# Patient Record
Sex: MALE | Race: ASIAN | URBAN - METROPOLITAN AREA
[De-identification: names, ages, dates, MRNs, and addresses within clinical notes are randomized per-mention and may not be internally consistent; named-entity substitution may affect disease eponyms.]

---

## 2017-02-25 ASSESSMENT — MIFFLIN-ST. JEOR
SCORE: 1419.88
SCORE: 1419.88

## 2017-02-26 ENCOUNTER — SURGERY - HEALTHEAST (OUTPATIENT)
Dept: GASTROENTEROLOGY | Facility: HOSPITAL | Age: 42
End: 2017-02-26

## 2017-02-27 ENCOUNTER — SURGERY - HEALTHEAST (OUTPATIENT)
Dept: GASTROENTEROLOGY | Facility: HOSPITAL | Age: 42
End: 2017-02-27

## 2017-02-27 ASSESSMENT — MIFFLIN-ST. JEOR
SCORE: 1572.09
SCORE: 1572.09

## 2017-07-26 ENCOUNTER — RECORDS - HEALTHEAST (OUTPATIENT)
Dept: ADMINISTRATIVE | Facility: OTHER | Age: 42
End: 2017-07-26

## 2017-08-04 ENCOUNTER — HOSPITAL ENCOUNTER (OUTPATIENT)
Dept: MRI IMAGING | Facility: HOSPITAL | Age: 42
Discharge: HOME | End: 2017-08-04
Attending: PHYSICIAN ASSISTANT

## 2017-08-04 DIAGNOSIS — R79.89 OTHER ABNORMAL BLOOD CHEMISTRY: ICD-10-CM

## 2018-01-01 ENCOUNTER — ANESTHESIA EVENT (OUTPATIENT)
Dept: SURGERY | Facility: CLINIC | Age: 43
End: 2018-01-01

## 2018-01-01 ENCOUNTER — HOME CARE/HOSPICE - HEALTHEAST (OUTPATIENT)
Dept: HOME HEALTH SERVICES | Facility: HOME HEALTH | Age: 43
End: 2018-01-01

## 2018-01-01 ENCOUNTER — APPOINTMENT (OUTPATIENT)
Dept: CT IMAGING | Facility: CLINIC | Age: 43
DRG: 813 | End: 2018-01-01
Attending: INTERNAL MEDICINE
Payer: COMMERCIAL

## 2018-01-01 ENCOUNTER — APPOINTMENT (OUTPATIENT)
Dept: GENERAL RADIOLOGY | Facility: CLINIC | Age: 43
DRG: 813 | End: 2018-01-01
Attending: STUDENT IN AN ORGANIZED HEALTH CARE EDUCATION/TRAINING PROGRAM
Payer: COMMERCIAL

## 2018-01-01 ENCOUNTER — RESULTS ONLY (OUTPATIENT)
Dept: OTHER | Facility: CLINIC | Age: 43
End: 2018-01-01

## 2018-01-01 ENCOUNTER — OFFICE VISIT (OUTPATIENT)
Dept: INTERPRETER SERVICES | Facility: CLINIC | Age: 43
End: 2018-01-01
Payer: COMMERCIAL

## 2018-01-01 ENCOUNTER — APPOINTMENT (OUTPATIENT)
Dept: OCCUPATIONAL THERAPY | Facility: CLINIC | Age: 43
DRG: 005 | End: 2018-01-01
Attending: TRANSPLANT SURGERY
Payer: COMMERCIAL

## 2018-01-01 ENCOUNTER — OFFICE VISIT (OUTPATIENT)
Dept: INFUSION THERAPY | Facility: CLINIC | Age: 43
End: 2018-01-01
Attending: INTERNAL MEDICINE
Payer: COMMERCIAL

## 2018-01-01 ENCOUNTER — INTERPRETER (OUTPATIENT)
Dept: INTERPRETER SERVICES | Facility: CLINIC | Age: 43
End: 2018-01-01
Payer: COMMERCIAL

## 2018-01-01 ENCOUNTER — APPOINTMENT (OUTPATIENT)
Dept: PHYSICAL THERAPY | Facility: CLINIC | Age: 43
DRG: 005 | End: 2018-01-01
Attending: TRANSPLANT SURGERY
Payer: COMMERCIAL

## 2018-01-01 ENCOUNTER — ANESTHESIA (OUTPATIENT)
Dept: SURGERY | Facility: CLINIC | Age: 43
End: 2018-01-01

## 2018-01-01 ENCOUNTER — OFFICE VISIT (OUTPATIENT)
Dept: INFUSION THERAPY | Facility: CLINIC | Age: 43
End: 2018-01-01
Payer: COMMERCIAL

## 2018-01-01 ENCOUNTER — APPOINTMENT (OUTPATIENT)
Dept: CARDIOLOGY | Facility: CLINIC | Age: 43
DRG: 005 | End: 2018-01-01
Attending: NURSE PRACTITIONER
Payer: COMMERCIAL

## 2018-01-01 ENCOUNTER — APPOINTMENT (OUTPATIENT)
Dept: GENERAL RADIOLOGY | Facility: CLINIC | Age: 43
DRG: 005 | End: 2018-01-01
Attending: TRANSPLANT SURGERY
Payer: COMMERCIAL

## 2018-01-01 ENCOUNTER — APPOINTMENT (OUTPATIENT)
Dept: GENERAL RADIOLOGY | Facility: CLINIC | Age: 43
DRG: 871 | End: 2018-01-01
Attending: INTERNAL MEDICINE
Payer: COMMERCIAL

## 2018-01-01 ENCOUNTER — OFFICE VISIT (OUTPATIENT)
Dept: INTERPRETER SERVICES | Facility: CLINIC | Age: 43
End: 2018-01-01

## 2018-01-01 ENCOUNTER — APPOINTMENT (OUTPATIENT)
Dept: ULTRASOUND IMAGING | Facility: CLINIC | Age: 43
DRG: 005 | End: 2018-01-01
Attending: SURGERY
Payer: COMMERCIAL

## 2018-01-01 ENCOUNTER — TRANSFERRED RECORDS (OUTPATIENT)
Dept: HEALTH INFORMATION MANAGEMENT | Facility: CLINIC | Age: 43
End: 2018-01-01

## 2018-01-01 ENCOUNTER — APPOINTMENT (OUTPATIENT)
Dept: SPEECH THERAPY | Facility: CLINIC | Age: 43
DRG: 871 | End: 2018-01-01
Attending: INTERNAL MEDICINE
Payer: COMMERCIAL

## 2018-01-01 ENCOUNTER — APPOINTMENT (OUTPATIENT)
Dept: ULTRASOUND IMAGING | Facility: CLINIC | Age: 43
DRG: 813 | End: 2018-01-01
Attending: TRANSPLANT SURGERY
Payer: COMMERCIAL

## 2018-01-01 ENCOUNTER — APPOINTMENT (OUTPATIENT)
Dept: GENERAL RADIOLOGY | Facility: CLINIC | Age: 43
DRG: 005 | End: 2018-01-01
Attending: PHYSICIAN ASSISTANT
Payer: COMMERCIAL

## 2018-01-01 ENCOUNTER — APPOINTMENT (OUTPATIENT)
Dept: CARDIOLOGY | Facility: CLINIC | Age: 43
DRG: 005 | End: 2018-01-01
Attending: TRANSPLANT SURGERY
Payer: COMMERCIAL

## 2018-01-01 ENCOUNTER — RECORDS - HEALTHEAST (OUTPATIENT)
Dept: ADMINISTRATIVE | Facility: OTHER | Age: 43
End: 2018-01-01

## 2018-01-01 ENCOUNTER — TELEPHONE (OUTPATIENT)
Dept: TRANSPLANT | Facility: CLINIC | Age: 43
End: 2018-01-01

## 2018-01-01 ENCOUNTER — COMMUNICATION - HEALTHEAST (OUTPATIENT)
Dept: SCHEDULING | Facility: CLINIC | Age: 43
End: 2018-01-01

## 2018-01-01 ENCOUNTER — APPOINTMENT (OUTPATIENT)
Dept: PHYSICAL THERAPY | Facility: CLINIC | Age: 43
DRG: 813 | End: 2018-01-01
Attending: INTERNAL MEDICINE
Payer: COMMERCIAL

## 2018-01-01 ENCOUNTER — ANESTHESIA - HEALTHEAST (OUTPATIENT)
Dept: SURGERY | Facility: HOSPITAL | Age: 43
End: 2018-01-01

## 2018-01-01 ENCOUNTER — HOSPITAL ENCOUNTER (INPATIENT)
Facility: CLINIC | Age: 43
Setting detail: SURGERY ADMIT
DRG: 813 | End: 2018-01-01
Attending: TRANSPLANT SURGERY | Admitting: TRANSPLANT SURGERY
Payer: COMMERCIAL

## 2018-01-01 ENCOUNTER — APPOINTMENT (OUTPATIENT)
Dept: GENERAL RADIOLOGY | Facility: CLINIC | Age: 43
DRG: 005 | End: 2018-01-01
Attending: NURSE PRACTITIONER
Payer: COMMERCIAL

## 2018-01-01 ENCOUNTER — SURGERY - HEALTHEAST (OUTPATIENT)
Dept: SURGERY | Facility: HOSPITAL | Age: 43
End: 2018-01-01

## 2018-01-01 ENCOUNTER — APPOINTMENT (OUTPATIENT)
Dept: CT IMAGING | Facility: CLINIC | Age: 43
DRG: 005 | End: 2018-01-01
Attending: TRANSPLANT SURGERY
Payer: COMMERCIAL

## 2018-01-01 ENCOUNTER — HOSPITAL ENCOUNTER (INPATIENT)
Facility: CLINIC | Age: 43
LOS: 49 days | DRG: 005 | End: 2018-09-08
Attending: TRANSPLANT SURGERY | Admitting: TRANSPLANT SURGERY
Payer: COMMERCIAL

## 2018-01-01 ENCOUNTER — APPOINTMENT (OUTPATIENT)
Dept: OCCUPATIONAL THERAPY | Facility: CLINIC | Age: 43
DRG: 871 | End: 2018-01-01
Attending: INTERNAL MEDICINE
Payer: COMMERCIAL

## 2018-01-01 ENCOUNTER — APPOINTMENT (OUTPATIENT)
Dept: ULTRASOUND IMAGING | Facility: CLINIC | Age: 43
DRG: 005 | End: 2018-01-01
Attending: STUDENT IN AN ORGANIZED HEALTH CARE EDUCATION/TRAINING PROGRAM
Payer: COMMERCIAL

## 2018-01-01 ENCOUNTER — RADIANT APPOINTMENT (OUTPATIENT)
Dept: ULTRASOUND IMAGING | Facility: CLINIC | Age: 43
End: 2018-01-01
Payer: COMMERCIAL

## 2018-01-01 ENCOUNTER — ORGAN (OUTPATIENT)
Dept: TRANSPLANT | Facility: CLINIC | Age: 43
End: 2018-01-01

## 2018-01-01 ENCOUNTER — DOCUMENTATION ONLY (OUTPATIENT)
Dept: TRANSPLANT | Facility: CLINIC | Age: 43
End: 2018-01-01

## 2018-01-01 ENCOUNTER — ANESTHESIA (OUTPATIENT)
Dept: INTENSIVE CARE | Facility: CLINIC | Age: 43
DRG: 871 | End: 2018-01-01
Payer: COMMERCIAL

## 2018-01-01 ENCOUNTER — APPOINTMENT (OUTPATIENT)
Dept: GENERAL RADIOLOGY | Facility: CLINIC | Age: 43
DRG: 005 | End: 2018-01-01
Attending: STUDENT IN AN ORGANIZED HEALTH CARE EDUCATION/TRAINING PROGRAM
Payer: COMMERCIAL

## 2018-01-01 ENCOUNTER — RECORDS - HEALTHEAST (OUTPATIENT)
Dept: LAB | Facility: HOSPITAL | Age: 43
End: 2018-01-01

## 2018-01-01 ENCOUNTER — APPOINTMENT (OUTPATIENT)
Dept: PHYSICAL THERAPY | Facility: CLINIC | Age: 43
DRG: 871 | End: 2018-01-01
Attending: INTERNAL MEDICINE
Payer: COMMERCIAL

## 2018-01-01 ENCOUNTER — ANESTHESIA - HEALTHEAST (OUTPATIENT)
Dept: INTENSIVE CARE | Facility: HOSPITAL | Age: 43
End: 2018-01-01

## 2018-01-01 ENCOUNTER — APPOINTMENT (OUTPATIENT)
Dept: ULTRASOUND IMAGING | Facility: CLINIC | Age: 43
DRG: 005 | End: 2018-01-01
Attending: NURSE PRACTITIONER
Payer: COMMERCIAL

## 2018-01-01 ENCOUNTER — RADIANT APPOINTMENT (OUTPATIENT)
Dept: ULTRASOUND IMAGING | Facility: CLINIC | Age: 43
End: 2018-01-01
Attending: INTERNAL MEDICINE
Payer: COMMERCIAL

## 2018-01-01 ENCOUNTER — APPOINTMENT (OUTPATIENT)
Dept: ULTRASOUND IMAGING | Facility: CLINIC | Age: 43
DRG: 005 | End: 2018-01-01
Attending: TRANSPLANT SURGERY
Payer: COMMERCIAL

## 2018-01-01 ENCOUNTER — APPOINTMENT (OUTPATIENT)
Dept: PHYSICAL THERAPY | Facility: CLINIC | Age: 43
DRG: 813 | End: 2018-01-01
Attending: TRANSPLANT SURGERY
Payer: COMMERCIAL

## 2018-01-01 ENCOUNTER — ANESTHESIA EVENT (OUTPATIENT)
Dept: INTENSIVE CARE | Facility: CLINIC | Age: 43
DRG: 871 | End: 2018-01-01
Payer: COMMERCIAL

## 2018-01-01 ENCOUNTER — ANESTHESIA EVENT (OUTPATIENT)
Dept: SURGERY | Facility: CLINIC | Age: 43
DRG: 871 | End: 2018-01-01
Payer: COMMERCIAL

## 2018-01-01 ENCOUNTER — CARE COORDINATION (OUTPATIENT)
Dept: CARE COORDINATION | Facility: CLINIC | Age: 43
End: 2018-01-01

## 2018-01-01 ENCOUNTER — HOSPITAL ENCOUNTER (INPATIENT)
Facility: CLINIC | Age: 43
LOS: 12 days | Discharge: HOME HEALTH | DRG: 871 | End: 2018-06-28
Attending: INTERNAL MEDICINE | Admitting: SURGERY
Payer: COMMERCIAL

## 2018-01-01 ENCOUNTER — RESULTS ONLY (OUTPATIENT)
Dept: GASTROENTEROLOGY | Facility: CLINIC | Age: 43
End: 2018-01-01

## 2018-01-01 ENCOUNTER — APPOINTMENT (OUTPATIENT)
Dept: CARDIOLOGY | Facility: CLINIC | Age: 43
DRG: 005 | End: 2018-01-01
Attending: PHYSICIAN ASSISTANT
Payer: COMMERCIAL

## 2018-01-01 ENCOUNTER — ANESTHESIA (OUTPATIENT)
Dept: SURGERY | Facility: CLINIC | Age: 43
DRG: 871 | End: 2018-01-01
Payer: COMMERCIAL

## 2018-01-01 ENCOUNTER — APPOINTMENT (OUTPATIENT)
Dept: SPEECH THERAPY | Facility: CLINIC | Age: 43
DRG: 005 | End: 2018-01-01
Attending: TRANSPLANT SURGERY
Payer: COMMERCIAL

## 2018-01-01 ENCOUNTER — RECORDS - HEALTHEAST (OUTPATIENT)
Dept: INTENSIVE CARE | Facility: HOSPITAL | Age: 43
End: 2018-01-01

## 2018-01-01 ENCOUNTER — HOSPITAL ENCOUNTER (INPATIENT)
Facility: CLINIC | Age: 43
Setting detail: SURGERY ADMIT
End: 2018-01-01
Attending: TRANSPLANT SURGERY | Admitting: TRANSPLANT SURGERY
Payer: COMMERCIAL

## 2018-01-01 ENCOUNTER — ANESTHESIA (OUTPATIENT)
Dept: INTENSIVE CARE | Facility: CLINIC | Age: 43
End: 2018-01-01

## 2018-01-01 ENCOUNTER — APPOINTMENT (OUTPATIENT)
Dept: CT IMAGING | Facility: CLINIC | Age: 43
DRG: 005 | End: 2018-01-01
Attending: PHYSICIAN ASSISTANT
Payer: COMMERCIAL

## 2018-01-01 ENCOUNTER — ANESTHESIA EVENT (OUTPATIENT)
Dept: INTENSIVE CARE | Facility: CLINIC | Age: 43
End: 2018-01-01

## 2018-01-01 ENCOUNTER — HOSPITAL ENCOUNTER (INPATIENT)
Facility: CLINIC | Age: 43
LOS: 4 days | Discharge: HOME | DRG: 813 | End: 2018-07-17
Attending: TRANSPLANT SURGERY | Admitting: PEDIATRICS
Payer: COMMERCIAL

## 2018-01-01 ENCOUNTER — APPOINTMENT (OUTPATIENT)
Dept: CARDIOLOGY | Facility: CLINIC | Age: 43
DRG: 871 | End: 2018-01-01
Attending: INTERNAL MEDICINE
Payer: COMMERCIAL

## 2018-01-01 ENCOUNTER — ANESTHESIA (OUTPATIENT)
Dept: SURGERY | Facility: CLINIC | Age: 43
DRG: 005 | End: 2018-01-01
Payer: COMMERCIAL

## 2018-01-01 ENCOUNTER — COMMITTEE REVIEW (OUTPATIENT)
Dept: TRANSPLANT | Facility: CLINIC | Age: 43
End: 2018-01-01

## 2018-01-01 ENCOUNTER — APPOINTMENT (OUTPATIENT)
Dept: GENERAL RADIOLOGY | Facility: CLINIC | Age: 43
DRG: 871 | End: 2018-01-01
Attending: STUDENT IN AN ORGANIZED HEALTH CARE EDUCATION/TRAINING PROGRAM
Payer: COMMERCIAL

## 2018-01-01 ENCOUNTER — APPOINTMENT (OUTPATIENT)
Dept: CT IMAGING | Facility: CLINIC | Age: 43
DRG: 005 | End: 2018-01-01
Attending: NURSE PRACTITIONER
Payer: COMMERCIAL

## 2018-01-01 ENCOUNTER — HOSPITAL ENCOUNTER (OUTPATIENT)
Dept: MRI IMAGING | Facility: HOSPITAL | Age: 43
Discharge: HOME | End: 2018-04-25

## 2018-01-01 ENCOUNTER — SURGERY - HEALTHEAST (OUTPATIENT)
Dept: GASTROENTEROLOGY | Facility: HOSPITAL | Age: 43
End: 2018-01-01

## 2018-01-01 ENCOUNTER — ANESTHESIA EVENT (OUTPATIENT)
Dept: SURGERY | Facility: CLINIC | Age: 43
DRG: 005 | End: 2018-01-01
Payer: COMMERCIAL

## 2018-01-01 ENCOUNTER — APPOINTMENT (OUTPATIENT)
Dept: ULTRASOUND IMAGING | Facility: CLINIC | Age: 43
DRG: 871 | End: 2018-01-01
Attending: INTERNAL MEDICINE
Payer: COMMERCIAL

## 2018-01-01 ENCOUNTER — HOSPITAL ENCOUNTER (OUTPATIENT)
Dept: ULTRASOUND IMAGING | Facility: HOSPITAL | Age: 43
Discharge: HOME | End: 2018-03-22
Attending: INTERPRETER

## 2018-01-01 VITALS
HEIGHT: 66 IN | TEMPERATURE: 98.5 F | DIASTOLIC BLOOD PRESSURE: 73 MMHG | RESPIRATION RATE: 16 BRPM | WEIGHT: 156.75 LBS | SYSTOLIC BLOOD PRESSURE: 111 MMHG | HEART RATE: 78 BPM | BODY MASS INDEX: 25.19 KG/M2 | OXYGEN SATURATION: 100 %

## 2018-01-01 VITALS
SYSTOLIC BLOOD PRESSURE: 112 MMHG | HEART RATE: 75 BPM | BODY MASS INDEX: 21 KG/M2 | DIASTOLIC BLOOD PRESSURE: 66 MMHG | TEMPERATURE: 99 F | WEIGHT: 130.1 LBS | OXYGEN SATURATION: 98 %

## 2018-01-01 VITALS
TEMPERATURE: 98.4 F | HEART RATE: 80 BPM | WEIGHT: 134.4 LBS | DIASTOLIC BLOOD PRESSURE: 60 MMHG | BODY MASS INDEX: 21.69 KG/M2 | OXYGEN SATURATION: 100 % | SYSTOLIC BLOOD PRESSURE: 115 MMHG

## 2018-01-01 VITALS
DIASTOLIC BLOOD PRESSURE: 73 MMHG | SYSTOLIC BLOOD PRESSURE: 107 MMHG | HEART RATE: 59 BPM | RESPIRATION RATE: 18 BRPM | WEIGHT: 141.1 LBS | TEMPERATURE: 97.5 F | OXYGEN SATURATION: 100 % | BODY MASS INDEX: 22.77 KG/M2

## 2018-01-01 VITALS
OXYGEN SATURATION: 100 % | HEART RATE: 81 BPM | DIASTOLIC BLOOD PRESSURE: 66 MMHG | BODY MASS INDEX: 21.69 KG/M2 | SYSTOLIC BLOOD PRESSURE: 113 MMHG | RESPIRATION RATE: 16 BRPM | WEIGHT: 135 LBS | HEIGHT: 66 IN | TEMPERATURE: 98.2 F

## 2018-01-01 VITALS
OXYGEN SATURATION: 96 % | RESPIRATION RATE: 19 BRPM | BODY MASS INDEX: 24.94 KG/M2 | SYSTOLIC BLOOD PRESSURE: 154 MMHG | WEIGHT: 154.54 LBS | HEART RATE: 131 BPM | DIASTOLIC BLOOD PRESSURE: 138 MMHG | TEMPERATURE: 100 F

## 2018-01-01 DIAGNOSIS — E83.39 HYPOPHOSPHATASIA: ICD-10-CM

## 2018-01-01 DIAGNOSIS — K70.31 ALCOHOLIC CIRRHOSIS OF LIVER WITH ASCITES (H): ICD-10-CM

## 2018-01-01 DIAGNOSIS — N39.0 ENTEROCOCCUS UTI: ICD-10-CM

## 2018-01-01 DIAGNOSIS — B18.1 CHRONIC HEPATITIS B (H): ICD-10-CM

## 2018-01-01 DIAGNOSIS — B18.1 CHRONIC HEPATITIS B WITH CIRRHOSIS (H): ICD-10-CM

## 2018-01-01 DIAGNOSIS — E87.6 HYPOKALEMIA: ICD-10-CM

## 2018-01-01 DIAGNOSIS — Z78.9 ON ENTERAL NUTRITION: ICD-10-CM

## 2018-01-01 DIAGNOSIS — B18.1 HEPATITIS B, CHRONIC (H): ICD-10-CM

## 2018-01-01 DIAGNOSIS — D84.9 IMMUNOSUPPRESSION (H): ICD-10-CM

## 2018-01-01 DIAGNOSIS — Z94.4 LIVER TRANSPLANTED (H): Primary | ICD-10-CM

## 2018-01-01 DIAGNOSIS — K70.31 ALCOHOLIC CIRRHOSIS OF LIVER WITH ASCITES (H): Primary | ICD-10-CM

## 2018-01-01 DIAGNOSIS — B18.1 CHRONIC HEPATITIS B WITH CIRRHOSIS (H): Primary | ICD-10-CM

## 2018-01-01 DIAGNOSIS — B18.1 CHRONIC VIRAL HEPATITIS B WITHOUT DELTA AGENT AND WITHOUT COMA (H): ICD-10-CM

## 2018-01-01 DIAGNOSIS — K74.60 CHRONIC HEPATITIS B WITH CIRRHOSIS (H): ICD-10-CM

## 2018-01-01 DIAGNOSIS — B37.2 CANDIDIASIS OF SKIN: ICD-10-CM

## 2018-01-01 DIAGNOSIS — G93.40 ENCEPHALOPATHY: Primary | ICD-10-CM

## 2018-01-01 DIAGNOSIS — I86.4 BLEEDING GASTRIC VARICES: ICD-10-CM

## 2018-01-01 DIAGNOSIS — E11.9 TYPE 2 DIABETES MELLITUS WITHOUT COMPLICATION, WITHOUT LONG-TERM CURRENT USE OF INSULIN (H): ICD-10-CM

## 2018-01-01 DIAGNOSIS — R18.8 CIRRHOSIS OF LIVER WITH ASCITES, UNSPECIFIED HEPATIC CIRRHOSIS TYPE (H): ICD-10-CM

## 2018-01-01 DIAGNOSIS — K65.2 SBP (SPONTANEOUS BACTERIAL PERITONITIS) (H): ICD-10-CM

## 2018-01-01 DIAGNOSIS — K74.60 CIRRHOSIS (H): Primary | ICD-10-CM

## 2018-01-01 DIAGNOSIS — D69.6 THROMBOCYTOPENIA (H): Primary | ICD-10-CM

## 2018-01-01 DIAGNOSIS — K74.60 CHRONIC HEPATITIS B WITH CIRRHOSIS (H): Primary | ICD-10-CM

## 2018-01-01 DIAGNOSIS — K74.60 CIRRHOSIS (H): ICD-10-CM

## 2018-01-01 DIAGNOSIS — K76.82 HEPATIC ENCEPHALOPATHY (H): ICD-10-CM

## 2018-01-01 DIAGNOSIS — K74.60 CIRRHOSIS OF LIVER WITH ASCITES, UNSPECIFIED HEPATIC CIRRHOSIS TYPE (H): ICD-10-CM

## 2018-01-01 DIAGNOSIS — B95.2 ENTEROCOCCUS UTI: ICD-10-CM

## 2018-01-01 LAB
A* LOCUS NMDP: NORMAL
A* LOCUS: NORMAL
A* NMDP: NORMAL
A2: 1231
ABO + RH BLD: NORMAL
ABTEST METHOD: NORMAL
ACANTHOCYTES BLD QL SMEAR: SLIGHT
AFP L3 MFR SERPL: 14.3 % (ref 0–9.9)
AFP SERPL-MCNC: 4 NG/ML (ref 0–15)
AFP SERPL-MCNC: 4.1 UG/L (ref 0–8)
AFP SERPL-MCNC: NORMAL UG/L (ref 0–8)
ALBUMIN FLD-MCNC: 0.1 G/DL
ALBUMIN FLD-MCNC: 0.9 G/DL
ALBUMIN FLD-MCNC: 1 G/DL
ALBUMIN FLD-MCNC: 1 G/DL
ALBUMIN SERPL-MCNC: 1 G/DL (ref 3.4–5)
ALBUMIN SERPL-MCNC: 1.1 G/DL (ref 3.4–5)
ALBUMIN SERPL-MCNC: 1.3 G/DL (ref 3.4–5)
ALBUMIN SERPL-MCNC: 1.4 G/DL (ref 3.4–5)
ALBUMIN SERPL-MCNC: 1.5 G/DL (ref 3.4–5)
ALBUMIN SERPL-MCNC: 1.6 G/DL (ref 3.4–5)
ALBUMIN SERPL-MCNC: 1.7 G/DL (ref 3.4–5)
ALBUMIN SERPL-MCNC: 1.8 G/DL (ref 3.4–5)
ALBUMIN SERPL-MCNC: 1.9 G/DL (ref 3.4–5)
ALBUMIN SERPL-MCNC: 2 G/DL (ref 3.4–5)
ALBUMIN SERPL-MCNC: 2.1 G/DL (ref 3.4–5)
ALBUMIN SERPL-MCNC: 2.2 G/DL (ref 3.4–5)
ALBUMIN SERPL-MCNC: 2.3 G/DL (ref 3.4–5)
ALBUMIN SERPL-MCNC: 2.4 G/DL (ref 3.4–5)
ALBUMIN SERPL-MCNC: 2.5 G/DL (ref 3.4–5)
ALBUMIN SERPL-MCNC: 2.6 G/DL (ref 3.4–5)
ALBUMIN SERPL-MCNC: 2.7 G/DL (ref 3.4–5)
ALBUMIN SERPL-MCNC: 2.8 G/DL (ref 3.4–5)
ALBUMIN SERPL-MCNC: 2.9 G/DL (ref 3.4–5)
ALBUMIN SERPL-MCNC: 2.9 G/DL (ref 3.4–5)
ALBUMIN SERPL-MCNC: 3 G/DL (ref 3.4–5)
ALBUMIN SERPL-MCNC: 3.1 G/DL (ref 3.5–5)
ALBUMIN SERPL-MCNC: 3.2 G/DL (ref 3.4–5)
ALBUMIN SERPL-MCNC: 3.2 G/DL (ref 3.4–5)
ALBUMIN SERPL-MCNC: 3.4 G/DL (ref 3.4–5)
ALBUMIN SERPL-MCNC: 3.5 G/DL (ref 3.4–5)
ALBUMIN SERPL-MCNC: 3.6 G/DL (ref 3.4–5)
ALBUMIN SERPL-MCNC: 3.7 G/DL (ref 3.4–5)
ALBUMIN SERPL-MCNC: 3.8 G/DL (ref 3.4–5)
ALBUMIN SERPL-MCNC: 3.8 G/DL (ref 3.4–5)
ALBUMIN SERPL-MCNC: 4 G/DL (ref 3.4–5)
ALBUMIN UR-MCNC: 10 MG/DL
ALBUMIN UR-MCNC: 30 MG/DL
ALBUMIN UR-MCNC: 30 MG/DL
ALBUMIN UR-MCNC: NEGATIVE MG/DL
ALP SERPL-CCNC: 100 U/L (ref 40–150)
ALP SERPL-CCNC: 103 U/L (ref 40–150)
ALP SERPL-CCNC: 107 U/L (ref 40–150)
ALP SERPL-CCNC: 111 U/L (ref 40–150)
ALP SERPL-CCNC: 127 U/L (ref 40–150)
ALP SERPL-CCNC: 138 U/L (ref 40–150)
ALP SERPL-CCNC: 142 U/L (ref 40–150)
ALP SERPL-CCNC: 144 U/L (ref 40–150)
ALP SERPL-CCNC: 148 U/L (ref 40–150)
ALP SERPL-CCNC: 148 U/L (ref 40–150)
ALP SERPL-CCNC: 151 U/L (ref 40–150)
ALP SERPL-CCNC: 153 U/L (ref 40–150)
ALP SERPL-CCNC: 154 U/L (ref 40–150)
ALP SERPL-CCNC: 157 U/L (ref 40–150)
ALP SERPL-CCNC: 157 U/L (ref 40–150)
ALP SERPL-CCNC: 158 U/L (ref 40–150)
ALP SERPL-CCNC: 160 U/L (ref 40–150)
ALP SERPL-CCNC: 162 U/L (ref 40–150)
ALP SERPL-CCNC: 164 U/L (ref 40–150)
ALP SERPL-CCNC: 170 U/L (ref 40–150)
ALP SERPL-CCNC: 178 U/L (ref 40–150)
ALP SERPL-CCNC: 179 U/L (ref 40–150)
ALP SERPL-CCNC: 190 U/L (ref 40–150)
ALP SERPL-CCNC: 192 U/L (ref 40–150)
ALP SERPL-CCNC: 193 U/L (ref 40–150)
ALP SERPL-CCNC: 193 U/L (ref 40–150)
ALP SERPL-CCNC: 194 U/L (ref 40–150)
ALP SERPL-CCNC: 196 U/L (ref 40–150)
ALP SERPL-CCNC: 199 U/L (ref 40–150)
ALP SERPL-CCNC: 202 U/L (ref 40–150)
ALP SERPL-CCNC: 204 U/L (ref 40–150)
ALP SERPL-CCNC: 206 U/L (ref 40–150)
ALP SERPL-CCNC: 206 U/L (ref 40–150)
ALP SERPL-CCNC: 208 U/L (ref 40–150)
ALP SERPL-CCNC: 211 U/L (ref 40–150)
ALP SERPL-CCNC: 218 U/L (ref 40–150)
ALP SERPL-CCNC: 218 U/L (ref 40–150)
ALP SERPL-CCNC: 223 U/L (ref 40–150)
ALP SERPL-CCNC: 224 U/L (ref 40–150)
ALP SERPL-CCNC: 224 U/L (ref 40–150)
ALP SERPL-CCNC: 227 U/L (ref 40–150)
ALP SERPL-CCNC: 227 U/L (ref 40–150)
ALP SERPL-CCNC: 228 U/L (ref 40–150)
ALP SERPL-CCNC: 233 U/L (ref 40–150)
ALP SERPL-CCNC: 235 U/L (ref 40–150)
ALP SERPL-CCNC: 239 U/L (ref 40–150)
ALP SERPL-CCNC: 239 U/L (ref 40–150)
ALP SERPL-CCNC: 240 U/L (ref 40–150)
ALP SERPL-CCNC: 242 U/L (ref 40–150)
ALP SERPL-CCNC: 244 U/L (ref 40–150)
ALP SERPL-CCNC: 245 U/L (ref 40–150)
ALP SERPL-CCNC: 249 U/L (ref 40–150)
ALP SERPL-CCNC: 250 U/L (ref 40–150)
ALP SERPL-CCNC: 251 U/L (ref 40–150)
ALP SERPL-CCNC: 252 U/L (ref 40–150)
ALP SERPL-CCNC: 253 U/L (ref 40–150)
ALP SERPL-CCNC: 254 U/L (ref 40–150)
ALP SERPL-CCNC: 254 U/L (ref 40–150)
ALP SERPL-CCNC: 257 U/L (ref 40–150)
ALP SERPL-CCNC: 258 U/L (ref 40–150)
ALP SERPL-CCNC: 263 U/L (ref 40–150)
ALP SERPL-CCNC: 266 U/L (ref 40–150)
ALP SERPL-CCNC: 266 U/L (ref 40–150)
ALP SERPL-CCNC: 269 U/L (ref 40–150)
ALP SERPL-CCNC: 269 U/L (ref 40–150)
ALP SERPL-CCNC: 270 U/L (ref 40–150)
ALP SERPL-CCNC: 270 U/L (ref 40–150)
ALP SERPL-CCNC: 272 U/L (ref 40–150)
ALP SERPL-CCNC: 272 U/L (ref 40–150)
ALP SERPL-CCNC: 273 U/L (ref 40–150)
ALP SERPL-CCNC: 274 U/L (ref 40–150)
ALP SERPL-CCNC: 276 U/L (ref 40–150)
ALP SERPL-CCNC: 276 U/L (ref 40–150)
ALP SERPL-CCNC: 277 U/L (ref 40–150)
ALP SERPL-CCNC: 277 U/L (ref 40–150)
ALP SERPL-CCNC: 279 U/L (ref 40–150)
ALP SERPL-CCNC: 280 U/L (ref 40–150)
ALP SERPL-CCNC: 282 U/L (ref 40–150)
ALP SERPL-CCNC: 282 U/L (ref 40–150)
ALP SERPL-CCNC: 283 U/L (ref 40–150)
ALP SERPL-CCNC: 289 U/L (ref 40–150)
ALP SERPL-CCNC: 290 U/L (ref 40–150)
ALP SERPL-CCNC: 291 U/L (ref 40–150)
ALP SERPL-CCNC: 292 U/L (ref 40–150)
ALP SERPL-CCNC: 296 U/L (ref 40–150)
ALP SERPL-CCNC: 296 U/L (ref 40–150)
ALP SERPL-CCNC: 298 U/L (ref 40–150)
ALP SERPL-CCNC: 307 U/L (ref 40–150)
ALP SERPL-CCNC: 412 U/L (ref 40–150)
ALP SERPL-CCNC: 43 U/L (ref 40–150)
ALP SERPL-CCNC: 518 U/L (ref 40–150)
ALP SERPL-CCNC: 552 U/L (ref 40–150)
ALP SERPL-CCNC: 56 U/L (ref 40–150)
ALP SERPL-CCNC: 599 U/L (ref 40–150)
ALP SERPL-CCNC: 60 U/L (ref 40–150)
ALP SERPL-CCNC: 61 U/L (ref 40–150)
ALP SERPL-CCNC: 620 U/L (ref 40–150)
ALP SERPL-CCNC: 63 U/L (ref 40–150)
ALP SERPL-CCNC: 64 U/L (ref 40–150)
ALP SERPL-CCNC: 664 U/L (ref 40–150)
ALP SERPL-CCNC: 664 U/L (ref 40–150)
ALP SERPL-CCNC: 69 U/L (ref 40–150)
ALP SERPL-CCNC: 704 U/L (ref 40–150)
ALP SERPL-CCNC: 737 U/L (ref 40–150)
ALP SERPL-CCNC: 75 U/L (ref 40–150)
ALP SERPL-CCNC: 76 U/L (ref 40–150)
ALP SERPL-CCNC: 77 U/L (ref 40–150)
ALP SERPL-CCNC: 78 U/L (ref 40–150)
ALP SERPL-CCNC: 78 U/L (ref 40–150)
ALP SERPL-CCNC: 79 U/L (ref 40–150)
ALP SERPL-CCNC: 79 U/L (ref 40–150)
ALP SERPL-CCNC: 80 U/L (ref 40–150)
ALP SERPL-CCNC: 82 U/L (ref 40–150)
ALP SERPL-CCNC: 83 U/L (ref 40–150)
ALP SERPL-CCNC: 83 U/L (ref 40–150)
ALP SERPL-CCNC: 84 U/L (ref 40–150)
ALP SERPL-CCNC: 86 U/L (ref 40–150)
ALP SERPL-CCNC: 86 U/L (ref 40–150)
ALP SERPL-CCNC: 88 U/L (ref 40–150)
ALP SERPL-CCNC: 88 U/L (ref 40–150)
ALP SERPL-CCNC: 90 U/L (ref 40–150)
ALP SERPL-CCNC: 90 U/L (ref 40–150)
ALP SERPL-CCNC: 92 U/L (ref 40–150)
ALP SERPL-CCNC: 94 U/L (ref 40–150)
ALP SERPL-CCNC: 96 U/L (ref 40–150)
ALP SERPL-CCNC: 97 U/L (ref 40–150)
ALT SERPL W P-5'-P-CCNC: 101 U/L (ref 0–70)
ALT SERPL W P-5'-P-CCNC: 106 U/L (ref 0–70)
ALT SERPL W P-5'-P-CCNC: 1080 U/L (ref 0–70)
ALT SERPL W P-5'-P-CCNC: 111 U/L (ref 0–70)
ALT SERPL W P-5'-P-CCNC: 112 U/L (ref 0–70)
ALT SERPL W P-5'-P-CCNC: 1183 U/L (ref 0–70)
ALT SERPL W P-5'-P-CCNC: 12 U/L (ref 0–70)
ALT SERPL W P-5'-P-CCNC: 122 U/L (ref 0–70)
ALT SERPL W P-5'-P-CCNC: 122 U/L (ref 0–70)
ALT SERPL W P-5'-P-CCNC: 123 U/L (ref 0–70)
ALT SERPL W P-5'-P-CCNC: 1239 U/L (ref 0–70)
ALT SERPL W P-5'-P-CCNC: 124 U/L (ref 0–70)
ALT SERPL W P-5'-P-CCNC: 125 U/L (ref 0–70)
ALT SERPL W P-5'-P-CCNC: 125 U/L (ref 0–70)
ALT SERPL W P-5'-P-CCNC: 13 U/L (ref 0–70)
ALT SERPL W P-5'-P-CCNC: 130 U/L (ref 0–70)
ALT SERPL W P-5'-P-CCNC: 132 U/L (ref 0–70)
ALT SERPL W P-5'-P-CCNC: 137 U/L (ref 0–70)
ALT SERPL W P-5'-P-CCNC: 1372 U/L (ref 0–70)
ALT SERPL W P-5'-P-CCNC: 138 U/L (ref 0–70)
ALT SERPL W P-5'-P-CCNC: 14 U/L (ref 0–70)
ALT SERPL W P-5'-P-CCNC: 142 U/L (ref 0–70)
ALT SERPL W P-5'-P-CCNC: 15 U/L (ref 0–70)
ALT SERPL W P-5'-P-CCNC: 154 U/L (ref 0–70)
ALT SERPL W P-5'-P-CCNC: 1594 U/L (ref 0–70)
ALT SERPL W P-5'-P-CCNC: 16 U/L (ref 0–70)
ALT SERPL W P-5'-P-CCNC: 162 U/L (ref 0–70)
ALT SERPL W P-5'-P-CCNC: 166 U/L (ref 0–70)
ALT SERPL W P-5'-P-CCNC: 17 U/L (ref 0–70)
ALT SERPL W P-5'-P-CCNC: 1730 U/L (ref 0–70)
ALT SERPL W P-5'-P-CCNC: 1756 U/L (ref 0–70)
ALT SERPL W P-5'-P-CCNC: 177 U/L (ref 0–70)
ALT SERPL W P-5'-P-CCNC: 18 U/L (ref 0–70)
ALT SERPL W P-5'-P-CCNC: 18 U/L (ref 0–70)
ALT SERPL W P-5'-P-CCNC: 1846 U/L (ref 0–70)
ALT SERPL W P-5'-P-CCNC: 1890 U/L (ref 0–70)
ALT SERPL W P-5'-P-CCNC: 19 U/L (ref 0–70)
ALT SERPL W P-5'-P-CCNC: 192 U/L (ref 0–70)
ALT SERPL W P-5'-P-CCNC: 20 U/L (ref 0–70)
ALT SERPL W P-5'-P-CCNC: 21 U/L (ref 0–70)
ALT SERPL W P-5'-P-CCNC: 2100 U/L (ref 0–70)
ALT SERPL W P-5'-P-CCNC: 2195 U/L (ref 0–70)
ALT SERPL W P-5'-P-CCNC: 22 U/L (ref 0–70)
ALT SERPL W P-5'-P-CCNC: 23 U/L (ref 0–70)
ALT SERPL W P-5'-P-CCNC: 24 U/L (ref 0–70)
ALT SERPL W P-5'-P-CCNC: 248 U/L (ref 0–70)
ALT SERPL W P-5'-P-CCNC: 25 U/L (ref 0–70)
ALT SERPL W P-5'-P-CCNC: 25 U/L (ref 0–70)
ALT SERPL W P-5'-P-CCNC: 26 U/L (ref 0–70)
ALT SERPL W P-5'-P-CCNC: 267 U/L (ref 0–70)
ALT SERPL W P-5'-P-CCNC: 27 U/L (ref 0–70)
ALT SERPL W P-5'-P-CCNC: 28 U/L (ref 0–70)
ALT SERPL W P-5'-P-CCNC: 28 U/L (ref 0–70)
ALT SERPL W P-5'-P-CCNC: 30 U/L (ref 0–70)
ALT SERPL W P-5'-P-CCNC: 302 U/L (ref 0–70)
ALT SERPL W P-5'-P-CCNC: 31 U/L (ref 0–70)
ALT SERPL W P-5'-P-CCNC: 31 U/L (ref 0–70)
ALT SERPL W P-5'-P-CCNC: 32 U/L (ref 0–70)
ALT SERPL W P-5'-P-CCNC: 32 U/L (ref 0–70)
ALT SERPL W P-5'-P-CCNC: 320 U/L (ref 0–70)
ALT SERPL W P-5'-P-CCNC: 326 U/L (ref 0–70)
ALT SERPL W P-5'-P-CCNC: 33 U/L (ref 0–70)
ALT SERPL W P-5'-P-CCNC: 332 U/L (ref 0–70)
ALT SERPL W P-5'-P-CCNC: 336 U/L (ref 0–70)
ALT SERPL W P-5'-P-CCNC: 338 U/L (ref 0–70)
ALT SERPL W P-5'-P-CCNC: 34 U/L (ref 0–70)
ALT SERPL W P-5'-P-CCNC: 36 U/L (ref 0–70)
ALT SERPL W P-5'-P-CCNC: 386 U/L (ref 0–70)
ALT SERPL W P-5'-P-CCNC: 40 U/L (ref 0–70)
ALT SERPL W P-5'-P-CCNC: 422 U/L (ref 0–70)
ALT SERPL W P-5'-P-CCNC: 438 U/L (ref 0–70)
ALT SERPL W P-5'-P-CCNC: 488 U/L (ref 0–70)
ALT SERPL W P-5'-P-CCNC: 49 U/L (ref 0–70)
ALT SERPL W P-5'-P-CCNC: 51 U/L (ref 0–70)
ALT SERPL W P-5'-P-CCNC: 526 U/L (ref 0–70)
ALT SERPL W P-5'-P-CCNC: 527 U/L (ref 0–70)
ALT SERPL W P-5'-P-CCNC: 53 U/L (ref 0–70)
ALT SERPL W P-5'-P-CCNC: 55 U/L (ref 0–70)
ALT SERPL W P-5'-P-CCNC: 57 U/L (ref 0–70)
ALT SERPL W P-5'-P-CCNC: 57 U/L (ref 0–70)
ALT SERPL W P-5'-P-CCNC: 59 U/L (ref 0–70)
ALT SERPL W P-5'-P-CCNC: 602 U/L (ref 0–70)
ALT SERPL W P-5'-P-CCNC: 613 U/L (ref 0–70)
ALT SERPL W P-5'-P-CCNC: 648 U/L (ref 0–70)
ALT SERPL W P-5'-P-CCNC: 69 U/L (ref 0–70)
ALT SERPL W P-5'-P-CCNC: 699 U/L (ref 0–70)
ALT SERPL W P-5'-P-CCNC: 75 U/L (ref 0–70)
ALT SERPL W P-5'-P-CCNC: 756 U/L (ref 0–70)
ALT SERPL W P-5'-P-CCNC: 78 U/L (ref 0–70)
ALT SERPL W P-5'-P-CCNC: 78 U/L (ref 0–70)
ALT SERPL W P-5'-P-CCNC: 79 U/L (ref 0–70)
ALT SERPL W P-5'-P-CCNC: 832 U/L (ref 0–70)
ALT SERPL W P-5'-P-CCNC: 87 U/L (ref 0–70)
ALT SERPL W P-5'-P-CCNC: 915 U/L (ref 0–70)
ALT SERPL W P-5'-P-CCNC: 92 U/L (ref 0–70)
ALT SERPL W P-5'-P-CCNC: 93 U/L (ref 0–70)
ALT SERPL W P-5'-P-CCNC: 94 U/L (ref 0–70)
ALT SERPL W P-5'-P-CCNC: 95 U/L (ref 0–70)
ALT SERPL W P-5'-P-CCNC: 99 U/L (ref 0–70)
ALT SERPL W P-5'-P-CCNC: 99 U/L (ref 0–70)
AMIKACIN SERPL-MCNC: 12 MG/L
AMIKACIN SERPL-MCNC: 13 MG/L
AMIKACIN SERPL-MCNC: 13 MG/L
AMIKACIN SERPL-MCNC: 21 MG/L
AMIKACIN SERPL-MCNC: 4 MG/L
AMIKACIN SERPL-MCNC: 6 MG/L
AMIKACIN SERPL-MCNC: 7 MG/L
AMIKACIN SERPL-MCNC: 7 MG/L
AMMONIA PLAS-SCNC: 268 UMOL/L (ref 10–50)
AMMONIA PLAS-SCNC: 51 UMOL/L (ref 10–50)
AMYLASE SERPL-CCNC: 35 U/L (ref 30–110)
AMYLASE SERPL-CCNC: 42 U/L (ref 30–110)
AMYLASE SERPL-CCNC: 60 U/L (ref 30–110)
ANGLE RATE OF CLOT GROWTH: 44.3 DEG (ref 59–74)
ANGLE RATE OF CLOT GROWTH: 51.3 DEG (ref 59–74)
ANGLE RATE OF CLOT GROWTH: 51.6 DEG (ref 59–74)
ANGLE RATE OF CLOT GROWTH: 64 DEG (ref 59–74)
ANGLE RATE OF CLOT GROWTH: 65.3 DEG (ref 59–74)
ANGLE RATE OF CLOT GROWTH: 67.6 DEG (ref 59–74)
ANGLE RATE OF CLOT GROWTH: 70.4 DEG (ref 59–74)
ANGLE RATE OF CLOT GROWTH: NORMAL DEG (ref 59–74)
ANGLE RATE OF CLOT STRENGTH: 33.5 DEGREES (ref 53–72)
ANGLE RATE OF CLOT STRENGTH: 55.8 DEGREES (ref 53–72)
ANGLE RATE OF CLOT STRENGTH: 56.3 DEGREES (ref 53–72)
ANGLE RATE OF CLOT STRENGTH: 56.8 DEGREES (ref 53–72)
ANGLE RATE OF CLOT STRENGTH: 60 DEGREES (ref 53–72)
ANGLE RATE OF CLOT STRENGTH: 69.6 DEGREES (ref 53–72)
ANGLE RATE OF CLOT STRENGTH: NORMAL DEGREES (ref 53–72)
ANION GAP SERPL CALCULATED.3IONS-SCNC: 10 MMOL/L (ref 3–14)
ANION GAP SERPL CALCULATED.3IONS-SCNC: 11 MMOL/L (ref 3–14)
ANION GAP SERPL CALCULATED.3IONS-SCNC: 12 MMOL/L (ref 3–14)
ANION GAP SERPL CALCULATED.3IONS-SCNC: 13 MMOL/L (ref 3–14)
ANION GAP SERPL CALCULATED.3IONS-SCNC: 14 MMOL/L (ref 3–14)
ANION GAP SERPL CALCULATED.3IONS-SCNC: 15 MMOL/L (ref 3–14)
ANION GAP SERPL CALCULATED.3IONS-SCNC: 16 MMOL/L (ref 3–14)
ANION GAP SERPL CALCULATED.3IONS-SCNC: 17 MMOL/L (ref 3–14)
ANION GAP SERPL CALCULATED.3IONS-SCNC: 18 MMOL/L (ref 3–14)
ANION GAP SERPL CALCULATED.3IONS-SCNC: 20 MMOL/L (ref 3–14)
ANION GAP SERPL CALCULATED.3IONS-SCNC: 21 MMOL/L (ref 3–14)
ANION GAP SERPL CALCULATED.3IONS-SCNC: 23 MMOL/L (ref 3–14)
ANION GAP SERPL CALCULATED.3IONS-SCNC: 4 MMOL/L (ref 3–14)
ANION GAP SERPL CALCULATED.3IONS-SCNC: 6 MMOL/L (ref 3–14)
ANION GAP SERPL CALCULATED.3IONS-SCNC: 7 MMOL/L (ref 3–14)
ANION GAP SERPL CALCULATED.3IONS-SCNC: 8 MMOL/L (ref 3–14)
ANION GAP SERPL CALCULATED.3IONS-SCNC: 9 MMOL/L (ref 3–14)
ANISOCYTOSIS BLD QL SMEAR: ABNORMAL
ANISOCYTOSIS BLD QL SMEAR: SLIGHT
APPEARANCE FLD: CLEAR
APPEARANCE FLD: NORMAL
APPEARANCE UR: ABNORMAL
APPEARANCE UR: CLEAR
APTT PPP: 179 SEC (ref 22–37)
APTT PPP: 34 SEC (ref 22–37)
APTT PPP: 37 SEC (ref 22–37)
APTT PPP: 38 SEC (ref 22–37)
APTT PPP: 39 SEC (ref 22–37)
APTT PPP: 40 SEC (ref 22–37)
APTT PPP: 41 SEC (ref 22–37)
APTT PPP: 42 SEC (ref 22–37)
APTT PPP: 42 SEC (ref 22–37)
APTT PPP: 46 SEC (ref 22–37)
APTT PPP: 47 SEC (ref 22–37)
APTT PPP: 48 SEC (ref 22–37)
APTT PPP: 49 SEC (ref 22–37)
APTT PPP: 51 SEC (ref 22–37)
APTT PPP: 51 SEC (ref 22–37)
APTT PPP: 53 SEC (ref 22–37)
APTT PPP: 53 SEC (ref 22–37)
APTT PPP: 55 SEC (ref 22–37)
APTT PPP: 56 SEC (ref 22–37)
APTT PPP: 66 SEC (ref 22–37)
APTT PPP: 74 SEC (ref 22–37)
APTT PPP: 85 SEC (ref 22–37)
APTT PPP: 95 SEC (ref 22–37)
APTT PPP: >240 SEC (ref 22–37)
AST SERPL W P-5'-P-CCNC: 100 U/L (ref 0–45)
AST SERPL W P-5'-P-CCNC: 100 U/L (ref 0–45)
AST SERPL W P-5'-P-CCNC: 102 U/L (ref 0–45)
AST SERPL W P-5'-P-CCNC: 102 U/L (ref 0–45)
AST SERPL W P-5'-P-CCNC: 105 U/L (ref 0–45)
AST SERPL W P-5'-P-CCNC: 108 U/L (ref 0–45)
AST SERPL W P-5'-P-CCNC: 111 U/L (ref 0–45)
AST SERPL W P-5'-P-CCNC: 1176 U/L (ref 0–45)
AST SERPL W P-5'-P-CCNC: 118 U/L (ref 0–45)
AST SERPL W P-5'-P-CCNC: 118 U/L (ref 0–45)
AST SERPL W P-5'-P-CCNC: 124 U/L (ref 0–45)
AST SERPL W P-5'-P-CCNC: 1297 U/L (ref 0–45)
AST SERPL W P-5'-P-CCNC: 13 U/L (ref 0–45)
AST SERPL W P-5'-P-CCNC: 1328 U/L (ref 0–45)
AST SERPL W P-5'-P-CCNC: 133 U/L (ref 0–45)
AST SERPL W P-5'-P-CCNC: 142 U/L (ref 0–45)
AST SERPL W P-5'-P-CCNC: 144 U/L (ref 0–45)
AST SERPL W P-5'-P-CCNC: 144 U/L (ref 0–45)
AST SERPL W P-5'-P-CCNC: 152 U/L (ref 0–45)
AST SERPL W P-5'-P-CCNC: 16 U/L (ref 0–45)
AST SERPL W P-5'-P-CCNC: 160 U/L (ref 0–45)
AST SERPL W P-5'-P-CCNC: 1608 U/L (ref 0–45)
AST SERPL W P-5'-P-CCNC: 169 U/L (ref 0–45)
AST SERPL W P-5'-P-CCNC: 17 U/L (ref 0–45)
AST SERPL W P-5'-P-CCNC: 18 U/L (ref 0–45)
AST SERPL W P-5'-P-CCNC: 18 U/L (ref 0–45)
AST SERPL W P-5'-P-CCNC: 19 U/L (ref 0–45)
AST SERPL W P-5'-P-CCNC: 19 U/L (ref 0–45)
AST SERPL W P-5'-P-CCNC: 190 U/L (ref 0–45)
AST SERPL W P-5'-P-CCNC: 193 U/L (ref 0–45)
AST SERPL W P-5'-P-CCNC: 20 U/L (ref 0–45)
AST SERPL W P-5'-P-CCNC: 20 U/L (ref 0–45)
AST SERPL W P-5'-P-CCNC: 21 U/L (ref 0–45)
AST SERPL W P-5'-P-CCNC: 2168 U/L (ref 0–45)
AST SERPL W P-5'-P-CCNC: 22 U/L (ref 0–45)
AST SERPL W P-5'-P-CCNC: 22 U/L (ref 0–45)
AST SERPL W P-5'-P-CCNC: 221 U/L (ref 0–45)
AST SERPL W P-5'-P-CCNC: 226 U/L (ref 0–45)
AST SERPL W P-5'-P-CCNC: 23 U/L (ref 0–45)
AST SERPL W P-5'-P-CCNC: 24 U/L (ref 0–45)
AST SERPL W P-5'-P-CCNC: 242 U/L (ref 0–45)
AST SERPL W P-5'-P-CCNC: 247 U/L (ref 0–45)
AST SERPL W P-5'-P-CCNC: 249 U/L (ref 0–45)
AST SERPL W P-5'-P-CCNC: 25 U/L (ref 0–45)
AST SERPL W P-5'-P-CCNC: 25 U/L (ref 0–45)
AST SERPL W P-5'-P-CCNC: 26 U/L (ref 0–45)
AST SERPL W P-5'-P-CCNC: 28 U/L (ref 0–45)
AST SERPL W P-5'-P-CCNC: 287 U/L (ref 0–45)
AST SERPL W P-5'-P-CCNC: 287 U/L (ref 0–45)
AST SERPL W P-5'-P-CCNC: 289 U/L (ref 0–45)
AST SERPL W P-5'-P-CCNC: 30 U/L (ref 0–45)
AST SERPL W P-5'-P-CCNC: 301 U/L (ref 0–45)
AST SERPL W P-5'-P-CCNC: 305 U/L (ref 0–45)
AST SERPL W P-5'-P-CCNC: 3095 U/L (ref 0–45)
AST SERPL W P-5'-P-CCNC: 319 U/L (ref 0–45)
AST SERPL W P-5'-P-CCNC: 322 U/L (ref 0–45)
AST SERPL W P-5'-P-CCNC: 33 U/L (ref 0–45)
AST SERPL W P-5'-P-CCNC: 341 U/L (ref 0–45)
AST SERPL W P-5'-P-CCNC: 35 U/L (ref 0–45)
AST SERPL W P-5'-P-CCNC: 36 U/L (ref 0–45)
AST SERPL W P-5'-P-CCNC: 368 U/L (ref 0–45)
AST SERPL W P-5'-P-CCNC: 37 U/L (ref 0–45)
AST SERPL W P-5'-P-CCNC: 3763 U/L (ref 0–45)
AST SERPL W P-5'-P-CCNC: 38 U/L (ref 0–45)
AST SERPL W P-5'-P-CCNC: 38 U/L (ref 0–45)
AST SERPL W P-5'-P-CCNC: 386 U/L (ref 0–45)
AST SERPL W P-5'-P-CCNC: 41 U/L (ref 0–45)
AST SERPL W P-5'-P-CCNC: 41 U/L (ref 0–45)
AST SERPL W P-5'-P-CCNC: 418 U/L (ref 0–45)
AST SERPL W P-5'-P-CCNC: 44 U/L (ref 0–45)
AST SERPL W P-5'-P-CCNC: 44 U/L (ref 0–45)
AST SERPL W P-5'-P-CCNC: 47 U/L (ref 0–45)
AST SERPL W P-5'-P-CCNC: 48 U/L (ref 0–45)
AST SERPL W P-5'-P-CCNC: 49 U/L (ref 0–45)
AST SERPL W P-5'-P-CCNC: 49 U/L (ref 0–45)
AST SERPL W P-5'-P-CCNC: 4916 U/L (ref 0–45)
AST SERPL W P-5'-P-CCNC: 4995 U/L (ref 0–45)
AST SERPL W P-5'-P-CCNC: 50 U/L (ref 0–45)
AST SERPL W P-5'-P-CCNC: 504 U/L (ref 0–45)
AST SERPL W P-5'-P-CCNC: 51 U/L (ref 0–45)
AST SERPL W P-5'-P-CCNC: 5308 U/L (ref 0–45)
AST SERPL W P-5'-P-CCNC: 538 U/L (ref 0–45)
AST SERPL W P-5'-P-CCNC: 54 U/L (ref 0–45)
AST SERPL W P-5'-P-CCNC: 54 U/L (ref 0–45)
AST SERPL W P-5'-P-CCNC: 55 U/L (ref 0–45)
AST SERPL W P-5'-P-CCNC: 55 U/L (ref 0–45)
AST SERPL W P-5'-P-CCNC: 56 U/L (ref 0–45)
AST SERPL W P-5'-P-CCNC: 56 U/L (ref 0–45)
AST SERPL W P-5'-P-CCNC: 57 U/L (ref 0–45)
AST SERPL W P-5'-P-CCNC: 59 U/L (ref 0–45)
AST SERPL W P-5'-P-CCNC: 59 U/L (ref 0–45)
AST SERPL W P-5'-P-CCNC: 593 U/L (ref 0–45)
AST SERPL W P-5'-P-CCNC: 60 U/L (ref 0–45)
AST SERPL W P-5'-P-CCNC: 60 U/L (ref 0–45)
AST SERPL W P-5'-P-CCNC: 61 U/L (ref 0–45)
AST SERPL W P-5'-P-CCNC: 61 U/L (ref 0–45)
AST SERPL W P-5'-P-CCNC: 6162 U/L (ref 0–45)
AST SERPL W P-5'-P-CCNC: 62 U/L (ref 0–45)
AST SERPL W P-5'-P-CCNC: 628 U/L (ref 0–45)
AST SERPL W P-5'-P-CCNC: 63 U/L (ref 0–45)
AST SERPL W P-5'-P-CCNC: 63 U/L (ref 0–45)
AST SERPL W P-5'-P-CCNC: 64 U/L (ref 0–45)
AST SERPL W P-5'-P-CCNC: 671 U/L (ref 0–45)
AST SERPL W P-5'-P-CCNC: 68 U/L (ref 0–45)
AST SERPL W P-5'-P-CCNC: 69 U/L (ref 0–45)
AST SERPL W P-5'-P-CCNC: 691 U/L (ref 0–45)
AST SERPL W P-5'-P-CCNC: 71 U/L (ref 0–45)
AST SERPL W P-5'-P-CCNC: 719 U/L (ref 0–45)
AST SERPL W P-5'-P-CCNC: 74 U/L (ref 0–45)
AST SERPL W P-5'-P-CCNC: 76 U/L (ref 0–45)
AST SERPL W P-5'-P-CCNC: 81 U/L (ref 0–45)
AST SERPL W P-5'-P-CCNC: 81 U/L (ref 0–45)
AST SERPL W P-5'-P-CCNC: 83 U/L (ref 0–45)
AST SERPL W P-5'-P-CCNC: 86 U/L (ref 0–45)
AST SERPL W P-5'-P-CCNC: 86 U/L (ref 0–45)
AST SERPL W P-5'-P-CCNC: 864 U/L (ref 0–45)
AST SERPL W P-5'-P-CCNC: 88 U/L (ref 0–45)
AST SERPL W P-5'-P-CCNC: 889 U/L (ref 0–45)
AST SERPL W P-5'-P-CCNC: 95 U/L (ref 0–45)
AST SERPL W P-5'-P-CCNC: 96 U/L (ref 0–45)
AST SERPL W P-5'-P-CCNC: 97 U/L (ref 0–45)
AST SERPL W P-5'-P-CCNC: 98 U/L (ref 0–45)
AT III ACT/NOR PPP CHRO: 21 % (ref 85–135)
B* LOCUS NMDP: NORMAL
B* LOCUS: NORMAL
B* NMDP: NORMAL
B*: NORMAL
BACTERIA #/AREA URNS HPF: ABNORMAL /HPF
BACTERIA SPEC CULT: ABNORMAL
BACTERIA SPEC CULT: NO GROWTH
BACTERIA SPEC CULT: NORMAL
BASE DEFICIT BLDA-SCNC: 0.2 MMOL/L
BASE DEFICIT BLDA-SCNC: 0.3 MMOL/L
BASE DEFICIT BLDA-SCNC: 1.7 MMOL/L
BASE DEFICIT BLDA-SCNC: 10.5 MMOL/L
BASE DEFICIT BLDA-SCNC: 10.7 MMOL/L
BASE DEFICIT BLDA-SCNC: 12.8 MMOL/L
BASE DEFICIT BLDA-SCNC: 13.4 MMOL/L
BASE DEFICIT BLDA-SCNC: 13.6 MMOL/L
BASE DEFICIT BLDA-SCNC: 2.1 MMOL/L
BASE DEFICIT BLDA-SCNC: 2.7 MMOL/L
BASE DEFICIT BLDA-SCNC: 3 MMOL/L
BASE DEFICIT BLDA-SCNC: 3.1 MMOL/L
BASE DEFICIT BLDA-SCNC: 4.2 MMOL/L
BASE DEFICIT BLDA-SCNC: 4.8 MMOL/L
BASE DEFICIT BLDA-SCNC: 5.1 MMOL/L
BASE DEFICIT BLDA-SCNC: 5.2 MMOL/L
BASE DEFICIT BLDA-SCNC: 5.4 MMOL/L
BASE DEFICIT BLDA-SCNC: 6.9 MMOL/L
BASE DEFICIT BLDA-SCNC: 7.6 MMOL/L
BASE DEFICIT BLDA-SCNC: 7.7 MMOL/L
BASE DEFICIT BLDA-SCNC: 7.9 MMOL/L
BASE DEFICIT BLDA-SCNC: 8.3 MMOL/L
BASE DEFICIT BLDA-SCNC: 8.7 MMOL/L
BASE DEFICIT BLDA-SCNC: 8.9 MMOL/L
BASE DEFICIT BLDA-SCNC: 9.3 MMOL/L
BASE DEFICIT BLDA-SCNC: 9.3 MMOL/L
BASE DEFICIT BLDA-SCNC: 9.7 MMOL/L
BASE DEFICIT BLDV-SCNC: 0.4 MMOL/L
BASE DEFICIT BLDV-SCNC: 1.6 MMOL/L
BASE DEFICIT BLDV-SCNC: 2.8 MMOL/L
BASE DEFICIT BLDV-SCNC: 3.1 MMOL/L
BASE DEFICIT BLDV-SCNC: 4 MMOL/L
BASE DEFICIT BLDV-SCNC: 4.8 MMOL/L
BASE DEFICIT BLDV-SCNC: 6.6 MMOL/L
BASE DEFICIT BLDV-SCNC: 7.5 MMOL/L
BASE DEFICIT BLDV-SCNC: 9.9 MMOL/L
BASE EXCESS BLDA CALC-SCNC: 0 MMOL/L
BASE EXCESS BLDA CALC-SCNC: 0.5 MMOL/L
BASE EXCESS BLDA CALC-SCNC: 0.7 MMOL/L
BASE EXCESS BLDA CALC-SCNC: 0.7 MMOL/L
BASE EXCESS BLDA CALC-SCNC: 0.9 MMOL/L
BASE EXCESS BLDA CALC-SCNC: 2.2 MMOL/L
BASO STIPL BLD QL SMEAR: PRESENT
BASOPHILS # BLD AUTO: 0 10E9/L (ref 0–0.2)
BASOPHILS # BLD AUTO: 0.1 10E9/L (ref 0–0.2)
BASOPHILS # BLD AUTO: 0.2 10E9/L (ref 0–0.2)
BASOPHILS NFR BLD AUTO: 0 %
BASOPHILS NFR BLD AUTO: 0.1 %
BASOPHILS NFR BLD AUTO: 0.2 %
BASOPHILS NFR BLD AUTO: 0.4 %
BASOPHILS NFR BLD AUTO: 0.5 %
BASOPHILS NFR BLD AUTO: 0.8 %
BASOPHILS NFR BLD AUTO: 0.9 %
BILIRUB DIRECT SERPL-MCNC: 1 MG/DL (ref 0–0.2)
BILIRUB DIRECT SERPL-MCNC: 1.1 MG/DL (ref 0–0.2)
BILIRUB DIRECT SERPL-MCNC: 1.3 MG/DL (ref 0–0.2)
BILIRUB DIRECT SERPL-MCNC: 1.4 MG/DL (ref 0–0.2)
BILIRUB DIRECT SERPL-MCNC: 1.6 MG/DL (ref 0–0.2)
BILIRUB DIRECT SERPL-MCNC: 1.7 MG/DL (ref 0–0.2)
BILIRUB DIRECT SERPL-MCNC: 1.8 MG/DL (ref 0–0.2)
BILIRUB DIRECT SERPL-MCNC: 1.9 MG/DL (ref 0–0.2)
BILIRUB DIRECT SERPL-MCNC: 2 MG/DL (ref 0–0.2)
BILIRUB DIRECT SERPL-MCNC: 2 MG/DL (ref 0–0.2)
BILIRUB DIRECT SERPL-MCNC: 2.2 MG/DL (ref 0–0.2)
BILIRUB DIRECT SERPL-MCNC: 2.5 MG/DL (ref 0–0.2)
BILIRUB DIRECT SERPL-MCNC: 2.6 MG/DL (ref 0–0.2)
BILIRUB DIRECT SERPL-MCNC: 2.8 MG/DL (ref 0–0.2)
BILIRUB DIRECT SERPL-MCNC: 2.9 MG/DL (ref 0–0.2)
BILIRUB DIRECT SERPL-MCNC: 2.9 MG/DL (ref 0–0.2)
BILIRUB DIRECT SERPL-MCNC: 28.1 MG/DL (ref 0–0.2)
BILIRUB DIRECT SERPL-MCNC: 3.1 MG/DL (ref 0–0.2)
BILIRUB DIRECT SERPL-MCNC: 3.3 MG/DL (ref 0–0.2)
BILIRUB DIRECT SERPL-MCNC: 3.5 MG/DL (ref 0–0.2)
BILIRUB DIRECT SERPL-MCNC: 3.5 MG/DL (ref 0–0.2)
BILIRUB DIRECT SERPL-MCNC: 3.6 MG/DL (ref 0–0.2)
BILIRUB DIRECT SERPL-MCNC: 3.9 MG/DL (ref 0–0.2)
BILIRUB DIRECT SERPL-MCNC: 33.6 MG/DL (ref 0–0.2)
BILIRUB DIRECT SERPL-MCNC: 4 MG/DL (ref 0–0.2)
BILIRUB DIRECT SERPL-MCNC: 4.2 MG/DL (ref 0–0.2)
BILIRUB DIRECT SERPL-MCNC: 4.3 MG/DL (ref 0–0.2)
BILIRUB DIRECT SERPL-MCNC: 4.8 MG/DL (ref 0–0.2)
BILIRUB DIRECT SERPL-MCNC: 5.4 MG/DL (ref 0–0.2)
BILIRUB DIRECT SERPL-MCNC: 5.5 MG/DL (ref 0–0.2)
BILIRUB DIRECT SERPL-MCNC: 5.5 MG/DL (ref 0–0.2)
BILIRUB DIRECT SERPL-MCNC: 6.2 MG/DL (ref 0–0.2)
BILIRUB DIRECT SERPL-MCNC: 6.2 MG/DL (ref 0–0.2)
BILIRUB DIRECT SERPL-MCNC: 6.3 MG/DL (ref 0–0.2)
BILIRUB DIRECT SERPL-MCNC: 6.5 MG/DL (ref 0–0.2)
BILIRUB DIRECT SERPL-MCNC: 7 MG/DL (ref 0–0.2)
BILIRUB SERPL-MCNC: 1.1 MG/DL (ref 0.2–1.3)
BILIRUB SERPL-MCNC: 1.1 MG/DL (ref 0.2–1.3)
BILIRUB SERPL-MCNC: 1.2 MG/DL (ref 0.2–1.3)
BILIRUB SERPL-MCNC: 1.2 MG/DL (ref 0.2–1.3)
BILIRUB SERPL-MCNC: 1.4 MG/DL (ref 0.2–1.3)
BILIRUB SERPL-MCNC: 1.5 MG/DL (ref 0.2–1.3)
BILIRUB SERPL-MCNC: 1.6 MG/DL (ref 0.2–1.3)
BILIRUB SERPL-MCNC: 1.7 MG/DL (ref 0.2–1.3)
BILIRUB SERPL-MCNC: 1.8 MG/DL (ref 0.2–1.3)
BILIRUB SERPL-MCNC: 1.9 MG/DL (ref 0.2–1.3)
BILIRUB SERPL-MCNC: 1.9 MG/DL (ref 0.2–1.3)
BILIRUB SERPL-MCNC: 10.3 MG/DL (ref 0.2–1.3)
BILIRUB SERPL-MCNC: 19.7 MG/DL (ref 0.2–1.3)
BILIRUB SERPL-MCNC: 2 MG/DL (ref 0.2–1.3)
BILIRUB SERPL-MCNC: 2 MG/DL (ref 0.2–1.3)
BILIRUB SERPL-MCNC: 2.1 MG/DL (ref 0.2–1.3)
BILIRUB SERPL-MCNC: 2.2 MG/DL (ref 0.2–1.3)
BILIRUB SERPL-MCNC: 2.3 MG/DL (ref 0.2–1.3)
BILIRUB SERPL-MCNC: 2.4 MG/DL (ref 0.2–1.3)
BILIRUB SERPL-MCNC: 2.4 MG/DL (ref 0.2–1.3)
BILIRUB SERPL-MCNC: 2.5 MG/DL (ref 0.2–1.3)
BILIRUB SERPL-MCNC: 2.6 MG/DL (ref 0.2–1.3)
BILIRUB SERPL-MCNC: 2.7 MG/DL (ref 0.2–1.3)
BILIRUB SERPL-MCNC: 2.8 MG/DL (ref 0.2–1.3)
BILIRUB SERPL-MCNC: 2.9 MG/DL (ref 0.2–1.3)
BILIRUB SERPL-MCNC: 22.6 MG/DL (ref 0.2–1.3)
BILIRUB SERPL-MCNC: 23.4 MG/DL (ref 0.2–1.3)
BILIRUB SERPL-MCNC: 23.4 MG/DL (ref 0.2–1.3)
BILIRUB SERPL-MCNC: 23.5 MG/DL (ref 0.2–1.3)
BILIRUB SERPL-MCNC: 23.7 MG/DL (ref 0.2–1.3)
BILIRUB SERPL-MCNC: 24.5 MG/DL (ref 0.2–1.3)
BILIRUB SERPL-MCNC: 26.1 MG/DL (ref 0.2–1.3)
BILIRUB SERPL-MCNC: 29.4 MG/DL (ref 0.2–1.3)
BILIRUB SERPL-MCNC: 3 MG/DL (ref 0.2–1.3)
BILIRUB SERPL-MCNC: 3.1 MG/DL (ref 0.2–1.3)
BILIRUB SERPL-MCNC: 3.1 MG/DL (ref 0.2–1.3)
BILIRUB SERPL-MCNC: 3.2 MG/DL (ref 0.2–1.3)
BILIRUB SERPL-MCNC: 3.3 MG/DL (ref 0.2–1.3)
BILIRUB SERPL-MCNC: 3.4 MG/DL (ref 0.2–1.3)
BILIRUB SERPL-MCNC: 3.5 MG/DL (ref 0.2–1.3)
BILIRUB SERPL-MCNC: 3.5 MG/DL (ref 0.2–1.3)
BILIRUB SERPL-MCNC: 3.6 MG/DL (ref 0.2–1.3)
BILIRUB SERPL-MCNC: 3.7 MG/DL (ref 0.2–1.3)
BILIRUB SERPL-MCNC: 3.8 MG/DL (ref 0.2–1.3)
BILIRUB SERPL-MCNC: 3.9 MG/DL (ref 0.2–1.3)
BILIRUB SERPL-MCNC: 3.9 MG/DL (ref 0.2–1.3)
BILIRUB SERPL-MCNC: 31 MG/DL (ref 0.2–1.3)
BILIRUB SERPL-MCNC: 31.6 MG/DL (ref 0.2–1.3)
BILIRUB SERPL-MCNC: 34.4 MG/DL (ref 0.2–1.3)
BILIRUB SERPL-MCNC: 35.7 MG/DL (ref 0.2–1.3)
BILIRUB SERPL-MCNC: 36.3 MG/DL (ref 0.2–1.3)
BILIRUB SERPL-MCNC: 37 MG/DL (ref 0.2–1.3)
BILIRUB SERPL-MCNC: 37 MG/DL (ref 0.2–1.3)
BILIRUB SERPL-MCNC: 37.4 MG/DL (ref 0.2–1.3)
BILIRUB SERPL-MCNC: 39.1 MG/DL (ref 0.2–1.3)
BILIRUB SERPL-MCNC: 4.1 MG/DL (ref 0.2–1.3)
BILIRUB SERPL-MCNC: 4.4 MG/DL (ref 0.2–1.3)
BILIRUB SERPL-MCNC: 4.5 MG/DL (ref 0.2–1.3)
BILIRUB SERPL-MCNC: 4.6 MG/DL (ref 0.2–1.3)
BILIRUB SERPL-MCNC: 4.6 MG/DL (ref 0.2–1.3)
BILIRUB SERPL-MCNC: 4.7 MG/DL (ref 0.2–1.3)
BILIRUB SERPL-MCNC: 4.8 MG/DL (ref 0.2–1.3)
BILIRUB SERPL-MCNC: 4.9 MG/DL (ref 0.2–1.3)
BILIRUB SERPL-MCNC: 4.9 MG/DL (ref 0.2–1.3)
BILIRUB SERPL-MCNC: 40.4 MG/DL (ref 0.2–1.3)
BILIRUB SERPL-MCNC: 40.6 MG/DL (ref 0.2–1.3)
BILIRUB SERPL-MCNC: 43.4 MG/DL (ref 0.2–1.3)
BILIRUB SERPL-MCNC: 47.5 MG/DL (ref 0.2–1.3)
BILIRUB SERPL-MCNC: 5 MG/DL (ref 0.2–1.3)
BILIRUB SERPL-MCNC: 5.1 MG/DL (ref 0.2–1.3)
BILIRUB SERPL-MCNC: 5.1 MG/DL (ref 0.2–1.3)
BILIRUB SERPL-MCNC: 5.2 MG/DL (ref 0.2–1.3)
BILIRUB SERPL-MCNC: 5.3 MG/DL (ref 0.2–1.3)
BILIRUB SERPL-MCNC: 5.3 MG/DL (ref 0.2–1.3)
BILIRUB SERPL-MCNC: 5.4 MG/DL (ref 0.2–1.3)
BILIRUB SERPL-MCNC: 5.5 MG/DL (ref 0.2–1.3)
BILIRUB SERPL-MCNC: 5.5 MG/DL (ref 0.2–1.3)
BILIRUB SERPL-MCNC: 5.6 MG/DL (ref 0.2–1.3)
BILIRUB SERPL-MCNC: 5.8 MG/DL (ref 0.2–1.3)
BILIRUB SERPL-MCNC: 57.6 MG/DL (ref 0–1)
BILIRUB SERPL-MCNC: 6.5 MG/DL (ref 0.2–1.3)
BILIRUB SERPL-MCNC: 6.6 MG/DL (ref 0.2–1.3)
BILIRUB SERPL-MCNC: 6.8 MG/DL (ref 0.2–1.3)
BILIRUB SERPL-MCNC: 7.5 MG/DL (ref 0.2–1.3)
BILIRUB SERPL-MCNC: 7.6 MG/DL (ref 0.2–1.3)
BILIRUB SERPL-MCNC: 7.7 MG/DL (ref 0.2–1.3)
BILIRUB SERPL-MCNC: 7.7 MG/DL (ref 0.2–1.3)
BILIRUB SERPL-MCNC: 7.8 MG/DL (ref 0.2–1.3)
BILIRUB SERPL-MCNC: 7.9 MG/DL (ref 0.2–1.3)
BILIRUB SERPL-MCNC: 8 MG/DL (ref 0.2–1.3)
BILIRUB UR QL STRIP: ABNORMAL
BILIRUB UR QL STRIP: NEGATIVE
BLD GP AB SCN SERPL QL: NORMAL
BLD PROD TYP BPU: NORMAL
BLD UNIT ID BPU: 0
BLOOD BANK CMNT PATIENT-IMP: NORMAL
BLOOD PRODUCT CODE: NORMAL
BPU ID: NORMAL
BUN SERPL-MCNC: 100 MG/DL (ref 7–30)
BUN SERPL-MCNC: 108 MG/DL (ref 7–30)
BUN SERPL-MCNC: 108 MG/DL (ref 7–30)
BUN SERPL-MCNC: 116 MG/DL (ref 7–30)
BUN SERPL-MCNC: 118 MG/DL (ref 7–30)
BUN SERPL-MCNC: 120 MG/DL (ref 7–30)
BUN SERPL-MCNC: 125 MG/DL (ref 7–30)
BUN SERPL-MCNC: 127 MG/DL (ref 7–30)
BUN SERPL-MCNC: 127 MG/DL (ref 7–30)
BUN SERPL-MCNC: 131 MG/DL (ref 7–30)
BUN SERPL-MCNC: 134 MG/DL (ref 7–30)
BUN SERPL-MCNC: 139 MG/DL (ref 7–30)
BUN SERPL-MCNC: 147 MG/DL (ref 7–30)
BUN SERPL-MCNC: 16 MG/DL (ref 7–30)
BUN SERPL-MCNC: 18 MG/DL (ref 7–30)
BUN SERPL-MCNC: 18 MG/DL (ref 7–30)
BUN SERPL-MCNC: 19 MG/DL (ref 7–30)
BUN SERPL-MCNC: 20 MG/DL (ref 7–30)
BUN SERPL-MCNC: 20 MG/DL (ref 7–30)
BUN SERPL-MCNC: 21 MG/DL (ref 7–30)
BUN SERPL-MCNC: 22 MG/DL (ref 7–30)
BUN SERPL-MCNC: 23 MG/DL (ref 7–30)
BUN SERPL-MCNC: 24 MG/DL (ref 7–30)
BUN SERPL-MCNC: 24 MG/DL (ref 7–30)
BUN SERPL-MCNC: 25 MG/DL (ref 7–30)
BUN SERPL-MCNC: 26 MG/DL (ref 7–30)
BUN SERPL-MCNC: 27 MG/DL (ref 7–30)
BUN SERPL-MCNC: 28 MG/DL (ref 7–30)
BUN SERPL-MCNC: 28 MG/DL (ref 7–30)
BUN SERPL-MCNC: 29 MG/DL (ref 7–30)
BUN SERPL-MCNC: 29 MG/DL (ref 7–30)
BUN SERPL-MCNC: 30 MG/DL (ref 7–30)
BUN SERPL-MCNC: 31 MG/DL (ref 7–30)
BUN SERPL-MCNC: 32 MG/DL (ref 7–30)
BUN SERPL-MCNC: 33 MG/DL (ref 7–30)
BUN SERPL-MCNC: 33 MG/DL (ref 7–30)
BUN SERPL-MCNC: 34 MG/DL (ref 7–30)
BUN SERPL-MCNC: 34 MG/DL (ref 7–30)
BUN SERPL-MCNC: 35 MG/DL (ref 7–30)
BUN SERPL-MCNC: 35 MG/DL (ref 7–30)
BUN SERPL-MCNC: 37 MG/DL (ref 7–30)
BUN SERPL-MCNC: 38 MG/DL (ref 7–30)
BUN SERPL-MCNC: 39 MG/DL (ref 7–30)
BUN SERPL-MCNC: 40 MG/DL (ref 7–30)
BUN SERPL-MCNC: 41 MG/DL (ref 7–30)
BUN SERPL-MCNC: 42 MG/DL (ref 7–30)
BUN SERPL-MCNC: 43 MG/DL (ref 7–30)
BUN SERPL-MCNC: 43 MG/DL (ref 7–30)
BUN SERPL-MCNC: 44 MG/DL (ref 7–30)
BUN SERPL-MCNC: 44 MG/DL (ref 7–30)
BUN SERPL-MCNC: 46 MG/DL (ref 7–30)
BUN SERPL-MCNC: 46 MG/DL (ref 7–30)
BUN SERPL-MCNC: 48 MG/DL (ref 7–30)
BUN SERPL-MCNC: 50 MG/DL (ref 7–30)
BUN SERPL-MCNC: 51 MG/DL (ref 7–30)
BUN SERPL-MCNC: 52 MG/DL (ref 7–30)
BUN SERPL-MCNC: 54 MG/DL (ref 7–30)
BUN SERPL-MCNC: 55 MG/DL (ref 7–30)
BUN SERPL-MCNC: 55 MG/DL (ref 7–30)
BUN SERPL-MCNC: 56 MG/DL (ref 7–30)
BUN SERPL-MCNC: 57 MG/DL (ref 7–30)
BUN SERPL-MCNC: 58 MG/DL (ref 7–30)
BUN SERPL-MCNC: 66 MG/DL (ref 7–30)
BUN SERPL-MCNC: 66 MG/DL (ref 7–30)
BUN SERPL-MCNC: 68 MG/DL (ref 7–30)
BUN SERPL-MCNC: 74 MG/DL (ref 7–30)
BUN SERPL-MCNC: 74 MG/DL (ref 7–30)
BUN SERPL-MCNC: 77 MG/DL (ref 7–30)
BUN SERPL-MCNC: 79 MG/DL (ref 7–30)
BUN SERPL-MCNC: 79 MG/DL (ref 7–30)
BUN SERPL-MCNC: 80 MG/DL (ref 7–30)
BUN SERPL-MCNC: 80 MG/DL (ref 7–30)
BUN SERPL-MCNC: 82 MG/DL (ref 7–30)
BUN SERPL-MCNC: 84 MG/DL (ref 7–30)
BUN SERPL-MCNC: 87 MG/DL (ref 7–30)
BUN SERPL-MCNC: 88 MG/DL (ref 7–30)
BUN SERPL-MCNC: 93 MG/DL (ref 7–30)
BUN SERPL-MCNC: 94 MG/DL (ref 7–30)
BURR CELLS BLD QL SMEAR: ABNORMAL
BURR CELLS BLD QL SMEAR: SLIGHT
BW-1: NORMAL
C* LOCUS NMDP: NORMAL
C* LOCUS: NORMAL
C* NMDP: NORMAL
C*: NORMAL
CA-I BLD-MCNC: 3.2 MG/DL (ref 4.4–5.2)
CA-I BLD-MCNC: 3.5 MG/DL (ref 4.4–5.2)
CA-I BLD-MCNC: 3.5 MG/DL (ref 4.4–5.2)
CA-I BLD-MCNC: 3.7 MG/DL (ref 4.4–5.2)
CA-I BLD-MCNC: 3.7 MG/DL (ref 4.4–5.2)
CA-I BLD-MCNC: 3.9 MG/DL (ref 4.4–5.2)
CA-I BLD-MCNC: 4.1 MG/DL (ref 4.4–5.2)
CA-I BLD-MCNC: 4.2 MG/DL (ref 4.4–5.2)
CA-I BLD-MCNC: 4.2 MG/DL (ref 4.4–5.2)
CA-I BLD-MCNC: 4.3 MG/DL (ref 4.4–5.2)
CA-I BLD-MCNC: 4.4 MG/DL (ref 4.4–5.2)
CA-I BLD-MCNC: 4.5 MG/DL (ref 4.4–5.2)
CA-I BLD-MCNC: 4.6 MG/DL (ref 4.4–5.2)
CA-I BLD-MCNC: 4.7 MG/DL (ref 4.4–5.2)
CA-I BLD-MCNC: 4.8 MG/DL (ref 4.4–5.2)
CA-I BLD-MCNC: 4.9 MG/DL (ref 4.4–5.2)
CA-I BLD-MCNC: 5 MG/DL (ref 4.4–5.2)
CA-I BLD-MCNC: 5.4 MG/DL (ref 4.4–5.2)
CA-I BLD-MCNC: 5.7 MG/DL (ref 4.4–5.2)
CA-I BLD-MCNC: 5.7 MG/DL (ref 4.4–5.2)
CA-I BLD-MCNC: 6 MG/DL (ref 4.4–5.2)
CA-I SERPL ISE-MCNC: 3.8 MG/DL (ref 4.4–5.2)
CA-I SERPL ISE-MCNC: 3.9 MG/DL (ref 4.4–5.2)
CA-I SERPL ISE-MCNC: 4 MG/DL (ref 4.4–5.2)
CA-I SERPL ISE-MCNC: 4.1 MG/DL (ref 4.4–5.2)
CA-I SERPL ISE-MCNC: 4.2 MG/DL (ref 4.4–5.2)
CA-I SERPL ISE-MCNC: 4.3 MG/DL (ref 4.4–5.2)
CA-I SERPL ISE-MCNC: 4.4 MG/DL (ref 4.4–5.2)
CA-I SERPL ISE-MCNC: 4.6 MG/DL (ref 4.4–5.2)
CA-I SERPL ISE-MCNC: 4.8 MG/DL (ref 4.4–5.2)
CA-I SERPL ISE-MCNC: 4.9 MG/DL (ref 4.4–5.2)
CA-I SERPL ISE-MCNC: 5 MG/DL (ref 4.4–5.2)
CALCIUM SERPL-MCNC: 6 MG/DL (ref 8.5–10.1)
CALCIUM SERPL-MCNC: 6.1 MG/DL (ref 8.5–10.1)
CALCIUM SERPL-MCNC: 6.1 MG/DL (ref 8.5–10.1)
CALCIUM SERPL-MCNC: 6.2 MG/DL (ref 8.5–10.1)
CALCIUM SERPL-MCNC: 6.3 MG/DL (ref 8.5–10.1)
CALCIUM SERPL-MCNC: 6.4 MG/DL (ref 8.5–10.1)
CALCIUM SERPL-MCNC: 6.4 MG/DL (ref 8.5–10.1)
CALCIUM SERPL-MCNC: 6.6 MG/DL (ref 8.5–10.1)
CALCIUM SERPL-MCNC: 6.7 MG/DL (ref 8.5–10.1)
CALCIUM SERPL-MCNC: 6.8 MG/DL (ref 8.5–10.1)
CALCIUM SERPL-MCNC: 6.9 MG/DL (ref 8.5–10.1)
CALCIUM SERPL-MCNC: 7 MG/DL (ref 8.5–10.1)
CALCIUM SERPL-MCNC: 7.1 MG/DL (ref 8.5–10.1)
CALCIUM SERPL-MCNC: 7.2 MG/DL (ref 8.5–10.1)
CALCIUM SERPL-MCNC: 7.3 MG/DL (ref 8.5–10.1)
CALCIUM SERPL-MCNC: 7.4 MG/DL (ref 8.5–10.1)
CALCIUM SERPL-MCNC: 7.5 MG/DL (ref 8.5–10.1)
CALCIUM SERPL-MCNC: 7.6 MG/DL (ref 8.5–10.1)
CALCIUM SERPL-MCNC: 7.7 MG/DL (ref 8.5–10.1)
CALCIUM SERPL-MCNC: 7.8 MG/DL (ref 8.5–10.1)
CALCIUM SERPL-MCNC: 7.9 MG/DL (ref 8.5–10.1)
CALCIUM SERPL-MCNC: 8 MG/DL (ref 8.5–10.1)
CALCIUM SERPL-MCNC: 8.1 MG/DL (ref 8.5–10.1)
CALCIUM SERPL-MCNC: 8.2 MG/DL (ref 8.5–10.1)
CALCIUM SERPL-MCNC: 8.3 MG/DL (ref 8.5–10.1)
CALCIUM SERPL-MCNC: 8.4 MG/DL (ref 8.5–10.1)
CALCIUM SERPL-MCNC: 8.5 MG/DL (ref 8.5–10.1)
CALCIUM SERPL-MCNC: 8.6 MG/DL (ref 8.5–10.1)
CALCIUM SERPL-MCNC: 8.7 MG/DL (ref 8.5–10.1)
CALCIUM SERPL-MCNC: 8.8 MG/DL (ref 8.5–10.1)
CALCIUM SERPL-MCNC: 8.9 MG/DL (ref 8.5–10.1)
CALCIUM SERPL-MCNC: 8.9 MG/DL (ref 8.5–10.1)
CALCIUM SERPL-MCNC: 9 MG/DL (ref 8.5–10.1)
CALCIUM SERPL-MCNC: 9 MG/DL (ref 8.5–10.1)
CALCIUM SERPL-MCNC: 9.1 MG/DL (ref 8.5–10.1)
CALCIUM SERPL-MCNC: 9.2 MG/DL (ref 8.5–10.1)
CALCIUM SERPL-MCNC: 9.4 MG/DL (ref 8.5–10.1)
CALCIUM SERPL-MCNC: 9.5 MG/DL (ref 8.5–10.1)
CALCIUM SERPL-MCNC: 9.6 MG/DL (ref 8.5–10.1)
CALCIUM SERPL-MCNC: 9.6 MG/DL (ref 8.5–10.1)
CALCIUM SERPL-MCNC: 9.8 MG/DL (ref 8.5–10.1)
CHLORIDE SERPL-SCNC: 100 MMOL/L (ref 94–109)
CHLORIDE SERPL-SCNC: 101 MMOL/L (ref 94–109)
CHLORIDE SERPL-SCNC: 102 MMOL/L (ref 94–109)
CHLORIDE SERPL-SCNC: 103 MMOL/L (ref 94–109)
CHLORIDE SERPL-SCNC: 104 MMOL/L (ref 94–109)
CHLORIDE SERPL-SCNC: 105 MMOL/L (ref 94–109)
CHLORIDE SERPL-SCNC: 106 MMOL/L (ref 94–109)
CHLORIDE SERPL-SCNC: 107 MMOL/L (ref 94–109)
CHLORIDE SERPL-SCNC: 108 MMOL/L (ref 94–109)
CHLORIDE SERPL-SCNC: 109 MMOL/L (ref 94–109)
CHLORIDE SERPL-SCNC: 110 MMOL/L (ref 94–109)
CHLORIDE SERPL-SCNC: 111 MMOL/L (ref 94–109)
CHLORIDE SERPL-SCNC: 112 MMOL/L (ref 94–109)
CHLORIDE SERPL-SCNC: 113 MMOL/L (ref 94–109)
CHLORIDE SERPL-SCNC: 114 MMOL/L (ref 94–109)
CHLORIDE SERPL-SCNC: 115 MMOL/L (ref 94–109)
CHLORIDE SERPL-SCNC: 116 MMOL/L (ref 94–109)
CHLORIDE SERPL-SCNC: 116 MMOL/L (ref 94–109)
CHLORIDE SERPL-SCNC: 117 MMOL/L (ref 94–109)
CHLORIDE SERPL-SCNC: 117 MMOL/L (ref 94–109)
CHLORIDE SERPL-SCNC: 118 MMOL/L (ref 94–109)
CHLORIDE SERPL-SCNC: 84 MMOL/L (ref 94–109)
CHLORIDE SERPL-SCNC: 85 MMOL/L (ref 94–109)
CHLORIDE SERPL-SCNC: 85 MMOL/L (ref 94–109)
CHLORIDE SERPL-SCNC: 90 MMOL/L (ref 94–109)
CHLORIDE SERPL-SCNC: 90 MMOL/L (ref 94–109)
CHLORIDE SERPL-SCNC: 93 MMOL/L (ref 94–109)
CHLORIDE SERPL-SCNC: 93 MMOL/L (ref 94–109)
CHLORIDE SERPL-SCNC: 94 MMOL/L (ref 94–109)
CHLORIDE SERPL-SCNC: 95 MMOL/L (ref 94–109)
CHLORIDE SERPL-SCNC: 96 MMOL/L (ref 94–109)
CHLORIDE SERPL-SCNC: 96 MMOL/L (ref 94–109)
CHLORIDE SERPL-SCNC: 97 MMOL/L (ref 94–109)
CHLORIDE SERPL-SCNC: 98 MMOL/L (ref 94–109)
CHLORIDE SERPL-SCNC: 99 MMOL/L (ref 94–109)
CHLORIDE UR-SCNC: 29 MMOL/L
CHOLEST SERPL-MCNC: ABNORMAL MG/DL
CI HYPERCOAGULATION INDEX: 0.1 RATIO (ref 0–3)
CI HYPERCOAGULATION INDEX: 0.5 RATIO (ref 0–3)
CI HYPERCOAGULATION INDEX: 1.8 RATIO (ref 0–3)
CI HYPERCOAGULATION INDEX: NORMAL RATIO (ref 0–3)
CI HYPERCOAGULATION INDEX: NORMAL RATIO (ref 0–3)
CI HYPOCOAGULATION INDEX: 0.6 RATIO (ref 0–3)
CI HYPOCOAGULATION INDEX: 1.3 RATIO (ref 0–3)
CI HYPOCOAGULATION INDEX: 2.9 RATIO (ref 0–3)
CI HYPOCOAGULATION INDEX: 3.1 RATIO (ref 0–3)
CI HYPOCOAGULATION INDEX: 3.8 RATIO (ref 0–3)
CI HYPOCOAGULATION INDEX: 4.3 RATIO (ref 0–3)
CI HYPOCOAGULATION INDEX: 4.4 RATIO (ref 0–3)
CI HYPOCOAGULATION INDEX: 5 RATIO (ref 0–3)
CI HYPOCOAGULATION INDEX: 5.1 RATIO (ref 0–3)
CI HYPOCOAGULATION INDEX: 8.8 RATIO (ref 0–3)
CI HYPOCOAGULATION INDEX: NORMAL RATIO (ref 0–3)
CI HYPOCOAGULATION INDEX: NORMAL RATIO (ref 0–3)
CK SERPL-CCNC: 26 U/L (ref 30–300)
CK SERPL-CCNC: 28 U/L (ref 30–300)
CK SERPL-CCNC: 32 U/L (ref 30–300)
CK SERPL-CCNC: 33 U/L (ref 30–300)
CK SERPL-CCNC: 34 U/L (ref 30–300)
CK SERPL-CCNC: 38 U/L (ref 30–300)
CK SERPL-CCNC: 40 U/L (ref 30–300)
CK SERPL-CCNC: 41 U/L (ref 30–300)
CK SERPL-CCNC: 44 U/L (ref 30–300)
CK SERPL-CCNC: 53 U/L (ref 30–300)
CK SERPL-CCNC: 58 U/L (ref 30–300)
CK SERPL-CCNC: 70 U/L (ref 30–300)
CLOT LYSIS 30M P MA LENFR BLD TEG: 0 % (ref 0–8)
CLOT LYSIS 30M P MA LENFR BLD TEG: 45 % (ref 0–8)
CLOT LYSIS 30M P MA LENFR BLD TEG: 6 % (ref 0–8)
CLOT LYSIS 30M P MA LENFR BLD TEG: 9 % (ref 0–8)
CLOT LYSIS 30M P MA LENFR BLD TEG: NORMAL % (ref 0–8)
CLOT STRENGTH BLD TEG: 11.6 KD/SC (ref 5.3–13.2)
CLOT STRENGTH BLD TEG: 3.3 KD/SC (ref 5.3–13.2)
CLOT STRENGTH BLD TEG: 3.4 KD/SC (ref 5.3–13.2)
CLOT STRENGTH BLD TEG: 4.8 KD/SC (ref 5.3–13.2)
CLOT STRENGTH BLD TEG: 4.9 KD/SC (ref 5.3–13.2)
CLOT STRENGTH BLD TEG: 7.4 KD/SC (ref 5.3–13.2)
CLOT STRENGTH BLD TEG: 9 KD/SC (ref 5.3–13.2)
CLOT STRENGTH BLD TEG: NORMAL KD/SC (ref 5.3–13.2)
CMV IGG SERPL QL IA: >8 AI (ref 0–0.8)
CMV IGM SERPL QL IA: <0.2 AI (ref 0–0.8)
CMV IGM SERPL QL IA: <0.2 AI (ref 0–0.8)
CO2 SERPL-SCNC: 10 MMOL/L (ref 20–32)
CO2 SERPL-SCNC: 11 MMOL/L (ref 20–32)
CO2 SERPL-SCNC: 12 MMOL/L (ref 20–32)
CO2 SERPL-SCNC: 13 MMOL/L (ref 20–32)
CO2 SERPL-SCNC: 14 MMOL/L (ref 20–32)
CO2 SERPL-SCNC: 15 MMOL/L (ref 20–32)
CO2 SERPL-SCNC: 15 MMOL/L (ref 20–32)
CO2 SERPL-SCNC: 16 MMOL/L (ref 20–32)
CO2 SERPL-SCNC: 17 MMOL/L (ref 20–32)
CO2 SERPL-SCNC: 18 MMOL/L (ref 20–32)
CO2 SERPL-SCNC: 19 MMOL/L (ref 20–32)
CO2 SERPL-SCNC: 20 MMOL/L (ref 20–32)
CO2 SERPL-SCNC: 21 MMOL/L (ref 20–32)
CO2 SERPL-SCNC: 22 MMOL/L (ref 20–32)
CO2 SERPL-SCNC: 23 MMOL/L (ref 20–32)
CO2 SERPL-SCNC: 24 MMOL/L (ref 20–32)
CO2 SERPL-SCNC: 25 MMOL/L (ref 20–32)
CO2 SERPL-SCNC: 26 MMOL/L (ref 20–32)
CO2 SERPL-SCNC: 28 MMOL/L (ref 20–32)
COLOR FLD: NORMAL
COLOR FLD: NORMAL
COLOR FLD: YELLOW
COLOR UR AUTO: ABNORMAL
COLOR UR AUTO: ABNORMAL
COLOR UR AUTO: YELLOW
COLOR UR AUTO: YELLOW
COPATH REPORT: NORMAL
CREAT SERPL-MCNC: 0.58 MG/DL (ref 0.66–1.25)
CREAT SERPL-MCNC: 0.6 MG/DL (ref 0.66–1.25)
CREAT SERPL-MCNC: 0.65 MG/DL (ref 0.66–1.25)
CREAT SERPL-MCNC: 0.66 MG/DL (ref 0.66–1.25)
CREAT SERPL-MCNC: 0.71 MG/DL (ref 0.66–1.25)
CREAT SERPL-MCNC: 0.72 MG/DL (ref 0.66–1.25)
CREAT SERPL-MCNC: 0.72 MG/DL (ref 0.66–1.25)
CREAT SERPL-MCNC: 0.73 MG/DL (ref 0.66–1.25)
CREAT SERPL-MCNC: 0.74 MG/DL (ref 0.66–1.25)
CREAT SERPL-MCNC: 0.75 MG/DL (ref 0.66–1.25)
CREAT SERPL-MCNC: 0.76 MG/DL (ref 0.66–1.25)
CREAT SERPL-MCNC: 0.77 MG/DL (ref 0.66–1.25)
CREAT SERPL-MCNC: 0.78 MG/DL (ref 0.66–1.25)
CREAT SERPL-MCNC: 0.8 MG/DL (ref 0.66–1.25)
CREAT SERPL-MCNC: 0.8 MG/DL (ref 0.66–1.25)
CREAT SERPL-MCNC: 0.81 MG/DL (ref 0.66–1.25)
CREAT SERPL-MCNC: 0.83 MG/DL (ref 0.66–1.25)
CREAT SERPL-MCNC: 0.83 MG/DL (ref 0.66–1.25)
CREAT SERPL-MCNC: 0.84 MG/DL (ref 0.66–1.25)
CREAT SERPL-MCNC: 0.85 MG/DL (ref 0.66–1.25)
CREAT SERPL-MCNC: 0.85 MG/DL (ref 0.66–1.25)
CREAT SERPL-MCNC: 0.86 MG/DL (ref 0.66–1.25)
CREAT SERPL-MCNC: 0.87 MG/DL (ref 0.66–1.25)
CREAT SERPL-MCNC: 0.87 MG/DL (ref 0.66–1.25)
CREAT SERPL-MCNC: 0.88 MG/DL (ref 0.66–1.25)
CREAT SERPL-MCNC: 0.89 MG/DL (ref 0.66–1.25)
CREAT SERPL-MCNC: 0.9 MG/DL (ref 0.66–1.25)
CREAT SERPL-MCNC: 0.91 MG/DL (ref 0.66–1.25)
CREAT SERPL-MCNC: 0.93 MG/DL (ref 0.66–1.25)
CREAT SERPL-MCNC: 0.95 MG/DL (ref 0.66–1.25)
CREAT SERPL-MCNC: 0.96 MG/DL (ref 0.66–1.25)
CREAT SERPL-MCNC: 0.97 MG/DL (ref 0.66–1.25)
CREAT SERPL-MCNC: 0.98 MG/DL (ref 0.66–1.25)
CREAT SERPL-MCNC: 0.99 MG/DL (ref 0.66–1.25)
CREAT SERPL-MCNC: 1.01 MG/DL (ref 0.66–1.25)
CREAT SERPL-MCNC: 1.02 MG/DL (ref 0.66–1.25)
CREAT SERPL-MCNC: 1.03 MG/DL (ref 0.66–1.25)
CREAT SERPL-MCNC: 1.03 MG/DL (ref 0.66–1.25)
CREAT SERPL-MCNC: 1.04 MG/DL (ref 0.66–1.25)
CREAT SERPL-MCNC: 1.05 MG/DL (ref 0.66–1.25)
CREAT SERPL-MCNC: 1.06 MG/DL (ref 0.66–1.25)
CREAT SERPL-MCNC: 1.1 MG/DL (ref 0.66–1.25)
CREAT SERPL-MCNC: 1.11 MG/DL (ref 0.66–1.25)
CREAT SERPL-MCNC: 1.13 MG/DL (ref 0.66–1.25)
CREAT SERPL-MCNC: 1.16 MG/DL (ref 0.66–1.25)
CREAT SERPL-MCNC: 1.16 MG/DL (ref 0.66–1.25)
CREAT SERPL-MCNC: 1.17 MG/DL (ref 0.66–1.25)
CREAT SERPL-MCNC: 1.18 MG/DL (ref 0.66–1.25)
CREAT SERPL-MCNC: 1.18 MG/DL (ref 0.66–1.25)
CREAT SERPL-MCNC: 1.19 MG/DL (ref 0.66–1.25)
CREAT SERPL-MCNC: 1.2 MG/DL (ref 0.66–1.25)
CREAT SERPL-MCNC: 1.23 MG/DL (ref 0.66–1.25)
CREAT SERPL-MCNC: 1.26 MG/DL (ref 0.66–1.25)
CREAT SERPL-MCNC: 1.26 MG/DL (ref 0.66–1.25)
CREAT SERPL-MCNC: 1.28 MG/DL (ref 0.66–1.25)
CREAT SERPL-MCNC: 1.29 MG/DL (ref 0.66–1.25)
CREAT SERPL-MCNC: 1.3 MG/DL (ref 0.66–1.25)
CREAT SERPL-MCNC: 1.31 MG/DL (ref 0.66–1.25)
CREAT SERPL-MCNC: 1.32 MG/DL (ref 0.66–1.25)
CREAT SERPL-MCNC: 1.33 MG/DL (ref 0.66–1.25)
CREAT SERPL-MCNC: 1.33 MG/DL (ref 0.66–1.25)
CREAT SERPL-MCNC: 1.37 MG/DL (ref 0.66–1.25)
CREAT SERPL-MCNC: 1.39 MG/DL (ref 0.66–1.25)
CREAT SERPL-MCNC: 1.41 MG/DL (ref 0.7–1.3)
CREAT SERPL-MCNC: 1.43 MG/DL (ref 0.66–1.25)
CREAT SERPL-MCNC: 1.46 MG/DL (ref 0.66–1.25)
CREAT SERPL-MCNC: 1.46 MG/DL (ref 0.66–1.25)
CREAT SERPL-MCNC: 1.49 MG/DL (ref 0.66–1.25)
CREAT SERPL-MCNC: 1.53 MG/DL (ref 0.66–1.25)
CREAT SERPL-MCNC: 1.53 MG/DL (ref 0.66–1.25)
CREAT SERPL-MCNC: 1.58 MG/DL (ref 0.66–1.25)
CREAT SERPL-MCNC: 1.58 MG/DL (ref 0.66–1.25)
CREAT SERPL-MCNC: 1.59 MG/DL (ref 0.66–1.25)
CREAT SERPL-MCNC: 1.59 MG/DL (ref 0.66–1.25)
CREAT SERPL-MCNC: 1.6 MG/DL (ref 0.66–1.25)
CREAT SERPL-MCNC: 1.63 MG/DL (ref 0.66–1.25)
CREAT SERPL-MCNC: 1.63 MG/DL (ref 0.66–1.25)
CREAT SERPL-MCNC: 1.64 MG/DL (ref 0.66–1.25)
CREAT SERPL-MCNC: 1.65 MG/DL (ref 0.66–1.25)
CREAT SERPL-MCNC: 1.67 MG/DL (ref 0.66–1.25)
CREAT SERPL-MCNC: 1.68 MG/DL (ref 0.66–1.25)
CREAT SERPL-MCNC: 1.7 MG/DL (ref 0.66–1.25)
CREAT SERPL-MCNC: 1.71 MG/DL (ref 0.66–1.25)
CREAT SERPL-MCNC: 1.71 MG/DL (ref 0.66–1.25)
CREAT SERPL-MCNC: 1.72 MG/DL (ref 0.66–1.25)
CREAT SERPL-MCNC: 1.78 MG/DL (ref 0.66–1.25)
CREAT SERPL-MCNC: 1.79 MG/DL (ref 0.66–1.25)
CREAT SERPL-MCNC: 1.84 MG/DL (ref 0.66–1.25)
CREAT SERPL-MCNC: 1.87 MG/DL (ref 0.66–1.25)
CREAT SERPL-MCNC: 1.87 MG/DL (ref 0.66–1.25)
CREAT SERPL-MCNC: 1.89 MG/DL (ref 0.66–1.25)
CREAT SERPL-MCNC: 1.91 MG/DL (ref 0.66–1.25)
CREAT SERPL-MCNC: 1.93 MG/DL (ref 0.66–1.25)
CREAT SERPL-MCNC: 1.94 MG/DL (ref 0.66–1.25)
CREAT SERPL-MCNC: 1.94 MG/DL (ref 0.66–1.25)
CREAT SERPL-MCNC: 1.96 MG/DL (ref 0.66–1.25)
CREAT SERPL-MCNC: 1.99 MG/DL (ref 0.66–1.25)
CREAT SERPL-MCNC: 2 MG/DL (ref 0.66–1.25)
CREAT SERPL-MCNC: 2.01 MG/DL (ref 0.66–1.25)
CREAT SERPL-MCNC: 2.08 MG/DL (ref 0.66–1.25)
CREAT SERPL-MCNC: 2.12 MG/DL (ref 0.66–1.25)
CREAT SERPL-MCNC: 2.15 MG/DL (ref 0.66–1.25)
CREAT SERPL-MCNC: 2.19 MG/DL (ref 0.66–1.25)
CREAT SERPL-MCNC: 2.28 MG/DL (ref 0.66–1.25)
CREAT SERPL-MCNC: 2.37 MG/DL (ref 0.66–1.25)
CREAT SERPL-MCNC: 2.38 MG/DL (ref 0.66–1.25)
CREAT SERPL-MCNC: 2.4 MG/DL (ref 0.66–1.25)
CREAT SERPL-MCNC: 2.41 MG/DL (ref 0.66–1.25)
CREAT SERPL-MCNC: 2.47 MG/DL (ref 0.66–1.25)
CREAT SERPL-MCNC: 2.49 MG/DL (ref 0.66–1.25)
CREAT SERPL-MCNC: 2.49 MG/DL (ref 0.66–1.25)
CREAT SERPL-MCNC: 2.58 MG/DL (ref 0.66–1.25)
CREAT SERPL-MCNC: 2.69 MG/DL (ref 0.66–1.25)
CREAT SERPL-MCNC: 2.71 MG/DL (ref 0.66–1.25)
CREAT SERPL-MCNC: 2.8 MG/DL (ref 0.66–1.25)
CREAT SERPL-MCNC: 2.88 MG/DL (ref 0.66–1.25)
CREAT SERPL-MCNC: 2.89 MG/DL (ref 0.66–1.25)
CREAT SERPL-MCNC: 2.93 MG/DL (ref 0.66–1.25)
CREAT SERPL-MCNC: 3 MG/DL (ref 0.66–1.25)
CREAT SERPL-MCNC: 3.08 MG/DL (ref 0.66–1.25)
CREAT SERPL-MCNC: 3.15 MG/DL (ref 0.66–1.25)
CREAT SERPL-MCNC: 3.17 MG/DL (ref 0.66–1.25)
CREAT SERPL-MCNC: 3.23 MG/DL (ref 0.66–1.25)
CREAT SERPL-MCNC: 3.26 MG/DL (ref 0.66–1.25)
CREAT SERPL-MCNC: 3.34 MG/DL (ref 0.66–1.25)
CREAT SERPL-MCNC: 3.4 MG/DL (ref 0.66–1.25)
CREAT SERPL-MCNC: 3.59 MG/DL (ref 0.66–1.25)
CREAT SERPL-MCNC: 3.67 MG/DL (ref 0.66–1.25)
CREAT SERPL-MCNC: 3.69 MG/DL (ref 0.66–1.25)
CREAT UR-MCNC: 105 MG/DL
CREAT UR-MCNC: 96 MG/DL
CROSSMATCH RESULT: NORMAL
CROSSMATCH RESULT: NORMAL
DACRYOCYTES BLD QL SMEAR: SLIGHT
DACRYOCYTES BLD QL SMEAR: SLIGHT
DIFFERENTIAL METHOD BLD: ABNORMAL
DONOR IDENTIFICATION: NORMAL
DPA1* LOCUS NMDP: NORMAL
DPA1* NMDP: NORMAL
DPA1*: NORMAL
DPA1*LOCUS: NORMAL
DPB1* LOCUS NMDP: NORMAL
DPB1* NMDP: NORMAL
DPB1*: NORMAL
DPB1*LOCUS: NORMAL
DQA1*: NORMAL
DQA1*LOCUS NMDP: NORMAL
DQA1*LOCUS: NORMAL
DQB1* LOCUS NMDP: NORMAL
DQB1* LOCUS: NORMAL
DQB1* NMDP: NORMAL
DQB1*: NORMAL
DRB1* LOCUS NMDP: NORMAL
DRB1* LOCUS: NORMAL
DRB1* NMDP: NORMAL
DRB1*: NORMAL
DRB3* LOCUS: NORMAL
DRB5* LOCUS: NORMAL
DRB5* NMDP: NORMAL
DRSSO TEST METHOD: NORMAL
DSA COMMENTS: NORMAL
DSA PRESENT: YES
DSA TEST METHOD: NORMAL
EBV VCA IGG SER QL IA: 7.6 AI (ref 0–0.8)
EBV VCA IGG SER QL IA: 7.6 AI (ref 0–0.8)
EBV VCA IGG SER QL IA: >8 AI (ref 0–0.8)
EBV VCA IGM SER QL IA: <0.2 AI (ref 0–0.8)
EBV VCA IGM SER QL IA: <0.2 AI (ref 0–0.8)
EOSINOPHIL # BLD AUTO: 0 10E9/L (ref 0–0.7)
EOSINOPHIL # BLD AUTO: 0.1 10E9/L (ref 0–0.7)
EOSINOPHIL # BLD AUTO: 0.2 10E9/L (ref 0–0.7)
EOSINOPHIL # BLD AUTO: 0.3 10E9/L (ref 0–0.7)
EOSINOPHIL # BLD AUTO: 0.7 10E9/L (ref 0–0.7)
EOSINOPHIL NFR BLD AUTO: 0 %
EOSINOPHIL NFR BLD AUTO: 0.1 %
EOSINOPHIL NFR BLD AUTO: 0.2 %
EOSINOPHIL NFR BLD AUTO: 0.3 %
EOSINOPHIL NFR BLD AUTO: 0.3 %
EOSINOPHIL NFR BLD AUTO: 0.5 %
EOSINOPHIL NFR BLD AUTO: 0.6 %
EOSINOPHIL NFR BLD AUTO: 0.8 %
EOSINOPHIL NFR BLD AUTO: 0.9 %
EOSINOPHIL NFR BLD AUTO: 1 %
EOSINOPHIL NFR BLD AUTO: 1.1 %
EOSINOPHIL NFR BLD AUTO: 1.2 %
EOSINOPHIL NFR BLD AUTO: 1.3 %
EOSINOPHIL NFR BLD AUTO: 1.3 %
EOSINOPHIL NFR BLD AUTO: 1.4 %
EOSINOPHIL NFR BLD AUTO: 1.4 %
EOSINOPHIL NFR BLD AUTO: 1.5 %
EOSINOPHIL NFR BLD AUTO: 1.6 %
EOSINOPHIL NFR BLD AUTO: 1.6 %
EOSINOPHIL NFR BLD AUTO: 1.7 %
EOSINOPHIL NFR BLD AUTO: 2.1 %
EOSINOPHIL NFR BLD AUTO: 2.6 %
EOSINOPHIL NFR BLD AUTO: 2.9 %
EOSINOPHIL NFR BLD AUTO: 2.9 %
EOSINOPHIL NFR BLD AUTO: 3.3 %
ERYTHROCYTE [DISTWIDTH] IN BLOOD BY AUTOMATED COUNT: 14.7 % (ref 10–15)
ERYTHROCYTE [DISTWIDTH] IN BLOOD BY AUTOMATED COUNT: 14.9 % (ref 10–15)
ERYTHROCYTE [DISTWIDTH] IN BLOOD BY AUTOMATED COUNT: 15.4 % (ref 10–15)
ERYTHROCYTE [DISTWIDTH] IN BLOOD BY AUTOMATED COUNT: 15.5 % (ref 10–15)
ERYTHROCYTE [DISTWIDTH] IN BLOOD BY AUTOMATED COUNT: 15.6 % (ref 10–15)
ERYTHROCYTE [DISTWIDTH] IN BLOOD BY AUTOMATED COUNT: 15.7 % (ref 10–15)
ERYTHROCYTE [DISTWIDTH] IN BLOOD BY AUTOMATED COUNT: 16 % (ref 10–15)
ERYTHROCYTE [DISTWIDTH] IN BLOOD BY AUTOMATED COUNT: 16.1 % (ref 10–15)
ERYTHROCYTE [DISTWIDTH] IN BLOOD BY AUTOMATED COUNT: 16.2 % (ref 10–15)
ERYTHROCYTE [DISTWIDTH] IN BLOOD BY AUTOMATED COUNT: 16.2 % (ref 10–15)
ERYTHROCYTE [DISTWIDTH] IN BLOOD BY AUTOMATED COUNT: 16.3 % (ref 10–15)
ERYTHROCYTE [DISTWIDTH] IN BLOOD BY AUTOMATED COUNT: 16.4 % (ref 10–15)
ERYTHROCYTE [DISTWIDTH] IN BLOOD BY AUTOMATED COUNT: 16.5 % (ref 10–15)
ERYTHROCYTE [DISTWIDTH] IN BLOOD BY AUTOMATED COUNT: 16.6 % (ref 10–15)
ERYTHROCYTE [DISTWIDTH] IN BLOOD BY AUTOMATED COUNT: 16.7 % (ref 10–15)
ERYTHROCYTE [DISTWIDTH] IN BLOOD BY AUTOMATED COUNT: 16.8 % (ref 10–15)
ERYTHROCYTE [DISTWIDTH] IN BLOOD BY AUTOMATED COUNT: 16.9 % (ref 10–15)
ERYTHROCYTE [DISTWIDTH] IN BLOOD BY AUTOMATED COUNT: 17 % (ref 10–15)
ERYTHROCYTE [DISTWIDTH] IN BLOOD BY AUTOMATED COUNT: 17.1 % (ref 10–15)
ERYTHROCYTE [DISTWIDTH] IN BLOOD BY AUTOMATED COUNT: 17.2 % (ref 10–15)
ERYTHROCYTE [DISTWIDTH] IN BLOOD BY AUTOMATED COUNT: 17.3 % (ref 10–15)
ERYTHROCYTE [DISTWIDTH] IN BLOOD BY AUTOMATED COUNT: 17.4 % (ref 10–15)
ERYTHROCYTE [DISTWIDTH] IN BLOOD BY AUTOMATED COUNT: 17.5 % (ref 10–15)
ERYTHROCYTE [DISTWIDTH] IN BLOOD BY AUTOMATED COUNT: 17.6 % (ref 10–15)
ERYTHROCYTE [DISTWIDTH] IN BLOOD BY AUTOMATED COUNT: 17.7 % (ref 10–15)
ERYTHROCYTE [DISTWIDTH] IN BLOOD BY AUTOMATED COUNT: 17.7 % (ref 10–15)
ERYTHROCYTE [DISTWIDTH] IN BLOOD BY AUTOMATED COUNT: 17.8 % (ref 10–15)
ERYTHROCYTE [DISTWIDTH] IN BLOOD BY AUTOMATED COUNT: 17.8 % (ref 10–15)
ERYTHROCYTE [DISTWIDTH] IN BLOOD BY AUTOMATED COUNT: 18 % (ref 10–15)
ERYTHROCYTE [DISTWIDTH] IN BLOOD BY AUTOMATED COUNT: 18 % (ref 10–15)
ERYTHROCYTE [DISTWIDTH] IN BLOOD BY AUTOMATED COUNT: 18.1 % (ref 10–15)
ERYTHROCYTE [DISTWIDTH] IN BLOOD BY AUTOMATED COUNT: 18.2 % (ref 10–15)
ERYTHROCYTE [DISTWIDTH] IN BLOOD BY AUTOMATED COUNT: 18.4 % (ref 10–15)
ERYTHROCYTE [DISTWIDTH] IN BLOOD BY AUTOMATED COUNT: 18.6 % (ref 10–15)
ERYTHROCYTE [DISTWIDTH] IN BLOOD BY AUTOMATED COUNT: 18.7 % (ref 10–15)
ERYTHROCYTE [DISTWIDTH] IN BLOOD BY AUTOMATED COUNT: 18.8 % (ref 10–15)
ERYTHROCYTE [DISTWIDTH] IN BLOOD BY AUTOMATED COUNT: 18.8 % (ref 10–15)
ERYTHROCYTE [DISTWIDTH] IN BLOOD BY AUTOMATED COUNT: 18.9 % (ref 10–15)
ERYTHROCYTE [DISTWIDTH] IN BLOOD BY AUTOMATED COUNT: 19 % (ref 10–15)
ERYTHROCYTE [DISTWIDTH] IN BLOOD BY AUTOMATED COUNT: 19.1 % (ref 10–15)
ERYTHROCYTE [DISTWIDTH] IN BLOOD BY AUTOMATED COUNT: 19.2 % (ref 10–15)
ERYTHROCYTE [DISTWIDTH] IN BLOOD BY AUTOMATED COUNT: 19.2 % (ref 10–15)
ERYTHROCYTE [DISTWIDTH] IN BLOOD BY AUTOMATED COUNT: 19.3 % (ref 10–15)
ERYTHROCYTE [DISTWIDTH] IN BLOOD BY AUTOMATED COUNT: 19.4 % (ref 10–15)
ERYTHROCYTE [DISTWIDTH] IN BLOOD BY AUTOMATED COUNT: 19.4 % (ref 10–15)
ERYTHROCYTE [DISTWIDTH] IN BLOOD BY AUTOMATED COUNT: 19.5 % (ref 10–15)
ERYTHROCYTE [DISTWIDTH] IN BLOOD BY AUTOMATED COUNT: 19.5 % (ref 10–15)
ERYTHROCYTE [DISTWIDTH] IN BLOOD BY AUTOMATED COUNT: 19.6 % (ref 10–15)
ERYTHROCYTE [DISTWIDTH] IN BLOOD BY AUTOMATED COUNT: 19.8 % (ref 10–15)
ERYTHROCYTE [DISTWIDTH] IN BLOOD BY AUTOMATED COUNT: 19.8 % (ref 10–15)
ERYTHROCYTE [DISTWIDTH] IN BLOOD BY AUTOMATED COUNT: 19.9 % (ref 10–15)
ERYTHROCYTE [DISTWIDTH] IN BLOOD BY AUTOMATED COUNT: 20 % (ref 10–15)
ERYTHROCYTE [DISTWIDTH] IN BLOOD BY AUTOMATED COUNT: 20.1 % (ref 10–15)
ERYTHROCYTE [DISTWIDTH] IN BLOOD BY AUTOMATED COUNT: 20.2 % (ref 10–15)
ERYTHROCYTE [DISTWIDTH] IN BLOOD BY AUTOMATED COUNT: 20.2 % (ref 10–15)
ERYTHROCYTE [DISTWIDTH] IN BLOOD BY AUTOMATED COUNT: 20.3 % (ref 10–15)
ERYTHROCYTE [DISTWIDTH] IN BLOOD BY AUTOMATED COUNT: 20.4 % (ref 10–15)
ERYTHROCYTE [DISTWIDTH] IN BLOOD BY AUTOMATED COUNT: 20.5 % (ref 10–15)
ERYTHROCYTE [DISTWIDTH] IN BLOOD BY AUTOMATED COUNT: 20.6 % (ref 10–15)
ERYTHROCYTE [DISTWIDTH] IN BLOOD BY AUTOMATED COUNT: 20.7 % (ref 10–15)
ERYTHROCYTE [DISTWIDTH] IN BLOOD BY AUTOMATED COUNT: 20.8 % (ref 10–15)
ERYTHROCYTE [DISTWIDTH] IN BLOOD BY AUTOMATED COUNT: 20.8 % (ref 10–15)
ERYTHROCYTE [DISTWIDTH] IN BLOOD BY AUTOMATED COUNT: 20.9 % (ref 10–15)
ERYTHROCYTE [DISTWIDTH] IN BLOOD BY AUTOMATED COUNT: 20.9 % (ref 10–15)
ERYTHROCYTE [DISTWIDTH] IN BLOOD BY AUTOMATED COUNT: 21.3 % (ref 10–15)
ERYTHROCYTE [DISTWIDTH] IN BLOOD BY AUTOMATED COUNT: 21.6 % (ref 10–15)
ERYTHROCYTE [DISTWIDTH] IN BLOOD BY AUTOMATED COUNT: 22 % (ref 10–15)
ERYTHROCYTE [DISTWIDTH] IN BLOOD BY AUTOMATED COUNT: 22.8 % (ref 10–15)
ERYTHROCYTE [DISTWIDTH] IN BLOOD BY AUTOMATED COUNT: 22.9 % (ref 10–15)
ERYTHROCYTE [DISTWIDTH] IN BLOOD BY AUTOMATED COUNT: 23.2 % (ref 10–15)
ERYTHROCYTE [DISTWIDTH] IN BLOOD BY AUTOMATED COUNT: NORMAL % (ref 10–15)
FERRITIN SERPL-MCNC: NORMAL NG/ML (ref 26–388)
FIBRINOGEN PPP-MCNC: 100 MG/DL (ref 200–420)
FIBRINOGEN PPP-MCNC: 101 MG/DL (ref 200–420)
FIBRINOGEN PPP-MCNC: 111 MG/DL (ref 200–420)
FIBRINOGEN PPP-MCNC: 128 MG/DL (ref 200–420)
FIBRINOGEN PPP-MCNC: 134 MG/DL (ref 200–420)
FIBRINOGEN PPP-MCNC: 141 MG/DL (ref 200–420)
FIBRINOGEN PPP-MCNC: 145 MG/DL (ref 200–420)
FIBRINOGEN PPP-MCNC: 146 MG/DL (ref 200–420)
FIBRINOGEN PPP-MCNC: 146 MG/DL (ref 200–420)
FIBRINOGEN PPP-MCNC: 152 MG/DL (ref 200–420)
FIBRINOGEN PPP-MCNC: 171 MG/DL (ref 200–420)
FIBRINOGEN PPP-MCNC: 188 MG/DL (ref 200–420)
FIBRINOGEN PPP-MCNC: 194 MG/DL (ref 200–420)
FIBRINOGEN PPP-MCNC: 197 MG/DL (ref 200–420)
FIBRINOGEN PPP-MCNC: 199 MG/DL (ref 200–420)
FIBRINOGEN PPP-MCNC: 209 MG/DL (ref 200–420)
FIBRINOGEN PPP-MCNC: 210 MG/DL (ref 200–420)
FIBRINOGEN PPP-MCNC: 211 MG/DL (ref 200–420)
FIBRINOGEN PPP-MCNC: 212 MG/DL (ref 200–420)
FIBRINOGEN PPP-MCNC: 214 MG/DL (ref 200–420)
FIBRINOGEN PPP-MCNC: 215 MG/DL (ref 200–420)
FIBRINOGEN PPP-MCNC: 216 MG/DL (ref 200–420)
FIBRINOGEN PPP-MCNC: 218 MG/DL (ref 200–420)
FIBRINOGEN PPP-MCNC: 220 MG/DL (ref 200–420)
FIBRINOGEN PPP-MCNC: 220 MG/DL (ref 200–420)
FIBRINOGEN PPP-MCNC: 226 MG/DL (ref 200–420)
FIBRINOGEN PPP-MCNC: 227 MG/DL (ref 200–420)
FIBRINOGEN PPP-MCNC: 228 MG/DL (ref 200–420)
FIBRINOGEN PPP-MCNC: 231 MG/DL (ref 200–420)
FIBRINOGEN PPP-MCNC: 231 MG/DL (ref 200–420)
FIBRINOGEN PPP-MCNC: 234 MG/DL (ref 200–420)
FIBRINOGEN PPP-MCNC: 238 MG/DL (ref 200–420)
FIBRINOGEN PPP-MCNC: 239 MG/DL (ref 200–420)
FIBRINOGEN PPP-MCNC: 247 MG/DL (ref 200–420)
FIBRINOGEN PPP-MCNC: 256 MG/DL (ref 200–420)
FIBRINOGEN PPP-MCNC: 261 MG/DL (ref 200–420)
FIBRINOGEN PPP-MCNC: 267 MG/DL (ref 200–420)
FIBRINOGEN PPP-MCNC: 271 MG/DL (ref 200–420)
FIBRINOGEN PPP-MCNC: 283 MG/DL (ref 200–420)
FIBRINOGEN PPP-MCNC: 317 MG/DL (ref 200–420)
FIBRINOGEN PPP-MCNC: 334 MG/DL (ref 200–420)
FIBRINOGEN PPP-MCNC: 336 MG/DL (ref 200–420)
FIBRINOGEN PPP-MCNC: 341 MG/DL (ref 200–420)
FIBRINOGEN PPP-MCNC: 365 MG/DL (ref 200–420)
FIBRINOGEN PPP-MCNC: 382 MG/DL (ref 200–420)
FIBRINOGEN PPP-MCNC: 391 MG/DL (ref 200–420)
FIBRINOGEN PPP-MCNC: 410 MG/DL (ref 200–420)
FIBRINOGEN PPP-MCNC: 91 MG/DL (ref 200–420)
FOLATE SERPL-MCNC: 10.5 NG/ML
FRACT EXCRET NA UR+SERPL-RTO: 0.4 %
FUNGUS SPEC CULT: NORMAL
G ACTUAL CLOT STRENGTH: 2.2 KD/SC (ref 4.5–11)
G ACTUAL CLOT STRENGTH: 3.8 KD/SC (ref 4.5–11)
G ACTUAL CLOT STRENGTH: 4 KD/SC (ref 4.5–11)
G ACTUAL CLOT STRENGTH: 4.2 KD/SC (ref 4.5–11)
G ACTUAL CLOT STRENGTH: 4.7 KD/SC (ref 4.5–11)
G ACTUAL CLOT STRENGTH: 7.8 KD/SC (ref 4.5–11)
G ACTUAL CLOT STRENGTH: NORMAL KD/SC (ref 4.5–11)
GFR SERPL CREATININE-BSD FRML MDRD: 18 ML/MIN/1.7M2
GFR SERPL CREATININE-BSD FRML MDRD: 20 ML/MIN/1.7M2
GFR SERPL CREATININE-BSD FRML MDRD: 20 ML/MIN/1.7M2
GFR SERPL CREATININE-BSD FRML MDRD: 21 ML/MIN/1.7M2
GFR SERPL CREATININE-BSD FRML MDRD: 22 ML/MIN/1.7M2
GFR SERPL CREATININE-BSD FRML MDRD: 22 ML/MIN/1.7M2
GFR SERPL CREATININE-BSD FRML MDRD: 23 ML/MIN/1.7M2
GFR SERPL CREATININE-BSD FRML MDRD: 23 ML/MIN/1.7M2
GFR SERPL CREATININE-BSD FRML MDRD: 24 ML/MIN/1.7M2
GFR SERPL CREATININE-BSD FRML MDRD: 24 ML/MIN/1.7M2
GFR SERPL CREATININE-BSD FRML MDRD: 25 ML/MIN/1.7M2
GFR SERPL CREATININE-BSD FRML MDRD: 26 ML/MIN/1.7M2
GFR SERPL CREATININE-BSD FRML MDRD: 26 ML/MIN/1.7M2
GFR SERPL CREATININE-BSD FRML MDRD: 27 ML/MIN/1.7M2
GFR SERPL CREATININE-BSD FRML MDRD: 28 ML/MIN/1.7M2
GFR SERPL CREATININE-BSD FRML MDRD: 29 ML/MIN/1.7M2
GFR SERPL CREATININE-BSD FRML MDRD: 29 ML/MIN/1.7M2
GFR SERPL CREATININE-BSD FRML MDRD: 30 ML/MIN/1.7M2
GFR SERPL CREATININE-BSD FRML MDRD: 30 ML/MIN/1.7M2
GFR SERPL CREATININE-BSD FRML MDRD: 31 ML/MIN/1.7M2
GFR SERPL CREATININE-BSD FRML MDRD: 33 ML/MIN/1.7M2
GFR SERPL CREATININE-BSD FRML MDRD: 33 ML/MIN/1.7M2
GFR SERPL CREATININE-BSD FRML MDRD: 34 ML/MIN/1.7M2
GFR SERPL CREATININE-BSD FRML MDRD: 35 ML/MIN/1.7M2
GFR SERPL CREATININE-BSD FRML MDRD: 36 ML/MIN/1.7M2
GFR SERPL CREATININE-BSD FRML MDRD: 37 ML/MIN/1.7M2
GFR SERPL CREATININE-BSD FRML MDRD: 37 ML/MIN/1.7M2
GFR SERPL CREATININE-BSD FRML MDRD: 38 ML/MIN/1.7M2
GFR SERPL CREATININE-BSD FRML MDRD: 39 ML/MIN/1.7M2
GFR SERPL CREATININE-BSD FRML MDRD: 40 ML/MIN/1.7M2
GFR SERPL CREATININE-BSD FRML MDRD: 41 ML/MIN/1.7M2
GFR SERPL CREATININE-BSD FRML MDRD: 42 ML/MIN/1.7M2
GFR SERPL CREATININE-BSD FRML MDRD: 43 ML/MIN/1.7M2
GFR SERPL CREATININE-BSD FRML MDRD: 44 ML/MIN/1.7M2
GFR SERPL CREATININE-BSD FRML MDRD: 44 ML/MIN/1.7M2
GFR SERPL CREATININE-BSD FRML MDRD: 45 ML/MIN/1.7M2
GFR SERPL CREATININE-BSD FRML MDRD: 45 ML/MIN/1.7M2
GFR SERPL CREATININE-BSD FRML MDRD: 46 ML/MIN/1.7M2
GFR SERPL CREATININE-BSD FRML MDRD: 47 ML/MIN/1.7M2
GFR SERPL CREATININE-BSD FRML MDRD: 48 ML/MIN/1.7M2
GFR SERPL CREATININE-BSD FRML MDRD: 48 ML/MIN/1.7M2
GFR SERPL CREATININE-BSD FRML MDRD: 49 ML/MIN/1.7M2
GFR SERPL CREATININE-BSD FRML MDRD: 49 ML/MIN/1.7M2
GFR SERPL CREATININE-BSD FRML MDRD: 51 ML/MIN/1.7M2
GFR SERPL CREATININE-BSD FRML MDRD: 52 ML/MIN/1.7M2
GFR SERPL CREATININE-BSD FRML MDRD: 52 ML/MIN/1.7M2
GFR SERPL CREATININE-BSD FRML MDRD: 53 ML/MIN/1.7M2
GFR SERPL CREATININE-BSD FRML MDRD: 54 ML/MIN/1.73M2
GFR SERPL CREATININE-BSD FRML MDRD: 55 ML/MIN/1.7M2
GFR SERPL CREATININE-BSD FRML MDRD: 56 ML/MIN/1.7M2
GFR SERPL CREATININE-BSD FRML MDRD: 58 ML/MIN/1.7M2
GFR SERPL CREATININE-BSD FRML MDRD: 58 ML/MIN/1.7M2
GFR SERPL CREATININE-BSD FRML MDRD: 59 ML/MIN/1.7M2
GFR SERPL CREATININE-BSD FRML MDRD: 59 ML/MIN/1.7M2
GFR SERPL CREATININE-BSD FRML MDRD: 60 ML/MIN/1.7M2
GFR SERPL CREATININE-BSD FRML MDRD: 61 ML/MIN/1.7M2
GFR SERPL CREATININE-BSD FRML MDRD: 62 ML/MIN/1.7M2
GFR SERPL CREATININE-BSD FRML MDRD: 62 ML/MIN/1.7M2
GFR SERPL CREATININE-BSD FRML MDRD: 64 ML/MIN/1.7M2
GFR SERPL CREATININE-BSD FRML MDRD: 65 ML/MIN/1.7M2
GFR SERPL CREATININE-BSD FRML MDRD: 66 ML/MIN/1.7M2
GFR SERPL CREATININE-BSD FRML MDRD: 67 ML/MIN/1.7M2
GFR SERPL CREATININE-BSD FRML MDRD: 68 ML/MIN/1.7M2
GFR SERPL CREATININE-BSD FRML MDRD: 68 ML/MIN/1.7M2
GFR SERPL CREATININE-BSD FRML MDRD: 70 ML/MIN/1.7M2
GFR SERPL CREATININE-BSD FRML MDRD: 72 ML/MIN/1.7M2
GFR SERPL CREATININE-BSD FRML MDRD: 72 ML/MIN/1.7M2
GFR SERPL CREATININE-BSD FRML MDRD: 76 ML/MIN/1.7M2
GFR SERPL CREATININE-BSD FRML MDRD: 77 ML/MIN/1.7M2
GFR SERPL CREATININE-BSD FRML MDRD: 78 ML/MIN/1.7M2
GFR SERPL CREATININE-BSD FRML MDRD: 78 ML/MIN/1.7M2
GFR SERPL CREATININE-BSD FRML MDRD: 79 ML/MIN/1.7M2
GFR SERPL CREATININE-BSD FRML MDRD: 80 ML/MIN/1.7M2
GFR SERPL CREATININE-BSD FRML MDRD: 82 ML/MIN/1.7M2
GFR SERPL CREATININE-BSD FRML MDRD: 83 ML/MIN/1.7M2
GFR SERPL CREATININE-BSD FRML MDRD: 84 ML/MIN/1.7M2
GFR SERPL CREATININE-BSD FRML MDRD: 84 ML/MIN/1.7M2
GFR SERPL CREATININE-BSD FRML MDRD: 85 ML/MIN/1.7M2
GFR SERPL CREATININE-BSD FRML MDRD: 86 ML/MIN/1.7M2
GFR SERPL CREATININE-BSD FRML MDRD: 88 ML/MIN/1.7M2
GFR SERPL CREATININE-BSD FRML MDRD: 90 ML/MIN/1.7M2
GFR SERPL CREATININE-BSD FRML MDRD: >90 ML/MIN/1.7M2
GLUCOSE BLD-MCNC: 129 MG/DL (ref 70–99)
GLUCOSE BLD-MCNC: 135 MG/DL (ref 70–99)
GLUCOSE BLD-MCNC: 136 MG/DL (ref 70–99)
GLUCOSE BLD-MCNC: 142 MG/DL (ref 70–99)
GLUCOSE BLD-MCNC: 143 MG/DL (ref 70–99)
GLUCOSE BLD-MCNC: 151 MG/DL (ref 70–99)
GLUCOSE BLD-MCNC: 157 MG/DL (ref 70–99)
GLUCOSE BLD-MCNC: 159 MG/DL (ref 70–99)
GLUCOSE BLD-MCNC: 174 MG/DL (ref 70–99)
GLUCOSE BLD-MCNC: 232 MG/DL (ref 70–99)
GLUCOSE BLD-MCNC: 233 MG/DL (ref 70–99)
GLUCOSE BLD-MCNC: 241 MG/DL (ref 70–99)
GLUCOSE BLD-MCNC: 96 MG/DL (ref 70–99)
GLUCOSE BLDC GLUCOMTR-MCNC: 100 MG/DL (ref 70–99)
GLUCOSE BLDC GLUCOMTR-MCNC: 101 MG/DL (ref 70–99)
GLUCOSE BLDC GLUCOMTR-MCNC: 102 MG/DL (ref 70–99)
GLUCOSE BLDC GLUCOMTR-MCNC: 103 MG/DL (ref 70–99)
GLUCOSE BLDC GLUCOMTR-MCNC: 104 MG/DL (ref 70–99)
GLUCOSE BLDC GLUCOMTR-MCNC: 105 MG/DL (ref 70–99)
GLUCOSE BLDC GLUCOMTR-MCNC: 106 MG/DL (ref 70–99)
GLUCOSE BLDC GLUCOMTR-MCNC: 107 MG/DL (ref 70–99)
GLUCOSE BLDC GLUCOMTR-MCNC: 108 MG/DL (ref 70–99)
GLUCOSE BLDC GLUCOMTR-MCNC: 109 MG/DL (ref 70–99)
GLUCOSE BLDC GLUCOMTR-MCNC: 109 MG/DL (ref 70–99)
GLUCOSE BLDC GLUCOMTR-MCNC: 110 MG/DL (ref 70–99)
GLUCOSE BLDC GLUCOMTR-MCNC: 111 MG/DL (ref 70–99)
GLUCOSE BLDC GLUCOMTR-MCNC: 112 MG/DL (ref 70–99)
GLUCOSE BLDC GLUCOMTR-MCNC: 113 MG/DL (ref 70–99)
GLUCOSE BLDC GLUCOMTR-MCNC: 114 MG/DL (ref 70–99)
GLUCOSE BLDC GLUCOMTR-MCNC: 115 MG/DL (ref 70–99)
GLUCOSE BLDC GLUCOMTR-MCNC: 116 MG/DL (ref 70–99)
GLUCOSE BLDC GLUCOMTR-MCNC: 117 MG/DL (ref 70–99)
GLUCOSE BLDC GLUCOMTR-MCNC: 118 MG/DL (ref 70–99)
GLUCOSE BLDC GLUCOMTR-MCNC: 119 MG/DL (ref 70–99)
GLUCOSE BLDC GLUCOMTR-MCNC: 120 MG/DL (ref 70–99)
GLUCOSE BLDC GLUCOMTR-MCNC: 121 MG/DL (ref 70–99)
GLUCOSE BLDC GLUCOMTR-MCNC: 122 MG/DL (ref 70–99)
GLUCOSE BLDC GLUCOMTR-MCNC: 123 MG/DL (ref 70–99)
GLUCOSE BLDC GLUCOMTR-MCNC: 124 MG/DL (ref 70–99)
GLUCOSE BLDC GLUCOMTR-MCNC: 125 MG/DL (ref 70–99)
GLUCOSE BLDC GLUCOMTR-MCNC: 126 MG/DL (ref 70–99)
GLUCOSE BLDC GLUCOMTR-MCNC: 127 MG/DL (ref 70–99)
GLUCOSE BLDC GLUCOMTR-MCNC: 128 MG/DL (ref 70–99)
GLUCOSE BLDC GLUCOMTR-MCNC: 129 MG/DL (ref 70–99)
GLUCOSE BLDC GLUCOMTR-MCNC: 130 MG/DL (ref 70–99)
GLUCOSE BLDC GLUCOMTR-MCNC: 131 MG/DL (ref 70–99)
GLUCOSE BLDC GLUCOMTR-MCNC: 132 MG/DL (ref 70–99)
GLUCOSE BLDC GLUCOMTR-MCNC: 133 MG/DL (ref 70–99)
GLUCOSE BLDC GLUCOMTR-MCNC: 134 MG/DL (ref 70–99)
GLUCOSE BLDC GLUCOMTR-MCNC: 135 MG/DL (ref 70–99)
GLUCOSE BLDC GLUCOMTR-MCNC: 136 MG/DL (ref 70–99)
GLUCOSE BLDC GLUCOMTR-MCNC: 137 MG/DL (ref 70–99)
GLUCOSE BLDC GLUCOMTR-MCNC: 138 MG/DL (ref 70–99)
GLUCOSE BLDC GLUCOMTR-MCNC: 139 MG/DL (ref 70–99)
GLUCOSE BLDC GLUCOMTR-MCNC: 140 MG/DL (ref 70–99)
GLUCOSE BLDC GLUCOMTR-MCNC: 141 MG/DL (ref 70–99)
GLUCOSE BLDC GLUCOMTR-MCNC: 142 MG/DL (ref 70–99)
GLUCOSE BLDC GLUCOMTR-MCNC: 143 MG/DL (ref 70–99)
GLUCOSE BLDC GLUCOMTR-MCNC: 144 MG/DL (ref 70–99)
GLUCOSE BLDC GLUCOMTR-MCNC: 144 MG/DL (ref 70–99)
GLUCOSE BLDC GLUCOMTR-MCNC: 145 MG/DL (ref 70–99)
GLUCOSE BLDC GLUCOMTR-MCNC: 146 MG/DL (ref 70–99)
GLUCOSE BLDC GLUCOMTR-MCNC: 147 MG/DL (ref 70–99)
GLUCOSE BLDC GLUCOMTR-MCNC: 148 MG/DL (ref 70–99)
GLUCOSE BLDC GLUCOMTR-MCNC: 149 MG/DL (ref 70–99)
GLUCOSE BLDC GLUCOMTR-MCNC: 150 MG/DL (ref 70–99)
GLUCOSE BLDC GLUCOMTR-MCNC: 151 MG/DL (ref 70–99)
GLUCOSE BLDC GLUCOMTR-MCNC: 152 MG/DL (ref 70–99)
GLUCOSE BLDC GLUCOMTR-MCNC: 153 MG/DL (ref 70–99)
GLUCOSE BLDC GLUCOMTR-MCNC: 154 MG/DL (ref 70–99)
GLUCOSE BLDC GLUCOMTR-MCNC: 155 MG/DL (ref 70–99)
GLUCOSE BLDC GLUCOMTR-MCNC: 156 MG/DL (ref 70–99)
GLUCOSE BLDC GLUCOMTR-MCNC: 157 MG/DL (ref 70–99)
GLUCOSE BLDC GLUCOMTR-MCNC: 158 MG/DL (ref 70–99)
GLUCOSE BLDC GLUCOMTR-MCNC: 159 MG/DL (ref 70–99)
GLUCOSE BLDC GLUCOMTR-MCNC: 160 MG/DL (ref 70–99)
GLUCOSE BLDC GLUCOMTR-MCNC: 161 MG/DL (ref 70–99)
GLUCOSE BLDC GLUCOMTR-MCNC: 162 MG/DL (ref 70–99)
GLUCOSE BLDC GLUCOMTR-MCNC: 163 MG/DL (ref 70–99)
GLUCOSE BLDC GLUCOMTR-MCNC: 164 MG/DL (ref 70–99)
GLUCOSE BLDC GLUCOMTR-MCNC: 164 MG/DL (ref 70–99)
GLUCOSE BLDC GLUCOMTR-MCNC: 165 MG/DL (ref 70–99)
GLUCOSE BLDC GLUCOMTR-MCNC: 166 MG/DL (ref 70–99)
GLUCOSE BLDC GLUCOMTR-MCNC: 167 MG/DL (ref 70–99)
GLUCOSE BLDC GLUCOMTR-MCNC: 167 MG/DL (ref 70–99)
GLUCOSE BLDC GLUCOMTR-MCNC: 168 MG/DL (ref 70–99)
GLUCOSE BLDC GLUCOMTR-MCNC: 168 MG/DL (ref 70–99)
GLUCOSE BLDC GLUCOMTR-MCNC: 169 MG/DL (ref 70–99)
GLUCOSE BLDC GLUCOMTR-MCNC: 171 MG/DL (ref 70–99)
GLUCOSE BLDC GLUCOMTR-MCNC: 173 MG/DL (ref 70–99)
GLUCOSE BLDC GLUCOMTR-MCNC: 174 MG/DL (ref 70–99)
GLUCOSE BLDC GLUCOMTR-MCNC: 175 MG/DL (ref 70–99)
GLUCOSE BLDC GLUCOMTR-MCNC: 176 MG/DL (ref 70–99)
GLUCOSE BLDC GLUCOMTR-MCNC: 177 MG/DL (ref 70–99)
GLUCOSE BLDC GLUCOMTR-MCNC: 177 MG/DL (ref 70–99)
GLUCOSE BLDC GLUCOMTR-MCNC: 180 MG/DL (ref 70–99)
GLUCOSE BLDC GLUCOMTR-MCNC: 180 MG/DL (ref 70–99)
GLUCOSE BLDC GLUCOMTR-MCNC: 181 MG/DL (ref 70–99)
GLUCOSE BLDC GLUCOMTR-MCNC: 182 MG/DL (ref 70–99)
GLUCOSE BLDC GLUCOMTR-MCNC: 184 MG/DL (ref 70–99)
GLUCOSE BLDC GLUCOMTR-MCNC: 186 MG/DL (ref 70–99)
GLUCOSE BLDC GLUCOMTR-MCNC: 187 MG/DL (ref 70–99)
GLUCOSE BLDC GLUCOMTR-MCNC: 187 MG/DL (ref 70–99)
GLUCOSE BLDC GLUCOMTR-MCNC: 188 MG/DL (ref 70–99)
GLUCOSE BLDC GLUCOMTR-MCNC: 189 MG/DL (ref 70–99)
GLUCOSE BLDC GLUCOMTR-MCNC: 190 MG/DL (ref 70–99)
GLUCOSE BLDC GLUCOMTR-MCNC: 191 MG/DL (ref 70–99)
GLUCOSE BLDC GLUCOMTR-MCNC: 194 MG/DL (ref 70–99)
GLUCOSE BLDC GLUCOMTR-MCNC: 194 MG/DL (ref 70–99)
GLUCOSE BLDC GLUCOMTR-MCNC: 196 MG/DL (ref 70–99)
GLUCOSE BLDC GLUCOMTR-MCNC: 196 MG/DL (ref 70–99)
GLUCOSE BLDC GLUCOMTR-MCNC: 199 MG/DL (ref 70–99)
GLUCOSE BLDC GLUCOMTR-MCNC: 200 MG/DL (ref 70–99)
GLUCOSE BLDC GLUCOMTR-MCNC: 203 MG/DL (ref 70–99)
GLUCOSE BLDC GLUCOMTR-MCNC: 206 MG/DL (ref 70–99)
GLUCOSE BLDC GLUCOMTR-MCNC: 207 MG/DL (ref 70–99)
GLUCOSE BLDC GLUCOMTR-MCNC: 207 MG/DL (ref 70–99)
GLUCOSE BLDC GLUCOMTR-MCNC: 208 MG/DL (ref 70–99)
GLUCOSE BLDC GLUCOMTR-MCNC: 208 MG/DL (ref 70–99)
GLUCOSE BLDC GLUCOMTR-MCNC: 211 MG/DL (ref 70–99)
GLUCOSE BLDC GLUCOMTR-MCNC: 213 MG/DL (ref 70–99)
GLUCOSE BLDC GLUCOMTR-MCNC: 214 MG/DL (ref 70–99)
GLUCOSE BLDC GLUCOMTR-MCNC: 214 MG/DL (ref 70–99)
GLUCOSE BLDC GLUCOMTR-MCNC: 217 MG/DL (ref 70–99)
GLUCOSE BLDC GLUCOMTR-MCNC: 219 MG/DL (ref 70–99)
GLUCOSE BLDC GLUCOMTR-MCNC: 220 MG/DL (ref 70–99)
GLUCOSE BLDC GLUCOMTR-MCNC: 225 MG/DL (ref 70–99)
GLUCOSE BLDC GLUCOMTR-MCNC: 230 MG/DL (ref 70–99)
GLUCOSE BLDC GLUCOMTR-MCNC: 238 MG/DL (ref 70–99)
GLUCOSE BLDC GLUCOMTR-MCNC: 248 MG/DL (ref 70–99)
GLUCOSE BLDC GLUCOMTR-MCNC: 260 MG/DL (ref 70–99)
GLUCOSE BLDC GLUCOMTR-MCNC: 272 MG/DL (ref 70–99)
GLUCOSE BLDC GLUCOMTR-MCNC: 275 MG/DL (ref 70–99)
GLUCOSE BLDC GLUCOMTR-MCNC: 288 MG/DL (ref 70–99)
GLUCOSE BLDC GLUCOMTR-MCNC: 295 MG/DL (ref 70–99)
GLUCOSE BLDC GLUCOMTR-MCNC: 306 MG/DL (ref 70–99)
GLUCOSE BLDC GLUCOMTR-MCNC: 397 MG/DL (ref 70–99)
GLUCOSE BLDC GLUCOMTR-MCNC: 457 MG/DL (ref 70–99)
GLUCOSE BLDC GLUCOMTR-MCNC: 65 MG/DL (ref 70–99)
GLUCOSE BLDC GLUCOMTR-MCNC: 79 MG/DL (ref 70–99)
GLUCOSE BLDC GLUCOMTR-MCNC: 79 MG/DL (ref 70–99)
GLUCOSE BLDC GLUCOMTR-MCNC: 83 MG/DL (ref 70–99)
GLUCOSE BLDC GLUCOMTR-MCNC: 85 MG/DL (ref 70–99)
GLUCOSE BLDC GLUCOMTR-MCNC: 85 MG/DL (ref 70–99)
GLUCOSE BLDC GLUCOMTR-MCNC: 87 MG/DL (ref 70–99)
GLUCOSE BLDC GLUCOMTR-MCNC: 88 MG/DL (ref 70–99)
GLUCOSE BLDC GLUCOMTR-MCNC: 89 MG/DL (ref 70–99)
GLUCOSE BLDC GLUCOMTR-MCNC: 91 MG/DL (ref 70–99)
GLUCOSE BLDC GLUCOMTR-MCNC: 93 MG/DL (ref 70–99)
GLUCOSE BLDC GLUCOMTR-MCNC: 93 MG/DL (ref 70–99)
GLUCOSE BLDC GLUCOMTR-MCNC: 94 MG/DL (ref 70–99)
GLUCOSE BLDC GLUCOMTR-MCNC: 94 MG/DL (ref 70–99)
GLUCOSE BLDC GLUCOMTR-MCNC: 95 MG/DL (ref 70–99)
GLUCOSE BLDC GLUCOMTR-MCNC: 97 MG/DL (ref 70–99)
GLUCOSE BLDC GLUCOMTR-MCNC: 98 MG/DL (ref 70–99)
GLUCOSE BLDC GLUCOMTR-MCNC: 99 MG/DL (ref 70–99)
GLUCOSE SERPL-MCNC: 100 MG/DL (ref 70–99)
GLUCOSE SERPL-MCNC: 101 MG/DL (ref 70–99)
GLUCOSE SERPL-MCNC: 103 MG/DL (ref 70–99)
GLUCOSE SERPL-MCNC: 103 MG/DL (ref 70–99)
GLUCOSE SERPL-MCNC: 104 MG/DL (ref 70–99)
GLUCOSE SERPL-MCNC: 104 MG/DL (ref 70–99)
GLUCOSE SERPL-MCNC: 105 MG/DL (ref 70–99)
GLUCOSE SERPL-MCNC: 106 MG/DL (ref 70–99)
GLUCOSE SERPL-MCNC: 107 MG/DL (ref 70–99)
GLUCOSE SERPL-MCNC: 107 MG/DL (ref 70–99)
GLUCOSE SERPL-MCNC: 109 MG/DL (ref 70–99)
GLUCOSE SERPL-MCNC: 110 MG/DL (ref 70–99)
GLUCOSE SERPL-MCNC: 110 MG/DL (ref 70–99)
GLUCOSE SERPL-MCNC: 112 MG/DL (ref 70–99)
GLUCOSE SERPL-MCNC: 113 MG/DL (ref 70–99)
GLUCOSE SERPL-MCNC: 114 MG/DL (ref 70–99)
GLUCOSE SERPL-MCNC: 115 MG/DL (ref 70–99)
GLUCOSE SERPL-MCNC: 115 MG/DL (ref 70–99)
GLUCOSE SERPL-MCNC: 116 MG/DL (ref 70–99)
GLUCOSE SERPL-MCNC: 116 MG/DL (ref 70–99)
GLUCOSE SERPL-MCNC: 117 MG/DL (ref 70–99)
GLUCOSE SERPL-MCNC: 119 MG/DL (ref 70–99)
GLUCOSE SERPL-MCNC: 119 MG/DL (ref 70–99)
GLUCOSE SERPL-MCNC: 122 MG/DL (ref 70–99)
GLUCOSE SERPL-MCNC: 122 MG/DL (ref 70–99)
GLUCOSE SERPL-MCNC: 123 MG/DL (ref 70–99)
GLUCOSE SERPL-MCNC: 124 MG/DL (ref 70–99)
GLUCOSE SERPL-MCNC: 125 MG/DL (ref 70–99)
GLUCOSE SERPL-MCNC: 126 MG/DL (ref 70–99)
GLUCOSE SERPL-MCNC: 128 MG/DL (ref 70–99)
GLUCOSE SERPL-MCNC: 129 MG/DL (ref 70–99)
GLUCOSE SERPL-MCNC: 129 MG/DL (ref 70–99)
GLUCOSE SERPL-MCNC: 130 MG/DL (ref 70–99)
GLUCOSE SERPL-MCNC: 131 MG/DL (ref 70–99)
GLUCOSE SERPL-MCNC: 132 MG/DL (ref 70–99)
GLUCOSE SERPL-MCNC: 133 MG/DL (ref 70–99)
GLUCOSE SERPL-MCNC: 134 MG/DL (ref 70–99)
GLUCOSE SERPL-MCNC: 134 MG/DL (ref 70–99)
GLUCOSE SERPL-MCNC: 135 MG/DL (ref 70–99)
GLUCOSE SERPL-MCNC: 136 MG/DL (ref 70–99)
GLUCOSE SERPL-MCNC: 137 MG/DL (ref 70–99)
GLUCOSE SERPL-MCNC: 137 MG/DL (ref 70–99)
GLUCOSE SERPL-MCNC: 139 MG/DL (ref 70–99)
GLUCOSE SERPL-MCNC: 140 MG/DL (ref 70–99)
GLUCOSE SERPL-MCNC: 141 MG/DL (ref 70–99)
GLUCOSE SERPL-MCNC: 141 MG/DL (ref 70–99)
GLUCOSE SERPL-MCNC: 142 MG/DL (ref 70–99)
GLUCOSE SERPL-MCNC: 143 MG/DL (ref 70–99)
GLUCOSE SERPL-MCNC: 144 MG/DL (ref 70–99)
GLUCOSE SERPL-MCNC: 145 MG/DL (ref 70–99)
GLUCOSE SERPL-MCNC: 147 MG/DL (ref 70–99)
GLUCOSE SERPL-MCNC: 148 MG/DL (ref 70–99)
GLUCOSE SERPL-MCNC: 148 MG/DL (ref 70–99)
GLUCOSE SERPL-MCNC: 150 MG/DL (ref 70–99)
GLUCOSE SERPL-MCNC: 151 MG/DL (ref 70–99)
GLUCOSE SERPL-MCNC: 152 MG/DL (ref 70–99)
GLUCOSE SERPL-MCNC: 153 MG/DL (ref 70–99)
GLUCOSE SERPL-MCNC: 153 MG/DL (ref 70–99)
GLUCOSE SERPL-MCNC: 154 MG/DL (ref 70–99)
GLUCOSE SERPL-MCNC: 154 MG/DL (ref 70–99)
GLUCOSE SERPL-MCNC: 156 MG/DL (ref 70–99)
GLUCOSE SERPL-MCNC: 157 MG/DL (ref 70–99)
GLUCOSE SERPL-MCNC: 158 MG/DL (ref 70–99)
GLUCOSE SERPL-MCNC: 158 MG/DL (ref 70–99)
GLUCOSE SERPL-MCNC: 159 MG/DL (ref 70–99)
GLUCOSE SERPL-MCNC: 160 MG/DL (ref 70–99)
GLUCOSE SERPL-MCNC: 160 MG/DL (ref 70–99)
GLUCOSE SERPL-MCNC: 161 MG/DL (ref 70–99)
GLUCOSE SERPL-MCNC: 169 MG/DL (ref 70–99)
GLUCOSE SERPL-MCNC: 170 MG/DL (ref 70–99)
GLUCOSE SERPL-MCNC: 172 MG/DL (ref 70–99)
GLUCOSE SERPL-MCNC: 174 MG/DL (ref 70–99)
GLUCOSE SERPL-MCNC: 176 MG/DL (ref 70–99)
GLUCOSE SERPL-MCNC: 177 MG/DL (ref 70–99)
GLUCOSE SERPL-MCNC: 178 MG/DL (ref 70–99)
GLUCOSE SERPL-MCNC: 182 MG/DL (ref 70–99)
GLUCOSE SERPL-MCNC: 184 MG/DL (ref 70–99)
GLUCOSE SERPL-MCNC: 185 MG/DL (ref 70–99)
GLUCOSE SERPL-MCNC: 186 MG/DL (ref 70–99)
GLUCOSE SERPL-MCNC: 192 MG/DL (ref 70–99)
GLUCOSE SERPL-MCNC: 192 MG/DL (ref 70–99)
GLUCOSE SERPL-MCNC: 194 MG/DL (ref 70–99)
GLUCOSE SERPL-MCNC: 195 MG/DL (ref 70–99)
GLUCOSE SERPL-MCNC: 197 MG/DL (ref 70–99)
GLUCOSE SERPL-MCNC: 198 MG/DL (ref 70–99)
GLUCOSE SERPL-MCNC: 210 MG/DL (ref 70–99)
GLUCOSE SERPL-MCNC: 220 MG/DL (ref 70–99)
GLUCOSE SERPL-MCNC: 223 MG/DL (ref 70–99)
GLUCOSE SERPL-MCNC: 227 MG/DL (ref 70–99)
GLUCOSE SERPL-MCNC: 234 MG/DL (ref 70–99)
GLUCOSE SERPL-MCNC: 264 MG/DL (ref 70–99)
GLUCOSE SERPL-MCNC: 264 MG/DL (ref 70–99)
GLUCOSE SERPL-MCNC: 273 MG/DL (ref 70–99)
GLUCOSE SERPL-MCNC: 282 MG/DL (ref 70–99)
GLUCOSE SERPL-MCNC: 284 MG/DL (ref 70–99)
GLUCOSE SERPL-MCNC: 311 MG/DL (ref 70–99)
GLUCOSE SERPL-MCNC: 74 MG/DL (ref 70–99)
GLUCOSE SERPL-MCNC: 85 MG/DL (ref 70–99)
GLUCOSE SERPL-MCNC: 92 MG/DL (ref 70–99)
GLUCOSE SERPL-MCNC: 93 MG/DL (ref 70–99)
GLUCOSE SERPL-MCNC: 97 MG/DL (ref 70–99)
GLUCOSE SERPL-MCNC: 98 MG/DL (ref 70–99)
GLUCOSE SERPL-MCNC: 99 MG/DL (ref 70–99)
GLUCOSE SERPL-MCNC: 99 MG/DL (ref 70–99)
GLUCOSE UR STRIP-MCNC: NEGATIVE MG/DL
GRAM STN SPEC: ABNORMAL
GRAM STN SPEC: NORMAL
GRAN CASTS #/AREA URNS LPF: 1 /LPF
HAPTOGLOB SERPL-MCNC: 13 MG/DL (ref 15–200)
HAPTOGLOB SERPL-MCNC: <6 MG/DL (ref 15–200)
HAV IGG SER QL IA: REACTIVE
HAV IGM SERPL QL IA: NONREACTIVE
HBA1C MFR BLD: 5.4 % (ref 0–5.6)
HBV CORE AB SERPL QL IA: REACTIVE
HBV CORE IGM SERPL QL IA: NONREACTIVE
HBV CORE IGM SERPL QL IA: NONREACTIVE
HBV DNA SERPL NAA+PROBE-ACNC: NORMAL [IU]/ML
HBV DNA SERPL NAA+PROBE-ACNC: NORMAL [IU]/ML
HBV DNA SERPL NAA+PROBE-LOG IU: NORMAL {LOG_IU}/ML
HBV DNA SERPL NAA+PROBE-LOG IU: NORMAL {LOG_IU}/ML
HBV SURFACE AB SERPL IA-ACNC: 0 M[IU]/ML
HBV SURFACE AB SERPL IA-ACNC: 551.06 M[IU]/ML
HBV SURFACE AG SERPL QL IA: NONREACTIVE
HBV SURFACE AG SERPL QL IA: REACTIVE
HCO3 BLD-SCNC: 11 MMOL/L (ref 21–28)
HCO3 BLD-SCNC: 13 MMOL/L (ref 21–28)
HCO3 BLD-SCNC: 13 MMOL/L (ref 21–28)
HCO3 BLD-SCNC: 15 MMOL/L (ref 21–28)
HCO3 BLD-SCNC: 15 MMOL/L (ref 21–28)
HCO3 BLD-SCNC: 16 MMOL/L (ref 21–28)
HCO3 BLD-SCNC: 17 MMOL/L (ref 21–28)
HCO3 BLD-SCNC: 18 MMOL/L (ref 21–28)
HCO3 BLD-SCNC: 19 MMOL/L (ref 21–28)
HCO3 BLD-SCNC: 20 MMOL/L (ref 21–28)
HCO3 BLD-SCNC: 21 MMOL/L (ref 21–28)
HCO3 BLD-SCNC: 22 MMOL/L (ref 21–28)
HCO3 BLD-SCNC: 23 MMOL/L (ref 21–28)
HCO3 BLD-SCNC: 24 MMOL/L (ref 21–28)
HCO3 BLD-SCNC: 25 MMOL/L (ref 21–28)
HCO3 BLD-SCNC: 27 MMOL/L (ref 21–28)
HCO3 BLDV-SCNC: 14 MMOL/L (ref 21–28)
HCO3 BLDV-SCNC: 17 MMOL/L (ref 21–28)
HCO3 BLDV-SCNC: 17 MMOL/L (ref 21–28)
HCO3 BLDV-SCNC: 18 MMOL/L (ref 21–28)
HCO3 BLDV-SCNC: 20 MMOL/L (ref 21–28)
HCO3 BLDV-SCNC: 21 MMOL/L (ref 21–28)
HCO3 BLDV-SCNC: 21 MMOL/L (ref 21–28)
HCO3 BLDV-SCNC: 24 MMOL/L (ref 21–28)
HCO3 BLDV-SCNC: 24 MMOL/L (ref 21–28)
HCT VFR BLD AUTO: 14.3 % (ref 40–53)
HCT VFR BLD AUTO: 14.5 % (ref 40–53)
HCT VFR BLD AUTO: 14.6 % (ref 40–53)
HCT VFR BLD AUTO: 14.7 % (ref 40–53)
HCT VFR BLD AUTO: 14.8 % (ref 40–53)
HCT VFR BLD AUTO: 15.8 % (ref 40–53)
HCT VFR BLD AUTO: 18.5 % (ref 40–53)
HCT VFR BLD AUTO: 18.9 % (ref 40–53)
HCT VFR BLD AUTO: 19.4 % (ref 40–53)
HCT VFR BLD AUTO: 19.5 % (ref 40–53)
HCT VFR BLD AUTO: 19.7 % (ref 40–53)
HCT VFR BLD AUTO: 19.8 % (ref 40–53)
HCT VFR BLD AUTO: 19.8 % (ref 40–53)
HCT VFR BLD AUTO: 19.9 % (ref 40–53)
HCT VFR BLD AUTO: 19.9 % (ref 40–53)
HCT VFR BLD AUTO: 20 % (ref 40–53)
HCT VFR BLD AUTO: 20.1 % (ref 40–53)
HCT VFR BLD AUTO: 20.3 % (ref 40–53)
HCT VFR BLD AUTO: 20.4 % (ref 40–53)
HCT VFR BLD AUTO: 20.5 % (ref 40–53)
HCT VFR BLD AUTO: 20.6 % (ref 40–53)
HCT VFR BLD AUTO: 20.8 % (ref 40–53)
HCT VFR BLD AUTO: 20.9 % (ref 40–53)
HCT VFR BLD AUTO: 21 % (ref 40–53)
HCT VFR BLD AUTO: 21.1 % (ref 40–53)
HCT VFR BLD AUTO: 21.2 % (ref 40–53)
HCT VFR BLD AUTO: 21.3 % (ref 40–53)
HCT VFR BLD AUTO: 21.3 % (ref 40–53)
HCT VFR BLD AUTO: 21.4 % (ref 40–53)
HCT VFR BLD AUTO: 21.5 % (ref 40–53)
HCT VFR BLD AUTO: 21.5 % (ref 40–53)
HCT VFR BLD AUTO: 21.6 % (ref 40–53)
HCT VFR BLD AUTO: 21.6 % (ref 40–53)
HCT VFR BLD AUTO: 21.7 % (ref 40–53)
HCT VFR BLD AUTO: 21.8 % (ref 40–53)
HCT VFR BLD AUTO: 21.9 % (ref 40–53)
HCT VFR BLD AUTO: 22 % (ref 40–53)
HCT VFR BLD AUTO: 22.1 % (ref 40–53)
HCT VFR BLD AUTO: 22.2 % (ref 40–53)
HCT VFR BLD AUTO: 22.2 % (ref 40–53)
HCT VFR BLD AUTO: 22.3 % (ref 40–53)
HCT VFR BLD AUTO: 22.4 % (ref 40–53)
HCT VFR BLD AUTO: 22.5 % (ref 40–53)
HCT VFR BLD AUTO: 22.5 % (ref 40–53)
HCT VFR BLD AUTO: 22.6 % (ref 40–53)
HCT VFR BLD AUTO: 22.7 % (ref 40–53)
HCT VFR BLD AUTO: 22.8 % (ref 40–53)
HCT VFR BLD AUTO: 22.8 % (ref 40–53)
HCT VFR BLD AUTO: 22.9 % (ref 40–53)
HCT VFR BLD AUTO: 23 % (ref 40–53)
HCT VFR BLD AUTO: 23.1 % (ref 40–53)
HCT VFR BLD AUTO: 23.1 % (ref 40–53)
HCT VFR BLD AUTO: 23.2 % (ref 40–53)
HCT VFR BLD AUTO: 23.2 % (ref 40–53)
HCT VFR BLD AUTO: 23.3 % (ref 40–53)
HCT VFR BLD AUTO: 23.4 % (ref 40–53)
HCT VFR BLD AUTO: 23.5 % (ref 40–53)
HCT VFR BLD AUTO: 23.5 % (ref 40–53)
HCT VFR BLD AUTO: 23.6 % (ref 40–53)
HCT VFR BLD AUTO: 23.7 % (ref 40–53)
HCT VFR BLD AUTO: 23.7 % (ref 40–53)
HCT VFR BLD AUTO: 23.9 % (ref 40–53)
HCT VFR BLD AUTO: 24 % (ref 40–53)
HCT VFR BLD AUTO: 24 % (ref 40–53)
HCT VFR BLD AUTO: 24.1 % (ref 40–53)
HCT VFR BLD AUTO: 24.2 % (ref 40–53)
HCT VFR BLD AUTO: 24.2 % (ref 40–53)
HCT VFR BLD AUTO: 24.3 % (ref 40–53)
HCT VFR BLD AUTO: 24.3 % (ref 40–53)
HCT VFR BLD AUTO: 24.4 % (ref 40–53)
HCT VFR BLD AUTO: 24.4 % (ref 40–53)
HCT VFR BLD AUTO: 24.7 % (ref 40–53)
HCT VFR BLD AUTO: 24.8 % (ref 40–53)
HCT VFR BLD AUTO: 24.9 % (ref 40–53)
HCT VFR BLD AUTO: 25.1 % (ref 40–53)
HCT VFR BLD AUTO: 25.2 % (ref 40–53)
HCT VFR BLD AUTO: 25.3 % (ref 40–53)
HCT VFR BLD AUTO: 25.4 % (ref 40–53)
HCT VFR BLD AUTO: 25.5 % (ref 40–53)
HCT VFR BLD AUTO: 25.6 % (ref 40–53)
HCT VFR BLD AUTO: 25.6 % (ref 40–53)
HCT VFR BLD AUTO: 25.7 % (ref 40–53)
HCT VFR BLD AUTO: 25.7 % (ref 40–53)
HCT VFR BLD AUTO: 25.8 % (ref 40–53)
HCT VFR BLD AUTO: 25.9 % (ref 40–53)
HCT VFR BLD AUTO: 26 % (ref 40–53)
HCT VFR BLD AUTO: 26.1 % (ref 40–53)
HCT VFR BLD AUTO: 26.2 % (ref 40–53)
HCT VFR BLD AUTO: 26.2 % (ref 40–53)
HCT VFR BLD AUTO: 26.4 % (ref 40–53)
HCT VFR BLD AUTO: 26.4 % (ref 40–53)
HCT VFR BLD AUTO: 26.9 % (ref 40–53)
HCT VFR BLD AUTO: 27 % (ref 40–53)
HCT VFR BLD AUTO: 27 % (ref 40–53)
HCT VFR BLD AUTO: 27.1 % (ref 40–53)
HCT VFR BLD AUTO: 27.3 % (ref 40–53)
HCT VFR BLD AUTO: 27.3 % (ref 40–53)
HCT VFR BLD AUTO: 27.4 % (ref 40–53)
HCT VFR BLD AUTO: 27.4 % (ref 40–53)
HCT VFR BLD AUTO: 27.7 % (ref 40–53)
HCT VFR BLD AUTO: 27.7 % (ref 40–53)
HCT VFR BLD AUTO: 27.8 % (ref 40–53)
HCT VFR BLD AUTO: 27.8 % (ref 40–53)
HCT VFR BLD AUTO: 27.9 % (ref 40–53)
HCT VFR BLD AUTO: 28 % (ref 40–53)
HCT VFR BLD AUTO: 28.1 % (ref 40–53)
HCT VFR BLD AUTO: 28.1 % (ref 40–53)
HCT VFR BLD AUTO: 28.3 % (ref 40–53)
HCT VFR BLD AUTO: 28.6 % (ref 40–53)
HCT VFR BLD AUTO: 28.7 % (ref 40–53)
HCT VFR BLD AUTO: 28.8 % (ref 40–53)
HCT VFR BLD AUTO: 28.9 % (ref 40–53)
HCT VFR BLD AUTO: 28.9 % (ref 40–53)
HCT VFR BLD AUTO: 29.5 % (ref 40–53)
HCT VFR BLD AUTO: 29.5 % (ref 40–53)
HCT VFR BLD AUTO: 29.6 % (ref 40–53)
HCT VFR BLD AUTO: 29.7 % (ref 40–53)
HCT VFR BLD AUTO: 29.8 % (ref 40–53)
HCT VFR BLD AUTO: 30 % (ref 40–53)
HCT VFR BLD AUTO: 30 % (ref 40–53)
HCT VFR BLD AUTO: 30.1 % (ref 40–53)
HCT VFR BLD AUTO: 30.2 % (ref 40–53)
HCT VFR BLD AUTO: 30.2 % (ref 40–53)
HCT VFR BLD AUTO: 30.7 % (ref 40–53)
HCT VFR BLD AUTO: 30.9 % (ref 40–53)
HCT VFR BLD AUTO: 31.3 % (ref 40–53)
HCT VFR BLD AUTO: 31.6 % (ref 40–53)
HCT VFR BLD AUTO: 31.8 % (ref 40–53)
HCT VFR BLD AUTO: 32.1 % (ref 40–53)
HCT VFR BLD AUTO: 32.6 % (ref 40–53)
HCT VFR BLD AUTO: 35.2 % (ref 40–53)
HCT VFR BLD AUTO: 36.6 % (ref 40–53)
HCT VFR BLD AUTO: 37.6 % (ref 40–53)
HCT VFR BLD AUTO: 40.4 % (ref 40–53)
HCT VFR BLD AUTO: 40.8 % (ref 40–53)
HCT VFR BLD AUTO: 41 % (ref 40–53)
HCT VFR BLD AUTO: 41.4 % (ref 40–53)
HCT VFR BLD AUTO: 41.5 % (ref 40–53)
HCT VFR BLD AUTO: 42.6 % (ref 40–53)
HCT VFR BLD AUTO: 42.6 % (ref 40–53)
HCT VFR BLD AUTO: 43.1 % (ref 40–53)
HCT VFR BLD AUTO: 43.4 % (ref 40–53)
HCT VFR BLD AUTO: 43.6 % (ref 40–53)
HCT VFR BLD AUTO: 44.2 % (ref 40–53)
HCT VFR BLD AUTO: 44.8 % (ref 40–53)
HCT VFR BLD AUTO: 47.5 % (ref 40–53)
HCT VFR BLD AUTO: NORMAL % (ref 40–53)
HCV AB SERPL QL IA: NONREACTIVE
HDLC SERPL-MCNC: 7 MG/DL
HGB BLD-MCNC: 10 G/DL (ref 13.3–17.7)
HGB BLD-MCNC: 10.1 G/DL (ref 13.3–17.7)
HGB BLD-MCNC: 10.2 G/DL (ref 13.3–17.7)
HGB BLD-MCNC: 10.3 G/DL (ref 13.3–17.7)
HGB BLD-MCNC: 10.4 G/DL (ref 13.3–17.7)
HGB BLD-MCNC: 10.6 G/DL (ref 13.3–17.7)
HGB BLD-MCNC: 10.7 G/DL (ref 13.3–17.7)
HGB BLD-MCNC: 10.7 G/DL (ref 13.3–17.7)
HGB BLD-MCNC: 10.9 G/DL (ref 13.3–17.7)
HGB BLD-MCNC: 11 G/DL (ref 13.3–17.7)
HGB BLD-MCNC: 11.1 G/DL (ref 13.3–17.7)
HGB BLD-MCNC: 11.6 G/DL (ref 13.3–17.7)
HGB BLD-MCNC: 12.3 G/DL (ref 13.3–17.7)
HGB BLD-MCNC: 12.9 G/DL (ref 13.3–17.7)
HGB BLD-MCNC: 14.3 G/DL (ref 13.3–17.7)
HGB BLD-MCNC: 14.3 G/DL (ref 13.3–17.7)
HGB BLD-MCNC: 14.4 G/DL (ref 13.3–17.7)
HGB BLD-MCNC: 14.5 G/DL (ref 13.3–17.7)
HGB BLD-MCNC: 14.7 G/DL (ref 13.3–17.7)
HGB BLD-MCNC: 14.8 G/DL (ref 13.3–17.7)
HGB BLD-MCNC: 14.9 G/DL (ref 13.3–17.7)
HGB BLD-MCNC: 15 G/DL (ref 13.3–17.7)
HGB BLD-MCNC: 15.2 G/DL (ref 13.3–17.7)
HGB BLD-MCNC: 15.3 G/DL (ref 13.3–17.7)
HGB BLD-MCNC: 15.5 G/DL (ref 13.3–17.7)
HGB BLD-MCNC: 15.7 G/DL (ref 13.3–17.7)
HGB BLD-MCNC: 15.9 G/DL (ref 13.3–17.7)
HGB BLD-MCNC: 16.9 G/DL (ref 13.3–17.7)
HGB BLD-MCNC: 4.6 G/DL (ref 13.3–17.7)
HGB BLD-MCNC: 4.7 G/DL (ref 13.3–17.7)
HGB BLD-MCNC: 4.9 G/DL (ref 13.3–17.7)
HGB BLD-MCNC: 5 G/DL (ref 13.3–17.7)
HGB BLD-MCNC: 5 G/DL (ref 13.3–17.7)
HGB BLD-MCNC: 5.3 G/DL (ref 13.3–17.7)
HGB BLD-MCNC: 5.8 G/DL (ref 13.3–17.7)
HGB BLD-MCNC: 6.4 G/DL (ref 13.3–17.7)
HGB BLD-MCNC: 6.5 G/DL (ref 13.3–17.7)
HGB BLD-MCNC: 6.6 G/DL (ref 13.3–17.7)
HGB BLD-MCNC: 6.7 G/DL (ref 13.3–17.7)
HGB BLD-MCNC: 6.7 G/DL (ref 13.3–17.7)
HGB BLD-MCNC: 6.8 G/DL (ref 13.3–17.7)
HGB BLD-MCNC: 6.9 G/DL (ref 13.3–17.7)
HGB BLD-MCNC: 6.9 G/DL (ref 13.3–17.7)
HGB BLD-MCNC: 7 G/DL (ref 13.3–17.7)
HGB BLD-MCNC: 7.1 G/DL (ref 13.3–17.7)
HGB BLD-MCNC: 7.2 G/DL (ref 13.3–17.7)
HGB BLD-MCNC: 7.3 G/DL (ref 13.3–17.7)
HGB BLD-MCNC: 7.4 G/DL (ref 13.3–17.7)
HGB BLD-MCNC: 7.5 G/DL (ref 13.3–17.7)
HGB BLD-MCNC: 7.6 G/DL (ref 13.3–17.7)
HGB BLD-MCNC: 7.7 G/DL (ref 13.3–17.7)
HGB BLD-MCNC: 7.8 G/DL (ref 13.3–17.7)
HGB BLD-MCNC: 7.9 G/DL (ref 13.3–17.7)
HGB BLD-MCNC: 8 G/DL (ref 13.3–17.7)
HGB BLD-MCNC: 8.1 G/DL (ref 13.3–17.7)
HGB BLD-MCNC: 8.2 G/DL (ref 13.3–17.7)
HGB BLD-MCNC: 8.3 G/DL (ref 13.3–17.7)
HGB BLD-MCNC: 8.4 G/DL (ref 13.3–17.7)
HGB BLD-MCNC: 8.5 G/DL (ref 13.3–17.7)
HGB BLD-MCNC: 8.6 G/DL (ref 13.3–17.7)
HGB BLD-MCNC: 8.7 G/DL (ref 13.3–17.7)
HGB BLD-MCNC: 8.8 G/DL (ref 13.3–17.7)
HGB BLD-MCNC: 8.9 G/DL (ref 13.3–17.7)
HGB BLD-MCNC: 9 G/DL (ref 13.3–17.7)
HGB BLD-MCNC: 9.1 G/DL (ref 13.3–17.7)
HGB BLD-MCNC: 9.1 G/DL (ref 13.3–17.7)
HGB BLD-MCNC: 9.2 G/DL (ref 13.3–17.7)
HGB BLD-MCNC: 9.3 G/DL (ref 13.3–17.7)
HGB BLD-MCNC: 9.5 G/DL (ref 13.3–17.7)
HGB BLD-MCNC: 9.6 G/DL (ref 13.3–17.7)
HGB BLD-MCNC: 9.7 G/DL (ref 13.3–17.7)
HGB BLD-MCNC: 9.8 G/DL (ref 13.3–17.7)
HGB BLD-MCNC: 9.9 G/DL (ref 13.3–17.7)
HGB BLD-MCNC: NORMAL G/DL (ref 13.3–17.7)
HGB UR QL STRIP: ABNORMAL
HGB UR QL STRIP: NEGATIVE
HIV 1+2 AB+HIV1 P24 AG SERPL QL IA: NONREACTIVE
HLA FINAL CROSSMATCH RECIPIENT: NORMAL
HLA FINAL CROSSMATCH RECIPIENT: NORMAL
HLA TYPING COMPLETE SOT RECIPIENT: NORMAL
IMM GRANULOCYTES # BLD: 0 10E9/L (ref 0–0.4)
IMM GRANULOCYTES # BLD: 0.1 10E9/L (ref 0–0.4)
IMM GRANULOCYTES # BLD: 0.2 10E9/L (ref 0–0.4)
IMM GRANULOCYTES # BLD: 0.3 10E9/L (ref 0–0.4)
IMM GRANULOCYTES # BLD: 0.5 10E9/L (ref 0–0.4)
IMM GRANULOCYTES # BLD: 0.5 10E9/L (ref 0–0.4)
IMM GRANULOCYTES # BLD: 0.6 10E9/L (ref 0–0.4)
IMM GRANULOCYTES # BLD: 0.9 10E9/L (ref 0–0.4)
IMM GRANULOCYTES # BLD: 1.4 10E9/L (ref 0–0.4)
IMM GRANULOCYTES NFR BLD: 0 %
IMM GRANULOCYTES NFR BLD: 0.2 %
IMM GRANULOCYTES NFR BLD: 0.3 %
IMM GRANULOCYTES NFR BLD: 0.4 %
IMM GRANULOCYTES NFR BLD: 0.5 %
IMM GRANULOCYTES NFR BLD: 0.6 %
IMM GRANULOCYTES NFR BLD: 0.7 %
IMM GRANULOCYTES NFR BLD: 0.7 %
IMM GRANULOCYTES NFR BLD: 0.8 %
IMM GRANULOCYTES NFR BLD: 0.9 %
IMM GRANULOCYTES NFR BLD: 1 %
IMM GRANULOCYTES NFR BLD: 1.1 %
IMM GRANULOCYTES NFR BLD: 1.1 %
IMM GRANULOCYTES NFR BLD: 1.2 %
IMM GRANULOCYTES NFR BLD: 1.3 %
IMM GRANULOCYTES NFR BLD: 1.3 %
IMM GRANULOCYTES NFR BLD: 1.5 %
IMM GRANULOCYTES NFR BLD: 1.6 %
IMM GRANULOCYTES NFR BLD: 1.8 %
IMM GRANULOCYTES NFR BLD: 1.9 %
IMM GRANULOCYTES NFR BLD: 2 %
IMM GRANULOCYTES NFR BLD: 2.3 %
IMM GRANULOCYTES NFR BLD: 2.4 %
IMM GRANULOCYTES NFR BLD: 2.4 %
IMM GRANULOCYTES NFR BLD: 2.9 %
IMM GRANULOCYTES NFR BLD: 3.2 %
IMM GRANULOCYTES NFR BLD: 4.5 %
INR PPP: 1.24 (ref 0.86–1.14)
INR PPP: 1.27 (ref 0.86–1.14)
INR PPP: 1.28 (ref 0.86–1.14)
INR PPP: 1.3 (ref 0.86–1.14)
INR PPP: 1.35 (ref 0.86–1.14)
INR PPP: 1.36 (ref 0.86–1.14)
INR PPP: 1.39 (ref 0.86–1.14)
INR PPP: 1.41 (ref 0.86–1.14)
INR PPP: 1.43 (ref 0.86–1.14)
INR PPP: 1.45 (ref 0.86–1.14)
INR PPP: 1.47 (ref 0.86–1.14)
INR PPP: 1.47 (ref 0.86–1.14)
INR PPP: 1.48 (ref 0.86–1.14)
INR PPP: 1.49 (ref 0.86–1.14)
INR PPP: 1.5 (ref 0.86–1.14)
INR PPP: 1.5 (ref 0.86–1.14)
INR PPP: 1.51 (ref 0.86–1.14)
INR PPP: 1.52 (ref 0.86–1.14)
INR PPP: 1.53 (ref 0.86–1.14)
INR PPP: 1.57 (ref 0.86–1.14)
INR PPP: 1.58 (ref 0.86–1.14)
INR PPP: 1.6 (ref 0.86–1.14)
INR PPP: 1.63 (ref 0.86–1.14)
INR PPP: 1.64 (ref 0.86–1.14)
INR PPP: 1.65 (ref 0.86–1.14)
INR PPP: 1.68 (ref 0.86–1.14)
INR PPP: 1.69 (ref 0.86–1.14)
INR PPP: 1.7 (ref 0.86–1.14)
INR PPP: 1.71 (ref 0.9–1.1)
INR PPP: 1.74 (ref 0.86–1.14)
INR PPP: 1.75 (ref 0.86–1.14)
INR PPP: 1.78 (ref 0.86–1.14)
INR PPP: 1.78 (ref 0.86–1.14)
INR PPP: 1.79 (ref 0.86–1.14)
INR PPP: 1.79 (ref 0.86–1.14)
INR PPP: 1.81 (ref 0.86–1.14)
INR PPP: 1.83 (ref 0.86–1.14)
INR PPP: 1.84 (ref 0.86–1.14)
INR PPP: 1.87 (ref 0.86–1.14)
INR PPP: 1.87 (ref 0.86–1.14)
INR PPP: 1.88 (ref 0.86–1.14)
INR PPP: 1.91 (ref 0.86–1.14)
INR PPP: 1.93 (ref 0.86–1.14)
INR PPP: 1.96 (ref 0.86–1.14)
INR PPP: 1.98 (ref 0.86–1.14)
INR PPP: 1.99 (ref 0.86–1.14)
INR PPP: 2 (ref 0.86–1.14)
INR PPP: 2.02 (ref 0.86–1.14)
INR PPP: 2.05 (ref 0.86–1.14)
INR PPP: 2.06 (ref 0.86–1.14)
INR PPP: 2.09 (ref 0.86–1.14)
INR PPP: 2.12 (ref 0.86–1.14)
INR PPP: 2.15 (ref 0.86–1.14)
INR PPP: 2.18 (ref 0.86–1.14)
INR PPP: 2.18 (ref 0.86–1.14)
INR PPP: 2.2 (ref 0.86–1.14)
INR PPP: 2.2 (ref 0.86–1.14)
INR PPP: 2.23 (ref 0.86–1.14)
INR PPP: 2.23 (ref 0.86–1.14)
INR PPP: 2.25 (ref 0.86–1.14)
INR PPP: 2.26 (ref 0.86–1.14)
INR PPP: 2.32 (ref 0.86–1.14)
INR PPP: 2.33 (ref 0.86–1.14)
INR PPP: 2.34 (ref 0.86–1.14)
INR PPP: 2.35 (ref 0.86–1.14)
INR PPP: 2.36 (ref 0.86–1.14)
INR PPP: 2.39 (ref 0.86–1.14)
INR PPP: 2.43 (ref 0.86–1.14)
INR PPP: 2.44 (ref 0.86–1.14)
INR PPP: 2.46 (ref 0.86–1.14)
INR PPP: 2.48 (ref 0.86–1.14)
INR PPP: 2.54 (ref 0.86–1.14)
INR PPP: 2.57 (ref 0.86–1.14)
INR PPP: 2.6 (ref 0.86–1.14)
INR PPP: 2.62 (ref 0.86–1.14)
INR PPP: 2.64 (ref 0.86–1.14)
INR PPP: 2.65 (ref 0.86–1.14)
INR PPP: 2.65 (ref 0.86–1.14)
INR PPP: 2.75 (ref 0.86–1.14)
INR PPP: 2.78 (ref 0.86–1.14)
INR PPP: 2.81 (ref 0.86–1.14)
INR PPP: 3.5 (ref 0.86–1.14)
INR PPP: 3.52 (ref 0.86–1.14)
INR PPP: 3.81 (ref 0.86–1.14)
INR PPP: 3.82 (ref 0.86–1.14)
INTERPRETATION ECG - MUSE: NORMAL
IRON SATN MFR SERPL: 109 % (ref 15–46)
IRON SATN MFR SERPL: 83 % (ref 15–46)
IRON SERPL-MCNC: 66 UG/DL (ref 35–180)
IRON SERPL-MCNC: 97 UG/DL (ref 35–180)
K TIME TO SPEC CLOT STRENGTH: 1.4 MIN (ref 1–3)
K TIME TO SPEC CLOT STRENGTH: 1.5 MINUTE (ref 1–3)
K TIME TO SPEC CLOT STRENGTH: 1.9 MIN (ref 1–3)
K TIME TO SPEC CLOT STRENGTH: 1.9 MIN (ref 1–3)
K TIME TO SPEC CLOT STRENGTH: 2 MIN (ref 1–3)
K TIME TO SPEC CLOT STRENGTH: 2.7 MINUTE (ref 1–3)
K TIME TO SPEC CLOT STRENGTH: 2.8 MINUTE (ref 1–3)
K TIME TO SPEC CLOT STRENGTH: 2.8 MINUTE (ref 1–3)
K TIME TO SPEC CLOT STRENGTH: 2.9 MINUTE (ref 1–3)
K TIME TO SPEC CLOT STRENGTH: 3.2 MIN (ref 1–3)
K TIME TO SPEC CLOT STRENGTH: 4.2 MIN (ref 1–3)
K TIME TO SPEC CLOT STRENGTH: 4.8 MIN (ref 1–3)
K TIME TO SPEC CLOT STRENGTH: 8.5 MINUTE (ref 1–3)
K TIME TO SPEC CLOT STRENGTH: NORMAL MIN (ref 1–3)
K TIME TO SPEC CLOT STRENGTH: NORMAL MINUTE (ref 1–3)
KETONES UR STRIP-MCNC: NEGATIVE MG/DL
LAB SCANNED RESULT: ABNORMAL
LACTATE BLD-SCNC: 0.8 MMOL/L (ref 0.7–2)
LACTATE BLD-SCNC: 0.9 MMOL/L (ref 0.7–2)
LACTATE BLD-SCNC: 1 MMOL/L (ref 0.7–2)
LACTATE BLD-SCNC: 1.1 MMOL/L (ref 0.7–2)
LACTATE BLD-SCNC: 1.2 MMOL/L (ref 0.7–2)
LACTATE BLD-SCNC: 1.3 MMOL/L (ref 0.7–2)
LACTATE BLD-SCNC: 1.4 MMOL/L (ref 0.7–2)
LACTATE BLD-SCNC: 1.5 MMOL/L (ref 0.7–2)
LACTATE BLD-SCNC: 1.6 MMOL/L (ref 0.7–2)
LACTATE BLD-SCNC: 1.7 MMOL/L (ref 0.7–2)
LACTATE BLD-SCNC: 1.8 MMOL/L (ref 0.7–2)
LACTATE BLD-SCNC: 1.9 MMOL/L (ref 0.7–2)
LACTATE BLD-SCNC: 10.1 MMOL/L (ref 0.7–2)
LACTATE BLD-SCNC: 2 MMOL/L (ref 0.7–2)
LACTATE BLD-SCNC: 2.1 MMOL/L (ref 0.7–2)
LACTATE BLD-SCNC: 2.2 MMOL/L (ref 0.7–2)
LACTATE BLD-SCNC: 2.3 MMOL/L (ref 0.7–2)
LACTATE BLD-SCNC: 2.4 MMOL/L (ref 0.7–2)
LACTATE BLD-SCNC: 2.5 MMOL/L (ref 0.7–2)
LACTATE BLD-SCNC: 2.7 MMOL/L (ref 0.7–2)
LACTATE BLD-SCNC: 2.8 MMOL/L (ref 0.7–2)
LACTATE BLD-SCNC: 2.9 MMOL/L (ref 0.7–2)
LACTATE BLD-SCNC: 3 MMOL/L (ref 0.7–2)
LACTATE BLD-SCNC: 3.2 MMOL/L (ref 0.4–1.9)
LACTATE BLD-SCNC: 3.2 MMOL/L (ref 0.7–2)
LACTATE BLD-SCNC: 3.2 MMOL/L (ref 0.7–2)
LACTATE BLD-SCNC: 3.3 MMOL/L (ref 0.7–2)
LACTATE BLD-SCNC: 3.4 MMOL/L (ref 0.7–2)
LACTATE BLD-SCNC: 3.7 MMOL/L (ref 0.7–2)
LACTATE BLD-SCNC: 3.8 MMOL/L (ref 0.7–2)
LACTATE BLD-SCNC: 3.9 MMOL/L (ref 0.7–2)
LACTATE BLD-SCNC: 3.9 MMOL/L (ref 0.7–2)
LACTATE BLD-SCNC: 4.3 MMOL/L (ref 0.7–2)
LACTATE BLD-SCNC: 5.1 MMOL/L (ref 0.7–2)
LACTATE BLD-SCNC: 5.1 MMOL/L (ref 0.7–2)
LACTATE BLD-SCNC: 5.2 MMOL/L (ref 0.7–2)
LACTATE BLD-SCNC: 5.6 MMOL/L (ref 0.7–2)
LACTATE BLD-SCNC: 5.7 MMOL/L (ref 0.7–2)
LACTATE BLD-SCNC: 5.7 MMOL/L (ref 0.7–2)
LACTATE BLD-SCNC: 5.8 MMOL/L (ref 0.7–2)
LACTATE BLD-SCNC: 5.9 MMOL/L (ref 0.7–2)
LACTATE BLD-SCNC: 8.7 MMOL/L (ref 0.7–2)
LACTATE BLD-SCNC: 8.7 MMOL/L (ref 0.7–2)
LACTATE BLD-SCNC: 8.9 MMOL/L (ref 0.7–2)
LACTATE BLD-SCNC: 9.4 MMOL/L (ref 0.7–2)
LACTATE SERPL-SCNC: 1 MMOL/L (ref 0.4–2)
LACTATE SERPL-SCNC: 5.7 MMOL/L (ref 0.4–2)
LDH FLD L TO P-CCNC: 54 U/L
LDH FLD L TO P-CCNC: 62 U/L
LDH SERPL L TO P-CCNC: 177 U/L (ref 85–227)
LDH SERPL L TO P-CCNC: 259 U/L (ref 85–227)
LDLC SERPL CALC-MCNC: ABNORMAL MG/DL (ref 0–130)
LEUKOCYTE ESTERASE UR QL STRIP: ABNORMAL
LEUKOCYTE ESTERASE UR QL STRIP: NEGATIVE
LIPASE SERPL-CCNC: 260 U/L (ref 73–393)
LIPASE SERPL-CCNC: 75 U/L (ref 73–393)
LMWH PPP CHRO-ACNC: 0.12 IU/ML
LMWH PPP CHRO-ACNC: 0.14 IU/ML
LMWH PPP CHRO-ACNC: 0.17 IU/ML
LMWH PPP CHRO-ACNC: 0.21 IU/ML
LMWH PPP CHRO-ACNC: 0.23 IU/ML
LMWH PPP CHRO-ACNC: 0.26 IU/ML
LMWH PPP CHRO-ACNC: 0.27 IU/ML
LMWH PPP CHRO-ACNC: 0.28 IU/ML
LMWH PPP CHRO-ACNC: 0.28 IU/ML
LMWH PPP CHRO-ACNC: 0.29 IU/ML
LMWH PPP CHRO-ACNC: 0.3 IU/ML
LMWH PPP CHRO-ACNC: 0.31 IU/ML
LMWH PPP CHRO-ACNC: 0.33 IU/ML
LMWH PPP CHRO-ACNC: 0.33 IU/ML
LMWH PPP CHRO-ACNC: 0.4 IU/ML
LMWH PPP CHRO-ACNC: 0.45 IU/ML
LMWH PPP CHRO-ACNC: 0.48 IU/ML
LMWH PPP CHRO-ACNC: 0.55 IU/ML
LMWH PPP CHRO-ACNC: 0.58 IU/ML
LMWH PPP CHRO-ACNC: <0.1 IU/ML
LY30 LYSIS AT 30 MINUTES: 0 % (ref 0–8)
LY30 LYSIS AT 30 MINUTES: 0.9 % (ref 0–8)
LY30 LYSIS AT 30 MINUTES: NORMAL % (ref 0–8)
LY60 LYSIS AT 60 MINUTES: 0 % (ref 0–15)
LY60 LYSIS AT 60 MINUTES: 0.2 % (ref 0–15)
LY60 LYSIS AT 60 MINUTES: 0.6 % (ref 0–15)
LY60 LYSIS AT 60 MINUTES: 0.7 % (ref 0–15)
LY60 LYSIS AT 60 MINUTES: 1.3 % (ref 0–15)
LY60 LYSIS AT 60 MINUTES: 17.5 % (ref 0–15)
LY60 LYSIS AT 60 MINUTES: 34.6 % (ref 0–15)
LY60 LYSIS AT 60 MINUTES: 6.1 % (ref 0–15)
LY60 LYSIS AT 60 MINUTES: 73.4 % (ref 0–15)
LY60 LYSIS AT 60 MINUTES: NORMAL % (ref 0–15)
LY60 LYSIS AT 60 MINUTES: NORMAL % (ref 0–15)
LYMPHOCYTES # BLD AUTO: 0 10E9/L (ref 0.8–5.3)
LYMPHOCYTES # BLD AUTO: 0.1 10E9/L (ref 0.8–5.3)
LYMPHOCYTES # BLD AUTO: 0.2 10E9/L (ref 0.8–5.3)
LYMPHOCYTES # BLD AUTO: 0.3 10E9/L (ref 0.8–5.3)
LYMPHOCYTES # BLD AUTO: 0.4 10E9/L (ref 0.8–5.3)
LYMPHOCYTES # BLD AUTO: 0.5 10E9/L (ref 0.8–5.3)
LYMPHOCYTES # BLD AUTO: 0.6 10E9/L (ref 0.8–5.3)
LYMPHOCYTES # BLD AUTO: 0.7 10E9/L (ref 0.8–5.3)
LYMPHOCYTES # BLD AUTO: 0.8 10E9/L (ref 0.8–5.3)
LYMPHOCYTES # BLD AUTO: 1 10E9/L (ref 0.8–5.3)
LYMPHOCYTES # BLD AUTO: 1.1 10E9/L (ref 0.8–5.3)
LYMPHOCYTES # BLD AUTO: 1.2 10E9/L (ref 0.8–5.3)
LYMPHOCYTES NFR BLD AUTO: 0 %
LYMPHOCYTES NFR BLD AUTO: 0.9 %
LYMPHOCYTES NFR BLD AUTO: 1.1 %
LYMPHOCYTES NFR BLD AUTO: 1.4 %
LYMPHOCYTES NFR BLD AUTO: 1.5 %
LYMPHOCYTES NFR BLD AUTO: 1.7 %
LYMPHOCYTES NFR BLD AUTO: 1.7 %
LYMPHOCYTES NFR BLD AUTO: 1.8 %
LYMPHOCYTES NFR BLD AUTO: 11.3 %
LYMPHOCYTES NFR BLD AUTO: 11.6 %
LYMPHOCYTES NFR BLD AUTO: 12.5 %
LYMPHOCYTES NFR BLD AUTO: 15 %
LYMPHOCYTES NFR BLD AUTO: 15.3 %
LYMPHOCYTES NFR BLD AUTO: 16.2 %
LYMPHOCYTES NFR BLD AUTO: 16.3 %
LYMPHOCYTES NFR BLD AUTO: 17.4 %
LYMPHOCYTES NFR BLD AUTO: 17.6 %
LYMPHOCYTES NFR BLD AUTO: 2 %
LYMPHOCYTES NFR BLD AUTO: 2.1 %
LYMPHOCYTES NFR BLD AUTO: 2.3 %
LYMPHOCYTES NFR BLD AUTO: 2.5 %
LYMPHOCYTES NFR BLD AUTO: 2.6 %
LYMPHOCYTES NFR BLD AUTO: 2.7 %
LYMPHOCYTES NFR BLD AUTO: 2.8 %
LYMPHOCYTES NFR BLD AUTO: 2.9 %
LYMPHOCYTES NFR BLD AUTO: 3 %
LYMPHOCYTES NFR BLD AUTO: 3.2 %
LYMPHOCYTES NFR BLD AUTO: 3.2 %
LYMPHOCYTES NFR BLD AUTO: 3.3 %
LYMPHOCYTES NFR BLD AUTO: 3.4 %
LYMPHOCYTES NFR BLD AUTO: 3.5 %
LYMPHOCYTES NFR BLD AUTO: 3.5 %
LYMPHOCYTES NFR BLD AUTO: 3.6 %
LYMPHOCYTES NFR BLD AUTO: 3.7 %
LYMPHOCYTES NFR BLD AUTO: 3.9 %
LYMPHOCYTES NFR BLD AUTO: 37 %
LYMPHOCYTES NFR BLD AUTO: 4 %
LYMPHOCYTES NFR BLD AUTO: 4.2 %
LYMPHOCYTES NFR BLD AUTO: 4.5 %
LYMPHOCYTES NFR BLD AUTO: 4.6 %
LYMPHOCYTES NFR BLD AUTO: 4.6 %
LYMPHOCYTES NFR BLD AUTO: 4.8 %
LYMPHOCYTES NFR BLD AUTO: 4.8 %
LYMPHOCYTES NFR BLD AUTO: 5 %
LYMPHOCYTES NFR BLD AUTO: 5.1 %
LYMPHOCYTES NFR BLD AUTO: 5.2 %
LYMPHOCYTES NFR BLD AUTO: 5.5 %
LYMPHOCYTES NFR BLD AUTO: 5.7 %
LYMPHOCYTES NFR BLD AUTO: 5.8 %
LYMPHOCYTES NFR BLD AUTO: 5.8 %
LYMPHOCYTES NFR BLD AUTO: 6.1 %
LYMPHOCYTES NFR BLD AUTO: 6.2 %
LYMPHOCYTES NFR BLD AUTO: 6.2 %
LYMPHOCYTES NFR BLD AUTO: 6.3 %
LYMPHOCYTES NFR BLD AUTO: 6.6 %
LYMPHOCYTES NFR BLD AUTO: 6.7 %
LYMPHOCYTES NFR BLD AUTO: 6.8 %
LYMPHOCYTES NFR BLD AUTO: 6.8 %
LYMPHOCYTES NFR BLD AUTO: 6.9 %
LYMPHOCYTES NFR BLD AUTO: 6.9 %
LYMPHOCYTES NFR BLD AUTO: 7.1 %
LYMPHOCYTES NFR BLD AUTO: 7.4 %
LYMPHOCYTES NFR BLD AUTO: 7.5 %
LYMPHOCYTES NFR BLD AUTO: 7.5 %
LYMPHOCYTES NFR BLD AUTO: 7.8 %
LYMPHOCYTES NFR BLD AUTO: 8 %
LYMPHOCYTES NFR BLD AUTO: 8.1 %
LYMPHOCYTES NFR BLD AUTO: 8.1 %
LYMPHOCYTES NFR BLD AUTO: 8.3 %
LYMPHOCYTES NFR BLD AUTO: 8.8 %
LYMPHOCYTES NFR BLD AUTO: 8.9 %
LYMPHOCYTES NFR BLD AUTO: 9 %
LYMPHOCYTES NFR BLD AUTO: 9.5 %
LYMPHOCYTES NFR BLD AUTO: 9.6 %
LYMPHOCYTES NFR FLD MANUAL: 11 %
LYMPHOCYTES NFR FLD MANUAL: 16 %
LYMPHOCYTES NFR FLD MANUAL: 19 %
LYMPHOCYTES NFR FLD MANUAL: 19 %
LYMPHOCYTES NFR FLD MANUAL: 24 %
LYMPHOCYTES NFR FLD MANUAL: 36 %
LYMPHOCYTES NFR FLD MANUAL: 5 %
Lab: ABNORMAL
Lab: NORMAL
M TB TUBERC IFN-G BLD QL: NEGATIVE
M TB TUBERC IFN-G/MITOGEN IGNF BLD: 0.01 IU/ML
MA MAXIMUM CLOT STRENGTH: 30.4 MM (ref 50–70)
MA MAXIMUM CLOT STRENGTH: 39.4 MM (ref 55–74)
MA MAXIMUM CLOT STRENGTH: 40.6 MM (ref 55–74)
MA MAXIMUM CLOT STRENGTH: 43.3 MM (ref 50–70)
MA MAXIMUM CLOT STRENGTH: 44.3 MM (ref 50–70)
MA MAXIMUM CLOT STRENGTH: 45.5 MM (ref 50–70)
MA MAXIMUM CLOT STRENGTH: 48.5 MM (ref 50–70)
MA MAXIMUM CLOT STRENGTH: 49 MM (ref 55–74)
MA MAXIMUM CLOT STRENGTH: 49.6 MM (ref 55–74)
MA MAXIMUM CLOT STRENGTH: 59.7 MM (ref 55–74)
MA MAXIMUM CLOT STRENGTH: 60.9 MM (ref 50–70)
MA MAXIMUM CLOT STRENGTH: 64.2 MM (ref 55–74)
MA MAXIMUM CLOT STRENGTH: 69.9 MM (ref 55–74)
MA MAXIMUM CLOT STRENGTH: NORMAL MM (ref 50–70)
MA MAXIMUM CLOT STRENGTH: NORMAL MM (ref 55–74)
MACROCYTES BLD QL SMEAR: PRESENT
MAGNESIUM SERPL-MCNC: 1.2 MG/DL (ref 1.6–2.3)
MAGNESIUM SERPL-MCNC: 1.4 MG/DL (ref 1.6–2.3)
MAGNESIUM SERPL-MCNC: 1.4 MG/DL (ref 1.6–2.3)
MAGNESIUM SERPL-MCNC: 1.5 MG/DL (ref 1.6–2.3)
MAGNESIUM SERPL-MCNC: 1.5 MG/DL (ref 1.6–2.3)
MAGNESIUM SERPL-MCNC: 1.6 MG/DL (ref 1.6–2.3)
MAGNESIUM SERPL-MCNC: 1.7 MG/DL (ref 1.6–2.3)
MAGNESIUM SERPL-MCNC: 1.8 MG/DL (ref 1.6–2.3)
MAGNESIUM SERPL-MCNC: 1.9 MG/DL (ref 1.6–2.3)
MAGNESIUM SERPL-MCNC: 2 MG/DL (ref 1.6–2.3)
MAGNESIUM SERPL-MCNC: 2.1 MG/DL (ref 1.6–2.3)
MAGNESIUM SERPL-MCNC: 2.2 MG/DL (ref 1.6–2.3)
MAGNESIUM SERPL-MCNC: 2.3 MG/DL (ref 1.6–2.3)
MAGNESIUM SERPL-MCNC: 2.4 MG/DL (ref 1.6–2.3)
MAGNESIUM SERPL-MCNC: 2.5 MG/DL (ref 1.6–2.3)
MAGNESIUM SERPL-MCNC: 2.6 MG/DL (ref 1.6–2.3)
MAGNESIUM SERPL-MCNC: 2.8 MG/DL (ref 1.6–2.3)
MCH RBC QN AUTO: 28.4 PG (ref 26.5–33)
MCH RBC QN AUTO: 28.5 PG (ref 26.5–33)
MCH RBC QN AUTO: 28.5 PG (ref 26.5–33)
MCH RBC QN AUTO: 28.6 PG (ref 26.5–33)
MCH RBC QN AUTO: 28.6 PG (ref 26.5–33)
MCH RBC QN AUTO: 28.7 PG (ref 26.5–33)
MCH RBC QN AUTO: 28.8 PG (ref 26.5–33)
MCH RBC QN AUTO: 28.9 PG (ref 26.5–33)
MCH RBC QN AUTO: 29 PG (ref 26.5–33)
MCH RBC QN AUTO: 29.1 PG (ref 26.5–33)
MCH RBC QN AUTO: 29.2 PG (ref 26.5–33)
MCH RBC QN AUTO: 29.3 PG (ref 26.5–33)
MCH RBC QN AUTO: 29.4 PG (ref 26.5–33)
MCH RBC QN AUTO: 29.5 PG (ref 26.5–33)
MCH RBC QN AUTO: 29.6 PG (ref 26.5–33)
MCH RBC QN AUTO: 29.7 PG (ref 26.5–33)
MCH RBC QN AUTO: 29.8 PG (ref 26.5–33)
MCH RBC QN AUTO: 29.9 PG (ref 26.5–33)
MCH RBC QN AUTO: 30 PG (ref 26.5–33)
MCH RBC QN AUTO: 30.1 PG (ref 26.5–33)
MCH RBC QN AUTO: 30.2 PG (ref 26.5–33)
MCH RBC QN AUTO: 30.3 PG (ref 26.5–33)
MCH RBC QN AUTO: 30.4 PG (ref 26.5–33)
MCH RBC QN AUTO: 30.5 PG (ref 26.5–33)
MCH RBC QN AUTO: 30.6 PG (ref 26.5–33)
MCH RBC QN AUTO: 30.7 PG (ref 26.5–33)
MCH RBC QN AUTO: 30.8 PG (ref 26.5–33)
MCH RBC QN AUTO: 30.9 PG (ref 26.5–33)
MCH RBC QN AUTO: 31 PG (ref 26.5–33)
MCH RBC QN AUTO: 31.1 PG (ref 26.5–33)
MCH RBC QN AUTO: 31.2 PG (ref 26.5–33)
MCH RBC QN AUTO: 31.2 PG (ref 26.5–33)
MCH RBC QN AUTO: 31.3 PG (ref 26.5–33)
MCH RBC QN AUTO: NORMAL PG (ref 26.5–33)
MCHC RBC AUTO-ENTMCNC: 32 G/DL (ref 31.5–36.5)
MCHC RBC AUTO-ENTMCNC: 32 G/DL (ref 31.5–36.5)
MCHC RBC AUTO-ENTMCNC: 32.1 G/DL (ref 31.5–36.5)
MCHC RBC AUTO-ENTMCNC: 32.2 G/DL (ref 31.5–36.5)
MCHC RBC AUTO-ENTMCNC: 32.6 G/DL (ref 31.5–36.5)
MCHC RBC AUTO-ENTMCNC: 32.8 G/DL (ref 31.5–36.5)
MCHC RBC AUTO-ENTMCNC: 32.9 G/DL (ref 31.5–36.5)
MCHC RBC AUTO-ENTMCNC: 33 G/DL (ref 31.5–36.5)
MCHC RBC AUTO-ENTMCNC: 33.1 G/DL (ref 31.5–36.5)
MCHC RBC AUTO-ENTMCNC: 33.2 G/DL (ref 31.5–36.5)
MCHC RBC AUTO-ENTMCNC: 33.3 G/DL (ref 31.5–36.5)
MCHC RBC AUTO-ENTMCNC: 33.4 G/DL (ref 31.5–36.5)
MCHC RBC AUTO-ENTMCNC: 33.5 G/DL (ref 31.5–36.5)
MCHC RBC AUTO-ENTMCNC: 33.6 G/DL (ref 31.5–36.5)
MCHC RBC AUTO-ENTMCNC: 33.7 G/DL (ref 31.5–36.5)
MCHC RBC AUTO-ENTMCNC: 33.8 G/DL (ref 31.5–36.5)
MCHC RBC AUTO-ENTMCNC: 33.9 G/DL (ref 31.5–36.5)
MCHC RBC AUTO-ENTMCNC: 34 G/DL (ref 31.5–36.5)
MCHC RBC AUTO-ENTMCNC: 34.1 G/DL (ref 31.5–36.5)
MCHC RBC AUTO-ENTMCNC: 34.2 G/DL (ref 31.5–36.5)
MCHC RBC AUTO-ENTMCNC: 34.3 G/DL (ref 31.5–36.5)
MCHC RBC AUTO-ENTMCNC: 34.4 G/DL (ref 31.5–36.5)
MCHC RBC AUTO-ENTMCNC: 34.5 G/DL (ref 31.5–36.5)
MCHC RBC AUTO-ENTMCNC: 34.6 G/DL (ref 31.5–36.5)
MCHC RBC AUTO-ENTMCNC: 34.7 G/DL (ref 31.5–36.5)
MCHC RBC AUTO-ENTMCNC: 34.8 G/DL (ref 31.5–36.5)
MCHC RBC AUTO-ENTMCNC: 34.9 G/DL (ref 31.5–36.5)
MCHC RBC AUTO-ENTMCNC: 35 G/DL (ref 31.5–36.5)
MCHC RBC AUTO-ENTMCNC: 35.1 G/DL (ref 31.5–36.5)
MCHC RBC AUTO-ENTMCNC: 35.2 G/DL (ref 31.5–36.5)
MCHC RBC AUTO-ENTMCNC: 35.3 G/DL (ref 31.5–36.5)
MCHC RBC AUTO-ENTMCNC: 35.4 G/DL (ref 31.5–36.5)
MCHC RBC AUTO-ENTMCNC: 35.5 G/DL (ref 31.5–36.5)
MCHC RBC AUTO-ENTMCNC: 35.5 G/DL (ref 31.5–36.5)
MCHC RBC AUTO-ENTMCNC: 35.6 G/DL (ref 31.5–36.5)
MCHC RBC AUTO-ENTMCNC: 35.7 G/DL (ref 31.5–36.5)
MCHC RBC AUTO-ENTMCNC: 35.8 G/DL (ref 31.5–36.5)
MCHC RBC AUTO-ENTMCNC: 35.9 G/DL (ref 31.5–36.5)
MCHC RBC AUTO-ENTMCNC: 35.9 G/DL (ref 31.5–36.5)
MCHC RBC AUTO-ENTMCNC: 36.2 G/DL (ref 31.5–36.5)
MCHC RBC AUTO-ENTMCNC: 36.2 G/DL (ref 31.5–36.5)
MCHC RBC AUTO-ENTMCNC: 36.3 G/DL (ref 31.5–36.5)
MCHC RBC AUTO-ENTMCNC: 36.4 G/DL (ref 31.5–36.5)
MCHC RBC AUTO-ENTMCNC: 36.5 G/DL (ref 31.5–36.5)
MCHC RBC AUTO-ENTMCNC: 36.7 G/DL (ref 31.5–36.5)
MCHC RBC AUTO-ENTMCNC: 37 G/DL (ref 31.5–36.5)
MCHC RBC AUTO-ENTMCNC: 37.2 G/DL (ref 31.5–36.5)
MCHC RBC AUTO-ENTMCNC: 37.3 G/DL (ref 31.5–36.5)
MCHC RBC AUTO-ENTMCNC: NORMAL G/DL (ref 31.5–36.5)
MCV RBC AUTO: 81 FL (ref 78–100)
MCV RBC AUTO: 82 FL (ref 78–100)
MCV RBC AUTO: 83 FL (ref 78–100)
MCV RBC AUTO: 84 FL (ref 78–100)
MCV RBC AUTO: 85 FL (ref 78–100)
MCV RBC AUTO: 86 FL (ref 78–100)
MCV RBC AUTO: 87 FL (ref 78–100)
MCV RBC AUTO: 88 FL (ref 78–100)
MCV RBC AUTO: 89 FL (ref 78–100)
MCV RBC AUTO: 90 FL (ref 78–100)
MCV RBC AUTO: 91 FL (ref 78–100)
MCV RBC AUTO: 92 FL (ref 78–100)
MCV RBC AUTO: 92 FL (ref 78–100)
MCV RBC AUTO: NORMAL FL (ref 78–100)
METAMYELOCYTES # BLD: 0 10E9/L
METAMYELOCYTES # BLD: 0.1 10E9/L
METAMYELOCYTES # BLD: 0.1 10E9/L
METAMYELOCYTES # BLD: 0.2 10E9/L
METAMYELOCYTES # BLD: 0.3 10E9/L
METAMYELOCYTES # BLD: 0.4 10E9/L
METAMYELOCYTES # BLD: 0.5 10E9/L
METAMYELOCYTES # BLD: 0.6 10E9/L
METAMYELOCYTES # BLD: 0.7 10E9/L
METAMYELOCYTES # BLD: 0.8 10E9/L
METAMYELOCYTES # BLD: 0.8 10E9/L
METAMYELOCYTES # BLD: 1.8 10E9/L
METAMYELOCYTES NFR BLD MANUAL: 0.9 %
METAMYELOCYTES NFR BLD MANUAL: 1.7 %
METAMYELOCYTES NFR BLD MANUAL: 1.8 %
METAMYELOCYTES NFR BLD MANUAL: 1.8 %
METAMYELOCYTES NFR BLD MANUAL: 11.6 %
METAMYELOCYTES NFR BLD MANUAL: 2 %
METAMYELOCYTES NFR BLD MANUAL: 2.6 %
METAMYELOCYTES NFR BLD MANUAL: 3.6 %
METAMYELOCYTES NFR BLD MANUAL: 3.8 %
METAMYELOCYTES NFR BLD MANUAL: 4.3 %
MICROCYTES BLD QL SMEAR: PRESENT
MISCELLANEOUS TEST: NORMAL
MIXED CELL CASTS #/AREA URNS LPF: 4 /LPF
MONOCYTES # BLD AUTO: 0 10E9/L (ref 0–1.3)
MONOCYTES # BLD AUTO: 0.1 10E9/L (ref 0–1.3)
MONOCYTES # BLD AUTO: 0.2 10E9/L (ref 0–1.3)
MONOCYTES # BLD AUTO: 0.3 10E9/L (ref 0–1.3)
MONOCYTES # BLD AUTO: 0.4 10E9/L (ref 0–1.3)
MONOCYTES # BLD AUTO: 0.5 10E9/L (ref 0–1.3)
MONOCYTES # BLD AUTO: 0.6 10E9/L (ref 0–1.3)
MONOCYTES # BLD AUTO: 0.7 10E9/L (ref 0–1.3)
MONOCYTES # BLD AUTO: 0.8 10E9/L (ref 0–1.3)
MONOCYTES # BLD AUTO: 0.8 10E9/L (ref 0–1.3)
MONOCYTES # BLD AUTO: 0.9 10E9/L (ref 0–1.3)
MONOCYTES # BLD AUTO: 1 10E9/L (ref 0–1.3)
MONOCYTES # BLD AUTO: 1.1 10E9/L (ref 0–1.3)
MONOCYTES # BLD AUTO: 1.2 10E9/L (ref 0–1.3)
MONOCYTES # BLD AUTO: 1.2 10E9/L (ref 0–1.3)
MONOCYTES # BLD AUTO: 1.3 10E9/L (ref 0–1.3)
MONOCYTES # BLD AUTO: 1.3 10E9/L (ref 0–1.3)
MONOCYTES # BLD AUTO: 1.4 10E9/L (ref 0–1.3)
MONOCYTES # BLD AUTO: 1.5 10E9/L (ref 0–1.3)
MONOCYTES # BLD AUTO: 1.7 10E9/L (ref 0–1.3)
MONOCYTES # BLD AUTO: 1.9 10E9/L (ref 0–1.3)
MONOCYTES # BLD AUTO: 2 10E9/L (ref 0–1.3)
MONOCYTES # BLD AUTO: 2.1 10E9/L (ref 0–1.3)
MONOCYTES # BLD AUTO: 3.1 10E9/L (ref 0–1.3)
MONOCYTES # BLD AUTO: 7.9 10E9/L (ref 0–1.3)
MONOCYTES NFR BLD AUTO: 0 %
MONOCYTES NFR BLD AUTO: 0.9 %
MONOCYTES NFR BLD AUTO: 1 %
MONOCYTES NFR BLD AUTO: 1 %
MONOCYTES NFR BLD AUTO: 1.1 %
MONOCYTES NFR BLD AUTO: 1.2 %
MONOCYTES NFR BLD AUTO: 1.2 %
MONOCYTES NFR BLD AUTO: 1.3 %
MONOCYTES NFR BLD AUTO: 1.3 %
MONOCYTES NFR BLD AUTO: 1.4 %
MONOCYTES NFR BLD AUTO: 1.5 %
MONOCYTES NFR BLD AUTO: 1.6 %
MONOCYTES NFR BLD AUTO: 1.7 %
MONOCYTES NFR BLD AUTO: 1.7 %
MONOCYTES NFR BLD AUTO: 1.8 %
MONOCYTES NFR BLD AUTO: 10.5 %
MONOCYTES NFR BLD AUTO: 11.4 %
MONOCYTES NFR BLD AUTO: 11.5 %
MONOCYTES NFR BLD AUTO: 12.2 %
MONOCYTES NFR BLD AUTO: 12.3 %
MONOCYTES NFR BLD AUTO: 12.5 %
MONOCYTES NFR BLD AUTO: 12.5 %
MONOCYTES NFR BLD AUTO: 12.7 %
MONOCYTES NFR BLD AUTO: 12.9 %
MONOCYTES NFR BLD AUTO: 13.4 %
MONOCYTES NFR BLD AUTO: 13.8 %
MONOCYTES NFR BLD AUTO: 13.9 %
MONOCYTES NFR BLD AUTO: 14.1 %
MONOCYTES NFR BLD AUTO: 14.4 %
MONOCYTES NFR BLD AUTO: 15.5 %
MONOCYTES NFR BLD AUTO: 16 %
MONOCYTES NFR BLD AUTO: 16.9 %
MONOCYTES NFR BLD AUTO: 18.2 %
MONOCYTES NFR BLD AUTO: 18.8 %
MONOCYTES NFR BLD AUTO: 19.6 %
MONOCYTES NFR BLD AUTO: 2 %
MONOCYTES NFR BLD AUTO: 2 %
MONOCYTES NFR BLD AUTO: 2.1 %
MONOCYTES NFR BLD AUTO: 2.1 %
MONOCYTES NFR BLD AUTO: 2.5 %
MONOCYTES NFR BLD AUTO: 2.6 %
MONOCYTES NFR BLD AUTO: 3 %
MONOCYTES NFR BLD AUTO: 3 %
MONOCYTES NFR BLD AUTO: 3.1 %
MONOCYTES NFR BLD AUTO: 3.2 %
MONOCYTES NFR BLD AUTO: 3.3 %
MONOCYTES NFR BLD AUTO: 3.4 %
MONOCYTES NFR BLD AUTO: 3.4 %
MONOCYTES NFR BLD AUTO: 3.5 %
MONOCYTES NFR BLD AUTO: 3.7 %
MONOCYTES NFR BLD AUTO: 3.8 %
MONOCYTES NFR BLD AUTO: 4 %
MONOCYTES NFR BLD AUTO: 4 %
MONOCYTES NFR BLD AUTO: 4.2 %
MONOCYTES NFR BLD AUTO: 4.2 %
MONOCYTES NFR BLD AUTO: 4.3 %
MONOCYTES NFR BLD AUTO: 4.4 %
MONOCYTES NFR BLD AUTO: 4.4 %
MONOCYTES NFR BLD AUTO: 4.5 %
MONOCYTES NFR BLD AUTO: 4.6 %
MONOCYTES NFR BLD AUTO: 5 %
MONOCYTES NFR BLD AUTO: 5.2 %
MONOCYTES NFR BLD AUTO: 5.3 %
MONOCYTES NFR BLD AUTO: 5.4 %
MONOCYTES NFR BLD AUTO: 5.7 %
MONOCYTES NFR BLD AUTO: 5.9 %
MONOCYTES NFR BLD AUTO: 6 %
MONOCYTES NFR BLD AUTO: 6.1 %
MONOCYTES NFR BLD AUTO: 6.2 %
MONOCYTES NFR BLD AUTO: 6.3 %
MONOCYTES NFR BLD AUTO: 6.6 %
MONOCYTES NFR BLD AUTO: 6.7 %
MONOCYTES NFR BLD AUTO: 6.9 %
MONOCYTES NFR BLD AUTO: 7.1 %
MONOCYTES NFR BLD AUTO: 7.8 %
MONOCYTES NFR BLD AUTO: 8 %
MONOCYTES NFR BLD AUTO: 8.7 %
MONOCYTES NFR BLD AUTO: 9.6 %
MONOCYTES NFR BLD AUTO: 9.6 %
MONOCYTES NFR BLD AUTO: 9.7 %
MONOCYTES NFR BLD AUTO: 9.8 %
MONOCYTES NFR BLD AUTO: 9.8 %
MONOS+MACROS NFR FLD MANUAL: 78 %
MRSA DNA SPEC QL NAA+PROBE: NEGATIVE
MRSA DNA SPEC QL NAA+PROBE: NEGATIVE
MUCOUS THREADS #/AREA URNS LPF: PRESENT /LPF
MYELOCYTES # BLD: 0 10E9/L
MYELOCYTES # BLD: 0 10E9/L
MYELOCYTES # BLD: 0.1 10E9/L
MYELOCYTES # BLD: 0.1 10E9/L
MYELOCYTES # BLD: 0.2 10E9/L
MYELOCYTES # BLD: 0.2 10E9/L
MYELOCYTES # BLD: 0.3 10E9/L
MYELOCYTES # BLD: 0.4 10E9/L
MYELOCYTES NFR BLD MANUAL: 0.9 %
MYELOCYTES NFR BLD MANUAL: 1 %
MYELOCYTES NFR BLD MANUAL: 1.8 %
MYELOCYTES NFR BLD MANUAL: 1.8 %
MYELOCYTES NFR BLD MANUAL: 2.7 %
MYELOCYTES NFR BLD MANUAL: 4.8 %
NEUTROPHILS # BLD AUTO: 1.2 10E9/L (ref 1.6–8.3)
NEUTROPHILS # BLD AUTO: 1.3 10E9/L (ref 1.6–8.3)
NEUTROPHILS # BLD AUTO: 1.3 10E9/L (ref 1.6–8.3)
NEUTROPHILS # BLD AUTO: 1.4 10E9/L (ref 1.6–8.3)
NEUTROPHILS # BLD AUTO: 1.6 10E9/L (ref 1.6–8.3)
NEUTROPHILS # BLD AUTO: 1.7 10E9/L (ref 1.6–8.3)
NEUTROPHILS # BLD AUTO: 1.9 10E9/L (ref 1.6–8.3)
NEUTROPHILS # BLD AUTO: 10.3 10E9/L (ref 1.6–8.3)
NEUTROPHILS # BLD AUTO: 11.1 10E9/L (ref 1.6–8.3)
NEUTROPHILS # BLD AUTO: 11.3 10E9/L (ref 1.6–8.3)
NEUTROPHILS # BLD AUTO: 11.4 10E9/L (ref 1.6–8.3)
NEUTROPHILS # BLD AUTO: 11.5 10E9/L (ref 1.6–8.3)
NEUTROPHILS # BLD AUTO: 11.7 10E9/L (ref 1.6–8.3)
NEUTROPHILS # BLD AUTO: 13.3 10E9/L (ref 1.6–8.3)
NEUTROPHILS # BLD AUTO: 13.6 10E9/L (ref 1.6–8.3)
NEUTROPHILS # BLD AUTO: 13.9 10E9/L (ref 1.6–8.3)
NEUTROPHILS # BLD AUTO: 14.7 10E9/L (ref 1.6–8.3)
NEUTROPHILS # BLD AUTO: 15.2 10E9/L (ref 1.6–8.3)
NEUTROPHILS # BLD AUTO: 15.5 10E9/L (ref 1.6–8.3)
NEUTROPHILS # BLD AUTO: 15.5 10E9/L (ref 1.6–8.3)
NEUTROPHILS # BLD AUTO: 15.6 10E9/L (ref 1.6–8.3)
NEUTROPHILS # BLD AUTO: 15.7 10E9/L (ref 1.6–8.3)
NEUTROPHILS # BLD AUTO: 16.1 10E9/L (ref 1.6–8.3)
NEUTROPHILS # BLD AUTO: 16.2 10E9/L (ref 1.6–8.3)
NEUTROPHILS # BLD AUTO: 16.5 10E9/L (ref 1.6–8.3)
NEUTROPHILS # BLD AUTO: 16.6 10E9/L (ref 1.6–8.3)
NEUTROPHILS # BLD AUTO: 16.9 10E9/L (ref 1.6–8.3)
NEUTROPHILS # BLD AUTO: 16.9 10E9/L (ref 1.6–8.3)
NEUTROPHILS # BLD AUTO: 17.2 10E9/L (ref 1.6–8.3)
NEUTROPHILS # BLD AUTO: 18.5 10E9/L (ref 1.6–8.3)
NEUTROPHILS # BLD AUTO: 18.5 10E9/L (ref 1.6–8.3)
NEUTROPHILS # BLD AUTO: 18.8 10E9/L (ref 1.6–8.3)
NEUTROPHILS # BLD AUTO: 18.9 10E9/L (ref 1.6–8.3)
NEUTROPHILS # BLD AUTO: 19.1 10E9/L (ref 1.6–8.3)
NEUTROPHILS # BLD AUTO: 19.9 10E9/L (ref 1.6–8.3)
NEUTROPHILS # BLD AUTO: 2.8 10E9/L (ref 1.6–8.3)
NEUTROPHILS # BLD AUTO: 2.8 10E9/L (ref 1.6–8.3)
NEUTROPHILS # BLD AUTO: 20.7 10E9/L (ref 1.6–8.3)
NEUTROPHILS # BLD AUTO: 21.3 10E9/L (ref 1.6–8.3)
NEUTROPHILS # BLD AUTO: 21.4 10E9/L (ref 1.6–8.3)
NEUTROPHILS # BLD AUTO: 22.1 10E9/L (ref 1.6–8.3)
NEUTROPHILS # BLD AUTO: 22.7 10E9/L (ref 1.6–8.3)
NEUTROPHILS # BLD AUTO: 23.8 10E9/L (ref 1.6–8.3)
NEUTROPHILS # BLD AUTO: 24.4 10E9/L (ref 1.6–8.3)
NEUTROPHILS # BLD AUTO: 24.7 10E9/L (ref 1.6–8.3)
NEUTROPHILS # BLD AUTO: 24.9 10E9/L (ref 1.6–8.3)
NEUTROPHILS # BLD AUTO: 25.5 10E9/L (ref 1.6–8.3)
NEUTROPHILS # BLD AUTO: 25.6 10E9/L (ref 1.6–8.3)
NEUTROPHILS # BLD AUTO: 26.5 10E9/L (ref 1.6–8.3)
NEUTROPHILS # BLD AUTO: 26.8 10E9/L (ref 1.6–8.3)
NEUTROPHILS # BLD AUTO: 27.3 10E9/L (ref 1.6–8.3)
NEUTROPHILS # BLD AUTO: 27.7 10E9/L (ref 1.6–8.3)
NEUTROPHILS # BLD AUTO: 28.5 10E9/L (ref 1.6–8.3)
NEUTROPHILS # BLD AUTO: 29 10E9/L (ref 1.6–8.3)
NEUTROPHILS # BLD AUTO: 3 10E9/L (ref 1.6–8.3)
NEUTROPHILS # BLD AUTO: 3 10E9/L (ref 1.6–8.3)
NEUTROPHILS # BLD AUTO: 3.1 10E9/L (ref 1.6–8.3)
NEUTROPHILS # BLD AUTO: 3.1 10E9/L (ref 1.6–8.3)
NEUTROPHILS # BLD AUTO: 3.2 10E9/L (ref 1.6–8.3)
NEUTROPHILS # BLD AUTO: 3.5 10E9/L (ref 1.6–8.3)
NEUTROPHILS # BLD AUTO: 3.8 10E9/L (ref 1.6–8.3)
NEUTROPHILS # BLD AUTO: 3.8 10E9/L (ref 1.6–8.3)
NEUTROPHILS # BLD AUTO: 30.3 10E9/L (ref 1.6–8.3)
NEUTROPHILS # BLD AUTO: 30.4 10E9/L (ref 1.6–8.3)
NEUTROPHILS # BLD AUTO: 31.7 10E9/L (ref 1.6–8.3)
NEUTROPHILS # BLD AUTO: 32.6 10E9/L (ref 1.6–8.3)
NEUTROPHILS # BLD AUTO: 32.9 10E9/L (ref 1.6–8.3)
NEUTROPHILS # BLD AUTO: 34.7 10E9/L (ref 1.6–8.3)
NEUTROPHILS # BLD AUTO: 37.7 10E9/L (ref 1.6–8.3)
NEUTROPHILS # BLD AUTO: 4 10E9/L (ref 1.6–8.3)
NEUTROPHILS # BLD AUTO: 4.2 10E9/L (ref 1.6–8.3)
NEUTROPHILS # BLD AUTO: 4.2 10E9/L (ref 1.6–8.3)
NEUTROPHILS # BLD AUTO: 4.4 10E9/L (ref 1.6–8.3)
NEUTROPHILS # BLD AUTO: 4.4 10E9/L (ref 1.6–8.3)
NEUTROPHILS # BLD AUTO: 4.5 10E9/L (ref 1.6–8.3)
NEUTROPHILS # BLD AUTO: 4.5 10E9/L (ref 1.6–8.3)
NEUTROPHILS # BLD AUTO: 4.6 10E9/L (ref 1.6–8.3)
NEUTROPHILS # BLD AUTO: 4.6 10E9/L (ref 1.6–8.3)
NEUTROPHILS # BLD AUTO: 4.7 10E9/L (ref 1.6–8.3)
NEUTROPHILS # BLD AUTO: 4.8 10E9/L (ref 1.6–8.3)
NEUTROPHILS # BLD AUTO: 4.9 10E9/L (ref 1.6–8.3)
NEUTROPHILS # BLD AUTO: 41.9 10E9/L (ref 1.6–8.3)
NEUTROPHILS # BLD AUTO: 5.1 10E9/L (ref 1.6–8.3)
NEUTROPHILS # BLD AUTO: 5.2 10E9/L (ref 1.6–8.3)
NEUTROPHILS # BLD AUTO: 5.2 10E9/L (ref 1.6–8.3)
NEUTROPHILS # BLD AUTO: 5.4 10E9/L (ref 1.6–8.3)
NEUTROPHILS # BLD AUTO: 5.6 10E9/L (ref 1.6–8.3)
NEUTROPHILS # BLD AUTO: 53.5 10E9/L (ref 1.6–8.3)
NEUTROPHILS # BLD AUTO: 55.4 10E9/L (ref 1.6–8.3)
NEUTROPHILS # BLD AUTO: 6.1 10E9/L (ref 1.6–8.3)
NEUTROPHILS # BLD AUTO: 6.3 10E9/L (ref 1.6–8.3)
NEUTROPHILS # BLD AUTO: 6.8 10E9/L (ref 1.6–8.3)
NEUTROPHILS # BLD AUTO: 7 10E9/L (ref 1.6–8.3)
NEUTROPHILS # BLD AUTO: 7.1 10E9/L (ref 1.6–8.3)
NEUTROPHILS # BLD AUTO: 7.3 10E9/L (ref 1.6–8.3)
NEUTROPHILS # BLD AUTO: 7.5 10E9/L (ref 1.6–8.3)
NEUTROPHILS # BLD AUTO: 7.5 10E9/L (ref 1.6–8.3)
NEUTROPHILS # BLD AUTO: 7.8 10E9/L (ref 1.6–8.3)
NEUTROPHILS # BLD AUTO: 8 10E9/L (ref 1.6–8.3)
NEUTROPHILS # BLD AUTO: 8.1 10E9/L (ref 1.6–8.3)
NEUTROPHILS # BLD AUTO: 8.3 10E9/L (ref 1.6–8.3)
NEUTROPHILS # BLD AUTO: 8.3 10E9/L (ref 1.6–8.3)
NEUTROPHILS # BLD AUTO: 8.5 10E9/L (ref 1.6–8.3)
NEUTROPHILS # BLD AUTO: 8.9 10E9/L (ref 1.6–8.3)
NEUTROPHILS # BLD AUTO: 8.9 10E9/L (ref 1.6–8.3)
NEUTROPHILS # BLD AUTO: 9.4 10E9/L (ref 1.6–8.3)
NEUTROPHILS NFR BLD AUTO: 100 %
NEUTROPHILS NFR BLD AUTO: 54 %
NEUTROPHILS NFR BLD AUTO: 59.1 %
NEUTROPHILS NFR BLD AUTO: 63.4 %
NEUTROPHILS NFR BLD AUTO: 69.7 %
NEUTROPHILS NFR BLD AUTO: 72.3 %
NEUTROPHILS NFR BLD AUTO: 73 %
NEUTROPHILS NFR BLD AUTO: 73.1 %
NEUTROPHILS NFR BLD AUTO: 73.2 %
NEUTROPHILS NFR BLD AUTO: 73.2 %
NEUTROPHILS NFR BLD AUTO: 73.4 %
NEUTROPHILS NFR BLD AUTO: 73.6 %
NEUTROPHILS NFR BLD AUTO: 73.7 %
NEUTROPHILS NFR BLD AUTO: 75.1 %
NEUTROPHILS NFR BLD AUTO: 75.7 %
NEUTROPHILS NFR BLD AUTO: 76.8 %
NEUTROPHILS NFR BLD AUTO: 77.3 %
NEUTROPHILS NFR BLD AUTO: 77.7 %
NEUTROPHILS NFR BLD AUTO: 77.9 %
NEUTROPHILS NFR BLD AUTO: 79.9 %
NEUTROPHILS NFR BLD AUTO: 80 %
NEUTROPHILS NFR BLD AUTO: 80.4 %
NEUTROPHILS NFR BLD AUTO: 81.1 %
NEUTROPHILS NFR BLD AUTO: 81.1 %
NEUTROPHILS NFR BLD AUTO: 81.2 %
NEUTROPHILS NFR BLD AUTO: 81.3 %
NEUTROPHILS NFR BLD AUTO: 82.1 %
NEUTROPHILS NFR BLD AUTO: 82.6 %
NEUTROPHILS NFR BLD AUTO: 82.7 %
NEUTROPHILS NFR BLD AUTO: 82.7 %
NEUTROPHILS NFR BLD AUTO: 83.3 %
NEUTROPHILS NFR BLD AUTO: 83.5 %
NEUTROPHILS NFR BLD AUTO: 84.5 %
NEUTROPHILS NFR BLD AUTO: 85.3 %
NEUTROPHILS NFR BLD AUTO: 85.9 %
NEUTROPHILS NFR BLD AUTO: 86 %
NEUTROPHILS NFR BLD AUTO: 86.3 %
NEUTROPHILS NFR BLD AUTO: 86.4 %
NEUTROPHILS NFR BLD AUTO: 86.9 %
NEUTROPHILS NFR BLD AUTO: 87.3 %
NEUTROPHILS NFR BLD AUTO: 87.4 %
NEUTROPHILS NFR BLD AUTO: 87.7 %
NEUTROPHILS NFR BLD AUTO: 88 %
NEUTROPHILS NFR BLD AUTO: 88.1 %
NEUTROPHILS NFR BLD AUTO: 88.3 %
NEUTROPHILS NFR BLD AUTO: 88.8 %
NEUTROPHILS NFR BLD AUTO: 89 %
NEUTROPHILS NFR BLD AUTO: 89 %
NEUTROPHILS NFR BLD AUTO: 89.2 %
NEUTROPHILS NFR BLD AUTO: 89.7 %
NEUTROPHILS NFR BLD AUTO: 89.7 %
NEUTROPHILS NFR BLD AUTO: 90.2 %
NEUTROPHILS NFR BLD AUTO: 90.2 %
NEUTROPHILS NFR BLD AUTO: 90.4 %
NEUTROPHILS NFR BLD AUTO: 90.6 %
NEUTROPHILS NFR BLD AUTO: 91 %
NEUTROPHILS NFR BLD AUTO: 91 %
NEUTROPHILS NFR BLD AUTO: 91.2 %
NEUTROPHILS NFR BLD AUTO: 91.2 %
NEUTROPHILS NFR BLD AUTO: 91.3 %
NEUTROPHILS NFR BLD AUTO: 91.4 %
NEUTROPHILS NFR BLD AUTO: 91.7 %
NEUTROPHILS NFR BLD AUTO: 92 %
NEUTROPHILS NFR BLD AUTO: 92 %
NEUTROPHILS NFR BLD AUTO: 92.1 %
NEUTROPHILS NFR BLD AUTO: 92.2 %
NEUTROPHILS NFR BLD AUTO: 92.3 %
NEUTROPHILS NFR BLD AUTO: 92.6 %
NEUTROPHILS NFR BLD AUTO: 92.8 %
NEUTROPHILS NFR BLD AUTO: 92.8 %
NEUTROPHILS NFR BLD AUTO: 92.9 %
NEUTROPHILS NFR BLD AUTO: 93.1 %
NEUTROPHILS NFR BLD AUTO: 93.2 %
NEUTROPHILS NFR BLD AUTO: 93.5 %
NEUTROPHILS NFR BLD AUTO: 93.6 %
NEUTROPHILS NFR BLD AUTO: 93.8 %
NEUTROPHILS NFR BLD AUTO: 93.8 %
NEUTROPHILS NFR BLD AUTO: 93.9 %
NEUTROPHILS NFR BLD AUTO: 94.1 %
NEUTROPHILS NFR BLD AUTO: 94.2 %
NEUTROPHILS NFR BLD AUTO: 94.5 %
NEUTROPHILS NFR BLD AUTO: 94.5 %
NEUTROPHILS NFR BLD AUTO: 94.6 %
NEUTROPHILS NFR BLD AUTO: 94.7 %
NEUTROPHILS NFR BLD AUTO: 94.7 %
NEUTROPHILS NFR BLD AUTO: 94.8 %
NEUTROPHILS NFR BLD AUTO: 94.8 %
NEUTROPHILS NFR BLD AUTO: 94.9 %
NEUTROPHILS NFR BLD AUTO: 95 %
NEUTROPHILS NFR BLD AUTO: 95.1 %
NEUTROPHILS NFR BLD AUTO: 95.2 %
NEUTROPHILS NFR BLD AUTO: 95.5 %
NEUTROPHILS NFR BLD AUTO: 95.6 %
NEUTROPHILS NFR BLD AUTO: 95.7 %
NEUTROPHILS NFR BLD AUTO: 96.4 %
NEUTROPHILS NFR BLD AUTO: 96.5 %
NEUTROPHILS NFR BLD AUTO: 97 %
NEUTROPHILS NFR BLD AUTO: 97.3 %
NEUTROPHILS NFR BLD AUTO: 99.1 %
NEUTS BAND NFR FLD MANUAL: 2 %
NEUTS BAND NFR FLD MANUAL: 28 %
NEUTS BAND NFR FLD MANUAL: 3 %
NEUTS BAND NFR FLD MANUAL: 36 %
NEUTS BAND NFR FLD MANUAL: 6 %
NEUTS BAND NFR FLD MANUAL: 60 %
NEUTS BAND NFR FLD MANUAL: 68 %
NITRATE UR QL: NEGATIVE
NONHDLC SERPL-MCNC: ABNORMAL MG/DL
NRBC # BLD AUTO: 0 10*3/UL
NRBC # BLD AUTO: 0.1 10*3/UL
NRBC # BLD AUTO: 0.2 10*3/UL
NRBC # BLD AUTO: 0.3 10*3/UL
NRBC # BLD AUTO: 0.4 10*3/UL
NRBC # BLD AUTO: 0.5 10*3/UL
NRBC # BLD AUTO: 0.5 10*3/UL
NRBC # BLD AUTO: 0.7 10*3/UL
NRBC # BLD AUTO: 0.8 10*3/UL
NRBC # BLD AUTO: 0.8 10*3/UL
NRBC # BLD AUTO: 1.1 10*3/UL
NRBC # BLD AUTO: 1.2 10*3/UL
NRBC # BLD AUTO: 1.3 10*3/UL
NRBC # BLD AUTO: 1.8 10*3/UL
NRBC # BLD AUTO: 4.7 10*3/UL
NRBC BLD AUTO-RTO: 0 /100
NRBC BLD AUTO-RTO: 1 /100
NRBC BLD AUTO-RTO: 10 /100
NRBC BLD AUTO-RTO: 11 /100
NRBC BLD AUTO-RTO: 13 /100
NRBC BLD AUTO-RTO: 13 /100
NRBC BLD AUTO-RTO: 2 /100
NRBC BLD AUTO-RTO: 23 /100
NRBC BLD AUTO-RTO: 24 /100
NRBC BLD AUTO-RTO: 3 /100
NRBC BLD AUTO-RTO: 4 /100
NRBC BLD AUTO-RTO: 5 /100
NRBC BLD AUTO-RTO: 6 /100
NRBC BLD AUTO-RTO: 6 /100
NRBC BLD AUTO-RTO: 7 /100
NRBC BLD AUTO-RTO: 8 /100
NRBC BLD AUTO-RTO: 9 /100
NRBC BLD AUTO-RTO: 9 /100
NUM BPU REQUESTED: 0
NUM BPU REQUESTED: 1
NUM BPU REQUESTED: 10
NUM BPU REQUESTED: 16
NUM BPU REQUESTED: 18
NUM BPU REQUESTED: 2
NUM BPU REQUESTED: 20
NUM BPU REQUESTED: 20
NUM BPU REQUESTED: 25
NUM BPU REQUESTED: 4
NUM BPU REQUESTED: 5
NUM BPU REQUESTED: 7
NUM BPU REQUESTED: 8
NUM BPU REQUESTED: 8
O2/TOTAL GAS SETTING VFR VENT: 100 %
O2/TOTAL GAS SETTING VFR VENT: 21 %
O2/TOTAL GAS SETTING VFR VENT: 30 %
O2/TOTAL GAS SETTING VFR VENT: 34 %
O2/TOTAL GAS SETTING VFR VENT: 38 %
O2/TOTAL GAS SETTING VFR VENT: 39 %
O2/TOTAL GAS SETTING VFR VENT: 40 %
O2/TOTAL GAS SETTING VFR VENT: 43 %
O2/TOTAL GAS SETTING VFR VENT: 45 %
O2/TOTAL GAS SETTING VFR VENT: 48 %
O2/TOTAL GAS SETTING VFR VENT: 50 %
O2/TOTAL GAS SETTING VFR VENT: 60 %
O2/TOTAL GAS SETTING VFR VENT: 70 %
O2/TOTAL GAS SETTING VFR VENT: 70 %
O2/TOTAL GAS SETTING VFR VENT: ABNORMAL %
ORGAN: NORMAL
OSMOLALITY SERPL: 273 MMOL/KG (ref 275–295)
OSMOLALITY SERPL: 285 MMOL/KG (ref 275–295)
OSMOLALITY UR: 235 MMOL/KG (ref 100–1200)
OSMOLALITY UR: 334 MMOL/KG (ref 100–1200)
OSMOLALITY UR: 397 MMOL/KG (ref 100–1200)
OTHER CELLS FLD MANUAL: 24 %
OTHER CELLS FLD MANUAL: 27 %
OTHER CELLS FLD MANUAL: 45 %
OTHER CELLS FLD MANUAL: 61 %
OTHER CELLS FLD MANUAL: 62 %
OTHER CELLS FLD MANUAL: 70 %
OVALOCYTES BLD QL SMEAR: SLIGHT
OXYHGB MFR BLD: 94 % (ref 92–100)
OXYHGB MFR BLD: 96 % (ref 92–100)
OXYHGB MFR BLD: 96 % (ref 92–100)
OXYHGB MFR BLDV: 63 %
OXYHGB MFR BLDV: 70 %
OXYHGB MFR BLDV: 79 %
OXYHGB MFR BLDV: 82 %
OXYHGB MFR BLDV: 84 %
PCO2 BLD: 21 MM HG (ref 35–45)
PCO2 BLD: 27 MM HG (ref 35–45)
PCO2 BLD: 29 MM HG (ref 35–45)
PCO2 BLD: 29 MM HG (ref 35–45)
PCO2 BLD: 30 MM HG (ref 35–45)
PCO2 BLD: 30 MM HG (ref 35–45)
PCO2 BLD: 31 MM HG (ref 35–45)
PCO2 BLD: 32 MM HG (ref 35–45)
PCO2 BLD: 33 MM HG (ref 35–45)
PCO2 BLD: 35 MM HG (ref 35–45)
PCO2 BLD: 35 MM HG (ref 35–45)
PCO2 BLD: 36 MM HG (ref 35–45)
PCO2 BLD: 36 MM HG (ref 35–45)
PCO2 BLD: 37 MM HG (ref 35–45)
PCO2 BLD: 38 MM HG (ref 35–45)
PCO2 BLD: 39 MM HG (ref 35–45)
PCO2 BLD: 40 MM HG (ref 35–45)
PCO2 BLD: 40 MM HG (ref 35–45)
PCO2 BLD: 41 MM HG (ref 35–45)
PCO2 BLD: 44 MM HG (ref 35–45)
PCO2 BLD: 46 MM HG (ref 35–45)
PCO2 BLDV: 23 MM HG (ref 40–50)
PCO2 BLDV: 27 MM HG (ref 40–50)
PCO2 BLDV: 28 MM HG (ref 40–50)
PCO2 BLDV: 29 MM HG (ref 40–50)
PCO2 BLDV: 33 MM HG (ref 40–50)
PCO2 BLDV: 33 MM HG (ref 40–50)
PCO2 BLDV: 36 MM HG (ref 40–50)
PCO2 BLDV: 39 MM HG (ref 40–50)
PCO2 BLDV: 41 MM HG (ref 40–50)
PF4 HEPARIN CMPLX AB SER QL: NEGATIVE
PF4 HEPARIN CMPLX AB SER QL: NEGATIVE
PH BLD: 7.22 PH (ref 7.35–7.45)
PH BLD: 7.23 PH (ref 7.35–7.45)
PH BLD: 7.29 PH (ref 7.35–7.45)
PH BLD: 7.3 PH (ref 7.35–7.45)
PH BLD: 7.3 PH (ref 7.35–7.45)
PH BLD: 7.31 PH (ref 7.35–7.45)
PH BLD: 7.31 PH (ref 7.35–7.45)
PH BLD: 7.32 PH (ref 7.35–7.45)
PH BLD: 7.33 PH (ref 7.35–7.45)
PH BLD: 7.34 PH (ref 7.35–7.45)
PH BLD: 7.34 PH (ref 7.35–7.45)
PH BLD: 7.35 PH (ref 7.35–7.45)
PH BLD: 7.36 PH (ref 7.35–7.45)
PH BLD: 7.36 PH (ref 7.35–7.45)
PH BLD: 7.37 PH (ref 7.35–7.45)
PH BLD: 7.38 PH (ref 7.35–7.45)
PH BLD: 7.38 PH (ref 7.35–7.45)
PH BLD: 7.39 PH (ref 7.35–7.45)
PH BLD: 7.39 PH (ref 7.35–7.45)
PH BLD: 7.4 PH (ref 7.35–7.45)
PH BLD: 7.41 PH (ref 7.35–7.45)
PH BLD: 7.42 PH (ref 7.35–7.45)
PH BLD: 7.43 PH (ref 7.35–7.45)
PH BLD: 7.43 PH (ref 7.35–7.45)
PH BLD: 7.44 PH (ref 7.35–7.45)
PH BLD: 7.45 PH (ref 7.35–7.45)
PH BLD: 7.45 PH (ref 7.35–7.45)
PH BLD: 7.46 PH (ref 7.35–7.45)
PH BLDV: 7.37 PH (ref 7.32–7.43)
PH BLDV: 7.37 PH (ref 7.32–7.43)
PH BLDV: 7.38 PH (ref 7.32–7.43)
PH BLDV: 7.38 PH (ref 7.32–7.43)
PH BLDV: 7.39 PH (ref 7.32–7.43)
PH BLDV: 7.4 PH (ref 7.32–7.43)
PH BLDV: 7.41 PH (ref 7.32–7.43)
PH BLDV: 7.42 PH (ref 7.32–7.43)
PH BLDV: 7.43 PH (ref 7.32–7.43)
PH UR STRIP: 5 PH (ref 5–7)
PH UR STRIP: 5.5 PH (ref 5–7)
PH UR STRIP: 6 PH (ref 5–7)
PH UR STRIP: 7.5 PH (ref 5–7)
PHOSPHATE SERPL-MCNC: 1.3 MG/DL (ref 2.5–4.5)
PHOSPHATE SERPL-MCNC: 1.3 MG/DL (ref 2.5–4.5)
PHOSPHATE SERPL-MCNC: 1.4 MG/DL (ref 2.5–4.5)
PHOSPHATE SERPL-MCNC: 1.5 MG/DL (ref 2.5–4.5)
PHOSPHATE SERPL-MCNC: 1.6 MG/DL (ref 2.5–4.5)
PHOSPHATE SERPL-MCNC: 1.7 MG/DL (ref 2.5–4.5)
PHOSPHATE SERPL-MCNC: 1.8 MG/DL (ref 2.5–4.5)
PHOSPHATE SERPL-MCNC: 1.9 MG/DL (ref 2.5–4.5)
PHOSPHATE SERPL-MCNC: 1.9 MG/DL (ref 2.5–4.5)
PHOSPHATE SERPL-MCNC: 2 MG/DL (ref 2.5–4.5)
PHOSPHATE SERPL-MCNC: 2.2 MG/DL (ref 2.5–4.5)
PHOSPHATE SERPL-MCNC: 2.3 MG/DL (ref 2.5–4.5)
PHOSPHATE SERPL-MCNC: 2.4 MG/DL (ref 2.5–4.5)
PHOSPHATE SERPL-MCNC: 2.5 MG/DL (ref 2.5–4.5)
PHOSPHATE SERPL-MCNC: 2.6 MG/DL (ref 2.5–4.5)
PHOSPHATE SERPL-MCNC: 2.8 MG/DL (ref 2.5–4.5)
PHOSPHATE SERPL-MCNC: 2.9 MG/DL (ref 2.5–4.5)
PHOSPHATE SERPL-MCNC: 3 MG/DL (ref 2.5–4.5)
PHOSPHATE SERPL-MCNC: 3.1 MG/DL (ref 2.5–4.5)
PHOSPHATE SERPL-MCNC: 3.2 MG/DL (ref 2.5–4.5)
PHOSPHATE SERPL-MCNC: 3.3 MG/DL (ref 2.5–4.5)
PHOSPHATE SERPL-MCNC: 3.3 MG/DL (ref 2.5–4.5)
PHOSPHATE SERPL-MCNC: 3.4 MG/DL (ref 2.5–4.5)
PHOSPHATE SERPL-MCNC: 3.5 MG/DL (ref 2.5–4.5)
PHOSPHATE SERPL-MCNC: 3.6 MG/DL (ref 2.5–4.5)
PHOSPHATE SERPL-MCNC: 3.7 MG/DL (ref 2.5–4.5)
PHOSPHATE SERPL-MCNC: 3.7 MG/DL (ref 2.5–4.5)
PHOSPHATE SERPL-MCNC: 3.8 MG/DL (ref 2.5–4.5)
PHOSPHATE SERPL-MCNC: 3.9 MG/DL (ref 2.5–4.5)
PHOSPHATE SERPL-MCNC: 4 MG/DL (ref 2.5–4.5)
PHOSPHATE SERPL-MCNC: 4.1 MG/DL (ref 2.5–4.5)
PHOSPHATE SERPL-MCNC: 4.2 MG/DL (ref 2.5–4.5)
PHOSPHATE SERPL-MCNC: 4.3 MG/DL (ref 2.5–4.5)
PHOSPHATE SERPL-MCNC: 4.4 MG/DL (ref 2.5–4.5)
PHOSPHATE SERPL-MCNC: 4.4 MG/DL (ref 2.5–4.5)
PHOSPHATE SERPL-MCNC: 4.5 MG/DL (ref 2.5–4.5)
PHOSPHATE SERPL-MCNC: 4.6 MG/DL (ref 2.5–4.5)
PHOSPHATE SERPL-MCNC: 4.7 MG/DL (ref 2.5–4.5)
PHOSPHATE SERPL-MCNC: 4.8 MG/DL (ref 2.5–4.5)
PHOSPHATE SERPL-MCNC: 5 MG/DL (ref 2.5–4.5)
PHOSPHATE SERPL-MCNC: 5.1 MG/DL (ref 2.5–4.5)
PHOSPHATE SERPL-MCNC: 5.1 MG/DL (ref 2.5–4.5)
PHOSPHATE SERPL-MCNC: 5.4 MG/DL (ref 2.5–4.5)
PHOSPHATE SERPL-MCNC: 5.5 MG/DL (ref 2.5–4.5)
PHOSPHATE SERPL-MCNC: 5.6 MG/DL (ref 2.5–4.5)
PHOSPHATE SERPL-MCNC: 5.8 MG/DL (ref 2.5–4.5)
PHOSPHATE SERPL-MCNC: 5.8 MG/DL (ref 2.5–4.5)
PHOSPHATE SERPL-MCNC: 5.9 MG/DL (ref 2.5–4.5)
PHOSPHATE SERPL-MCNC: 6 MG/DL (ref 2.5–4.5)
PHOSPHATE SERPL-MCNC: 6.5 MG/DL (ref 2.5–4.5)
PHOSPHATE SERPL-MCNC: 6.5 MG/DL (ref 2.5–4.5)
PLATELET # BLD AUTO: 101 10E9/L (ref 150–450)
PLATELET # BLD AUTO: 101 10E9/L (ref 150–450)
PLATELET # BLD AUTO: 105 10E9/L (ref 150–450)
PLATELET # BLD AUTO: 111 10E9/L (ref 150–450)
PLATELET # BLD AUTO: 111 10E9/L (ref 150–450)
PLATELET # BLD AUTO: 116 10E9/L (ref 150–450)
PLATELET # BLD AUTO: 122 10E9/L (ref 150–450)
PLATELET # BLD AUTO: 139 10E9/L (ref 150–450)
PLATELET # BLD AUTO: 142 10E9/L (ref 150–450)
PLATELET # BLD AUTO: 154 10E9/L (ref 150–450)
PLATELET # BLD AUTO: 17 10E9/L (ref 150–450)
PLATELET # BLD AUTO: 172 10E9/L (ref 150–450)
PLATELET # BLD AUTO: 176 10E9/L (ref 150–450)
PLATELET # BLD AUTO: 19 10E9/L (ref 150–450)
PLATELET # BLD AUTO: 19 10E9/L (ref 150–450)
PLATELET # BLD AUTO: 199 10E9/L (ref 150–450)
PLATELET # BLD AUTO: 23 10E9/L (ref 150–450)
PLATELET # BLD AUTO: 23 10E9/L (ref 150–450)
PLATELET # BLD AUTO: 24 10E9/L (ref 150–450)
PLATELET # BLD AUTO: 24 10E9/L (ref 150–450)
PLATELET # BLD AUTO: 246 10E9/L (ref 150–450)
PLATELET # BLD AUTO: 25 10E9/L (ref 150–450)
PLATELET # BLD AUTO: 25 10E9/L (ref 150–450)
PLATELET # BLD AUTO: 251 10E9/L (ref 150–450)
PLATELET # BLD AUTO: 26 10E9/L (ref 150–450)
PLATELET # BLD AUTO: 27 10E9/L (ref 150–450)
PLATELET # BLD AUTO: 27 10E9/L (ref 150–450)
PLATELET # BLD AUTO: 28 10E9/L (ref 150–450)
PLATELET # BLD AUTO: 28 10E9/L (ref 150–450)
PLATELET # BLD AUTO: 29 10E9/L (ref 150–450)
PLATELET # BLD AUTO: 31 10E9/L (ref 150–450)
PLATELET # BLD AUTO: 32 10E9/L (ref 150–450)
PLATELET # BLD AUTO: 33 10E9/L (ref 150–450)
PLATELET # BLD AUTO: 34 10E9/L (ref 150–450)
PLATELET # BLD AUTO: 35 10E9/L (ref 150–450)
PLATELET # BLD AUTO: 36 10E9/L (ref 150–450)
PLATELET # BLD AUTO: 37 10E9/L (ref 150–450)
PLATELET # BLD AUTO: 38 10E9/L (ref 150–450)
PLATELET # BLD AUTO: 39 10E9/L (ref 150–450)
PLATELET # BLD AUTO: 40 10E9/L (ref 150–450)
PLATELET # BLD AUTO: 41 10E9/L (ref 150–450)
PLATELET # BLD AUTO: 42 10E9/L (ref 150–450)
PLATELET # BLD AUTO: 43 10E9/L (ref 150–450)
PLATELET # BLD AUTO: 44 10E9/L (ref 150–450)
PLATELET # BLD AUTO: 45 10E9/L (ref 150–450)
PLATELET # BLD AUTO: 45 10E9/L (ref 150–450)
PLATELET # BLD AUTO: 46 10E9/L (ref 150–450)
PLATELET # BLD AUTO: 46 10E9/L (ref 150–450)
PLATELET # BLD AUTO: 47 10E9/L (ref 150–450)
PLATELET # BLD AUTO: 48 10E9/L (ref 150–450)
PLATELET # BLD AUTO: 49 10E9/L (ref 150–450)
PLATELET # BLD AUTO: 50 10E9/L (ref 150–450)
PLATELET # BLD AUTO: 51 10E9/L (ref 150–450)
PLATELET # BLD AUTO: 52 10E9/L (ref 150–450)
PLATELET # BLD AUTO: 53 10E9/L (ref 150–450)
PLATELET # BLD AUTO: 53 10E9/L (ref 150–450)
PLATELET # BLD AUTO: 54 10E9/L (ref 150–450)
PLATELET # BLD AUTO: 55 10E9/L (ref 150–450)
PLATELET # BLD AUTO: 56 10E9/L (ref 150–450)
PLATELET # BLD AUTO: 57 10E9/L (ref 150–450)
PLATELET # BLD AUTO: 58 10E9/L (ref 150–450)
PLATELET # BLD AUTO: 58 10E9/L (ref 150–450)
PLATELET # BLD AUTO: 59 10E9/L (ref 150–450)
PLATELET # BLD AUTO: 6 10E9/L (ref 150–450)
PLATELET # BLD AUTO: 61 10E9/L (ref 150–450)
PLATELET # BLD AUTO: 62 10E9/L (ref 150–450)
PLATELET # BLD AUTO: 63 10E9/L (ref 150–450)
PLATELET # BLD AUTO: 64 10E9/L (ref 150–450)
PLATELET # BLD AUTO: 64 10E9/L (ref 150–450)
PLATELET # BLD AUTO: 65 10E9/L (ref 150–450)
PLATELET # BLD AUTO: 65 10E9/L (ref 150–450)
PLATELET # BLD AUTO: 66 10E9/L (ref 150–450)
PLATELET # BLD AUTO: 67 10E9/L (ref 150–450)
PLATELET # BLD AUTO: 68 10E9/L (ref 150–450)
PLATELET # BLD AUTO: 71 10E9/L (ref 150–450)
PLATELET # BLD AUTO: 72 10E9/L (ref 150–450)
PLATELET # BLD AUTO: 73 10E9/L (ref 150–450)
PLATELET # BLD AUTO: 75 10E9/L (ref 150–450)
PLATELET # BLD AUTO: 76 10E9/L (ref 150–450)
PLATELET # BLD AUTO: 79 10E9/L (ref 150–450)
PLATELET # BLD AUTO: 81 10E9/L (ref 150–450)
PLATELET # BLD AUTO: 81 10E9/L (ref 150–450)
PLATELET # BLD AUTO: 82 10E9/L (ref 150–450)
PLATELET # BLD AUTO: 82 10E9/L (ref 150–450)
PLATELET # BLD AUTO: 83 10E9/L (ref 150–450)
PLATELET # BLD AUTO: 85 10E9/L (ref 150–450)
PLATELET # BLD AUTO: 86 10E9/L (ref 150–450)
PLATELET # BLD AUTO: 86 10E9/L (ref 150–450)
PLATELET # BLD AUTO: 88 10E9/L (ref 150–450)
PLATELET # BLD AUTO: 89 10E9/L (ref 150–450)
PLATELET # BLD AUTO: 92 10E9/L (ref 150–450)
PLATELET # BLD AUTO: 94 10E9/L (ref 150–450)
PLATELET # BLD AUTO: 94 10E9/L (ref 150–450)
PLATELET # BLD AUTO: NORMAL 10E9/L (ref 150–450)
PLATELET # BLD EST: ABNORMAL 10*3/UL
PO2 BLD: 100 MM HG (ref 80–105)
PO2 BLD: 100 MM HG (ref 80–105)
PO2 BLD: 103 MM HG (ref 80–105)
PO2 BLD: 106 MM HG (ref 80–105)
PO2 BLD: 112 MM HG (ref 80–105)
PO2 BLD: 116 MM HG (ref 80–105)
PO2 BLD: 126 MM HG (ref 80–105)
PO2 BLD: 127 MM HG (ref 80–105)
PO2 BLD: 128 MM HG (ref 80–105)
PO2 BLD: 132 MM HG (ref 80–105)
PO2 BLD: 142 MM HG (ref 80–105)
PO2 BLD: 143 MM HG (ref 80–105)
PO2 BLD: 144 MM HG (ref 80–105)
PO2 BLD: 163 MM HG (ref 80–105)
PO2 BLD: 164 MM HG (ref 80–105)
PO2 BLD: 172 MM HG (ref 80–105)
PO2 BLD: 177 MM HG (ref 80–105)
PO2 BLD: 178 MM HG (ref 80–105)
PO2 BLD: 178 MM HG (ref 80–105)
PO2 BLD: 180 MM HG (ref 80–105)
PO2 BLD: 180 MM HG (ref 80–105)
PO2 BLD: 183 MM HG (ref 80–105)
PO2 BLD: 186 MM HG (ref 80–105)
PO2 BLD: 203 MM HG (ref 80–105)
PO2 BLD: 205 MM HG (ref 80–105)
PO2 BLD: 210 MM HG (ref 80–105)
PO2 BLD: 331 MM HG (ref 80–105)
PO2 BLD: 56 MM HG (ref 80–105)
PO2 BLD: 72 MM HG (ref 80–105)
PO2 BLD: 73 MM HG (ref 80–105)
PO2 BLD: 85 MM HG (ref 80–105)
PO2 BLDV: 28 MM HG (ref 25–47)
PO2 BLDV: 38 MM HG (ref 25–47)
PO2 BLDV: 39 MM HG (ref 25–47)
PO2 BLDV: 40 MM HG (ref 25–47)
PO2 BLDV: 46 MM HG (ref 25–47)
PO2 BLDV: 47 MM HG (ref 25–47)
PO2 BLDV: 47 MM HG (ref 25–47)
PO2 BLDV: 50 MM HG (ref 25–47)
PO2 BLDV: 95 MM HG (ref 25–47)
POIKILOCYTOSIS BLD QL SMEAR: ABNORMAL
POIKILOCYTOSIS BLD QL SMEAR: SLIGHT
POLYCHROMASIA BLD QL SMEAR: SLIGHT
POTASSIUM BLD-SCNC: 2.7 MMOL/L (ref 3.4–5.3)
POTASSIUM BLD-SCNC: 2.9 MMOL/L (ref 3.4–5.3)
POTASSIUM BLD-SCNC: 3 MMOL/L (ref 3.4–5.3)
POTASSIUM BLD-SCNC: 3.1 MMOL/L (ref 3.4–5.3)
POTASSIUM BLD-SCNC: 3.4 MMOL/L (ref 3.4–5.3)
POTASSIUM BLD-SCNC: 3.6 MMOL/L (ref 3.4–5.3)
POTASSIUM BLD-SCNC: 3.7 MMOL/L (ref 3.4–5.3)
POTASSIUM BLD-SCNC: 3.8 MMOL/L (ref 3.4–5.3)
POTASSIUM BLD-SCNC: 3.8 MMOL/L (ref 3.4–5.3)
POTASSIUM BLD-SCNC: 4.1 MMOL/L (ref 3.4–5.3)
POTASSIUM BLD-SCNC: 4.3 MMOL/L (ref 3.4–5.3)
POTASSIUM BLD-SCNC: 4.6 MMOL/L (ref 3.4–5.3)
POTASSIUM BLD-SCNC: 6.6 MMOL/L (ref 3.4–5.3)
POTASSIUM SERPL-SCNC: 2.5 MMOL/L (ref 3.4–5.3)
POTASSIUM SERPL-SCNC: 2.6 MMOL/L (ref 3.4–5.3)
POTASSIUM SERPL-SCNC: 2.7 MMOL/L (ref 3.4–5.3)
POTASSIUM SERPL-SCNC: 2.8 MMOL/L (ref 3.4–5.3)
POTASSIUM SERPL-SCNC: 2.9 MMOL/L (ref 3.4–5.3)
POTASSIUM SERPL-SCNC: 3 MMOL/L (ref 3.4–5.3)
POTASSIUM SERPL-SCNC: 3.1 MMOL/L (ref 3.4–5.3)
POTASSIUM SERPL-SCNC: 3.1 MMOL/L (ref 3.4–5.3)
POTASSIUM SERPL-SCNC: 3.2 MMOL/L (ref 3.4–5.3)
POTASSIUM SERPL-SCNC: 3.3 MMOL/L (ref 3.4–5.3)
POTASSIUM SERPL-SCNC: 3.4 MMOL/L (ref 3.4–5.3)
POTASSIUM SERPL-SCNC: 3.5 MMOL/L (ref 3.4–5.3)
POTASSIUM SERPL-SCNC: 3.6 MMOL/L (ref 3.4–5.3)
POTASSIUM SERPL-SCNC: 3.7 MMOL/L (ref 3.4–5.3)
POTASSIUM SERPL-SCNC: 3.8 MMOL/L (ref 3.4–5.3)
POTASSIUM SERPL-SCNC: 3.9 MMOL/L (ref 3.4–5.3)
POTASSIUM SERPL-SCNC: 4 MMOL/L (ref 3.4–5.3)
POTASSIUM SERPL-SCNC: 4.1 MMOL/L (ref 3.4–5.3)
POTASSIUM SERPL-SCNC: 4.2 MMOL/L (ref 3.4–5.3)
POTASSIUM SERPL-SCNC: 4.3 MMOL/L (ref 3.4–5.3)
POTASSIUM SERPL-SCNC: 4.4 MMOL/L (ref 3.4–5.3)
POTASSIUM SERPL-SCNC: 4.4 MMOL/L (ref 3.4–5.3)
POTASSIUM SERPL-SCNC: 4.5 MMOL/L (ref 3.4–5.3)
POTASSIUM SERPL-SCNC: 4.5 MMOL/L (ref 3.4–5.3)
POTASSIUM SERPL-SCNC: 4.6 MMOL/L (ref 3.4–5.3)
POTASSIUM SERPL-SCNC: 4.9 MMOL/L (ref 3.4–5.3)
POTASSIUM SERPL-SCNC: 5 MMOL/L (ref 3.4–5.3)
POTASSIUM SERPL-SCNC: 5 MMOL/L (ref 3.4–5.3)
POTASSIUM SERPL-SCNC: 5.1 MMOL/L (ref 3.4–5.3)
POTASSIUM SERPL-SCNC: 5.2 MMOL/L (ref 3.4–5.3)
POTASSIUM SERPL-SCNC: 5.3 MMOL/L (ref 3.4–5.3)
POTASSIUM SERPL-SCNC: 5.3 MMOL/L (ref 3.4–5.3)
POTASSIUM SERPL-SCNC: 5.4 MMOL/L (ref 3.4–5.3)
POTASSIUM SERPL-SCNC: 5.6 MMOL/L (ref 3.4–5.3)
POTASSIUM SERPL-SCNC: 6.1 MMOL/L (ref 3.4–5.3)
POTASSIUM SERPL-SCNC: 6.5 MMOL/L (ref 3.4–5.3)
POTASSIUM SERPL-SCNC: 6.7 MMOL/L (ref 3.4–5.3)
PRA SINGLE ANTIGEN IGG ANTIBODY: NORMAL
PREALB SERPL IA-MCNC: 4 MG/DL (ref 15–45)
PREALB SERPL IA-MCNC: 6 MG/DL (ref 15–45)
PREALB SERPL IA-MCNC: 9 MG/DL (ref 15–45)
PROCALCITONIN SERPL-MCNC: 0.89 NG/ML
PROMYELOCYTES # BLD MANUAL: 0.1 10E9/L
PROMYELOCYTES # BLD MANUAL: 0.2 10E9/L
PROMYELOCYTES # BLD MANUAL: 0.2 10E9/L
PROMYELOCYTES # BLD MANUAL: 0.4 10E9/L
PROMYELOCYTES NFR BLD MANUAL: 0.9 %
PROMYELOCYTES NFR BLD MANUAL: 1.7 %
PROT C ACT/NOR PPP CHRO: 28 % (ref 70–170)
PROT FLD-MCNC: 0.6 G/DL
PROT FLD-MCNC: 1.6 G/DL
PROT S FREE AG ACT/NOR PPP IA: 27 % (ref 70–148)
PROT SERPL-MCNC: 2.3 G/DL (ref 6.8–8.8)
PROT SERPL-MCNC: 2.9 G/DL (ref 6.8–8.8)
PROT SERPL-MCNC: 3 G/DL (ref 6.8–8.8)
PROT SERPL-MCNC: 3 G/DL (ref 6.8–8.8)
PROT SERPL-MCNC: 3.1 G/DL (ref 6.8–8.8)
PROT SERPL-MCNC: 3.2 G/DL (ref 6.8–8.8)
PROT SERPL-MCNC: 3.3 G/DL (ref 6.8–8.8)
PROT SERPL-MCNC: 3.3 G/DL (ref 6.8–8.8)
PROT SERPL-MCNC: 3.4 G/DL (ref 6.8–8.8)
PROT SERPL-MCNC: 3.5 G/DL (ref 6.8–8.8)
PROT SERPL-MCNC: 3.6 G/DL (ref 6.8–8.8)
PROT SERPL-MCNC: 3.7 G/DL (ref 6.8–8.8)
PROT SERPL-MCNC: 3.8 G/DL (ref 6.8–8.8)
PROT SERPL-MCNC: 3.9 G/DL (ref 6.8–8.8)
PROT SERPL-MCNC: 4 G/DL (ref 6.8–8.8)
PROT SERPL-MCNC: 4.1 G/DL (ref 6.8–8.8)
PROT SERPL-MCNC: 4.2 G/DL (ref 6.8–8.8)
PROT SERPL-MCNC: 4.3 G/DL (ref 6.8–8.8)
PROT SERPL-MCNC: 4.4 G/DL (ref 6.8–8.8)
PROT SERPL-MCNC: 4.5 G/DL (ref 6.8–8.8)
PROT SERPL-MCNC: 4.6 G/DL (ref 6.8–8.8)
PROT SERPL-MCNC: 4.7 G/DL (ref 6.8–8.8)
PROT SERPL-MCNC: 4.7 G/DL (ref 6.8–8.8)
PROT SERPL-MCNC: 4.8 G/DL (ref 6.8–8.8)
PROT SERPL-MCNC: 4.8 G/DL (ref 6.8–8.8)
PROT SERPL-MCNC: 4.9 G/DL (ref 6.8–8.8)
PROT SERPL-MCNC: 5 G/DL (ref 6.8–8.8)
PROT SERPL-MCNC: 5 G/DL (ref 6.8–8.8)
PROT SERPL-MCNC: 5.1 G/DL (ref 6.8–8.8)
PROT SERPL-MCNC: 5.2 G/DL (ref 6.8–8.8)
PROT SERPL-MCNC: 5.2 G/DL (ref 6.8–8.8)
PROT SERPL-MCNC: 5.3 G/DL (ref 6.8–8.8)
PROT SERPL-MCNC: 5.4 G/DL (ref 6.8–8.8)
PROT SERPL-MCNC: 5.5 G/DL (ref 6.8–8.8)
PROT SERPL-MCNC: 5.5 G/DL (ref 6.8–8.8)
PROT SERPL-MCNC: 5.6 G/DL (ref 6.8–8.8)
PROT SERPL-MCNC: 5.7 G/DL (ref 6.8–8.8)
PROT SERPL-MCNC: 5.7 G/DL (ref 6.8–8.8)
PROT SERPL-MCNC: 5.8 G/DL (ref 6.8–8.8)
PROT SERPL-MCNC: 5.9 G/DL (ref 6.8–8.8)
PROT SERPL-MCNC: 5.9 G/DL (ref 6.8–8.8)
PROT SERPL-MCNC: 6 G/DL (ref 6.8–8.8)
PROT SERPL-MCNC: 6 G/DL (ref 6.8–8.8)
PROT SERPL-MCNC: 6.1 G/DL (ref 6.8–8.8)
PROT SERPL-MCNC: 6.4 G/DL (ref 6.8–8.8)
PROT SERPL-MCNC: 6.5 G/DL (ref 6.8–8.8)
PROT SERPL-MCNC: 6.6 G/DL (ref 6.8–8.8)
PROT SERPL-MCNC: 6.8 G/DL (ref 6.8–8.8)
PROT UR-MCNC: 0.24 G/L
PROT/CREAT 24H UR: 0.75 G/G CR (ref 0–0.2)
PSA SERPL-ACNC: 0.15 UG/L (ref 0–4)
R TIME UNTIL CLOT FORMS: 3.2 MIN (ref 4–9)
R TIME UNTIL CLOT FORMS: 3.9 MIN (ref 4–9)
R TIME UNTIL CLOT FORMS: 5.8 MIN (ref 4–9)
R TIME UNTIL CLOT FORMS: 6.2 MINUTE (ref 5–10)
R TIME UNTIL CLOT FORMS: 6.6 MINUTE (ref 5–10)
R TIME UNTIL CLOT FORMS: 6.8 MIN (ref 4–9)
R TIME UNTIL CLOT FORMS: 6.8 MINUTE (ref 5–10)
R TIME UNTIL CLOT FORMS: 6.9 MINUTE (ref 5–10)
R TIME UNTIL CLOT FORMS: 7.5 MIN (ref 4–9)
R TIME UNTIL CLOT FORMS: 7.7 MINUTE (ref 5–10)
R TIME UNTIL CLOT FORMS: 8.1 MINUTE (ref 5–10)
R TIME UNTIL CLOT FORMS: 8.2 MIN (ref 4–9)
R TIME UNTIL CLOT FORMS: 8.3 MIN (ref 4–9)
R TIME UNTIL CLOT FORMS: NORMAL MIN (ref 4–9)
R TIME UNTIL CLOT FORMS: NORMAL MINUTE (ref 5–10)
RADIOLOGIST FLAGS: ABNORMAL
RBC # BLD AUTO: 1.59 10E12/L (ref 4.4–5.9)
RBC # BLD AUTO: 1.64 10E12/L (ref 4.4–5.9)
RBC # BLD AUTO: 1.65 10E12/L (ref 4.4–5.9)
RBC # BLD AUTO: 1.66 10E12/L (ref 4.4–5.9)
RBC # BLD AUTO: 1.69 10E12/L (ref 4.4–5.9)
RBC # BLD AUTO: 1.79 10E12/L (ref 4.4–5.9)
RBC # BLD AUTO: 2.14 10E12/L (ref 4.4–5.9)
RBC # BLD AUTO: 2.15 10E12/L (ref 4.4–5.9)
RBC # BLD AUTO: 2.2 10E12/L (ref 4.4–5.9)
RBC # BLD AUTO: 2.2 10E12/L (ref 4.4–5.9)
RBC # BLD AUTO: 2.22 10E12/L (ref 4.4–5.9)
RBC # BLD AUTO: 2.23 10E12/L (ref 4.4–5.9)
RBC # BLD AUTO: 2.29 10E12/L (ref 4.4–5.9)
RBC # BLD AUTO: 2.3 10E12/L (ref 4.4–5.9)
RBC # BLD AUTO: 2.32 10E12/L (ref 4.4–5.9)
RBC # BLD AUTO: 2.33 10E12/L (ref 4.4–5.9)
RBC # BLD AUTO: 2.34 10E12/L (ref 4.4–5.9)
RBC # BLD AUTO: 2.35 10E12/L (ref 4.4–5.9)
RBC # BLD AUTO: 2.37 10E12/L (ref 4.4–5.9)
RBC # BLD AUTO: 2.38 10E12/L (ref 4.4–5.9)
RBC # BLD AUTO: 2.39 10E12/L (ref 4.4–5.9)
RBC # BLD AUTO: 2.41 10E12/L (ref 4.4–5.9)
RBC # BLD AUTO: 2.41 10E12/L (ref 4.4–5.9)
RBC # BLD AUTO: 2.43 10E12/L (ref 4.4–5.9)
RBC # BLD AUTO: 2.45 10E12/L (ref 4.4–5.9)
RBC # BLD AUTO: 2.47 10E12/L (ref 4.4–5.9)
RBC # BLD AUTO: 2.48 10E12/L (ref 4.4–5.9)
RBC # BLD AUTO: 2.49 10E12/L (ref 4.4–5.9)
RBC # BLD AUTO: 2.5 10E12/L (ref 4.4–5.9)
RBC # BLD AUTO: 2.5 10E12/L (ref 4.4–5.9)
RBC # BLD AUTO: 2.52 10E12/L (ref 4.4–5.9)
RBC # BLD AUTO: 2.53 10E12/L (ref 4.4–5.9)
RBC # BLD AUTO: 2.54 10E12/L (ref 4.4–5.9)
RBC # BLD AUTO: 2.54 10E12/L (ref 4.4–5.9)
RBC # BLD AUTO: 2.55 10E12/L (ref 4.4–5.9)
RBC # BLD AUTO: 2.56 10E12/L (ref 4.4–5.9)
RBC # BLD AUTO: 2.56 10E12/L (ref 4.4–5.9)
RBC # BLD AUTO: 2.57 10E12/L (ref 4.4–5.9)
RBC # BLD AUTO: 2.57 10E12/L (ref 4.4–5.9)
RBC # BLD AUTO: 2.58 10E12/L (ref 4.4–5.9)
RBC # BLD AUTO: 2.59 10E12/L (ref 4.4–5.9)
RBC # BLD AUTO: 2.6 10E12/L (ref 4.4–5.9)
RBC # BLD AUTO: 2.61 10E12/L (ref 4.4–5.9)
RBC # BLD AUTO: 2.61 10E12/L (ref 4.4–5.9)
RBC # BLD AUTO: 2.62 10E12/L (ref 4.4–5.9)
RBC # BLD AUTO: 2.63 10E12/L (ref 4.4–5.9)
RBC # BLD AUTO: 2.63 10E12/L (ref 4.4–5.9)
RBC # BLD AUTO: 2.65 10E12/L (ref 4.4–5.9)
RBC # BLD AUTO: 2.66 10E12/L (ref 4.4–5.9)
RBC # BLD AUTO: 2.67 10E12/L (ref 4.4–5.9)
RBC # BLD AUTO: 2.67 10E12/L (ref 4.4–5.9)
RBC # BLD AUTO: 2.68 10E12/L (ref 4.4–5.9)
RBC # BLD AUTO: 2.7 10E12/L (ref 4.4–5.9)
RBC # BLD AUTO: 2.71 10E12/L (ref 4.4–5.9)
RBC # BLD AUTO: 2.72 10E12/L (ref 4.4–5.9)
RBC # BLD AUTO: 2.73 10E12/L (ref 4.4–5.9)
RBC # BLD AUTO: 2.73 10E12/L (ref 4.4–5.9)
RBC # BLD AUTO: 2.74 10E12/L (ref 4.4–5.9)
RBC # BLD AUTO: 2.74 10E12/L (ref 4.4–5.9)
RBC # BLD AUTO: 2.75 10E12/L (ref 4.4–5.9)
RBC # BLD AUTO: 2.75 10E12/L (ref 4.4–5.9)
RBC # BLD AUTO: 2.76 10E12/L (ref 4.4–5.9)
RBC # BLD AUTO: 2.76 10E12/L (ref 4.4–5.9)
RBC # BLD AUTO: 2.77 10E12/L (ref 4.4–5.9)
RBC # BLD AUTO: 2.77 10E12/L (ref 4.4–5.9)
RBC # BLD AUTO: 2.79 10E12/L (ref 4.4–5.9)
RBC # BLD AUTO: 2.79 10E12/L (ref 4.4–5.9)
RBC # BLD AUTO: 2.8 10E12/L (ref 4.4–5.9)
RBC # BLD AUTO: 2.81 10E12/L (ref 4.4–5.9)
RBC # BLD AUTO: 2.82 10E12/L (ref 4.4–5.9)
RBC # BLD AUTO: 2.84 10E12/L (ref 4.4–5.9)
RBC # BLD AUTO: 2.85 10E12/L (ref 4.4–5.9)
RBC # BLD AUTO: 2.85 10E12/L (ref 4.4–5.9)
RBC # BLD AUTO: 2.87 10E12/L (ref 4.4–5.9)
RBC # BLD AUTO: 2.88 10E12/L (ref 4.4–5.9)
RBC # BLD AUTO: 2.89 10E12/L (ref 4.4–5.9)
RBC # BLD AUTO: 2.91 10E12/L (ref 4.4–5.9)
RBC # BLD AUTO: 2.91 10E12/L (ref 4.4–5.9)
RBC # BLD AUTO: 2.92 10E12/L (ref 4.4–5.9)
RBC # BLD AUTO: 2.92 10E12/L (ref 4.4–5.9)
RBC # BLD AUTO: 2.93 10E12/L (ref 4.4–5.9)
RBC # BLD AUTO: 2.94 10E12/L (ref 4.4–5.9)
RBC # BLD AUTO: 2.95 10E12/L (ref 4.4–5.9)
RBC # BLD AUTO: 2.95 10E12/L (ref 4.4–5.9)
RBC # BLD AUTO: 2.96 10E12/L (ref 4.4–5.9)
RBC # BLD AUTO: 2.97 10E12/L (ref 4.4–5.9)
RBC # BLD AUTO: 2.99 10E12/L (ref 4.4–5.9)
RBC # BLD AUTO: 3.01 10E12/L (ref 4.4–5.9)
RBC # BLD AUTO: 3.02 10E12/L (ref 4.4–5.9)
RBC # BLD AUTO: 3.04 10E12/L (ref 4.4–5.9)
RBC # BLD AUTO: 3.06 10E12/L (ref 4.4–5.9)
RBC # BLD AUTO: 3.08 10E12/L (ref 4.4–5.9)
RBC # BLD AUTO: 3.08 10E12/L (ref 4.4–5.9)
RBC # BLD AUTO: 3.09 10E12/L (ref 4.4–5.9)
RBC # BLD AUTO: 3.09 10E12/L (ref 4.4–5.9)
RBC # BLD AUTO: 3.1 10E12/L (ref 4.4–5.9)
RBC # BLD AUTO: 3.1 10E12/L (ref 4.4–5.9)
RBC # BLD AUTO: 3.14 10E12/L (ref 4.4–5.9)
RBC # BLD AUTO: 3.15 10E12/L (ref 4.4–5.9)
RBC # BLD AUTO: 3.17 10E12/L (ref 4.4–5.9)
RBC # BLD AUTO: 3.19 10E12/L (ref 4.4–5.9)
RBC # BLD AUTO: 3.19 10E12/L (ref 4.4–5.9)
RBC # BLD AUTO: 3.2 10E12/L (ref 4.4–5.9)
RBC # BLD AUTO: 3.21 10E12/L (ref 4.4–5.9)
RBC # BLD AUTO: 3.25 10E12/L (ref 4.4–5.9)
RBC # BLD AUTO: 3.26 10E12/L (ref 4.4–5.9)
RBC # BLD AUTO: 3.3 10E12/L (ref 4.4–5.9)
RBC # BLD AUTO: 3.31 10E12/L (ref 4.4–5.9)
RBC # BLD AUTO: 3.31 10E12/L (ref 4.4–5.9)
RBC # BLD AUTO: 3.35 10E12/L (ref 4.4–5.9)
RBC # BLD AUTO: 3.36 10E12/L (ref 4.4–5.9)
RBC # BLD AUTO: 3.37 10E12/L (ref 4.4–5.9)
RBC # BLD AUTO: 3.38 10E12/L (ref 4.4–5.9)
RBC # BLD AUTO: 3.4 10E12/L (ref 4.4–5.9)
RBC # BLD AUTO: 3.41 10E12/L (ref 4.4–5.9)
RBC # BLD AUTO: 3.42 10E12/L (ref 4.4–5.9)
RBC # BLD AUTO: 3.42 10E12/L (ref 4.4–5.9)
RBC # BLD AUTO: 3.43 10E12/L (ref 4.4–5.9)
RBC # BLD AUTO: 3.45 10E12/L (ref 4.4–5.9)
RBC # BLD AUTO: 3.47 10E12/L (ref 4.4–5.9)
RBC # BLD AUTO: 3.49 10E12/L (ref 4.4–5.9)
RBC # BLD AUTO: 3.51 10E12/L (ref 4.4–5.9)
RBC # BLD AUTO: 3.51 10E12/L (ref 4.4–5.9)
RBC # BLD AUTO: 3.52 10E12/L (ref 4.4–5.9)
RBC # BLD AUTO: 3.52 10E12/L (ref 4.4–5.9)
RBC # BLD AUTO: 3.59 10E12/L (ref 4.4–5.9)
RBC # BLD AUTO: 3.62 10E12/L (ref 4.4–5.9)
RBC # BLD AUTO: 3.66 10E12/L (ref 4.4–5.9)
RBC # BLD AUTO: 3.67 10E12/L (ref 4.4–5.9)
RBC # BLD AUTO: 3.71 10E12/L (ref 4.4–5.9)
RBC # BLD AUTO: 3.94 10E12/L (ref 4.4–5.9)
RBC # BLD AUTO: 4.11 10E12/L (ref 4.4–5.9)
RBC # BLD AUTO: 4.33 10E12/L (ref 4.4–5.9)
RBC # BLD AUTO: 4.72 10E12/L (ref 4.4–5.9)
RBC # BLD AUTO: 4.77 10E12/L (ref 4.4–5.9)
RBC # BLD AUTO: 4.79 10E12/L (ref 4.4–5.9)
RBC # BLD AUTO: 4.83 10E12/L (ref 4.4–5.9)
RBC # BLD AUTO: 4.87 10E12/L (ref 4.4–5.9)
RBC # BLD AUTO: 4.87 10E12/L (ref 4.4–5.9)
RBC # BLD AUTO: 4.97 10E12/L (ref 4.4–5.9)
RBC # BLD AUTO: 5.04 10E12/L (ref 4.4–5.9)
RBC # BLD AUTO: 5.15 10E12/L (ref 4.4–5.9)
RBC # BLD AUTO: 5.16 10E12/L (ref 4.4–5.9)
RBC # BLD AUTO: 5.47 10E12/L (ref 4.4–5.9)
RBC # BLD AUTO: NORMAL 10E12/L (ref 4.4–5.9)
RBC #/AREA URNS AUTO: 1 /HPF (ref 0–2)
RBC #/AREA URNS AUTO: 16 /HPF (ref 0–2)
RBC #/AREA URNS AUTO: 5 /HPF (ref 0–2)
RBC #/AREA URNS AUTO: <1 /HPF (ref 0–2)
RBC AGGLUT BLD QL: PRESENT
RBC INCLUSIONS BLD: SLIGHT
RBC MORPH BLD: NORMAL
RENAL EPI CELLS #/AREA URNS HPF: 2 /HPF
RESULT: NORMAL
RETICS # AUTO: 46 10E9/L (ref 25–95)
RETICS # AUTO: 49.1 10E9/L (ref 25–95)
RETICS # AUTO: 96.5 10E9/L (ref 25–95)
RETICS/RBC NFR AUTO: 1.5 % (ref 0.5–2)
RETICS/RBC NFR AUTO: 1.6 % (ref 0.5–2)
RETICS/RBC NFR AUTO: 3.8 % (ref 0.5–2)
SA1 CELL: NORMAL
SA1 COMMENTS: NORMAL
SA1 HI RISK ABY: NORMAL
SA1 MOD RISK ABY: NORMAL
SA1 TEST METHOD: NORMAL
SA2 CELL: NORMAL
SA2 COMMENTS: NORMAL
SA2 HI RISK ABY UA: NORMAL
SA2 MOD RISK ABY: NORMAL
SA2 TEST METHOD: NORMAL
SEND OUTS MISC TEST CODE: NORMAL
SEND OUTS MISC TEST SPECIMEN: NORMAL
SODIUM BLD-SCNC: 112 MMOL/L (ref 133–144)
SODIUM BLD-SCNC: 132 MMOL/L (ref 133–144)
SODIUM BLD-SCNC: 133 MMOL/L (ref 133–144)
SODIUM BLD-SCNC: 134 MMOL/L (ref 133–144)
SODIUM BLD-SCNC: 134 MMOL/L (ref 133–144)
SODIUM BLD-SCNC: 135 MMOL/L (ref 133–144)
SODIUM BLD-SCNC: 137 MMOL/L (ref 133–144)
SODIUM BLD-SCNC: 138 MMOL/L (ref 133–144)
SODIUM SERPL-SCNC: 119 MMOL/L (ref 133–144)
SODIUM SERPL-SCNC: 121 MMOL/L (ref 133–144)
SODIUM SERPL-SCNC: 123 MMOL/L (ref 133–144)
SODIUM SERPL-SCNC: 125 MMOL/L (ref 133–144)
SODIUM SERPL-SCNC: 126 MMOL/L (ref 133–144)
SODIUM SERPL-SCNC: 127 MMOL/L (ref 133–144)
SODIUM SERPL-SCNC: 128 MMOL/L (ref 133–144)
SODIUM SERPL-SCNC: 129 MMOL/L (ref 133–144)
SODIUM SERPL-SCNC: 130 MMOL/L (ref 133–144)
SODIUM SERPL-SCNC: 131 MMOL/L (ref 133–144)
SODIUM SERPL-SCNC: 132 MMOL/L (ref 133–144)
SODIUM SERPL-SCNC: 133 MMOL/L (ref 133–144)
SODIUM SERPL-SCNC: 133 MMOL/L (ref 136–145)
SODIUM SERPL-SCNC: 134 MMOL/L (ref 133–144)
SODIUM SERPL-SCNC: 135 MMOL/L (ref 133–144)
SODIUM SERPL-SCNC: 136 MMOL/L (ref 133–144)
SODIUM SERPL-SCNC: 137 MMOL/L (ref 133–144)
SODIUM SERPL-SCNC: 138 MMOL/L (ref 133–144)
SODIUM SERPL-SCNC: 139 MMOL/L (ref 133–144)
SODIUM SERPL-SCNC: 140 MMOL/L (ref 133–144)
SODIUM SERPL-SCNC: 141 MMOL/L (ref 133–144)
SODIUM SERPL-SCNC: 142 MMOL/L (ref 133–144)
SODIUM SERPL-SCNC: 143 MMOL/L (ref 133–144)
SODIUM SERPL-SCNC: 144 MMOL/L (ref 133–144)
SODIUM SERPL-SCNC: 145 MMOL/L (ref 133–144)
SODIUM SERPL-SCNC: 145 MMOL/L (ref 133–144)
SODIUM SERPL-SCNC: 146 MMOL/L (ref 133–144)
SODIUM SERPL-SCNC: 147 MMOL/L (ref 133–144)
SODIUM SERPL-SCNC: 147 MMOL/L (ref 133–144)
SODIUM SERPL-SCNC: 148 MMOL/L (ref 133–144)
SODIUM SERPL-SCNC: 149 MMOL/L (ref 133–144)
SODIUM SERPL-SCNC: 150 MMOL/L (ref 133–144)
SODIUM UR-SCNC: 127 MMOL/L
SODIUM UR-SCNC: 13 MMOL/L
SODIUM UR-SCNC: 29 MMOL/L
SODIUM UR-SCNC: 36 MMOL/L
SODIUM UR-SCNC: 63 MMOL/L
SOURCE: ABNORMAL
SP GR UR STRIP: 1.01 (ref 1–1.03)
SPECIMEN EXP DATE BLD: NORMAL
SPECIMEN SOURCE FLD: NORMAL
SPECIMEN SOURCE: ABNORMAL
SPECIMEN SOURCE: NORMAL
SQUAMOUS #/AREA URNS AUTO: 2 /HPF (ref 0–1)
SQUAMOUS #/AREA URNS AUTO: <1 /HPF (ref 0–1)
T PALLIDUM AB SER QL: NONREACTIVE
T4 FREE SERPL DIALY-MCNC: 2 NG/DL (ref 1.1–2.4)
T4 FREE SERPL-MCNC: NORMAL NG/DL (ref 0.76–1.46)
TACROLIMUS BLD-MCNC: 10.2 UG/L (ref 5–15)
TACROLIMUS BLD-MCNC: 10.2 UG/L (ref 5–15)
TACROLIMUS BLD-MCNC: 10.3 UG/L (ref 5–15)
TACROLIMUS BLD-MCNC: 10.8 UG/L (ref 5–15)
TACROLIMUS BLD-MCNC: 11.3 UG/L (ref 5–15)
TACROLIMUS BLD-MCNC: 11.5 UG/L (ref 5–15)
TACROLIMUS BLD-MCNC: 11.6 UG/L (ref 5–15)
TACROLIMUS BLD-MCNC: 11.9 UG/L (ref 5–15)
TACROLIMUS BLD-MCNC: 11.9 UG/L (ref 5–15)
TACROLIMUS BLD-MCNC: 12.1 UG/L (ref 5–15)
TACROLIMUS BLD-MCNC: 17.3 UG/L (ref 5–15)
TACROLIMUS BLD-MCNC: 3.2 UG/L (ref 5–15)
TACROLIMUS BLD-MCNC: 4 UG/L (ref 5–15)
TACROLIMUS BLD-MCNC: 4 UG/L (ref 5–15)
TACROLIMUS BLD-MCNC: 4.3 UG/L (ref 5–15)
TACROLIMUS BLD-MCNC: 4.6 UG/L (ref 5–15)
TACROLIMUS BLD-MCNC: 4.9 UG/L (ref 5–15)
TACROLIMUS BLD-MCNC: 5 UG/L (ref 5–15)
TACROLIMUS BLD-MCNC: 5.1 UG/L (ref 5–15)
TACROLIMUS BLD-MCNC: 5.6 UG/L (ref 5–15)
TACROLIMUS BLD-MCNC: 5.7 UG/L (ref 5–15)
TACROLIMUS BLD-MCNC: 5.8 UG/L (ref 5–15)
TACROLIMUS BLD-MCNC: 5.9 UG/L (ref 5–15)
TACROLIMUS BLD-MCNC: 5.9 UG/L (ref 5–15)
TACROLIMUS BLD-MCNC: 6 UG/L (ref 5–15)
TACROLIMUS BLD-MCNC: 6 UG/L (ref 5–15)
TACROLIMUS BLD-MCNC: 6.1 UG/L (ref 5–15)
TACROLIMUS BLD-MCNC: 6.2 UG/L (ref 5–15)
TACROLIMUS BLD-MCNC: 6.8 UG/L (ref 5–15)
TACROLIMUS BLD-MCNC: 6.9 UG/L (ref 5–15)
TACROLIMUS BLD-MCNC: 7.1 UG/L (ref 5–15)
TACROLIMUS BLD-MCNC: 7.4 UG/L (ref 5–15)
TACROLIMUS BLD-MCNC: 7.5 UG/L (ref 5–15)
TACROLIMUS BLD-MCNC: 7.6 UG/L (ref 5–15)
TACROLIMUS BLD-MCNC: 7.8 UG/L (ref 5–15)
TACROLIMUS BLD-MCNC: 7.8 UG/L (ref 5–15)
TACROLIMUS BLD-MCNC: 8.1 UG/L (ref 5–15)
TACROLIMUS BLD-MCNC: 8.5 UG/L (ref 5–15)
TACROLIMUS BLD-MCNC: 8.6 UG/L (ref 5–15)
TACROLIMUS BLD-MCNC: 8.7 UG/L (ref 5–15)
TACROLIMUS BLD-MCNC: 9 UG/L (ref 5–15)
TACROLIMUS BLD-MCNC: 9.6 UG/L (ref 5–15)
TACROLIMUS BLD-MCNC: <3 UG/L (ref 5–15)
TACROLIMUS BLD-MCNC: <3 UG/L (ref 5–15)
TACROLIMUS BLD-MCNC: NORMAL UG/L (ref 5–15)
TEST NAME: NORMAL
TIBC SERPL-MCNC: 117 UG/DL (ref 240–430)
TIBC SERPL-MCNC: 61 UG/DL (ref 240–430)
TME LAST DOSE: ABNORMAL H
TME LAST DOSE: NORMAL H
TRANS CELLS #/AREA URNS HPF: <1 /HPF (ref 0–1)
TRANS CELLS #/AREA URNS HPF: <1 /HPF (ref 0–1)
TRANSFUSION STATUS PATIENT QL: NORMAL
TRIGL SERPL-MCNC: 173 MG/DL
TRIGL SERPL-MCNC: 351 MG/DL
TRIGL SERPL-MCNC: 40 MG/DL
TRIGL SERPL-MCNC: ABNORMAL MG/DL
TSH SERPL DL<=0.005 MIU/L-ACNC: 0.77 MU/L (ref 0.4–4)
TSH SERPL DL<=0.005 MIU/L-ACNC: 0.77 MU/L (ref 0.4–4)
UNOS CPRA: 0
UPPER EUS: NORMAL
UPPER GI ENDOSCOPY: NORMAL
UROBILINOGEN UR STRIP-MCNC: NORMAL MG/DL (ref 0–2)
UUN UR-MCNC: 505 MG/DL
UUN/CREAT 24H UR: 5 G/G CR
VANCOMYCIN SERPL-MCNC: 16.8 MG/L
VIT B12 SERPL-MCNC: NORMAL PG/ML (ref 193–986)
WBC # BLD AUTO: 1.7 10E9/L (ref 4–11)
WBC # BLD AUTO: 1.8 10E9/L (ref 4–11)
WBC # BLD AUTO: 1.9 10E9/L (ref 4–11)
WBC # BLD AUTO: 10 10E9/L (ref 4–11)
WBC # BLD AUTO: 10.1 10E9/L (ref 4–11)
WBC # BLD AUTO: 10.2 10E9/L (ref 4–11)
WBC # BLD AUTO: 10.3 10E9/L (ref 4–11)
WBC # BLD AUTO: 10.3 10E9/L (ref 4–11)
WBC # BLD AUTO: 10.5 10E9/L (ref 4–11)
WBC # BLD AUTO: 10.6 10E9/L (ref 4–11)
WBC # BLD AUTO: 10.8 10E9/L (ref 4–11)
WBC # BLD AUTO: 11.2 10E9/L (ref 4–11)
WBC # BLD AUTO: 11.2 10E9/L (ref 4–11)
WBC # BLD AUTO: 11.4 10E9/L (ref 4–11)
WBC # BLD AUTO: 11.6 10E9/L (ref 4–11)
WBC # BLD AUTO: 12.1 10E9/L (ref 4–11)
WBC # BLD AUTO: 12.1 10E9/L (ref 4–11)
WBC # BLD AUTO: 12.2 10E9/L (ref 4–11)
WBC # BLD AUTO: 12.2 10E9/L (ref 4–11)
WBC # BLD AUTO: 12.4 10E9/L (ref 4–11)
WBC # BLD AUTO: 12.9 10E9/L (ref 4–11)
WBC # BLD AUTO: 13.4 10E9/L (ref 4–11)
WBC # BLD AUTO: 13.6 10E9/L (ref 4–11)
WBC # BLD AUTO: 14.1 10E9/L (ref 4–11)
WBC # BLD AUTO: 14.8 10E9/L (ref 4–11)
WBC # BLD AUTO: 14.8 10E9/L (ref 4–11)
WBC # BLD AUTO: 14.9 10E9/L (ref 4–11)
WBC # BLD AUTO: 15.5 10E9/L (ref 4–11)
WBC # BLD AUTO: 15.7 10E9/L (ref 4–11)
WBC # BLD AUTO: 15.9 10E9/L (ref 4–11)
WBC # BLD AUTO: 15.9 10E9/L (ref 4–11)
WBC # BLD AUTO: 16.4 10E9/L (ref 4–11)
WBC # BLD AUTO: 16.6 10E9/L (ref 4–11)
WBC # BLD AUTO: 16.6 10E9/L (ref 4–11)
WBC # BLD AUTO: 17.1 10E9/L (ref 4–11)
WBC # BLD AUTO: 17.1 10E9/L (ref 4–11)
WBC # BLD AUTO: 17.3 10E9/L (ref 4–11)
WBC # BLD AUTO: 17.3 10E9/L (ref 4–11)
WBC # BLD AUTO: 17.7 10E9/L (ref 4–11)
WBC # BLD AUTO: 17.8 10E9/L (ref 4–11)
WBC # BLD AUTO: 17.8 10E9/L (ref 4–11)
WBC # BLD AUTO: 18.5 10E9/L (ref 4–11)
WBC # BLD AUTO: 19.1 10E9/L (ref 4–11)
WBC # BLD AUTO: 19.7 10E9/L (ref 4–11)
WBC # BLD AUTO: 19.9 10E9/L (ref 4–11)
WBC # BLD AUTO: 2 10E9/L (ref 4–11)
WBC # BLD AUTO: 2 10E9/L (ref 4–11)
WBC # BLD AUTO: 2.1 10E9/L (ref 4–11)
WBC # BLD AUTO: 2.2 10E9/L (ref 4–11)
WBC # BLD AUTO: 2.4 10E9/L (ref 4–11)
WBC # BLD AUTO: 2.4 10E9/L (ref 4–11)
WBC # BLD AUTO: 2.6 10E9/L (ref 4–11)
WBC # BLD AUTO: 2.9 10E9/L (ref 4–11)
WBC # BLD AUTO: 20 10E9/L (ref 4–11)
WBC # BLD AUTO: 20.1 10E9/L (ref 4–11)
WBC # BLD AUTO: 20.1 10E9/L (ref 4–11)
WBC # BLD AUTO: 20.2 10E9/L (ref 4–11)
WBC # BLD AUTO: 20.9 10E9/L (ref 4–11)
WBC # BLD AUTO: 20.9 10E9/L (ref 4–11)
WBC # BLD AUTO: 21.6 10E9/L (ref 4–11)
WBC # BLD AUTO: 22.5 10E9/L (ref 4–11)
WBC # BLD AUTO: 22.8 10E9/L (ref 4–11)
WBC # BLD AUTO: 23.2 10E9/L (ref 4–11)
WBC # BLD AUTO: 23.4 10E9/L (ref 4–11)
WBC # BLD AUTO: 23.8 10E9/L (ref 4–11)
WBC # BLD AUTO: 24 10E9/L (ref 4–11)
WBC # BLD AUTO: 25 10E9/L (ref 4–11)
WBC # BLD AUTO: 25.5 10E9/L (ref 4–11)
WBC # BLD AUTO: 25.7 10E9/L (ref 4–11)
WBC # BLD AUTO: 26.1 10E9/L (ref 4–11)
WBC # BLD AUTO: 27.1 10E9/L (ref 4–11)
WBC # BLD AUTO: 27.3 10E9/L (ref 4–11)
WBC # BLD AUTO: 28 10E9/L (ref 4–11)
WBC # BLD AUTO: 28.1 10E9/L (ref 4–11)
WBC # BLD AUTO: 28.6 10E9/L (ref 4–11)
WBC # BLD AUTO: 28.9 10E9/L (ref 4–11)
WBC # BLD AUTO: 29 10E9/L (ref 4–11)
WBC # BLD AUTO: 29.2 10E9/L (ref 4–11)
WBC # BLD AUTO: 29.3 10E9/L (ref 4–11)
WBC # BLD AUTO: 29.8 10E9/L (ref 4–11)
WBC # BLD AUTO: 3 10E9/L (ref 4–11)
WBC # BLD AUTO: 3.2 10E9/L (ref 4–11)
WBC # BLD AUTO: 3.2 10E9/L (ref 4–11)
WBC # BLD AUTO: 3.4 10E9/L (ref 4–11)
WBC # BLD AUTO: 3.4 10E9/L (ref 4–11)
WBC # BLD AUTO: 3.5 10E9/L (ref 4–11)
WBC # BLD AUTO: 3.5 10E9/L (ref 4–11)
WBC # BLD AUTO: 3.6 10E9/L (ref 4–11)
WBC # BLD AUTO: 3.7 10E9/L (ref 4–11)
WBC # BLD AUTO: 3.8 10E9/L (ref 4–11)
WBC # BLD AUTO: 3.9 10E9/L (ref 4–11)
WBC # BLD AUTO: 30 10E9/L (ref 4–11)
WBC # BLD AUTO: 30.5 10E9/L (ref 4–11)
WBC # BLD AUTO: 30.7 10E9/L (ref 4–11)
WBC # BLD AUTO: 31.4 10E9/L (ref 4–11)
WBC # BLD AUTO: 31.7 10E9/L (ref 4–11)
WBC # BLD AUTO: 31.8 10E9/L (ref 4–11)
WBC # BLD AUTO: 32.2 10E9/L (ref 4–11)
WBC # BLD AUTO: 32.9 10E9/L (ref 4–11)
WBC # BLD AUTO: 33.2 10E9/L (ref 4–11)
WBC # BLD AUTO: 34.5 10E9/L (ref 4–11)
WBC # BLD AUTO: 35.6 10E9/L (ref 4–11)
WBC # BLD AUTO: 36.8 10E9/L (ref 4–11)
WBC # BLD AUTO: 37.3 10E9/L (ref 4–11)
WBC # BLD AUTO: 37.5 10E9/L (ref 4–11)
WBC # BLD AUTO: 4 10E9/L (ref 4–11)
WBC # BLD AUTO: 4.1 10E9/L (ref 4–11)
WBC # BLD AUTO: 4.1 10E9/L (ref 4–11)
WBC # BLD AUTO: 4.2 10E9/L (ref 4–11)
WBC # BLD AUTO: 4.2 10E9/L (ref 4–11)
WBC # BLD AUTO: 4.3 10E9/L (ref 4–11)
WBC # BLD AUTO: 4.4 10E9/L (ref 4–11)
WBC # BLD AUTO: 4.6 10E9/L (ref 4–11)
WBC # BLD AUTO: 4.7 10E9/L (ref 4–11)
WBC # BLD AUTO: 4.8 10E9/L (ref 4–11)
WBC # BLD AUTO: 4.8 10E9/L (ref 4–11)
WBC # BLD AUTO: 4.9 10E9/L (ref 4–11)
WBC # BLD AUTO: 41.3 10E9/L (ref 4–11)
WBC # BLD AUTO: 44.2 10E9/L (ref 4–11)
WBC # BLD AUTO: 5 10E9/L (ref 4–11)
WBC # BLD AUTO: 5.1 10E9/L (ref 4–11)
WBC # BLD AUTO: 5.2 10E9/L (ref 4–11)
WBC # BLD AUTO: 5.2 10E9/L (ref 4–11)
WBC # BLD AUTO: 5.3 10E9/L (ref 4–11)
WBC # BLD AUTO: 5.4 10E9/L (ref 4–11)
WBC # BLD AUTO: 5.6 10E9/L (ref 4–11)
WBC # BLD AUTO: 5.7 10E9/L (ref 4–11)
WBC # BLD AUTO: 5.8 10E9/L (ref 4–11)
WBC # BLD AUTO: 5.9 10E9/L (ref 4–11)
WBC # BLD AUTO: 56 10E9/L (ref 4–11)
WBC # BLD AUTO: 6.1 10E9/L (ref 4–11)
WBC # BLD AUTO: 6.1 10E9/L (ref 4–11)
WBC # BLD AUTO: 6.6 10E9/L (ref 4–11)
WBC # BLD AUTO: 6.8 10E9/L (ref 4–11)
WBC # BLD AUTO: 6.9 10E9/L (ref 4–11)
WBC # BLD AUTO: 6.9 10E9/L (ref 4–11)
WBC # BLD AUTO: 64.5 10E9/L (ref 4–11)
WBC # BLD AUTO: 7 10E9/L (ref 4–11)
WBC # BLD AUTO: 7.1 10E9/L (ref 4–11)
WBC # BLD AUTO: 7.2 10E9/L (ref 4–11)
WBC # BLD AUTO: 7.5 10E9/L (ref 4–11)
WBC # BLD AUTO: 7.5 10E9/L (ref 4–11)
WBC # BLD AUTO: 7.6 10E9/L (ref 4–11)
WBC # BLD AUTO: 7.8 10E9/L (ref 4–11)
WBC # BLD AUTO: 7.9 10E9/L (ref 4–11)
WBC # BLD AUTO: 8 10E9/L (ref 4–11)
WBC # BLD AUTO: 8.4 10E9/L (ref 4–11)
WBC # BLD AUTO: 8.5 10E9/L (ref 4–11)
WBC # BLD AUTO: 8.6 10E9/L (ref 4–11)
WBC # BLD AUTO: 8.6 10E9/L (ref 4–11)
WBC # BLD AUTO: 8.8 10E9/L (ref 4–11)
WBC # BLD AUTO: 8.9 10E9/L (ref 4–11)
WBC # BLD AUTO: 9.1 10E9/L (ref 4–11)
WBC # BLD AUTO: 9.2 10E9/L (ref 4–11)
WBC # BLD AUTO: 9.2 10E9/L (ref 4–11)
WBC # BLD AUTO: 9.3 10E9/L (ref 4–11)
WBC # BLD AUTO: 9.3 10E9/L (ref 4–11)
WBC # BLD AUTO: 9.6 10E9/L (ref 4–11)
WBC # BLD AUTO: 9.6 10E9/L (ref 4–11)
WBC # BLD AUTO: 9.8 10E9/L (ref 4–11)
WBC # BLD AUTO: 9.9 10E9/L (ref 4–11)
WBC # BLD AUTO: NORMAL 10E9/L (ref 4–11)
WBC # FLD AUTO: 127 /UL
WBC # FLD AUTO: 234 /UL
WBC # FLD AUTO: 235 /UL
WBC # FLD AUTO: 245 /UL
WBC # FLD AUTO: 306 /UL
WBC # FLD AUTO: 373 /UL
WBC # FLD AUTO: 738 /UL
WBC #/AREA URNS AUTO: 2 /HPF (ref 0–5)
WBC #/AREA URNS AUTO: 3 /HPF (ref 0–5)
WBC #/AREA URNS AUTO: 66 /HPF (ref 0–5)
WBC #/AREA URNS AUTO: ABNORMAL /HPF (ref 0–5)
WBC CASTS #/AREA URNS LPF: 2 /LPF
WBC CLUMPS #/AREA URNS HPF: PRESENT /HPF

## 2018-01-01 PROCEDURE — 40000344 ZZHCL STATISTIC THAWING COMPONENT: Performed by: TRANSPLANT SURGERY

## 2018-01-01 PROCEDURE — 27210429 ZZH NUTRITION PRODUCT INTERMEDIATE LITER

## 2018-01-01 PROCEDURE — 25000128 H RX IP 250 OP 636: Performed by: STUDENT IN AN ORGANIZED HEALTH CARE EDUCATION/TRAINING PROGRAM

## 2018-01-01 PROCEDURE — 25000566 ZZH SEVOFLURANE, EA 15 MIN: Performed by: TRANSPLANT SURGERY

## 2018-01-01 PROCEDURE — P9016 RBC LEUKOCYTES REDUCED: HCPCS | Performed by: STUDENT IN AN ORGANIZED HEALTH CARE EDUCATION/TRAINING PROGRAM

## 2018-01-01 PROCEDURE — 82330 ASSAY OF CALCIUM: CPT | Performed by: INTERNAL MEDICINE

## 2018-01-01 PROCEDURE — 84100 ASSAY OF PHOSPHORUS: CPT | Performed by: NURSE PRACTITIONER

## 2018-01-01 PROCEDURE — 27210190 US PARACENTESIS

## 2018-01-01 PROCEDURE — 25000128 H RX IP 250 OP 636: Performed by: INTERNAL MEDICINE

## 2018-01-01 PROCEDURE — 36000059 ZZH SURGERY LEVEL 3 EA 15 ADDTL MIN UMMC: Performed by: TRANSPLANT SURGERY

## 2018-01-01 PROCEDURE — 83735 ASSAY OF MAGNESIUM: CPT | Performed by: INTERNAL MEDICINE

## 2018-01-01 PROCEDURE — P9016 RBC LEUKOCYTES REDUCED: HCPCS | Performed by: INTERNAL MEDICINE

## 2018-01-01 PROCEDURE — 80048 BASIC METABOLIC PNL TOTAL CA: CPT | Performed by: NURSE PRACTITIONER

## 2018-01-01 PROCEDURE — 80197 ASSAY OF TACROLIMUS: CPT | Performed by: TRANSPLANT SURGERY

## 2018-01-01 PROCEDURE — 84100 ASSAY OF PHOSPHORUS: CPT | Performed by: PHYSICIAN ASSISTANT

## 2018-01-01 PROCEDURE — 84295 ASSAY OF SERUM SODIUM: CPT | Performed by: INTERNAL MEDICINE

## 2018-01-01 PROCEDURE — 40000133 ZZH STATISTIC OT WARD VISIT

## 2018-01-01 PROCEDURE — 25000132 ZZH RX MED GY IP 250 OP 250 PS 637: Performed by: TRANSPLANT SURGERY

## 2018-01-01 PROCEDURE — 80197 ASSAY OF TACROLIMUS: CPT | Performed by: STUDENT IN AN ORGANIZED HEALTH CARE EDUCATION/TRAINING PROGRAM

## 2018-01-01 PROCEDURE — 25000125 ZZHC RX 250: Performed by: INTERNAL MEDICINE

## 2018-01-01 PROCEDURE — 83605 ASSAY OF LACTIC ACID: CPT | Performed by: STUDENT IN AN ORGANIZED HEALTH CARE EDUCATION/TRAINING PROGRAM

## 2018-01-01 PROCEDURE — P9059 PLASMA, FRZ BETWEEN 8-24HOUR: HCPCS | Performed by: NURSE PRACTITIONER

## 2018-01-01 PROCEDURE — 25000128 H RX IP 250 OP 636: Performed by: PHYSICIAN ASSISTANT

## 2018-01-01 PROCEDURE — 86901 BLOOD TYPING SEROLOGIC RH(D): CPT | Performed by: STUDENT IN AN ORGANIZED HEALTH CARE EDUCATION/TRAINING PROGRAM

## 2018-01-01 PROCEDURE — 25000125 ZZHC RX 250: Performed by: NURSE PRACTITIONER

## 2018-01-01 PROCEDURE — 89051 BODY FLUID CELL COUNT: CPT | Performed by: STUDENT IN AN ORGANIZED HEALTH CARE EDUCATION/TRAINING PROGRAM

## 2018-01-01 PROCEDURE — 94003 VENT MGMT INPAT SUBQ DAY: CPT

## 2018-01-01 PROCEDURE — 85520 HEPARIN ASSAY: CPT | Performed by: INTERNAL MEDICINE

## 2018-01-01 PROCEDURE — 40000014 ZZH STATISTIC ARTERIAL MONITORING DAILY

## 2018-01-01 PROCEDURE — 25000128 H RX IP 250 OP 636: Performed by: NURSE PRACTITIONER

## 2018-01-01 PROCEDURE — 99233 SBSQ HOSP IP/OBS HIGH 50: CPT | Mod: GC | Performed by: HOSPITALIST

## 2018-01-01 PROCEDURE — 83615 LACTATE (LD) (LDH) ENZYME: CPT | Performed by: STUDENT IN AN ORGANIZED HEALTH CARE EDUCATION/TRAINING PROGRAM

## 2018-01-01 PROCEDURE — G0463 HOSPITAL OUTPT CLINIC VISIT: HCPCS

## 2018-01-01 PROCEDURE — 85027 COMPLETE CBC AUTOMATED: CPT | Performed by: PHYSICIAN ASSISTANT

## 2018-01-01 PROCEDURE — 27210794 ZZH OR GENERAL SUPPLY STERILE: Performed by: TRANSPLANT SURGERY

## 2018-01-01 PROCEDURE — 25000125 ZZHC RX 250: Performed by: STUDENT IN AN ORGANIZED HEALTH CARE EDUCATION/TRAINING PROGRAM

## 2018-01-01 PROCEDURE — 80076 HEPATIC FUNCTION PANEL: CPT | Performed by: NURSE PRACTITIONER

## 2018-01-01 PROCEDURE — 20000004 ZZH R&B ICU UMMC

## 2018-01-01 PROCEDURE — 25000128 H RX IP 250 OP 636: Performed by: SURGERY

## 2018-01-01 PROCEDURE — 80150 ASSAY OF AMIKACIN: CPT | Performed by: INTERNAL MEDICINE

## 2018-01-01 PROCEDURE — 85027 COMPLETE CBC AUTOMATED: CPT | Performed by: STUDENT IN AN ORGANIZED HEALTH CARE EDUCATION/TRAINING PROGRAM

## 2018-01-01 PROCEDURE — 84100 ASSAY OF PHOSPHORUS: CPT | Performed by: INTERNAL MEDICINE

## 2018-01-01 PROCEDURE — 80197 ASSAY OF TACROLIMUS: CPT | Performed by: INTERNAL MEDICINE

## 2018-01-01 PROCEDURE — 00000146 ZZHCL STATISTIC GLUCOSE BY METER IP

## 2018-01-01 PROCEDURE — 87070 CULTURE OTHR SPECIMN AEROBIC: CPT | Performed by: TRANSPLANT SURGERY

## 2018-01-01 PROCEDURE — 87040 BLOOD CULTURE FOR BACTERIA: CPT | Performed by: TRANSPLANT SURGERY

## 2018-01-01 PROCEDURE — P9041 ALBUMIN (HUMAN),5%, 50ML: HCPCS | Performed by: STUDENT IN AN ORGANIZED HEALTH CARE EDUCATION/TRAINING PROGRAM

## 2018-01-01 PROCEDURE — 86850 RBC ANTIBODY SCREEN: CPT | Performed by: STUDENT IN AN ORGANIZED HEALTH CARE EDUCATION/TRAINING PROGRAM

## 2018-01-01 PROCEDURE — 25000131 ZZH RX MED GY IP 250 OP 636 PS 637: Performed by: STUDENT IN AN ORGANIZED HEALTH CARE EDUCATION/TRAINING PROGRAM

## 2018-01-01 PROCEDURE — 25000132 ZZH RX MED GY IP 250 OP 250 PS 637: Performed by: PHYSICIAN ASSISTANT

## 2018-01-01 PROCEDURE — 93010 ELECTROCARDIOGRAM REPORT: CPT | Performed by: INTERNAL MEDICINE

## 2018-01-01 PROCEDURE — 86645 CMV ANTIBODY IGM: CPT | Performed by: NURSE PRACTITIONER

## 2018-01-01 PROCEDURE — 87186 SC STD MICRODIL/AGAR DIL: CPT | Performed by: PHYSICIAN ASSISTANT

## 2018-01-01 PROCEDURE — 40000344 ZZHCL STATISTIC THAWING COMPONENT: Performed by: NURSE PRACTITIONER

## 2018-01-01 PROCEDURE — 40000281 ZZH STATISTIC TRANSPORT TIME EA 15 MIN

## 2018-01-01 PROCEDURE — 86900 BLOOD TYPING SEROLOGIC ABO: CPT | Performed by: TRANSPLANT SURGERY

## 2018-01-01 PROCEDURE — 87077 CULTURE AEROBIC IDENTIFY: CPT | Performed by: PHYSICIAN ASSISTANT

## 2018-01-01 PROCEDURE — 82550 ASSAY OF CK (CPK): CPT | Performed by: INTERNAL MEDICINE

## 2018-01-01 PROCEDURE — 80053 COMPREHEN METABOLIC PANEL: CPT | Performed by: INTERNAL MEDICINE

## 2018-01-01 PROCEDURE — 92610 EVALUATE SWALLOWING FUNCTION: CPT | Mod: GN

## 2018-01-01 PROCEDURE — 97116 GAIT TRAINING THERAPY: CPT | Mod: GP

## 2018-01-01 PROCEDURE — 25000131 ZZH RX MED GY IP 250 OP 636 PS 637: Performed by: PHYSICIAN ASSISTANT

## 2018-01-01 PROCEDURE — 25000132 ZZH RX MED GY IP 250 OP 250 PS 637

## 2018-01-01 PROCEDURE — 86923 COMPATIBILITY TEST ELECTRIC: CPT | Performed by: TRANSPLANT SURGERY

## 2018-01-01 PROCEDURE — 97110 THERAPEUTIC EXERCISES: CPT | Mod: GP | Performed by: PHYSICAL THERAPIST

## 2018-01-01 PROCEDURE — 36415 COLL VENOUS BLD VENIPUNCTURE: CPT | Performed by: STUDENT IN AN ORGANIZED HEALTH CARE EDUCATION/TRAINING PROGRAM

## 2018-01-01 PROCEDURE — 87205 SMEAR GRAM STAIN: CPT | Performed by: STUDENT IN AN ORGANIZED HEALTH CARE EDUCATION/TRAINING PROGRAM

## 2018-01-01 PROCEDURE — 82330 ASSAY OF CALCIUM: CPT | Performed by: TRANSPLANT SURGERY

## 2018-01-01 PROCEDURE — 97530 THERAPEUTIC ACTIVITIES: CPT | Mod: GP

## 2018-01-01 PROCEDURE — 84134 ASSAY OF PREALBUMIN: CPT | Performed by: SURGERY

## 2018-01-01 PROCEDURE — P9037 PLATE PHERES LEUKOREDU IRRAD: HCPCS | Performed by: STUDENT IN AN ORGANIZED HEALTH CARE EDUCATION/TRAINING PROGRAM

## 2018-01-01 PROCEDURE — 97110 THERAPEUTIC EXERCISES: CPT | Mod: GP

## 2018-01-01 PROCEDURE — 85520 HEPARIN ASSAY: CPT | Performed by: SURGERY

## 2018-01-01 PROCEDURE — P9047 ALBUMIN (HUMAN), 25%, 50ML: HCPCS | Performed by: INTERNAL MEDICINE

## 2018-01-01 PROCEDURE — 80048 BASIC METABOLIC PNL TOTAL CA: CPT | Performed by: PHYSICIAN ASSISTANT

## 2018-01-01 PROCEDURE — 85025 COMPLETE CBC W/AUTO DIFF WBC: CPT | Performed by: SURGERY

## 2018-01-01 PROCEDURE — 80053 COMPREHEN METABOLIC PANEL: CPT | Performed by: SURGERY

## 2018-01-01 PROCEDURE — 27210995 ZZH RX 272: Performed by: SURGERY

## 2018-01-01 PROCEDURE — 80048 BASIC METABOLIC PNL TOTAL CA: CPT | Performed by: STUDENT IN AN ORGANIZED HEALTH CARE EDUCATION/TRAINING PROGRAM

## 2018-01-01 PROCEDURE — 87641 MR-STAPH DNA AMP PROBE: CPT | Performed by: STUDENT IN AN ORGANIZED HEALTH CARE EDUCATION/TRAINING PROGRAM

## 2018-01-01 PROCEDURE — 36415 COLL VENOUS BLD VENIPUNCTURE: CPT | Performed by: TRANSPLANT SURGERY

## 2018-01-01 PROCEDURE — 86901 BLOOD TYPING SEROLOGIC RH(D): CPT | Performed by: TRANSPLANT SURGERY

## 2018-01-01 PROCEDURE — 25000128 H RX IP 250 OP 636: Performed by: TRANSPLANT SURGERY

## 2018-01-01 PROCEDURE — 25000132 ZZH RX MED GY IP 250 OP 250 PS 637: Performed by: INTERNAL MEDICINE

## 2018-01-01 PROCEDURE — P9012 CRYOPRECIPITATE EACH UNIT: HCPCS | Performed by: STUDENT IN AN ORGANIZED HEALTH CARE EDUCATION/TRAINING PROGRAM

## 2018-01-01 PROCEDURE — 83605 ASSAY OF LACTIC ACID: CPT | Performed by: NURSE PRACTITIONER

## 2018-01-01 PROCEDURE — 85384 FIBRINOGEN ACTIVITY: CPT | Performed by: TRANSPLANT SURGERY

## 2018-01-01 PROCEDURE — 27210995 ZZH RX 272: Performed by: TRANSPLANT SURGERY

## 2018-01-01 PROCEDURE — 93970 EXTREMITY STUDY: CPT

## 2018-01-01 PROCEDURE — 85025 COMPLETE CBC W/AUTO DIFF WBC: CPT | Performed by: INTERNAL MEDICINE

## 2018-01-01 PROCEDURE — 81376 HLA II TYPING 1 LOCUS LR: CPT | Performed by: INTERNAL MEDICINE

## 2018-01-01 PROCEDURE — 40000275 ZZH STATISTIC RCP TIME EA 10 MIN

## 2018-01-01 PROCEDURE — 85520 HEPARIN ASSAY: CPT | Performed by: TRANSPLANT SURGERY

## 2018-01-01 PROCEDURE — 87086 URINE CULTURE/COLONY COUNT: CPT | Performed by: PHYSICIAN ASSISTANT

## 2018-01-01 PROCEDURE — 84132 ASSAY OF SERUM POTASSIUM: CPT | Performed by: TRANSPLANT SURGERY

## 2018-01-01 PROCEDURE — T1013 SIGN LANG/ORAL INTERPRETER: HCPCS | Mod: U3

## 2018-01-01 PROCEDURE — 40000141 ZZH STATISTIC PERIPHERAL IV START W/O US GUIDANCE

## 2018-01-01 PROCEDURE — 25000125 ZZHC RX 250: Performed by: CLINICAL NURSE SPECIALIST

## 2018-01-01 PROCEDURE — 99291 CRITICAL CARE FIRST HOUR: CPT | Mod: GC | Performed by: SURGERY

## 2018-01-01 PROCEDURE — 89051 BODY FLUID CELL COUNT: CPT | Performed by: INTERNAL MEDICINE

## 2018-01-01 PROCEDURE — 86923 COMPATIBILITY TEST ELECTRIC: CPT | Performed by: STUDENT IN AN ORGANIZED HEALTH CARE EDUCATION/TRAINING PROGRAM

## 2018-01-01 PROCEDURE — 87040 BLOOD CULTURE FOR BACTERIA: CPT | Performed by: PHYSICIAN ASSISTANT

## 2018-01-01 PROCEDURE — 82947 ASSAY GLUCOSE BLOOD QUANT: CPT | Performed by: TRANSPLANT SURGERY

## 2018-01-01 PROCEDURE — 84132 ASSAY OF SERUM POTASSIUM: CPT | Performed by: PHYSICIAN ASSISTANT

## 2018-01-01 PROCEDURE — 85306 CLOT INHIBIT PROT S FREE: CPT | Performed by: SURGERY

## 2018-01-01 PROCEDURE — 40000986 XR CHEST PORT 1 VW

## 2018-01-01 PROCEDURE — 99233 SBSQ HOSP IP/OBS HIGH 50: CPT | Performed by: PHYSICIAN ASSISTANT

## 2018-01-01 PROCEDURE — 85610 PROTHROMBIN TIME: CPT | Performed by: TRANSPLANT SURGERY

## 2018-01-01 PROCEDURE — 40000978 ZZH STATISTIC COMPARTMENT STUDY

## 2018-01-01 PROCEDURE — 25000125 ZZHC RX 250: Mod: ZF | Performed by: STUDENT IN AN ORGANIZED HEALTH CARE EDUCATION/TRAINING PROGRAM

## 2018-01-01 PROCEDURE — 85610 PROTHROMBIN TIME: CPT | Performed by: SURGERY

## 2018-01-01 PROCEDURE — 84295 ASSAY OF SERUM SODIUM: CPT | Performed by: SURGERY

## 2018-01-01 PROCEDURE — 87186 SC STD MICRODIL/AGAR DIL: CPT | Performed by: STUDENT IN AN ORGANIZED HEALTH CARE EDUCATION/TRAINING PROGRAM

## 2018-01-01 PROCEDURE — 97110 THERAPEUTIC EXERCISES: CPT | Mod: GP | Performed by: REHABILITATION PRACTITIONER

## 2018-01-01 PROCEDURE — 94681 O2 UPTK CO2 OUTP % O2 XTRC: CPT

## 2018-01-01 PROCEDURE — E2402 NEG PRESS WOUND THERAPY PUMP: HCPCS

## 2018-01-01 PROCEDURE — 87075 CULTR BACTERIA EXCEPT BLOOD: CPT | Performed by: TRANSPLANT SURGERY

## 2018-01-01 PROCEDURE — 85025 COMPLETE CBC W/AUTO DIFF WBC: CPT | Performed by: TRANSPLANT SURGERY

## 2018-01-01 PROCEDURE — P9047 ALBUMIN (HUMAN), 25%, 50ML: HCPCS | Performed by: STUDENT IN AN ORGANIZED HEALTH CARE EDUCATION/TRAINING PROGRAM

## 2018-01-01 PROCEDURE — 86850 RBC ANTIBODY SCREEN: CPT | Performed by: TRANSPLANT SURGERY

## 2018-01-01 PROCEDURE — P9047 ALBUMIN (HUMAN), 25%, 50ML: HCPCS | Performed by: NURSE PRACTITIONER

## 2018-01-01 PROCEDURE — 84100 ASSAY OF PHOSPHORUS: CPT | Performed by: SURGERY

## 2018-01-01 PROCEDURE — 85027 COMPLETE CBC AUTOMATED: CPT | Performed by: SURGERY

## 2018-01-01 PROCEDURE — P9059 PLASMA, FRZ BETWEEN 8-24HOUR: HCPCS | Performed by: STUDENT IN AN ORGANIZED HEALTH CARE EDUCATION/TRAINING PROGRAM

## 2018-01-01 PROCEDURE — 25000125 ZZHC RX 250: Performed by: TRANSPLANT SURGERY

## 2018-01-01 PROCEDURE — 87106 FUNGI IDENTIFICATION YEAST: CPT | Performed by: TRANSPLANT SURGERY

## 2018-01-01 PROCEDURE — 40000193 ZZH STATISTIC PT WARD VISIT

## 2018-01-01 PROCEDURE — 94002 VENT MGMT INPAT INIT DAY: CPT

## 2018-01-01 PROCEDURE — 25000128 H RX IP 250 OP 636

## 2018-01-01 PROCEDURE — 76700 US EXAM ABDOM COMPLETE: CPT | Mod: XS

## 2018-01-01 PROCEDURE — 85730 THROMBOPLASTIN TIME PARTIAL: CPT | Performed by: TRANSPLANT SURGERY

## 2018-01-01 PROCEDURE — P9012 CRYOPRECIPITATE EACH UNIT: HCPCS | Performed by: TRANSPLANT SURGERY

## 2018-01-01 PROCEDURE — 80053 COMPREHEN METABOLIC PANEL: CPT | Performed by: STUDENT IN AN ORGANIZED HEALTH CARE EDUCATION/TRAINING PROGRAM

## 2018-01-01 PROCEDURE — 85520 HEPARIN ASSAY: CPT | Performed by: PHYSICIAN ASSISTANT

## 2018-01-01 PROCEDURE — 82550 ASSAY OF CK (CPK): CPT | Performed by: TRANSPLANT SURGERY

## 2018-01-01 PROCEDURE — 93325 DOPPLER ECHO COLOR FLOW MAPG: CPT | Mod: 26 | Performed by: INTERNAL MEDICINE

## 2018-01-01 PROCEDURE — 80048 BASIC METABOLIC PNL TOTAL CA: CPT | Performed by: SURGERY

## 2018-01-01 PROCEDURE — 85610 PROTHROMBIN TIME: CPT | Performed by: PHYSICIAN ASSISTANT

## 2018-01-01 PROCEDURE — 71045 X-RAY EXAM CHEST 1 VIEW: CPT

## 2018-01-01 PROCEDURE — 36592 COLLECT BLOOD FROM PICC: CPT | Performed by: PHYSICIAN ASSISTANT

## 2018-01-01 PROCEDURE — 84132 ASSAY OF SERUM POTASSIUM: CPT | Performed by: ANESTHESIOLOGY

## 2018-01-01 PROCEDURE — 40000193 ZZH STATISTIC PT WARD VISIT: Performed by: PHYSICAL THERAPIST

## 2018-01-01 PROCEDURE — 85610 PROTHROMBIN TIME: CPT | Performed by: INTERNAL MEDICINE

## 2018-01-01 PROCEDURE — 87102 FUNGUS ISOLATION CULTURE: CPT | Performed by: TRANSPLANT SURGERY

## 2018-01-01 PROCEDURE — 82140 ASSAY OF AMMONIA: CPT | Performed by: STUDENT IN AN ORGANIZED HEALTH CARE EDUCATION/TRAINING PROGRAM

## 2018-01-01 PROCEDURE — 85384 FIBRINOGEN ACTIVITY: CPT | Performed by: STUDENT IN AN ORGANIZED HEALTH CARE EDUCATION/TRAINING PROGRAM

## 2018-01-01 PROCEDURE — 80076 HEPATIC FUNCTION PANEL: CPT | Performed by: INTERNAL MEDICINE

## 2018-01-01 PROCEDURE — 25000125 ZZHC RX 250: Performed by: NURSE ANESTHETIST, CERTIFIED REGISTERED

## 2018-01-01 PROCEDURE — 83735 ASSAY OF MAGNESIUM: CPT | Performed by: PHYSICIAN ASSISTANT

## 2018-01-01 PROCEDURE — 85025 COMPLETE CBC W/AUTO DIFF WBC: CPT | Performed by: NURSE PRACTITIONER

## 2018-01-01 PROCEDURE — 37000008 ZZH ANESTHESIA TECHNICAL FEE, 1ST 30 MIN: Performed by: TRANSPLANT SURGERY

## 2018-01-01 PROCEDURE — 82803 BLOOD GASES ANY COMBINATION: CPT | Performed by: STUDENT IN AN ORGANIZED HEALTH CARE EDUCATION/TRAINING PROGRAM

## 2018-01-01 PROCEDURE — 25000128 H RX IP 250 OP 636: Performed by: NURSE ANESTHETIST, CERTIFIED REGISTERED

## 2018-01-01 PROCEDURE — 36600 WITHDRAWAL OF ARTERIAL BLOOD: CPT

## 2018-01-01 PROCEDURE — 80076 HEPATIC FUNCTION PANEL: CPT | Performed by: PHYSICIAN ASSISTANT

## 2018-01-01 PROCEDURE — 12000025 ZZH R&B TRANSPLANT INTERMEDIATE

## 2018-01-01 PROCEDURE — 82330 ASSAY OF CALCIUM: CPT | Performed by: NURSE PRACTITIONER

## 2018-01-01 PROCEDURE — 83735 ASSAY OF MAGNESIUM: CPT | Performed by: TRANSPLANT SURGERY

## 2018-01-01 PROCEDURE — 87077 CULTURE AEROBIC IDENTIFY: CPT | Performed by: NURSE PRACTITIONER

## 2018-01-01 PROCEDURE — 99291 CRITICAL CARE FIRST HOUR: CPT | Mod: GC | Performed by: INTERNAL MEDICINE

## 2018-01-01 PROCEDURE — 74018 RADEX ABDOMEN 1 VIEW: CPT

## 2018-01-01 PROCEDURE — 25000131 ZZH RX MED GY IP 250 OP 636 PS 637: Performed by: NURSE PRACTITIONER

## 2018-01-01 PROCEDURE — 36415 COLL VENOUS BLD VENIPUNCTURE: CPT | Performed by: NURSE PRACTITIONER

## 2018-01-01 PROCEDURE — 87077 CULTURE AEROBIC IDENTIFY: CPT | Performed by: STUDENT IN AN ORGANIZED HEALTH CARE EDUCATION/TRAINING PROGRAM

## 2018-01-01 PROCEDURE — 25000132 ZZH RX MED GY IP 250 OP 250 PS 637: Performed by: SURGERY

## 2018-01-01 PROCEDURE — 82565 ASSAY OF CREATININE: CPT | Performed by: INTERNAL MEDICINE

## 2018-01-01 PROCEDURE — 99233 SBSQ HOSP IP/OBS HIGH 50: CPT | Mod: GC | Performed by: INTERNAL MEDICINE

## 2018-01-01 PROCEDURE — 3E1M38Z IRRIGATION OF PERITONEAL CAVITY USING IRRIGATING SUBSTANCE, PERCUTANEOUS APPROACH: ICD-10-PCS | Performed by: SURGERY

## 2018-01-01 PROCEDURE — 82607 VITAMIN B-12: CPT | Performed by: STUDENT IN AN ORGANIZED HEALTH CARE EDUCATION/TRAINING PROGRAM

## 2018-01-01 PROCEDURE — P9041 ALBUMIN (HUMAN),5%, 50ML: HCPCS | Performed by: PHYSICIAN ASSISTANT

## 2018-01-01 PROCEDURE — 40000556 ZZH STATISTIC PERIPHERAL IV START W US GUIDANCE

## 2018-01-01 PROCEDURE — 83010 ASSAY OF HAPTOGLOBIN QUANT: CPT | Performed by: STUDENT IN AN ORGANIZED HEALTH CARE EDUCATION/TRAINING PROGRAM

## 2018-01-01 PROCEDURE — 90947 DIALYSIS REPEATED EVAL: CPT

## 2018-01-01 PROCEDURE — 83735 ASSAY OF MAGNESIUM: CPT | Performed by: STUDENT IN AN ORGANIZED HEALTH CARE EDUCATION/TRAINING PROGRAM

## 2018-01-01 PROCEDURE — 82248 BILIRUBIN DIRECT: CPT | Performed by: INTERNAL MEDICINE

## 2018-01-01 PROCEDURE — 87077 CULTURE AEROBIC IDENTIFY: CPT | Performed by: TRANSPLANT SURGERY

## 2018-01-01 PROCEDURE — 86900 BLOOD TYPING SEROLOGIC ABO: CPT | Performed by: STUDENT IN AN ORGANIZED HEALTH CARE EDUCATION/TRAINING PROGRAM

## 2018-01-01 PROCEDURE — P9037 PLATE PHERES LEUKOREDU IRRAD: HCPCS | Performed by: SURGERY

## 2018-01-01 PROCEDURE — G0463 HOSPITAL OUTPT CLINIC VISIT: HCPCS | Mod: 25

## 2018-01-01 PROCEDURE — 37000009 ZZH ANESTHESIA TECHNICAL FEE, EACH ADDTL 15 MIN: Performed by: TRANSPLANT SURGERY

## 2018-01-01 PROCEDURE — 87800 DETECT AGNT MULT DNA DIREC: CPT | Performed by: PHYSICIAN ASSISTANT

## 2018-01-01 PROCEDURE — 93321 DOPPLER ECHO F-UP/LMTD STD: CPT | Mod: 26 | Performed by: INTERNAL MEDICINE

## 2018-01-01 PROCEDURE — 99291 CRITICAL CARE FIRST HOUR: CPT | Performed by: PHYSICIAN ASSISTANT

## 2018-01-01 PROCEDURE — 82803 BLOOD GASES ANY COMBINATION: CPT | Performed by: ANESTHESIOLOGY

## 2018-01-01 PROCEDURE — 84157 ASSAY OF PROTEIN OTHER: CPT | Performed by: STUDENT IN AN ORGANIZED HEALTH CARE EDUCATION/TRAINING PROGRAM

## 2018-01-01 PROCEDURE — 93312 ECHO TRANSESOPHAGEAL: CPT | Mod: 26 | Performed by: INTERNAL MEDICINE

## 2018-01-01 PROCEDURE — 99233 SBSQ HOSP IP/OBS HIGH 50: CPT | Mod: GC | Performed by: PEDIATRICS

## 2018-01-01 PROCEDURE — 85025 COMPLETE CBC W/AUTO DIFF WBC: CPT | Performed by: STUDENT IN AN ORGANIZED HEALTH CARE EDUCATION/TRAINING PROGRAM

## 2018-01-01 PROCEDURE — 25000132 ZZH RX MED GY IP 250 OP 250 PS 637: Performed by: STUDENT IN AN ORGANIZED HEALTH CARE EDUCATION/TRAINING PROGRAM

## 2018-01-01 PROCEDURE — 40000344 ZZHCL STATISTIC THAWING COMPONENT: Performed by: PHYSICIAN ASSISTANT

## 2018-01-01 PROCEDURE — P9041 ALBUMIN (HUMAN),5%, 50ML: HCPCS

## 2018-01-01 PROCEDURE — 25800025 ZZH RX 258: Performed by: STUDENT IN AN ORGANIZED HEALTH CARE EDUCATION/TRAINING PROGRAM

## 2018-01-01 PROCEDURE — 25000132 ZZH RX MED GY IP 250 OP 250 PS 637: Performed by: NURSE PRACTITIONER

## 2018-01-01 PROCEDURE — 25000128 H RX IP 250 OP 636: Mod: ZF | Performed by: STUDENT IN AN ORGANIZED HEALTH CARE EDUCATION/TRAINING PROGRAM

## 2018-01-01 PROCEDURE — 80053 COMPREHEN METABOLIC PANEL: CPT | Performed by: TRANSPLANT SURGERY

## 2018-01-01 PROCEDURE — 80197 ASSAY OF TACROLIMUS: CPT | Performed by: PHYSICIAN ASSISTANT

## 2018-01-01 PROCEDURE — 93005 ELECTROCARDIOGRAM TRACING: CPT

## 2018-01-01 PROCEDURE — 84300 ASSAY OF URINE SODIUM: CPT | Performed by: STUDENT IN AN ORGANIZED HEALTH CARE EDUCATION/TRAINING PROGRAM

## 2018-01-01 PROCEDURE — 85027 COMPLETE CBC AUTOMATED: CPT | Performed by: NURSE PRACTITIONER

## 2018-01-01 PROCEDURE — 87040 BLOOD CULTURE FOR BACTERIA: CPT | Performed by: INTERNAL MEDICINE

## 2018-01-01 PROCEDURE — 85384 FIBRINOGEN ACTIVITY: CPT | Performed by: INTERNAL MEDICINE

## 2018-01-01 PROCEDURE — 40000196 ZZH STATISTIC RAPCV CVP MONITORING

## 2018-01-01 PROCEDURE — 85520 HEPARIN ASSAY: CPT | Performed by: NURSE PRACTITIONER

## 2018-01-01 PROCEDURE — 12000008 ZZH R&B INTERMEDIATE UMMC

## 2018-01-01 PROCEDURE — 36000070 ZZH SURGERY LEVEL 5 EA 15 ADDTL MIN - UMMC: Performed by: TRANSPLANT SURGERY

## 2018-01-01 PROCEDURE — 25000125 ZZHC RX 250: Performed by: PHYSICIAN ASSISTANT

## 2018-01-01 PROCEDURE — 84100 ASSAY OF PHOSPHORUS: CPT | Performed by: STUDENT IN AN ORGANIZED HEALTH CARE EDUCATION/TRAINING PROGRAM

## 2018-01-01 PROCEDURE — 0DB80ZZ EXCISION OF SMALL INTESTINE, OPEN APPROACH: ICD-10-PCS | Performed by: TRANSPLANT SURGERY

## 2018-01-01 PROCEDURE — 85396 CLOTTING ASSAY WHOLE BLOOD: CPT | Performed by: TRANSPLANT SURGERY

## 2018-01-01 PROCEDURE — 85027 COMPLETE CBC AUTOMATED: CPT | Performed by: TRANSPLANT SURGERY

## 2018-01-01 PROCEDURE — 97606 NEG PRS WND THER DME>50 SQCM: CPT

## 2018-01-01 PROCEDURE — 86665 EPSTEIN-BARR CAPSID VCA: CPT | Performed by: STUDENT IN AN ORGANIZED HEALTH CARE EDUCATION/TRAINING PROGRAM

## 2018-01-01 PROCEDURE — 97530 THERAPEUTIC ACTIVITIES: CPT | Mod: GO | Performed by: OCCUPATIONAL THERAPIST

## 2018-01-01 PROCEDURE — 85610 PROTHROMBIN TIME: CPT | Performed by: STUDENT IN AN ORGANIZED HEALTH CARE EDUCATION/TRAINING PROGRAM

## 2018-01-01 PROCEDURE — C9113 INJ PANTOPRAZOLE SODIUM, VIA: HCPCS | Performed by: NURSE PRACTITIONER

## 2018-01-01 PROCEDURE — 93975 VASCULAR STUDY: CPT | Mod: TC

## 2018-01-01 PROCEDURE — 99232 SBSQ HOSP IP/OBS MODERATE 35: CPT | Mod: GC | Performed by: SURGERY

## 2018-01-01 PROCEDURE — 97116 GAIT TRAINING THERAPY: CPT | Mod: GP | Performed by: REHABILITATION PRACTITIONER

## 2018-01-01 PROCEDURE — 85045 AUTOMATED RETICULOCYTE COUNT: CPT | Performed by: STUDENT IN AN ORGANIZED HEALTH CARE EDUCATION/TRAINING PROGRAM

## 2018-01-01 PROCEDURE — 86780 TREPONEMA PALLIDUM: CPT | Performed by: STUDENT IN AN ORGANIZED HEALTH CARE EDUCATION/TRAINING PROGRAM

## 2018-01-01 PROCEDURE — 99233 SBSQ HOSP IP/OBS HIGH 50: CPT | Mod: 25 | Performed by: HOSPITALIST

## 2018-01-01 PROCEDURE — 12000001 ZZH R&B MED SURG/OB UMMC

## 2018-01-01 PROCEDURE — 85730 THROMBOPLASTIN TIME PARTIAL: CPT | Performed by: STUDENT IN AN ORGANIZED HEALTH CARE EDUCATION/TRAINING PROGRAM

## 2018-01-01 PROCEDURE — 86850 RBC ANTIBODY SCREEN: CPT | Performed by: NURSE PRACTITIONER

## 2018-01-01 PROCEDURE — 36415 COLL VENOUS BLD VENIPUNCTURE: CPT | Performed by: INTERNAL MEDICINE

## 2018-01-01 PROCEDURE — 25000565 ZZH ISOFLURANE, EA 15 MIN: Performed by: TRANSPLANT SURGERY

## 2018-01-01 PROCEDURE — 84132 ASSAY OF SERUM POTASSIUM: CPT | Performed by: STUDENT IN AN ORGANIZED HEALTH CARE EDUCATION/TRAINING PROGRAM

## 2018-01-01 PROCEDURE — 92610 EVALUATE SWALLOWING FUNCTION: CPT | Mod: GN | Performed by: SPEECH-LANGUAGE PATHOLOGIST

## 2018-01-01 PROCEDURE — C9364 PORCINE IMPLANT, PERMACOL: HCPCS | Performed by: TRANSPLANT SURGERY

## 2018-01-01 PROCEDURE — 40000671 ZZH STATISTIC ANESTHESIA CASE

## 2018-01-01 PROCEDURE — P9037 PLATE PHERES LEUKOREDU IRRAD: HCPCS | Performed by: PHYSICIAN ASSISTANT

## 2018-01-01 PROCEDURE — P9059 PLASMA, FRZ BETWEEN 8-24HOUR: HCPCS | Performed by: SURGERY

## 2018-01-01 PROCEDURE — 85018 HEMOGLOBIN: CPT | Performed by: STUDENT IN AN ORGANIZED HEALTH CARE EDUCATION/TRAINING PROGRAM

## 2018-01-01 PROCEDURE — 83605 ASSAY OF LACTIC ACID: CPT | Performed by: TRANSPLANT SURGERY

## 2018-01-01 PROCEDURE — 5A2204Z RESTORATION OF CARDIAC RHYTHM, SINGLE: ICD-10-PCS | Performed by: TRANSPLANT SURGERY

## 2018-01-01 PROCEDURE — 83930 ASSAY OF BLOOD OSMOLALITY: CPT | Performed by: STUDENT IN AN ORGANIZED HEALTH CARE EDUCATION/TRAINING PROGRAM

## 2018-01-01 PROCEDURE — 83605 ASSAY OF LACTIC ACID: CPT | Performed by: INTERNAL MEDICINE

## 2018-01-01 PROCEDURE — P9016 RBC LEUKOCYTES REDUCED: HCPCS | Performed by: TRANSPLANT SURGERY

## 2018-01-01 PROCEDURE — 82247 BILIRUBIN TOTAL: CPT | Performed by: INTERNAL MEDICINE

## 2018-01-01 PROCEDURE — 0FY00Z0 TRANSPLANTATION OF LIVER, ALLOGENEIC, OPEN APPROACH: ICD-10-PCS | Performed by: TRANSPLANT SURGERY

## 2018-01-01 PROCEDURE — P9059 PLASMA, FRZ BETWEEN 8-24HOUR: HCPCS | Performed by: ANESTHESIOLOGY

## 2018-01-01 PROCEDURE — 27210136 ZZH KIT CATH ARTERIAL EXT SUPPLY

## 2018-01-01 PROCEDURE — 27211315 ZZ H KIT, BLADDER PRESSURE

## 2018-01-01 PROCEDURE — 83605 ASSAY OF LACTIC ACID: CPT | Performed by: ANESTHESIOLOGY

## 2018-01-01 PROCEDURE — 80048 BASIC METABOLIC PNL TOTAL CA: CPT | Performed by: INTERNAL MEDICINE

## 2018-01-01 PROCEDURE — 85730 THROMBOPLASTIN TIME PARTIAL: CPT | Performed by: INTERNAL MEDICINE

## 2018-01-01 PROCEDURE — 87186 SC STD MICRODIL/AGAR DIL: CPT | Performed by: TRANSPLANT SURGERY

## 2018-01-01 PROCEDURE — 87181 SC STD AGAR DILUTION PER AGT: CPT | Performed by: PHYSICIAN ASSISTANT

## 2018-01-01 PROCEDURE — 88304 TISSUE EXAM BY PATHOLOGIST: CPT | Performed by: TRANSPLANT SURGERY

## 2018-01-01 PROCEDURE — 97535 SELF CARE MNGMENT TRAINING: CPT | Mod: GO | Performed by: OCCUPATIONAL THERAPIST

## 2018-01-01 PROCEDURE — 40000133 ZZH STATISTIC OT WARD VISIT: Performed by: OCCUPATIONAL THERAPIST

## 2018-01-01 PROCEDURE — 83540 ASSAY OF IRON: CPT | Performed by: STUDENT IN AN ORGANIZED HEALTH CARE EDUCATION/TRAINING PROGRAM

## 2018-01-01 PROCEDURE — 86923 COMPATIBILITY TEST ELECTRIC: CPT | Performed by: SURGERY

## 2018-01-01 PROCEDURE — 86923 COMPATIBILITY TEST ELECTRIC: CPT | Performed by: INTERNAL MEDICINE

## 2018-01-01 PROCEDURE — 83605 ASSAY OF LACTIC ACID: CPT | Performed by: SURGERY

## 2018-01-01 PROCEDURE — 97161 PT EVAL LOW COMPLEX 20 MIN: CPT | Mod: GP

## 2018-01-01 PROCEDURE — 86901 BLOOD TYPING SEROLOGIC RH(D): CPT | Performed by: SURGERY

## 2018-01-01 PROCEDURE — 82248 BILIRUBIN DIRECT: CPT | Performed by: TRANSPLANT SURGERY

## 2018-01-01 PROCEDURE — 82610 CYSTATIN C: CPT | Performed by: PHYSICIAN ASSISTANT

## 2018-01-01 PROCEDURE — 86900 BLOOD TYPING SEROLOGIC ABO: CPT | Performed by: INTERNAL MEDICINE

## 2018-01-01 PROCEDURE — P9047 ALBUMIN (HUMAN), 25%, 50ML: HCPCS | Performed by: PHYSICIAN ASSISTANT

## 2018-01-01 PROCEDURE — 88313 SPECIAL STAINS GROUP 2: CPT | Performed by: TRANSPLANT SURGERY

## 2018-01-01 PROCEDURE — 97530 THERAPEUTIC ACTIVITIES: CPT | Mod: GO

## 2018-01-01 PROCEDURE — 49083 ABD PARACENTESIS W/IMAGING: CPT | Performed by: PHYSICIAN ASSISTANT

## 2018-01-01 PROCEDURE — 80061 LIPID PANEL: CPT | Performed by: STUDENT IN AN ORGANIZED HEALTH CARE EDUCATION/TRAINING PROGRAM

## 2018-01-01 PROCEDURE — 86832 HLA CLASS I HIGH DEFIN QUAL: CPT | Performed by: INTERNAL MEDICINE

## 2018-01-01 PROCEDURE — 86901 BLOOD TYPING SEROLOGIC RH(D): CPT | Performed by: INTERNAL MEDICINE

## 2018-01-01 PROCEDURE — 84100 ASSAY OF PHOSPHORUS: CPT | Performed by: TRANSPLANT SURGERY

## 2018-01-01 PROCEDURE — 93016 CV STRESS TEST SUPVJ ONLY: CPT | Performed by: INTERNAL MEDICINE

## 2018-01-01 PROCEDURE — 87040 BLOOD CULTURE FOR BACTERIA: CPT | Performed by: STUDENT IN AN ORGANIZED HEALTH CARE EDUCATION/TRAINING PROGRAM

## 2018-01-01 PROCEDURE — 36569 INSJ PICC 5 YR+ W/O IMAGING: CPT

## 2018-01-01 PROCEDURE — 82803 BLOOD GASES ANY COMBINATION: CPT | Performed by: PHYSICIAN ASSISTANT

## 2018-01-01 PROCEDURE — 85384 FIBRINOGEN ACTIVITY: CPT | Performed by: SURGERY

## 2018-01-01 PROCEDURE — 83735 ASSAY OF MAGNESIUM: CPT | Performed by: NURSE PRACTITIONER

## 2018-01-01 PROCEDURE — 0W9G3ZZ DRAINAGE OF PERITONEAL CAVITY, PERCUTANEOUS APPROACH: ICD-10-PCS | Performed by: SURGERY

## 2018-01-01 PROCEDURE — 84132 ASSAY OF SERUM POTASSIUM: CPT | Performed by: INTERNAL MEDICINE

## 2018-01-01 PROCEDURE — 27210995 ZZH RX 272

## 2018-01-01 PROCEDURE — 85027 COMPLETE CBC AUTOMATED: CPT | Performed by: PEDIATRICS

## 2018-01-01 PROCEDURE — 87205 SMEAR GRAM STAIN: CPT | Performed by: TRANSPLANT SURGERY

## 2018-01-01 PROCEDURE — 86708 HEPATITIS A ANTIBODY: CPT | Performed by: STUDENT IN AN ORGANIZED HEALTH CARE EDUCATION/TRAINING PROGRAM

## 2018-01-01 PROCEDURE — 87186 SC STD MICRODIL/AGAR DIL: CPT | Performed by: INTERNAL MEDICINE

## 2018-01-01 PROCEDURE — 25000131 ZZH RX MED GY IP 250 OP 636 PS 637: Performed by: TRANSPLANT SURGERY

## 2018-01-01 PROCEDURE — 25000125 ZZHC RX 250: Performed by: ANESTHESIOLOGY

## 2018-01-01 PROCEDURE — 87070 CULTURE OTHR SPECIMN AEROBIC: CPT | Performed by: STUDENT IN AN ORGANIZED HEALTH CARE EDUCATION/TRAINING PROGRAM

## 2018-01-01 PROCEDURE — P9041 ALBUMIN (HUMAN),5%, 50ML: HCPCS | Performed by: NURSE ANESTHETIST, CERTIFIED REGISTERED

## 2018-01-01 PROCEDURE — 85018 HEMOGLOBIN: CPT | Performed by: NURSE PRACTITIONER

## 2018-01-01 PROCEDURE — 25000131 ZZH RX MED GY IP 250 OP 636 PS 637: Performed by: INTERNAL MEDICINE

## 2018-01-01 PROCEDURE — 82803 BLOOD GASES ANY COMBINATION: CPT | Performed by: SURGERY

## 2018-01-01 PROCEDURE — 80076 HEPATIC FUNCTION PANEL: CPT | Performed by: STUDENT IN AN ORGANIZED HEALTH CARE EDUCATION/TRAINING PROGRAM

## 2018-01-01 PROCEDURE — 82550 ASSAY OF CK (CPK): CPT | Performed by: PHYSICIAN ASSISTANT

## 2018-01-01 PROCEDURE — 87070 CULTURE OTHR SPECIMN AEROBIC: CPT | Performed by: SURGERY

## 2018-01-01 PROCEDURE — 36000057 ZZH SURGERY LEVEL 3 1ST 30 MIN - UMMC: Performed by: SURGERY

## 2018-01-01 PROCEDURE — 83605 ASSAY OF LACTIC ACID: CPT | Performed by: PHYSICIAN ASSISTANT

## 2018-01-01 PROCEDURE — 36000053 ZZH SURGERY LEVEL 2 EA 15 ADDTL MIN - UMMC: Performed by: INTERNAL MEDICINE

## 2018-01-01 PROCEDURE — 82947 ASSAY GLUCOSE BLOOD QUANT: CPT | Performed by: ANESTHESIOLOGY

## 2018-01-01 PROCEDURE — 85384 FIBRINOGEN ACTIVITY: CPT | Performed by: PHYSICIAN ASSISTANT

## 2018-01-01 PROCEDURE — P9037 PLATE PHERES LEUKOREDU IRRAD: HCPCS | Performed by: ANESTHESIOLOGY

## 2018-01-01 PROCEDURE — 99291 CRITICAL CARE FIRST HOUR: CPT | Performed by: NURSE PRACTITIONER

## 2018-01-01 PROCEDURE — 97162 PT EVAL MOD COMPLEX 30 MIN: CPT | Mod: GP

## 2018-01-01 PROCEDURE — 2W13X6Z COMPRESSION OF ABDOMINAL WALL USING PRESSURE DRESSING: ICD-10-PCS | Performed by: TRANSPLANT SURGERY

## 2018-01-01 PROCEDURE — 40000344 ZZHCL STATISTIC THAWING COMPONENT: Performed by: SURGERY

## 2018-01-01 PROCEDURE — 25000128 H RX IP 250 OP 636: Performed by: ANESTHESIOLOGY

## 2018-01-01 PROCEDURE — 74176 CT ABD & PELVIS W/O CONTRAST: CPT

## 2018-01-01 PROCEDURE — 36000068 ZZH SURGERY LEVEL 5 1ST 30 MIN - UMMC: Performed by: TRANSPLANT SURGERY

## 2018-01-01 PROCEDURE — 80076 HEPATIC FUNCTION PANEL: CPT | Performed by: TRANSPLANT SURGERY

## 2018-01-01 PROCEDURE — 97535 SELF CARE MNGMENT TRAINING: CPT | Mod: GO

## 2018-01-01 PROCEDURE — 25000125 ZZHC RX 250: Performed by: SURGERY

## 2018-01-01 PROCEDURE — 87077 CULTURE AEROBIC IDENTIFY: CPT | Performed by: INTERNAL MEDICINE

## 2018-01-01 PROCEDURE — 87181 SC STD AGAR DILUTION PER AGT: CPT | Performed by: STUDENT IN AN ORGANIZED HEALTH CARE EDUCATION/TRAINING PROGRAM

## 2018-01-01 PROCEDURE — 82330 ASSAY OF CALCIUM: CPT | Performed by: SURGERY

## 2018-01-01 PROCEDURE — P9059 PLASMA, FRZ BETWEEN 8-24HOUR: HCPCS | Performed by: TRANSPLANT SURGERY

## 2018-01-01 PROCEDURE — P9041 ALBUMIN (HUMAN),5%, 50ML: HCPCS | Performed by: TRANSPLANT SURGERY

## 2018-01-01 PROCEDURE — 40000986 XR ABDOMEN PORT 1 VW

## 2018-01-01 PROCEDURE — 82330 ASSAY OF CALCIUM: CPT | Performed by: ANESTHESIOLOGY

## 2018-01-01 PROCEDURE — 27210437 ZZH NUTRITION PRODUCT SEMIELEM INTERMED LITER

## 2018-01-01 PROCEDURE — 82570 ASSAY OF URINE CREATININE: CPT | Performed by: STUDENT IN AN ORGANIZED HEALTH CARE EDUCATION/TRAINING PROGRAM

## 2018-01-01 PROCEDURE — 25000565 ZZH ISOFLURANE, EA 15 MIN: Performed by: INTERNAL MEDICINE

## 2018-01-01 PROCEDURE — 27210995 ZZH RX 272: Performed by: INTERNAL MEDICINE

## 2018-01-01 PROCEDURE — P9047 ALBUMIN (HUMAN), 25%, 50ML: HCPCS | Mod: ZF | Performed by: STUDENT IN AN ORGANIZED HEALTH CARE EDUCATION/TRAINING PROGRAM

## 2018-01-01 PROCEDURE — 97110 THERAPEUTIC EXERCISES: CPT | Mod: GO

## 2018-01-01 PROCEDURE — P9016 RBC LEUKOCYTES REDUCED: HCPCS | Performed by: NURSE PRACTITIONER

## 2018-01-01 PROCEDURE — 87040 BLOOD CULTURE FOR BACTERIA: CPT | Performed by: NURSE PRACTITIONER

## 2018-01-01 PROCEDURE — 82330 ASSAY OF CALCIUM: CPT | Performed by: STUDENT IN AN ORGANIZED HEALTH CARE EDUCATION/TRAINING PROGRAM

## 2018-01-01 PROCEDURE — 93018 CV STRESS TEST I&R ONLY: CPT | Performed by: INTERNAL MEDICINE

## 2018-01-01 PROCEDURE — P9059 PLASMA, FRZ BETWEEN 8-24HOUR: HCPCS | Performed by: PHYSICIAN ASSISTANT

## 2018-01-01 PROCEDURE — 36415 COLL VENOUS BLD VENIPUNCTURE: CPT | Performed by: PHYSICIAN ASSISTANT

## 2018-01-01 PROCEDURE — 0DBA0ZZ EXCISION OF JEJUNUM, OPEN APPROACH: ICD-10-PCS | Performed by: TRANSPLANT SURGERY

## 2018-01-01 PROCEDURE — 82803 BLOOD GASES ANY COMBINATION: CPT | Performed by: TRANSPLANT SURGERY

## 2018-01-01 PROCEDURE — 83935 ASSAY OF URINE OSMOLALITY: CPT | Performed by: STUDENT IN AN ORGANIZED HEALTH CARE EDUCATION/TRAINING PROGRAM

## 2018-01-01 PROCEDURE — 49083 ABD PARACENTESIS W/IMAGING: CPT

## 2018-01-01 PROCEDURE — 84295 ASSAY OF SERUM SODIUM: CPT | Performed by: TRANSPLANT SURGERY

## 2018-01-01 PROCEDURE — 87181 SC STD AGAR DILUTION PER AGT: CPT | Performed by: TRANSPLANT SURGERY

## 2018-01-01 PROCEDURE — 85396 CLOTTING ASSAY WHOLE BLOOD: CPT | Performed by: STUDENT IN AN ORGANIZED HEALTH CARE EDUCATION/TRAINING PROGRAM

## 2018-01-01 PROCEDURE — 93320 DOPPLER ECHO COMPLETE: CPT | Mod: 26 | Performed by: INTERNAL MEDICINE

## 2018-01-01 PROCEDURE — 86850 RBC ANTIBODY SCREEN: CPT | Performed by: INTERNAL MEDICINE

## 2018-01-01 PROCEDURE — 85025 COMPLETE CBC W/AUTO DIFF WBC: CPT | Performed by: PHYSICIAN ASSISTANT

## 2018-01-01 PROCEDURE — 85027 COMPLETE CBC AUTOMATED: CPT | Performed by: INTERNAL MEDICINE

## 2018-01-01 PROCEDURE — 88112 CYTOPATH CELL ENHANCE TECH: CPT | Performed by: STUDENT IN AN ORGANIZED HEALTH CARE EDUCATION/TRAINING PROGRAM

## 2018-01-01 PROCEDURE — 80053 COMPREHEN METABOLIC PANEL: CPT | Performed by: NURSE PRACTITIONER

## 2018-01-01 PROCEDURE — 87800 DETECT AGNT MULT DNA DIREC: CPT | Performed by: STUDENT IN AN ORGANIZED HEALTH CARE EDUCATION/TRAINING PROGRAM

## 2018-01-01 PROCEDURE — 85730 THROMBOPLASTIN TIME PARTIAL: CPT | Performed by: NURSE PRACTITIONER

## 2018-01-01 PROCEDURE — 36556 INSERT NON-TUNNEL CV CATH: CPT | Performed by: NURSE PRACTITIONER

## 2018-01-01 PROCEDURE — 86850 RBC ANTIBODY SCREEN: CPT | Performed by: SURGERY

## 2018-01-01 PROCEDURE — 84157 ASSAY OF PROTEIN OTHER: CPT | Performed by: INTERNAL MEDICINE

## 2018-01-01 PROCEDURE — 87070 CULTURE OTHR SPECIMN AEROBIC: CPT | Performed by: INTERNAL MEDICINE

## 2018-01-01 PROCEDURE — 36592 COLLECT BLOOD FROM PICC: CPT | Performed by: TRANSPLANT SURGERY

## 2018-01-01 PROCEDURE — 71046 X-RAY EXAM CHEST 2 VIEWS: CPT

## 2018-01-01 PROCEDURE — 97110 THERAPEUTIC EXERCISES: CPT | Mod: GO | Performed by: OCCUPATIONAL THERAPIST

## 2018-01-01 PROCEDURE — 97164 PT RE-EVAL EST PLAN CARE: CPT | Mod: GP

## 2018-01-01 PROCEDURE — 37000009 ZZH ANESTHESIA TECHNICAL FEE, EACH ADDTL 15 MIN: Performed by: SURGERY

## 2018-01-01 PROCEDURE — 74174 CTA ABD&PLVS W/CONTRAST: CPT

## 2018-01-01 PROCEDURE — 36000059 ZZH SURGERY LEVEL 3 EA 15 ADDTL MIN UMMC: Performed by: SURGERY

## 2018-01-01 PROCEDURE — 40000344 ZZHCL STATISTIC THAWING COMPONENT: Performed by: STUDENT IN AN ORGANIZED HEALTH CARE EDUCATION/TRAINING PROGRAM

## 2018-01-01 PROCEDURE — 97530 THERAPEUTIC ACTIVITIES: CPT | Mod: GP | Performed by: PHYSICAL THERAPIST

## 2018-01-01 PROCEDURE — 93325 DOPPLER ECHO COLOR FLOW MAPG: CPT

## 2018-01-01 PROCEDURE — 85018 HEMOGLOBIN: CPT | Performed by: INTERNAL MEDICINE

## 2018-01-01 PROCEDURE — P9016 RBC LEUKOCYTES REDUCED: HCPCS | Performed by: SURGERY

## 2018-01-01 PROCEDURE — 85610 PROTHROMBIN TIME: CPT | Performed by: NURSE PRACTITIONER

## 2018-01-01 PROCEDURE — 25800025 ZZH RX 258: Performed by: INTERNAL MEDICINE

## 2018-01-01 PROCEDURE — 25000128 H RX IP 250 OP 636: Performed by: HOSPITALIST

## 2018-01-01 PROCEDURE — 93306 TTE W/DOPPLER COMPLETE: CPT | Mod: 26 | Performed by: INTERNAL MEDICINE

## 2018-01-01 PROCEDURE — 74150 CT ABDOMEN W/O CONTRAST: CPT

## 2018-01-01 PROCEDURE — 40000048 ZZH STATISTIC DAILY SWAN MONITORING

## 2018-01-01 PROCEDURE — 0FB00ZX EXCISION OF LIVER, OPEN APPROACH, DIAGNOSTIC: ICD-10-PCS | Performed by: TRANSPLANT SURGERY

## 2018-01-01 PROCEDURE — 40000193 ZZH STATISTIC PT WARD VISIT: Performed by: REHABILITATION PRACTITIONER

## 2018-01-01 PROCEDURE — 86480 TB TEST CELL IMMUN MEASURE: CPT | Performed by: STUDENT IN AN ORGANIZED HEALTH CARE EDUCATION/TRAINING PROGRAM

## 2018-01-01 PROCEDURE — P9037 PLATE PHERES LEUKOREDU IRRAD: HCPCS | Performed by: NURSE PRACTITIONER

## 2018-01-01 PROCEDURE — 87176 TISSUE HOMOGENIZATION CULTR: CPT | Performed by: TRANSPLANT SURGERY

## 2018-01-01 PROCEDURE — 86704 HEP B CORE ANTIBODY TOTAL: CPT | Performed by: NURSE PRACTITIONER

## 2018-01-01 PROCEDURE — P9041 ALBUMIN (HUMAN),5%, 50ML: HCPCS | Performed by: NURSE PRACTITIONER

## 2018-01-01 PROCEDURE — 0W9G3ZZ DRAINAGE OF PERITONEAL CAVITY, PERCUTANEOUS APPROACH: ICD-10-PCS | Performed by: PEDIATRICS

## 2018-01-01 PROCEDURE — 84439 ASSAY OF FREE THYROXINE: CPT | Performed by: STUDENT IN AN ORGANIZED HEALTH CARE EDUCATION/TRAINING PROGRAM

## 2018-01-01 PROCEDURE — 86644 CMV ANTIBODY: CPT | Performed by: STUDENT IN AN ORGANIZED HEALTH CARE EDUCATION/TRAINING PROGRAM

## 2018-01-01 PROCEDURE — 86803 HEPATITIS C AB TEST: CPT | Performed by: NURSE PRACTITIONER

## 2018-01-01 PROCEDURE — 87077 CULTURE AEROBIC IDENTIFY: CPT | Performed by: SURGERY

## 2018-01-01 PROCEDURE — G0103 PSA SCREENING: HCPCS | Performed by: INTERNAL MEDICINE

## 2018-01-01 PROCEDURE — 84132 ASSAY OF SERUM POTASSIUM: CPT | Performed by: SURGERY

## 2018-01-01 PROCEDURE — 84443 ASSAY THYROID STIM HORMONE: CPT | Performed by: STUDENT IN AN ORGANIZED HEALTH CARE EDUCATION/TRAINING PROGRAM

## 2018-01-01 PROCEDURE — 25800025 ZZH RX 258: Performed by: NURSE PRACTITIONER

## 2018-01-01 PROCEDURE — 76700 US EXAM ABDOM COMPLETE: CPT

## 2018-01-01 PROCEDURE — 87640 STAPH A DNA AMP PROBE: CPT | Performed by: STUDENT IN AN ORGANIZED HEALTH CARE EDUCATION/TRAINING PROGRAM

## 2018-01-01 PROCEDURE — 87341 HEP B SURFACE AG NEUTRLZJ IA: CPT | Performed by: STUDENT IN AN ORGANIZED HEALTH CARE EDUCATION/TRAINING PROGRAM

## 2018-01-01 PROCEDURE — 87389 HIV-1 AG W/HIV-1&-2 AB AG IA: CPT | Performed by: NURSE PRACTITIONER

## 2018-01-01 PROCEDURE — 87340 HEPATITIS B SURFACE AG IA: CPT | Performed by: STUDENT IN AN ORGANIZED HEALTH CARE EDUCATION/TRAINING PROGRAM

## 2018-01-01 PROCEDURE — 27210197 ZZH KIT POWER PICC TRIPLE LUMEN

## 2018-01-01 PROCEDURE — 86665 EPSTEIN-BARR CAPSID VCA: CPT | Performed by: NURSE PRACTITIONER

## 2018-01-01 PROCEDURE — 93308 TTE F-UP OR LMTD: CPT | Mod: 26 | Performed by: INTERNAL MEDICINE

## 2018-01-01 PROCEDURE — 3E1M38Z IRRIGATION OF PERITONEAL CAVITY USING IRRIGATING SUBSTANCE, PERCUTANEOUS APPROACH: ICD-10-PCS | Performed by: TRANSPLANT SURGERY

## 2018-01-01 PROCEDURE — 82042 OTHER SOURCE ALBUMIN QUAN EA: CPT | Performed by: INTERNAL MEDICINE

## 2018-01-01 PROCEDURE — 82805 BLOOD GASES W/O2 SATURATION: CPT | Performed by: STUDENT IN AN ORGANIZED HEALTH CARE EDUCATION/TRAINING PROGRAM

## 2018-01-01 PROCEDURE — 85303 CLOT INHIBIT PROT C ACTIVITY: CPT | Performed by: SURGERY

## 2018-01-01 PROCEDURE — 86704 HEP B CORE ANTIBODY TOTAL: CPT | Performed by: STUDENT IN AN ORGANIZED HEALTH CARE EDUCATION/TRAINING PROGRAM

## 2018-01-01 PROCEDURE — 84132 ASSAY OF SERUM POTASSIUM: CPT | Performed by: NURSE PRACTITIONER

## 2018-01-01 PROCEDURE — 3E033XZ INTRODUCTION OF VASOPRESSOR INTO PERIPHERAL VEIN, PERCUTANEOUS APPROACH: ICD-10-PCS | Performed by: SURGERY

## 2018-01-01 PROCEDURE — 92526 ORAL FUNCTION THERAPY: CPT | Mod: GN | Performed by: SPEECH-LANGUAGE PATHOLOGIST

## 2018-01-01 PROCEDURE — 87086 URINE CULTURE/COLONY COUNT: CPT | Performed by: INTERNAL MEDICINE

## 2018-01-01 PROCEDURE — 27210794 ZZH OR GENERAL SUPPLY STERILE: Performed by: INTERNAL MEDICINE

## 2018-01-01 PROCEDURE — 87799 DETECT AGENT NOS DNA QUANT: CPT | Performed by: NURSE PRACTITIONER

## 2018-01-01 PROCEDURE — 82042 OTHER SOURCE ALBUMIN QUAN EA: CPT | Performed by: STUDENT IN AN ORGANIZED HEALTH CARE EDUCATION/TRAINING PROGRAM

## 2018-01-01 PROCEDURE — 82140 ASSAY OF AMMONIA: CPT | Performed by: INTERNAL MEDICINE

## 2018-01-01 PROCEDURE — 87106 FUNGI IDENTIFICATION YEAST: CPT | Performed by: STUDENT IN AN ORGANIZED HEALTH CARE EDUCATION/TRAINING PROGRAM

## 2018-01-01 PROCEDURE — 12000026 ZZH R&B TRANSPLANT

## 2018-01-01 PROCEDURE — C9399 UNCLASSIFIED DRUGS OR BIOLOG: HCPCS | Performed by: NURSE ANESTHETIST, CERTIFIED REGISTERED

## 2018-01-01 PROCEDURE — 85049 AUTOMATED PLATELET COUNT: CPT | Performed by: STUDENT IN AN ORGANIZED HEALTH CARE EDUCATION/TRAINING PROGRAM

## 2018-01-01 PROCEDURE — 85300 ANTITHROMBIN III ACTIVITY: CPT | Performed by: SURGERY

## 2018-01-01 PROCEDURE — 49083 ABD PARACENTESIS W/IMAGING: CPT | Mod: GC | Performed by: INTERNAL MEDICINE

## 2018-01-01 PROCEDURE — 83935 ASSAY OF URINE OSMOLALITY: CPT | Performed by: PHYSICIAN ASSISTANT

## 2018-01-01 PROCEDURE — 87340 HEPATITIS B SURFACE AG IA: CPT | Performed by: INTERNAL MEDICINE

## 2018-01-01 PROCEDURE — 84450 TRANSFERASE (AST) (SGOT): CPT | Performed by: TRANSPLANT SURGERY

## 2018-01-01 PROCEDURE — 82040 ASSAY OF SERUM ALBUMIN: CPT | Performed by: INTERNAL MEDICINE

## 2018-01-01 PROCEDURE — 87340 HEPATITIS B SURFACE AG IA: CPT | Performed by: TRANSPLANT SURGERY

## 2018-01-01 PROCEDURE — 84540 ASSAY OF URINE/UREA-N: CPT | Performed by: INTERNAL MEDICINE

## 2018-01-01 PROCEDURE — 85384 FIBRINOGEN ACTIVITY: CPT | Performed by: ANESTHESIOLOGY

## 2018-01-01 PROCEDURE — 83735 ASSAY OF MAGNESIUM: CPT | Performed by: HOSPITALIST

## 2018-01-01 PROCEDURE — 84295 ASSAY OF SERUM SODIUM: CPT | Performed by: ANESTHESIOLOGY

## 2018-01-01 PROCEDURE — 85049 AUTOMATED PLATELET COUNT: CPT | Performed by: INTERNAL MEDICINE

## 2018-01-01 PROCEDURE — 12000024 ZZH R&B TRANSPLANT CRITICAL

## 2018-01-01 PROCEDURE — 82803 BLOOD GASES ANY COMBINATION: CPT | Performed by: INTERNAL MEDICINE

## 2018-01-01 PROCEDURE — 87176 TISSUE HOMOGENIZATION CULTR: CPT | Performed by: SURGERY

## 2018-01-01 PROCEDURE — 40000225 ZZH STATISTIC SLP WARD VISIT: Performed by: SPEECH-LANGUAGE PATHOLOGIST

## 2018-01-01 PROCEDURE — 36000057 ZZH SURGERY LEVEL 3 1ST 30 MIN - UMMC: Performed by: TRANSPLANT SURGERY

## 2018-01-01 PROCEDURE — T1013 SIGN LANG/ORAL INTERPRETER: HCPCS | Mod: U3,ZF

## 2018-01-01 PROCEDURE — 82805 BLOOD GASES W/O2 SATURATION: CPT | Performed by: INTERNAL MEDICINE

## 2018-01-01 PROCEDURE — 5A1D90Z PERFORMANCE OF URINARY FILTRATION, CONTINUOUS, GREATER THAN 18 HOURS PER DAY: ICD-10-PCS | Performed by: INTERNAL MEDICINE

## 2018-01-01 PROCEDURE — 40000809 ZZH STATISTIC NO DOCUMENTATION TO SUPPORT CHARGE

## 2018-01-01 PROCEDURE — 5A1945Z RESPIRATORY VENTILATION, 24-96 CONSECUTIVE HOURS: ICD-10-PCS | Performed by: INTERNAL MEDICINE

## 2018-01-01 PROCEDURE — 76705 ECHO EXAM OF ABDOMEN: CPT

## 2018-01-01 PROCEDURE — 85049 AUTOMATED PLATELET COUNT: CPT | Performed by: TRANSPLANT SURGERY

## 2018-01-01 PROCEDURE — 84134 ASSAY OF PREALBUMIN: CPT | Performed by: PHYSICIAN ASSISTANT

## 2018-01-01 PROCEDURE — 43255 EGD CONTROL BLEEDING ANY: CPT | Performed by: INTERNAL MEDICINE

## 2018-01-01 PROCEDURE — 86900 BLOOD TYPING SEROLOGIC ABO: CPT | Performed by: SURGERY

## 2018-01-01 PROCEDURE — 93350 STRESS TTE ONLY: CPT | Mod: 26 | Performed by: INTERNAL MEDICINE

## 2018-01-01 PROCEDURE — 27210995 ZZH RX 272: Performed by: STUDENT IN AN ORGANIZED HEALTH CARE EDUCATION/TRAINING PROGRAM

## 2018-01-01 PROCEDURE — 86803 HEPATITIS C AB TEST: CPT | Performed by: STUDENT IN AN ORGANIZED HEALTH CARE EDUCATION/TRAINING PROGRAM

## 2018-01-01 PROCEDURE — 99291 CRITICAL CARE FIRST HOUR: CPT | Mod: 25 | Performed by: SURGERY

## 2018-01-01 PROCEDURE — 40000008 ZZH STATISTIC AIRWAY CARE

## 2018-01-01 PROCEDURE — 83036 HEMOGLOBIN GLYCOSYLATED A1C: CPT | Performed by: STUDENT IN AN ORGANIZED HEALTH CARE EDUCATION/TRAINING PROGRAM

## 2018-01-01 PROCEDURE — 84300 ASSAY OF URINE SODIUM: CPT | Performed by: INTERNAL MEDICINE

## 2018-01-01 PROCEDURE — 99292 CRITICAL CARE ADDL 30 MIN: CPT | Mod: GC | Performed by: INTERNAL MEDICINE

## 2018-01-01 PROCEDURE — 00000155 ZZHCL STATISTIC H-CELL BLOCK W/STAIN: Performed by: STUDENT IN AN ORGANIZED HEALTH CARE EDUCATION/TRAINING PROGRAM

## 2018-01-01 PROCEDURE — 27210432 ZZH NUTRITION PRODUCT RENAL BASIC LITER

## 2018-01-01 PROCEDURE — 82150 ASSAY OF AMYLASE: CPT | Performed by: STUDENT IN AN ORGANIZED HEALTH CARE EDUCATION/TRAINING PROGRAM

## 2018-01-01 PROCEDURE — 40000264 ECHO STRESS DOBUTAMINE WITH OPTISON

## 2018-01-01 PROCEDURE — 93306 TTE W/DOPPLER COMPLETE: CPT

## 2018-01-01 PROCEDURE — 40000344 ZZHCL STATISTIC THAWING COMPONENT: Performed by: INTERNAL MEDICINE

## 2018-01-01 PROCEDURE — 36000051 ZZH SURGERY LEVEL 2 1ST 30 MIN - UMMC: Performed by: INTERNAL MEDICINE

## 2018-01-01 PROCEDURE — 84134 ASSAY OF PREALBUMIN: CPT | Performed by: INTERNAL MEDICINE

## 2018-01-01 PROCEDURE — 97168 OT RE-EVAL EST PLAN CARE: CPT | Mod: GO | Performed by: OCCUPATIONAL THERAPIST

## 2018-01-01 PROCEDURE — 25000128 H RX IP 250 OP 636: Performed by: PEDIATRICS

## 2018-01-01 PROCEDURE — 83930 ASSAY OF BLOOD OSMOLALITY: CPT | Performed by: SURGERY

## 2018-01-01 PROCEDURE — 25000125 ZZHC RX 250

## 2018-01-01 PROCEDURE — 25800025 ZZH RX 258: Performed by: TRANSPLANT SURGERY

## 2018-01-01 PROCEDURE — 37000009 ZZH ANESTHESIA TECHNICAL FEE, EACH ADDTL 15 MIN: Performed by: INTERNAL MEDICINE

## 2018-01-01 PROCEDURE — 84460 ALANINE AMINO (ALT) (SGPT): CPT | Performed by: TRANSPLANT SURGERY

## 2018-01-01 PROCEDURE — 81001 URINALYSIS AUTO W/SCOPE: CPT | Performed by: PHYSICIAN ASSISTANT

## 2018-01-01 PROCEDURE — 80048 BASIC METABOLIC PNL TOTAL CA: CPT | Performed by: TRANSPLANT SURGERY

## 2018-01-01 PROCEDURE — 82805 BLOOD GASES W/O2 SATURATION: CPT | Performed by: TRANSPLANT SURGERY

## 2018-01-01 PROCEDURE — P9012 CRYOPRECIPITATE EACH UNIT: HCPCS | Performed by: INTERNAL MEDICINE

## 2018-01-01 PROCEDURE — 81001 URINALYSIS AUTO W/SCOPE: CPT | Performed by: INTERNAL MEDICINE

## 2018-01-01 PROCEDURE — 44500 INTRO GASTROINTESTINAL TUBE: CPT | Performed by: DIETITIAN, REGISTERED

## 2018-01-01 PROCEDURE — 86901 BLOOD TYPING SEROLOGIC RH(D): CPT | Performed by: NURSE PRACTITIONER

## 2018-01-01 PROCEDURE — 0D1A0ZB BYPASS JEJUNUM TO ILEUM, OPEN APPROACH: ICD-10-PCS | Performed by: TRANSPLANT SURGERY

## 2018-01-01 PROCEDURE — 36556 INSERT NON-TUNNEL CV CATH: CPT | Mod: GC | Performed by: SURGERY

## 2018-01-01 PROCEDURE — 83735 ASSAY OF MAGNESIUM: CPT | Performed by: SURGERY

## 2018-01-01 PROCEDURE — 87186 SC STD MICRODIL/AGAR DIL: CPT | Performed by: SURGERY

## 2018-01-01 PROCEDURE — 93321 DOPPLER ECHO F-UP/LMTD STD: CPT

## 2018-01-01 PROCEDURE — 86706 HEP B SURFACE ANTIBODY: CPT | Performed by: STUDENT IN AN ORGANIZED HEALTH CARE EDUCATION/TRAINING PROGRAM

## 2018-01-01 PROCEDURE — 81001 URINALYSIS AUTO W/SCOPE: CPT | Performed by: STUDENT IN AN ORGANIZED HEALTH CARE EDUCATION/TRAINING PROGRAM

## 2018-01-01 PROCEDURE — 82248 BILIRUBIN DIRECT: CPT | Performed by: SURGERY

## 2018-01-01 PROCEDURE — 84156 ASSAY OF PROTEIN URINE: CPT | Performed by: STUDENT IN AN ORGANIZED HEALTH CARE EDUCATION/TRAINING PROGRAM

## 2018-01-01 PROCEDURE — 40000225 ZZH STATISTIC SLP WARD VISIT

## 2018-01-01 PROCEDURE — 85384 FIBRINOGEN ACTIVITY: CPT | Performed by: NURSE PRACTITIONER

## 2018-01-01 PROCEDURE — 0W9G3ZZ DRAINAGE OF PERITONEAL CAVITY, PERCUTANEOUS APPROACH: ICD-10-PCS | Performed by: INTERNAL MEDICINE

## 2018-01-01 PROCEDURE — 25000125 ZZHC RX 250: Performed by: PEDIATRICS

## 2018-01-01 PROCEDURE — 80202 ASSAY OF VANCOMYCIN: CPT | Performed by: INTERNAL MEDICINE

## 2018-01-01 PROCEDURE — 27211024 ZZHC OR SUPPLY OTHER OPNP: Performed by: INTERNAL MEDICINE

## 2018-01-01 PROCEDURE — 82105 ALPHA-FETOPROTEIN SERUM: CPT | Performed by: INTERNAL MEDICINE

## 2018-01-01 PROCEDURE — 84478 ASSAY OF TRIGLYCERIDES: CPT | Performed by: NURSE PRACTITIONER

## 2018-01-01 PROCEDURE — 82728 ASSAY OF FERRITIN: CPT | Performed by: STUDENT IN AN ORGANIZED HEALTH CARE EDUCATION/TRAINING PROGRAM

## 2018-01-01 PROCEDURE — 40000344 ZZHCL STATISTIC THAWING COMPONENT: Performed by: ANESTHESIOLOGY

## 2018-01-01 PROCEDURE — 86022 PLATELET ANTIBODIES: CPT | Performed by: STUDENT IN AN ORGANIZED HEALTH CARE EDUCATION/TRAINING PROGRAM

## 2018-01-01 PROCEDURE — 88309 TISSUE EXAM BY PATHOLOGIST: CPT | Performed by: TRANSPLANT SURGERY

## 2018-01-01 PROCEDURE — 5A1945Z RESPIRATORY VENTILATION, 24-96 CONSECUTIVE HOURS: ICD-10-PCS | Performed by: SURGERY

## 2018-01-01 PROCEDURE — 87517 HEPATITIS B DNA QUANT: CPT | Performed by: INTERNAL MEDICINE

## 2018-01-01 PROCEDURE — 92526 ORAL FUNCTION THERAPY: CPT | Mod: GN

## 2018-01-01 PROCEDURE — 84999 UNLISTED CHEMISTRY PROCEDURE: CPT | Performed by: NURSE PRACTITIONER

## 2018-01-01 PROCEDURE — 27210794 ZZH OR GENERAL SUPPLY STERILE: Performed by: SURGERY

## 2018-01-01 PROCEDURE — 87081 CULTURE SCREEN ONLY: CPT | Performed by: INTERNAL MEDICINE

## 2018-01-01 PROCEDURE — 37000008 ZZH ANESTHESIA TECHNICAL FEE, 1ST 30 MIN: Performed by: SURGERY

## 2018-01-01 PROCEDURE — 40000611 ZZHCL STATISTIC MORPHOLOGY W/INTERP HEMEPATH TC 85060: Performed by: STUDENT IN AN ORGANIZED HEALTH CARE EDUCATION/TRAINING PROGRAM

## 2018-01-01 PROCEDURE — 88305 TISSUE EXAM BY PATHOLOGIST: CPT | Performed by: TRANSPLANT SURGERY

## 2018-01-01 PROCEDURE — 99291 CRITICAL CARE FIRST HOUR: CPT | Mod: GC | Performed by: ANESTHESIOLOGY

## 2018-01-01 PROCEDURE — 85396 CLOTTING ASSAY WHOLE BLOOD: CPT | Performed by: ANESTHESIOLOGY

## 2018-01-01 PROCEDURE — 87640 STAPH A DNA AMP PROBE: CPT | Performed by: INTERNAL MEDICINE

## 2018-01-01 PROCEDURE — 88305 TISSUE EXAM BY PATHOLOGIST: CPT | Performed by: INTERNAL MEDICINE

## 2018-01-01 PROCEDURE — 97165 OT EVAL LOW COMPLEX 30 MIN: CPT | Mod: GO

## 2018-01-01 PROCEDURE — 0W3P8ZZ CONTROL BLEEDING IN GASTROINTESTINAL TRACT, VIA NATURAL OR ARTIFICIAL OPENING ENDOSCOPIC: ICD-10-PCS | Performed by: INTERNAL MEDICINE

## 2018-01-01 PROCEDURE — 84478 ASSAY OF TRIGLYCERIDES: CPT | Performed by: SURGERY

## 2018-01-01 PROCEDURE — 87389 HIV-1 AG W/HIV-1&-2 AB AG IA: CPT | Performed by: STUDENT IN AN ORGANIZED HEALTH CARE EDUCATION/TRAINING PROGRAM

## 2018-01-01 PROCEDURE — 86705 HEP B CORE ANTIBODY IGM: CPT | Performed by: NURSE PRACTITIONER

## 2018-01-01 PROCEDURE — 83735 ASSAY OF MAGNESIUM: CPT | Performed by: PEDIATRICS

## 2018-01-01 PROCEDURE — 37000008 ZZH ANESTHESIA TECHNICAL FEE, 1ST 30 MIN: Performed by: INTERNAL MEDICINE

## 2018-01-01 PROCEDURE — 36415 COLL VENOUS BLD VENIPUNCTURE: CPT | Performed by: SURGERY

## 2018-01-01 PROCEDURE — 86833 HLA CLASS II HIGH DEFIN QUAL: CPT | Performed by: INTERNAL MEDICINE

## 2018-01-01 PROCEDURE — 86709 HEPATITIS A IGM ANTIBODY: CPT | Performed by: STUDENT IN AN ORGANIZED HEALTH CARE EDUCATION/TRAINING PROGRAM

## 2018-01-01 PROCEDURE — 0WQF0ZZ REPAIR ABDOMINAL WALL, OPEN APPROACH: ICD-10-PCS | Performed by: TRANSPLANT SURGERY

## 2018-01-01 PROCEDURE — 85004 AUTOMATED DIFF WBC COUNT: CPT | Performed by: STUDENT IN AN ORGANIZED HEALTH CARE EDUCATION/TRAINING PROGRAM

## 2018-01-01 PROCEDURE — 83550 IRON BINDING TEST: CPT | Performed by: STUDENT IN AN ORGANIZED HEALTH CARE EDUCATION/TRAINING PROGRAM

## 2018-01-01 PROCEDURE — 84100 ASSAY OF PHOSPHORUS: CPT | Performed by: HOSPITALIST

## 2018-01-01 PROCEDURE — P9059 PLASMA, FRZ BETWEEN 8-24HOUR: HCPCS | Performed by: INTERNAL MEDICINE

## 2018-01-01 PROCEDURE — 86900 BLOOD TYPING SEROLOGIC ABO: CPT | Performed by: NURSE PRACTITIONER

## 2018-01-01 PROCEDURE — 88305 TISSUE EXAM BY PATHOLOGIST: CPT | Performed by: STUDENT IN AN ORGANIZED HEALTH CARE EDUCATION/TRAINING PROGRAM

## 2018-01-01 PROCEDURE — 87088 URINE BACTERIA CULTURE: CPT | Performed by: INTERNAL MEDICINE

## 2018-01-01 PROCEDURE — 99223 1ST HOSP IP/OBS HIGH 75: CPT | Mod: AI | Performed by: PEDIATRICS

## 2018-01-01 PROCEDURE — 74177 CT ABD & PELVIS W/CONTRAST: CPT

## 2018-01-01 PROCEDURE — 87186 SC STD MICRODIL/AGAR DIL: CPT | Performed by: NURSE PRACTITIONER

## 2018-01-01 PROCEDURE — 40000277 XR SURGERY CARM FLUORO LESS THAN 5 MIN W STILLS: Mod: TC

## 2018-01-01 PROCEDURE — 40000065 ZZH STATISTIC EKG NON-CHARGEABLE

## 2018-01-01 PROCEDURE — 80150 ASSAY OF AMIKACIN: CPT | Performed by: SURGERY

## 2018-01-01 PROCEDURE — 86706 HEP B SURFACE ANTIBODY: CPT | Performed by: NURSE PRACTITIONER

## 2018-01-01 PROCEDURE — 00000155 ZZHCL STATISTIC H-CELL BLOCK W/STAIN: Performed by: INTERNAL MEDICINE

## 2018-01-01 PROCEDURE — 99291 CRITICAL CARE FIRST HOUR: CPT | Mod: 25 | Performed by: NURSE PRACTITIONER

## 2018-01-01 PROCEDURE — 81200005 ZZH ACQUISITION LIVER CADAVER DONOR

## 2018-01-01 PROCEDURE — 80150 ASSAY OF AMIKACIN: CPT | Performed by: TRANSPLANT SURGERY

## 2018-01-01 PROCEDURE — 81370 HLA I & II TYPING LR: CPT | Performed by: INTERNAL MEDICINE

## 2018-01-01 PROCEDURE — 87181 SC STD AGAR DILUTION PER AGT: CPT | Performed by: NURSE PRACTITIONER

## 2018-01-01 PROCEDURE — P9073 PLATELETS PHERESIS PATH REDU: HCPCS | Performed by: ANESTHESIOLOGY

## 2018-01-01 PROCEDURE — 86022 PLATELET ANTIBODIES: CPT | Performed by: INTERNAL MEDICINE

## 2018-01-01 PROCEDURE — 82107 ALPHA-FETOPROTEIN L3: CPT | Performed by: STUDENT IN AN ORGANIZED HEALTH CARE EDUCATION/TRAINING PROGRAM

## 2018-01-01 PROCEDURE — 84134 ASSAY OF PREALBUMIN: CPT | Performed by: TRANSPLANT SURGERY

## 2018-01-01 PROCEDURE — 82436 ASSAY OF URINE CHLORIDE: CPT | Performed by: INTERNAL MEDICINE

## 2018-01-01 PROCEDURE — 82248 BILIRUBIN DIRECT: CPT | Performed by: STUDENT IN AN ORGANIZED HEALTH CARE EDUCATION/TRAINING PROGRAM

## 2018-01-01 PROCEDURE — C1769 GUIDE WIRE: HCPCS

## 2018-01-01 PROCEDURE — 97530 THERAPEUTIC ACTIVITIES: CPT | Mod: GP | Performed by: REHABILITATION PRACTITIONER

## 2018-01-01 PROCEDURE — 74018 RADEX ABDOMEN 1 VIEW: CPT | Mod: 77

## 2018-01-01 PROCEDURE — 84478 ASSAY OF TRIGLYCERIDES: CPT | Performed by: INTERNAL MEDICINE

## 2018-01-01 PROCEDURE — 87205 SMEAR GRAM STAIN: CPT | Performed by: INTERNAL MEDICINE

## 2018-01-01 PROCEDURE — 84295 ASSAY OF SERUM SODIUM: CPT | Performed by: STUDENT IN AN ORGANIZED HEALTH CARE EDUCATION/TRAINING PROGRAM

## 2018-01-01 PROCEDURE — 85396 CLOTTING ASSAY WHOLE BLOOD: CPT | Performed by: NURSE ANESTHETIST, CERTIFIED REGISTERED

## 2018-01-01 PROCEDURE — 82746 ASSAY OF FOLIC ACID SERUM: CPT | Performed by: STUDENT IN AN ORGANIZED HEALTH CARE EDUCATION/TRAINING PROGRAM

## 2018-01-01 PROCEDURE — 84300 ASSAY OF URINE SODIUM: CPT | Performed by: PHYSICIAN ASSISTANT

## 2018-01-01 PROCEDURE — 87075 CULTR BACTERIA EXCEPT BLOOD: CPT | Performed by: SURGERY

## 2018-01-01 PROCEDURE — 82550 ASSAY OF CK (CPK): CPT | Performed by: NURSE PRACTITIONER

## 2018-01-01 PROCEDURE — 86923 COMPATIBILITY TEST ELECTRIC: CPT | Performed by: NURSE PRACTITIONER

## 2018-01-01 PROCEDURE — 40000985 XR ABDOMEN PORT 1 VW

## 2018-01-01 PROCEDURE — 86705 HEP B CORE ANTIBODY IGM: CPT | Performed by: STUDENT IN AN ORGANIZED HEALTH CARE EDUCATION/TRAINING PROGRAM

## 2018-01-01 PROCEDURE — 76376 3D RENDER W/INTRP POSTPROCES: CPT | Mod: 26 | Performed by: INTERNAL MEDICINE

## 2018-01-01 PROCEDURE — 85396 CLOTTING ASSAY WHOLE BLOOD: CPT | Performed by: NURSE PRACTITIONER

## 2018-01-01 PROCEDURE — 0DNU0ZZ RELEASE OMENTUM, OPEN APPROACH: ICD-10-PCS | Performed by: TRANSPLANT SURGERY

## 2018-01-01 PROCEDURE — 82150 ASSAY OF AMYLASE: CPT | Performed by: NURSE PRACTITIONER

## 2018-01-01 PROCEDURE — P9037 PLATE PHERES LEUKOREDU IRRAD: HCPCS | Performed by: TRANSPLANT SURGERY

## 2018-01-01 PROCEDURE — 88307 TISSUE EXAM BY PATHOLOGIST: CPT | Performed by: TRANSPLANT SURGERY

## 2018-01-01 PROCEDURE — 85730 THROMBOPLASTIN TIME PARTIAL: CPT | Performed by: SURGERY

## 2018-01-01 PROCEDURE — 83690 ASSAY OF LIPASE: CPT | Performed by: STUDENT IN AN ORGANIZED HEALTH CARE EDUCATION/TRAINING PROGRAM

## 2018-01-01 PROCEDURE — 99291 CRITICAL CARE FIRST HOUR: CPT | Mod: 25 | Performed by: INTERNAL MEDICINE

## 2018-01-01 PROCEDURE — 3E043XZ INTRODUCTION OF VASOPRESSOR INTO CENTRAL VEIN, PERCUTANEOUS APPROACH: ICD-10-PCS | Performed by: SURGERY

## 2018-01-01 PROCEDURE — 87641 MR-STAPH DNA AMP PROBE: CPT | Performed by: INTERNAL MEDICINE

## 2018-01-01 PROCEDURE — 36415 COLL VENOUS BLD VENIPUNCTURE: CPT | Performed by: HOSPITALIST

## 2018-01-01 PROCEDURE — P9073 PLATELETS PHERESIS PATH REDU: HCPCS | Performed by: STUDENT IN AN ORGANIZED HEALTH CARE EDUCATION/TRAINING PROGRAM

## 2018-01-01 PROCEDURE — 49082 ABD PARACENTESIS: CPT | Mod: GC | Performed by: INTERNAL MEDICINE

## 2018-01-01 PROCEDURE — 40000802 ZZH SITE CHECK

## 2018-01-01 PROCEDURE — 36415 COLL VENOUS BLD VENIPUNCTURE: CPT | Performed by: ANESTHESIOLOGY

## 2018-01-01 PROCEDURE — 49082 ABD PARACENTESIS: CPT | Mod: GC | Performed by: PEDIATRICS

## 2018-01-01 PROCEDURE — 84145 PROCALCITONIN (PCT): CPT | Performed by: STUDENT IN AN ORGANIZED HEALTH CARE EDUCATION/TRAINING PROGRAM

## 2018-01-01 PROCEDURE — 86644 CMV ANTIBODY: CPT | Performed by: NURSE PRACTITIONER

## 2018-01-01 PROCEDURE — 36415 COLL VENOUS BLD VENIPUNCTURE: CPT | Performed by: PEDIATRICS

## 2018-01-01 PROCEDURE — 20950 MNTR INTRSTITIAL FLUID PRESS: CPT

## 2018-01-01 PROCEDURE — 82962 GLUCOSE BLOOD TEST: CPT

## 2018-01-01 PROCEDURE — 85018 HEMOGLOBIN: CPT | Performed by: TRANSPLANT SURGERY

## 2018-01-01 PROCEDURE — 87181 SC STD AGAR DILUTION PER AGT: CPT | Performed by: INTERNAL MEDICINE

## 2018-01-01 PROCEDURE — 83690 ASSAY OF LIPASE: CPT | Performed by: INTERNAL MEDICINE

## 2018-01-01 PROCEDURE — 88112 CYTOPATH CELL ENHANCE TECH: CPT | Performed by: INTERNAL MEDICINE

## 2018-01-01 PROCEDURE — 93970 EXTREMITY STUDY: CPT | Mod: XS

## 2018-01-01 PROCEDURE — P9047 ALBUMIN (HUMAN), 25%, 50ML: HCPCS | Performed by: PEDIATRICS

## 2018-01-01 PROCEDURE — 00000102 ZZHCL STATISTIC CYTO WRIGHT STAIN TC: Performed by: STUDENT IN AN ORGANIZED HEALTH CARE EDUCATION/TRAINING PROGRAM

## 2018-01-01 PROCEDURE — 0DQB0ZZ REPAIR ILEUM, OPEN APPROACH: ICD-10-PCS | Performed by: TRANSPLANT SURGERY

## 2018-01-01 PROCEDURE — 99239 HOSP IP/OBS DSCHRG MGMT >30: CPT | Mod: GC | Performed by: HOSPITALIST

## 2018-01-01 PROCEDURE — 87517 HEPATITIS B DNA QUANT: CPT | Performed by: STUDENT IN AN ORGANIZED HEALTH CARE EDUCATION/TRAINING PROGRAM

## 2018-01-01 PROCEDURE — 99233 SBSQ HOSP IP/OBS HIGH 50: CPT | Performed by: PEDIATRICS

## 2018-01-01 PROCEDURE — 00000102 ZZHCL STATISTIC CYTO WRIGHT STAIN TC: Performed by: INTERNAL MEDICINE

## 2018-01-01 PROCEDURE — 88342 IMHCHEM/IMCYTCHM 1ST ANTB: CPT | Performed by: TRANSPLANT SURGERY

## 2018-01-01 PROCEDURE — 25500064 ZZH RX 255 OP 636: Performed by: INTERNAL MEDICINE

## 2018-01-01 PROCEDURE — 86645 CMV ANTIBODY IGM: CPT | Performed by: STUDENT IN AN ORGANIZED HEALTH CARE EDUCATION/TRAINING PROGRAM

## 2018-01-01 PROCEDURE — 87102 FUNGUS ISOLATION CULTURE: CPT | Performed by: SURGERY

## 2018-01-01 DEVICE — GRAFT STRATTICE 16X20CM FIRM 1620002 CHG PER SQ CM=320 UNITS: Type: IMPLANTABLE DEVICE | Status: FUNCTIONAL

## 2018-01-01 DEVICE — IMPLANTABLE DEVICE: Type: IMPLANTABLE DEVICE | Site: ABDOMEN | Status: FUNCTIONAL

## 2018-01-01 RX ORDER — POTASSIUM CHLORIDE 1.5 G/1.58G
40 POWDER, FOR SOLUTION ORAL ONCE
Status: COMPLETED | OUTPATIENT
Start: 2018-01-01 | End: 2018-01-01

## 2018-01-01 RX ORDER — SODIUM CHLORIDE 9 MG/ML
INJECTION, SOLUTION INTRAVENOUS
Status: DISCONTINUED
Start: 2018-01-01 | End: 2018-01-01 | Stop reason: HOSPADM

## 2018-01-01 RX ORDER — SODIUM CHLORIDE 9 MG/ML
INJECTION, SOLUTION INTRAVENOUS CONTINUOUS PRN
Status: DISCONTINUED | OUTPATIENT
Start: 2018-01-01 | End: 2018-01-01

## 2018-01-01 RX ORDER — FENTANYL CITRATE 50 UG/ML
25-50 INJECTION, SOLUTION INTRAMUSCULAR; INTRAVENOUS
Status: DISCONTINUED | OUTPATIENT
Start: 2018-01-01 | End: 2018-01-01

## 2018-01-01 RX ORDER — POTASSIUM CHLORIDE 1.5 G/1.58G
20-40 POWDER, FOR SOLUTION ORAL
Status: DISCONTINUED | OUTPATIENT
Start: 2018-01-01 | End: 2018-01-01

## 2018-01-01 RX ORDER — FUROSEMIDE 10 MG/ML
40 INJECTION INTRAMUSCULAR; INTRAVENOUS ONCE
Status: DISCONTINUED | OUTPATIENT
Start: 2018-01-01 | End: 2018-01-01

## 2018-01-01 RX ORDER — POTASSIUM CHLORIDE 14.9 MG/ML
20 INJECTION INTRAVENOUS EVERY 6 HOURS PRN
Status: DISCONTINUED | OUTPATIENT
Start: 2018-01-01 | End: 2018-01-01

## 2018-01-01 RX ORDER — LIDOCAINE 40 MG/G
CREAM TOPICAL
Status: DISCONTINUED | OUTPATIENT
Start: 2018-01-01 | End: 2018-01-01

## 2018-01-01 RX ORDER — POTASSIUM CHLORIDE 750 MG/1
20-40 TABLET, EXTENDED RELEASE ORAL
Status: DISCONTINUED | OUTPATIENT
Start: 2018-01-01 | End: 2018-01-01 | Stop reason: HOSPADM

## 2018-01-01 RX ORDER — PROCHLORPERAZINE 25 MG
25 SUPPOSITORY, RECTAL RECTAL EVERY 12 HOURS PRN
Status: DISCONTINUED | OUTPATIENT
Start: 2018-01-01 | End: 2018-01-01 | Stop reason: HOSPADM

## 2018-01-01 RX ORDER — PIPERACILLIN SODIUM, TAZOBACTAM SODIUM 4; .5 G/20ML; G/20ML
4.5 INJECTION, POWDER, LYOPHILIZED, FOR SOLUTION INTRAVENOUS EVERY 6 HOURS
Status: DISCONTINUED | OUTPATIENT
Start: 2018-01-01 | End: 2018-01-01

## 2018-01-01 RX ORDER — POTASSIUM CL/LIDO/0.9 % NACL 10MEQ/0.1L
10 INTRAVENOUS SOLUTION, PIGGYBACK (ML) INTRAVENOUS
Status: DISCONTINUED | OUTPATIENT
Start: 2018-01-01 | End: 2018-01-01

## 2018-01-01 RX ORDER — SODIUM CHLORIDE 450 MG/100ML
INJECTION, SOLUTION INTRAVENOUS
Status: DISCONTINUED
Start: 2018-01-01 | End: 2018-01-01 | Stop reason: HOSPADM

## 2018-01-01 RX ORDER — POTASSIUM CHLORIDE 7.45 MG/ML
10 INJECTION INTRAVENOUS ONCE
Status: DISCONTINUED | OUTPATIENT
Start: 2018-01-01 | End: 2018-01-01

## 2018-01-01 RX ORDER — ALBUMIN (HUMAN) 12.5 G/50ML
12.5 SOLUTION INTRAVENOUS 4 TIMES DAILY PRN
Status: DISCONTINUED | OUTPATIENT
Start: 2018-01-01 | End: 2018-01-01 | Stop reason: HOSPADM

## 2018-01-01 RX ORDER — HEPARIN SODIUM 10000 [USP'U]/100ML
400 INJECTION, SOLUTION INTRAVENOUS
Status: DISCONTINUED | OUTPATIENT
Start: 2018-01-01 | End: 2018-01-01

## 2018-01-01 RX ORDER — TENOFOVIR DISOPROXIL FUMARATE 300 MG/1
300 TABLET, FILM COATED ORAL
Status: DISCONTINUED | OUTPATIENT
Start: 2018-01-01 | End: 2018-01-01

## 2018-01-01 RX ORDER — ALBUMIN, HUMAN INJ 5% 5 %
SOLUTION INTRAVENOUS
Status: COMPLETED
Start: 2018-01-01 | End: 2018-01-01

## 2018-01-01 RX ORDER — SODIUM CHLORIDE 9 MG/ML
INJECTION, SOLUTION INTRAVENOUS
Status: COMPLETED
Start: 2018-01-01 | End: 2018-01-01

## 2018-01-01 RX ORDER — PROPOFOL 10 MG/ML
INJECTION, EMULSION INTRAVENOUS
Status: COMPLETED
Start: 2018-01-01 | End: 2018-01-01

## 2018-01-01 RX ORDER — ALBUMIN (HUMAN) 12.5 G/50ML
12.5 SOLUTION INTRAVENOUS EVERY 6 HOURS PRN
Status: DISCONTINUED | OUTPATIENT
Start: 2018-01-01 | End: 2018-01-01

## 2018-01-01 RX ORDER — ALBUMIN, HUMAN INJ 5% 5 %
12.5 SOLUTION INTRAVENOUS EVERY 12 HOURS PRN
Status: DISCONTINUED | OUTPATIENT
Start: 2018-01-01 | End: 2018-01-01

## 2018-01-01 RX ORDER — ALBUMIN, HUMAN INJ 5% 5 %
12.5 SOLUTION INTRAVENOUS ONCE
Status: COMPLETED | OUTPATIENT
Start: 2018-01-01 | End: 2018-01-01

## 2018-01-01 RX ORDER — HEPARIN SODIUM 10000 [USP'U]/100ML
0-3500 INJECTION, SOLUTION INTRAVENOUS CONTINUOUS
Status: DISCONTINUED | OUTPATIENT
Start: 2018-01-01 | End: 2018-01-01

## 2018-01-01 RX ORDER — ALBUMIN (HUMAN) 12.5 G/50ML
25 SOLUTION INTRAVENOUS EVERY 8 HOURS
Status: COMPLETED | OUTPATIENT
Start: 2018-01-01 | End: 2018-01-01

## 2018-01-01 RX ORDER — MAGNESIUM HYDROXIDE 1200 MG/15ML
LIQUID ORAL PRN
Status: DISCONTINUED | OUTPATIENT
Start: 2018-01-01 | End: 2018-01-01 | Stop reason: HOSPADM

## 2018-01-01 RX ORDER — CEFTRIAXONE 1 G/1
1 INJECTION, POWDER, FOR SOLUTION INTRAMUSCULAR; INTRAVENOUS EVERY 24 HOURS
Status: DISCONTINUED | OUTPATIENT
Start: 2018-01-01 | End: 2018-01-01

## 2018-01-01 RX ORDER — PROPOFOL 10 MG/ML
5-75 INJECTION, EMULSION INTRAVENOUS CONTINUOUS
Status: DISCONTINUED | OUTPATIENT
Start: 2018-01-01 | End: 2018-01-01

## 2018-01-01 RX ORDER — SULFAMETHOXAZOLE AND TRIMETHOPRIM 200; 40 MG/5ML; MG/5ML
10 SUSPENSION ORAL
Status: DISCONTINUED | OUTPATIENT
Start: 2018-01-01 | End: 2018-01-01

## 2018-01-01 RX ORDER — OXYCODONE HYDROCHLORIDE 5 MG/1
5 TABLET ORAL EVERY 4 HOURS
Status: DISCONTINUED | OUTPATIENT
Start: 2018-01-01 | End: 2018-01-01

## 2018-01-01 RX ORDER — LACTULOSE 10 G/15ML
20 SOLUTION ORAL
Status: DISCONTINUED | OUTPATIENT
Start: 2018-01-01 | End: 2018-01-01

## 2018-01-01 RX ORDER — MORPHINE SULFATE 2 MG/ML
2-4 INJECTION, SOLUTION INTRAMUSCULAR; INTRAVENOUS EVERY 10 MIN PRN
Status: DISCONTINUED | OUTPATIENT
Start: 2018-01-01 | End: 2018-01-01 | Stop reason: HOSPADM

## 2018-01-01 RX ORDER — POTASSIUM CHLORIDE 1.5 G/1.58G
20-40 POWDER, FOR SOLUTION ORAL
Status: DISCONTINUED | OUTPATIENT
Start: 2018-01-01 | End: 2018-01-01 | Stop reason: HOSPADM

## 2018-01-01 RX ORDER — POTASSIUM CL/LIDO/0.9 % NACL 10MEQ/0.1L
10 INTRAVENOUS SOLUTION, PIGGYBACK (ML) INTRAVENOUS
Status: DISCONTINUED | OUTPATIENT
Start: 2018-01-01 | End: 2018-01-01 | Stop reason: HOSPADM

## 2018-01-01 RX ORDER — PIPERACILLIN SODIUM, TAZOBACTAM SODIUM 3; .375 G/15ML; G/15ML
3.38 INJECTION, POWDER, LYOPHILIZED, FOR SOLUTION INTRAVENOUS ONCE
Status: DISCONTINUED | OUTPATIENT
Start: 2018-01-01 | End: 2018-01-01

## 2018-01-01 RX ORDER — MIDAZOLAM (PF) 1 MG/ML IN 0.9 % SODIUM CHLORIDE INTRAVENOUS SOLUTION
1-8 CONTINUOUS
Status: DISCONTINUED | OUTPATIENT
Start: 2018-01-01 | End: 2018-01-01

## 2018-01-01 RX ORDER — ATROPINE SULFATE 0.1 MG/ML
INJECTION INTRAVENOUS
Status: DISCONTINUED
Start: 2018-01-01 | End: 2018-01-01 | Stop reason: HOSPADM

## 2018-01-01 RX ORDER — LACTULOSE 10 G/15ML
100 SOLUTION ORAL
Status: DISCONTINUED | OUTPATIENT
Start: 2018-01-01 | End: 2018-01-01

## 2018-01-01 RX ORDER — SODIUM CHLORIDE, SODIUM LACTATE, POTASSIUM CHLORIDE, CALCIUM CHLORIDE 600; 310; 30; 20 MG/100ML; MG/100ML; MG/100ML; MG/100ML
INJECTION, SOLUTION INTRAVENOUS CONTINUOUS PRN
Status: DISCONTINUED | OUTPATIENT
Start: 2018-01-01 | End: 2018-01-01

## 2018-01-01 RX ORDER — ACETAMINOPHEN 325 MG/1
325 TABLET ORAL EVERY 4 HOURS PRN
Status: DISCONTINUED | OUTPATIENT
Start: 2018-01-01 | End: 2018-01-01 | Stop reason: HOSPADM

## 2018-01-01 RX ORDER — IRRIGATION SET
800 IRRIGATION SET MISCELLANEOUS EVERY 24 HOURS
Status: COMPLETED | OUTPATIENT
Start: 2018-01-01 | End: 2018-01-01

## 2018-01-01 RX ORDER — POTASSIUM CHLORIDE 1.5 G/1.58G
20 POWDER, FOR SOLUTION ORAL 2 TIMES DAILY
Status: COMPLETED | OUTPATIENT
Start: 2018-01-01 | End: 2018-01-01

## 2018-01-01 RX ORDER — LIDOCAINE 50 MG/G
OINTMENT TOPICAL EVERY 4 HOURS PRN
Qty: 240 G | Refills: 0 | Status: SHIPPED | OUTPATIENT
Start: 2018-01-01

## 2018-01-01 RX ORDER — POTASSIUM CHLORIDE 7.45 MG/ML
10 INJECTION INTRAVENOUS
Status: DISCONTINUED | OUTPATIENT
Start: 2018-01-01 | End: 2018-01-01

## 2018-01-01 RX ORDER — PROPOFOL 10 MG/ML
INJECTION, EMULSION INTRAVENOUS CONTINUOUS PRN
Status: DISCONTINUED | OUTPATIENT
Start: 2018-01-01 | End: 2018-01-01

## 2018-01-01 RX ORDER — SODIUM CHLORIDE 450 MG/100ML
INJECTION, SOLUTION INTRAVENOUS CONTINUOUS
Status: DISCONTINUED | OUTPATIENT
Start: 2018-01-01 | End: 2018-01-01

## 2018-01-01 RX ORDER — ALBUMIN (HUMAN) 12.5 G/50ML
25 SOLUTION INTRAVENOUS EVERY 6 HOURS PRN
Status: DISCONTINUED | OUTPATIENT
Start: 2018-01-01 | End: 2018-01-01

## 2018-01-01 RX ORDER — POTASSIUM CHLORIDE 29.8 MG/ML
20 INJECTION INTRAVENOUS EVERY 6 HOURS PRN
Status: DISCONTINUED | OUTPATIENT
Start: 2018-01-01 | End: 2018-01-01

## 2018-01-01 RX ORDER — POTASSIUM CHLORIDE 7.45 MG/ML
10 INJECTION INTRAVENOUS
Status: DISCONTINUED | OUTPATIENT
Start: 2018-01-01 | End: 2018-01-01 | Stop reason: HOSPADM

## 2018-01-01 RX ORDER — OXYCODONE HCL 5 MG/5 ML
5-10 SOLUTION, ORAL ORAL
Status: DISCONTINUED | OUTPATIENT
Start: 2018-01-01 | End: 2018-01-01

## 2018-01-01 RX ORDER — FUROSEMIDE 10 MG/ML
20 INJECTION INTRAMUSCULAR; INTRAVENOUS ONCE
Status: DISCONTINUED | OUTPATIENT
Start: 2018-01-01 | End: 2018-01-01

## 2018-01-01 RX ORDER — LEVOFLOXACIN 250 MG/1
250 TABLET, FILM COATED ORAL EVERY 24 HOURS
Status: DISCONTINUED | OUTPATIENT
Start: 2018-01-01 | End: 2018-01-01

## 2018-01-01 RX ORDER — LACTULOSE 10 G/15ML
20 SOLUTION ORAL
Status: ON HOLD | COMMUNITY
Start: 2018-01-01 | End: 2018-01-01

## 2018-01-01 RX ORDER — VALGANCICLOVIR HYDROCHLORIDE 50 MG/ML
450 POWDER, FOR SOLUTION ORAL
Status: DISCONTINUED | OUTPATIENT
Start: 2018-01-01 | End: 2018-01-01

## 2018-01-01 RX ORDER — BUMETANIDE 0.25 MG/ML
2 INJECTION INTRAMUSCULAR; INTRAVENOUS EVERY 8 HOURS
Status: DISCONTINUED | OUTPATIENT
Start: 2018-01-01 | End: 2018-01-01

## 2018-01-01 RX ORDER — MAGNESIUM SULFATE HEPTAHYDRATE 40 MG/ML
4 INJECTION, SOLUTION INTRAVENOUS EVERY 4 HOURS PRN
Status: DISCONTINUED | OUTPATIENT
Start: 2018-01-01 | End: 2018-01-01

## 2018-01-01 RX ORDER — HEPARIN SODIUM 10000 [USP'U]/100ML
0-3500 INJECTION, SOLUTION INTRAVENOUS CONTINUOUS
Status: DISPENSED | OUTPATIENT
Start: 2018-01-01 | End: 2018-01-01

## 2018-01-01 RX ORDER — SODIUM CHLORIDE, SODIUM LACTATE, POTASSIUM CHLORIDE, CALCIUM CHLORIDE 600; 310; 30; 20 MG/100ML; MG/100ML; MG/100ML; MG/100ML
INJECTION, SOLUTION INTRAVENOUS
Status: DISCONTINUED
Start: 2018-01-01 | End: 2018-01-01 | Stop reason: HOSPADM

## 2018-01-01 RX ORDER — FENTANYL CITRATE 50 UG/ML
INJECTION, SOLUTION INTRAMUSCULAR; INTRAVENOUS PRN
Status: DISCONTINUED | OUTPATIENT
Start: 2018-01-01 | End: 2018-01-01

## 2018-01-01 RX ORDER — PANTOPRAZOLE SODIUM 40 MG/1
40 TABLET, DELAYED RELEASE ORAL
Status: DISCONTINUED | OUTPATIENT
Start: 2018-01-01 | End: 2018-01-01

## 2018-01-01 RX ORDER — DEXTROSE MONOHYDRATE 50 MG/ML
INJECTION, SOLUTION INTRAVENOUS CONTINUOUS
Status: DISCONTINUED | OUTPATIENT
Start: 2018-01-01 | End: 2018-01-01

## 2018-01-01 RX ORDER — HEPARIN SODIUM 1000 [USP'U]/ML
INJECTION, SOLUTION INTRAVENOUS; SUBCUTANEOUS PRN
Status: DISCONTINUED | OUTPATIENT
Start: 2018-01-01 | End: 2018-01-01

## 2018-01-01 RX ORDER — DEXTROSE MONOHYDRATE 25 G/50ML
25-50 INJECTION, SOLUTION INTRAVENOUS
Status: DISCONTINUED | OUTPATIENT
Start: 2018-01-01 | End: 2018-01-01 | Stop reason: HOSPADM

## 2018-01-01 RX ORDER — POTASSIUM CHLORIDE 750 MG/1
20-40 TABLET, EXTENDED RELEASE ORAL
Status: DISCONTINUED | OUTPATIENT
Start: 2018-01-01 | End: 2018-01-01

## 2018-01-01 RX ORDER — LIDOCAINE 50 MG/G
OINTMENT TOPICAL EVERY 4 HOURS PRN
Status: DISCONTINUED | OUTPATIENT
Start: 2018-01-01 | End: 2018-01-01 | Stop reason: HOSPADM

## 2018-01-01 RX ORDER — FENTANYL CITRATE 50 UG/ML
INJECTION, SOLUTION INTRAMUSCULAR; INTRAVENOUS
Status: COMPLETED
Start: 2018-01-01 | End: 2018-01-01

## 2018-01-01 RX ORDER — METHYLPREDNISOLONE SODIUM SUCCINATE 125 MG/2ML
50 INJECTION, POWDER, LYOPHILIZED, FOR SOLUTION INTRAMUSCULAR; INTRAVENOUS ONCE
Status: COMPLETED | OUTPATIENT
Start: 2018-01-01 | End: 2018-01-01

## 2018-01-01 RX ORDER — LACTULOSE 10 G/15ML
20 SOLUTION ORAL 3 TIMES DAILY
Status: DISCONTINUED | OUTPATIENT
Start: 2018-01-01 | End: 2018-01-01 | Stop reason: HOSPADM

## 2018-01-01 RX ORDER — MIDODRINE HYDROCHLORIDE 5 MG/1
10 TABLET ORAL
Status: DISCONTINUED | OUTPATIENT
Start: 2018-01-01 | End: 2018-01-01 | Stop reason: HOSPADM

## 2018-01-01 RX ORDER — NALOXONE HYDROCHLORIDE 0.4 MG/ML
.1-.4 INJECTION, SOLUTION INTRAMUSCULAR; INTRAVENOUS; SUBCUTANEOUS
Status: DISCONTINUED | OUTPATIENT
Start: 2018-01-01 | End: 2018-01-01 | Stop reason: HOSPADM

## 2018-01-01 RX ORDER — POTASSIUM CHLORIDE 20MEQ/15ML
80 LIQUID (ML) ORAL ONCE
Status: COMPLETED | OUTPATIENT
Start: 2018-01-01 | End: 2018-01-01

## 2018-01-01 RX ORDER — MYCOPHENOLATE MOFETIL 200 MG/ML
250 POWDER, FOR SUSPENSION ORAL
Status: DISCONTINUED | OUTPATIENT
Start: 2018-01-01 | End: 2018-01-01

## 2018-01-01 RX ORDER — AMOXICILLIN 250 MG
1 CAPSULE ORAL 2 TIMES DAILY
Status: DISCONTINUED | OUTPATIENT
Start: 2018-01-01 | End: 2018-01-01

## 2018-01-01 RX ORDER — LINEZOLID 2 MG/ML
INJECTION, SOLUTION INTRAVENOUS PRN
Status: DISCONTINUED | OUTPATIENT
Start: 2018-01-01 | End: 2018-01-01

## 2018-01-01 RX ORDER — PANTOPRAZOLE SODIUM 40 MG/1
40 TABLET, DELAYED RELEASE ORAL
Qty: 180 TABLET | Refills: 0 | Status: SHIPPED | OUTPATIENT
Start: 2018-01-01

## 2018-01-01 RX ORDER — ALBUMIN, HUMAN INJ 5% 5 %
25 SOLUTION INTRAVENOUS ONCE
Status: COMPLETED | OUTPATIENT
Start: 2018-01-01 | End: 2018-01-01

## 2018-01-01 RX ORDER — TACROLIMUS 1 MG/1
2 CAPSULE ORAL
Status: DISCONTINUED | OUTPATIENT
Start: 2018-01-01 | End: 2018-01-01

## 2018-01-01 RX ORDER — LINEZOLID 2 MG/ML
600 INJECTION, SOLUTION INTRAVENOUS EVERY 12 HOURS
Status: DISCONTINUED | OUTPATIENT
Start: 2018-01-01 | End: 2018-01-01

## 2018-01-01 RX ORDER — POTASSIUM CHLORIDE 7.45 MG/ML
10 INJECTION INTRAVENOUS
Status: COMPLETED | OUTPATIENT
Start: 2018-01-01 | End: 2018-01-01

## 2018-01-01 RX ORDER — TENOFOVIR DISOPROXIL FUMARATE 300 MG/1
300 TABLET, FILM COATED ORAL
Status: DISCONTINUED | OUTPATIENT
Start: 2018-01-01 | End: 2018-01-01 | Stop reason: HOSPADM

## 2018-01-01 RX ORDER — OCTREOTIDE ACETATE 100 UG/ML
100 INJECTION, SOLUTION INTRAVENOUS; SUBCUTANEOUS 3 TIMES DAILY
Status: DISCONTINUED | OUTPATIENT
Start: 2018-01-01 | End: 2018-01-01

## 2018-01-01 RX ORDER — PIPERACILLIN SODIUM, TAZOBACTAM SODIUM 3; .375 G/15ML; G/15ML
INJECTION, POWDER, LYOPHILIZED, FOR SOLUTION INTRAVENOUS PRN
Status: DISCONTINUED | OUTPATIENT
Start: 2018-01-01 | End: 2018-01-01

## 2018-01-01 RX ORDER — NICOTINE POLACRILEX 4 MG
15-30 LOZENGE BUCCAL
Status: DISCONTINUED | OUTPATIENT
Start: 2018-01-01 | End: 2018-01-01 | Stop reason: HOSPADM

## 2018-01-01 RX ORDER — BISACODYL 10 MG
10 SUPPOSITORY, RECTAL RECTAL ONCE
Status: COMPLETED | OUTPATIENT
Start: 2018-01-01 | End: 2018-01-01

## 2018-01-01 RX ORDER — FUROSEMIDE 40 MG
20 TABLET ORAL
Status: ON HOLD | COMMUNITY
Start: 2018-01-01 | End: 2018-01-01

## 2018-01-01 RX ORDER — SODIUM CHLORIDE, SODIUM LACTATE, POTASSIUM CHLORIDE, CALCIUM CHLORIDE 600; 310; 30; 20 MG/100ML; MG/100ML; MG/100ML; MG/100ML
INJECTION, SOLUTION INTRAVENOUS
Status: COMPLETED
Start: 2018-01-01 | End: 2018-01-01

## 2018-01-01 RX ORDER — HYDROMORPHONE HYDROCHLORIDE 1 MG/ML
.3-.5 INJECTION, SOLUTION INTRAMUSCULAR; INTRAVENOUS; SUBCUTANEOUS
Status: DISCONTINUED | OUTPATIENT
Start: 2018-01-01 | End: 2018-01-01

## 2018-01-01 RX ORDER — SODIUM CHLORIDE 9 MG/ML
INJECTION, SOLUTION INTRAVENOUS
Status: DISPENSED
Start: 2018-01-01 | End: 2018-01-01

## 2018-01-01 RX ORDER — LIDOCAINE 40 MG/G
CREAM TOPICAL
Status: DISCONTINUED | OUTPATIENT
Start: 2018-01-01 | End: 2018-01-01 | Stop reason: HOSPADM

## 2018-01-01 RX ORDER — METOPROLOL TARTRATE 1 MG/ML
5-15 INJECTION, SOLUTION INTRAVENOUS ONCE
Status: COMPLETED | OUTPATIENT
Start: 2018-01-01 | End: 2018-01-01

## 2018-01-01 RX ORDER — NALOXONE HYDROCHLORIDE 0.4 MG/ML
.1-.4 INJECTION, SOLUTION INTRAMUSCULAR; INTRAVENOUS; SUBCUTANEOUS
Status: CANCELLED | OUTPATIENT
Start: 2018-01-01 | End: 2018-01-01

## 2018-01-01 RX ORDER — ONDANSETRON 4 MG/1
4 TABLET, ORALLY DISINTEGRATING ORAL EVERY 6 HOURS PRN
Status: DISCONTINUED | OUTPATIENT
Start: 2018-01-01 | End: 2018-01-01

## 2018-01-01 RX ORDER — OXYCODONE HCL 5 MG/5 ML
5 SOLUTION, ORAL ORAL EVERY 4 HOURS PRN
Status: DISPENSED | OUTPATIENT
Start: 2018-01-01 | End: 2018-01-01

## 2018-01-01 RX ORDER — ALBUMIN (HUMAN) 12.5 G/50ML
12.5 SOLUTION INTRAVENOUS 4 TIMES DAILY PRN
Status: CANCELLED
Start: 2018-01-01

## 2018-01-01 RX ORDER — MIDODRINE HYDROCHLORIDE 5 MG/1
10 TABLET ORAL EVERY 8 HOURS
Status: DISCONTINUED | OUTPATIENT
Start: 2018-01-01 | End: 2018-01-01

## 2018-01-01 RX ORDER — ALBUMIN (HUMAN) 12.5 G/50ML
25 SOLUTION INTRAVENOUS ONCE
Status: COMPLETED | OUTPATIENT
Start: 2018-01-01 | End: 2018-01-01

## 2018-01-01 RX ORDER — FUROSEMIDE 10 MG/ML
20 INJECTION INTRAMUSCULAR; INTRAVENOUS ONCE
Status: COMPLETED | OUTPATIENT
Start: 2018-01-01 | End: 2018-01-01

## 2018-01-01 RX ORDER — MIDODRINE HYDROCHLORIDE 5 MG/1
15 TABLET ORAL
Status: DISCONTINUED | OUTPATIENT
Start: 2018-01-01 | End: 2018-01-01

## 2018-01-01 RX ORDER — ALBUMIN (HUMAN) 12.5 G/50ML
12.5 SOLUTION INTRAVENOUS ONCE
Status: COMPLETED | OUTPATIENT
Start: 2018-01-01 | End: 2018-01-01

## 2018-01-01 RX ORDER — TENOFOVIR DISOPROXIL FUMARATE 300 MG/1
300 TABLET, FILM COATED ORAL DAILY
Status: DISCONTINUED | OUTPATIENT
Start: 2018-01-01 | End: 2018-01-01 | Stop reason: HOSPADM

## 2018-01-01 RX ORDER — MEROPENEM 1 G/1
1000 INJECTION, POWDER, FOR SOLUTION INTRAVENOUS EVERY 8 HOURS
Status: DISCONTINUED | OUTPATIENT
Start: 2018-01-01 | End: 2018-01-01

## 2018-01-01 RX ORDER — OXYCODONE HCL 5 MG/5 ML
10 SOLUTION, ORAL ORAL EVERY 6 HOURS
Status: DISCONTINUED | OUTPATIENT
Start: 2018-01-01 | End: 2018-01-01

## 2018-01-01 RX ORDER — AMOXICILLIN 500 MG/1
500 CAPSULE ORAL EVERY 12 HOURS
Qty: 10 CAPSULE | Refills: 0 | Status: SHIPPED | OUTPATIENT
Start: 2018-01-01 | End: 2018-01-01

## 2018-01-01 RX ORDER — VALGANCICLOVIR 450 MG/1
450 TABLET, FILM COATED ORAL EVERY OTHER DAY
Status: DISCONTINUED | OUTPATIENT
Start: 2018-01-01 | End: 2018-01-01

## 2018-01-01 RX ORDER — PROPOFOL 10 MG/ML
INJECTION, EMULSION INTRAVENOUS PRN
Status: DISCONTINUED | OUTPATIENT
Start: 2018-01-01 | End: 2018-01-01

## 2018-01-01 RX ORDER — HEPARIN SODIUM 10000 [USP'U]/100ML
500 INJECTION, SOLUTION INTRAVENOUS CONTINUOUS
Status: DISCONTINUED | OUTPATIENT
Start: 2018-01-01 | End: 2018-01-01

## 2018-01-01 RX ORDER — MAGNESIUM HYDROXIDE 1200 MG/15ML
LIQUID ORAL
Status: DISCONTINUED
Start: 2018-01-01 | End: 2018-01-01 | Stop reason: HOSPADM

## 2018-01-01 RX ORDER — TENOFOVIR DISOPROXIL FUMARATE 300 MG/1
300 TABLET, FILM COATED ORAL EVERY OTHER DAY
Status: DISCONTINUED | OUTPATIENT
Start: 2018-01-01 | End: 2018-01-01

## 2018-01-01 RX ORDER — VALGANCICLOVIR HYDROCHLORIDE 50 MG/ML
450 POWDER, FOR SOLUTION ORAL EVERY OTHER DAY
Status: DISCONTINUED | OUTPATIENT
Start: 2018-01-01 | End: 2018-01-01

## 2018-01-01 RX ORDER — ALBUMIN, HUMAN INJ 5% 5 %
SOLUTION INTRAVENOUS
Status: DISCONTINUED
Start: 2018-01-01 | End: 2018-01-01 | Stop reason: HOSPADM

## 2018-01-01 RX ORDER — LIDOCAINE HYDROCHLORIDE 10 MG/ML
10 INJECTION, SOLUTION EPIDURAL; INFILTRATION; INTRACAUDAL; PERINEURAL ONCE
Status: DISCONTINUED | OUTPATIENT
Start: 2018-01-01 | End: 2018-01-01

## 2018-01-01 RX ORDER — DOPAMINE HYDROCHLORIDE 160 MG/100ML
2-20 INJECTION, SOLUTION INTRAVENOUS CONTINUOUS
Status: DISCONTINUED | OUTPATIENT
Start: 2018-01-01 | End: 2018-01-01

## 2018-01-01 RX ORDER — MAGNESIUM SULFATE HEPTAHYDRATE 40 MG/ML
4 INJECTION, SOLUTION INTRAVENOUS EVERY 4 HOURS PRN
Status: DISCONTINUED | OUTPATIENT
Start: 2018-01-01 | End: 2018-01-01 | Stop reason: HOSPADM

## 2018-01-01 RX ORDER — ONDANSETRON 2 MG/ML
4 INJECTION INTRAMUSCULAR; INTRAVENOUS EVERY 30 MIN PRN
Status: COMPLETED | OUTPATIENT
Start: 2018-01-01 | End: 2018-01-01

## 2018-01-01 RX ORDER — VASOPRESSIN 20 U/ML
INJECTION PARENTERAL PRN
Status: DISCONTINUED | OUTPATIENT
Start: 2018-01-01 | End: 2018-01-01

## 2018-01-01 RX ORDER — ONDANSETRON 2 MG/ML
4 INJECTION INTRAMUSCULAR; INTRAVENOUS EVERY 30 MIN PRN
Status: CANCELLED | OUTPATIENT
Start: 2018-01-01

## 2018-01-01 RX ORDER — SODIUM CHLORIDE, SODIUM GLUCONATE, SODIUM ACETATE, POTASSIUM CHLORIDE AND MAGNESIUM CHLORIDE 526; 502; 368; 37; 30 MG/100ML; MG/100ML; MG/100ML; MG/100ML; MG/100ML
INJECTION, SOLUTION INTRAVENOUS CONTINUOUS PRN
Status: DISCONTINUED | OUTPATIENT
Start: 2018-01-01 | End: 2018-01-01

## 2018-01-01 RX ORDER — LIDOCAINE HYDROCHLORIDE 10 MG/ML
10 INJECTION, SOLUTION EPIDURAL; INFILTRATION; INTRACAUDAL; PERINEURAL ONCE
Status: COMPLETED | OUTPATIENT
Start: 2018-01-01 | End: 2018-01-01

## 2018-01-01 RX ORDER — FERROUS SULFATE 325(65) MG
325 TABLET ORAL
COMMUNITY
Start: 2018-01-01

## 2018-01-01 RX ORDER — ETOMIDATE 2 MG/ML
INJECTION INTRAVENOUS PRN
Status: DISCONTINUED | OUTPATIENT
Start: 2018-01-01 | End: 2018-01-01

## 2018-01-01 RX ORDER — PROPOFOL 10 MG/ML
5-75 INJECTION, EMULSION INTRAVENOUS CONTINUOUS
Status: DISCONTINUED | OUTPATIENT
Start: 2018-01-01 | End: 2018-01-01 | Stop reason: HOSPADM

## 2018-01-01 RX ORDER — SODIUM CHLORIDE, SODIUM LACTATE, POTASSIUM CHLORIDE, CALCIUM CHLORIDE 600; 310; 30; 20 MG/100ML; MG/100ML; MG/100ML; MG/100ML
INJECTION, SOLUTION INTRAVENOUS
Status: DISPENSED
Start: 2018-01-01 | End: 2018-01-01

## 2018-01-01 RX ORDER — PROPOFOL 10 MG/ML
INJECTION, EMULSION INTRAVENOUS
Status: DISPENSED
Start: 2018-01-01 | End: 2018-01-01

## 2018-01-01 RX ORDER — AMPICILLIN 2 G/1
2 INJECTION, POWDER, FOR SOLUTION INTRAVENOUS EVERY 8 HOURS
Status: DISCONTINUED | OUTPATIENT
Start: 2018-01-01 | End: 2018-01-01

## 2018-01-01 RX ORDER — CEFTRIAXONE 2 G/1
2 INJECTION, POWDER, FOR SOLUTION INTRAMUSCULAR; INTRAVENOUS EVERY 24 HOURS
Status: DISCONTINUED | OUTPATIENT
Start: 2018-01-01 | End: 2018-01-01

## 2018-01-01 RX ORDER — PROCHLORPERAZINE MALEATE 5 MG
10 TABLET ORAL EVERY 6 HOURS PRN
Status: DISCONTINUED | OUTPATIENT
Start: 2018-01-01 | End: 2018-01-01 | Stop reason: HOSPADM

## 2018-01-01 RX ORDER — ALBUMIN, HUMAN INJ 5% 5 %
SOLUTION INTRAVENOUS CONTINUOUS PRN
Status: DISCONTINUED | OUTPATIENT
Start: 2018-01-01 | End: 2018-01-01

## 2018-01-01 RX ORDER — HEPARIN SODIUM,PORCINE 10 UNIT/ML
5-10 VIAL (ML) INTRAVENOUS EVERY 24 HOURS
Status: DISCONTINUED | OUTPATIENT
Start: 2018-01-01 | End: 2018-01-01 | Stop reason: HOSPADM

## 2018-01-01 RX ORDER — DEXTROSE MONOHYDRATE 25 G/50ML
25-50 INJECTION, SOLUTION INTRAVENOUS
Status: DISCONTINUED | OUTPATIENT
Start: 2018-01-01 | End: 2018-01-01

## 2018-01-01 RX ORDER — ONDANSETRON 2 MG/ML
INJECTION INTRAMUSCULAR; INTRAVENOUS PRN
Status: DISCONTINUED | OUTPATIENT
Start: 2018-01-01 | End: 2018-01-01

## 2018-01-01 RX ORDER — GUAR GUM
1 PACKET (EA) ORAL 3 TIMES DAILY
Status: DISCONTINUED | OUTPATIENT
Start: 2018-01-01 | End: 2018-01-01

## 2018-01-01 RX ORDER — HEPARIN SODIUM,PORCINE 10 UNIT/ML
5-10 VIAL (ML) INTRAVENOUS
Status: DISCONTINUED | OUTPATIENT
Start: 2018-01-01 | End: 2018-01-01 | Stop reason: HOSPADM

## 2018-01-01 RX ORDER — ONDANSETRON 2 MG/ML
4 INJECTION INTRAMUSCULAR; INTRAVENOUS EVERY 6 HOURS PRN
Status: DISCONTINUED | OUTPATIENT
Start: 2018-01-01 | End: 2018-01-01 | Stop reason: HOSPADM

## 2018-01-01 RX ORDER — LOPERAMIDE HYDROCHLORIDE 1 MG/5ML
2 SOLUTION ORAL 4 TIMES DAILY
Status: DISCONTINUED | OUTPATIENT
Start: 2018-01-01 | End: 2018-01-01

## 2018-01-01 RX ORDER — ONDANSETRON 4 MG/1
4 TABLET, ORALLY DISINTEGRATING ORAL EVERY 30 MIN PRN
Status: COMPLETED | OUTPATIENT
Start: 2018-01-01 | End: 2018-01-01

## 2018-01-01 RX ORDER — CALCIUM CHLORIDE 100 MG/ML
INJECTION INTRAVENOUS; INTRAVENTRICULAR PRN
Status: DISCONTINUED | OUTPATIENT
Start: 2018-01-01 | End: 2018-01-01

## 2018-01-01 RX ORDER — SODIUM CHLORIDE, SODIUM LACTATE, POTASSIUM CHLORIDE, CALCIUM CHLORIDE 600; 310; 30; 20 MG/100ML; MG/100ML; MG/100ML; MG/100ML
INJECTION, SOLUTION INTRAVENOUS CONTINUOUS
Status: DISCONTINUED | OUTPATIENT
Start: 2018-01-01 | End: 2018-01-01

## 2018-01-01 RX ORDER — AMOXICILLIN 250 MG
2 CAPSULE ORAL 2 TIMES DAILY
Status: DISCONTINUED | OUTPATIENT
Start: 2018-01-01 | End: 2018-01-01

## 2018-01-01 RX ORDER — POTASSIUM CHLORIDE 29.8 MG/ML
20 INJECTION INTRAVENOUS
Status: DISCONTINUED | OUTPATIENT
Start: 2018-01-01 | End: 2018-01-01

## 2018-01-01 RX ORDER — NICOTINE POLACRILEX 4 MG
15-30 LOZENGE BUCCAL
Status: DISCONTINUED | OUTPATIENT
Start: 2018-01-01 | End: 2018-01-01

## 2018-01-01 RX ORDER — FENTANYL CITRATE 50 UG/ML
25-50 INJECTION, SOLUTION INTRAMUSCULAR; INTRAVENOUS
Status: CANCELLED | OUTPATIENT
Start: 2018-01-01

## 2018-01-01 RX ORDER — HEPARIN SODIUM 10000 [USP'U]/100ML
0-3500 INJECTION, SOLUTION INTRAVENOUS
Status: DISCONTINUED | OUTPATIENT
Start: 2018-01-01 | End: 2018-01-01

## 2018-01-01 RX ORDER — MEROPENEM 500 MG/1
500 INJECTION, POWDER, FOR SOLUTION INTRAVENOUS EVERY 6 HOURS
Status: DISCONTINUED | OUTPATIENT
Start: 2018-01-01 | End: 2018-01-01

## 2018-01-01 RX ORDER — MIDODRINE HYDROCHLORIDE 5 MG/1
10 TABLET ORAL ONCE
Status: COMPLETED | OUTPATIENT
Start: 2018-01-01 | End: 2018-01-01

## 2018-01-01 RX ORDER — BISACODYL 10 MG
10 SUPPOSITORY, RECTAL RECTAL DAILY PRN
Status: DISCONTINUED | OUTPATIENT
Start: 2018-01-01 | End: 2018-01-01

## 2018-01-01 RX ORDER — OXYCODONE HCL 5 MG/5 ML
5-10 SOLUTION, ORAL ORAL EVERY 4 HOURS PRN
Status: DISCONTINUED | OUTPATIENT
Start: 2018-01-01 | End: 2018-01-01

## 2018-01-01 RX ORDER — POTASSIUM CHLORIDE 29.8 MG/ML
20 INJECTION INTRAVENOUS
Status: DISCONTINUED | OUTPATIENT
Start: 2018-01-01 | End: 2018-01-01 | Stop reason: HOSPADM

## 2018-01-01 RX ORDER — TENOFOVIR DISOPROXIL FUMARATE 300 MG/1
300 TABLET, FILM COATED ORAL DAILY
Status: DISCONTINUED | OUTPATIENT
Start: 2018-01-01 | End: 2018-01-01

## 2018-01-01 RX ORDER — PANTOPRAZOLE SODIUM 40 MG/1
40 TABLET, DELAYED RELEASE ORAL
Status: DISCONTINUED | OUTPATIENT
Start: 2018-01-01 | End: 2018-01-01 | Stop reason: HOSPADM

## 2018-01-01 RX ORDER — FENTANYL CITRATE 50 UG/ML
INJECTION, SOLUTION INTRAMUSCULAR; INTRAVENOUS
Status: DISPENSED
Start: 2018-01-01 | End: 2018-01-01

## 2018-01-01 RX ORDER — LACTULOSE 10 G/15ML
200 SOLUTION ORAL ONCE
Status: COMPLETED | OUTPATIENT
Start: 2018-01-01 | End: 2018-01-01

## 2018-01-01 RX ORDER — CALCIUM CHLORIDE 100 MG/ML
INJECTION INTRAVENOUS; INTRAVENTRICULAR
Status: DISCONTINUED
Start: 2018-01-01 | End: 2018-01-01 | Stop reason: HOSPADM

## 2018-01-01 RX ORDER — SPIRONOLACTONE 25 MG/1
75 TABLET ORAL
Status: ON HOLD | COMMUNITY
Start: 2018-01-01 | End: 2018-01-01

## 2018-01-01 RX ORDER — SODIUM BICARBONATE 325 MG/1
650 TABLET ORAL 2 TIMES DAILY
Status: DISCONTINUED | OUTPATIENT
Start: 2018-01-01 | End: 2018-01-01

## 2018-01-01 RX ORDER — SULFAMETHOXAZOLE AND TRIMETHOPRIM 200; 40 MG/5ML; MG/5ML
10 SUSPENSION ORAL DAILY
Status: DISCONTINUED | OUTPATIENT
Start: 2018-01-01 | End: 2018-01-01

## 2018-01-01 RX ORDER — VALGANCICLOVIR 450 MG/1
450 TABLET, FILM COATED ORAL
Status: DISCONTINUED | OUTPATIENT
Start: 2018-01-01 | End: 2018-01-01

## 2018-01-01 RX ORDER — NICOTINE POLACRILEX 4 MG
15-30 LOZENGE BUCCAL
Status: CANCELLED | OUTPATIENT
Start: 2018-01-01

## 2018-01-01 RX ORDER — SIMETHICONE
LIQUID (ML) MISCELLANEOUS PRN
Status: DISCONTINUED | OUTPATIENT
Start: 2018-01-01 | End: 2018-01-01

## 2018-01-01 RX ORDER — SPIRONOLACTONE 50 MG/1
100 TABLET, FILM COATED ORAL DAILY
Status: DISCONTINUED | OUTPATIENT
Start: 2018-01-01 | End: 2018-01-01

## 2018-01-01 RX ORDER — DEXTROSE MONOHYDRATE 25 G/50ML
25-50 INJECTION, SOLUTION INTRAVENOUS
Status: CANCELLED | OUTPATIENT
Start: 2018-01-01

## 2018-01-01 RX ORDER — FLUCONAZOLE 2 MG/ML
400 INJECTION, SOLUTION INTRAVENOUS EVERY 24 HOURS
Status: DISCONTINUED | OUTPATIENT
Start: 2018-01-01 | End: 2018-01-01

## 2018-01-01 RX ORDER — PIPERACILLIN SODIUM, TAZOBACTAM SODIUM 2; .25 G/10ML; G/10ML
2.25 INJECTION, POWDER, LYOPHILIZED, FOR SOLUTION INTRAVENOUS
Status: DISCONTINUED | OUTPATIENT
Start: 2018-01-01 | End: 2018-01-01 | Stop reason: HOSPADM

## 2018-01-01 RX ORDER — FENTANYL CITRATE 50 UG/ML
INJECTION, SOLUTION INTRAMUSCULAR; INTRAVENOUS
Status: DISCONTINUED
Start: 2018-01-01 | End: 2018-01-01 | Stop reason: HOSPADM

## 2018-01-01 RX ORDER — ACETAMINOPHEN 325 MG/1
650 TABLET ORAL ONCE
Status: COMPLETED | OUTPATIENT
Start: 2018-01-01 | End: 2018-01-01

## 2018-01-01 RX ORDER — DIPHENOXYLATE HCL/ATROPINE 2.5-.025/5
2.5 LIQUID (ML) ORAL 4 TIMES DAILY PRN
Status: DISCONTINUED | OUTPATIENT
Start: 2018-01-01 | End: 2018-01-01

## 2018-01-01 RX ORDER — POTASSIUM CHLORIDE 29.8 MG/ML
20 INJECTION INTRAVENOUS ONCE
Status: COMPLETED | OUTPATIENT
Start: 2018-01-01 | End: 2018-01-01

## 2018-01-01 RX ORDER — HEPARIN SODIUM 10000 [USP'U]/100ML
500 INJECTION, SOLUTION INTRAVENOUS CONTINUOUS
Status: DISPENSED | OUTPATIENT
Start: 2018-01-01 | End: 2018-01-01

## 2018-01-01 RX ORDER — ALBUMIN (HUMAN) 12.5 G/50ML
25 SOLUTION INTRAVENOUS EVERY 6 HOURS PRN
Status: COMPLETED | OUTPATIENT
Start: 2018-01-01 | End: 2018-01-01

## 2018-01-01 RX ORDER — HYDROMORPHONE HYDROCHLORIDE 1 MG/ML
.3-.5 INJECTION, SOLUTION INTRAMUSCULAR; INTRAVENOUS; SUBCUTANEOUS
Status: DISCONTINUED | OUTPATIENT
Start: 2018-01-01 | End: 2018-01-01 | Stop reason: HOSPADM

## 2018-01-01 RX ORDER — NALOXONE HYDROCHLORIDE 0.4 MG/ML
.1-.4 INJECTION, SOLUTION INTRAMUSCULAR; INTRAVENOUS; SUBCUTANEOUS
Status: ACTIVE | OUTPATIENT
Start: 2018-01-01 | End: 2018-01-01

## 2018-01-01 RX ORDER — CIPROFLOXACIN 500 MG/1
500 TABLET, FILM COATED ORAL
Status: DISCONTINUED | OUTPATIENT
Start: 2018-01-01 | End: 2018-01-01 | Stop reason: HOSPADM

## 2018-01-01 RX ORDER — FLUMAZENIL 0.1 MG/ML
0.2 INJECTION, SOLUTION INTRAVENOUS
Status: ACTIVE | OUTPATIENT
Start: 2018-01-01 | End: 2018-01-01

## 2018-01-01 RX ORDER — PIPERACILLIN SODIUM, TAZOBACTAM SODIUM 3; .375 G/15ML; G/15ML
3.38 INJECTION, POWDER, LYOPHILIZED, FOR SOLUTION INTRAVENOUS ONCE
Status: DISCONTINUED | OUTPATIENT
Start: 2018-01-01 | End: 2018-01-01 | Stop reason: HOSPADM

## 2018-01-01 RX ORDER — METOCLOPRAMIDE HYDROCHLORIDE 5 MG/ML
10 INJECTION INTRAMUSCULAR; INTRAVENOUS ONCE
Status: COMPLETED | OUTPATIENT
Start: 2018-01-01 | End: 2018-01-01

## 2018-01-01 RX ORDER — NITROGLYCERIN 10 MG/100ML
INJECTION INTRAVENOUS PRN
Status: DISCONTINUED | OUTPATIENT
Start: 2018-01-01 | End: 2018-01-01

## 2018-01-01 RX ORDER — HYDROMORPHONE HCL/0.9% NACL/PF 0.2MG/0.2
.2-1 SYRINGE (ML) INTRAVENOUS
Status: ACTIVE | OUTPATIENT
Start: 2018-01-01 | End: 2018-01-01

## 2018-01-01 RX ORDER — TENOFOVIR DISOPROXIL FUMARATE 300 MG/1
300 TABLET, FILM COATED ORAL
COMMUNITY
Start: 2017-06-26

## 2018-01-01 RX ORDER — DEXTROSE 20 G/100ML
INJECTION, SOLUTION INTRAVENOUS CONTINUOUS
Status: DISCONTINUED | OUTPATIENT
Start: 2018-01-01 | End: 2018-01-01 | Stop reason: HOSPADM

## 2018-01-01 RX ORDER — AMOXICILLIN 250 MG
2 CAPSULE ORAL 2 TIMES DAILY PRN
Status: DISCONTINUED | OUTPATIENT
Start: 2018-01-01 | End: 2018-01-01

## 2018-01-01 RX ORDER — POTASSIUM CHLORIDE 29.8 MG/ML
20 INJECTION INTRAVENOUS
Status: DISCONTINUED | OUTPATIENT
Start: 2018-01-01 | End: 2018-01-01 | Stop reason: RX

## 2018-01-01 RX ORDER — LACTULOSE 10 G/15ML
20 SOLUTION ORAL 3 TIMES DAILY
Qty: 1892 ML | Refills: 2 | Status: SHIPPED | OUTPATIENT
Start: 2018-01-01

## 2018-01-01 RX ORDER — ALBUMIN (HUMAN) 12.5 G/50ML
50 SOLUTION INTRAVENOUS ONCE
Status: COMPLETED | OUTPATIENT
Start: 2018-01-01 | End: 2018-01-01

## 2018-01-01 RX ORDER — MEROPENEM 500 MG/1
500 INJECTION, POWDER, FOR SOLUTION INTRAVENOUS EVERY 24 HOURS
Status: DISCONTINUED | OUTPATIENT
Start: 2018-01-01 | End: 2018-01-01

## 2018-01-01 RX ORDER — ALBUMIN, HUMAN INJ 5% 5 %
25 SOLUTION INTRAVENOUS ONCE
Status: DISCONTINUED | OUTPATIENT
Start: 2018-01-01 | End: 2018-01-01

## 2018-01-01 RX ORDER — PEPPERMINT OIL
OIL (ML) MISCELLANEOUS CONTINUOUS PRN
Status: DISCONTINUED | OUTPATIENT
Start: 2018-01-01 | End: 2018-01-01 | Stop reason: HOSPADM

## 2018-01-01 RX ORDER — POTASSIUM CL/LIDO/0.9 % NACL 10MEQ/0.1L
10 INTRAVENOUS SOLUTION, PIGGYBACK (ML) INTRAVENOUS ONCE
Status: DISCONTINUED | OUTPATIENT
Start: 2018-01-01 | End: 2018-01-01 | Stop reason: RX

## 2018-01-01 RX ORDER — FAMOTIDINE 40 MG/5ML
20 POWDER, FOR SUSPENSION ORAL DAILY
Status: DISCONTINUED | OUTPATIENT
Start: 2018-01-01 | End: 2018-01-01

## 2018-01-01 RX ORDER — HEPARIN SODIUM 10000 [USP'U]/100ML
400 INJECTION, SOLUTION INTRAVENOUS CONTINUOUS
Status: DISCONTINUED | OUTPATIENT
Start: 2018-01-01 | End: 2018-01-01

## 2018-01-01 RX ORDER — POTASSIUM CL/LIDO/0.9 % NACL 10MEQ/0.1L
10 INTRAVENOUS SOLUTION, PIGGYBACK (ML) INTRAVENOUS
Status: DISCONTINUED | OUTPATIENT
Start: 2018-01-01 | End: 2018-01-01 | Stop reason: ALTCHOICE

## 2018-01-01 RX ORDER — VALGANCICLOVIR 450 MG/1
450 TABLET, FILM COATED ORAL DAILY
Status: DISCONTINUED | OUTPATIENT
Start: 2018-01-01 | End: 2018-01-01

## 2018-01-01 RX ORDER — POTASSIUM CHLORIDE 20MEQ/15ML
20 LIQUID (ML) ORAL ONCE
Status: COMPLETED | OUTPATIENT
Start: 2018-01-01 | End: 2018-01-01

## 2018-01-01 RX ORDER — FENTANYL CITRATE 50 UG/ML
50-100 INJECTION, SOLUTION INTRAMUSCULAR; INTRAVENOUS
Status: DISCONTINUED | OUTPATIENT
Start: 2018-01-01 | End: 2018-01-01

## 2018-01-01 RX ORDER — PREDNISONE 10 MG/1
10 TABLET ORAL ONCE
Status: COMPLETED | OUTPATIENT
Start: 2018-01-01 | End: 2018-01-01

## 2018-01-01 RX ORDER — POTASSIUM CHLORIDE 1500 MG/1
20 TABLET, EXTENDED RELEASE ORAL DAILY
Qty: 90 TABLET | Refills: 1 | Status: SHIPPED | OUTPATIENT
Start: 2018-01-01

## 2018-01-01 RX ORDER — NALOXONE HYDROCHLORIDE 0.4 MG/ML
.1-.4 INJECTION, SOLUTION INTRAMUSCULAR; INTRAVENOUS; SUBCUTANEOUS
Status: DISCONTINUED | OUTPATIENT
Start: 2018-01-01 | End: 2018-01-01

## 2018-01-01 RX ORDER — AMOXICILLIN 250 MG
1 CAPSULE ORAL 2 TIMES DAILY PRN
Status: DISCONTINUED | OUTPATIENT
Start: 2018-01-01 | End: 2018-01-01

## 2018-01-01 RX ORDER — FENTANYL CITRATE 50 UG/ML
100 INJECTION, SOLUTION INTRAMUSCULAR; INTRAVENOUS EVERY 30 MIN PRN
Status: DISCONTINUED | OUTPATIENT
Start: 2018-01-01 | End: 2018-01-01 | Stop reason: HOSPADM

## 2018-01-01 RX ORDER — ALBUMIN (HUMAN) 12.5 G/50ML
25 SOLUTION INTRAVENOUS EVERY 8 HOURS
Status: DISPENSED | OUTPATIENT
Start: 2018-01-01 | End: 2018-01-01

## 2018-01-01 RX ORDER — ALBUMIN (HUMAN) 12.5 G/50ML
100 SOLUTION INTRAVENOUS DAILY
Status: COMPLETED | OUTPATIENT
Start: 2018-01-01 | End: 2018-01-01

## 2018-01-01 RX ORDER — POTASSIUM CHLORIDE 750 MG/1
20-40 TABLET, EXTENDED RELEASE ORAL
Status: DISCONTINUED | OUTPATIENT
Start: 2018-01-01 | End: 2018-01-01 | Stop reason: ALTCHOICE

## 2018-01-01 RX ORDER — MAGNESIUM CARB/ALUMINUM HYDROX 105-160MG
30 TABLET,CHEWABLE ORAL ONCE
Status: COMPLETED | OUTPATIENT
Start: 2018-01-01 | End: 2018-01-01

## 2018-01-01 RX ORDER — CALCIUM CHLORIDE 100 MG/ML
1 INJECTION INTRAVENOUS; INTRAVENTRICULAR ONCE
Status: DISCONTINUED | OUTPATIENT
Start: 2018-01-01 | End: 2018-01-01

## 2018-01-01 RX ORDER — HEPARIN SODIUM,PORCINE 10 UNIT/ML
5-10 VIAL (ML) INTRAVENOUS
Status: DISCONTINUED | OUTPATIENT
Start: 2018-01-01 | End: 2018-01-01

## 2018-01-01 RX ORDER — ALBUMIN (HUMAN) 12.5 G/50ML
75 SOLUTION INTRAVENOUS DAILY
Status: DISCONTINUED | OUTPATIENT
Start: 2018-01-01 | End: 2018-01-01

## 2018-01-01 RX ORDER — ACETAMINOPHEN 500 MG
500 TABLET ORAL EVERY 6 HOURS PRN
Qty: 120 TABLET | Refills: 0 | Status: SHIPPED | OUTPATIENT
Start: 2018-01-01

## 2018-01-01 RX ORDER — MIDODRINE HYDROCHLORIDE 5 MG/1
5 TABLET ORAL ONCE
Status: COMPLETED | OUTPATIENT
Start: 2018-01-01 | End: 2018-01-01

## 2018-01-01 RX ORDER — POTASSIUM CHLORIDE 29.8 MG/ML
20 INJECTION INTRAVENOUS
Status: DISCONTINUED | OUTPATIENT
Start: 2018-01-01 | End: 2018-01-01 | Stop reason: ALTCHOICE

## 2018-01-01 RX ORDER — ONDANSETRON 2 MG/ML
4 INJECTION INTRAMUSCULAR; INTRAVENOUS
Status: COMPLETED | OUTPATIENT
Start: 2018-01-01 | End: 2018-01-01

## 2018-01-01 RX ORDER — DEXTROSE MONOHYDRATE 25 G/50ML
50 INJECTION, SOLUTION INTRAVENOUS ONCE
Status: DISCONTINUED | OUTPATIENT
Start: 2018-01-01 | End: 2018-01-01 | Stop reason: CLARIF

## 2018-01-01 RX ORDER — ATROPINE SULFATE 0.4 MG/ML
2 INJECTION, SOLUTION ENDOTRACHEAL; INTRAMEDULLARY; INTRAMUSCULAR; INTRAVENOUS; SUBCUTANEOUS ONCE
Status: DISCONTINUED | OUTPATIENT
Start: 2018-01-01 | End: 2018-01-01

## 2018-01-01 RX ORDER — SIMETHICONE 80 MG
80 TABLET,CHEWABLE ORAL DAILY PRN
Status: DISCONTINUED | OUTPATIENT
Start: 2018-01-01 | End: 2018-01-01 | Stop reason: HOSPADM

## 2018-01-01 RX ORDER — POTASSIUM CHLORIDE 1.5 G/1.58G
20-40 POWDER, FOR SOLUTION ORAL
Status: DISCONTINUED | OUTPATIENT
Start: 2018-01-01 | End: 2018-01-01 | Stop reason: ALTCHOICE

## 2018-01-01 RX ORDER — CIPROFLOXACIN 500 MG/1
500 TABLET, FILM COATED ORAL EVERY 24 HOURS
Qty: 30 TABLET | Refills: 0 | Status: SHIPPED | OUTPATIENT
Start: 2018-01-01

## 2018-01-01 RX ORDER — IOPAMIDOL 755 MG/ML
93 INJECTION, SOLUTION INTRAVASCULAR ONCE
Status: COMPLETED | OUTPATIENT
Start: 2018-01-01 | End: 2018-01-01

## 2018-01-01 RX ORDER — ALBUMIN (HUMAN) 12.5 G/50ML
100 SOLUTION INTRAVENOUS ONCE
Status: COMPLETED | OUTPATIENT
Start: 2018-01-01 | End: 2018-01-01

## 2018-01-01 RX ORDER — ONDANSETRON 4 MG/1
4 TABLET, ORALLY DISINTEGRATING ORAL EVERY 30 MIN PRN
Status: CANCELLED | OUTPATIENT
Start: 2018-01-01

## 2018-01-01 RX ORDER — ALBUMIN (HUMAN) 12.5 G/50ML
75 SOLUTION INTRAVENOUS DAILY
Status: COMPLETED | OUTPATIENT
Start: 2018-01-01 | End: 2018-01-01

## 2018-01-01 RX ORDER — OXYCODONE HCL 5 MG/5 ML
5-15 SOLUTION, ORAL ORAL
Status: DISCONTINUED | OUTPATIENT
Start: 2018-01-01 | End: 2018-01-01

## 2018-01-01 RX ORDER — POTASSIUM CHLORIDE 7.45 MG/ML
10 INJECTION INTRAVENOUS ONCE
Status: COMPLETED | OUTPATIENT
Start: 2018-01-01 | End: 2018-01-01

## 2018-01-01 RX ORDER — POTASSIUM CHLORIDE 29.8 MG/ML
20 INJECTION INTRAVENOUS ONCE
Status: DISCONTINUED | OUTPATIENT
Start: 2018-01-01 | End: 2018-01-01

## 2018-01-01 RX ORDER — HEPARIN SODIUM 10000 [USP'U]/100ML
400 INJECTION, SOLUTION INTRAVENOUS CONTINUOUS
Status: DISCONTINUED | OUTPATIENT
Start: 2018-01-01 | End: 2018-01-01 | Stop reason: HOSPADM

## 2018-01-01 RX ORDER — SPIRONOLACTONE 25 MG/1
50 TABLET ORAL DAILY
Qty: 180 TABLET | Refills: 0 | Status: SHIPPED | OUTPATIENT
Start: 2018-01-01 | End: 2018-01-01

## 2018-01-01 RX ORDER — IOPAMIDOL 755 MG/ML
82 INJECTION, SOLUTION INTRAVASCULAR ONCE
Status: COMPLETED | OUTPATIENT
Start: 2018-01-01 | End: 2018-01-01

## 2018-01-01 RX ORDER — HEPARIN SODIUM,PORCINE 10 UNIT/ML
5-10 VIAL (ML) INTRAVENOUS EVERY 24 HOURS
Status: DISCONTINUED | OUTPATIENT
Start: 2018-01-01 | End: 2018-01-01

## 2018-01-01 RX ORDER — NYSTATIN 100000/ML
500000 SUSPENSION, ORAL (FINAL DOSE FORM) ORAL 4 TIMES DAILY
Status: DISCONTINUED | OUTPATIENT
Start: 2018-01-01 | End: 2018-01-01 | Stop reason: HOSPADM

## 2018-01-01 RX ORDER — MEROPENEM 500 MG/1
500 INJECTION, POWDER, FOR SOLUTION INTRAVENOUS EVERY 12 HOURS
Status: DISCONTINUED | OUTPATIENT
Start: 2018-01-01 | End: 2018-01-01

## 2018-01-01 RX ORDER — ALBUMIN (HUMAN) 12.5 G/50ML
100 SOLUTION INTRAVENOUS DAILY
Status: DISCONTINUED | OUTPATIENT
Start: 2018-01-01 | End: 2018-01-01

## 2018-01-01 RX ORDER — HEPARIN SODIUM 10000 [USP'U]/100ML
400 INJECTION, SOLUTION INTRAVENOUS CONTINUOUS
Status: DISPENSED | OUTPATIENT
Start: 2018-01-01 | End: 2018-01-01

## 2018-01-01 RX ORDER — OXYCODONE HYDROCHLORIDE 5 MG/1
5-10 TABLET ORAL EVERY 4 HOURS PRN
Status: DISCONTINUED | OUTPATIENT
Start: 2018-01-01 | End: 2018-01-01

## 2018-01-01 RX ORDER — BUMETANIDE 1 MG/1
1 TABLET ORAL 3 TIMES DAILY
Status: DISCONTINUED | OUTPATIENT
Start: 2018-01-01 | End: 2018-01-01

## 2018-01-01 RX ORDER — PROPOFOL 10 MG/ML
100 INJECTION, EMULSION INTRAVENOUS CONTINUOUS
Status: DISCONTINUED | OUTPATIENT
Start: 2018-01-01 | End: 2018-01-01

## 2018-01-01 RX ORDER — ALBUMIN (HUMAN) 12.5 G/50ML
12.5 SOLUTION INTRAVENOUS EVERY 8 HOURS
Status: DISCONTINUED | OUTPATIENT
Start: 2018-01-01 | End: 2018-01-01

## 2018-01-01 RX ORDER — MIDODRINE HYDROCHLORIDE 10 MG/1
10 TABLET ORAL
Qty: 30 TABLET | Refills: 3 | Status: SHIPPED | OUTPATIENT
Start: 2018-01-01

## 2018-01-01 RX ORDER — ALBUMIN, HUMAN INJ 5% 5 %
SOLUTION INTRAVENOUS
Status: DISPENSED
Start: 2018-01-01 | End: 2018-01-01

## 2018-01-01 RX ORDER — FLUCONAZOLE 2 MG/ML
200 INJECTION, SOLUTION INTRAVENOUS EVERY 24 HOURS
Status: DISCONTINUED | OUTPATIENT
Start: 2018-01-01 | End: 2018-01-01 | Stop reason: HOSPADM

## 2018-01-01 RX ORDER — BUMETANIDE 1 MG/1
1 TABLET ORAL DAILY
Status: DISCONTINUED | OUTPATIENT
Start: 2018-01-01 | End: 2018-01-01

## 2018-01-01 RX ORDER — DOPAMINE HYDROCHLORIDE 160 MG/100ML
INJECTION, SOLUTION INTRAVENOUS
Status: DISCONTINUED
Start: 2018-01-01 | End: 2018-01-01 | Stop reason: HOSPADM

## 2018-01-01 RX ORDER — FUROSEMIDE 40 MG
40 TABLET ORAL DAILY
Status: DISCONTINUED | OUTPATIENT
Start: 2018-01-01 | End: 2018-01-01

## 2018-01-01 RX ORDER — NADOLOL 20 MG/1
20 TABLET ORAL
Status: ON HOLD | COMMUNITY
Start: 2017-02-03 | End: 2018-01-01

## 2018-01-01 RX ORDER — POTASSIUM CHLORIDE 7.45 MG/ML
10 INJECTION INTRAVENOUS
Status: DISCONTINUED | OUTPATIENT
Start: 2018-01-01 | End: 2018-01-01 | Stop reason: ALTCHOICE

## 2018-01-01 RX ORDER — PIPERACILLIN SODIUM, TAZOBACTAM SODIUM 2; .25 G/10ML; G/10ML
2.25 INJECTION, POWDER, LYOPHILIZED, FOR SOLUTION INTRAVENOUS
Status: DISCONTINUED | OUTPATIENT
Start: 2018-01-01 | End: 2018-01-01

## 2018-01-01 RX ORDER — LIDOCAINE HYDROCHLORIDE 10 MG/ML
10 INJECTION, SOLUTION EPIDURAL; INFILTRATION; INTRACAUDAL; PERINEURAL ONCE
Status: DISCONTINUED | OUTPATIENT
Start: 2018-01-01 | End: 2018-01-01 | Stop reason: HOSPADM

## 2018-01-01 RX ORDER — OXYCODONE HCL 5 MG/5 ML
10-15 SOLUTION, ORAL ORAL
Status: DISCONTINUED | OUTPATIENT
Start: 2018-01-01 | End: 2018-01-01

## 2018-01-01 RX ORDER — PIPERACILLIN SODIUM, TAZOBACTAM SODIUM 3; .375 G/15ML; G/15ML
3.38 INJECTION, POWDER, LYOPHILIZED, FOR SOLUTION INTRAVENOUS EVERY 6 HOURS
Status: COMPLETED | OUTPATIENT
Start: 2018-01-01 | End: 2018-01-01

## 2018-01-01 RX ORDER — GUAR GUM
2 PACKET (EA) ORAL 3 TIMES DAILY
Status: DISCONTINUED | OUTPATIENT
Start: 2018-01-01 | End: 2018-01-01

## 2018-01-01 RX ORDER — SIMETHICONE 80 MG
80 TABLET,CHEWABLE ORAL
COMMUNITY
Start: 2018-01-01

## 2018-01-01 RX ORDER — DEXMEDETOMIDINE HYDROCHLORIDE 4 UG/ML
0.2-0.7 INJECTION, SOLUTION INTRAVENOUS CONTINUOUS
Status: DISCONTINUED | OUTPATIENT
Start: 2018-01-01 | End: 2018-01-01

## 2018-01-01 RX ORDER — ACETAMINOPHEN 500 MG
500 TABLET ORAL EVERY 6 HOURS PRN
Status: DISCONTINUED | OUTPATIENT
Start: 2018-01-01 | End: 2018-01-01 | Stop reason: HOSPADM

## 2018-01-01 RX ORDER — MAGNESIUM SULFATE HEPTAHYDRATE 40 MG/ML
2 INJECTION, SOLUTION INTRAVENOUS DAILY PRN
Status: DISCONTINUED | OUTPATIENT
Start: 2018-01-01 | End: 2018-01-01

## 2018-01-01 RX ORDER — TACROLIMUS 1 MG/1
3 CAPSULE ORAL
Status: DISCONTINUED | OUTPATIENT
Start: 2018-01-01 | End: 2018-01-01

## 2018-01-01 RX ORDER — PROPOFOL 10 MG/ML
10-20 INJECTION, EMULSION INTRAVENOUS EVERY 30 MIN PRN
Status: DISCONTINUED | OUTPATIENT
Start: 2018-01-01 | End: 2018-01-01

## 2018-01-01 RX ORDER — ALBUMIN (HUMAN) 12.5 G/50ML
75 SOLUTION INTRAVENOUS ONCE
Status: COMPLETED | OUTPATIENT
Start: 2018-01-01 | End: 2018-01-01

## 2018-01-01 RX ORDER — MYCOPHENOLATE MOFETIL 200 MG/ML
1000 POWDER, FOR SUSPENSION ORAL
Status: DISCONTINUED | OUTPATIENT
Start: 2018-01-01 | End: 2018-01-01

## 2018-01-01 RX ORDER — CALCIUM GLUCONATE 94 MG/ML
1 INJECTION, SOLUTION INTRAVENOUS ONCE
Status: DISCONTINUED | OUTPATIENT
Start: 2018-01-01 | End: 2018-01-01

## 2018-01-01 RX ORDER — CIPROFLOXACIN 500 MG/1
500 TABLET, FILM COATED ORAL EVERY 24 HOURS
Status: DISCONTINUED | OUTPATIENT
Start: 2018-01-01 | End: 2018-01-01

## 2018-01-01 RX ORDER — LACTULOSE 10 G/15ML
20 SOLUTION ORAL 3 TIMES DAILY
Status: DISCONTINUED | OUTPATIENT
Start: 2018-01-01 | End: 2018-01-01

## 2018-01-01 RX ORDER — ERTAPENEM 1 G/1
1 INJECTION, POWDER, LYOPHILIZED, FOR SOLUTION INTRAMUSCULAR; INTRAVENOUS EVERY 24 HOURS
Status: DISCONTINUED | OUTPATIENT
Start: 2018-01-01 | End: 2018-01-01

## 2018-01-01 RX ORDER — MAGNESIUM SULFATE HEPTAHYDRATE 40 MG/ML
4 INJECTION, SOLUTION INTRAVENOUS ONCE
Status: COMPLETED | OUTPATIENT
Start: 2018-01-01 | End: 2018-01-01

## 2018-01-01 RX ORDER — POTASSIUM CHLORIDE 29.8 MG/ML
20 INJECTION INTRAVENOUS EVERY 6 HOURS PRN
Status: DISCONTINUED | OUTPATIENT
Start: 2018-01-01 | End: 2018-01-01 | Stop reason: CLARIF

## 2018-01-01 RX ORDER — ONDANSETRON 2 MG/ML
4 INJECTION INTRAMUSCULAR; INTRAVENOUS EVERY 6 HOURS PRN
Status: DISCONTINUED | OUTPATIENT
Start: 2018-01-01 | End: 2018-01-01

## 2018-01-01 RX ORDER — MYCOPHENOLATE MOFETIL 250 MG/1
1000 CAPSULE ORAL
Status: DISCONTINUED | OUTPATIENT
Start: 2018-01-01 | End: 2018-01-01

## 2018-01-01 RX ORDER — TENOFOVIR DISOPROXIL FUMARATE 300 MG/1
300 TABLET, FILM COATED ORAL WEEKLY
Status: DISCONTINUED | OUTPATIENT
Start: 2018-01-01 | End: 2018-01-01

## 2018-01-01 RX ORDER — INSULIN GLARGINE 100 [IU]/ML
10 INJECTION, SOLUTION SUBCUTANEOUS 2 TIMES DAILY
Status: ON HOLD | COMMUNITY
End: 2018-01-01

## 2018-01-01 RX ORDER — MAGNESIUM SULFATE HEPTAHYDRATE 40 MG/ML
2 INJECTION, SOLUTION INTRAVENOUS EVERY 6 HOURS PRN
Status: DISCONTINUED | OUTPATIENT
Start: 2018-01-01 | End: 2018-01-01

## 2018-01-01 RX ORDER — OXYCODONE HYDROCHLORIDE 5 MG/1
5 TABLET ORAL EVERY 4 HOURS PRN
Status: DISCONTINUED | OUTPATIENT
Start: 2018-01-01 | End: 2018-01-01 | Stop reason: HOSPADM

## 2018-01-01 RX ORDER — SODIUM CHLORIDE, SODIUM LACTATE, POTASSIUM CHLORIDE, CALCIUM CHLORIDE 600; 310; 30; 20 MG/100ML; MG/100ML; MG/100ML; MG/100ML
INJECTION, SOLUTION INTRAVENOUS CONTINUOUS
Status: CANCELLED | OUTPATIENT
Start: 2018-01-01

## 2018-01-01 RX ORDER — METHYLPREDNISOLONE SODIUM SUCCINATE 125 MG/2ML
100 INJECTION, POWDER, LYOPHILIZED, FOR SOLUTION INTRAMUSCULAR; INTRAVENOUS ONCE
Status: COMPLETED | OUTPATIENT
Start: 2018-01-01 | End: 2018-01-01

## 2018-01-01 RX ORDER — FERROUS SULFATE 325(65) MG
325 TABLET ORAL DAILY
Status: DISCONTINUED | OUTPATIENT
Start: 2018-01-01 | End: 2018-01-01 | Stop reason: HOSPADM

## 2018-01-01 RX ORDER — LIDOCAINE HYDROCHLORIDE 20 MG/ML
INJECTION, SOLUTION INFILTRATION; PERINEURAL PRN
Status: DISCONTINUED | OUTPATIENT
Start: 2018-01-01 | End: 2018-01-01

## 2018-01-01 RX ORDER — DOBUTAMINE HYDROCHLORIDE 200 MG/100ML
5-50 INJECTION INTRAVENOUS CONTINUOUS
Status: DISCONTINUED | OUTPATIENT
Start: 2018-01-01 | End: 2018-01-01

## 2018-01-01 RX ORDER — MAGNESIUM HYDROXIDE 1200 MG/15ML
LIQUID ORAL
Status: COMPLETED
Start: 2018-01-01 | End: 2018-01-01

## 2018-01-01 RX ORDER — VALGANCICLOVIR HYDROCHLORIDE 50 MG/ML
450 POWDER, FOR SOLUTION ORAL DAILY
Status: DISCONTINUED | OUTPATIENT
Start: 2018-01-01 | End: 2018-01-01

## 2018-01-01 RX ORDER — BUMETANIDE 0.25 MG/ML
2 INJECTION INTRAMUSCULAR; INTRAVENOUS ONCE
Status: COMPLETED | OUTPATIENT
Start: 2018-01-01 | End: 2018-01-01

## 2018-01-01 RX ORDER — HEPARIN SODIUM,PORCINE 10 UNIT/ML
2-5 VIAL (ML) INTRAVENOUS
Status: DISCONTINUED | OUTPATIENT
Start: 2018-01-01 | End: 2018-01-01

## 2018-01-01 RX ORDER — ASPIRIN 81 MG/1
81 TABLET, CHEWABLE ORAL DAILY
Status: DISCONTINUED | OUTPATIENT
Start: 2018-01-01 | End: 2018-01-01

## 2018-01-01 RX ORDER — SULFAMETHOXAZOLE AND TRIMETHOPRIM 400; 80 MG/1; MG/1
1 TABLET ORAL DAILY
Status: DISCONTINUED | OUTPATIENT
Start: 2018-01-01 | End: 2018-01-01

## 2018-01-01 RX ORDER — POTASSIUM CHLORIDE 1.5 G/1.58G
20 POWDER, FOR SOLUTION ORAL ONCE
Status: COMPLETED | OUTPATIENT
Start: 2018-01-01 | End: 2018-01-01

## 2018-01-01 RX ORDER — MEROPENEM 1 G/1
1 INJECTION, POWDER, FOR SOLUTION INTRAVENOUS EVERY 8 HOURS
Status: DISCONTINUED | OUTPATIENT
Start: 2018-01-01 | End: 2018-01-01

## 2018-01-01 RX ORDER — HEPARIN SODIUM,PORCINE 10 UNIT/ML
2-5 VIAL (ML) INTRAVENOUS
Status: DISCONTINUED | OUTPATIENT
Start: 2018-01-01 | End: 2018-01-01 | Stop reason: HOSPADM

## 2018-01-01 RX ADMIN — MIDODRINE HYDROCHLORIDE 10 MG: 5 TABLET ORAL at 03:36

## 2018-01-01 RX ADMIN — FLUCONAZOLE 200 MG: 2 INJECTION, SOLUTION INTRAVENOUS at 07:46

## 2018-01-01 RX ADMIN — RIFAXIMIN 550 MG: 550 TABLET ORAL at 07:57

## 2018-01-01 RX ADMIN — HUMAN INSULIN 4 UNITS/HR: 100 INJECTION, SOLUTION SUBCUTANEOUS at 19:23

## 2018-01-01 RX ADMIN — CALCIUM GLUCONATE: 98 INJECTION, SOLUTION INTRAVENOUS at 20:57

## 2018-01-01 RX ADMIN — CALCIUM CHLORIDE 1 G: 100 INJECTION, SOLUTION INTRAVENOUS at 09:46

## 2018-01-01 RX ADMIN — SENNOSIDES AND DOCUSATE SODIUM 1 TABLET: 8.6; 5 TABLET ORAL at 19:51

## 2018-01-01 RX ADMIN — Medication 12.5 ML/KG/HR: at 00:50

## 2018-01-01 RX ADMIN — OCTREOTIDE ACETATE 100 MCG: 100 INJECTION, SOLUTION INTRAVENOUS; SUBCUTANEOUS at 08:07

## 2018-01-01 RX ADMIN — ACETAMINOPHEN 500 MG: 500 TABLET, FILM COATED ORAL at 16:10

## 2018-01-01 RX ADMIN — DAPTOMYCIN 800 MG: 500 INJECTION, POWDER, LYOPHILIZED, FOR SOLUTION INTRAVENOUS at 14:31

## 2018-01-01 RX ADMIN — PANTOPRAZOLE SODIUM 40 MG: 40 TABLET, DELAYED RELEASE ORAL at 08:19

## 2018-01-01 RX ADMIN — MICAFUNGIN SODIUM 100 MG: 10 INJECTION, POWDER, LYOPHILIZED, FOR SOLUTION INTRAVENOUS at 22:08

## 2018-01-01 RX ADMIN — NOREPINEPHRINE BITARTRATE 0.51 MCG/KG/MIN: 1 INJECTION INTRAVENOUS at 10:19

## 2018-01-01 RX ADMIN — MAGNESIUM SULFATE HEPTAHYDRATE: 500 INJECTION, SOLUTION INTRAMUSCULAR; INTRAVENOUS at 19:56

## 2018-01-01 RX ADMIN — LACTULOSE 20 G: 10 SOLUTION ORAL; RECTAL at 21:00

## 2018-01-01 RX ADMIN — ALBUMIN HUMAN 12.5 G: 0.25 SOLUTION INTRAVENOUS at 15:03

## 2018-01-01 RX ADMIN — ALBUMIN HUMAN 12.5 G: 0.05 INJECTION, SOLUTION INTRAVENOUS at 17:56

## 2018-01-01 RX ADMIN — NOREPINEPHRINE BITARTRATE 0.02 MCG/KG/MIN: 1 INJECTION INTRAVENOUS at 19:27

## 2018-01-01 RX ADMIN — LACTULOSE 20 G: 10 SOLUTION ORAL; RECTAL at 21:30

## 2018-01-01 RX ADMIN — Medication 12.5 ML/KG/HR: at 14:37

## 2018-01-01 RX ADMIN — PHENYLEPHRINE HYDROCHLORIDE 100 MCG: 10 INJECTION, SOLUTION INTRAMUSCULAR; INTRAVENOUS; SUBCUTANEOUS at 15:21

## 2018-01-01 RX ADMIN — VASOPRESSIN 1 UNITS: 20 INJECTION INTRAVENOUS at 09:36

## 2018-01-01 RX ADMIN — METRONIDAZOLE 500 MG: 500 INJECTION, SOLUTION INTRAVENOUS at 20:11

## 2018-01-01 RX ADMIN — Medication: at 10:38

## 2018-01-01 RX ADMIN — POTASSIUM CHLORIDE 40 MEQ: 750 TABLET, EXTENDED RELEASE ORAL at 04:48

## 2018-01-01 RX ADMIN — HYDROCORTISONE SODIUM SUCCINATE 50 MG: 100 INJECTION, POWDER, FOR SOLUTION INTRAMUSCULAR; INTRAVENOUS at 12:02

## 2018-01-01 RX ADMIN — OXYCODONE HYDROCHLORIDE 5 MG: 5 TABLET ORAL at 07:28

## 2018-01-01 RX ADMIN — SODIUM CHLORIDE 2000 ML: 9 INJECTION, SOLUTION INTRAVENOUS at 22:00

## 2018-01-01 RX ADMIN — PHENYLEPHRINE HYDROCHLORIDE 100 MCG: 10 INJECTION, SOLUTION INTRAMUSCULAR; INTRAVENOUS; SUBCUTANEOUS at 08:43

## 2018-01-01 RX ADMIN — MAGNESIUM SULFATE HEPTAHYDRATE: 500 INJECTION, SOLUTION INTRAMUSCULAR; INTRAVENOUS at 21:36

## 2018-01-01 RX ADMIN — POTASSIUM CHLORIDE 20 MEQ: 750 TABLET, EXTENDED RELEASE ORAL at 09:04

## 2018-01-01 RX ADMIN — FLUCONAZOLE 200 MG: 2 INJECTION, SOLUTION INTRAVENOUS at 07:55

## 2018-01-01 RX ADMIN — VASOPRESSIN 1 UNITS: 20 INJECTION INTRAVENOUS at 11:50

## 2018-01-01 RX ADMIN — PROPOFOL 130 MG: 10 INJECTION, EMULSION INTRAVENOUS at 06:26

## 2018-01-01 RX ADMIN — PANTOPRAZOLE SODIUM 40 MG: 40 TABLET, DELAYED RELEASE ORAL at 08:57

## 2018-01-01 RX ADMIN — METRONIDAZOLE 500 MG: 500 INJECTION, SOLUTION INTRAVENOUS at 13:14

## 2018-01-01 RX ADMIN — PANTOPRAZOLE SODIUM 40 MG: 40 TABLET, DELAYED RELEASE ORAL at 08:17

## 2018-01-01 RX ADMIN — OXYCODONE HYDROCHLORIDE 5 MG: 5 TABLET ORAL at 13:32

## 2018-01-01 RX ADMIN — CALCIUM CHLORIDE 2 G: 100 INJECTION INTRAVENOUS; INTRAVENTRICULAR at 01:35

## 2018-01-01 RX ADMIN — HUMAN INSULIN 4 UNITS/HR: 100 INJECTION, SOLUTION SUBCUTANEOUS at 03:58

## 2018-01-01 RX ADMIN — POTASSIUM CHLORIDE 20 MEQ: 1.5 POWDER, FOR SOLUTION ORAL at 05:34

## 2018-01-01 RX ADMIN — MEROPENEM 1 G: 1 INJECTION, POWDER, FOR SOLUTION INTRAVENOUS at 12:56

## 2018-01-01 RX ADMIN — MYCOPHENOLATE MOFETIL 1000 MG: 200 POWDER, FOR SUSPENSION ORAL at 08:26

## 2018-01-01 RX ADMIN — FLUCONAZOLE 200 MG: 2 INJECTION, SOLUTION INTRAVENOUS at 09:08

## 2018-01-01 RX ADMIN — NOREPINEPHRINE BITARTRATE 0.34 MCG/KG/MIN: 1 INJECTION INTRAVENOUS at 02:49

## 2018-01-01 RX ADMIN — ALBUMIN HUMAN 12.5 G: 0.05 INJECTION, SOLUTION INTRAVENOUS at 11:54

## 2018-01-01 RX ADMIN — HEPARIN SODIUM 400 UNITS/HR: 10000 INJECTION, SOLUTION INTRAVENOUS at 12:09

## 2018-01-01 RX ADMIN — Medication 17 ML/KG/HR: at 05:44

## 2018-01-01 RX ADMIN — Medication 0.5 MG: at 19:52

## 2018-01-01 RX ADMIN — CEFTAROLINE FOSAMIL 300 MG: 600 POWDER, FOR SOLUTION INTRAVENOUS at 13:53

## 2018-01-01 RX ADMIN — Medication 12.5 ML/KG/HR: at 18:18

## 2018-01-01 RX ADMIN — DAPTOMYCIN 800 MG: 500 INJECTION, POWDER, LYOPHILIZED, FOR SOLUTION INTRAVENOUS at 14:06

## 2018-01-01 RX ADMIN — FAMOTIDINE 20 MG: 20 INJECTION, SOLUTION INTRAVENOUS at 08:07

## 2018-01-01 RX ADMIN — CEFTRIAXONE SODIUM 2 G: 2 INJECTION, POWDER, FOR SOLUTION INTRAMUSCULAR; INTRAVENOUS at 10:10

## 2018-01-01 RX ADMIN — RIFAXIMIN 550 MG: 550 TABLET ORAL at 19:41

## 2018-01-01 RX ADMIN — LACTULOSE 20 G: 20 POWDER, FOR SOLUTION ORAL at 08:01

## 2018-01-01 RX ADMIN — PHENYLEPHRINE HYDROCHLORIDE 200 MCG: 10 INJECTION, SOLUTION INTRAMUSCULAR; INTRAVENOUS; SUBCUTANEOUS at 08:58

## 2018-01-01 RX ADMIN — Medication 10 MEQ: at 06:04

## 2018-01-01 RX ADMIN — PANTOPRAZOLE SODIUM 40 MG: 40 TABLET, DELAYED RELEASE ORAL at 09:18

## 2018-01-01 RX ADMIN — CLOTRIMAZOLE 1 TROCHE: 10 LOZENGE ORAL at 20:05

## 2018-01-01 RX ADMIN — Medication 12.5 ML/KG/HR: at 22:10

## 2018-01-01 RX ADMIN — SODIUM CHLORIDE: 9 INJECTION, SOLUTION INTRAVENOUS at 12:24

## 2018-01-01 RX ADMIN — Medication 17 ML/KG/HR: at 04:16

## 2018-01-01 RX ADMIN — Medication 12.5 ML/KG/HR: at 05:03

## 2018-01-01 RX ADMIN — ALBUMIN HUMAN: 0.05 INJECTION, SOLUTION INTRAVENOUS at 08:21

## 2018-01-01 RX ADMIN — PANTOPRAZOLE SODIUM 40 MG: 40 INJECTION, POWDER, FOR SOLUTION INTRAVENOUS at 07:27

## 2018-01-01 RX ADMIN — Medication 0.5 MG: at 03:10

## 2018-01-01 RX ADMIN — MAGNESIUM SULFATE HEPTAHYDRATE: 500 INJECTION, SOLUTION INTRAMUSCULAR; INTRAVENOUS at 19:45

## 2018-01-01 RX ADMIN — HYDROCORTISONE SODIUM SUCCINATE 100 MG: 100 INJECTION, POWDER, FOR SOLUTION INTRAMUSCULAR; INTRAVENOUS at 11:18

## 2018-01-01 RX ADMIN — PANTOPRAZOLE SODIUM 40 MG: 40 INJECTION, POWDER, FOR SOLUTION INTRAVENOUS at 07:30

## 2018-01-01 RX ADMIN — CALCIUM CHLORIDE 1 G: 100 INJECTION, SOLUTION INTRAVENOUS at 23:54

## 2018-01-01 RX ADMIN — HYDROCORTISONE SODIUM SUCCINATE 100 MG: 100 INJECTION, POWDER, FOR SOLUTION INTRAMUSCULAR; INTRAVENOUS at 12:32

## 2018-01-01 RX ADMIN — Medication 1.25 MG: at 14:41

## 2018-01-01 RX ADMIN — LACTULOSE 20 G: 10 SOLUTION ORAL; RECTAL at 08:43

## 2018-01-01 RX ADMIN — HUMAN INSULIN 6 UNITS/HR: 100 INJECTION, SOLUTION SUBCUTANEOUS at 09:21

## 2018-01-01 RX ADMIN — SODIUM CHLORIDE 50 MG: 9 INJECTION, SOLUTION INTRAVENOUS at 21:55

## 2018-01-01 RX ADMIN — PROPOFOL 20 MCG/KG/MIN: 10 INJECTION, EMULSION INTRAVENOUS at 14:28

## 2018-01-01 RX ADMIN — FENTANYL CITRATE 100 MCG: 50 INJECTION INTRAMUSCULAR; INTRAVENOUS at 13:10

## 2018-01-01 RX ADMIN — Medication 10 MEQ: at 07:49

## 2018-01-01 RX ADMIN — CEFTRIAXONE SODIUM 2 G: 2 INJECTION, POWDER, FOR SOLUTION INTRAMUSCULAR; INTRAVENOUS at 11:31

## 2018-01-01 RX ADMIN — NYSTATIN 500000 UNITS: 100000 SUSPENSION ORAL at 21:18

## 2018-01-01 RX ADMIN — SMOFLIPID 250 ML: 6; 6; 5; 3 INJECTION, EMULSION INTRAVENOUS at 20:15

## 2018-01-01 RX ADMIN — OXYCODONE HYDROCHLORIDE 5 MG: 5 SOLUTION ORAL at 08:40

## 2018-01-01 RX ADMIN — PANTOPRAZOLE SODIUM 40 MG: 40 TABLET, DELAYED RELEASE ORAL at 07:57

## 2018-01-01 RX ADMIN — Medication 12.5 ML/KG/HR: at 17:48

## 2018-01-01 RX ADMIN — POTASSIUM CHLORIDE 20 MEQ: 400 INJECTION, SOLUTION INTRAVENOUS at 21:30

## 2018-01-01 RX ADMIN — ASCORBIC ACID, VITAMIN A PALMITATE, CHOLECALCIFEROL, THIAMINE HYDROCHLORIDE, RIBOFLAVIN-5 PHOSPHATE SODIUM, PYRIDOXINE HYDROCHLORIDE, NIACINAMIDE, DEXPANTHENOL, ALPHA-TOCOPHEROL ACETATE, VITAMIN K1, FOLIC ACID, BIOTIN, CYANOCOBALAMIN: 200; 3300; 200; 6; 3.6; 6; 40; 15; 10; 150; 600; 60; 5 INJECTION, SOLUTION INTRAVENOUS at 19:43

## 2018-01-01 RX ADMIN — HYDROCORTISONE SODIUM SUCCINATE 50 MG: 100 INJECTION, POWDER, FOR SOLUTION INTRAMUSCULAR; INTRAVENOUS at 01:56

## 2018-01-01 RX ADMIN — LACTULOSE 20 G: 20 POWDER, FOR SOLUTION ORAL at 13:23

## 2018-01-01 RX ADMIN — Medication 1.5 MG: at 07:43

## 2018-01-01 RX ADMIN — PHENYLEPHRINE HYDROCHLORIDE 4.3 MCG/KG/MIN: 10 INJECTION, SOLUTION INTRAMUSCULAR; INTRAVENOUS; SUBCUTANEOUS at 07:25

## 2018-01-01 RX ADMIN — Medication 12.5 ML/KG/HR: at 16:19

## 2018-01-01 RX ADMIN — CALCIUM GLUCONATE: 98 INJECTION, SOLUTION INTRAVENOUS at 19:37

## 2018-01-01 RX ADMIN — MEROPENEM 500 MG: 500 INJECTION, POWDER, FOR SOLUTION INTRAVENOUS at 13:08

## 2018-01-01 RX ADMIN — FENTANYL CITRATE 50 MCG/HR: 50 INJECTION, SOLUTION INTRAMUSCULAR; INTRAVENOUS at 03:55

## 2018-01-01 RX ADMIN — HYDROCORTISONE SODIUM SUCCINATE 50 MG: 100 INJECTION, POWDER, FOR SOLUTION INTRAMUSCULAR; INTRAVENOUS at 02:09

## 2018-01-01 RX ADMIN — RIFAXIMIN 550 MG: 550 TABLET ORAL at 20:55

## 2018-01-01 RX ADMIN — SODIUM CHLORIDE 250 ML: 9 INJECTION, SOLUTION INTRAVENOUS at 09:17

## 2018-01-01 RX ADMIN — PIPERACILLIN SODIUM,TAZOBACTAM SODIUM 4.5 G: 4; .5 INJECTION, POWDER, FOR SOLUTION INTRAVENOUS at 07:53

## 2018-01-01 RX ADMIN — CEFTAROLINE FOSAMIL 300 MG: 600 POWDER, FOR SOLUTION INTRAVENOUS at 18:47

## 2018-01-01 RX ADMIN — MIDODRINE HYDROCHLORIDE 10 MG: 5 TABLET ORAL at 11:30

## 2018-01-01 RX ADMIN — INSULIN ASPART 7 UNITS: 100 INJECTION, SOLUTION INTRAVENOUS; SUBCUTANEOUS at 08:31

## 2018-01-01 RX ADMIN — FENTANYL CITRATE 50 MCG: 50 INJECTION, SOLUTION INTRAMUSCULAR; INTRAVENOUS at 01:51

## 2018-01-01 RX ADMIN — Medication 12.5 ML/KG/HR: at 01:58

## 2018-01-01 RX ADMIN — PANTOPRAZOLE SODIUM 40 MG: 40 TABLET, DELAYED RELEASE ORAL at 15:42

## 2018-01-01 RX ADMIN — SODIUM BICARBONATE 650 MG: 650 TABLET ORAL at 08:03

## 2018-01-01 RX ADMIN — PANTOPRAZOLE SODIUM 40 MG: 40 TABLET, DELAYED RELEASE ORAL at 08:25

## 2018-01-01 RX ADMIN — HEPARIN SODIUM 500 UNITS/HR: 10000 INJECTION, SOLUTION INTRAVENOUS at 14:16

## 2018-01-01 RX ADMIN — PHENYLEPHRINE HYDROCHLORIDE 300 MCG: 10 INJECTION, SOLUTION INTRAMUSCULAR; INTRAVENOUS; SUBCUTANEOUS at 09:10

## 2018-01-01 RX ADMIN — Medication 12.5 ML/KG/HR: at 13:14

## 2018-01-01 RX ADMIN — PIPERACILLIN SODIUM AND TAZOBACTAM SODIUM 3.38 G: 3; .375 INJECTION, POWDER, LYOPHILIZED, FOR SOLUTION INTRAVENOUS at 17:22

## 2018-01-01 RX ADMIN — PROPOFOL 50 MCG/KG/MIN: 10 INJECTION, EMULSION INTRAVENOUS at 14:49

## 2018-01-01 RX ADMIN — ALBUMIN HUMAN 12.5 G: 50 SOLUTION INTRAVENOUS at 23:09

## 2018-01-01 RX ADMIN — HYDROMORPHONE HYDROCHLORIDE 0.5 MG: 1 INJECTION, SOLUTION INTRAMUSCULAR; INTRAVENOUS; SUBCUTANEOUS at 10:42

## 2018-01-01 RX ADMIN — HEPATITIS B IMMUNE GLOBULIN (HUMAN) 10000 UNITS: 312 INJECTION, SOLUTION INTRAMUSCULAR; INTRAVENOUS at 15:58

## 2018-01-01 RX ADMIN — Medication 17 ML/KG/HR: at 13:41

## 2018-01-01 RX ADMIN — Medication 81 MG: at 07:41

## 2018-01-01 RX ADMIN — OXYCODONE HYDROCHLORIDE 10 MG: 5 SOLUTION ORAL at 08:15

## 2018-01-01 RX ADMIN — HYDROCORTISONE SODIUM SUCCINATE 100 MG: 100 INJECTION, POWDER, FOR SOLUTION INTRAMUSCULAR; INTRAVENOUS at 09:48

## 2018-01-01 RX ADMIN — Medication 0.3 MG: at 23:52

## 2018-01-01 RX ADMIN — DEXTROSE MONOHYDRATE: 100 INJECTION, SOLUTION INTRAVENOUS at 04:17

## 2018-01-01 RX ADMIN — Medication 1 MG: at 18:41

## 2018-01-01 RX ADMIN — VALGANCICLOVIR HYDROCHLORIDE 450 MG: 50 POWDER, FOR SOLUTION ORAL at 07:07

## 2018-01-01 RX ADMIN — METRONIDAZOLE 500 MG: 500 INJECTION, SOLUTION INTRAVENOUS at 20:00

## 2018-01-01 RX ADMIN — CEFTRIAXONE SODIUM 2 G: 2 INJECTION, POWDER, FOR SOLUTION INTRAMUSCULAR; INTRAVENOUS at 11:04

## 2018-01-01 RX ADMIN — VALGANCICLOVIR HYDROCHLORIDE 450 MG: 50 POWDER, FOR SOLUTION ORAL at 09:26

## 2018-01-01 RX ADMIN — RIFAXIMIN 550 MG: 550 TABLET ORAL at 08:43

## 2018-01-01 RX ADMIN — MAGNESIUM SULFATE HEPTAHYDRATE 2 G: 40 INJECTION, SOLUTION INTRAVENOUS at 05:50

## 2018-01-01 RX ADMIN — Medication 12.5 ML/KG/HR: at 16:38

## 2018-01-01 RX ADMIN — ALBUMIN HUMAN 12.5 G: 0.25 SOLUTION INTRAVENOUS at 13:15

## 2018-01-01 RX ADMIN — MAGNESIUM SULFATE HEPTAHYDRATE: 500 INJECTION, SOLUTION INTRAMUSCULAR; INTRAVENOUS at 19:39

## 2018-01-01 RX ADMIN — LINEZOLID 600 MG: 600 INJECTION, SOLUTION INTRAVENOUS at 20:00

## 2018-01-01 RX ADMIN — ACETAMINOPHEN 500 MG: 500 TABLET, FILM COATED ORAL at 20:41

## 2018-01-01 RX ADMIN — POTASSIUM CHLORIDE 20 MEQ: 400 INJECTION, SOLUTION INTRAVENOUS at 14:58

## 2018-01-01 RX ADMIN — BUMETANIDE 1 MG/HR: 0.25 INJECTION INTRAMUSCULAR; INTRAVENOUS at 18:56

## 2018-01-01 RX ADMIN — SODIUM BICARBONATE 50 MEQ: 84 INJECTION INTRAVENOUS at 10:30

## 2018-01-01 RX ADMIN — ALBUMIN HUMAN 12.5 G: 0.25 SOLUTION INTRAVENOUS at 12:58

## 2018-01-01 RX ADMIN — Medication 0.5 MG: at 04:34

## 2018-01-01 RX ADMIN — PIPERACILLIN SODIUM AND TAZOBACTAM SODIUM 3.38 G: 3; .375 INJECTION, POWDER, LYOPHILIZED, FOR SOLUTION INTRAVENOUS at 18:25

## 2018-01-01 RX ADMIN — METRONIDAZOLE 500 MG: 500 INJECTION, SOLUTION INTRAVENOUS at 15:26

## 2018-01-01 RX ADMIN — FENTANYL CITRATE 50 MCG: 50 INJECTION, SOLUTION INTRAMUSCULAR; INTRAVENOUS at 17:09

## 2018-01-01 RX ADMIN — HYDROMORPHONE HYDROCHLORIDE 0.5 MG: 1 INJECTION, SOLUTION INTRAMUSCULAR; INTRAVENOUS; SUBCUTANEOUS at 19:34

## 2018-01-01 RX ADMIN — Medication 17.5 ML/KG/HR: at 22:43

## 2018-01-01 RX ADMIN — HYDROMORPHONE HYDROCHLORIDE 0.3 MG: 1 INJECTION, SOLUTION INTRAMUSCULAR; INTRAVENOUS; SUBCUTANEOUS at 12:39

## 2018-01-01 RX ADMIN — Medication 0.5 MG: at 22:25

## 2018-01-01 RX ADMIN — INSULIN ASPART 2 UNITS: 100 INJECTION, SOLUTION INTRAVENOUS; SUBCUTANEOUS at 12:46

## 2018-01-01 RX ADMIN — SODIUM CHLORIDE 50 MG: 9 INJECTION, SOLUTION INTRAVENOUS at 16:59

## 2018-01-01 RX ADMIN — Medication 12.5 ML/KG/HR: at 04:49

## 2018-01-01 RX ADMIN — DEXMEDETOMIDINE HYDROCHLORIDE 0.3 MCG/KG/HR: 4 INJECTION, SOLUTION INTRAVENOUS at 09:59

## 2018-01-01 RX ADMIN — HUMAN INSULIN 3 UNITS/HR: 100 INJECTION, SOLUTION SUBCUTANEOUS at 08:17

## 2018-01-01 RX ADMIN — CALCIUM CHLORIDE 2 G: 100 INJECTION, SOLUTION INTRAVENOUS at 13:07

## 2018-01-01 RX ADMIN — CEFTAROLINE FOSAMIL 300 MG: 600 POWDER, FOR SOLUTION INTRAVENOUS at 14:46

## 2018-01-01 RX ADMIN — MEROPENEM 1 G: 1 INJECTION, POWDER, FOR SOLUTION INTRAVENOUS at 04:28

## 2018-01-01 RX ADMIN — PANTOPRAZOLE SODIUM 40 MG: 40 INJECTION, POWDER, FOR SOLUTION INTRAVENOUS at 20:06

## 2018-01-01 RX ADMIN — TENOFOVIR DISOPROXIL FUMARATE 300 MG: 300 TABLET, COATED ORAL at 20:11

## 2018-01-01 RX ADMIN — Medication 50 MCG/HR: at 14:22

## 2018-01-01 RX ADMIN — HYDROCORTISONE SODIUM SUCCINATE 50 MG: 100 INJECTION, POWDER, FOR SOLUTION INTRAMUSCULAR; INTRAVENOUS at 02:20

## 2018-01-01 RX ADMIN — ROCURONIUM BROMIDE 50 MG: 10 INJECTION INTRAVENOUS at 17:55

## 2018-01-01 RX ADMIN — LACTULOSE 20 G: 10 SOLUTION ORAL; RECTAL at 20:09

## 2018-01-01 RX ADMIN — LACTULOSE 200 G: 10 SOLUTION ORAL; RECTAL at 23:06

## 2018-01-01 RX ADMIN — PANTOPRAZOLE SODIUM 40 MG: 40 INJECTION, POWDER, FOR SOLUTION INTRAVENOUS at 08:40

## 2018-01-01 RX ADMIN — NYSTATIN 500000 UNITS: 100000 SUSPENSION ORAL at 09:08

## 2018-01-01 RX ADMIN — HYDROMORPHONE HYDROCHLORIDE 0.5 MG: 1 INJECTION, SOLUTION INTRAMUSCULAR; INTRAVENOUS; SUBCUTANEOUS at 11:53

## 2018-01-01 RX ADMIN — SULFAMETHOXAZOLE AND TRIMETHOPRIM 80 MG: 200; 40 SUSPENSION ORAL at 09:19

## 2018-01-01 RX ADMIN — MEROPENEM 1000 MG: 1 INJECTION, POWDER, FOR SOLUTION INTRAVENOUS at 13:47

## 2018-01-01 RX ADMIN — MICAFUNGIN SODIUM 100 MG: 10 INJECTION, POWDER, LYOPHILIZED, FOR SOLUTION INTRAVENOUS at 21:55

## 2018-01-01 RX ADMIN — LACTULOSE 20 G: 10 SOLUTION ORAL; RECTAL at 13:32

## 2018-01-01 RX ADMIN — Medication 1.25 MG: at 20:12

## 2018-01-01 RX ADMIN — TENOFOVIR DISOPROXIL FUMARATE 300 MG: 300 TABLET, COATED ORAL at 15:41

## 2018-01-01 RX ADMIN — Medication 1 PACKET: at 11:58

## 2018-01-01 RX ADMIN — HEPARIN SODIUM 1400 UNITS/HR: 10000 INJECTION, SOLUTION INTRAVENOUS at 18:18

## 2018-01-01 RX ADMIN — CALCIUM GLUCONATE: 98 INJECTION, SOLUTION INTRAVENOUS at 19:50

## 2018-01-01 RX ADMIN — Medication 100 MCG/HR: at 00:41

## 2018-01-01 RX ADMIN — ONDANSETRON 4 MG: 2 INJECTION INTRAMUSCULAR; INTRAVENOUS at 15:18

## 2018-01-01 RX ADMIN — HUMAN INSULIN 1 UNITS/HR: 100 INJECTION, SOLUTION SUBCUTANEOUS at 00:12

## 2018-01-01 RX ADMIN — PHENYLEPHRINE HYDROCHLORIDE 300 MCG: 10 INJECTION, SOLUTION INTRAMUSCULAR; INTRAVENOUS; SUBCUTANEOUS at 09:36

## 2018-01-01 RX ADMIN — Medication 1 MG: at 11:12

## 2018-01-01 RX ADMIN — FENTANYL CITRATE 50 MCG: 50 INJECTION, SOLUTION INTRAMUSCULAR; INTRAVENOUS at 16:51

## 2018-01-01 RX ADMIN — PANTOPRAZOLE SODIUM 40 MG: 40 TABLET, DELAYED RELEASE ORAL at 07:32

## 2018-01-01 RX ADMIN — Medication 17 ML/KG/HR: at 18:13

## 2018-01-01 RX ADMIN — ALBUMIN HUMAN 25 G: 0.25 SOLUTION INTRAVENOUS at 11:00

## 2018-01-01 RX ADMIN — Medication 81 MG: at 08:05

## 2018-01-01 RX ADMIN — PROPOFOL 15 MCG/KG/MIN: 10 INJECTION, EMULSION INTRAVENOUS at 21:32

## 2018-01-01 RX ADMIN — MAGNESIUM SULFATE HEPTAHYDRATE: 500 INJECTION, SOLUTION INTRAMUSCULAR; INTRAVENOUS at 20:01

## 2018-01-01 RX ADMIN — CEFTAROLINE FOSAMIL 400 MG: 600 POWDER, FOR SOLUTION INTRAVENOUS at 19:58

## 2018-01-01 RX ADMIN — Medication 81 MG: at 07:55

## 2018-01-01 RX ADMIN — FENTANYL CITRATE 100 MCG: 50 INJECTION, SOLUTION INTRAMUSCULAR; INTRAVENOUS at 07:57

## 2018-01-01 RX ADMIN — CLOTRIMAZOLE 1 TROCHE: 10 LOZENGE ORAL at 07:56

## 2018-01-01 RX ADMIN — DEXTROSE MONOHYDRATE: 50 INJECTION, SOLUTION INTRAVENOUS at 09:45

## 2018-01-01 RX ADMIN — PANTOPRAZOLE SODIUM 40 MG: 40 TABLET, DELAYED RELEASE ORAL at 07:26

## 2018-01-01 RX ADMIN — ALBUMIN HUMAN 25 G: 50 SOLUTION INTRAVENOUS at 05:04

## 2018-01-01 RX ADMIN — HUMAN INSULIN 6 UNITS/HR: 100 INJECTION, SOLUTION SUBCUTANEOUS at 13:04

## 2018-01-01 RX ADMIN — Medication 17.5 ML/KG/HR: at 23:41

## 2018-01-01 RX ADMIN — POTASSIUM CHLORIDE 20 MEQ: 20 SOLUTION ORAL at 09:41

## 2018-01-01 RX ADMIN — ALBUMIN HUMAN 12.5 G: 50 SOLUTION INTRAVENOUS at 06:47

## 2018-01-01 RX ADMIN — Medication 12.5 ML/KG/HR: at 03:52

## 2018-01-01 RX ADMIN — SODIUM CHLORIDE 8 MG/HR: 9 INJECTION, SOLUTION INTRAVENOUS at 07:53

## 2018-01-01 RX ADMIN — SODIUM CHLORIDE 50 MG: 9 INJECTION, SOLUTION INTRAVENOUS at 05:14

## 2018-01-01 RX ADMIN — PANTOPRAZOLE SODIUM 40 MG: 40 TABLET, DELAYED RELEASE ORAL at 16:09

## 2018-01-01 RX ADMIN — MIDODRINE HYDROCHLORIDE 10 MG: 5 TABLET ORAL at 11:50

## 2018-01-01 RX ADMIN — HUMAN INSULIN 7 UNITS/HR: 100 INJECTION, SOLUTION SUBCUTANEOUS at 11:20

## 2018-01-01 RX ADMIN — Medication 12.5 ML/KG/HR: at 14:45

## 2018-01-01 RX ADMIN — PHENYLEPHRINE HYDROCHLORIDE 100 MCG: 10 INJECTION, SOLUTION INTRAMUSCULAR; INTRAVENOUS; SUBCUTANEOUS at 14:34

## 2018-01-01 RX ADMIN — Medication 17 ML/KG/HR: at 19:34

## 2018-01-01 RX ADMIN — SODIUM CHLORIDE 50 MG: 9 INJECTION, SOLUTION INTRAVENOUS at 16:47

## 2018-01-01 RX ADMIN — MYCOPHENOLATE MOFETIL 1000 MG: 200 POWDER, FOR SUSPENSION ORAL at 08:19

## 2018-01-01 RX ADMIN — CEFTAROLINE FOSAMIL 400 MG: 600 POWDER, FOR SOLUTION INTRAVENOUS at 07:32

## 2018-01-01 RX ADMIN — HYDROMORPHONE HYDROCHLORIDE 0.5 MG: 1 INJECTION, SOLUTION INTRAMUSCULAR; INTRAVENOUS; SUBCUTANEOUS at 21:52

## 2018-01-01 RX ADMIN — WATER 500 ML: 100 IRRIGANT IRRIGATION at 16:30

## 2018-01-01 RX ADMIN — OXYCODONE HYDROCHLORIDE 10 MG: 5 SOLUTION ORAL at 12:09

## 2018-01-01 RX ADMIN — PHENYLEPHRINE HYDROCHLORIDE 200 MCG: 10 INJECTION, SOLUTION INTRAMUSCULAR; INTRAVENOUS; SUBCUTANEOUS at 09:46

## 2018-01-01 RX ADMIN — ONDANSETRON HYDROCHLORIDE 4 MG: 2 INJECTION, SOLUTION INTRAMUSCULAR; INTRAVENOUS at 00:35

## 2018-01-01 RX ADMIN — VASOPRESSIN 2 UNITS: 20 INJECTION INTRAVENOUS at 09:31

## 2018-01-01 RX ADMIN — ROCURONIUM BROMIDE 20 MG: 10 INJECTION INTRAVENOUS at 11:44

## 2018-01-01 RX ADMIN — VALGANCICLOVIR HYDROCHLORIDE 450 MG: 50 POWDER, FOR SOLUTION ORAL at 07:26

## 2018-01-01 RX ADMIN — PREDNISONE 25 MG: 5 TABLET ORAL at 11:08

## 2018-01-01 RX ADMIN — HUMAN INSULIN 4 UNITS/HR: 100 INJECTION, SOLUTION SUBCUTANEOUS at 18:01

## 2018-01-01 RX ADMIN — SODIUM CHLORIDE, POTASSIUM CHLORIDE, SODIUM LACTATE AND CALCIUM CHLORIDE: 600; 310; 30; 20 INJECTION, SOLUTION INTRAVENOUS at 13:40

## 2018-01-01 RX ADMIN — MEROPENEM 1000 MG: 1 INJECTION, POWDER, FOR SOLUTION INTRAVENOUS at 14:42

## 2018-01-01 RX ADMIN — DEXTROSE MONOHYDRATE: 50 INJECTION, SOLUTION INTRAVENOUS at 21:59

## 2018-01-01 RX ADMIN — NITROGLYCERIN 50 MCG: 10 INJECTION INTRAVENOUS at 12:15

## 2018-01-01 RX ADMIN — PIPERACILLIN SODIUM,TAZOBACTAM SODIUM 4.5 G: 4; .5 INJECTION, POWDER, FOR SOLUTION INTRAVENOUS at 02:16

## 2018-01-01 RX ADMIN — LACTULOSE 20 G: 20 POWDER, FOR SOLUTION ORAL at 08:44

## 2018-01-01 RX ADMIN — PANTOPRAZOLE SODIUM 40 MG: 40 INJECTION, POWDER, FOR SOLUTION INTRAVENOUS at 07:43

## 2018-01-01 RX ADMIN — PANTOPRAZOLE SODIUM 40 MG: 40 INJECTION, POWDER, FOR SOLUTION INTRAVENOUS at 19:35

## 2018-01-01 RX ADMIN — Medication 17 ML/KG/HR: at 23:54

## 2018-01-01 RX ADMIN — SODIUM CHLORIDE 500 ML: 9 INJECTION, SOLUTION INTRAVENOUS at 19:34

## 2018-01-01 RX ADMIN — Medication 1.25 MG: at 21:27

## 2018-01-01 RX ADMIN — METRONIDAZOLE 500 MG: 500 INJECTION, SOLUTION INTRAVENOUS at 02:42

## 2018-01-01 RX ADMIN — CLOTRIMAZOLE 1 TROCHE: 10 LOZENGE ORAL at 11:13

## 2018-01-01 RX ADMIN — HYDROCORTISONE SODIUM SUCCINATE 100 MG: 100 INJECTION, POWDER, FOR SOLUTION INTRAMUSCULAR; INTRAVENOUS at 01:52

## 2018-01-01 RX ADMIN — OXYCODONE HYDROCHLORIDE 10 MG: 5 SOLUTION ORAL at 22:17

## 2018-01-01 RX ADMIN — SODIUM CHLORIDE 50 MG: 9 INJECTION, SOLUTION INTRAVENOUS at 09:21

## 2018-01-01 RX ADMIN — Medication 12.5 ML/KG/HR: at 23:58

## 2018-01-01 RX ADMIN — ONDANSETRON 4 MG: 2 INJECTION INTRAMUSCULAR; INTRAVENOUS at 16:08

## 2018-01-01 RX ADMIN — LACTULOSE 20 G: 20 POWDER, FOR SOLUTION ORAL at 15:03

## 2018-01-01 RX ADMIN — PHENYLEPHRINE HYDROCHLORIDE 200 MCG: 10 INJECTION, SOLUTION INTRAMUSCULAR; INTRAVENOUS; SUBCUTANEOUS at 09:47

## 2018-01-01 RX ADMIN — Medication 0.3 MG: at 12:53

## 2018-01-01 RX ADMIN — RIFAXIMIN 550 MG: 550 TABLET ORAL at 21:30

## 2018-01-01 RX ADMIN — DAPTOMYCIN 800 MG: 500 INJECTION, POWDER, LYOPHILIZED, FOR SOLUTION INTRAVENOUS at 13:59

## 2018-01-01 RX ADMIN — METRONIDAZOLE 500 MG: 500 INJECTION, SOLUTION INTRAVENOUS at 07:51

## 2018-01-01 RX ADMIN — LEVOFLOXACIN 250 MG: 250 TABLET, FILM COATED ORAL at 18:36

## 2018-01-01 RX ADMIN — PANTOPRAZOLE SODIUM 40 MG: 40 TABLET, DELAYED RELEASE ORAL at 07:38

## 2018-01-01 RX ADMIN — HYDROCORTISONE SODIUM SUCCINATE 100 MG: 100 INJECTION, POWDER, FOR SOLUTION INTRAMUSCULAR; INTRAVENOUS at 03:45

## 2018-01-01 RX ADMIN — LACTULOSE 20 G: 20 POWDER, FOR SOLUTION ORAL at 19:41

## 2018-01-01 RX ADMIN — PANTOPRAZOLE SODIUM 40 MG: 40 INJECTION, POWDER, FOR SOLUTION INTRAVENOUS at 19:40

## 2018-01-01 RX ADMIN — TRANEXAMIC ACID 1 G: 100 INJECTION, SOLUTION INTRAVENOUS at 05:52

## 2018-01-01 RX ADMIN — Medication: at 11:31

## 2018-01-01 RX ADMIN — OXYCODONE HYDROCHLORIDE 10 MG: 5 SOLUTION ORAL at 20:05

## 2018-01-01 RX ADMIN — PHENYLEPHRINE HYDROCHLORIDE 2 MCG/KG/MIN: 10 INJECTION, SOLUTION INTRAMUSCULAR; INTRAVENOUS; SUBCUTANEOUS at 03:12

## 2018-01-01 RX ADMIN — PIPERACILLIN SODIUM AND TAZOBACTAM SODIUM 3.38 G: 3; .375 INJECTION, POWDER, LYOPHILIZED, FOR SOLUTION INTRAVENOUS at 11:56

## 2018-01-01 RX ADMIN — PANTOPRAZOLE SODIUM 40 MG: 40 TABLET, DELAYED RELEASE ORAL at 09:25

## 2018-01-01 RX ADMIN — OXYCODONE HYDROCHLORIDE 5 MG: 5 SOLUTION ORAL at 05:11

## 2018-01-01 RX ADMIN — OCTREOTIDE ACETATE 50 MCG/HR: 200 INJECTION, SOLUTION INTRAVENOUS; SUBCUTANEOUS at 00:08

## 2018-01-01 RX ADMIN — Medication 17 ML/KG/HR: at 21:42

## 2018-01-01 RX ADMIN — MIDODRINE HYDROCHLORIDE 10 MG: 5 TABLET ORAL at 19:44

## 2018-01-01 RX ADMIN — WATER 500 ML: 100 IRRIGANT IRRIGATION at 02:11

## 2018-01-01 RX ADMIN — Medication 81 MG: at 08:30

## 2018-01-01 RX ADMIN — HYDROCORTISONE SODIUM SUCCINATE 25 MG: 100 INJECTION, POWDER, FOR SOLUTION INTRAMUSCULAR; INTRAVENOUS at 20:01

## 2018-01-01 RX ADMIN — DOCUSATE SODIUM 286 ML: 50 LIQUID ORAL at 10:43

## 2018-01-01 RX ADMIN — ERTAPENEM SODIUM 500 MG: 1 INJECTION, POWDER, LYOPHILIZED, FOR SOLUTION INTRAMUSCULAR; INTRAVENOUS at 17:25

## 2018-01-01 RX ADMIN — OXYCODONE HYDROCHLORIDE 5 MG: 5 SOLUTION ORAL at 08:07

## 2018-01-01 RX ADMIN — POTASSIUM CHLORIDE 40 MEQ: 1.5 POWDER, FOR SOLUTION ORAL at 15:53

## 2018-01-01 RX ADMIN — Medication 0.75 MG: at 07:48

## 2018-01-01 RX ADMIN — HEPARIN SODIUM 400 UNITS/HR: 10000 INJECTION, SOLUTION INTRAVENOUS at 17:36

## 2018-01-01 RX ADMIN — POTASSIUM PHOSPHATE, MONOBASIC AND POTASSIUM PHOSPHATE, DIBASIC 15 MMOL: 224; 236 INJECTION, SOLUTION INTRAVENOUS at 11:49

## 2018-01-01 RX ADMIN — PIPERACILLIN SODIUM,TAZOBACTAM SODIUM 4.5 G: 4; .5 INJECTION, POWDER, FOR SOLUTION INTRAVENOUS at 01:56

## 2018-01-01 RX ADMIN — OXYCODONE HYDROCHLORIDE 5 MG: 5 SOLUTION ORAL at 13:16

## 2018-01-01 RX ADMIN — HUMAN INSULIN 4 UNITS/HR: 100 INJECTION, SOLUTION SUBCUTANEOUS at 05:05

## 2018-01-01 RX ADMIN — Medication 12.5 ML/KG/HR: at 07:05

## 2018-01-01 RX ADMIN — ALBUMIN HUMAN: 0.05 INJECTION, SOLUTION INTRAVENOUS at 12:02

## 2018-01-01 RX ADMIN — SODIUM CHLORIDE: 9 INJECTION, SOLUTION INTRAVENOUS at 16:14

## 2018-01-01 RX ADMIN — SODIUM CHLORIDE 50 MG: 9 INJECTION, SOLUTION INTRAVENOUS at 20:21

## 2018-01-01 RX ADMIN — SODIUM CHLORIDE, SODIUM GLUCONATE, SODIUM ACETATE, POTASSIUM CHLORIDE AND MAGNESIUM CHLORIDE: 526; 502; 368; 37; 30 INJECTION, SOLUTION INTRAVENOUS at 09:30

## 2018-01-01 RX ADMIN — FENTANYL CITRATE 50 MCG: 50 INJECTION, SOLUTION INTRAMUSCULAR; INTRAVENOUS at 13:49

## 2018-01-01 RX ADMIN — PANTOPRAZOLE SODIUM 40 MG: 40 TABLET, DELAYED RELEASE ORAL at 08:01

## 2018-01-01 RX ADMIN — ACETAMINOPHEN 325 MG: 325 TABLET, FILM COATED ORAL at 12:53

## 2018-01-01 RX ADMIN — HUMAN INSULIN 8 UNITS/HR: 100 INJECTION, SOLUTION SUBCUTANEOUS at 04:30

## 2018-01-01 RX ADMIN — I.V. FAT EMULSION 250 ML: 20 EMULSION INTRAVENOUS at 20:01

## 2018-01-01 RX ADMIN — Medication: at 17:02

## 2018-01-01 RX ADMIN — BUMETANIDE 1 MG: 1 TABLET ORAL at 13:38

## 2018-01-01 RX ADMIN — SODIUM CHLORIDE 100 MG: 9 INJECTION, SOLUTION INTRAVENOUS at 17:38

## 2018-01-01 RX ADMIN — MULTIVITAMIN 15 ML: LIQUID ORAL at 09:21

## 2018-01-01 RX ADMIN — POTASSIUM CHLORIDE 40 MEQ: 1.5 POWDER, FOR SOLUTION ORAL at 06:42

## 2018-01-01 RX ADMIN — Medication 17.5 ML/KG/HR: at 18:00

## 2018-01-01 RX ADMIN — HYDROMORPHONE HYDROCHLORIDE 0.2 MG: 1 INJECTION, SOLUTION INTRAMUSCULAR; INTRAVENOUS; SUBCUTANEOUS at 12:53

## 2018-01-01 RX ADMIN — MIDAZOLAM 1 MG: 1 INJECTION INTRAMUSCULAR; INTRAVENOUS at 18:45

## 2018-01-01 RX ADMIN — CEFTAROLINE FOSAMIL 400 MG: 600 POWDER, FOR SOLUTION INTRAVENOUS at 19:45

## 2018-01-01 RX ADMIN — CEFTAROLINE FOSAMIL 300 MG: 600 POWDER, FOR SOLUTION INTRAVENOUS at 01:40

## 2018-01-01 RX ADMIN — OXYCODONE HYDROCHLORIDE 10 MG: 5 SOLUTION ORAL at 23:10

## 2018-01-01 RX ADMIN — Medication 17 ML/KG/HR: at 09:03

## 2018-01-01 RX ADMIN — NYSTATIN 500000 UNITS: 100000 SUSPENSION ORAL at 19:34

## 2018-01-01 RX ADMIN — SULFAMETHOXAZOLE AND TRIMETHOPRIM 80 MG: 200; 40 SUSPENSION ORAL at 07:40

## 2018-01-01 RX ADMIN — POTASSIUM CHLORIDE 20 MEQ: 1.5 POWDER, FOR SOLUTION ORAL at 08:08

## 2018-01-01 RX ADMIN — CISATRACURIUM BESYLATE 6 MG: 2 INJECTION INTRAVENOUS at 13:36

## 2018-01-01 RX ADMIN — POTASSIUM CHLORIDE 20 MEQ: 29.8 INJECTION, SOLUTION INTRAVENOUS at 06:10

## 2018-01-01 RX ADMIN — HUMAN INSULIN 7 UNITS/HR: 100 INJECTION, SOLUTION SUBCUTANEOUS at 07:50

## 2018-01-01 RX ADMIN — ASPIRIN 81 MG CHEWABLE TABLET 81 MG: 81 TABLET CHEWABLE at 11:08

## 2018-01-01 RX ADMIN — HUMAN INSULIN 5 UNITS/HR: 100 INJECTION, SOLUTION SUBCUTANEOUS at 17:19

## 2018-01-01 RX ADMIN — HUMAN INSULIN 4 UNITS/HR: 100 INJECTION, SOLUTION SUBCUTANEOUS at 21:35

## 2018-01-01 RX ADMIN — MAGNESIUM SULFATE HEPTAHYDRATE 2 G: 40 INJECTION, SOLUTION INTRAVENOUS at 08:39

## 2018-01-01 RX ADMIN — Medication 12.5 ML/KG/HR: at 20:31

## 2018-01-01 RX ADMIN — MULTIVITAMIN 15 ML: LIQUID ORAL at 18:41

## 2018-01-01 RX ADMIN — DEXTROSE MONOHYDRATE: 50 INJECTION, SOLUTION INTRAVENOUS at 10:10

## 2018-01-01 RX ADMIN — HEPARIN SODIUM 1400 UNITS/HR: 10000 INJECTION, SOLUTION INTRAVENOUS at 01:31

## 2018-01-01 RX ADMIN — METRONIDAZOLE 500 MG: 500 INJECTION, SOLUTION INTRAVENOUS at 08:38

## 2018-01-01 RX ADMIN — TENOFOVIR DISOPROXIL FUMARATE 300 MG: 300 TABLET, COATED ORAL at 08:09

## 2018-01-01 RX ADMIN — CALCIUM CHLORIDE 1 G: 100 INJECTION, SOLUTION INTRAVENOUS at 00:01

## 2018-01-01 RX ADMIN — MAGNESIUM SULFATE HEPTAHYDRATE: 500 INJECTION, SOLUTION INTRAMUSCULAR; INTRAVENOUS at 19:51

## 2018-01-01 RX ADMIN — HEPARIN SODIUM 1100 UNITS/HR: 10000 INJECTION, SOLUTION INTRAVENOUS at 17:08

## 2018-01-01 RX ADMIN — PHYTONADIONE 10 MG: 10 INJECTION, EMULSION INTRAMUSCULAR; INTRAVENOUS; SUBCUTANEOUS at 10:49

## 2018-01-01 RX ADMIN — PIPERACILLIN SODIUM,TAZOBACTAM SODIUM 4.5 G: 4; .5 INJECTION, POWDER, FOR SOLUTION INTRAVENOUS at 07:55

## 2018-01-01 RX ADMIN — CALCIUM CHLORIDE 1 G: 100 INJECTION, SOLUTION INTRAVENOUS at 14:55

## 2018-01-01 RX ADMIN — SODIUM CHLORIDE 50 MG: 9 INJECTION, SOLUTION INTRAVENOUS at 16:30

## 2018-01-01 RX ADMIN — FENTANYL CITRATE 50 MCG: 50 INJECTION, SOLUTION INTRAMUSCULAR; INTRAVENOUS at 16:58

## 2018-01-01 RX ADMIN — OXYCODONE HYDROCHLORIDE 5 MG: 5 SOLUTION ORAL at 20:34

## 2018-01-01 RX ADMIN — VALGANCICLOVIR HYDROCHLORIDE 450 MG: 50 POWDER, FOR SOLUTION ORAL at 08:43

## 2018-01-01 RX ADMIN — LOPERAMIDE HYDROCHLORIDE 2 MG: 1 SOLUTION ORAL at 16:44

## 2018-01-01 RX ADMIN — PHENYLEPHRINE HYDROCHLORIDE 100 MCG: 10 INJECTION, SOLUTION INTRAMUSCULAR; INTRAVENOUS; SUBCUTANEOUS at 10:49

## 2018-01-01 RX ADMIN — RIFAXIMIN 550 MG: 550 TABLET ORAL at 08:01

## 2018-01-01 RX ADMIN — SODIUM CHLORIDE: 4.5 INJECTION, SOLUTION INTRAVENOUS at 17:12

## 2018-01-01 RX ADMIN — OXYCODONE HYDROCHLORIDE 10 MG: 5 SOLUTION ORAL at 12:04

## 2018-01-01 RX ADMIN — Medication 17 ML/KG/HR: at 04:37

## 2018-01-01 RX ADMIN — Medication 50 MCG/HR: at 16:26

## 2018-01-01 RX ADMIN — VASOPRESSIN 1 UNITS: 20 INJECTION, SOLUTION INTRAMUSCULAR; SUBCUTANEOUS at 09:26

## 2018-01-01 RX ADMIN — METRONIDAZOLE 500 MG: 500 INJECTION, SOLUTION INTRAVENOUS at 08:10

## 2018-01-01 RX ADMIN — Medication: at 22:19

## 2018-01-01 RX ADMIN — DESMOPRESSIN ACETATE 18.2 MCG: 4 SOLUTION INTRAVENOUS at 12:19

## 2018-01-01 RX ADMIN — HUMAN INSULIN 9 UNITS/HR: 100 INJECTION, SOLUTION SUBCUTANEOUS at 22:46

## 2018-01-01 RX ADMIN — SODIUM CHLORIDE, POTASSIUM CHLORIDE, SODIUM LACTATE AND CALCIUM CHLORIDE: 600; 310; 30; 20 INJECTION, SOLUTION INTRAVENOUS at 07:30

## 2018-01-01 RX ADMIN — FENTANYL CITRATE 250 MCG: 50 INJECTION, SOLUTION INTRAMUSCULAR; INTRAVENOUS at 12:16

## 2018-01-01 RX ADMIN — MULTIVITAMIN 15 ML: LIQUID ORAL at 08:18

## 2018-01-01 RX ADMIN — MAGNESIUM SULFATE HEPTAHYDRATE 45 ML/HR: 500 INJECTION, SOLUTION INTRAMUSCULAR; INTRAVENOUS at 13:20

## 2018-01-01 RX ADMIN — HUMAN ALBUMIN MICROSPHERES AND PERFLUTREN 9 ML: 10; .22 INJECTION, SOLUTION INTRAVENOUS at 10:57

## 2018-01-01 RX ADMIN — MICAFUNGIN SODIUM 100 MG: 10 INJECTION, POWDER, LYOPHILIZED, FOR SOLUTION INTRAVENOUS at 20:13

## 2018-01-01 RX ADMIN — HYDROCORTISONE SODIUM SUCCINATE 100 MG: 100 INJECTION, POWDER, FOR SOLUTION INTRAMUSCULAR; INTRAVENOUS at 15:00

## 2018-01-01 RX ADMIN — SODIUM CHLORIDE 50 MG: 9 INJECTION, SOLUTION INTRAVENOUS at 09:30

## 2018-01-01 RX ADMIN — Medication 12.5 ML/KG/HR: at 18:26

## 2018-01-01 RX ADMIN — POTASSIUM PHOSPHATE, MONOBASIC AND POTASSIUM PHOSPHATE, DIBASIC 20 MMOL: 224; 236 INJECTION, SOLUTION INTRAVENOUS at 05:54

## 2018-01-01 RX ADMIN — OCTREOTIDE ACETATE 100 MCG: 100 INJECTION, SOLUTION INTRAVENOUS; SUBCUTANEOUS at 04:05

## 2018-01-01 RX ADMIN — ROCURONIUM BROMIDE 80 MG: 10 INJECTION INTRAVENOUS at 10:07

## 2018-01-01 RX ADMIN — POTASSIUM CHLORIDE 20 MEQ: 400 INJECTION, SOLUTION INTRAVENOUS at 06:14

## 2018-01-01 RX ADMIN — MIDODRINE HYDROCHLORIDE 10 MG: 5 TABLET ORAL at 12:56

## 2018-01-01 RX ADMIN — ETOMIDATE 6 MG: 2 INJECTION INTRAVENOUS at 17:23

## 2018-01-01 RX ADMIN — PHENYLEPHRINE HYDROCHLORIDE 100 MCG: 10 INJECTION, SOLUTION INTRAMUSCULAR; INTRAVENOUS; SUBCUTANEOUS at 17:36

## 2018-01-01 RX ADMIN — MIDODRINE HYDROCHLORIDE 10 MG: 5 TABLET ORAL at 19:55

## 2018-01-01 RX ADMIN — Medication 1 MG: at 08:26

## 2018-01-01 RX ADMIN — PANTOPRAZOLE SODIUM 40 MG: 40 INJECTION, POWDER, FOR SOLUTION INTRAVENOUS at 07:31

## 2018-01-01 RX ADMIN — Medication 17 ML/KG/HR: at 14:34

## 2018-01-01 RX ADMIN — HUMAN INSULIN 10 UNITS/HR: 100 INJECTION, SOLUTION SUBCUTANEOUS at 05:44

## 2018-01-01 RX ADMIN — LIDOCAINE HYDROCHLORIDE 10 ML: 20 JELLY TOPICAL at 14:12

## 2018-01-01 RX ADMIN — HUMAN INSULIN 1 UNITS/HR: 100 INJECTION, SOLUTION SUBCUTANEOUS at 14:07

## 2018-01-01 RX ADMIN — Medication 12.5 ML/KG/HR: at 05:10

## 2018-01-01 RX ADMIN — Medication 12.5 ML/KG/HR: at 07:49

## 2018-01-01 RX ADMIN — CALCIUM CHLORIDE 1 G: 100 INJECTION, SOLUTION INTRAVENOUS at 05:22

## 2018-01-01 RX ADMIN — VALGANCICLOVIR HYDROCHLORIDE 450 MG: 50 POWDER, FOR SOLUTION ORAL at 08:30

## 2018-01-01 RX ADMIN — MIDAZOLAM 2 MG: 1 INJECTION INTRAMUSCULAR; INTRAVENOUS at 11:53

## 2018-01-01 RX ADMIN — POTASSIUM CHLORIDE 20 MEQ: 1.5 POWDER, FOR SOLUTION ORAL at 21:42

## 2018-01-01 RX ADMIN — LIDOCAINE HYDROCHLORIDE 5 ML: 10 INJECTION, SOLUTION EPIDURAL; INFILTRATION; INTRACAUDAL; PERINEURAL at 09:30

## 2018-01-01 RX ADMIN — Medication 1 MG: at 17:53

## 2018-01-01 RX ADMIN — HYDROCORTISONE SODIUM SUCCINATE 100 MG: 100 INJECTION, POWDER, FOR SOLUTION INTRAMUSCULAR; INTRAVENOUS at 02:23

## 2018-01-01 RX ADMIN — NOREPINEPHRINE BITARTRATE 3.2 MCG: 1 INJECTION INTRAVENOUS at 12:51

## 2018-01-01 RX ADMIN — SODIUM CHLORIDE, POTASSIUM CHLORIDE, SODIUM LACTATE AND CALCIUM CHLORIDE: 600; 310; 30; 20 INJECTION, SOLUTION INTRAVENOUS at 21:43

## 2018-01-01 RX ADMIN — POTASSIUM CHLORIDE 20 MEQ: 1.5 POWDER, FOR SOLUTION ORAL at 20:05

## 2018-01-01 RX ADMIN — MICONAZOLE NITRATE: 2 POWDER TOPICAL at 19:41

## 2018-01-01 RX ADMIN — Medication 12.5 ML/KG/HR: at 00:29

## 2018-01-01 RX ADMIN — DEXTROSE AND SODIUM CHLORIDE: 5; 450 INJECTION, SOLUTION INTRAVENOUS at 05:55

## 2018-01-01 RX ADMIN — OXYCODONE HYDROCHLORIDE 10 MG: 5 SOLUTION ORAL at 14:24

## 2018-01-01 RX ADMIN — POTASSIUM CHLORIDE 20 MEQ: 29.8 INJECTION, SOLUTION INTRAVENOUS at 04:44

## 2018-01-01 RX ADMIN — FENTANYL CITRATE 25 MCG/HR: 50 INJECTION, SOLUTION INTRAMUSCULAR; INTRAVENOUS at 15:49

## 2018-01-01 RX ADMIN — HUMAN INSULIN 6 UNITS/HR: 100 INJECTION, SOLUTION SUBCUTANEOUS at 06:18

## 2018-01-01 RX ADMIN — SPIRONOLACTONE 100 MG: 25 TABLET ORAL at 14:38

## 2018-01-01 RX ADMIN — SODIUM CHLORIDE: 4.5 INJECTION, SOLUTION INTRAVENOUS at 06:45

## 2018-01-01 RX ADMIN — FENTANYL CITRATE 100 MCG: 50 INJECTION INTRAMUSCULAR; INTRAVENOUS at 10:52

## 2018-01-01 RX ADMIN — POTASSIUM CHLORIDE 20 MEQ: 1.5 POWDER, FOR SOLUTION ORAL at 08:45

## 2018-01-01 RX ADMIN — MIDODRINE HYDROCHLORIDE 10 MG: 5 TABLET ORAL at 11:44

## 2018-01-01 RX ADMIN — DAPTOMYCIN 600 MG: 500 INJECTION, POWDER, LYOPHILIZED, FOR SOLUTION INTRAVENOUS at 11:28

## 2018-01-01 RX ADMIN — NYSTATIN 500000 UNITS: 100000 SUSPENSION ORAL at 07:46

## 2018-01-01 RX ADMIN — NYSTATIN 500000 UNITS: 100000 SUSPENSION ORAL at 12:33

## 2018-01-01 RX ADMIN — CEFTRIAXONE SODIUM 2 G: 2 INJECTION, POWDER, FOR SOLUTION INTRAMUSCULAR; INTRAVENOUS at 10:42

## 2018-01-01 RX ADMIN — Medication 17 ML/KG/HR: at 02:12

## 2018-01-01 RX ADMIN — Medication 0.75 MG: at 18:24

## 2018-01-01 RX ADMIN — HUMAN INSULIN 6 UNITS/HR: 100 INJECTION, SOLUTION SUBCUTANEOUS at 12:19

## 2018-01-01 RX ADMIN — OXYCODONE HYDROCHLORIDE 5 MG: 5 SOLUTION ORAL at 14:23

## 2018-01-01 RX ADMIN — MYCOPHENOLATE MOFETIL 1000 MG: 200 POWDER, FOR SUSPENSION ORAL at 07:57

## 2018-01-01 RX ADMIN — HUMAN INSULIN 1 UNITS/HR: 100 INJECTION, SOLUTION SUBCUTANEOUS at 20:23

## 2018-01-01 RX ADMIN — HUMAN INSULIN 7 UNITS/HR: 100 INJECTION, SOLUTION SUBCUTANEOUS at 23:10

## 2018-01-01 RX ADMIN — MEROPENEM 500 MG: 500 INJECTION, POWDER, FOR SOLUTION INTRAVENOUS at 21:25

## 2018-01-01 RX ADMIN — ROCURONIUM BROMIDE 50 MG: 10 INJECTION INTRAVENOUS at 12:36

## 2018-01-01 RX ADMIN — INSULIN HUMAN 10 UNITS: 100 INJECTION, SUSPENSION SUBCUTANEOUS at 16:04

## 2018-01-01 RX ADMIN — IOPAMIDOL 93 ML: 755 INJECTION, SOLUTION INTRAVENOUS at 10:41

## 2018-01-01 RX ADMIN — Medication 81 MG: at 07:57

## 2018-01-01 RX ADMIN — Medication 12.5 ML/KG/HR: at 19:10

## 2018-01-01 RX ADMIN — Medication 12.5 ML/KG/HR: at 09:45

## 2018-01-01 RX ADMIN — NOREPINEPHRINE BITARTRATE 0.4 MCG/KG/MIN: 1 INJECTION INTRAVENOUS at 23:24

## 2018-01-01 RX ADMIN — LACTULOSE 20 G: 20 SOLUTION ORAL at 16:46

## 2018-01-01 RX ADMIN — PHENYLEPHRINE HYDROCHLORIDE 200 MCG: 10 INJECTION, SOLUTION INTRAMUSCULAR; INTRAVENOUS; SUBCUTANEOUS at 08:00

## 2018-01-01 RX ADMIN — ACETAMINOPHEN 500 MG: 500 TABLET, FILM COATED ORAL at 12:56

## 2018-01-01 RX ADMIN — RIFAXIMIN 550 MG: 550 TABLET ORAL at 21:22

## 2018-01-01 RX ADMIN — MIDAZOLAM 1 MG: 1 INJECTION INTRAMUSCULAR; INTRAVENOUS at 16:14

## 2018-01-01 RX ADMIN — POTASSIUM PHOSPHATE, MONOBASIC AND POTASSIUM PHOSPHATE, DIBASIC 20 MMOL: 224; 236 INJECTION, SOLUTION INTRAVENOUS at 12:20

## 2018-01-01 RX ADMIN — PANTOPRAZOLE SODIUM 40 MG: 40 INJECTION, POWDER, FOR SOLUTION INTRAVENOUS at 09:08

## 2018-01-01 RX ADMIN — HUMAN INSULIN 4 UNITS/HR: 100 INJECTION, SOLUTION SUBCUTANEOUS at 23:24

## 2018-01-01 RX ADMIN — TACROLIMUS 3 MG: 1 CAPSULE ORAL at 08:19

## 2018-01-01 RX ADMIN — HYDROCORTISONE 25 MG: 20 TABLET ORAL at 15:48

## 2018-01-01 RX ADMIN — CISATRACURIUM BESYLATE 8 MG: 2 INJECTION INTRAVENOUS at 12:35

## 2018-01-01 RX ADMIN — DEXMEDETOMIDINE HYDROCHLORIDE 0.2 MCG/KG/HR: 100 INJECTION, SOLUTION INTRAVENOUS at 03:21

## 2018-01-01 RX ADMIN — METOCLOPRAMIDE 10 MG: 5 INJECTION, SOLUTION INTRAMUSCULAR; INTRAVENOUS at 04:37

## 2018-01-01 RX ADMIN — BISACODYL 10 MG: 10 SUPPOSITORY RECTAL at 07:44

## 2018-01-01 RX ADMIN — Medication 0.5 MG: at 17:15

## 2018-01-01 RX ADMIN — MYCOPHENOLATE MOFETIL 1000 MG: 200 POWDER, FOR SUSPENSION ORAL at 08:30

## 2018-01-01 RX ADMIN — METRONIDAZOLE 500 MG: 500 INJECTION, SOLUTION INTRAVENOUS at 09:30

## 2018-01-01 RX ADMIN — PANTOPRAZOLE SODIUM 40 MG: 40 TABLET, DELAYED RELEASE ORAL at 15:58

## 2018-01-01 RX ADMIN — SULFAMETHOXAZOLE AND TRIMETHOPRIM 80 MG: 200; 40 SUSPENSION ORAL at 07:26

## 2018-01-01 RX ADMIN — NOREPINEPHRINE BITARTRATE 6.4 MCG: 1 INJECTION INTRAVENOUS at 08:40

## 2018-01-01 RX ADMIN — ALBUMIN HUMAN 12.5 G: 0.25 SOLUTION INTRAVENOUS at 13:05

## 2018-01-01 RX ADMIN — SIMETHICONE CHEW TAB 80 MG 80 MG: 80 TABLET ORAL at 21:23

## 2018-01-01 RX ADMIN — CEFTAROLINE FOSAMIL 300 MG: 600 POWDER, FOR SOLUTION INTRAVENOUS at 14:23

## 2018-01-01 RX ADMIN — Medication 12.5 ML/KG/HR: at 22:06

## 2018-01-01 RX ADMIN — DAPTOMYCIN 800 MG: 500 INJECTION, POWDER, LYOPHILIZED, FOR SOLUTION INTRAVENOUS at 14:29

## 2018-01-01 RX ADMIN — SODIUM CHLORIDE 50 MG: 9 INJECTION, SOLUTION INTRAVENOUS at 21:23

## 2018-01-01 RX ADMIN — FLUCONAZOLE 200 MG: 2 INJECTION, SOLUTION INTRAVENOUS at 07:47

## 2018-01-01 RX ADMIN — PANTOPRAZOLE SODIUM 40 MG: 40 TABLET, DELAYED RELEASE ORAL at 09:39

## 2018-01-01 RX ADMIN — CALCIUM CHLORIDE 1 G: 100 INJECTION, SOLUTION INTRAVENOUS at 14:06

## 2018-01-01 RX ADMIN — ALBUMIN HUMAN 12.5 G: 50 SOLUTION INTRAVENOUS at 17:35

## 2018-01-01 RX ADMIN — LACTULOSE 20 G: 10 SOLUTION ORAL; RECTAL at 10:38

## 2018-01-01 RX ADMIN — METRONIDAZOLE 500 MG: 500 INJECTION, SOLUTION INTRAVENOUS at 01:57

## 2018-01-01 RX ADMIN — HUMAN INSULIN 1 UNITS/HR: 100 INJECTION, SOLUTION SUBCUTANEOUS at 05:10

## 2018-01-01 RX ADMIN — PROPOFOL 40 MG: 10 INJECTION, EMULSION INTRAVENOUS at 17:23

## 2018-01-01 RX ADMIN — Medication 0.5 MG: at 00:49

## 2018-01-01 RX ADMIN — SODIUM CHLORIDE 50 MG: 9 INJECTION, SOLUTION INTRAVENOUS at 09:11

## 2018-01-01 RX ADMIN — SODIUM CHLORIDE 20 MG: 9 INJECTION, SOLUTION INTRAVENOUS at 19:43

## 2018-01-01 RX ADMIN — OXYCODONE HYDROCHLORIDE 10 MG: 5 SOLUTION ORAL at 20:07

## 2018-01-01 RX ADMIN — HEPARIN SODIUM 1050 UNITS/HR: 10000 INJECTION, SOLUTION INTRAVENOUS at 19:08

## 2018-01-01 RX ADMIN — PHENYLEPHRINE HYDROCHLORIDE 100 MCG: 10 INJECTION, SOLUTION INTRAMUSCULAR; INTRAVENOUS; SUBCUTANEOUS at 18:19

## 2018-01-01 RX ADMIN — Medication 0.5 MG: at 09:09

## 2018-01-01 RX ADMIN — METRONIDAZOLE 500 MG: 500 INJECTION, SOLUTION INTRAVENOUS at 22:36

## 2018-01-01 RX ADMIN — OXYCODONE HYDROCHLORIDE 5 MG: 5 TABLET ORAL at 16:01

## 2018-01-01 RX ADMIN — SODIUM CHLORIDE 500 ML: 9 INJECTION, SOLUTION INTRAVENOUS at 20:33

## 2018-01-01 RX ADMIN — NYSTATIN 500000 UNITS: 100000 SUSPENSION ORAL at 19:44

## 2018-01-01 RX ADMIN — Medication 1 PACKET: at 14:23

## 2018-01-01 RX ADMIN — PANTOPRAZOLE SODIUM 40 MG: 40 INJECTION, POWDER, FOR SOLUTION INTRAVENOUS at 20:16

## 2018-01-01 RX ADMIN — Medication 1.5 MG: at 08:05

## 2018-01-01 RX ADMIN — MYCOPHENOLATE MOFETIL 250 MG: 200 POWDER, FOR SUSPENSION ORAL at 07:41

## 2018-01-01 RX ADMIN — METRONIDAZOLE 500 MG: 500 INJECTION, SOLUTION INTRAVENOUS at 03:57

## 2018-01-01 RX ADMIN — ROCURONIUM BROMIDE 10 MG: 10 INJECTION INTRAVENOUS at 14:58

## 2018-01-01 RX ADMIN — ALBUMIN HUMAN 12.5 G: 0.25 SOLUTION INTRAVENOUS at 13:27

## 2018-01-01 RX ADMIN — FENTANYL CITRATE 100 MCG: 50 INJECTION INTRAMUSCULAR; INTRAVENOUS at 13:41

## 2018-01-01 RX ADMIN — Medication: at 11:21

## 2018-01-01 RX ADMIN — LIDOCAINE HYDROCHLORIDE 10 ML: 10 INJECTION, SOLUTION EPIDURAL; INFILTRATION; INTRACAUDAL; PERINEURAL at 12:26

## 2018-01-01 RX ADMIN — SODIUM CHLORIDE 500 ML: 9 INJECTION, SOLUTION INTRAVENOUS at 22:08

## 2018-01-01 RX ADMIN — ALBUMIN HUMAN 50 G: 0.25 SOLUTION INTRAVENOUS at 12:16

## 2018-01-01 RX ADMIN — LACTULOSE 20 G: 20 SOLUTION ORAL at 01:26

## 2018-01-01 RX ADMIN — METRONIDAZOLE 500 MG: 500 INJECTION, SOLUTION INTRAVENOUS at 04:11

## 2018-01-01 RX ADMIN — HYDROCORTISONE SODIUM SUCCINATE 50 MG: 100 INJECTION, POWDER, FOR SOLUTION INTRAMUSCULAR; INTRAVENOUS at 02:10

## 2018-01-01 RX ADMIN — CALCIUM CHLORIDE 2 G: 100 INJECTION, SOLUTION INTRAVENOUS at 12:11

## 2018-01-01 RX ADMIN — DEXTRAN 40 10 ML/HR: 10 INJECTION, SOLUTION INTRAVENOUS at 14:34

## 2018-01-01 RX ADMIN — HEPARIN SODIUM 1500 UNITS/HR: 10000 INJECTION, SOLUTION INTRAVENOUS at 23:32

## 2018-01-01 RX ADMIN — PANTOPRAZOLE SODIUM 40 MG: 40 INJECTION, POWDER, FOR SOLUTION INTRAVENOUS at 19:37

## 2018-01-01 RX ADMIN — Medication 1 MG: at 18:55

## 2018-01-01 RX ADMIN — ALBUMIN HUMAN 12.5 G: 0.25 SOLUTION INTRAVENOUS at 15:18

## 2018-01-01 RX ADMIN — CALCIUM GLUCONATE: 98 INJECTION, SOLUTION INTRAVENOUS at 21:21

## 2018-01-01 RX ADMIN — VASOPRESSIN 2.4 UNITS/HR: 20 INJECTION INTRAVENOUS at 19:22

## 2018-01-01 RX ADMIN — Medication 17 ML/KG/HR: at 20:30

## 2018-01-01 RX ADMIN — METRONIDAZOLE 500 MG: 500 INJECTION, SOLUTION INTRAVENOUS at 09:00

## 2018-01-01 RX ADMIN — METRONIDAZOLE 500 MG: 500 INJECTION, SOLUTION INTRAVENOUS at 14:48

## 2018-01-01 RX ADMIN — PIPERACILLIN SODIUM,TAZOBACTAM SODIUM 4.5 G: 4; .5 INJECTION, POWDER, FOR SOLUTION INTRAVENOUS at 19:38

## 2018-01-01 RX ADMIN — PANTOPRAZOLE SODIUM 40 MG: 40 INJECTION, POWDER, FOR SOLUTION INTRAVENOUS at 20:14

## 2018-01-01 RX ADMIN — FERROUS SULFATE TAB 325 MG (65 MG ELEMENTAL FE) 325 MG: 325 (65 FE) TAB at 12:25

## 2018-01-01 RX ADMIN — Medication 12.5 ML/KG/HR: at 10:34

## 2018-01-01 RX ADMIN — MULTIVITAMIN 15 ML: LIQUID ORAL at 08:30

## 2018-01-01 RX ADMIN — VASOPRESSIN 2 UNITS: 20 INJECTION INTRAVENOUS at 12:21

## 2018-01-01 RX ADMIN — Medication 0.5 MG: at 08:17

## 2018-01-01 RX ADMIN — MYCOPHENOLATE MOFETIL 250 MG: 200 POWDER, FOR SUSPENSION ORAL at 18:00

## 2018-01-01 RX ADMIN — OXYCODONE HYDROCHLORIDE 10 MG: 5 SOLUTION ORAL at 05:30

## 2018-01-01 RX ADMIN — FLUCONAZOLE 200 MG: 2 INJECTION, SOLUTION INTRAVENOUS at 06:09

## 2018-01-01 RX ADMIN — DEXTROSE MONOHYDRATE: 50 INJECTION, SOLUTION INTRAVENOUS at 03:58

## 2018-01-01 RX ADMIN — Medication 50 MCG: at 08:17

## 2018-01-01 RX ADMIN — PHENYLEPHRINE HYDROCHLORIDE 100 MCG: 10 INJECTION, SOLUTION INTRAMUSCULAR; INTRAVENOUS; SUBCUTANEOUS at 09:07

## 2018-01-01 RX ADMIN — CEFTAROLINE FOSAMIL 400 MG: 600 POWDER, FOR SOLUTION INTRAVENOUS at 06:09

## 2018-01-01 RX ADMIN — Medication 12.5 ML/KG/HR: at 03:45

## 2018-01-01 RX ADMIN — POTASSIUM CHLORIDE 80 MEQ: 40 SOLUTION ORAL at 08:19

## 2018-01-01 RX ADMIN — OXYCODONE HYDROCHLORIDE 10 MG: 5 SOLUTION ORAL at 23:12

## 2018-01-01 RX ADMIN — FENTANYL CITRATE 200 MCG: 50 INJECTION, SOLUTION INTRAMUSCULAR; INTRAVENOUS at 11:55

## 2018-01-01 RX ADMIN — Medication 17.5 ML/KG/HR: at 09:07

## 2018-01-01 RX ADMIN — PHENYLEPHRINE HYDROCHLORIDE 100 MCG: 10 INJECTION, SOLUTION INTRAMUSCULAR; INTRAVENOUS; SUBCUTANEOUS at 15:06

## 2018-01-01 RX ADMIN — FLUCONAZOLE 200 MG: 2 INJECTION, SOLUTION INTRAVENOUS at 05:56

## 2018-01-01 RX ADMIN — PANTOPRAZOLE SODIUM 40 MG: 40 TABLET, DELAYED RELEASE ORAL at 07:29

## 2018-01-01 RX ADMIN — POTASSIUM CHLORIDE 20 MEQ: 29.8 INJECTION, SOLUTION INTRAVENOUS at 00:01

## 2018-01-01 RX ADMIN — DAPTOMYCIN 600 MG: 500 INJECTION, POWDER, LYOPHILIZED, FOR SOLUTION INTRAVENOUS at 11:45

## 2018-01-01 RX ADMIN — Medication 17.5 ML/KG/HR: at 14:20

## 2018-01-01 RX ADMIN — MEROPENEM 1000 MG: 1 INJECTION, POWDER, FOR SOLUTION INTRAVENOUS at 14:33

## 2018-01-01 RX ADMIN — HYDROCORTISONE SODIUM SUCCINATE 50 MG: 100 INJECTION, POWDER, FOR SOLUTION INTRAMUSCULAR; INTRAVENOUS at 12:52

## 2018-01-01 RX ADMIN — Medication 50 MCG/HR: at 03:43

## 2018-01-01 RX ADMIN — PHENYLEPHRINE HYDROCHLORIDE 200 MCG: 10 INJECTION, SOLUTION INTRAMUSCULAR; INTRAVENOUS; SUBCUTANEOUS at 08:39

## 2018-01-01 RX ADMIN — OCTREOTIDE ACETATE 50 MCG/HR: 200 INJECTION, SOLUTION INTRAVENOUS; SUBCUTANEOUS at 23:54

## 2018-01-01 RX ADMIN — CLOTRIMAZOLE 1 TROCHE: 10 LOZENGE ORAL at 20:34

## 2018-01-01 RX ADMIN — MIDODRINE HYDROCHLORIDE 10 MG: 5 TABLET ORAL at 20:00

## 2018-01-01 RX ADMIN — PHYTONADIONE 10 MG: 10 INJECTION, EMULSION INTRAMUSCULAR; INTRAVENOUS; SUBCUTANEOUS at 09:21

## 2018-01-01 RX ADMIN — Medication 81 MG: at 08:19

## 2018-01-01 RX ADMIN — Medication 1 MG: at 09:21

## 2018-01-01 RX ADMIN — HYDROCORTISONE SODIUM SUCCINATE 100 MG: 100 INJECTION, POWDER, FOR SOLUTION INTRAMUSCULAR; INTRAVENOUS at 18:00

## 2018-01-01 RX ADMIN — SODIUM BICARBONATE: 84 INJECTION, SOLUTION INTRAVENOUS at 12:24

## 2018-01-01 RX ADMIN — Medication 0.5 MG: at 22:15

## 2018-01-01 RX ADMIN — RIFAXIMIN 550 MG: 550 TABLET ORAL at 19:13

## 2018-01-01 RX ADMIN — ALBUMIN HUMAN 25 G: 0.05 INJECTION, SOLUTION INTRAVENOUS at 19:15

## 2018-01-01 RX ADMIN — Medication 17 ML/KG/HR: at 03:34

## 2018-01-01 RX ADMIN — CLOTRIMAZOLE 1 TROCHE: 10 LOZENGE ORAL at 12:10

## 2018-01-01 RX ADMIN — METRONIDAZOLE 500 MG: 500 INJECTION, SOLUTION INTRAVENOUS at 20:06

## 2018-01-01 RX ADMIN — SODIUM CHLORIDE, SODIUM GLUCONATE, SODIUM ACETATE, POTASSIUM CHLORIDE AND MAGNESIUM CHLORIDE: 526; 502; 368; 37; 30 INJECTION, SOLUTION INTRAVENOUS at 12:10

## 2018-01-01 RX ADMIN — MIDAZOLAM 0.5 MG: 1 INJECTION INTRAMUSCULAR; INTRAVENOUS at 08:20

## 2018-01-01 RX ADMIN — Medication 12.5 ML/KG/HR: at 22:00

## 2018-01-01 RX ADMIN — OXYCODONE HYDROCHLORIDE 10 MG: 5 SOLUTION ORAL at 03:27

## 2018-01-01 RX ADMIN — BUMETANIDE 0.5 MG/HR: 0.25 INJECTION INTRAMUSCULAR; INTRAVENOUS at 18:02

## 2018-01-01 RX ADMIN — POTASSIUM PHOSPHATE, MONOBASIC AND POTASSIUM PHOSPHATE, DIBASIC 15 MMOL: 224; 236 INJECTION, SOLUTION INTRAVENOUS at 06:09

## 2018-01-01 RX ADMIN — VASOPRESSIN 1 UNITS: 20 INJECTION INTRAVENOUS at 07:46

## 2018-01-01 RX ADMIN — MIDODRINE HYDROCHLORIDE 10 MG: 5 TABLET ORAL at 11:34

## 2018-01-01 RX ADMIN — HEPARIN SODIUM 500 UNITS/HR: 10000 INJECTION, SOLUTION INTRAVENOUS at 10:31

## 2018-01-01 RX ADMIN — AMPICILLIN SODIUM 2 G: 2 INJECTION, POWDER, FOR SOLUTION INTRAMUSCULAR; INTRAVENOUS at 12:17

## 2018-01-01 RX ADMIN — PREDNISONE 10 MG: 10 TABLET ORAL at 07:56

## 2018-01-01 RX ADMIN — NOREPINEPHRINE BITARTRATE 0.4 MCG/KG/MIN: 1 INJECTION INTRAVENOUS at 09:45

## 2018-01-01 RX ADMIN — MIDODRINE HYDROCHLORIDE 10 MG: 5 TABLET ORAL at 12:44

## 2018-01-01 RX ADMIN — ALBUMIN HUMAN: 0.05 INJECTION, SOLUTION INTRAVENOUS at 08:51

## 2018-01-01 RX ADMIN — Medication 0.25 MG: at 08:05

## 2018-01-01 RX ADMIN — PHENYLEPHRINE HYDROCHLORIDE 100 MCG: 10 INJECTION, SOLUTION INTRAMUSCULAR; INTRAVENOUS; SUBCUTANEOUS at 10:42

## 2018-01-01 RX ADMIN — BISACODYL 10 MG: 10 SUPPOSITORY RECTAL at 08:24

## 2018-01-01 RX ADMIN — ROCURONIUM BROMIDE 50 MG: 10 INJECTION INTRAVENOUS at 07:30

## 2018-01-01 RX ADMIN — MIDODRINE HYDROCHLORIDE 10 MG: 5 TABLET ORAL at 12:02

## 2018-01-01 RX ADMIN — PHENYLEPHRINE HYDROCHLORIDE 100 MCG: 10 INJECTION, SOLUTION INTRAMUSCULAR; INTRAVENOUS; SUBCUTANEOUS at 18:00

## 2018-01-01 RX ADMIN — RIFAXIMIN 550 MG: 550 TABLET ORAL at 07:29

## 2018-01-01 RX ADMIN — HUMAN INSULIN 4 UNITS/HR: 100 INJECTION, SOLUTION SUBCUTANEOUS at 21:52

## 2018-01-01 RX ADMIN — SODIUM CHLORIDE: 9 INJECTION, SOLUTION INTRAVENOUS at 16:50

## 2018-01-01 RX ADMIN — HEPARIN SODIUM 1650 UNITS/HR: 10000 INJECTION, SOLUTION INTRAVENOUS at 07:45

## 2018-01-01 RX ADMIN — POTASSIUM CHLORIDE 80 MEQ: 40 SOLUTION ORAL at 10:07

## 2018-01-01 RX ADMIN — NYSTATIN 500000 UNITS: 100000 SUSPENSION ORAL at 13:45

## 2018-01-01 RX ADMIN — OXYCODONE HYDROCHLORIDE 5 MG: 5 SOLUTION ORAL at 00:35

## 2018-01-01 RX ADMIN — PIPERACILLIN SODIUM AND TAZOBACTAM SODIUM 3.38 G: 3; .375 INJECTION, POWDER, LYOPHILIZED, FOR SOLUTION INTRAVENOUS at 00:06

## 2018-01-01 RX ADMIN — TENOFOVIR DISOPROXIL FUMARATE 300 MG: 300 TABLET, FILM COATED ORAL at 16:10

## 2018-01-01 RX ADMIN — HUMAN INSULIN 2 UNITS/HR: 100 INJECTION, SOLUTION SUBCUTANEOUS at 22:08

## 2018-01-01 RX ADMIN — HYDROMORPHONE HYDROCHLORIDE 0.5 MG: 1 INJECTION, SOLUTION INTRAMUSCULAR; INTRAVENOUS; SUBCUTANEOUS at 23:10

## 2018-01-01 RX ADMIN — Medication 12.5 ML/KG/HR: at 12:10

## 2018-01-01 RX ADMIN — SODIUM BICARBONATE: 84 INJECTION, SOLUTION INTRAVENOUS at 11:15

## 2018-01-01 RX ADMIN — PROCHLORPERAZINE EDISYLATE 10 MG: 5 INJECTION INTRAMUSCULAR; INTRAVENOUS at 03:32

## 2018-01-01 RX ADMIN — MIDODRINE HYDROCHLORIDE 10 MG: 5 TABLET ORAL at 03:31

## 2018-01-01 RX ADMIN — HYDROCORTISONE SODIUM SUCCINATE 50 MG: 100 INJECTION, POWDER, FOR SOLUTION INTRAMUSCULAR; INTRAVENOUS at 09:16

## 2018-01-01 RX ADMIN — VALGANCICLOVIR HYDROCHLORIDE 450 MG: 50 POWDER, FOR SOLUTION ORAL at 07:49

## 2018-01-01 RX ADMIN — Medication 12.5 ML/KG/HR: at 10:54

## 2018-01-01 RX ADMIN — HYDROMORPHONE HYDROCHLORIDE 0.5 MG: 1 INJECTION, SOLUTION INTRAMUSCULAR; INTRAVENOUS; SUBCUTANEOUS at 08:09

## 2018-01-01 RX ADMIN — VALGANCICLOVIR HYDROCHLORIDE 450 MG: 50 POWDER, FOR SOLUTION ORAL at 07:41

## 2018-01-01 RX ADMIN — DAPTOMYCIN 600 MG: 500 INJECTION, POWDER, LYOPHILIZED, FOR SOLUTION INTRAVENOUS at 11:07

## 2018-01-01 RX ADMIN — METRONIDAZOLE 500 MG: 500 INJECTION, SOLUTION INTRAVENOUS at 09:58

## 2018-01-01 RX ADMIN — METHYLPREDNISOLONE SODIUM SUCCINATE 50 MG: 125 INJECTION, POWDER, LYOPHILIZED, FOR SOLUTION INTRAMUSCULAR; INTRAVENOUS at 09:21

## 2018-01-01 RX ADMIN — Medication 17.5 ML/KG/HR: at 04:24

## 2018-01-01 RX ADMIN — HUMAN INSULIN 6 UNITS/HR: 100 INJECTION, SOLUTION SUBCUTANEOUS at 03:56

## 2018-01-01 RX ADMIN — PANTOPRAZOLE SODIUM 40 MG: 40 TABLET, DELAYED RELEASE ORAL at 07:35

## 2018-01-01 RX ADMIN — TENOFOVIR DISOPROXIL FUMARATE 300 MG: 300 TABLET, COATED ORAL at 08:57

## 2018-01-01 RX ADMIN — LIDOCAINE HYDROCHLORIDE 15 ML: 10 INJECTION, SOLUTION EPIDURAL; INFILTRATION; INTRACAUDAL; PERINEURAL at 12:59

## 2018-01-01 RX ADMIN — MEROPENEM 1000 MG: 1 INJECTION, POWDER, FOR SOLUTION INTRAVENOUS at 07:07

## 2018-01-01 RX ADMIN — Medication 0.5 MG: at 16:08

## 2018-01-01 RX ADMIN — POTASSIUM PHOSPHATE, MONOBASIC AND POTASSIUM PHOSPHATE, DIBASIC 20 MMOL: 224; 236 INJECTION, SOLUTION INTRAVENOUS at 05:39

## 2018-01-01 RX ADMIN — TENOFOVIR DISOPROXIL FUMARATE 300 MG: 300 TABLET, COATED ORAL at 09:07

## 2018-01-01 RX ADMIN — Medication 81 MG: at 08:16

## 2018-01-01 RX ADMIN — POTASSIUM CHLORIDE 20 MEQ: 29.8 INJECTION, SOLUTION INTRAVENOUS at 16:28

## 2018-01-01 RX ADMIN — CALCIUM CHLORIDE 300 MG: 100 INJECTION, SOLUTION INTRAVENOUS at 11:58

## 2018-01-01 RX ADMIN — FLUCONAZOLE 200 MG: 2 INJECTION, SOLUTION INTRAVENOUS at 07:30

## 2018-01-01 RX ADMIN — DOCUSATE SODIUM 286 ML: 50 LIQUID ORAL at 10:20

## 2018-01-01 RX ADMIN — PHENYLEPHRINE HYDROCHLORIDE 100 MCG: 10 INJECTION, SOLUTION INTRAMUSCULAR; INTRAVENOUS; SUBCUTANEOUS at 17:20

## 2018-01-01 RX ADMIN — SODIUM CHLORIDE, SODIUM GLUCONATE, SODIUM ACETATE, POTASSIUM CHLORIDE AND MAGNESIUM CHLORIDE: 526; 502; 368; 37; 30 INJECTION, SOLUTION INTRAVENOUS at 11:59

## 2018-01-01 RX ADMIN — Medication 1 MG: at 07:47

## 2018-01-01 RX ADMIN — SODIUM CHLORIDE: 9 INJECTION, SOLUTION INTRAVENOUS at 17:30

## 2018-01-01 RX ADMIN — Medication 81 MG: at 11:13

## 2018-01-01 RX ADMIN — OXYCODONE HYDROCHLORIDE 5 MG: 5 SOLUTION ORAL at 18:27

## 2018-01-01 RX ADMIN — DAPTOMYCIN 800 MG: 500 INJECTION, POWDER, LYOPHILIZED, FOR SOLUTION INTRAVENOUS at 11:10

## 2018-01-01 RX ADMIN — OXYCODONE HYDROCHLORIDE 10 MG: 5 SOLUTION ORAL at 03:06

## 2018-01-01 RX ADMIN — LACTULOSE 20 G: 20 POWDER, FOR SOLUTION ORAL at 21:22

## 2018-01-01 RX ADMIN — CEFTAROLINE FOSAMIL 300 MG: 600 POWDER, FOR SOLUTION INTRAVENOUS at 02:21

## 2018-01-01 RX ADMIN — Medication 1 PACKET: at 14:33

## 2018-01-01 RX ADMIN — CALCIUM GLUCONATE: 98 INJECTION, SOLUTION INTRAVENOUS at 20:06

## 2018-01-01 RX ADMIN — PHENYLEPHRINE HYDROCHLORIDE 100 MCG: 10 INJECTION, SOLUTION INTRAMUSCULAR; INTRAVENOUS; SUBCUTANEOUS at 09:04

## 2018-01-01 RX ADMIN — SODIUM CHLORIDE 80 MG: 9 INJECTION, SOLUTION INTRAVENOUS at 08:39

## 2018-01-01 RX ADMIN — PANTOPRAZOLE SODIUM 40 MG: 40 INJECTION, POWDER, FOR SOLUTION INTRAVENOUS at 08:08

## 2018-01-01 RX ADMIN — MICAFUNGIN SODIUM 100 MG: 10 INJECTION, POWDER, LYOPHILIZED, FOR SOLUTION INTRAVENOUS at 18:25

## 2018-01-01 RX ADMIN — DOPAMINE HYDROCHLORIDE 3 MCG/KG/MIN: 160 INJECTION, SOLUTION INTRAVENOUS at 10:14

## 2018-01-01 RX ADMIN — PHENYLEPHRINE HYDROCHLORIDE 100 MCG: 10 INJECTION, SOLUTION INTRAMUSCULAR; INTRAVENOUS; SUBCUTANEOUS at 14:29

## 2018-01-01 RX ADMIN — Medication 17 ML/KG/HR: at 23:56

## 2018-01-01 RX ADMIN — ERTAPENEM SODIUM 500 MG: 1 INJECTION, POWDER, LYOPHILIZED, FOR SOLUTION INTRAMUSCULAR; INTRAVENOUS at 17:14

## 2018-01-01 RX ADMIN — DAPTOMYCIN 600 MG: 500 INJECTION, POWDER, LYOPHILIZED, FOR SOLUTION INTRAVENOUS at 11:42

## 2018-01-01 RX ADMIN — RIFAXIMIN 550 MG: 550 TABLET ORAL at 07:35

## 2018-01-01 RX ADMIN — PHENYLEPHRINE HYDROCHLORIDE 100 MCG: 10 INJECTION, SOLUTION INTRAMUSCULAR; INTRAVENOUS; SUBCUTANEOUS at 11:05

## 2018-01-01 RX ADMIN — Medication 1 MG: at 08:42

## 2018-01-01 RX ADMIN — LIDOCAINE HYDROCHLORIDE 5 ML: 10 INJECTION, SOLUTION EPIDURAL; INFILTRATION; INTRACAUDAL; PERINEURAL at 15:56

## 2018-01-01 RX ADMIN — ALBUMIN HUMAN 12.5 G: 0.05 INJECTION, SOLUTION INTRAVENOUS at 18:11

## 2018-01-01 RX ADMIN — Medication 1 MG: at 18:27

## 2018-01-01 RX ADMIN — ONDANSETRON 4 MG: 2 INJECTION INTRAMUSCULAR; INTRAVENOUS at 18:15

## 2018-01-01 RX ADMIN — HYDROMORPHONE HYDROCHLORIDE 0.5 MG: 1 INJECTION, SOLUTION INTRAMUSCULAR; INTRAVENOUS; SUBCUTANEOUS at 05:22

## 2018-01-01 RX ADMIN — LOPERAMIDE HYDROCHLORIDE 2 MG: 1 SOLUTION ORAL at 20:07

## 2018-01-01 RX ADMIN — SODIUM CHLORIDE 50 MG: 9 INJECTION, SOLUTION INTRAVENOUS at 09:50

## 2018-01-01 RX ADMIN — Medication 2 MG: at 09:22

## 2018-01-01 RX ADMIN — FAMOTIDINE 20 MG: 40 POWDER, FOR SUSPENSION ORAL at 08:01

## 2018-01-01 RX ADMIN — Medication 0.5 MG: at 18:29

## 2018-01-01 RX ADMIN — MIDODRINE HYDROCHLORIDE 10 MG: 5 TABLET ORAL at 08:57

## 2018-01-01 RX ADMIN — Medication 0.5 MG: at 06:51

## 2018-01-01 RX ADMIN — Medication 12.5 ML/KG/HR: at 04:46

## 2018-01-01 RX ADMIN — Medication 81 MG: at 07:32

## 2018-01-01 RX ADMIN — NYSTATIN 500000 UNITS: 100000 SUSPENSION ORAL at 21:55

## 2018-01-01 RX ADMIN — POTASSIUM CHLORIDE 20 MEQ: 400 INJECTION, SOLUTION INTRAVENOUS at 11:34

## 2018-01-01 RX ADMIN — METRONIDAZOLE 500 MG: 500 INJECTION, SOLUTION INTRAVENOUS at 14:00

## 2018-01-01 RX ADMIN — Medication 10 MEQ: at 07:13

## 2018-01-01 RX ADMIN — CIPROFLOXACIN HYDROCHLORIDE 500 MG: 250 TABLET, FILM COATED ORAL at 08:45

## 2018-01-01 RX ADMIN — SODIUM CHLORIDE, POTASSIUM CHLORIDE, SODIUM LACTATE AND CALCIUM CHLORIDE 500 ML: 600; 310; 30; 20 INJECTION, SOLUTION INTRAVENOUS at 22:26

## 2018-01-01 RX ADMIN — ALBUMIN HUMAN 100 G: 0.25 SOLUTION INTRAVENOUS at 07:58

## 2018-01-01 RX ADMIN — LACTULOSE 20 G: 10 SOLUTION ORAL; RECTAL at 07:52

## 2018-01-01 RX ADMIN — ROCURONIUM BROMIDE 20 MG: 10 INJECTION INTRAVENOUS at 17:07

## 2018-01-01 RX ADMIN — MIDODRINE HYDROCHLORIDE 10 MG: 5 TABLET ORAL at 20:05

## 2018-01-01 RX ADMIN — CLOTRIMAZOLE 1 TROCHE: 10 LOZENGE ORAL at 20:11

## 2018-01-01 RX ADMIN — PANTOPRAZOLE SODIUM 40 MG: 40 INJECTION, POWDER, FOR SOLUTION INTRAVENOUS at 21:13

## 2018-01-01 RX ADMIN — OXYCODONE HYDROCHLORIDE 10 MG: 5 SOLUTION ORAL at 19:39

## 2018-01-01 RX ADMIN — FENTANYL CITRATE 50 MCG/HR: 50 INJECTION, SOLUTION INTRAMUSCULAR; INTRAVENOUS at 20:40

## 2018-01-01 RX ADMIN — NYSTATIN 500000 UNITS: 100000 SUSPENSION ORAL at 12:06

## 2018-01-01 RX ADMIN — MEROPENEM 1000 MG: 1 INJECTION, POWDER, FOR SOLUTION INTRAVENOUS at 21:41

## 2018-01-01 RX ADMIN — MICAFUNGIN SODIUM 100 MG: 10 INJECTION, POWDER, LYOPHILIZED, FOR SOLUTION INTRAVENOUS at 20:25

## 2018-01-01 RX ADMIN — Medication 12.5 ML/KG/HR: at 04:00

## 2018-01-01 RX ADMIN — HYDROCORTISONE SODIUM SUCCINATE 50 MG: 100 INJECTION, POWDER, FOR SOLUTION INTRAMUSCULAR; INTRAVENOUS at 07:53

## 2018-01-01 RX ADMIN — TENOFOVIR DISOPROXIL FUMARATE 300 MG: 300 TABLET, COATED ORAL at 10:35

## 2018-01-01 RX ADMIN — VALGANCICLOVIR HYDROCHLORIDE 450 MG: 50 POWDER, FOR SOLUTION ORAL at 08:04

## 2018-01-01 RX ADMIN — FERROUS SULFATE TAB 325 MG (65 MG ELEMENTAL FE) 325 MG: 325 (65 FE) TAB at 11:56

## 2018-01-01 RX ADMIN — CALCIUM CHLORIDE 1 G: 100 INJECTION, SOLUTION INTRAVENOUS at 06:20

## 2018-01-01 RX ADMIN — ASCORBIC ACID, VITAMIN A PALMITATE, CHOLECALCIFEROL, THIAMINE HYDROCHLORIDE, RIBOFLAVIN-5 PHOSPHATE SODIUM, PYRIDOXINE HYDROCHLORIDE, NIACINAMIDE, DEXPANTHENOL, ALPHA-TOCOPHEROL ACETATE, VITAMIN K1, FOLIC ACID, BIOTIN, CYANOCOBALAMIN: 200; 3300; 200; 6; 3.6; 6; 40; 15; 10; 150; 600; 60; 5 INJECTION, SOLUTION INTRAVENOUS at 19:53

## 2018-01-01 RX ADMIN — CALCIUM CHLORIDE 1 G: 100 INJECTION, SOLUTION INTRAVENOUS at 00:58

## 2018-01-01 RX ADMIN — CALCIUM GLUCONATE: 98 INJECTION, SOLUTION INTRAVENOUS at 20:07

## 2018-01-01 RX ADMIN — ALBUMIN HUMAN 25 G: 0.25 SOLUTION INTRAVENOUS at 10:46

## 2018-01-01 RX ADMIN — SODIUM CHLORIDE 50 MG: 9 INJECTION, SOLUTION INTRAVENOUS at 16:08

## 2018-01-01 RX ADMIN — OXYCODONE HYDROCHLORIDE 10 MG: 5 SOLUTION ORAL at 16:14

## 2018-01-01 RX ADMIN — VASOPRESSIN 2.4 UNITS/HR: 20 INJECTION INTRAVENOUS at 11:35

## 2018-01-01 RX ADMIN — CIPROFLOXACIN HYDROCHLORIDE 500 MG: 250 TABLET, FILM COATED ORAL at 09:03

## 2018-01-01 RX ADMIN — Medication 1 MG: at 08:59

## 2018-01-01 RX ADMIN — CEFTAROLINE FOSAMIL 400 MG: 600 POWDER, FOR SOLUTION INTRAVENOUS at 09:00

## 2018-01-01 RX ADMIN — MEROPENEM 1000 MG: 1 INJECTION, POWDER, FOR SOLUTION INTRAVENOUS at 23:02

## 2018-01-01 RX ADMIN — Medication 12.5 ML/KG/HR: at 00:04

## 2018-01-01 RX ADMIN — PHENYLEPHRINE HYDROCHLORIDE 100 MCG: 10 INJECTION, SOLUTION INTRAMUSCULAR; INTRAVENOUS; SUBCUTANEOUS at 11:00

## 2018-01-01 RX ADMIN — MULTIVITAMIN 15 ML: LIQUID ORAL at 08:39

## 2018-01-01 RX ADMIN — BISACODYL 10 MG: 10 SUPPOSITORY RECTAL at 08:15

## 2018-01-01 RX ADMIN — CEFTRIAXONE SODIUM 2 G: 2 INJECTION, POWDER, FOR SOLUTION INTRAMUSCULAR; INTRAVENOUS at 11:56

## 2018-01-01 RX ADMIN — METRONIDAZOLE 500 MG: 500 INJECTION, SOLUTION INTRAVENOUS at 19:29

## 2018-01-01 RX ADMIN — SULFAMETHOXAZOLE AND TRIMETHOPRIM 80 MG: 200; 40 SUSPENSION ORAL at 13:08

## 2018-01-01 RX ADMIN — HUMAN INSULIN 6 UNITS/HR: 100 INJECTION, SOLUTION SUBCUTANEOUS at 01:07

## 2018-01-01 RX ADMIN — SODIUM CHLORIDE 1000 ML: 9 INJECTION, SOLUTION INTRAVENOUS at 10:21

## 2018-01-01 RX ADMIN — Medication 0.5 MG: at 06:29

## 2018-01-01 RX ADMIN — RIFAXIMIN 550 MG: 550 TABLET ORAL at 10:38

## 2018-01-01 RX ADMIN — ROCURONIUM BROMIDE 50 MG: 10 INJECTION INTRAVENOUS at 12:32

## 2018-01-01 RX ADMIN — PANTOPRAZOLE SODIUM 40 MG: 40 INJECTION, POWDER, FOR SOLUTION INTRAVENOUS at 19:45

## 2018-01-01 RX ADMIN — PHENYLEPHRINE HYDROCHLORIDE 150 MCG: 10 INJECTION, SOLUTION INTRAMUSCULAR; INTRAVENOUS; SUBCUTANEOUS at 12:25

## 2018-01-01 RX ADMIN — PHENYLEPHRINE HYDROCHLORIDE 200 MCG: 10 INJECTION, SOLUTION INTRAMUSCULAR; INTRAVENOUS; SUBCUTANEOUS at 09:45

## 2018-01-01 RX ADMIN — SODIUM CHLORIDE, POTASSIUM CHLORIDE, SODIUM LACTATE AND CALCIUM CHLORIDE 1000 ML: 600; 310; 30; 20 INJECTION, SOLUTION INTRAVENOUS at 00:45

## 2018-01-01 RX ADMIN — Medication: at 18:25

## 2018-01-01 RX ADMIN — Medication 12.5 ML/KG/HR: at 23:27

## 2018-01-01 RX ADMIN — POTASSIUM CHLORIDE 20 MEQ: 400 INJECTION, SOLUTION INTRAVENOUS at 16:41

## 2018-01-01 RX ADMIN — DESMOPRESSIN ACETATE 18.2 MCG: 4 SOLUTION INTRAVENOUS at 11:16

## 2018-01-01 RX ADMIN — HYDROMORPHONE HYDROCHLORIDE 0.5 MG: 1 INJECTION, SOLUTION INTRAMUSCULAR; INTRAVENOUS; SUBCUTANEOUS at 10:43

## 2018-01-01 RX ADMIN — Medication 50 MCG/HR: at 22:23

## 2018-01-01 RX ADMIN — ALBUMIN HUMAN 25 G: 0.25 SOLUTION INTRAVENOUS at 13:11

## 2018-01-01 RX ADMIN — HYDROCORTISONE SODIUM SUCCINATE 25 MG: 100 INJECTION, POWDER, FOR SOLUTION INTRAMUSCULAR; INTRAVENOUS at 23:52

## 2018-01-01 RX ADMIN — Medication 12.5 ML/KG/HR: at 14:32

## 2018-01-01 RX ADMIN — MIDODRINE HYDROCHLORIDE 10 MG: 5 TABLET ORAL at 08:06

## 2018-01-01 RX ADMIN — SODIUM CHLORIDE 50 MG: 9 INJECTION, SOLUTION INTRAVENOUS at 21:41

## 2018-01-01 RX ADMIN — Medication 1.25 MG: at 19:01

## 2018-01-01 RX ADMIN — METRONIDAZOLE 500 MG: 500 INJECTION, SOLUTION INTRAVENOUS at 20:07

## 2018-01-01 RX ADMIN — PANTOPRAZOLE SODIUM 40 MG: 40 TABLET, DELAYED RELEASE ORAL at 16:15

## 2018-01-01 RX ADMIN — TENOFOVIR DISOPROXIL FUMARATE 300 MG: 300 TABLET, COATED ORAL at 07:07

## 2018-01-01 RX ADMIN — Medication 12.5 ML/KG/HR: at 03:55

## 2018-01-01 RX ADMIN — ALBUMIN HUMAN: 0.05 INJECTION, SOLUTION INTRAVENOUS at 08:37

## 2018-01-01 RX ADMIN — VASOPRESSIN 1 UNITS/HR: 20 INJECTION INTRAVENOUS at 12:24

## 2018-01-01 RX ADMIN — DEXMEDETOMIDINE HYDROCHLORIDE 0.3 MCG/KG/HR: 4 INJECTION, SOLUTION INTRAVENOUS at 08:38

## 2018-01-01 RX ADMIN — VALGANCICLOVIR HYDROCHLORIDE 450 MG: 50 POWDER, FOR SOLUTION ORAL at 07:57

## 2018-01-01 RX ADMIN — HEPARIN SODIUM 400 UNITS/HR: 10000 INJECTION, SOLUTION INTRAVENOUS at 16:38

## 2018-01-01 RX ADMIN — MIDODRINE HYDROCHLORIDE 10 MG: 5 TABLET ORAL at 19:39

## 2018-01-01 RX ADMIN — HUMAN INSULIN 5 UNITS/HR: 100 INJECTION, SOLUTION SUBCUTANEOUS at 07:03

## 2018-01-01 RX ADMIN — HUMAN INSULIN 6 UNITS/HR: 100 INJECTION, SOLUTION SUBCUTANEOUS at 10:31

## 2018-01-01 RX ADMIN — SODIUM CHLORIDE: 4.5 INJECTION, SOLUTION INTRAVENOUS at 11:37

## 2018-01-01 RX ADMIN — MIDODRINE HYDROCHLORIDE 10 MG: 5 TABLET ORAL at 03:46

## 2018-01-01 RX ADMIN — MIDODRINE HYDROCHLORIDE 10 MG: 5 TABLET ORAL at 03:35

## 2018-01-01 RX ADMIN — HYDROCORTISONE SODIUM SUCCINATE 50 MG: 100 INJECTION, POWDER, FOR SOLUTION INTRAMUSCULAR; INTRAVENOUS at 20:09

## 2018-01-01 RX ADMIN — DAPTOMYCIN 800 MG: 500 INJECTION, POWDER, LYOPHILIZED, FOR SOLUTION INTRAVENOUS at 13:07

## 2018-01-01 RX ADMIN — VALGANCICLOVIR 450 MG: 450 TABLET, FILM COATED ORAL at 08:59

## 2018-01-01 RX ADMIN — ALBUMIN HUMAN 12.5 G: 50 SOLUTION INTRAVENOUS at 18:11

## 2018-01-01 RX ADMIN — NOREPINEPHRINE BITARTRATE 6.4 MCG: 1 INJECTION INTRAVENOUS at 08:44

## 2018-01-01 RX ADMIN — CEFTAROLINE FOSAMIL 300 MG: 600 POWDER, FOR SOLUTION INTRAVENOUS at 13:15

## 2018-01-01 RX ADMIN — Medication 81 MG: at 07:49

## 2018-01-01 RX ADMIN — TENOFOVIR DISOPROXIL FUMARATE 300 MG: 300 TABLET, COATED ORAL at 16:10

## 2018-01-01 RX ADMIN — PHYTONADIONE 10 MG: 10 INJECTION, EMULSION INTRAMUSCULAR; INTRAVENOUS; SUBCUTANEOUS at 09:49

## 2018-01-01 RX ADMIN — TENOFOVIR DISOPROXIL FUMARATE 300 MG: 300 TABLET, COATED ORAL at 07:25

## 2018-01-01 RX ADMIN — VASOPRESSIN 2 UNITS: 20 INJECTION INTRAVENOUS at 11:23

## 2018-01-01 RX ADMIN — HYDROMORPHONE HYDROCHLORIDE 0.5 MG: 1 INJECTION, SOLUTION INTRAMUSCULAR; INTRAVENOUS; SUBCUTANEOUS at 11:58

## 2018-01-01 RX ADMIN — SODIUM PHOSPHATE, MONOBASIC, MONOHYDRATE AND SODIUM PHOSPHATE, DIBASIC, ANHYDROUS 20 MMOL: 276; 142 INJECTION, SOLUTION INTRAVENOUS at 18:01

## 2018-01-01 RX ADMIN — Medication 81 MG: at 07:40

## 2018-01-01 RX ADMIN — MAGNESIUM SULFATE HEPTAHYDRATE 4 G: 40 INJECTION, SOLUTION INTRAVENOUS at 14:24

## 2018-01-01 RX ADMIN — OXYCODONE HYDROCHLORIDE 5 MG: 5 TABLET ORAL at 05:52

## 2018-01-01 RX ADMIN — PROPOFOL 50 MCG/KG/MIN: 10 INJECTION, EMULSION INTRAVENOUS at 13:07

## 2018-01-01 RX ADMIN — HUMAN INSULIN 5 UNITS/HR: 100 INJECTION, SOLUTION SUBCUTANEOUS at 14:30

## 2018-01-01 RX ADMIN — Medication 12.5 ML/KG/HR: at 06:29

## 2018-01-01 RX ADMIN — NOREPINEPHRINE BITARTRATE 6.4 MCG: 1 INJECTION INTRAVENOUS at 09:07

## 2018-01-01 RX ADMIN — METRONIDAZOLE 500 MG: 500 INJECTION, SOLUTION INTRAVENOUS at 01:46

## 2018-01-01 RX ADMIN — Medication: at 20:38

## 2018-01-01 RX ADMIN — VASOPRESSIN 1 UNITS: 20 INJECTION INTRAVENOUS at 12:39

## 2018-01-01 RX ADMIN — OXYCODONE HYDROCHLORIDE 5 MG: 5 SOLUTION ORAL at 06:24

## 2018-01-01 RX ADMIN — MIDODRINE HYDROCHLORIDE 10 MG: 5 TABLET ORAL at 03:25

## 2018-01-01 RX ADMIN — NOREPINEPHRINE BITARTRATE 6.4 MCG: 1 INJECTION INTRAVENOUS at 08:38

## 2018-01-01 RX ADMIN — SODIUM CHLORIDE, SODIUM GLUCONATE, SODIUM ACETATE, POTASSIUM CHLORIDE AND MAGNESIUM CHLORIDE: 526; 502; 368; 37; 30 INJECTION, SOLUTION INTRAVENOUS at 13:07

## 2018-01-01 RX ADMIN — Medication 1 MG: at 09:25

## 2018-01-01 RX ADMIN — OXYCODONE HYDROCHLORIDE 5 MG: 5 TABLET ORAL at 18:38

## 2018-01-01 RX ADMIN — SODIUM CHLORIDE 50 MG: 9 INJECTION, SOLUTION INTRAVENOUS at 09:41

## 2018-01-01 RX ADMIN — PANTOPRAZOLE SODIUM 40 MG: 40 INJECTION, POWDER, FOR SOLUTION INTRAVENOUS at 08:47

## 2018-01-01 RX ADMIN — CLOTRIMAZOLE 1 TROCHE: 10 LOZENGE ORAL at 08:30

## 2018-01-01 RX ADMIN — LOPERAMIDE HYDROCHLORIDE 2 MG: 1 SOLUTION ORAL at 11:52

## 2018-01-01 RX ADMIN — PANTOPRAZOLE SODIUM 40 MG: 40 INJECTION, POWDER, FOR SOLUTION INTRAVENOUS at 07:46

## 2018-01-01 RX ADMIN — SODIUM CHLORIDE 50 MG: 9 INJECTION, SOLUTION INTRAVENOUS at 05:06

## 2018-01-01 RX ADMIN — CEFTAROLINE FOSAMIL 300 MG: 600 POWDER, FOR SOLUTION INTRAVENOUS at 02:09

## 2018-01-01 RX ADMIN — OXYCODONE HYDROCHLORIDE 10 MG: 5 SOLUTION ORAL at 23:21

## 2018-01-01 RX ADMIN — VASOPRESSIN 1 UNITS: 20 INJECTION INTRAVENOUS at 09:10

## 2018-01-01 RX ADMIN — Medication 0.5 MG: at 06:03

## 2018-01-01 RX ADMIN — OXYCODONE HYDROCHLORIDE 10 MG: 5 SOLUTION ORAL at 22:08

## 2018-01-01 RX ADMIN — ROCURONIUM BROMIDE 20 MG: 10 INJECTION INTRAVENOUS at 12:23

## 2018-01-01 RX ADMIN — PROCHLORPERAZINE EDISYLATE 10 MG: 5 INJECTION INTRAMUSCULAR; INTRAVENOUS at 13:07

## 2018-01-01 RX ADMIN — AMPICILLIN SODIUM 2 G: 2 INJECTION, POWDER, FOR SOLUTION INTRAMUSCULAR; INTRAVENOUS at 04:21

## 2018-01-01 RX ADMIN — ONDANSETRON 4 MG: 2 INJECTION INTRAMUSCULAR; INTRAVENOUS at 07:51

## 2018-01-01 RX ADMIN — ALBUMIN HUMAN 25 G: 0.25 SOLUTION INTRAVENOUS at 17:34

## 2018-01-01 RX ADMIN — PIPERACILLIN SODIUM AND TAZOBACTAM SODIUM 2.25 G: .375; 3 INJECTION, POWDER, LYOPHILIZED, FOR SOLUTION INTRAVENOUS at 09:22

## 2018-01-01 RX ADMIN — MIDODRINE HYDROCHLORIDE 10 MG: 5 TABLET ORAL at 11:38

## 2018-01-01 RX ADMIN — OXYCODONE HYDROCHLORIDE 5 MG: 5 TABLET ORAL at 22:20

## 2018-01-01 RX ADMIN — HUMAN INSULIN 1 UNITS/HR: 100 INJECTION, SOLUTION SUBCUTANEOUS at 20:02

## 2018-01-01 RX ADMIN — SUGAMMADEX 200 MG: 100 INJECTION, SOLUTION INTRAVENOUS at 18:30

## 2018-01-01 RX ADMIN — PANTOPRAZOLE SODIUM 40 MG: 40 TABLET, DELAYED RELEASE ORAL at 07:07

## 2018-01-01 RX ADMIN — Medication 17 ML/KG/HR: at 15:56

## 2018-01-01 RX ADMIN — SODIUM CHLORIDE, SODIUM GLUCONATE, SODIUM ACETATE, POTASSIUM CHLORIDE AND MAGNESIUM CHLORIDE: 526; 502; 368; 37; 30 INJECTION, SOLUTION INTRAVENOUS at 06:00

## 2018-01-01 RX ADMIN — Medication 12.5 ML/KG/HR: at 10:18

## 2018-01-01 RX ADMIN — VALGANCICLOVIR HYDROCHLORIDE 450 MG: 50 POWDER, FOR SOLUTION ORAL at 09:07

## 2018-01-01 RX ADMIN — INSULIN HUMAN 30 UNITS: 100 INJECTION, SUSPENSION SUBCUTANEOUS at 20:05

## 2018-01-01 RX ADMIN — MEROPENEM 1000 MG: 1 INJECTION, POWDER, FOR SOLUTION INTRAVENOUS at 21:25

## 2018-01-01 RX ADMIN — AMIKACIN SULFATE 500 MG: 250 INJECTION, SOLUTION INTRAMUSCULAR; INTRAVENOUS at 09:00

## 2018-01-01 RX ADMIN — ALBUMIN HUMAN 12.5 G: 0.05 INJECTION, SOLUTION INTRAVENOUS at 08:42

## 2018-01-01 RX ADMIN — VASOPRESSIN 1 UNITS: 20 INJECTION INTRAVENOUS at 17:45

## 2018-01-01 RX ADMIN — DAPTOMYCIN 600 MG: 500 INJECTION, POWDER, LYOPHILIZED, FOR SOLUTION INTRAVENOUS at 16:11

## 2018-01-01 RX ADMIN — ASCORBIC ACID, VITAMIN A PALMITATE, CHOLECALCIFEROL, THIAMINE HYDROCHLORIDE, RIBOFLAVIN-5 PHOSPHATE SODIUM, PYRIDOXINE HYDROCHLORIDE, NIACINAMIDE, DEXPANTHENOL, ALPHA-TOCOPHEROL ACETATE, VITAMIN K1, FOLIC ACID, BIOTIN, CYANOCOBALAMIN: 200; 3300; 200; 6; 3.6; 6; 40; 15; 10; 150; 600; 60; 5 INJECTION, SOLUTION INTRAVENOUS at 20:51

## 2018-01-01 RX ADMIN — ALBUMIN HUMAN 25 G: 0.05 INJECTION, SOLUTION INTRAVENOUS at 18:08

## 2018-01-01 RX ADMIN — AMIKACIN SULFATE 550 MG: 250 INJECTION, SOLUTION INTRAMUSCULAR; INTRAVENOUS at 12:50

## 2018-01-01 RX ADMIN — Medication 12.5 ML/KG/HR: at 02:05

## 2018-01-01 RX ADMIN — ALBUMIN HUMAN: 0.05 INJECTION, SOLUTION INTRAVENOUS at 17:50

## 2018-01-01 RX ADMIN — Medication 1 MG: at 08:46

## 2018-01-01 RX ADMIN — MICAFUNGIN SODIUM 100 MG: 10 INJECTION, POWDER, LYOPHILIZED, FOR SOLUTION INTRAVENOUS at 19:44

## 2018-01-01 RX ADMIN — CEFTAROLINE FOSAMIL 400 MG: 600 POWDER, FOR SOLUTION INTRAVENOUS at 18:29

## 2018-01-01 RX ADMIN — PANTOPRAZOLE SODIUM 40 MG: 40 TABLET, DELAYED RELEASE ORAL at 08:30

## 2018-01-01 RX ADMIN — FLUCONAZOLE 200 MG: 2 INJECTION, SOLUTION INTRAVENOUS at 06:10

## 2018-01-01 RX ADMIN — Medication 0.5 MG: at 18:21

## 2018-01-01 RX ADMIN — CALCIUM CHLORIDE 1 G: 100 INJECTION, SOLUTION INTRAVENOUS at 06:30

## 2018-01-01 RX ADMIN — CLOTRIMAZOLE 1 TROCHE: 10 LOZENGE ORAL at 19:59

## 2018-01-01 RX ADMIN — Medication 81 MG: at 07:50

## 2018-01-01 RX ADMIN — Medication 50 MCG: at 10:33

## 2018-01-01 RX ADMIN — SODIUM CHLORIDE 500 ML: 9 INJECTION, SOLUTION INTRAVENOUS at 22:48

## 2018-01-01 RX ADMIN — HEPARIN SODIUM 500 UNITS/HR: 10000 INJECTION, SOLUTION INTRAVENOUS at 16:00

## 2018-01-01 RX ADMIN — LINEZOLID 600 MG: 600 INJECTION, SOLUTION INTRAVENOUS at 12:30

## 2018-01-01 RX ADMIN — ROCURONIUM BROMIDE 30 MG: 10 INJECTION INTRAVENOUS at 07:52

## 2018-01-01 RX ADMIN — Medication 12.5 ML/KG/HR: at 15:36

## 2018-01-01 RX ADMIN — PANTOPRAZOLE SODIUM 40 MG: 40 INJECTION, POWDER, FOR SOLUTION INTRAVENOUS at 20:19

## 2018-01-01 RX ADMIN — HUMAN INSULIN 3 UNITS/HR: 100 INJECTION, SOLUTION SUBCUTANEOUS at 05:13

## 2018-01-01 RX ADMIN — MICAFUNGIN SODIUM 100 MG: 10 INJECTION, POWDER, LYOPHILIZED, FOR SOLUTION INTRAVENOUS at 21:30

## 2018-01-01 RX ADMIN — NOREPINEPHRINE BITARTRATE 6.4 MCG: 1 INJECTION INTRAVENOUS at 09:47

## 2018-01-01 RX ADMIN — PANTOPRAZOLE SODIUM 40 MG: 40 INJECTION, POWDER, FOR SOLUTION INTRAVENOUS at 07:47

## 2018-01-01 RX ADMIN — HYDROCORTISONE SODIUM SUCCINATE 50 MG: 100 INJECTION, POWDER, FOR SOLUTION INTRAMUSCULAR; INTRAVENOUS at 10:09

## 2018-01-01 RX ADMIN — DAPTOMYCIN 800 MG: 500 INJECTION, POWDER, LYOPHILIZED, FOR SOLUTION INTRAVENOUS at 13:53

## 2018-01-01 RX ADMIN — LIDOCAINE HYDROCHLORIDE 10 ML: 20 JELLY TOPICAL at 15:42

## 2018-01-01 RX ADMIN — PHENYLEPHRINE HYDROCHLORIDE 100 MCG: 10 INJECTION, SOLUTION INTRAMUSCULAR; INTRAVENOUS; SUBCUTANEOUS at 13:56

## 2018-01-01 RX ADMIN — FUROSEMIDE 20 MG: 10 INJECTION, SOLUTION INTRAVENOUS at 09:27

## 2018-01-01 RX ADMIN — Medication 12.5 ML/KG/HR: at 13:29

## 2018-01-01 RX ADMIN — MIDAZOLAM 1 MG: 1 INJECTION INTRAMUSCULAR; INTRAVENOUS at 09:27

## 2018-01-01 RX ADMIN — TENOFOVIR DISOPROXIL FUMARATE 300 MG: 300 TABLET, COATED ORAL at 16:50

## 2018-01-01 RX ADMIN — HYDROCORTISONE SODIUM SUCCINATE 100 MG: 100 INJECTION, POWDER, FOR SOLUTION INTRAMUSCULAR; INTRAVENOUS at 18:30

## 2018-01-01 RX ADMIN — HUMAN INSULIN 2 UNITS/HR: 100 INJECTION, SOLUTION SUBCUTANEOUS at 07:56

## 2018-01-01 RX ADMIN — LACTULOSE 20 G: 20 POWDER, FOR SOLUTION ORAL at 07:46

## 2018-01-01 RX ADMIN — DEXTROSE MONOHYDRATE 25 ML: 500 INJECTION PARENTERAL at 18:23

## 2018-01-01 RX ADMIN — HEPARIN SODIUM 400 UNITS/HR: 10000 INJECTION, SOLUTION INTRAVENOUS at 20:50

## 2018-01-01 RX ADMIN — CEFTAROLINE FOSAMIL 300 MG: 600 POWDER, FOR SOLUTION INTRAVENOUS at 13:45

## 2018-01-01 RX ADMIN — FLUCONAZOLE 200 MG: 2 INJECTION, SOLUTION INTRAVENOUS at 06:38

## 2018-01-01 RX ADMIN — TENOFOVIR DISOPROXIL FUMARATE 300 MG: 300 TABLET, COATED ORAL at 10:09

## 2018-01-01 RX ADMIN — AMPICILLIN SODIUM 2 G: 2 INJECTION, POWDER, FOR SOLUTION INTRAMUSCULAR; INTRAVENOUS at 22:04

## 2018-01-01 RX ADMIN — SULFAMETHOXAZOLE AND TRIMETHOPRIM 80 MG: 200; 40 SUSPENSION ORAL at 07:57

## 2018-01-01 RX ADMIN — PANTOPRAZOLE SODIUM 40 MG: 40 TABLET, DELAYED RELEASE ORAL at 16:11

## 2018-01-01 RX ADMIN — PHENYLEPHRINE HYDROCHLORIDE 100 MCG: 10 INJECTION, SOLUTION INTRAMUSCULAR; INTRAVENOUS; SUBCUTANEOUS at 10:46

## 2018-01-01 RX ADMIN — OXYCODONE HYDROCHLORIDE 5 MG: 5 SOLUTION ORAL at 09:18

## 2018-01-01 RX ADMIN — HUMAN INSULIN 4 UNITS/HR: 100 INJECTION, SOLUTION SUBCUTANEOUS at 05:32

## 2018-01-01 RX ADMIN — SODIUM BICARBONATE 650 MG: 650 TABLET ORAL at 21:18

## 2018-01-01 RX ADMIN — I.V. FAT EMULSION 250 ML: 20 EMULSION INTRAVENOUS at 21:08

## 2018-01-01 RX ADMIN — CALCIUM CHLORIDE 1 G: 100 INJECTION, SOLUTION INTRAVENOUS at 08:21

## 2018-01-01 RX ADMIN — POTASSIUM CHLORIDE 20 MEQ: 29.8 INJECTION, SOLUTION INTRAVENOUS at 07:48

## 2018-01-01 RX ADMIN — HUMAN INSULIN 1 UNITS/HR: 100 INJECTION, SOLUTION SUBCUTANEOUS at 08:44

## 2018-01-01 RX ADMIN — HEPARIN SODIUM 500 UNITS/HR: 10000 INJECTION, SOLUTION INTRAVENOUS at 10:41

## 2018-01-01 RX ADMIN — VASOPRESSIN 1 UNITS: 20 INJECTION, SOLUTION INTRAMUSCULAR; SUBCUTANEOUS at 09:36

## 2018-01-01 RX ADMIN — PANTOPRAZOLE SODIUM 40 MG: 40 TABLET, DELAYED RELEASE ORAL at 16:01

## 2018-01-01 RX ADMIN — Medication 1 MG: at 08:43

## 2018-01-01 RX ADMIN — HEPARIN SODIUM 500 UNITS/HR: 10000 INJECTION, SOLUTION INTRAVENOUS at 08:12

## 2018-01-01 RX ADMIN — HYDROCORTISONE SODIUM SUCCINATE 50 MG: 100 INJECTION, POWDER, FOR SOLUTION INTRAMUSCULAR; INTRAVENOUS at 07:46

## 2018-01-01 RX ADMIN — Medication 12.5 ML/KG/HR: at 10:56

## 2018-01-01 RX ADMIN — Medication: at 14:45

## 2018-01-01 RX ADMIN — PROPOFOL 150 MG: 10 INJECTION, EMULSION INTRAVENOUS at 11:51

## 2018-01-01 RX ADMIN — OXYCODONE HYDROCHLORIDE 10 MG: 5 SOLUTION ORAL at 14:20

## 2018-01-01 RX ADMIN — RIFAXIMIN 550 MG: 550 TABLET ORAL at 21:00

## 2018-01-01 RX ADMIN — MYCOPHENOLATE MOFETIL 1000 MG: 200 POWDER, FOR SUSPENSION ORAL at 18:18

## 2018-01-01 RX ADMIN — OXYCODONE HYDROCHLORIDE 10 MG: 5 SOLUTION ORAL at 13:38

## 2018-01-01 RX ADMIN — RIFAXIMIN 550 MG: 550 TABLET ORAL at 09:38

## 2018-01-01 RX ADMIN — HUMAN INSULIN 2 UNITS/HR: 100 INJECTION, SOLUTION SUBCUTANEOUS at 07:36

## 2018-01-01 RX ADMIN — POTASSIUM PHOSPHATE, MONOBASIC AND POTASSIUM PHOSPHATE, DIBASIC 15 MMOL: 224; 236 INJECTION, SOLUTION INTRAVENOUS at 10:09

## 2018-01-01 RX ADMIN — PHENYLEPHRINE HYDROCHLORIDE 200 MCG: 10 INJECTION, SOLUTION INTRAMUSCULAR; INTRAVENOUS; SUBCUTANEOUS at 09:44

## 2018-01-01 RX ADMIN — LACTULOSE 20 G: 10 SOLUTION ORAL; RECTAL at 13:35

## 2018-01-01 RX ADMIN — SIMETHICONE CHEW TAB 80 MG 80 MG: 80 TABLET ORAL at 14:20

## 2018-01-01 RX ADMIN — NOREPINEPHRINE BITARTRATE 6.4 MCG: 1 INJECTION INTRAVENOUS at 08:27

## 2018-01-01 RX ADMIN — SODIUM CHLORIDE, POTASSIUM CHLORIDE, SODIUM LACTATE AND CALCIUM CHLORIDE: 600; 310; 30; 20 INJECTION, SOLUTION INTRAVENOUS at 14:29

## 2018-01-01 RX ADMIN — POTASSIUM PHOSPHATE, MONOBASIC AND POTASSIUM PHOSPHATE, DIBASIC 20 MMOL: 224; 236 INJECTION, SOLUTION INTRAVENOUS at 01:58

## 2018-01-01 RX ADMIN — Medication 1 MG: at 18:46

## 2018-01-01 RX ADMIN — CEFTAROLINE FOSAMIL 400 MG: 600 POWDER, FOR SOLUTION INTRAVENOUS at 08:17

## 2018-01-01 RX ADMIN — SULFAMETHOXAZOLE AND TRIMETHOPRIM 80 MG: 200; 40 SUSPENSION ORAL at 12:50

## 2018-01-01 RX ADMIN — TACROLIMUS 2.5 MG: 1 CAPSULE ORAL at 08:43

## 2018-01-01 RX ADMIN — Medication 12.5 ML/KG/HR: at 20:38

## 2018-01-01 RX ADMIN — MIDODRINE HYDROCHLORIDE 10 MG: 5 TABLET ORAL at 09:38

## 2018-01-01 RX ADMIN — SODIUM BICARBONATE 50 MEQ: 84 INJECTION, SOLUTION INTRAVENOUS at 12:15

## 2018-01-01 RX ADMIN — PANTOPRAZOLE SODIUM 40 MG: 40 TABLET, DELAYED RELEASE ORAL at 07:50

## 2018-01-01 RX ADMIN — LACTULOSE 20 G: 20 SOLUTION ORAL at 14:20

## 2018-01-01 RX ADMIN — PANTOPRAZOLE SODIUM 40 MG: 40 TABLET, DELAYED RELEASE ORAL at 08:16

## 2018-01-01 RX ADMIN — PROPOFOL 120 MG: 10 INJECTION, EMULSION INTRAVENOUS at 08:45

## 2018-01-01 RX ADMIN — PHENYLEPHRINE HYDROCHLORIDE 100 MCG: 10 INJECTION, SOLUTION INTRAMUSCULAR; INTRAVENOUS; SUBCUTANEOUS at 17:59

## 2018-01-01 RX ADMIN — HEPARIN SODIUM 500 UNITS/HR: 10000 INJECTION, SOLUTION INTRAVENOUS at 22:02

## 2018-01-01 RX ADMIN — Medication 80 MG: at 17:25

## 2018-01-01 RX ADMIN — SODIUM CHLORIDE 8 MG/HR: 9 INJECTION, SOLUTION INTRAVENOUS at 06:37

## 2018-01-01 RX ADMIN — BUMETANIDE 2 MG: 0.25 INJECTION INTRAMUSCULAR; INTRAVENOUS at 20:00

## 2018-01-01 RX ADMIN — CEFTAROLINE FOSAMIL 300 MG: 600 POWDER, FOR SOLUTION INTRAVENOUS at 02:15

## 2018-01-01 RX ADMIN — LACTULOSE 20 G: 20 POWDER, FOR SOLUTION ORAL at 07:35

## 2018-01-01 RX ADMIN — MICAFUNGIN SODIUM 100 MG: 10 INJECTION, POWDER, LYOPHILIZED, FOR SOLUTION INTRAVENOUS at 19:55

## 2018-01-01 RX ADMIN — MIDODRINE HYDROCHLORIDE 10 MG: 5 TABLET ORAL at 14:36

## 2018-01-01 RX ADMIN — OXYCODONE HYDROCHLORIDE 5 MG: 5 SOLUTION ORAL at 06:19

## 2018-01-01 RX ADMIN — CALCIUM CHLORIDE 1 G: 100 INJECTION, SOLUTION INTRAVENOUS at 03:08

## 2018-01-01 RX ADMIN — POTASSIUM CHLORIDE 40 MEQ: 1.5 POWDER, FOR SOLUTION ORAL at 07:06

## 2018-01-01 RX ADMIN — SODIUM CHLORIDE 50 MG: 9 INJECTION, SOLUTION INTRAVENOUS at 10:47

## 2018-01-01 RX ADMIN — VASOPRESSIN 1 UNITS: 20 INJECTION, SOLUTION INTRAMUSCULAR; SUBCUTANEOUS at 10:19

## 2018-01-01 RX ADMIN — MIDODRINE HYDROCHLORIDE 10 MG: 5 TABLET ORAL at 05:08

## 2018-01-01 RX ADMIN — ALBUMIN HUMAN 75 G: 0.25 SOLUTION INTRAVENOUS at 07:28

## 2018-01-01 RX ADMIN — SODIUM CHLORIDE: 9 INJECTION, SOLUTION INTRAVENOUS at 07:20

## 2018-01-01 RX ADMIN — Medication 17 ML/KG/HR: at 10:15

## 2018-01-01 RX ADMIN — RIFAXIMIN 550 MG: 550 TABLET ORAL at 07:46

## 2018-01-01 RX ADMIN — TENOFOVIR DISOPROXIL FUMARATE 300 MG: 300 TABLET, COATED ORAL at 09:39

## 2018-01-01 RX ADMIN — Medication 0.5 MG: at 21:45

## 2018-01-01 RX ADMIN — MINERAL OIL 30 ML: 1000 SOLUTION ORAL at 23:25

## 2018-01-01 RX ADMIN — Medication 12.5 ML/KG/HR: at 19:56

## 2018-01-01 RX ADMIN — HYDROMORPHONE HYDROCHLORIDE 0.5 MG: 1 INJECTION, SOLUTION INTRAMUSCULAR; INTRAVENOUS; SUBCUTANEOUS at 00:05

## 2018-01-01 RX ADMIN — PANTOPRAZOLE SODIUM 40 MG: 40 TABLET, DELAYED RELEASE ORAL at 08:43

## 2018-01-01 RX ADMIN — Medication 50 MCG/HR: at 15:15

## 2018-01-01 RX ADMIN — SMOFLIPID 250 ML: 6; 6; 5; 3 INJECTION, EMULSION INTRAVENOUS at 20:20

## 2018-01-01 RX ADMIN — Medication 1 MG: at 17:37

## 2018-01-01 RX ADMIN — Medication 17 ML/KG/HR: at 18:25

## 2018-01-01 RX ADMIN — Medication 10 MCG/KG/MIN: at 09:01

## 2018-01-01 RX ADMIN — Medication 0.5 MG: at 07:35

## 2018-01-01 RX ADMIN — MICAFUNGIN SODIUM 100 MG: 10 INJECTION, POWDER, LYOPHILIZED, FOR SOLUTION INTRAVENOUS at 21:20

## 2018-01-01 RX ADMIN — SODIUM BICARBONATE 650 MG: 650 TABLET ORAL at 07:57

## 2018-01-01 RX ADMIN — METOROPROLOL TARTRATE 1 MG: 5 INJECTION, SOLUTION INTRAVENOUS at 09:18

## 2018-01-01 RX ADMIN — Medication 12.5 ML/KG/HR: at 09:01

## 2018-01-01 RX ADMIN — MYCOPHENOLATE MOFETIL 1000 MG: 200 POWDER, FOR SUSPENSION ORAL at 18:26

## 2018-01-01 RX ADMIN — MYCOPHENOLATE MOFETIL 250 MG: 200 POWDER, FOR SUSPENSION ORAL at 08:02

## 2018-01-01 RX ADMIN — DAPTOMYCIN 600 MG: 500 INJECTION, POWDER, LYOPHILIZED, FOR SOLUTION INTRAVENOUS at 11:09

## 2018-01-01 RX ADMIN — PANTOPRAZOLE SODIUM 40 MG: 40 TABLET, DELAYED RELEASE ORAL at 15:46

## 2018-01-01 RX ADMIN — AMPICILLIN SODIUM 2 G: 2 INJECTION, POWDER, FOR SOLUTION INTRAMUSCULAR; INTRAVENOUS at 20:43

## 2018-01-01 RX ADMIN — VASOPRESSIN 2 UNITS: 20 INJECTION INTRAVENOUS at 09:42

## 2018-01-01 RX ADMIN — HEPARIN SODIUM 500 UNITS/HR: 10000 INJECTION, SOLUTION INTRAVENOUS at 11:33

## 2018-01-01 RX ADMIN — Medication 10 MEQ: at 21:26

## 2018-01-01 RX ADMIN — Medication: at 15:53

## 2018-01-01 RX ADMIN — EPINEPHRINE 0.05 MCG/KG/MIN: 1 INJECTION PARENTERAL at 08:55

## 2018-01-01 RX ADMIN — CEFTRIAXONE SODIUM 2 G: 2 INJECTION, POWDER, FOR SOLUTION INTRAMUSCULAR; INTRAVENOUS at 10:54

## 2018-01-01 RX ADMIN — Medication 0.3 MG: at 14:28

## 2018-01-01 RX ADMIN — Medication 2 MG: at 07:57

## 2018-01-01 RX ADMIN — POTASSIUM CHLORIDE 40 MEQ: 750 TABLET, EXTENDED RELEASE ORAL at 16:40

## 2018-01-01 RX ADMIN — PHENYLEPHRINE HYDROCHLORIDE 100 MCG: 10 INJECTION, SOLUTION INTRAMUSCULAR; INTRAVENOUS; SUBCUTANEOUS at 14:45

## 2018-01-01 RX ADMIN — Medication 17 ML/KG/HR: at 06:49

## 2018-01-01 RX ADMIN — TACROLIMUS 2.5 MG: 1 CAPSULE ORAL at 18:36

## 2018-01-01 RX ADMIN — Medication 0.5 MG: at 23:18

## 2018-01-01 RX ADMIN — METRONIDAZOLE 500 MG: 500 INJECTION, SOLUTION INTRAVENOUS at 01:51

## 2018-01-01 RX ADMIN — MYCOPHENOLATE MOFETIL 1000 MG: 250 CAPSULE ORAL at 07:53

## 2018-01-01 RX ADMIN — NOREPINEPHRINE BITARTRATE 0.46 MCG/KG/MIN: 1 INJECTION INTRAVENOUS at 05:00

## 2018-01-01 RX ADMIN — PANTOPRAZOLE SODIUM 40 MG: 40 TABLET, DELAYED RELEASE ORAL at 16:21

## 2018-01-01 RX ADMIN — Medication: at 15:27

## 2018-01-01 RX ADMIN — SODIUM CHLORIDE 500 ML: 9 INJECTION, SOLUTION INTRAVENOUS at 08:55

## 2018-01-01 RX ADMIN — Medication 0.5 MG: at 18:03

## 2018-01-01 RX ADMIN — HUMAN INSULIN 2 UNITS/HR: 100 INJECTION, SOLUTION SUBCUTANEOUS at 10:07

## 2018-01-01 RX ADMIN — BUMETANIDE 2 MG: 0.25 INJECTION INTRAMUSCULAR; INTRAVENOUS at 11:10

## 2018-01-01 RX ADMIN — Medication 12.5 ML/KG/HR: at 01:59

## 2018-01-01 RX ADMIN — FUROSEMIDE 40 MG: 40 TABLET ORAL at 07:29

## 2018-01-01 RX ADMIN — HYDROCORTISONE SODIUM SUCCINATE 100 MG: 100 INJECTION, POWDER, FOR SOLUTION INTRAMUSCULAR; INTRAVENOUS at 20:31

## 2018-01-01 RX ADMIN — Medication 0.5 MG: at 03:39

## 2018-01-01 RX ADMIN — LACTULOSE 200 G: 10 SOLUTION ORAL; RECTAL at 20:34

## 2018-01-01 RX ADMIN — MYCOPHENOLATE MOFETIL 1000 MG: 200 POWDER, FOR SUSPENSION ORAL at 18:48

## 2018-01-01 RX ADMIN — VASOPRESSIN 1 UNITS: 20 INJECTION INTRAVENOUS at 17:38

## 2018-01-01 RX ADMIN — NOREPINEPHRINE BITARTRATE 0.43 MCG/KG/MIN: 1 INJECTION INTRAVENOUS at 00:38

## 2018-01-01 RX ADMIN — CALCIUM GLUCONATE 1 G: 98 INJECTION, SOLUTION INTRAVENOUS at 08:31

## 2018-01-01 RX ADMIN — OXYCODONE HYDROCHLORIDE 10 MG: 5 SOLUTION ORAL at 08:17

## 2018-01-01 RX ADMIN — METRONIDAZOLE 500 MG: 500 INJECTION, SOLUTION INTRAVENOUS at 21:43

## 2018-01-01 RX ADMIN — DAPTOMYCIN 600 MG: 500 INJECTION, POWDER, LYOPHILIZED, FOR SOLUTION INTRAVENOUS at 11:10

## 2018-01-01 RX ADMIN — ALBUMIN HUMAN 12.5 G: 0.05 INJECTION, SOLUTION INTRAVENOUS at 09:17

## 2018-01-01 RX ADMIN — MEROPENEM 500 MG: 500 INJECTION, POWDER, FOR SOLUTION INTRAVENOUS at 10:02

## 2018-01-01 RX ADMIN — ALBUMIN HUMAN 25 G: 0.25 SOLUTION INTRAVENOUS at 00:15

## 2018-01-01 RX ADMIN — PANTOPRAZOLE SODIUM 40 MG: 40 INJECTION, POWDER, FOR SOLUTION INTRAVENOUS at 07:35

## 2018-01-01 RX ADMIN — NOREPINEPHRINE BITARTRATE 0.03 MCG/KG/MIN: 1 INJECTION INTRAVENOUS at 22:17

## 2018-01-01 RX ADMIN — SPIRONOLACTONE 100 MG: 25 TABLET ORAL at 07:29

## 2018-01-01 RX ADMIN — MICAFUNGIN SODIUM 100 MG: 10 INJECTION, POWDER, LYOPHILIZED, FOR SOLUTION INTRAVENOUS at 18:52

## 2018-01-01 RX ADMIN — Medication 1 MG/HR: at 00:11

## 2018-01-01 RX ADMIN — OXYCODONE HYDROCHLORIDE 5 MG: 5 SOLUTION ORAL at 05:56

## 2018-01-01 RX ADMIN — OXYCODONE HYDROCHLORIDE 5 MG: 5 TABLET ORAL at 08:37

## 2018-01-01 RX ADMIN — METHYLPREDNISOLONE 100 MG: 125 INJECTION, POWDER, LYOPHILIZED, FOR SOLUTION INTRAMUSCULAR; INTRAVENOUS at 08:18

## 2018-01-01 RX ADMIN — SODIUM BICARBONATE 50 MEQ: 84 INJECTION INTRAVENOUS at 11:39

## 2018-01-01 RX ADMIN — MYCOPHENOLATE MOFETIL 250 MG: 200 POWDER, FOR SUSPENSION ORAL at 07:42

## 2018-01-01 RX ADMIN — PANTOPRAZOLE SODIUM 40 MG: 40 INJECTION, POWDER, FOR SOLUTION INTRAVENOUS at 07:52

## 2018-01-01 RX ADMIN — Medication 0.3 MG: at 11:58

## 2018-01-01 RX ADMIN — METRONIDAZOLE 500 MG: 500 INJECTION, SOLUTION INTRAVENOUS at 08:36

## 2018-01-01 RX ADMIN — BUMETANIDE 2 MG: 0.25 INJECTION INTRAMUSCULAR; INTRAVENOUS at 16:10

## 2018-01-01 RX ADMIN — MORPHINE SULFATE 2 MG: 2 INJECTION, SOLUTION INTRAMUSCULAR; INTRAVENOUS at 12:12

## 2018-01-01 RX ADMIN — POTASSIUM CHLORIDE 20 MEQ: 400 INJECTION, SOLUTION INTRAVENOUS at 00:51

## 2018-01-01 RX ADMIN — Medication 0.5 MG: at 03:48

## 2018-01-01 RX ADMIN — SODIUM CHLORIDE 200 MG: 9 INJECTION, SOLUTION INTRAVENOUS at 08:36

## 2018-01-01 RX ADMIN — CALCIUM GLUCONATE 50 ML/HR: 98 INJECTION, SOLUTION INTRAVENOUS at 08:11

## 2018-01-01 RX ADMIN — NOREPINEPHRINE BITARTRATE 6.4 MCG: 1 INJECTION INTRAVENOUS at 09:08

## 2018-01-01 RX ADMIN — VALGANCICLOVIR HYDROCHLORIDE 450 MG: 50 POWDER, FOR SOLUTION ORAL at 08:44

## 2018-01-01 RX ADMIN — ROCURONIUM BROMIDE 20 MG: 10 INJECTION INTRAVENOUS at 13:55

## 2018-01-01 RX ADMIN — PANTOPRAZOLE SODIUM 40 MG: 40 INJECTION, POWDER, FOR SOLUTION INTRAVENOUS at 07:41

## 2018-01-01 RX ADMIN — MYCOPHENOLATE MOFETIL 250 MG: 200 POWDER, FOR SUSPENSION ORAL at 08:09

## 2018-01-01 RX ADMIN — POTASSIUM PHOSPHATE, MONOBASIC AND POTASSIUM PHOSPHATE, DIBASIC 20 MMOL: 224; 236 INJECTION, SOLUTION INTRAVENOUS at 20:04

## 2018-01-01 RX ADMIN — HYDROCORTISONE SODIUM SUCCINATE 50 MG: 100 INJECTION, POWDER, FOR SOLUTION INTRAMUSCULAR; INTRAVENOUS at 18:53

## 2018-01-01 RX ADMIN — MIDODRINE HYDROCHLORIDE 10 MG: 5 TABLET ORAL at 03:48

## 2018-01-01 RX ADMIN — HUMAN INSULIN 9 UNITS/HR: 100 INJECTION, SOLUTION SUBCUTANEOUS at 20:33

## 2018-01-01 RX ADMIN — CALCIUM CHLORIDE 1 G: 100 INJECTION, SOLUTION INTRAVENOUS at 00:24

## 2018-01-01 RX ADMIN — SODIUM CHLORIDE, POTASSIUM CHLORIDE, SODIUM LACTATE AND CALCIUM CHLORIDE 1000 ML: 600; 310; 30; 20 INJECTION, SOLUTION INTRAVENOUS at 15:50

## 2018-01-01 RX ADMIN — TENOFOVIR DISOPROXIL FUMARATE 300 MG: 300 TABLET, COATED ORAL at 16:15

## 2018-01-01 RX ADMIN — Medication 12.5 ML/KG/HR: at 09:27

## 2018-01-01 RX ADMIN — ALBUMIN HUMAN 25 G: 0.25 SOLUTION INTRAVENOUS at 01:47

## 2018-01-01 RX ADMIN — METRONIDAZOLE 500 MG: 500 INJECTION, SOLUTION INTRAVENOUS at 08:40

## 2018-01-01 RX ADMIN — Medication 1.5 MG: at 08:09

## 2018-01-01 RX ADMIN — CEFTAROLINE FOSAMIL 300 MG: 600 POWDER, FOR SOLUTION INTRAVENOUS at 01:33

## 2018-01-01 RX ADMIN — OXYCODONE HYDROCHLORIDE 10 MG: 5 SOLUTION ORAL at 13:08

## 2018-01-01 RX ADMIN — PHENYLEPHRINE HYDROCHLORIDE 100 MCG: 10 INJECTION, SOLUTION INTRAMUSCULAR; INTRAVENOUS; SUBCUTANEOUS at 13:48

## 2018-01-01 RX ADMIN — OXYCODONE HYDROCHLORIDE 10 MG: 5 SOLUTION ORAL at 05:14

## 2018-01-01 RX ADMIN — HUMAN INSULIN 2 UNITS/HR: 100 INJECTION, SOLUTION SUBCUTANEOUS at 18:57

## 2018-01-01 RX ADMIN — ALBUMIN HUMAN 12.5 G: 0.25 SOLUTION INTRAVENOUS at 14:55

## 2018-01-01 RX ADMIN — ALBUMIN HUMAN 75 G: 0.25 SOLUTION INTRAVENOUS at 21:17

## 2018-01-01 RX ADMIN — METRONIDAZOLE 500 MG: 500 INJECTION, SOLUTION INTRAVENOUS at 19:40

## 2018-01-01 RX ADMIN — Medication 12.5 ML/KG/HR: at 02:04

## 2018-01-01 RX ADMIN — PANTOPRAZOLE SODIUM 40 MG: 40 TABLET, DELAYED RELEASE ORAL at 18:04

## 2018-01-01 RX ADMIN — NOREPINEPHRINE BITARTRATE 0.43 MCG/KG/MIN: 1 INJECTION INTRAVENOUS at 19:40

## 2018-01-01 RX ADMIN — Medication 0.25 MG: at 17:34

## 2018-01-01 RX ADMIN — NOREPINEPHRINE BITARTRATE 6.4 MCG: 1 INJECTION INTRAVENOUS at 12:49

## 2018-01-01 RX ADMIN — MYCOPHENOLATE MOFETIL 1000 MG: 200 POWDER, FOR SUSPENSION ORAL at 08:43

## 2018-01-01 RX ADMIN — NYSTATIN 500000 UNITS: 100000 SUSPENSION ORAL at 17:37

## 2018-01-01 RX ADMIN — PANTOPRAZOLE SODIUM 40 MG: 40 INJECTION, POWDER, FOR SOLUTION INTRAVENOUS at 09:21

## 2018-01-01 RX ADMIN — SODIUM CHLORIDE: 4.5 INJECTION, SOLUTION INTRAVENOUS at 13:40

## 2018-01-01 RX ADMIN — HYDROCORTISONE SODIUM SUCCINATE 100 MG: 100 INJECTION, POWDER, FOR SOLUTION INTRAMUSCULAR; INTRAVENOUS at 11:39

## 2018-01-01 RX ADMIN — SODIUM CHLORIDE, POTASSIUM CHLORIDE, SODIUM LACTATE AND CALCIUM CHLORIDE: 600; 310; 30; 20 INJECTION, SOLUTION INTRAVENOUS at 22:12

## 2018-01-01 RX ADMIN — PHENYLEPHRINE HYDROCHLORIDE 100 MCG: 10 INJECTION, SOLUTION INTRAMUSCULAR; INTRAVENOUS; SUBCUTANEOUS at 14:39

## 2018-01-01 RX ADMIN — OXYCODONE HYDROCHLORIDE 10 MG: 5 SOLUTION ORAL at 03:48

## 2018-01-01 RX ADMIN — MAGNESIUM SULFATE HEPTAHYDRATE: 500 INJECTION, SOLUTION INTRAMUSCULAR; INTRAVENOUS at 20:59

## 2018-01-01 RX ADMIN — SODIUM CHLORIDE 2000 ML: 9 INJECTION, SOLUTION INTRAVENOUS at 12:09

## 2018-01-01 RX ADMIN — Medication 12.5 ML/KG/HR: at 19:01

## 2018-01-01 RX ADMIN — HYDROCORTISONE SODIUM SUCCINATE 25 MG: 100 INJECTION, POWDER, FOR SOLUTION INTRAMUSCULAR; INTRAVENOUS at 11:50

## 2018-01-01 RX ADMIN — Medication 1 MG: at 20:13

## 2018-01-01 RX ADMIN — MAGNESIUM SULFATE HEPTAHYDRATE: 500 INJECTION, SOLUTION INTRAMUSCULAR; INTRAVENOUS at 21:00

## 2018-01-01 RX ADMIN — Medication 0.5 MG: at 07:50

## 2018-01-01 RX ADMIN — Medication 17 ML/KG/HR: at 11:24

## 2018-01-01 RX ADMIN — HEPARIN SODIUM 1350 UNITS/HR: 10000 INJECTION, SOLUTION INTRAVENOUS at 20:11

## 2018-01-01 RX ADMIN — PIPERACILLIN SODIUM,TAZOBACTAM SODIUM 4.5 G: 4; .5 INJECTION, POWDER, FOR SOLUTION INTRAVENOUS at 14:18

## 2018-01-01 RX ADMIN — SODIUM CHLORIDE 50 MG: 9 INJECTION, SOLUTION INTRAVENOUS at 16:49

## 2018-01-01 RX ADMIN — ALBUMIN HUMAN 12.5 G: 0.25 SOLUTION INTRAVENOUS at 10:26

## 2018-01-01 RX ADMIN — VASOPRESSIN 1 UNITS: 20 INJECTION, SOLUTION INTRAMUSCULAR; SUBCUTANEOUS at 10:56

## 2018-01-01 RX ADMIN — SULFAMETHOXAZOLE AND TRIMETHOPRIM 80 MG: 200; 40 SUSPENSION ORAL at 14:40

## 2018-01-01 RX ADMIN — Medication 12.5 ML/KG/HR: at 01:07

## 2018-01-01 RX ADMIN — SODIUM CHLORIDE 500 ML: 9 INJECTION, SOLUTION INTRAVENOUS at 11:45

## 2018-01-01 RX ADMIN — ALBUMIN HUMAN 12.5 G: 0.05 INJECTION, SOLUTION INTRAVENOUS at 17:35

## 2018-01-01 RX ADMIN — METRONIDAZOLE 500 MG: 500 INJECTION, SOLUTION INTRAVENOUS at 07:27

## 2018-01-01 RX ADMIN — MIDODRINE HYDROCHLORIDE 10 MG: 5 TABLET ORAL at 17:27

## 2018-01-01 RX ADMIN — OXYCODONE HYDROCHLORIDE 5 MG: 5 SOLUTION ORAL at 03:54

## 2018-01-01 RX ADMIN — SODIUM CHLORIDE 20 MG: 9 INJECTION, SOLUTION INTRAVENOUS at 10:13

## 2018-01-01 RX ADMIN — ALBUMIN HUMAN 25 G: 0.25 SOLUTION INTRAVENOUS at 02:46

## 2018-01-01 RX ADMIN — FENTANYL CITRATE 250 MCG: 50 INJECTION, SOLUTION INTRAMUSCULAR; INTRAVENOUS at 12:12

## 2018-01-01 RX ADMIN — POTASSIUM CHLORIDE 20 MEQ: 400 INJECTION, SOLUTION INTRAVENOUS at 17:49

## 2018-01-01 RX ADMIN — VASOPRESSIN 2.4 UNITS/HR: 20 INJECTION INTRAVENOUS at 14:43

## 2018-01-01 RX ADMIN — FENTANYL CITRATE 50 MCG: 50 INJECTION, SOLUTION INTRAMUSCULAR; INTRAVENOUS at 13:32

## 2018-01-01 RX ADMIN — Medication 10 MEQ: at 03:05

## 2018-01-01 RX ADMIN — HUMAN INSULIN 2 UNITS/HR: 100 INJECTION, SOLUTION SUBCUTANEOUS at 00:49

## 2018-01-01 RX ADMIN — SODIUM CHLORIDE, POTASSIUM CHLORIDE, SODIUM LACTATE AND CALCIUM CHLORIDE: 600; 310; 30; 20 INJECTION, SOLUTION INTRAVENOUS at 05:04

## 2018-01-01 RX ADMIN — MYCOPHENOLATE MOFETIL 250 MG: 200 POWDER, FOR SUSPENSION ORAL at 17:39

## 2018-01-01 RX ADMIN — Medication 12.5 ML/KG/HR: at 14:10

## 2018-01-01 RX ADMIN — MIDAZOLAM 1 MG: 1 INJECTION INTRAMUSCULAR; INTRAVENOUS at 18:15

## 2018-01-01 RX ADMIN — MICAFUNGIN SODIUM 100 MG: 10 INJECTION, POWDER, LYOPHILIZED, FOR SOLUTION INTRAVENOUS at 21:27

## 2018-01-01 RX ADMIN — SODIUM CHLORIDE, POTASSIUM CHLORIDE, SODIUM LACTATE AND CALCIUM CHLORIDE: 600; 310; 30; 20 INJECTION, SOLUTION INTRAVENOUS at 16:22

## 2018-01-01 RX ADMIN — PANTOPRAZOLE SODIUM 40 MG: 40 INJECTION, POWDER, FOR SOLUTION INTRAVENOUS at 20:07

## 2018-01-01 RX ADMIN — MIDODRINE HYDROCHLORIDE 10 MG: 5 TABLET ORAL at 19:45

## 2018-01-01 RX ADMIN — OXYCODONE HYDROCHLORIDE 10 MG: 5 SOLUTION ORAL at 16:11

## 2018-01-01 RX ADMIN — Medication 0.5 MG: at 11:49

## 2018-01-01 RX ADMIN — VASOPRESSIN 2 UNITS/HR: 20 INJECTION INTRAVENOUS at 08:15

## 2018-01-01 RX ADMIN — METRONIDAZOLE 500 MG: 500 INJECTION, SOLUTION INTRAVENOUS at 16:51

## 2018-01-01 RX ADMIN — MORPHINE SULFATE 2 MG: 2 INJECTION, SOLUTION INTRAMUSCULAR; INTRAVENOUS at 13:17

## 2018-01-01 RX ADMIN — SODIUM CHLORIDE: 9 INJECTION, SOLUTION INTRAVENOUS at 12:25

## 2018-01-01 RX ADMIN — PANTOPRAZOLE SODIUM 40 MG: 40 TABLET, DELAYED RELEASE ORAL at 16:43

## 2018-01-01 RX ADMIN — CEFTAROLINE FOSAMIL 300 MG: 600 POWDER, FOR SOLUTION INTRAVENOUS at 01:26

## 2018-01-01 RX ADMIN — MIDODRINE HYDROCHLORIDE 10 MG: 5 TABLET ORAL at 12:36

## 2018-01-01 RX ADMIN — DAPTOMYCIN 600 MG: 500 INJECTION, POWDER, LYOPHILIZED, FOR SOLUTION INTRAVENOUS at 11:55

## 2018-01-01 RX ADMIN — SULFAMETHOXAZOLE AND TRIMETHOPRIM 80 MG: 200; 40 SUSPENSION ORAL at 08:25

## 2018-01-01 RX ADMIN — NOREPINEPHRINE BITARTRATE 0.16 MCG/KG/MIN: 1 INJECTION INTRAVENOUS at 11:12

## 2018-01-01 RX ADMIN — POTASSIUM PHOSPHATE, MONOBASIC AND POTASSIUM PHOSPHATE, DIBASIC 20 MMOL: 224; 236 INJECTION, SOLUTION INTRAVENOUS at 18:14

## 2018-01-01 RX ADMIN — MIDODRINE HYDROCHLORIDE 10 MG: 5 TABLET ORAL at 20:15

## 2018-01-01 RX ADMIN — LACTULOSE 20 G: 10 SOLUTION ORAL; RECTAL at 13:39

## 2018-01-01 RX ADMIN — SODIUM CHLORIDE: 9 INJECTION, SOLUTION INTRAVENOUS at 11:50

## 2018-01-01 RX ADMIN — HUMAN INSULIN 0.75 UNITS/HR: 100 INJECTION, SOLUTION SUBCUTANEOUS at 14:39

## 2018-01-01 RX ADMIN — HUMAN INSULIN 10 UNITS/HR: 100 INJECTION, SOLUTION SUBCUTANEOUS at 15:26

## 2018-01-01 RX ADMIN — FERROUS SULFATE TAB 325 MG (65 MG ELEMENTAL FE) 325 MG: 325 (65 FE) TAB at 12:44

## 2018-01-01 RX ADMIN — POTASSIUM CHLORIDE 40 MEQ: 1.5 POWDER, FOR SOLUTION ORAL at 03:23

## 2018-01-01 RX ADMIN — MYCOPHENOLATE MOFETIL 1000 MG: 200 POWDER, FOR SUSPENSION ORAL at 17:45

## 2018-01-01 RX ADMIN — NYSTATIN 500000 UNITS: 100000 SUSPENSION ORAL at 19:45

## 2018-01-01 RX ADMIN — AMPICILLIN SODIUM 2 G: 2 INJECTION, POWDER, FOR SOLUTION INTRAMUSCULAR; INTRAVENOUS at 13:00

## 2018-01-01 RX ADMIN — ALBUMIN HUMAN 12.5 G: 0.05 INJECTION, SOLUTION INTRAVENOUS at 23:20

## 2018-01-01 RX ADMIN — RIFAXIMIN 550 MG: 550 TABLET ORAL at 19:39

## 2018-01-01 RX ADMIN — HYDROCORTISONE SODIUM SUCCINATE 25 MG: 100 INJECTION, POWDER, FOR SOLUTION INTRAMUSCULAR; INTRAVENOUS at 11:25

## 2018-01-01 RX ADMIN — ALBUMIN HUMAN: 0.05 INJECTION, SOLUTION INTRAVENOUS at 12:56

## 2018-01-01 RX ADMIN — Medication 0.5 MG: at 05:06

## 2018-01-01 RX ADMIN — NOREPINEPHRINE BITARTRATE 6.4 MCG: 1 INJECTION INTRAVENOUS at 11:22

## 2018-01-01 RX ADMIN — MYCOPHENOLATE MOFETIL 250 MG: 200 POWDER, FOR SUSPENSION ORAL at 08:17

## 2018-01-01 RX ADMIN — CEFTRIAXONE SODIUM 2 G: 2 INJECTION, POWDER, FOR SOLUTION INTRAMUSCULAR; INTRAVENOUS at 11:05

## 2018-01-01 RX ADMIN — PHENYLEPHRINE HYDROCHLORIDE 200 MCG: 10 INJECTION, SOLUTION INTRAMUSCULAR; INTRAVENOUS; SUBCUTANEOUS at 16:23

## 2018-01-01 RX ADMIN — ALBUMIN HUMAN 25 G: 0.25 SOLUTION INTRAVENOUS at 00:14

## 2018-01-01 RX ADMIN — SULFAMETHOXAZOLE AND TRIMETHOPRIM 80 MG: 200; 40 SUSPENSION ORAL at 13:16

## 2018-01-01 RX ADMIN — LACTULOSE 20 G: 20 SOLUTION ORAL at 04:05

## 2018-01-01 RX ADMIN — METRONIDAZOLE 500 MG: 500 INJECTION, SOLUTION INTRAVENOUS at 14:51

## 2018-01-01 RX ADMIN — PHENYLEPHRINE HYDROCHLORIDE 100 MCG: 10 INJECTION, SOLUTION INTRAMUSCULAR; INTRAVENOUS; SUBCUTANEOUS at 16:46

## 2018-01-01 RX ADMIN — PHENYLEPHRINE HYDROCHLORIDE 200 MCG: 10 INJECTION, SOLUTION INTRAMUSCULAR; INTRAVENOUS; SUBCUTANEOUS at 17:46

## 2018-01-01 RX ADMIN — DEXTROSE MONOHYDRATE 50 ML: 25 INJECTION, SOLUTION INTRAVENOUS at 07:45

## 2018-01-01 RX ADMIN — HYDROCORTISONE SODIUM SUCCINATE 50 MG: 100 INJECTION, POWDER, FOR SOLUTION INTRAMUSCULAR; INTRAVENOUS at 17:59

## 2018-01-01 RX ADMIN — Medication 10 MEQ: at 21:43

## 2018-01-01 RX ADMIN — Medication 0.5 MG: at 19:45

## 2018-01-01 RX ADMIN — MEROPENEM 1000 MG: 1 INJECTION, POWDER, FOR SOLUTION INTRAVENOUS at 05:35

## 2018-01-01 RX ADMIN — MIDODRINE HYDROCHLORIDE 10 MG: 5 TABLET ORAL at 09:39

## 2018-01-01 RX ADMIN — Medication 17.5 ML/KG/HR: at 18:14

## 2018-01-01 RX ADMIN — CLOTRIMAZOLE 1 TROCHE: 10 LOZENGE ORAL at 11:07

## 2018-01-01 RX ADMIN — SODIUM CHLORIDE, POTASSIUM CHLORIDE, SODIUM LACTATE AND CALCIUM CHLORIDE 1000 ML: 600; 310; 30; 20 INJECTION, SOLUTION INTRAVENOUS at 15:27

## 2018-01-01 RX ADMIN — PANTOPRAZOLE SODIUM 40 MG: 40 INJECTION, POWDER, FOR SOLUTION INTRAVENOUS at 20:22

## 2018-01-01 RX ADMIN — MULTIVITAMIN 15 ML: LIQUID ORAL at 08:24

## 2018-01-01 RX ADMIN — POTASSIUM CHLORIDE 20 MEQ: 1.5 POWDER, FOR SOLUTION ORAL at 11:44

## 2018-01-01 RX ADMIN — PROPOFOL 80 MG: 10 INJECTION, EMULSION INTRAVENOUS at 00:59

## 2018-01-01 RX ADMIN — Medication 12.5 ML/KG/HR: at 21:45

## 2018-01-01 RX ADMIN — DEXMEDETOMIDINE HYDROCHLORIDE 0.2 MCG/KG/HR: 4 INJECTION, SOLUTION INTRAVENOUS at 20:58

## 2018-01-01 RX ADMIN — Medication 1 MG: at 19:37

## 2018-01-01 RX ADMIN — SULFAMETHOXAZOLE AND TRIMETHOPRIM 80 MG: 200; 40 SUSPENSION ORAL at 12:45

## 2018-01-01 RX ADMIN — PHYTONADIONE 10 MG: 10 INJECTION, EMULSION INTRAMUSCULAR; INTRAVENOUS; SUBCUTANEOUS at 20:55

## 2018-01-01 RX ADMIN — DEXTROSE MONOHYDRATE: 50 INJECTION, SOLUTION INTRAVENOUS at 14:28

## 2018-01-01 RX ADMIN — HYDROMORPHONE HYDROCHLORIDE 0.5 MG: 1 INJECTION, SOLUTION INTRAMUSCULAR; INTRAVENOUS; SUBCUTANEOUS at 20:11

## 2018-01-01 RX ADMIN — HYDROMORPHONE HYDROCHLORIDE 0.5 MG: 1 INJECTION, SOLUTION INTRAMUSCULAR; INTRAVENOUS; SUBCUTANEOUS at 16:33

## 2018-01-01 RX ADMIN — SODIUM CHLORIDE: 4.5 INJECTION, SOLUTION INTRAVENOUS at 00:48

## 2018-01-01 RX ADMIN — SODIUM CHLORIDE 50 MG: 9 INJECTION, SOLUTION INTRAVENOUS at 05:22

## 2018-01-01 RX ADMIN — POTASSIUM CHLORIDE 10 MEQ: 10 INJECTION, SOLUTION INTRAVENOUS at 20:08

## 2018-01-01 RX ADMIN — HEPARIN SODIUM 400 UNITS/HR: 10000 INJECTION, SOLUTION INTRAVENOUS at 08:59

## 2018-01-01 RX ADMIN — MIDAZOLAM 1 MG: 1 INJECTION INTRAMUSCULAR; INTRAVENOUS at 07:20

## 2018-01-01 RX ADMIN — HEPARIN SODIUM 1500 UNITS/HR: 10000 INJECTION, SOLUTION INTRAVENOUS at 23:25

## 2018-01-01 RX ADMIN — TENOFOVIR DISOPROXIL FUMARATE 300 MG: 300 TABLET, COATED ORAL at 08:25

## 2018-01-01 RX ADMIN — ALBUMIN HUMAN 25 G: 50 SOLUTION INTRAVENOUS at 19:15

## 2018-01-01 RX ADMIN — OXYCODONE HYDROCHLORIDE 10 MG: 5 SOLUTION ORAL at 19:45

## 2018-01-01 RX ADMIN — OXYCODONE HYDROCHLORIDE 10 MG: 5 SOLUTION ORAL at 08:08

## 2018-01-01 RX ADMIN — MYCOPHENOLATE MOFETIL 250 MG: 200 POWDER, FOR SUSPENSION ORAL at 18:51

## 2018-01-01 RX ADMIN — SODIUM CHLORIDE: 4.5 INJECTION, SOLUTION INTRAVENOUS at 18:56

## 2018-01-01 RX ADMIN — OXYCODONE HYDROCHLORIDE 10 MG: 5 SOLUTION ORAL at 10:45

## 2018-01-01 RX ADMIN — Medication: at 19:56

## 2018-01-01 RX ADMIN — CALCIUM CHLORIDE 1 G: 100 INJECTION, SOLUTION INTRAVENOUS at 23:52

## 2018-01-01 RX ADMIN — HUMAN INSULIN 0.5 UNITS/HR: 100 INJECTION, SOLUTION SUBCUTANEOUS at 22:10

## 2018-01-01 RX ADMIN — MYCOPHENOLATE MOFETIL 250 MG: 200 POWDER, FOR SUSPENSION ORAL at 17:14

## 2018-01-01 RX ADMIN — HEPARIN SODIUM 1500 UNITS/HR: 10000 INJECTION, SOLUTION INTRAVENOUS at 07:43

## 2018-01-01 RX ADMIN — HYDROCORTISONE SODIUM SUCCINATE 50 MG: 100 INJECTION, POWDER, FOR SOLUTION INTRAMUSCULAR; INTRAVENOUS at 02:04

## 2018-01-01 RX ADMIN — NOREPINEPHRINE BITARTRATE 0.03 MCG/KG/MIN: 1 INJECTION INTRAVENOUS at 00:50

## 2018-01-01 RX ADMIN — PIPERACILLIN SODIUM,TAZOBACTAM SODIUM 4.5 G: 4; .5 INJECTION, POWDER, FOR SOLUTION INTRAVENOUS at 19:55

## 2018-01-01 RX ADMIN — OXYCODONE HYDROCHLORIDE 5 MG: 5 SOLUTION ORAL at 07:40

## 2018-01-01 RX ADMIN — Medication 17 ML/KG/HR: at 12:49

## 2018-01-01 RX ADMIN — Medication 17.5 ML/KG/HR: at 13:40

## 2018-01-01 RX ADMIN — TENOFOVIR DISOPROXIL FUMARATE 300 MG: 300 TABLET, COATED ORAL at 08:10

## 2018-01-01 RX ADMIN — IOPAMIDOL 82 ML: 755 INJECTION, SOLUTION INTRAVENOUS at 16:34

## 2018-01-01 RX ADMIN — HYDROCORTISONE SODIUM SUCCINATE 50 MG: 100 INJECTION, POWDER, FOR SOLUTION INTRAMUSCULAR; INTRAVENOUS at 20:24

## 2018-01-01 RX ADMIN — CEFTAROLINE FOSAMIL 300 MG: 600 POWDER, FOR SOLUTION INTRAVENOUS at 14:48

## 2018-01-01 RX ADMIN — FUROSEMIDE 40 MG: 40 TABLET ORAL at 14:38

## 2018-01-01 RX ADMIN — PROPOFOL 5 MCG/KG/MIN: 10 INJECTION, EMULSION INTRAVENOUS at 22:12

## 2018-01-01 RX ADMIN — ALBUMIN HUMAN 25 G: 0.05 INJECTION, SOLUTION INTRAVENOUS at 16:57

## 2018-01-01 RX ADMIN — I.V. FAT EMULSION 250 ML: 20 EMULSION INTRAVENOUS at 19:56

## 2018-01-01 RX ADMIN — SODIUM CHLORIDE 50 MG: 9 INJECTION, SOLUTION INTRAVENOUS at 10:09

## 2018-01-01 RX ADMIN — PANTOPRAZOLE SODIUM 40 MG: 40 INJECTION, POWDER, FOR SOLUTION INTRAVENOUS at 21:05

## 2018-01-01 RX ADMIN — METRONIDAZOLE 500 MG: 500 INJECTION, SOLUTION INTRAVENOUS at 14:31

## 2018-01-01 RX ADMIN — POTASSIUM CHLORIDE 40 MEQ: 1.5 POWDER, FOR SOLUTION ORAL at 12:09

## 2018-01-01 RX ADMIN — CEFTAROLINE FOSAMIL 300 MG: 600 POWDER, FOR SOLUTION INTRAVENOUS at 02:14

## 2018-01-01 RX ADMIN — NOREPINEPHRINE BITARTRATE 0.12 MCG/KG/MIN: 1 INJECTION INTRAVENOUS at 20:05

## 2018-01-01 RX ADMIN — OXYCODONE HYDROCHLORIDE 5 MG: 5 SOLUTION ORAL at 16:57

## 2018-01-01 RX ADMIN — SODIUM CHLORIDE 50 MG: 9 INJECTION, SOLUTION INTRAVENOUS at 04:52

## 2018-01-01 RX ADMIN — HEPARIN SODIUM 1550 UNITS/HR: 10000 INJECTION, SOLUTION INTRAVENOUS at 10:08

## 2018-01-01 RX ADMIN — DOPAMINE HYDROCHLORIDE 2 MCG/KG/MIN: 160 INJECTION, SOLUTION INTRAVENOUS at 23:05

## 2018-01-01 RX ADMIN — PHENYLEPHRINE HYDROCHLORIDE 100 MCG: 10 INJECTION, SOLUTION INTRAMUSCULAR; INTRAVENOUS; SUBCUTANEOUS at 15:40

## 2018-01-01 RX ADMIN — MEROPENEM 500 MG: 500 INJECTION, POWDER, FOR SOLUTION INTRAVENOUS at 08:20

## 2018-01-01 RX ADMIN — MEROPENEM 500 MG: 500 INJECTION, POWDER, FOR SOLUTION INTRAVENOUS at 13:27

## 2018-01-01 RX ADMIN — SMOFLIPID 250 ML: 6; 6; 5; 3 INJECTION, EMULSION INTRAVENOUS at 19:45

## 2018-01-01 RX ADMIN — POTASSIUM CHLORIDE 20 MEQ: 400 INJECTION, SOLUTION INTRAVENOUS at 18:27

## 2018-01-01 RX ADMIN — OXYCODONE HYDROCHLORIDE 10 MG: 5 TABLET ORAL at 14:21

## 2018-01-01 RX ADMIN — Medication 12.5 ML/KG/HR: at 23:30

## 2018-01-01 RX ADMIN — SODIUM CHLORIDE, POTASSIUM CHLORIDE, SODIUM LACTATE AND CALCIUM CHLORIDE 500 ML: 600; 310; 30; 20 INJECTION, SOLUTION INTRAVENOUS at 23:33

## 2018-01-01 RX ADMIN — ERTAPENEM SODIUM 500 MG: 1 INJECTION, POWDER, LYOPHILIZED, FOR SOLUTION INTRAMUSCULAR; INTRAVENOUS at 18:09

## 2018-01-01 RX ADMIN — CLOTRIMAZOLE 1 TROCHE: 10 LOZENGE ORAL at 15:41

## 2018-01-01 RX ADMIN — MULTIVITAMIN 15 ML: LIQUID ORAL at 07:57

## 2018-01-01 RX ADMIN — DAPTOMYCIN 800 MG: 500 INJECTION, POWDER, LYOPHILIZED, FOR SOLUTION INTRAVENOUS at 10:27

## 2018-01-01 RX ADMIN — MEROPENEM 1000 MG: 1 INJECTION, POWDER, FOR SOLUTION INTRAVENOUS at 20:12

## 2018-01-01 RX ADMIN — Medication 10 MEQ: at 23:33

## 2018-01-01 RX ADMIN — PROPOFOL 40 MCG/KG/MIN: 10 INJECTION, EMULSION INTRAVENOUS at 17:11

## 2018-01-01 RX ADMIN — PANTOPRAZOLE SODIUM 40 MG: 40 INJECTION, POWDER, FOR SOLUTION INTRAVENOUS at 07:56

## 2018-01-01 RX ADMIN — ROCURONIUM BROMIDE 50 MG: 10 INJECTION INTRAVENOUS at 07:49

## 2018-01-01 RX ADMIN — LIDOCAINE HYDROCHLORIDE 5 ML: 10 INJECTION, SOLUTION EPIDURAL; INFILTRATION; INTRACAUDAL; PERINEURAL at 16:12

## 2018-01-01 RX ADMIN — RIFAXIMIN 550 MG: 550 TABLET ORAL at 21:13

## 2018-01-01 RX ADMIN — PANTOPRAZOLE SODIUM 40 MG: 40 TABLET, DELAYED RELEASE ORAL at 14:40

## 2018-01-01 RX ADMIN — INSULIN ASPART 1 UNITS: 100 INJECTION, SOLUTION INTRAVENOUS; SUBCUTANEOUS at 17:27

## 2018-01-01 RX ADMIN — Medication 12.5 ML/KG/HR: at 07:53

## 2018-01-01 RX ADMIN — MEROPENEM 1000 MG: 1 INJECTION, POWDER, FOR SOLUTION INTRAVENOUS at 05:47

## 2018-01-01 RX ADMIN — Medication 10 MEQ: at 20:19

## 2018-01-01 RX ADMIN — TACROLIMUS 2.5 MG: 1 CAPSULE ORAL at 08:30

## 2018-01-01 RX ADMIN — ROCURONIUM BROMIDE 20 MG: 10 INJECTION INTRAVENOUS at 14:00

## 2018-01-01 RX ADMIN — ALBUMIN HUMAN 25 G: 0.05 INJECTION, SOLUTION INTRAVENOUS at 20:06

## 2018-01-01 RX ADMIN — PANTOPRAZOLE SODIUM 40 MG: 40 INJECTION, POWDER, FOR SOLUTION INTRAVENOUS at 07:53

## 2018-01-01 RX ADMIN — NOREPINEPHRINE BITARTRATE 0.32 MCG/KG/MIN: 1 INJECTION INTRAVENOUS at 21:36

## 2018-01-01 RX ADMIN — PANTOPRAZOLE SODIUM 40 MG: 40 INJECTION, POWDER, FOR SOLUTION INTRAVENOUS at 20:28

## 2018-01-01 RX ADMIN — HUMAN INSULIN 1.75 UNITS/HR: 100 INJECTION, SOLUTION SUBCUTANEOUS at 12:00

## 2018-01-01 RX ADMIN — SODIUM CHLORIDE, POTASSIUM CHLORIDE, SODIUM LACTATE AND CALCIUM CHLORIDE: 600; 310; 30; 20 INJECTION, SOLUTION INTRAVENOUS at 13:56

## 2018-01-01 RX ADMIN — SODIUM CHLORIDE 50 MG: 9 INJECTION, SOLUTION INTRAVENOUS at 17:31

## 2018-01-01 RX ADMIN — HYDROCORTISONE SODIUM SUCCINATE 50 MG: 100 INJECTION, POWDER, FOR SOLUTION INTRAMUSCULAR; INTRAVENOUS at 07:50

## 2018-01-01 RX ADMIN — ONDANSETRON HYDROCHLORIDE 4 MG: 2 INJECTION, SOLUTION INTRAMUSCULAR; INTRAVENOUS at 00:12

## 2018-01-01 RX ADMIN — Medication 0.5 MG: at 10:47

## 2018-01-01 RX ADMIN — MYCOPHENOLATE MOFETIL 250 MG: 200 POWDER, FOR SUSPENSION ORAL at 17:37

## 2018-01-01 RX ADMIN — HEPARIN SODIUM 400 UNITS/HR: 10000 INJECTION, SOLUTION INTRAVENOUS at 14:01

## 2018-01-01 RX ADMIN — VASOPRESSIN 2.4 UNITS/HR: 20 INJECTION INTRAVENOUS at 04:58

## 2018-01-01 RX ADMIN — TENOFOVIR DISOPROXIL FUMARATE 300 MG: 300 TABLET, COATED ORAL at 17:24

## 2018-01-01 RX ADMIN — CEFTAROLINE FOSAMIL 300 MG: 600 POWDER, FOR SOLUTION INTRAVENOUS at 01:04

## 2018-01-01 RX ADMIN — Medication 1 PACKET: at 11:52

## 2018-01-01 RX ADMIN — Medication 90 MG: at 06:26

## 2018-01-01 RX ADMIN — SODIUM CHLORIDE, POTASSIUM CHLORIDE, SODIUM LACTATE AND CALCIUM CHLORIDE: 600; 310; 30; 20 INJECTION, SOLUTION INTRAVENOUS at 04:44

## 2018-01-01 RX ADMIN — Medication 2 MG: at 18:22

## 2018-01-01 RX ADMIN — PHENYLEPHRINE HYDROCHLORIDE 200 MCG: 10 INJECTION, SOLUTION INTRAMUSCULAR; INTRAVENOUS; SUBCUTANEOUS at 08:45

## 2018-01-01 RX ADMIN — PANTOPRAZOLE SODIUM 40 MG: 40 INJECTION, POWDER, FOR SOLUTION INTRAVENOUS at 19:53

## 2018-01-01 RX ADMIN — POTASSIUM CHLORIDE 20 MEQ: 29.8 INJECTION, SOLUTION INTRAVENOUS at 05:33

## 2018-01-01 RX ADMIN — Medication 1 PACKET: at 20:07

## 2018-01-01 RX ADMIN — Medication 1 MG: at 18:37

## 2018-01-01 RX ADMIN — PANTOPRAZOLE SODIUM 40 MG: 40 TABLET, DELAYED RELEASE ORAL at 17:37

## 2018-01-01 RX ADMIN — Medication 2 G: at 09:20

## 2018-01-01 RX ADMIN — Medication 0.5 MG: at 08:08

## 2018-01-01 RX ADMIN — DEXTROSE MONOHYDRATE: 50 INJECTION, SOLUTION INTRAVENOUS at 17:19

## 2018-01-01 RX ADMIN — MAGNESIUM SULFATE HEPTAHYDRATE: 500 INJECTION, SOLUTION INTRAMUSCULAR; INTRAVENOUS at 21:07

## 2018-01-01 RX ADMIN — FENTANYL CITRATE 50 MCG: 50 INJECTION, SOLUTION INTRAMUSCULAR; INTRAVENOUS at 11:36

## 2018-01-01 RX ADMIN — SODIUM CHLORIDE 50 MG: 9 INJECTION, SOLUTION INTRAVENOUS at 04:55

## 2018-01-01 RX ADMIN — HUMAN INSULIN 10 UNITS/HR: 100 INJECTION, SOLUTION SUBCUTANEOUS at 13:55

## 2018-01-01 RX ADMIN — SODIUM CHLORIDE, POTASSIUM CHLORIDE, SODIUM LACTATE AND CALCIUM CHLORIDE 1000 ML: 600; 310; 30; 20 INJECTION, SOLUTION INTRAVENOUS at 02:58

## 2018-01-01 RX ADMIN — HYDROMORPHONE HYDROCHLORIDE 0.5 MG: 1 INJECTION, SOLUTION INTRAMUSCULAR; INTRAVENOUS; SUBCUTANEOUS at 20:04

## 2018-01-01 RX ADMIN — HEPARIN SODIUM 1400 UNITS/HR: 10000 INJECTION, SOLUTION INTRAVENOUS at 18:25

## 2018-01-01 RX ADMIN — OXYCODONE HYDROCHLORIDE 5 MG: 5 TABLET ORAL at 05:14

## 2018-01-01 RX ADMIN — SODIUM CHLORIDE 50 MG: 9 INJECTION, SOLUTION INTRAVENOUS at 18:54

## 2018-01-01 RX ADMIN — CALCIUM CHLORIDE 1 G: 100 INJECTION, SOLUTION INTRAVENOUS at 05:54

## 2018-01-01 RX ADMIN — CEFTAROLINE FOSAMIL 400 MG: 600 POWDER, FOR SOLUTION INTRAVENOUS at 19:40

## 2018-01-01 RX ADMIN — PHENYLEPHRINE HYDROCHLORIDE 200 MCG: 10 INJECTION, SOLUTION INTRAMUSCULAR; INTRAVENOUS; SUBCUTANEOUS at 10:31

## 2018-01-01 RX ADMIN — NYSTATIN 500000 UNITS: 100000 SUSPENSION ORAL at 09:06

## 2018-01-01 RX ADMIN — ROCURONIUM BROMIDE 50 MG: 10 INJECTION INTRAVENOUS at 08:05

## 2018-01-01 RX ADMIN — HUMAN INSULIN 6 UNITS/HR: 100 INJECTION, SOLUTION SUBCUTANEOUS at 09:03

## 2018-01-01 RX ADMIN — NYSTATIN 500000 UNITS: 100000 SUSPENSION ORAL at 11:49

## 2018-01-01 RX ADMIN — PROPOFOL 20 MCG/KG/MIN: 10 INJECTION, EMULSION INTRAVENOUS at 19:54

## 2018-01-01 RX ADMIN — FLUCONAZOLE 200 MG: 2 INJECTION, SOLUTION INTRAVENOUS at 07:56

## 2018-01-01 RX ADMIN — ACETAMINOPHEN 325 MG: 325 TABLET, FILM COATED ORAL at 10:02

## 2018-01-01 RX ADMIN — HUMAN INSULIN 1.5 UNITS/HR: 100 INJECTION, SOLUTION SUBCUTANEOUS at 13:15

## 2018-01-01 RX ADMIN — SULFAMETHOXAZOLE AND TRIMETHOPRIM 80 MG: 200; 40 SUSPENSION ORAL at 12:07

## 2018-01-01 RX ADMIN — BISACODYL 10 MG: 10 SUPPOSITORY RECTAL at 08:17

## 2018-01-01 RX ADMIN — MIDAZOLAM 2 MG: 1 INJECTION INTRAMUSCULAR; INTRAVENOUS at 13:20

## 2018-01-01 RX ADMIN — HUMAN INSULIN 1.5 UNITS/HR: 100 INJECTION, SOLUTION SUBCUTANEOUS at 14:58

## 2018-01-01 RX ADMIN — ROCURONIUM BROMIDE 30 MG: 10 INJECTION INTRAVENOUS at 13:37

## 2018-01-01 RX ADMIN — HUMAN INSULIN 7 UNITS/HR: 100 INJECTION, SOLUTION SUBCUTANEOUS at 03:29

## 2018-01-01 RX ADMIN — PHENYLEPHRINE HYDROCHLORIDE 150 MCG: 10 INJECTION, SOLUTION INTRAMUSCULAR; INTRAVENOUS; SUBCUTANEOUS at 12:18

## 2018-01-01 RX ADMIN — METRONIDAZOLE 500 MG: 500 INJECTION, SOLUTION INTRAVENOUS at 11:08

## 2018-01-01 RX ADMIN — MEROPENEM 1000 MG: 1 INJECTION, POWDER, FOR SOLUTION INTRAVENOUS at 04:09

## 2018-01-01 RX ADMIN — SODIUM CHLORIDE 8 MG/HR: 9 INJECTION, SOLUTION INTRAVENOUS at 23:54

## 2018-01-01 RX ADMIN — ONDANSETRON 4 MG: 2 INJECTION INTRAMUSCULAR; INTRAVENOUS at 10:05

## 2018-01-01 RX ADMIN — SODIUM CHLORIDE, POTASSIUM CHLORIDE, SODIUM LACTATE AND CALCIUM CHLORIDE 1000 ML: 600; 310; 30; 20 INJECTION, SOLUTION INTRAVENOUS at 03:12

## 2018-01-01 RX ADMIN — NOREPINEPHRINE BITARTRATE 0.03 MCG/KG/MIN: 1 INJECTION INTRAVENOUS at 08:10

## 2018-01-01 RX ADMIN — VALGANCICLOVIR HYDROCHLORIDE 450 MG: 50 POWDER, FOR SOLUTION ORAL at 09:19

## 2018-01-01 RX ADMIN — SULFAMETHOXAZOLE AND TRIMETHOPRIM 80 MG: 200; 40 SUSPENSION ORAL at 18:41

## 2018-01-01 RX ADMIN — NOREPINEPHRINE BITARTRATE 6.4 MCG: 1 INJECTION INTRAVENOUS at 11:10

## 2018-01-01 RX ADMIN — ONDANSETRON HYDROCHLORIDE 4 MG: 2 INJECTION, SOLUTION INTRAMUSCULAR; INTRAVENOUS at 20:53

## 2018-01-01 RX ADMIN — HYDROMORPHONE HYDROCHLORIDE 0.5 MG: 1 INJECTION, SOLUTION INTRAMUSCULAR; INTRAVENOUS; SUBCUTANEOUS at 06:00

## 2018-01-01 RX ADMIN — VALGANCICLOVIR HYDROCHLORIDE 450 MG: 50 POWDER, FOR SOLUTION ORAL at 09:15

## 2018-01-01 RX ADMIN — MEROPENEM 1000 MG: 1 INJECTION, POWDER, FOR SOLUTION INTRAVENOUS at 23:07

## 2018-01-01 RX ADMIN — HYDROCORTISONE SODIUM SUCCINATE 50 MG: 100 INJECTION, POWDER, FOR SOLUTION INTRAMUSCULAR; INTRAVENOUS at 14:18

## 2018-01-01 RX ADMIN — ALBUMIN HUMAN 12.5 G: 0.05 INJECTION, SOLUTION INTRAVENOUS at 13:01

## 2018-01-01 RX ADMIN — SODIUM CHLORIDE, POTASSIUM CHLORIDE, SODIUM LACTATE AND CALCIUM CHLORIDE 500 ML: 600; 310; 30; 20 INJECTION, SOLUTION INTRAVENOUS at 21:27

## 2018-01-01 RX ADMIN — PROPOFOL 40 MCG/KG/MIN: 10 INJECTION, EMULSION INTRAVENOUS at 14:15

## 2018-01-01 RX ADMIN — DAPTOMYCIN 800 MG: 500 INJECTION, POWDER, LYOPHILIZED, FOR SOLUTION INTRAVENOUS at 13:56

## 2018-01-01 RX ADMIN — ALBUMIN HUMAN 12.5 G: 50 SOLUTION INTRAVENOUS at 01:49

## 2018-01-01 RX ADMIN — OXYCODONE HYDROCHLORIDE 5 MG: 5 SOLUTION ORAL at 09:20

## 2018-01-01 RX ADMIN — ALBUMIN HUMAN 12.5 G: 0.05 INJECTION, SOLUTION INTRAVENOUS at 06:14

## 2018-01-01 RX ADMIN — ALBUMIN HUMAN 12.5 G: 0.25 SOLUTION INTRAVENOUS at 10:42

## 2018-01-01 RX ADMIN — Medication 50 MCG/HR: at 22:55

## 2018-01-01 RX ADMIN — MIDODRINE HYDROCHLORIDE 10 MG: 5 TABLET ORAL at 11:24

## 2018-01-01 RX ADMIN — MIDAZOLAM 2 MG: 1 INJECTION INTRAMUSCULAR; INTRAVENOUS at 14:56

## 2018-01-01 RX ADMIN — Medication 0.5 MG: at 20:34

## 2018-01-01 RX ADMIN — Medication 12.5 ML/KG/HR: at 09:51

## 2018-01-01 RX ADMIN — NOREPINEPHRINE BITARTRATE 0.09 MCG/KG/MIN: 1 INJECTION INTRAVENOUS at 14:29

## 2018-01-01 RX ADMIN — ALBUMIN HUMAN 25 G: 0.25 SOLUTION INTRAVENOUS at 20:52

## 2018-01-01 RX ADMIN — PANTOPRAZOLE SODIUM 40 MG: 40 TABLET, DELAYED RELEASE ORAL at 08:09

## 2018-01-01 RX ADMIN — PIPERACILLIN SODIUM,TAZOBACTAM SODIUM 4.5 G: 4; .5 INJECTION, POWDER, FOR SOLUTION INTRAVENOUS at 20:09

## 2018-01-01 RX ADMIN — HYDROCORTISONE SODIUM SUCCINATE 50 MG: 100 INJECTION, POWDER, FOR SOLUTION INTRAMUSCULAR; INTRAVENOUS at 19:40

## 2018-01-01 RX ADMIN — OXYCODONE HYDROCHLORIDE 5 MG: 5 TABLET ORAL at 02:03

## 2018-01-01 RX ADMIN — HUMAN INSULIN 4 UNITS/HR: 100 INJECTION, SOLUTION SUBCUTANEOUS at 04:49

## 2018-01-01 RX ADMIN — INSULIN GLARGINE 10 UNITS: 100 INJECTION, SOLUTION SUBCUTANEOUS at 08:31

## 2018-01-01 RX ADMIN — PHENYLEPHRINE HYDROCHLORIDE 6 MCG/KG/MIN: 10 INJECTION, SOLUTION INTRAMUSCULAR; INTRAVENOUS; SUBCUTANEOUS at 08:04

## 2018-01-01 RX ADMIN — LIDOCAINE HYDROCHLORIDE 15 ML: 10 INJECTION, SOLUTION EPIDURAL; INFILTRATION; INTRACAUDAL; PERINEURAL at 10:26

## 2018-01-01 RX ADMIN — RIFAXIMIN 550 MG: 550 TABLET ORAL at 20:09

## 2018-01-01 RX ADMIN — Medication 12.5 ML/KG/HR: at 00:03

## 2018-01-01 RX ADMIN — Medication 0.25 MG: at 17:41

## 2018-01-01 RX ADMIN — PHENYLEPHRINE HYDROCHLORIDE 100 MCG: 10 INJECTION, SOLUTION INTRAMUSCULAR; INTRAVENOUS; SUBCUTANEOUS at 15:28

## 2018-01-01 RX ADMIN — PANTOPRAZOLE SODIUM 40 MG: 40 TABLET, DELAYED RELEASE ORAL at 07:42

## 2018-01-01 RX ADMIN — CLOTRIMAZOLE 1 TROCHE: 10 LOZENGE ORAL at 16:21

## 2018-01-01 RX ADMIN — Medication 1.25 MG: at 20:05

## 2018-01-01 RX ADMIN — HYDROCORTISONE SODIUM SUCCINATE 50 MG: 100 INJECTION, POWDER, FOR SOLUTION INTRAMUSCULAR; INTRAVENOUS at 21:26

## 2018-01-01 RX ADMIN — Medication 2 G: at 17:12

## 2018-01-01 RX ADMIN — SODIUM CHLORIDE 50 MG: 9 INJECTION, SOLUTION INTRAVENOUS at 10:00

## 2018-01-01 RX ADMIN — NYSTATIN 500000 UNITS: 100000 SUSPENSION ORAL at 12:36

## 2018-01-01 RX ADMIN — POTASSIUM CHLORIDE 20 MEQ: 1.5 POWDER, FOR SOLUTION ORAL at 06:00

## 2018-01-01 RX ADMIN — ERTAPENEM SODIUM 500 MG: 1 INJECTION, POWDER, LYOPHILIZED, FOR SOLUTION INTRAMUSCULAR; INTRAVENOUS at 16:35

## 2018-01-01 RX ADMIN — POTASSIUM PHOSPHATE, MONOBASIC AND POTASSIUM PHOSPHATE, DIBASIC 20 MMOL: 224; 236 INJECTION, SOLUTION INTRAVENOUS at 07:24

## 2018-01-01 RX ADMIN — SULFAMETHOXAZOLE AND TRIMETHOPRIM 80 MG: 200; 40 SUSPENSION ORAL at 09:26

## 2018-01-01 RX ADMIN — RIFAXIMIN 550 MG: 550 TABLET ORAL at 08:08

## 2018-01-01 RX ADMIN — LINEZOLID 600 MG: 600 INJECTION, SOLUTION INTRAVENOUS at 20:05

## 2018-01-01 RX ADMIN — SODIUM CHLORIDE: 9 INJECTION, SOLUTION INTRAVENOUS at 08:04

## 2018-01-01 RX ADMIN — MIDODRINE HYDROCHLORIDE 10 MG: 5 TABLET ORAL at 11:10

## 2018-01-01 RX ADMIN — METRONIDAZOLE 500 MG: 500 INJECTION, SOLUTION INTRAVENOUS at 08:53

## 2018-01-01 RX ADMIN — CALCIUM CHLORIDE 2 G: 100 INJECTION, SOLUTION INTRAVENOUS at 02:03

## 2018-01-01 RX ADMIN — ACETAMINOPHEN 325 MG: 325 TABLET, FILM COATED ORAL at 11:30

## 2018-01-01 RX ADMIN — MIDODRINE HYDROCHLORIDE 10 MG: 5 TABLET ORAL at 07:19

## 2018-01-01 RX ADMIN — HEPARIN SODIUM 1850 UNITS/HR: 10000 INJECTION, SOLUTION INTRAVENOUS at 10:22

## 2018-01-01 RX ADMIN — HUMAN INSULIN 3 UNITS/HR: 100 INJECTION, SOLUTION SUBCUTANEOUS at 13:38

## 2018-01-01 RX ADMIN — METRONIDAZOLE 500 MG: 500 INJECTION, SOLUTION INTRAVENOUS at 15:21

## 2018-01-01 RX ADMIN — LACTULOSE 20 G: 10 SOLUTION ORAL; RECTAL at 08:06

## 2018-01-01 RX ADMIN — POTASSIUM CHLORIDE 20 MEQ: 750 TABLET, EXTENDED RELEASE ORAL at 17:39

## 2018-01-01 RX ADMIN — PANTOPRAZOLE SODIUM 40 MG: 40 TABLET, DELAYED RELEASE ORAL at 08:45

## 2018-01-01 RX ADMIN — DAPTOMYCIN 600 MG: 500 INJECTION, POWDER, LYOPHILIZED, FOR SOLUTION INTRAVENOUS at 11:26

## 2018-01-01 RX ADMIN — Medication 1 MG: at 18:48

## 2018-01-01 RX ADMIN — ALBUMIN HUMAN 25 G: 0.25 SOLUTION INTRAVENOUS at 09:36

## 2018-01-01 RX ADMIN — TENOFOVIR DISOPROXIL FUMARATE 300 MG: 300 TABLET, COATED ORAL at 09:37

## 2018-01-01 RX ADMIN — Medication 12.5 ML/KG/HR: at 21:11

## 2018-01-01 RX ADMIN — CALCIUM CHLORIDE 1 G: 100 INJECTION, SOLUTION INTRAVENOUS at 16:50

## 2018-01-01 RX ADMIN — HYDROMORPHONE HYDROCHLORIDE 0.5 MG: 1 INJECTION, SOLUTION INTRAMUSCULAR; INTRAVENOUS; SUBCUTANEOUS at 02:05

## 2018-01-01 RX ADMIN — Medication 12.5 ML/KG/HR: at 09:48

## 2018-01-01 RX ADMIN — Medication 81 MG: at 08:00

## 2018-01-01 RX ADMIN — MEROPENEM 500 MG: 500 INJECTION, POWDER, FOR SOLUTION INTRAVENOUS at 21:23

## 2018-01-01 RX ADMIN — METRONIDAZOLE 500 MG: 500 INJECTION, SOLUTION INTRAVENOUS at 13:50

## 2018-01-01 RX ADMIN — CEFTRIAXONE SODIUM 2 G: 2 INJECTION, POWDER, FOR SOLUTION INTRAMUSCULAR; INTRAVENOUS at 11:02

## 2018-01-01 RX ADMIN — Medication 0.5 MG: at 21:28

## 2018-01-01 RX ADMIN — SULFAMETHOXAZOLE AND TRIMETHOPRIM 80 MG: 200; 40 SUSPENSION ORAL at 07:42

## 2018-01-01 RX ADMIN — Medication 12.5 ML/KG/HR: at 06:03

## 2018-01-01 RX ADMIN — OCTREOTIDE ACETATE 50 MCG/HR: 200 INJECTION, SOLUTION INTRAVENOUS; SUBCUTANEOUS at 01:43

## 2018-01-01 RX ADMIN — PIPERACILLIN SODIUM AND TAZOBACTAM SODIUM 3.38 G: .375; 3 INJECTION, POWDER, LYOPHILIZED, FOR SOLUTION INTRAVENOUS at 12:33

## 2018-01-01 RX ADMIN — DAPTOMYCIN 800 MG: 500 INJECTION, POWDER, LYOPHILIZED, FOR SOLUTION INTRAVENOUS at 14:34

## 2018-01-01 RX ADMIN — PHENYLEPHRINE HYDROCHLORIDE 200 MCG: 10 INJECTION, SOLUTION INTRAMUSCULAR; INTRAVENOUS; SUBCUTANEOUS at 10:52

## 2018-01-01 RX ADMIN — CEFTAROLINE FOSAMIL 400 MG: 600 POWDER, FOR SOLUTION INTRAVENOUS at 07:03

## 2018-01-01 RX ADMIN — POTASSIUM CHLORIDE 40 MEQ: 750 TABLET, EXTENDED RELEASE ORAL at 06:05

## 2018-01-01 RX ADMIN — BUMETANIDE 2 MG: 0.25 INJECTION INTRAMUSCULAR; INTRAVENOUS at 03:24

## 2018-01-01 RX ADMIN — MEROPENEM 500 MG: 500 INJECTION, POWDER, FOR SOLUTION INTRAVENOUS at 08:21

## 2018-01-01 RX ADMIN — SUGAMMADEX 200 MG: 100 INJECTION, SOLUTION INTRAVENOUS at 15:52

## 2018-01-01 RX ADMIN — Medication 0.5 MG: at 00:23

## 2018-01-01 RX ADMIN — HUMAN INSULIN 8 UNITS/HR: 100 INJECTION, SOLUTION SUBCUTANEOUS at 19:43

## 2018-01-01 RX ADMIN — SODIUM CHLORIDE 1000 ML: 9 INJECTION, SOLUTION INTRAVENOUS at 16:08

## 2018-01-01 RX ADMIN — HUMAN INSULIN 2 UNITS/HR: 100 INJECTION, SOLUTION SUBCUTANEOUS at 12:25

## 2018-01-01 RX ADMIN — MICAFUNGIN SODIUM 100 MG: 10 INJECTION, POWDER, LYOPHILIZED, FOR SOLUTION INTRAVENOUS at 20:05

## 2018-01-01 RX ADMIN — NOREPINEPHRINE BITARTRATE 0.25 MCG/KG/MIN: 1 INJECTION INTRAVENOUS at 04:41

## 2018-01-01 RX ADMIN — SODIUM CHLORIDE, POTASSIUM CHLORIDE, SODIUM LACTATE AND CALCIUM CHLORIDE 500 ML: 600; 310; 30; 20 INJECTION, SOLUTION INTRAVENOUS at 08:07

## 2018-01-01 RX ADMIN — Medication: at 22:40

## 2018-01-01 RX ADMIN — SODIUM CHLORIDE 50 MG: 9 INJECTION, SOLUTION INTRAVENOUS at 04:08

## 2018-01-01 RX ADMIN — NOREPINEPHRINE BITARTRATE 0.1 MCG/KG/MIN: 1 INJECTION INTRAVENOUS at 21:23

## 2018-01-01 RX ADMIN — MIDAZOLAM 0.5 MG: 1 INJECTION INTRAMUSCULAR; INTRAVENOUS at 12:31

## 2018-01-01 RX ADMIN — Medication 0.25 MG: at 08:44

## 2018-01-01 RX ADMIN — MAGNESIUM SULFATE HEPTAHYDRATE 2 G: 40 INJECTION, SOLUTION INTRAVENOUS at 14:50

## 2018-01-01 RX ADMIN — PROPOFOL 20 MCG/KG/MIN: 10 INJECTION, EMULSION INTRAVENOUS at 19:35

## 2018-01-01 RX ADMIN — PANTOPRAZOLE SODIUM 40 MG: 40 TABLET, DELAYED RELEASE ORAL at 15:35

## 2018-01-01 RX ADMIN — PHENYLEPHRINE HYDROCHLORIDE 100 MCG: 10 INJECTION, SOLUTION INTRAMUSCULAR; INTRAVENOUS; SUBCUTANEOUS at 11:10

## 2018-01-01 RX ADMIN — LIDOCAINE HYDROCHLORIDE 5 ML: 10 INJECTION, SOLUTION EPIDURAL; INFILTRATION; INTRACAUDAL; PERINEURAL at 20:59

## 2018-01-01 RX ADMIN — CALCIUM CHLORIDE 1 G: 100 INJECTION, SOLUTION INTRAVENOUS at 00:12

## 2018-01-01 RX ADMIN — I.V. FAT EMULSION 250 ML: 20 EMULSION INTRAVENOUS at 19:53

## 2018-01-01 RX ADMIN — MICONAZOLE NITRATE: 2 POWDER TOPICAL at 21:32

## 2018-01-01 RX ADMIN — DAPTOMYCIN 800 MG: 500 INJECTION, POWDER, LYOPHILIZED, FOR SOLUTION INTRAVENOUS at 11:39

## 2018-01-01 RX ADMIN — CLOTRIMAZOLE 1 TROCHE: 10 LOZENGE ORAL at 08:39

## 2018-01-01 RX ADMIN — SENNOSIDES AND DOCUSATE SODIUM 1 TABLET: 8.6; 5 TABLET ORAL at 09:20

## 2018-01-01 RX ADMIN — MICAFUNGIN SODIUM 100 MG: 10 INJECTION, POWDER, LYOPHILIZED, FOR SOLUTION INTRAVENOUS at 19:43

## 2018-01-01 RX ADMIN — Medication 1 MG: at 17:45

## 2018-01-01 RX ADMIN — Medication 12.5 ML/KG/HR: at 05:45

## 2018-01-01 RX ADMIN — MYCOPHENOLATE MOFETIL 1000 MG: 200 POWDER, FOR SUSPENSION ORAL at 18:40

## 2018-01-01 RX ADMIN — Medication 17 ML/KG/HR: at 19:10

## 2018-01-01 RX ADMIN — CLOTRIMAZOLE 1 TROCHE: 10 LOZENGE ORAL at 08:19

## 2018-01-01 RX ADMIN — DEXTROSE MONOHYDRATE: 50 INJECTION, SOLUTION INTRAVENOUS at 04:07

## 2018-01-01 RX ADMIN — POTASSIUM PHOSPHATE, MONOBASIC AND POTASSIUM PHOSPHATE, DIBASIC 20 MMOL: 224; 236 INJECTION, SOLUTION INTRAVENOUS at 21:51

## 2018-01-01 RX ADMIN — LIDOCAINE HYDROCHLORIDE 5 ML: 20 SOLUTION ORAL; TOPICAL at 10:29

## 2018-01-01 RX ADMIN — LACTULOSE 20 G: 20 POWDER, FOR SOLUTION ORAL at 07:57

## 2018-01-01 RX ADMIN — MEROPENEM 1000 MG: 1 INJECTION, POWDER, FOR SOLUTION INTRAVENOUS at 07:35

## 2018-01-01 RX ADMIN — OXYCODONE HYDROCHLORIDE 10 MG: 5 SOLUTION ORAL at 19:59

## 2018-01-01 RX ADMIN — ALBUMIN HUMAN 12.5 G: 0.25 SOLUTION INTRAVENOUS at 15:10

## 2018-01-01 RX ADMIN — NYSTATIN 500000 UNITS: 100000 SUSPENSION ORAL at 17:33

## 2018-01-01 RX ADMIN — Medication 81 MG: at 07:35

## 2018-01-01 RX ADMIN — PROPOFOL 10 MCG/KG/MIN: 10 INJECTION, EMULSION INTRAVENOUS at 01:22

## 2018-01-01 RX ADMIN — PHYTONADIONE 10 MG: 10 INJECTION, EMULSION INTRAMUSCULAR; INTRAVENOUS; SUBCUTANEOUS at 08:28

## 2018-01-01 RX ADMIN — HUMAN INSULIN 8 UNITS/HR: 100 INJECTION, SOLUTION SUBCUTANEOUS at 11:16

## 2018-01-01 RX ADMIN — MAGNESIUM SULFATE HEPTAHYDRATE 70 ML/HR: 500 INJECTION, SOLUTION INTRAMUSCULAR; INTRAVENOUS at 16:50

## 2018-01-01 RX ADMIN — MIDAZOLAM 2 MG: 1 INJECTION INTRAMUSCULAR; INTRAVENOUS at 02:45

## 2018-01-01 RX ADMIN — ALBUMIN HUMAN 12.5 G: 0.05 INJECTION, SOLUTION INTRAVENOUS at 15:35

## 2018-01-01 RX ADMIN — SODIUM CHLORIDE, SODIUM GLUCONATE, SODIUM ACETATE, POTASSIUM CHLORIDE AND MAGNESIUM CHLORIDE: 526; 502; 368; 37; 30 INJECTION, SOLUTION INTRAVENOUS at 11:47

## 2018-01-01 RX ADMIN — SODIUM BICARBONATE 50 MEQ: 84 INJECTION, SOLUTION INTRAVENOUS at 11:04

## 2018-01-01 RX ADMIN — MYCOPHENOLATE MOFETIL 250 MG: 200 POWDER, FOR SUSPENSION ORAL at 08:16

## 2018-01-01 RX ADMIN — Medication 17 ML/KG/HR: at 08:16

## 2018-01-01 RX ADMIN — DEXTROSE AND SODIUM CHLORIDE: 5; 450 INJECTION, SOLUTION INTRAVENOUS at 23:56

## 2018-01-01 RX ADMIN — MIDAZOLAM 2 MG: 1 INJECTION INTRAMUSCULAR; INTRAVENOUS at 12:55

## 2018-01-01 RX ADMIN — POTASSIUM CHLORIDE 10 MEQ: 10 INJECTION, SOLUTION INTRAVENOUS at 21:11

## 2018-01-01 RX ADMIN — METRONIDAZOLE 500 MG: 500 INJECTION, SOLUTION INTRAVENOUS at 02:17

## 2018-01-01 RX ADMIN — HYDROCORTISONE SODIUM SUCCINATE 50 MG: 100 INJECTION, POWDER, FOR SOLUTION INTRAMUSCULAR; INTRAVENOUS at 14:04

## 2018-01-01 RX ADMIN — MICAFUNGIN SODIUM 100 MG: 10 INJECTION, POWDER, LYOPHILIZED, FOR SOLUTION INTRAVENOUS at 19:01

## 2018-01-01 RX ADMIN — ROCURONIUM BROMIDE 80 MG: 10 INJECTION INTRAVENOUS at 00:59

## 2018-01-01 RX ADMIN — OXYCODONE HYDROCHLORIDE 10 MG: 5 SOLUTION ORAL at 08:05

## 2018-01-01 RX ADMIN — MEROPENEM 1000 MG: 1 INJECTION, POWDER, FOR SOLUTION INTRAVENOUS at 15:23

## 2018-01-01 RX ADMIN — OXYCODONE HYDROCHLORIDE 10 MG: 5 SOLUTION ORAL at 02:12

## 2018-01-01 RX ADMIN — CEFTAROLINE FOSAMIL 400 MG: 600 POWDER, FOR SOLUTION INTRAVENOUS at 20:00

## 2018-01-01 RX ADMIN — SODIUM CHLORIDE, POTASSIUM CHLORIDE, SODIUM LACTATE AND CALCIUM CHLORIDE: 600; 310; 30; 20 INJECTION, SOLUTION INTRAVENOUS at 05:57

## 2018-01-01 RX ADMIN — SULFAMETHOXAZOLE AND TRIMETHOPRIM 80 MG: 200; 40 SUSPENSION ORAL at 08:30

## 2018-01-01 RX ADMIN — Medication 50 MCG/HR: at 10:09

## 2018-01-01 RX ADMIN — SODIUM CHLORIDE, POTASSIUM CHLORIDE, SODIUM LACTATE AND CALCIUM CHLORIDE 500 ML: 600; 310; 30; 20 INJECTION, SOLUTION INTRAVENOUS at 13:49

## 2018-01-01 RX ADMIN — CALCIUM CHLORIDE 1 G: 100 INJECTION, SOLUTION INTRAVENOUS at 02:23

## 2018-01-01 RX ADMIN — Medication 1.25 MG: at 17:41

## 2018-01-01 RX ADMIN — LACTULOSE 20 G: 20 POWDER, FOR SOLUTION ORAL at 20:55

## 2018-01-01 RX ADMIN — CISATRACURIUM BESYLATE 6 MG: 2 INJECTION INTRAVENOUS at 13:18

## 2018-01-01 RX ADMIN — RIFAXIMIN 550 MG: 550 TABLET ORAL at 21:05

## 2018-01-01 RX ADMIN — Medication 10 MEQ: at 06:29

## 2018-01-01 RX ADMIN — FENTANYL CITRATE 50 MCG: 50 INJECTION, SOLUTION INTRAMUSCULAR; INTRAVENOUS at 08:30

## 2018-01-01 RX ADMIN — PROPOFOL 30 MG: 10 INJECTION, EMULSION INTRAVENOUS at 10:39

## 2018-01-01 RX ADMIN — ALBUMIN HUMAN: 0.05 INJECTION, SOLUTION INTRAVENOUS at 08:30

## 2018-01-01 RX ADMIN — ALBUMIN HUMAN 12.5 G: 0.05 INJECTION, SOLUTION INTRAVENOUS at 03:06

## 2018-01-01 RX ADMIN — METRONIDAZOLE 500 MG: 500 INJECTION, SOLUTION INTRAVENOUS at 15:18

## 2018-01-01 RX ADMIN — MIDAZOLAM 2 MG: 1 INJECTION INTRAMUSCULAR; INTRAVENOUS at 01:50

## 2018-01-01 RX ADMIN — OXYCODONE HYDROCHLORIDE 10 MG: 5 SOLUTION ORAL at 01:11

## 2018-01-01 RX ADMIN — HYDROCORTISONE SODIUM SUCCINATE 50 MG: 100 INJECTION, POWDER, FOR SOLUTION INTRAMUSCULAR; INTRAVENOUS at 19:35

## 2018-01-01 RX ADMIN — Medication 17 ML/KG/HR: at 10:03

## 2018-01-01 RX ADMIN — OXYCODONE HYDROCHLORIDE 10 MG: 5 SOLUTION ORAL at 08:34

## 2018-01-01 RX ADMIN — SODIUM CHLORIDE 50 MG: 9 INJECTION, SOLUTION INTRAVENOUS at 09:01

## 2018-01-01 RX ADMIN — FLUCONAZOLE 200 MG: 2 INJECTION, SOLUTION INTRAVENOUS at 08:02

## 2018-01-01 RX ADMIN — Medication 12.5 ML/KG/HR: at 10:05

## 2018-01-01 RX ADMIN — SODIUM CHLORIDE, POTASSIUM CHLORIDE, SODIUM LACTATE AND CALCIUM CHLORIDE 1000 ML: 600; 310; 30; 20 INJECTION, SOLUTION INTRAVENOUS at 09:57

## 2018-01-01 RX ADMIN — ALBUMIN HUMAN 25 G: 0.05 INJECTION, SOLUTION INTRAVENOUS at 11:41

## 2018-01-01 RX ADMIN — HUMAN INSULIN 1 UNITS/HR: 100 INJECTION, SOLUTION SUBCUTANEOUS at 19:06

## 2018-01-01 RX ADMIN — HUMAN INSULIN 8 UNITS/HR: 100 INJECTION, SOLUTION SUBCUTANEOUS at 03:50

## 2018-01-01 RX ADMIN — VASOPRESSIN 2.4 UNITS/HR: 20 INJECTION INTRAVENOUS at 07:40

## 2018-01-01 RX ADMIN — PANTOPRAZOLE SODIUM 40 MG: 40 INJECTION, POWDER, FOR SOLUTION INTRAVENOUS at 07:51

## 2018-01-01 RX ADMIN — ROCURONIUM BROMIDE 100 MG: 10 INJECTION INTRAVENOUS at 23:34

## 2018-01-01 RX ADMIN — ROCURONIUM BROMIDE 50 MG: 10 INJECTION INTRAVENOUS at 09:17

## 2018-01-01 RX ADMIN — HYDROCORTISONE SODIUM SUCCINATE 100 MG: 100 INJECTION, POWDER, FOR SOLUTION INTRAMUSCULAR; INTRAVENOUS at 23:00

## 2018-01-01 RX ADMIN — MAGNESIUM SULFATE HEPTAHYDRATE: 500 INJECTION, SOLUTION INTRAMUSCULAR; INTRAVENOUS at 21:28

## 2018-01-01 RX ADMIN — ACETAMINOPHEN 650 MG: 325 TABLET, FILM COATED ORAL at 00:01

## 2018-01-01 RX ADMIN — AMPICILLIN SODIUM 2 G: 2 INJECTION, POWDER, FOR SOLUTION INTRAMUSCULAR; INTRAVENOUS at 19:39

## 2018-01-01 RX ADMIN — PIPERACILLIN SODIUM AND TAZOBACTAM SODIUM 2.25 G: .375; 3 INJECTION, POWDER, LYOPHILIZED, FOR SOLUTION INTRAVENOUS at 11:19

## 2018-01-01 RX ADMIN — DAPTOMYCIN 800 MG: 500 INJECTION, POWDER, LYOPHILIZED, FOR SOLUTION INTRAVENOUS at 13:19

## 2018-01-01 RX ADMIN — LINEZOLID 600 MG: 600 INJECTION, SOLUTION INTRAVENOUS at 08:37

## 2018-01-01 RX ADMIN — MICAFUNGIN SODIUM 100 MG: 10 INJECTION, POWDER, LYOPHILIZED, FOR SOLUTION INTRAVENOUS at 18:24

## 2018-01-01 RX ADMIN — ALBUMIN HUMAN 12.5 G: 0.05 INJECTION, SOLUTION INTRAVENOUS at 00:47

## 2018-01-01 RX ADMIN — PHYTONADIONE 10 MG: 10 INJECTION, EMULSION INTRAMUSCULAR; INTRAVENOUS; SUBCUTANEOUS at 08:08

## 2018-01-01 RX ADMIN — ALBUMIN HUMAN 12.5 G: 0.05 INJECTION, SOLUTION INTRAVENOUS at 06:47

## 2018-01-01 RX ADMIN — NOREPINEPHRINE BITARTRATE 0.05 MCG/KG/MIN: 1 INJECTION INTRAVENOUS at 11:50

## 2018-01-01 RX ADMIN — ALBUMIN HUMAN 75 G: 0.25 SOLUTION INTRAVENOUS at 08:57

## 2018-01-01 RX ADMIN — Medication 1 MG: at 07:39

## 2018-01-01 RX ADMIN — LACTULOSE 20 G: 20 POWDER, FOR SOLUTION ORAL at 21:14

## 2018-01-01 RX ADMIN — NOREPINEPHRINE BITARTRATE 6.4 MCG: 1 INJECTION INTRAVENOUS at 13:02

## 2018-01-01 RX ADMIN — Medication 81 MG: at 07:58

## 2018-01-01 RX ADMIN — NOREPINEPHRINE BITARTRATE 0.18 MCG/KG/MIN: 1 INJECTION INTRAVENOUS at 18:28

## 2018-01-01 RX ADMIN — OXYCODONE HYDROCHLORIDE 5 MG: 5 SOLUTION ORAL at 10:08

## 2018-01-01 RX ADMIN — HUMAN INSULIN 1.5 UNITS/HR: 100 INJECTION, SOLUTION SUBCUTANEOUS at 17:11

## 2018-01-01 RX ADMIN — INSULIN ASPART 2 UNITS: 100 INJECTION, SOLUTION INTRAVENOUS; SUBCUTANEOUS at 12:44

## 2018-01-01 RX ADMIN — Medication 0.5 MG: at 02:03

## 2018-01-01 RX ADMIN — OXYCODONE HYDROCHLORIDE 10 MG: 5 SOLUTION ORAL at 01:13

## 2018-01-01 RX ADMIN — Medication: at 14:32

## 2018-01-01 RX ADMIN — Medication 17 ML/KG/HR: at 05:20

## 2018-01-01 RX ADMIN — NOREPINEPHRINE BITARTRATE 6.4 MCG: 1 INJECTION INTRAVENOUS at 11:08

## 2018-01-01 RX ADMIN — HUMAN INSULIN 0.2 UNITS/HR: 100 INJECTION, SOLUTION SUBCUTANEOUS at 18:26

## 2018-01-01 RX ADMIN — OXYCODONE HYDROCHLORIDE 5 MG: 5 SOLUTION ORAL at 08:39

## 2018-01-01 RX ADMIN — HYDROMORPHONE HYDROCHLORIDE 0.5 MG: 1 INJECTION, SOLUTION INTRAMUSCULAR; INTRAVENOUS; SUBCUTANEOUS at 11:49

## 2018-01-01 RX ADMIN — MULTIVITAMIN 15 ML: LIQUID ORAL at 08:19

## 2018-01-01 RX ADMIN — HUMAN INSULIN 7 UNITS/HR: 100 INJECTION, SOLUTION SUBCUTANEOUS at 18:49

## 2018-01-01 RX ADMIN — PHENYLEPHRINE HYDROCHLORIDE 150 MCG: 10 INJECTION, SOLUTION INTRAMUSCULAR; INTRAVENOUS; SUBCUTANEOUS at 12:10

## 2018-01-01 RX ADMIN — Medication 17.5 ML/KG/HR: at 09:45

## 2018-01-01 RX ADMIN — MICAFUNGIN SODIUM 100 MG: 10 INJECTION, POWDER, LYOPHILIZED, FOR SOLUTION INTRAVENOUS at 20:40

## 2018-01-01 RX ADMIN — MYCOPHENOLATE MOFETIL 1000 MG: 200 POWDER, FOR SUSPENSION ORAL at 09:25

## 2018-01-01 RX ADMIN — HUMAN INSULIN 8 UNITS/HR: 100 INJECTION, SOLUTION SUBCUTANEOUS at 14:37

## 2018-01-01 RX ADMIN — POTASSIUM CHLORIDE 20 MEQ: 1.5 POWDER, FOR SOLUTION ORAL at 09:20

## 2018-01-01 RX ADMIN — AMPICILLIN SODIUM 2 G: 2 INJECTION, POWDER, FOR SOLUTION INTRAMUSCULAR; INTRAVENOUS at 04:08

## 2018-01-01 RX ADMIN — POTASSIUM CHLORIDE 10 MEQ: 10 INJECTION, SOLUTION INTRAVENOUS at 05:20

## 2018-01-01 RX ADMIN — Medication 0.5 MG: at 00:18

## 2018-01-01 RX ADMIN — TRANEXAMIC ACID 200 MG/HR: 100 INJECTION, SOLUTION INTRAVENOUS at 08:38

## 2018-01-01 RX ADMIN — ALBUMIN HUMAN: 0.05 INJECTION, SOLUTION INTRAVENOUS at 18:00

## 2018-01-01 RX ADMIN — Medication 12.5 ML/KG/HR: at 16:26

## 2018-01-01 RX ADMIN — ALBUMIN HUMAN 25 G: 0.05 INJECTION, SOLUTION INTRAVENOUS at 05:04

## 2018-01-01 RX ADMIN — CEFTAROLINE FOSAMIL 300 MG: 600 POWDER, FOR SOLUTION INTRAVENOUS at 13:58

## 2018-01-01 RX ADMIN — POTASSIUM CHLORIDE 20 MEQ: 400 INJECTION, SOLUTION INTRAVENOUS at 23:36

## 2018-01-01 RX ADMIN — MAGNESIUM SULFATE HEPTAHYDRATE: 500 INJECTION, SOLUTION INTRAMUSCULAR; INTRAVENOUS at 19:43

## 2018-01-01 RX ADMIN — ALBUMIN HUMAN 25 G: 0.25 SOLUTION INTRAVENOUS at 14:54

## 2018-01-01 RX ADMIN — PANTOPRAZOLE SODIUM 40 MG: 40 INJECTION, POWDER, FOR SOLUTION INTRAVENOUS at 09:24

## 2018-01-01 RX ADMIN — SODIUM BICARBONATE 650 MG: 650 TABLET ORAL at 21:05

## 2018-01-01 RX ADMIN — HEPARIN SODIUM 1050 UNITS/HR: 10000 INJECTION, SOLUTION INTRAVENOUS at 22:03

## 2018-01-01 RX ADMIN — HYDROCORTISONE 10 MG: 20 TABLET ORAL at 20:05

## 2018-01-01 RX ADMIN — LIDOCAINE HYDROCHLORIDE 10 ML: 10 INJECTION, SOLUTION EPIDURAL; INFILTRATION; INTRACAUDAL; PERINEURAL at 12:17

## 2018-01-01 RX ADMIN — Medication 1 MG: at 17:10

## 2018-01-01 RX ADMIN — MEROPENEM 1000 MG: 1 INJECTION, POWDER, FOR SOLUTION INTRAVENOUS at 06:51

## 2018-01-01 RX ADMIN — Medication: at 13:20

## 2018-01-01 RX ADMIN — NYSTATIN 500000 UNITS: 100000 SUSPENSION ORAL at 20:07

## 2018-01-01 RX ADMIN — ALBUMIN HUMAN 12.5 G: 0.05 INJECTION, SOLUTION INTRAVENOUS at 23:09

## 2018-01-01 RX ADMIN — HUMAN INSULIN 2 UNITS/HR: 100 INJECTION, SOLUTION SUBCUTANEOUS at 21:35

## 2018-01-01 RX ADMIN — Medication 12.5 ML/KG/HR: at 17:56

## 2018-01-01 RX ADMIN — AMPICILLIN SODIUM 2 G: 2 INJECTION, POWDER, FOR SOLUTION INTRAMUSCULAR; INTRAVENOUS at 20:05

## 2018-01-01 RX ADMIN — VASOPRESSIN 2.4 UNITS/HR: 20 INJECTION INTRAVENOUS at 14:31

## 2018-01-01 RX ADMIN — ALBUMIN HUMAN 12.5 G: 0.05 INJECTION, SOLUTION INTRAVENOUS at 06:00

## 2018-01-01 RX ADMIN — PANTOPRAZOLE SODIUM 40 MG: 40 TABLET, DELAYED RELEASE ORAL at 08:04

## 2018-01-01 RX ADMIN — SODIUM BICARBONATE 50 MEQ: 84 INJECTION INTRAVENOUS at 12:04

## 2018-01-01 RX ADMIN — ROCURONIUM BROMIDE 20 MG: 10 INJECTION INTRAVENOUS at 06:35

## 2018-01-01 RX ADMIN — HUMAN INSULIN 4 UNITS/HR: 100 INJECTION, SOLUTION SUBCUTANEOUS at 11:38

## 2018-01-01 RX ADMIN — MYCOPHENOLATE MOFETIL 1000 MG: 200 POWDER, FOR SUSPENSION ORAL at 18:04

## 2018-01-01 RX ADMIN — HYDROMORPHONE HYDROCHLORIDE 0.5 MG: 1 INJECTION, SOLUTION INTRAMUSCULAR; INTRAVENOUS; SUBCUTANEOUS at 02:10

## 2018-01-01 RX ADMIN — OXYCODONE HYDROCHLORIDE 5 MG: 5 TABLET ORAL at 14:20

## 2018-01-01 RX ADMIN — CEFTAROLINE FOSAMIL 300 MG: 600 POWDER, FOR SOLUTION INTRAVENOUS at 01:44

## 2018-01-01 RX ADMIN — MORPHINE SULFATE 4 MG: 2 INJECTION, SOLUTION INTRAMUSCULAR; INTRAVENOUS at 14:25

## 2018-01-01 RX ADMIN — HYDROCORTISONE 25 MG: 20 TABLET ORAL at 08:09

## 2018-01-01 RX ADMIN — ALBUMIN HUMAN 75 G: 0.25 SOLUTION INTRAVENOUS at 11:29

## 2018-01-01 RX ADMIN — POTASSIUM CHLORIDE 20 MEQ: 14.9 INJECTION, SOLUTION INTRAVENOUS at 06:52

## 2018-01-01 RX ADMIN — PHENYLEPHRINE HYDROCHLORIDE 150 MCG: 10 INJECTION, SOLUTION INTRAMUSCULAR; INTRAVENOUS; SUBCUTANEOUS at 12:00

## 2018-01-01 RX ADMIN — TENOFOVIR DISOPROXIL FUMARATE 300 MG: 300 TABLET, COATED ORAL at 10:27

## 2018-01-01 RX ADMIN — OXYCODONE HYDROCHLORIDE 10 MG: 5 SOLUTION ORAL at 18:04

## 2018-01-01 RX ADMIN — CEFTAROLINE FOSAMIL 400 MG: 600 POWDER, FOR SOLUTION INTRAVENOUS at 08:39

## 2018-01-01 RX ADMIN — NYSTATIN 500000 UNITS: 100000 SUSPENSION ORAL at 16:57

## 2018-01-01 RX ADMIN — METRONIDAZOLE 500 MG: 500 INJECTION, SOLUTION INTRAVENOUS at 09:33

## 2018-01-01 RX ADMIN — SULFAMETHOXAZOLE AND TRIMETHOPRIM 80 MG: 200; 40 SUSPENSION ORAL at 08:19

## 2018-01-01 RX ADMIN — ALBUMIN HUMAN 25 G: 0.05 INJECTION, SOLUTION INTRAVENOUS at 08:10

## 2018-01-01 RX ADMIN — FLUCONAZOLE 200 MG: 2 INJECTION, SOLUTION INTRAVENOUS at 08:04

## 2018-01-01 RX ADMIN — SODIUM CHLORIDE 50 MG: 9 INJECTION, SOLUTION INTRAVENOUS at 08:42

## 2018-01-01 RX ADMIN — SULFAMETHOXAZOLE AND TRIMETHOPRIM 80 MG: 200; 40 SUSPENSION ORAL at 11:08

## 2018-01-01 RX ADMIN — PHENYLEPHRINE HYDROCHLORIDE 150 MCG: 10 INJECTION, SOLUTION INTRAMUSCULAR; INTRAVENOUS; SUBCUTANEOUS at 12:07

## 2018-01-01 RX ADMIN — PANTOPRAZOLE SODIUM 40 MG: 40 INJECTION, POWDER, FOR SOLUTION INTRAVENOUS at 19:44

## 2018-01-01 RX ADMIN — PANTOPRAZOLE SODIUM 40 MG: 40 TABLET, DELAYED RELEASE ORAL at 17:24

## 2018-01-01 RX ADMIN — MICONAZOLE NITRATE: 2 POWDER TOPICAL at 18:08

## 2018-01-01 RX ADMIN — CALCIUM GLUCONATE: 98 INJECTION, SOLUTION INTRAVENOUS at 19:46

## 2018-01-01 RX ADMIN — BISACODYL 10 MG: 10 SUPPOSITORY RECTAL at 11:50

## 2018-01-01 RX ADMIN — OXYCODONE HYDROCHLORIDE 10 MG: 5 SOLUTION ORAL at 20:17

## 2018-01-01 RX ADMIN — HYDROMORPHONE HYDROCHLORIDE 0.5 MG: 1 INJECTION, SOLUTION INTRAMUSCULAR; INTRAVENOUS; SUBCUTANEOUS at 18:27

## 2018-01-01 RX ADMIN — ALBUMIN HUMAN 25 G: 0.25 SOLUTION INTRAVENOUS at 21:25

## 2018-01-01 RX ADMIN — CEFTAROLINE FOSAMIL 300 MG: 600 POWDER, FOR SOLUTION INTRAVENOUS at 15:50

## 2018-01-01 RX ADMIN — Medication 17 ML/KG/HR: at 23:27

## 2018-01-01 RX ADMIN — MAGNESIUM SULFATE HEPTAHYDRATE: 500 INJECTION, SOLUTION INTRAMUSCULAR; INTRAVENOUS at 19:23

## 2018-01-01 RX ADMIN — ALBUMIN HUMAN: 0.05 INJECTION, SOLUTION INTRAVENOUS at 09:09

## 2018-01-01 RX ADMIN — HEPARIN SODIUM 1500 UNITS/HR: 10000 INJECTION, SOLUTION INTRAVENOUS at 16:30

## 2018-01-01 RX ADMIN — MAGNESIUM SULFATE HEPTAHYDRATE 2 G: 40 INJECTION, SOLUTION INTRAVENOUS at 10:42

## 2018-01-01 RX ADMIN — MYCOPHENOLATE MOFETIL 1000 MG: 200 POWDER, FOR SUSPENSION ORAL at 09:19

## 2018-01-01 RX ADMIN — SODIUM CHLORIDE 50 MG: 9 INJECTION, SOLUTION INTRAVENOUS at 20:23

## 2018-01-01 RX ADMIN — SODIUM CHLORIDE, POTASSIUM CHLORIDE, SODIUM LACTATE AND CALCIUM CHLORIDE: 600; 310; 30; 20 INJECTION, SOLUTION INTRAVENOUS at 22:35

## 2018-01-01 RX ADMIN — MORPHINE SULFATE 2 MG: 2 INJECTION, SOLUTION INTRAMUSCULAR; INTRAVENOUS at 13:08

## 2018-01-01 RX ADMIN — VASOPRESSIN 1 UNITS/HR: 20 INJECTION INTRAVENOUS at 18:12

## 2018-01-01 RX ADMIN — Medication 12.5 ML/KG/HR: at 04:21

## 2018-01-01 RX ADMIN — CEFTAROLINE FOSAMIL 400 MG: 600 POWDER, FOR SOLUTION INTRAVENOUS at 19:34

## 2018-01-01 RX ADMIN — HYDROMORPHONE HYDROCHLORIDE 0.5 MG: 1 INJECTION, SOLUTION INTRAMUSCULAR; INTRAVENOUS; SUBCUTANEOUS at 16:06

## 2018-01-01 RX ADMIN — CLOTRIMAZOLE 1 TROCHE: 10 LOZENGE ORAL at 17:24

## 2018-01-01 RX ADMIN — DEXTROSE MONOHYDRATE: 50 INJECTION, SOLUTION INTRAVENOUS at 09:04

## 2018-01-01 RX ADMIN — LOPERAMIDE HYDROCHLORIDE 2 MG: 1 SOLUTION ORAL at 08:42

## 2018-01-01 RX ADMIN — POTASSIUM CHLORIDE 20 MEQ: 29.8 INJECTION, SOLUTION INTRAVENOUS at 20:14

## 2018-01-01 RX ADMIN — MYCOPHENOLATE MOFETIL 250 MG: 200 POWDER, FOR SUSPENSION ORAL at 17:07

## 2018-01-01 RX ADMIN — VALGANCICLOVIR HYDROCHLORIDE 450 MG: 50 POWDER, FOR SOLUTION ORAL at 07:50

## 2018-01-01 RX ADMIN — INSULIN ASPART 10 UNITS: 100 INJECTION, SOLUTION INTRAVENOUS; SUBCUTANEOUS at 11:58

## 2018-01-01 RX ADMIN — MIDAZOLAM 1 MG: 1 INJECTION INTRAMUSCULAR; INTRAVENOUS at 13:49

## 2018-01-01 RX ADMIN — PHENYLEPHRINE HYDROCHLORIDE 200 MCG: 10 INJECTION, SOLUTION INTRAMUSCULAR; INTRAVENOUS; SUBCUTANEOUS at 09:06

## 2018-01-01 RX ADMIN — DAPTOMYCIN 800 MG: 500 INJECTION, POWDER, LYOPHILIZED, FOR SOLUTION INTRAVENOUS at 13:26

## 2018-01-01 RX ADMIN — PHENYLEPHRINE HYDROCHLORIDE 200 MCG: 10 INJECTION, SOLUTION INTRAMUSCULAR; INTRAVENOUS; SUBCUTANEOUS at 18:16

## 2018-01-01 RX ADMIN — VALGANCICLOVIR HYDROCHLORIDE 450 MG: 50 POWDER, FOR SOLUTION ORAL at 09:01

## 2018-01-01 RX ADMIN — POTASSIUM CHLORIDE 20 MEQ: 750 TABLET, EXTENDED RELEASE ORAL at 13:49

## 2018-01-01 RX ADMIN — BUMETANIDE 1 MG: 1 TABLET ORAL at 10:31

## 2018-01-01 RX ADMIN — OXYCODONE HYDROCHLORIDE 5 MG: 5 SOLUTION ORAL at 20:18

## 2018-01-01 RX ADMIN — AMPICILLIN SODIUM 2 G: 2 INJECTION, POWDER, FOR SOLUTION INTRAMUSCULAR; INTRAVENOUS at 04:15

## 2018-01-01 RX ADMIN — MINERAL OIL 30 ML: 1000 SOLUTION ORAL at 10:11

## 2018-01-01 RX ADMIN — Medication 10 MEQ: at 22:39

## 2018-01-01 RX ADMIN — PIPERACILLIN SODIUM,TAZOBACTAM SODIUM 4.5 G: 4; .5 INJECTION, POWDER, FOR SOLUTION INTRAVENOUS at 14:02

## 2018-01-01 RX ADMIN — NYSTATIN 500000 UNITS: 100000 SUSPENSION ORAL at 08:04

## 2018-01-01 RX ADMIN — VASOPRESSIN 2.4 UNITS/HR: 20 INJECTION INTRAVENOUS at 22:11

## 2018-01-01 RX ADMIN — Medication 0.5 MG: at 07:47

## 2018-01-01 RX ADMIN — Medication 81 MG: at 07:07

## 2018-01-01 RX ADMIN — NOREPINEPHRINE BITARTRATE 0.03 MCG/KG/MIN: 1 INJECTION INTRAVENOUS at 14:37

## 2018-01-01 RX ADMIN — MAGNESIUM SULFATE HEPTAHYDRATE 2 G: 40 INJECTION, SOLUTION INTRAVENOUS at 05:12

## 2018-01-01 RX ADMIN — CEFTAROLINE FOSAMIL 300 MG: 600 POWDER, FOR SOLUTION INTRAVENOUS at 13:49

## 2018-01-01 RX ADMIN — Medication 12.5 ML/KG/HR: at 17:55

## 2018-01-01 RX ADMIN — MEROPENEM 1000 MG: 1 INJECTION, POWDER, FOR SOLUTION INTRAVENOUS at 22:42

## 2018-01-01 RX ADMIN — PANTOPRAZOLE SODIUM 40 MG: 40 TABLET, DELAYED RELEASE ORAL at 07:43

## 2018-01-01 RX ADMIN — PROPOFOL 45 MCG/KG/MIN: 10 INJECTION, EMULSION INTRAVENOUS at 17:52

## 2018-01-01 RX ADMIN — MICONAZOLE NITRATE: 2 POWDER TOPICAL at 07:29

## 2018-01-01 RX ADMIN — Medication 0.5 MG: at 11:30

## 2018-01-01 RX ADMIN — Medication 12.5 ML/KG/HR: at 11:31

## 2018-01-01 RX ADMIN — VANCOMYCIN HYDROCHLORIDE 1500 MG: 10 INJECTION, POWDER, LYOPHILIZED, FOR SOLUTION INTRAVENOUS at 13:38

## 2018-01-01 RX ADMIN — SODIUM CHLORIDE 100 MG: 9 INJECTION, SOLUTION INTRAVENOUS at 21:39

## 2018-01-01 RX ADMIN — Medication 12.5 ML/KG/HR: at 16:02

## 2018-01-01 RX ADMIN — MICAFUNGIN SODIUM 100 MG: 10 INJECTION, POWDER, LYOPHILIZED, FOR SOLUTION INTRAVENOUS at 18:09

## 2018-01-01 RX ADMIN — POTASSIUM CHLORIDE 20 MEQ: 29.8 INJECTION, SOLUTION INTRAVENOUS at 05:26

## 2018-01-01 RX ADMIN — PHENYLEPHRINE HYDROCHLORIDE 100 MCG: 10 INJECTION, SOLUTION INTRAMUSCULAR; INTRAVENOUS; SUBCUTANEOUS at 14:58

## 2018-01-01 RX ADMIN — MIDODRINE HYDROCHLORIDE 10 MG: 5 TABLET ORAL at 18:07

## 2018-01-01 RX ADMIN — PANTOPRAZOLE SODIUM 40 MG: 40 INJECTION, POWDER, FOR SOLUTION INTRAVENOUS at 19:29

## 2018-01-01 RX ADMIN — OXYCODONE HYDROCHLORIDE 5 MG: 5 SOLUTION ORAL at 00:44

## 2018-01-01 RX ADMIN — Medication 12.5 ML/KG/HR: at 05:46

## 2018-01-01 RX ADMIN — ERTAPENEM SODIUM 500 MG: 1 INJECTION, POWDER, LYOPHILIZED, FOR SOLUTION INTRAMUSCULAR; INTRAVENOUS at 16:26

## 2018-01-01 RX ADMIN — SULFAMETHOXAZOLE AND TRIMETHOPRIM 80 MG: 200; 40 SUSPENSION ORAL at 12:34

## 2018-01-01 RX ADMIN — OXYCODONE HYDROCHLORIDE 5 MG: 5 TABLET ORAL at 22:54

## 2018-01-01 RX ADMIN — VASOPRESSIN 1 UNITS: 20 INJECTION INTRAVENOUS at 12:56

## 2018-01-01 RX ADMIN — CALCIUM CHLORIDE 1 G: 100 INJECTION, SOLUTION INTRAVENOUS at 22:59

## 2018-01-01 RX ADMIN — HEPARIN SODIUM 1200 UNITS/HR: 10000 INJECTION, SOLUTION INTRAVENOUS at 00:04

## 2018-01-01 RX ADMIN — NOREPINEPHRINE BITARTRATE 12.8 MCG: 1 INJECTION INTRAVENOUS at 09:54

## 2018-01-01 RX ADMIN — DEXMEDETOMIDINE HYDROCHLORIDE 0.3 MCG/KG/HR: 4 INJECTION, SOLUTION INTRAVENOUS at 07:48

## 2018-01-01 RX ADMIN — INSULIN GLARGINE 10 UNITS: 100 INJECTION, SOLUTION SUBCUTANEOUS at 15:44

## 2018-01-01 RX ADMIN — HEPARIN SODIUM 400 UNITS/HR: 10000 INJECTION, SOLUTION INTRAVENOUS at 13:58

## 2018-01-01 RX ADMIN — I.V. FAT EMULSION 250 ML: 20 EMULSION INTRAVENOUS at 19:37

## 2018-01-01 RX ADMIN — Medication 1.5 MG: at 08:10

## 2018-01-01 RX ADMIN — SODIUM CHLORIDE 50 MG: 9 INJECTION, SOLUTION INTRAVENOUS at 19:58

## 2018-01-01 RX ADMIN — METRONIDAZOLE 500 MG: 500 INJECTION, SOLUTION INTRAVENOUS at 21:56

## 2018-01-01 RX ADMIN — PROPOFOL 50 MCG/KG/MIN: 10 INJECTION, EMULSION INTRAVENOUS at 10:39

## 2018-01-01 RX ADMIN — MIDODRINE HYDROCHLORIDE 10 MG: 5 TABLET ORAL at 03:54

## 2018-01-01 RX ADMIN — PANTOPRAZOLE SODIUM 40 MG: 40 TABLET, DELAYED RELEASE ORAL at 10:20

## 2018-01-01 RX ADMIN — ROCURONIUM BROMIDE 30 MG: 10 INJECTION INTRAVENOUS at 10:50

## 2018-01-01 RX ADMIN — Medication 0.5 MG: at 23:26

## 2018-01-01 RX ADMIN — NYSTATIN 500000 UNITS: 100000 SUSPENSION ORAL at 11:42

## 2018-01-01 RX ADMIN — PANTOPRAZOLE SODIUM 40 MG: 40 INJECTION, POWDER, FOR SOLUTION INTRAVENOUS at 21:04

## 2018-01-01 RX ADMIN — AMPICILLIN SODIUM 2 G: 2 INJECTION, POWDER, FOR SOLUTION INTRAMUSCULAR; INTRAVENOUS at 04:14

## 2018-01-01 RX ADMIN — SODIUM CHLORIDE 50 MG: 9 INJECTION, SOLUTION INTRAVENOUS at 21:32

## 2018-01-01 RX ADMIN — DAPTOMYCIN 800 MG: 500 INJECTION, POWDER, LYOPHILIZED, FOR SOLUTION INTRAVENOUS at 11:14

## 2018-01-01 RX ADMIN — HYDROCORTISONE SODIUM SUCCINATE 50 MG: 100 INJECTION, POWDER, FOR SOLUTION INTRAMUSCULAR; INTRAVENOUS at 13:50

## 2018-01-01 RX ADMIN — POTASSIUM CHLORIDE 40 MEQ: 1.5 POWDER, FOR SOLUTION ORAL at 06:22

## 2018-01-01 RX ADMIN — PANTOPRAZOLE SODIUM 40 MG: 40 TABLET, DELAYED RELEASE ORAL at 16:49

## 2018-01-01 RX ADMIN — NOREPINEPHRINE BITARTRATE 0.02 MCG/KG/MIN: 1 INJECTION INTRAVENOUS at 11:22

## 2018-01-01 RX ADMIN — OXYCODONE HYDROCHLORIDE 5 MG: 5 SOLUTION ORAL at 17:58

## 2018-01-01 RX ADMIN — METRONIDAZOLE 500 MG: 500 INJECTION, SOLUTION INTRAVENOUS at 04:35

## 2018-01-01 RX ADMIN — FLUCONAZOLE 200 MG: 2 INJECTION, SOLUTION INTRAVENOUS at 07:49

## 2018-01-01 RX ADMIN — CEFTAROLINE FOSAMIL 300 MG: 600 POWDER, FOR SOLUTION INTRAVENOUS at 02:01

## 2018-01-01 RX ADMIN — FERROUS SULFATE TAB 325 MG (65 MG ELEMENTAL FE) 325 MG: 325 (65 FE) TAB at 11:30

## 2018-01-01 RX ADMIN — PIPERACILLIN SODIUM AND TAZOBACTAM SODIUM 3.38 G: .375; 3 INJECTION, POWDER, LYOPHILIZED, FOR SOLUTION INTRAVENOUS at 07:00

## 2018-01-01 RX ADMIN — HUMAN INSULIN 2 UNITS/HR: 100 INJECTION, SOLUTION SUBCUTANEOUS at 21:26

## 2018-01-01 RX ADMIN — Medication 17 ML/KG/HR: at 09:51

## 2018-01-01 RX ADMIN — HYDROCORTISONE SODIUM SUCCINATE 100 MG: 100 INJECTION, POWDER, FOR SOLUTION INTRAMUSCULAR; INTRAVENOUS at 13:42

## 2018-01-01 RX ADMIN — Medication 12.5 ML/KG/HR: at 15:27

## 2018-01-01 RX ADMIN — TENOFOVIR DISOPROXIL FUMARATE 300 MG: 300 TABLET, COATED ORAL at 07:49

## 2018-01-01 RX ADMIN — PHENYLEPHRINE HYDROCHLORIDE 200 MCG: 10 INJECTION, SOLUTION INTRAMUSCULAR; INTRAVENOUS; SUBCUTANEOUS at 13:38

## 2018-01-01 RX ADMIN — POTASSIUM CHLORIDE 20 MEQ: 400 INJECTION, SOLUTION INTRAVENOUS at 18:22

## 2018-01-01 RX ADMIN — Medication 0.75 MG: at 08:39

## 2018-01-01 RX ADMIN — DEXMEDETOMIDINE HYDROCHLORIDE 0.3 MCG/KG/HR: 4 INJECTION, SOLUTION INTRAVENOUS at 08:00

## 2018-01-01 RX ADMIN — MIDODRINE HYDROCHLORIDE 10 MG: 5 TABLET ORAL at 16:46

## 2018-01-01 RX ADMIN — MAGNESIUM SULFATE IN WATER 4 G: 40 INJECTION, SOLUTION INTRAVENOUS at 19:40

## 2018-01-01 RX ADMIN — LIDOCAINE HYDROCHLORIDE 2 ML: 10 INJECTION, SOLUTION EPIDURAL; INFILTRATION; INTRACAUDAL; PERINEURAL at 11:14

## 2018-01-01 RX ADMIN — Medication 12.5 ML/KG/HR: at 23:05

## 2018-01-01 RX ADMIN — SMOFLIPID 250 ML: 6; 6; 5; 3 INJECTION, EMULSION INTRAVENOUS at 21:39

## 2018-01-01 RX ADMIN — Medication 0.5 MG: at 07:46

## 2018-01-01 RX ADMIN — Medication 0.5 MG: at 00:51

## 2018-01-01 RX ADMIN — HYDROCORTISONE SODIUM SUCCINATE 25 MG: 100 INJECTION, POWDER, FOR SOLUTION INTRAMUSCULAR; INTRAVENOUS at 03:55

## 2018-01-01 RX ADMIN — CIPROFLOXACIN HYDROCHLORIDE 500 MG: 500 TABLET, FILM COATED ORAL at 08:09

## 2018-01-01 RX ADMIN — PIPERACILLIN SODIUM AND TAZOBACTAM SODIUM 3.38 G: 3; .375 INJECTION, POWDER, LYOPHILIZED, FOR SOLUTION INTRAVENOUS at 12:22

## 2018-01-01 RX ADMIN — AMPICILLIN SODIUM 2 G: 2 INJECTION, POWDER, FOR SOLUTION INTRAMUSCULAR; INTRAVENOUS at 13:03

## 2018-01-01 RX ADMIN — OXYCODONE HYDROCHLORIDE 5 MG: 5 TABLET ORAL at 21:37

## 2018-01-01 RX ADMIN — PANTOPRAZOLE SODIUM 40 MG: 40 INJECTION, POWDER, FOR SOLUTION INTRAVENOUS at 20:34

## 2018-01-01 RX ADMIN — Medication 17 ML/KG/HR: at 00:28

## 2018-01-01 RX ADMIN — HUMAN INSULIN 3 UNITS/HR: 100 INJECTION, SOLUTION SUBCUTANEOUS at 14:39

## 2018-01-01 RX ADMIN — PHENYLEPHRINE HYDROCHLORIDE 100 MCG: 10 INJECTION, SOLUTION INTRAMUSCULAR; INTRAVENOUS; SUBCUTANEOUS at 15:15

## 2018-01-01 RX ADMIN — METRONIDAZOLE 500 MG: 500 INJECTION, SOLUTION INTRAVENOUS at 08:07

## 2018-01-01 RX ADMIN — NOREPINEPHRINE BITARTRATE 6.4 MCG: 1 INJECTION INTRAVENOUS at 08:51

## 2018-01-01 RX ADMIN — RIFAXIMIN 550 MG: 550 TABLET ORAL at 08:06

## 2018-01-01 RX ADMIN — CEFTAROLINE FOSAMIL 300 MG: 600 POWDER, FOR SOLUTION INTRAVENOUS at 14:31

## 2018-01-01 RX ADMIN — VALGANCICLOVIR HYDROCHLORIDE 450 MG: 50 POWDER, FOR SOLUTION ORAL at 08:19

## 2018-01-01 RX ADMIN — Medication 1 MG: at 07:36

## 2018-01-01 RX ADMIN — METRONIDAZOLE 500 MG: 500 INJECTION, SOLUTION INTRAVENOUS at 20:54

## 2018-01-01 RX ADMIN — Medication 17 ML/KG/HR: at 04:08

## 2018-01-01 RX ADMIN — PANTOPRAZOLE SODIUM 40 MG: 40 INJECTION, POWDER, FOR SOLUTION INTRAVENOUS at 19:55

## 2018-01-01 RX ADMIN — NYSTATIN 500000 UNITS: 100000 SUSPENSION ORAL at 16:30

## 2018-01-01 RX ADMIN — CEFTRIAXONE SODIUM 2 G: 2 INJECTION, POWDER, FOR SOLUTION INTRAMUSCULAR; INTRAVENOUS at 10:33

## 2018-01-01 RX ADMIN — Medication 0.5 MG: at 10:21

## 2018-01-01 RX ADMIN — NOREPINEPHRINE BITARTRATE 0.16 MCG/KG/MIN: 1 INJECTION INTRAVENOUS at 07:20

## 2018-01-01 RX ADMIN — DEXTROSE MONOHYDRATE: 50 INJECTION, SOLUTION INTRAVENOUS at 13:14

## 2018-01-01 RX ADMIN — METRONIDAZOLE 500 MG: 500 INJECTION, SOLUTION INTRAVENOUS at 14:14

## 2018-01-01 RX ADMIN — PHENYLEPHRINE HYDROCHLORIDE 100 MCG: 10 INJECTION, SOLUTION INTRAMUSCULAR; INTRAVENOUS; SUBCUTANEOUS at 14:22

## 2018-01-01 RX ADMIN — ACETAMINOPHEN 325 MG: 325 TABLET, FILM COATED ORAL at 16:11

## 2018-01-01 RX ADMIN — SODIUM BICARBONATE 50 MEQ: 84 INJECTION, SOLUTION INTRAVENOUS at 10:30

## 2018-01-01 RX ADMIN — ACETAMINOPHEN 500 MG: 500 TABLET, FILM COATED ORAL at 12:21

## 2018-01-01 RX ADMIN — ROCURONIUM BROMIDE 30 MG: 10 INJECTION INTRAVENOUS at 09:27

## 2018-01-01 RX ADMIN — HEPARIN SODIUM 1700 UNITS/HR: 10000 INJECTION, SOLUTION INTRAVENOUS at 15:18

## 2018-01-01 RX ADMIN — ACETAMINOPHEN 500 MG: 500 TABLET, FILM COATED ORAL at 09:32

## 2018-01-01 RX ADMIN — SODIUM BICARBONATE 650 MG: 650 TABLET ORAL at 19:13

## 2018-01-01 RX ADMIN — CALCIUM CHLORIDE 1 G: 100 INJECTION, SOLUTION INTRAVENOUS at 09:41

## 2018-01-01 RX ADMIN — Medication 17 ML/KG/HR: at 01:03

## 2018-01-01 RX ADMIN — NOREPINEPHRINE BITARTRATE 0.22 MCG/KG/MIN: 1 INJECTION INTRAVENOUS at 12:41

## 2018-01-01 RX ADMIN — LACTULOSE 20 G: 20 POWDER, FOR SOLUTION ORAL at 14:09

## 2018-01-01 RX ADMIN — MYCOPHENOLATE MOFETIL 250 MG: 200 POWDER, FOR SUSPENSION ORAL at 17:41

## 2018-01-01 RX ADMIN — PROPOFOL 50 MCG/KG/MIN: 10 INJECTION, EMULSION INTRAVENOUS at 13:31

## 2018-01-01 RX ADMIN — CALCIUM CHLORIDE 1 G: 100 INJECTION, SOLUTION INTRAVENOUS at 10:14

## 2018-01-01 RX ADMIN — Medication 2 MG/HR: at 10:43

## 2018-01-01 RX ADMIN — MYCOPHENOLATE MOFETIL 250 MG: 200 POWDER, FOR SUSPENSION ORAL at 07:36

## 2018-01-01 RX ADMIN — HUMAN INSULIN 2 UNITS/HR: 100 INJECTION, SOLUTION SUBCUTANEOUS at 04:11

## 2018-01-01 RX ADMIN — TENOFOVIR DISOPROXIL FUMARATE 300 MG: 300 TABLET, COATED ORAL at 14:36

## 2018-01-01 RX ADMIN — HUMAN INSULIN 1 UNITS/HR: 100 INJECTION, SOLUTION SUBCUTANEOUS at 01:51

## 2018-01-01 RX ADMIN — POTASSIUM CHLORIDE 20 MEQ: 400 INJECTION, SOLUTION INTRAVENOUS at 06:09

## 2018-01-01 RX ADMIN — CALCIUM CHLORIDE 1 G: 100 INJECTION, SOLUTION INTRAVENOUS at 09:51

## 2018-01-01 RX ADMIN — TACROLIMUS 3 MG: 1 CAPSULE ORAL at 18:26

## 2018-01-01 RX ADMIN — CEFTRIAXONE SODIUM 2 G: 2 INJECTION, POWDER, FOR SOLUTION INTRAMUSCULAR; INTRAVENOUS at 11:18

## 2018-01-01 RX ADMIN — Medication 0.5 MG: at 19:39

## 2018-01-01 RX ADMIN — LACTULOSE 20 G: 20 SOLUTION ORAL at 09:36

## 2018-01-01 RX ADMIN — HEPARIN SODIUM 400 UNITS/HR: 10000 INJECTION, SOLUTION INTRAVENOUS at 21:24

## 2018-01-01 RX ADMIN — SODIUM CHLORIDE: 4.5 INJECTION, SOLUTION INTRAVENOUS at 07:44

## 2018-01-01 RX ADMIN — Medication 17 ML/KG/HR: at 14:10

## 2018-01-01 RX ADMIN — PANTOPRAZOLE SODIUM 40 MG: 40 TABLET, DELAYED RELEASE ORAL at 16:02

## 2018-01-01 RX ADMIN — ALBUMIN HUMAN 12.5 G: 0.05 INJECTION, SOLUTION INTRAVENOUS at 21:58

## 2018-01-01 RX ADMIN — PANTOPRAZOLE SODIUM 40 MG: 40 INJECTION, POWDER, FOR SOLUTION INTRAVENOUS at 07:23

## 2018-01-01 RX ADMIN — VASOPRESSIN 2.4 UNITS/HR: 20 INJECTION INTRAVENOUS at 22:49

## 2018-01-01 RX ADMIN — SODIUM CHLORIDE: 900 INJECTION, SOLUTION INTRAVENOUS at 10:00

## 2018-01-01 RX ADMIN — CALCIUM CHLORIDE 1 G: 100 INJECTION, SOLUTION INTRAVENOUS at 01:14

## 2018-01-01 RX ADMIN — NOREPINEPHRINE BITARTRATE 6.4 MCG: 1 INJECTION INTRAVENOUS at 09:29

## 2018-01-01 RX ADMIN — Medication 0.5 MG: at 18:26

## 2018-01-01 RX ADMIN — SULFAMETHOXAZOLE AND TRIMETHOPRIM 80 MG: 200; 40 SUSPENSION ORAL at 08:43

## 2018-01-01 RX ADMIN — EPINEPHRINE 0.1 MCG/KG/MIN: 1 INJECTION PARENTERAL at 12:26

## 2018-01-01 RX ADMIN — CLOTRIMAZOLE 1 TROCHE: 10 LOZENGE ORAL at 19:42

## 2018-01-01 RX ADMIN — RIFAXIMIN 550 MG: 550 TABLET ORAL at 20:24

## 2018-01-01 RX ADMIN — Medication 1.25 MG: at 07:42

## 2018-01-01 RX ADMIN — OXYCODONE HYDROCHLORIDE 10 MG: 5 SOLUTION ORAL at 04:46

## 2018-01-01 RX ADMIN — Medication 12.5 ML/KG/HR: at 05:09

## 2018-01-01 RX ADMIN — HEPARIN SODIUM 1700 UNITS/HR: 10000 INJECTION, SOLUTION INTRAVENOUS at 00:09

## 2018-01-01 RX ADMIN — Medication: at 14:37

## 2018-01-01 RX ADMIN — NOREPINEPHRINE BITARTRATE 0.03 MCG/KG/MIN: 1 INJECTION INTRAVENOUS at 18:30

## 2018-01-01 RX ADMIN — FENTANYL CITRATE 50 MCG/HR: 50 INJECTION, SOLUTION INTRAMUSCULAR; INTRAVENOUS at 15:07

## 2018-01-01 RX ADMIN — METRONIDAZOLE 500 MG: 500 INJECTION, SOLUTION INTRAVENOUS at 02:15

## 2018-01-01 RX ADMIN — LACTULOSE 20 G: 20 POWDER, FOR SOLUTION ORAL at 14:38

## 2018-01-01 RX ADMIN — NITROGLYCERIN 100 MCG: 10 INJECTION INTRAVENOUS at 12:23

## 2018-01-01 RX ADMIN — VASOPRESSIN 1 UNITS: 20 INJECTION INTRAVENOUS at 18:16

## 2018-01-01 RX ADMIN — Medication: at 10:10

## 2018-01-01 RX ADMIN — Medication: at 12:10

## 2018-01-01 RX ADMIN — MEROPENEM 1000 MG: 1 INJECTION, POWDER, FOR SOLUTION INTRAVENOUS at 06:10

## 2018-01-01 RX ADMIN — OXYCODONE HYDROCHLORIDE 10 MG: 5 SOLUTION ORAL at 06:45

## 2018-01-01 RX ADMIN — CALCIUM CHLORIDE 1 G: 100 INJECTION, SOLUTION INTRAVENOUS at 11:09

## 2018-01-01 RX ADMIN — ROCURONIUM BROMIDE 20 MG: 10 INJECTION INTRAVENOUS at 14:18

## 2018-01-01 RX ADMIN — MYCOPHENOLATE MOFETIL 1000 MG: 200 POWDER, FOR SUSPENSION ORAL at 18:22

## 2018-01-01 RX ADMIN — VASOPRESSIN 1 UNITS: 20 INJECTION, SOLUTION INTRAMUSCULAR; SUBCUTANEOUS at 09:08

## 2018-01-01 RX ADMIN — TENOFOVIR DISOPROXIL FUMARATE 300 MG: 300 TABLET, COATED ORAL at 07:50

## 2018-01-01 RX ADMIN — CLOTRIMAZOLE 1 TROCHE: 10 LOZENGE ORAL at 16:04

## 2018-01-01 RX ADMIN — Medication 10 MG: at 09:43

## 2018-01-01 RX ADMIN — POTASSIUM CHLORIDE 20 MEQ: 29.8 INJECTION, SOLUTION INTRAVENOUS at 06:01

## 2018-01-01 RX ADMIN — MYCOPHENOLATE MOFETIL 1000 MG: 200 POWDER, FOR SUSPENSION ORAL at 17:24

## 2018-01-01 RX ADMIN — INSULIN ASPART 3 UNITS: 100 INJECTION, SOLUTION INTRAVENOUS; SUBCUTANEOUS at 20:27

## 2018-01-01 RX ADMIN — PROPOFOL 5 MCG/KG/MIN: 10 INJECTION, EMULSION INTRAVENOUS at 09:16

## 2018-01-01 RX ADMIN — ALBUMIN HUMAN 25 G: 0.05 INJECTION, SOLUTION INTRAVENOUS at 01:14

## 2018-01-01 RX ADMIN — TENOFOVIR DISOPROXIL FUMARATE 300 MG: 300 TABLET, COATED ORAL at 08:21

## 2018-01-01 RX ADMIN — HUMAN INSULIN 7 UNITS/HR: 100 INJECTION, SOLUTION SUBCUTANEOUS at 12:15

## 2018-01-01 RX ADMIN — HEPARIN SODIUM 400 UNITS/HR: 10000 INJECTION, SOLUTION INTRAVENOUS at 22:27

## 2018-01-01 RX ADMIN — MEROPENEM 1000 MG: 1 INJECTION, POWDER, FOR SOLUTION INTRAVENOUS at 23:03

## 2018-01-01 RX ADMIN — ALBUMIN HUMAN 25 G: 0.25 SOLUTION INTRAVENOUS at 06:12

## 2018-01-01 RX ADMIN — DAPTOMYCIN 800 MG: 500 INJECTION, POWDER, LYOPHILIZED, FOR SOLUTION INTRAVENOUS at 14:00

## 2018-01-01 RX ADMIN — INSULIN ASPART 3 UNITS: 100 INJECTION, SOLUTION INTRAVENOUS; SUBCUTANEOUS at 18:08

## 2018-01-01 RX ADMIN — CEFTAROLINE FOSAMIL 300 MG: 600 POWDER, FOR SOLUTION INTRAVENOUS at 14:33

## 2018-01-01 RX ADMIN — OXYCODONE HYDROCHLORIDE 5 MG: 5 TABLET ORAL at 13:11

## 2018-01-01 RX ADMIN — ETOMIDATE 10 MG: 2 INJECTION INTRAVENOUS at 23:33

## 2018-01-01 RX ADMIN — PANTOPRAZOLE SODIUM 40 MG: 40 INJECTION, POWDER, FOR SOLUTION INTRAVENOUS at 21:18

## 2018-01-01 RX ADMIN — Medication 0.75 MG: at 08:09

## 2018-01-01 RX ADMIN — DEXTROSE: 20 INJECTION, SOLUTION INTRAVENOUS at 07:00

## 2018-01-01 RX ADMIN — MEROPENEM 1000 MG: 1 INJECTION, POWDER, FOR SOLUTION INTRAVENOUS at 13:36

## 2018-01-01 RX ADMIN — HUMAN INSULIN 8 UNITS/HR: 100 INJECTION, SOLUTION SUBCUTANEOUS at 06:12

## 2018-01-01 RX ADMIN — HUMAN INSULIN 1 UNITS/HR: 100 INJECTION, SOLUTION SUBCUTANEOUS at 17:45

## 2018-01-01 RX ADMIN — NYSTATIN 500000 UNITS: 100000 SUSPENSION ORAL at 16:26

## 2018-01-01 RX ADMIN — OXYCODONE HYDROCHLORIDE 10 MG: 5 SOLUTION ORAL at 17:54

## 2018-01-01 RX ADMIN — MEROPENEM 1000 MG: 1 INJECTION, POWDER, FOR SOLUTION INTRAVENOUS at 06:16

## 2018-01-01 RX ADMIN — LACTULOSE 20 G: 10 SOLUTION ORAL; RECTAL at 08:07

## 2018-01-01 RX ADMIN — AMPICILLIN SODIUM 2 G: 2 INJECTION, POWDER, FOR SOLUTION INTRAMUSCULAR; INTRAVENOUS at 21:14

## 2018-01-01 RX ADMIN — Medication 0.25 MG: at 17:53

## 2018-01-01 RX ADMIN — HYDROMORPHONE HYDROCHLORIDE 0.3 MG: 1 INJECTION, SOLUTION INTRAMUSCULAR; INTRAVENOUS; SUBCUTANEOUS at 16:26

## 2018-01-01 RX ADMIN — METRONIDAZOLE 500 MG: 500 INJECTION, SOLUTION INTRAVENOUS at 02:05

## 2018-01-01 RX ADMIN — Medication 12.5 ML/KG/HR: at 07:43

## 2018-01-01 RX ADMIN — SULFAMETHOXAZOLE AND TRIMETHOPRIM 80 MG: 200; 40 SUSPENSION ORAL at 07:58

## 2018-01-01 RX ADMIN — MAGNESIUM SULFATE HEPTAHYDRATE: 500 INJECTION, SOLUTION INTRAMUSCULAR; INTRAVENOUS at 19:52

## 2018-01-01 RX ADMIN — Medication 17 ML/KG/HR: at 18:03

## 2018-01-01 RX ADMIN — CEFTAROLINE FOSAMIL 300 MG: 600 POWDER, FOR SOLUTION INTRAVENOUS at 14:24

## 2018-01-01 RX ADMIN — SODIUM CHLORIDE 50 MG: 9 INJECTION, SOLUTION INTRAVENOUS at 20:19

## 2018-01-01 RX ADMIN — POTASSIUM PHOSPHATE, MONOBASIC AND POTASSIUM PHOSPHATE, DIBASIC 15 MMOL: 224; 236 INJECTION, SOLUTION INTRAVENOUS at 09:03

## 2018-01-01 RX ADMIN — OXYCODONE HYDROCHLORIDE 5 MG: 5 SOLUTION ORAL at 21:35

## 2018-01-01 RX ADMIN — HEPARIN SODIUM 1200 UNITS/HR: 10000 INJECTION, SOLUTION INTRAVENOUS at 02:40

## 2018-01-01 RX ADMIN — ALBUMIN HUMAN 12.5 G: 50 SOLUTION INTRAVENOUS at 08:42

## 2018-01-01 RX ADMIN — SODIUM CHLORIDE 2000 ML: 9 INJECTION, SOLUTION INTRAVENOUS at 20:37

## 2018-01-01 RX ADMIN — HYDROCORTISONE SODIUM SUCCINATE 100 MG: 100 INJECTION, POWDER, FOR SOLUTION INTRAMUSCULAR; INTRAVENOUS at 06:38

## 2018-01-01 RX ADMIN — Medication 12.5 ML/KG/HR: at 21:42

## 2018-01-01 RX ADMIN — PANTOPRAZOLE SODIUM 40 MG: 40 INJECTION, POWDER, FOR SOLUTION INTRAVENOUS at 20:01

## 2018-01-01 RX ADMIN — RIFAXIMIN 550 MG: 550 TABLET ORAL at 08:45

## 2018-01-01 RX ADMIN — Medication 50 MCG/HR: at 17:14

## 2018-01-01 RX ADMIN — SULFAMETHOXAZOLE AND TRIMETHOPRIM 80 MG: 200; 40 SUSPENSION ORAL at 11:42

## 2018-01-01 RX ADMIN — Medication 1.25 MG: at 07:37

## 2018-01-01 RX ADMIN — OXYCODONE HYDROCHLORIDE 10 MG: 5 SOLUTION ORAL at 02:14

## 2018-01-01 RX ADMIN — HUMAN INSULIN 8 UNITS/HR: 100 INJECTION, SOLUTION SUBCUTANEOUS at 23:30

## 2018-01-01 RX ADMIN — ALBUMIN HUMAN 25 G: 0.05 INJECTION, SOLUTION INTRAVENOUS at 15:30

## 2018-01-01 RX ADMIN — Medication 0.5 MG: at 16:27

## 2018-01-01 RX ADMIN — NYSTATIN 500000 UNITS: 100000 SUSPENSION ORAL at 17:15

## 2018-01-01 RX ADMIN — DOCUSATE SODIUM 286 ML: 50 LIQUID ORAL at 11:33

## 2018-01-01 RX ADMIN — NYSTATIN 500000 UNITS: 100000 SUSPENSION ORAL at 07:34

## 2018-01-01 RX ADMIN — POTASSIUM CHLORIDE 20 MEQ: 400 INJECTION, SOLUTION INTRAVENOUS at 13:22

## 2018-01-01 RX ADMIN — DOCUSATE SODIUM 286 ML: 50 LIQUID ORAL at 12:58

## 2018-01-01 RX ADMIN — PANTOPRAZOLE SODIUM 40 MG: 40 TABLET, DELAYED RELEASE ORAL at 15:53

## 2018-01-01 RX ADMIN — FENTANYL CITRATE 50 MCG: 50 INJECTION INTRAMUSCULAR; INTRAVENOUS at 01:51

## 2018-01-01 RX ADMIN — POTASSIUM CHLORIDE 40 MEQ: 1.5 POWDER, FOR SOLUTION ORAL at 18:42

## 2018-01-01 RX ADMIN — Medication 12.5 ML/KG/HR: at 13:06

## 2018-01-01 RX ADMIN — FENTANYL CITRATE 50 MCG: 50 INJECTION, SOLUTION INTRAMUSCULAR; INTRAVENOUS at 11:25

## 2018-01-01 RX ADMIN — OXYCODONE HYDROCHLORIDE 10 MG: 5 SOLUTION ORAL at 02:03

## 2018-01-01 RX ADMIN — Medication 17 ML/KG/HR: at 13:39

## 2018-01-01 RX ADMIN — HYDROMORPHONE HYDROCHLORIDE 0.5 MG: 1 INJECTION, SOLUTION INTRAMUSCULAR; INTRAVENOUS; SUBCUTANEOUS at 08:08

## 2018-01-01 RX ADMIN — METRONIDAZOLE 500 MG: 500 INJECTION, SOLUTION INTRAVENOUS at 13:08

## 2018-01-01 RX ADMIN — POTASSIUM CHLORIDE 20 MEQ: 14.9 INJECTION, SOLUTION INTRAVENOUS at 16:23

## 2018-01-01 RX ADMIN — VASOPRESSIN 2 UNITS: 20 INJECTION INTRAVENOUS at 09:54

## 2018-01-01 RX ADMIN — MIDODRINE HYDROCHLORIDE 5 MG: 5 TABLET ORAL at 02:47

## 2018-01-01 RX ADMIN — FLUCONAZOLE 400 MG: 2 INJECTION, SOLUTION INTRAVENOUS at 06:15

## 2018-01-01 RX ADMIN — CEFTAROLINE FOSAMIL 400 MG: 600 POWDER, FOR SOLUTION INTRAVENOUS at 06:27

## 2018-01-01 RX ADMIN — CEFTAROLINE FOSAMIL 300 MG: 600 POWDER, FOR SOLUTION INTRAVENOUS at 13:22

## 2018-01-01 RX ADMIN — FERROUS SULFATE TAB 325 MG (65 MG ELEMENTAL FE) 325 MG: 325 (65 FE) TAB at 12:36

## 2018-01-01 RX ADMIN — DEXTROSE MONOHYDRATE: 50 INJECTION, SOLUTION INTRAVENOUS at 18:27

## 2018-01-01 RX ADMIN — Medication 0.75 MG: at 17:40

## 2018-01-01 RX ADMIN — Medication 12.5 ML/KG/HR: at 16:28

## 2018-01-01 RX ADMIN — MAGNESIUM SULFATE HEPTAHYDRATE 2 G: 40 INJECTION, SOLUTION INTRAVENOUS at 11:58

## 2018-01-01 RX ADMIN — Medication 0.5 MG: at 23:01

## 2018-01-01 RX ADMIN — PROPOFOL 50 MG: 10 INJECTION, EMULSION INTRAVENOUS at 09:45

## 2018-01-01 RX ADMIN — SODIUM CHLORIDE 50 MG: 9 INJECTION, SOLUTION INTRAVENOUS at 05:07

## 2018-01-01 RX ADMIN — HUMAN INSULIN 1 UNITS/HR: 100 INJECTION, SOLUTION SUBCUTANEOUS at 04:28

## 2018-01-01 RX ADMIN — ACETAMINOPHEN 500 MG: 500 TABLET, FILM COATED ORAL at 00:42

## 2018-01-01 RX ADMIN — MIDODRINE HYDROCHLORIDE 10 MG: 5 TABLET ORAL at 11:56

## 2018-01-01 RX ADMIN — MEROPENEM 1 G: 1 INJECTION, POWDER, FOR SOLUTION INTRAVENOUS at 21:49

## 2018-01-01 RX ADMIN — POTASSIUM CHLORIDE 20 MEQ: 14.9 INJECTION, SOLUTION INTRAVENOUS at 07:56

## 2018-01-01 RX ADMIN — MEROPENEM 1 G: 1 INJECTION, POWDER, FOR SOLUTION INTRAVENOUS at 13:24

## 2018-01-01 RX ADMIN — PHENYLEPHRINE HYDROCHLORIDE 150 MCG: 10 INJECTION, SOLUTION INTRAMUSCULAR; INTRAVENOUS; SUBCUTANEOUS at 12:21

## 2018-01-01 RX ADMIN — ALBUMIN HUMAN 12.5 G: 0.25 SOLUTION INTRAVENOUS at 10:01

## 2018-01-01 RX ADMIN — SODIUM CHLORIDE, POTASSIUM CHLORIDE, SODIUM LACTATE AND CALCIUM CHLORIDE: 600; 310; 30; 20 INJECTION, SOLUTION INTRAVENOUS at 15:19

## 2018-01-01 RX ADMIN — TACROLIMUS 2.5 MG: 1 CAPSULE ORAL at 18:04

## 2018-01-01 RX ADMIN — SODIUM CHLORIDE, POTASSIUM CHLORIDE, SODIUM LACTATE AND CALCIUM CHLORIDE 1000 ML: 600; 310; 30; 20 INJECTION, SOLUTION INTRAVENOUS at 15:43

## 2018-01-01 RX ADMIN — ERTAPENEM SODIUM 500 MG: 1 INJECTION, POWDER, LYOPHILIZED, FOR SOLUTION INTRAMUSCULAR; INTRAVENOUS at 16:30

## 2018-01-01 RX ADMIN — CEFTAROLINE FOSAMIL 400 MG: 600 POWDER, FOR SOLUTION INTRAVENOUS at 19:55

## 2018-01-01 RX ADMIN — AMPICILLIN SODIUM 2 G: 2 INJECTION, POWDER, FOR SOLUTION INTRAMUSCULAR; INTRAVENOUS at 13:38

## 2018-01-01 RX ADMIN — AMPICILLIN SODIUM 2 G: 2 INJECTION, POWDER, FOR SOLUTION INTRAMUSCULAR; INTRAVENOUS at 11:21

## 2018-01-01 RX ADMIN — HUMAN INSULIN 6 UNITS/HR: 100 INJECTION, SOLUTION SUBCUTANEOUS at 09:11

## 2018-01-01 RX ADMIN — AMIKACIN SULFATE 550 MG: 250 INJECTION, SOLUTION INTRAMUSCULAR; INTRAVENOUS at 15:37

## 2018-01-01 RX ADMIN — ALBUMIN HUMAN 25 G: 0.05 INJECTION, SOLUTION INTRAVENOUS at 21:47

## 2018-01-01 RX ADMIN — PHENYLEPHRINE HYDROCHLORIDE 100 MCG: 10 INJECTION, SOLUTION INTRAMUSCULAR; INTRAVENOUS; SUBCUTANEOUS at 16:38

## 2018-01-01 RX ADMIN — Medication 0.5 MG: at 13:26

## 2018-01-01 RX ADMIN — MAGNESIUM SULFATE HEPTAHYDRATE 2 G: 40 INJECTION, SOLUTION INTRAVENOUS at 08:55

## 2018-01-01 RX ADMIN — NOREPINEPHRINE BITARTRATE 12.8 MCG: 1 INJECTION INTRAVENOUS at 09:42

## 2018-01-01 RX ADMIN — POTASSIUM CHLORIDE 20 MEQ: 1.5 POWDER, FOR SOLUTION ORAL at 05:52

## 2018-01-01 RX ADMIN — PANTOPRAZOLE SODIUM 40 MG: 40 INJECTION, POWDER, FOR SOLUTION INTRAVENOUS at 08:11

## 2018-01-01 RX ADMIN — MICAFUNGIN SODIUM 100 MG: 10 INJECTION, POWDER, LYOPHILIZED, FOR SOLUTION INTRAVENOUS at 19:46

## 2018-01-01 RX ADMIN — HUMAN INSULIN 3 UNITS/HR: 100 INJECTION, SOLUTION SUBCUTANEOUS at 04:29

## 2018-01-01 RX ADMIN — Medication 1 MG: at 07:26

## 2018-01-01 RX ADMIN — METRONIDAZOLE 500 MG: 500 INJECTION, SOLUTION INTRAVENOUS at 19:56

## 2018-01-01 RX ADMIN — HYDROCORTISONE SODIUM SUCCINATE 50 MG: 100 INJECTION, POWDER, FOR SOLUTION INTRAMUSCULAR; INTRAVENOUS at 03:02

## 2018-01-01 RX ADMIN — HUMAN INSULIN 6 UNITS/HR: 100 INJECTION, SOLUTION SUBCUTANEOUS at 22:50

## 2018-01-01 RX ADMIN — SODIUM CHLORIDE: 9 INJECTION, SOLUTION INTRAVENOUS at 10:07

## 2018-01-01 RX ADMIN — POTASSIUM CHLORIDE 20 MEQ: 400 INJECTION, SOLUTION INTRAVENOUS at 18:00

## 2018-01-01 RX ADMIN — TENOFOVIR DISOPROXIL FUMARATE 300 MG: 300 TABLET, COATED ORAL at 09:19

## 2018-01-01 RX ADMIN — ALBUMIN HUMAN 12.5 G: 0.05 INJECTION, SOLUTION INTRAVENOUS at 15:45

## 2018-01-01 RX ADMIN — CLOTRIMAZOLE 1 TROCHE: 10 LOZENGE ORAL at 11:58

## 2018-01-01 RX ADMIN — CALCIUM GLUCONATE 1 G: 98 INJECTION, SOLUTION INTRAVENOUS at 13:24

## 2018-01-01 RX ADMIN — Medication 1 MG: at 17:59

## 2018-01-01 RX ADMIN — TENOFOVIR DISOPROXIL FUMARATE 300 MG: 300 TABLET, COATED ORAL at 09:21

## 2018-01-01 RX ADMIN — CLOTRIMAZOLE 1 TROCHE: 10 LOZENGE ORAL at 11:16

## 2018-01-01 RX ADMIN — OXYCODONE HYDROCHLORIDE 10 MG: 5 SOLUTION ORAL at 17:06

## 2018-01-01 RX ADMIN — HUMAN INSULIN 1.75 UNITS/HR: 100 INJECTION, SOLUTION SUBCUTANEOUS at 04:51

## 2018-01-01 RX ADMIN — FUROSEMIDE 40 MG: 40 TABLET ORAL at 08:45

## 2018-01-01 RX ADMIN — Medication 12.5 ML/KG/HR: at 03:44

## 2018-01-01 RX ADMIN — CEFTAROLINE FOSAMIL 300 MG: 600 POWDER, FOR SOLUTION INTRAVENOUS at 14:58

## 2018-01-01 RX ADMIN — Medication 1.25 MG: at 17:13

## 2018-01-01 RX ADMIN — LOPERAMIDE HYDROCHLORIDE 2 MG: 1 SOLUTION ORAL at 13:19

## 2018-01-01 RX ADMIN — Medication 1.5 MG: at 18:49

## 2018-01-01 RX ADMIN — CEFTAROLINE FOSAMIL 400 MG: 600 POWDER, FOR SOLUTION INTRAVENOUS at 19:52

## 2018-01-01 RX ADMIN — POTASSIUM CHLORIDE 20 MEQ: 400 INJECTION, SOLUTION INTRAVENOUS at 15:29

## 2018-01-01 RX ADMIN — BUMETANIDE 2 MG: 0.25 INJECTION INTRAMUSCULAR; INTRAVENOUS at 16:08

## 2018-01-01 RX ADMIN — MAGNESIUM SULFATE HEPTAHYDRATE: 500 INJECTION, SOLUTION INTRAMUSCULAR; INTRAVENOUS at 20:00

## 2018-01-01 RX ADMIN — FENTANYL CITRATE 75 MCG/HR: 50 INJECTION, SOLUTION INTRAMUSCULAR; INTRAVENOUS at 05:16

## 2018-01-01 RX ADMIN — HYDROMORPHONE HYDROCHLORIDE 0.5 MG: 1 INJECTION, SOLUTION INTRAMUSCULAR; INTRAVENOUS; SUBCUTANEOUS at 10:03

## 2018-01-01 RX ADMIN — NYSTATIN 500000 UNITS: 100000 SUSPENSION ORAL at 11:45

## 2018-01-01 RX ADMIN — TACROLIMUS 2 MG: 1 CAPSULE ORAL at 10:31

## 2018-01-01 RX ADMIN — LIDOCAINE HYDROCHLORIDE 10 ML: 10 INJECTION, SOLUTION EPIDURAL; INFILTRATION; INTRACAUDAL; PERINEURAL at 15:56

## 2018-01-01 RX ADMIN — SULFAMETHOXAZOLE AND TRIMETHOPRIM 80 MG: 200; 40 SUSPENSION ORAL at 07:49

## 2018-01-01 RX ADMIN — NOREPINEPHRINE BITARTRATE 0.03 MCG/KG/MIN: 1 INJECTION INTRAVENOUS at 08:04

## 2018-01-01 RX ADMIN — VALGANCICLOVIR HYDROCHLORIDE 450 MG: 50 POWDER, FOR SOLUTION ORAL at 18:42

## 2018-01-01 RX ADMIN — ALBUMIN HUMAN 12.5 G: 0.05 INJECTION, SOLUTION INTRAVENOUS at 13:57

## 2018-01-01 RX ADMIN — MYCOPHENOLATE MOFETIL 1000 MG: 200 POWDER, FOR SUSPENSION ORAL at 16:02

## 2018-01-01 RX ADMIN — NOREPINEPHRINE BITARTRATE 0.03 MCG/KG/MIN: 1 INJECTION INTRAVENOUS at 13:39

## 2018-01-01 RX ADMIN — SODIUM CHLORIDE 500 ML: 900 IRRIGANT IRRIGATION at 16:48

## 2018-01-01 RX ADMIN — NYSTATIN 500000 UNITS: 100000 SUSPENSION ORAL at 07:47

## 2018-01-01 RX ADMIN — SODIUM CHLORIDE, POTASSIUM CHLORIDE, SODIUM LACTATE AND CALCIUM CHLORIDE 500 ML: 600; 310; 30; 20 INJECTION, SOLUTION INTRAVENOUS at 23:38

## 2018-01-01 RX ADMIN — Medication 81 MG: at 07:38

## 2018-01-01 RX ADMIN — DEXTROSE: 20 INJECTION, SOLUTION INTRAVENOUS at 20:03

## 2018-01-01 RX ADMIN — CEFTAROLINE FOSAMIL 400 MG: 600 POWDER, FOR SOLUTION INTRAVENOUS at 19:27

## 2018-01-01 RX ADMIN — DEXMEDETOMIDINE HYDROCHLORIDE 0.3 MCG/KG/HR: 4 INJECTION, SOLUTION INTRAVENOUS at 21:36

## 2018-01-01 RX ADMIN — ROCURONIUM BROMIDE 10 MG: 10 INJECTION INTRAVENOUS at 17:56

## 2018-01-01 RX ADMIN — POTASSIUM CHLORIDE 20 MEQ: 400 INJECTION, SOLUTION INTRAVENOUS at 00:01

## 2018-01-01 RX ADMIN — ROCURONIUM BROMIDE 20 MG: 10 INJECTION INTRAVENOUS at 14:30

## 2018-01-01 RX ADMIN — HUMAN INSULIN 5 UNITS/HR: 100 INJECTION, SOLUTION SUBCUTANEOUS at 17:58

## 2018-01-01 RX ADMIN — LINEZOLID 600 MG: 600 INJECTION, SOLUTION INTRAVENOUS at 08:30

## 2018-01-01 RX ADMIN — SODIUM CHLORIDE 8 MG/HR: 9 INJECTION, SOLUTION INTRAVENOUS at 19:01

## 2018-01-01 RX ADMIN — MAGNESIUM SULFATE HEPTAHYDRATE 2 G: 40 INJECTION, SOLUTION INTRAVENOUS at 16:49

## 2018-01-01 RX ADMIN — ERTAPENEM SODIUM 500 MG: 1 INJECTION, POWDER, LYOPHILIZED, FOR SOLUTION INTRAMUSCULAR; INTRAVENOUS at 17:11

## 2018-01-01 RX ADMIN — EPINEPHRINE 80 MCG: 1 INJECTION PARENTERAL at 10:16

## 2018-01-01 RX ADMIN — ALBUMIN HUMAN 12.5 G: 0.25 SOLUTION INTRAVENOUS at 10:31

## 2018-01-01 RX ADMIN — Medication 12.5 ML/KG/HR: at 18:09

## 2018-01-01 RX ADMIN — PIPERACILLIN SODIUM AND TAZOBACTAM SODIUM 3.38 G: 3; .375 INJECTION, POWDER, LYOPHILIZED, FOR SOLUTION INTRAVENOUS at 06:09

## 2018-01-01 RX ADMIN — HYDROCORTISONE SODIUM SUCCINATE 50 MG: 100 INJECTION, POWDER, FOR SOLUTION INTRAMUSCULAR; INTRAVENOUS at 04:37

## 2018-01-01 RX ADMIN — Medication 17 ML/KG/HR: at 22:55

## 2018-01-01 RX ADMIN — METRONIDAZOLE 500 MG: 500 INJECTION, SOLUTION INTRAVENOUS at 01:33

## 2018-01-01 RX ADMIN — NOREPINEPHRINE BITARTRATE 0.03 MCG/KG/MIN: 1 INJECTION INTRAVENOUS at 13:57

## 2018-01-01 RX ADMIN — POTASSIUM CHLORIDE 20 MEQ: 1.5 POWDER, FOR SOLUTION ORAL at 08:01

## 2018-01-01 RX ADMIN — Medication 50 MCG/HR: at 21:49

## 2018-01-01 RX ADMIN — MICONAZOLE NITRATE: 2 POWDER TOPICAL at 09:22

## 2018-01-01 RX ADMIN — MEROPENEM 1000 MG: 1 INJECTION, POWDER, FOR SOLUTION INTRAVENOUS at 11:49

## 2018-01-01 RX ADMIN — Medication 12.5 ML/KG/HR: at 16:36

## 2018-01-01 RX ADMIN — SODIUM CHLORIDE 50 MG: 9 INJECTION, SOLUTION INTRAVENOUS at 04:07

## 2018-01-01 RX ADMIN — NOREPINEPHRINE BITARTRATE 0.03 MCG/KG/MIN: 1 INJECTION INTRAVENOUS at 14:14

## 2018-01-01 RX ADMIN — PHENYLEPHRINE HYDROCHLORIDE 200 MCG: 10 INJECTION, SOLUTION INTRAMUSCULAR; INTRAVENOUS; SUBCUTANEOUS at 17:52

## 2018-01-01 RX ADMIN — ROCURONIUM BROMIDE 50 MG: 10 INJECTION INTRAVENOUS at 16:21

## 2018-01-01 RX ADMIN — MAGNESIUM SULFATE HEPTAHYDRATE: 500 INJECTION, SOLUTION INTRAMUSCULAR; INTRAVENOUS at 20:17

## 2018-01-01 RX ADMIN — MICAFUNGIN SODIUM 100 MG: 10 INJECTION, POWDER, LYOPHILIZED, FOR SOLUTION INTRAVENOUS at 19:39

## 2018-01-01 RX ADMIN — MAGNESIUM SULFATE HEPTAHYDRATE: 500 INJECTION, SOLUTION INTRAMUSCULAR; INTRAVENOUS at 19:41

## 2018-01-01 RX ADMIN — HYDROCORTISONE 25 MG: 20 TABLET ORAL at 17:20

## 2018-01-01 RX ADMIN — ALBUMIN HUMAN 12.5 G: 0.25 SOLUTION INTRAVENOUS at 18:08

## 2018-01-01 RX ADMIN — NYSTATIN 500000 UNITS: 100000 SUSPENSION ORAL at 07:49

## 2018-01-01 RX ADMIN — HYDROMORPHONE HYDROCHLORIDE 0.5 MG: 1 INJECTION, SOLUTION INTRAMUSCULAR; INTRAVENOUS; SUBCUTANEOUS at 20:48

## 2018-01-01 RX ADMIN — CALCIUM CHLORIDE 1 G: 100 INJECTION, SOLUTION INTRAVENOUS at 12:56

## 2018-01-01 RX ADMIN — VASOPRESSIN 2.4 UNITS/HR: 20 INJECTION INTRAVENOUS at 22:52

## 2018-01-01 RX ADMIN — SULFAMETHOXAZOLE AND TRIMETHOPRIM 80 MG: 200; 40 SUSPENSION ORAL at 07:35

## 2018-01-01 RX ADMIN — I.V. FAT EMULSION 250 ML: 20 EMULSION INTRAVENOUS at 19:43

## 2018-01-01 RX ADMIN — ROCURONIUM BROMIDE 50 MG: 10 INJECTION INTRAVENOUS at 11:50

## 2018-01-01 RX ADMIN — Medication 0.3 MG: at 22:53

## 2018-01-01 RX ADMIN — SODIUM CHLORIDE 3000 ML: 9 INJECTION, SOLUTION INTRAVENOUS at 09:14

## 2018-01-01 RX ADMIN — Medication 1 MG: at 10:06

## 2018-01-01 RX ADMIN — HYDROCORTISONE SODIUM SUCCINATE 50 MG: 100 INJECTION, POWDER, FOR SOLUTION INTRAMUSCULAR; INTRAVENOUS at 10:26

## 2018-01-01 RX ADMIN — SULFAMETHOXAZOLE AND TRIMETHOPRIM 80 MG: 200; 40 SUSPENSION ORAL at 08:06

## 2018-01-01 RX ADMIN — PHENYLEPHRINE HYDROCHLORIDE 200 MCG: 10 INJECTION, SOLUTION INTRAMUSCULAR; INTRAVENOUS; SUBCUTANEOUS at 08:49

## 2018-01-01 RX ADMIN — PHENYLEPHRINE HYDROCHLORIDE 100 MCG: 10 INJECTION, SOLUTION INTRAMUSCULAR; INTRAVENOUS; SUBCUTANEOUS at 14:10

## 2018-01-01 RX ADMIN — Medication 25 MCG/HR: at 22:37

## 2018-01-01 RX ADMIN — SODIUM CHLORIDE 500 ML: 9 INJECTION, SOLUTION INTRAVENOUS at 20:15

## 2018-01-01 RX ADMIN — HYDROCORTISONE SODIUM SUCCINATE 100 MG: 100 INJECTION, POWDER, FOR SOLUTION INTRAMUSCULAR; INTRAVENOUS at 18:48

## 2018-01-01 RX ADMIN — OXYCODONE HYDROCHLORIDE 5 MG: 5 TABLET ORAL at 17:41

## 2018-01-01 RX ADMIN — PANTOPRAZOLE SODIUM 40 MG: 40 INJECTION, POWDER, FOR SOLUTION INTRAVENOUS at 08:26

## 2018-01-01 RX ADMIN — SODIUM CHLORIDE 1000 ML: 9 INJECTION, SOLUTION INTRAVENOUS at 16:04

## 2018-01-01 RX ADMIN — FLUCONAZOLE 200 MG: 2 INJECTION, SOLUTION INTRAVENOUS at 07:45

## 2018-01-01 RX ADMIN — MYCOPHENOLATE MOFETIL 250 MG: 200 POWDER, FOR SUSPENSION ORAL at 17:22

## 2018-01-01 RX ADMIN — LACTULOSE 20 G: 20 SOLUTION ORAL at 20:24

## 2018-01-01 RX ADMIN — HEPARIN SODIUM 1850 UNITS/HR: 10000 INJECTION, SOLUTION INTRAVENOUS at 21:24

## 2018-01-01 RX ADMIN — LACTULOSE 20 G: 20 POWDER, FOR SOLUTION ORAL at 07:28

## 2018-01-01 RX ADMIN — OXYCODONE HYDROCHLORIDE 5 MG: 5 SOLUTION ORAL at 18:37

## 2018-01-01 RX ADMIN — Medication 12.5 ML/KG/HR: at 08:56

## 2018-01-01 RX ADMIN — ALBUMIN HUMAN 12.5 G: 0.05 INJECTION, SOLUTION INTRAVENOUS at 01:49

## 2018-01-01 RX ADMIN — SODIUM CHLORIDE 50 MG: 9 INJECTION, SOLUTION INTRAVENOUS at 05:01

## 2018-01-01 RX ADMIN — PHENYLEPHRINE HYDROCHLORIDE 150 MCG: 10 INJECTION, SOLUTION INTRAMUSCULAR; INTRAVENOUS; SUBCUTANEOUS at 12:13

## 2018-01-01 RX ADMIN — RIFAXIMIN 550 MG: 550 TABLET ORAL at 20:14

## 2018-01-01 RX ADMIN — SULFAMETHOXAZOLE AND TRIMETHOPRIM 80 MG: 200; 40 SUSPENSION ORAL at 10:09

## 2018-01-01 RX ADMIN — BUMETANIDE 1 MG: 1 TABLET ORAL at 08:19

## 2018-01-01 RX ADMIN — LACTULOSE 20 G: 20 POWDER, FOR SOLUTION ORAL at 20:14

## 2018-01-01 RX ADMIN — CLOTRIMAZOLE 1 TROCHE: 10 LOZENGE ORAL at 17:06

## 2018-01-01 RX ADMIN — AMPICILLIN SODIUM 2 G: 2 INJECTION, POWDER, FOR SOLUTION INTRAMUSCULAR; INTRAVENOUS at 11:44

## 2018-01-01 RX ADMIN — PANTOPRAZOLE SODIUM 40 MG: 40 TABLET, DELAYED RELEASE ORAL at 09:38

## 2018-01-01 RX ADMIN — SODIUM CHLORIDE 500 ML: 9 INJECTION, SOLUTION INTRAVENOUS at 20:19

## 2018-01-01 RX ADMIN — HEPARIN SODIUM 500 UNITS/HR: 10000 INJECTION, SOLUTION INTRAVENOUS at 08:00

## 2018-01-01 RX ADMIN — SODIUM CHLORIDE 1000 ML: 9 INJECTION, SOLUTION INTRAVENOUS at 08:21

## 2018-01-01 RX ADMIN — POTASSIUM CHLORIDE 20 MEQ: 29.8 INJECTION, SOLUTION INTRAVENOUS at 21:52

## 2018-01-01 RX ADMIN — FLUCONAZOLE 200 MG: 2 INJECTION, SOLUTION INTRAVENOUS at 07:34

## 2018-01-01 RX ADMIN — OXYCODONE HYDROCHLORIDE 5 MG: 5 TABLET ORAL at 01:46

## 2018-01-01 RX ADMIN — VALGANCICLOVIR 450 MG: 450 TABLET, FILM COATED ORAL at 07:53

## 2018-01-01 RX ADMIN — Medication 0.5 MG: at 17:55

## 2018-01-01 RX ADMIN — METRONIDAZOLE 500 MG: 500 INJECTION, SOLUTION INTRAVENOUS at 19:55

## 2018-01-01 RX ADMIN — HYDROCORTISONE SODIUM SUCCINATE 50 MG: 100 INJECTION, POWDER, FOR SOLUTION INTRAMUSCULAR; INTRAVENOUS at 16:28

## 2018-01-01 RX ADMIN — Medication 1.5 MG: at 08:16

## 2018-01-01 RX ADMIN — POTASSIUM CHLORIDE 20 MEQ: 1.5 POWDER, FOR SOLUTION ORAL at 18:27

## 2018-01-01 RX ADMIN — FENTANYL CITRATE 50 MCG: 50 INJECTION, SOLUTION INTRAMUSCULAR; INTRAVENOUS at 08:59

## 2018-01-01 RX ADMIN — SODIUM CHLORIDE 50 MG: 9 INJECTION, SOLUTION INTRAVENOUS at 19:47

## 2018-01-01 RX ADMIN — SODIUM CHLORIDE, SODIUM GLUCONATE, SODIUM ACETATE, POTASSIUM CHLORIDE AND MAGNESIUM CHLORIDE: 526; 502; 368; 37; 30 INJECTION, SOLUTION INTRAVENOUS at 09:22

## 2018-01-01 RX ADMIN — ALBUMIN HUMAN 12.5 G: 0.25 SOLUTION INTRAVENOUS at 10:51

## 2018-01-01 RX ADMIN — VALGANCICLOVIR HYDROCHLORIDE 450 MG: 50 POWDER, FOR SOLUTION ORAL at 08:26

## 2018-01-01 RX ADMIN — HYDROCORTISONE SODIUM SUCCINATE 25 MG: 100 INJECTION, POWDER, FOR SOLUTION INTRAMUSCULAR; INTRAVENOUS at 11:40

## 2018-01-01 RX ADMIN — ACETAMINOPHEN 325 MG: 325 TABLET, FILM COATED ORAL at 20:30

## 2018-01-01 RX ADMIN — HUMAN INSULIN 4 UNITS/HR: 100 INJECTION, SOLUTION SUBCUTANEOUS at 15:07

## 2018-01-01 RX ADMIN — OXYCODONE HYDROCHLORIDE 5 MG: 5 SOLUTION ORAL at 02:32

## 2018-01-01 RX ADMIN — Medication 12.5 ML/KG/HR: at 03:04

## 2018-01-01 RX ADMIN — NOREPINEPHRINE BITARTRATE 12.8 MCG: 1 INJECTION INTRAVENOUS at 08:34

## 2018-01-01 RX ADMIN — ALBUMIN HUMAN 100 G: 0.25 SOLUTION INTRAVENOUS at 08:08

## 2018-01-01 RX ADMIN — SULFAMETHOXAZOLE AND TRIMETHOPRIM 80 MG: 200; 40 SUSPENSION ORAL at 08:15

## 2018-01-01 RX ADMIN — MICAFUNGIN SODIUM 100 MG: 10 INJECTION, POWDER, LYOPHILIZED, FOR SOLUTION INTRAVENOUS at 18:36

## 2018-01-01 RX ADMIN — TENOFOVIR DISOPROXIL FUMARATE 300 MG: 300 TABLET, COATED ORAL at 08:43

## 2018-01-01 RX ADMIN — LACTULOSE 20 G: 20 POWDER, FOR SOLUTION ORAL at 16:10

## 2018-01-01 RX ADMIN — FENTANYL CITRATE 50 MCG/HR: 50 INJECTION, SOLUTION INTRAMUSCULAR; INTRAVENOUS at 21:25

## 2018-01-01 RX ADMIN — DEXMEDETOMIDINE HYDROCHLORIDE 0.3 MCG/KG/HR: 4 INJECTION, SOLUTION INTRAVENOUS at 19:47

## 2018-01-01 RX ADMIN — VASOPRESSIN 2.4 UNITS/HR: 20 INJECTION INTRAVENOUS at 19:42

## 2018-01-01 RX ADMIN — METRONIDAZOLE 500 MG: 500 INJECTION, SOLUTION INTRAVENOUS at 08:20

## 2018-01-01 RX ADMIN — HYDROMORPHONE HYDROCHLORIDE 0.5 MG: 1 INJECTION, SOLUTION INTRAMUSCULAR; INTRAVENOUS; SUBCUTANEOUS at 07:40

## 2018-01-01 RX ADMIN — MIDAZOLAM 0.5 MG: 1 INJECTION INTRAMUSCULAR; INTRAVENOUS at 09:04

## 2018-01-01 RX ADMIN — CEFTAROLINE FOSAMIL 400 MG: 600 POWDER, FOR SOLUTION INTRAVENOUS at 07:25

## 2018-01-01 RX ADMIN — OXYCODONE HYDROCHLORIDE 10 MG: 5 SOLUTION ORAL at 08:24

## 2018-01-01 RX ADMIN — PIPERACILLIN SODIUM AND TAZOBACTAM SODIUM 3.38 G: 3; .375 INJECTION, POWDER, LYOPHILIZED, FOR SOLUTION INTRAVENOUS at 05:27

## 2018-01-01 RX ADMIN — CALCIUM CHLORIDE 1 G: 100 INJECTION, SOLUTION INTRAVENOUS at 04:58

## 2018-01-01 RX ADMIN — ALBUMIN HUMAN 25 G: 0.25 SOLUTION INTRAVENOUS at 10:47

## 2018-01-01 RX ADMIN — MICAFUNGIN SODIUM 100 MG: 10 INJECTION, POWDER, LYOPHILIZED, FOR SOLUTION INTRAVENOUS at 18:29

## 2018-01-01 RX ADMIN — MIDODRINE HYDROCHLORIDE 10 MG: 5 TABLET ORAL at 03:27

## 2018-01-01 RX ADMIN — PROPOFOL 10 MCG/KG/MIN: 10 INJECTION, EMULSION INTRAVENOUS at 08:27

## 2018-01-01 RX ADMIN — Medication 1.5 MG: at 08:47

## 2018-01-01 RX ADMIN — POTASSIUM CHLORIDE 20 MEQ: 400 INJECTION, SOLUTION INTRAVENOUS at 05:24

## 2018-01-01 RX ADMIN — MICAFUNGIN SODIUM 100 MG: 10 INJECTION, POWDER, LYOPHILIZED, FOR SOLUTION INTRAVENOUS at 20:48

## 2018-01-01 RX ADMIN — Medication 2 G: at 11:24

## 2018-01-01 RX ADMIN — FENTANYL CITRATE 25 MCG: 50 INJECTION, SOLUTION INTRAMUSCULAR; INTRAVENOUS at 17:23

## 2018-01-01 RX ADMIN — Medication 0.5 MG: at 04:08

## 2018-01-01 RX ADMIN — PANTOPRAZOLE SODIUM 40 MG: 40 INJECTION, POWDER, FOR SOLUTION INTRAVENOUS at 07:34

## 2018-01-01 RX ADMIN — ALBUMIN HUMAN: 0.05 INJECTION, SOLUTION INTRAVENOUS at 08:10

## 2018-01-01 RX ADMIN — DEXTROSE MONOHYDRATE: 50 INJECTION, SOLUTION INTRAVENOUS at 21:54

## 2018-01-01 RX ADMIN — HYDROCORTISONE SODIUM SUCCINATE 100 MG: 100 INJECTION, POWDER, FOR SOLUTION INTRAMUSCULAR; INTRAVENOUS at 02:00

## 2018-01-01 RX ADMIN — DAPTOMYCIN 800 MG: 500 INJECTION, POWDER, LYOPHILIZED, FOR SOLUTION INTRAVENOUS at 13:27

## 2018-01-01 RX ADMIN — DEXTROSE AND SODIUM CHLORIDE: 5; 450 INJECTION, SOLUTION INTRAVENOUS at 09:50

## 2018-01-01 RX ADMIN — CEFTAROLINE FOSAMIL 300 MG: 600 POWDER, FOR SOLUTION INTRAVENOUS at 01:51

## 2018-01-01 RX ADMIN — HEPARIN SODIUM 900 UNITS/HR: 10000 INJECTION, SOLUTION INTRAVENOUS at 14:39

## 2018-01-01 RX ADMIN — AMIKACIN SULFATE 500 MG: 250 INJECTION, SOLUTION INTRAMUSCULAR; INTRAVENOUS at 08:57

## 2018-01-01 RX ADMIN — OXYCODONE HYDROCHLORIDE 5 MG: 5 TABLET ORAL at 22:06

## 2018-01-01 RX ADMIN — Medication 1.25 MG: at 20:14

## 2018-01-01 RX ADMIN — SODIUM CHLORIDE, POTASSIUM CHLORIDE, SODIUM LACTATE AND CALCIUM CHLORIDE 500 ML: 600; 310; 30; 20 INJECTION, SOLUTION INTRAVENOUS at 18:13

## 2018-01-01 RX ADMIN — SENNOSIDES AND DOCUSATE SODIUM 1 TABLET: 8.6; 5 TABLET ORAL at 17:58

## 2018-01-01 RX ADMIN — CEFTAROLINE FOSAMIL 300 MG: 600 POWDER, FOR SOLUTION INTRAVENOUS at 21:51

## 2018-01-01 RX ADMIN — Medication 2 MG: at 17:45

## 2018-01-01 RX ADMIN — MYCOPHENOLATE MOFETIL 1000 MG: 200 POWDER, FOR SUSPENSION ORAL at 08:44

## 2018-01-01 RX ADMIN — LIDOCAINE HYDROCHLORIDE 10 ML: 10 INJECTION, SOLUTION EPIDURAL; INFILTRATION; INTRACAUDAL; PERINEURAL at 14:55

## 2018-01-01 RX ADMIN — Medication 0.25 MG: at 08:02

## 2018-01-01 RX ADMIN — FENTANYL CITRATE 100 MCG: 50 INJECTION, SOLUTION INTRAMUSCULAR; INTRAVENOUS at 06:26

## 2018-01-01 RX ADMIN — MAGNESIUM SULFATE HEPTAHYDRATE: 500 INJECTION, SOLUTION INTRAMUSCULAR; INTRAVENOUS at 20:20

## 2018-01-01 RX ADMIN — MIDODRINE HYDROCHLORIDE 10 MG: 5 TABLET ORAL at 12:09

## 2018-01-01 RX ADMIN — Medication 0.5 MG: at 05:54

## 2018-01-01 RX ADMIN — MORPHINE SULFATE 2 MG: 2 INJECTION, SOLUTION INTRAMUSCULAR; INTRAVENOUS at 12:27

## 2018-01-01 RX ADMIN — SODIUM CHLORIDE, SODIUM GLUCONATE, SODIUM ACETATE, POTASSIUM CHLORIDE AND MAGNESIUM CHLORIDE: 526; 502; 368; 37; 30 INJECTION, SOLUTION INTRAVENOUS at 12:13

## 2018-01-01 RX ADMIN — SIMETHICONE CHEW TAB 80 MG 80 MG: 80 TABLET ORAL at 16:05

## 2018-01-01 RX ADMIN — POTASSIUM CHLORIDE 20 MEQ: 1.5 POWDER, FOR SOLUTION ORAL at 05:39

## 2018-01-01 RX ADMIN — VASOPRESSIN 1 UNITS: 20 INJECTION, SOLUTION INTRAMUSCULAR; SUBCUTANEOUS at 12:50

## 2018-01-01 RX ADMIN — MIDODRINE HYDROCHLORIDE 10 MG: 5 TABLET ORAL at 20:24

## 2018-01-01 RX ADMIN — HUMAN INSULIN 2 UNITS/HR: 100 INJECTION, SOLUTION SUBCUTANEOUS at 10:59

## 2018-01-01 RX ADMIN — LACTULOSE 20 G: 20 POWDER, FOR SOLUTION ORAL at 21:05

## 2018-01-01 RX ADMIN — RIFAXIMIN 550 MG: 550 TABLET ORAL at 20:05

## 2018-01-01 RX ADMIN — Medication 1 MG: at 18:06

## 2018-01-01 RX ADMIN — HEPARIN SODIUM 3050 UNITS/HR: 10000 INJECTION, SOLUTION INTRAVENOUS at 19:09

## 2018-01-01 RX ADMIN — TENOFOVIR DISOPROXIL FUMARATE 300 MG: 300 TABLET, COATED ORAL at 09:01

## 2018-01-01 RX ADMIN — FLUCONAZOLE 800 MG: 2 INJECTION, SOLUTION INTRAVENOUS at 09:40

## 2018-01-01 RX ADMIN — Medication 0.5 MG: at 10:19

## 2018-01-01 RX ADMIN — PANTOPRAZOLE SODIUM 40 MG: 40 TABLET, DELAYED RELEASE ORAL at 16:40

## 2018-01-01 RX ADMIN — HYDROMORPHONE HYDROCHLORIDE 0.5 MG: 1 INJECTION, SOLUTION INTRAMUSCULAR; INTRAVENOUS; SUBCUTANEOUS at 08:19

## 2018-01-01 RX ADMIN — HYDROMORPHONE HYDROCHLORIDE 0.5 MG: 1 INJECTION, SOLUTION INTRAMUSCULAR; INTRAVENOUS; SUBCUTANEOUS at 20:49

## 2018-01-01 RX ADMIN — PIPERACILLIN SODIUM AND TAZOBACTAM SODIUM 3.38 G: 3; .375 INJECTION, POWDER, LYOPHILIZED, FOR SOLUTION INTRAVENOUS at 23:57

## 2018-01-01 RX ADMIN — SODIUM CHLORIDE 2000 ML: 9 INJECTION, SOLUTION INTRAVENOUS at 17:18

## 2018-01-01 RX ADMIN — PANTOPRAZOLE SODIUM 40 MG: 40 INJECTION, POWDER, FOR SOLUTION INTRAVENOUS at 08:04

## 2018-01-01 RX ADMIN — AMPICILLIN SODIUM 2 G: 2 INJECTION, POWDER, FOR SOLUTION INTRAMUSCULAR; INTRAVENOUS at 03:51

## 2018-01-01 RX ADMIN — ALBUMIN HUMAN 100 G: 0.25 SOLUTION INTRAVENOUS at 03:38

## 2018-01-01 RX ADMIN — PANTOPRAZOLE SODIUM 40 MG: 40 INJECTION, POWDER, FOR SOLUTION INTRAVENOUS at 08:01

## 2018-01-01 RX ADMIN — SODIUM CHLORIDE, POTASSIUM CHLORIDE, SODIUM LACTATE AND CALCIUM CHLORIDE: 600; 310; 30; 20 INJECTION, SOLUTION INTRAVENOUS at 10:18

## 2018-01-01 RX ADMIN — MEROPENEM 1000 MG: 1 INJECTION, POWDER, FOR SOLUTION INTRAVENOUS at 15:35

## 2018-01-01 RX ADMIN — MICAFUNGIN SODIUM 100 MG: 10 INJECTION, POWDER, LYOPHILIZED, FOR SOLUTION INTRAVENOUS at 21:46

## 2018-01-01 RX ADMIN — NYSTATIN 500000 UNITS: 100000 SUSPENSION ORAL at 16:35

## 2018-01-01 RX ADMIN — Medication 1.5 MG: at 21:39

## 2018-01-01 RX ADMIN — RIFAXIMIN 550 MG: 550 TABLET ORAL at 07:52

## 2018-01-01 RX ADMIN — HYDROCORTISONE SODIUM SUCCINATE 100 MG: 100 INJECTION, POWDER, FOR SOLUTION INTRAMUSCULAR; INTRAVENOUS at 02:24

## 2018-01-01 RX ADMIN — OXYCODONE HYDROCHLORIDE 10 MG: 5 SOLUTION ORAL at 16:32

## 2018-01-01 RX ADMIN — HYDROMORPHONE HYDROCHLORIDE 0.5 MG: 1 INJECTION, SOLUTION INTRAMUSCULAR; INTRAVENOUS; SUBCUTANEOUS at 17:06

## 2018-01-01 RX ADMIN — PANTOPRAZOLE SODIUM 40 MG: 40 INJECTION, POWDER, FOR SOLUTION INTRAVENOUS at 07:49

## 2018-01-01 RX ADMIN — FENTANYL CITRATE 50 MCG/HR: 50 INJECTION, SOLUTION INTRAMUSCULAR; INTRAVENOUS at 04:37

## 2018-01-01 RX ADMIN — Medication 81 MG: at 08:09

## 2018-01-01 RX ADMIN — CALCIUM CHLORIDE 1 G: 100 INJECTION, SOLUTION INTRAVENOUS at 12:37

## 2018-01-01 RX ADMIN — NYSTATIN 500000 UNITS: 100000 SUSPENSION ORAL at 15:39

## 2018-01-01 RX ADMIN — SODIUM BICARBONATE 50 MEQ: 84 INJECTION, SOLUTION INTRAVENOUS at 12:53

## 2018-01-01 RX ADMIN — VALGANCICLOVIR HYDROCHLORIDE 450 MG: 50 POWDER, FOR SOLUTION ORAL at 08:46

## 2018-01-01 RX ADMIN — AMPICILLIN SODIUM 2 G: 2 INJECTION, POWDER, FOR SOLUTION INTRAMUSCULAR; INTRAVENOUS at 19:15

## 2018-01-01 RX ADMIN — Medication 1 PACKET: at 20:05

## 2018-01-01 RX ADMIN — HYDROCORTISONE 10 MG: 20 TABLET ORAL at 08:17

## 2018-01-01 RX ADMIN — I.V. FAT EMULSION 250 ML: 20 EMULSION INTRAVENOUS at 20:00

## 2018-01-01 RX ADMIN — TENOFOVIR DISOPROXIL FUMARATE 300 MG: 300 TABLET, COATED ORAL at 16:01

## 2018-01-01 RX ADMIN — PANTOPRAZOLE SODIUM 40 MG: 40 INJECTION, POWDER, FOR SOLUTION INTRAVENOUS at 19:34

## 2018-01-01 RX ADMIN — MIDODRINE HYDROCHLORIDE 10 MG: 5 TABLET ORAL at 03:02

## 2018-01-01 RX ADMIN — DAPTOMYCIN 600 MG: 500 INJECTION, POWDER, LYOPHILIZED, FOR SOLUTION INTRAVENOUS at 11:15

## 2018-01-01 RX ADMIN — ROCURONIUM BROMIDE 20 MG: 10 INJECTION INTRAVENOUS at 13:25

## 2018-01-01 RX ADMIN — Medication 1 MG: at 08:21

## 2018-01-01 RX ADMIN — HEPARIN SODIUM 5000 UNITS: 1000 INJECTION, SOLUTION INTRAVENOUS; SUBCUTANEOUS at 13:40

## 2018-01-01 RX ADMIN — SPIRONOLACTONE 100 MG: 25 TABLET ORAL at 08:45

## 2018-01-01 RX ADMIN — POTASSIUM CHLORIDE 20 MEQ: 14.9 INJECTION, SOLUTION INTRAVENOUS at 12:08

## 2018-01-01 RX ADMIN — ROCURONIUM BROMIDE 50 MG: 10 INJECTION INTRAVENOUS at 08:45

## 2018-01-01 RX ADMIN — Medication 0.25 MG: at 17:48

## 2018-01-01 RX ADMIN — PANTOPRAZOLE SODIUM 40 MG: 40 TABLET, DELAYED RELEASE ORAL at 07:19

## 2018-01-01 RX ADMIN — NITROGLYCERIN 50 MCG: 10 INJECTION INTRAVENOUS at 12:11

## 2018-01-01 RX ADMIN — MULTIVITAMIN 15 ML: LIQUID ORAL at 11:08

## 2018-01-01 RX ADMIN — SODIUM CHLORIDE 50 MG: 9 INJECTION, SOLUTION INTRAVENOUS at 16:51

## 2018-01-01 RX ADMIN — TENOFOVIR DISOPROXIL FUMARATE 300 MG: 300 TABLET, COATED ORAL at 09:00

## 2018-01-01 RX ADMIN — ALBUMIN HUMAN 12.5 G: 0.05 INJECTION, SOLUTION INTRAVENOUS at 11:40

## 2018-01-01 RX ADMIN — METRONIDAZOLE 500 MG: 500 INJECTION, SOLUTION INTRAVENOUS at 19:27

## 2018-01-01 RX ADMIN — OXYCODONE HYDROCHLORIDE 5 MG: 5 SOLUTION ORAL at 20:43

## 2018-01-01 RX ADMIN — HYDROMORPHONE HYDROCHLORIDE 0.5 MG: 1 INJECTION, SOLUTION INTRAMUSCULAR; INTRAVENOUS; SUBCUTANEOUS at 00:31

## 2018-01-01 RX ADMIN — Medication 12.5 ML/KG/HR: at 07:44

## 2018-01-01 ASSESSMENT — ACTIVITIES OF DAILY LIVING (ADL)
ADLS_ACUITY_SCORE: 19
ADLS_ACUITY_SCORE: 21
ADLS_ACUITY_SCORE: 21
ADLS_ACUITY_SCORE: 20
ADLS_ACUITY_SCORE: 21
RETIRED_EATING: 1-->ASSISTIVE EQUIPMENT
ADLS_ACUITY_SCORE: 13
ADLS_ACUITY_SCORE: 13
ADLS_ACUITY_SCORE: 21
ADLS_ACUITY_SCORE: 19
ADLS_ACUITY_SCORE: 18
ADLS_ACUITY_SCORE: 19
ADLS_ACUITY_SCORE: 19
ADLS_ACUITY_SCORE: 17
ADLS_ACUITY_SCORE: 21
ADLS_ACUITY_SCORE: 23
ADLS_ACUITY_SCORE: 13
ADLS_ACUITY_SCORE: 21
ADLS_ACUITY_SCORE: 19
ADLS_ACUITY_SCORE: 13
ADLS_ACUITY_SCORE: 18
ADLS_ACUITY_SCORE: 21
ADLS_ACUITY_SCORE: 13
ADLS_ACUITY_SCORE: 19
ADLS_ACUITY_SCORE: 17
ADLS_ACUITY_SCORE: 20
COGNITION: 1 - ATTENTION OR MEMORY DEFICITS
ADLS_ACUITY_SCORE: 22
WHICH_OF_THE_ABOVE_FUNCTIONAL_RISKS_HAD_A_RECENT_ONSET_OR_CHANGE?: TOILETING;BATHING;DRESSING
ADLS_ACUITY_SCORE: 13
ADLS_ACUITY_SCORE: 21
ADLS_ACUITY_SCORE: 13
ADLS_ACUITY_SCORE: 11
ADLS_ACUITY_SCORE: 13
ADLS_ACUITY_SCORE: 19
ADLS_ACUITY_SCORE: 13
ADLS_ACUITY_SCORE: 22
ADLS_ACUITY_SCORE: 19
ADLS_ACUITY_SCORE: 22
ADLS_ACUITY_SCORE: 13
ADLS_ACUITY_SCORE: 17
ADLS_ACUITY_SCORE: 13
ADLS_ACUITY_SCORE: 17
ADLS_ACUITY_SCORE: 19
ADLS_ACUITY_SCORE: 17
ADLS_ACUITY_SCORE: 21
ADLS_ACUITY_SCORE: 19
ADLS_ACUITY_SCORE: 18
ADLS_ACUITY_SCORE: 20
ADLS_ACUITY_SCORE: 19
ADLS_ACUITY_SCORE: 20
ADLS_ACUITY_SCORE: 11
ADLS_ACUITY_SCORE: 19
TOILETING: 1-->ASSISTIVE EQUIPMENT
ADLS_ACUITY_SCORE: 22
ADLS_ACUITY_SCORE: 19
ADLS_ACUITY_SCORE: 21
ADLS_ACUITY_SCORE: 13
ADLS_ACUITY_SCORE: 21
ADLS_ACUITY_SCORE: 19
ADLS_ACUITY_SCORE: 10
ADLS_ACUITY_SCORE: 21
ADLS_ACUITY_SCORE: 18
ADLS_ACUITY_SCORE: 23
ADLS_ACUITY_SCORE: 19
ADLS_ACUITY_SCORE: 13
ADLS_ACUITY_SCORE: 17
TOILETING: 1-->ASSISTIVE EQUIPMENT
ADLS_ACUITY_SCORE: 13
ADLS_ACUITY_SCORE: 19
COGNITION: 0 - NO COGNITION ISSUES REPORTED
ADLS_ACUITY_SCORE: 13
ADLS_ACUITY_SCORE: 13
ADLS_ACUITY_SCORE: 11
ADLS_ACUITY_SCORE: 19
ADLS_ACUITY_SCORE: 19
ADLS_ACUITY_SCORE: 21
ADLS_ACUITY_SCORE: 19
SWALLOWING: 0-->SWALLOWS FOODS/LIQUIDS WITHOUT DIFFICULTY
ADLS_ACUITY_SCORE: 21
ADLS_ACUITY_SCORE: 23
ADLS_ACUITY_SCORE: 13
ADLS_ACUITY_SCORE: 19
ADLS_ACUITY_SCORE: 21
AMBULATION: 1-->ASSISTIVE EQUIPMENT
ADLS_ACUITY_SCORE: 13
ADLS_ACUITY_SCORE: 21
ADLS_ACUITY_SCORE: 19
ADLS_ACUITY_SCORE: 19
ADLS_ACUITY_SCORE: 21
ADLS_ACUITY_SCORE: 20
ADLS_ACUITY_SCORE: 11
ADLS_ACUITY_SCORE: 17
ADLS_ACUITY_SCORE: 21
ADLS_ACUITY_SCORE: 23
ADLS_ACUITY_SCORE: 18
ADLS_ACUITY_SCORE: 13
ADLS_ACUITY_SCORE: 19
ADLS_ACUITY_SCORE: 21
ADLS_ACUITY_SCORE: 13
ADLS_ACUITY_SCORE: 21
ADLS_ACUITY_SCORE: 18
ADLS_ACUITY_SCORE: 13
ADLS_ACUITY_SCORE: 21
ADLS_ACUITY_SCORE: 19
ADLS_ACUITY_SCORE: 21
ADLS_ACUITY_SCORE: 13
ADLS_ACUITY_SCORE: 17
ADLS_ACUITY_SCORE: 13
ADLS_ACUITY_SCORE: 21
ADLS_ACUITY_SCORE: 19
ADLS_ACUITY_SCORE: 11
ADLS_ACUITY_SCORE: 19
ADLS_ACUITY_SCORE: 13
RETIRED_COMMUNICATION: 0-->UNDERSTANDS/COMMUNICATES WITHOUT DIFFICULTY
ADLS_ACUITY_SCORE: 21
ADLS_ACUITY_SCORE: 13
ADLS_ACUITY_SCORE: 19
ADLS_ACUITY_SCORE: 13
ADLS_ACUITY_SCORE: 17
ADLS_ACUITY_SCORE: 17
ADLS_ACUITY_SCORE: 21
ADLS_ACUITY_SCORE: 21
ADLS_ACUITY_SCORE: 20
ADLS_ACUITY_SCORE: 21
RETIRED_COMMUNICATION: 0-->UNDERSTANDS/COMMUNICATES WITHOUT DIFFICULTY
ADLS_ACUITY_SCORE: 13
ADLS_ACUITY_SCORE: 21
ADLS_ACUITY_SCORE: 19
ADLS_ACUITY_SCORE: 11
ADLS_ACUITY_SCORE: 19
ADLS_ACUITY_SCORE: 13
ADLS_ACUITY_SCORE: 19
ADLS_ACUITY_SCORE: 21
ADLS_ACUITY_SCORE: 13
ADLS_ACUITY_SCORE: 13
ADLS_ACUITY_SCORE: 19
ADLS_ACUITY_SCORE: 13
ADLS_ACUITY_SCORE: 23
ADLS_ACUITY_SCORE: 21
ADLS_ACUITY_SCORE: 18
RETIRED_EATING: 0-->INDEPENDENT
ADLS_ACUITY_SCORE: 21
ADLS_ACUITY_SCORE: 18
ADLS_ACUITY_SCORE: 19
ADLS_ACUITY_SCORE: 18
ADLS_ACUITY_SCORE: 21
ADLS_ACUITY_SCORE: 13
ADLS_ACUITY_SCORE: 18
TRANSFERRING: 1-->ASSISTIVE EQUIPMENT
ADLS_ACUITY_SCORE: 23
ADLS_ACUITY_SCORE: 21
ADLS_ACUITY_SCORE: 20
ADLS_ACUITY_SCORE: 13
ADLS_ACUITY_SCORE: 22
ADLS_ACUITY_SCORE: 19
ADLS_ACUITY_SCORE: 13
ADLS_ACUITY_SCORE: 21
ADLS_ACUITY_SCORE: 21
ADLS_ACUITY_SCORE: 13
ADLS_ACUITY_SCORE: 18
ADLS_ACUITY_SCORE: 13
ADLS_ACUITY_SCORE: 10
ADLS_ACUITY_SCORE: 21
ADLS_ACUITY_SCORE: 13
ADLS_ACUITY_SCORE: 13
ADLS_ACUITY_SCORE: 19
ADLS_ACUITY_SCORE: 19
ADLS_ACUITY_SCORE: 13
BATHING: 2-->ASSISTIVE PERSON
ADLS_ACUITY_SCORE: 21
ADLS_ACUITY_SCORE: 21
ADLS_ACUITY_SCORE: 13
ADLS_ACUITY_SCORE: 13
ADLS_ACUITY_SCORE: 20
ADLS_ACUITY_SCORE: 21
ADLS_ACUITY_SCORE: 11
ADLS_ACUITY_SCORE: 21
ADLS_ACUITY_SCORE: 19
ADLS_ACUITY_SCORE: 19
ADLS_ACUITY_SCORE: 21
ADLS_ACUITY_SCORE: 19
ADLS_ACUITY_SCORE: 13
ADLS_ACUITY_SCORE: 13
ADLS_ACUITY_SCORE: 21
ADLS_ACUITY_SCORE: 13
ADLS_ACUITY_SCORE: 21
ADLS_ACUITY_SCORE: 19
ADLS_ACUITY_SCORE: 19
ADLS_ACUITY_SCORE: 23
ADLS_ACUITY_SCORE: 19
ADLS_ACUITY_SCORE: 13
ADLS_ACUITY_SCORE: 19
ADLS_ACUITY_SCORE: 13
BATHING: 1-->ASSISTIVE EQUIPMENT
ADLS_ACUITY_SCORE: 19
ADLS_ACUITY_SCORE: 23
ADLS_ACUITY_SCORE: 21
ADLS_ACUITY_SCORE: 11
ADLS_ACUITY_SCORE: 21
ADLS_ACUITY_SCORE: 21
ADLS_ACUITY_SCORE: 19
ADLS_ACUITY_SCORE: 19
ADLS_ACUITY_SCORE: 13
ADLS_ACUITY_SCORE: 19
ADLS_ACUITY_SCORE: 10
ADLS_ACUITY_SCORE: 13
ADLS_ACUITY_SCORE: 21
ADLS_ACUITY_SCORE: 13
ADLS_ACUITY_SCORE: 20
ADLS_ACUITY_SCORE: 13
ADLS_ACUITY_SCORE: 19
FALL_HISTORY_WITHIN_LAST_SIX_MONTHS: NO
ADLS_ACUITY_SCORE: 19
ADLS_ACUITY_SCORE: 19
ADLS_ACUITY_SCORE: 13
ADLS_ACUITY_SCORE: 11
ADLS_ACUITY_SCORE: 19
ADLS_ACUITY_SCORE: 21
ADLS_ACUITY_SCORE: 17
SWALLOWING: 0-->SWALLOWS FOODS/LIQUIDS WITHOUT DIFFICULTY
ADLS_ACUITY_SCORE: 21
ADLS_ACUITY_SCORE: 13
ADLS_ACUITY_SCORE: 13
ADLS_ACUITY_SCORE: 21
ADLS_ACUITY_SCORE: 19
ADLS_ACUITY_SCORE: 18
ADLS_ACUITY_SCORE: 23
ADLS_ACUITY_SCORE: 10
ADLS_ACUITY_SCORE: 13
ADLS_ACUITY_SCORE: 18
ADLS_ACUITY_SCORE: 21
ADLS_ACUITY_SCORE: 19
ADLS_ACUITY_SCORE: 13
TRANSFERRING: 1-->ASSISTIVE EQUIPMENT
ADLS_ACUITY_SCORE: 21
ADLS_ACUITY_SCORE: 19
ADLS_ACUITY_SCORE: 21
ADLS_ACUITY_SCORE: 21
ADLS_ACUITY_SCORE: 13
ADLS_ACUITY_SCORE: 22
ADLS_ACUITY_SCORE: 13
ADLS_ACUITY_SCORE: 13
ADLS_ACUITY_SCORE: 21
ADLS_ACUITY_SCORE: 23
ADLS_ACUITY_SCORE: 13
ADLS_ACUITY_SCORE: 13
ADLS_ACUITY_SCORE: 22
ADLS_ACUITY_SCORE: 13
ADLS_ACUITY_SCORE: 19
ADLS_ACUITY_SCORE: 21
ADLS_ACUITY_SCORE: 13
ADLS_ACUITY_SCORE: 19
ADLS_ACUITY_SCORE: 19
ADLS_ACUITY_SCORE: 13
ADLS_ACUITY_SCORE: 21
ADLS_ACUITY_SCORE: 19
ADLS_ACUITY_SCORE: 19
ADLS_ACUITY_SCORE: 18
DRESS: 2-->ASSISTIVE PERSON
ADLS_ACUITY_SCORE: 11
ADLS_ACUITY_SCORE: 17
ADLS_ACUITY_SCORE: 19
ADLS_ACUITY_SCORE: 18
ADLS_ACUITY_SCORE: 19
ADLS_ACUITY_SCORE: 19
DRESS: 1-->ASSISTIVE EQUIPMENT
ADLS_ACUITY_SCORE: 17
ADLS_ACUITY_SCORE: 21
ADLS_ACUITY_SCORE: 13
ADLS_ACUITY_SCORE: 22
ADLS_ACUITY_SCORE: 13
FALL_HISTORY_WITHIN_LAST_SIX_MONTHS: NO
ADLS_ACUITY_SCORE: 13
AMBULATION: 1-->ASSISTIVE EQUIPMENT
ADLS_ACUITY_SCORE: 11
ADLS_ACUITY_SCORE: 21
ADLS_ACUITY_SCORE: 22
ADLS_ACUITY_SCORE: 13
ADLS_ACUITY_SCORE: 22
ADLS_ACUITY_SCORE: 13
ADLS_ACUITY_SCORE: 19
ADLS_ACUITY_SCORE: 21
ADLS_ACUITY_SCORE: 13
ADLS_ACUITY_SCORE: 19
ADLS_ACUITY_SCORE: 13
ADLS_ACUITY_SCORE: 17
ADLS_ACUITY_SCORE: 11
ADLS_ACUITY_SCORE: 21
ADLS_ACUITY_SCORE: 21
ADLS_ACUITY_SCORE: 13
ADLS_ACUITY_SCORE: 19
ADLS_ACUITY_SCORE: 21
ADLS_ACUITY_SCORE: 13
ADLS_ACUITY_SCORE: 22

## 2018-01-01 ASSESSMENT — ENCOUNTER SYMPTOMS
DYSRHYTHMIAS: 0
SEIZURES: 0

## 2018-01-01 ASSESSMENT — PAIN DESCRIPTION - DESCRIPTORS
DESCRIPTORS: CONSTANT
DESCRIPTORS: ACHING
DESCRIPTORS: ACHING
DESCRIPTORS: CONSTANT
DESCRIPTORS: ACHING
DESCRIPTORS: DISCOMFORT
DESCRIPTORS: CONSTANT
DESCRIPTORS: ACHING
DESCRIPTORS: CONSTANT
DESCRIPTORS: ACHING
DESCRIPTORS: CONSTANT
DESCRIPTORS: ACHING
DESCRIPTORS: CONSTANT
DESCRIPTORS: CONSTANT;DISCOMFORT
DESCRIPTORS: CRAMPING;DULL
DESCRIPTORS: CONSTANT
DESCRIPTORS: CONSTANT
DESCRIPTORS: PRESSURE
DESCRIPTORS: CONSTANT
DESCRIPTORS: CONSTANT
DESCRIPTORS: CONSTANT;DISCOMFORT
DESCRIPTORS: ACHING
DESCRIPTORS: ACHING
DESCRIPTORS: CONSTANT
DESCRIPTORS: CONSTANT;CRAMPING;DISCOMFORT;SHARP
DESCRIPTORS: ACHING
DESCRIPTORS: ACHING;SORE
DESCRIPTORS: CONSTANT
DESCRIPTORS: CONSTANT;SHARP
DESCRIPTORS: DULL;DISCOMFORT
DESCRIPTORS: ACHING;CONSTANT

## 2018-01-01 ASSESSMENT — MIFFLIN-ST. JEOR
SCORE: 1599.76
SCORE: 1407.63
SCORE: 1565.28
SCORE: 1412.63
SCORE: 1563.92
SCORE: 1551.68
SCORE: 1519.02
SCORE: 1581.88
SCORE: 1410.63
SCORE: 1448.63
SCORE: 1538.07
SCORE: 1595.88
SCORE: 1618.35

## 2018-01-01 ASSESSMENT — COPD QUESTIONNAIRES: COPD: 0

## 2018-01-01 ASSESSMENT — LIFESTYLE VARIABLES: TOBACCO_USE: 0

## 2018-01-01 ASSESSMENT — PAIN SCALES - GENERAL: PAINLEVEL: EXTREME PAIN (8)

## 2018-06-14 PROBLEM — R18.8 CIRRHOSIS OF LIVER WITH ASCITES (H): Status: ACTIVE | Noted: 2018-01-01

## 2018-06-14 PROBLEM — B18.1 CHRONIC VIRAL HEPATITIS B WITHOUT DELTA AGENT AND WITHOUT COMA (H): Status: ACTIVE | Noted: 2018-01-01

## 2018-06-14 PROBLEM — I81 PORTAL VEIN THROMBOSIS: Status: ACTIVE | Noted: 2018-01-01

## 2018-06-14 PROBLEM — E11.9 TYPE 2 DIABETES MELLITUS WITHOUT COMPLICATION, WITHOUT LONG-TERM CURRENT USE OF INSULIN (H): Status: ACTIVE | Noted: 2018-01-01

## 2018-06-14 PROBLEM — K74.60 CIRRHOSIS OF LIVER WITH ASCITES (H): Status: ACTIVE | Noted: 2018-01-01

## 2018-06-14 NOTE — Clinical Note
Eval for next week. They agree on all day Tuesday and then either Monday or Wednesday if possible.  Need 1:1 teaching with ong . I can do on Monday or another day.  Thanks!

## 2018-06-14 NOTE — TELEPHONE ENCOUNTER
Received referral from [facility]  Referring Mandeep De La Rosa  Coordinator- Oanh Case  Diagnosis- HBV

## 2018-06-14 NOTE — Clinical Note
### some, not all, records are in care everywhere ### Ramona - please start the process. Oanh - this is the one for next week with Dr. Arriaga. Sharona - heads up ### needs  ###

## 2018-06-14 NOTE — TELEPHONE ENCOUNTER
New referral from Mandeep De La Rosa NP at [facility]. Below is copy of his note to me.    Yaneli Miles, [birth year]. HBV. I have not been too involved in his care but saw him in follow up from hospitalizations. Had recent hospitalization for probable HE and also found to have choledocholithiasis. ERCP removed stone. Bilirubin went from mildly elevated to 22. Other liver tests also higher. Not sure why. Check HDV, negative so far. Not sure if it is simply decompensation related to other medical problems. His HBV has been well suppressed. Has hx of GIB and now more problems with ascites. Bilirubin not getting better. Needs . Nice man. Sick. Wife supportive. MELD 25. Needs to be sorted out but with sudden change in MELD thought better get him evaluated.     Will start eval process and shoot for next week with Dr. Arriaga if possible.

## 2018-06-15 NOTE — TELEPHONE ENCOUNTER
Smitha 15, 2018: I called Yaneli with the help of an  (and left a message) to introduce myself and discuss timing of the liver evaluation that Oanh, his coordinator, placed orders for.    We would like for him to attend appointments on Monday, June 18 and Tuesday, June 19. I mentioned on voice mail that I would send the schedule and packet via [employer] next day air.    Lisa Kathy  Pre-Liver Transplant   [phone]     Message  Received: Today       [facility] Case[facility]  [facility]. They agree on all day Tuesday and then either Monday or Wednesday if possible.   Need 1:1 teaching with [facility] . I can do on Monday or another day.            Telephone for Transplant Evaluation  6/14/2018       Oanh Case, RN - [facility] Encounter Summary       Diagnoses       Cirrhosis (h) (Primary)       Chronic hepatitis B (H)                Orders Signed This Encounter (33)      Hepatitis C antibody        Treponema Abs w Reflex to RPR and Titer        HIV Antigen Antibody Combo Pretransplant        Hepatitis A Antibody IgG        EBV Capsid Antibody IgG        CMV Antibody IgG        Protein  random urine with Creat Ratio        Ethyl Glucuronide Urine        UA reflex to Microscopic and Culture        CBC with platelets        Prostate spec antigen screen        INR        Vitamin D Deficiency        Transferrin        TSH with free T4 reflex        Phosphorus        M Tuberculosis by Quantiferon        Iron and iron binding capacity        AFP tumor marker        Hepatic panel        Basic metabolic panel        Lipid Profile        ABO type [ZCG2630]        Antibody titer red cell        ABO/Rh type and screen        Echo dobutamine stress test        EKG 12-lead, tracing only [EKG1]        US Abdomen Complete w Doppler Complete        NUTRITION REFERRAL         REFERRAL        GENERAL SURG ADULT REFERRAL        GASTROENTEROLOGY  ADULT REF CONSULT ONLY        Pre-Transplant Class

## 2018-06-15 NOTE — LETTER
Smitha 15, 2018    LIVER TRANSPLANT EVALUATION SCHEDULE    Patient:   Yaneli Miles  MR#:    [id number]  Coordinator:  Oanh Case RN  [phone]  :     Lisa HERNANDEZ   [phone]  [facility]  Dates:   June 18 and June 19, 2018    This is your evaluation schedule, please follow dates and times.  You will receive reminder phone calls for other tests, but please follow this schedule only!  If you have any questions about dates and times, please call us on the number listed above.  Thank you, Transplant Services       No beta blockers the day before or day of the the dobutamine stress echo    No caffeine/alcohol twelve (12) hours before the dobutamine stress echo    No food or drink three (3) hours before the dobutamine stress echo    Day/Date:  Monday, June 18, 2018  Time Location Activity   1:00 pm [facility] Waiting Room  (2nd floor [facility],  [address] [place] [address] [place]; [facility] [id number]) Dobutamine Stress Echo    See enclosed instructions for test!    No food or drink three (3) hours before test    No caffeine/alcohol twelve (12) hrs before test    No beta blockers the day before or day of the test   2:00 pm [facility] - 2nd Floor/Entrance  [facility]  (The shuttle is white with maroon letters)   2:30 pm to 4:30 pm Transplant Services  (3rd floor [facility],  [address] [place] [address]; [facility] [id number]) Pre-transplant class and meet with Oanh Case RN, Transplant Coordinator           No food or drink after midnight (Monday) for labs & ultrasound (you may only have small amounts of water)    Day/Date:  Tuesday, June 19, 2018  Time Location Activity   7:30 am &  7:40 am Imaging and Lab testing  (1st floor [facility],  [address] [place] [address]; [facility] [id number]) Blood tests   NO FOOD OR DRINK AFTER MIDNIGHT   8:00 am Imaging and Lab Testing  (1st floor [facility]) EKG   8:30 am Imaging  and Lab testing  (1st floor [facility]) Liver Ultrasound with dopplers  NO FOOD OR DRINK AFTER MIDNIGHT   9:30 am Transplant Services  (3rd floor [facility]) Review evaluation process & daily schedule   10:00 am Medicine Specialties  (3rd floor [facility]) Appointment with Dr. Arriaga,  Hepatologist   11:00 am Transplant Services  ([facility] floor [facility]) Appointment with Dr. Norwood,  Transplant Surgeon   11:30 am Transplant Services  (3rd floor [facility]) Appointment with Ludy Godinez  Registered Dietitian   12:00 pm Transplant Services  (3rd floor [facility]) Appointment with Shreya Araya     12:30 pm Transplant Services  (3rd floor [facility]) Check out with the Nursing Staff     * IF ONLINE CHECK IN IS NOT DONE, YOU WILL NEED TO CHECK IN AT LEAST 15 MINUTES EARLIER THAN THE FIRST SCHEDULED APPOINTMENT *

## 2018-06-15 NOTE — TELEPHONE ENCOUNTER
Spoke with patient's wife per request for her  who will come next week for evaluation. They live with their children ages 7-29 years old.Patient has been unable to work since 4/3/2018. He does not smoke, drink alcohol or use traditional medications.     Orders sent.

## 2018-06-15 NOTE — TELEPHONE ENCOUNTER
With aid of ong  left message informing patient this RN will call back latter regarding his referral from EDY De La Rosa.    Patient is a 42 yo on Tenofovir for HBV . 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  5/25/18 with HE and choledocholithiasis needing ERCP-sphincterectomy and stone removal. Ascites with paracentesis bi- monthly. MELD 25 (Na++129, Creat 0.58, Bili T 19, INR 1.88, Alb 1.9), bld type B. PMH: DM2-insulin.

## 2018-06-16 PROBLEM — G93.40 ENCEPHALOPATHY: Status: ACTIVE | Noted: 2018-01-01

## 2018-06-16 NOTE — IP AVS SNAPSHOT
[address] [address]    Phone:  [phone]                                       After Visit Summary   6/16/2018    Yaneli Miles    MRN: [id number]           After Visit Summary Signature Page     I have received my discharge instructions, and my questions have been answered. I have discussed any challenges I see with this plan with the nurse or doctor.    ..........................................................................................................................................  Patient/Patient Representative Signature      ..........................................................................................................................................  Patient Representative Print Name and Relationship to Patient    ..................................................               ................................................  Date                                            Time    ..........................................................................................................................................  Reviewed by Signature/Title    ...................................................              ..............................................  Date                                                            Time

## 2018-06-16 NOTE — IP AVS SNAPSHOT
MRN:[id number]                      After Visit Summary   6/16/2018    Yaneli Miles    MRN: [id number]           Thank you!     Thank you for choosing [facility] for your care. Our goal is always to provide you with excellent care. Hearing back from our patients is one way we can continue to improve our services. Please take a few minutes to complete the written survey that you may receive in the mail after you visit with us. Thank you!        Patient Information     Date Of Birth          1975        Designated Caregiver       Most Recent Value    Caregiver    Will someone help with your care after discharge? no      About your hospital stay     You were admitted on:  June 16, 2018 You last received care in the:  [address] [facility]    You were discharged on:  June 28, 2018        Reason for your hospital stay       You were admitted for infection in your urine and abdominal fluid, confusion related to liver, and massive stomach bleed                  Who to Call     For medical emergencies, please call 911.  For non-urgent questions about your medical care, please call your primary care provider or clinic, None  For questions related to your surgery, please call your surgery clinic        Attending Provider     Provider Specialty    Amanuel Briggs MD Pulmonary    Leonora Bolanos MD Internal Medicine       Primary Care Provider Fax #    Provider Not In System [phone]       When to contact your care team       Return the the hospital IMMEDIATELY if you notice blood in your stools or vomit    Call triage or return to the hospital if you have any of the following: increased confusion, increased abdominal pain.                  After Care Instructions     Activity       Your activity upon discharge: activity as tolerated, 24 hour supervision            Diet       Follow this diet upon discharge: Orders Placed This Encounter      Regular Diet Adult Thin Liquids (water, ice chips, juice,  milk, gelatin, ice cream, etc)            Discharge Instructions       -follow up with your primary care doctor or establish with a primary care doctor in 1 week with lab draw  -lab check on 7/2  -follow up with liver doctors within 2 weeks  -Start taking insulin with meals  -Start taking ciprofloxacin to prevent infection  -Stop taking nadolol, furosemide, and spironolactone  -return weekly for abdominal drainage                  Follow-up Appointments     Adult [facility] Follow-up and recommended labs and tests       Follow up with primary care provider within 7 days. If patient does not have primary care provider, establish within 7 days for hospital follow- up at [facility] P[facility] clinic.  The following labs/tests are recommended: CMP, phos, mg, INR.      Follow up with [facility] hepatology clinic, within 2 weeks  to evaluate treatment change and for hospital follow- up. The following labs/tests are recommended: CMP, INR in 1 week.    Appointments on [place] and/or [place] campus (with [facility] or [facility] provider or service). Call [phone] if you haven't heard regarding these appointments within 7 days of discharge.                  Your next 10 appointments already scheduled     Jul 11, 2018  2:00 PM CDT   Paracentesis Visit with Uc Spec Inf Para Provider, UC 40 ATC   [facility] ([facility])    [address]  [address]  [place] [address] [phone]            Jul 18, 2018  9:30 AM CDT   Paracentesis Visit with Uc Spec Inf Para Provider, UC 40 ATC   [facility] ([facility])    [address]  [address]  [place] [address] [phone]            Jul 25, 2018  9:30 AM CDT   Paracentesis Visit with Uc Spec Inf Para Provider, UC 40 ATC   [facility] ([facility])    [address]  [address]  [place] [address] [phone]            Aug 01, 2018  9:30 AM CDT   Paracentesis Visit with Uc Spec Inf Para Provider, UC 40 ATC   [facility] ([facility])    [address]  [address]  [place] [address] [phone]            Aug 08, 2018  2:00 PM CDT   Paracentesis Visit with Uc Spec Inf Para Provider, UC 40 ATC   [facility] ([facility])    [address]  [address]  [place] [id number] [phone]              Additional Services     Home Care PT Referral for Hospital Discharge       [facility]  Phone  [phone]  Fax  [phone]    **ong  required with visits**  PT to eval and treat    Your provider has ordered home care - physical therapy.   If you have not been contacted within 2 days of your discharge please call                  Pending Results     Date and Time Order Name Status Description    6/28/2018 1050 POC US Guide for Paracentesis In process     6/28/2018 0721 Sodium random urine In process     6/28/2018 0721 Osmolality urine In process     6/27/2018 1611 fluid culture - Aerobic Bacterial In process     6/25/2018 1405 fluid culture - Aerobic Bacterial Preliminary     6/23/2018 2223 Platelets prepare order unit conditional In process             Statement of Approval     Ordered          06/28/18 1345  I have reviewed and agree with all the recommendations and orders detailed in this document.  EFFECTIVE NOW     Approved and electronically signed by:  Fazal Lamas MD             Admission Information     Date & Time Provider Department Dept. Phone    6/16/2018 Leonora Bolanos MD [address] [facility] [place] [phone]      Your Vitals Were     Blood Pressure Pulse Temperature Respirations Height Weight    111/73 (BP Location: Left arm) 78 98.5  F (36.9  C)  "(Oral) 16 1.676 m (5' 6\") 71.1 kg (156 lb 12 oz)    Pulse Oximetry BMI (Body Mass Index)                100% 25.3 kg/m2          [employer] Information     [employer] lets you send messages to your doctor, view your test results, renew your prescriptions, schedule appointments and more. To sign up, go to [email or web address] . Click on \"Log in\" on the left side of the screen, which will take you to the Welcome page. Then click on \"Sign up Now\" on the right side of the page.     You will be asked to enter the access code listed below, as well as some personal information. Please follow the directions to create your username and password.     Your access code is: [email or web address]  Expires: 2018  8:19 AM     Your access code will  in 90 days. If you need help or a new code, please call your [facility] clinic or [phone].        Care EveryWhere ID     This is your Care EveryWhere ID. This could be used by other organizations to access your [facility] medical records  [id number]        Equal Access to Services     ALIYA HOWARD : Hadii yolanda Thomas, wacal ordonez, qaybta kaalmacristina lawrence, jose m ramirez . So [facility] [phone].    ATENCIÓN: Si habla español, tiene a william disposición servicios gratuitos de asistencia lingüística. Andrade al [phone].    We comply with applicable federal civil rights laws and [place] laws. We do not discriminate on the basis of race, color, national origin, age, disability, sex, sexual orientation, or gender identity.               Review of your medicines      START taking        Dose / Directions    acetaminophen 500 MG tablet   Commonly known as:  TYLENOL   Used for:  Cirrhosis of liver with ascites, unspecified hepatic cirrhosis type (H)        Dose:  500 mg   Take 1 tablet (500 mg) by mouth every 6 hours as needed for mild pain or fever   Quantity:  120 tablet   Refills:  0       ciprofloxacin 500 MG tablet   Commonly known as:  CIPRO "   Indication:  SBP ppx   Used for:  SBP (spontaneous bacterial peritonitis) (H), Cirrhosis of liver with ascites, unspecified hepatic cirrhosis type (H)        Dose:  500 mg   Start taking on:  6/29/2018   Take 1 tablet (500 mg) by mouth every 24 hours   Quantity:  30 tablet   Refills:  0       insulin aspart 100 UNIT/ML injection   Commonly known as:  NovoLOG PEN   Used for:  Type 2 diabetes mellitus without complication, without long-term current use of insulin (H)        Dose:  1 Units   Inject 1 Units Subcutaneous 3 times daily (with meals)   Quantity:  3 mL   Refills:  0       lidocaine 5 % ointment   Commonly known as:  XYLOCAINE   Used for:  Cirrhosis of liver with ascites, unspecified hepatic cirrhosis type (H)        Apply topically every 4 hours as needed for moderate pain   Quantity:  240 g   Refills:  0       miconazole 2 % powder   Commonly known as:  MICATIN; MICRO GUARD   Used for:  Candidiasis of skin        Apply topically 2 times daily   Quantity:  90 g   Refills:  0       pantoprazole 40 MG EC tablet   Commonly known as:  PROTONIX   Used for:  Bleeding gastric varices        Dose:  40 mg   Take 1 tablet (40 mg) by mouth 2 times daily (before meals)   Quantity:  180 tablet   Refills:  0       phosphorus tablet 250 mg 250 MG per tablet   Commonly known as:  PHOSPHA 250 NEUTRAL   Used for:  Hypophosphatasia        Dose:  500 mg   Take 2 tablets (500 mg) by mouth 2 times daily   Quantity:  120 tablet   Refills:  0         CONTINUE these medicines which may have CHANGED, or have new prescriptions. If we are uncertain of the size of tablets/capsules you have at home, strength may be listed as something that might have changed.        Dose / Directions    lactulose 10 GM/15ML solution   Commonly known as:  CHRONULAC   This may have changed:    - when to take this  - additional instructions   Used for:  Hepatic encephalopathy (H)        Dose:  20 g   Take 30 mLs (20 g) by mouth 3 times daily Goal of 3-4  BMs per day   Quantity:  1892 mL   Refills:  2       rifaximin 550 MG Tabs tablet   Commonly known as:  XIFAXAN   This may have changed:  when to take this   Used for:  Hepatic encephalopathy (H)        Dose:  550 mg   Take 1 tablet (550 mg) by mouth 2 times daily   Quantity:  180 tablet   Refills:  0         CONTINUE these medicines which have NOT CHANGED        Dose / Directions    ferrous sulfate 325 (65 Fe) MG tablet   Commonly known as:  IRON        Dose:  325 mg   Take 325 mg by mouth   Refills:  0       MI-ACID GAS RELIEF 80 MG chewable tablet   Generic drug:  simethicone        Dose:  80 mg   Take 80 mg by mouth   Refills:  0       omeprazole 20 MG CR capsule   Commonly known as:  priLOSEC        Dose:  20 mg   Take 20 mg by mouth   Refills:  0       tenofovir 300 MG tablet   Commonly known as:  VIREAD        Dose:  300 mg   Take 300 mg by mouth   Refills:  0         STOP taking     BYETTA 10 MCG PEN 10 MCG/0.04ML injection   Generic drug:  exenatide           furosemide 40 MG tablet   Commonly known as:  LASIX           insulin glargine 100 UNIT/ML injection   Commonly known as:  LANTUS           nadolol 20 MG tablet   Commonly known as:  CORGARD           sitagliptin 100 MG tablet   Commonly known as:  JANUVIA           spironolactone 25 MG tablet   Commonly known as:  ALDACTONE                Where to get your medicines      These medications were sent to [facility] - [place], [place] - [address] [address]     Phone:  [phone]     acetaminophen 500 MG tablet    ciprofloxacin 500 MG tablet    insulin aspart 100 UNIT/ML injection    lactulose 10 GM/15ML solution    lidocaine 5 % ointment    miconazole 2 % powder    pantoprazole 40 MG EC tablet    phosphorus tablet 250 mg 250 MG per tablet    rifaximin 550 MG Tabs tablet                Protect others around you: Learn how to safely use, store and throw away your medicines at  www.disposemymeds.org.        ANTIBIOTIC INSTRUCTION     You've Been Prescribed an Antibiotic - Now What?  Your healthcare team thinks that you or your loved one might have an infection. Some infections can be treated with antibiotics, which are powerful, life-saving drugs. Like all medications, antibiotics have side effects and should only be used when necessary. There are some important things you should know about your antibiotic treatment.      Your healthcare team may run tests before you start taking an antibiotic.    Your team may take samples (e.g., from your blood, urine or other areas) to run tests to look for bacteria. These test can be important to determine if you need an antibiotic at all and, if you do, which antibiotic will work best.      Within a few days, your healthcare team might change or even stop your antibiotic.    Your team may start you on an antibiotic while they are working to find out what is making you sick.    Your team might change your antibiotic because test results show that a different antibiotic would be better to treat your infection.    In some cases, once your team has more information, they learn that you do not need an antibiotic at all. They may find out that you don't have an infection, or that the antibiotic you're taking won't work against your infection. For example, an infection caused by a virus can't be treated with antibiotics. Staying on an antibiotic when you don't need it is more likely to be harmful than helpful.      You may experience side effects from your antibiotic.    Like all medications, antibiotics have side effects. Some of these can be serious.    Let you healthcare team know if you have any known allergies when you are admitted to the hospital.    One significant side effect of nearly all antibiotics is the risk of severe and sometimes deadly diarrhea caused by Clostridium difficile (C. Difficile). This occurs when a person takes antibiotics because  some good germs are destroyed. Antibiotic use allows C. diificile to take over, putting patients at high risk for this serious infection.    As a patient or caregiver, it is important to understand your or your loved one's antibiotic treatment. It is especially important for caregivers to speak up when patients can't speak for themselves. Here are some important questions to ask your healthcare team.    What infection is this antibiotic treating and how do you know I have that infection?    What side effects might occur from this antibiotic?    How long will I need to take this antibiotic?    Is it safe to take this antibiotic with other medications or supplements (e.g., vitamins) that I am taking?     Are there any special directions I need to know about taking this antibiotic? For example, should I take it with food?    How will I be monitored to know whether my infection is responding to the antibiotic?    What tests may help to make sure the right antibiotic is prescribed for me?      Information provided by:  www.cdc.gov/getsmart  U.S. Department of Health and Human Services  Centers for disease Control and Prevention  National Center for Emerging and Zoonotic Infectious Diseases  Division of Healthcare Quality Promotion             Medication List: This is a list of all your medications and when to take them. Check marks below indicate your daily home schedule. Keep this list as a reference.      Medications           Morning Afternoon Evening Bedtime As Needed    acetaminophen 500 MG tablet   Commonly known as:  TYLENOL   Take 1 tablet (500 mg) by mouth every 6 hours as needed for mild pain or fever   Last time this was given:  500 mg on 6/26/2018 12:21 PM                                ciprofloxacin 500 MG tablet   Commonly known as:  CIPRO   Take 1 tablet (500 mg) by mouth every 24 hours   Start taking on:  6/29/2018   Last time this was given:  500 mg on 6/28/2018  9:03 AM                                 ferrous sulfate 325 (65 Fe) MG tablet   Commonly known as:  IRON   Take 325 mg by mouth                                insulin aspart 100 UNIT/ML injection   Commonly known as:  NovoLOG PEN   Inject 1 Units Subcutaneous 3 times daily (with meals)   Last time this was given:  1 Units on 6/27/2018 10:50 PM                                lactulose 10 GM/15ML solution   Commonly known as:  CHRONULAC   Take 30 mLs (20 g) by mouth 3 times daily Goal of 3-4 BMs per day   Last time this was given:  20 g on 6/21/2018  1:35 PM                                lidocaine 5 % ointment   Commonly known as:  XYLOCAINE   Apply topically every 4 hours as needed for moderate pain                                MI-ACID GAS RELIEF 80 MG chewable tablet   Take 80 mg by mouth   Last time this was given:  80 mg on 6/27/2018  2:20 PM   Generic drug:  simethicone                                miconazole 2 % powder   Commonly known as:  MICATIN; MICRO GUARD   Apply topically 2 times daily   Last time this was given:  6/28/2018  7:29 AM                                omeprazole 20 MG CR capsule   Commonly known as:  priLOSEC   Take 20 mg by mouth                                pantoprazole 40 MG EC tablet   Commonly known as:  PROTONIX   Take 1 tablet (40 mg) by mouth 2 times daily (before meals)   Last time this was given:  40 mg on 6/28/2018  7:29 AM                                phosphorus tablet 250 mg 250 MG per tablet   Commonly known as:  PHOSPHA 250 NEUTRAL   Take 2 tablets (500 mg) by mouth 2 times daily                                rifaximin 550 MG Tabs tablet   Commonly known as:  XIFAXAN   Take 1 tablet (550 mg) by mouth 2 times daily   Last time this was given:  550 mg on 6/28/2018  7:29 AM                                tenofovir 300 MG tablet   Commonly known as:  VIREAD   Take 300 mg by mouth   Last time this was given:  300 mg on 6/27/2018  4:15 PM

## 2018-06-16 NOTE — PROGRESS NOTES
Patient arrived on 4C from [facility] at 1800, obtunded does not wake up to painful stimuli or have reflex. Does have a gag at this time, MD in room. On RA, SR, BP stable on levo. Labs sent. Plan to do a para, and Liver/renal US and watch overnight

## 2018-06-16 NOTE — H&P
[facility]      MICU History and Physicial  Yaneli Miles MRN: [id number]  1975  Date of Admission:6/16/2018  Primary care provider: No primary care provider on file.      Assessment and Plan:     Yaneli Miles is a 44 y/o Hmong male with Pmhx of Chronic Viral Hepatitis B with cirrhosis, known portal vein thrombosis, TIIDM on insulin, esophageal varices with recent ERCP for choledocholithiasis who presents from OSH with alerted mental status and vomiting.    ===NEURO===  # Sedation  None    # AMS  OSH labs remarkable elevated Creatinine, hyperammonemia with unremarkable CXR and head CT. Glucose though elevated not concerning for DKA or HHS. Current home medication regimen has minimal concern for mental alteration. Vitals otherwise stable, thus less concerning for hypoxia induced AMS with PNA or PE. Intracranial processes unlikely as CT appeared stable. He was unable to tolerate lactulose and has previous hx of hepatic encephalopathy. Will initiate antibiotics for intrabominal concern as unable to rule out at this time.   - Complete abdominal ultrasound with dopplers   - Continue Ceftriaxone  - Diagnostic Paracentesis with appropriate labs and cx  - Repeat bld cx, ua with culture, urine sodium   - Repeat ammonia level  - Lipase, wnl  - Repeat CXR and new Ab XR    ===CARDIOVASCULAR===  # Hypotension   Concern for vascular depletion in setting of ascites and acute kidney injury. Was started on Levo at OSH.   - Continue Levo  - Map goal >80 with concern for hepatorenal syndrome    # Prolong QT on EKG  No acute electrolyte abnormalities or ischemic changes. Additionally not currently on QT prolonging medications. Will continue to monitor.    ===PULMONARY===  No acute concerns.    ===GASTROINTESTINAL===  # Hyperammonemia   PTA patient was on rifaximin and lactulose.   - Hepatology consult  - Imaging as above  - Lactulose    # Chronic viral Hepatitis B  # Cirrhosis   # Known portal vein thrombosis  # Esophageal  varices  # Elevated INR   Meld score today of 39. Patient has been followed outpatient by ELDER Torres. He was to be seen by transplant this week in regards to liver transplant discussion. PTA medications include lactulose, Lasix, spironolactone, tenofovir, nadolol and rifaximin.  - INR, PT, Fibrinogen   - Hepatology consult as above   - Vitamin K     # Nutrition:   - NPO    ===RENAL===  # BEN  Unclear etiology.  Creatinine within the past month has been within normal limits. UA and urine culture were done at OSH with 5-9 WBC, bacteria, though many squamous cells and  RBCs. Consideration of hepatorenal syndrome.   - Urea nitrogen random urine w/ Creat ratio  - Nephrology consult in AM     # Hypoalbuminemia  - Will albumin challenge    ===HEME/ONC===  # Normocytic Anemia  No acute signs of bleeding. Does have a hx of esophageal varices. Will continue to monitor. Will for transfusion if < 7. Type and screen sent. Spouse consented for blood products upon admission.   - Trend    ===ENDOCRINE===  # Type II DM, on insulin  Hemoglobin A1C 5/25/2018, 5.2. PTA medications include Exenatide, sitagliptin and lantus 10 units.  - MSSI  - Hypoglycemia protocol      ===INFECTIOUS DISEASE===  # See AMS above    # Antimicrobials:  - Ceftriaxone 6/16-  - Flagyl 6/16 given at OSH    ===SKIN/MSK===  No acute concerns     Lines:  Right IJ, bah, PIV, NJ  Prophylaxis:  DVT: SCDs  GI: not indicated   Family: Updated at Bedside  Disposition:  ICU  Code Status:  Full code    Patient was seen and discussed with attending physician Dr. Calloway, who agrees with above assessment and plan.    Antonietta Pierce MD  PGY1  Pager: [phone]         Chief Complaint:   AMS and vomiting         History of Present Illness:   Yaneli Miles is a 42 y/o Hmong male with Pmhx of Chronic Viral Hepatitis B with cirrhosis and esophageal varices, known portal vein thrombosis, Type 2 Diabetes mellitus who presents from OSH with alerted mental status and  vomiting.    History was obtained through chart review and  talking with spouse. Per wife, patient was well last night. This morning patient was confused and not answering questions properly. He proceeded to have 2 episodes of emesis and she called EMS. Patient was taken to [facility]. He has been jaundice since recent admission to [facility]. He has had increased abdominal distension since April. CT of head was unremarkable. Lactulose enemas were attempted, but unsuccessful.  Blood cultures were drawn. CXR done had no acute processes such as infection or edema.  Initial LA 3.1 with repeat of 2.3. Ammonia level of 299. Patient has elevated liver function levels, but these appear within baseline over the last month, even after ERCP on 6/08. At OSH he was given 12.5 g albumin, 1 g ceftraxione and 500g metronidazole. Additionally given 3L of NS and 1 L of LR. One dose of oral lactulose was given by NG before transfer. Patient and patient s spouse are Hmong speaking.        Review of Systems:      Unable to perform due to patient condition.          Past Medical History:   Medical History reviewed.   Past Medical History:   Diagnosis Date     Choledocholithiasis      Chronic viral hepatitis B without delta agent and without coma (H) 6/14/2018     Cirrhosis of liver with ascites (H) 6/14/2018     Esophageal varices (H)     prior GIB     Hepatic encephalopathy (H)      Portal vein thrombosis 6/14/2018 5/29/18 care everywhere      Type 2 diabetes mellitus without complication, without long-term current use of insulin (H) 6/14/2018             Past Surgical History:   Surgical History reviewed.   Past Surgical History:   Procedure Laterality Date     ENDOSCOPIC RETROGRADE CHOLANGIOPANCREATOGRAM      with stone removal             Social History:   Social History reviewed.  Social History   Substance Use Topics     Smoking status: Never Smoker     Smokeless tobacco: Not on file     Alcohol use No              Family History:   Family History reviewed. Spouse could not remark.   No family history on file.          Allergies:     Allergies   Allergen Reactions     Nsaids      Contraindicated              Medications:   Medications Reviewed.   Current Facility-Administered Medications   Medication     albumin human 25 % injection 100 g     [START ON 6/17/2018] cefTRIAXone (ROCEPHIN) 1 g vial to attach to  mL bag for ADULTS or NS 50 mL bag for PEDS     Daily 2 GRAM acetaminophen limit, unless fulminent liver failure or transaminases greater than or equal to 300 - 400, then none     glucose gel 15-30 g    Or     dextrose 50 % injection 25-50 mL    Or     glucagon injection 1 mg     insulin aspart (NovoLOG) inj (RAPID ACTING)     insulin aspart (NovoLOG) inj (RAPID ACTING)     lactulose (CHRONULAC) solution 20 g    Or     lactulose (CHRONULAC) solution for enema prep 100 g     lactulose (CHRONULAC) solution 20 g     lidocaine 1 % 5 mL     magnesium sulfate 2 g in water intermittent infusion     magnesium sulfate 4 g in 100 mL sterile water (premade)     naloxone (NARCAN) injection 0.1-0.4 mg     norepinephrine (LEVOPHED) 16 mg in D5W 250 mL infusion     phytonadione (AQUA-MEPHYTON) 10 mg in sodium chloride 0.9 % 50 mL intermittent infusion     potassium chloride (KLOR-CON) Packet 20-40 mEq     potassium chloride 10 mEq in 100 mL intermittent infusion with 10 mg lidocaine     potassium chloride 10 mEq in 100 mL sterile water intermittent infusion (premix)     potassium chloride SA (K-DUR/KLOR-CON M) CR tablet 20-40 mEq     potassium phosphate 10 mmol in D5W 250 mL intermittent infusion     potassium phosphate 15 mmol in D5W 250 mL intermittent infusion     potassium phosphate 20 mmol in D5W 250 mL intermittent infusion     potassium phosphate 20 mmol in D5W 500 mL intermittent infusion     potassium phosphate 25 mmol in D5W 500 mL intermittent infusion     rifaximin (XIFAXAN) tablet 550 mg     sodium chloride 0.9%  (bottle) 0.9 % irrigation             Physical Exam:   Vitals were reviewed.  Temp:  [97.5  F (36.4  C)] 97.5  F (36.4  C)  Heart Rate:  [64-69] 64  BP: ()/(68-70) 102/70  SpO2:  [99 %-100 %] 100 %    General: grimace with sternal rub, laying upright in bed, periorbital edema  Skin: Diffuse jaundiced, no rash, no ecchymoses  HEENT: MMM, PERRLA  CV: RRR, normal S1S2, no murmur, clicks, rubs  Resp: Clear to auscultation bilaterally, no wheezes, rhonchi  Abd: Soft, non-tender, distended with appreciable fluid wave  Extremities: warm and well perfused, +1 pitting edema BLE, palpable pulses         Data:        ROUTINE LABS (Last four results)  CMP    Recent Labs  Lab 06/16/18  1815      POTASSIUM 4.6   CHLORIDE 109   CO2 11*   ANIONGAP 14   *   BUN 46*   CR PENDING   GFRESTIMATED PENDING   GFRESTBLACK PENDING   JESUS 7.4*   MAG 2.1   PHOS 3.4   PROTTOTAL PENDING   ALBUMIN 1.6*   BILITOTAL 24.5*   ALKPHOS 138   *   *     CBC    Recent Labs  Lab 06/16/18  1815   WBC 9.6   RBC 3.41*   HGB 10.6*   HCT 29.8*   MCV 87   MCH 31.1   MCHC 35.6   RDW 20.6*   PLT 82*     INR    Recent Labs  Lab 06/16/18  1815   INR 2.18*     Arterial Blood Gas    Recent Labs  Lab 06/16/18  1830   O2PER 21.0

## 2018-06-16 NOTE — LETTER
"Transition Communication Hand-off for Care Transitions to Next Level of Care Provider    Name: Yaneli Miles  : 1975  MRN #: [id number]  Primary Care Provider: Provider Not In System     Primary Clinic:       Reason for Hospitalization:  SBP (spontaneous bacterial peritonitis) (H) [K65.2]  Admit Date/Time: 2018  6:03 PM  Discharge Date: ***  Payor Source: Payor: UCARE / Plan: UCARE MA / Product Type: HMO /     Readmission Assessment Measure (EMILY) Risk Score/category: ***    Plan of Care Goals/Milestone Events:   Patient Concerns: ***   Patient Goals:   Short-term ***   Long-term ***   Medical Goals   Short-term ***   Long-term ***         Reason for Communication Hand-off Referral: {CCREASONCMCTNHANDOFFREFERRALCTS:32306}    Discharge Plan:       Concern for non-adherence with plan of care:   Y/N ***  Discharge Needs Assessment:  Needs       Most Recent Value    Equipment Currently Used at Home walker, rolling    Transportation Available family or friend will provide          Already enrolled in Tele-monitoring program and name of program:  ***  Follow-up specialty is recommended: {YES / NO:26942::\"Yes\"}    Follow-up plan:  Future Appointments  Date Time Provider Department Center   2018 3:00 PM Toyin Gallardo, SLP [facility] O   2018 3:30 PM Razia Graham OTA [facility] O   2018 2:00 AM Katja Orourke, PT [facility] O   2018 9:00 AM Radha Borrego [facility]   2018 2:00 PM Provider, [facility]   2018 9:30 AM Provider, [facility]   2018 9:30 AM Provider, [facility]   2018 9:30 AM Provider, [facility]   2018 2:00 PM Provider, [facility]   8/15/2018 9:30 AM Provider, [facility]   2018 2:00 PM Provider, [facility]   2018 9:30 AM Provider, [facility] Inf [facility]       Any outstanding " tests or procedures:        Referrals     Future Labs/Procedures    Home Care PT Referral for Hospital Discharge     Comments:    [facility]  Phone  [phone]  Fax  [phone]    **ong  required with visits**  PT to eval and treat    Your provider has ordered home care - physical therapy.   If you have not been contacted within 2 days of your discharge please call            Key Recommendations:      Elaine Ordaz    AVS/Discharge Summary is the source of truth; this is a helpful guide for improved communication of patient story

## 2018-06-16 NOTE — LETTER
June 28, 2018    Yaneli Miles  [address] [place] [address]    To whom it may concern,      Emy Cerna's father has been in the hospital for an acute severe illness. His father will continue to require ongoing support from family in order to recover successfully for the next 2-3 weeks. If it is possible to accommodate Emy Cerna in allowing him to care for his father, it would be of great benefit to the patient.    Thank you for your consideration,      Gold Lamas MD

## 2018-06-17 NOTE — PROGRESS NOTES
[facility], [place]  Procedure Note           Intubation:       Yaneli Miles  MRN# [id number]   June 17, 2018, 3:25 AM Indication: Inability to protect airway           Patient intubated at: June 17, 2018, 01:05 AM   Patient and family informed of: Why intubation was required   Informed consent: Obtained   Cervical spine: Was stabilized during the procedure   Sedative medication: Was administered during the procedure   Technique used: Fiberoptic visualization with GlideScope   Endotracheal tube size: 8.0 cm with cuff   Number of attempts: 1     Placement confirmed by: Auscultation of bilateral breath sounds  Visualization of bilateral chest wall rise  End-tidal CO2 monitor  Chest X-ray   ET tube repositioning: Was not performed   Tube secured at: 22 cm      This procedure was performed without difficulty and he tolerated the procedure well with no complications.      Recorded by Mayuri Rolon

## 2018-06-17 NOTE — PHARMACY-VANCOMYCIN DOSING SERVICE
Pharmacy Vancomycin Initial Note  Date of Service 2018  Patient's  1975  43 year old male    Indication: Bacteremia (GPCs on culture from OSH)    Current estimated CrCl = Estimated Creatinine Clearance: 28.6 mL/min (based on Cr of 3.26).    Creatinine for last 3 days  2018:  6:15 PM Creatinine 3.08 mg/dL  2018:  4:10 AM Creatinine 3.26 mg/dL    Recent Vancomycin Level(s) for last 3 days  No results found for requested labs within last 72 hours.      Vancomycin IV Administrations (past 72 hours)      No vancomycin orders with administrations in past 72 hours.              Nephrotoxins and other renal medications (Future)    Start     Dose/Rate Route Frequency Ordered Stop    18 1330  vancomycin (VANCOCIN) 1,500 mg in sodium chloride 0.9 % 250 mL intermittent infusion      1,500 mg  over 90 Minutes Intravenous ONCE 18 1300      18 1259  vancomycin place de la cruz - receiving intermittent dosing      1 each Does not apply SEE ADMIN INSTRUCTIONS 18 1300      18 1815  norepinephrine (LEVOPHED) 16 mg in D5W 250 mL infusion      0.03-0.4 mcg/kg/min × 70.7 kg  2-26.5 mL/hr  Intravenous CONTINUOUS 18 1817          Contrast Orders - past 72 hours     None          Plan:  1.  Give vancomycin 1500mg IV x1 dose, followed by intermittent dosing in the setting of significant BEN/hepatorenal syndrome.   2.  Goal Trough Level: 15-20 mg/L   3.  Pharmacy will check trough levels as appropriate in 1-3 Days.    4. Serum creatinine levels will be ordered daily for the first week of therapy and at least twice weekly for subsequent weeks.    5. [place] method utilized to dose vancomycin therapy: Method 2    Dana Gonzalez, PharmD

## 2018-06-17 NOTE — CONSULTS
Nephrology Initial Consult  June 17, 2018      Yaneli Miles MRN:[id number] YOB: 1975  Date of Admission:6/16/2018  Primary care provider: No primary care provider on file.  Requesting physician: Amanuel Briggs MD    ASSESSMENT AND RECOMMENDATIONS:   This is a case of BEN in a 43 year old male with HBV cirrhosis c/b esophageal varices and HE, who presented to an OSH with AMS and found to have hyperammonemia.    Non-oliguric BEN  Baseline Cr 0.6 in June 2018, now presenting in acute kidney injury with Cr 3.3. Etiology for BEN can be intravascular volume depletion, ATN-physiology, cholemic nephropathy, or hepatorenal physiology. Currently patient is being treated for septic shock, on ceftriaxone 2g daily. ATN-physiology most likley  - Patient already received +4L resuscitation fluids at OSH without improvement in Cr or blood pressure, which rules out pure volume depletion. Albumin being given x3 days, follow daily BMP  - Granular casts in UA, although non specific, suggest tubular injury which may be from underlying sepsis. Maintain MAP >80, currently on levophed  - Therapeutic ascites tap may relieve intra-abdominal pressure and improve hemodynamics and urine output. Get abdominal pressures, avoid taps >4L.  - Strict I/O, daily BMP, avoid NSAIDs, avoid other nephrotoxic medications    Electrolytes  Na 10, K 4.3, Phos 5.0, Cl 113  - replace as necessary. No acute need for hemodialysis at this time    Volume/BP  Hypotensive, likely low effective circulating blood volume. Continue albumin x 3 days (day 2 today)  - BP support with vasopressors. Paracentesis should not exceed 4L. Hold home diuretics    Anemia  hgb 8.2, h/o esophageal and variceal bleeding but no obvious source at this time.   - Transfusion parameters by primary team    Acid/base balance   pH 7.34/21/144 on 30% fio2. HCO3 13  - positive urine anion gap, but patient may still have component of both renal disease and GI loses contributing.  -  no need for bicarbonate therapy at this time, continue to trend    BMD  Ca 8.5, albumin 3.8, Phos 5.0  - Needs to be on renal TF, low phos    Recommendations were communicated to primary team via phone    Seen and discussed with Dr. Сергей Amanda MD   [phone]    REASON FOR CONSULT: Acute kidney injury in the setting of liver cirrhosis    HISTORY OF PRESENT ILLNESS:  Yaneli Miles is a 43 year old ong male with a PMHx significant for hepattis B, cirrhosis c/b esophageal varices, T2DM, who presents from an OSH with AMS and recurrent vomiting. The patients wife reports Mr Miles having increasing abdominal distention since id April. His ammonia on admission was found to be 299, with elevated transaminases. He was given albumin, ceftriaxone, and flagyl at the outside hospital. He was rescucitated with 3L NS + 1L LR.    The patient has a baseline Cr of 0.6 (06/01/2018), and now has a rise to 3.3. The patient had an ascites fluid tap which revealed 738 WBC, 68 PMNs. His UA revealed large bilirubin, sp grav 1.011, large blood, 66 WBC, 16 RBC, granular casts. Bilirubin was 23.7, FeUrea 33.4%. +ve urine anion gap.     The patient had a recent admission at [facility] for hepatic encephalopathy and choledocholithiasis s/p ERCP 5/31/2018.     PAST MEDICAL HISTORY:  Reviewed with patient on 06/17/2018     Past Medical History:   Diagnosis Date     Choledocholithiasis      Chronic viral hepatitis B without delta agent and without coma (H) 6/14/2018     Cirrhosis of liver with ascites (H) 6/14/2018     Esophageal varices (H)     prior GIB     Hepatic encephalopathy (H)      Portal vein thrombosis 6/14/2018 5/29/18 care everywhere [place]     Type 2 diabetes mellitus without complication, without long-term current use of insulin (H) 6/14/2018     Past Surgical History:   Procedure Laterality Date     ENDOSCOPIC RETROGRADE CHOLANGIOPANCREATOGRAM      with stone removal      MEDICATIONS:  PTA Meds  Prior to Admission medications     Medication Sig Last Dose Taking? Auth Provider   exenatide (BYETTA 10 MCG PEN) 10 MCG/0.04ML injection Inject 10 mcg Subcutaneous   Reported, Patient   ferrous sulfate (IRON) 325 (65 Fe) MG tablet Take 325 mg by mouth   Reported, Patient   furosemide (LASIX) 40 MG tablet Take 20 mg by mouth   Reported, Patient   insulin glargine (LANTUS) 100 UNIT/ML injection Inject 10 Units Subcutaneous   Reported, Patient   lactulose (CHRONULAC) 10 GM/15ML solution Take 20 g by mouth   Reported, Patient   nadolol (CORGARD) 20 MG tablet Take 20 mg by mouth   Reported, Patient   omeprazole (PRILOSEC) 20 MG CR capsule Take 20 mg by mouth   Reported, Patient   rifaximin (XIFAXAN) 550 MG TABS tablet Take 550 mg by mouth   Reported, Patient   simethicone (MI-ACID GAS RELIEF) 80 MG chewable tablet Take 80 mg by mouth   Reported, Patient   sitagliptin (JANUVIA) 100 MG tablet Take 100 mg by mouth   Reported, Patient   spironolactone (ALDACTONE) 25 MG tablet Take 75 mg by mouth   Reported, Patient   tenofovir (VIREAD) 300 MG tablet Take 300 mg by mouth   Reported, Patient      Current Meds    albumin human  100 g Intravenous Daily     cefTRIAXone  2 g Intravenous Q24H     [START ON 6/18/2018] famotidine  20 mg Oral or Feeding Tube Daily     insulin aspart  1-7 Units Subcutaneous Q4H     lactulose  20 g Oral or NG Tube TID     phytonadione  10 mg Intravenous Daily     rifaximin  550 mg Oral or Feeding Tube BID     [START ON 6/19/2018] tenofovir  300 mg Oral or Feeding Tube Q72H     vancomycin place de la cruz - receiving intermittent dosing  1 each Does not apply See Admin Instructions     Infusion Meds    - MEDICATION INSTRUCTIONS -       norepinephrine 0.04 mcg/kg/min (06/17/18 1700)     propofol (DIPRIVAN) infusion 15 mcg/kg/min (06/17/18 1700)     ALLERGIES:    Allergies   Allergen Reactions     Nsaids      Contraindicated      REVIEW OF SYSTEMS:  A comprehensive of systems was negative except as noted above.    SOCIAL HISTORY:   Social  "History     Social History     Marital status: N/A     Spouse name: N/A     Number of children: N/A     Years of education: N/A     Occupational History     Not on file.     Social History Main Topics     Smoking status: Never Smoker     Smokeless tobacco: Not on file     Alcohol use No     Drug use: No     Sexual activity: Not on file     Other Topics Concern     Not on file     Social History Narrative     Reviewed with patient   Wife accompanies Yaneli Miles in hospital room    FAMILY MEDICAL HISTORY:   No history of kidney disease  Reviewed with patient     PHYSICAL EXAM:   Temp  Av.4  F (36.3  C)  Min: 95.8  F (35.4  C)  Max: 98.9  F (37.2  C)      No Data Recorded Resp  Avg: 15.3  Min: 11  Max: 29  SpO2  Av.7 %  Min: 96 %  Max: 100 %  FiO2 (%)  Av %  Min: 30 %  Max: 60 %       BP 98/58  Temp 98.7  F (37.1  C) (Axillary)  Resp 15  Ht 1.676 m (5' 6\")  Wt 70.7 kg (155 lb 13.8 oz)  SpO2 100%  BMI 25.16 kg/m2   Date 18 0700 - 18 0659   Shift 2326-1370 0303-4780 5626-8333 24 Hour Total   I  N  T  A  K  E   I.V. 630.67 87.3  717.97    Colloid 400   400    Shift Total  (mL/kg) 1030.67  (14.58) 87.3  (1.23)  1117.97  (15.81)   O  U  T  P  U  T   Urine 275 250  525    Stool 600 300  900    Shift Total  (mL/kg) 875  (12.38) 550  (7.78)  1425  (20.16)   Weight (kg) 70.7 70.7 70.7 70.7     Admit Weight: 70.7 kg (155 lb 13.8 oz)     GENERAL APPEARANCE: intubated, sedated, non-responsive  EYES: + scleral icterus, pupils equal  Endo: no goiter, no moon facies  Lymphatics: no cervical or supraclavicular LAD  Pulmonary: lungs clear to auscultation with equal breath sounds bilaterally, no clubbing  CV: regular rhythm, normal rate, no rub   - JVD no   - Edema no  GI: distended but not tense, hypoactive bowel sounds  MS: no evidence of inflammation in joints, no muscle tenderness  : + bah  SKIN: no rash, warm, dry, no cyanosis  NEURO: face symmetric     LABS:   CMP  Recent Labs  Lab 18  0410 " 06/16/18  1815    135   POTASSIUM 4.2 4.6   CHLORIDE 107 109   CO2 11* 11*   ANIONGAP 18* 14   * 128*   BUN 52* 46*   CR 3.26* 3.08*   GFRESTIMATED 21* 22*   GFRESTBLACK 25* 27*   JESUS 8.0* 7.4*   MAG 2.5* 2.1   PHOS 5.0* 3.4   PROTTOTAL 5.5* 6.0*   ALBUMIN 3.2* 1.6*   BILITOTAL 23.7* 24.5*   ALKPHOS 86 138   * 190*   ALT 95* 138*     CBC  Recent Labs  Lab 06/17/18  0410 06/16/18  1815   HGB 8.2* 10.6*   WBC 3.6* 9.6   RBC 2.66* 3.41*   HCT 23.3* 29.8*   MCV 88 87   MCH 30.8 31.1   MCHC 35.2 35.6   RDW 20.8* 20.6*   PLT 48* 82*     INR  Recent Labs  Lab 06/17/18  0410 06/16/18  1815   INR 2.20* 2.18*   PTT  --  42*     ABG  Recent Labs  Lab 06/17/18  0845 06/16/18  1830   PH 7.34*  --    PCO2 21*  --    PO2 144*  --    HCO3 11*  --    O2PER 30.0 21.0      URINE STUDIES  Recent Labs   Lab Test  06/16/18   2220   COLOR  Dark Yellow   APPEARANCE  Slightly Cloudy   URINEGLC  Negative   URINEBILI  Large*   URINEKETONE  Negative   SG  1.011   UBLD  Large*   URINEPH  5.5   PROTEIN  30*   NITRITE  Negative   LEUKEST  Large*   RBCU  16*   WBCU  66*     No lab results found.  PTH  No lab results found.  IRON STUDIES  No lab results found.    IMAGING:  All imaging studies reviewed by me.     Esau Amanda MD

## 2018-06-17 NOTE — PROGRESS NOTES
Medical ICU Progress Note    Assessment/Plan:  Yaneli Miles is a 44 y/o H[place] male with Pmhx of Chronic Viral Hepatitis B with cirrhosis, known portal vein thrombosis, TIIDM on insulin, esophageal varices with recent ERCP for choledocholithiasis who presents from OSH with alerted mental status and vomiting found to have SBP. Patient intubated overnight with concern for protecting airway.     Today:  - Increase Ceftriaxone to 2 g QDay  - Switched back to propofol   - D/C midodrine and octreotide   - Will discuss therapeutic para with Nephro and GI      ===NEURO===  # Sedation  - Propofol       # AMS  Most likely multifactorial including worsening liver function and sepsis.   - Continue to monitor        ===CARDIOVASCULAR===  # Hypotension   Concern for vascular depletion in setting of ascites, acute kidney injury and sepsis. Was started on Levo at OSH.   - Continue Levo  - Map goal >80 with concern for hepatorenal syndrome     # Prolong QT on EKG  No acute electrolyte abnormalities or ischemic changes. Additionally not currently on QT prolonging medications. Will continue to monitor.     ===PULMONARY===  No acute concerns.     ===GASTROINTESTINAL===  # SBP  # Sepsis  Diagnostic tap concerning for SBP. Patient already on appropriate treatment  - Continue Ceftriaxone, will increase from 1 g to 2 g daily      # Hyperammonemia   # Thrombocytopenia   # Chronic viral Hepatitis B  # Cirrhosis with ascites  # Known portal vein thrombosis  # Esophageal varices  # Elevated INR   Meld score today of 39. Patient has been followed outpatient by ELDER Torres. He was to be seen by transplant this week in regards to liver transplant discussion. PTA medications include lactulose, Lasix, spironolactone, tenofovir, nadolol and rifaximin.  - INR, PT, Fibrinogen   - Hepatology consult as above   - Vitamin K   - Lactulose   - Per Pharm recs tenofovir, dosing every 3-4 days with current renal state; most recent dose 6/15  - Continue rifaximin  -  "D/c octreotide and midodrine   - Will discuss with GI and nephrology if appropriate to do therapeutic paracentesis     # Nutrition:   - NPO     ===RENAL===  # BEN  Unclear etiology, but concerning for pre renal in setting of sepsis. Creatinine within the past month has been within normal limits. UA and urine culture were done at OSH with 5-9 WBC, bacteria, though many squamous cells and  RBCs. 0.96% FeNa.   - Nephrology consult      # Hypoalbuminemia  - Continuel albumin 100g for 3 days total, Day 2      ===HEME/ONC===  # Normocytic Anemia  No acute signs of bleeding. Does have a hx of esophageal varices. Will continue to monitor. Will for transfusion if < 7. Type and screen sent. Spouse consented for blood products upon admission.   - Trend     ===ENDOCRINE===  # Type II DM, on insulin  Hemoglobin A1C 5/25/2018, 5.2. PTA medications include Exenatide, sitagliptin and lantus 10 units.  - MSSI  - Hypoglycemia protocol       ===INFECTIOUS DISEASE===  # See SBP     # Antimicrobials:  - Ceftriaxone 6/16-  - Flagyl 6/16 given at OSH     ===SKIN/MSK===  No acute concerns      Lines:  Right IJ, bah, PIV, NG  Prophylaxis:  DVT: SCDs  GI: Pepcid  Family: Updated at Bedside  Disposition:  ICU  Code Status:  Full code    Patient seen and discussed with Dr. Brigitte Pierce, PGY 1   [phone]    ==========================================================  Interval Events    Patient was intubated overnight due to concerns for not protecting airway. Initially started on propofol, but had a notable drop in MAPs and switched over to Precedex. Additionally started on octreotide and midodrine. Nursing stated an increase in Levo needed with initiation and continutation of Precedex. Became agitated this morning and was switched back to propofol and stopped Precedex.      Objective    Blood pressure 121/72, temperature 95.8  F (35.4  C), temperature source Tympanic, resp. rate 27, height 1.676 m (5' 6\"), weight 70.7 kg " (155 lb 13.8 oz), SpO2 100 %.  Ventilation Mode: CMV/AC  (Continuous Mandatory Ventilation/ Assist Control)  FiO2 (%): 30 %  Rate Set (breaths/minute): 16 breaths/min  Tidal Volume Set (mL): 450 mL  PEEP (cm H2O): 5 cmH2O  Oxygen Concentration (%): 30 %  Resp: 27    General: Sedated, intubated  Skin: Diffuse jaundiced; no rash, no ecchymoses  CV: RRR, normal S1S2, no murmur, clicks, rubs  Resp: Clear to auscultation bilaterally, no wheezes, rhonchi  Abd: Soft, non-tender, distended with appreciable fluid wave  Extremities: warm and well perfused, palpable pulses, +1 pitting edema BLE    Labs reviewed  Imaging reviewed     ROUTINE ICU LABS (Last four results)  CMP  Recent Labs  Lab 06/17/18 0410 06/16/18 1815    135   POTASSIUM 4.2 4.6   CHLORIDE 107 109   CO2 11* 11*   ANIONGAP 18* 14   * 128*   BUN 52* 46*   CR 3.26* 3.08*   GFRESTIMATED 21* 22*   GFRESTBLACK 25* 27*   JESUS 8.0* 7.4*   MAG 2.5* 2.1   PHOS 5.0* 3.4   PROTTOTAL 5.5* 6.0*   ALBUMIN 3.2* 1.6*   BILITOTAL 23.7* 24.5*   ALKPHOS 86 138   * 190*   ALT 95* 138*     CBC  Recent Labs  Lab 06/17/18 0410 06/16/18  1815   WBC 3.6* 9.6   RBC 2.66* 3.41*   HGB 8.2* 10.6*   HCT 23.3* 29.8*   MCV 88 87   MCH 30.8 31.1   MCHC 35.2 35.6   RDW 20.8* 20.6*   PLT 48* 82*     INR  Recent Labs  Lab 06/17/18  0410 06/16/18  1815   INR 2.20* 2.18*     Arterial Blood Gas  Recent Labs  Lab 06/16/18  1830   O2PER 21.0       Imaging   Ab XR 6/17  - Gas distention of stomach    CXR 6/17  - Perihilar and bibasilar linear opacities w/low lung volumtes, suggestive of atelectasis     Complete Abdominal ultrasound 6/17  -  Cirrhotic liver.  -  Cholelithiasis with gallbladder wall thickening and  pericholecystic fluid which is nonspecific in the context of liver  failure and ascites.  -  Patent ultrasound evaluation of liver vasculature.  -  Moderate ascites.  - Splenomegaly measuring up to 15.4 cm.

## 2018-06-17 NOTE — PROCEDURES
"Procedure/Surgery Information   [facility]    Bedside Procedure Note  Date of Service (when I performed the procedure): 06/16/2018    Yaneli Miles is a 43 year old male patient.  No diagnosis found.  Past Medical History:   Diagnosis Date     Choledocholithiasis      Chronic viral hepatitis B without delta agent and without coma (H) 6/14/2018     Cirrhosis of liver with ascites (H) 6/14/2018     Esophageal varices (H)     prior GIB     Hepatic encephalopathy (H)      Portal vein thrombosis 6/14/2018 5/29/18 care everywhere [place]     Type 2 diabetes mellitus without complication, without long-term current use of insulin (H) 6/14/2018     Temp: 96.7  F (35.9  C) Temp src: Axillary BP: 98/70   Heart Rate: 70 Resp: 16 SpO2: 99 % O2 Device: None (Room air)      Abdominal paracentesis  Date/Time: 6/16/2018 8:54 PM  Performed by: NEFTALI ADAMS  Authorized by: NEFTALI ADAMS   Consent: Verbal consent obtained. Written consent obtained.  Consent given by: spouse  Required items: required blood products, implants, devices, and special equipment available  Patient identity confirmed: arm band, provided demographic data and hospital-assigned identification number  Time out: Immediately prior to procedure a \"time out\" was called to verify the correct patient, procedure, equipment, support staff and site/side marked as required.  Preparation: Patient was prepped and draped in the usual sterile fashion.  Local anesthesia used: yes  Anesthesia: local infiltration    Anesthesia:  Local anesthesia used: yes  Local Anesthetic: lidocaine 1% without epinephrine  Anesthetic total: 5 mL    Sedation:  Patient sedated: no  Patient tolerance: Patient tolerated the procedure well with no immediate complications  Comments: 30cc clear yary fluid removed and sent to lab for analysis.           Neftali Calloway MD, FACS  P, Surgical Critical Care  [phone] " pager

## 2018-06-17 NOTE — ANESTHESIA PROCEDURE NOTES
ANESTHESIOLOGY RESIDENT/CRNA INTUBATION NOTE  Indication for intubation: airway protection.    History regarding the most recent potassium obtained: Yes  History regarding renal failure obtained: Yes  History of presence or absence of CVA/stroke was obtained: Yes  History of presence or absence of NM disorder obtained: Yes  Post Intubation: ETT secured, Sedation to be ordered by primary/ICU team, Vent settings by primary/ICU team, No apparent complications, Primary/ICU team to review CXR and Report given to primary nurse and/or team

## 2018-06-17 NOTE — PLAN OF CARE
Problem: Diabetes Comorbidity  Goal: Diabetes  Patient comorbidity will be monitored for signs and symptoms of hyperglycemia or hypoglycemia. Problems will be absent, minimized or managed by discharge/transition of care.  Patient has DM2, which is a comorbidity to diagnosis

## 2018-06-17 NOTE — PLAN OF CARE
Problem: Patient Care Overview  Goal: Plan of Care/Patient Progress Review  D: Patient with ESLD, hep B cirrhosis.   I/A: Patient intubated, sedated on propofol. Able to arouse to voice but does not follow commands. Lung sounds course and diminished. On 30% peep5. SR to loyda at times with no ectopy seen. BP stable keeping MAPs >80 for hepato/renal issues, remains on a low dose of levo. Started doing bladder pressures which were 17, MD aware. Paracentesis done for 2L off. No changes in pressures but did loyda down into the 50s from 80s. Had emesis x2 after coughing fits. Good urine output, giving lactulose for high ammonia and HE with good stool output. Positive blood/urine cultures, new abx started.    P: continue to monitor stool and continue give lactulose

## 2018-06-17 NOTE — PLAN OF CARE
Problem: Patient Care Overview  Goal: Plan of Care/Patient Progress Review  OT/4C - Cancel. Pt not medically appropriate to initiate OT on this date. Will reschedule as appropriate.

## 2018-06-17 NOTE — PLAN OF CARE
Problem: Patient Care Overview  Goal: Plan of Care/Patient Progress Review  PT 4C: Cancel- PT orders received. Patient not medically appropriate for therapy today, will reschedule PT evaluation.

## 2018-06-17 NOTE — PLAN OF CARE
Problem: Patient Care Overview  Goal: Plan of Care/Patient Progress Review  Outcome: Improving  D: 42 y/o [place] male with Pmhx of Chronic Viral Hepatitis B with cirrhosis, known portal vein thrombosis, TIIDM on insulin, esophageal varices with recent ERCP for choledocholithiasis who presents from OSH with alerted mental status and vomiting. Currently intubated in MICU    A/I:30% FiO2, clear lung sounds at beginning of shift fine crackles now in lower lobes, intubated at 0100. Coughing but tolerating vent. HR dropped to 49 one time and precedex was stopped and HR recovered. About 6:15 pt was coughing uncontrollaby and precedex was restarted. BP MAP goal of 80 maintained most of night with levo. UOP improving. Lactulose enema given around midnight, Now has rectal pouch in place and is putting out adequate stool    P: Continue to Monitor and Assess patient. Notify MD of any changes. Closely monitor MAP goal, and alertness continue lactulose

## 2018-06-17 NOTE — CONSULTS
GASTROENTEROLOGY CONSULTATION      Date of Admission:  6/16/2018          ASSESSMENT AND RECOMMENDATIONS:   Assessment:  Yaneli Miles is a 43 year old male with a history of decompensated HBV cirrhosis on tenofovir c/b esophageal varices (with hemorrhage in 2/2017 and 4/2018), ascites requiring twice monthly paracentesis, and hepatic encephalopathy with recent admission at [facility] 5/25/18-6/1/18 for hepatic encephalopathy and choledocholithiasis s/p ERCP 5/31/18 who is presenting as a transfer from [facility] with hepatic encephalopathy and acute kidney injury. Hepatology is consulted for further evaluation and management.     Infectious evaluation revealing SBP (500+ neutrophils in ascites fluid) with diagnostic paracentesis. Suspect decompensation due to SBP, however, complete infectious evaluation still pending. No evidence of portal venous thrombus or gastrointestinal bleeding. Recommend supportive care by ICU team and initiation of transplant evaluation order set.     Cirrhosis Summary:  - Type: HBV  - Hepatic Encephalopathy: No evidence  - Varices: History of esophageal varices, banded 2/17 and 4/18. On beta blocker (nadolol 20 mg daily).    - Ascites: On home lasix/spironolactone; currently being held.   - Spontaneous Bacterial Peritonitis: Diagnostic paracentesis notable for.   - Portal Vein Thrombosis: Non-occlusive PVT noted on U/S Abdomen 5/29/18; not identified on U/S this admission.   - Hepatopulmonary Syndrome: No evidence  - Hepatorenal Syndrome: Likely.  - Hyponatremia: Baseline.  - Thrombocytopenia: Baseline.  - Coagulopathy: INR 2.3.  - HCC: No evidence.  - Transplant Status: Evaluation was to start this Monday.   - ABO: Pending  - AFP: Pending    MELD-Na score: 39 at 6/17/2018  4:00 PM  MELD score: 39 at 6/17/2018  4:00 PM  Calculated from:  Serum Creatinine: 3.59 mg/dL at 6/17/2018  4:00 PM  Serum Sodium: 140 mmol/L (Rounded to 137) at 6/17/2018  4:00 PM  Total Bilirubin: 23.7  mg/dL at 6/17/2018  4:00 PM  INR(ratio): 2.20 at 6/17/2018  4:10 AM  Age: 43 years     Recommendations:  - Follow-up infectious studies  - Follow-up HBV DNA  - Avoid hepatotoxic and nephrotoxic medications  - Avoid hypotension, narcotics, and benzodiazepines as able  - Strict I/Os, monitor stool output, and daily weights  - Follow electrolytes and renal function closely  - Scheduled lactulose with a stool goal of 3-4 BMs per day or 1-2L equivalent  - Rifaximin 550mg po bid  - SBP treatment - agree with ceftriaxone, await culture  - Vitamin K 10mg IV qday x3 days  - Albumin (25%) challenge 100g qday x3 days  - Frequent repositioning, PT/OT  - Daily MELD labs (CMP, INR)  - Continue home tenofovir - however, please first discuss with pharmacy regarding renal dosing in setting of BEN (GFR 21)  - Nephrology consult for BEN in cirrhotic  - Hold home diuretics  - Transplant evaluation order set placed  - Further recommendations to follow based on his clinical course; hepatology will continue to follow along with you, please page with ?s.    Gastroenterology outpatient follow up recommendations: Pending clinical course.     Thank you for involving us in this patient's care. Please do not hesitate to contact the GI service with any questions or concerns.     Pt care plan discussed with Dr. Arriaga, GI staff physician.    Indira Orourke MD  Gastroenterology Fellow    The patient was seen and examined.  The above assessment and plan was developed jointly with the fellow.      Michael Arriaga MD  -------------------------------------------------------------------------------------------------------------------          Chief Complaint:   We were asked by Dr. Briggs of [facility] to evaluate this patient with HBV cirrhosis, hepatic encephalopathy and acute kidney injury.    History is obtained from the patient and the medical record.          History of Present Illness:   Yaneli Miles is a 43 year old male with a history of decompensated  HBV cirrhosis on tenofovir c/b esophageal varices (with hemorrhage in 2/2017 and 4/2018), ascites requiring twice monthly paracentesis, and hepatic encephalopathy with recent admission at [facility] 5/25/18-6/1/18 for hepatic encephalopathy and choledocholithiasis s/p ERCP 5/31/18 who is presenting as a transfer from [facility] with hepatic encephalopathy and acute kidney injury. Hepatology is consulted for further evaluation and management.     On arrival to [facility], he was intubated overnight for failure to protect airway due to mental status and is currently on a propofol infusion. Unable to obtain ROS due to patient's sedation/intubation.             Past Medical History:   Reviewed and edited as appropriate  Past Medical History:   Diagnosis Date     Choledocholithiasis      Chronic viral hepatitis B without delta agent and without coma (H) 6/14/2018     Cirrhosis of liver with ascites (H) 6/14/2018     Esophageal varices (H)     prior GIB     Hepatic encephalopathy (H)      Portal vein thrombosis 6/14/2018 5/29/18 care everywhere US     Type 2 diabetes mellitus without complication, without long-term current use of insulin (H) 6/14/2018            Past Surgical History:   Reviewed and edited as appropriate   Past Surgical History:   Procedure Laterality Date     ENDOSCOPIC RETROGRADE CHOLANGIOPANCREATOGRAM      with stone removal            Previous Endoscopy:   No results found for this or any previous visit.         Social History:   Reviewed and edited as appropriate  Social History     Social History     Marital status: N/A     Spouse name: N/A     Number of children: N/A     Years of education: N/A     Occupational History     Not on file.     Social History Main Topics     Smoking status: Not on file     Smokeless tobacco: Not on file     Alcohol use Not on file     Drug use: Not on file     Sexual activity: Not on file     Other Topics Concern     Not on file     Social History Narrative  "    No narrative on file            Family History:   Reviewed and edited as appropriate  No family history on file.    No known history of colorectal cancer, liver disease, or inflammatory bowel disease.       Allergies:   Reviewed and edited as appropriate     Allergies   Allergen Reactions     Nsaids      Contraindicated             Medications:     Prescriptions Prior to Admission   Medication Sig Dispense Refill Last Dose     exenatide (BYETTA 10 MCG PEN) 10 MCG/0.04ML injection Inject 10 mcg Subcutaneous        ferrous sulfate (IRON) 325 (65 Fe) MG tablet Take 325 mg by mouth        furosemide (LASIX) 40 MG tablet Take 20 mg by mouth        insulin glargine (LANTUS) 100 UNIT/ML injection Inject 10 Units Subcutaneous        lactulose (CHRONULAC) 10 GM/15ML solution Take 20 g by mouth        nadolol (CORGARD) 20 MG tablet Take 20 mg by mouth        omeprazole (PRILOSEC) 20 MG CR capsule Take 20 mg by mouth        rifaximin (XIFAXAN) 550 MG TABS tablet Take 550 mg by mouth        simethicone (MI-ACID GAS RELIEF) 80 MG chewable tablet Take 80 mg by mouth        sitagliptin (JANUVIA) 100 MG tablet Take 100 mg by mouth        spironolactone (ALDACTONE) 25 MG tablet Take 75 mg by mouth        tenofovir (VIREAD) 300 MG tablet Take 300 mg by mouth                Review of Systems:   A complete review of systems was performed and is negative except as noted in the HPI           Physical Exam:   /70  Temp 97.5  F (36.4  C) (Axillary)  Ht 1.676 m (5' 6\")  Wt 70.7 kg (155 lb 13.8 oz)  SpO2 100%  BMI 25.16 kg/m2  Wt:   Wt Readings from Last 2 Encounters:   06/16/18 70.7 kg (155 lb 13.8 oz)      Constitutional: Sedated, intubated  Eyes: Sclera icteric, pupils equal  Ears/nose/mouth/throat: Normal oropharynx without ulcers or exudate, mucus membranes moist, ETT in place  Neck: supple, thyroid normal size  CV: + trace edema  Respiratory: Ventilated, no wheezing  Lymph: No axillary, submandibular, supraclavicular " lymphadenopathy  Abd: Distended with fluid wave, nontender, no peritoneal signs  Skin: warm, perfused, jaundiced  Neuro: Sedated, reflexes intact  Psych: Unable to assess  MSK: No gross deformities         Data:   Labs and imaging below were independently reviewed and interpreted    BMP    Recent Labs  Lab 06/17/18  1600 06/17/18  0410 06/16/18  1815    137 135   POTASSIUM 4.3 4.2 4.6   CHLORIDE 113* 107 109   JESUS 8.5 8.0* 7.4*   CO2 13* 11* 11*   BUN 55* 52* 46*   CR 3.59* 3.26* 3.08*   * 130* 128*     CBC    Recent Labs  Lab 06/17/18  0410 06/16/18  1815   WBC 3.6* 9.6   RBC 2.66* 3.41*   HGB 8.2* 10.6*   HCT 23.3* 29.8*   MCV 88 87   MCH 30.8 31.1   MCHC 35.2 35.6   RDW 20.8* 20.6*   PLT 48* 82*     INR    Recent Labs  Lab 06/17/18  0410 06/16/18  1815   INR 2.20* 2.18*     LFTs    Recent Labs  Lab 06/17/18  1600 06/17/18  0410 06/16/18  1815   ALKPHOS 76 86 138   * 144* 190*   ALT 93* 95* 138*   BILITOTAL 23.7* 23.7* 24.5*   PROTTOTAL 5.9* 5.5* 6.0*   ALBUMIN 3.8 3.2* 1.6*      PANC    Recent Labs  Lab 06/16/18  1815   LIPASE 260     Imaging:  U/S Abdomen Complete with Dopplers 6/17/18  1.  Cirrhotic configuration of the liver parenchyma with sequela of  portal hypertension including splenomegaly and moderate to large  amount of ascites. No focal liver lesions.  2.  Patent hepatic vasculature with normal direction of flow. No  evidence for portal vein thrombus in the present study.   3.  Cholelithiasis and sludge within the gallbladder. Gallbladder wall  thickening and pericholecystic fluid which is nonspecific in the  context of liver failure and ascites.

## 2018-06-17 NOTE — PROCEDURES
"Procedure/Surgery Information   [facility] [place]    Bedside Procedure Note  Date of Service (when I performed the procedure): 06/17/2018    Yaneli Miles is a 43 year old male patient.  1. Encephalopathy      Past Medical History:   Diagnosis Date     Choledocholithiasis      Chronic viral hepatitis B without delta agent and without coma (H) 6/14/2018     Cirrhosis of liver with ascites (H) 6/14/2018     Esophageal varices (H)     prior GIB     Hepatic encephalopathy (H)      Portal vein thrombosis 6/14/2018 5/29/18 care everywhere US     Type 2 diabetes mellitus without complication, without long-term current use of insulin (H) 6/14/2018     Temp: 98.9  F (37.2  C) Temp src: Axillary BP: 108/67   Heart Rate: 67 Resp: 16 SpO2: 99 % O2 Device: Mechanical Ventilator      Abdominal paracentesis  Date/Time: 6/17/2018 3:51 PM  Performed by: NEISHA LO  Authorized by: NEISHA LO   Consent: Verbal consent obtained. Written consent obtained.  Risks and benefits: risks, benefits and alternatives were discussed  Consent given by: spouse  Required items: required blood products, implants, devices, and special equipment available  Patient identity confirmed: arm band and hospital-assigned identification number  Time out: Immediately prior to procedure a \"time out\" was called to verify the correct patient, procedure, equipment, support staff and site/side marked as required.  Preparation: Patient was prepped and draped in the usual sterile fashion.  Local anesthesia used: yes  Anesthesia: local infiltration    Anesthesia:  Local anesthesia used: yes  Local Anesthetic: lidocaine 1% without epinephrine  Anesthetic total: 4 mL    Sedation:  Patient sedated: On propofol   Patient tolerance: Patient tolerated the procedure well with no immediate complications  Comments: Deep fluid pocket identified with ultrasound. Area was marked and prepped. Two liters of yary fluid removed and 30 cc sent to " lab for testing. Dr. Briggs present during procedure and senior resident, Dr. Degroot, at bedside to assist.          Antonietta Pierce   [facility] PGY-1

## 2018-06-18 NOTE — PROGRESS NOTES
06/18/18 0900   Quick Adds   Type of Visit Initial Occupational Therapy Evaluation   Living Environment   Lives With child(doug), dependent;spouse   Living Arrangements house   Home Accessibility stairs to enter home;stairs within home;tub/shower is not walk in;bed and bath on same level   Number of Stairs to Enter Home 4   Number of Stairs Within Home 12  (Pt doesn't need to access basement.)   Living Environment Comment Pt lives with wife and 9 children in a house at baseline.   Self-Care   Usual Activity Tolerance moderate   Current Activity Tolerance poor   Activity/Exercise/Self-Care Comment Pt was working full time until April when health declined and pt required increased assist.   Functional Level Prior   Ambulation 0-->independent   Transferring 0-->independent   Toileting 0-->independent   Bathing 0-->independent   Dressing 0-->independent   Eating 0-->independent   Communication 0-->understands/communicates without difficulty   Swallowing 0-->swallows foods/liquids without difficulty   Cognition 0 - no cognition issues reported   Prior Functional Level Comment Pt was IND with all ADL prior to April. Has recently required assist for ADL from wife.       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    Precautions/Limitations fall precautions;oxygen therapy device and L/min   General Observations Pt supine in bed upon arrival, agreeable to therapy.   General Info Comments Activity: up with  "assist   Cognitive Status Examination   Orientation person   Level of Consciousness lethargic/somnolent   Able to Follow Commands mild impairment   Pain Assessment   Patient Currently in Pain No   Strength   Strength Comments Generalized weakness   Instrumental Activities of Daily Living (IADL)   Previous Responsibilities meal prep;housekeeping;shopping;laundry;yardwork;medication management;finances;driving;work;   IADL Comments Pt and wife manage IADLs together at baseline. Pt was working full time and driving at baseline.   Activities of Daily Living Analysis   Impairments Contributing to Impaired Activities of Daily Living balance impaired;cognition impaired;strength decreased   General Therapy Interventions   Planned Therapy Interventions ADL retraining;IADL retraining;bed mobility training;cognition;strengthening;ROM;home program guidelines;transfer training;progressive activity/exercise;risk factor education   Clinical Impression   Criteria for Skilled Therapeutic Interventions Met yes, treatment indicated   OT Diagnosis OT order for critical illness   Influenced by the following impairments strength, activity tolerance, fatigue, confusion   Assessment of Occupational Performance 1-3 Performance Deficits   Identified Performance Deficits home mgmt, bathing, dressing   Clinical Decision Making (Complexity) Low complexity   Therapy Frequency 5 times/wk   Predicted Duration of Therapy Intervention (days/wks) 3 weeks   Anticipated Equipment Needs at Discharge (TBD with further therapy)   Anticipated Discharge Disposition Home with Home Therapy   Risks and Benefits of Treatment have been explained. Yes   Patient, Family & other staff in agreement with plan of care Yes   [facility] [employer]-PAC TM \"6 Clicks\"   2016, Trustees of [facility], under license to [employer].  All rights reserved.   6 Clicks Short Forms Daily Activity Inpatient Short Form   [facility] AM-PAC  \"6 Clicks\" Daily " Activity Inpatient Short Form   1. Putting on and taking off regular lower body clothing? 1 - Total   2. Bathing (including washing, rinsing, drying)? 1 - Total   3. Toileting, which includes using toilet, bedpan or urinal? 1 - Total   4. Putting on and taking off regular upper body clothing? 1 - Total   5. Taking care of personal grooming such as brushing teeth? 2 - A Lot   6. Eating meals? 1 - Total   Daily Activity Raw Score (Score out of 24.Lower scores equate to lower levels of function) 7   Total Evaluation Time   Total Evaluation Time (Minutes) 6

## 2018-06-18 NOTE — PLAN OF CARE
Problem: Patient Care Overview  Goal: Plan of Care/Patient Progress Review  Outcome: Improving  D: Pt with HBV cirrhosis c/b HE and BEN remains in MICU.  I/A: Pt extubated at 10, currently RA lung sounds clear. Good cough, able to self oral suxn. Through , pt disoriented to place, time, and situation, unable to remember birthdate. Appropriate responses to questions, following all commands. Up to chair for 4 hours. VSS, MAP goal changed to 65, off levo since 1200. 100g albumin given. Afebrile. Cr remains elevated at 3.67, see neph note. Passed bedside swallow eval, full liquid diet ordered. Continued lactulose, >900 stool output since 0000. Zofran given x1 for nausea.  P: Continue to notify MD of any changes, continue POC    Problem: Diabetes Comorbidity  Goal: Diabetes  Patient comorbidity will be monitored for signs and symptoms of hyperglycemia or hypoglycemia. Problems will be absent, minimized or managed by discharge/transition of care.   Outcome: Improving  BGs WDL, full liquid diet ordered.

## 2018-06-18 NOTE — PHARMACY-VANCOMYCIN DOSING SERVICE
Pharmacy Vancomycin Note  Date of Service 2018  Patient's  1975   43 year old male    Indication: Sepsis  Goal Trough Level: 15-20 mg/L  Day of Therapy: 2  Current Vancomycin regimen:  intermittent    Current estimated CrCl = Estimated Creatinine Clearance: 25.3 mL/min (based on Cr of 3.69).    Creatinine for last 3 days  2018:  6:15 PM Creatinine 3.08 mg/dL  2018:  4:10 AM Creatinine 3.26 mg/dL;  4:00 PM Creatinine 3.59 mg/dL  2018:  4:40 AM Creatinine 3.69 mg/dL    Recent Vancomycin Levels (past 3 days)  2018: 12:30 PM Vancomycin Level 16.8 mg/L    Vancomycin IV Administrations (past 72 hours)                   vancomycin (VANCOCIN) 1,500 mg in sodium chloride 0.9 % 250 mL intermittent infusion (mg) 1,500 mg New Bag 18 1338              Nephrotoxins and other renal medications (Future)    Start     Dose/Rate Route Frequency Ordered Stop    18 1300  ampicillin (OMNIPEN) 2 g vial to attach to  ml bag      2 g  over 15 Minutes Intravenous EVERY 8 HOURS 18 1241      18 1815  norepinephrine (LEVOPHED) 16 mg in D5W 250 mL infusion      0.03-0.4 mcg/kg/min × 70.7 kg  2-26.5 mL/hr  Intravenous CONTINUOUS 18 1817          Contrast Orders - past 72 hours     None        Interpretation of levels and current regimen:  Level of 16.8mg/L approximately 24 hours post-dose is within goal range.     Has serum creatinine changed > 50% in last 72 hours: yes    Urine output:  Adequate    Renal Function: Likely hepatorenal syndrome    Plan:  1. Vancomycin therapy discontinued today by MICU team.      Dana Gonzalez, PharmD

## 2018-06-18 NOTE — CONSULTS
PRE-OPERATIVE CARDIAC EVALUATION  June 18, 2018    Reason for Consult:  A cardiology consult was requested by Dr. Pierce from the Medical ICU service to provide clinical guidance regarding cardiac clearance for preop liver transplant in setting of chronic Hepatitis B.    HPI:   Yaneli Miles is a 43 year old male cirrhosis secondary to Chronic Viral Hepatitis B c/b esophageal varices, portal HTN, upper GIB, and DM2 who presents from OSH with alerted mental status and vomiting found to have SBP, GPC bacteremia and Enterococcus UTI.     Patient had a normal Echo in April 2018 with EF 60-65%. No stress tests on chart review. In terms of cardiac medications, patient is taking Lasix 20mg daily, Spironolactone 75mg daily (both of which are currently being held in setting of BEN), and nadolol 20mg daily  (also currently being held d/t low BP).    At the time of interview, the patient denies chest pain, dyspnea at rest or with exertion, orthopnea, PND, palpitations, lightheadedness, or syncope. Patient also denies any family history of cardiac diseases.     REVIEW OF SYSTEMS:    Complete review of systems was performed and negative except per HPI.    PMH:  Past Medical History:   Diagnosis Date     Choledocholithiasis      Chronic viral hepatitis B without delta agent and without coma (H) 6/14/2018     Cirrhosis of liver with ascites (H) 6/14/2018     Esophageal varices (H)     prior GIB     Hepatic encephalopathy (H)      Portal vein thrombosis 6/14/2018 5/29/18 care everywhere [facility]     SBP (spontaneous bacterial peritonitis) (H) 06/2018     Type 2 diabetes mellitus without complication, without long-term current use of insulin (H) 6/14/2018     ACTIVE PROBLEMS:  Patient Active Problem List    Diagnosis Date Noted     Encephalopathy 06/16/2018     Priority: Medium     Chronic viral hepatitis B without delta agent and without coma (H) 06/14/2018     Priority: Medium     Portal vein thrombosis 06/14/2018     Priority:  Medium     5/29/18 care everywhere        Cirrhosis of liver with ascites (H) 06/14/2018     Priority: Medium     Type 2 diabetes mellitus without complication, without long-term current use of insulin (H) 06/14/2018     Priority: Medium     SOCIAL HISTORY:  Social History   Substance Use Topics     Smoking status: Never Smoker     Smokeless tobacco: Not on file     Alcohol use No     FAMILY HISTORY:  No family history on file.    MEDICATIONS:    ampicillin  2 g Intravenous Q8H     cefTRIAXone  2 g Intravenous Q24H     insulin aspart  1-7 Units Subcutaneous Q4H     lactulose  20 g Oral or NG Tube TID     rifaximin  550 mg Oral or Feeding Tube BID     [START ON 6/19/2018] tenofovir  300 mg Oral or Feeding Tube Q72H         - MEDICATION INSTRUCTIONS -       norepinephrine Stopped (06/18/18 1200)       PHYSICAL EXAM:  Temp:  [98.2  F (36.8  C)-98.9  F (37.2  C)] 98.9  F (37.2  C)  Heart Rate:  [52-77] 65  Resp:  [13-19] 17  BP: ()/(56-74) 121/71  FiO2 (%):  [30 %-60 %] 30 %  SpO2:  [93 %-100 %] 98 %    Intake/Output Summary (Last 24 hours) at 06/18/18 1432  Last data filed at 06/18/18 1400   Gross per 24 hour   Intake          1341.93 ml   Output             5225 ml   Net         -3883.07 ml     GEN: NAD  HEENT: no icterus  CV: RRR, normal s1/s2, no murmurs/rubs/s3/s4, no heave.   CHEST: CTAB, diminished  ABD: soft, tender t/o, distended  SKIN: Jaundiced, warm  NEURO: appears drowsy, oriented to self and place    LABS:  All labs and imaging were reviewed, of note:  Results for CHRISTINA RODRIGUEZ (MRN [id number]) as of 6/18/2018 14:22   Ref. Range 6/18/2018 04:40   Sodium Latest Ref Range: 133 - 144 mmol/L 140   Potassium Latest Ref Range: 3.4 - 5.3 mmol/L 4.2   Chloride Latest Ref Range: 94 - 109 mmol/L 114 (H)   Carbon Dioxide Latest Ref Range: 20 - 32 mmol/L 11 (L)   Urea Nitrogen Latest Ref Range: 7 - 30 mg/dL 55 (H)   Creatinine Latest Ref Range: 0.66 - 1.25 mg/dL 3.69 (H)   GFR Estimate Latest Ref Range: >60  mL/min/1.7m2 18 (L)   GFR Estimate If Black Latest Ref Range: >60 mL/min/1.7m2 22 (L)   Calcium Latest Ref Range: 8.5 - 10.1 mg/dL 8.6   Anion Gap Latest Ref Range: 3 - 14 mmol/L 15 (H)   Magnesium Latest Ref Range: 1.6 - 2.3 mg/dL 2.5 (H)   Phosphorus Latest Ref Range: 2.5 - 4.5 mg/dL 4.5   Albumin Latest Ref Range: 3.4 - 5.0 g/dL 3.4   Protein Total Latest Ref Range: 6.8 - 8.8 g/dL 5.8 (L)   Bilirubin Total Latest Ref Range: 0.2 - 1.3 mg/dL 23.4 (HH)   Alkaline Phosphatase Latest Ref Range: 40 - 150 U/L 79   ALT Latest Ref Range: 0 - 70 U/L 112 (H)   AST Latest Ref Range: 0 - 45 U/L 221 (H)   Alpha Fetoprotein Latest Ref Range: 0 - 8 ug/L 4.1   Glucose Latest Ref Range: 70 - 99 mg/dL 131 (H)   WBC Latest Ref Range: 4.0 - 11.0 10e9/L 3.4 (L)   Hemoglobin Latest Ref Range: 13.3 - 17.7 g/dL 8.7 (L)   Hematocrit Latest Ref Range: 40.0 - 53.0 % 25.4 (L)   Platelet Count Latest Ref Range: 150 - 450 10e9/L 53 (L)   RBC Count Latest Ref Range: 4.4 - 5.9 10e12/L 2.82 (L)   MCV Latest Ref Range: 78 - 100 fl 90   MCH Latest Ref Range: 26.5 - 33.0 pg 30.9   MCHC Latest Ref Range: 31.5 - 36.5 g/dL 34.3   RDW Latest Ref Range: 10.0 - 15.0 % 21.3 (H)   INR Latest Ref Range: 0.86 - 1.14  1.87 (H)       No results found for: TROPI, TROPONIN, TROPR, TROPN    EKG 6/17/18: NSR      DIAGNOSTIC STUDIES:  Transthoracic echocardiogram 4/5/18:  Result Narrative   -Left ventricle ejection fraction is normal. The calculated left   ventricular ejection fraction is 66%.    No hemodynamically significant valvular heart abnormalities.    No previous study for comparison.       ASSESSMENT:  Yaneli Miles is a 43 year old male cirrhosis secondary to Chronic Viral Hepatitis B c/b esophageal varices, portal HTN, upper GIB, and DM2 who presents from OSH with alerted mental status and vomiting found to have SBP, GPC bacteremia and Enterococcus UTI. Cardiology was consulted for cardiac clearance for pre-op liver eval.     RECOMMENDATION:  Pre-operative  cardiac evaluation:  -- Ischemic evaluation: No evidence of ischemia on EKG or echocardiogram. CAD risk factors: DM2   - Recommend dobutamine stress echocardiogram when patient is hemodynamically stable  -- Cardiopulmonary evaluation: Echocardiogram with normal ventricular function, no wall motion abnormalities, EF 60-65%. Low likelihood of pulmonary hypertension.    - Do not feel right heart catheterization is needed to proceed with liver transplantation.       I have discussed the above with Dr. Pink.    Thank you for consulting the cardiovascular services at the [facility]. Please do not hesitate to call us with any questions.     BUD Delvalle, NP-C  [facility] Cardiology Consult Team  Pager [phone] or [phone]

## 2018-06-18 NOTE — PROGRESS NOTES
2018:  See Transplant Note for SW psychosocial assessment--Transplant Consult  Social Work: Assessment with Discharge Plan    Patient Name:  Yaneli Miles  :  1975  Age:  43 year old  MRN:  [id number]  Risk/Complexity Score:     Completed assessment with:  Patient at bedside, patient's wife Antony, and Hmong     Presenting Information   Reason for Referral:  Discharge plan, Caregiver issues, Currently receiving community services of PT/OT and home health nurse and Financial issues   Home PT/OT and Nursing is through [facility]  Date of Intake:  2018  Referral Source:  Chart Review and Discharge planning  Decision Maker:  Patient  Alternate Decision Maker:  Patient's wife  Health Care Directive:  Provided education  Living Situation:  House  Previous Functional Status:  Assistance with Transportation:  wife/family, Meals:  some assistance, Laundry:  unable to complete, Housekeeping:  unable to complete, Bathing:  wife assists, Appointments:  wife and family members assist and Financial:  no income since 2018 due to medical condition, some financial assistance through [facility]  -Last work was 2018, started [employer] [place] and Cash Assistance in May 2018    Patient and family understanding of hospitalization:  Yes- understand, interested in returning home with additional supports  Cultural/Language/Spiritual Considerations:  Hmong speaking, no Zoroastrian/spiritual considerations per patient and wife  Adjustment to Illness: aware of illness and ongoing chronic needs, wife reports medication compliance    Physical Health  Reason for Admission:    1. Encephalopathy    2. Cirrhosis of liver with ascites, unspecified hepatic cirrhosis type (H)      Services Needed/Recommended:  Home with homecare or other needs, per medical and rehab recommendations    Mental Health/Chemical Dependency  Diagnosis:  unknown  Support/Services in Place:  Wife, Home Nurse care through [place], in home  PT/OT  Services Needed/Recommended:  Possible additional assistance needed upon discharge, wife interested in possible PCA services to help especially with bathroom cares    Support System  Significant relationship at present time:  Wife, Antony:   Family of origin is available for support:  Yes, mostly wife is caretaker as it is not traditional for children to be a part of or aware of parental medical needs  Other support available:  unknown  Gaps in support system:  Financial and inhome help concerns  Patient is caregiver to:  None     Provider Information   Primary Care Physician:  No primary care provider on file.   None   Clinic:  No primary physician on file.      :  n/a    Financial   Income Source:  Cash assistance and food stamps through [facility]  Financial Concerns:  Limited income, only cash assistance through [facility],  Per Gabbie with Transplant, has started disability/SSI process with Social Security  Insurance:    Payor/Plan Subscriber Name Rel Member # Group #   [employer] [place] [employer] [place] CHRISTINA RODRIGUEZ  [id number]      [address]       Discharge Plan   Patient and family discharge goal:  Per transplant team and medical plan  Provided education on discharge plan:  N/a, unknown at this time  Patient agreeable to discharge plan:  N/a, waiting for evaluation  A list of Medicare Certified Facilities was provided to the patient and/or family to encourage patient choice. Patient's choices for facility are:  n/a  Will NH provide Skilled rehabilitation or complex medical:  n/a  General information regarding anticipated insurance coverage and possible out of pocket cost was discussed. Patient and patient's family are aware patient may incur the cost of transportation to the facility, pending insurance payment: n/a at this time  Barriers to discharge:  Transplant and medical clearance    Discharge Recommendations   Anticipated Disposition:  Home with services  Transportation Needs:  Other:   unknown  Name of Transportation Company and Phone:  n/a    Additional comments   Discussed with Transplant SW regarding upcoming SW transplant psychosocial assessment. See Transplant SW consult note.      Katja Shafer, KAIA, LGSW  ICU 4A, 4C, 4E   Ph: [phone]

## 2018-06-18 NOTE — PROGRESS NOTES
[facility] [place]  Procedure Note          Extubation:       Yaneli Miles  MRN# [id number]   June 18, 2018, 10:20 AM         Patient extubated at: June 18, 2018, 10:11 AM   Supplemental Oxygen: Via face mask at 5 liters per minute   Cough: The cough is good   Secretion Mode: Able to clear   Secretion Amount: Small amount, moderately thick and dark green in color   Respiratory Exam:: Breath sounds: equal and clear     Location: bilaterally   Skin Exam:: Patient color: natural   Patient Status: Currently appears comfortable   Arterial Blood Gasses: pH Arterial (pH)   Date Value   06/17/2018 7.34 (L)     pO2 Arterial (mm Hg)   Date Value   06/17/2018 144 (H)     pCO2 Arterial (mm Hg)   Date Value   06/17/2018 21 (L)     Bicarbonate Arterial (mmol/L)   Date Value   06/17/2018 11 (L)            Recorded by Kristian Oro

## 2018-06-18 NOTE — CONSULTS
Psychosocial Assessment  Yaneli Miles was seen in his intensive care room, as part of his evaluation as a potential liver transplant recipient.  He was extubated earlier today, and sleepy during a good portion of my visit. The following information is mainly from his wife, with the assistance of a [facility] telephone . I also consulted with the unit  and reviewed his chart for additional information.   Living Situation: Yaneli Miles is a [age] year old miguelina gentleman who lives in [place], [place]. He shares his home with his wife of thirty years, Antony, along with four of their nine children (3 daughters and a son). This family came to the [place] in 2005.   Education/Employment:  Mr. Miles's educational background was not assessed today. 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 He has been unable to work since that time, due to his health.   Financial /Income: The Jd's are unaware if he has any sick leave/FMLA or disability benefits from his employer. His income ceased when he was unable to work. His wife does not work outside the home. The applied for assistance with [facility] in May. As of June 1 they began receiving cash and food assistance from the [facility]. Per his wife, someone at [facility] helped them arrange a telephone interview to apply for Social Security benefits. They have not heard from the [facility] since the interview. She is also uncertain if they applied for SSI, SSDI or both.   Health Insurance:  Mr. Miles has [facility] Medicaid coverage. This includes assistance with transportation to and from medical appointments, if needed. Medication co payments are likely zero or minimal. This writer talked with Antony about the financial risks of transplant, particularly about the high cost of transplant related medications and the importance of maintaining adequate health insurance coverage.  Family/Social Support: Mr. Miles's main support person is his  wife, Antony. They have been  for thirty years, since they were teenagers. She is here with him in the hospital continually, and would continue to be his caregiver after a liver transplant. As previously stated, they have nine children. Those who do not live with them, live in the [facility]. Per his medical records, Mr. Miles has eight brothers and three sisters. I am unaware of where they live. His parents are . Mr. Miles practices traditional [facility] spirituality. This writer stressed the importance of having a stable and involved support network before and after transplant.  Provided Yaneli and Antony with education about the relationship between a stable support system and better surgical and post-transplant outcomes compared to patients with a limited support system.    Functional Status: Mr. Miles has been unable to work for the past two and a half months. His wife provides for most of his needs at home. He has been receiving home care visits via [facility] ([phone]). He is currently in MICU, and was extubated this morning. I defer to PT/OTR about his current functional status.   Chemical Dependency:  Mr. Miles has no reported history of substance use concerns. He does not drink alcohol, smoke cigarettes or use any herbs.   Mental Health: There are no reported mental concerns.   Adjustment to Illness: I was unable to assess how Mr. Miles is coping with his liver disease and potential need for a transplant. He was scheduled to be seen as an outpatient for a liver transplant evaluation this week, so is likely interested in this possibility. Per his wife, he has been taking his HBV medication as prescribed. He also has diabetes, which he has been managing.  This writer provided Oscar with supportive counseling throughout this interview.   Impression/Recommendations:  As  was unable to fully participate in today's assessment, I will attempt to see him again, along with a ong  , for ongoing psychosocial assessment and transplant education.   His wife is his main support person. I did ask if she has someone to relieve her here a the hospital some times, to help prevent caregiver burnout. Her main expressed concern is financial, as her  has been the sole supporter of this family. If he is approved for Social Security benefits, it will help. He could receive SSI as soon as his application is processed. SSDI, if he qualifies, would not begin before November. He does currently have adequate insurance for transplant.   Mr. Miles has no substance use or mental health concerns. He and his family will benefit from ongoing transplant education, and support related to his illness. He is reported to be adherent to his HBV and diabetes care. If so, he would be able to manage needed post transplant routines. At this time, there are no psychosocial barriers to transplant. I will continue to be available for ongoing assessment, education and other transplant related concerns during this hospitalization.    Teaching completed during assessment:  1.     Housing and relocation needs post transplant.  2.     Caregiver needs post transplant.  3.     Financial issues related to transplant.  4.     Risks of alcohol use post transplant.  5.     Common psychosocial stressors pre/post transplant.        6.     Liver Transplant support group availability.        7.     [employer] Directive             Psychosocial Risks of Transplant Reviewed:  1.     Increased stress related to your emotional, family, social, employment, or   financial situation.  2.     Affect on work and/or disability benefits.  3.     Affect on future health and life insurance.  4.     Transplant outcome expectations may not be met.  5.     Mental Health risks: anxiety, depression, PTSD, guilt, grief and chronic fatigue.     KAIA Park, [facility]  Pager [phone]

## 2018-06-18 NOTE — PROGRESS NOTES
Medical ICU Progress Note    Assessment/Plan:  Yaneli Miles is a 42 y/o Hmong male with Pmhx cirrhosis secondary to Chronic Viral Hepatitis B c/b esophageal varices and TIIDM on insulin who presents from OSH with alerted mental status and vomiting found to have SBP, GPC bacteremia and Enterococcus UTI.     Today:  - Discuss with Nephrology if concern for HRS, as impacts MAP goals  - Will discuss with Nephro and GI in regards to restarting midodrine and octreotide   - Switch from indwelling bah to condom cap  - PST with consideration for extubation  - Continue Ceftriaxone  - Deescalate Vanc to Ampicillin  - Discussed with social work family situation, patient is sole income for family    ===NEURO===  # Sedation  - Currently off propofol      # AMS, improving  Most likely multifactorial including worsening liver function and sepsis. Intubated on 6/17 for concern for not protecting airway. Patient has been more alert, moving all 4 extremities and following command.  - PST     ===CARDIOVASCULAR===  # Hypotension   Concern for vascular depletion in setting of ascites, acute kidney injury and sepsis. Was started on Levo at OSH. Will discuss with nephrology concern for HRS as this impact map goals.   - Continue Levo  - Map goal >80 with concern for hepatorenal syndrome     # Prolong QT on EKG  Will continue to monitor.     ===PULMONARY===  No acute concerns.     ===GASTROINTESTINAL===  # SBP  # Sepsis  Diagnostic tap on admission concerning for SBP with improving labs on therapeutic and diagnostic paracentesis on 6/17.    - Continue Ceftriaxone, 7 day course, Day 3    # Hyperammonemia   # Thrombocytopenia   # Chronic viral Hepatitis B  # Cirrhosis with ascites  # Esophageal varices  # Elevated INR   Meld score today of 38 and was 39 on admission. Patient has been followed outpatient by ELDER Torres. He was to be seen by transplant this week in regards to liver transplant discussion. PTA medications include lactulose, lasix,  spironolactone, tenofovir, nadolol and rifaximin. 2 liter diagnostic and therapeutic paracentesis on 6/17 showing improvement.   - Hepatology consult as above   - Vitamin K, last day for 3 day course   - Lactulose   - Continue rifaximin  - Hold nadolol in setting of hypotension  - Hold diuretics in setting of BEN   - Daily CMP and INR, following MELD score  - May restart midodrine and octreotide, will discuss with GI and Renal  - Per Pharm recs tenofovir, dosing every 3-4 days with current renal state; most recent dose 6/15    # Nutrition:   - NPO     ===RENAL===  # Non-oliguric BEN  Baseline Cr 0.5-0.7. Consideration for intravascular depletion in setting of sepsis, ATN, cholemic nephropathy or hepatorenal syndrome.   - Nephrology consult  - Holding PTA diuretics   - Repeat Bladder pressure       # Hypoalbuminemia  - Continue albumin 100g for 3 days total, Day 3      ===HEME/ONC===  # Normocytic Anemia  No acute signs of bleeding. Does have a hx of esophageal varices. Will continue to monitor with no acute signs of blood loss. Will transfusion if < 7. Spouse consented for blood products upon admission. Hgb continues to stay stable.  - Trend     ===ENDOCRINE===  # Type II DM, on insulin  Hemoglobin A1C 5/25/2018, 5.2. PTA medications include Exenatide, sitagliptin and Lantus 10 units. Patient has not needed any insulin since admission.   - MSSI  - Hypoglycemia protocol       ===INFECTIOUS DISEASE===  # See above for SBP    # Staph, OSH blood culture x1   One positive blood culture with Staph, not aureus, from peripheral line at OSH. Concerning for contaminant.    - D/c Vancomycin  - Daily blood cultures until negative >48 hrs    # Enterococcus UTI   Per OSH urine culture and pansensitive. Narrow from Vanc to Ampicillin per sensitivities.   - Ampicillin renal dosing    # Antimicrobials:  - Flagyl 6/16 given at OSH  - Ceftriaxone 6/16-  - Ampicillin 6/18-  - Vancomycin 6/17-6/18   ===SKIN/MSK===  No acute concerns  "     Lines:  Right IJ, bah, PIV, NG  Prophylaxis:  DVT: SCDs  GI: not indicated   Family: Updated at Bedside  Disposition: ICU  Code Status:  Full code    Patient seen and discussed with Dr. Melyssa Pierce, PGY 1   [phone]    ==========================================================  Interval Events    No acute events overnight. Patient had 1 episode of vomiting. He is appearing more awake and following commands.    Objective    Blood pressure 106/64, temperature 98.2  F (36.8  C), temperature source Tympanic, resp. rate 15, height 1.676 m (5' 6\"), weight 70.7 kg (155 lb 13.8 oz), SpO2 100 %.  Ventilation Mode: CMV/AC  (Continuous Mandatory Ventilation/ Assist Control)  FiO2 (%): 30 %  Rate Set (breaths/minute): 16 breaths/min  Tidal Volume Set (mL): 450 mL  PEEP (cm H2O): 5 cmH2O  Oxygen Concentration (%): 30 %  Resp: 15    General: intubated, awake, following commands  Skin: Diffuse jaundiced; no rash, no ecchymoses  CV: RRR, normal S1S2, no murmur, clicks, rubs  Resp: Clear to auscultation bilaterally, no wheezes, rhonchi  Abd: Soft, non-tender, distended with appreciable fluid wave  Extremities: warm and well perfused, palpable pulses, +1 nonpitting edema BLE    Labs reviewed  Imaging reviewed     ROUTINE ICU LABS (Last four results)  CMP    Recent Labs  Lab 06/18/18  0440 06/17/18  1600 06/17/18  0410 06/16/18  1815    140 137 135   POTASSIUM 4.2 4.3 4.2 4.6   CHLORIDE 114* 113* 107 109   CO2 11* 13* 11* 11*   ANIONGAP 15* 14 18* 14   * 114* 130* 128*   BUN 55* 55* 52* 46*   CR 3.69* 3.59* 3.26* 3.08*   GFRESTIMATED 18* 18* 21* 22*   GFRESTBLACK 22* 22* 25* 27*   JESUS 8.6 8.5 8.0* 7.4*   MAG 2.5*  --  2.5* 2.1   PHOS 4.5  --  5.0* 3.4   PROTTOTAL 5.8* 5.9* 5.5* 6.0*   ALBUMIN 3.4 3.8 3.2* 1.6*   BILITOTAL 23.4* 23.7* 23.7* 24.5*   ALKPHOS 79 76 86 138   * 160* 144* 190*   * 93* 95* 138*     CBC    Recent Labs  Lab 06/18/18  0440 06/17/18  0410 06/16/18  1815   WBC 3.4* " 3.6* 9.6   RBC 2.82* 2.66* 3.41*   HGB 8.7* 8.2* 10.6*   HCT 25.4* 23.3* 29.8*   MCV 90 88 87   MCH 30.9 30.8 31.1   MCHC 34.3 35.2 35.6   RDW 21.3* 20.8* 20.6*   PLT 53* 48* 82*     INR    Recent Labs  Lab 06/18/18  0440 06/17/18  0410 06/16/18  1815   INR 1.87* 2.20* 2.18*     Arterial Blood Gas    Recent Labs  Lab 06/17/18  0845 06/16/18  1830   PH 7.34*  --    PCO2 21*  --    PO2 144*  --    HCO3 11*  --    O2PER 30.0 21.0       Imaging - no new imaging

## 2018-06-18 NOTE — PROGRESS NOTES
"CLINICAL NUTRITION SERVICES - ASSESSMENT NOTE     Nutrition Prescription    RECOMMENDATIONS FOR MDs/PROVIDERS TO ORDER:  If pt can take PO, adv to a Low Sodium diet.    Malnutrition Status:    Non-severe malnutrition in the context of acute-on-chronic illness.     Recommendations already ordered by Registered Dietitian (RD):  Placed nasal bridle to keep FT in place.  Insterted new stylet into pt's FT (no stylet came with pt but 10 Fr brown-hubbed corpak FT is in place) and maintained enteral access throughout extubation.    Future/Additional Recommendations:  If pt cannot safely take PO or if appetite is poor, would initiate TF of Nutren 1.5 @ 10 mL/hr and adv by 10 mL q4h until @ goal rate of 50 mL/hr. TF at goal volume would provide 1800 kcals (30 kcal/kg/day), 82 g PRO (1.3 g/kg/day), 912 mL H2O, 211 g CHO and no fiber daily.    Diet education PRN.       REASON FOR ASSESSMENT  Yaneli Miles is a 43 year old male assessed by the dietitian for Provider Order - SOT eval    NUTRITION HISTORY  Pt was feeling well until evening of admission. Pt was intubated at time of interview but was recently extubated. Pt had FT placed at OSH for lactulose, since the lactulose enema \"did not go well\" per MD. HgbA1c in good range in pt with type 2 DM.    CURRENT NUTRITION ORDERS  Diet: NPO  Intake/Tolerance: n/a    LABS  Labs reviewed- note 6/17 labs show HDL cholesterol very low at 7; TG unable to be checked d/t unsatisfactory specimen. Pt's NH3 on 6/16 was high at 268. Pt likely is not able to exercise given significant medical issues; therefore, HDL level will likely not improve.    MEDICATIONS  Medications reviewed    ANTHROPOMETRICS  Height: 167.6 cm (5' 6\")  Most Recent Weight: 70.7 kg (155 lb 13.8 oz)    IBW: 64.5 kg  BMI: Overweight BMI 25-29.9 (likely normal if fluid volume is taken out of the equation). Meets criteria for liver transplant.  Weight History:   61.3 kg (6/8/18)  71.7 kg (6/23/17)    Dosing Weight: 61 kg (recent " dry wt)    ASSESSED NUTRITION NEEDS  Estimated Energy Needs: 3206-8978 kcals/day (25 - 30 kcals/kg)  Justification: Maintenance  Estimated Protein Needs: 75-95 g/day (1.2 - 1.5 g/kg)  Justification: Hypercatabolism with critical illness  Estimated Fluid Needs: Per provider pending fluid status    PHYSICAL FINDINGS  See malnutrition section below.  Jaundice  Ascites     MALNUTRITION  % Intake: Decreased intake does not meet criteria  % Weight Loss: None noted  Subcutaneous Fat Loss: None observed  Muscle Loss: Scapular bone, Thoracic region (clavicle, acromium bone, deltoid, trapezius, pectoral) and Upper arm (bicep, tricep): mild (cachexia r/t cirrhosis)  Fluid Accumulation/Edema: Moderate- ascites, some LE edema  Malnutrition Diagnosis: Non-severe malnutrition in the context of acute-on-chronic illness.     NUTRITION DIAGNOSIS  Inadequate protein-energy intake related to resp/mental status inhibiting ability to take PO as evidenced by pt NPO x 2-3 days, no TF started via FT, HDL cholesterol level low at 7, and pt with muscle wasting and non-severe protein-calorie malnutrition.       INTERVENTIONS  Implementation  Nutrition Education: See education flowsheet   Collaboration with other providers - MICU rounds    Goals  Patient to consume % of nutritionally adequate meal trays TID, or the equivalent with supplements/snacks.     Monitoring/Evaluation  Progress toward goals will be monitored and evaluated per protocol.    Kirstie Mayers, JESSICA, LD  (MICU dietitian, pgr- [phone])

## 2018-06-18 NOTE — PLAN OF CARE
Problem: Patient Care Overview  Goal: Plan of Care/Patient Progress Review  D: 42 y/o H[place] male with Pmhx of Chronic Viral Hepatitis B with cirrhosis, known portal vein thrombosis, TIIDM on insulin, esophageal varices with recent ERCP for choledocholithiasis who presents from OSH with alerted mental status and vomiting. Currently intubated in MICU     A/I:30% FiO2. Coughing but tolerating vent.beginning to wake up more and open eyes to voice. HR loyda while sleepig. BP MAP goal of 80 maintained most of night with levo. UOP 75/hr. rectal pouch in place and is putting out adequate stool. turned q2 hrs     P: Continue to Monitor and Assess patient. Notify MD of any changes. Closely monitor MAP goal, and alertness continue lactulose

## 2018-06-18 NOTE — PLAN OF CARE
Problem: Patient Care Overview  Goal: Plan of Care/Patient Progress Review  OT/4C - Discharge Planner OT   Patient plan for discharge: wife reports pt hopes to return directly home with OP vs. Home therapy  Current status: Pt demos ability to move arms to commands. Facilitated ADL with min A required to wash face. Dependently lifted from bed to chair with max A required to roll bilaterally for sling placement. VCAC fio2 30%, PEEP 5, RR 16, spO2 100%. HR 63 bpm, /69 (88).  Barriers to return to prior living situation: strength, cognition, activity tolerance  Recommendations for discharge: TCU  Rationale for recommendations: to increase ADl IND       Entered by: Lydia Ricks 06/18/2018 4:02 PM

## 2018-06-18 NOTE — PROGRESS NOTES
Nephrology Progress Note  06/18/2018         Assessment & Recommendations:   This is a case of BEN in a 43 year old male with HBV cirrhosis c/b esophageal varices and HE, who presented to an OSH with AMS and found to have hyperammonemia.     Non-oliguric BEN  Baseline Cr 0.6 in June 2018, now presenting in acute kidney injury with Cr 3.3. Etiology for BEN can be intravascular volume depletion, ATN-physiology, cholemic nephropathy, or hepatorenal physiology. Currently patient is being treated for septic shock, on ceftriaxone 2g daily. ATN-physiology most likely. Patient on tenofovir but has been taking it for many years.  - Patient already received +4L resuscitation fluids at OSH without improvement in Cr or blood pressure, which rules out pure volume depletion. Albumin being given x3 days, follow daily BMP  - Granular casts in UA, although non specific, suggest tubular injury which may be from underlying sepsis. Maintain MAP >65, wean vasopressors. Would get repeat UA, repeat UPC ratio  - Therapeutic ascites tap may relieve intra-abdominal pressure and improve hemodynamics and urine output. Avoid taps >4L.  - Vancomycin stopped, transitioned to ampicillin and ceftriaxone.  - Strict I/O, daily BMP, avoid NSAIDs, avoid other nephrotoxic medications  - Undergoing liver transplant evaluation     Electrolytes  Na 140, K 4.2, Phos 5.0, Cl 114  - replace as necessary. No acute need for hemodialysis at this time     Volume/BP  Hypotensive, likely low effective circulating blood volume. Continue albumin x 3 days (day 3 today)  - BP support with low-dose vasopressors. Paracentesis should not exceed 4L. Hold home diuretics  - Can target Map 65 given low likelihood this is HRS     Anemia  hgb 8.7, h/o esophageal and variceal bleeding but no obvious source at this time.   - Transfusion parameters by primary team    Acid/base balance   pH 7.34/21/144 on 30% fio2. HCO3 11  - positive urine anion gap, but patient may still have  "component of both renal disease and GI loses contributing.  - no need for bicarbonate therapy at this time     BMD  Ca 8.6, albumin 3.4, Phos 5.0  - Continue renal TF, low phos    Recommendations were communicated to primary team in person    Seen and discussed with Dr. Gissel Amanda MD   [phone]    Interval History :   In the last 24 hours Yaneli Miles has become more alert.  was used for the encounter this morning. He remains confused but is able to recount how he feels and can attest to his normal baseline functioning. He denies CP, but has SOB. He feels tired, but has no nausea.    Review of Systems:   I reviewed the following systems:  GI: + loss of appetite. no nausea or vomiting or diarrhea.   Neuro:  + confusion  Constitutional:  no fever or chills  CV: no dyspnea or edema.  no chest pain.    Physical Exam:   I/O last 3 completed shifts:  In: 1341.93 [I.V.:1171.93; NG/GT:170]  Out: 5225 [Urine:2000; Drains:2000; Stool:1225]   /71  Temp 98.9  F (37.2  C)  Resp 17  Ht 1.676 m (5' 6\")  Wt 70.7 kg (155 lb 13.8 oz)  SpO2 98%  BMI 25.16 kg/m2     GENERAL APPEARANCE: NAD, sitting in his chair, confused  EYES:  + scleral icterus, pupils equal  HENT: mouth without ulcers or lesions  PULM: lungs clear to auscultation bilaterally, equal air movement, no clubbing  CV: regular rhythm, normal rate, no rub     -JVD no     -edema -ve   GI: hard but not [place]e, non tender, + distended, bowel sounds are normal  INTEGUMENT: no cyanosis, no rash  NEURO:  no asterixis   Access no    Labs:   All labs reviewed by me  Electrolytes/Renal -   Recent Labs   Lab Test  06/18/18   0440  06/17/18   1600  06/17/18   0410  06/16/18   1815   NA  140  140  137  135   POTASSIUM  4.2  4.3  4.2  4.6   CHLORIDE  114*  113*  107  109   CO2  11*  13*  11*  11*   BUN  55*  55*  52*  46*   CR  3.69*  3.59*  3.26*  3.08*   GLC  131*  114*  130*  128*   JESUS  8.6  8.5  8.0*  7.4*   MAG  2.5*   --   2.5*  2.1   PHOS  4.5   " --   5.0*  3.4     CBC -   Recent Labs   Lab Test  06/18/18   0440  06/17/18   0410  06/16/18   1815   WBC  3.4*  3.6*  9.6   HGB  8.7*  8.2*  10.6*   PLT  53*  48*  82*     LFTs -   Recent Labs   Lab Test  06/18/18   0440  06/17/18   1600  06/17/18   0410   ALKPHOS  79  76  86   BILITOTAL  23.4*  23.7*  23.7*   ALT  112*  93*  95*   AST  221*  160*  144*   PROTTOTAL  5.8*  5.9*  5.5*   ALBUMIN  3.4  3.8  3.2*       Iron Panel -   Recent Labs   Lab Test  06/17/18   2138   IRON  66   IRONSAT  109*     Imaging:  All imaging studies reviewed by me.     Current Medications:    ampicillin  2 g Intravenous Q8H     cefTRIAXone  2 g Intravenous Q24H     insulin aspart  1-7 Units Subcutaneous Q4H     lactulose  20 g Oral or NG Tube TID     rifaximin  550 mg Oral or Feeding Tube BID     [START ON 6/19/2018] tenofovir  300 mg Oral or Feeding Tube Q72H       - MEDICATION INSTRUCTIONS -       norepinephrine Stopped (06/18/18 1200)     Esau Amanda MD     I have seen and examined this patient with the resident.  This note reflects our joint assessment and plan.     Gay Chauhan MD

## 2018-06-18 NOTE — PROGRESS NOTES
Critical Care Services Progress Note:  I personally examined and evaluated the patient today. I formulated today s plan with the house staff team or resident(s) and agree with the findings and plan in the associated note (see separately attested resident note).   I have evaluated all laboratory values and imaging studies from the past 24 hours.  Summary of hospital course:  43M with cirrhosis 2/2 hep B now decompensated with sbp.  Overnight events/pertinent findings today:  No events other than 1 episode of vomiting.  Much more awake/alert today.    Data  A feb.  satting well on pressure support 7/5.  Hr/bp on 0.05 levo.  Net neg 3.2L  Labs (personally reviewed):  Lytes ok.  Creat 3.69 slowly rising, bili 23.4, ast/alt 112/221; inr improved to 1.87.  plttls acceptable but low 53, hgb anemic to 8.7 stable.    Micro:  Enterococcus uti.  Ascites cx neg.  Assessment/plan:  1.  Hypoxemic respiratory failure, vent-dep:  Likely due to pulmonary edema/sepsis.  Trial extubation today.  2.  UTI/CTX:  Ceftriaxone.  3.  Cirrhosis:  Appreciate hepatology input.  Continue lasix/rifiaximin.  Continue tenofovir for hep B.  Continue vitamin K day 3/3.  Continue albumin.  4.  Shock:  Levophed.  Start midodrine.  5.  Non-oliguric BEN:  No acute need for dialysis.  ATN vs HRS.  Will discuss with nephrology.    Rest per resident note.    I spent a total of 45 minutes (excluding procedure time) personally providing and directing critical care services at the bedside and on the critical care unit for this patient.     Tyrese Ragsdale

## 2018-06-18 NOTE — PROGRESS NOTES
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  Note ascitic fluid analysis concerning for SBP and positive urine cultures.  Renal failure may be related to pyelonephritis or hypoperfusion in the setting of fluid shifts and infection.  HRS is less likely at this time.  Had planned to see patient in transplant eval clinic today, and will start transplant evaluation while is is admitted.     MELD-Na score: 38 at 6/18/2018  4:40 AM  MELD score: 38 at 6/18/2018  4:40 AM  Calculated from:  Serum Creatinine: 3.69 mg/dL at 6/18/2018  4:40 AM  Serum Sodium: 140 mmol/L (Rounded to 137) at 6/18/2018  4:40 AM  Total Bilirubin: 23.4 mg/dL at 6/18/2018  4:40 AM  INR(ratio): 1.87 at 6/18/2018  4:40 AM  Age: 43 years      Recommendations  - Appreciate nephrology involvement  - Broaden antibiotic coverage to include gram positive bacteria  - Continue scheduled lactulose with a stool goal of 3-4 BMs per day or 1-2L equivalent  - Continue rifaximin 550mg po bid  - Continue vitamin K 10mg IV qday x3 days  - Continue albumin (25%) challenge 100g qday x3 days  - Continue home tenofovir, renally dosed  - Check urine protein  - Check PSA  - Cardiology consult for pre-transplant eval      Gastroenterology follow up recommendations: Will continue to follow    Thank you for involving us in this patient's care. Please do not hesitate to contact the GI service with any questions or concerns.     Pt care plan discussed with Dr. Arriaga, GI staff physician.    Daniella Back MD  GI Fellow  [phone]    The patient was seen and examined.  The above assessment and plan was  "developed jointly with the fellow.      Michael Arriaga MD    -------------------------------------------------------------------------------------------------------------------         Interval Events:     Extubated this morning.  Reports he has been having a few days of dysuria.  Also reports persistent, diffuse abdominal pain and distension.  Reports dyspnea improving post extubation.         Physical Exam:   /66  Temp 98.9  F (37.2  C)  Resp 14  Ht 1.676 m (5' 6\")  Wt 70.7 kg (155 lb 13.8 oz)  SpO2 99%  BMI 25.16 kg/m2  Wt:   Wt Readings from Last 2 Encounters:   06/16/18 70.7 kg (155 lb 13.8 oz)        Constitutional: Cooperative, not dyspneic/diaphoretic, no acute distress  Eyes: Sclera icteric  Ears/nose/mouth/throat: Mucus membranes moist, hearing intact  Neck: Supple  CV: RRR, no edema  Respiratory: Unlabored breathing on oxygen  Abd: Soft, diffusely tender, distended, no peritoneal signs  Skin: Warm, perfused, jaundiced  Neuro: Tired but alert and answers questions appropriately  MSK: No gross deformities           Data:   All available labs, imaging, and procedure notes were independently reviewed and interpreted, pertinent values are as follow:    BMP  Recent Labs  Lab 06/18/18  0440 06/17/18  1600 06/17/18  0410 06/16/18  1815    140 137 135   POTASSIUM 4.2 4.3 4.2 4.6   CHLORIDE 114* 113* 107 109   JESUS 8.6 8.5 8.0* 7.4*   CO2 11* 13* 11* 11*   BUN 55* 55* 52* 46*   CR 3.69* 3.59* 3.26* 3.08*   * 114* 130* 128*     CBC  Recent Labs  Lab 06/18/18  0440 06/17/18  0410 06/16/18  1815   WBC 3.4* 3.6* 9.6   RBC 2.82* 2.66* 3.41*   HGB 8.7* 8.2* 10.6*   HCT 25.4* 23.3* 29.8*   MCV 90 88 87   MCH 30.9 30.8 31.1   MCHC 34.3 35.2 35.6   RDW 21.3* 20.8* 20.6*   PLT 53* 48* 82*     INR  Recent Labs  Lab 06/18/18  0440 06/17/18  0410 06/16/18  1815   INR 1.87* 2.20* 2.18*     LFTs  Recent Labs  Lab 06/18/18  0440 06/17/18  1600 06/17/18  0410 06/16/18  1815   ALKPHOS 79 76 86 138   * " 160* 144* 190*   * 93* 95* 138*   BILITOTAL 23.4* 23.7* 23.7* 24.5*   PROTTOTAL 5.8* 5.9* 5.5* 6.0*   ALBUMIN 3.4 3.8 3.2* 1.6*      PANC  Recent Labs  Lab 06/16/18  1810   LIPASE 260

## 2018-06-19 NOTE — PLAN OF CARE
Problem: Patient Care Overview  Goal: Plan of Care/Patient Progress Review  Cancel, No  available at set time.  Per  services,  was sent to an emergent situation.

## 2018-06-19 NOTE — PLAN OF CARE
Problem: Patient Care Overview  Goal: Plan of Care/Patient Progress Review  Outcome: Improving    Patient transferred from 4C to 7D at 1245. Sinus bradycardia. Patient complaining of abdominal pain; heat applied. Ascites; abdomen distended. Previous abdominal tap site covered with gauze and tegaderm. Skin and eyes jaundiced. On ampicillin for UTI; ceftriaxone for SBP. Blood sugars before meals and bedtime. Blood sugar checked on 4C prior to transfer. 3 loose stools today; afternoon lactulose held per ivon 5 team. Potassium replaced on 4C; recheck in the morning. Bottom has blanchable redness; pt may need encouragement or help with turns. R IJ was pulled; dressing C/D/I. Neuro checks Q4 hours. ong  present. Disoriented to time only. Bed alarm on for safety. Pt's wife at bedside. Continue with plan of care.

## 2018-06-19 NOTE — PROGRESS NOTES
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  Note ascitic fluid analysis concerning for SBP and positive urine cultures. Renal failure may be related to pyelonephritis or hypoperfusion in the setting of fluid shifts and infection.  HRS is less likely at this time.  Note overall improvement today.  Had planned to see patient in transplant eval clinic, and started transplant evaluation while is is admitted.    MELD-Na score: 38 at 6/19/2018  4:05 AM  MELD score: 38 at 6/19/2018  4:05 AM  Calculated from:  Serum Creatinine: 3.15 mg/dL at 6/19/2018  4:05 AM  Serum Sodium: 138 mmol/L (Rounded to 137) at 6/19/2018  4:05 AM  Total Bilirubin: 22.6 mg/dL at 6/19/2018  4:05 AM  INR(ratio): 2.06 at 6/19/2018  4:05 AM  Age: 43 years       Recommendations  - Appreciate nephrology involvement  - Continue antibiotics  - Continue scheduled lactulose with a stool goal of 3-4 BMs per day or 1-2L equivalent  - Continue rifaximin 550mg po bid  - Continue vitamin K 10mg IV qday x3 days  - Continue albumin (25%) challenge 100g qday x3 days  - Continue home tenofovir, renally dosed    Gastroenterology follow up recommendations: Will continue to follow    Thank you for involving us in this patient's care. Please do not hesitate to contact the GI service with any questions or concerns.     Pt care plan discussed with Dr. Arriaga, GI staff physician.    Daniella Back MD  GI Fellow  [phone]    The patient was seen and examined.  The above assessment and plan was developed jointly with the fellow.      Michael Arriaga,  "MD    -------------------------------------------------------------------------------------------------------------------         Interval Events:     Transferred out of ICU today. Respiratory failure resolved. Tolerating diet. No nausea/vomiting.  Improved mobility.         Physical Exam:   /62 (BP Location: Left arm)  Temp 96.6  F (35.9  C) (Oral)  Resp 16  Ht 1.676 m (5' 6\")  Wt 50.8 kg (111 lb 14.4 oz)  SpO2 100%  BMI 18.06 kg/m2  Wt:   Wt Readings from Last 2 Encounters:   06/19/18 50.8 kg (111 lb 14.4 oz)      Constitutional: Cooperative, not dyspneic/diaphoretic, no acute distress  Eyes: Sclera icteric  Ears/nose/mouth/throat: Mucus membranes moist, hearing intact  Neck: Supple  CV: RRR, no edema  Respiratory: Unlabored breathing on room air  Abd: Soft, diffusely tender, distended, no peritoneal signs  Skin: Warm, perfused, jaundiced  Neuro: Alert, answers questions appropriately  MSK: No gross deformities           Data:   All available labs, imaging, and procedure notes were independently reviewed and interpreted, pertinent values are as follow:    BMP  Recent Labs  Lab 06/19/18  0405 06/18/18  1555 06/18/18  0440 06/17/18  1600    142 140 140   POTASSIUM 3.6 4.0 4.2 4.3   CHLORIDE 111* 117* 114* 113*   JESUS 8.5 8.9 8.6 8.5   CO2 13* 13* 11* 13*   BUN 50* 56* 55* 55*   CR 3.15* 3.67* 3.69* 3.59*   * 142* 131* 114*     CBC  Recent Labs  Lab 06/19/18  0405 06/18/18  0440 06/17/18  0410 06/16/18  1815   WBC 2.2* 3.4* 3.6* 9.6   RBC 2.60* 2.82* 2.66* 3.41*   HGB 8.1* 8.7* 8.2* 10.6*   HCT 23.4* 25.4* 23.3* 29.8*   MCV 90 90 88 87   MCH 31.2 30.9 30.8 31.1   MCHC 34.6 34.3 35.2 35.6   RDW 20.4* 21.3* 20.8* 20.6*   PLT 35* 53* 48* 82*     INR  Recent Labs  Lab 06/19/18  0405 06/18/18  0440 06/17/18  0410 06/16/18  1815   INR 2.06* 1.87* 2.20* 2.18*     LFTs  Recent Labs  Lab 06/19/18  0405 06/18/18  0440 06/17/18  1600 06/17/18  0410   ALKPHOS 63 79 76 86   * 221* 160* 144*   ALT " 92* 112* 93* 95*   BILITOTAL 22.6* 23.4* 23.7* 23.7*   PROTTOTAL 5.6* 5.8* 5.9* 5.5*   ALBUMIN 3.6 3.4 3.8 3.2*      PANC  Recent Labs  Lab 06/16/18  1811   LIPASE 260

## 2018-06-19 NOTE — PROGRESS NOTES
Critical Care Services Progress Note:  I personally examined and evaluated the patient today. I formulated today s plan with the house staff team or resident(s) and agree with the findings and plan in the associated note (see separately attested resident note).   I have evaluated all laboratory values and imaging studies from the past 24 hours.  Summary of hospital course:  43M with cirrhosis 2/2 hep B now decompensated with sbp.  Overnight events/pertinent findings today:  Successfully extubated.  Doing well.  Off pressors.   Data  A feb.  satting well on pressure support 7/5.  Hr/bp on 0.05 levo.  Net neg 3.2L  Labs (personally reviewed):  Lytes ok.  Creat 3.15 slowly improving, bili 22.6, ast/alt 152/92; inr stable 2.06.  plttls acceptable but low 35, hgb anemic to 8.1 stable.    Micro:  Enterococcus uti.  Ascites cx neg.  Assessment/plan:  1.  Hypoxemic respiratory failure:  Improving. likely due to pulmonary edema/sepsis.  Continue to wean oxygen support as able.  2.  UTI/CTX:  Ceftriaxone for sbp and ampicillin for uti.  3.  Cirrhosis:  Appreciate hepatology input.  Continue lasix/rifiaximin.  Continue tenofovir for hep B.  Finished 3d vitamin K.  5.  Non-oliguric BEN:  No acute need for dialysis.  ATN vs HRS.  Will discuss with nephrology.    Rest per resident note.      Tyrese Ragsdale

## 2018-06-19 NOTE — PROGRESS NOTES
Medical ICU Progress and Transfer Note    Assessment/Plan:  Yaneli Miles is a 42 y/o [place] male with Pmhx cirrhosis secondary to Chronic Viral Hepatitis B c/b esophageal varices and TIIDM on insulin who presents from OSH with alerted mental status and vomiting found to have SBP and Enterococcus UTI. Patient intubated on 6/17 for concern of not protecting airway and was extubated on 6/18.     Course:  Patient presented from [facility] obtunded. Infectious work up started with blood cx, urine cx and imaging including CXR and CT. Work up most remarkable for hyperammonemia and BEN. Due to hyperammonemia OSH tried an unsuccessful lactulose enema. An NG was placed and had 1 dose of lactulose given before transfer. Additionally he was started on levo due to hypotension with right IJ placed for pressors and given 1 dose of ceftriaxone and flagyl at OSH. On arrival, levo was continued with Maps >80 due to concern for hepatorenal syndrome and diagnostic paracentesis was found >500 PMNs with diagnosis of SBP. Due to hyperammoniemia, liver function and hypoalbuminema, he was given albumin and vitamin K for 3 days total. Also continued lactulose. GI and Nephro were consulted to aid with further management. He was intubated early 6/17 with concern for not protecting airway. Patient was also found to have an OSH positive peripheral blood culture for Staph, though concerning for contaminant. OSH urine culture found to have pansensitive Enterococcus faecalis. He was extubated on 6/18 and taken off of Levo with appropriate MAPs. Patient was deescalated from Vanc to Ampicillin for UTI and continued on Ceftriaxone for SBP. Of note patient was to see liver transplant this past Monday to begin liver transplant evaluation. Evaluation was started here. Will need dobutamine stress echo when hemodynamically and resolution of infections per cardiology for transplant evaluation. Patient appropriate to transfer to the floor           ===NEURO===  # Sedation  - None     # AMS, improving  Most likely multifactorial including SBP, UTI and hyperammonemia. Intubated on 6/17 for concern for not protecting airway. Extubated on 6/18. Patient continues to be more alert and interactive. Still disoriented to time.    - Continue to monitor     ===CARDIOVASCULAR===  # Hypotension, resolved   Was started on Levo at OSH and has been discontinued on 6/18 with appropriate MAPs.  - Map goal >65     # Prolong QT on EKG  Will continue to monitor.     ===PULMONARY===  No acute concerns.     ===GASTROINTESTINAL===  # SBP  # Sepsis, resolved  Diagnostic tap on admission concerning for SBP, >500 PMNs, with improving labs on therapeutic and diagnostic paracentesis on 6/17.    - Continue Ceftriaxone, 7 day course, Day 4    # Chronic viral Hepatitis B  # Cirrhosis with ascites  # Hyperammonemia   # Thrombocytopenia   # Esophageal varices  # Elevated INR   Meld score today of 38 and was 39 on admission. Patient has been followed outpatient by E[facility] Torres. Liver transplant evaluation started yesterday while inpatient. PTA medications include lactulose, lasix, spironolactone, tenofovir, nadolol and rifaximin. 2 liter diagnostic and therapeutic paracentesis on 6/17 showing improvement.   - Hepatology consult as above   - S/p Vitamin K   - Continue Lactulose   - Continue rifaximin  - Holding nadolol in setting of hypotension, may restart w/BP appropriate  - Hold diuretics in setting of BEN   - Daily CMP and INR, following MELD score  - Per Pharm recs tenofovir, dosing every 3 days with current renal state; most recent dose 6/15    # Elevated liver function labs  Unclear etiology and have remained elevated since recent ERCP. Abdominal ultrasound on admission with no acute concerns. Will continue to monitor and are down trending.     # Nutrition:   - Mod CHO diet     ===RENAL===  # Non-oliguric BEN  Baseline Cr 0.5-0.7. Concerning for ATN. Improving creatinine. Repeat bladder  can went down from 17 to 13 after 2L para.    - Nephrology consult  - Holding PTA diuretics        # Hypoalbuminemia  - S/p albumin 100g for 3 days total, Day 3      ===HEME/ONC===  # Normocytic Anemia  No acute signs of bleeding. Does have a hx of esophageal varices. Will continue to monitor with no acute signs of blood loss. Will transfusion if < 7. Spouse consented for blood products upon admission.   - Trend     ===ENDOCRINE===  # Type II DM, on insulin  Hemoglobin A1C 5/25/2018, 5.2. PTA medications include Exenatide, sitagliptin and Lantus 10 units.    - MSSI  - Hypoglycemia protocol       ===INFECTIOUS DISEASE===  # See above for SBP    # Staph, OSH blood culture x1   Started on Vanc upon positive culture, but discontinued as found to be Staph, not aureus, from peripheral line at OSH with concern for contaminant. Discontinued daily bld cultures as negative >48 hrs.      # Enterococcus UTI   Per OSH urine culture and upon admission found to be pansensitive. Narrowed from Vanc to Ampicillin per sensitivities.   - Ampicillin renal dosing    # Antimicrobials:  - Flagyl 6/16 given at OSH  - Ceftriaxone 6/16-  - Ampicillin 6/18-  - Vancomycin 6/17-6/18   ===SKIN/MSK===  No acute concerns      Lines:  Right IJ (can be removed), PIV, NG  Prophylaxis:  DVT: SCDs  GI: not indicated   Family: Updated at Bedside  Disposition: Transfer to the floor  Code Status:  Full code    Patient seen and discussed with Dr. Melyssa Pierce, PGY 1   [phone]    ==========================================================  Interval Events    No acute events overnight. Patient endorsed abdominal pain and given tylenol with improvement. Patient extubated yesterday and mental status continues to improve. Off Levo by 10AM yesterday morning and continues to maintain appropriate BPs. MAP goals were changed as less concerned for HRS.    Objective    Blood pressure 95/52, temperature 97.9  F (36.6  C), temperature source Tympanic, resp.  "rate 11, height 1.676 m (5' 6\"), weight 50.8 kg (111 lb 14.4 oz), SpO2 95 %.  Ventilation Mode: CPAP/PS  (Continuous positive airway pressure with Pressure Support)  FiO2 (%): 30 %  Rate Set (breaths/minute): 16 breaths/min  Tidal Volume Set (mL): 450 mL  PEEP (cm H2O): 5 cmH2O  Pressure Support (cm H2O): 7 cmH2O  Oxygen Concentration (%): 30 %  Resp: 11    General: resting comfortably, awoke to voice, NAD  Skin: Diffuse jaundiced; no rash, no ecchymoses  CV: RRR, normal S1S2, no murmur, clicks, rubs  Resp: Clear to auscultation bilaterally, no wheezes, rhonchi  Abd: Soft, non-tender, distended with appreciable fluid wave, tagaderm on bilateral lower quadrants from paracentesis with no appreciable leaking  Extremities: warm and well perfused, palpable pulses, +1 nonpitting edema BLE    Labs reviewed  Imaging reviewed     ROUTINE ICU LABS (Last four results)  CMP    Recent Labs  Lab 06/19/18  0405 06/18/18  1555 06/18/18  0440 06/17/18  1600 06/17/18  0410 06/16/18  1815    142 140 140 137 135   POTASSIUM 3.6 4.0 4.2 4.3 4.2 4.6   CHLORIDE 111* 117* 114* 113* 107 109   CO2 13* 13* 11* 13* 11* 11*   ANIONGAP 14 13 15* 14 18* 14   * 142* 131* 114* 130* 128*   BUN 50* 56* 55* 55* 52* 46*   CR 3.15* 3.67* 3.69* 3.59* 3.26* 3.08*   GFRESTIMATED 22* 18* 18* 18* 21* 22*   GFRESTBLACK 26* 22* 22* 22* 25* 27*   JESUS 8.5 8.9 8.6 8.5 8.0* 7.4*   MAG  --   --  2.5*  --  2.5* 2.1   PHOS  --   --  4.5  --  5.0* 3.4   PROTTOTAL 5.6*  --  5.8* 5.9* 5.5* 6.0*   ALBUMIN 3.6  --  3.4 3.8 3.2* 1.6*   BILITOTAL 22.6*  --  23.4* 23.7* 23.7* 24.5*   ALKPHOS 63  --  79 76 86 138   *  --  221* 160* 144* 190*   ALT 92*  --  112* 93* 95* 138*     CBC    Recent Labs  Lab 06/19/18  0405 06/18/18  0440 06/17/18  0410 06/16/18  1815   WBC 2.2* 3.4* 3.6* 9.6   RBC 2.60* 2.82* 2.66* 3.41*   HGB 8.1* 8.7* 8.2* 10.6*   HCT 23.4* 25.4* 23.3* 29.8*   MCV 90 90 88 87   MCH 31.2 30.9 30.8 31.1   MCHC 34.6 34.3 35.2 35.6   RDW 20.4* " 21.3* 20.8* 20.6*   PLT 35* 53* 48* 82*     INR    Recent Labs  Lab 06/19/18  0405 06/18/18  0440 06/17/18  0410 06/16/18  1815   INR 2.06* 1.87* 2.20* 2.18*     Arterial Blood Gas    Recent Labs  Lab 06/17/18  0845 06/16/18  1830   PH 7.34*  --    PCO2 21*  --    PO2 144*  --    HCO3 11*  --    O2PER 30.0 21.0       Imaging - no new imaging

## 2018-06-19 NOTE — PROGRESS NOTES
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     Non-oliguric BEN  Baseline Cr 0.6 in June 2018, now presenting in acute kidney injury with Cr 3.3. Etiology for BEN can be intravascular volume depletion, ATN-physiology, cholemic nephropathy, or hepatorenal physiology. Currently patient is being treated for septic shock, on ceftriaxone 2g daily. Patient on tenofovir but has been taking it for many years.  - Cr improved today. Would continue abx and supportive care. Etiology of BEN now is more in francoise with intravascular volume depletion vs ATN-physiology from sepsis.  - Patient already received +4L resuscitation fluids at OSH without improvement in Cr or blood pressure, which rules out pure volume depletion. Albumin being given x3 days, follow daily BMP  - Granular casts in UA, although non specific, suggest tubular injury which may be from underlying sepsis. Maintain MAP >65. Repeat UA does not reveal concentrated urine (which now suggests against HRS along with other vitals and lab values)  - Therapeutic ascites tap may relieve intra-abdominal pressure and improve hemodynamics and urine output. Avoid taps >4L. Would consider tapping him again before the weekend  - Vancomycin stopped, transitioned to ampicillin and ceftriaxone.  - Strict I/O, daily BMP, avoid NSAIDs, avoid other nephrotoxic medications  - Undergoing liver transplant evaluation     Electrolytes  Na 138, K 3.6, Phos 5.0, Cl 111  - replace as necessary. No acute need for hemodialysis at this time     Volume/BP  BP improved, likely low effective circulating blood volume. Completed albumin x 3 days with improvement in BP and Cr  - Paracentesis should not exceed 4L. Hold home diuretics  - Can target Map 65 given low likelihood this is HRS     Anemia  hgb 8.1, h/o  "esophageal and variceal bleeding but no obvious source at this time.   - Transfusion parameters by primary team    Acid/base balance  HCO3 13 (improving)  - positive urine anion gap, but patient may still have component of both renal disease and GI loses contributing.  - no need for bicarbonate therapy at this time given h/o encephalopathy and concern that replacement might worsen his osmotic gradient and cause worsening mentation     BMD  Ca 8.5, albumin 3.6, Phos 5.0  - Continue renal TF, low phos    Recommendations were communicated to primary team in person    Seen and discussed with Dr. Gissel Amanda MD   [phone]    Interval History :   In the last 24 hours Yaneli Miles has been extubated. He is more alert and able to understand english and simple commands. He denies chest pain, shortness of breath, or nausea. He has abdominal pain.     Review of Systems:   I reviewed the following systems:  GI: + loss of appetite. no nausea or vomiting or diarrhea.   Neuro:  + confusion  Constitutional:  no fever or chills  CV: no dyspnea or edema.  no chest pain.    Physical Exam:   I/O last 3 completed shifts:  In: 1445 [P.O.:360; I.V.:790; NG/GT:295]  Out: 1900 [Urine:900; Stool:1000]   /62 (BP Location: Left arm)  Temp 96.6  F (35.9  C) (Oral)  Resp 16  Ht 1.676 m (5' 6\")  Wt 50.8 kg (111 lb 14.4 oz)  SpO2 100%  BMI 18.06 kg/m2     GENERAL APPEARANCE: NAD, sitting in his chair, extubated  EYES:  + scleral icterus, pupils equal  HENT: mouth without ulcers or lesions  PULM: lungs clear to auscultation bilaterally, equal air movement, no clubbing  CV: regular rhythm, normal rate, no rub     -JVD no     -edema -ve   GI: hard but not tensonee, non tender, + distended, bowel sounds are normal  INTEGUMENT: no cyanosis, no rash  NEURO:  no asterixis   Access no    Labs:   All labs reviewed by me  Electrolytes/Renal -   Recent Labs   Lab Test  06/19/18   0405  06/18/18   1555  06/18/18   0440   06/17/18   0410 "   NA  138  142  140   < >  137   POTASSIUM  3.6  4.0  4.2   < >  4.2   CHLORIDE  111*  117*  114*   < >  107   CO2  13*  13*  11*   < >  11*   BUN  50*  56*  55*   < >  52*   CR  3.15*  3.67*  3.69*   < >  3.26*   GLC  145*  142*  131*   < >  130*   JESUS  8.5  8.9  8.6   < >  8.0*   MAG  2.2   --   2.5*   --   2.5*   PHOS  5.0*   --   4.5   --   5.0*    < > = values in this interval not displayed.     CBC -   Recent Labs   Lab Test  06/19/18   0405  06/18/18   0440  06/17/18   0410   WBC  2.2*  3.4*  3.6*   HGB  8.1*  8.7*  8.2*   PLT  35*  53*  48*     LFTs -   Recent Labs   Lab Test  06/19/18   0405  06/18/18   0440  06/17/18   1600   ALKPHOS  63  79  76   BILITOTAL  22.6*  23.4*  23.7*   ALT  92*  112*  93*   AST  152*  221*  160*   PROTTOTAL  5.6*  5.8*  5.9*   ALBUMIN  3.6  3.4  3.8     Iron Panel -   Recent Labs   Lab Test  06/17/18   2138   IRON  66   IRONSAT  109*     Imaging:  All imaging studies reviewed by me.     Current Medications:    ampicillin  2 g Intravenous Q8H     cefTRIAXone  2 g Intravenous Q24H     insulin aspart  1-7 Units Subcutaneous 4x Daily w/meals     lactulose  20 g Oral or NG Tube TID     rifaximin  550 mg Oral or Feeding Tube BID     tenofovir  300 mg Oral or Feeding Tube Q72H       - MEDICATION INSTRUCTIONS -       Esau Amanda MD     .I have seen and examined this patient with the resident.  This note reflects our joint assessment and plan.     Gay Chauhan MD

## 2018-06-19 NOTE — PLAN OF CARE
Problem: Patient Care Overview  Goal: Plan of Care/Patient Progress Review  OT/4C - Discharge Planner OT   Patient plan for discharge: pt preference for home with home therapy  Current status: Min A required for bed mobility and for pivot transfer into bedside chair. Set up assist and encouragement required for ADL; pt frequently requesting assist from wife. HR 55 bpm, 100% on RA, /67.  Barriers to return to prior living situation: cognition, activity tolerance, strength  Recommendations for discharge: home with assist and home OT/PT  Rationale for recommendations: to increase ADL IND, strength, and activity tolerance       Entered by: Lydia Ricks 06/19/2018 9:11 AM

## 2018-06-19 NOTE — PLAN OF CARE
Problem: Patient Care Overview  Goal: Plan of Care/Patient Progress Review  Outcome: Improving  Patient vitally stable and appropriate for transfer out of ICU.  Advanced to regular, diabetic diet.  Taking po without issue.  Post-pyloric feeding tube and CVC discontinued per orders.  Report called to 7D.  Patient transferred via wheelchair.    Problem: Diabetes Comorbidity  Goal: Diabetes  Patient comorbidity will be monitored for signs and symptoms of hyperglycemia or hypoglycemia. Problems will be absent, minimized or managed by discharge/transition of care.   Following BG levels with meals and bedtime.  Sliding scale insulin coverage ordered.

## 2018-06-19 NOTE — PROGRESS NOTES
"Brief Medicine ICU Acceptance Note    Patient admitted for obtundation.  Hyperammonemic.  Unsuccessful lactulose outside hospital.  Patient transferred.  Previously on norepinephrine.  Diagnosed with SBP, neg cytology.  Ceftriaxone was continued.  Patient found to have BEN as well as coagulopathy.  Status post albumin and vitamin K challenge. Nephrology on board, ATN most likely. Patient was briefly intubated starting 6/17-6/18 due to ongoing encephalopathy and poor airway protection.  Now tolerating room air.  Patient also noted to have pansensitive Enterococcus faecalis in urine, vancomycin transition to ampicillin.  Pathology following, transplant evaluation beginning while inpatient.  Cardiology on board to assist with evaluation.    Overall patient states that he notes diffuse itching though states that this is currently manageable.  He also notes that he feels bloated in his abdomen though denies any abdominal pain.  He has not noted any hematemesis, melena, hematochezia.    /59  Temp 95.9  F (35.5  C) (Oral)  Resp 14  Ht 1.676 m (5' 6\")  Wt 50.8 kg (111 lb 14.4 oz)  SpO2 100%  BMI 18.06 kg/m2    General: Pleasant, tired appearing, no acute distress  HEENT: Normocephalic, atraumatic, scleral icterus, EOMI  CV: Regular rate and rhythm, no murmurs rubs or gallops  Respiratory: Clear to auscultation bilaterally, breathing comfortably on room air  Abdomen: Firm, distended, ascites present, nontender, no rebound or guarding, right abdomen paracentesis site covered by gauze with surrounding ecchymosis though nontender  Extremities: No lower extremity edema  Neuro: No asterixis, alert    A/P    PROBLEM LIST  Hep B cirrhosis (MELD-Na 38)  Chronic HBV on tenofovir  Coagulopathy of liver disease  Acute hepatic/infectious encephalopathy  Esophageal varices  Septic shock, resolved  SBP  Pansensitive enterococcus UTI  Non-oliguric BEN  Non nephrotic range proteinuria  Chronic normocytic anemia  Chronic " thrombocytopenia  Type 2 diabetes mellitus    Plan  -agree with MICU plan, please see note from today for full details  -continue ceftriaxone/ampicillin  -lactulose and rifaximin, goal 3-4 BMs per day  -ongoing transplant workup  -monitor MELD  -monitor creatinine  -will consider diagnostic/therapeutic paracentesis vs diuretics in AM if BP remains stable    # Antimicrobials:  - Flagyl 6/16 given at OSH  - Ceftriaxone 6/16-  - Ampicillin 6/18-  - Vancomycin 6/17-6/18    Gold Lamas MD  Internal Medicine, PGY-2  Pager [phone]

## 2018-06-19 NOTE — PROGRESS NOTES
Liver Transplant Evaluation/Teaching    TEACHING TOPICS: Evaluation Process, Evaluation Items, Diagnostic Studies, Consultation, Chemical Dependency Policy, CD Eval, Donor Source, Liver Allocation, MELD System, UNOS, Waiting List, Follow up while on transplant list, Follow up after transplantation, Infection and Rejection, Immunosuppression , Medication Teaching, Lab Recording after transplant, Laboratory Frequency after transplant , Consent for evaluation and One year survival rates  INSTRUCTIONAL MATERIAL USED/GIVEN: Liver Transplant Handbook, MELD Booklet, Donor Booklet, Web Sites Options, Verbal instructions, Multiple Listing Brochure , Consent for evaluation signed, One year survival rates and SRTR (Scientific Registry) Data  Person(s) involved in teaching: Braulio , patient and wife. Completed at 8:45 am today.  Asks Questions: YES  Eager to Learn: YES  Cooperative: YES  Receptive (willing/able to accept information): YES  Reason for the appointment, diagnosis and treatment plan:YES  Knowledge of proper use of medications and conditions for which they are ordered (with special attention to potential side effects or drug interactions): YES  Other: Patient signed receipt of information and alcohol policy.  Patient is aware of and agrees to required commitment to post-op care and long term follow-up: YES  Patient has name and phone number of transplant coordinator.   Time Spent face-to-face teaching: 15 minutes.    Plan- 1:1 outpatient teaching after discharged    ESTUARDO BaileyN,RN  Adult Liver Coordinator  [phone]

## 2018-06-19 NOTE — PLAN OF CARE
Problem: Patient Care Overview  Goal: Plan of Care/Patient Progress Review  Outcome: Improving    Pt doing well s/p extubation yesterday morning. Appears oriented, able to make needs known, appropriate with cares. Difficult to assess 2/2 language barrier (pt Hmong speaking). Stool output 1.3 L yesterday. 400 ml since midnight. Pt endorsing abdominal pain last evening. One-time order tylenol dose given with improvement in pain. Maintaining MAPs >65 with no pressors. Wife at bedside overnight, assisting and supportive of pt. Hmong  ordered for 11:30 today.  Consider midodrine. Continue to monitor.         Problem: Diabetes Comorbidity  Goal: Diabetes  Patient comorbidity will be monitored for signs and symptoms of hyperglycemia or hypoglycemia. Problems will be absent, minimized or managed by discharge/transition of care.   Outcome: No Change    -190s. Pt continues on S/S insulin coverage. Requiring 1-2 units insulin coverage per check.  Continue to monitor and cover with supplemental insulin as ordered.

## 2018-06-20 NOTE — PLAN OF CARE
Problem: Patient Care Overview  Goal: Plan of Care/Patient Progress Review  Discharge Planner PT  7D eval  Patient plan for discharge: Home with assist of wife  Current status: Pt CGA-SBA for bed mobility, reports abdominal pain with this. Ambulates 150 ft x2 with CGA-SBA and FWW, very slow gait speed and frequent rest breaks required due to fatigue. Pt ascends/descends 4 stairs with B hand rail and Jan-CGA, step-to pattern, very slow and cautious. Tearful during session regarding medical status and wanting to return home.  Barriers to return to prior living situation: Medical status, activity tolerance, decreased ind with functional mobility   Recommendations for discharge: Home with 24/7 assist of wife and HHPT/OT  Rationale for recommendations: Pt's wife very supportive and notes she was previously assisting with stairs, mobility, and ADLs since onset of diagnosis and worsening of medical status and is comfortable continuing this assist. Pt needs at least CGA for stairs and SBA for functional mobility. Pt would benefit from continued home PT/OT services to progress ind with mobility and improve strength and endurance.        Entered by: Sindy Martinez 06/20/2018 2:35 PM

## 2018-06-20 NOTE — PROGRESS NOTES
Physician Attestation   I, Mamadou Norwood, saw this patient with the resident and agree with the resident and/or medical student's findings and plan of care as documented in the note.      I personally reviewed vital signs, medications, labs and imaging.    Key findings: as noted peggy Norwood MD  Date of Service (when I saw the patient): 6/20/2018  Assessment and Plan:  1. liver transplant evaluation - patient is a good candidate overall. Benefits and surgical risks of a liver transplantation were discussed.  2. 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 Portal Vein: patent  2. Hepatic Artery: open  3. TIPS: no  4. Previous Abdominal Surgery: no  5. Hepatocellular Carcinoma: no  6. Ascites: yes  7. Costal Angle: narrow  8. Portopulmonary Hypertension: no  9. Hepatopulmonary Syndrome: no  10. Cardiac Evaluation: completed  11. Nutritional Status: malnurished    Recommendations:   List for liver transplantation after outstanding work-up completed.    Patients overall evaluation will be discussed at the Liver Transplant selection committee meeting with a final recommendation on the patients suitability for transplant to be made at that time.    Consult Full Details:  Yaneli Miles was seen in consultation at the request of   for evaluation as a potential liver transplant recipient.    Reason for Visit:  Yaneli Miles I is a 43 year old year old male with cirrohsis of the liver due to HBV, who presents for liver transplant evaluation.    HPI:  Presenting complaint: Yaneli Miles is a 43 year old male with HBV cirrhosis c/b EV with h/o variceal hemorrhage 2/2017 and 4/2018, ascites requiring twice weekly paracentesis, and HE on lactulose and rifaxamin who was admitted with HE and increasing renal failure, likely precipitated by infection (SBP and UTI).          Past Medical History:     Past Medical History:   Diagnosis Date      Choledocholithiasis      Chronic viral hepatitis B without delta agent and without coma (H) 6/14/2018     Cirrhosis of liver with ascites (H) 6/14/2018     Esophageal varices (H)     prior GIB     Hepatic encephalopathy (H)      Portal vein thrombosis 6/14/2018 5/29/18 care everywhere [facility]     SBP (spontaneous bacterial peritonitis) (H) 06/2018     Type 2 diabetes mellitus without complication, without long-term current use of insulin (H) 6/14/2018             Past Surgical History:     Past Surgical History:   Procedure Laterality Date     ENDOSCOPIC RETROGRADE CHOLANGIOPANCREATOGRAM      with stone removal             Social History:     Social History   Substance Use Topics     Smoking status: Never Smoker     Smokeless tobacco: Not on file     Alcohol use No             Family History:   The family history is not on file.             Allergies:     Allergies   Allergen Reactions     Nsaids      Contraindicated              Medications:   @  I have reviewed this patient's current medications  Prescriptions Prior to Admission   Medication Sig Dispense Refill Last Dose     exenatide (BYETTA 10 MCG PEN) 10 MCG/0.04ML injection Inject 10 mcg Subcutaneous   6/15/2018     ferrous sulfate (IRON) 325 (65 Fe) MG tablet Take 325 mg by mouth   6/15/2018     furosemide (LASIX) 40 MG tablet Take 20 mg by mouth   6/15/2018     insulin glargine (LANTUS) 100 UNIT/ML injection Inject 10 Units Subcutaneous 2 times daily    6/15/2018     nadolol (CORGARD) 20 MG tablet Take 20 mg by mouth   6/15/2018     omeprazole (PRILOSEC) 20 MG CR capsule Take 20 mg by mouth   6/15/2018     simethicone (MI-ACID GAS RELIEF) 80 MG chewable tablet Take 80 mg by mouth   6/15/2018     sitagliptin (JANUVIA) 100 MG tablet Take 100 mg by mouth   6/15/2018     tenofovir (VIREAD) 300 MG tablet Take 300 mg by mouth   6/15/2018     Current Facility-Administered Medications Ordered in Epic   Medication Dose Route Frequency Last Rate Last Dose      acetaminophen (TYLENOL) tablet 500 mg  500 mg Oral Q6H PRN   500 mg at 06/26/18 1221     [START ON 6/27/2018] ciprofloxacin (CIPRO) tablet 500 mg  500 mg Oral Q24H AJITH         Daily 2 GRAM acetaminophen limit, unless fulminent liver failure or transaminases greater than or equal to 300 - 400, then none   Does not apply Continuous PRN         glucose gel 15-30 g  15-30 g Oral Q15 Min PRN        Or     dextrose 50 % injection 25-50 mL  25-50 mL Intravenous Q15 Min PRN   50 mL at 06/23/18 0745    Or     glucagon injection 1 mg  1 mg Subcutaneous Q15 Min PRN         furosemide (LASIX) tablet 40 mg  40 mg Oral Daily         insulin aspart (NovoLOG) inj (RAPID ACTING)  1-7 Units Subcutaneous TID AC   2 Units at 06/26/18 1153     insulin aspart (NovoLOG) inj (RAPID ACTING)  1-5 Units Subcutaneous At Bedtime         lactulose (CEPHULAC) Packet 20 g  20 g Oral TID   20 g at 06/26/18 0801     magnesium sulfate 2 g in water intermittent infusion  2 g Intravenous Daily PRN   2 g at 06/26/18 0855     magnesium sulfate 4 g in 100 mL sterile water (premade)  4 g Intravenous Q4H PRN         naloxone (NARCAN) injection 0.1-0.4 mg  0.1-0.4 mg Intravenous Q2 Min PRN         ondansetron (ZOFRAN) injection 4 mg  4 mg Intravenous Q6H PRN   4 mg at 06/18/18 0751     oxyCODONE IR (ROXICODONE) tablet 5 mg  5 mg Oral or Feeding Tube Q4H PRN   5 mg at 06/26/18 0837     pantoprazole (PROTONIX) EC tablet 40 mg  40 mg Oral BID AC         potassium chloride (KLOR-CON) Packet 20-40 mEq  20-40 mEq Oral or Feeding Tube Q2H PRN   20 mEq at 06/26/18 0801     potassium chloride 10 mEq in 100 mL intermittent infusion with 10 mg lidocaine  10 mEq Intravenous Q1H PRN   10 mEq at 06/24/18 0713     potassium chloride 10 mEq in 100 mL sterile water intermittent infusion (premix)  10 mEq Intravenous Q1H PRN         potassium chloride SA (K-DUR/KLOR-CON M) CR tablet 20-40 mEq  20-40 mEq Oral Q2H PRN         potassium phosphate 10 mmol in D5W 250 mL  intermittent infusion  10 mmol Intravenous Daily PRN         potassium phosphate 15 mmol in D5W 250 mL intermittent infusion  15 mmol Intravenous Daily PRN   15 mmol at 06/23/18 0903     potassium phosphate 20 mmol in D5W 250 mL intermittent infusion  20 mmol Intravenous Q6H PRN         potassium phosphate 20 mmol in D5W 500 mL intermittent infusion  20 mmol Intravenous Q6H PRN   20 mmol at 06/26/18 1220     potassium phosphate 25 mmol in D5W 500 mL intermittent infusion  25 mmol Intravenous Q8H PRN         rifaximin (XIFAXAN) tablet 550 mg  550 mg Oral or Feeding Tube BID   550 mg at 06/26/18 0801     simethicone (MYLICON) chewable tablet 80 mg  80 mg Oral Daily PRN   80 mg at 06/24/18 2123     simethicone 133mg/2mL oral suspension    PRN   133 mg at 06/22/18 1331     sodium chloride (PF) 0.9% PF flush 3 mL  3 mL Intravenous q1 min prn         spironolactone (ALDACTONE) tablet 100 mg  100 mg Oral Daily         sterile water (bottle) irrigation    PRN   100 mL at 06/22/18 1400     tenofovir (VIREAD) tablet 300 mg  300 mg Oral Daily   300 mg at 06/25/18 1650     Current Outpatient Prescriptions Ordered in Epic   Medication     amoxicillin (AMOXIL) 500 MG capsule     lactulose (CHRONULAC) 10 GM/15ML solution     rifaximin (XIFAXAN) 550 MG TABS tablet     spironolactone (ALDACTONE) 25 MG tablet          Previous Transplant Hx: no    Cardiovascular Hx:  h/o Cardiac Issues: no  Exercise Tolerance: poor    Potential Donor(s): no    ROS:   REVIEW OF SYSTEMS (check box if normal)  [X]   GENERAL [X]   PULMONARY [X]   GENITOURINARY  [X]    CNS [X]   CARDIAC [X]    ENDOCRINE  [X]   EARS,NOSE,THROAT [X]    GASTROINTESTINAL [X]   NEUROLOGIC   [X]   MUSCLOSKELTAL [X]    HEMATOLOGY    Examination:     Vitals:  There were no vitals taken for this visit.    GENERAL APPEARANCE: distressed, somnolent, jaundiced  EYES: PERRL  HENT: mouth without ulcers or lesions  NECK: supple, no adenopathy  RESP: lungs clear to auscultation - no  rales, rhonchi or wheezes  CV: regular rhythm, normal rate, no rub   ABDOMEN: distended and tender, acites  MS: extremities normal- no gross deformities noted, no evidence of inflammation in joints, no muscle tenderness  SKIN: no rash  NEURO: Poor strength and tone, sensory exam grossly normal, mentation intact, speech slow   PSYCH: mentation appears normal. and affect normal/bright      Results:   Lab Results   Component Value Date    WBC 2.9 06/20/2018     Lab Results   Component Value Date    RBC 3.10 06/20/2018     Lab Results   Component Value Date    HGB 9.6 06/20/2018     Lab Results   Component Value Date    HCT 28.1 06/20/2018     No components found for: MCT  Lab Results   Component Value Date    MCV 91 06/20/2018     Lab Results   Component Value Date    MCH 31.0 06/20/2018     Lab Results   Component Value Date    MCHC 34.2 06/20/2018     Lab Results   Component Value Date    RDW 19.9 06/20/2018     Lab Results   Component Value Date    PLT 42 06/20/2018       Last Basic Metabolic Panel:  Lab Results   Component Value Date     06/20/2018      Lab Results   Component Value Date    POTASSIUM 4.6 06/20/2018     Lab Results   Component Value Date    CHLORIDE 106 06/20/2018     Lab Results   Component Value Date    JESUS 8.5 06/20/2018     Lab Results   Component Value Date    CO2 13 06/20/2018     Lab Results   Component Value Date    BUN 44 06/20/2018     Lab Results   Component Value Date    CR 2.71 06/20/2018     Lab Results   Component Value Date     06/20/2018       Liver Function Studies -   Recent Labs   Lab Test  06/20/18   0608   PROTTOTAL  5.9*   ALBUMIN  3.4   BILITOTAL  26.1*   ALKPHOS  84   AST  144*   ALT  99*       Lab Results   Component Value Date    INR 1.84 06/20/2018    INR 2.06 06/19/2018    INR 1.87 06/18/2018    INR 2.20 06/17/2018    INR 2.18 06/16/2018       All laboratory data reviewed.      All imaging studies reviewed.           I had a long discussion with the patient  and family with help of  regarding liver transplantation which included but was not limited to  the following points:  1. Liver transplant selection committee process.  2. The federal rules for cadaveric waiting list, the size and blood type matching of the organ. The availability of living-related donor transplantation.  3. The types of donors: brain death donors, non-heart beating donors, partial liver grafts: splits and living donor grafts  4. Extended criteria Donors (older age, steasosis) and the increased risk of primary non-function using the extended criteria donors  5. The CDC high risk donors, Risk of donor transmitted infections and donor transmitted malignancy  6. The liver transplant operation and the associated risks and technical complications which can include intraoperative death, post operative death, Primary non-function, bleeding requiring re-operations, arterial and biliary complications, bowel perforations, and intra abdominal abscess. Some of these complicaitons may require a second operation.  7. The postoperative course, the ICU stay and risk of postoperative complications which can include sepsis, MI, stroke, brain injury, pneumonia, pleural effusions, and renal dysfunction.  8. The current 1 year and 5 year graft and patient survivals.  9. The need for life long immunosuppressive therapy and the side effects of these medications, including the possibility of toxicity, opportunistic infections, risk of cancer including lymphoma, and the possibility of rejection even if the patient is taking the medication exactly as prescribed.  10. The need for compliance with medications and follow-up visits in the clinic and thereafter.  11. The patient and family understand these risks and wish to proceed to transplantation    I spent 60 minutes with the patient and more than 50% of the time was spend in direct face to face counseling.

## 2018-06-20 NOTE — PLAN OF CARE
Problem: Patient Care Overview  Goal: Plan of Care/Patient Progress Review  Discharge Planner OT   Patient plan for discharge: Pt prefers home with home therapy  Current status: Mod A bed mobility X 1. Min A STS. Pt tolerated STS 4X with standing tolerance of 2-4 min each stand needing seated rest breaks In between.    Barriers to return to prior living situation:Decreased activity tolerance, decreased IND in ADLs  Recommendations for discharge: Home with A and home OT/PT  Rationale for recommendations: Pt would benefit from continued strengthening to increase IND in I/ADLs.       Entered by: Tereza Zeng 06/20/2018 1:16 PM

## 2018-06-20 NOTE — PROGRESS NOTES
Clinical details noted. Please see the GI notes.    HBV cirrhosis, MELD 38. No surgical containdications to transplantation; Please list without further delay    Please do a stress test to evaluate for IHD    I met the patient and his family with the help of an     I had a long discussion with the patient regarding liver transplantation which included but was not limited to  the following points:    1. Liver transplant selection committee process.  2. The federal rules for cadaveric waiting list, the size and blood type matching of the organ. The availability of living-related donor transplantation.  3. The types of donors: brain death donors, non-heart beating donors, partial liver grafts: splits and living donor grafts  4. Extended criteria  Donors (older age, steasosis) and the increased  risk of primary non-function using the extended criteria donors  5. The CDC high risk donors,  Risk of donor transmitted infections and donor transmitted malignancy  6. The liver transplant operation and the associated risks and technical complications which can include intraoperative death, post operative death,  Primary non-function, bleeding requiring re-operations, arterial and biliary complications, bowel perforations, and intra abdominal abscess. Some of these complicaitons may require a second operation.  7. The postoperative course, the ICU stay and risk of postoperative complications which can include sepsis, MI, stroke, brain injury, pneumonia, pleural effusions, and renal dysfunction.  8. The current 1 year and 5 year graft and patient survivals.  9. The need for life long immunosuppressive therapy and the side effects of these medications, including the possibility of toxicity, opportunistic infections, risk of cancer including lymphoma, and the possibility of rejection even if the patient is taking the medication exactly as prescribed.  10. The need for compliance with medications and follow-up visits in  the clinic and thereafter.  11. The patient and family understand these risks and wish to proceed to transplantation    Time spent direct face to face counsellin min

## 2018-06-20 NOTE — PROGRESS NOTES
06/20/18 1403   Quick Adds   Type of Visit Initial PT Evaluation       Present yes   Living Environment   Lives With child(doug), dependent;spouse   Living Arrangements house   Home Accessibility stairs to enter home;stairs within home;tub/shower is not walk in;bed and bath on same level   Number of Stairs to Enter Home 4   Number of Stairs Within Home 12  (pt does not need to access stairs)   Stair Railings at Home inside, present at both sides   Transportation Available family or friend will provide   Living Environment Comment Pt lives with wife and 9 children    Self-Care   Dominant Hand other (see comments)  (not discussed)   Usual Activity Tolerance moderate   Current Activity Tolerance fair   Regular Exercise no   Equipment Currently Used at Home walker, rolling   Activity/Exercise/Self-Care Comment Pt was working full time before diagnosis   Functional Level Prior   Ambulation 1-->assistive equipment   Transferring 1-->assistive equipment   Toileting 0-->independent   Bathing 0-->independent   Dressing 0-->independent   Eating 0-->independent   Communication 0-->understands/communicates without difficulty   Swallowing 0-->swallows foods/liquids without difficulty   Cognition 0 - no cognition issues reported   Fall history within last six months no   Which of the above functional risks had a recent onset or change? none   Prior Functional Level Comment Pt previously ambulated with walker at home, has assist from wife for stairs and ADLs. Prior to diagnosis, pt was ind with all mobility and ADLs.   General Information   Onset of Illness/Injury or Date of Surgery - Date 06/16/18   Referring Physician Janet Greenwood MD   Patient/Family Goals Statement To return home   Pertinent History of Current Problem (include personal factors and/or comorbidities that impact the POC) Yaneli Miles is a 43 year old male with HBV cirrhosis c/b EV with h/o variceal hemorrhage 2/2017 and 4/2018, ascites requiring  twice weekly paracentesis, and HE on lactulose and rifaxamin who is admitted with HE and increasing renal failure, likely precipitated by infection.  Note ascitic fluid analysis concerning for SBP and positive urine cultures. Renal failure may be related to pyelonephritis or hypoperfusion in the setting of fluid shifts and infection.  HRS is less likely at this time.  Note overall improvement today.  Had planned to see patient in transplant eval clinic, and started transplant evaluation while is is admitted.   Precautions/Limitations fall precautions   Weight-Bearing Status - LUE full weight-bearing   Weight-Bearing Status - RUE full weight-bearing   Weight-Bearing Status - LLE full weight-bearing   Weight-Bearing Status - RLE full weight-bearing   Cognitive Status Examination   Orientation orientation to person, place and time   Level of Consciousness alert   Follows Commands and Answers Questions 100% of the time   Personal Safety and Judgment intact   Memory intact   Pain Assessment   Patient Currently in Pain No   Integumentary/Edema   Integumentary/Edema Comments No edema noted, jaundiced skin noted   Posture    Posture Protracted shoulders;Forward head position;Kyphosis   Range of Motion (ROM)   ROM Comment B UE and LE ROM WFL   Strength   Strength Comments Not formally assessed due to language barrier, B LE strength at least 3/5   Bed Mobility   Bed Mobility Comments SBA for supine<>sit   Transfer Skills   Transfer Comments CGA-SBA and FWW for sit<>stand   Gait   Gait Comments Pt ambulates with FWW and CGA-SBA, slow speed, short step length, heavy UE use on FWW, forward head posture   Balance   Balance Comments Sitting balance good as pt able to perform seated marching without UE support. Requires SBA and UE support on FWW for standing balance   Sensory Examination   Sensory Perception Comments Not formally assessed, pt reports no numbness or tingling in B UEs or LEs   Coordination   Coordination Comments Not  "formally assessed   General Therapy Interventions   Planned Therapy Interventions balance training;gait training;neuromuscular re-education;strengthening;home program guidelines;progressive activity/exercise   Clinical Impression   Criteria for Skilled Therapeutic Intervention yes, treatment indicated   PT Diagnosis Impaired functional mobility   Influenced by the following impairments Decreased strength, endurance, activity tolerance, balance   Functional limitations due to impairments Pt requires CGA-SBA for transfers and functional mobility, Jan-CGA for stairs   Clinical Presentation Stable/Uncomplicated   Clinical Presentation Rationale PMH and clinical judgment   Clinical Decision Making (Complexity) Low complexity   Therapy Frequency` 5 times/week   Predicted Duration of Therapy Intervention (days/wks) 1 week   Anticipated Equipment Needs at Discharge (TBD)   Anticipated Discharge Disposition Home with Assist;Home with Home Therapy   Risk & Benefits of therapy have been explained Yes   Patient, Family & other staff in agreement with plan of care Yes   Clinical Impression Comments Pt very tearful when discussing discharge, really wants to return home. Educated that will likely be achieved if he continues to work with therapy and progresses his mobility. Pt's wife will be available for 24/7 assist per her report.   [facility] [employer] TM \"6 Clicks\"   2016, Trustees of [facility], under license to [employer].  All rights reserved.   6 Clicks Short Forms Basic Mobility Inpatient Short Form   [facility] [employer]  \"6 Clicks\" V.2 Basic Mobility Inpatient Short Form   1. Turning from your back to your side while in a flat bed without using bedrails? 2 - A Lot   2. Moving from lying on your back to sitting on the side of a flat bed without using bedrails? 2 - A Lot   3. Moving to and from a bed to a chair (including a wheelchair)? 3 - A Little   4. Standing up from a chair using your arms (e.g., " wheelchair, or bedside chair)? 3 - A Little   5. To walk in hospital room? 3 - A Little   6. Climbing 3-5 steps with a railing? 3 - A Little   Basic Mobility Raw Score (Score out of 24.Lower scores equate to lower levels of function) 16   Total Evaluation Time   Total Evaluation Time (Minutes) 9

## 2018-06-20 NOTE — PLAN OF CARE
Problem: Patient Care Overview  Goal: Plan of Care/Patient Progress Review  Outcome: No Change    Soft BPs. Occasionally bradycardic. Disoriented to time. Bed alarm off today; wife at bedside. Good appetite. Blood sugars 132 and 200; on SSI. Abdominal ascites and full body jaundice. Tpump applied to abdomen when in bed. Continues on ampicillin and ceftriaxone. Worked with PT/OT.  used for rounds and assessment. Plan for stress echo tomorrow morning at 0800;  requested. Needs to be NPO 4 hours prior to procedure. Continue with plan of care.

## 2018-06-20 NOTE — PLAN OF CARE
Problem: Patient Care Overview  Goal: Plan of Care/Patient Progress Review  Outcome: No Change  VSS, afebrile. Up with 1 to commode. Neuro checks q4hrs. Pt disoriented to time (city, year, day). Per report was intubated on 6/17/2018 and extubated yesterday. Yaunker at bedside for increased saliva; patient suctioning himself. Pt having total of 6 stools today; lactulose held. [facility] team paged, waiting for response (per MD note, goal is for pt to have 3 to 4 stools). BG checks; 167 around 1600. 1 unit insulin given. BG at 2230 was 138. Trace BRB in stool x1; doctor notified. PIV saline locked. K+ replaced on days, recheck in the morning.  scheduled for 0800; blue phone at bedside. Continuing to eval for liver transplant. Dressing on RIJ removal site, C/D/I. Pt ate 100% of dinner.

## 2018-06-20 NOTE — PLAN OF CARE
Problem: Patient Care Overview  Goal: Plan of Care/Patient Progress Review  Outcome: No Change  [id number]  Mentation unchanged, disoriented to time. Afebrile, other VSS on room air. Denies pain, using heating pad. L PIV SL, IV abx infused. Incontinent of bowel and bladder overnight. No acute issues, patient slept between cares.  scheduled for 0800; blue phone at bedside and spouse present overnight. Continue to monitor and follow POC.

## 2018-06-20 NOTE — PROGRESS NOTES
[facility], [place]    Internal Medicine Progress Note - [facility] Service    Main Plans for Today    -Dobutamine stress echo  -Transplant surgery consulted  -repeat diagnostic paracentesis 6/21, if PMN <250 cc, stop abx     Assessment & Plan   Yaneli Miles is a 43 year old male admitted on 6/16/2018. He has a history of chronic hepatitis B cirrhosis c/p EVs, HE, ascites, DM II and is admitted for hepatic/infectious encephalopathy, septic shock d/t SBP and pansensitive enterococcus UTI on ceftriaxone/ampicillin.    # Hep B cirrhosis  # Chronic HBV on tenofovir  # Coagulopathy of liver disease  # Ascites  # Esophageal varices  MELD-Na score: 36 at 6/20/2018  6:08 AM  MELD score: 35 at 6/20/2018  6:08 AM  Calculated from:  Serum Creatinine: 2.71 mg/dL at 6/20/2018  6:08 AM  Serum Sodium: 132 mmol/L at 6/20/2018  6:08 AM  Total Bilirubin: 26.1 mg/dL at 6/20/2018  6:08 AM  INR(ratio): 1.84 at 6/20/2018  6:08 AM  Age: 43 years  Undergoing transplant workup.  Discussed with cardiology stated that aside from dobutamine stress echo, no further evaluation unless abnormality on stress test.  -Hepatology consulted, appreciate input  -Cardiology consult, appreciate input  -Consulted transplant surgery regarding expedited transplant workup, appreciate input  -Monitor MELD labs  - s/p vitamin K challenge    # Acute hepatic/infectious encephalopathy, improved  Patient still tired appearing though significantly improved from previous when he needed intubation to protect his airway.  -Treat SBP and UTI as below  -Lactulose and rifaximin, goal 3-4 BMs per day    # Septic shock, resolved  # SBP  # Complicated pansensitive enterococcus UTI  Patient's blood pressure is normal, though slightly low which is not surprising given underlying cirrhosis.  Ongoing IV antibiotics for SBP and UTI.  -ceftriaxone x 5d tentatively (end 6/21, received 1g instead of 2g on 6/16)   - repeat diagnostic paracentesis 6/21, if PMN  <250 cc, stop abx  -Ampicillin x 10d (end 6/27)    # Antimicrobials:  - Flagyl 6/16 given at OSH  - Ceftriaxone 6/16-  - Ampicillin 6/18-  - Vancomycin 6/17-6/18    # Non-oliguric BEN, improving  # Non nephrotic range proteinuria  Patient's creatinine continues to improve. Likely ATN.  -Nephrology consulted, appreciate input  -Continue to monitor creatinine    # Chronic normocytic anemia  # Chronic thrombocytopenia  # Pancytopenia  Patient's hemoglobin and platelets are at baseline.  Hemoglobin has not demonstrated despite ecchymoses of prior paracentesis sites.  Do note that patient's WBC has down trended since admission to 2.9.  Unclear if this represents bone marrow failure vs viral infection.  -CBC with differential  -Peripheral smear    # Type 2 diabetes mellitus  Hemoglobin A1C 5/25/2018, 5.2. PTA medications include Exenatide, sitagliptin and Lantus 10 units.    - SSI  - hypoglycemia protocol  - mod CHO diet    # Pain Assessment:  Current Pain Score 6/20/2018   Patient currently in pain? denies   Pain location -   CPOT pain score -   Yaenli latif pain level was assessed and he currently denies pain.      Diet: Moderate Consistent CHO Diet  Fluids: PO  DVT Prophylaxis: Pneumatic Compression Devices  Code Status: Full Code    Disposition Plan   Expected discharge: 4 - 7 days, recommended to prior living arrangement once transplant workup complete, rec home w/ 24h supervision and home health.     Entered: Fazal Lamas 06/20/2018, 2:46 PM   Information in the above section will display in the discharge planner report.      The patient's care was discussed with the Attending Physician, Dr. Baum, Bedside Nurse, Patient and Patient's Family.    Fazal Lamas  [facility]  [place]: 5  Pager: [phone]  Please see sticky note for cross cover information    Interval History   No acute events.  Patient states that abdominal distention is improved, denies pain.  Does ask when he can go home.  States that he  is thinking also feels better but does note that he is quite tired.  Nursing noted small streaks of blood in stool yesterday, has not recurred.    Physical Exam   Vital Signs: Temp: 97.6  F (36.4  C) Temp src: Oral BP: 111/66   Heart Rate: 73 Resp: 16 SpO2: 100 % O2 Device: None (Room air)    Weight: 147 lbs 11.33 oz  General Appearance: Tired appearing, jaundiced, no acute distress  Respiratory: Clear to auscultation bilaterally, breathing comfortably on room air  Cardiovascular: Regular rate and rhythm, no murmurs rubs or gallops  GI: soft, moderately distended, nontender, no rebound or guarding  Skin: Jaundice, ecchymoses surrounding prior paracentesis sites, nontender  Other: Alert, oriented, no asterixis, speech fluent, trace lower extremity edema       Data   Medications     - MEDICATION INSTRUCTIONS -         ampicillin  2 g Intravenous Q8H     cefTRIAXone  2 g Intravenous Q24H     insulin aspart  1-7 Units Subcutaneous 4x Daily w/meals     lactulose  20 g Oral or NG Tube TID     rifaximin  550 mg Oral or Feeding Tube BID     tenofovir  300 mg Oral or Feeding Tube Q72H     Data     Recent Labs  Lab 06/20/18  0608 06/19/18  0405 06/18/18  1555 06/18/18  0440  06/16/18  1815   WBC 2.9* 2.2*  --  3.4*  < > 9.6   HGB 9.6* 8.1*  --  8.7*  < > 10.6*   MCV 91 90  --  90  < > 87   PLT 42* 35*  --  53*  < > 82*   INR 1.84* 2.06*  --  1.87*  < > 2.18*   * 138 142 140  < > 135   POTASSIUM 4.6 3.6 4.0 4.2  < > 4.6   CHLORIDE 106 111* 117* 114*  < > 109   CO2 13* 13* 13* 11*  < > 11*   BUN 44* 50* 56* 55*  < > 46*   CR 2.71* 3.15* 3.67* 3.69*  < > 3.08*   ANIONGAP 13 14 13 15*  < > 14   JESUS 8.5 8.5 8.9 8.6  < > 7.4*   * 145* 142* 131*  < > 128*   ALBUMIN 3.4 3.6  --  3.4  < > 1.6*   PROTTOTAL 5.9* 5.6*  --  5.8*  < > 6.0*   BILITOTAL 26.1* 22.6*  --  23.4*  < > 24.5*   ALKPHOS 84 63  --  79  < > 138   ALT 99* 92*  --  112*  < > 138*   * 152*  --  221*  < > 190*   LIPASE  --   --   --   --   --  260   <  > = values in this interval not displayed.  No results found for this or any previous visit (from the past 24 hour(s)).

## 2018-06-21 NOTE — PROCEDURES
Procedure Note:    Procedure Name: Therapeutic paracentesis  Procedurist (primary): Anshu Dior MD  Pre-procedure diagnosis: ascites  Post-procedure diagnosis: ascites  Indication: SBP clearance of infection    Consent was obtained from patient and placed in the chart via .   Ultrasound used to locate ascites; optimal pocket found to be in LLQ, and marked.  Universal protocol performed with nursing.   5 cc of 1% lidocaine used anesthetize with 25 gauge needle.   22G needle used to obtain fluid, which was clear yellow in appearance, and was sent to lab.   10 cc total fluid obtained.      Patient tolerated without any apparent complications.    Dr. Willard present for critical aspects of procedure.     Anshu Dior MD PGY-3     I was present for critical aspects of the above procedure.  Kike Willard MD  Med-Peds Hospitalist

## 2018-06-21 NOTE — PROGRESS NOTES
"[facility], [place]    Internal Medicine Progress Note - [facility] Service    Main Plans for Today    -Dobutamine stress echo completed, normal  -Transplant surgery rec listing for transplant \"without further delay\"  -repeat diagnostic paracentesis 6/21, if PMN <250 cc, stop ceftriaxone  -transition to renally dosed amoxicillin at discharge (end 6/26)    Assessment & Plan   Yaneli Miles is a 43 year old male admitted on 6/16/2018. He has a history of chronic hepatitis B cirrhosis c/p EVs, HE, ascites, DM II and is admitted for hepatic/infectious encephalopathy, septic shock d/t SBP and pansensitive enterococcus UTI on ceftriaxone/ampicillin.    # Hep B cirrhosis  # Chronic HBV on tenofovir  # Coagulopathy of liver disease  # Ascites  # Esophageal varices  MELD-Na score: 35 at 6/21/2018  6:12 AM  MELD score: 34 at 6/21/2018  6:12 AM  Calculated from:  Serum Creatinine: 2.49 mg/dL at 6/21/2018  6:12 AM  Serum Sodium: 131 mmol/L at 6/21/2018  6:12 AM  Total Bilirubin: 23.4 mg/dL at 6/21/2018  6:12 AM  INR(ratio): 1.84 at 6/20/2018  6:08 AM  Age: 43 years  Dobutamine echo performed, normal.  No further evaluation per cardiology.  Seen by transplant surgery who recommend listing for transplant \"without further delay\".  -Hepatology consulted, appreciate input  -Cardiology consult, appreciate input  -Consulted transplant surgery regarding expedited transplant workup, appreciate input  -Monitor MELD labs  - s/p vitamin K challenge    # Septic shock, resolved  # SBP  # Complicated pansensitive enterococcus UTI  Patient's blood pressure is normal, though slightly low which is not surprising given underlying cirrhosis.  Ongoing IV antibiotics for SBP and UTI.  -ceftriaxone x 5d tentatively (end 6/21, received 1g instead of 2g on 6/16)   - repeat diagnostic paracentesis 6/21, if PMN <250 cc, stop ceftriaxone  -Ampicillin x 10d total course, was on vanc 6/17 (end 6/26), will transition to renally dosed " amoxicillin at discharge    # Antimicrobials:  - Flagyl 6/16 given at OSH  - Ceftriaxone 6/16-  - Ampicillin 6/18-  - Vancomycin 6/17-6/18    # Chronic normocytic anemia  # Chronic thrombocytopenia  # Pancytopenia  # Neutropenia (ANC 1.4)  # Lymphopenia  Patient's hemoglobin down trended from 9.6-7.8 today.  Notes that patient's prior baseline is around 8.  No further bleeding, no ecchymoses on lateral abdomen from prior paracenteses that these appear unchanged and are nontender, do not have high suspicion for abdominal wall hematoma.  Patient has neutropenia, lymphopenia, not to the degree where prophylaxis is needed at this time. Retic index 1.01, underproduction likely due to resolving BEN.  -monitor  -Peripheral smear    # Acute hepatic/infectious encephalopathy, improved  Patient still tired appearing though significantly improved from previous when he needed intubation to protect his airway.  -Treat SBP and UTI as below  -Lactulose and rifaximin, goal 3-4 BMs per day    # Non-oliguric BEN, improving  # Non nephrotic range proteinuria  Patient's creatinine continues to improve. Likely ATN.  -Nephrology consulted, appreciate input  -Continue to monitor creatinine    # Type 2 diabetes mellitus  Hemoglobin A1C 5/25/2018, 5.2. PTA medications include Exenatide, sitagliptin and Lantus 10 units.    - SSI  - hypoglycemia protocol  - mod CHO diet    # Pain Assessment:  Current Pain Score 6/21/2018   Patient currently in pain? yes   Pain location Abdomen   CPOT pain score -   Yaneli latif pain level was assessed and he currently denies pain.      Diet: Regular Diet Adult  Fluids: PO  DVT Prophylaxis: Pneumatic Compression Devices  Code Status: Full Code    Disposition Plan   Expected discharge: 4 - 7 days, recommended to prior living arrangement once transplant workup complete, rec home w/ 24h supervision and home health.     Entered: Fazal Lamas 06/21/2018, 12:59 PM   Information in the above section will display in the  discharge planner report.      The patient's care was discussed with the Attending Physician, Dr. Baum, Bedside Nurse, Patient and Patient's Family.    Fazal Lamas  [facility]: 5  Pager: [phone]  Please see sticky note for cross cover information    Interval History   No acute events.  No recurrence of blood in stools.  Patient states that he has some slightly increased abdominal pain diffusely.  No hematemesis, no melena, no hematochezia.  Patient states that he is hungry, and inquires about when he can eat.  Dobutamine stress echo today.    Physical Exam   Vital Signs: Temp: 97  F (36.1  C) Temp src: Oral BP: 95/60   Heart Rate: 76 Resp: 18 SpO2: 100 % O2 Device: None (Room air)    Weight: 150 lbs 3.2 oz   Stable from 6/20  General Appearance: Tired appearing, jaundiced, no acute distress  Respiratory: Clear to auscultation bilaterally, breathing comfortably on room air  Cardiovascular: Regular rate and rhythm, no murmurs rubs or gallops  GI: soft, moderately distended, nontender, no rebound or guarding  Skin: Jaundice, ecchymoses surrounding prior paracentesis sites, nontender  Other: Alert, oriented, no asterixis, speech fluent, trace lower extremity edema       Data   Medications     - MEDICATION INSTRUCTIONS -       DOBUTamine Stopped (06/21/18 0910)       ampicillin  2 g Intravenous Q8H     atrpopine  2 mg Intravenous Once     cefTRIAXone  2 g Intravenous Q24H     insulin aspart  1-7 Units Subcutaneous 4x Daily w/meals     lactulose  20 g Oral or NG Tube TID     rifaximin  550 mg Oral or Feeding Tube BID     tenofovir  300 mg Oral or Feeding Tube Q72H     Data     Recent Labs  Lab 06/21/18  0612 06/20/18  0608 06/19/18  0405  06/18/18  0440  06/16/18  1815   WBC 2.6* 2.9* 2.2*  --  3.4*  < > 9.6   HGB 7.8* 9.6* 8.1*  --  8.7*  < > 10.6*   MCV 90 91 90  --  90  < > 87   PLT 41* 42* 35*  --  53*  < > 82*   INR  --  1.84* 2.06*  --  1.87*  < > 2.18*   * 132* 138  < > 140  <  > 135   POTASSIUM 4.2 4.6 3.6  < > 4.2  < > 4.6   CHLORIDE 106 106 111*  < > 114*  < > 109   CO2 12* 13* 13*  < > 11*  < > 11*   BUN 43* 44* 50*  < > 55*  < > 46*   CR 2.49* 2.71* 3.15*  < > 3.69*  < > 3.08*   ANIONGAP 13 13 14  < > 15*  < > 14   JESUS 7.9* 8.5 8.5  < > 8.6  < > 7.4*   * 142* 145*  < > 131*  < > 128*   ALBUMIN 2.9* 3.4 3.6  --  3.4  < > 1.6*   PROTTOTAL 5.1* 5.9* 5.6*  --  5.8*  < > 6.0*   BILITOTAL 23.4* 26.1* 22.6*  --  23.4*  < > 24.5*   ALKPHOS 78 84 63  --  79  < > 138   ALT 79* 99* 92*  --  112*  < > 138*   * 144* 152*  --  221*  < > 190*   LIPASE  --   --   --   --   --   --  260   < > = values in this interval not displayed.  No results found for this or any previous visit (from the past 24 hour(s)).

## 2018-06-21 NOTE — PLAN OF CARE
Problem: Patient Care Overview  Goal: Plan of Care/Patient Progress Review  Discharge Planner PT   Patient plan for discharge: Home with family  Current status: Pt demonstrating improved tolerance to activity today, pt reports having more energy.  Pt SBA for transfers, needing Jan for bed mobility and wife able to assist.  Pt ambulated 175' x 2 with use of FWW and SBA, improved speed and form noted.  Pt completed 4 steps with use of B rails and SBA, no LOB noted through out.  Per pt he has a walker at home he can use. Continue to encourage pt ambulating in halls through out the day to increase tolerance to activity.   Barriers to return to prior living situation: IND with mobility, decreased activity tolerance and strength   Recommendations for discharge: Home with assist from family and home PT  Rationale for recommendations: Would benefit from home PT to increase strength and IND with functional mobility       Entered by: Nelly Blanchard 06/21/2018 3:14 PM

## 2018-06-21 NOTE — PROGRESS NOTES
[facility], [place]    Sepsis Evaluation Progress Note    Date of Service: 06/21/2018    I was called to see Yaneli Miles due to abnormal vital signs triggering the Sepsis SIRS screening alert. He is known to have an infection.     Physical Exam    Vital Signs:  Temp: 97.2  F (36.2  C) Temp src: Oral BP: 101/67   Heart Rate: 80 Resp: 18 SpO2: 100 % O2 Device: None (Room air)      Lab:  Lactic Acid   Date Value Ref Range Status   06/16/2018 2.3 (H) 0.7 - 2.0 mmol/L Final     Lactate for Sepsis Protocol   Date Value Ref Range Status   06/21/2018 3.2 (HH) 0.4 - 1.9 mmol/L Final     Comment:     Critical Value called to and read back by  LILLIAN JONES RN 7D AT 1640 ON 06/21/2018 BY MG         The patient is at baseline mental status.    The rest of their physical exam is significant for RRR, clear lungs bilaterally, diffuse jaundice, scleral icterus, distended abdomen, paracentesis site with yellow ascites leaking through bandage, abdominal pain present with light palpation  - at baseline per patient.    Assessment and Plan    The SIRS and exam findings are likely due to sepsis. Patient has been borderline hypotensive during majority of this admission, SBP <90 briefly today, next check was >90.     ID: The patient is currently on the following antibiotics:  Anti-infectives (Future)    Start     Dose/Rate Route Frequency Ordered Stop    06/22/18 0000  amoxicillin (AMOXIL) 500 MG capsule      500 mg Oral EVERY 12 HOURS 06/21/18 1456      06/21/18 0000  rifaximin (XIFAXAN) 550 MG TABS tablet      550 mg Oral 2 TIMES DAILY 06/21/18 1453      06/19/18 0800  tenofovir (VIREAD) tablet 300 mg      300 mg Oral or Feeding Tube EVERY 72 HOURS 06/17/18 0750      06/18/18 1300  ampicillin (OMNIPEN) 2 g vial to attach to  ml bag      2 g  over 15 Minutes Intravenous EVERY 8 HOURS 06/18/18 1241      06/16/18 2000  rifaximin (XIFAXAN) tablet 550 mg      550 mg Oral or Feeding Tube 2 TIMES DAILY 06/16/18  1817          Current antibiotic coverage is appropriate for source of infection. Ampicillin and Ceftriaxone.     Fluid: Fluid bolus ordered. Albumin ordered    Lab: Repeat lactic acid ordered for 1 hour from now. Blood cultures ordered as last culture from 6/18.     Disposition: The patient will remain on the current unit. We will continue to monitor this patient closely.    Raina Sherman MD

## 2018-06-21 NOTE — PROGRESS NOTES
Nephrology Progress Note  06/20/2018         Assessment & Recommendations:   This is a case of BEN in a 43 year old male with HBV cirrhosis c/b esophageal varices and HE, who presented to an OSH with AMS and found to have hyperammonemia.     Non-oliguric BEN  Baseline Cr 0.6 in June 2018, now presenting in acute kidney injury with Cr 3.3. Etiology for BEN can be intravascular volume depletion, ATN-physiology, cholemic nephropathy, or hepatorenal physiology. Currently patient is being treated for septic shock, on ceftriaxone 2g daily. Patient on tenofovir but has been taking it for many years.  - Cr improved today. Would continue abx and supportive care. Etiology of BEN now is more in line with intravascular volume depletion vs ATN-physiology from sepsis.  - Patient already received +4L resuscitation fluids at OSH without improvement in Cr or blood pressure, which rules out pure volume depletion. Albumin being given x3 days, follow daily BMP  - Granular casts in UA, although non specific, suggest tubular injury which may be from underlying sepsis. Maintain MAP >65. Repeat UA does not reveal concentrated urine (which now suggests against HRS along with other vitals and lab values)  - Therapeutic ascites tap may relieve intra-abdominal pressure and improve hemodynamics and urine output. Avoid taps >4L. Would consider tapping him again before the weekend  - Vancomycin stopped, transitioned to ampicillin and ceftriaxone.  - Strict I/O, daily BMP, avoid NSAIDs, avoid other nephrotoxic medications  - Undergoing liver transplant evaluation. Stress echo Thursday, paracentesis Thursday.      Electrolytes  Na 132, K 4.6, Phos 5.0, Cl 106  - replace as necessary. No acute need for hemodialysis at this time     Volume/BP  BP improved, likely low effective circulating blood volume. Completed albumin x 3 days with improvement in BP and Cr  - Paracentesis should not exceed 4L. Hold home diuretics  - Can target Map 65 given low  "likelihood this is HRS     Anemia  hgb 9.6, h/o esophageal and variceal bleeding but no obvious source at this time.   - Transfusion parameters by primary team    Acid/base balance  HCO3 13 stable  - positive urine anion gap, but patient may still have component of both renal disease and GI loses contributing.  - no need for bicarbonate therapy at this time given h/o encephalopathy and concern that replacement might worsen his osmotic gradient and cause worsening mentation     BMD  Ca 8.5, albumin 3.4, Phos 5.0  - Continue renal TF, low phos    Recommendations were communicated to primary team in person    Seen and discussed with Dr. Gissel Amanda MD   [phone]    Interval History :   In the last 24 hours Yaneli Miles has been extubated and transferred up to the medical floors. He is more alert and denies chest pain, shortness of breath, or nausea. He has abdominal pain which remains.     Review of Systems:   I reviewed the following systems:  GI: + loss of appetite. no nausea or vomiting or diarrhea.   Neuro:  + confusion  Constitutional:  no fever or chills  CV: no dyspnea or edema.  no chest pain.    Physical Exam:   I/O last 3 completed shifts:  In: 2250 [P.O.:1840; I.V.:410]  Out: 1300 [Other:1300]   BP 91/53 (BP Location: Right arm)  Temp 98.4  F (36.9  C) (Oral)  Resp 14  Ht 1.676 m (5' 6\")  Wt 67 kg (147 lb 11.3 oz)  SpO2 98%  BMI 23.84 kg/m2     GENERAL APPEARANCE: NAD, sitting in his chair  EYES:  + scleral icterus, pupils equal  HENT: mouth without ulcers or lesions  PULM: lungs clear to auscultation bilaterally, equal air movement, no clubbing  CV: regular rhythm, normal rate, no rub     -JVD no     -edema -ve   GI: hard but not tense, non tender, + distended, bowel sounds are normal  INTEGUMENT: no cyanosis, no rash  NEURO:  no asterixis   Access no    Labs:   All labs reviewed by me  Electrolytes/Renal -   Recent Labs   Lab Test  06/20/18   0608  06/19/18   0405  06/18/18   1555  06/18/18   " 0440   06/17/18   0410   NA  132*  138  142  140   < >  137   POTASSIUM  4.6  3.6  4.0  4.2   < >  4.2   CHLORIDE  106  111*  117*  114*   < >  107   CO2  13*  13*  13*  11*   < >  11*   BUN  44*  50*  56*  55*   < >  52*   CR  2.71*  3.15*  3.67*  3.69*   < >  3.26*   GLC  142*  145*  142*  131*   < >  130*   JESUS  8.5  8.5  8.9  8.6   < >  8.0*   MAG   --   2.2   --   2.5*   --   2.5*   PHOS   --   5.0*   --   4.5   --   5.0*    < > = values in this interval not displayed.     CBC -   Recent Labs   Lab Test  06/20/18   0608  06/19/18   0405  06/18/18   0440   WBC  2.9*  2.2*  3.4*   HGB  9.6*  8.1*  8.7*   PLT  42*  35*  53*     LFTs -   Recent Labs   Lab Test  06/20/18   0608  06/19/18   0405  06/18/18   0440   ALKPHOS  84  63  79   BILITOTAL  26.1*  22.6*  23.4*   ALT  99*  92*  112*   AST  144*  152*  221*   PROTTOTAL  5.9*  5.6*  5.8*   ALBUMIN  3.4  3.6  3.4     Iron Panel -   Recent Labs   Lab Test  06/17/18   2138   IRON  66   IRONSAT  109*     Imaging:  All imaging studies reviewed by me.     Current Medications:    ampicillin  2 g Intravenous Q8H     cefTRIAXone  2 g Intravenous Q24H     insulin aspart  1-7 Units Subcutaneous 4x Daily w/meals     lactulose  20 g Oral or NG Tube TID     rifaximin  550 mg Oral or Feeding Tube BID     tenofovir  300 mg Oral or Feeding Tube Q72H       - MEDICATION INSTRUCTIONS -       Esau Amanda MD       I have seen and examined this patient with the resident.  This note reflects our joint assessment and plan.     Gay Chauhan MD

## 2018-06-21 NOTE — PLAN OF CARE
Problem: Patient Care Overview  Goal: Plan of Care/Patient Progress Review  OT 7D:  time set for 9:00 AM however pt still at ECHO during this scheduled time. Will reschedule for tomorrow.

## 2018-06-21 NOTE — PLAN OF CARE
Problem: Patient Care Overview  Goal: Plan of Care/Patient Progress Review  Alert and oriented X 3. Disoriented to time. Denies difficulty breathing. C/o lower abdominal pain. Using heating pad. Acetaminophen given. Pt reports decrease in pain. Sleeping in between cares.

## 2018-06-21 NOTE — PROGRESS NOTES
Pt here for dobutamine stress test. Test, meds and side effects reviewed with patient via . Reached max HR of 130 (target 149 bpm) with 40mcg/kg min dobutamine. BP dropped 64/37; pt asymptomatic. Dobutamine infusion stopped and per Dr. Medina, given a total of 1mg IV Metoprolol to bring HR back to baseline and 250cc NS bolus with BP returning to 93/55. Post monitoring complete and VSS. Pt transferred back to  via wheelchair and hospital transport.

## 2018-06-21 NOTE — PLAN OF CARE
Problem: Patient Care Overview  Goal: Plan of Care/Patient Progress Review  Echo stress test completed this am. Pt having abd discomfort r/t ascites. Tylenol given with some relief. Planning for paracentesis this afternoon.  and MD was here and consent was obtained. Waiting for paracentesis procedure. Pt is familiar with the procedure. Pt tearful this morning, crying that his family and friends are not visiting him in the hospital. Family/friends came be later this afternoon. Pt wanting to be discharge. Pt had 2 BM's on dayshift. Continue with lactulose for goal of 4 BM's/day.

## 2018-06-21 NOTE — PLAN OF CARE
Problem: Patient Care Overview  Goal: Plan of Care/Patient Progress Review    Continues w/ soft BPs, OVSS, afebrile. Disoriented to time. Bed alarm was off during the day, would have on for safety overnight.  & 181. Pt ascites & jaundice. C/o abdominal discomfort, Tpump in use & tylenol given x1. Old PIV was symptomatic, vascular access pulled & placed new one. Continues w/ abx. Pt incontinent of stool/urine x2; total of 6BMs today- lactulose held per MAR/MD.  used, stress echo tomorrow around 0800, pt instructed for no caffeine/beta blockers & will be NPO from 0400 on. Pt having transplant workup done. Wife at bedside. Continue to monitor & w/ POC.

## 2018-06-21 NOTE — PROGRESS NOTES
Nephrology Progress Note  06/21/2018      This is a case of BEN in a 43 year old male with HBV cirrhosis c/b esophageal varices and HE, who presented to an OSH with AMS and found to have hyperammonemia.     Assessment & Recommendations:     1. BEN, non oliguric - Resolving. Creat down to 2.4, from peak of 3.67 on 6/18. Admission creat was 3.0. Baseline 0.8-0.9 May 2018. He is non oliguric. Tbili 23.4.   Etiology felt to be multifactorial: intravascular volume depletion, cholemic nephropathy, HRS, ATN from sepsis. Creat did not improve initially with 4 liters of volume resuscitation, however seemed to improve with albumin challenge. UA had granular casts which supports BEN. No longer on Vanco   - Continue to monitor for ongoing renal recovery with daily chemistry labs, I/O and daily standing weights   - Limit paracentesis volume to < 4 liters   - Renal dose medications and avoid hypotension/nephrotoxins    2. Volume status - Appears euvolemic. No edema/hypoxia. Had normal Echo today. SBP 90's/. Weight 68.1 kg. Admission weight 70.7 kg. UO unmeasured but recorded as 6+, additionally had 1300 cc of mixed output.    - Continue strict I/O   - Daily standing weights    3. Electrolytes - K 4.2. Has developed hyponatremia over the last 48 hrs with decline in Na to 131.    - Will add fluid restriction tomorrow if continues to decline    4. Acid base - Bicarb remains very low at 12. We have been concerned about over correction resulting in increased encephalopathy. However, encephalopathy improving and he is on lactulose. Will begin slow correction   - Have started bicarb 650 mg po bid    5. BMD - Corrected Ca 8.7, albumin 2.9. Phos 5.0  On 6/19/18.    - No interventions required at this time    6. Sepsis, Enterococcus UTI - Remains on Ampicillin and Rocephin. WBC 2.6, Afebrile    7. Hep B cirrhosis, decompensated 2/2 sepsis - Being w/u for liver transplant. Baseline bili 2-4 range. Currently running in the 20's. Had Echo  "today. On Lactulose/Rifax/Tenofovir.     8. Nutrition - Appears to be tolerating home cooking from wife. Albumin 2.9      Recommendations were communicated to primary team via progress note    Seen and discussed with Dr. Longo and Gissel Orta, NP   [phone]    Interval History :   Creat continues to decline  He is non oliguric  Per nursing notes, encephalopathy has improved  Remains quite acidotic  Interval progress notes, imaging studies and labs reviewed    Review of Systems:   I reviewed the following systems:  GI: Tolerating diet  Neuro:  improved confusion  Constitutional:  no fever or chills  CV: denies dyspnea, CP or edema.   : Voiding w/o bah    Physical Exam:   I/O last 3 completed shifts:  In: 2240 [P.O.:1840; I.V.:400]  Out: 1300 [Other:1300]   BP 95/60  Temp 97  F (36.1  C) (Oral)  Resp 18  Ht 1.676 m (5' 6\")  Wt 68.1 kg (150 lb 3.2 oz)  SpO2 100%  BMI 24.24 kg/m2     GENERAL APPEARANCE: Sitting up, eating lunch with wife. Speaks minimal english  PULM: lungs CTA. Breathing is non labored. Not on supplemental oxygen.   CV: RRR       -edema - none  GI: soft, NT. Distended  INTEGUMENT: no cyanosis or rash  NEURO:  Unable to assess given lack of interpretor  : Voiding w/o bah    Labs:   All labs reviewed by me  Electrolytes/Renal -   Recent Labs   Lab Test  06/21/18   0612  06/20/18   0608  06/19/18   0405   06/18/18   0440   06/17/18   0410   NA  131*  132*  138   < >  140   < >  137   POTASSIUM  4.2  4.6  3.6   < >  4.2   < >  4.2   CHLORIDE  106  106  111*   < >  114*   < >  107   CO2  12*  13*  13*   < >  11*   < >  11*   BUN  43*  44*  50*   < >  55*   < >  52*   CR  2.49*  2.71*  3.15*   < >  3.69*   < >  3.26*   GLC  103*  142*  145*   < >  131*   < >  130*   JESUS  7.9*  8.5  8.5   < >  8.6   < >  8.0*   MAG   --    --   2.2   --   2.5*   --   2.5*   PHOS   --    --   5.0*   --   4.5   --   5.0*    < > = values in this interval not displayed.       CBC -   Recent Labs "   Lab Test  06/21/18   0612  06/20/18   0608  06/19/18   0405   WBC  2.6*  2.9*  2.2*   HGB  7.8*  9.6*  8.1*   PLT  41*  42*  35*       LFTs -   Recent Labs   Lab Test  06/21/18   0612  06/20/18   0608  06/19/18   0405   ALKPHOS  78  84  63   BILITOTAL  23.4*  26.1*  22.6*   ALT  79*  99*  92*   AST  124*  144*  152*   PROTTOTAL  5.1*  5.9*  5.6*   ALBUMIN  2.9*  3.4  3.6       Iron Panel -   Recent Labs   Lab Test  06/17/18   2138   IRON  66   IRONSAT  109*         Imaging:    Echo 6/21/18  Interpretation Summary  Normal stress echocardiogram.  Normal global LV systolic function at rest (LVEF 65-70%) and peak dobutamine  infusion (LVEF >80%.)  No regional wall motion abnormalities at rest and with infusion.  Normal ECG at rest and with infusion.  No subjective symptoms at rest and with infusion.  Normal BP at rest with mild BP fall (expected response) with infusion.  No resting valvular abnormalities. Normal ascending aortic segment. The RVSP  is 13 mmHg above the RAP (from a limited quality Doppler signal.)    Current Medications:    ampicillin  2 g Intravenous Q8H     atrpopine  2 mg Intravenous Once     cefTRIAXone  2 g Intravenous Q24H     insulin aspart  1-7 Units Subcutaneous 4x Daily w/meals     lactulose  20 g Oral or NG Tube TID     rifaximin  550 mg Oral or Feeding Tube BID     sodium bicarbonate  650 mg Oral BID     tenofovir  300 mg Oral or Feeding Tube Q72H       - MEDICATION INSTRUCTIONS -       DOBUTamine Stopped (06/21/18 0910)     Teresa Orta, NP

## 2018-06-22 NOTE — PROGRESS NOTES
"Brief Cross Cover Note.    Was called by bedside RN for 500cc bloody emesis shortly after Hgb check returned with 5.8 level. Went to bedside to evaluate patient and order STAT pRBCs with Hgb recheck. Upon arrival at bedside, patient was feeling lightheaded and \"not well.\" Blood pressure 70s/50s with normal HR. 2 large IVs were placed by vascular access, 2 units pRBCs, 1 L NS, and dopamine drip were ordered. Blood pressure continued to drop with lowest in 50s/30s - patient continued to be responsive. Dopamine was started with good response. GI was called and will come in to see patient. MICU was notified and patient was transferred to MICU for higher level of care and pressor support. Patient continued to protect his airway. Stat CBC, INR, fibrinogen was ordered. Patient was also started on octreotide and PPI infusions per GI request.     Raina Sherman MD  Internal Medicine, PGY 1  P    "

## 2018-06-22 NOTE — LETTER
June 22, 2018    Yaneli Miles  [address] [address]      Dear Mr. Miles,    This letter is sent to confirm that you have completed your transplant work-up and you are a candidate in the liver transplant program at the [facility].  You were placed on the liver active waitlist on 6/22/2018.      When you are active on the waitlist and an organ becomes available, a coordinator will need to speak to you immediately.  You could be contacted at any time during the day or night as an organ could become available at any time.  Please make certain our office always has your current telephone numbers and address.      Items we will need from you:      We have received approval from your insurance company for the transplant procedure.  It is critical that you notify us if there is any change in your insurance.  It is also important that you familiarize yourself with the details of your specific insurance policy.  Our patient  is available to assist you if you should have any questions regarding your coverage.      MELD labs weekly or as needed per transplant.      During this waiting period, we may request additional periodic laboratory tests with your primary physician.  It will be your responsibility to remind your physician to forward your results to the Transplant Office.      We need to be kept informed of any changes in your medical condition such as:    o changes in your medications,   o significant changes in your health  o significant infections (such as pneumonia or abscesses)  o blood transfusions  o any condition which requires hospitalization  o any surgery      Remember to complete any routine cancer screening tests required before your transplant.  This includes colonoscopy; prostate screening for men, and mammogram and gynecologic testing for women, as well as dental work.  Your primary care clinic can assist you with arranging for these exams.   Remind your caregivers to forward copies of the records and final reports.    We want you to know that our program has physician and surgeon coverage 24 hours a day, 365 days a year. If this coverage changes or there are substantial program changes, you will be notified in writing by letter.     Attached is a letter from the [employer] ([employer]). It describes the services and information offered to patients by [employer] and the Organ Procurement and Transplantation Network.    We appreciate having had the opportunity to participate in your care.  If you have questions, please feel free to call the Transplant Office at [phone] or [phone].      Sincerely,      Solid Organ Transplant  [facility]    Enclosures: HARRYOS Letter  cc: Care Team

## 2018-06-22 NOTE — PROGRESS NOTES
MICU Progress Note  Yaneli Miles MRN: [id number]  1975  Date of Admission:6/16/2018  Primary care provider: No Ref-Primary, Physician      Assessment and Plan:     Mr. Miles is a 43 y o Hmong speaking male with a PMH significant for Hep. B Cirrhosis (undergoing transplant workup), esophageal varices, and type II DM who was admitted to [facility] on 6/16 for SBC and enteroccal UTI w/ associated gram positive cocci bacteremia, BEN, and hepatic encephalopathy. Patient transferred back to ICU on 6/21 due to massive variceal hemorrhage.     PLAN:  Today:  - Q8h CBC, INR, and Fibrinogen checks stable.   - EGD, no active bleeding. EUS guided injection of varices in gastric cardia.     NEURO  Sedation - Midazolam. Rass goal 0-1 now that MN tube out.     CARDIOVASCULAR  No active issues. Of note had Dobutamine stress echo yesterday as part of transplant workup. Showed normal EF and good cardiac output.     ===PULMONARY===  Respiratory Failure - Patient intubated on 6/21 for airway protection amid massive GI bleed and need for MN tube. Patient oxygenating well. No prior lung pathology.   -Okay to extubate from GI standpoint  -Possibly 6/23 pending mental status and PST.     ===GASTROINTESTINAL===  Variceal Hemorrhage - Massive hemorrhage on 6/21 visualized on EGD. No clear trigger of bleed. Varices known PTA, hx of variceal bleed 2/2017 and 4/2018. 6/22 Unable to visualize varices endoscopically, but visualized and injected varices in gastric cardia without evidence of full embolization on fluoroscopy.    - Continue IV PPI, Octreotide gtt until 6/24, and Ceftriaxone  - GI Consulted, appreciate recs.   -q8h CBC, INR, and Fibrinogen Checks  - Minimum 2 18 gauge PIVs throughout hospital stay    Hepatic Encephalopathy - Admitted with AMS. Believed to be 2/2 hepatic encephalopathy and sepsis. Was clearing prior to initial dc from ICU.   -Restart lactulose and rifaxamin   -Must be considered shall TIPS become necessary.      Hepatitis B Cirrhosis   MELD-Na score: 33 at 6/22/2018 12:00 PM  MELD score: 33 at 6/22/2018 12:00 PM  Calculated from:  Serum Creatinine: 2.01 mg/dL at 6/22/2018  4:11 AM  Serum Sodium: 135 mmol/L at 6/22/2018  4:11 AM  Total Bilirubin: 19.7 mg/dL at 6/22/2018  4:11 AM  INR(ratio): 2.09 at 6/22/2018 12:00 PM  Age: 43 years   Acute worsening in last month. In process of workup for transplant.     Nutrition:   - NPO pending procedures and MN tube removal.   ===RENAL===  BEN - Complex picture. Baseline Cr. Normal. Believed to be multifactorial - prerenal vs. HRS component vs. cholemic kidney injury vs. ATN. Trend was improving prior to hemorrhage. Likely another insult given massive GIB. Difficult to assess 6/22 due to large volume transfusion and likely hemodilution of Cr.   -Trend and assess.   -Nephrology consulted, appreciate recs.   - Vigilant hemodynamic monitoring and assessment.  - Luis Catheter for strict I/Os. \    Anion Gap Metabolic Acidosis - Elevated Lactate had normalized. BUN remains elevated.   - Sodium Bicarb BID per Nephrology's recs.     Hypocalcemia - low Ica in setting of massive transfusion. Likely 2/2 citrate in blood products. Patient received replacement 6/22 am. Recheck was normal.   -recheck ICa shall more blood products be required.     ===HEME/ONC===  Thrombocytopenia - Likely 2/2 cirrhosis. Stable since admission.    -transfuse >50k given active bleeding.     Coagulopathy - likely 2/2 decreased hepatic function. INR normal following transfusions.   -q8h INR  -q8H fibrinogen. Transfuse to > 100    ===ENDOCRINE===  DM II - PTA exenatide, sitagliptin and Lantus 10 units.  Medium dose SSI. No LA currently given NPO. BGs at goal recently.  - Goal 140-180  - Medium dose SSI  - hypoglycemia protocol    ===INFECTIOUS DISEASE===  Pansensitive enterococcus UTI  - started on ampicillin 6/16. Will complete 10 day course 6/26.     #SBP - Initially with >400 PMNs on paracentesis. And started on  Ceftriaxone. Cultures negative and repeat paracentesis with 300 WBC and only 3% PMNs.   -However, will continue Ceftriaxone due to variceal hemorrhage mortality benefit.     # Antimicrobials:  Ceftriaxone - 6/16 - current   Ampicillin - 6/16 - current    # Cultures:  GPC in blood from OSH, no staph. No speciation/susceptibilities  Enterococcus from urine. Pansensitive.   Other Blood and Ascitic Fluid NGTD.     Prophylaxis:  DVT: Hold given bleed  GI: on IV PPI BID  Family: Updated   Disposition: Critically Ill     Code Status: Full    Patient was seen and discussed with attending physician Dr. Ragsdale, who agrees with above assessment and plan.    Brody Burns MPH  MICU Med Student  Pgr: [phone]      I saw this patient together with MS-Brody Katy and was present during his physical exam and performed an independent exam at the same time which confirmed findings.  I have edited this note as appropriate to reflect team plan for today.  Patient was seen and discussed with attending physician, Dr. Ragsdale, who is also in agreement with above.    Gerhard Gill MD  IM-[id number]  P: [phone]    Interval History:       On 6/21 patient had melanic stools, and one episode of hypotension that responded to 1L IVF, but didn't have a noted Hg drop. Then at 11pm patient developed massive hematemesis and hemodynamic instability with BPs dropping to 50s/30s. Patient was placed on massive transfusion protocol (throughout night received 12 units of PRBCs, 12 U plasma, 2 units plts, and 2 u cryo), Dopamine, PPI, Ceftriaxone, and Octreotide drip . Patient was intubated and transferred to ICU. Patient also had a right IJ central line (ana) placed for additional access (in addition to PIV x 3). Stat endoscopy showed large amount of ongoing variceal hemorrhage 2 cm proximal to GE junction, however they were not able to intervene endoscopically. At this point a MN tube was placed and the gastric balloon inflated and put to pressure to  tamponade the bleed.       PHYSICAL EXAM  Vitals reviewed in chart.  GEN: Sedated, comfortable on vent.   EYES: PERRL, Scleral icterus present.   CV: RRR, no gallops, rubs, or murmurs.  PULM: CTAB, symmetric chest rise.  GI: Tense, protuberant, unable to assess tenderness due to sedation  : bah catheter in place  EXTREMITIES: peripheral pulses intact  SKIN: Obviously jaundiced.

## 2018-06-22 NOTE — BRIEF OP NOTE
Gastroenterology Endoscopy Suite Brief Operative Note    Procedure:  Upper endoscopy   Post-operative diagnosis:  Gastric variceal hemorrhage   Staff Physician:  Dr. Elaine Lawrence   Fellow/Assistant(s):  Dr. Indira Orourke    Specimens:  Please see final procedure note for further details.   Findings:  Active extravasation with continuous bleeding (non-pulsatile) at gastric varix ~2 cm from GE junction. Large amount of blood (fresh and old) and clot in stomach. Attempted to retroflex, but due to volume of blood products in fundus unable to visualize in retroflexion. Distal stomach with hematin overlying mucosal surface but otherwise appeared normal. Duodenum not examined on this exam. No large esophageal varices identified.  No intervention performed during endoscopy.     [place] tube placed (please see separate procedure note).    Complications:  None.   Condition:  Unstable: Active hemorrhage requiring vasopressors and massive transfusion protocol   Recommendations  Diet:  NPO  PPI:  PPI IV BID  Anti-coagulants/platelets:  Hold anticoagulation  Octreotide:  Continue octreotide drip  Discharge Planning:   Per ICU team; recommend IR consult for consideration of TIPS in AM; if ongoing bleeding could consider variceal gluing/coiling

## 2018-06-22 NOTE — PROGRESS NOTES
Brief GI Note  06/22/18    Called ~11:15 pm regarding patient with hematemesis.     Patient had been reportedly having melenic/maroon stool all day - >5 documented. Hemoglobin 9.6 --> 7.8 last evening. SIRS/Sepsis RRT called around 1700, labs obtained 5.8 and blood ordered. Approximately six hours later patient had ~500 cc hematemesis on the floor with associated hypotension to 50s/30s. Dopamine gtt was initiated and patient was transferred to ICU, although had not yet physically moved. I recommended initiation of PPI and octreotide infusions as well as antibiotics and access.     Just after transfer to ICU unit patient had additional ~2645-2958 cc hematemesis and massive transfusion protocol was initiated.     On my arrival to ICU patient was being intubated and central line was being placed. Patient was sedated. A suction canister next to the bed was 2/3 full with dark red blood.     Will plan emergent EGD this evening. Concern for variceal hemorrhage (most likely) vs other upper GI bleeding sources (PUD, Dieulafoy, hemobilia in setting of recent biliary tract instrumentation, etc).     Supportive care per MICU service. Family updated in ICU waiting room, although no  was available.     Plan relayed to Dr. Mcmillan and Dr. Roberson (ICU overnight staff).     Plan discussed with Dr. Lawrence, GI staff physician.     Please page with ?waqar Orourke MD  Gastroenterology Fellow

## 2018-06-22 NOTE — SIGNIFICANT EVENT
At approximately 2200 lab called to report Hgb of 5.8, at the same time NA informed writer pt had bloody emesis of 500ml and the 6th grape jelly stool of the shift. Rapid response called and MD paged. Report given to  nurse; pt transferred to .

## 2018-06-22 NOTE — ANESTHESIA PREPROCEDURE EVALUATION
Anesthesia Evaluation    ROS/Med Hx   Unable to perform ROS.                    Genetic/Syndrome Findings   Comments: Unable to perform ROS      Additional Notes  Pt intubated           Physical Exam      Airway     Dental     Cardiovascular   Rhythm and rate: regular and normal      Pulmonary     Other findings: Intubated en situ      Anesthesia Plan      History & Physical Review      ASA Status:  3 emergent.    NPO Status:  > 8 hours    Plan for General with Other induction. Maintenance will be Inhalation.      Patient brought directly to OR intubated, sedated for procedure      Postoperative Care  Postoperative pain management:  Multi-modal analgesia.      Consents  Anesthetic plan, risks, benefits and alternatives discussed with:  Other (See Comment)..            Procedure: Procedure(s):  ENDOSCOPIC ULTRASOUND with coiling and glueing - Wound Class: II-Clean Contaminated    HPI: Yaneli Miles is a 43 year old male w PMH significant for chronic Hep B, significant for varices, encephalopathy, developed melena and hematemesis, was transferred to MICU for resuscitation.  Pt intubated, sedated, placed on massive transfusion protocol.  Remained intubated, brought to OR today for varice ligation under fluoroscopy.        PMHx/PSHx:  Past Medical History:   Diagnosis Date     Choledocholithiasis      Chronic viral hepatitis B without delta agent and without coma (H) 6/14/2018     Cirrhosis of liver with ascites (H) 6/14/2018     Esophageal varices (H)     prior GIB     Hepatic encephalopathy (H)      Portal vein thrombosis 6/14/2018 5/29/18 care everywhere US     SBP (spontaneous bacterial peritonitis) (H) 06/2018     Type 2 diabetes mellitus without complication, without long-term current use of insulin (H) 6/14/2018       Past Surgical History:   Procedure Laterality Date     ENDOSCOPIC RETROGRADE CHOLANGIOPANCREATOGRAM      with stone removal         No current facility-administered medications on file prior to  encounter.   No current outpatient prescriptions on file prior to encounter.    Social Hx:   Social History   Substance Use Topics     Smoking status: Never Smoker     Smokeless tobacco: Not on file     Alcohol use No       Allergies:   Allergies   Allergen Reactions     Nsaids      Contraindicated          NPO Status: Per ASA Guidelines    Labs:    Blood Bank:  Lab Results   Component Value Date    ABO B 06/21/2018    RH Pos 06/21/2018    AS Neg 06/21/2018     BMP:  Recent Labs   Lab Test  06/22/18 0411   NA  135   POTASSIUM  4.5   CHLORIDE  103   CO2  16*   BUN  46*   CR  2.01*   GLC  182*   JESUS  8.9     CBC:   Recent Labs   Lab Test  06/22/18 0411   WBC  3.7*   RBC  4.87   HGB  14.8   HCT  43.1   MCV  89   MCH  30.4   MCHC  34.3   RDW  15.7*   PLT  41*     Coags:  Recent Labs   Lab Test  06/22/18 0411 06/22/18 0239   INR  1.35*  1.47*   PTT   --   37   FIBR   --   199*       Talib Worrell MD  Staff Anesthesiologist  [phone]

## 2018-06-22 NOTE — PLAN OF CARE
Problem: Patient Care Overview  Goal: Plan of Care/Patient Progress Review  PT 4C: Cancel- Patient in OR for procedure this afternoon. Will reschedule.

## 2018-06-22 NOTE — OR NURSING
EGD with [place] Tube placement to traction done at pt bedside. Floor RN administered sedation and monitored pt VS.

## 2018-06-22 NOTE — SIGNIFICANT EVENT
Planning to proceed with emergent EGD as declared by Dr. Mcmillan and Dr. Lawrence (ICU and GI staff).     Daughters (english speaking) updated, however, no  available to discuss with wife.     Indira Orourke MD  Gastroenterology Fellow

## 2018-06-22 NOTE — BRIEF OP NOTE
[facility], [place]    Brief Operative Note    Pre-operative diagnosis: Bleeding  Post-operative diagnosis * Gastric cardia varices *  Procedure: Procedure(s):  ENDOSCOPIC ULTRASOUND with coiling and glueing - Wound Class: II-Clean Contaminated  Surgeon: Surgeon(s) and Role:     * Armando Kraft MD - Primary  Anesthesia: General   Estimated blood loss: Less than 50 ml  Drains: Nasogastric tube to LIS through right nares secured at 55 cm.  Specimens: * No specimens in log *  Findings:   Aspiration from gastric port of [place] tube prior to exam returned fresh blood and clot.  Esophageal and gastric balloons deflated.  films showed deflated balloons and no free air. [place] tube removed.  Endoscopy with two channel scope showed red blood and clot in stomach. Lavaged and no source of bleeding or stigmata seen.  No gastric varices or esophageal varices were endoscopically evident.  EUS performed. A few small 2-3 mm varices were seen in the proximal stomach within 2-3 cm of the GE junction along the left/greater curvature.  Needle puncture performed and varices injected with 1 cc 50/50 lipiodol + histoacryl mixture. Fluoroscopic visualization did not suggest embolization. The varices were too small caliber to place coils.  A total of four injections were performed.   Endoscopic exam then showed fullness in the cardia in the region of the injection without active bleeding.  The stomach was suctioned clear other than small amounts of food and no new bleeding was seen.  The major papilla was somewhat prominent, but not pathologic. The duodenum was otherwise normal.  No esophageal varices were seen.    Ascites was present.    An NG tube was placed through the right nostril and position confirmed fluoroscopically.    Complications: None.  Implants: None.    CHRISTOPHER Kraft MD  Associate Professor of Medicine  Division of Gastroenterology, Hepatology and Nutrition  [place]  [facility]

## 2018-06-22 NOTE — PROVIDER NOTIFICATION
"   06/22/18 0000   Call Information   Date of Call 06/21/18   Time of Call 2242   Name of person requesting the team Murray    Title of person requesting team RN   RRT Arrival time 2245   Time RRT ended 0045   Reason for call   Type of RRT Adult   Primary reason for call Cardiovascular;Nurse is concerned/worried   Cardiovascular SBP less than 90   Was patient transferred from the ED, ICU, or PACU within last 24 hours prior to RRT call? No   SBAR   Situation Pt had 600 cc bloody emesis, hypotensive. Had been having maroon stools all day. Hgb 5.8.    Background Chronic Viral Hepatitis B with cirrhosis, known portal vein thrombosis, TIIDM on insulin, esophageal varices with recent ERCP for choledocholithiasis who presents from OSH with alerted mental status and vomiting.   Notable History/Conditions Diabetes;End-Stage disease  (Cirrhosis - hep B)   Assessment Pt lethargic but responsive, hmong speaking but understands some english. Says he feels \"not good\". BP 70/40's, HR 80's, sat 98% on RA.    Interventions Fluid bolus;Other (describe);Meds  (1 unit PRBC, dopamine started)   Patient Outcome   Patient Outcome Transferred to  (4C)   RRT Team   Date Attending Physician notified 06/21/18   Time Attending Physician notified 2205   Physician(s) Raina Valdez RN Dorie Richter    Post RRT Intervention Assessment   Post RRT Assessment Transferred to ICU   Comments On arrival to ICU pt had another bloody emesis, about 1L. Started massive transfusion protocol and intubated for airway protection.      "

## 2018-06-22 NOTE — PROCEDURES
[place] Tube Placement    Gastric variceal bleeding identified on EGD (please see procedure note).   Proceeded with emergent placement of [place] tube.     After verifying gastric and esophageal balloons were without leak, the [place] tube was passed down the esophagus and into the stomach without difficulty.   The gastric balloon was inflated to 50 cc.   XR taken to verify location was in the stomach/below diaphragm.   Gastric balloon was then inflated to 500 cc.   The balloon was pulled back until there was resistance.   XR taken to verify location in gastric fundus and cardia/below diaphragm.  Helmet with string utilized to maintain traction on the [place] tube.     No immediate complications.     Elaine Lawrence MD  Gastroenterology - Luminal Service.

## 2018-06-22 NOTE — ANESTHESIA POSTPROCEDURE EVALUATION
Patient: Yaneli Miles    Procedure(s):  ENDOSCOPIC ULTRASOUND with coiling and glueing - Wound Class: II-Clean Contaminated    Diagnosis:Bleeding  Diagnosis Additional Information: No value filed.    Anesthesia Type:  General    Note:  Anesthesia Post Evaluation    Patient location during evaluation: ICU  Patient participation: Unable to evaluate secondary to administered sedation  Cardiovascular status: acceptable  Respiratory status: acceptable  Hydration status: acceptable  PONV: none       Comments: Patient returned to MICU on propofol gtt, vital signs stable        Last vitals:  Vitals:    06/22/18 1145 06/22/18 1200 06/22/18 1215   BP: 120/84 114/82 119/86   Resp:      Temp:      SpO2: 99% 99% 99%         Electronically Signed By: Talib Worrell MD  June 22, 2018  2:16 PM

## 2018-06-22 NOTE — PROGRESS NOTES
Assisted with endotracheal intubation for respiratory failure/airway protection. A 8 ETT was placed and secured at 24 cm @ lip. Positive color change noted with CO2 detector, breath sounds were present bilaterally. Patient placed on full vent support with vent setting CMV 20,400,5,45%, labs and CXR pending.    RT will monitor and assess as needed.   RT Jess  6/22/2018 12:03 AM

## 2018-06-22 NOTE — PROGRESS NOTES
Critical Care Services Progress Note:  I personally examined and evaluated the patient today. I formulated today s plan with the house staff team or resident(s) and agree with the findings and plan in the associated note (see separately attested resident note).   I have evaluated all laboratory values and imaging studies from the past 24 hours.  Summary of hospital course:  43M with hep B cirrhosis initially presented with hepatic encephalopathy in the setting of sbp.  Improved from this and transferred out of MICU but now returned to ICU with large variceal bleed.  S/p massive transfusion protocol and MN tube placement.  Overnight events/pertinent findings today:  See above.  S/p egd by GI showing large variceal bleed.  MN tube now in place.  Data  bp stable off pressors, satting well on 45%/5 mech vent, afebrile  Labs (personally reviewed):  Lytes ok, creat 2.01 improving, Tbili 19.7 improved, lactate down to 1.9, hgb 14.8 ok, pltlts 41 (xfusing). INR ok 1.35. vbg with mixed mild metab acidosis and resp alkalosis: 7.43/27/95/18  CXR (personally reviewed):  ETT too deep (pulling back), MN tube in place.  Lungs ok.  Assessment/plan:  1.  Massive GI bleed:  Stable with MN tube in place. Cordis access.  pltlts for 50, ffp for inr better than 1.8.  Octreotide/protonix.  Ceftriaxone.  Will need definitive management today (TIPS vs coiling).  2.  Loss of protective airway reflexes:  3.  Cirrhosis: continue lactulose/rifaximin once appropriate, appreciate hepatology input.  4.  BEN: creatinine improved but UOP unclear. Monitor closely.  5.  Enterococcus UTI:  Ampicillin x10d  Rest per resident note.   I spent a total of 45 minutes (excluding procedure time) personally providing and directing critical care services at the bedside and on the critical care unit for this patient (in addition to time spent by Dr. Mcmillan earlier in the morning).     Tyrese Ragsdale

## 2018-06-22 NOTE — PLAN OF CARE
Problem: Patient Care Overview  Goal: Plan of Care/Patient Progress Review  Outcome: No Change  BP 89/54; recheck WNL but keep watching, OVSS afebrile. Sepsis protocol triggered; lactic acid 3.2, MD paged, albumin ordered, Lactic re-check 2.4, 500 ml NS bolus infusing. 7 BM today (4 this shift--at least two of which were incontinence); BM noted to be 'grape jelly' like; MD paged that stool looks to contain blood, Hgb ordered. Paracentesis on days; significant bruising at site w/ serous drainage, abd applied and changed x1 this shift d/t saturation. Pt has been using yankauer to suction mouth; cannister noted to contain 650mL of output--per pt he has been using yankauer for at least a day; canister switched--continue to monitor output.; Pain at abdomen; heat applied, tylenol given x1. BG monitored; 158 at Dinner but didn't cover as pt was not eating; BG at , 2 units aspart given . Pt eats food brought from home by wife; educated pt and wife to inform nursing staff when eating so we can get BG; they agreed however they did not call--continue to monitor.

## 2018-06-22 NOTE — PROGRESS NOTES
Nephrology Progress Note  06/22/2018       This is a case of BEN in a 43 year old male with HBV cirrhosis c/b esophageal varices and HE, who presented to an OSH with AMS and found to have hyperammonemia.      Assessment & Recommendations:      1. BEN - Creat has declined to 2.0 today in setting of massive GIB requiring implementation of massive transfusion protocol. His creat is likely reflective of the transfused blood/hemodilution. He had been non oliguric, but no UO recorded despite insertion of bah when transferred to ICU.   Peak creat 3.67 on 6/18. Admission creat was 3.0. Baseline 0.8-0.9 May 2018. Tbili 19.7.   Etiology felt to be multifactorial: intravascular volume depletion, cholemic nephropathy, HRS, ATN from sepsis. He has likely sustained new injury over last 24 hrs with GIB, Hgb 5.8, hypotension with b/p's in the 50's/.    - Will have a better idea tomorrow how his kidneys are handling the new insults   - Continue to monitor for ongoing renal recovery with daily chemistry labs, I/O and daily standing weights   - Limit paracentesis volume to < 4 liters   - Renal dose medications and avoid hypotension/nephrotoxins     2. Volume status - Difficult to assess today. He has been intubated and given ~ 22 U RBCs over last 24 hrs in addition to platelets and plasma. He has trace edema. Had normal stress echo 6/21/18. -150/ off Dopamine. Weight 68.1 kg on 6/21/18. Admission weight 70.7 kg. Has bah, but no UO recorded.     - Continue strict I/O   - Daily standing weights     3. Electrolytes - No acute concerns. K 4.5, Na 135.     4. Acid base - Bicarb improved from 12 to 16 today.    - Continue bicarb 650 mg po bid     5. BMD - Ca 8.9, albumin 3.4. Phos 5.0  On 6/19/18.    - No interventions required at this time     6. Sepsis, Enterococcus UTI - Remains on Ampicillin and Rocephin. WBC 2.6, Afebrile     7. Hep B cirrhosis, decompensated 2/2 sepsis - Being w/u for liver transplant. Baseline bili 2-4  "range. Currently running in the 20's. Had Echo 6/21. Holding Lactulose/Rifax. Remains on Tenofovir.      8. Nutrition - NPO    9. Anemia - Esophageal varice with massive GIB overnight. Hgb 14.8 today from 5.8 yesterday. Appears to have received ~ 22 U RBCs. On Octreotide/Protonix gtt. Had MN tube inserted per EGD last evening.         Recommendations were communicated to primary team via progress note     Seen and discussed with Dr. Qing Orta, NP   [phone]     Interval History :     Patient developed massive bleed from esophageal varice last night with approx 2 liters of hematemesis, hypotension with blood pressures in the 50's/, intubation, EGD with insertion of MN tube and massive transfusion protocol. He required Dopamine for several hours and is now off.   Creat declined, but likely diluted 2/2 massive transfusions received over last 12 hrs  Luis present and no UO is recorded at this time  Interval progress notes, imaging studies and labs reviewed     Review of Systems:   Unable given sedation    Physical Exam:   I/O last 3 completed shifts:  In: 8321.71 [I.V.:155.71]  Out: 975 [Emesis/NG output:650; Other:325]   /76  Temp 97.8  F (36.6  C) (Axillary)  Resp 16  Ht 1.676 m (5' 6\")  Wt 68.1 kg (150 lb 3.2 oz)  SpO2 99%  BMI 24.24 kg/m2     GENERAL APPEARANCE:  Sedated  PULM: Intubated.    CV: RRR       -edema - none  GI: Firm, Distended, MN tube present w/bloody ouptut  INTEGUMENT: no cyanosis or rash  NEURO:  sedated  : Luis present    Labs:   All labs reviewed by me  Electrolytes/Renal -   Recent Labs   Lab Test  06/22/18   0411  06/22/18   0239  06/22/18   0134   06/21/18   0612  06/20/18   0608  06/19/18   0405   06/18/18   0440   06/17/18   0410   NA  135   --    --    --   131*  132*  138   < >  140   < >  137   POTASSIUM  4.5  4.6  4.4   < >  4.2  4.6  3.6   < >  4.2   < >  4.2   CHLORIDE  103   --    --    --   106  106  111*   < >  114*   < >  107   CO2  16*   --    " --    --   12*  13*  13*   < >  11*   < >  11*   BUN  46*   --    --    --   43*  44*  50*   < >  55*   < >  52*   CR  2.01*   --    --    --   2.49*  2.71*  3.15*   < >  3.69*   < >  3.26*   GLC  182*   --    --    --   103*  142*  145*   < >  131*   < >  130*   JESUS  8.9   --    --    --   7.9*  8.5  8.5   < >  8.6   < >  8.0*   MAG   --    --    --    --    --    --   2.2   --   2.5*   --   2.5*   PHOS   --    --    --    --    --    --   5.0*   --   4.5   --   5.0*    < > = values in this interval not displayed.       CBC -   Recent Labs   Lab Test  06/22/18   0411  06/22/18   0239  06/22/18   0134   WBC  3.7*  3.2*  3.9*   HGB  14.8  11.0*  9.1*   PLT  41*  61*  52*       LFTs -   Recent Labs   Lab Test  06/22/18   0411  06/21/18   0612  06/20/18   0608   ALKPHOS  78  78  84   BILITOTAL  19.7*  23.4*  26.1*   ALT  53  79*  99*   AST  86*  124*  144*   PROTTOTAL  6.5*  5.1*  5.9*   ALBUMIN  3.4  2.9*  3.4       Iron Panel -   Recent Labs   Lab Test  06/17/18   2138   IRON  66   IRONSAT  109*         Imaging:  CXR pending    Current Medications:    ampicillin  2 g Intravenous Q8H     atrpopine  2 mg Intravenous Once     cefTRIAXone  2 g Intravenous Q24H     insulin aspart  1-7 Units Subcutaneous 4x Daily w/meals     lidocaine (PF)  10 mL Subcutaneous Once     pantoprazole (PROTONIX) IV  80 mg Intravenous Daily with breakfast     sodium bicarbonate  650 mg Oral BID     tenofovir  300 mg Oral or Feeding Tube Q72H       - MEDICATION INSTRUCTIONS -       fentaNYL 50 mcg/hr (06/22/18 1100)     midazolam 4 mg/hr (06/22/18 1100)     octreotide (sandoSTATIN) infusion ADULT 50 mcg/hr (06/22/18 1100)     pantoprazole (PROTONIX) infusion ADULT/PEDS GREATER than or EQUAL to 45 kg 8 mg/hr (06/22/18 1100)     Teresa Orta, NP

## 2018-06-22 NOTE — PROCEDURES
Procedure/Surgery Information   [facility], [place]    Bedside Procedure Note  Date of Service (when I performed the procedure): 06/22/2018    Yaneli Miles is a 43 year old male patient.  1. Encephalopathy    2. Cirrhosis of liver with ascites, unspecified hepatic cirrhosis type (H)    3. Hepatic encephalopathy (H)    4. Enterococcus UTI      Past Medical History:   Diagnosis Date     Choledocholithiasis      Chronic viral hepatitis B without delta agent and without coma (H) 6/14/2018     Cirrhosis of liver with ascites (H) 6/14/2018     Esophageal varices (H)     prior GIB     Hepatic encephalopathy (H)      Portal vein thrombosis 6/14/2018 5/29/18 care everywhere US     SBP (spontaneous bacterial peritonitis) (H) 06/2018     Type 2 diabetes mellitus without complication, without long-term current use of insulin (H) 6/14/2018     Temp: 97.7  F (36.5  C) Temp src: Tympanic BP: (!) 133/94   Heart Rate: 72 Resp: 20 SpO2: 100 % O2 Device: Mechanical Ventilator      Central line  Date/Time: 6/22/2018 7:05 AM  Performed by: ALEX ROBERSON  Authorized by: ALEX ROBERSON   Consent: The procedure was performed in an emergent situation.  Risks and benefits: risks, benefits and alternatives were discussed  Consent given by: patient  Required items: required blood products, implants, devices, and special equipment available  Patient identity confirmed: arm band  Indications: vascular access    Sedation:  Patient sedated: yes  Preparation: skin prepped with chlorhexidine  Location details: right subclavian  Catheter type: single lumen  Ultrasound guidance: yes  Number of attempts: 1  Successful placement: yes  Post-procedure: line sutured  Assessment: blood return through all parts  Patient tolerance: Patient tolerated the procedure well with no immediate complications        Error above, right internal jugular vein is the correct location     Alex Roberson

## 2018-06-22 NOTE — PLAN OF CARE
Problem: Patient Care Overview  Goal: Plan of Care/Patient Progress Review  OT:  Pt not appropriate for therapy today, will reschedule.

## 2018-06-22 NOTE — ANESTHESIA PROCEDURE NOTES
ANESTHESIOLOGY RESIDENT/CRNA INTUBATION NOTE  Indication for intubation: altered level of consciousness, airway protection.  Provider Ordering Intubation: YOHAN PARK  History regarding the most recent potassium obtained: Yes  History regarding renal failure obtained: Yes  History of presence or absence of CVA/stroke was obtained: Yes  History of presence or absence of NM disorder obtained: Yes  Post Intubation: ETT secured, Vent settings by primary/ICU team, Primary/ICU team to review CXR, Report given to primary nurse and/or team, No apparent complications and Sedation to be ordered by primary/ICU team

## 2018-06-22 NOTE — H&P
"              MICU HPI  Yaneli Miles ([id number]) admitted on 6/16/2018  Primary care provider: No Ref-Primary, Physician           Reason for Transfer to MICU:     Large volume, bright red blood hematemesis         History of Present Illness     Yaneli Miles is a 43 year old male with h/o chronic Hep B cirrhosis c/b esophageal varices, ascites, and hepatic encephalopathy who developed melena and profuse bright red hematemesis (>500cc) on the floor for which he was transferred to the MICU on 6/21/18 for resuscitation. In the MICU pt produced >2L bright red hematemesis for which a right IJ cordis was placed and massive transfusion protocol was started. GI performed endoscopy and performed a \"[place] Tube\" to tamponade the bleed. Pt remains in critical condition.         Past Medical History     Patient Active Problem List   Diagnosis     Chronic viral hepatitis B without delta agent and without coma (H)     Portal vein thrombosis     Cirrhosis of liver with ascites (H)     Type 2 diabetes mellitus without complication, without long-term current use of insulin (H)     Encephalopathy           Past Surgical History     Past Surgical History:   Procedure Laterality Date     ENDOSCOPIC RETROGRADE CHOLANGIOPANCREATOGRAM      with stone removal            Medications       No current facility-administered medications on file prior to encounter.   No current outpatient prescriptions on file prior to encounter.         Allergies     Allergies   Allergen Reactions     Nsaids      Contraindicated             Social History     Social History     Social History     Marital status: Single     Spouse name: N/A     Number of children: N/A     Years of education: N/A     Occupational History     Not on file.     Social History Main Topics     Smoking status: Never Smoker     Smokeless tobacco: Not on file     Alcohol use No     Drug use: No     Sexual activity: Not on file     Other Topics Concern     Not on file     Social History " Narrative            Family History     No family history on file.         Review of Systems     10 Point ROS negative unless mentioned in HPI        OBJECTIVE     Temp:  [96.4  F (35.8  C)-98.5  F (36.9  C)] 97.7  F (36.5  C)  Heart Rate:  [] 72  Resp:  [18-20] 20  BP: ()/() 133/94  FiO2 (%):  [45 %] 45 %  SpO2:  [97 %-100 %] 100 %    Ventilation Mode: CMV/AC  (Continuous Mandatory Ventilation/ Assist Control)  FiO2 (%): 45 %  Rate Set (breaths/minute): 20 breaths/min  Tidal Volume Set (mL): 400 mL  PEEP (cm H2O): 5 cmH2O  Oxygen Concentration (%): 45 %  Resp: 20    Physical Exam  GEN: critically ill, intubated     NEURO: sedated   HEENT: NCAT, no scleral icteris, no cervical LAD  LUNG: CTAB  CV: RRR  ABD: Soft, protuberant, NT  EXT: wwp  SKIN: No rash, jaundices  Peripheral IV 06/20/18 Left;Posterior Upper forearm (Active)   Site Assessment [facility] 6/22/2018  4:00 AM   Line Status Infusing 6/22/2018  4:00 AM   Phlebitis Scale 0-->no symptoms 6/22/2018  4:00 AM   Infiltration Scale 0 6/22/2018  4:00 AM   Extravasation? No 6/22/2018  4:00 AM   Number of days:2       Peripheral IV 06/21/18 Left;Anterior Lower forearm (Active)   Site Assessment [facility] 6/22/2018  4:00 AM   Line Status Infusing 6/22/2018  4:00 AM   Phlebitis Scale 0-->no symptoms 6/22/2018  4:00 AM   Infiltration Scale 0 6/22/2018  4:00 AM   Extravasation? No 6/22/2018  4:00 AM   Number of days:1       Peripheral IV 06/21/18 Right;Lateral Upper forearm (Active)   Site Assessment [facility] 6/22/2018  4:00 AM   Line Status Infusing 6/22/2018  4:00 AM   Phlebitis Scale 0-->no symptoms 6/22/2018  4:00 AM   Infiltration Scale 0 6/22/2018  4:00 AM   Extravasation? No 6/22/2018  4:00 AM   Number of days:1       ETT (adult) (Active)   Number of days:0       ETT (adult) 8 (Active)   Secured By [employer] tube [employer] 6/22/2018 12:00 AM   Site Appearance Clean and dry 6/22/2018 12:00 AM   Secured at (cm) to lip 24 cm 6/22/2018 12:00 AM   Bite Block Secure and  Patent 6/22/2018 12:00 AM   Safety Measures manual resuscitator at bedside 6/22/2018 12:00 AM   Number of days:1       Incision/Surgical Site 06/21/18 Right Abdomen (Active)   Incision Assessment WDL except;Drainage 6/21/2018 11:00 PM   Incision Drainage Amount Large 6/21/2018 11:00 PM   Drainage Description Serous 6/21/2018 11:00 PM   Incision Care Wound cleanser 6/21/2018  4:00 PM   Dressing Intervention New dressing applied 6/21/2018 11:00 PM   Number of days:1     I/O last 3 completed shifts:  In: 8166   Out: 975 [Emesis/NG output:650; Other:325]    Vitals:    06/19/18 0000 06/20/18 0832 06/21/18 0900   Weight: 50.8 kg (111 lb 14.4 oz) 67 kg (147 lb 11.3 oz) 68.1 kg (150 lb 3.2 oz)       ABG / VBG: Recent Labs   Lab Test  06/17/18   0845  06/16/18   1830   PH  7.34*   --    PCO2  21*   --    PO2  144*   --    O2PER  30.0  21.0   PHV   --   7.38   PCO2V   --   23*       BMP: Recent Labs   Lab Test  06/22/18   0411  06/22/18   0400  06/22/18   0239  06/22/18   0134  06/22/18   0004  06/21/18   2345  06/21/18   1936   06/21/18   0612  06/20/18   0608  06/19/18   0405   06/18/18   0440   NA  135   --    --    --    --    --    --    --   131*  132*  138   < >  140   POTASSIUM  4.5   --   4.6  4.4  4.3   --    --    --   4.2  4.6  3.6   < >  4.2   CHLORIDE  103   --    --    --    --    --    --    --   106  106  111*   < >  114*   CO2  16*   --    --    --    --    --    --    --   12*  13*  13*   < >  11*   ANIONGAP  15*   --    --    --    --    --    --    --   13  13  14   < >  15*   LACT   --   1.9   --    --    --   2.2*  2.4*   < >   --    --    --    --    --    BUN  46*   --    --    --    --    --    --    --   43*  44*  50*   < >  55*   CR  2.01*   --    --    --    --    --    --    --   2.49*  2.71*  3.15*   < >  3.69*   GLC  182*   --    --    --    --    --    --    --   103*  142*  145*   < >  131*   JESUS  8.9   --    --    --    --    --    --    --   7.9*  8.5  8.5   < >  8.6   MAG   --    --    --     --    --    --    --    --    --    --   2.2   --   2.5*   PHOS   --    --    --    --    --    --    --    --    --    --   5.0*   --   4.5    < > = values in this interval not displayed.       Hepatic Studies: Recent Labs   Lab Test  06/22/18   0411  06/21/18   0612  06/20/18   0608   AST  86*  124*  144*   ALT  53  79*  99*   ALKPHOS  78  78  84   BILITOTAL  19.7*  23.4*  26.1*   ALBUMIN  3.4  2.9*  3.4       Heme Studies: Recent Labs   Lab Test  06/22/18   0411  06/22/18   0239  06/22/18   0134   06/21/18   0612   WBC  3.7*  3.2*  3.9*   < >  2.6*   ANEU  2.8   --    --    --   1.4*   ALYM  0.3*   --    --    --   0.4*   AEOS  0.1   --    --    --   0.1   HGB  14.8  11.0*  9.1*   < >  7.8*   MCV  89  88  90   < >  90   PLT  41*  61*  52*   < >  41*   INR  1.35*  1.47*  1.58*   < >   --     < > = values in this interval not displayed.       Iron Studies: Recent Labs   Lab Test  06/17/18   2138   IRON  66   FEB  61*   IRONSAT  109*       Urine Studies: Recent Labs   Lab Test  06/18/18   1200  06/16/18   2220   SG  1.008  1.011   URINEPH  5.0  5.5   NITRITE  Negative  Negative   LEUKEST  Negative  Large*   WBCU  2  66*   RBCU  1  16*   PROTEIN  Negative  30*       Microbiology:  No results found for: RS, FLUAG    Recent Labs   Lab Test  06/21/18   1936  06/21/18   1744  06/21/18   1500  06/18/18   0442  06/18/18   0431  06/17/18   1530  06/17/18   0809  06/17/18   0605   CULT  PENDING  PENDING   --   No growth after 3 days  No growth after 3 days  Culture negative monitoring continues  No growth after 4 days  No growth after 4 days  Single colony  Citrobacter freundii complex  Identification obtained by MALDI-TOF mass spectrometry research use only database. Test   characteristics determined and verified by the [facility]   ([facility]) [place], [place].  *  Light growth  Candida albicans / dubliniensis  Candida albicans and Candida dubliniensis are not routinely  speciated  Susceptibility testing not routinely done  *   SDES  Blood Right Arm  Blood Right Arm  Ascites  Blood Left Hand  Blood Right Arm  Ascites  Blood Left Hand  Blood RT ARM  Sputum  Sputum       Imaging:  Recent Results (from the past 24 hour(s))   XR Chest Port 1 View    Narrative    1. Endotracheal tube tip projects at the level of the tano.  Recommend retraction.  2. Increased small left pleural effusion and adjacent basilar  atelectasis and/or consolidation.    [Result: Endotracheal tube positioning]    Finding was identified on 6/22/2018 1:31 AM.     MICU team was contacted by Dr. Kenny at 6/22/2018 1:33 AM and  verbalized understanding of the urgent finding.    XR Abdomen Port 1 View    Narrative    Exam:  XR ABDOMEN PORT 1 VW, 6/22/2018 1:30 AM    History: WORSENED ABDOMINAL PAIN;     Comparison:  None available.    Findings:  Left lateral decubitus abdominal radiograph. Few  nonspecific air-fluid levels. No pneumoperitoneum. Paucity of bowel  gas.      Impression    Impression:    1. No free air.  2. Nonspecific bowel gas pattern.     I have personally reviewed the examination and initial interpretation  and I agree with the findings.    NICANOR MARIN MD   XR Abdomen Port 1 View    Narrative    Exam:  XR ABDOMEN PORT 1 VW, 6/22/2018 3:21 AM    History: [place] tube placement;     Comparison:  Abdominal radiograph 6/22/2018, 6/16/2018.    Findings:  Single supine radiograph of the abdomen. [place] tube tip  projects near the expected location of the gastric antrum. No  abnormally dilated bowel. Paucity of small bowel gas. Left greater  than right basilar opacities in the lung bases.      Impression    Impression:   [place] tube tip projects near the expected location of the gastric  antrum.    I have personally reviewed the examination and initial interpretation  and I agree with the findings.    NICANOR MARIN MD   XR Abdomen Port 1 View    Narrative    Exam:  XR ABDOMEN PORT 1 VW,  6/22/2018 3:26 AM    History: [place] Tube;     Comparison:  Abdominal radiograph 0237 hours on 6/22/2018.    Findings:  Portable supine radiograph of the abdomen. [place] tube  tip projects near the gastric antrum with balloon inflated just below  the GE junction. No abnormally dilated bowel. No pneumatosis or portal  venous gas. Left greater than right basilar pulmonary opacities.      Impression    Impression:    [place] tube tip projects near the gastric antrum with balloon  inflated just below the GE junction.    I have personally reviewed the examination and initial interpretation  and I agree with the findings.    NICANOR MARIN MD                   ASSESSMENT & PLAN     Yaneli Miles is a 43 year old male with h/o chronic Hep B cirrhosis c/b esophageal varices, ascites, and hepatic encephalopathy transferred to MICU 6/21/18 for massive UGIB requiring intubation, MTP, and [place] Tub. Pt remains critically ill.    LINES  Peripheral IV 06/20/18 Left;Posterior Upper forearm (Active)   Site Assessment [facility] 6/22/2018  4:00 AM   Line Status Infusing 6/22/2018  4:00 AM   Phlebitis Scale 0-->no symptoms 6/22/2018  4:00 AM   Infiltration Scale 0 6/22/2018  4:00 AM   Extravasation? No 6/22/2018  4:00 AM   Number of days:2       Peripheral IV 06/21/18 Left;Anterior Lower forearm (Active)   Site Assessment [facility] 6/22/2018  4:00 AM   Line Status Infusing 6/22/2018  4:00 AM   Phlebitis Scale 0-->no symptoms 6/22/2018  4:00 AM   Infiltration Scale 0 6/22/2018  4:00 AM   Extravasation? No 6/22/2018  4:00 AM   Number of days:1       Peripheral IV 06/21/18 Right;Lateral Upper forearm (Active)   Site Assessment [facility] 6/22/2018  4:00 AM   Line Status Infusing 6/22/2018  4:00 AM   Phlebitis Scale 0-->no symptoms 6/22/2018  4:00 AM   Infiltration Scale 0 6/22/2018  4:00 AM   Extravasation? No 6/22/2018  4:00 AM   Number of days:1       ETT (adult) (Active)   Number of days:0       ETT (adult) 8 (Active)   Secured By [employer] tube [employer]  6/22/2018 12:00 AM   Site Appearance Clean and dry 6/22/2018 12:00 AM   Secured at (cm) to lip 24 cm 6/22/2018 12:00 AM   Bite Block Secure and Patent 6/22/2018 12:00 AM   Safety Measures manual resuscitator at bedside 6/22/2018 12:00 AM   Number of days:1       Incision/Surgical Site 06/21/18 Right Abdomen (Active)   Incision Assessment WDL except;Drainage 6/21/2018 11:00 PM   Incision Drainage Amount Large 6/21/2018 11:00 PM   Drainage Description Serous 6/21/2018 11:00 PM   Incision Care Wound cleanser 6/21/2018  4:00 PM   Dressing Intervention New dressing applied 6/21/2018 11:00 PM   Number of days:1     ===NEURO===  Sedation/Analgesia: Pt was agitated & moving during EGD and required higher levels of sedation so the following was ordered and can be weaned when appropriate:  - fentanyl gtt  - versed gtt  - propofol gtt    Hepatic Encephalopthy  - hold PTA PO rifaximin & PO lactulose  - pt will likely need lactulose enemas given high intraluminal blood burden which will get broken down to ammonia      ===CARDIOVASCULAR / GI / HEME===  BP  Min: 58/33  Max: 161/103   Recent Labs   Lab Test  06/22/18   0400  06/21/18   2345  06/21/18   1936   LACT  1.9  2.2*  2.4*      Hemorrhagic Shock 2/2 Variceal Bleed 6/22/18  Chronic Hep B Cirrhosis   - s/p massive transfusion protocol 6/21/18-6/22/18  - right IJ cordis & [place] Tube by GI on 6/22/18  - PPI gtt, octreotide gtt, transexamic acid gtt  - CBC q6h, fibrinogen/INR q12h  - conditional transfusion orders: hgb >7, plt >50, INR <2, fibrinogen >100  - continue PTA tenofovir    Rate/Rhythm  No Data Recorded   EKG: sinus rhythm; QTc: 493 (6/17/18)       ===RESPIRATORY===  Ventilation Mode: CMV/AC  (Continuous Mandatory Ventilation/ Assist Control)  FiO2 (%): 45 %  Rate Set (breaths/minute): 20 breaths/min  Tidal Volume Set (mL): 400 mL  PEEP (cm H2O): 5 cmH2O  Oxygen Concentration (%): 45 %  Resp: 20    SpO2: 100 %   O2 Device: Mechanical Ventilator   Oxygen Delivery: 3  LPM   Recent Labs   Lab Test  06/22/18 0411 06/21/18   0612 06/17/18   0845   06/16/18   1830   PH   --    --    --   7.34*   --    --    PCO2   --    --    --   21*   --    --    PO2   --    --    --   144*   --    --    O2PER   --    --    --   30.0   --   21.0   PHV   --    --    --    --    --   7.38   PCO2V   --    --    --    --    --   23*   CO2  16*  12*   < >   --    < >   --     < > = values in this interval not displayed.      - intubated for airway protection, VBG pending    ===RENAL / ELECTROLYTES===  Creatine at Baseline: 2-3   Recent Labs   Lab Test  06/22/18 0411 06/21/18 0612 06/20/18   0608  06/19/18   0405   BUN  46*  43*  44*  50*   CR  2.01*  2.49*  2.71*  3.15*      I/O last 3 completed shifts:  In: 8166   Out: 975 [Emesis/NG output:650; Other:325]   Vitals:    06/19/18 0000 06/20/18 0832 06/21/18 0900   Weight: 50.8 kg (111 lb 14.4 oz) 67 kg (147 lb 11.3 oz) 68.1 kg (150 lb 3.2 oz)      - fluid resuscitated with massive transfusion protocol  - continue to monitor     Acid Base / Electrolytes  Recent Labs   Lab Test  06/22/18 0411 06/22/18   0400  06/21/18   2345   06/21/18 0612 06/17/18   0845   06/16/18   1830   PH   --    --    --    --    --    --   7.34*   --    --    PCO2   --    --    --    --    --    --   21*   --    --    PHV   --    --    --    --    --    --    --    --   7.38   PCO2V   --    --    --    --    --    --    --    --   23*   CO2  16*   --    --    --   12*   < >   --    < >   --    ANIONGAP  15*   --    --    --   13   < >   --    < >   --    LACT   --   1.9  2.2*   < >   --    --    --    --   2.3*    < > = values in this interval not displayed.      Recent Labs   Lab Test  06/22/18   0411  06/22/18   0239   06/21/18   0612   06/19/18   0405   06/18/18   0440   NA  135   --    --   131*   < >  138   < >  140   POTASSIUM  4.5  4.6   < >  4.2   < >  3.6   < >  4.2   MAG   --    --    --    --    --   2.2   --   2.5*   JESUS  8.9   --    --   7.9*   < >   8.5   < >  8.6   PHOS   --    --    --    --    --   5.0*   --   4.5    < > = values in this interval not displayed.      Combined Anion Gap / Non-Anion Gap Metabolic Acidosis  Due to shock and recent stool output  - improving, continue to monitor    ===INFECTIOUS DISEASE===     Temp (24hrs), Av.3  F (36.3  C), Min:96.4  F (35.8  C), Max:98.5  F (36.9  C)     Recent Labs   Lab Test  18   0411  18   0239  18   0134   18   0612   WBC  3.7*  3.2*  3.9*   < >  2.6*   ANEU  2.8   --    --    --   1.4*   ALYM  0.3*   --    --    --   0.4*   AEOS  0.1   --    --    --   0.1    < > = values in this interval not displayed.      Recent Labs   Lab Test  18   1936  18   1744  18   1500  18   0442  18   0431   CULT  PENDING  PENDING   --   No growth after 3 days  No growth after 3 days   SDES  Blood Right Arm  Blood Right Arm  Ascites  Blood Left Hand  Blood Right Arm      - ceftriaxone for citrobacter PNA & SBP, now continued for UGIB PPx (-current)   - ampicillin for UTI (-current)    ===ENDOCRINE===    Recent Labs  Lab 18  0411 18  0612 18  0608 18  0405 18  1555  18  2138   * 103* 142* 145* 142*  < >  --    TSH  --   --   --   --   --   --  0.77  0.77   T4  --   --   --   --   --   --  Unsatisfactory specimen - icteric   < > = values in this interval not displayed.   T2DM - SSI      GI PPx: PPI gtt  DVT PPx: none in the setting of UGIB  CODE: full    Patient seen and discussed with staff attending, Dr. Mcmillan.      Rik Roberson MD  Internal Medicine, PGY-2       Attending note:  Patient seen, examined and discussed with the Resident physician. All data reviewed including vital signs, labs, and medications. The patient remains criticallly ill due to a massive upper GI bleed, acute respiratory failure, enterococcus bacteremia. Emergently transferred to ICU due to ongoing UGIB; intubated on arrival to ICU for airway  protection, central access placed, and started on massive transfusion protocol.  GI performed EGD with presence of bleeding gastric varix, [place] tube placed.  Needs aggressive blood product resuscitation. Continue full vent support.    Total Critical Care time excluding procedures in 70 minutes.    Carmen Mcmillan MD  [phone]

## 2018-06-22 NOTE — PROGRESS NOTES
Patient discussed by the following multi-disciplinary (hepaotlogy,transplant surgery, cardiology) team,okay to list.     Current MELD score 33  ABO: B  ICU with GIB

## 2018-06-22 NOTE — PROGRESS NOTES
GASTROENTEROLOGY PROGRESS NOTE      Date of Admission:  6/16/2018  Date of Service:   6/22/2018          ASSESSMENT AND RECOMMENDATIONS:     Assessment:  Yaneli Miles is a 43 year old male with HBV cirrhosis c/b EV with h/o variceal hemorrhage 2/2017 and 4/2018, ascites requiring twice weekly paracentesis, and HE on lactulose and rifaxamin who was admitted with HE and increasing renal failure, likely precipitated by infection (SBP and UTI).      GI Bleed:  Developed bleeding from gastric varices, with emergent EGD overnight and stabilization with [place] tube.  Underwent EGD and EUS today, showing resolution of bleeding.  No obvious source of bleeding identified, but small gastric varices were seen on EUS and treated with intravascular glue.  If he re-bleeds, plan on repeat EGD.  - Monitor closely for signs of rebleeding based on vitals or hgb trend  - Trend hgb and transfuse as needed for hgb < 7  - Continue octreotide ggt for 48-72 hours  - Timing of extubation per MICU team  - Can be on CLD post-extubation and can advance diet as tolerated if no bleeding for 24 hours     BEN: Renal failure likely due to pyelonephritis as well as hypoperfusion in the setting of fluid shifts and infection.  Renal failure had been improving.  Some concern for post-ATN diuresis, please monitor closely.  - Appreciate nephrology involvement  - Continue antibiotics    HBV Cirrhosis: Completed transplant evaluation, and he has been listed for transplant.  - MELD-Na 30  - Continue scheduled lactulose with a stool goal of 3-4 BMs per day or 1-2L equivalent  - Continue rifaximin 550mg po bid  - Continue home tenofovir, renally dosed    Gastroenterology follow up recommendations: Will continue to follow    Thank you for involving us in this patient's care. Please do not hesitate to contact the GI service with any questions or concerns.     Pt care plan discussed with Dr. Arriaga, GI staff physician.    Daniella Back MD  GI  "Fellow  [phone]    The patient was seen and examined.  The above assessment and plan was developed jointly with the fellow.      Michael Arriaga MD    -------------------------------------------------------------------------------------------------------------------         Interval Events:     Variceal bleeding overnight, requiring [place] tube for hemostasis.  Intubated and stabilized in MICU.  Underwent EUS today, showing resolution of bleeding and treating gastric varices with glue.  Vitals stable, hgb responded appropriately to transfusion.         Physical Exam:   /71  Temp 96.4  F (35.8  C) (Axillary)  Resp 16  Ht 1.676 m (5' 6\")  Wt 68.1 kg (150 lb 3.2 oz)  SpO2 97%  BMI 24.24 kg/m2  Wt:   Wt Readings from Last 2 Encounters:   06/21/18 68.1 kg (150 lb 3.2 oz)      Constitutional: Intubated and sedated  Eyes: Sclera icteric  Ears/nose/mouth/throat: Mucus membranes moist  CV: RRR  Respiratory: Unlabored breathing  Abd: Soft, mildly distended, no peritoneal signs  Skin: Warm, perfused, jaundiced  Neuro: Sedated  MSK: No gross deformities           Data:   All available labs, imaging, and procedure notes were independently reviewed and interpreted, pertinent values are as follow:    BMP  Recent Labs  Lab 06/22/18  0411 06/22/18  0239 06/22/18  0134 06/22/18  0004 06/21/18  0612 06/20/18  0608 06/19/18  0405     --   --   --  131* 132* 138   POTASSIUM 4.5 4.6 4.4 4.3 4.2 4.6 3.6   CHLORIDE 103  --   --   --  106 106 111*   JESUS 8.9  --   --   --  7.9* 8.5 8.5   CO2 16*  --   --   --  12* 13* 13*   BUN 46*  --   --   --  43* 44* 50*   CR 2.01*  --   --   --  2.49* 2.71* 3.15*   *  --   --   --  103* 142* 145*     CBC  Recent Labs  Lab 06/22/18  1625 06/22/18  1315 06/22/18  1200 06/22/18  0411 06/22/18  0239   WBC 3.9*  --  3.0* 3.7* 3.2*   RBC 4.77  --  3.49* 4.87 3.62*   HGB 14.4 14.3 10.6* 14.8 11.0*   HCT 40.8  --  30.0* 43.1 31.8*   MCV 86  --  86 89 88   MCH 30.2  --  30.4 30.4 30.4 "   MCHC 35.3  --  35.3 34.3 34.6   RDW 16.1*  --  15.7* 15.7* 15.6*   PLT 54* 59* 46* 41* 61*     INR  Recent Labs  Lab 06/22/18  1625 06/22/18  1200 06/22/18  0411 06/22/18  0239   INR 1.48* 2.09* 1.35* 1.47*     LFTs  Recent Labs  Lab 06/22/18  0411 06/21/18  0612 06/20/18  0608 06/19/18  0405   ALKPHOS 78 78 84 63   AST 86* 124* 144* 152*   ALT 53 79* 99* 92*   BILITOTAL 19.7* 23.4* 26.1* 22.6*   PROTTOTAL 6.5* 5.1* 5.9* 5.6*   ALBUMIN 3.4 2.9* 3.4 3.6      PANC  Recent Labs  Lab 06/16/18  1815   LIPASE 260

## 2018-06-22 NOTE — ANESTHESIA CARE TRANSFER NOTE
Patient: Yaneli Miles    Procedure(s):  ENDOSCOPIC ULTRASOUND with coiling and glueing - Wound Class: II-Clean Contaminated    Diagnosis: Bleeding  Diagnosis Additional Information: No value filed.    Anesthesia Type:   General     Note:  Airway :ETT  Patient transferred to:ICU  Comments: To ICU on monitor, 100% FiO2 via ETT and Ambu. VSS throughout transport. Patient placed on previous ventilator settings. Report given to RN, all questions answered. Refer to flowsheet for gtts, VSS.    Lisa Waters CRNA  6/22/2018ICU Handoff: Call for PAUSE to initiate/utilize ICU HANDOFF, Identified Patient, Identified Responsible Provider, Reviewed the Pertinent Medical History, Discussed Surgical Course, Reviewed Intra-OP Anesthesia Management and Issues during Anesthesia, Set Expectations for Post Procedure Period and Allowed Opportunity for Questions and Acknowledgement of Understanding      Vitals: (Last set prior to Anesthesia Care Transfer)    CRNA VITALS  6/22/2018 1350 - 6/22/2018 1439      6/22/2018             Resp Rate (observed): (!)  1                Electronically Signed By: BUD Shetty CRNA  June 22, 2018  2:39 PM

## 2018-06-22 NOTE — PLAN OF CARE
Problem: Patient Care Overview  Goal: Plan of Care/Patient Progress Review  Outcome: Declining  43 y.o male transferred from  around 2330 alert and on room air. Upon arrival patient began vomiting several large quantities of fabi blood. 1 unit of blood hanging and on dopamine gtt. MTP initiated. Patient normotensive at this point so dopamine gtt stopped. Around 0000 patient intubated and versed gtt started with a bump of fentanyl. Octreotide, pantoprazole, fentanyl gtt's also started. Cordis placed in right IJ. ETT and line placement verified by xray. Around 0100 bedside EGD done to find active bleeding gastric varix. [place] tube placed and traction secure with helmet. Helmet is tight on patients face, monitor site for pressure points. MN tube placement confirmed with xray. Large amounts of bloody oral and nasal secretions. One large urine occurrence, condom cath placed. No stool since transfer to unit. Abdomen is becoming increasingly distended. Patient received a total of 14 units prbc's, 2 platelets, and 12 plasma via rapid transfusion. Patient and family are hmong speaking with minimal english and need extensive updates and plan of care discussion. Interpretive services called, no [facility]  available today. Bedside report given, VSS at this time, patient sedated and wife at bedside.       P: Monitor of s/s of bleeding. Follow plan of care and report to MICU with concerns.

## 2018-06-22 NOTE — ANESTHESIA CARE TRANSFER NOTE
Patient: Yaneli Miles    * No procedures listed *    Diagnosis: * No pre-op diagnosis entered *  Diagnosis Additional Information: No value filed.    Anesthesia Type:   No value filed.     Note:  Airway :ETT  Patient transferred to:ICU  ICU Handoff: Call for PAUSE to initiate/utilize ICU HANDOFF, Identified Patient, Identified Responsible Provider, Reviewed the Pertinent Medical History, Discussed Surgical Course, Reviewed Intra-OP Anesthesia Management and Issues during Anesthesia, Set Expectations for Post Procedure Period and Allowed Opportunity for Questions and Acknowledgement of Understanding      Vitals: (Last set prior to Anesthesia Care Transfer)    CRNA VITALS  6/21/2018 2336 - 6/22/2018 0006      6/21/2018             EKG: Sinus rhythm                Electronically Signed By: Mariama Oro CRNA, APRN CRNA  June 22, 2018  12:06 AM

## 2018-06-22 NOTE — PLAN OF CARE
Problem: Patient Care Overview  Goal: Plan of Care/Patient Progress Review  Outcome: Improving  D:  Pt sedated and on vent.  40%, PEEP 5. Sats adequate. HR 60s-70s, no ectopy.  MAPs>65, no pressors.  On propofol and fentanyl.  Afebrile, hypothermic upon return from OR.  I/A: MN tube was placed overnight, was at LIS, 200ml dark red output.  Pt went to OR at 1215 for possible coiling of gastric bleed.  Pt had MN tube removed in OR and bleeding sites were fixed with glue.  In OR pt received a paralytic and not reversed and sedation was changed from versed to propofol.  Since returning from OR pressures are softer, 90s/60s.  TOF 0/4, unclear if paralytic has worn off.  Have not decreased sedation since return from OR. NG was placed under fluroscopy, now to LIS, 50cc brown/dark red output.  1 unit of platelets given to keep plt >50 and 1 unit plasma given to keep INR<1.8.  Rectal pouch in place with no output. Condom cath was not draining and fell off.  Luis catheter placed, 1400ml out upon placement.  Continues with adequate UO.    P:   Family at bedside most of day, updated with  many times throughout day.  Continue to keep pt sedated overnight and monitor for bleeding.    Blood products as needed. Pt was listed for liver transplant today.

## 2018-06-23 NOTE — PROGRESS NOTES
"  Nephrology Progress Note  06/23/2018       This is a case of BEN in a 43 year old male with HBV cirrhosis c/b esophageal varices and HE, who presented to an OSH with AMS and found to have hyperammonemia.      Assessment & Recommendations:      1. BEN - Etiology felt to be multifactorial: intravascular volume depletion, cholemic nephropathy, HRS, ATN from sepsis. He has likely sustained new injury over last 24 hrs with GIB, Hgb 5.8, hypotension with b/p's in the 50's. Baseline 0.8-0.9 May 2018. Tbili 19.7.  --Creatinine continues to improve with improving urine output.     2. Volume: Increased total body volume with massive transfusion protocol but suspect that he is intravascularly close to being euvolemic. Vent settings minimal and remain stable.   --Will defer diuretics as needed to MICU as long as we prevent hypotensive episodes. Would be okay to match his intakes for now. If he remains stable, continue to achieve a net negative balance of ~ 1L daily.     3. GI bleed: 2/2 gastric varices s/o gluing. Not required any more blood products since gluing. Repeat EGD confirmed that bleeding has been stopped.     Recommendations were communicated to primary team via progress note     Coty Longo MD   [phone]     Interval History : Gastric varices glued. Repeat EGD with no active bleeding. Now hemodynamically stable.     Review of Systems:   Unable given sedation    Physical Exam:   I/O last 3 completed shifts:  In: 2228.92 [I.V.:1588.92; NG/GT:430]  Out: 3800 [Urine:2850; Emesis/NG output:700; Stool:250]   /68  Temp 99.6  F (37.6  C) (Axillary)  Resp 15  Ht 1.676 m (5' 6\")  Wt 77.2 kg (170 lb 3.1 oz)  SpO2 100%  BMI 27.47 kg/m2     GENERAL APPEARANCE:  Sedated  PULM: Intubated.    CV: RRR       -edema - trace  GI: soft.   INTEGUMENT: no cyanosis or rash  NEURO:  sedated  : Luis present    Labs:   All labs reviewed by me  Electrolytes/Renal -   Recent Labs   Lab Test  06/23/18   0416  06/22/18   0411  " 06/22/18   0239   06/21/18   0612   06/19/18   0405   06/18/18   0440   NA  140  135   --    --   131*   < >  138   < >  140   POTASSIUM  3.5  4.5  4.6   < >  4.2   < >  3.6   < >  4.2   CHLORIDE  110*  103   --    --   106   < >  111*   < >  114*   CO2  20  16*   --    --   12*   < >  13*   < >  11*   BUN  48*  46*   --    --   43*   < >  50*   < >  55*   CR  1.89*  2.01*   --    --   2.49*   < >  3.15*   < >  3.69*   GLC  74  182*   --    --   103*   < >  145*   < >  131*   JESUS  8.6  8.9   --    --   7.9*   < >  8.5   < >  8.6   MAG  1.6   --    --    --    --    --   2.2   --   2.5*   PHOS  2.4*   --    --    --    --    --   5.0*   --   4.5    < > = values in this interval not displayed.       CBC -   Recent Labs   Lab Test  06/23/18   1210  06/23/18   0416  06/22/18 2006   WBC  5.2  4.3  3.7*   HGB  16.9  15.2  14.3   PLT  50*  51*  54*       LFTs -   Recent Labs   Lab Test  06/23/18   0416  06/22/18   0411  06/21/18 0612   ALKPHOS  69  78  78   BILITOTAL  23.5*  19.7*  23.4*   ALT  59  53  79*   AST  118*  86*  124*   PROTTOTAL  5.1*  6.5*  5.1*   ALBUMIN  2.6*  3.4  2.9*       Iron Panel -   Recent Labs   Lab Test  06/17/18   2138   IRON  66   IRONSAT  109*         Imaging:  CXR pending    Current Medications:    ampicillin  2 g Intravenous Q8H     atrpopine  2 mg Intravenous Once     cefTRIAXone  2 g Intravenous Q24H     lactulose  20 g Oral TID     lidocaine (PF)  10 mL Subcutaneous Once     pantoprazole (PROTONIX) IV  40 mg Intravenous BID     rifaximin  550 mg Oral or Feeding Tube BID     sodium bicarbonate  650 mg Oral BID     [START ON 6/24/2018] tenofovir  300 mg Oral or Feeding Tube Q48H       - MEDICATION INSTRUCTIONS -       fentaNYL 0 mcg/hr (06/23/18 1120)     octreotide (sandoSTATIN) infusion ADULT 50 mcg/hr (06/23/18 1800)     propofol (DIPRIVAN) infusion Stopped (06/23/18 0717)     Coty Longo MD

## 2018-06-23 NOTE — PLAN OF CARE
Problem: Patient Care Overview  Goal: Plan of Care/Patient Progress Review  Outcome: Improving  Mr. Miles is a 43 y o Hmong speaking male with a PMH significant for Hep. B Cirrhosis (undergoing transplant workup), esophageal varices, and type II DM who was admitted to [facility] on 6/16 for SBC and enteroccal UTI w/ associated gram positive cocci bacteremia, BEN, and hepatic encephalopathy. Patient transferred back to ICU on 6/21 due to massive variceal hemorrhage.     Pt sedated and on vent. Unresponsive to painful and verbal stimuli. On CMV settings throughout shift with no secretions noted. Pt in sinus rhythm with  No ectopy. Soft blood pressures throughout shift. MAPs 60-70's. See flowsheets. MDs aware. Pt NPO. NG to low intermittent suction with 350 ml, red/dark bloody output this shift. Abdomen taut and distended. Rectal pouch replaced. Scant amount of stool in rectal tube. Luis in place with adequate urine output. Lower urine output over the night but improving this AM. Pt turned q2h. Sacral, preventative mepilex placed. Octreotide, protonix, fentanyl and propofol drips continue. See MAR. Family at bedside throughout shift. K 3.5 this AM. Will replace with 10 mEq K Chloride x2 with a recheck of K 6/24 AM.    Continue to monitor for bleeding - stool, NG output. Blood products PRN - standing orders. Monitor blood pressure and blood glucose. Address nutrition needs this AM. Recheck K 6/24 AM. Continue to monitor labs q8h. Notify team if any changes or increased bleeding.    Problem: Diabetes Comorbidity  Goal: Diabetes  Patient comorbidity will be monitored for signs and symptoms of hyperglycemia or hypoglycemia. Problems will be absent, minimized or managed by discharge/transition of care.   Outcome: No Change  Pt blood sugars WNL. Checking q4h

## 2018-06-23 NOTE — PLAN OF CARE
Problem: Patient Care Overview  Goal: Plan of Care/Patient Progress Review  Outcome: Improving  D: Pt with HBV cirrhosis, recent gastric bleed post MTP and MN tube.  Intubated, BP stable, HR 60s-90s.   I/A:  Tmax 99.9. MAPs>65.  NSR no ectopy.   CMV 40% PEEP 5, overbreaths vent at 14-17, failed PST this AM, apneic. Fair productive cough, small amount of thin tan/brown secretions.  Pt off propofol since 0715 and off fentanyl since 0900.  Slightly opens eyes, and slight withdrawn to pain.  Mag and phos replaced.  Lasix 20mg given X 1.  Adequate output.  Abd continues to be distended and firm, NG at LIS, 500ml output. Meds given and clamp NG for 2hrs. 1 unit platelets given. Rectal pouch in place, 250ml dark red output.  BG 65 this AM, D50 given, now >100 since this AM.   P:  PST this evening.  Continue to monitor for bleeding.  Avoiding sedating medications. Extubate when able.

## 2018-06-23 NOTE — PROGRESS NOTES
MICU Progress Note    MICU Staff    This patient has been seen and evaluated by me.  I have discussed care with the housestaff  and agree with the findings and plan in their note. 43 year old male with HBV cirrhosis c/b EV with h/o variceal hemorrhage 2/2017 and 4/2018, ascites requiring twice weekly paracentesis, and HE on lactulose and rifaxamin who was admitted with HE and increasing renal failure, likely precipitated by infection (SBP and UTI). Developed bleeding from gastric varices yesterday, with emergent EGD and stabilization with [place] tube.  Underwent EGD and EUS today, showing resolution of bleeding.  No obvious source of bleeding identified, but small gastric varices were seen on EUS and treated with intravascular glue.  If he re-bleeds, plan on repeat EGD. Continue supportive cares. Gentle diuresis - was up over 6 liters yesterday.    Darwin Devlin  Pulmonary/Critical Care  June 23, 2018 3:41 PM    CCT 30 minutes separate from procedures

## 2018-06-23 NOTE — PROGRESS NOTES
MICU Progress Note  Yaneli Miles MRN: [id number]  1975  Date of Admission:6/16/2018  Primary care provider: No Ref-Primary, Physician      Assessment and Plan:     Mr. Miles is a 43 y o Hmong speaking male with a PMH significant for Hep. B Cirrhosis (undergoing transplant workup), esophageal varices, and type II DM who was admitted to [facility] on 6/16 for SBC and enteroccal UTI w/ associated gram positive cocci bacteremia, BEN, and hepatic encephalopathy. Patient transferred back to ICU on 6/21 due to massive variceal hemorrhage.     PLAN:  Today:  - PST  - Stopping sedation  - Monitor of Hgb TID  - Possible extubation tomorrow    NEURO  Sedation   Stopping propofol, overbreathing the vent  - Rass goal 0-1 now   - Reducing fentanyl drip    CARDIOVASCULAR  No active issues. Of note, pt had Dobutamine stress echo as part of transplant workup that showed normal EF and good cardiac output.     ===PULMONARY===  Respiratory Failure   - Patient intubated on 6/21 for airway protection due to massive GI bleed and need for MN tube. Patient with minimal vent settings started PST today. Still sedated in the morning, but more reactive in the afternoon.   - Continue PST   - Would be extubated tomorrow if mentation improves    ===GASTROINTESTINAL===  Variceal Hemorrhage   - Massive hemorrhage on 6/21 visualized on EGD. No clear trigger of bleed. Varices known PTA, hx of variceal bleed 2/2017 and 4/2018. 6/22 Unable to visualize varices endoscopically, likely gastric bleeding that resolved after MN tube.  No obvious source of bleeding identified, but small gastric varices were seen on EUS and treated with intravascular glue. Planned to repeat EGD if bleeding. No bleeding overnight, Hgb stable, dark BM noted, but Hgb around 15-16, so will keep checking Q8h.  - Continue IV PPI, Octreotide gtt until 6/24, and Ceftriaxone  - GI following, appreciate recs.   - Q8h CBC    Hepatic Encephalopathy   - Admitted with AMS. Believed to be  2/2 hepatic encephalopathy and sepsis. Was clear prior this ICU admission. Started BM this morning, dark stools but no change in Hgb, so will keep watching.  - Continue lactulose and rifaxamin     Hepatitis B Cirrhosis   MELD-Na score: 29 at 6/23/2018  4:16 AM  MELD score: 29 at 6/23/2018  4:16 AM  Calculated from:  Serum Creatinine: 1.89 mg/dL at 6/23/2018  4:16 AM  Serum Sodium: 140 mmol/L (Rounded to 137) at 6/23/2018  4:16 AM  Total Bilirubin: 23.5 mg/dL at 6/23/2018  4:16 AM  INR(ratio): 1.50 at 6/23/2018  4:16 AM  Age: 43 years   Acute worsening in last month. In process of workup for transplant.  - Re-starting tenofovir renal dose adjusted    Nutrition:   - Would re-start CLD once extubation    ===RENAL===  BEN   - Complex picture. Baseline Cr. Normal. Believed to be multifactorial - prerenal vs. HRS component vs. cholemic kidney injury vs. ATN. Improving prior to hemorrhage. Likely another insult given massive GIB. Cr keeps improving, good urine output, low concerns for HRS  - Trend and assess.   - Strict I/Os.    Anion Gap Metabolic Acidosis, resolving   - Elevated Lactate had normalized. BUN remains elevated.   - Continue sodium Bicarb BID per Nephrology's recs.     Hypocalcemia, resolved   - low Ica in setting of massive transfusion. Likely 2/2 citrate in blood products. Patient received replacement 6/22 am. Recheck was normal.   -recheck ICa shall more blood products be required.     ===HEME/ONC===  Thrombocytopenia   - 2/2 cirrhosis. Stable since admission.    - transfuse >50k given active bleeding.     Coagulopathy   - likely 2/2 decreased hepatic function. INR normal following transfusions.   - qd INR  - qd fibrinogen. Transfuse to > 100    ===ENDOCRINE===  DM II   - PTA exenatide, sitagliptin and Lantus 10 units.  Medium dose SSI. No LA currently given NPO. BGs at goal recently, will re-start home meds once re-initiating PO.  - Goal 140-180  - Medium dose SSI  - hypoglycemia protocol    ===INFECTIOUS  "DISEASE===  Pansensitive enterococcus UTI    - started on ampicillin 6/16. Will complete 10 day course 6/26.     SBP   - Initially with >400 PMNs on paracentesis. And started on Ceftriaxone. Cultures negative and repeat paracentesis with 300 WBC and only 3% PMNs.   - Will continue ceftriaxone due to variceal hemorrhage (mortality benefit).     # Antimicrobials:  Ceftriaxone - 6/16 - current   Ampicillin - 6/16 - current    # Cultures:  GPC in blood from OSH, no staph. No speciation/susceptibilities  Enterococcus from urine. Pansensitive.   Other Blood and Ascitic Fluid NGTD.     Prophylaxis:  DVT: SCD, GI: IV PPI BID  Family: Updated   Disposition: Critically Ill     Code Status: Full    Patient was seen and discussed with attending physician Dr. Devlin, who agrees with above assessment and plan.    Gerhard Gill MD  [id number]  P: [phone]    Interval History:       No events overnight, started to have dark BM this morning, but no changes in BP or drop in Hgb. Hgb check in afternoon looked fine. Mildly tachycardic this afternoon. No fever reported     /68  Temp 99.6  F (37.6  C) (Axillary)  Resp 15  Ht 1.676 m (5' 6\")  Wt 77.2 kg (170 lb 3.1 oz)  SpO2 100%  BMI 27.47 kg/m2  GEN: Sedated, comfortable on vent.   EYES: PERRL, Scleral icterus present.   CV: RRR, no gallops, rubs, or murmurs.  PULM: Coarse breath sounds bilaterally  GI: Tense, protuberant, unable to assess tenderness due to sedation  : bah catheter in place  EXTREMITIES: peripheral pulses intact  SKIN: Jaundiced.           "

## 2018-06-24 NOTE — PROVIDER NOTIFICATION
Pressure Support Trial Summary    Settings:  Ventilation Mode: CMV/AC  (Continuous Mandatory Ventilation/ Assist Control)  PEEP (cm H2O): 5 cmH2O  Pressure Support (cm H2O): 7 cmH2O  Oxygen Concentration (%): 40 %    Patient Parameters:  Heart Rate: 67  BP: 120/81  BP - Mean: 98  Spontaneous Respiratory Rate: 11 breaths/min  Spontaneous Tidal Volume: 0.5 mL  Spontaneous Minute Ventilation: 6 mL  RSBI (calculation): 22  RSBI trend: steady  Breathing Trial Total Time   (min): 109 minutes  Weaning trial discontinued due to:: End of designated trial.  See RT Accordian for complete documentation.    Based on pressure support trial results and RT assessment, recommend do not extubate without further physician assessment.    Brian Woods, RRT  6/24/2018 10:51 AM

## 2018-06-24 NOTE — PROGRESS NOTES
MICU Progress Note  Yaneli Miles MRN: [id number]  1975  Date of Admission:6/16/2018  Primary care provider: No Ref-Primary, Physician      Assessment and Plan:     Mr. Miles is a 43 y o Hmong speaking male with a PMH significant for Hep. B Cirrhosis (undergoing transplant workup), esophageal varices, and type II DM who was admitted to [facility] on 6/16 for SBC and enteroccal UTI w/ associated gram positive cocci bacteremia, BEN, and hepatic encephalopathy. Patient transferred back to ICU on 6/21 due to massive variceal hemorrhage.     PLAN:  Today:  - Extubation; did well  - Octreotide to stop tonight  - Stopped Ampicillin  - Cordis removed  - Started Clears.     NEURO  Sedation   -None    Hepatic Encephalopathy - lactulose as below.    Analgesia  -Tylenol PRN    CARDIOVASCULAR  No active issues. Of note, pt had Dobutamine stress echo as part of transplant workup that showed normal EF and good cardiac output.     PULMONARY  Respiratory Failure   - Patient intubated on 6/21 for airway protection due to massive GI bleed and need for MN tube.   - PST 6/24 am went well. CRX 6/24 stable. Extubated 6/24.  - Patient doing well post extubation. Continue to monitor.     GASTROINTESTINAL  Massive Upper GI Bleed, resolved  - Massive hemorrhage on 6/21 visualized on EGD. No clear trigger of bleed. Varices known PTA, hx of variceal bleed 2/2017 and 4/2018. 6/22 Unable to visualize varices endoscopically, likely gastric bleeding that resolved after MN tube.  No obvious source of bleeding identified, but small gastric varices were seen on EUS and treated with intravascular glue. Some ongoing dark red stools, but becoming more brown and hgb stable so overall suspect we are mostly seeing passage of remains from prior bleeding.  - Continue IV PPI, Octreotide gtt until 6/24, and Ceftriaxone  - GI following, appreciate recs.   - Space CBC to q12h  -holding beta blocker, appreciate GI recs regarding when, if ever, to restart  nadolol  - assess need for SBP ppx prior to d/c      Hepatic Encephalopathy   - Admitted with AMS. Believed to be 2/2 hepatic encephalopathy and sepsis. Was clear prior this ICU admission. Started BM this morning, dark stools but no change in Hgb, so will keep watching.  - Continue lactulose and rifaxamin as home recs.     Hepatitis B Cirrhosis   MELD-Na score: 30 at 6/24/2018  4:21 AM  MELD score: 30 at 6/24/2018  4:21 AM  Calculated from:  Serum Creatinine: 1.84 mg/dL at 6/24/2018  4:21 AM  Serum Sodium: 143 mmol/L (Rounded to 137) at 6/24/2018  4:21 AM  Total Bilirubin: 23.7 mg/dL at 6/24/2018  4:21 AM  INR(ratio): 1.68 at 6/24/2018  4:21 AM  Age: 43 years   Acute worsening in last month. In process of workup for transplant.  - Re-starting tenofovir renal dose adjusted    Nutrition:   - restart CLD  - Of note patient's wife brings food for him from home. Ensure proper diet communication for advancement per recs.     RENAL  BEN   - Complex picture. Baseline Cr. Normal. Believed to be multifactorial - prerenal vs. HRS component vs. cholemic kidney injury vs. ATN. Improving prior to hemorrhage. Likely another insult given massive GIB. Cr keeps improving, good urine output, low concerns for HRS  - Trend and assess.   - Strict I/Os.    Anion Gap Metabolic Acidosis, resolving   - Elevated Lactate had normalized. BUN remains elevated.   - Continue sodium Bicarb BID per Nephrology's recs.   - Possible stop Bicarb 6/25    Hypocalcemia, resolved   - low Ica in setting of massive transfusion. Likely 2/2 citrate in blood products. Patient received replacement 6/22 am. Recheck was normal.   -recheck ICa shall more blood products be required.     HEME/ONC  Thrombocytopenia   - 2/2 cirrhosis. Stable since admission.    - transfuse >50k given active bleeding.     Coagulopathy   - likely 2/2 decreased hepatic function. INR normal following transfusions.   - qd INR    ENDOCRINE  DM II   - PTA exenatide, sitagliptin and Lantus 10  units.  Medium dose SSI. No LA currently given NPO. BGs at goal recently, will re-start home meds once re-initiating PO.  - Goal 140-180  - Medium dose SSI  - hypoglycemia protocol    INFECTIOUS DISEASE  Pansensitive enterococcus UTI    - Received 8 day course of ampicillin. D/C 6/24.    SBP   - Initially with >400 PMNs on paracentesis. And started on Ceftriaxone. Cultures negative and repeat paracentesis with 300 WBC and only 3% PMNs.   - Will continue ceftriaxone due to variceal hemorrhage (mortality benefit) for 7 day course. End date 6/28.  - Defer decision abut long term SBP ppx to GI    # Antimicrobials:  Ceftriaxone - 6/16 - current   Ampicillin - 6/16 - 6/24    # Cultures:  GPC in blood from OSH, coag-neg staph, almost certainly contaminant  Enterococcus from urine. Pansensitive.   Other Blood and Ascitic Fluid NGTD.     Prophylaxis:  DVT: SCD, GI: IV PPI BID  Family: Updated   Disposition: Floor tomorrow pending stable resp status    Code Status: Full    Patient was seen and discussed with attending physician Dr. Devlin, who agrees with above assessment and plan.    Brody Burns, MPH  MICU Med Student  [phone]    I saw this patient together with MS-4 Brody Burns and was present during his physical exam and performed an independent exam at the same time which confirmed findings.  I have edited this note as appropriate to reflect team plan for today.  Patient was seen and discussed with attending physician, Dr. Devlin, who is also in agreement with above.    Neftali Degroot MD, PhD  PGY-2, Internal Medicine  Pager: [phone]      Subjective/Interval Events:       NAEO.  Did well with PST and following commands, so extubated successfully to NC ~noon today.  Stable thereafter.  Cordis removed with patient in trendelenburg position during exhalation after third PIV established and confirmed working.  No complaints today.  Seen with professional in-person [facility] ; family updated at bedside.      Objective:        Vitals: Reviewed    GEN: Sedated, comfortable on vent in AM. Able to follow commands.  Later seen resting comfortably in bed talking with family in NAD.    EYES: PERRL, Scleral icterus present.   CV: RRR, no gallops, rubs, or murmurs.  PULM: CTAB  GI: Distended but non-tense, protuberant, nontender  : bah catheter in place  EXTREMITIES: peripheral pulses intact. No significant LE edema.   SKIN: Jaundiced.     Labs/Meds/Imaging: Reviewed

## 2018-06-24 NOTE — PLAN OF CARE
Problem: Patient Care Overview  Goal: Plan of Care/Patient Progress Review  Outcome: Improving  Mr. Miles is a 43 y o Hmong speaking male with a PMH significant for Hep. B Cirrhosis (undergoing transplant workup), esophageal varices, and type II DM who was admitted to [facility] on 6/16 for SBC and enteroccal UTI w/ associated gram positive cocci bacteremia, BEN, and hepatic encephalopathy. Patient transferred back to ICU on 6/21 due to massive variceal hemorrhage.      Pt opens eyes spontaneously and nods yes or no to questions. On CMV settings throughout shift with scant amount of thin to thick secretions noted.. Pt in sinus rhythm with  No ectopy. Pt NPO. NG to low intermittent suction with minimal output. Abdomen taut and distended. Rectal pouch intact with dark maroon output. See flowsheets.  Luis in place with adequate urine output.  Sacral, preventative mepilex intact. Octreotide gtt continues. 2 units platelets given over night. K 3.8. 10 mEq K Chloride infusing at this time. Recheck K 6/25 AM.     Continue to monitor for bleeding - stool, NG output. Blood products PRN - standing orders. Monitor blood pressure and blood glucose. Pt more arousable this shift. Possible extubation today. Pressure support trials today. Recheck K 6/25 AM. Continue to monitor labs q8h. Notify team if any changes or increased bleeding.    Problem: Diabetes Comorbidity  Goal: Diabetes  Patient comorbidity will be monitored for signs and symptoms of hyperglycemia or hypoglycemia. Problems will be absent, minimized or managed by discharge/transition of care.   Outcome: Improving  Pt blood glucose within normal range. Will continue to monitor.

## 2018-06-24 NOTE — PLAN OF CARE
Problem: Patient Care Overview  Goal: Plan of Care/Patient Progress Review  Outcome: Improving  D:  Pt on 2L NC, afebrile, BP stable.  HR 60s-70s, SR.   I/A:  Extubated at 1250 to 4L NC.  Appears comfortable.  Per family, pt is confused and thinks he is at home.  Appropriate to situation, not pulling at tubes/lines.  NG in place, meds ok and to LIS, no output, verified placement after extubation.  Advanced 4cm to 55cm at nares.  Bowel sounds more active, but c/o abd discomfort/pain.  Rectal pouch in place, 150ml maroon stool output.  Hgb and platelets stable, no need for replacement.  Continues on octreotide gtt, order to D/C at 2300 tonight.  Luis removed at 1300, 175ml output since removal.    P:  Pt listed for liver transplant.  Order to d/c cordis, but needs to be done by a physician, awaiting removal.   Continue to monitor urine output, bladder scan as needed.  Increase activity, up to chair tomorrow, as tolerated.    Problem: Diabetes Comorbidity  Goal: Diabetes  Patient comorbidity will be monitored for signs and symptoms of hyperglycemia or hypoglycemia. Problems will be absent, minimized or managed by discharge/transition of care.   Outcome: Improving  Monitoring blood sugars q4hrs.  Ok for clear liquid diet when pt tolerating oral intake.  Continue to monitor blood sugars.

## 2018-06-24 NOTE — PROGRESS NOTES
MICU Progress Note     MICU Staff     This patient has been seen and evaluated by me.  I have discussed care with the housestaff  and agree with the findings and plan in their note. 43 year old male with HBV cirrhosis c/b EV with h/o variceal hemorrhage 2/2017 and 4/2018, ascites requiring twice weekly paracentesis, and HE on lactulose and rifaxamin who was admitted with HE and increasing renal failure, likely precipitated by infection (SBP and UTI). Developed bleeding from gastric varices yesterday, with emergent EGD and stabilization with [place] tube.  Underwent EGD and EUS today, showing resolution of bleeding.  No obvious source of bleeding identified, but small gastric varices were seen on EUS and treated with intravascular glue.  If he re-bleeds, plan on repeat EGD. Did well overnight - no recurrent bleeding. Awake this AM and following commands. Did well on PS trial - extubated this AM. Continue supportive cares. Can stop octreotide. On PPI and ceftriaxone.     Darwin Devlin  Pulmonary/Critical Care  June 24, 2018      CCT 30 minutes separate from procedures

## 2018-06-24 NOTE — PROVIDER NOTIFICATION
Patient suctioned and electively extubated per physician order. Placed on LFNC @ 3 LPM, breath sounds were clear, labs pending. Patient tolerated procedure well without any immediate complications.    Brian Woods, RT  6/24/2018 12:52 PM

## 2018-06-24 NOTE — PROGRESS NOTES
"  Nephrology Progress Note  06/24/2018       This is a case of BEN in a 43 year old male with HBV cirrhosis c/b esophageal varices and HE, who presented to an OSH with AMS and found to have hyperammonemia.      Assessment & Recommendations:      1. BEN - Etiology felt to be multifactorial however with kidney function now, he likely had ATN in the setting of SBP/UTI. Baseline 0.8-0.9 May 2018.  --Creatinine continues to improve with improving urine output.     2. Volume: Increased total body volume with massive transfusion protocol but suspect that he is intravascularly close to being euvolemic. Now extubated and doing well.   --Will defer diuretics as needed to MICU as long as we prevent hypotensive episodes. Would be okay to match his intakes for now. If he remains stable, continue to achieve a net negative balance of ~ 1L daily.     3. GI bleed: 2/2 gastric varices s/o gluing. Not required any more blood products since gluing. Repeat EGD confirmed that bleeding has been stopped.     Recommendations were communicated to primary team via progress note    Nephrology will sign off. Please call us with questions as needed.     Coty Longo MD   [phone]     Interval History : Now extubated, comfortable in bed.      Review of Systems:   Unable given sedation    Physical Exam:   I/O last 3 completed shifts:  In: 3292.05 [I.V.:1253.05; NG/GT:580]  Out: 5130 [Urine:3705; Emesis/NG output:700; Stool:725]   /77 (BP Location: Left arm)  Temp 97.6  F (36.4  C) (Axillary)  Resp 14  Ht 1.676 m (5' 6\")  Wt 76.1 kg (167 lb 12.3 oz)  SpO2 99%  BMI 27.08 kg/m2     GENERAL APPEARANCE: awake, somewhat responsive.   PULM: lungs clear to auscultations.   CV: RRR       -edema - none   GI: soft.   INTEGUMENT: no cyanosis or rash  NEURO:  sedated  : Luis present    Labs:   All labs reviewed by me  Electrolytes/Renal -   Recent Labs   Lab Test  06/24/18   0421  06/23/18   0416  06/22/18   0411   06/19/18   0405   NA  143  " 140  135   < >  138   POTASSIUM  3.8  3.5  4.5   < >  3.6   CHLORIDE  109  110*  103   < >  111*   CO2  20  20  16*   < >  13*   BUN  40*  48*  46*   < >  50*   CR  1.84*  1.89*  2.01*   < >  3.15*   GLC  115*  74  182*   < >  145*   JESUS  8.8  8.6  8.9   < >  8.5   MAG  2.0  1.6   --    --   2.2   PHOS  3.4  2.4*   --    --   5.0*    < > = values in this interval not displayed.       CBC -   Recent Labs   Lab Test  06/24/18   0421  06/23/18   2145  06/23/18   1210   WBC  6.9  8.9  5.2   HGB  14.9  15.7  16.9   PLT  50*  49*  50*       LFTs -   Recent Labs   Lab Test  06/24/18   0421  06/23/18   0416  06/22/18   0411   ALKPHOS  77  69  78   BILITOTAL  23.7*  23.5*  19.7*   ALT  55  59  53   AST  108*  118*  86*   PROTTOTAL  5.3*  5.1*  6.5*   ALBUMIN  2.6*  2.6*  3.4       Iron Panel -   Recent Labs   Lab Test  06/17/18   2138   IRON  66   IRONSAT  109*         Imaging: Reviewed.     Current Medications:    atrpopine  2 mg Intravenous Once     cefTRIAXone  2 g Intravenous Q24H     lactulose  20 g Oral TID     lidocaine (PF)  10 mL Subcutaneous Once     pantoprazole (PROTONIX) IV  40 mg Intravenous BID     rifaximin  550 mg Oral or Feeding Tube BID     sodium bicarbonate  650 mg Oral BID     tenofovir  300 mg Oral or Feeding Tube Q48H       - MEDICATION INSTRUCTIONS -       fentaNYL 0 mcg/hr (06/23/18 1120)     octreotide (sandoSTATIN) infusion ADULT 50 mcg/hr (06/24/18 1200)     propofol (DIPRIVAN) infusion Stopped (06/23/18 0717)     Coty Longo MD

## 2018-06-25 NOTE — PLAN OF CARE
"Problem: Patient Care Overview  Goal: Plan of Care/Patient Progress Review  Outcome: No Change  D: Afebrile. VSS. SBP 140s - MD aware and wanted no intervention. SpO2 >95% on room air.   A/I: Transfer orders for Medical Tele.   D: Afebrile. VSS. SBP 140s - MD MICU aware, said ok with this. SpO2 >92% on room air.   A/I: Pt needed NC O2 1 LPM this morning to keep SpO2 >92% , pt SpO2 decreased to 88% MD FINNEYU aware, once patient awake SpO2 >92% on room air. Pt fatigued today, sleeping intermittently. Disoriented to time with interpretor but oriented to person, place, situation time at end of shift. Worked with OT, SBA of 1, steady on feet. Rectal pouch detached with OT and replaced. 2x lactulose given and about 3x BMs today. Pt complains of more pain in abdomen this morning, tylenol given with minimal relief, still complains of some pain, says it is more than yesterday, MD RODRIGUEZ, MD karla Mccarthy and liver team notified. Oxycodone IR ordered and given. Paracentesis per MD took off 4 L and pt said less pain with interpretor. Pt cough and burping with meds in morning and speech eval ordered with interpretor at 1pm. Pt does have an appetite, but NPO d/t swallowing, pt said \"does not know\" if liquids are going in right spot and complains of \"soare throat.\" Failed 3 oz swallow eval d/t burping/coughing in between sips. Speech ordered nectar thick, pt tolerated clears well for dinner after paracentesis and MD ordered full liquids nectar thick. Good appetite. Bgls controlled with scheduled insulin. Per MD Karla Mccarthy continuing Iv Protonix. IU plts given for plts at 43. plts resulted after para procedure MD doing paracenteis aware.   P: Transfer orders to med tele.       Problem: Diabetes Comorbidity  Goal: Diabetes  Patient comorbidity will be monitored for signs and symptoms of hyperglycemia or hypoglycemia. Problems will be absent, minimized or managed by discharge/transition of care.   Outcome: No Change  PRN insulin changed to " scheduled insulin per MD. NPO most of day d/t speech eval at 1pm with interpretor, bgls decreased to 140s, no insulin given. Pt good appetite later in evening ate 100% of dinner and covered with insulin.     Problem: Infection, Risk/Actual (Adult)  Goal: Identify Related Risk Factors and Signs and Symptoms  Related risk factors and signs and symptoms are identified upon initiation of Human Response Clinical Practice Guideline (CPG).   VSS. HR 50s-70s - MD aware. BP stable. Afebrile. Continue to assess and monitor.

## 2018-06-25 NOTE — PROCEDURES
Procedure Note:    Procedure Name: Therapeutic paracentesis  Procedurist (primary): Anshu Dior MD  Pre-procedure diagnosis: ascites  Post-procedure diagnosis: ascites  Indication: ascites, discomfort    Consent was obtained from pt and placed in the chart.   Ultrasound used to locate ascites; optimal pocket found to be in RLQ, and marked.  Universal protocol performed with nursing.   5 cc of 1% lidocaine used anesthetize with 25 gauge needle.   8F catheter used to obtain fluid, which was yellow clear in appearance, and was sent to lab.   4L total fluid obtained.      Patient tolerated without any apparent complications.    Anshu Dior MD PGY3      Dr Dior performed the above procedure; I was not present, and this procedure should not be billed.    Kike Willard MD  [facility] Hospitalist

## 2018-06-25 NOTE — PROGRESS NOTES
MICU Progress Note  Yaneli Miles MRN: [id number]  1975  Date of Admission:6/16/2018  Primary care provider: No Ref-Primary, Physician    ICU Course/Transfer Summary:  Patient was transferred back to ICU late evening/early morning of 6/21-6/22 after developing massive hemoptysis requiring MTP (12U RBCs total, along with FFP/cryo).  GI was emergently consulted; EGD with too brisk of bleeding (cont bleeding from gastric varix) to perform any interventions so MN tube placed at bedside.  Hemostasis achieved with inflation of gastric balloon; did not require esophageal balloon to be inflated.  He was taken for repeat EGD/EUS which showed gastric varices on EUS but no specific source of bleeding.  These were injected with glue endoscopically but not believed to achieve full hemostasis per report.  No bleeding was seen, and MN tube was removed.  He returned to the ICU and remained intubated through 6/24.  He was extubated successfully to RA and remained stable thereafter.  Octreotide drip stopped in evening of 6/24 after 72h per GI recommendations.  He did not require any further blood transfusions.  Cordis removed after PIVx3 established.      Outstanding Issues:  -Continue clearing of MS  -Determine when, if ever, to restart nadalol  -Discuss need for long term SBP ppx  -SLP eval (passed bedside swallow post-extubation, but some issues per nursing this AM)  -Consider paracentesis in next 1-2 days for comfort  -Consider diuretics if cr continues to improve      Assessment and Plan:       Mr. Miles is a 43 y o Hmong speaking male with a PMH significant for Hep. B Cirrhosis (undergoing transplant workup), esophageal varices, and type II DM who was admitted to [facility] on 6/16 for SBC and enteroccal UTI w/ associated gram positive cocci bacteremia, BEN, and hepatic encephalopathy. Patient transferred back to ICU on 6/21 due to massive hemorrhage requiring MN tube.      NEURO    Hepatic Encephalopathy - lactulose as  below.    Analgesia - having increased abdominal pain, will start some prn oxy.  Taper as able  -Tylenol PRN, oxycodone prn    CARDIOVASCULAR  No active issues. Of note, pt had Dobutamine stress echo as part of transplant workup that showed normal EF and good cardiac output.     PULMONARY  Acute Hypoxic Respiratory Failure, resolved  - Patient intubated on 6/21 for airway protection due to massive GI bleed and need for MN tube.   - PST 6/24 am went well. CRX 6/24 stable. Extubated 6/24 and stable on RA vs 2L NC thereafter.    GASTROINTESTINAL  Massive Upper GI Bleed from gastric varix, resolved  - Massive hemorrhage on 6/21 visualized on EGD. No clear trigger of bleed, source found to be gastric varix. Varices known PTA, hx of variceal bleed 2/2017 and 4/2018. Endocscopy done 6/22 after MN tube in plac, was unable to visualize varices endoscopically, likely gastric bleeding that resolved after MN tube.  No obvious source of bleeding identified, but small gastric varices were seen on EUS and treated with intravascular glue with questionable hemostasis.  Stools becoming more brown and hgb stable so overall suspect we are mostly seeing passage of remains from prior bleeding.  - Continue PPI BID; octreotide stopped 6/24 after 72h  - GI following, appreciate recs.   - Space CBC to q12h  -holding beta blocker, appreciate GI recs regarding when, if ever, to restart nadolol  - assess need for SBP ppx prior to d/c      Hepatic Encephalopathy   - Admitted initially with AMS likely 2/2 SBP/hepatic encephalopathy.  Had cleared fully prior to bleeding episode.   - Continue lactulose and rifaxamin    Hepatitis B Cirrhosis   MELD-Na score: 27 at 6/25/2018  4:27 AM  MELD score: 27 at 6/25/2018  4:27 AM  Calculated from:  Serum Creatinine: 1.32 mg/dL at 6/25/2018  4:27 AM  Serum Sodium: 142 mmol/L (Rounded to 137) at 6/25/2018  4:27 AM  Total Bilirubin: 29.4 mg/dL at 6/25/2018  4:27 AM  INR(ratio): 1.60 at 6/25/2018  4:27 AM  Age:  43 years   Acute worsening in last month.   - Now listed for transplant  - Trending MELD labs  - Consider diuretics if cr continues to improve    Nutrition:   - restart CLD, hold on advancing pending SLP eval    RENAL  BEN   - Complex picture. Baseline Cr. Normal. Believed to be multifactorial - prerenal vs. HRS component vs. cholemic kidney injury vs. ATN. Improving prior to hemorrhage. Likely another insult given massive GIB. Cr keeps improving, good urine output, low concerns for HRS  - Trend and assess.    - Strict I/Os.    Anion Gap Metabolic Acidosis, resolving   - Elevated Lactate had normalized. BUN remains elevated.   - Continue sodium Bicarb BID per Nephrology's recs.   - Possible stop Bicarb 6/25    Hypocalcemia, resolved   - low iCal in setting of massive transfusion. Likely 2/2 citrate in blood products. Patient received replacement 6/22 am. Recheck was normal.     HEME/ONC  Thrombocytopenia   - 2/2 cirrhosis. Stable since admission.    - transfuse to 50k given active bleeding.     Coagulopathy   - likely 2/2 decreased hepatic function. INR normal following transfusions.   - qd INR    ENDOCRINE  DM II   - PTA on exenatide, sitagliptin and Lantus 10 units.  Medium dose SSI. No LA currently given poor PO intake. BGs at goal recently, will re-start home meds once re-initiating PO.  - Medium dose SSI  - hypoglycemia protocol    INFECTIOUS DISEASE  Pansensitive enterococcus UTI    - Received 8 day course of ampicillin, stopped 6/24    SBP   - Initially with >400 PMNs on paracentesis. Started on Ceftriaxone. Cultures negative and repeat paracentesis with 300 WBC and only 3% PMNs.   - Will continue ceftriaxone due to hemorrhage for 7 day course, though unclear if truly variceal bleed or not. End date 6/28; or transition to oral if othwerwise ready for discharge.  - Defer decision abut long term SBP ppx to GI    # Antimicrobials:  Ceftriaxone - 6/16 - current   Ampicillin - 6/16 - 6/24    # Cultures:  GPC in  blood from OSH, coag-neg staph, almost certainly contaminant  Enterococcus from urine. Pansensitive.   Other Blood and Ascitic Fluid NGTD.     Prophylaxis:  DVT: SCD, GI: PPI BID  Family: Updated   Disposition: To floor today    Code Status: Full    Patient was seen and discussed with attending physician Dr. Gandhi, who agrees with above assessment and plan.      Subjective/Interval Events:       NAEO. Stable on RA vs 2L NC overnight.  Somewhat sleepy still today.  Having 5-7/10 abdominal pain and slightly more distended today.    Tolerated CLD last night after bedside swallow eval, but nursing noting some concerns about swallow function this AM.  Otherwise, no new complaints or issues.  No CP, dyspnea, n/v.  Stool more brown, meeting goals with lactulose.    Objective:       Vitals: Reviewed    GEN: Resting in bed, NAD.    EYES: PERRL, Scleral icterus present.   CV: RRR, no gallops, rubs, or murmurs.  PULM: CTAB, nwob, on 1L NC at time of exam  GI: Distended, diffuse mild tenderness but no rebound or guarding.    EXTREMITIES: peripheral pulses intact. No significant LE edema.   SKIN: Jaundiced.     Labs/Meds/Imaging: Reviewed

## 2018-06-25 NOTE — PROGRESS NOTES
CLINICAL NUTRITION SERVICES - REASSESSMENT NOTE     Nutrition Prescription    RECOMMENDATIONS FOR MDs/PROVIDERS TO ORDER:  Diet adv pending SLP recommendations     Malnutrition Status:    Non-severe malnutrition in the context of acute-on-chronic illness     Future/Additional Recommendations:  If PO intake inadequate, would recommend Nutren 1.5 @ 50 mL/hr = 1800 kcals (30 kcal/kg/day), 82 g PRO (1.3 g/kg/day), 912 mL H2O, 211 g CHO, no fiber daily.    Provide SOT-diet education as able/appropriate      EVALUATION OF THE PROGRESS TOWARD GOALS   Diet: Clear Liquids     Intake: Overall, suspect at least < 75% nutrition needs over the past 7 days. Has been NPO/Clear Liquids for the past x3 days d/t GI bleed (6/22-present). Prior to that, was eating % meals x3 days on Regular diet (6/19-6/21).     NEW FINDINGS    GI: NGT to low-intermittent. Pt burping with all liquid intake. SLP consulted, evaluation pending.     MALNUTRITION  % Intake: < 75% for > 7 days (non-severe)  % Weight Loss: None noted --> wt remains > dosing wt of 61 kg   Subcutaneous Fat Loss: None observed  Muscle Loss: Thoracic region (clavicle, acromium bone, deltoid, trapezius, pectoral) and Upper arm (bicep, tricep):  Mild   Fluid Accumulation/Edema: Mild anasarca   Malnutrition Diagnosis: Non-severe malnutrition in the context of acute-on-chronic illness     Previous Goals   Patient to consume % of nutritionally adequate meal trays TID, or the equivalent with supplements/snacks.  Evaluation: Not met consistently     Previous Nutrition Diagnosis  Inadequate protein-energy intake related to resp/mental status inhibiting ability to take PO as evidenced by pt NPO x 2-3 days, no TF started via FT, HDL cholesterol level low at 7, and pt with muscle wasting and non-severe protein-calorie malnutrition.     Evaluation: No change    CURRENT NUTRITION DIAGNOSIS  Inadequate protein-energy intake related to restricted diet (NPO/Clear Liquids) 2/2 altered  GI status as evidenced by suspect met < 75% energy/protein needs for >7 days.        INTERVENTIONS  Implementation  Multivitamin supplementation ordered   Enteral nutrition recommendations if needed      Goals  Diet adv > Clear Liquids v nutrition support within 2-3 days.    Monitoring/Evaluation  Progress toward goals will be monitored and evaluated per protocol.    Zoila Evans RD, LD   4C Pager: [phone]

## 2018-06-25 NOTE — PROGRESS NOTES
06/25/18 1401   General Information   Onset Date 06/16/18   Start of Care Date 06/25/18   Referring Physician Neftali Degroot MD   Patient Profile Review/OT: Additional Occupational Profile Info See Profile for full history and prior level of function   Patient/Family Goals Statement Pt did not state   Swallowing Evaluation Bedside swallow evaluation   Behaviorial Observations (cooperative)   Mode of current nutrition Oral diet   Type of oral diet (clear liquid diet)   Respiratory Status Room air   Comments Orders received for swallow evaluation. Pt  with a PMH significant for Hep. B Cirrhosis (undergoing transplant workup), esophageal varices, and type II DM who was admitted to [facility] on 6/16 for SBC and enteroccal UTI w/ associated gram positive cocci bacteremia, BEN, and hepatic encephalopathy. Patient transferred back to ICU on 6/21 due to massive variceal hemorrhage.  unavailable, so  phone utilized to communicate with patient, which limited evaluation   Clinical Swallow Evaluation   Oral Musculature generally intact   Structural Abnormalities none present   Dentition present and adequate   Mucosal Quality adequate   Mandibular Strength and Mobility intact   Oral Labial Strength and Mobility WFL   Lingual Strength and Mobility WFL   Velar Elevation intact   Buccal Strength and Mobility intact   Laryngeal Function Cough;Throat clear;Swallow;Voicing initiated;Dry swallow palpated   Additional Documentation Yes   Swallow Eval   Feeding Assistance no assistance needed   Clinical Swallow Eval: Thin Liquid Texture Trial   Mode of Presentation, Thin Liquids cup;self-fed   Volume of Liquid or Food Presented Pt observed taking sips of thin liquids with oral meds with RN. small sips x4 via cup   Oral Phase of Swallow (reduced bolus formation/control)   Pharyngeal Phase of Swallow impaired;coughing/choking  (overt s/sx of aspiration)   Diagnostic Statement elevated aspiration risk with thin  liquids   Clinical Swallow Eval: Nectar Thick Liquid Texture Trial   Mode of Presentation, Nectar cup;self-fed   Volume of Nectar Presented 4oz   Oral Phase, Nectar WFL   Pharyngeal Phase, Nectar intact  (no s/sx of aspiration observed)   Diagnostic Statement swallow functional for nectar-thick liquids   Clinical Swallow Eval: Puree Solid Texture Trial   Diagnostic Statement Advanced textures not presented this date. Pt with NG to suction; on clear liquid diet from GI standpoint   Esophageal Phase of Swallow   Patient reports or presents with symptoms of esophageal dysphagia No   General Therapy Interventions   Planned Therapy Interventions Dysphagia Treatment   Dysphagia treatment Oropharyngeal exercise training;Instruction of safe swallow strategies;Modified diet education   Swallow Eval: Clinical Impressions   Skilled Criteria for Therapy Intervention Skilled criteria met.  Treatment indicated.   Functional Assessment Scale (FAS) 5   Treatment Diagnosis mild dysphagia   Diet texture recommendations Nectar thick liquids;Clear liquid   Recommended Feeding/Eating Techniques maintain upright posture during/after eating for 30 mins;small sips/bites   Demonstrates Need for Referral to Another Service occupational therapy;physical therapy   Therapy Frequency 5 times/wk   Predicted Duration of Therapy Intervention (days/wks) 1 week   Anticipated Discharge Disposition inpatient rehabilitation facility   Risks and Benefits of Treatment have been explained. Yes   Patient, family and/or staff in agreement with Plan of Care Yes   Clinical Impression Comments Clinical swallow evaluation completed per MD order. No  present in person, so  phone used, limiting exam. Pt presents with mild oral-pharyngeal dysphagia, characterized by reduced bolus formation/control and overt s/sx of aspiration with thin liquids. Tolerated necter-thick liquids without overt difficulties. Advanced textures not presented this date  due to GI status and NG to suction. Recommend downgrade to nectar-thick clear liquid diet as tolerated. Pt to sit upright for all PO intake and take small bites/sips   Total Evaluation Time   Total Evaluation Time (Minutes) 16

## 2018-06-25 NOTE — PLAN OF CARE
Problem: Patient Care Overview  Goal: Plan of Care/Patient Progress Review    Discharge Planner OT   Patient plan for discharge: not discussed  Current status: Pt mod A for supine>sit, CGA to min A for sit>stand. Ambulating in room with FWW and CGA. Stood for almost 20 min this session for ADLs and nursing cares, needing 1 seated rest break. ADL performance limited by cognition. VSS during activity, HR in 60s, spO2 95% on 2L O2.  Barriers to return to prior living situation: decreased ADL independence and activity tolerance, cognition  Recommendations for discharge: TCU at this time, but will continue to assess  Rationale for recommendations: increase functional endurance and ADL independence       Entered by: Derek Che 06/25/2018 11:23 AM

## 2018-06-25 NOTE — PLAN OF CARE
Problem: Patient Care Overview  Goal: Plan of Care/Patient Progress Review  PT 4C: Cancel- patient having abdominal thoracentesis this afternoon. Will reschedule.

## 2018-06-25 NOTE — PLAN OF CARE
Problem: Patient Care Overview  Goal: Plan of Care/Patient Progress Review  Outcome: Improving  43 y o Hmong speaking male with a PMH significant for Hep. B Cirrhosis (undergoing transplant workup), esophageal varices, and type II DM who was admitted to [facility] on 6/16 for SBC and enteroccal UTI w/ associated gram positive cocci bacteremia, BEN, and hepatic encephalopathy. Patient transferred back to ICU on 6/21 due to massive variceal hemorrhage.       Pt alert and oriented to self and hospital. On room air throughout shift. Clear lung sounds. Normal sinus rhythm with no ectopy noted. NG to low intermittent suction with minimal output noted. Abdomen distended and firm. Rectal pouch intact with light brown/yellow stool output. No blood noted. Voiding in urinal.  Pt turns in bed independently. K 3.9 this AM. 20 mEq replacement per NG.       Continue to monitor for bleeding - stool, NG output. Blood products PRN - standing orders. Monitor blood pressure and blood glucose. Recheck K 6/26 AM. PT consult to increase activity today. Possible transfer to floor?  Notify team if any changes.    Problem: Diabetes Comorbidity  Goal: Diabetes  Patient comorbidity will be monitored for signs and symptoms of hyperglycemia or hypoglycemia. Problems will be absent, minimized or managed by discharge/transition of care.   Outcome: No Change  Pt continues with q4h blood glucose checks. Insulin x2 given this shift. See MAR.

## 2018-06-25 NOTE — PLAN OF CARE
Problem: Patient Care Overview  Goal: Plan of Care/Patient Progress Review  Discharge Planner SLP   Patient plan for discharge: unknown  Current status: Clinical swallow evaluation completed per MD order. No  present in person, so  phone used, limiting exam. Pt presents with mild oral-pharyngeal dysphagia, characterized by reduced bolus formation/control and overt s/sx of aspiration with thin liquids. Tolerated necter-thick liquids without overt difficulties. Advanced textures not presented this date due to GI status and NG to suction. Recommend downgrade to nectar-thick clear liquid diet as tolerated. Pt to sit upright for all PO intake and take small bites/sips  Barriers to return to prior living situation: dysphagia; deconditioning  Recommendations for discharge: inpatient rehab  Rationale for recommendations: anticipate ongoing needs       Entered by: Lisa Maldonado 06/25/2018 2:10 PM

## 2018-06-25 NOTE — PROVIDER NOTIFICATION
"Notified provider Brody with MICU that pt has belly pain scoring at \"5,6 or 7\" out of \"10\" and hurts on touch. Pt does not have nausea, reassured pt that he was given lactulose to help with a bowel movement and relieve pressure. Notified provdier of higher BP too and provider said that was okay. Notified that pt burped with every swallow of water, but pt claims swallowing ok - provider ordered swallow eval.   "

## 2018-06-25 NOTE — PROGRESS NOTES
"Brief Medicine ICU Transfer Note    Patient admitted for obtundation.  Hyperammonemic.  Unsuccessful lactulose outside hospital.  Patient transferred.  Previously on norepinephrine.  Diagnosed with SBP, neg cytology.  Ceftriaxone was continued.  Patient found to have BEN as well as coagulopathy.  Status post albumin and vitamin K challenge. Nephrology on board, ATN most likely. Patient was briefly intubated starting 6/17-6/18 due to ongoing encephalopathy and poor airway protection.  Now tolerating room air.  Patient also noted to have pansensitive Enterococcus faecalis in urine, vancomycin transition to ampicillin.  Has since completed transplant eval is listed for transplant.  Underwent diagnostic paracentesis 6/21 following 5d SBP tx (4 PMNs). On evening of 6/21, patient had episode of large hematemesis, hypotension, acute anemia found to have bleeding gastric varix, [place] tube was placed.  Massive transfusion protocol initiated, patient received approximately 12 units PRBCs, 3 units platelets, 10 units FFP.  Underwent EUS 6/22 with glue injection of few small gastric varices identified by EUS with cessation of flow.  No active bleeding was noted during the procedure.  [place] tube was pulled.  Patient's hemoglobin normalized, remained stable.  Patient was treated with octreotide, IV PPI, ceftriaxone.  Octreotide discontinued 6/24, patient extubated on 6/24 as well, has been stable on room air to 1 L.  Patient's BEN continues to improve, most recent creatinine 1.32 (peak 3.69). Patient has increased abd pain and distension likely due to significant blood products given from massive transfusion protocol.    BP (!) 142/97  Temp 98.3  F (36.8  C) (Oral)  Resp 12  Ht 1.676 m (5' 6\")  Wt 75.4 kg (166 lb 3.6 oz)  SpO2 94%  BMI 26.83 kg/m2     General: tired-appearing, nad, scleral icterus  Pulm: bibasilar crackles, breathing comfortably on room air  CV: slow rate, regular rhythm, no m/r/g  Abd: firm, " distended, no rebound or guarding  EXT: no LE edema, wwp  Skin: jaundiced  Neuro: no asterixis    EGD:  - Red blood in the esophagus.   - Gastric varix, spurting blood.   - Red blood in the gastric fundus and in the gastric body.   - No specimens collected.     EUS 6/22:  - Small proximal gastric varices - either GOV-2 or IGV-1 (no endoscopic or sonographically identiable esophageal varices, thus favor IGV-1). No active bleeding. No other potential bleeding sites identified. Treated with EUS-guided glue embolization with apparent cessation of flow.   - No bleeding at the end of the exam. Gastric pool lavaged clear of blood and clot.   - [place] tube removed and not replaced. NG placed through right nostril to LIS.   - Ascites.     A/P    PROBLEM LIST  Hemorrhagic shock  Massive GV bleed  Acute hypoxic respiratory failure  Hepatic encephalopathy  Hep B cirrhosis, listed for transplant  Ascites  SBP  Thrombocytopenia  Enterococcus UTI  BEN  DM II    Plan  -agree with MICU plan  -therapeutic paracentesis <4L, no albumin  -consider restarting diuresis  -close monitoring of stools  -daily hgb  -ADAT  -cont ceftriaxone  -discuss SBP ppx with hepatology in AM    Gold Lamas MD  Internal Medicine, PGY-2  Pager [phone]

## 2018-06-26 NOTE — PROGRESS NOTES
GASTROENTEROLOGY PROGRESS NOTE      Date of Admission:  6/16/2018  Date of Service:   6/26/2018          ASSESSMENT AND RECOMMENDATIONS:     Assessment:  Yaneli Miles is a 43 year old male with HBV cirrhosis c/b EV with h/o variceal hemorrhage 2/2017 and 4/2018, ascites requiring twice weekly paracentesis, and HE on lactulose and rifaxamin who was admitted with HE and increasing renal failure, likely precipitated by infection (SBP and UTI).       GI Bleed:  Developed bleeding from gastric varices and underwent EGD and EUS 6/22/18 with treatment of small gastric varices with intravascular glue.  No evidence of ongoing bleeding.  - Trend hgb and transfuse as needed for hgb < 7  - Advance diet as tolerated, encourage nutrition      BEN: Renal failure likely due to pyelonephritis as well as hypoperfusion in the setting of fluid shifts and infection.  Renal failure has been improving.   - Appreciate nephrology involvement     HBV Cirrhosis: Completed transplant evaluation, and he has been listed for transplant.  - MELD-Na 30  - Continue scheduled lactulose with a stool goal of 3-4 BMs per day or 1-2L equivalent  - Continue rifaximin 550mg po bid  - Continue home tenofovir, can resume full dose  - 2 gm sodium restriction, encourage nutrition  - PT/OT     Gastroenterology follow up recommendations: Will continue to follow    Thank you for involving us in this patient's care. Please do not hesitate to contact the GI service with any questions or concerns.     Pt care plan discussed with Dr. Macias, GI staff physician.    Daniella Back MD  GI Fellow  [phone]    -------------------------------------------------------------------------------------------------------------------         Interval Events:     No ongoing signs of bleeding.  Reports continued abdominal discomfort.  Speech therapy to evaluate swallow.           Physical Exam:   /74 (BP Location: Right arm)  Temp 97.9  F (36.6  C) (Oral)  Resp 19  Ht 1.676  "m (5' 6\")  Wt 75.4 kg (166 lb 3.6 oz)  SpO2 93%  BMI 26.83 kg/m2  Wt:   Wt Readings from Last 2 Encounters:   06/25/18 75.4 kg (166 lb 3.6 oz)        Constitutional: Sitting in chair, not dyspneic/diaphoretic, no acute distress  Eyes: Sclera icteric  Ears/nose/mouth/throat: Mucus membranes moist, hearing intact  CV: Tachycardic  Respiratory: Unlabored breathing  Abd: Tense, nontender, distended, no peritoneal signs  Skin: Warm, perfused, jaundiced  Neuro: AAO  MSK: No gross deformities           Data:   All available labs, imaging, and procedure notes were independently reviewed and interpreted, pertinent values are as follow:    BMP  Recent Labs  Lab 06/25/18 0427 06/24/18 0421 06/23/18  0416 06/22/18  0411    143 140 135   POTASSIUM 3.9 3.8 3.5 4.5   CHLORIDE 109 109 110* 103   JESUS 9.0 8.8 8.6 8.9   CO2 20 20 20 16*   BUN 31* 40* 48* 46*   CR 1.32* 1.84* 1.89* 2.01*   * 115* 74 182*     CBC  Recent Labs  Lab 06/25/18  1540 06/25/18  0427 06/24/18  1600 06/24/18  0421   WBC 7.0 6.6 6.8 6.9   RBC 5.15 5.04 4.97 4.87   HGB 15.9 15.5 15.3 14.9   HCT 44.8 43.6 42.6 41.0   MCV 87 87 86 84   MCH 30.9 30.8 30.8 30.6   MCHC 35.5 35.6 35.9 36.3   RDW 17.6* 17.6* 17.5* 17.2*   PLT 43* 51* 52* 50*     INR  Recent Labs  Lab 06/25/18  0427 06/24/18  0421 06/23/18  2145 06/23/18  1210   INR 1.60* 1.68* 1.50* 1.43*     LFTs  Recent Labs  Lab 06/25/18  0427 06/24/18  0421 06/23/18  0416 06/22/18  0411   ALKPHOS 86 77 69 78   * 108* 118* 86*   ALT 59 55 59 53   BILITOTAL 29.4* 23.7* 23.5* 19.7*   PROTTOTAL 6.1* 5.3* 5.1* 6.5*   ALBUMIN 2.8* 2.6* 2.6* 3.4      Attestation:  This patient has been seen and evaluated by me, Doug Macias.  Discussed with the house staff team or resident(s) and agree with the findings and plan in this note.     "

## 2018-06-26 NOTE — PLAN OF CARE
Problem: Patient Care Overview  Goal: Plan of Care/Patient Progress Review  Discharge Planner SLP   Patient plan for discharge: Unknown    Current status: Pt emotionally overwhelmed and crying at start of session.  Pt continues to present with NG to LIS (clamped).  Pt remains appropriate for nectar thick full liquids.  Pt will need to be upright for intake and should take small bites/sips, pace self, and altenate consistencies with intake.  Upon trials, no overt s/s of aspiration but persistent burping after each sip of nectar thick liquids.    Barriers to return to prior living situation: Below baseline   Recommendations for discharge: Rehab   Rationale for recommendations: Anticipate ongoing needs        Entered by: Toyin Gallardo 06/26/2018 11:58 AM

## 2018-06-26 NOTE — PLAN OF CARE
"Problem: Patient Care Overview  Goal: Plan of Care/Patient Progress Review  Outcome: No Change  D: Afebrile. VSS. BP slightly hypertensive SBP high in 130s, MD Lamas aware and did not want any interventions. SpO2 >92% on room air.   A/I: With interpretor assessed orientation. Pt unable to say year, month, or date. Pt oriented to situation, place, and name. Per MD Lamas continuing lactulose for goal if 4 BMs, 2 BMs today, 1 large after lactulose then rectal pouch came off in bed with OT. Replaced and skin tear noted of left side of perirectal area, rectal pouch replaced NOT on skin tear and barrier cream applied to skin tear. 2x more BMs today. Pt complained of bruning/itching in groin area, MD Lamas aware and ordered WOC consult. Waiting for WOC to show. Pt up to chair with OT, no dizzines. No nausea and less abdomen pain from yesterday. Relief from abdomen pain after PRN oxycodone IR and tylenol given during the day. Per MD Lamas pt still ok to receive tylenol. MD Lamas said would change plt goal to 10, and plt order discontinued, MD aware that 1 unit was given overnight for plt 47 to keep goal >50. Per MD Lamas discontinuing NG tube to intermittent suction and starting regular diet with nectar thick. Per speech ok for regular with nectar thick as long as tube pulled. NG tube pulled and minimal residual prior, no nausea/pain reported per patient afterwards. Took oral lactulose well with nectar thick liquids. Pt ate 100% breakfast full liquids this morning and for lunch prior to regular diet orded. K 3.9 replaced, Mg 1.7 replaced. Phos 1.4 replaced. Reported to recheck 2 hours after phos infused. Bgls controlled with insulin. R paracentesis site clean, dry and intact. Gave diuretics per MD towards end of shift, explained to pt medication would make him pee and to use urinal. Also explained oral lactulose would make him poop. Pt earlier said \"unable to control\" bowel movements and urine.     "   P: Continue to assess and monitor and plan of care.       Problem: Diabetes Comorbidity  Goal: Diabetes  Patient comorbidity will be monitored for signs and symptoms of hyperglycemia or hypoglycemia. Problems will be absent, minimized or managed by discharge/transition of care.   Outcome: No Change  Bgls controlled with insulin regimen.

## 2018-06-26 NOTE — PROGRESS NOTES
GASTROENTEROLOGY PROGRESS NOTE      Date of Admission:  6/16/2018  Date of Service:   6/26/2018          ASSESSMENT AND RECOMMENDATIONS:     Assessment:  Yaneli Miles is a 43 year old male with HBV cirrhosis c/b EV with h/o variceal hemorrhage 2/2017 and 4/2018, ascites requiring twice weekly paracentesis, and HE on lactulose and rifaxamin who was admitted with HE and increasing renal failure, likely precipitated by infection (SBP and UTI).        GI Bleed:  Developed bleeding from gastric varices and underwent EGD and EUS 6/22/18 with treatment of small gastric varices with intravascular glue.  No evidence of ongoing bleeding.  - Trend hgb and transfuse as needed for hgb < 7  - Advance diet as tolerated, encourage nutrition      BEN: Renal failure likely due to pyelonephritis as well as hypoperfusion in the setting of fluid shifts and infection.  Renal failure has been improving.   - Appreciate nephrology involvement      HBV Cirrhosis: Completed transplant evaluation, and he has been listed for transplant.  - MELD-Na 33  - Continue scheduled lactulose with a stool goal of 3-4 BMs per day or 1-2L equivalent  - Continue rifaximin 550mg po bid  - Continue home tenofovir, can resume full dose  - 2 gm sodium restriction, encourage nutrition  - PT/OT     Gastroenterology follow up recommendations: Will continue to follow     Thank you for involving us in this patient's care. Please do not hesitate to contact the GI service with any questions or concerns.      Pt care plan discussed with Dr. Macias, GI staff physician.    Daniella Back MD  GI Fellow  [phone]    -------------------------------------------------------------------------------------------------------------------         Interval Events:     Ongoing improvement.  Tolerating increased PO intake, still with nectar thick liquids.  Plan to remove MG.  No signs of ongoing bleeding.         Physical Exam:   BP (!) 127/93 (BP Location: Right arm)  Temp 98.7  F  "(37.1  C) (Oral)  Resp 15  Ht 1.676 m (5' 6\")  Wt 75.4 kg (166 lb 3.6 oz)  SpO2 97%  BMI 26.83 kg/m2  Wt:   Wt Readings from Last 2 Encounters:   06/25/18 75.4 kg (166 lb 3.6 oz)        Constitutional: Lying in bed, not dyspneic/diaphoretic, no acute distress  Eyes: Sclera icteric  Ears/nose/mouth/throat: Mucus membranes moist, hearing intact  CV: RRR  Respiratory: Unlabored breathing  Abd: Soft, distended, no peritoneal signs  Skin: Warm, perfused, jaundiced  Neuro: AAO  MSK: No gross deformities           Data:   All available labs, imaging, and procedure notes were independently reviewed and interpreted, pertinent values are as follow:    BMP  Recent Labs  Lab 06/26/18 0429 06/25/18 0427 06/24/18 0421 06/23/18  0416    142 143 140   POTASSIUM 3.9 3.9 3.8 3.5   CHLORIDE 108 109 109 110*   JESUS 8.7 9.0 8.8 8.6   CO2 21 20 20 20   BUN 25 31* 40* 48*   CR 1.05 1.32* 1.84* 1.89*   * 125* 115* 74     CBC  Recent Labs  Lab 06/26/18 0429 06/25/18  1540 06/25/18  0427 06/24/18  1600   WBC 5.9 7.0 6.6 6.8   RBC 4.97 5.15 5.04 4.97   HGB 15.0 15.9 15.5 15.3   HCT 44.2 44.8 43.6 42.6   MCV 89 87 87 86   MCH 30.2 30.9 30.8 30.8   MCHC 33.9 35.5 35.6 35.9   RDW 17.5* 17.6* 17.6* 17.5*   PLT 47* 43* 51* 52*     INR  Recent Labs  Lab 06/26/18 0429 06/25/18 0427 06/24/18 0421 06/23/18  2145   INR 1.57* 1.60* 1.68* 1.50*     LFTs  Recent Labs  Lab 06/26/18 0429 06/25/18 0427 06/24/18  0421 06/23/18  0416   ALKPHOS 88 86 77 69   * 102* 108* 118*   ALT 57 59 55 59   BILITOTAL 31.0* 29.4* 23.7* 23.5*   PROTTOTAL 5.6* 6.1* 5.3* 5.1*   ALBUMIN 2.8* 2.8* 2.6* 2.6*      Attestation:  This patient has been seen and evaluated by me, Doug Macias.  Discussed with the house staff team or resident(s) and agree with the findings and plan in this note.     "

## 2018-06-26 NOTE — PROGRESS NOTES
WO Nurse Inpatient Wound Assessment   Reason for consultation: Evaluate and treat groin rash    Assessment  Groin rash appearance consistent with yeast overgrowth.  To avoid interfering with rectal pouch seal, Recommend using antifungal powder instead of cream    Treatment Plan  Recommended provider order:Miconazole or Nystatin powder to groin rash twice daily   WOC Nurse follow-up plan:signing off  Nursing to notify the Provider(s) and re-consult the WOC Nurse if wound(s) deteriorates or new skin concern.    Patient History  According to provider note(s):  43 year old male admitted on 6/16/2018. He has a history of hepatitis B cirrhosis, diabetes and is admitted for SBP and enterococcus UTI, course, gated by massive GI bleed requiring a [place] tube, massive transfusion protocol, EUS glue injection  Objective Data  Containment of urine/stool: rectal pouch, urinal     Active Diet Order    Active Diet Order      Regular Diet Adult Nectar Thickened Liquids (pre-thickened or use instant food thickener)    Output:   I/O last 3 completed shifts:  In: 2662 [P.O.:1020; I.V.:540; NG/GT:505]  Out: 6650 [Urine:2025; Emesis/NG output:50; Drains:4000; Stool:575]    Risk Assessment:   Sensory Perception: 3-->slightly limited  Moisture: 3-->occasionally moist  Activity: 2-->chairfast  Mobility: 3-->slightly limited  Nutrition: 2-->probably inadequate  Friction and Shear: 2-->potential problem  Sai Score: 15                          Labs:   Recent Labs  Lab 06/26/18  0429   ALBUMIN 2.8*   HGB 15.0   INR 1.57*   WBC 5.9       Physical Exam  Skin inspection: focused groin and buttocks   Rectal pouch in place, no leakage noted.  No rash on buttocks.  Groin and superior thighs with bright red erythema and satellite lesions.  Pt c/o burning and itching       Interventions  Current support surface: Standard  Low air loss mattress  Visual inspection of wound(s) completed  Wound Care: None  Supplies: discussed with RN  Discussed  plan of care with Nurse, they are asking MD for antifungal powder order

## 2018-06-26 NOTE — PLAN OF CARE
Problem: Patient Care Overview  Goal: Plan of Care/Patient Progress Review  Outcome: No Change  0330-0217:  VSS.  Pt received x1 oxycodone for abd pain.  Pt dangled at bedside for dinner; ate 100% of meal.  Miconazole applied to groin rash.  Voiding in urinal with assistance.  Continue to monitor per POC.

## 2018-06-26 NOTE — PROVIDER NOTIFICATION
Paged [phone]:  Pt's transfer orders fell off; do you want to re-order? Would you want to order hydrocortisone prn for groin rash?

## 2018-06-26 NOTE — PLAN OF CARE
Problem: Patient Care Overview  Goal: Plan of Care/Patient Progress Review  Outcome: Improving  Neuro:  used. A & O x 4.   CV: SR, -140's, HR 60-70, afebrile. +2 pulses  Resp: lung sounds clear, on RA. SATs > 92%  GI/: Rectal pouch removed as it was leaking. Pt then had very large BM - rectal tube placed back on pt. NG to LIS - minimal output. On full liquid diet - nectar thick liquids. Pt voided 800 ml of dark tea colored urine. abd distended.   Skin: Jaundice in color. Rash to groin, excoriation around partha - area. Protective paste applied.     3 PIV to L hand.     1 unit of platelets infusing for platelets < 50    Comments: Will continue to monitor and assess.

## 2018-06-26 NOTE — PROGRESS NOTES
[facility], [place]    Internal Medicine Progress Note - [facility] Service    Main Plans for Today   -Advance diet, nectar thick liquids  -Remove NG  -Ongoing lactulose and rifaximin, goal 3-4 BMs per day or greater than 2 L per day  -d/c ceftriaxone, start Cipro prophylaxis for SBP  -Resume home furosemide and spironolactone for diuresis  -WOCN consult  -d/c ICU auto transfusion protocol    Assessment & Plan   Yaneli Miles is a 43 year old male admitted on 6/16/2018. He has a history of hepatitis B cirrhosis, diabetes and is admitted for SBP and enterococcus UTI, course, gated by massive GI bleed requiring a [place] tube, massive transfusion protocol, EUS glue injection.    Hemorrhagic shock  Massive GV bleed  Acute respiratory failure, resolved  Patient developed hemorrhagic shock requiring massive transfusion protocol for massive gastric variceal bleed status post [place] tube and now subsequent endoscopic glue injection   With apparent cessation of bleeding.  Patient was intubated for airway protection, extubated 6/24.  Patient's hemoglobin the 15's, has remained stable since initial resuscitation, hypertensive.  Discussed case with hepatology, no further need for NG due to patient cleared for diet and not intubated.  -Remove NG  -Advanced diet as tolerated  -Daily CBC  -avoid beta block    Hep B cirrhosis, listed for transplant  Ascites  SBP  EVs  Thrombocytopenia  Patient has chronic hep B cirrhosis, on tenofovir.  Patient underwent expedited transplant evaluation prior to decompensation requiring ICU readmission.  Patient is not listed for transplant.  Patient underwent 4 L paracentesis yesterday with slight improvement in symptoms the patient remains distended with abdominal discomfort.  As patient's blood pressures have remained stable, will resume patient's home diuretic regimen.  Patient has completed to 5 day courses of ceftriaxone for SBP.  After discussions with hepatology,  will start SBP prophylaxis (qtc 454).  -Discontinue ceftriaxone  -Cipro 500 mg every 24 hours  -resume patient's home diuresis with furosemide and spironolactone  -Daily meld labs    Hepatic/infectious encephalopathy, improving  Improved following lactulose, treatment of SBP and UTI. No asterixis.  -Continue lactulose and rifaximin, goal BMs 3-4 or greater than 2 L per day.    BEN, resolved  Patient's BEN has continued to resolve, now to baseline creatinine of 1.05. Nephrology was previously consulted, etiology thought to be multifactorial.    Enterococcus UTI, resolved  S/p 8 days therapy with ampicillin 6/16 - 6/24.    DM II  Glucose well controlled on medium SSI, continue.    # Pain Assessment:  Current Pain Score 6/26/2018   Patient currently in pain? denies   Pain location -   Pain descriptors -   CPOT pain score -   - Yaneli is experiencing pain due to ascites. Pain management was discussed and the plan was created in a collaborative fashion.  Yaneli's response to the current recommendations: compliant  - Pharmacologic adjuvants: Acetaminophen and diuresis  - Non-pharmacologic adjuvants: Heat, Ice and repeat paracentesis prn        Diet: Regular Diet Adult Nectar Thickened Liquids (pre-thickened or use instant food thickener)  Fluids: PO  DVT Prophylaxis: VTE Prophylaxis contraindicated due to major bleed  Code Status: Full Code    Disposition Plan   Expected discharge: 2 - 3 days, recommended to transitional care unit once adequate pain management/ tolerating PO medications, hemoglobin stable and safe disposition plan/ TCU bed available.     Entered: Fazal Lamas 06/26/2018, 3:38 PM   Information in the above section will display in the discharge planner report.      The patient's care was discussed with the Attending Physician, Dr. Bolanos, Bedside Nurse, Patient and Patient's Family.    Fazal Lamas  [facility]  [place]: 5  Pager: [phone]  Please see sticky note for cross cover  information    Interval History   No acute events.  Patient underwent and completed 4 L paracentesis yesterday.  Patient states that abdominal pain has improved slightly though continues to note abdominal pain.  Patient asks whether NG can be removed and whether his diet can be advanced.  Denies fever chills.    Physical Exam   Vital Signs: Temp: 98.8  F (37.1  C) Temp src: Oral BP: 119/82   Heart Rate: 70 Resp: 14 SpO2: 97 % O2 Device: None (Room air)    Weight: 166 lbs 3.63 oz  General Appearance: Tired appearing, weak, jaundiced  Respiratory: Bibasilar crackles, breathing comfortably on room air  Cardiovascular: Regular rate and rhythm, no murmurs rubs gallops  GI: Soft, moderately distended, nontender, no rebound, no guarding, bowel sounds present  Skin: Jaundiced, no rashes  Other: No asterixis, oriented ×3, NG in place        Data   Medications     - MEDICATION INSTRUCTIONS -         [START ON 6/27/2018] ciprofloxacin  500 mg Oral Q24H AJITH     furosemide  40 mg Oral Daily     insulin aspart  1-7 Units Subcutaneous TID AC     insulin aspart  1-5 Units Subcutaneous At Bedtime     lactulose  20 g Oral TID     pantoprazole  40 mg Oral BID AC     rifaximin  550 mg Oral or Feeding Tube BID     spironolactone  100 mg Oral Daily     tenofovir  300 mg Oral Daily     Data     Recent Labs  Lab 06/26/18  0429 06/25/18  1540 06/25/18  0427  06/24/18  0421   WBC 5.9 7.0 6.6  < > 6.9   HGB 15.0 15.9 15.5  < > 14.9   MCV 89 87 87  < > 84   PLT 47* 43* 51*  < > 50*   INR 1.57*  --  1.60*  --  1.68*     --  142  --  143   POTASSIUM 3.9  --  3.9  --  3.8   CHLORIDE 108  --  109  --  109   CO2 21  --  20  --  20   BUN 25  --  31*  --  40*   CR 1.05  --  1.32*  --  1.84*   ANIONGAP 11  --  13  --  13   JESUS 8.7  --  9.0  --  8.8   *  --  125*  --  115*   ALBUMIN 2.8*  --  2.8*  --  2.6*   PROTTOTAL 5.6*  --  6.1*  --  5.3*   BILITOTAL 31.0*  --  29.4*  --  23.7*   ALKPHOS 88  --  86  --  77   ALT 57  --  59  --  55    *  --  102*  --  108*   < > = values in this interval not displayed.  No results found for this or any previous visit (from the past 24 hour(s)).

## 2018-06-27 PROBLEM — K70.31 ALCOHOLIC CIRRHOSIS OF LIVER WITH ASCITES (H): Status: ACTIVE | Noted: 2018-01-01

## 2018-06-27 NOTE — PLAN OF CARE
Problem: Diabetes Comorbidity  Goal: Diabetes  Patient comorbidity will be monitored for signs and symptoms of hyperglycemia or hypoglycemia. Problems will be absent, minimized or managed by discharge/transition of care.   Outcome: No Change  Pt transferred from  today at 1200. Pt AxOx3. He is alert to self, place, and situation. He is not aware of time and date. Pt speaks little english. His wife speaks some as well. An  was here x2. MD informed. Family had questions. Blood sugar was 219. Pt ordered food and has food brought in by his wife. Diet changed to regular.  He has some complaints of abdomen tightness and pain. Simethicone given for the possibility of gas. He also stated he had back pain. Oxycodone given.

## 2018-06-27 NOTE — PROGRESS NOTES
Last Phos level drawn at around 1000 6/27 today was 1.9. Replacement appears to have been given prior to this lab value at 0200 6/27. Recheck ordered as ADD-ON to BMP drawn at 1544 6/27. BMP results available in EPIC. Sodium level low at 131. Calcium 7.9. Awaiting Mg and Phos values which lab does not have available yet.     Addendum 1800: Phos 1.3 and Mg 1.5; Replacement infusions ordered from pharmacy. Karla Collier ([phone]) paged regarding sodium and calcium levels.

## 2018-06-27 NOTE — PROGRESS NOTES
[facility] [place]    Internal Medicine Progress Note - [facility] Service    Main Plans for Today   -Advance diet, regular with thin liquids, cleared by SLP  -Ongoing lactulose and rifaximin, goal 3-4 BMs per day or 1-2 L per day  -continue Cipro prophylaxis for SBP  -continue home furosemide and spironolactone for diuresis  -continue miconazole  -recheck Na, if ongoing or worsening, serum osm, urine osm/Na    Assessment & Plan   Yaneli Miles is a 43 year old male admitted on 6/16/2018. He has a history of hepatitis B cirrhosis, diabetes and is admitted for SBP and enterococcus UTI, course, gated by massive GI bleed requiring a [place] tube, massive transfusion protocol, EUS glue injection.    Hemorrhagic shock, resolved  Massive GV bleed s/p intravascular glue, resolved  Acute respiratory failure, resolved  No evidence of further GI bleed, hemoglobin stable, vital signs stable.  -Advanced diet as tolerated  -Daily CBC  -cont PO PPI twice daily  -avoid beta block    Hep B cirrhosis, listed for transplant  Ascites  SBP  EVs  Thrombocytopenia  MELD-Na score: 29 at 6/27/2018  5:31 AM  MELD score: 26 at 6/27/2018  5:31 AM  Calculated from:  Serum Creatinine: 0.97 mg/dL (Rounded to 1) at 6/27/2018  5:31 AM  Serum Sodium: 131 mmol/L at 6/27/2018  5:31 AM  Total Bilirubin: 31.6 mg/dL at 6/27/2018  5:31 AM  INR(ratio): 1.79 at 6/27/2018  5:31 AM  Age: 43 years   -Cipro 500 mg every 24 hours  -continue patient's home diuresis with furosemide and spironolactone  -Daily meld labs  -hold home nadolol    Hyponatremia  Following initiation of diuresis, patient mildly hyponatremic to 131 from 140 the day prior. Suspect fluid shifts.  -recheck Na, if ongoing or worsening, serum osm, urine osm/Na    Candidiasis  Symptoms improving with miconazole powder.  -Continue miconazole    Hepatic/infectious encephalopathy, improving  Improved following lactulose, treatment of SBP and UTI. No asterixis.  -Continue  lactulose and rifaximin, goal BMs 3-4 or 1-2 L per day.    BEN, resolved  Patient's BEN has continued to resolve, now to baseline creatinine of 1.05. Nephrology was previously consulted, etiology thought to be multifactorial.    Enterococcus UTI, resolved  S/p 8 days therapy with ampicillin 6/16 - 6/24.    DM II  Glucose well controlled on medium SSI, continue.    # Pain Assessment:  Current Pain Score 6/27/2018   Patient currently in pain? no   Pain location -   Pain descriptors -   CPOT pain score -   - Yaneli is experiencing pain due to ascites. Pain management was discussed and the plan was created in a collaborative fashion.  Yaneli's response to the current recommendations: compliant  - Pharmacologic adjuvants: Acetaminophen and diuresis  - Non-pharmacologic adjuvants: Heat, Ice and repeat paracentesis prn        Diet: Regular Diet Adult Nectar Thickened Liquids (pre-thickened or use instant food thickener)  Fluids: PO  DVT Prophylaxis: SCDs  Code Status: Full Code    Disposition Plan   Expected discharge: 2 - 3 days, recommended to transitional care unit once adequate pain management/ tolerating PO medications, hemoglobin stable and safe disposition plan/ TCU bed available.     Entered: Fazal Lamas 06/27/2018, 11:34 AM   Information in the above section will display in the discharge planner report.      The patient's care was discussed with the Attending Physician, Dr. Bolanos, Bedside Nurse, Patient and Patient's Family.    Fazal Lamas  [facility]: 5  Pager: [phone]  Please see sticky note for cross cover information    Interval History   No acute events.  Patient states that his abdominal pain has improved further with initiation of diuresis.  Denies hemoptysis, hematemesis, melena, hematochezia.  Tolerated porridge for dinner.  Expressed a strong desire to go home, has been recommended to go to TCU.  Patient will continue to think about these issues.  He states that his children  will be able to help him when he is at home and that he has not been home for a significant amount of time. Patient tearful when discussing this. He states that he understands that he will need strength to recover well from a transplant surgery. States that the miconazole powder has improved his groin rash pruritus.    Physical Exam   Vital Signs: Temp: 98.8  F (37.1  C) Temp src: Axillary BP: 123/86   Heart Rate: 64 Resp: 16 SpO2: 98 % O2 Device: None (Room air)    Weight: 156 lbs 11.95 oz  General Appearance: Increased energy, jaundiced, tearful  Respiratory: Bibasilar crackles, breathing comfortably on room air  Cardiovascular: Regular rate and rhythm, no murmurs rubs gallops  GI: Soft, moderately distended, nontender, no rebound, no guarding, bowel sounds present  Skin: Jaundiced, bright red patch in groin with satellite lesions  Other: No asterixis, oriented to person and place, NG removed        Data   Medications     - MEDICATION INSTRUCTIONS -         ciprofloxacin  500 mg Oral Q24H AJITH     furosemide  40 mg Oral Daily     insulin aspart  1-7 Units Subcutaneous TID AC     insulin aspart  1-5 Units Subcutaneous At Bedtime     lactulose  20 g Oral TID     miconazole   Topical BID     pantoprazole  40 mg Oral BID AC     rifaximin  550 mg Oral or Feeding Tube BID     spironolactone  100 mg Oral Daily     tenofovir  300 mg Oral Daily     Data     Recent Labs  Lab 06/27/18  0531 06/26/18  0429 06/25/18  1540 06/25/18  0427   WBC 4.8 5.9 7.0 6.6   HGB 14.7 15.0 15.9 15.5   MCV 87 89 87 87   PLT 44* 47* 43* 51*   INR 1.79* 1.57*  --  1.60*   * 140  --  142   POTASSIUM 3.7 3.9  --  3.9   CHLORIDE 99 108  --  109   CO2 23 21  --  20   BUN 19 25  --  31*   CR 0.97 1.05  --  1.32*   ANIONGAP 9 11  --  13   JESUS 7.9* 8.7  --  9.0   * 124*  --  125*   ALBUMIN 2.6* 2.8*  --  2.8*   PROTTOTAL 5.6* 5.6*  --  6.1*   BILITOTAL 31.6* 31.0*  --  29.4*   ALKPHOS 83 88  --  86   ALT 57 57  --  59   AST 88* 100*  --   102*     No results found for this or any previous visit (from the past 24 hour(s)).

## 2018-06-27 NOTE — PLAN OF CARE
Problem: Patient Care Overview  Goal: Plan of Care/Patient Progress Review    4C / Discharge Planner PT   Patient plan for discharge: Home  Current status: Patient completes bed mobility and transfers with min A, ambulated 300' with FWW +CGA/min A, emotional and stating he really wants to go home. Encouraged pt to ambulate 3x/day to increase strength and activity tolerance. VSS on RA.   Barriers to return to prior living situation: medical status, deconditioning, level of assist  Recommendations for discharge: pending continued progress in therapy anticipate pt will be appropriate to discharge home with assist from family and HH vs OP PT  Rationale for recommendations: Patient below baseline level of function, making progress in therapy, motivated to return home and to PLOF, family is supportive and able to assist as needed

## 2018-06-27 NOTE — PLAN OF CARE
Problem: Patient Care Overview  Goal: Plan of Care/Patient Progress Review  Outcome: Improving  Neuro:  used. A & O to person, place and situation. Disoriented to time.   CV: SR, -130's, HR 60-70, afebrile. +2 pulses  Resp: lung sounds clear, on RA. SATs > 92%  GI/: Rectal pouch intact. On regular diet - nectar thick liquids. Pt voided spontaneously dark tea colored urine. abd distended. Pt did c/o abd pain - PRN oxy given x 1.   Skin: Jaundice in color. Rash to groin, excoriation around partha area. Protective paste applied.      3 PIV to L hand.           Comments: Will continue to monitor and assess.

## 2018-06-27 NOTE — PLAN OF CARE
Problem: Patient Care Overview  Goal: Plan of Care/Patient Progress Review  Discharge Planner OT   Patient plan for discharge: Pt would like to return home.   Current status: Late entry for 6/26.Pt supine inclined in bed upon arrival.  present throughout session. Pt completed transfer rolling to bilateral sides with Min A and Max vc's. Pt dependent for partha cares. Pt required Min A and vc's for transfer supine<->seated EOB and sit<->standing pivot transfer to bedside chair. Pt tolerated this activity though easily fatigued. VSS.   Barriers to return to prior living situation: Decreased activity tolerance and strength, Fatigue, Decreased independence with ADLs/Functional transfers.   Recommendations for discharge: Rehab at this time though pt will likely progress to discharge home with A and Home therapy.  Rationale for recommendations: Pt is moving fairly well but is below baseline function. Pt will benefit from continued therapy at this time to address barriers to independence.

## 2018-06-27 NOTE — PLAN OF CARE
Problem: Patient Care Overview  Goal: Plan of Care/Patient Progress Review  Discharge Planner SLP   Patient plan for discharge: unknown  Current status: Recommend regular diet with thin liquids as tolerated. Pt to sit upright for all PO intake, take small bites/sips and alternate consistencies.   Barriers to return to prior living situation: likely none from ST standpoint  Recommendations for discharge: will defer to PT/OT  Rationale for recommendations: Do not anticipate ST needs post discharge       Entered by: Lisa Maldonado 06/27/2018 2:34 PM

## 2018-06-27 NOTE — PLAN OF CARE
Problem: Patient Care Overview  Goal: Plan of Care/Patient Progress Review  PT / 4C -     Discharge Planner PT   Patient plan for discharge: home  Current status: Performing bed mobility with elevated HOB + SBA.  Transferred sit->stand + SBA.  Amb ~ 300' with FWW + CGA and progressing to SBA.  Climbed 3 stairs with CGA with B UE support.   Barriers to return to prior living situation: strength, balance, endurance  Recommendations for discharge: home with assist + HH PT   Rationale for recommendations: Continued skilled PT to progressing strength, balance and endurance to improve safety and independence with bed mobility, transfers, gait and stairs.  Wife and family are able to assist pt as needed.        Entered by: Donna Horn 06/27/2018 12:14 PM

## 2018-06-28 NOTE — PLAN OF CARE
Problem: Patient Care Overview  Goal: Plan of Care/Patient Progress Review  Discharge Planner OT   Patient plan for discharge: Home with A and Home therapy.   Current status: Pt required CGA and vc's with FWW for transfer sit<->standing ambulation to bathroom. Therapist educated pt on tub transfer side stepping in and out. Pt completed with CGA. Educated pt/wife on use of shower chair for fatigue management and safety. Therapist educated pt on LE dressing crossing leg over contralateral knee to avoid compression of abdomin and discomfort. Pt able to don/doff LLE sock but required Min A for RLE. Pt ambulated ~400 ft with CGA, vc's and FWW. Pt required few standing rest breaks and slow pace. Pt tolerated therapy session well though fatigued. VSS.  Barriers to return to prior living situation: Decreased activity tolerance/strength, Decreased independence with ADLs/Transfers.   Recommendations for discharge: Home with A from Wife and HH OT/PT.  Rationale for recommendations: Pt will benefit from continued home therapy to address the barriers above. Pt is driven to participate and get better.

## 2018-06-28 NOTE — PLAN OF CARE
"Problem: Patient Care Overview  Goal: Plan of Care/Patient Progress Review  Outcome: No Change  /84 (BP Location: Left arm)  Temp 98.9  F (37.2  C) (Oral)  Resp 16  Ht 1.676 m (5' 6\")  Wt 71.1 kg (156 lb 12 oz)  SpO2 100%  BMI 25.3 kg/m2  5037-7331:  Afebrile. VSS on RA. Disoriented to time. Scheduled Lactulose administered. Regular diet; Assisted pt to order vegetable broth, hot water, tea, chicken noodle soup, & orange jello. Fair appetite considering his firm, severely distended abdomen. Abdominal pain radiating to both sides and back medicated with oxycodone PRN. Paracentesis possibly tomorrow morning as there are fluid labs ordered. Pt tearful with family present this evening. Pt verbalized that he was bored in the hospital and disposition was reviewed with pt and family. Disposition per Resident note states 2-3 days then TCU. BG monitored.  AC . HS ; Novolog 1 unit administered. Pt assisted with 1, GB & walker to ambulate in the halls x2. Wife at bedside. Rectal tube intact with moderate amount of soft stool. Voided in urinal dark tea colored urine. Mg replacement complete and phos replacement still infusing. Recheck of Mg and Phos ordered for around 0230. Continue to monitor and follow POC.           "

## 2018-06-28 NOTE — PLAN OF CARE
Problem: Patient Care Overview  Goal: Plan of Care/Patient Progress Review  Speech Language Therapy Discharge Summary    Reason for therapy discharge:    Discharged to home.    Progress towards therapy goal(s). See goals on Care Plan in [facility] electronic health record for goal details.  Goals not met.  Barriers to achieving goals:   discharge from facility.    Therapy recommendation(s):    No further therapy is recommended.     Discharge diet: Regular textures and thin liquids

## 2018-06-28 NOTE — PROGRESS NOTES
Care Coordinator - Discharge Planning    Admission Date/Time:  6/16/2018  Attending MD:  Leonora Bolanos MD     Data  Date of initial CC assessment:  6/28/218  Chart reviewed, discussed with interdisciplinary team.   Patient was admitted for:   1. Encephalopathy    2. Cirrhosis of liver with ascites, unspecified hepatic cirrhosis type (H)    3. Hepatic encephalopathy (H)    4. Enterococcus UTI    5. Alcoholic cirrhosis of liver with ascites (H)         Assessment   Concerns with insurance coverage for discharge needs: None.  Current Living Situation: Patient lives with family.  Support System: Supportive and Involved  Services Involved: none  Transportation at Discharge: Family or friend will provide  Transportation to Medical Appointments:  - family  Barriers to Discharge: none      Coordination of Care  D: Chart reviewed and plan of care discussed with MD team. Plan for patient to discharge today. I/A: Per therapy recommendation after evaluations patient should receive Home Care PT (orders completed). Patient needs Outpatient Paracentesis reoccurring once weekly, closest facility to his home is [facility] [place] [place] OP para's scheduled through August for patients convenience. (MD following OP para's is GI: Dr. Epstein) P: Care Coordinator will remain available for additional discharge needs that may arise.       Referrals: Provided patient/family with options for Home Care, PT (with Listen Edition )    [facility]  Phone  [phone]  Fax  [phone]    **Listen Edition  required with visits**  PT to eval and treat      Plan  Anticipated Discharge Date:  6/28/18  Anticipated Discharge Plan:  Home    CTS Handoff completed:  YES    Elaine MARTEN RN PHN  Patient Care Management Coordinator  [facility]  Phone: [phone] / Pager: [phone]

## 2018-06-28 NOTE — PLAN OF CARE
Problem: Patient Care Overview  Goal: Discharge Needs Assessment  Outcome: Adequate for Discharge Date Met: 06/28/18  DISCHARGE                         No discharge date for patient encounter.  ----------------------------------------------------------------------------  Discharged to: Home  Via: private transportation  Accompanied by: Family wife  Discharge Instructions: regular diet,  Regular activity, medications, follow up appointments, when to call the MD, aftercare instructions. Reviewed with  present.  Prescriptions: To be filled by  d/c pharmacy; medication list reviewed & sent with pt  Follow Up Appointments: arranged; information given  Belongings: All sent with pt  IV: d/c'd    Pt/wife exhibits understanding of above discharge instructions; all questions answered.    Discharge Paperwork: Signed, copied, and sent home with patient. All information reviewed with patient, pt wife with  present.

## 2018-06-28 NOTE — PLAN OF CARE
"Problem: Patient Care Overview  Goal: Plan of Care/Patient Progress Review  Outcome: No Change  /78 (BP Location: Left arm)  Temp 99.5  F (37.5  C) (Oral)  Resp 16  Ht 1.676 m (5' 6\")  Wt 71.1 kg (156 lb 12 oz)  SpO2 100%  BMI 25.3 kg/m2  [id number]  Pt here for hematemesis related to liver failure. Pt is alert and oriented x 3, disoriented to time. Pt is on scheduled Lactulose and is incontinent of stool intermittently. Pt is tolerating regular diet, no nausea or emesis. Abdominal pain radiating to both sides and back managed with oxycodone PRN. Paracentesis possibly this morning. Pt VSS on room air. Mag and Phos replaced yesterday evening. Needs additional Phos replacement. Ordered from pharmacy. Discharge in 2-3 days to TCU versus home. Will continue to assess per POC.      "

## 2018-06-28 NOTE — PROCEDURES
Hospitalist Procedure Service - Paracentesis Procedure Note  Date of Service: 6/28/2018    Patient: Yaneli Miles  MRN: [id number]  Admission Date: 6/22/2018  Hospital Day # 6    Attending: Jose Tobias MD  Resident: Heath Dior  Procedure: Diagnostic and therapeutic paracentesis  Indication: Ascites, discomfort  Pre-procedure diagnosis: Cirrhosis with ascites  Post-procedure diagnosis: same    The risks and benefits of the procedure were explained to patient who expressed understanding and opted to proceed.  Consent was obtained and placed in the chart.  Ultrasound was used to find a pocket of ascites in the right lower quadrant. A time out was performed. The area was prepped and draped in the usual sterile fashion.  5 ml of 1% lidocaine was instilled and ascites located.  A small incision was made with an 11 blade.  The 8F paracentesis catheter and needle were inserted until ascites obtained then the needle removed.  The apparatus was connected to vacuum bottles and a total of 5,000 ml removed.   A specimen was sent for analysis. The catheter was withdrawn and the area dressed. Pre and post-procedure images were saved to the medical record.    Patient tolerated the procedure well with no immediate complications.  The primary team was informed of the procedure.     Dr Tobias was present    Anshu Dior MD PGY3    Attending Attestation   I was present for the entirety of the procedure including pre and post-procedure image capture, time out, lidocaine injection, catheter insertion and withdrawal.     Jose Tobias MD  Med-Peds Hospitalist  Pager [phone]

## 2018-06-28 NOTE — PLAN OF CARE
Problem: Patient Care Overview  Goal: Plan of Care/Patient Progress Review  PT - per plan established by the Physical Therapist, according to functional mobility the  discharge recommendation is home with assist as needed. Pt is IDN for all bed mob, Sit to stand with WW. Pt amb up to 200'x 1 with WW safe and stable. Pt demo up and down 10 steps with one rail needing SBA. Pt ED on car transfers tech. Pt stating no issues for D/c   Discharge Planner PT   Patient plan for discharge: home   Current status: see above.   Barriers to return to prior living situation: weakness, pain  Recommendations for discharge: home with assist.   Rationale for recommendations: pt is progressing well,, good support at home.     At time of D/c pt met 4/4 goals,  NO PT equipment needs at D/c   Home PT to follow.        Entered by: Jose Armando Katz 06/28/2018 2:12 PM

## 2018-06-29 NOTE — DISCHARGE SUMMARY
[facility], [place]    Internal Medicine Discharge Summary- Karla Service    Date of Admission:  6/16/2018  Date of Discharge:  6/28/2018  3:09 PM  Discharging Attending Provider: Dr Leonora Bolanos  Discharge Team: Karla 5    Discharge Diagnoses   Hemorrhagic shock  Gastric variceal bleed  Acute on chronic normocytic anemia  Septic shock  SBP  Complicated pansensitive enterococcus UTI  Acute hepatic/infectious encephalopathy  Decompensated hep B cirrhosis  Chronic HBV  Coagulopathy of liver disease  Ascites  Esophageal varices  Non-oliguric BEN, improving  Non nephrotic range proteinuria  Chronic thrombocytopenia  Pancytopenia  Neutropenia  Lymphopenia  Type 2 diabetes mellitus    Follow-ups Needed After Discharge   -Hepatology follow-up in 2 weeks, review CMP, INR to be obtained in 1 week, evaluate need to resume home diuretics  -Weekly paracenteses for symptom relief  -Primary care provider follow-up within 1 week, establish at [facility] if no other primary care provider, check CMP, phos, mg, INR.  Adjust phos replacement as needed, adjust insulin    Hospital Course   Yaneli Miles was admitted on 6/16/2018 for encephalopathy requiring intubation, sbp, enterococcus UTI.  The following problems were addressed during his hospitalization:    Hemorrhagic shock  Gastric variceal bleed  Acute on chronic normocytic anemia  Patient developed acute hematemesis as well as hypotension.  Massive transfusion protocol was initiated.  EGD performed, found actively bleeding gastric varix, [place] tube was placed.  Patient was intubated for airway protection.  Follow-up EUS found no active sources of bleeding, patient received intravascular glue injection with cessation of flow noted by ultrasound.  Patient's hemoglobin and vitals remained stable, no further evidence of clinical GI bleed throughout hospitalization.  -Patient instructed to immediately return to the hospital should he have any further  evidence of GI bleed, episode of syncope, symptoms of hypotension    Septic shock  SBP  Complicated pansensitive enterococcus UTI  Patient diagnosed with SBP by paracentesis, treated with ceftriaxone.  Patient also found to have enterococcus UTI treated with vancomycin -> ampicillin.  Patient initially admitted to the ICU briefly on NE. Lactulose was given as below, patient cleared, extubated.  Patient was started on Cipro SBP prophylaxis prior to discharge.  -Start Ciprofloxacin prophylaxis for SBP     Acute hepatic/infectious encephalopathy  Patient initially presented with encephalopathy and emesis.  Patient required brief intubation due to inability to maintain airway.  Encephalopathy cleared with reinitiation of lactulose as well as treatment for infections as above.  -Ongoing lactulose, rifaximin, SBP prophylaxis.  Goal 3-4 bowel movements per day     Decompensated Hep B cirrhosis  Chronic HBV  Coagulopathy of liver disease  Ascites  Esophageal varices  Hyponatremia  Patient's MELD-Na  remained relatively stable.  Patient was scheduled for outpatient transplant evaluation.  This was performed while inpatient.  Patient had normal TTE, normal stress test, evaluated by transplant surgery who did not note any surgical contraindications.    Patient was listed for transplant prior to discharge.  Patient received IV vitamin K x 3d for coagulopathy. Tenofovir was continued.  Nadolol was held due to concern for blood pressure, continue to hold on discharge due to low normal blood pressures. Patient underwent therapeutic/diagnostic paracentesis.  Patient developed mild hyponatremia, diuretics held.  Patient underwent therapeutic paracentesis on day of discharge, set up with outpatient weekly paracenteses.  Re-initiation of diuresis per outpatient hepatology.  -Patient listed for transplant  -Discontinue nadolol  -Two-week hepatology follow-up. CMP and INR in 1 week  -Weekly paracenteses, reinitiation of diuresis per  hepatology     Non-oliguric BEN  Non nephrotic range proteinuria  Admission creatinine 3.16 from baseline 0.5-0.9.  Nephrology was consulted, etiology thought to be likely ATN physiology due to infection as well as hypotension.  Patient received 1 mg/kg albumin.  Patient's BEN resolved prior to discharge.     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  Patient briefly had neutropenia as well as lymphopenia (ANC 2.8) which resolved spontaneously.     Type 2 diabetes mellitus  Due to acute illness, patient's oral hypoglycemics were held.  Managed with insulin.  At discharge patient requiring between 2-4 units of aspirin daily.  -1 unit aspart 3 times daily with meals  -Primary care follow-up within 1 week, adjust insulin as needed    # Discharge Pain Plan:  - During his hospitalization, Yaneli experienced pain due to ascites.  The pain plan for discharge was discussed with Yaneli and the plan was created in a collaborative fashion.    - Pharmacologic adjuvants:  Acetaminophen 2g limit  - weekly paracenteses, future liver transplant    Consultations This Hospital Stay   PHYSICAL THERAPY ADULT IP CONSULT  OCCUPATIONAL THERAPY ADULT IP CONSULT  GI HEPATOLOGY ADULT IP CONSULT  NEPHROLOGY GENERAL ADULT IP CONSULT  PHARMACY TO DOSE VANCO  SOCIAL WORK IP CONSULT  NUTRITION SERVICES ADULT IP CONSULT  CARDIOLOGY GENERAL ADULT IP CONSULT  TRANSPLANT SURGERY LIVER ADULT IP CONSULT  VASCULAR ACCESS CARE ADULT IP CONSULT  INTERNAL MEDICINE PROCEDURE TEAM ADULT IP CONSULT [place] - [place]  VASCULAR ACCESS CARE ADULT IP CONSULT  VASCULAR ACCESS CARE ADULT IP CONSULT  VASCULAR ACCESS CARE ADULT IP CONSULT  SWALLOW EVAL SPEECH PATH AT BEDSIDE IP CONSULT  INTERNAL MEDICINE PROCEDURE TEAM ADULT IP CONSULT [place] - [facility]  WOUND OSTOMY CONTINENCE NURSE  IP CONSULT  WOUND OSTOMY CONTINENCE NURSE  IP CONSULT  INTERNAL MEDICINE PROCEDURE  TEAM ADULT IP CONSULT [place] - [place]     Code Status   Full Code       The patient was discussed with Dr. Luis Miguel Lamas  [facility]  Pager: [phone]  ______________________________________________________________________    Physical Exam   Vital Signs: Temp: 98.5  F (36.9  C) Temp src: Oral BP: 111/73 Pulse: 78 Heart Rate: 72 Resp: 16 SpO2: 100 % O2 Device: None (Room air)    Weight: 156 lbs 11.95 oz    General Appearance: Pleasant, nad, jaundiced, increased energy  Respiratory: Bibasilar crackles, breathing comfortably on room air  Cardiovascular: rrr, no m/r/g  GI: firm, moderately distended, mild diffuse pain, no rebound or guarding, nabs  Skin: no rashes or jaundiced  Other: alert, disoriented to time, no asterixis, trace LE edema    Significant Results and Procedures   Most Recent 3 CBC's:  Recent Labs   Lab Test  06/28/18 0619 06/27/18   0531  06/26/18   0429   WBC  5.3  4.8  5.9   HGB  14.5  14.7  15.0   MCV  86  87  89   PLT  38*  44*  47*     Most Recent 3 BMP's:  Recent Labs   Lab Test  06/28/18 0619  06/27/18   1544  06/27/18   0531   NA  130*  131*  131*   POTASSIUM  3.8  4.2  3.7   CHLORIDE  95  98  99   CO2  23  21  23   BUN  16  18  19   CR  1.01  1.06  0.97   ANIONGAP  12  12  9   JESUS  8.0*  7.9*  7.9*   GLC  104*  157*  192*     Most Recent 2 LFT's:  Recent Labs   Lab Test  06/28/18 0619  06/27/18   0531   AST  97*  88*   ALT  59  57   ALKPHOS  92  83   BILITOTAL  34.4*  31.6*     Most Recent 3 INR's:  Recent Labs   Lab Test  06/28/18 0619  06/27/18   0531  06/26/18   0429   INR  1.84*  1.79*  1.57*     Most Recent 6 Bacteria Isolates From Any Culture (See EPIC Reports for Culture Details):  Recent Labs   Lab Test  06/28/18   1111  06/25/18   1600  06/21/18   1936  06/21/18   1744  06/21/18   1500  06/18/18   0442   CULT  Culture negative monitoring continues  Culture negative monitoring continues  No growth  No growth  No growth  No growth   ,    Results for orders placed or performed during the hospital encounter of 06/16/18   XR Chest Port 1 View    Narrative    XR CHEST PORT 1 VW  6/16/2018 7:50 PM      HISTORY: verify CVC placed at OSH;     COMPARISON: None.    FINDINGS: Enteric tube tip projects over the proximal duodenum. Right  internal jugular vein approach central venous catheter tip projects  over the right atrium. Cardiac silhouette is upper limits of normal in  size. Low lung volumes. No pneumothorax or pleural effusion. Perihilar  and bibasilar linear opacities.      Impression    IMPRESSION:   1. Right IJ approach intravenous catheter tip projects over the right  atrium.  2. Enteric tube tip projects over the proximal duodenum.  3. Perihilar and bibasilar linear opacities with low lung volumes,  suggestive of atelectasis.    I have personally reviewed the examination and initial interpretation  and I agree with the findings.    MANDO TAVERAS MD   XR Abdomen Port 1 View    Narrative    XR ABDOMEN PORT 1 VW  6/16/2018 7:30 PM      HISTORY: verify ng/og placed at OSH;     COMPARISON: None.    FINDINGS: Enteric tube tip projects over the proximal duodenum.  Gaseous distention of the stomach. Paucity additional intraluminal,  intra-abdominal bowel gas. No acute osseous abnormalities.      Impression    IMPRESSION:   1. Enteric tube tip projects over the proximal duodenum.  2. Gaseous distention of the stomach. There is paucity of intraluminal  intra-abdominal bowel gas. Nonspecific bowel gas pattern.    I have personally reviewed the examination and initial interpretation  and I agree with the findings.    MANDO TAVERAS MD   US Abdomen Complete w Doppler Complete    Narrative    EXAMINATION: US ABDOMEN COMPLETE WITH DOPPLER, 6/16/2018 10:18 PM     COMPARISON: None available    HISTORY: liver failure, renal failure.  Known non-occlusive portal  vein thrombus, eval for interval change    TECHNIQUE: The abdomen was scanned in standard  fashion with  specialized ultrasound transducer(s) using both gray-scale, color  Doppler, and spectral flow techniques.    Findings:    Liver: Cirrhotic configuration of the liver including contour  nodularity and coarsening of liver echotexture. No focal liver  lesions.    Extrahepatic portal vein flow is antegrade, measuring 22 cm/sec. The  main portal vein diameter is 1.4 cm.  Right portal vein flow is antegrade, measuring 18 cm/sec.  Left portal vein flow is antegrade, measuring 22 cm/sec.    Flow in the hepatic artery is towards the liver and:  176 cm/sec peak systolic  0.83 resistive index.     The splenic vein is patent and flow is towards the liver.  The left,  middle, and right hepatic veins are patent with flow towards the IVC.  The IVC is patent with flow towards the heart measuring 1.9 cm.    Aorta is obscured due to overlying bowel gas.    Gallbladder: Sludge and stones in the gallbladder with gallbladder  wall thickening up to 5.3 mm, with pericholecystic fluid which is  nonspecific in the context of liver failure and ascites. No  gallbladder wall hyperemia.    Bile Ducts: Both the intra- and extrahepatic biliary system are of  normal caliber.  The common bile duct measures 5.1 mm in diameter.    Pancreas: Obscured due to overlying bowel gas.    Kidneys: Right kidney is of normal echotexture, without mass or  hydronephrosis measuring 11.9 cm in craniocaudal dimension. Left  kidney is not well-visualized and measuring 11.8 cm in craniocaudal  dimension.     Spleen: The spleen is enlarged and measures 15.4 cm in sagittal  dimension.    Fluid: Moderate to large amount of ascites in the abdomen.      Impression    Impression:   1.  Cirrhotic configuration of the liver parenchyma with sequela of  portal hypertension including splenomegaly and moderate to large  amount of ascites. No focal liver lesions.  2.  Patent hepatic vasculature with normal direction of flow. No  evidence for portal vein thrombus in  the present study.   3.  Cholelithiasis and sludge within the gallbladder. Gallbladder wall  thickening and pericholecystic fluid which is nonspecific in the  context of liver failure and ascites.    I have personally reviewed the examination and initial interpretation  and I agree with the findings.    GEOVANY SHEPARD MD   XR Chest Port 1 View    Narrative    Exam: XR CHEST PORT 1 VW, 6/17/2018 3:34 AM    Indication: Endotracheal tube positioning;     Comparison: Radiograph on 6/16/2018 at 1921    Findings:   Supine frontal view of chest.  Endotracheal tube projects 3.7 cm above the tano. Stable right IJ  catheter. Feeding tube courses below the left hemidiaphragm and out of  the field-of-view.    Persistent low lung volumes. Cardiomediastinal silhouette and  pulmonary vasculature are within normal limits. Unchanged retrocardiac  and bibasilar opacities. No appreciable pneumothorax.      Impression    Impression:   1.Endotracheal tube projects 3.7 cm above the tano.  2. Persistent low lung volumes.  3. Unchanged retrocardiac and bibasilar opacities, atelectasis and/or  consolidation.    I have personally reviewed the examination and initial interpretation  and I agree with the findings.    GEOVANY SHEPARD MD   XR Abdomen Port 1 View    Narrative    Exam:  XR ABDOMEN PORT 1 VW, 6/22/2018 1:30 AM    History: WORSENED ABDOMINAL PAIN;     Comparison:  None available.    Findings:  Left lateral decubitus abdominal radiograph. Few  nonspecific air-fluid levels. No pneumoperitoneum. Paucity of bowel  gas.      Impression    Impression:    1. No free air.  2. Nonspecific bowel gas pattern.     I have personally reviewed the examination and initial interpretation  and I agree with the findings.    NICANOR MARIN MD   XR Chest Port 1 View    Narrative    Exam:  XR CHEST PORT 1 VW, 6/22/2018 1:31 AM    History: Endotracheal tube positioning;     Comparison:  Chest radiograph 6/17/2018.    Findings:  Single AP view  of the chest. Endotracheal tube tip projects  just above the tano. Right IJ central venous catheter projects over  the mid SVC. Cardiac silhouette is stable. Low lung volumes. Small  left pleural effusion and adjacent basilar opacities. No pneumothorax.  Visualized upper abdomen and bones are unremarkable.      Impression    Impression:    1. Endotracheal tube tip projects at the level of the tano.  Recommend retraction.  2. Increased small left pleural effusion and adjacent basilar  atelectasis and/or consolidation.    [Result: Endotracheal tube positioning]    Finding was identified on 6/22/2018 1:31 AM.     MICU team was contacted by Dr. Kenny at 6/22/2018 1:33 AM and  verbalized understanding of the urgent finding.     I have personally reviewed the examination and initial interpretation  and I agree with the findings.    NICANOR MARIN MD   XR Abdomen Port 1 View    Narrative    Exam:  XR ABDOMEN PORT 1 VW, 6/22/2018 3:21 AM    History: [place] tube placement;     Comparison:  Abdominal radiograph 6/22/2018, 6/16/2018.    Findings:  Single supine radiograph of the abdomen. [place] tube tip  projects near the expected location of the gastric antrum. No  abnormally dilated bowel. Paucity of small bowel gas. Left greater  than right basilar opacities in the lung bases.      Impression    Impression:   [place] tube tip projects near the expected location of the gastric  antrum.    I have personally reviewed the examination and initial interpretation  and I agree with the findings.    NICANOR MARIN MD   XR Abdomen Port 1 View    Narrative    Exam:  XR ABDOMEN PORT 1 VW, 6/22/2018 3:26 AM    History: MN Tube;     Comparison:  Abdominal radiograph 0237 hours on 6/22/2018.    Findings:  Portable supine radiograph of the abdomen. [place] tube  tip projects near the gastric antrum with balloon inflated just below  the GE junction. No abnormally dilated bowel. No pneumatosis or portal  venous gas. Left  greater than right basilar pulmonary opacities.      Impression    Impression:    [place] tube tip projects near the gastric antrum with balloon  inflated just below the GE junction.    I have personally reviewed the examination and initial interpretation  and I agree with the findings.    NICANOR MARIN MD   XR Chest Port 1 View    Narrative    Exam: XR CHEST PORT 1 VW, 6/22/2018 8:58 AM    Indication: verify new ET position;     Comparison: Chest x-ray 6/22/2018 obtained at 0032    Findings: [place] tube projects over the esophagus/stomach. Right IJ  catheter tip projects over the superior cavoatrial junction. ET tube  tip projects 2.1 cm from the tano. Lung volumes are low and there  are streaky perihilar/bibasilar opacities. No pneumothorax or large  pleural effusion.      Impression    Impression:   1. ET tube tip projects 2.1 cm from the tano.  2. Low lung volumes with streaky perihilar and bibasilar opacities.  Differential includes atelectasis, infection, and aspiration.    I have personally reviewed the examination and initial interpretation  and I agree with the findings.    NICANOR MARIN MD   XR Chest Port 1 View    Narrative    Exam: XR CHEST PORT 1 VW, 6/22/2018 11:31 AM    Indication: et repositioned;     Comparison: Chest x-ray 6/22/2018 obtained at 0836    Findings:   ET tube has been retracted with the tip now projecting 3.5 cm from the  tano. The [place] tube positioning is unchanged. Right IJ catheter  sheath projects over the low SVC. Heart is enlarged and the pulmonary  vasculature is indistinct. Dense bibasilar opacities. No pneumothorax.  Small left-sided pleural effusion.      Impression    Impression:   1. ET tube tip projects 3.5 cm from the tano.  2. Cardiomegaly with pulmonary vascular congestion.  3. Dense bibasilar opacities. Primary differential includes  atelectasis, aspiration, and pneumonia.    I have personally reviewed the examination and initial interpretation  and  I agree with the findings.    NICANOR MARIN MD   XR Surgery SARAH L/T 5 Min Fluoro w Stills    Narrative    This exam was marked as non-reportable because it will not be read by a   radiologist or a [facility] non-radiologist provider.             XR Chest Port 1 View    Narrative    Exam:  XR CHEST PORT 1 VW, 6/24/2018 11:07 AM    History: eval for interval change;     Comparison:  Chest radiograph 6/22/2018.    Findings:  Single AP view of the chest. Endotracheal tube tip projects  approximately 3 cm above the tano. Right IJ central venous catheter  tip projects over the mid SVC. Gastric tube sidehole projects near the  GE junction.    Cardiac silhouette is within normal limits. Low lung volumes. Slightly  decreased left greater than right basilar opacities. No pneumothorax.  Retained contrast from prior ERCP in the epigastrium. Visualized upper  abdomen is otherwise unremarkable. Bones are unremarkable.      Impression    Impression:    1.  Low lung volumes with slightly decreased left greater than right  basilar atelectasis and/or consolidation.  2. Gastric tube sidehole projects near the GE junction. Consider  slight advancement. Additional support devices are stable.    I have personally reviewed the examination and initial interpretation  and I agree with the findings.    PAUL COOL MD   XR Abdomen Port 1 View    Narrative    Exam: XR ABDOMEN PORT 1 VW, 6/24/2018 2:10 PM    Indication: verify NG placement after extubation;     Comparison: Radiographs dated 6/24/2018, 6/22/2018.    Findings:   Single, portable AP view of the abdomen. Nasogastric tube in place  with the tip projecting over the stomach. The sidehole located near  the gastroesophageal junction. Residual contrast from prior procedure  noted in the stomach. Additional lines presumed exterior to the  patient. The most inferior portions of the abdomen were collimated out  of the field-of-view. Gas pattern in the abdomen is nonobstructive.  No  pneumatosis or portal venous gas. Left greater than right airspace  opacities noted in the visualized portion of the lower lungs, which  are better seen on same-day chest radiograph.      Impression    Impression:   1. New nasogastric tube in place with the tip projecting over the  stomach and the sidehole near the gastroesophageal junction. Recommend  slight advancement.  2. Redemonstration of bilateral airspace opacities in the lower lungs,  which are better seen on same-day chest radiograph.    I have personally reviewed the examination and initial interpretation  and I agree with the findings.    GEOVANY SHEPARD MD       Pending Results   These results will be followed up by hepatology  Unresulted Labs Ordered in the Past 30 Days of this Admission     Date and Time Order Name Status Description    6/27/2018 1611 fluid culture - Aerobic Bacterial Preliminary     6/25/2018 1405 fluid culture - Aerobic Bacterial Preliminary     6/23/2018 2223 Platelets prepare order unit conditional In process              Primary Care Physician   Provider Not In System    Discharge Disposition   Discharged to home  Condition at discharge: Stable    Discharge Orders     Home Care PT Referral for Hospital Discharge     MD face to face encounter   Documentation of Face to Face and Certification for Home Health Services    I certify that patient: Yaneli Miles is under my care and that I, or a nurse practitioner or physician's assistant working with me, had a face-to-face encounter that meets the physician face-to-face encounter requirements with this patient on: 6/28/2018.    This encounter with the patient was in whole, or in part, for the following medical condition, which is the primary reason for home health care: hepatic encephalopathy.    I certify that, based on my findings, the following services are medically necessary home health services: Physical Therapy.    My clinical findings support the need for the above services  because: Physical Therapy Services are needed to assess and treat the following functional impairments: progressing strength, balance and endurance to improve safety and independence with bed mobility, transfers, gait and stairs.    Further, I certify that my clinical findings support that this patient is homebound (i.e. absences from home require considerable and taxing effort and are for medical reasons or [employer] services or infrequently or of short duration when for other reasons) because: Requires assistance of another person or specialized equipment to access medical services because patient: Requires supervision of another for safe transfer...    Based on the above findings. I certify that this patient is confined to the home and needs intermittent skilled nursing care, physical therapy and/or speech therapy.  The patient is under my care, and I have initiated the establishment of the plan of care.  This patient will be followed by a physician who will periodically review the plan of care.  Physician/Provider to provide follow up care: System, Provider Not In    Attending hospital physician (the Medicare certified [facility] provider): Leonora Bolanos MD  Physician Signature: See electronic signature associated with these discharge orders.  Date: 6/28/2018     Reason for your hospital stay   You were admitted for infection in your urine and abdominal fluid, confusion related to liver, and massive stomach bleed     Activity   Your activity upon discharge: activity as tolerated, 24 hour supervision     When to contact your care team   Return the the hospital IMMEDIATELY if you notice blood in your stools or vomit    Call triage or return to the hospital if you have any of the following: increased confusion, increased abdominal pain.     Discharge Instructions   -follow up with your primary care doctor or establish with a primary care doctor in 1 week with lab draw  -lab check on 7/2  -follow up with liver  doctors within 2 weeks  -Start taking insulin with meals  -Start taking ciprofloxacin to prevent infection  -Stop taking nadolol, furosemide, and spironolactone  -return weekly for abdominal drainage     [facility] Follow-up and recommended labs and tests   Follow up with primary care provider within 7 days. If patient does not have primary care provider, establish within 7 days for hospital follow- up at [facility].  The following labs/tests are recommended: CMP, phos, mg, INR.     Follow up with [facility] hepatology clinic, within 2 weeks  to evaluate treatment change and for hospital follow- up. The following labs/tests are recommended: CMP, INR in 1 week.    Appointments on [place] and/or [facility] (with [facility] or [facility] provider or service). Call [phone] if you haven't heard regarding these appointments within 7 days of discharge.     Full Code     Diet   Follow this diet upon discharge: Orders Placed This Encounter     Regular Diet Adult Thin Liquids (water, ice chips, juice, milk, gelatin, ice cream, etc)       Discharge Medications   Discharge Medication List as of 6/28/2018  1:59 PM      START taking these medications    Details   acetaminophen (TYLENOL) 500 MG tablet Take 1 tablet (500 mg) by mouth every 6 hours as needed for mild pain or fever, Disp-120 tablet, R-0, E-Prescribe      ciprofloxacin (CIPRO) 500 MG tablet Take 1 tablet (500 mg) by mouth every 24 hours, Disp-30 tablet, R-0, E-Prescribe      insulin aspart (NOVOLOG PEN) 100 UNIT/ML injection Inject 1 Units Subcutaneous 3 times daily (with meals), Disp-3 mL, R-0, E-Prescribe      lidocaine (XYLOCAINE) 5 % ointment Apply topically every 4 hours as needed for moderate painDisp-240 g, [id number]      miconazole (MICATIN; MICRO GUARD) 2 % powder Apply topically 2 times dailyDisp-90 g, [id number]      pantoprazole (PROTONIX) 40 MG EC tablet Take 1 tablet (40 mg) by mouth 2 times daily (before meals), Disp-180 tablet,  R-0, E-Prescribe      phosphorus tablet 250 mg (PHOSPHA 250 NEUTRAL) 250 MG per tablet Take 2 tablets (500 mg) by mouth 2 times daily, Disp-120 tablet, R-0, E-Prescribe         CONTINUE these medications which have CHANGED    Details   lactulose (CHRONULAC) 10 GM/15ML solution Take 30 mLs (20 g) by mouth 3 times daily Goal of 3-4 BMs per day, Disp-1892 mL, R-2, E-Prescribe      rifaximin (XIFAXAN) 550 MG TABS tablet Take 1 tablet (550 mg) by mouth 2 times daily, Disp-180 tablet, R-0, E-Prescribe         CONTINUE these medications which have NOT CHANGED    Details   ferrous sulfate (IRON) 325 (65 Fe) MG tablet Take 325 mg by mouth, Historical      omeprazole (PRILOSEC) 20 MG CR capsule Take 20 mg by mouth, Historical      simethicone (MI-ACID GAS RELIEF) 80 MG chewable tablet Take 80 mg by mouth, Historical      tenofovir (VIREAD) 300 MG tablet Take 300 mg by mouth, Historical         STOP taking these medications       exenatide (BYETTA 10 MCG PEN) 10 MCG/0.04ML injection Comments:   Reason for Stopping:         furosemide (LASIX) 40 MG tablet Comments:   Reason for Stopping:         insulin glargine (LANTUS) 100 UNIT/ML injection Comments:   Reason for Stopping:         nadolol (CORGARD) 20 MG tablet Comments:   Reason for Stopping:         sitagliptin (JANUVIA) 100 MG tablet Comments:   Reason for Stopping:         spironolactone (ALDACTONE) 25 MG tablet Comments:   Reason for Stopping:             Allergies   Allergies   Allergen Reactions     Nsaids      Contraindicated

## 2018-06-29 NOTE — PLAN OF CARE
Problem: Patient Care Overview  Goal: Plan of Care/Patient Progress Review  Occupational Therapy Discharge Summary    Reason for therapy discharge:    Discharged to home with home therapy.    Progress towards therapy goal(s). See goals on Care Plan in [facility] electronic health record for goal details.  Goals partially met.  Barriers to achieving goals:   discharge from facility.    Therapy recommendation(s):    Continued therapy is recommended.  Rationale/Recommendations:  home therapy recommended to increase ADL/IADL IND and activity tolerance.

## 2018-07-02 NOTE — TELEPHONE ENCOUNTER
Spoke with wife with aid of [employer] . Reports patient doing fair. Needs weekly lab work and will get [phone]) to come out this week. Starting 7/11 will get weekly labs same time as his paracentesis.     Request sent to scheduling for hepatology, dietitian, 1:1 teaching class to be done within next 2 weeks.

## 2018-07-02 NOTE — PLAN OF CARE
Problem: Patient Care Overview  Goal: Plan of Care/Patient Progress Review  Physical Therapy Discharge Summary    Reason for therapy discharge:    Discharged to home with home therapy.    Progress towards therapy goal(s). See goals on Care Plan in [facility] electronic health record for goal details.  Goals met    Therapy recommendation(s):    Continued therapy is recommended.  Rationale/Recommendations:  Pt will benefit from additional skilled home PT to address endurance and strength..

## 2018-07-03 NOTE — TELEPHONE ENCOUNTER
Left message with his [facility]  that he needs weekly MELD labs. Left this RN contact information and request a call back.

## 2018-07-05 NOTE — MR AVS SNAPSHOT
After Visit Summary   7/5/2018    Yaneli Miles    MRN: [id number]           Patient Information     Date Of Birth          1975        Visit Information        Provider Department      7/5/2018 12:00 PM Provider, [facility]; [place] [facility]; [address] Specialty and Procedure        Today's Diagnoses     Alcoholic cirrhosis of liver with ascites (H)    -  1    Chronic hepatitis B with cirrhosis (H)          Care Instructions      Paracentesis  Your healthcare provider recommends that you have paracentesis. This is a procedure to remove extra fluid from your belly (abdomen). A needle is used to drain the fluid. A small sample of fluid may be taken and tested for problems. If the fluid buildup is causing discomfort or pain, all of the fluid may be drained. To do this, a tube is attached to the needle. The fluid is drained into a container that sits outside of the body. If symptoms are severe, paracentesis may be done as an emergency procedure. Otherwise, it will be scheduled ahead of time. Read on to learn more about paracentesis and how it works.     Understanding ascites  Many of the body s organs, including the liver and intestines, are inside the belly (abdomen). The organs are covered in a thin membrane called the peritoneum. The peritoneum has 2 layers. It makes a fluid that allows the layers to glide smoothly past each other. If this fluid builds up in the belly, the condition is called ascites. Ascites causes pain and discomfort. It can also make it hard to breathe. Fluid can build up for a number of reasons. These include chronic liver disease (cirrhosis), heart or kidney failure, and cancer. Your provider can tell you more about the cause of your ascites.  How paracentesis works  The goal of paracentesis may be to help diagnose the cause of the excess fluid. Or, the goal may be to drain excess fluid from the abdomen. In some cases, fluid  returns and the procedure needs to be repeated.  Before the procedure    Tell your provider about any medicines you are taking. This includes all prescription medicines, over-the-counter medicines, street drugs, herbs, vitamins, and other supplements.    Tell your provider about any allergies you have.    Before the procedure begins, you ll be asked to empty your bladder. This helps prevent injury to the bladder during the procedure. If needed, a thin tube (Luis catheter) may be placed into your bladder to drain urine during the procedure. This tube is removed after the procedure.    An IV (intravenous) line may be put into a vein in your arm or hand. This line supplies fluids and medicines.  During the procedure    You are awake during the procedure.    An imaging method called ultrasound may be used to guide the procedure. It shows live images of the inside of your belly on a video screen. This helps the provider find the site of the excess fluid inside your belly and decide where to insert the needle.    A numbing medicine (local anesthesia) is injected into your belly where the needle will be inserted.    Once the skin is numb, the provider carefully inserts the needle into the belly. This causes the needle to fill with fluid.    The needle may be removed with only a small sample of fluid. This sample is sent to a lab for testing. Getting a sample takes about 10 to 15 minutes.    Or, a tube may be attached to the needle so that more of the excess fluid can be drained. The tube may be taped or stitched into place. This keeps it from pulling the needle out of your belly.    How long it takes to drain all of the fluid varies for each person. In most cases, it takes about 30 minutes. Your provider will let you know if the procedure is expected to take longer than usual.    Once all of the fluid is drained, the needle and tube are removed.    Pressure is put on the puncture site to stop any fluid leakage or  bleeding.    A small bandage is placed over the puncture site. Albumin may be given during or after the procedure to prevent low blood pressure or kidney problems.  After the procedure  You may be taken to a recovery room to rest after the procedure. If you are in pain, you will be given medicine as needed. You will likely be sent home 1 to 2 hours after the procedure is done. When you leave the hospital, have an adult family member or friend drive you home. If you are staying in the hospital, you will return to your hospital room.  Risks and possible complications of paracentesis  This procedure is considered safe. But like all procedures, it carries some risks. These include the following:    Bleeding    Infection    Injury to structures in the belly    Fast drop in blood pressure   Recovering at home    If needed, your provider can prescribe or recommend pain medicines for you to take at home. Take these exactly as directed. If you stopped taking other medicines before the procedure, ask your provider when you can start them again.    You may remove the bandage 24 hours after the procedure.    Take it easy for 24 hours after the procedure. Avoid physical activity until your provider says it s OK.  Follow-up care  Make a follow-up appointment with your provider as directed. During your follow-up visit, your provider will check your healing. Let your provider know how you are feeling. You can also discuss the cause of your ascites and if any more treatment is needed.   When to seek medical care  Call your healthcare provider if you notice any of the following after the procedure:    A fever of 100.4 F (38.0 C) or higher    Trouble breathing    Pain that does not go away even after taking pain medicine    Belly pain not caused by having the skin punctured    Bleeding from the puncture site    More than a small amount of fluid leakage from the puncture site    Swollen belly    Signs of infection at the puncture site.  These include increased pain, redness, or swelling, as well as warmth or bad-smelling drainage.    Blood in your urine    Dizziness, lightheadedness, or fainting   Date Last Reviewed: 7/1/2016 2000-2017 [employer]. [address] [place] [address]. All rights reserved. This information is not intended as a substitute for professional medical care. Always follow your healthcare professional's instructions.                Follow-ups after your visit        Your next 10 appointments already scheduled     Jul 11, 2018  2:00 PM CDT   Paracentesis Visit with [facility] Spec Inf Para Provider, UC 40 ATC   [facility] Special[facility] Procedure ([facility])    [address]  [address]  [place] [address]   [phone]            Jul 18, 2018  9:30 AM CDT   Paracentesis Visit with Uc Spec Inf Para Provider, UC 40 ATC   [facility] Specialty [facility] Procedure ([facility])    [address]  [address]  [place] [address]   [phone]            Jul 25, 2018  9:30 AM CDT   Paracentesis Visit with Uc Spec Inf Para Provider, UC 40 ATC   [facility] Specialty [facility] Procedure ([facility])    [address]  [address]  [place] [address]   [phone]            Jul 30, 2018  9:00 AM CDT   Lab with UC LAB   [facility] ([facility])    [address]  1st Floor  [place] [address]   [phone]            Jul 30, 2018 10:00 AM CDT   (Arrive by 9:45 AM)   New General Liver with Collette Helton MD   [facility] ([facility])    [address]  [address]  [place] [address]   [phone]            Aug 01, 2018  9:30 AM CDT   Paracentesis Visit with Uc Spec Inf Para Provider, UC 40 ATC   [facility] Specialty and Procedure  "([facility])    [address]  [address]  [place] [address] [phone]              Who to contact     If you have questions or need follow up information about today's clinic visit or your schedule please contact [facility] SPECIALTY AND PROCEDURE directly at [phone].  Normal or non-critical lab and imaging results will be communicated to you by MyChart, letter or phone within 4 business days after the clinic has received the results. If you do not hear from us within 7 days, please contact the clinic through MyChart or phone. If you have a critical or abnormal lab result, we will notify you by phone as soon as possible.  Submit refill requests through [employer] or call your pharmacy and they will forward the refill request to us. Please allow 3 business days for your refill to be completed.          Additional Information About Your Visit        [employer] Information     [employer] lets you send messages to your doctor, view your test results, renew your prescriptions, schedule appointments and more. To sign up, go to [email or web address] . Click on \"Log in\" on the left side of the screen, which will take you to the Welcome page. Then click on \"Sign up Now\" on the right side of the page.     You will be asked to enter the access code listed below, as well as some personal information. Please follow the directions to create your username and password.     Your access code is: [email or web address]  Expires: 2018  8:19 AM     Your access code will  in 90 days. If you need help or a new code, please call your [place] clinic or [phone].        Care EveryWhere ID     This is your Care EveryWhere ID. This could be used by other organizations to access your [place] medical records  [id number]        Your Vitals Were     Pulse Temperature Pulse Oximetry BMI (Body Mass Index)          75 99  F (37.2  C) 98% 21 kg/m2         Blood Pressure from " Last 3 Encounters:   07/05/18 112/66   06/28/18 111/73    Weight from Last 3 Encounters:   07/05/18 59 kg (130 lb 1.6 oz)   06/27/18 71.1 kg (156 lb 12 oz)              We Performed the Following     Albumin level     Bilirubin  total     Creatinine     INR     Sodium     US Paracentesis        Primary Care Provider Fax #    Provider Not In System [phone]                Equal Access to Services     [facility] Center[facility] : Hadii aad ku hadceliao Sodcali, waaxda luqadaha, qaybta kaalmada adeegyada, waxbrielle vladimirin dinon ademary jane alves laJorgevishal . So [facility] [phone].    ATENCIÓN: Si habla español, tiene a william disposición servicios gratuitos de asistencia lingüística. Llame al [phone].    We comply with applicable federal civil rights laws and [place] laws. We do not discriminate on the basis of race, color, national origin, age, disability, sex, sexual orientation, or gender identity.            Thank you!     Thank you for choosing [facility] SPECIALTY AND PROCEDURE  for your care. Our goal is always to provide you with excellent care. Hearing back from our patients is one way we can continue to improve our services. Please take a few minutes to complete the written survey that you may receive in the mail after your visit with us. Thank you!             Your Updated Medication List - Protect others around you: Learn how to safely use, store and throw away your medicines at www.disposemymeds.org.          This list is accurate as of 7/5/18  3:08 PM.  Always use your most recent med list.                   Brand Name Dispense Instructions for use Diagnosis    acetaminophen 500 MG tablet    TYLENOL    120 tablet    Take 1 tablet (500 mg) by mouth every 6 hours as needed for mild pain or fever    Cirrhosis of liver with ascites, unspecified hepatic cirrhosis type (H)       ciprofloxacin 500 MG tablet    CIPRO    30 tablet    Take 1 tablet (500 mg) by mouth every 24 hours    SBP (spontaneous bacterial  peritonitis) (H), Cirrhosis of liver with ascites, unspecified hepatic cirrhosis type (H)       ferrous sulfate 325 (65 Fe) MG tablet    IRON     Take 325 mg by mouth        insulin aspart 100 UNIT/ML injection    NovoLOG PEN    3 mL    Inject 1 Units Subcutaneous 3 times daily (with meals)    Type 2 diabetes mellitus without complication, without long-term current use of insulin (H)       lactulose 10 GM/15ML solution    CHRONULAC    1892 mL    Take 30 mLs (20 g) by mouth 3 times daily Goal of 3-4 BMs per day    Hepatic encephalopathy (H)       lidocaine 5 % ointment    XYLOCAINE    240 g    Apply topically every 4 hours as needed for moderate pain    Cirrhosis of liver with ascites, unspecified hepatic cirrhosis type (H)       MI-ACID GAS RELIEF 80 MG chewable tablet   Generic drug:  simethicone      Take 80 mg by mouth        miconazole 2 % powder    MICATIN; MICRO GUARD    90 g    Apply topically 2 times daily    Candidiasis of skin       omeprazole 20 MG CR capsule    priLOSEC     Take 20 mg by mouth        pantoprazole 40 MG EC tablet    PROTONIX    180 tablet    Take 1 tablet (40 mg) by mouth 2 times daily (before meals)    Bleeding gastric varices       phosphorus tablet 250 mg 250 MG per tablet    PHOSPHA 250 NEUTRAL    120 tablet    Take 2 tablets (500 mg) by mouth 2 times daily    Hypophosphatasia       rifaximin 550 MG Tabs tablet    XIFAXAN    180 tablet    Take 1 tablet (550 mg) by mouth 2 times daily    Hepatic encephalopathy (H)       tenofovir 300 MG tablet    VIREAD     Take 300 mg by mouth

## 2018-07-05 NOTE — PROGRESS NOTES
Paracentesis Nursing Note  Yaneli Miles [facility].    During today's appointment orders from Fazal Lamas were completed.    Progress Note:  Patient identification verified by name and date of birth.  Assessment completed.  Vitals monitored throughout appointment and recorded in Doc Flowsheets.  See proceduralist note in ultrasound.    Date of consent or authorization: 7/5/18.  Invasive Procedure Safety Checklist was completed and sent for scanning.     Paracentesis performed by Fazal Zaragoza PA-C Radiology.    The following labs were communicated to provider performing paracentesis:  Lab Results   Component Value Date    PLT 38 06/28/2018       Total amount of ascites fluid drained: 4.2 liters.  Color of ascites fluid: straw colored.  Total amount of albumin given: 50  grams.    Patient tolerated procedure well.    Post procedure,denies pain or discomfort post paracentesis.      Discharge Plan:  Discharge instructions were reviewed with patient.  Patient/Representative verbalized understanding and all questions were answered.   Discharged from [facility] in stable condition.    Michelle Simon RN    Administrations This Visit     albumin human 25 % injection 12.5 g     Admin Date Action Dose Route Administered By             07/05/2018 New Bag 12.5 g Intravenous Michelle Simon RN              Admin Date Action Dose Route Administered By             07/05/2018 New Bag 12.5 g Intravenous Michelle Simon RN              Admin Date Action Dose Route Administered By             07/05/2018 New Bag 12.5 g Intravenous Michelle Simon RN              Admin Date Action Dose Route Administered By             07/05/2018 New Bag 12.5 g Intravenous Michelle Simon RN                    lidocaine 1 % 20 mL     Admin Date Action Dose Route Administered By             07/05/2018 Given 15 mL Other Michelle Simon RN                         /72  Pulse 70  Temp 99  F (37.2  C)  Wt 63.8 kg (140 lb 9.6 oz)  SpO2 98%  BMI 22.69 kg/m2

## 2018-07-05 NOTE — PATIENT INSTRUCTIONS
Paracentesis  Your healthcare provider recommends that you have paracentesis. This is a procedure to remove extra fluid from your belly (abdomen). A needle is used to drain the fluid. A small sample of fluid may be taken and tested for problems. If the fluid buildup is causing discomfort or pain, all of the fluid may be drained. To do this, a tube is attached to the needle. The fluid is drained into a container that sits outside of the body. If symptoms are severe, paracentesis may be done as an emergency procedure. Otherwise, it will be scheduled ahead of time. Read on to learn more about paracentesis and how it works.     Understanding ascites  Many of the body s organs, including the liver and intestines, are inside the belly (abdomen). The organs are covered in a thin membrane called the peritoneum. The peritoneum has 2 layers. It makes a fluid that allows the layers to glide smoothly past each other. If this fluid builds up in the belly, the condition is called ascites. Ascites causes pain and discomfort. It can also make it hard to breathe. Fluid can build up for a number of reasons. These include chronic liver disease (cirrhosis), heart or kidney failure, and cancer. Your provider can tell you more about the cause of your ascites.  How paracentesis works  The goal of paracentesis may be to help diagnose the cause of the excess fluid. Or, the goal may be to drain excess fluid from the abdomen. In some cases, fluid returns and the procedure needs to be repeated.  Before the procedure    Tell your provider about any medicines you are taking. This includes all prescription medicines, over-the-counter medicines, street drugs, herbs, vitamins, and other supplements.    Tell your provider about any allergies you have.    Before the procedure begins, you ll be asked to empty your bladder. This helps prevent injury to the bladder during the procedure. If needed, a thin tube (Luis catheter) may be placed into your  bladder to drain urine during the procedure. This tube is removed after the procedure.    An IV (intravenous) line may be put into a vein in your arm or hand. This line supplies fluids and medicines.  During the procedure    You are awake during the procedure.    An imaging method called ultrasound may be used to guide the procedure. It shows live images of the inside of your belly on a video screen. This helps the provider find the site of the excess fluid inside your belly and decide where to insert the needle.    A numbing medicine (local anesthesia) is injected into your belly where the needle will be inserted.    Once the skin is numb, the provider carefully inserts the needle into the belly. This causes the needle to fill with fluid.    The needle may be removed with only a small sample of fluid. This sample is sent to a lab for testing. Getting a sample takes about 10 to 15 minutes.    Or, a tube may be attached to the needle so that more of the excess fluid can be drained. The tube may be taped or stitched into place. This keeps it from pulling the needle out of your belly.    How long it takes to drain all of the fluid varies for each person. In most cases, it takes about 30 minutes. Your provider will let you know if the procedure is expected to take longer than usual.    Once all of the fluid is drained, the needle and tube are removed.    Pressure is put on the puncture site to stop any fluid leakage or bleeding.    A small bandage is placed over the puncture site. Albumin may be given during or after the procedure to prevent low blood pressure or kidney problems.  After the procedure  You may be taken to a recovery room to rest after the procedure. If you are in pain, you will be given medicine as needed. You will likely be sent home 1 to 2 hours after the procedure is done. When you leave the hospital, have an adult family member or friend drive you home. If you are staying in the hospital, you will  return to your hospital room.  Risks and possible complications of paracentesis  This procedure is considered safe. But like all procedures, it carries some risks. These include the following:    Bleeding    Infection    Injury to structures in the belly    Fast drop in blood pressure   Recovering at home    If needed, your provider can prescribe or recommend pain medicines for you to take at home. Take these exactly as directed. If you stopped taking other medicines before the procedure, ask your provider when you can start them again.    You may remove the bandage 24 hours after the procedure.    Take it easy for 24 hours after the procedure. Avoid physical activity until your provider says it s OK.  Follow-up care  Make a follow-up appointment with your provider as directed. During your follow-up visit, your provider will check your healing. Let your provider know how you are feeling. You can also discuss the cause of your ascites and if any more treatment is needed.   When to seek medical care  Call your healthcare provider if you notice any of the following after the procedure:    A fever of 100.4 F (38.0 C) or higher    Trouble breathing    Pain that does not go away even after taking pain medicine    Belly pain not caused by having the skin punctured    Bleeding from the puncture site    More than a small amount of fluid leakage from the puncture site    Swollen belly    Signs of infection at the puncture site. These include increased pain, redness, or swelling, as well as warmth or bad-smelling drainage.    Blood in your urine    Dizziness, lightheadedness, or fainting   Date Last Reviewed: 7/1/2016 2000-2017 [employer]. [address] [place] [address], [place] [address]. All rights reserved. This information is not intended as a substitute for professional medical care. Always follow your healthcare professional's instructions.

## 2018-07-06 NOTE — TELEPHONE ENCOUNTER
Provider Call: General      Reason for call:Liver VM- 7/5/18- [facility] called - unable to reach Oanh- wonders if weekly labs are being done when he comes here for paracentesis on Wed     Call back needed? Yes    Return Call Needed  Same as documented in contacts section  When to return call?: Greater than one day: Route standard priority

## 2018-07-10 NOTE — COMMITTEE REVIEW
Abdominal Committee Review Note     Evaluation Date: 6/19/2018  Committee Review Date: 7/10/2018    Organ being evaluated for: Liver    Transplant Phase: Waitlist  Transplant Status: Active    Transplant Coordinator: Oanh Case  Transplant Surgeon:   Mamadou Norwood    Referring Physician: Mandeep De La Rosa    Primary Diagnosis: Cirrhosis: Type B-, HBsAG+  Secondary Diagnosis:     Committee Review Members:  Nutrition Katja Godinez, RD   Pharmacy Rikki Howell, [facility]    - Clinical Nalini Moznon, KAIA, Shreya Araya, [facility]   Transplant Micaela Epstein MD, Kay Reyes, RN, Stacey Landry, RN, Terrence Sanabria PA-C, Jr Stew Sheldon, MISAEL, Liliana Ewing, APRN CNP, Oanh Case, RN, Taylor Scherer, RN, Doug Macias MD, Derek Cooley MD, Fay Gurrola, MISAEL, Mamadou Norwood MD, Stew Min MD       Transplant Eligibility: Cirrhosis with MELD, HBV    Committee Review Decision: Remain Active    Relative Contraindications: None    Absolute Contraindications: None    Committee Chair Derek Lama MD verbally attested to the committee's decision.    Committee Discussion Details:   Remain active

## 2018-07-11 NOTE — MR AVS SNAPSHOT
After Visit Summary   7/11/2018    Yaneli Miles    MRN: [id number]           Patient Information     Date Of Birth          1975        Visit Information        Provider Department      7/11/2018 2:00 PM Provider, Uc Spec Inf Para; [place] [employer];  [facility] [place] Specialty and Procedure        Today's Diagnoses     Alcoholic cirrhosis of liver with ascites (H)    -  1    Chronic hepatitis B with cirrhosis (H)          Care Instructions    Dear Yaneli Miles    Thank you for choosing [facility] ([facility]) for your paracentesis.  The following information is a summary of our appointment as well as important reminders.      We look forward in seeing you on your next appointment here at [facility].  Please don t hesitate to call us at [phone] to reschedule any of your appointments or to speak with one of the [facility] registered nurses.  It was a pleasure taking care of you today.    Sincerely,    [facility]  [address] [place] [address]  [id number]  Phone:  [phone]        Paracentesis  Your healthcare provider recommends that you have paracentesis. This is a procedure to remove extra fluid from your belly (abdomen). A needle is used to drain the fluid. A small sample of fluid may be taken and tested for problems. If the fluid buildup is causing discomfort or pain, all of the fluid may be drained. To do this, a tube is attached to the needle. The fluid is drained into a container that sits outside of the body. If symptoms are severe, paracentesis may be done as an emergency procedure. Otherwise, it will be scheduled ahead of time. Read on to learn more about paracentesis and how it works.     Understanding ascites  Many of the body s organs, including the liver and intestines, are inside the belly (abdomen). The organs are covered in a  thin membrane called the peritoneum. The peritoneum has 2 layers. It makes a fluid that allows the layers to glide smoothly past each other. If this fluid builds up in the belly, the condition is called ascites. Ascites causes pain and discomfort. It can also make it hard to breathe. Fluid can build up for a number of reasons. These include chronic liver disease (cirrhosis), heart or kidney failure, and cancer. Your provider can tell you more about the cause of your ascites.  How paracentesis works  The goal of paracentesis may be to help diagnose the cause of the excess fluid. Or, the goal may be to drain excess fluid from the abdomen. In some cases, fluid returns and the procedure needs to be repeated.  Before the procedure    Tell your provider about any medicines you are taking. This includes all prescription medicines, over-the-counter medicines, street drugs, herbs, vitamins, and other supplements.    Tell your provider about any allergies you have.    Before the procedure begins, you ll be asked to empty your bladder. This helps prevent injury to the bladder during the procedure. If needed, a thin tube (Luis catheter) may be placed into your bladder to drain urine during the procedure. This tube is removed after the procedure.    An IV (intravenous) line may be put into a vein in your arm or hand. This line supplies fluids and medicines.  During the procedure    You are awake during the procedure.    An imaging method called ultrasound may be used to guide the procedure. It shows live images of the inside of your belly on a video screen. This helps the provider find the site of the excess fluid inside your belly and decide where to insert the needle.    A numbing medicine (local anesthesia) is injected into your belly where the needle will be inserted.    Once the skin is numb, the provider carefully inserts the needle into the belly. This causes the needle to fill with fluid.    The needle may be removed  with only a small sample of fluid. This sample is sent to a lab for testing. Getting a sample takes about 10 to 15 minutes.    Or, a tube may be attached to the needle so that more of the excess fluid can be drained. The tube may be taped or stitched into place. This keeps it from pulling the needle out of your belly.    How long it takes to drain all of the fluid varies for each person. In most cases, it takes about 30 minutes. Your provider will let you know if the procedure is expected to take longer than usual.    Once all of the fluid is drained, the needle and tube are removed.    Pressure is put on the puncture site to stop any fluid leakage or bleeding.    A small bandage is placed over the puncture site. Albumin may be given during or after the procedure to prevent low blood pressure or kidney problems.  After the procedure  You may be taken to a recovery room to rest after the procedure. If you are in pain, you will be given medicine as needed. You will likely be sent home 1 to 2 hours after the procedure is done. When you leave the hospital, have an adult family member or friend drive you home. If you are staying in the hospital, you will return to your hospital room.  Risks and possible complications of paracentesis  This procedure is considered safe. But like all procedures, it carries some risks. These include the following:    Bleeding    Infection    Injury to structures in the belly    Fast drop in blood pressure   Recovering at home    If needed, your provider can prescribe or recommend pain medicines for you to take at home. Take these exactly as directed. If you stopped taking other medicines before the procedure, ask your provider when you can start them again.    You may remove the bandage 24 hours after the procedure.    Take it easy for 24 hours after the procedure. Avoid physical activity until your provider says it s OK.  Follow-up care  Make a follow-up appointment with your provider as  directed. During your follow-up visit, your provider will check your healing. Let your provider know how you are feeling. You can also discuss the cause of your ascites and if any more treatment is needed.   When to seek medical care  Call your healthcare provider if you notice any of the following after the procedure:    A fever of 100.4 F (38.0 C) or higher    Trouble breathing    Pain that does not go away even after taking pain medicine    Belly pain not caused by having the skin punctured    Bleeding from the puncture site    More than a small amount of fluid leakage from the puncture site    Swollen belly    Signs of infection at the puncture site. These include increased pain, redness, or swelling, as well as warmth or bad-smelling drainage.    Blood in your urine    Dizziness, lightheadedness, or fainting   Date Last Reviewed: 7/1/2016 2000-2017 [employer]. [address] [place] [address]. All rights reserved. This information is not intended as a substitute for professional medical care. Always follow your healthcare professional's instructions.                Follow-ups after your visit        Your next 10 appointments already scheduled     Jul 18, 2018  9:30 AM CDT   Paracentesis Visit with Uc Spec Inf Para Provider, UC 40 ATC   [facility] Specialty and Procedure ([facility])    [address] [place] [address]  [address] [place] [address]   [phone]            Jul 25, 2018  9:30 AM CDT   Paracentesis Visit with Uc Spec Inf Para Provider, UC 40 ATC   [facility] Specialty and Procedure ([facility])    [address] [place] [address]  [address]  [facility] [address]   [phone]            Jul 30, 2018  8:30 AM CDT   NUTRITION VISIT with Silva Godinez RD   [facility] Solid Organ Transplant ([facility])    [address] [place] [address]  [address]  [facility]  [address]   [phone]            Jul 30, 2018  9:00 AM CDT   Lab with UC LAB   [facility] ([facility])    [address]  1st Floor  [facility] [address]   [phone]            Jul 30, 2018 10:00 AM CDT   (Arrive by 9:45 AM)   New General Liver with Collette Helton MD   [facility] Hepatology ([facility])    [address]  [address]  [facility] [address]   [phone]            Jul 30, 2018 11:00 AM CDT   Transplant Class with UC TRANSPLANT CLASS   [facility] Solid Organ Transplant ([facility])    [address]  [address]  [facility] [address]   [phone]            Aug 01, 2018  9:30 AM CDT   Paracentesis Visit with  Spec Inf Para Provider, UC 40 ATC   [facility] Specialty and Procedure ([facility])    [address]  [address]  [facility] [id number] [phone]              Who to contact     If you have questions or need follow up information about today's clinic visit or your schedule please contact [facility] SPECIALTY AND PROCEDURE directly at [phone].  Normal or non-critical lab and imaging results will be communicated to you by MyChart, letter or phone within 4 business days after the clinic has received the results. If you do not hear from us within 7 days, please contact the clinic through MyChart or phone. If you have a critical or abnormal lab result, we will notify you by phone as soon as possible.  Submit refill requests through [employer] or call your pharmacy and they will forward the refill request to us. Please allow 3 business days for your refill to be completed.          Additional Information About Your Visit        [employer] Information     [employer] lets you send messages to your doctor, view your test results, renew your prescriptions, schedule appointments and more. To sign up,  "go to [email or web address] . Click on \"Log in\" on the left side of the screen, which will take you to the Welcome page. Then click on \"Sign up Now\" on the right side of the page.     You will be asked to enter the access code listed below, as well as some personal information. Please follow the directions to create your username and password.     Your access code is: [email or web address]  Expires: 2018  8:19 AM     Your access code will  in 90 days. If you need help or a new code, please call your [facility] clinic or [phone].        Care EveryWhere ID     This is your Care EveryWhere ID. This could be used by other organizations to access your [facility] medical records  [id number]        Your Vitals Were     Pulse Temperature Respirations Height Pulse Oximetry BMI (Body Mass Index)    81 98.2  F (36.8  C) (Oral) 16 1.676 m (5' 6\") 100% 21.79 kg/m2       Blood Pressure from Last 3 Encounters:   18 113/66   18 112/66   18 111/73    Weight from Last 3 Encounters:   18 61.2 kg (135 lb)   18 59 kg (130 lb 1.6 oz)   18 71.1 kg (156 lb 12 oz)              We Performed the Following     AFP tumor marker     Albumin level     Bilirubin  total     Creatinine     INR     Sodium     US Paracentesis        Primary Care Provider Fax #    Provider Not In System [phone]                Equal Access to Services     DIANNA HOWARD : Hadii yolanda ku hadasho Soomaali, waaxda luqadaha, qaybta kaalmada adeegyada, jose m colin. So [facility] [phone].    ATENCIÓN: Si habla español, tiene a william disposición servicios gratuitos de asistencia lingüística. Llame al [phone].    We comply with applicable federal civil rights laws and [place] laws. We do not discriminate on the basis of race, color, national origin, age, disability, sex, sexual orientation, or gender identity.            Thank you!     Thank you for choosing [facility] [place] SPECIALTY " AND PROCEDURE  for your care. Our goal is always to provide you with excellent care. Hearing back from our patients is one way we can continue to improve our services. Please take a few minutes to complete the written survey that you may receive in the mail after your visit with us. Thank you!             Your Updated Medication List - Protect others around you: Learn how to safely use, store and throw away your medicines at www.disposemymeds.org.          This list is accurate as of 7/11/18  3:47 PM.  Always use your most recent med list.                   Brand Name Dispense Instructions for use Diagnosis    acetaminophen 500 MG tablet    TYLENOL    120 tablet    Take 1 tablet (500 mg) by mouth every 6 hours as needed for mild pain or fever    Cirrhosis of liver with ascites, unspecified hepatic cirrhosis type (H)       ciprofloxacin 500 MG tablet    CIPRO    30 tablet    Take 1 tablet (500 mg) by mouth every 24 hours    SBP (spontaneous bacterial peritonitis) (H), Cirrhosis of liver with ascites, unspecified hepatic cirrhosis type (H)       ferrous sulfate 325 (65 Fe) MG tablet    IRON     Take 325 mg by mouth        insulin aspart 100 UNIT/ML injection    NovoLOG PEN    3 mL    Inject 1 Units Subcutaneous 3 times daily (with meals)    Type 2 diabetes mellitus without complication, without long-term current use of insulin (H)       lactulose 10 GM/15ML solution    CHRONULAC    1892 mL    Take 30 mLs (20 g) by mouth 3 times daily Goal of 3-4 BMs per day    Hepatic encephalopathy (H)       lidocaine 5 % ointment    XYLOCAINE    240 g    Apply topically every 4 hours as needed for moderate pain    Cirrhosis of liver with ascites, unspecified hepatic cirrhosis type (H)       MI-ACID GAS RELIEF 80 MG chewable tablet   Generic drug:  simethicone      Take 80 mg by mouth        miconazole 2 % powder    MICATIN; MICRO GUARD    90 g    Apply topically 2 times daily    Candidiasis of skin       omeprazole 20 MG CR capsule     priLOSEC     Take 20 mg by mouth        pantoprazole 40 MG EC tablet    PROTONIX    180 tablet    Take 1 tablet (40 mg) by mouth 2 times daily (before meals)    Bleeding gastric varices       phosphorus tablet 250 mg 250 MG per tablet    PHOSPHA 250 NEUTRAL    120 tablet    Take 2 tablets (500 mg) by mouth 2 times daily    Hypophosphatasia       rifaximin 550 MG Tabs tablet    XIFAXAN    180 tablet    Take 1 tablet (550 mg) by mouth 2 times daily    Hepatic encephalopathy (H)       tenofovir 300 MG tablet    VIREAD     Take 300 mg by mouth

## 2018-07-11 NOTE — PATIENT INSTRUCTIONS
Dear Yaneli Miles    Thank you for choosing [facility] ([facility]) for your paracentesis.  The following information is a summary of our appointment as well as important reminders.      We look forward in seeing you on your next appointment here at [facility].  Please don t hesitate to call us at [phone] to reschedule any of your appointments or to speak with one of the [facility] registered nurses.  It was a pleasure taking care of you today.    Sincerely,    [facility]  [facility]  [address] [place] [address]  [id number]  Phone:  [phone]        Paracentesis  Your healthcare provider recommends that you have paracentesis. This is a procedure to remove extra fluid from your belly (abdomen). A needle is used to drain the fluid. A small sample of fluid may be taken and tested for problems. If the fluid buildup is causing discomfort or pain, all of the fluid may be drained. To do this, a tube is attached to the needle. The fluid is drained into a container that sits outside of the body. If symptoms are severe, paracentesis may be done as an emergency procedure. Otherwise, it will be scheduled ahead of time. Read on to learn more about paracentesis and how it works.     Understanding ascites  Many of the body s organs, including the liver and intestines, are inside the belly (abdomen). The organs are covered in a thin membrane called the peritoneum. The peritoneum has 2 layers. It makes a fluid that allows the layers to glide smoothly past each other. If this fluid builds up in the belly, the condition is called ascites. Ascites causes pain and discomfort. It can also make it hard to breathe. Fluid can build up for a number of reasons. These include chronic liver disease (cirrhosis), heart or kidney failure, and cancer. Your provider can tell you more about the cause of your ascites.  How paracentesis  works  The goal of paracentesis may be to help diagnose the cause of the excess fluid. Or, the goal may be to drain excess fluid from the abdomen. In some cases, fluid returns and the procedure needs to be repeated.  Before the procedure    Tell your provider about any medicines you are taking. This includes all prescription medicines, over-the-counter medicines, street drugs, herbs, vitamins, and other supplements.    Tell your provider about any allergies you have.    Before the procedure begins, you ll be asked to empty your bladder. This helps prevent injury to the bladder during the procedure. If needed, a thin tube (Luis catheter) may be placed into your bladder to drain urine during the procedure. This tube is removed after the procedure.    An IV (intravenous) line may be put into a vein in your arm or hand. This line supplies fluids and medicines.  During the procedure    You are awake during the procedure.    An imaging method called ultrasound may be used to guide the procedure. It shows live images of the inside of your belly on a video screen. This helps the provider find the site of the excess fluid inside your belly and decide where to insert the needle.    A numbing medicine (local anesthesia) is injected into your belly where the needle will be inserted.    Once the skin is numb, the provider carefully inserts the needle into the belly. This causes the needle to fill with fluid.    The needle may be removed with only a small sample of fluid. This sample is sent to a lab for testing. Getting a sample takes about 10 to 15 minutes.    Or, a tube may be attached to the needle so that more of the excess fluid can be drained. The tube may be taped or stitched into place. This keeps it from pulling the needle out of your belly.    How long it takes to drain all of the fluid varies for each person. In most cases, it takes about 30 minutes. Your provider will let you know if the procedure is expected to  take longer than usual.    Once all of the fluid is drained, the needle and tube are removed.    Pressure is put on the puncture site to stop any fluid leakage or bleeding.    A small bandage is placed over the puncture site. Albumin may be given during or after the procedure to prevent low blood pressure or kidney problems.  After the procedure  You may be taken to a recovery room to rest after the procedure. If you are in pain, you will be given medicine as needed. You will likely be sent home 1 to 2 hours after the procedure is done. When you leave the hospital, have an adult family member or friend drive you home. If you are staying in the hospital, you will return to your hospital room.  Risks and possible complications of paracentesis  This procedure is considered safe. But like all procedures, it carries some risks. These include the following:    Bleeding    Infection    Injury to structures in the belly    Fast drop in blood pressure   Recovering at home    If needed, your provider can prescribe or recommend pain medicines for you to take at home. Take these exactly as directed. If you stopped taking other medicines before the procedure, ask your provider when you can start them again.    You may remove the bandage 24 hours after the procedure.    Take it easy for 24 hours after the procedure. Avoid physical activity until your provider says it s OK.  Follow-up care  Make a follow-up appointment with your provider as directed. During your follow-up visit, your provider will check your healing. Let your provider know how you are feeling. You can also discuss the cause of your ascites and if any more treatment is needed.   When to seek medical care  Call your healthcare provider if you notice any of the following after the procedure:    A fever of 100.4 F (38.0 C) or higher    Trouble breathing    Pain that does not go away even after taking pain medicine    Belly pain not caused by having the skin  punctured    Bleeding from the puncture site    More than a small amount of fluid leakage from the puncture site    Swollen belly    Signs of infection at the puncture site. These include increased pain, redness, or swelling, as well as warmth or bad-smelling drainage.    Blood in your urine    Dizziness, lightheadedness, or fainting   Date Last Reviewed: 7/1/2016 2000-2017 [employer]. [address] [place] [address]. All rights reserved. This information is not intended as a substitute for professional medical care. Always follow your healthcare professional's instructions.

## 2018-07-11 NOTE — PROGRESS NOTES
Paracentesis Nursing Note  Yaneli Miles [facility].    During today's appointment orders from Dr. Lamas were completed.    Progress Note:  Patient identification verified by name and date of birth.  Assessment completed.  Vitals monitored throughout appointment and recorded in Doc Flowsheets.  See proceduralist note in ultrasound.    Date of consent or authorization: 7/5/18.  Invasive Procedure Safety Checklist was completed and sent for scanning.     Paracentesis performed by Corinna Oakley PA-C Radiology.    The following labs were communicated to provider performing paracentesis:  Lab Results   Component Value Date    PLT 38 06/28/2018       Total amount of ascites fluid drained: 4.6 liters.  Color of ascites fluid: yellow.  Total amount of albumin given: 50  grams.    Patient tolerated procedure well.    Post procedure,denies pain or discomfort post paracentesis.      Discharge Plan:  Discharge instructions were reviewed with patient.  Patient/Representative verbalized understanding and all questions were answered.   Discharged [facility] in stable condition.    Notified from lab after patient was discharged that blood had hemolyzed. Please re-draw at next appt.     Toyin Schaefer RN        Administrations This Visit     albumin human 25 % injection 12.5 g     Admin Date Action Dose Route Administered By             07/11/2018 New Bag 12.5 g Intravenous Toyin Schaefer RN              Admin Date Action Dose Route Administered By             07/11/2018 New Bag 12.5 g Intravenous Toyin Schaefer RN              Admin Date Action Dose Route Administered By             07/11/2018 New Bag 12.5 g Intravenous Toyin Schaefer RN              Admin Date Action Dose Route Administered By             07/11/2018 New Bag 12.5 g Intravenous Toyin Schaefer RN                    lidocaine 1 % 20 mL     Admin Date  "Action Dose Route Administered By             07/11/2018 Given by Other 10 mL Other Toyin Schaefer RN                            /70  Pulse 74  Temp 98.2  F (36.8  C) (Oral)  Resp 16  Ht 1.676 m (5' 6\")  Wt 65.8 kg (145 lb 1.6 oz)  SpO2 100%  BMI 23.42 kg/m2      "

## 2018-07-12 PROBLEM — Z76.82 LIVER TRANSPLANT CANDIDATE: Status: ACTIVE | Noted: 2018-01-01

## 2018-07-12 NOTE — TELEPHONE ENCOUNTER
With aid if  left message asking patient to call this RN and leave name on voicemail so this RN can return call with .  Will ensure diuretics have not been started and repeat labs.

## 2018-07-12 NOTE — IP AVS SNAPSHOT
[address] [address]    Phone:  [phone]                                       After Visit Summary   7/12/2018    Yaneli Miles    MRN: [id number]           After Visit Summary Signature Page     I have received my discharge instructions, and my questions have been answered. I have discussed any challenges I see with this plan with the nurse or doctor.    ..........................................................................................................................................  Patient/Patient Representative Signature      ..........................................................................................................................................  Patient Representative Print Name and Relationship to Patient    ..................................................               ................................................  Date                                            Time    ..........................................................................................................................................  Reviewed by Signature/Title    ...................................................              ..............................................  Date                                                            Time

## 2018-07-12 NOTE — IP AVS SNAPSHOT
MRN:[id number]                      After Visit Summary   7/12/2018    Yaneli Miles    MRN: [id number]           Thank you!     Thank you for choosing [facility] for your care. Our goal is always to provide you with excellent care. Hearing back from our patients is one way we can continue to improve our services. Please take a few minutes to complete the written survey that you may receive in the mail after you visit with us. Thank you!        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 Unfortunately you were found to have low blood count and platelets.  You received a platelet transfusion. Your blood counts normalized.                  Who to Call     For medical emergencies, please call 911.  For non-urgent questions about your medical care, please call your primary care provider or clinic, None          Attending Provider     Provider Specialty    Cristino Che MD Transplant    Sit, MD [facility]    [facility]    Harley Hayes MD Internal Medicine       Primary Care Provider Fax #    Provider Not In System [phone]       When to contact your care team       Worsening abdominal pain, confusion, fevers or chills                  After Care Instructions     Activity       Your activity upon discharge: activity as tolerated            Diet       Follow this diet upon discharge: Orders Placed This Encounter      Combination Diet Regular Diet Adult; 2 gm NA Diet, 2 L fluid restriction daily            Discharge Instructions       Follow-up with your providers as scheduled. We have started you on two new medications: midodrine (three times daily) to help increase your blood  pressure and potassium pills given you had low potassium.                  Follow-up Appointments     Adult [facility] Follow-up and recommended labs and tests       Follow up with primary care provider, Provider Not In System, within 7 days for hospital follow- up.  The following labs/tests are recommended: CBC.    Follow up with hepatology as scheduled       Appointments on [place] and/or [facility] (with [facility] or [facility] provider or service). Call [phone] if you haven't heard regarding these appointments within 7 days of discharge.                  Your next 10 appointments already scheduled     Jul 18, 2018  9:30 AM CDT   Paracentesis Visit with Uc Spec Inf Para Provider, UC 40 ATC   [facility] Specialty and Procedure ([facility])    [address]  [address]  [place] [facility] [address] [phone]            Jul 25, 2018  9:30 AM CDT   Paracentesis Visit with Uc Spec Inf Para Provider, UC 40 ATC   [facility] Specialty and Procedure ([facility])    [address]  [address]  [place] [facility] [id number] [phone]            Jul 30, 2018  8:30 AM CDT   NUTRITION VISIT with Silva Godinez RD   [facility] Solid Organ Transplant ([facility])    [address]  [address]  [place] [facility] [address]   [phone]            Jul 30, 2018  9:00 AM CDT   Lab with UC LAB   [facility] Lab ([facility])    [address]  1st Floor  [facility] [address]   [phone]            Jul 30, 2018 10:00 AM CDT   (Arrive by 9:45 AM)   New General Liver with Collette Helton MD   [facility] Hepatology ([facility])    [address]  [address]  [place] [facility] [address]   [phone]            Jul 30, 2018 11:00 AM CDT   Transplant Class with UC TRANSPLANT CLASS   [facility] Solid Organ Transplant ([facility]  "[facility])    [address]  [address]  [facility] [address]   [phone]            Aug 01, 2018  9:30 AM CDT   Paracentesis Visit with Uc Spec Inf Para Provider, NICOLAS 40 ATC   [facility] [place] Specialty and Procedure ([facility] [place])    [address]  [address]  [facility] [address]   [phone]              Pending Results     Date and Time Order Name Status Description    7/16/2018 1859 EKG 12-lead, tracing only Preliminary     7/15/2018 0807 POC US Guide for Paracentesis In process     7/14/2018 1602 fluid culture - Aerobic Bacterial Preliminary     7/13/2018 1302 Fluid Culture Aerobic Bacterial Preliminary     7/13/2018 1140 POC US Guide for Paracentesis In process     7/13/2018 0207 Blood culture Preliminary             Statement of Approval     Ordered          07/17/18 1256  I have reviewed and agree with all the recommendations and orders detailed in this document.  EFFECTIVE NOW     Approved and electronically signed by:  Harley Hayes MD             Admission Information     Date & Time Provider Department Dept. Phone    7/12/2018 Harley Hayes MD [address] [facility] [phone]      Your Vitals Were     Blood Pressure Pulse Temperature Respirations Weight Pulse Oximetry    107/73 (BP Location: Left arm) 59 97.5  F (36.4  C) (Oral) 18 64 kg (141 lb 1.6 oz) 100%    BMI (Body Mass Index)                   22.77 kg/m2           MyChart Information     [facility] lets you send messages to your doctor, view your test results, renew your prescriptions, schedule appointments and more. To sign up, go to [email or web address] . Click on \"Log in\" on the left side of the screen, which will take you to the Welcome page. Then click on \"Sign up Now\" on the right side of the page.     You will be asked to enter the access code listed below, as well as some personal information. Please follow the directions to create your " username and password.     Your access code is: [email or web address]  Expires: 2018  8:19 AM     Your access code will  in 90 days. If you need help or a new code, please call your [facility] clinic or [phone].        Care EveryWhere ID     This is your Care EveryWhere ID. This could be used by other organizations to access your [facility] medical records  [id number]        Equal Access to Services     ALIYA HOWARD : Hadii aad ku hadasho Soomaali, waaxda luqadaha, qaybta kaalmada adeegyada, waxay vladimirin haypiercen ademary jane muirgatitoallan ramirez . So [facility] [phone].    ATENCIÓN: Si habla español, tiene a william disposición servicios gratuitos de asistencia lingüística. Eugenioame al [phone].    We comply with applicable federal civil rights laws and [place] laws. We do not discriminate on the basis of race, color, national origin, age, disability, sex, sexual orientation, or gender identity.               Review of your medicines      START taking        Dose / Directions    midodrine HCl 10 MG Tabs   Used for:  Alcoholic cirrhosis of liver with ascites (H)        Dose:  10 mg   Take 10 mg by mouth 3 times daily (with meals)   Quantity:  30 tablet   Refills:  3       potassium chloride SA 20 MEQ CR tablet   Commonly known as:  KLOR-CON   Used for:  Hypokalemia        Dose:  20 mEq   Take 1 tablet (20 mEq) by mouth daily   Quantity:  90 tablet   Refills:  1         CONTINUE these medicines which have NOT CHANGED        Dose / Directions    acetaminophen 500 MG tablet   Commonly known as:  TYLENOL   Used for:  Cirrhosis of liver with ascites, unspecified hepatic cirrhosis type (H)        Dose:  500 mg   Take 1 tablet (500 mg) by mouth every 6 hours as needed for mild pain or fever   Quantity:  120 tablet   Refills:  0       ciprofloxacin 500 MG tablet   Commonly known as:  CIPRO   Indication:  SBP ppx   Used for:  SBP (spontaneous bacterial peritonitis) (H), Cirrhosis of liver with ascites, unspecified hepatic cirrhosis type  (H)        Dose:  500 mg   Take 1 tablet (500 mg) by mouth every 24 hours   Quantity:  30 tablet   Refills:  0       ferrous sulfate 325 (65 Fe) MG tablet   Commonly known as:  IRON        Dose:  325 mg   Take 325 mg by mouth   Refills:  0       insulin aspart 100 UNIT/ML injection   Commonly known as:  NovoLOG PEN   Used for:  Type 2 diabetes mellitus without complication, without long-term current use of insulin (H)        Dose:  1 Units   Inject 1 Units Subcutaneous 3 times daily (with meals)   Quantity:  3 mL   Refills:  0       lactulose 10 GM/15ML solution   Commonly known as:  CHRONULAC   Used for:  Hepatic encephalopathy (H)        Dose:  20 g   Take 30 mLs (20 g) by mouth 3 times daily Goal of 3-4 BMs per day   Quantity:  1892 mL   Refills:  2       lidocaine 5 % ointment   Commonly known as:  XYLOCAINE   Used for:  Cirrhosis of liver with ascites, unspecified hepatic cirrhosis type (H)        Apply topically every 4 hours as needed for moderate pain   Quantity:  240 g   Refills:  0       MI-ACID GAS RELIEF 80 MG chewable tablet   Generic drug:  simethicone        Dose:  80 mg   Take 80 mg by mouth   Refills:  0       miconazole 2 % powder   Commonly known as:  MICATIN; MICRO GUARD   Used for:  Candidiasis of skin        Apply topically 2 times daily   Quantity:  90 g   Refills:  0       pantoprazole 40 MG EC tablet   Commonly known as:  PROTONIX   Used for:  Bleeding gastric varices        Dose:  40 mg   Take 1 tablet (40 mg) by mouth 2 times daily (before meals)   Quantity:  180 tablet   Refills:  0       phosphorus tablet 250 mg 250 MG per tablet   Commonly known as:  PHOSPHA 250 NEUTRAL   Used for:  Hypophosphatasia        Dose:  500 mg   Take 2 tablets (500 mg) by mouth 2 times daily   Quantity:  120 tablet   Refills:  0       rifaximin 550 MG Tabs tablet   Commonly known as:  XIFAXAN   Used for:  Hepatic encephalopathy (H)        Dose:  550 mg   Take 1 tablet (550 mg) by mouth 2 times daily    Quantity:  180 tablet   Refills:  0       tenofovir 300 MG tablet   Commonly known as:  VIREAD        Dose:  300 mg   Take 300 mg by mouth   Refills:  0         STOP taking     omeprazole 20 MG CR capsule   Commonly known as:  priLOSEC                Where to get your medicines      These medications were sent to [facility], [place] - [address]  [address] [place] [address]     Phone:  [phone]     midodrine HCl 10 MG Tabs    potassium chloride SA 20 MEQ CR tablet                Protect others around you: Learn how to safely use, store and throw away your medicines at www.disposemymeds.org.             Medication List: This is a list of all your medications and when to take them. Check marks below indicate your daily home schedule. Keep this list as a reference.      Medications           Morning Afternoon Evening Bedtime As Needed    acetaminophen 500 MG tablet   Commonly known as:  TYLENOL   Take 1 tablet (500 mg) by mouth every 6 hours as needed for mild pain or fever   Last time this was given:  325 mg on 7/16/2018 12:53 PM                                ciprofloxacin 500 MG tablet   Commonly known as:  CIPRO   Take 1 tablet (500 mg) by mouth every 24 hours   Last time this was given:  500 mg on 7/13/2018  8:09 AM                                ferrous sulfate 325 (65 Fe) MG tablet   Commonly known as:  IRON   Take 325 mg by mouth   Last time this was given:  325 mg on 7/17/2018 12:36 PM                                insulin aspart 100 UNIT/ML injection   Commonly known as:  NovoLOG PEN   Inject 1 Units Subcutaneous 3 times daily (with meals)   Last time this was given:  2 Units on 7/17/2018 12:46 PM                                lactulose 10 GM/15ML solution   Commonly known as:  CHRONULAC   Take 30 mLs (20 g) by mouth 3 times daily Goal of 3-4 BMs per day   Last time this was given:  20 g on 7/17/2018  9:36 AM                                lidocaine 5 % ointment    Commonly known as:  XYLOCAINE   Apply topically every 4 hours as needed for moderate pain                                MI-ACID GAS RELIEF 80 MG chewable tablet   Take 80 mg by mouth   Generic drug:  simethicone                                miconazole 2 % powder   Commonly known as:  MICATIN; MICRO GUARD   Apply topically 2 times daily                                midodrine HCl 10 MG Tabs   Take 10 mg by mouth 3 times daily (with meals)   Last time this was given:  10 mg on 7/17/2018 12:36 PM                                pantoprazole 40 MG EC tablet   Commonly known as:  PROTONIX   Take 1 tablet (40 mg) by mouth 2 times daily (before meals)   Last time this was given:  40 mg on 7/17/2018  9:38 AM                                phosphorus tablet 250 mg 250 MG per tablet   Commonly known as:  PHOSPHA 250 NEUTRAL   Take 2 tablets (500 mg) by mouth 2 times daily                                potassium chloride SA 20 MEQ CR tablet   Commonly known as:  KLOR-CON   Take 1 tablet (20 mEq) by mouth daily   Last time this was given:  20 mEq on 7/16/2018  5:39 PM                                rifaximin 550 MG Tabs tablet   Commonly known as:  XIFAXAN   Take 1 tablet (550 mg) by mouth 2 times daily   Last time this was given:  550 mg on 7/17/2018  9:38 AM                                tenofovir 300 MG tablet   Commonly known as:  VIREAD   Take 300 mg by mouth   Last time this was given:  300 mg on 7/17/2018  9:37 AM

## 2018-07-13 PROBLEM — D69.6 THROMBOCYTOPENIA (H): Status: ACTIVE | Noted: 2018-01-01

## 2018-07-13 NOTE — PLAN OF CARE
Problem: Patient Care Overview  Goal: Plan of Care/Patient Progress Review  Discharge Planner PT 7A evaluation  Patient plan for discharge: home with assist  Current status: Pt completes bed mobility independently. Performs functional sit <> stand transfers from bed and chair heights with Mod I using FWW. Ambulates ~1500' with FWW with Mod I, PT manages IV pole. Tolerates standing LE therex program well. Pt denies need for IP OT at this time.   Barriers to return to prior living situation: medical complexity  Recommendations for discharge: home with assist, OP PT  Rationale for recommendations: pt demonstrating adequate mobility for safe d/c home; pending medical course while IP, d/c recommendations subject to change.        Entered by: Kike Brooks 07/13/2018 2:05 PM

## 2018-07-13 NOTE — H&P
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    Patient was called in to receive transplant today. Did not move forward with surgery due to platelets of 6, BEN, and electrolyte abnormalities.    Patient and wife report mild abdominal distention and pressure from accumulating ascites.     Increased diarrhea from baseline. Takes lactulose TID with 2 BM per day. Has had so many BMs yesterday that he was uncomfortable. Had 5 BMs since admission, (>1/hour).    Normal PO intake, alert and oriented, no confusion. No bleeding, no melena, no hematochezia, no hematemesis, no mucosal bleeding, no neuro changes. No cough, no fevers, no cold/flu symptoms, no med changes, no new vitamins or supplements, no sick contacts.     Review of Systems   The 10 point Review of Systems is negative other than noted in the HPI or here.     Past Medical History    I have reviewed this patient's medical history and updated it with pertinent information if needed.   Past Medical History:   Diagnosis Date     Choledocholithiasis      Chronic viral hepatitis B without delta agent and without coma (H) 6/14/2018     Cirrhosis of liver with ascites (H) 6/14/2018     Esophageal varices (H)     prior GIB     Hepatic encephalopathy (H)      Hepatitis delta with hepatitis B carrier state 06/14/2018    positive antigen     Portal vein thrombosis 6/14/2018 5/29/18 care everywhere [facility]     SBP (spontaneous bacterial peritonitis) (H) 06/2018     Type 2 diabetes mellitus without complication, without long-term current use of insulin (H) 6/14/2018        Past Surgical History   I have reviewed  this patient's surgical history and updated it with pertinent information if needed.  Past Surgical History:   Procedure Laterality Date     ENDOSCOPIC RETROGRADE CHOLANGIOPANCREATOGRAM      with stone removal     ENDOSCOPIC ULTRASOUND UPPER GASTROINTESTINAL TRACT (GI) N/A 6/22/2018    Procedure: ENDOSCOPIC ULTRASOUND, ESOPHAGOSCOPY / UPPER GASTROINTESTINAL TRACT (GI);  Esophagogastroduodenoscopy, removal of [place] tube, ENDOSCOPIC ULTRASOUND with embolization of gastric varices, placement of nasogastric tube;  Surgeon: Armando Kraft MD;  Location:  OR        Social History   Social History   Substance Use Topics     Smoking status: Never Smoker     Smokeless tobacco: Not on file     Alcohol use No   Hmong and Luxembourgish speaking    Family History   No family history of liver disease    Prior to Admission Medications   Prior to Admission Medications   Prescriptions Last Dose Informant Patient Reported? Taking?   acetaminophen (TYLENOL) 500 MG tablet   No No   Sig: Take 1 tablet (500 mg) by mouth every 6 hours as needed for mild pain or fever   ciprofloxacin (CIPRO) 500 MG tablet   No No   Sig: Take 1 tablet (500 mg) by mouth every 24 hours   ferrous sulfate (IRON) 325 (65 Fe) MG tablet   Yes No   Sig: Take 325 mg by mouth   insulin aspart (NOVOLOG PEN) 100 UNIT/ML injection   No No   Sig: Inject 1 Units Subcutaneous 3 times daily (with meals)   lactulose (CHRONULAC) 10 GM/15ML solution   No No   Sig: Take 30 mLs (20 g) by mouth 3 times daily Goal of 3-4 BMs per day   lidocaine (XYLOCAINE) 5 % ointment   No No   Sig: Apply topically every 4 hours as needed for moderate pain   miconazole (MICATIN; MICRO GUARD) 2 % powder   No No   Sig: Apply topically 2 times daily   omeprazole (PRILOSEC) 20 MG CR capsule   Yes No   Sig: Take 20 mg by mouth   pantoprazole (PROTONIX) 40 MG EC tablet   No No   Sig: Take 1 tablet (40 mg) by mouth 2 times daily (before meals)   phosphorus tablet 250 mg (PHOSPHA 250 NEUTRAL)  250 MG per tablet   No No   Sig: Take 2 tablets (500 mg) by mouth 2 times daily   rifaximin (XIFAXAN) 550 MG TABS tablet   No No   Sig: Take 1 tablet (550 mg) by mouth 2 times daily   simethicone (MI-ACID GAS RELIEF) 80 MG chewable tablet   Yes No   Sig: Take 80 mg by mouth   tenofovir (VIREAD) 300 MG tablet   Yes No   Sig: Take 300 mg by mouth      Facility-Administered Medications: None     Allergies   Allergies   Allergen Reactions     Nsaids      Contraindicated        Physical Exam   Vital Signs: Temp: 98  F (36.7  C) Temp src: Oral BP: 98/63 Pulse: 74 Heart Rate: 78 Resp: 16 SpO2: 99 % O2 Device: None (Room air)    Weight: 135 lbs 4.8 oz    General Appearance: Jaundiced, lying comfortably in bed  Eyes: scleral icterus  HEENT: MMM  Respiratory: Breathing comfortably on room air  Cardiovascular: RRR no murmu  GI: Mild distention, soft, mild tenderness throughout, no rebound  Lymph/Hematologic: No bruising, no mucosal bleeding  Genitourinary: no suprapubic tenderness  Skin: without rash or lesion. jaundiced  Musculoskeletal: decreased bulk, normal tone  Neurologic: A&O x3, no focal findings, CN 2-12 grossly in tact  Psychiatric: appropriate affect and behavior.     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      # Thrombocytopenia  Fibrinogen 152, PTT 42, INR 1.78 (at baseline). With acute drop in hemoglobin (14 on 6/28-->9 on 7/13) and thrombocytopenia, concern for microangiopathic hemolytic anemia. Possible DIC 2/2 to worsened liver failure, DIC possible due to infection or sepsis. Consider TMA, TTP, Atypical HUS given increased diarrhea. Could also be ITP.   -LDH STAT  -Haptoglobin STAT  -Heme Consult  -Bleeding Precautions  -Non-contrast ABD CT to look for intraabdominal bleeding  -Nutritional labs- B12, folate  -CBC Q8H  -repeat INR PTT Fibrinogen  am    #BEN  Baseline 1.0 scr. Now 2.49.  Likely pre-renal, hypovolemic 2/2 to increased diarrhea. Maybe hepatorenal.    -Fena  -Osm  -Uosm  -Albumin fluid challenge 100g daily x3days  -Neph consult AM    #Increased Diarrhea from baseline  May be from infective diarrhea. In the context of thrombocytopenia, schistocytes, and microangiopathic hemolytic anemia consider HUS. May also be from too much lactulose.  -Cdiff PCR  -Enteric panel  -Enteric precautions  -held PTA lactulose  -held PTA rifamiximin    #Decompensated Liver failure  MELD 37. 2/2 to chronic hep B with known varicies. Was called for liver transplant today deemed too sick for surgery. Is temporarily inactivated from list. Bilirubin is 43 up from baseline of 39. Elevated  and . On 6/28 was ALT 59 AST 97. Possible acute worsening of liver function, unclear etiology.   -US ABD with Doppler  -Will check hep A Igm since acute diarrheal illness  -Consider diagnostic para in AM  -Consider relist for transplant     #Electrolytes  #Hypokalemia  #Hyponatremia  #Metabolic anion gap acidosis  Increased diarrhea explains metabolic anion gap acidosis and hypokalemia. Liver disease  and hypovolemia can explain hyponatremia.  -Gentle K replacement given BEN  -FENA  -Uosm    #Jaundice  Bili 43.4 may be due to liver disease not far from baseline of   ~40, may be from hemolysis or worsening liver disease. May be acute worsening of ESLD in setting of acute illness or increased diarrhea.  -Ordered Total and direct.    # Pain Assessment:  Current Pain Score 7/13/2018   Patient currently in pain? denies   Pain location -   Pain descriptors -   CPOT pain score -   Yaneli latif pain level was assessed and he currently denies pain.      #Chronic  Diabetes-Held PTA novolog    Diet: Advance Diet as Tolerated: Clear Liquid Diet  Fluids: albumin fluid challange  DVT Prophylaxis: Bleeding risk, on bleeding precautions  Code Status: Full Code    Disposition Plan   Expected  discharge: 4 - 7 days; recommended to prior living arrangement once hemoglobin stable.     Entered: Toyin Madrigal 2018, 5:02 AM   Information in the above section will display in the discharge planner report.    To be staffed in AM    Toyin Madrigal  [facility]   Pager: [phone]  Please see sticky note for cross cover information      Data   Data     Recent Labs  Lab 18  0334 18  1913 18  1420   WBC 3.9* 4.2  --    HGB 8.5* 9.5*  --    MCV 81 82  --    PLT 42* 6*  --    INR  --  1.78* 1.79*   * 132* 126*   POTASSIUM 2.6* 2.6*  --    CHLORIDE 101 100  --    CO2 20 17*  --    BUN 30 30  --    CR 2.08* 2.49* 1.71*   ANIONGAP 12 15*  --    JESUS 8.1* 8.3*  --    * 177*  --    ALBUMIN 2.8* 2.8* 2.1*   PROTTOTAL 5.8* 6.1*  --    BILITOTAL 36.3* 43.4* 39.1*   ALKPHOS 96 111  --    * 154*  --    * 341*  --      Recent Results (from the past 24 hour(s))   XR Chest 2 Views    Narrative    XR CHEST 2 VW  2018 7:22 PM      HISTORY:  donor transplant going to OR NOW;     COMPARISON: 2018    FINDINGS:  Dense material projects over the epigastrium relating to  esophageal varix embolization. Cardiac silhouette is not enlarged. No  pneumothorax or pleural effusion. No acute osseous analysis. No focal  airspace opacities.      Impression    IMPRESSION: No acute airspace opacity.    I have personally reviewed the examination and initial interpretation  and I agree with the findings.    MICHAEL GAUILA MD         Physician Attestation   I, Gold Silverio, personally examined and evaluated this patient.  I discussed the patient with the medical student and/or resident and care team, and agree with the assessment and plan of care as documented in the note of 18      I personally reviewed vital signs, medications, labs and imaging.  Gold Silverio MD  Date of Service (when I saw the patient): 18

## 2018-07-13 NOTE — TELEPHONE ENCOUNTER
Organ Offer Encounter Information    Organ Offer Information   Organ offer date & time:  7/12/2018  3:13 PM   Coordinator/Fellow/Attending name:  SATHYA TALAVERA   Organ(s):   Organ UNOS ID Match Run ID Comment Organ Laterality   Liver [id number]          Recent infections?:  No    New medications?:  No Recent pregnancy?:  (Comment: NA)   Angicoagulation medications?:  No Recent vaccinations?:  No   Recent blood transfusions?:  No Recent hospitalizations?:  Yes (Comment: Liver disease)   Has your insurance changed in the last 6-12 months?:  Neg    Discussed organ offer with:  Spouse   Understood donor criteria, verbalized understanding   Discussed program-specific outcomes:  Did not have questions regarding SRTR   Right to decline organ offer without penalty, Patient/Other:  Aware of option to decline without penalty   Organ offer decision status Patient/Other:  Declined Offer   UNOS decline reason:  801 - Candidate ill, unavailable, refused, or temporarily unsuitable   Additional Comments:  3:15 PM  July 12, 2018  Organ: Liver  MD: Yane/Clem  Reviewed offer with  And Mrs. Miles with [employer]  .  Agreed to accept offer.  Will admit to [facility] at 7:00 PM.  Admissions: Done- Duy   Unit: Done- Criselda- 7:00 PM arrival   Immunology: Done- Sammi   OR: Done Shannen; OR scheduled for 5:00 AM   Blood Bank: Done- Zakiya  Research: No per Pamela Che MD   On call provider: Oliver Gonzalez; will order xmatch  TransNet/ABO Verification: Done   Add Organ: Done    6:00 PM  July 12, 2018   for recovery is at 8:30 PM. Donor recovery scheduled for 11:00 PM.  William Chase and Barbara notified.  Sathya Talavera RN, CCTC  On Call Organ Coordinator    10:00 PM  July 12, 2018  Per Dr. Che, Mr. Miles is currently too sick to transplant and is being transferred to medicine service.  [facility] notifed. Coded out 801.  Sathya Talavera RN, CCTC  On Call Organ Coordinator       Attestation I have discussed all  of the above with the Patient/Legal Guardian/Caregiver regarding this organ offer.:  Yes   Coordinator/Fellow/Attending name:  SATHYA TALAVERA

## 2018-07-13 NOTE — CONSULTS
HEMATOLOGY INITIAL CONSULT NOTE  07/13/18  10:03 AM    Assessment:  End stage liver disease with cirrhosis due to hepatitis B  Esophageal varices  Thrombocytopenia due to splenomegaly  Acute normocytic anemia of unclear etiology  Type 2 diabetes    Mr. Miles is a 43-year-old man with ESLD due to hepatitis B who presented when called for liver transplant, but transplant was delayed due to platelet count of 6.  Platelets increased as expected after transfusion, so ITP is not likely.  INR is at baseline and fibrinogen is slightly low while LDH is slightly high.  We would probably expect a greater degree of abnormalcy in these values if he was in DIC or hemolysis for some other reason.  Imaging reveals a very large spleen, 18.2 cm on ultrasound (last measurement in June 2018 was 15.4 cm), so it is likely that the anemia and thrombocytopenia are due to worsening splenomegaly in the setting of cirrhosis.  We do not see any hematologic contraindication to proceeding with liver transplant.    Hemoglobin has dropped from baseline of ~14 to 9 g/dL.  The reason for this is unclear, though as above, it does not seem he is hemolyzing to a serious degree.  Recommend continuing to monitor for any bleeding, transfusing RBCs as necessary.       Recommendations:  -- Monitor blood counts and transfuse as appropriate for liver transplant  -- OK to proceed with liver transplant from our perspective.    Thank you for involving us in the care of Mr. Miles.  Hematology consult service will continue to follow with you.  Patient discussed with faculty, Dr. Ceballos.    Bee Ortega MD  Hematology-Oncology-Transplant Fellow        HEMATOLOGY STAFF:  Seen with fellow, whose note reflects our joint evaluation, assessment, and plan.      Mandeep Ceballos MD  Associate Professor of Medicine  Division of Hematology, Oncology, and Transplantation  Director, [facility]        ---------------------------------------------------------------  History of present illness:  Mr. Miles is a 43-year-old man who was called in to receive a liver transplant for his ESLD.  When he presented, surgery was cancelled because platelets were 6 when checked.    He is generally feeling about the same as his baseline, very fatigued.  He has not had any bleeding or bruising.    Past medical history:  Type 2 diabetes  Chronic hepatitis B  Cirrhosis and ESLD 2/2 hepatitis B  Esophageal varices  Portal vein thrombosis, SBP June 2018    Past surgical history:  No surgeries.    Medications:  Ciprofloxacin for SBP prophylaxis  Ferrous sulfate  Aspart TID  Lactulose  Omeprazole  Phosphorus  Rifaximin  Tenofovir    Family history:  No chronic medical conditions, bleeding/clotting disorders or cancer.    Social history:  Lives at home with his wife.  Does not work.  No tobacco, alcohol or other drugs.    Allergies:  No known allergies.    OBJECTIVE DATA  Blood pressure 100/62, pulse 72, temperature 99.7  F (37.6  C), temperature source Oral, resp. rate 16, weight 61.4 kg (135 lb 4.8 oz), SpO2 98 %.  -- General: no acute distress; very jaundiced throughout  -- HEENT: mucous membranes moist, no erythema; pupils equally round, reactive; scleral icterus  -- Lymph: no lymphadenopathy in the cervical, submandibular, supraclavicular, axillary, or inguinal areas  -- Cardiovascular: regular rate and rhythm, no murmurs; no peripheral edema or JVD  -- Pulmonary: lungs clear bilaterally, equal diaphragmatic excursion, no wheezes or crackles  -- Gastrointestinal: abdomen soft and protuberant, +fluid wave, mildly tender throughout, no palpable organomegaly  -- Musculoskeletal: no swelling or erythema of joints  -- Integumentary: no rashes  -- Neurologic: alert and oriented to self, place, time; no focal deficits  -- Psychiatric: appropriate affect, cooperative    Relevant laboratory workup:  Hgb 8.5   WBC  3.9  Platelets 42 (from 9, after 2 units transfused)      Total bilirubin 36.3  Direct bilirubin 28.1  Creatinine 2.08    Peripheral blood morphology reviewed overnight, showed no evidence of schistocytes.    Relevant imaging:  CT abdomen/pelvis 7/13 with splenomegaly.  Abdominal ultrasound 7/13 with 18.2 cm spleen (15.4 cm in June 2018).

## 2018-07-13 NOTE — PLAN OF CARE
Problem: Patient Care Overview  Goal: Plan of Care/Patient Progress Review  OT 7A: DEFER. Per interdisciplinary collaboration, chart review, and conversation with patient, anticipate pt will not have acute OT needs. Recommend reconsult if pt has transplant or if functional status changes. Will complete orders.

## 2018-07-13 NOTE — PROGRESS NOTES
"CLINICAL NUTRITION SERVICES - ASSESSMENT NOTE     Nutrition Prescription    RECOMMENDATIONS FOR MDs/PROVIDERS TO ORDER:  None at this time     Malnutrition Status:    Unable to determine due to this writer unable to speak with patient     Recommendations already ordered by Registered Dietitian (RD):  None at this time     Future/Additional Recommendations:  1. Monitor diet advancement and ability to start calorie counts and boost glucose control  2. If unable to advance diet or low oral intake recommend FT placement and TF as follows:   Nutren 1.5 @ 55 mL/hr to provide 1980 kcals (32 kcal/kg/day), 90 g PRO (1.5 g/kg/day), 1003 mL H2O, 232 g CHO and no fiber daily.  - Certavite 15 ml/daily  - monitor K+, phos, Mg++ with TF advancement  - 30 ml H2O flush q 4 hrs for tube patency  3. Once tolerating at goal rate recommend cycle TF and adjust prn per oral intake      REASON FOR ASSESSMENT  Yaneli Miles is a/an 43 year old male assessed by the dietitian for Admission Nutrition Risk Screen for unintentional loss of 10# or more in the past two months and Provider Order - Pre-Op Liver transplant     NUTRITION HISTORY  Patient not available in room this writer attempted to visit. Per chart review patient's appetite has been normal but did endorse recent wt loss. + diarrhea which had worsen recently.   Liver transplant per notes is on hold at this time     CURRENT NUTRITION ORDERS  Diet: Clear Liquid    LABS  Labs reviewed  (7/13): K+ 2.6 mmol/L (L); Na 132 mmol/L (L); BUN 30 mg/dL; Cr 2.08 mg/dL (H)    MEDICATIONS  Medications reviewed  Ferrous sulfate, KCL 10 mEq     ANTHROPOMETRICS  Height: 5'6\"  Most Recent Weight: 61.4 kg (135 lb 4.8 oz)    IBW: 67.3 kg  BMI: Normal BMI  Weight History:   Wt Readings from Last 10 Encounters:   07/12/18 61.4 kg (135 lb 4.8 oz)   07/11/18 61.2 kg (135 lb)   07/05/18 59 kg (130 lb 1.6 oz)   06/27/18 71.1 kg (156 lb 12 oz)   14% weight loss in one month  Dosing Weight: 61 kg (admit wt) "     ASSESSED NUTRITION NEEDS  Estimated Energy Needs: 3148-6261 kcals/day (30 - 35 kcals/kg )  Justification: repletion  Estimated Protein Needs: 73-92 grams protein/day (1.2 - 1.5 grams of pro/kg)  Justification: Increased needs for repletion - pending renal function  Estimated Fluid Needs: 1 mL/kcal   Justification: Maintenance    PHYSICAL FINDINGS  See malnutrition section below per chart review.  Jaundiced  Decreased bulk     MALNUTRITION  % Intake: Unable to assess  % Weight Loss: > 5% in 1 month (severe)  Subcutaneous Fat Loss: Unable to assess  Muscle Loss: Unable to assess  Fluid Accumulation/Edema: None noted  Malnutrition Diagnosis: Unable to determine due to this writer unable to speak with patient     NUTRITION DIAGNOSIS  Increased nutrient needs calorie/protein related to recent unintentional wt loss as evidenced by 14% wt loss over the last month       INTERVENTIONS  Implementation  1. Nutrition Education: Unable to complete due to patient not available in room when this writer attempted to visit      Goals  Diet adv v nutrition support within 2-3 days.     Monitoring/Evaluation  Progress toward goals will be monitored and evaluated per protocol.    Sera Gonzalez MS/RD/TENA/CNSC  7A RD Pager: [phone]

## 2018-07-13 NOTE — PROGRESS NOTES
07/13/18 1313   Quick Adds   Type of Visit Initial PT Evaluation       Present yes   Living Environment   Lives With child(doug), dependent;spouse   Living Arrangements house   Home Accessibility stairs to enter home;stairs within home;tub/shower is not walk in;bed and bath on same level   Number of Stairs to Enter Home 2   Number of Stairs Within Home 12   Stair Railings at Home inside, present at both sides   Living Environment Comment Pt lives at home with his spouse and 9 children.    Self-Care   Dominant Hand right   Usual Activity Tolerance fair   Current Activity Tolerance fair   Regular Exercise yes   Activity/Exercise Type walking   Exercise Amount/Frequency daily   Equipment Currently Used at Home walker, rolling   Activity/Exercise/Self-Care Comment pt reports frequent bouts of ambulation around home and to the park with his wife's support.   Functional Level Prior   Ambulation 1-->assistive equipment   Transferring 1-->assistive equipment   Toileting 2-->assistive person   Bathing 2-->assistive person   Dressing 2-->assistive person   Eating 0-->independent   Communication 0-->understands/communicates without difficulty   Swallowing 0-->swallows foods/liquids without difficulty   Cognition 1 - attention or memory deficits   Fall history within last six months no   Which of the above functional risks had a recent onset or change? toileting;bathing;dressing   Prior Functional Level Comment Pt reports his spouse assists with dressing, bathing and toileting cares.    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   Precautions/Limitations no known precautions/limitations   Weight-Bearing Status - LUE full weight-bearing   Weight-Bearing Status - RUE full weight-bearing   Weight-Bearing Status - LLE full weight-bearing   Weight-Bearing Status - RLE full weight-bearing   General Observations PIV    General Info Comments activity: up with assist   Cognitive Status Examination   Orientation orientation to person, place and time   Level of Consciousness alert   Follows Commands and Answers Questions 100% of the time   Personal Safety and Judgment intact   Memory intact   Cognitive Comment pt is alert and oriented   Pain Assessment   Patient Currently in Pain Yes, see Vital Sign flowsheet  (abdominal discomfort)   Integumentary/Edema   Integumentary/Edema Comments diffuse jaundice; ascites present   Posture    Posture Forward head position;Protracted shoulders   Range of Motion (ROM)   ROM Comment BUE/BLE wfl   Strength   Strength Comments BUE/BLE > 3+/5 per observation   Bed Mobility   Bed Mobility Comments IND supine <> sit   Transfer Skills   Transfer Comments Mod I sit <> stand up to FWW from bed and chair heights   Gait   Gait Comments Amb x 30' with FWW, Mod I; slowed gait speed.    Balance   Balance Comments mod I using FWW for stability in standing    Sensory Examination   Sensory Perception no deficits were identified   General Therapy Interventions   Planned Therapy Interventions balance training;gait training;neuromuscular re-education;strengthening;home program guidelines   Clinical Impression   Criteria for Skilled Therapeutic Intervention yes, treatment indicated   PT Diagnosis impaired functional mobility   Influenced by the following impairments weakness, fatigue, balance deficits, medical status   Functional limitations due to impairments decreased tolerance of independent mobility   Clinical Presentation Stable/Uncomplicated   Clinical Presentation Rationale pt presentation,  "clinical reasoning   Clinical Decision Making (Complexity) Low complexity   Therapy Frequency` (4x/week)   Predicted Duration of Therapy Intervention (days/wks) 1 week   Anticipated Discharge Disposition Home with Assist;Home with Outpatient Therapy   Risk & Benefits of therapy have been explained Yes   Patient, Family & other staff in agreement with plan of care Yes   Clinical Impression Comments evaluation completed, treatment initiated   [facility] TM \"6 Clicks\"   2016, Trustees of [facility], under license to [employer].  All rights reserved.   6 Clicks Short Forms Basic Mobility Inpatient Short Form   [facility]  \"6 Clicks\" V.2 Basic Mobility Inpatient Short Form   1. Turning from your back to your side while in a flat bed without using bedrails? 4 - None   2. Moving from lying on your back to sitting on the side of a flat bed without using bedrails? 4 - None   3. Moving to and from a bed to a chair (including a wheelchair)? 4 - None   4. Standing up from a chair using your arms (e.g., wheelchair, or bedside chair)? 4 - None   5. To walk in hospital room? 4 - None   6. Climbing 3-5 steps with a railing? 3 - A Little   Basic Mobility Raw Score (Score out of 24.Lower scores equate to lower levels of function) 23   Total Evaluation Time   Total Evaluation Time (Minutes) 10     "

## 2018-07-13 NOTE — PLAN OF CARE
Problem: Patient Care Overview  Goal: Plan of Care/Patient Progress Review  Outcome: No Change    RN:  ION, denies pain and nausea. Liver transplant scheduled for 0300 was cancelled. KCL 10meq IV given x3 for K+ 2.6. Platelets 2units  given for platelet count of 6, rechecked was 42. Albumin 25% 100gm ordered and given. CT of abdomen done, scheduled for abdominal US this AM. Incontinent of stool and urine x1, still need urine and stool sample for multiple test and culture, one person assist and walker to get out of bed. Patient resting between cares, will continue to monitor.

## 2018-07-13 NOTE — PROCEDURES
Procedure Note  The risks and benefits of the procedure were explained to the patient and family via iPad Case Rover  who expressed understanding and opted to proceed.  Consent was obtained and placed in the chart.  A time out was performed.  An area of ascites was located with ultrasound and marked in the left lower quadrant; the area was prepped and draped in the usual sterile fashion.  5 ml of 1% lidocaine was instilled and ascites located. 10 mL was removed and sent for analysis and the area dressed.     Paracentesis was performed by Carlyn Apple PA-C and Dr. Salamanca was present throughout the procedure.    Location: LLQ  Total Volume Removed: 10 mL  Fluid Sent to Lab: Yes    Patient tolerated the procedure well.  Carlyn Apple PA-C  Internal Medicine  [phone]  DOS:  7/13/2018

## 2018-07-13 NOTE — PROGRESS NOTES
[facility], [place]  Internal Medicine Progress Note - [facility] Service    Main Plans for Today    Nephrology   Heme/onc consult  Hepatology consult  Diagnostic paracentesis  Monitor blood counts         Assessment and Plan:   Yaneli Miles is a 43 year old male Hmong speaking male who has a history of ESLD 2/2 chronic hep B c/b ascites, thrombocytopenia, varices admitted for liver transplant found to have severe thrombocytopenia and BEN.     # Acute on chronic normocytic anemia  # Thrombocytopenia  Presented with acute worsening of anemia (baseline 14 on 6/28 -> 9 on 7/13) and severe thrombocytopenia (baseline 30s -> 9 on 7/13) concern for MAHA (TTP, DIC, atypical HUS) however peripheral smear w/o schistocytes, INR at baseline and fibrinogen and LDH slightly elevated which would not correlate to degree of hemolysis if patient hemolyzed.  Given platelets improved with transfusion low suspicion for ITP. CT abdomen without evidence of intra-abdominal bleeding. Abdominal U/S showing splenomegaly likely contributing to sequestration.   - pending ferritin, B12  (unable to obtain given high level of bilirubin)  - Heme/onc following   - Bleeding Precautions  - CBC Q12H   - cont pta ferrous sulfate      # BEN  # Metabolic acidosis, resolved   Baseline 1.0 scr, presented with Cr 2.49. Could be pre-renal in the setting of hypovolemia 2/2 to dehydration or diarrhea, could also be hepatorenal (however Urine Na 29). FeNa consistent with pre-renal injury. UA with concentrated urine however with renal tubular epithelial cells and WBC casts (especially given pta ciprofloxacin and two PPI and tenofovir). Abdominal U/S w/o hydronephrosis.   - VBG for acid/base assessment  - daily standing weights, strict I&O   - midodrine 10 mg TID  - Albumin fluid challenge 100g daily x3days (7/13-7/15)   - Nephrology consultation  - Holding cipro (in setting of QT prolongation)  - Holding pta omeprazole, continuing pta  pantoprazole   - Hold pta phos tablets      # Possible diarrhea  Per nursing this AM patient having formed stools  - defer C diff PCR and enteric panel unless patient having loose stools  - hold PTA lactulose, PTA rifaximin will likely resume on 7/14 unless concern for infectious diarrhea  - pending hep A IgM    # Decompensated liver cirrhosis 2/2 chronic hep B on liver transplant list  # Hyperbilirubinemia  # Jaundice  # History of gastric varices   MELD-Na score: 35 at 7/13/2018  4:18 PM  Admission MELD of 37 was called for liver transplant, however deemed to sick for surgery.  US abdomen with doppler shows moderate volume ascites, no evidence of portal vein thrombosis.  7/13 diagnostic paracentesis w/o SBP. On admission bilirubin elevated from baseline however appears to be downtrending.  U/S showed cholelithiasis, gall bladder wall thickening with negative sonographic Chowdhury's thought to be nonspecific in the setting of ascites and liver cirrhosis however could consider cholecystitis or cholangitis.   - Hepatology following   - Consider relist for transplant  - Daily MELD labs  - cont pta tenofovir   - pending Hepatitis D PCR per GI   - pneumovax prior to discharge  - hold pta ciprofloxacin for SBP prophylaxis given prolonged QT   - hold pta lactulose, hold pta rifaxmin as above   - hold pta omeprazole  - cont pta pantoprazole 40 mg BID   - cont pta tylenol for pain (2 G limit)      # Hyponatremia  Likely in setting of worsening ESLD.  Urine Na 29 and urine osms 397 borderline  - Monitor   - 2 L fluid restriction    # Hypokalemia  Likely in setting of loose stools   - standard electrolyte protocol      # Prolonged QT  - avoid QT prolonging agents    # DM II  - hold pta 1 u aspart pta with meals  - hypoglycemia protocol  - low dose ssi     # Weakness  # Deconditioning  # Nutrition  - PT/OT/nutrition     Current Pain Score 7/13/2018   Patient currently in pain? denies   Pain location -   Pain descriptors -   CPOT  pain score -   ]  Yaneli latif pain level was assessed and he currently denies pain.      DIET:   Active Diet Order      Combination Diet Regular Diet Adult; Renal Diet; 2 gm NA Diet  DVT:NONE  CODE:  Full Code  Disposition Plan   Expected discharge in 3-5 days to pending PT/OT recs  once hemoglobin stable and platelets stable, liver decompensation stabilizes .     Entered: Clementina Pitts 07/13/2018, 6:12 PM     The patient's care was discussed with the Attending Physician, Dr. Silverio.    Bryan Pitts MD  PGY-2, Internal Medicine  [phone]      Interval History:     Nursing notes reviewed. Patient seen with  present. Patient reports abdominal distension and loose stools and dysuria (from straight catheter in the past) but otherwise denies any other symptoms.  Denies fevers, chills, SOB, chest pain. He denies dark/black or blood stools, coughing up blood, or vomiting up blood. 4 point ROS negative unless otherwise noted.     Physical Exam:   Vitals were reviewed  Blood pressure 105/66, pulse 72, temperature 97.4  F (36.3  C), temperature source Oral, resp. rate 16, weight 61.4 kg (135 lb 4.8 oz), SpO2 100 %.    Intake/Output Summary (Last 24 hours) at 07/13/18 1812  Last data filed at 07/13/18 1300   Gross per 24 hour   Intake             1726 ml   Output              200 ml   Net             1526 ml     Vitals:    07/12/18 1824   Weight: 61.4 kg (135 lb 4.8 oz)       Physical Exam:   General Apperance: Chronically ill appearing man in NAD, scleral icterus   CV: Normal rate, regular rhythm. S1/S2 normal.  No murmurs, rubs or gallops   Resp: Clear to auscultation bilaterally. Without wheeze or crackles.   Abdomen: Distended abdomen but soft, nontender to palpation, no rebound or guarding   Lymph/Heme: No bruising, petechia, mucosal bleeding  Extremities: No peripheral edema  Skin: Jaundice          Data:   ROUTINE LABS (Last four results)  CMP  Recent Labs  Lab 07/13/18  1618 07/13/18  1418 07/13/18  7194  07/12/18 1913 07/11/18  1420   *  --  132* 132* 126*   POTASSIUM 3.0*  --  2.6* 2.6*  --    CHLORIDE 97  --  101 100  --    CO2 17*  --  20 17*  --    ANIONGAP 14  --  12 15*  --    *  --  104* 177*  --    BUN 32*  --  30 30  --    CR 1.91*  --  2.08* 2.49* 1.71*   GFRESTIMATED 38*  --  35* 28* 43*   GFRESTBLACK 46*  --  42* 34* 53*   JESUS 8.6  --  8.1* 8.3*  --    MAG  --   --   --  2.5*  --    PHOS  --   --   --  3.6  --    PROTTOTAL 6.1*  --  5.8* 6.1*  --    ALBUMIN 3.5 3.7 2.8* 2.8* 2.1*   BILITOTAL 37.0*  --  36.3* 43.4* 39.1*   ALKPHOS 90  --  96 111  --    *  --  287* 341*  --    *  --  125* 154*  --      CBC  Recent Labs  Lab 07/13/18  1418 07/13/18  0334 07/12/18 1913   WBC 4.1 3.9* 4.2   RBC 2.96* 2.88* 3.19*   HGB 8.8* 8.5* 9.5*   HCT 24.3* 23.4* 26.2*   MCV 82 81 82   MCH 29.7 29.5 29.8   MCHC 36.2 36.3 36.3   RDW 19.3* 19.0* 19.0*   PLT 35* 42* 6*     INR  Recent Labs  Lab 07/12/18 1913 07/11/18  1420   INR 1.78* 1.79*     Arterial Blood GasNo lab results found in last 7 days.  I&O's: I/O last 3 completed shifts:  In: 1726 [P.O.:120; I.V.:300]  Out: 200 [Urine:200]

## 2018-07-13 NOTE — CONSULTS
Transplant Social Work Services Progress Note      Date of Initial Social Work Evaluation: 6/18/2018  Collaborated with: , his wife and a ong     Data: I received a consult this morning to see Mr. Miles for an anticipated liver transplant today. Due to his medical condition, he is unable to be transplanted today. I did see him this afternoon for support and a brief psychosocial update. He has been receiving home care since his prior discharge. The RN sets up his medications. PT was also ordered, but they have not yet seen  at home.  His wife is his primary caregiver. Her only expressed need was for grab bars at home to make bathing him easier.   Intervention: Chart review. Offer of support and resources, brief updated psychosocial assessment.   Assessment: Mr. Miles reported coping alright, but he and his wife are anxious for him to be transplanted as soon as possible. He was clearly fatigued and not wanting to talk for very long. Mr. Miles may benefit from a CADI waiver to obtain needed safety equipment in his home. His wife does not think a PCA is needed, as she does most of his cares.   Education provided by : Potential availability of PCA or waiver services.   Plan:    Discharge Plans in Progress: Not at this time. Patient and family preference is to return home with services.     Barriers to d/c plan: Medical needs.      Follow up Plan: As  remains listed for a liver transplant, I remain available for support, disposition and community resources. I will make a referral for a CADI waiver next week.     KAIA Park, [facility]  Pager [phone]

## 2018-07-13 NOTE — PROGRESS NOTES
Heme/onc progress note  July 13, 2018    42 yo man with liver failure 2/2 hepatitis B admitted for liver transplant when found to have acute anemia, thrombocytopenia, BEN and diarrhea.  Currently transplant on hold till further work-up.    Acute thrombocytopenia: on admission plt 6, post 2 units plt count 42 baseline 30s-40s.  Coags close to baseline, but would recommend recheck in AM. No acute changes to medications, but need to be extensively reviewed. At this time no active signs of bleeding per primary team.  Pending abdominal US. Do not see recent use of heparin or LMWH in his medication history.   Peripheral smear shows no schistocytes. At this time unclear source besides acute stress, possibly infectious source with diarrhea.  -- cbc q8H  -- in AM recheck INR, PTT, and fibrinogen  -- pending abdominal US  -- CT abdomen/pelvis without contrast to evaluate for large hematoma    Acute normocytic Anemia: hgb 9.5 baseline 14.5.  retic abs 49, . Haptoglobin will be low due to liver failure. Would expect higher retic and LDH in setting of hemolysis.  Per primary team at this time no active signs of bleeding.   Also add on nutritional studies (B12, folate, iron, iron sat and ferritin). peripheral smear does not show schistocytes.  Unclear source of anemia needs further evaluation, pending additional labs recommended above and abdominal US.  Need to look for source of bleeding.  Pt  inappropriate retic response to recent acute drop in hgb maybe due to stressed state possibly infectious with pending stool panels.  Waiting on further nutritional labs.  -- as above plus we added on nutritional labs    At this time with extensive review would not hold patients liver transplant due to acute hematologic state.    Case and plan discussed with Dr. Mandeep Ceballos.    Piper Deras MD  PGY5  Hematology Oncology Fellow  Pager: [phone]

## 2018-07-13 NOTE — PROVIDER NOTIFICATION
Paged dorita LORENZ of critical potassium level: 2.6; awaiting orders. Pt resting quietly with wife at bedside. Will continue to monitor.

## 2018-07-13 NOTE — CONSULTS
Hepatology Consultation    Yaneli Miles   MRN# [id number]     Age: 43 year old YOB: 1975       Referring provider: Placido Mercado  Attending Hepatologist: Dr. Arriaga  Consult requested for: cirrhosis       Assessment and Recommendation:   Assessment:   Mr. Miles is a 43-year-old with a history of hepatitis B and hepatitis D coinfection causing cirrhosis complicated by ascites, gastric varices, hepatic encephalopathy who was initially admitted with a potential liver transplant which was stopped due to significant thrombocytopenia.  He continues to have acute kidney injury that is improving.      Recommendations:   1.  Hepatitis B cirrhosis  2.  Coinfection hepatitis D  - continue tenofovir, adjusted renally.  -Has had recent hepatitis D antigen positive.  Will send out hepatitis D PCR.  Recent hepatitis B DNA undetectable  -Listed for liver transplant with a meld 35.  Continue with daily meld labs  -Ultrasound 7/13 without evidence of HCC    3.  Ascites  -Does require weekly paracentesis.  Due to acute kidney injury may may need this more frequently and limiting fluid removed.  -No evidence of SBP.    4.  Gastric varices  -Last EGD 6/22 with gluing of gastric varices.  No overt evidence of GI bleeding    5. Immunizations  - Please give Pneumovax prior to discharge    Plan of care discussed with Dr. Arriaga    Thank you for the opportunity to be involved in Yaneli Miels care. Please call with any questions or concerns.     BUD Denise, CNP  Inpatient Hepatology [facility]  [phone]               History of Present Illness:   Yaneli Miles is a 43 year old male with a history of hepatitis B cirrhosis.  His liver disease has been complicated by a + hepatitis D antigen, status post GI bleeding with gastric varices status post gluing, ascites with weekly paracentesis, hepatic encephalopathy, and acute kidney injury who was initially admitted with a potential liver transplant, however, was noted to have a platelet count  of 6.  He received 2 units of platelets and his platelet count julio to 42.  He was evaluated by hematology without further intervention needed and thought to be related to his chronic liver disease.  He denies fevers, melena, hematochezia, change in mentation.  He does complain of abdominal distention despite a paracentesis a few days ago.  He does not have a history of SBP and he has been compliant with taking his tenofovir.    He is currently listed for liver transplant with a meld of 35, ABO B.             Past Medical History:     Past Medical History:   Diagnosis Date     Choledocholithiasis      Chronic viral hepatitis B without delta agent and without coma (H) 6/14/2018     Cirrhosis of liver with ascites (H) 6/14/2018     Esophageal varices (H)     prior GIB     Hepatic encephalopathy (H)      Hepatitis delta with hepatitis B carrier state 06/14/2018    positive antigen     Portal vein thrombosis 6/14/2018 5/29/18 care everywhere US     SBP (spontaneous bacterial peritonitis) (H) 06/2018     Type 2 diabetes mellitus without complication, without long-term current use of insulin (H) 6/14/2018              Past Surgical History:     Past Surgical History:   Procedure Laterality Date     ENDOSCOPIC RETROGRADE CHOLANGIOPANCREATOGRAM      with stone removal     ENDOSCOPIC ULTRASOUND UPPER GASTROINTESTINAL TRACT (GI) N/A 6/22/2018    Procedure: ENDOSCOPIC ULTRASOUND, ESOPHAGOSCOPY / UPPER GASTROINTESTINAL TRACT (GI);  Esophagogastroduodenoscopy, removal of [place] tube, ENDOSCOPIC ULTRASOUND with embolization of gastric varices, placement of nasogastric tube;  Surgeon: Armando Kraft MD;  Location:  OR              Social History:     Social History   Substance Use Topics     Smoking status: Never Smoker     Smokeless tobacco: Not on file     Alcohol use No             Family History:   No family history of liver disease.            Immunizations:   tdap 10/21/05         Allergies:     Allergies    Allergen Reactions     Nsaids      Contraindicated              Medications:   @  Prescriptions Prior to Admission   Medication Sig Dispense Refill Last Dose     acetaminophen (TYLENOL) 500 MG tablet Take 1 tablet (500 mg) by mouth every 6 hours as needed for mild pain or fever 120 tablet 0 Taking     ciprofloxacin (CIPRO) 500 MG tablet Take 1 tablet (500 mg) by mouth every 24 hours 30 tablet 0 Taking     ferrous sulfate (IRON) 325 (65 Fe) MG tablet Take 325 mg by mouth   Taking     insulin aspart (NOVOLOG PEN) 100 UNIT/ML injection Inject 1 Units Subcutaneous 3 times daily (with meals) 3 mL 0 Taking     lactulose (CHRONULAC) 10 GM/15ML solution Take 30 mLs (20 g) by mouth 3 times daily Goal of 3-4 BMs per day 1892 mL 2 Taking     lidocaine (XYLOCAINE) 5 % ointment Apply topically every 4 hours as needed for moderate pain 240 g 0 Taking     miconazole (MICATIN; MICRO GUARD) 2 % powder Apply topically 2 times daily 90 g 0 Taking     omeprazole (PRILOSEC) 20 MG CR capsule Take 20 mg by mouth   Taking     pantoprazole (PROTONIX) 40 MG EC tablet Take 1 tablet (40 mg) by mouth 2 times daily (before meals) 180 tablet 0 Taking     phosphorus tablet 250 mg (PHOSPHA 250 NEUTRAL) 250 MG per tablet Take 2 tablets (500 mg) by mouth 2 times daily 120 tablet 0 Taking     rifaximin (XIFAXAN) 550 MG TABS tablet Take 1 tablet (550 mg) by mouth 2 times daily 180 tablet 0 Taking     simethicone (MI-ACID GAS RELIEF) 80 MG chewable tablet Take 80 mg by mouth   Taking     tenofovir (VIREAD) 300 MG tablet Take 300 mg by mouth   Taking   @          Review of Systems:    ROS: 10 point ROS neg other than the symptoms noted above in the HPI.          Physical Exam:   Blood pressure 100/62, pulse 72, temperature 99.7  F (37.6  C), temperature source Oral, resp. rate 16, weight 61.4 kg (135 lb 4.8 oz), SpO2 98 %. Body mass index is 21.84 kg/(m^2).    Intake/Output Summary (Last 24 hours) at 07/13/18 8578  Last data filed at 07/13/18 1300    Gross per 24 hour   Intake             1726 ml   Output              200 ml   Net             1526 ml     General: In no acute distress, no facial muscle wasting  Neuro: AOx3, No asterixis  HEENT: PERRL moderate scleral icterus, Nooral lesions  Lymph:  Nocervical lymphadenoapthy  CV: S1/[id number] murmurs, Skin warm and dry  Lungs: clear to auscultation Respirations even and nonlabored on room air  Abd: Moderately distended, nontender. +BS hypoactive  Extrem: Noperipehral edema  Skin: significant jaundice  Psych: flat, interactive.          Data:     Lab Results   Component Value Date    WBC 4.1 07/13/2018     Lab Results   Component Value Date    RBC 2.96 07/13/2018     Lab Results   Component Value Date    HGB 8.8 07/13/2018     Lab Results   Component Value Date    HCT 24.3 07/13/2018     No components found for: MCT  Lab Results   Component Value Date    MCV 82 07/13/2018     Lab Results   Component Value Date    MCH 29.7 07/13/2018     Lab Results   Component Value Date    MCHC 36.2 07/13/2018     Lab Results   Component Value Date    RDW 19.3 07/13/2018     Lab Results   Component Value Date    PLT 35 07/13/2018       Last Basic Metabolic Panel:  Lab Results   Component Value Date     07/13/2018      Lab Results   Component Value Date    POTASSIUM 2.6 07/13/2018     Lab Results   Component Value Date    CHLORIDE 101 07/13/2018     Lab Results   Component Value Date    JESUS 8.1 07/13/2018     Lab Results   Component Value Date    CO2 20 07/13/2018     Lab Results   Component Value Date    BUN 30 07/13/2018     Lab Results   Component Value Date    CR 2.08 07/13/2018     Lab Results   Component Value Date     07/13/2018       Liver Function Studies -   Recent Labs   Lab Test  07/13/18   1418  07/13/18   0334   PROTTOTAL   --   5.8*   ALBUMIN  3.7  2.8*   BILITOTAL   --   36.3*   ALKPHOS   --   96   AST   --   287*   ALT   --   125*       Lab Results   Component Value Date    INR 1.78 07/12/2018        MELD-Na score: 35 at 7/13/2018  3:34 AM  MELD score: 34 at 7/13/2018  3:34 AM  Calculated from:  Serum Creatinine: 2.08 mg/dL at 7/13/2018  3:34 AM  Serum Sodium: 132 mmol/L at 7/13/2018  3:34 AM  Total Bilirubin: 36.3 mg/dL at 7/13/2018  3:34 AM  INR(ratio): 1.78 at 7/12/2018  7:13 PM  Age: 43 years           Previous Endoscopy:   EGD 6/22/18  Impression:          - Red blood in the esophagus.                        - Gastric varix, spurting blood.                        - Red blood in the gastric fundus and in the gastric                        body.                        - No specimens collected.     EUS 6/22/18  Impression:          - Small proximal gastric varices - either GOV-2 or IGV-1                        (no endoscopic or sonographically identiable esophageal                        varices, thus favor IGV-1). No active bleeding. No other                        potential bleeding sites identified. Treated with                        EUS-guided glue embolization with apparent cessation of                        flow.                        - No bleeding at the end of the exam. Gastric pool                        lavaged clear of blood and clot.                        - [place] tube removed and not replaced. NG placed                        through right nostril to LIS.           IMAGING:  US abd 7/13/18  Impression:   1. Patent Doppler evaluation of a cirrhotic appearing liver. No focal  hepatic mass.   2. Cholelithiasis and circumferential wall thickening of a  decompressed gallbladder, nonspecific in the setting of ascites and  hepatic parenchymal disease.  3. Sequelae of portal hypertension including splenomegaly and moderate  volume ascites.    CT abd wo 7/13/18  IMPRESSION:   1. No source of bleeding identified in the abdomen or pelvis.  2. Cirrhosis with portal hypertension, including splenomegaly,  moderate ascites, and recanalized umbilical vein.  3. Cholelithiasis.  4. Anemia.

## 2018-07-13 NOTE — CONSULTS
Nephrology Initial Consult  July 13, 2018      Yaneli Miles MRN:[id number] YOB: 1975  Date of Admission:7/12/2018  Primary care provider: System, Provider Not In  Requesting physician: Placido Mercado MD    ASSESSMENT AND RECOMMENDATIONS:   Mr. Yaneli Miles is a 44yo man with a PMH significant for end stage liver disease (ESLD) 2/2 hepatitis B, esophageal varices, portal vein thrombosis, and TIIDM. He came to the hospital for a possible liver transplant on 7/12 but was found to be hypotensive to systolic BP of (91) with worsening diarrhea, anemia, thrombocytopenia,  elevated creatinine, and hypokalemia. Nephrology was consulted for elevated Cr and hypokalemia.    Recommendations (7/13/18):  - Daily IV albumin (75g)   - Midodrine at 10mg TID for BP management  - Hold Cipro and give only one PP1  - Please obtain a VBG  - Replete K as needed  - Strict Is/Os  - Daily standing weights    # Elevated Cr  Patient with a Cr of 2.49 (baseline 1.0) on admission concerning for for worsening renal status. Possibilities for patients elevated cr is likely multifactorial including volume depletion resulting in pre-renal azotemia and drug induced kidney injury as he is on multiple nephrotoxic drugs. Pre-renal azotemia is a possibility in this patient with multiple risk factors including hypotension,  worsening diarrhea, liver cirrhosis and history of portal vein thrombosis which can all result in decreased perfusion of the kidneys. Drug induced kidney injury is also a possibility as he was on 3 nephrotoxic medication including ciprofloxacin (crystalline tubular injury), two PPIs (AIN) and tenofovir (tubular injury). Also patient has a total bilirubin of 43.4 which can precipitate in the kidneys.   - Agree with holding Ciprofloxacin and given only one PPI  - Consider giving only on PPI   - Please give daily IV albumin infusions at 1kg/kg (75g IV albumin for this patient will be fine)  - At this point pt does not need kidney  transplant with potential liver transplant but he is at risk for further kidney damage from ongoing nephrotoxic exposure including bilirubin pigment.    # Volume status, BP  Low intravascular volume.  Marked ascites on exam today, but his extremities were not edematous and he reports of not feeling extremely volume up which is reassuring. His hypotension is likely in the setting of liver failure and hypovolemia.   - Rec daily IV albumin as above  - Rec Midodrine 10mg TID for BP management   - Strict Is/Os  - Daily standing weights    # Electrolytes   - Hypokalemia to 2.6 on admission likely causes include drugs toxicity (tenofovir) and GI loss (diarrhea).  ---  Agree with aggressive repletion, CTM  - Hyponatremia to 132, chronic and likely dilutional in the setting of ascites from ESLD; will CTM    # Acid Base: Bicarb of 17   - Acidosis likely in the setting of declining renal function.   - Please obtain a VBG to further assess patient's status since ESLD pts can develop metabolic alkalosis  -  CTM    # Anemia  Hgb at 8.8 down from 14.5 (from 6/28) is concerning for possible bleed in this patient with hypotension, esophageal varices and history of UGIB. Hematology following. So far, CT abdomen w/o contrast negative for bleed. Agree with further imaging studies. Consider GI and/or hepatology consult.  -Plans per primary team    # Overall renal status  This current rise in patient's Cr with worsening electrolyte and acid base status occurred in the setting of multiple risk factors some of which are reversible particularly the nephrotoxic medications.  - Discontinuation of the nephrotoxic medications should help halt any further insult  - Will closely monitor patient's status in the next few days     Recommendations were communicated to primary team via telephone    Seen and discussed with Dr. Chowdhury and Dr. Gissel Masterson MD   Internal Medicine PGY1 (Renal Consult)  Pager:  [phone]      REASON FOR CONSULT: Elevated creatinine    HISTORY OF PRESENT ILLNESS:  Yaneli Miles is a 43 year old man with a PMH significant for end stage liver disease (ESLD) 2/2 hepatitis B, esophageal varices, portal vein thrombosis, and TIIDM. He came to the hospital for a scheduled liver transplant on 7/12 but was found to be hypotensive to systolic BP of (91) with worsening diarrhea, anemia to hgb of 9.5 (baseline 14.5), thrombocytopenia to a platelets of 6, elevated creatinine, and hypokalemia (K of 2.6). Nephrology was consulted for elevated Cr and hypokalemia.  Of note, patient was recently admitted on 6/14-6/28 for AMS and was found to have variceal bleed resulting in hemorrhagic shock, SBP and sepsis. He also was hyponatremic and had non nephrotic range proteinuria with elevated creatinine which all resolved (except for the hyponatremia) prior to discharge.    PAST MEDICAL HISTORY:  Reviewed with patient on 07/13/2018     Past Medical History:   Diagnosis Date     Choledocholithiasis      Chronic viral hepatitis B without delta agent and without coma (H) 6/14/2018     Cirrhosis of liver with ascites (H) 6/14/2018     Esophageal varices (H)     prior GIB     Hepatic encephalopathy (H)      Hepatitis delta with hepatitis B carrier state 06/14/2018    positive antigen     Portal vein thrombosis 6/14/2018 5/29/18 care everywhere US     SBP (spontaneous bacterial peritonitis) (H) 06/2018     Type 2 diabetes mellitus without complication, without long-term current use of insulin (H) 6/14/2018       Past Surgical History:   Procedure Laterality Date     ENDOSCOPIC RETROGRADE CHOLANGIOPANCREATOGRAM      with stone removal     ENDOSCOPIC ULTRASOUND UPPER GASTROINTESTINAL TRACT (GI) N/A 6/22/2018    Procedure: ENDOSCOPIC ULTRASOUND, ESOPHAGOSCOPY / UPPER GASTROINTESTINAL TRACT (GI);  Esophagogastroduodenoscopy, removal of [place] tube, ENDOSCOPIC ULTRASOUND with embolization of gastric varices, placement of  nasogastric tube;  Surgeon: Armando Kraft MD;  Location: [facility] OR        MEDICATIONS:  PTA Meds  Prior to Admission medications    Medication Sig Last Dose Taking? Auth Provider   acetaminophen (TYLENOL) 500 MG tablet Take 1 tablet (500 mg) by mouth every 6 hours as needed for mild pain or fever   Fazal Lamas MD   ciprofloxacin (CIPRO) 500 MG tablet Take 1 tablet (500 mg) by mouth every 24 hours   Fazal Lamas MD   ferrous sulfate (IRON) 325 (65 Fe) MG tablet Take 325 mg by mouth   Reported, Patient   insulin aspart (NOVOLOG PEN) 100 UNIT/ML injection Inject 1 Units Subcutaneous 3 times daily (with meals)   Fazal Lamas MD   lactulose (CHRONULAC) 10 GM/15ML solution Take 30 mLs (20 g) by mouth 3 times daily Goal of 3-4 BMs per day   Fazal Lamas MD   lidocaine (XYLOCAINE) 5 % ointment Apply topically every 4 hours as needed for moderate pain   Fazal Lamas MD   miconazole (MICATIN; MICRO GUARD) 2 % powder Apply topically 2 times daily   Fazal Lamas MD   omeprazole (PRILOSEC) 20 MG CR capsule Take 20 mg by mouth   Reported, Patient   pantoprazole (PROTONIX) 40 MG EC tablet Take 1 tablet (40 mg) by mouth 2 times daily (before meals)   Fazal Lamas MD   phosphorus tablet 250 mg (PHOSPHA 250 NEUTRAL) 250 MG per tablet Take 2 tablets (500 mg) by mouth 2 times daily   Fazal Lamas MD   rifaximin (XIFAXAN) 550 MG TABS tablet Take 1 tablet (550 mg) by mouth 2 times daily   Fazal Lamas MD   simethicone (MI-ACID GAS RELIEF) 80 MG chewable tablet Take 80 mg by mouth   Reported, Patient   tenofovir (VIREAD) 300 MG tablet Take 300 mg by mouth   Reported, Patient      Current Meds    ferrous sulfate  325 mg Oral Daily     pantoprazole  40 mg Oral BID AC     sodium chloride (PF)  3 mL Intracatheter Q8H     tenofovir  300 mg Oral Q48H     Infusion Meds      ALLERGIES:    Allergies   Allergen Reactions     Nsaids      Contraindicated        REVIEW OF SYSTEMS:  A comprehensive of systems  was negative except as noted above.    SOCIAL HISTORY:   Social History     Social History     Marital status: Single     Spouse name: N/A     Number of children: N/A     Years of education: N/A     Occupational History     Not on file.     Social History Main Topics     Smoking status: Never Smoker     Smokeless tobacco: Not on file     Alcohol use No     Drug use: No     Sexual activity: Not on file     Other Topics Concern     Not on file     Social History Narrative     Reviewed with patient accompanies Yaneli Miles with a phone  and wife in hospital room    FAMILY MEDICAL HISTORY:   8 brothers 3 sister none with liver disease.  9 children healthy living with him or in area.  Parents  in [place]    PHYSICAL EXAM:   Temp  Av  F (36.7  C)  Min: 95.8  F (35.4  C)  Max: 99.9  F (37.7  C)      Pulse  Av.3  Min: 65  Max: 88 Resp  Avg: 15.4  Min: 10  Max: 29  SpO2  Av.2 %  Min: 91 %  Max: 100 %  FiO2 (%)  Av.3 %  Min: 30 %  Max: 60 %       /66 (BP Location: Right arm)  Pulse 72  Temp 97.4  F (36.3  C) (Oral)  Resp 16  Wt 61.4 kg (135 lb 4.8 oz)  SpO2 100%  BMI 21.84 kg/m2   Date 18 0700 - 18 0659   Shift 2954-3209 8928-3029 7672-9133 24 Hour Total   I  N  T  A  K  E   P.O. 120   120    Shift Total  (mL/kg) 120  (1.96)   120  (1.96)   O  U  T  P  U  T   Urine 200   200    Shift Total  (mL/kg) 200  (3.26)   200  (3.26)   Weight (kg) 61.37 61.37 61.37 61.37        Admit Weight: 61.4 kg (135 lb 4.8 oz)     GENERAL APPEARANCE: in NAD  EYES: anicteric, EOMI  Pulmonary: CTAB  CV: regular rhythm, normal rate, no rub  Abd: markedly protuberant (+ ascites), bowel sounds present, pateint complained of mild but constant discomfort   MS: non-edematous bilateral LE  SKIN: jaundiced, no rash, warm, dry, no cyanosis  NEURO: no asterixis     LABS:   CMP  Recent Labs  Lab 18  1618 18  1418 18  0334 18  1913 18  1420   *  --  132* 132* 126*    POTASSIUM 3.0*  --  2.6* 2.6*  --    CHLORIDE 97  --  101 100  --    CO2 17*  --  20 17*  --    ANIONGAP 14  --  12 15*  --    *  --  104* 177*  --    BUN 32*  --  30 30  --    CR PENDING  --  2.08* 2.49* 1.71*   GFRESTIMATED PENDING  --  35* 28* 43*   GFRESTBLACK PENDING  --  42* 34* 53*   JESUS 8.6  --  8.1* 8.3*  --    MAG  --   --   --  2.5*  --    PHOS  --   --   --  3.6  --    PROTTOTAL PENDING  --  5.8* 6.1*  --    ALBUMIN 3.5 3.7 2.8* 2.8* 2.1*   BILITOTAL 37.0*  --  36.3* 43.4* 39.1*   ALKPHOS 90  --  96 111  --    AST PENDING  --  287* 341*  --    ALT PENDING  --  125* 154*  --      CBC  Recent Labs  Lab 07/13/18  1418 07/13/18  0334 07/12/18 1913   HGB 8.8* 8.5* 9.5*   WBC 4.1 3.9* 4.2   RBC 2.96* 2.88* 3.19*   HCT 24.3* 23.4* 26.2*   MCV 82 81 82   MCH 29.7 29.5 29.8   MCHC 36.2 36.3 36.3   RDW 19.3* 19.0* 19.0*   PLT 35* 42* 6*     INR  Recent Labs  Lab 07/13/18  1618 07/12/18  1913 07/11/18  1420   INR  --  1.78* 1.79*   PTT 48* 42*  --      ABGNo lab results found in last 7 days.   URINE STUDIES  Recent Labs   Lab Test  07/13/18   1315  06/18/18   1200  06/16/18   2220   COLOR  Dark Yellow  Yellow  Dark Yellow   APPEARANCE  Clear  Clear  Slightly Cloudy   URINEGLC  Negative  Negative  Negative   URINEBILI  Large*  Small*  Large*   URINEKETONE  Negative  Negative  Negative   SG  1.013  1.008  1.011   UBLD  Negative  Negative  Large*   URINEPH  6.0  5.0  5.5   PROTEIN  10*  Negative  30*   NITRITE  Negative  Negative  Negative   LEUKEST  Negative  Negative  Large*   RBCU  <1  1  16*   WBCU  None  2  66*     Recent Labs   Lab Test  06/18/18   1200   [facility]  0.75*     PTH  No lab results found.  IRON STUDIES  Recent Labs   Lab Test  07/13/18   0334  06/17/18   2138   IRON  97  66   FEB  117*  61*   IRONSAT  83*  109*   ANGELLA  Unsatisfactory specimen - icteric   --        IMAGING:  All imaging studies reviewed by me.     Michelle Masterson MD    I have seen and examined this patient with the  resident.  This note reflects our joint assessment and plan.     Gay Chauhan MD

## 2018-07-13 NOTE — PLAN OF CARE
"Problem: Patient Care Overview  Goal: Plan of Care/Patient Progress Review    [id number]    Soft BPs,OVSS on RA; Yaneli Miles is a preop liver transplant pt and came from home; wife Shoua at bedside. Pt and his family are hmong speaking and need ; charge nurses calling and still attempting to arrange  services tonight.  phone installed at bedside. A&O x4, lethargic and quiet, stated he was \"very tired\" this shift; cooperative with cares. Denied pain; denied n/v but does endorse weight loss. No s/s of distress. EKG, labs, and cxray completed. Admission database and preop checklist completed. Pt had critical potassium of 2.6--LPIV patent with LR@50ml/hr and Potassium replacement running. Pt had critical platelet count of 6--to transfuse platelets, awaiting PIV insertion. Pt incontinent of stool x2--wearing brief (on lactulose at home) ; voiding small amounts of icteric urine. Up x1 assist with walker. Shower supplies given to pt and instructions given on preop shower. Pt resting quietly now, call light and belongings within reach. Will continue to monitor.     ADDENDUM: per dorita LORNEZ, pt no longer being prepped for liver tx d/t severity of pt's condition (low platelets, hypokalemia). Pt to be transferred to medicine team for further f/u and care.  arrived @midnight; plan explained to pt and his wife and both verbalized understanding of plan.  to stay and interpret for Medicine team. Pt resting quietly now, will continue to monitor.   "

## 2018-07-14 NOTE — PROVIDER NOTIFICATION
Notified Resident at 2155 PM regarding critical results read back.      Spoke with:  YOLANDE Mercado MD    Orders were obtained.    Comments: MD notified of pt's Lactic acid 3.9 this lucho. & ordered NS 500cc bolus over 2 hours & recheck Lactic at 2am.  NS bolus infusing. Pt's vitals stable (see v.s. flowsheet).

## 2018-07-14 NOTE — PLAN OF CARE
Problem: Liver Transplant (Adult)  Goal: Signs and Symptoms of Listed Potential Problems Will be Absent, Minimized or Managed (Liver Transplant)  Signs and symptoms of listed potential problems will be absent, minimized or managed by discharge/transition of care (reference Liver Transplant (Adult) [facility]).  Outcome: No Change  Pt was pre-op liver tx that was cancelled. Continue POC

## 2018-07-14 NOTE — PLAN OF CARE
Problem: Patient Care Overview  Goal: Plan of Care/Patient Progress Review  Outcome: No Change  /66 (BP Location: Left arm)  Pulse 75  Temp 97.8  F (36.6  C) (Oral)  Resp 16  Wt 65.6 kg (144 lb 9.6 oz)  SpO2 98%  BMI 23.34 kg/m2 VS stable on room air. Patient received tylenol x 1 for abdominal pain . Patient denied nausea. Urine Output - voiding spontaneously in bedside urinal. Bowel Function - no BM reported. Nutrition - on 2 gm Na diet and declined dinner.  with no need for tx per MAR. PIV x 2 saline locked. Activity - up SBA and rested between cares. All care explained and questions answered. Will continue to monitor and will notify team of changes.

## 2018-07-14 NOTE — PLAN OF CARE
Problem: Patient Care Overview  Goal: Individualization & Mutuality  Outcome: Improving  Yaneli has been afebrile w stable VS this shift on room air. Denies pain and nausea, however c/o belly fullness and pressure from ascites. Given albumin this morning, 300cc. Tolerating a low sodium diet w great appetite. Up independently in the room. Voiding adequate amounts, having regular BMs. PIV saline locked. Wife at bedside and supportive. Will continue to monitor and notify team w concerns.

## 2018-07-14 NOTE — PROGRESS NOTES
[facility], [place]  Internal Medicine Progress Note - [facility] Service    Main Plans for Today    - plan for therapeutic paracentesis on 7/15  - continue to hold pta lactulose and rifaximin, consider resuming on 7/15 or earlier if concerned about encephalopathy          Assessment and Plan:   Yaneli Miles is a 43 year old male Hmong speaking male who has a history of ESLD 2/2 chronic hep B c/b ascites, thrombocytopenia, varices admitted for liver transplant found to have severe thrombocytopenia and BEN.     # Acute on chronic normocytic anemia  # Thrombocytopenia  Presented with acute worsening of anemia (baseline 14 on 6/28 -> 9 on 7/13) and severe thrombocytopenia (baseline 30s -> 9 on 7/13) concern for MAHA (TTP, DIC, atypical HUS) however peripheral smear w/o schistocytes, INR at baseline and fibrinogen and LDH slightly elevated which would not correlate to degree of hemolysis if patient hemolyzed.  Given platelets improved with transfusion low suspicion for ITP. CT abdomen without evidence of intra-abdominal bleeding. Abdominal U/S showing splenomegaly likely contributing to sequestration.   - Hgb and platelets now stable, daily CBC   - Heme/onc following   - cont pta ferrous sulfate      # BEN  # Metabolic acidosis  Baseline 1.0 scr, presented with Cr 2.49. Could be pre-renal in the setting of hypovolemia 2/2 to dehydration or diarrhea, could also be hepatorenal (however Urine Na 29). FeNa consistent with pre-renal injury. UA with concentrated urine however with renal tubular epithelial cells and WBC casts (especially given pta ciprofloxacin and two PPI and tenofovir). Abdominal U/S w/o hydronephrosis. VBG w/o significant acidosis  - if metabolic acidosis worsens, consider bicarb   - daily standing weights, strict I&O   - midodrine 10 mg TID  - Albumin fluid challenge 100g daily x3days (7/13-7/15)   - Nephrology consultation  - Holding cipro (in setting of QT prolongation)  - Holding  pta omeprazole, continuing pta pantoprazole   - Hold pta phos tablets      # Possible diarrhea  Per nursing patient having formed stools  - defer C diff PCR and enteric panel unless patient having loose stools  - hold PTA lactulose, PTA rifaximin will likely resume on 7/15 unless concern for infectious diarrhea  - pending hep A IgM    # Decompensated liver cirrhosis 2/2 chronic hep B on liver transplant list  # Hyperbilirubinemia  # Jaundice  # History of gastric varices   MELD-Na score: 35 at 7/14/2018  7:22 AM  Admission MELD of 37 was called for liver transplant, however deemed to sick for surgery.  US abdomen with doppler shows moderate volume ascites, no evidence of portal vein thrombosis.  7/13 diagnostic paracentesis w/o SBP. On admission bilirubin elevated from baseline however appears to be downtrending.  U/S showed cholelithiasis, gall bladder wall thickening with negative sonographic Chowdhury's thought to be nonspecific in the setting of ascites and liver cirrhosis however could consider cholecystitis or cholangitis.   - Hepatology following   - Daily MELD labs  - cont pta tenofovir   - pending Hepatitis D PCR per GI   - pneumovax prior to discharge  - hold pta ciprofloxacin for SBP prophylaxis given prolonged QT   - hold pta lactulose, hold pta rifaxmin as above   - hold pta omeprazole  - cont pta pantoprazole 40 mg BID   - cont pta tylenol for pain (2 G limit)      # Hyponatremia  Likely in setting of worsening ESLD.  Urine Na 29 and urine osms 397 borderline  - Monitor   - 2 L fluid restriction, 2 g Na restriction     # Hypokalemia  Likely in setting of loose stools   - standard electrolyte protocol      # Prolonged QT  - avoid QT prolonging agents    # DM II  - hold pta 1 u aspart with meals  - hypoglycemia protocol  - low dose ssi     # Weakness  # Deconditioning  # Nutrition  - PT/OT/nutrition     Current Pain Score 7/14/2018   Patient currently in pain? denies   Pain location -   Pain descriptors -    CPOT pain score -   ]  Yaneli laitf pain level was assessed and he currently denies pain     DIET:   Active Diet Order      Combination Diet Regular Diet Adult; Renal Diet; 2 gm NA Diet  DVT:NONE  CODE:  Full Code  Disposition Plan   Expected discharge in 3-5 days to home with outpatient PT  once hemoglobin stable and platelets stable, liver decompensation stabilizes .     Entered: Clementina ANNLuciana Pitts 07/14/2018, 7:44 AM     The patient's care was discussed with the Attending Physician, Dr. Silverio.    Bryan Pitts MD  PGY-2, Internal Medicine  [phone]      Interval History:     Overnight had elevate lactate that improved with fluids. Nursing notes reviewed. Patient seen with  on ipad.  Reports more abdominal distension but no pain. Has mild shortness of breath but no chest pain. No blood in the stools or urine.  Feels weak.  4 point ROS otherwise normal.      Physical Exam:   Vitals were reviewed  Blood pressure 109/72, pulse 71, temperature 98.7  F (37.1  C), temperature source Oral, resp. rate 16, weight 61.4 kg (135 lb 4.8 oz), SpO2 100 %.    Intake/Output Summary (Last 24 hours) at 07/13/18 1812  Last data filed at 07/13/18 1300   Gross per 24 hour   Intake             1726 ml   Output              200 ml   Net             1526 ml     Vitals:    07/12/18 1824   Weight: 61.4 kg (135 lb 4.8 oz)       Physical Exam:   General Appearance: Chronically ill appearing man in NAD, scleral icterus   CV: Normal rate, regular rhythm. S1/S2 normal.  No murmurs, rubs or gallops   Resp: Bibasilar crackles   Abdomen: Distended abdomen but soft, nontender to palpation, no rebound or guarding   Lymph/Heme: No bruising, petechia, mucosal bleeding  Extremities: No peripheral edema  Skin: Jaundice          Data:   ROUTINE LABS (Last four results)  CMP    Recent Labs  Lab 07/14/18  0125 07/13/18  1618 07/13/18  1418 07/13/18  0334 07/12/18  1913 07/11/18  1420   NA  --  128*  --  132* 132* 126*   POTASSIUM 3.2* 3.0*  --  2.6*  2.6*  --    CHLORIDE  --  97  --  101 100  --    CO2  --  17*  --  20 17*  --    ANIONGAP  --  14  --  12 15*  --    GLC  --  264*  --  104* 177*  --    BUN  --  32*  --  30 30  --    CR  --  1.91*  --  2.08* 2.49* 1.71*   GFRESTIMATED  --  38*  --  35* 28* 43*   GFRESTBLACK  --  46*  --  42* 34* 53*   JESUS  --  8.6  --  8.1* 8.3*  --    MAG  --   --   --   --  2.5*  --    PHOS  --   --   --   --  3.6  --    PROTTOTAL  --  6.1*  --  5.8* 6.1*  --    ALBUMIN  --  3.5 3.7 2.8* 2.8* 2.1*   BILITOTAL  --  37.0*  --  36.3* 43.4* 39.1*   ALKPHOS  --  90  --  96 111  --    AST  --  289*  --  287* 341*  --    ALT  --  124*  --  125* 154*  --      CBC    Recent Labs  Lab 07/13/18  1418 07/13/18  0334 07/12/18 1913   WBC 4.1 3.9* 4.2   RBC 2.96* 2.88* 3.19*   HGB 8.8* 8.5* 9.5*   HCT 24.3* 23.4* 26.2*   MCV 82 81 82   MCH 29.7 29.5 29.8   MCHC 36.2 36.3 36.3   RDW 19.3* 19.0* 19.0*   PLT 35* 42* 6*     INR    Recent Labs  Lab 07/14/18  0722 07/12/18  1913 07/11/18  1420   INR 1.93* 1.78* 1.79*     Arterial Blood Gas    Recent Labs  Lab 07/13/18 2050   O2PER 21     I&O's: I/O last 3 completed shifts:  In: 1100 [P.O.:600; I.V.:500]  Out: 825 [Urine:825]

## 2018-07-14 NOTE — PLAN OF CARE
Problem: Patient Care Overview  Goal: Plan of Care/Patient Progress Review  Outcome: No Change  /72 (BP Location: Right arm)  Pulse 71  Temp 98.7  F (37.1  C) (Oral)  Resp 16  Wt 61.4 kg (135 lb 4.8 oz)  SpO2 100%  BMI 21.84 kg/m2.  B.  Lactic acid 1.4 & on-call aware with no orders treat.  Pt received NS 500cc bolus last lucho. for lactic acid 3.9.  K+ recheck: 3.2 & given Klor-con 60meq per protocol. Next K+ with morning labs. Pt. voided adequate amounts in urinal, no stools this shift.  Pt. denies pain or nausea.  2 PIVs saline locked. Spouse at bedside & supportive.  Pt. slept well between cares. Continue to monitor & treat per POC & notify MD with changes.

## 2018-07-14 NOTE — PLAN OF CARE
Problem: Patient Care Overview  Goal: Individualization & Mutuality  Outcome: Improving  [id number] - Yaneli has been afebrile w stable VS this shift on room air. Denies pain and nausea, only c/o belly fullness. Often incontinent of bowel and bladder. Pt had 2 small formed, tan stools today. Diagnostic paracentesis done, sample sent to lab. Pt was straight cath'd for 200cc icteric urine, sent to lab. Abdominal U/s done. Pt started on diet, ate 100% of meal. Up with SBA. Working with PT/OT. Wife at bedside and supportive. Nephrology and liver transplant following. Will continue to monitor and notify team w concerns.

## 2018-07-15 NOTE — PROGRESS NOTES
[facility], [place]    Internal Medicine Progress Note - [facility] Service    Assessment & Plan   Yaneli Miles is a 43 year old male Hmong speaking male who has a history of ESLD 2/2 chronic hep B c/b ascites, thrombocytopenia, varices admitted for liver transplant found to have severe thrombocytopenia and BEN. Has been stable from a heme standpoint, given significant abdominal distension will try for small volume paracentesis today with amount dependent on appearance of fluid.      # Acute on chronic normocytic anemia  # Thrombocytopenia  Presented with acute worsening of anemia (baseline 14 on 6/28 -> 9 on 7/13) and severe thrombocytopenia (baseline 30s -> 9 on 7/13) concern for MAHA (TTP, DIC, atypical HUS) however peripheral smear w/o schistocytes, INR at baseline and fibrinogen and LDH slightly elevated which would not correlate to degree of hemolysis if patient hemolyzed.  Given platelets improved with transfusion low suspicion for ITP. CT abdomen without evidence of intra-abdominal bleeding. Abdominal U/S showing splenomegaly likely contributing to sequestration.   - Hgb and platelets now stable, daily CBC   - Heme/onc following   - cont pta ferrous sulfate       # BEN  # Metabolic acidosis  Baseline 1.0 scr, presented with Cr 2.49. Could be pre-renal in the setting of hypovolemia 2/2 to dehydration or diarrhea, could also be hepatorenal (however Urine Na 29). FeNa consistent with pre-renal injury. UA with concentrated urine however with renal tubular epithelial cells and WBC casts (especially given pta ciprofloxacin and two PPI and tenofovir). Abdominal U/S w/o hydronephrosis. VBG w/o significant acidosis  - if metabolic acidosis worsens, consider bicarb   - daily standing weights, strict I&O   - midodrine 10 mg TID  - Albumin fluid challenge 100g daily x3days (7/13-7/15)   - Nephrology consultation  - Holding cipro (in setting of QT prolongation)  - Holding pta omeprazole, continuing  pta pantoprazole   - Hold pta phos tablets      # Decompensated liver cirrhosis 2/2 chronic hep B on liver transplant list  # Hyperbilirubinemia  # Jaundice  # History of gastric varices   MELD-Na score: 35 at 7/14/2018  7:22 AM  Admission MELD of 37 was called for liver transplant, however deemed to sick for surgery.  US abdomen with doppler shows moderate volume ascites, no evidence of portal vein thrombosis.  7/13 diagnostic paracentesis w/o SBP. On admission bilirubin elevated from baseline however appears to be downtrending.  U/S showed cholelithiasis, gall bladder wall thickening with negative sonographic Chowdhury's thought to be nonspecific in the setting of ascites and liver cirrhosis however could consider cholecystitis or cholangitis.   - Hepatology following   - Daily MELD labs  - cont pta tenofovir   - pending Hepatitis D PCR per GI   - pneumovax prior to discharge  - hold pta ciprofloxacin for SBP prophylaxis given prolonged QT   - hold pta lactulose, hold pta rifaxmin as above   - hold pta omeprazole  - cont pta pantoprazole 40 mg BID   - cont pta tylenol for pain (2 G limit)   - Paracentesis with 1L serosanguinous fluid removed, will repeat CBC at 1800    # VRE positive  Per nursing patient having formed stools  - hold PTA lactulose, PTA rifaximin at this time, if no signs of diarrhea tomorrow would consider re-initiation (no signs of HE at this time)     # Hyponatremia  Likely in setting of worsening ESLD.  Urine Na 29 and urine osms 397 borderline  - Monitor   - 2 L fluid restriction, 2 g Na restriction      # Hypokalemia  Likely in setting of loose stools   - standard electrolyte protocol       # Prolonged QT  - avoid QT prolonging agents     # DM II  - hold pta 1 u aspart with meals  - hypoglycemia protocol  - low dose ssi      # Weakness  # Deconditioning  # Nutrition  - PT/OT/nutrition     # Pain Assessment:  Current Pain Score 7/15/2018   Patient currently in pain? denies   Pain location -   Pain  descriptors -   CPOT pain score -   - Yaneli is experiencing pain due to ascities. Pain management was discussed and the plan was created in a collaborative fashion.  Yaneli's response to the current recommendations: engaged  - APAP, therapeutic paracentesis        Diet: Combination Diet Regular Diet Adult; Renal Diet; 2 gm NA Diet  Fluids: None  DVT Prophylaxis: Pneumatic Compression Devices  Code Status: Full Code    Disposition Plan   Expected discharge: 4 - 7 days, recommended to prior living arrangement once adequate pain management/ tolerating PO medications, renal function improved and safe disposition plan/ TCU bed available.     Entered: Gold Silverio 07/15/2018, 3:24 PM   Information in the above section will display in the discharge planner report.      The patient's care was discussed with the Bedside Nurse, Patient and GI Consultant.    Gold Silverio  [facility]  Maroon: 4  Pager: [phone]  Please see sticky note for cross cover information    Interval History   Overnight patient did not have any acute issues. He reports continued abdominal discomfort and bloating. He has not had any fevers or chills. He denies any shortness of breath or cough but has been taking shallow breaths related to abdominal distension. Has not had issues with confusion reportedly. He is very much hoping to have a paracentesis done today.    Physical Exam   Vital Signs: Temp: 97.9  F (36.6  C) Temp src: Oral BP: 109/69   Heart Rate: 67 Resp: 16 SpO2: 98 % O2 Device: None (Room air)    Weight: 144 lbs 9.6 oz  General Appearance: Sitting up in bed, NAD  Respiratory: CTAB  Cardiovascular: RRR, no m/r/g  GI: somewhat tense, very distended, mild tenderness to palpation  Skin: Jaundiced  Other: Alert and oriented, no asterixis.         Data   Medications       ferrous sulfate  325 mg Oral Daily     insulin aspart  1-3 Units Subcutaneous TID AC     insulin aspart  1-3 Units Subcutaneous At Bedtime     midodrine  10 mg  Oral TID w/meals     pantoprazole  40 mg Oral BID AC     pneumococcal vaccine  0.5 mL Intramuscular Prior to discharge     sodium chloride (PF)  3 mL Intracatheter Q8H     tenofovir  300 mg Oral Q48H     Data     Recent Labs  Lab 07/15/18  0703 07/14/18  0722 07/14/18  0125 07/13/18  1618 07/13/18  1418  07/12/18  1913   WBC 4.8 4.1  --   --  4.1  < > 4.2   HGB 8.7* 8.8*  --   --  8.8*  < > 9.5*   MCV 81 81  --   --  82  < > 82   PLT 38* 41*  --   --  35*  < > 6*   INR 1.84* 1.93*  --   --   --   --  1.78*   * 127*  --  128*  --   < > 132*   POTASSIUM 3.5 4.1 3.2* 3.0*  --   < > 2.6*   CHLORIDE 98 100  --  97  --   < > 100   CO2 16* 15*  --  17*  --   < > 17*   BUN 31* 29  --  32*  --   < > 30   CR 2.00* 1.93*  --  1.91*  --   < > 2.49*   ANIONGAP 14 12  --  14  --   < > 15*   JESUS 8.8 8.6  --  8.6  --   < > 8.3*   * 92  --  264*  --   < > 177*   ALBUMIN 3.4 3.2*  --  3.5 3.7  < > 2.8*   PROTTOTAL 5.7* 6.0*  --  6.1*  --   < > 6.1*   BILITOTAL 37.0* 37.4*  --  37.0*  --   < > 43.4*   ALKPHOS 80 90  --  90  --   < > 111   * 130*  --  124*  --   < > 154*   * 319*  --  289*  --   < > 341*   < > = values in this interval not displayed.  No results found for this or any previous visit (from the past 24 hour(s)).

## 2018-07-15 NOTE — PLAN OF CARE
Problem: Patient Care Overview  Goal: Individualization & Mutuality  Outcome: No Change  /69 (BP Location: Left arm)  Pulse 75  Temp 97.9  F (36.6  C) (Oral)  Resp 16  Wt 65.6 kg (144 lb 9.6 oz)  SpO2 98%  BMI 23.34 kg/m2  Pt is on room air. Alert and [place] X 4.    Blood glucose monitored. No correction needed.   Pt is not eating this shift. Writer encouraged his daughters to order whatever he will eat. They said they would. That was at 1400.   Michael, called at 1445 with a Preliminary positive VRE rectal swab result. Writer has spoken to Dr. Silverio and the charge RN regarding the result. The lab will be performing sensitivities to finalize.  Paracentesis done today. 1 liter removed. Scheduled albumin given, then a 50 gram one time order was given after the Paracentesis.   Blood pressures have remained stable. All other meds given.   One icteric urination of adequate amount. No bowel movement.   Pt is only up in the room.  He got up only once this whole shift. Writer encouraged walking, but he didn't do it.

## 2018-07-15 NOTE — PLAN OF CARE
Problem: Patient Care Overview  Goal: Plan of Care/Patient Progress Review  Outcome: No Change  /75 (BP Location: Left arm)  Pulse 75  Temp 98  F (36.7  C) (Oral)  Resp 16  Wt 65.6 kg (144 lb 9.6 oz)  SpO2 99%  BMI 23.34 kg/m2.  Vitals stable on RA. B-juice, 127.  Pt. had no c/o's pain or nausea. Pt. voiding adequate amounts in urinal, no stools this shift. 2 PIVs saline locked. Pt. Slept well between cares.  Wife at bedside.  Plan for paracentesis today. Continue to monitor & treat per POC & notify team with changes.

## 2018-07-15 NOTE — PROCEDURES
Procedure Note  The risks and benefits of the procedure were explained to the patient and family via [employer] interpretive services, who expressed understanding and opted to proceed. Consent was obtained and placed in the chart. A time out was performed. An area of ascites was located using ultrasound and marked in the left lower quadrant; the area was prepped and draped in the usual sterile fashion. 6 mL of 1% lidocaine was instilled and ascites located. The paracentesis catheter and needle were inserted until ascites obtained then the needle removed. The apparatus was connected to vacuum bottles and a total of 1000 mL removed. This volume was limited due to dark cherry color of ascites fluid.  The catheter was withdrawn and the area dressed. The patient tolerated the procedure well.  Paracentesis was performed by Carlyn Apple PA-C and Dr. Salamanca was present throughout the procedure.  Location: LLQ  Total volume removed: 1000 mL  Fluid sent to the lab: yes    Carlyn Apple PA-C  [facility] Medicine  Bedside Procedures Service  [phone]  DOS:  July 15, 2018

## 2018-07-15 NOTE — PLAN OF CARE
Problem: Infection, Risk/Actual (Adult)  Goal: Identify Related Risk Factors and Signs and Symptoms  Related risk factors and signs and symptoms are identified upon initiation of Human Response Clinical Practice Guideline (CPG).  Outcome: Improving  Educational paperwork given to pt and pts family for VRE

## 2018-07-16 NOTE — PROGRESS NOTES
HEMATOLOGY -- Follow Up Note    CBC and coag parameters have remained stable / baseline over last few days w/out specific intervention.  As noted in initial consult, see no contraindication to proceeding with transplant from our perspective.  Will sign off.  Please call with questions.      Mandeep Ceballos MD  Associate Professor of Medicine  Division of Hematology, Oncology, and Transplantation  Director, [facility]

## 2018-07-16 NOTE — PLAN OF CARE
Problem: Patient Care Overview  Goal: Plan of Care/Patient Progress Review  Outcome: No Change  ADM: 7/12 Liver transplant cancelled d/t BEN and electrolyte imbalance   **Hmong speaking**  Para- 1 L taken off today, albumin given  /68 (BP Location: Left arm)  Pulse 73  Temp 98.6  F (37  C) (Oral)  Resp 16  Wt 65.6 kg (144 lb 9.6 oz)  SpO2 98%  BMI 23.34 kg/m2  Neuro:  WDL  Cardiac:  Soft BP  Respiratory: WDL on RA  GI/: Voiding spontaneously, small amounts   Diet/ appetite: Renal 2G NA diet, Poor PO intake  Activity: Up SBA, slow moving  Pain: Denies pain  Skin: Jaundice. Round belly.  LDAs: R & L PIV Sl'd      Plan: Continue POC

## 2018-07-16 NOTE — PLAN OF CARE
Problem: Patient Care Overview  Goal: Plan of Care/Patient Progress Review  Outcome: No Change  /68 (BP Location: Left arm)  Pulse 72  Temp 98.7  F (37.1  C) (Oral)  Resp 16  Wt 65.6 kg (144 lb 9.6 oz)  SpO2 99%  BMI 23.34 kg/m2.  B.  Pt. had no c/o's pain or nausea.  Pt. up with standby assist.  Voiding but not always saving, 1 stool reported this shift.  2 PIVs saline locked.  Pt's spouse at bedside & supportive.  Continue to monitor & treat per POC & notify MD with changes.

## 2018-07-16 NOTE — PLAN OF CARE
Problem: Liver Transplant (Adult)  Goal: Signs and Symptoms of Listed Potential Problems Will be Absent, Minimized or Managed (Liver Transplant)  Signs and symptoms of listed potential problems will be absent, minimized or managed by discharge/transition of care (reference Liver Transplant (Adult) CPG).   Outcome: No Change    AVSS, on scheduled midodrine.  Afternoon , treated per sliding scale orders. Family reports patient is forgetful, difficult to assess orientation 2/2 language barrier.  iPad used when appropriate. Lactulose to start this evening. Poor appetite on a low sodium diet, po fluids encouraged. Voiding but not saving, reported 2 BMs today, abdomen very distended. Platelets improved today to 42 (from 37). Up with SBA. Plan to d/c home with family assist tomorrow.

## 2018-07-16 NOTE — PROGRESS NOTES
[facility]  HEPATOLOGY PROGRESS NOTE  Yaneli Miles [id number]       CC:   SUBJECTIVE:  Complains of increased abdominal distention. Is feeling weak but able to move with assistance of walker. Denies fevers, melena, or hematochezia.     ROS:  A 10-point review of systems was negative.    OBJECTIVE:  VS: /72 (BP Location: Left arm)  Pulse 72  Temp 98.1  F (36.7  C) (Oral)  Resp 16  Wt 65.6 kg (144 lb 9.6 oz)  SpO2 99%  BMI 23.34 kg/m2   General: In no acute distress, mild facial muscle wasting  Neuro: AOx3, No asterixis  Lymph: No cervical lymphadenopathy  HEENT:  Moderate scleral icterus, Nooral lesions  CV:. Skin warm and dry  Lungs:  Respirations even and nonlabored on room air  Abd: Moderately distended. Tender with palpation. +BS. Firm  Extrem: Noperipehral edema  Skin: moderate jaundice  Psych: pleasant    MEDICATIONS:  Current Facility-Administered Medications   Medication     acetaminophen (TYLENOL) tablet 325 mg     glucose gel 15-30 g    Or     dextrose 50 % injection 25-50 mL    Or     glucagon injection 1 mg     ferrous sulfate (IRON) tablet 325 mg     insulin aspart (NovoLOG) inj (RAPID ACTING)     insulin aspart (NovoLOG) inj (RAPID ACTING)     lactulose (CHRONULAC) solution 20 g     lidocaine 1 % 1 mL     melatonin tablet 1 mg     midodrine (PROAMATINE) tablet 10 mg     naloxone (NARCAN) injection 0.1-0.4 mg     pantoprazole (PROTONIX) EC tablet 40 mg     pneumococcal vaccine (PNEUMOVAX 23-adriana) injection 0.5 mL     potassium chloride (KLOR-CON) Packet 20-40 mEq     potassium chloride 10 mEq in 100 mL intermittent infusion with 10 mg lidocaine     potassium chloride 10 mEq in 100 mL sterile water intermittent infusion (premix)     potassium chloride 20 mEq in 50 mL intermittent infusion     potassium chloride SA (K-DUR/KLOR-CON M) CR tablet 20-40 mEq     rifaximin (XIFAXAN) tablet 550 mg     sodium chloride (PF) 0.9% PF flush 3 mL     sodium chloride (PF) 0.9% PF flush 3 mL     tenofovir  (VIREAD) tablet 300 mg       REVIEW OF LABORATORY, PATHOLOGY AND IMAGING RESULTS:  BMP  Recent Labs  Lab 07/16/18  0638 07/15/18  0703 07/14/18  0722 07/14/18  0125 07/13/18  1618   * 127* 127*  --  128*   POTASSIUM 3.2* 3.5 4.1 3.2* 3.0*   CHLORIDE 99 98 100  --  97   JESUS 8.7 8.8 8.6  --  8.6   CO2 17* 16* 15*  --  17*   BUN 27 31* 29  --  32*   CR 1.70* 2.00* 1.93*  --  1.91*   GLC 85 107* 92  --  264*     CBC  Recent Labs  Lab 07/16/18  0638 07/15/18  1800 07/15/18  0703 07/14/18  0722   WBC 3.5* 4.0 4.8 4.1   RBC 2.63* 2.63* 2.97* 2.96*   HGB 7.9* 7.9* 8.7* 8.8*   HCT 21.2* 22.4* 23.9* 24.0*   MCV 81 85 81 81   MCH 30.0 30.0 29.3 29.7   MCHC 37.3* 35.3 36.4 36.7*   RDW 19.9* 20.7* 19.9* 19.4*   PLT 42* 37* 38* 41*     INR  Recent Labs  Lab 07/16/18  0638 07/15/18  0703 07/14/18  0722 07/12/18  1913   INR 2.32* 1.84* 1.93* 1.78*     LFTs  Recent Labs  Lab 07/16/18  0638 07/15/18  0703 07/14/18  0722 07/13/18  1618   ALKPHOS 61 80 90 90   * 305* 319* 289*   ALT 99* 122* 130* 124*   BILITOTAL 35.7* 37.0* 37.4* 37.0*   PROTTOTAL 5.3* 5.7* 6.0* 6.1*   ALBUMIN 4.0 3.4 3.2* 3.5      PANC  Recent Labs  Lab 07/12/18  1913   AMYLASE 35      MELD-Na score: 36 at 7/16/2018  6:38 AM  MELD score: 34 at 7/16/2018  6:38 AM  Calculated from:  Serum Creatinine: 1.70 mg/dL at 7/16/2018  6:38 AM  Serum Sodium: 128 mmol/L at 7/16/2018  6:38 AM  Total Bilirubin: 35.7 mg/dL at 7/16/2018  6:38 AM  INR(ratio): 2.32 at 7/16/2018  6:38 AM  Age: 43 years    IMPRESSION:  Yaneli Miles is a 43 year old male with a history of Hepatitis B and D coinfection causing cirrhosis complicated by ascites, gastric varices s/p gluing and hepatic encephalopathy who was initially admitted with potential liver transplant which was held due to thrombocytopenia. He was noted also to have acute kidney injury that is improving.     RECOMMENDATIONS:  1. Hepatitis B  2. Coinfection hepatitis D  - continue tenofovir, adjusted renally.   - Hepatitis D PCR  quant pending. HBV DNA undetectable  - List for liver transplant with MELD 37.   - US 7/13 without evidence of HCC  - Transplant surgery following peripherally    3. Ascites  - has abdominal distention. Paracentesis prior to discharge.   - no evidence of SBP    Next follow up appointment: Dr. Helton 7/30    Thank you for the opportunity to be involved with  [facility]. Please call with any questions or concerns.    BUD Denise, CNP  Inpatient Hepatology [facility]  [phone]

## 2018-07-16 NOTE — PROVIDER NOTIFICATION
Notified primary team via text message. No new orders received. Instructed patient and wife via  to obtain urine specimen. Will follow up with team.

## 2018-07-16 NOTE — PROGRESS NOTES
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       # Acute on chronic normocytic anemia  # Thrombocytopenia  Presented with acute worsening of anemia (baseline 14 on 6/28 -> 9 on 7/13) and severe thrombocytopenia (baseline 30s -> 9 on 7/13) concern for MAHA (TTP, DIC, atypical HUS) however peripheral smear w/o schistocytes, INR at baseline and fibrinogen and LDH slightly elevated which would not correlate to degree of hemolysis if patient hemolyzed. Given platelets improved with transfusion low suspicion for ITP. CT abdomen without evidence of intra-abdominal bleeding. Abdominal U/S showing splenomegaly likely contributing to sequestration. Patient had paracentesis on 7/10, suspect could have had some bleeding from this.   - Hgb and platelets now stable, daily CBC   - Heme/onc following   - cont pta ferrous sulfate       # BEN  # Metabolic acidosis, stable   Baseline 1.0 scr, presented with Cr 2.49. Could be pre-renal in the setting of hypovolemia 2/2 to dehydration or diarrhea, could also be hepatorenal (however Urine Na 29). FeNa consistent with pre-renal injury. UA with concentrated urine however with renal tubular epithelial cells and WBC casts (especially given pta ciprofloxacin and two PPI and tenofovir). Abdominal U/S w/o hydronephrosis. VBG w/o significant acidosis  - if metabolic acidosis worsens, consider bicarb   - daily standing weights, strict I&O   - midodrine 10 mg TID  - Albumin fluid challenge 100g daily x3days (7/13-7/15)   -  Nephrology consultation  - Holding cipro (in setting of QT prolongation, will repeat ECG on 7/16 for QT monitoring)  - Holding pta omeprazole, continuing pta pantoprazole   - Hold pta phos tablets      # Decompensated liver cirrhosis 2/2 chronic hep B on liver transplant list  # Hyperbilirubinemia  # Jaundice  # History of gastric varices   MELD-Na score: 36 at 7/16/2018  6:38 AM  Admission MELD of 37 was called for liver transplant, however deemed to sick for surgery.  US abdomen with doppler shows moderate volume ascites, no evidence of portal vein thrombosis.  7/13 diagnostic paracentesis and 7/16 therapeutic para both w/o SBP. On admission bilirubin elevated from baseline however appears to be downtrending.  U/S showed cholelithiasis, gall bladder wall thickening with negative sonographic Chowdhury's thought to be nonspecific in the setting of ascites and liver cirrhosis however could consider cholecystitis or cholangitis. Now s/p paracentesis on 7/15 with removal of 1L serosanguinous fluid   - Hepatology following   - Daily MELD labs  - cont pta tenofovir   - pneumovax prior to discharge  - hold pta ciprofloxacin for SBP prophylaxis given prolonged QT   - hold pta lactulose, hold pta rifaxmin as above   - hold pta omeprazole  - cont pta pantoprazole 40 mg BID   - cont pta tylenol for pain (2 G limit)     # VRE positive  Per nursing patient having formed stools  - resume PTA lactulose, rifaximin     # Hyponatremia  Likely in setting of worsening ESLD.  Urine Na 29 and urine osms 397 borderline  - Monitor   - 2 L fluid restriction, 2 g Na restriction      # Hypokalemia  Likely in setting of loose stools   - standard electrolyte protocol       # Prolonged QT  - avoid QT prolonging agents, repeat ECG for monitoring      # DM II  - hold pta 1 u aspart with meals  - hypoglycemia protocol  - low dose ssi      # Weakness  # Deconditioning  # Nutrition  - PT/OT/nutrition     # Pain Assessment:  Current Pain Score 7/16/2018    Patient currently in pain? yes   Pain location Abdomen   Pain descriptors Dull   CPOT pain score -   - Yaneli is experiencing pain due to ascities. Pain management was discussed and the plan was created in a collaborative fashion.  Yaneli's response to the current recommendations: engaged  - APAP, therapeutic paracentesis    Diet: Combination Diet Regular Diet Adult; Renal Diet; 2 gm NA Diet  Fluids: None  DVT Prophylaxis: Pneumatic Compression Devices  Code Status: Full Code    Disposition Plan   Expected discharge: Tomorrow, recommended to prior living arrangement once adequate pain management/ tolerating PO medications, hemoglobin stable and safe disposition plan/ TCU bed available.     Entered: Clementina Pitts 07/16/2018, 2:18 PM   Information in the above section will display in the discharge planner report.      The patient's care was discussed with the Attending Physician, Dr. Dr. Silverio, Bedside Nurse, Patient and GI Consultant.    Clementina Pitts  [facility]  Maroon: 4  Pager: [phone]  Please see sticky note for cross cover information    Interval History   No acute events overnight. Nursing notes reviewed.  Patient seen with ipad and inperson . Patient reports abdominal distension improved after paracentesis yesterday.  Denies fevers, chills, chest pain, shortness of breath.  Continues to have abdominal distension but no pain.  No black or red stools in blood or in urine.  Appetite has been ok.  4 point ROS otherwise normal.     Physical Exam   Vital Signs: Temp: 98.1  F (36.7  C) Temp src: Oral BP: 107/72 Pulse: 72 Heart Rate: 70 Resp: 16 SpO2: 99 % O2 Device: None (Room air)    Weight: 144 lbs 9.6 oz  General Appearance: Sitting up in bed, NAD  Respiratory: CTAB  Cardiovascular: RRR, no m/r/g  GI: somewhat tense, very distended, mild tenderness to palpation  Skin: Jaundiced  Other: Alert and oriented, answered questions appropriately     Data   Medications       ferrous  sulfate  325 mg Oral Daily     insulin aspart  1-3 Units Subcutaneous TID AC     insulin aspart  1-3 Units Subcutaneous At Bedtime     midodrine  10 mg Oral TID w/meals     pantoprazole  40 mg Oral BID AC     pneumococcal vaccine  0.5 mL Intramuscular Prior to discharge     sodium chloride (PF)  3 mL Intracatheter Q8H     tenofovir  300 mg Oral Q48H     Data     Recent Labs  Lab 07/16/18  0638 07/15/18  1800 07/15/18  0703 07/14/18  0722   WBC 3.5* 4.0 4.8 4.1   HGB 7.9* 7.9* 8.7* 8.8*   MCV 81 85 81 81   PLT 42* 37* 38* 41*   INR 2.32*  --  1.84* 1.93*   *  --  127* 127*   POTASSIUM 3.2*  --  3.5 4.1   CHLORIDE 99  --  98 100   CO2 17*  --  16* 15*   BUN 27  --  31* 29   CR 1.70*  --  2.00* 1.93*   ANIONGAP 12  --  14 12   JESUS 8.7  --  8.8 8.6   GLC 85  --  107* 92   ALBUMIN 4.0  --  3.4 3.2*   PROTTOTAL 5.3*  --  5.7* 6.0*   BILITOTAL 35.7*  --  37.0* 37.4*   ALKPHOS 61  --  80 90   ALT 99*  --  122* 130*   *  --  305* 319*     No results found for this or any previous visit (from the past 24 hour(s)).

## 2018-07-16 NOTE — PLAN OF CARE
Problem: Patient Care Overview  Goal: Plan of Care/Patient Progress Review  Discharge Planner PT  7A  Patient plan for discharge: Home with assist  Current status: Pt performs sit<>stand x5 Carole with FWW, cues for hand placement. Pt ambulates 1025' Carole with FWW, slow speed with limited speed changes when cued to increase speed. Recommend pt up walking with nursing staff and FWW 3x/day.  Barriers to return to prior living situation: Medical status  Recommendations for discharge: Home with assist  Rationale for recommendations: Pt has good support of family at home, deemed safe to return home with assist as needed per current functional status.        Entered by: Sindy Martinez 07/16/2018 10:39 AM

## 2018-07-17 NOTE — DISCHARGE SUMMARY
[facility], [place]    Internal Medicine Discharge Summary- Karla Service    Date of Admission:  7/12/2018  Date of Discharge:  7/17/2018  Discharging Attending Provider: Dr. Hayes  Discharge Team: Karla 4    Discharge Diagnoses   Acute on chronic normocytic anemia  Acute on chronic thrombocytopenia  BEN  Metabolic acidosis   Decompensated liver cirrhosis  Chronic Hep B awaiting liver transplant  Hyponatremia  VRE positive   Hypokalemia   Prolonged QT  DM II  Deconditioning     Follow-ups Needed After Discharge   PCP for repeat CBC    Hepatology for ongoing management of cirrhosis     IR for planned paracentesis on 7/18     Hospital Course   Yaneli Miles is a 43 year old male Hmong speaking male who has a history of ESLD 2/2 chronic hep B c/b ascites, thrombocytopenia, varices admitted for liver transplant found to have acute on chronic anemia, acute on chronic thrombocytopenia and BEN.   The following problems were addressed during his hospitalization:      # Acute on chronic normocytic anemia  # Thrombocytopenia  Presented with acute worsening of anemia (baseline 14 on 6/28 -> 9 on 7/13) and severe thrombocytopenia (baseline 30s -> 9 on 7/13) concern for MAHA (TTP, DIC, atypical HUS) however peripheral smear w/o schistocytes, INR at baseline and fibrinogen and LDH slightly elevated which would not correlate to degree of hemolysis if patient hemolyzed. Was evaluated by hematology/oncology. Given platelets improved with transfusion low suspicion for ITP (received 1 U platelets day of admission). CT abdomen without evidence of intra-abdominal bleeding. Abdominal U/S showing splenomegaly likely contributing to sequestration. Patient had paracentesis on 7/10, suspect could have had some bleeding from this.   - cont pta ferrous sulfate       # BEN  # Metabolic acidosis  Baseline 1.0 scr, presented with Cr 2.49. Nephrology was consulted. FeNa consistent with pre-renal injury. UA with  concentrated urin. Abdominal U/S w/o hydronephrosis. VBG w/o significant acidosis. Completed 3 days of albumin fluid challenge (7/13-7/15).   - started midodrine 10 mg TID  - Holding pta omeprazole, continuing pta pantoprazole       # Decompensated liver cirrhosis 2/2 chronic hep B on liver transplant list  # Hyperbilirubinemia  # Jaundice  # Hyponatremia  # History of gastric varices   MELD-Na score: 36 at 7/17/2018  7:04 AM  Admission MELD of 37 was called for liver transplant, however deemed to sick for surgery.  US abdomen with doppler showed moderate volume ascites, no evidence of portal vein thrombosis.  7/13 diagnostic paracentesis and 7/16 therapeutic para both w/o SBP. On admission bilirubin elevated from baseline however downtrended. U/S showed cholelithiasis, gall bladder wall thickening with negative sonographic Chowdhury's thought to be nonspecific in the setting of ascites and liver cirrhosis.   - Follow-up with hepatology  - Will need pneumovax as an outpatient (did not receive it prior to discharge)   - cont pta tenofovir   - cont pta ciprofloxacin for SBP prophylaxis  - cont pta lactulose, cont pta rifaxmin  - cont pta tylenol for pain (2 G limit)   - 2 g Na restriction, 2 L fluid restriction given hyponatremia     # VRE positive  Patient had formed stools this admission     # Hypokalemia  - start 20 meq K daily       # Prolonged QT  On admission QTc ~ 600 in setting of hypkalemia. PTA ciprofloxacin was held.  Prior to discharge QTC had improved and cipro was continued.       # DM II  - cont pta 1 u aspart with meals      # Weakness  # Deconditioning  # Nutrition  Was evaluated by nurition, PT/OT. Outpatient PT was recommended.     # Discharge Pain Plan:  - Patient currently has NO PAIN and is not being prescribed pain medications on discharge.    Consultations This Hospital Stay   SOCIAL WORK IP CONSULT  NUTRITION SERVICES ADULT IP CONSULT  PHYSICAL THERAPY ADULT IP CONSULT  OCCUPATIONAL THERAPY ADULT  IP CONSULT  VASCULAR ACCESS CARE ADULT IP CONSULT  VASCULAR ACCESS CARE ADULT IP CONSULT  VASCULAR ACCESS CARE ADULT IP CONSULT  GI HEPATOLOGY ADULT IP CONSULT  HEMATOLOGY IP CONSULT  NEPHROLOGY GENERAL ADULT IP CONSULT  NEPHROLOGY GENERAL ADULT IP CONSULT  INTERNAL MEDICINE PROCEDURE TEAM ADULT IP CONSULT [facility] - DIALYSIS NON TUNNELED CENTRAL LINE PLACEMENT  INTERNAL MEDICINE PROCEDURE TEAM ADULT IP CONSULT [facility] - PARACENTESIS     Code Status   Full Code       The patient was discussed with Dr. Freddy Pitts  [facility]  Pager: [phone]  ______________________________________________________________________    Physical Exam   Vital Signs: Temp: 97.5  F (36.4  C) Temp src: Oral BP: 107/73 Pulse: 59 Heart Rate: 79 Resp: 18 SpO2: 100 % O2 Device: None (Room air)    Weight: 141 lbs 1.6 oz    General Appearance:  Sitting up in chair, NAD  Respiratory: CTAB  Cardiovascular: RRR, no m/r/g  GI: somewhat tense, very distended, very mild tenderness to palpation w/o rebound or guarding   Skin: Jaundiced  Other: Hmong speaking, alert and oriented, answered questions appropriately     Significant Results and Procedures   Most Recent 3 CBC's:  Recent Labs   Lab Test  07/17/18   0704  07/16/18   0638  07/15/18   1800   WBC  3.7*  3.5*  4.0   HGB  8.5*  7.9*  7.9*   MCV  81  81  85   PLT  43*  42*  37*     Most Recent 3 BMP's:  Recent Labs   Lab Test  07/17/18   0704  07/16/18   2233  07/16/18   0638  07/15/18   0703   NA  129*   --   128*  127*   POTASSIUM  3.8  4.1  3.2*  3.5   CHLORIDE  101   --   99  98   CO2  14*   --   17*  16*   BUN  30   --   27  31*   CR  1.78*   --   1.70*  2.00*   ANIONGAP  14   --   12  14   JESUS  9.4   --   8.7  8.8   GLC  110*   --   85  107*     Most Recent 2 LFT's:  Recent Labs   Lab Test  07/17/18   0704  07/16/18   0638   AST  301*  249*   ALT  122*  99*   ALKPHOS  82  61   BILITOTAL  40.6*  35.7*     Most Recent 3 INR's:  Recent Labs   Lab Test  07/17/18    0704  18   0638  07/15/18   0703   INR  2.12*  2.32*  1.84*   ,   Results for orders placed or performed during the hospital encounter of 18   XR Chest 2 Views    Narrative    XR CHEST 2 VW  2018 7:22 PM      HISTORY:  donor transplant going to [facility] NOW;     COMPARISON: 2018    FINDINGS:  Dense material projects over the epigastrium relating to  esophageal varix embolization. Cardiac silhouette is not enlarged. No  pneumothorax or pleural effusion. No acute osseous analysis. No focal  airspace opacities.      Impression    IMPRESSION: No acute airspace opacity.    I have personally reviewed the examination and initial interpretation  and I agree with the findings.    MICHAEL AGUILA MD   US Abd Cmpl Abd/Pelvis Duplex Cmpl    Narrative    EXAMINATION: US ABDOMEN COMPLETE WITH DOPPLER, 2018 9:48 AM     COMPARISON: Same day abdominal CT, abdominal ultrasound 2018.    HISTORY: 43-year-old male with worsening liver failure.    TECHNIQUE: The abdomen was scanned in standard fashion with  specialized ultrasound transducer(s) using both gray-scale, color  Doppler, and spectral flow techniques.    Findings:  Technically limited evaluation of the left hemiabdomen including the  left hepatic lobe and vasculature due to shadowing from overlying  bowel gas.    Liver: Increased and coarsened hepatic parenchymal echogenicity with  nodular contour. No evidence of a focal hepatic mass.   Extrahepatic portal vein flow is antegrade, measuring 18 cm/sec.  Right portal vein flow is antegrade, measuring 8 cm/sec.  Left portal vein flow is antegrade, measuring 25 cm/sec.    Flow in the hepatic artery is towards the liver and:  116 cm/sec peak systolic  0.72 resistive index.     The splenic vein is not identified. The middle and right hepatic veins  are patent with flow towards the IVC. The left hepatic vein is not  identified. The IVC is patent with flow towards the heart.   The  visualized aorta is  not dilated.    Gallbladder: Multiple shadowing gallstones and sludge within the  gallbladder. The gallbladder is decompressed with circumferential wall  thickening up to 10 mm. Appearance is similar compared to 6/16/2018.  No positive sonographic Chowdhury sign.     Bile Ducts: Both the intra- and extrahepatic biliary system are of  normal caliber.  The common bile duct measures 7 mm in diameter.    Pancreas: The pancreas is obscured by overlying bowel gas.    Kidneys: Both kidneys are of normal echotexture, without mass or  hydronephrosis.   The craniocaudal dimensions are: right- 11.4 cm,  left- 13.2 cm.    Spleen: The spleen is enlarged, measuring 18.2 cm in sagittal  dimension.    Fluid: Moderate volume of ascites.      Impression    Impression:   1. Patent Doppler evaluation of a cirrhotic appearing liver. No focal  hepatic mass.   2. Cholelithiasis and circumferential wall thickening of a  decompressed gallbladder, nonspecific in the setting of ascites and  hepatic parenchymal disease.  3. Sequelae of portal hypertension including splenomegaly and moderate  volume ascites.    I have personally reviewed the examination and initial interpretation  and I agree with the findings.    ZULEMA GRANDA MD   CT Abdomen w/o Contrast    Narrative    EXAMINATION: CT ABDOMEN W/O CONTRAST, 7/13/2018 5:12 AM    TECHNIQUE: Helical CT images from the lung bases through the symphysis  pubis were obtained without IV contrast.     COMPARISON: Ultrasound 6/16/2018. MRI of 5/30/2018, CT 5/27/2018.    HISTORY: Acute hgb drop, no obvious source, please eval for possible  blood in ABD/pelvis    FINDINGS:    Abdomen and pelvis:   Cirrhosis with moderate to large ascites. Metallic density in the left  hepatic lobe, possibly representing biopsy marker. No focal liver  lesion identified on this noncontrast exam. Recanalized umbilical  vein. The spleen is mildly enlarged measuring 14.8 cm in length. Small  splenule anterior to the spleen.  Cholelithiasis. The pancreas, adrenal  glands, and kidneys appear normal.    Moderate ascites. No intra-abdominal free air. No dilated loops of  bowel. Hyperdensities at the gastroesophageal junction, possibly  representing embolization of esophageal varices The appendix is not  visualized. Normal caliber abdominal aorta. No lymphadenopathy in the  abdomen or pelvis.    Lung bases:   The heart is not enlarged. Visualization of the interventricular  septum on these noncontrast images, compatible with anemia. Small  hiatal hernia versus esophageal varices. Atelectasis in the lung  bases.    Bones and soft tissues:   No acute or worrisome osseous lesions. Mild transitional anatomy at  the lumbosacral junction with sacralization of the L5 vertebral body.        Impression    IMPRESSION:   1. No source of bleeding identified in the abdomen or pelvis.  2. Cirrhosis with portal hypertension, including splenomegaly,  moderate ascites, and recanalized umbilical vein.  3. Cholelithiasis.  4. Anemia.    I have personally reviewed the examination and initial interpretation  and I agree with the findings.    ZULEMA GRANDA MD       Pending Results   These results will be followed up by hepatology   Unresulted Labs Ordered in the Past 30 Days of this Admission     Date and Time Order Name Status Description    7/14/2018 1602 fluid culture - Aerobic Bacterial Preliminary     7/13/2018 1302 Fluid Culture Aerobic Bacterial Preliminary     7/13/2018 0207 Blood culture Preliminary     6/23/2018 2223 Platelets prepare order unit conditional In process              Primary Care Physician   Provider Not In System    Discharge Disposition   Discharged to home  Condition at discharge: Stable    Discharge Orders     Reason for your hospital stay   You were seen to get your liver transplanted. Unfortunately you were found to have low blood count and platelets.  You received a platelet transfusion. Your blood counts normalized.     Adult  [facility] Follow-up and recommended labs and tests   Follow up with primary care provider, Provider Not In System, within 7 days for hospital follow- up.  The following labs/tests are recommended: CBC.    Follow up with hepatology as scheduled       Appointments on [place] and/or [facility] (with [facility] or [facility] provider or service). Call [phone] if you haven't heard regarding these appointments within 7 days of discharge.     Activity   Your activity upon discharge: activity as tolerated     When to contact your care team   Worsening abdominal pain, confusion, fevers or chills     Discharge Instructions   Follow-up with your providers as scheduled. We have started you on two new medications: midodrine (three times daily) to help increase your blood pressure and potassium pills given you had low potassium.     Full Code     Diet   Follow this diet upon discharge: Orders Placed This Encounter     Combination Diet Regular Diet Adult; 2 gm NA Diet, 2 L fluid restriction daily       Discharge Medications   Current Discharge Medication List      START taking these medications    Details   midodrine HCl 10 MG TABS Take 10 mg by mouth 3 times daily (with meals)  Qty: 30 tablet, Refills: 3    Associated Diagnoses: Alcoholic cirrhosis of liver with ascites (H)      potassium chloride SA (KLOR-CON) 20 MEQ CR tablet Take 1 tablet (20 mEq) by mouth daily  Qty: 90 tablet, Refills: 1    Associated Diagnoses: Hypokalemia         CONTINUE these medications which have NOT CHANGED    Details   acetaminophen (TYLENOL) 500 MG tablet Take 1 tablet (500 mg) by mouth every 6 hours as needed for mild pain or fever  Qty: 120 tablet, Refills: 0    Associated Diagnoses: Cirrhosis of liver with ascites, unspecified hepatic cirrhosis type (H)      ciprofloxacin (CIPRO) 500 MG tablet Take 1 tablet (500 mg) by mouth every 24 hours  Qty: 30 tablet, Refills: 0    Associated Diagnoses: SBP (spontaneous bacterial peritonitis) (H); Cirrhosis  of liver with ascites, unspecified hepatic cirrhosis type (H)      ferrous sulfate (IRON) 325 (65 Fe) MG tablet Take 325 mg by mouth      insulin aspart (NOVOLOG PEN) 100 UNIT/ML injection Inject 1 Units Subcutaneous 3 times daily (with meals)  Qty: 3 mL, Refills: 0    Associated Diagnoses: Type 2 diabetes mellitus without complication, without long-term current use of insulin (H)      lactulose (CHRONULAC) 10 GM/15ML solution Take 30 mLs (20 g) by mouth 3 times daily Goal of 3-4 BMs per day  Qty: 1892 mL, Refills: 2    Associated Diagnoses: Hepatic encephalopathy (H)      lidocaine (XYLOCAINE) 5 % ointment Apply topically every 4 hours as needed for moderate pain  Qty: 240 g, Refills: 0    Associated Diagnoses: Cirrhosis of liver with ascites, unspecified hepatic cirrhosis type (H)      miconazole (MICATIN; MICRO GUARD) 2 % powder Apply topically 2 times daily  Qty: 90 g, Refills: 0    Associated Diagnoses: Candidiasis of skin      pantoprazole (PROTONIX) 40 MG EC tablet Take 1 tablet (40 mg) by mouth 2 times daily (before meals)  Qty: 180 tablet, Refills: 0    Associated Diagnoses: Bleeding gastric varices      phosphorus tablet 250 mg (PHOSPHA 250 NEUTRAL) 250 MG per tablet Take 2 tablets (500 mg) by mouth 2 times daily  Qty: 120 tablet, Refills: 0    Associated Diagnoses: Hypophosphatasia      rifaximin (XIFAXAN) 550 MG TABS tablet Take 1 tablet (550 mg) by mouth 2 times daily  Qty: 180 tablet, Refills: 0    Associated Diagnoses: Hepatic encephalopathy (H)      simethicone (MI-ACID GAS RELIEF) 80 MG chewable tablet Take 80 mg by mouth      tenofovir (VIREAD) 300 MG tablet Take 300 mg by mouth         STOP taking these medications       omeprazole (PRILOSEC) 20 MG CR capsule Comments:   Reason for Stopping:             Allergies   Allergies   Allergen Reactions     Nsaids      Contraindicated

## 2018-07-17 NOTE — PLAN OF CARE
Problem: Patient Care Overview  Goal: Plan of Care/Patient Progress Review  Physical Therapy Discharge Summary    Reason for therapy discharge:    Discharged to home.    Progress towards therapy goal(s). See goals on Care Plan in [facility] electronic health record for goal details.  Goals partially met.  Barriers to achieving goals:   discharge from facility.    Therapy recommendation(s):    No further therapy is recommended.  Continue activity at home including ambulation

## 2018-07-17 NOTE — PLAN OF CARE
Problem: Patient Care Overview  Goal: Plan of Care/Patient Progress Review  Outcome: No Change  Yaneli appeared to sleep soundly between cares. Wife remains in room assisting with cares. Yaneli denies pain throughout shift. Abdomen remains very distended. Old paracentesis site remains clean and dry. No evidence of nausea or emesis this shift. Will continue to montior and report any significant changes.

## 2018-07-17 NOTE — PROGRESS NOTES
"CLINICAL NUTRITION SERVICES - BRIEF NOTE     Nutrition Prescription    Recommendations already ordered by Registered Dietitian (RD):  Boost Plus TID between meals and with meals when requested       EVALUATION OF THE PROGRESS TOWARD GOALS   Diet: 2 gm Sodium    Intake: Met with patient and wife using Ipad . Patient reports poor PO intake d/t early satiety and poor appetite at home and in the hospital.      When asked what he eats at home, he replied, \"I don't\" and his wife followed with \"only a very little amount of food\".  He has tolerated Boost Plus while in the hospital, but he doesn't do this at home.        Interventions  Discussed recommendations to increase intake of milk in small amounts throughout the day to get protein if unable to eat normal amounts of fish,meat or tofu.  Encouraged Boost Plus at home as usual, provided coupons for Boost and discussed where to obtain.  Reviewed foods high in iron per patient's wife request.  Provided handout in English, which patient's wife noted their daughter can read.  Encouraged cream of wheat as a good, tolerable source of iron.     Monitoring/Evaluation  Progress toward goals will be monitored and evaluated per protocol.    Donna Addison MS, RD, LD  Pager [phone]      "

## 2018-07-17 NOTE — PLAN OF CARE
Problem: Patient Care Overview  Goal: Plan of Care/Patient Progress Review  Spoke with team at 1930, do not wish to send urine sample at this time. Believe dysuria to be chronic related to history of self-cathing. Would recommend sending urine sample if fever or increase WBC.  Also reviewed new onset of emesis; after obtaining repeat EKG did order zofran which was given by another nurse after second emesis this evening with relief. Encourage bed mobility, wife refused turning when aide offered at 2100. Complains of abdomen feeling quite taut and asking for paracentesis. Possible discharge tomorrow.  Will continue to monitor and notify MD of questions/concerns.

## 2018-07-17 NOTE — PROGRESS NOTES
Nephrology Progress Note  07/16/2018         Assessment & Recommendations:   Yaneli Miles is a 43 year old year old male (Hmong speaker,  at bedside) with PMHx of  End stage liver disease with cirrhosis due to hepatitis B complicated by esophageal varices and hepatoencephalopathy, Patient was called in (7/12) to receive transplant but unfortunately did not move forward with surgery due to platelets of 6, BEN, and electrolyte abnormalities.    1- BEN:  - Is improving  - Baseline Crea: 1.0. On admission 2.49, today 1.7  - Likely pre-renal, hypovolemic 2/2 to increased diarrhea, low oral intake, possible compression on renal artery due to ascites,  Hepatorenal syndrome also considered.    - Avoid nephrotoxic agents, consider paracentesis.   - Patient had one bowel movement this morning, No diarrhea, I do believe Lactulose will be continued to prevent hepatic encephalopathy, keep watching kidney function closely and avoid intra-vascular depletion as patient continues developing 3rd space edema due to hypoalbuminemia.     2- End stage liver disease with cirrhosis due to Hep B:  - Patient was called in for liver transplant but due to low platelet, BEN, electrolytes abnormalities, surgery was cancelled.  - Lactulose per primary team and consider kidney function    3- BP and volume:  - Low normal, 100s/70s  - Patient looks euvolemic today, but he does have 3rd space edema which is improving  - Ascites is positive with at least 5 lites of fluid accumulated    4- Hyponatremia:  - Na: 128  - Likely due to worsening ESLD  - Recommend for fluid restriction, 1 liter per day  - Continue monitoring    5- Hypokalemia:  - Likely due to diarrhea as patient on Lactulose treatment  - Replace and daily monitoring.     6- Thrombocytopenia:  - Platelet on admission was 6, improved to 42 today  - Likely with platelet dysfunction due to cirrhosis  - Daily monitoring    Recommendations were communicated to primary team via verbal  communication    Seen and discussed with Dr. Jennifer Cortez MD   [phone]    Interval History :   In the last 24 hours Yaneli Miles with PMHx of  End stage liver disease with cirrhosis due to hepatitis B complicated by esophageal varices and hepatoencephalopathy, Patient was called in (7/12) to receive transplant but unfortunately did not move forward with surgery due to platelets of 6, BEN, and electrolyte abnormalities. During hospitalization paracentesis was done, BEN and platelet are improving.      Review of Systems:   I reviewed the following systems:  GI: poor appetite. No nausea or vomiting or diarrhea, abdominal discomfort.   Neuro:  mild confusion  Constitutional:  no fever or chills  CV: no dyspnea, positive edema.  no chest pain.    Physical Exam:   I/O last 3 completed shifts:  In: 200 [P.O.:200]  Out: 525 [Urine:525]   /74 (BP Location: Left arm)  Pulse 69  Temp 97.6  F (36.4  C) (Oral)  Resp 16  Wt 65.6 kg (144 lb 9.6 oz)  SpO2 100%  BMI 23.34 kg/m2     GENERAL APPEARANCE: lethargic,   EYES:  positive scleral icterus, pupils equal  HENT: mouth without ulcers or lesions  PULM: lungs clear to auscultation bilaterally, equal air movement, no clubbing  CV: regular rhythm, normal rate, no rub     -JVD positive     -edema positive   GI: Distended, mild tenderness, bowel sounds are positive  INTEGUMENT: no cyanosis, no rash  NEURO:  mild asterixis     Labs:   All labs reviewed by me  Electrolytes/Renal -   Recent Labs   Lab Test  07/16/18   0638  07/15/18   0703  07/14/18   0722   07/12/18   1913   06/28/18   0227  06/27/18   1544   NA  128*  127*  127*   < >  132*   < >   --   131*   POTASSIUM  3.2*  3.5  4.1   < >  2.6*   < >   --   4.2   CHLORIDE  99  98  100   < >  100   < >   --   98   CO2  17*  16*  15*   < >  17*   < >   --   21   BUN  27  31*  29   < >  30   < >   --   18   CR  1.70*  2.00*  1.93*   < >  2.49*   < >   --   1.06   GLC  85  107*  92   < >  177*   < >   --    157*   JESUS  8.7  8.8  8.6   < >  8.3*   < >   --   7.9*   MAG   --    --    --    --   2.5*   --   2.1  1.5*   PHOS   --    --    --    --   3.6   --   1.7*  1.3*    < > = values in this interval not displayed.       CBC -   Recent Labs   Lab Test  07/16/18   0638  07/15/18   1800  07/15/18   0703   WBC  3.5*  4.0  4.8   HGB  7.9*  7.9*  8.7*   PLT  42*  37*  38*       LFTs -   Recent Labs   Lab Test  07/16/18   0638  07/15/18   0703  07/14/18   0722   ALKPHOS  61  80  90   BILITOTAL  35.7*  37.0*  37.4*   ALT  99*  122*  130*   AST  249*  305*  319*   PROTTOTAL  5.3*  5.7*  6.0*   ALBUMIN  4.0  3.4  3.2*       Iron Panel -   Recent Labs   Lab Test  07/13/18   0334  06/17/18   2138   IRON  97  66   IRONSAT  83*  109*   ANGELLA  Unsatisfactory specimen - icteric   --          Imaging:  All imaging studies reviewed by me.     Current Medications:    ferrous sulfate  325 mg Oral Daily     insulin aspart  1-3 Units Subcutaneous TID AC     insulin aspart  1-3 Units Subcutaneous At Bedtime     lactulose  20 g Oral TID     midodrine  10 mg Oral TID w/meals     pantoprazole  40 mg Oral BID AC     pneumococcal vaccine  0.5 mL Intramuscular Prior to discharge     rifaximin  550 mg Oral BID     sodium chloride (PF)  3 mL Intracatheter Q8H     tenofovir  300 mg Oral Q48H       peppermint oil       Willis Cortez MD   Nephrology Fellow  Pager: [phone]

## 2018-07-17 NOTE — PLAN OF CARE
Problem: Infection, Risk/Actual (Adult)  Intervention: Prevent Infection/Maximize Resistance  Family performed personal cares.

## 2018-07-17 NOTE — PLAN OF CARE
Problem: Patient Care Overview  Goal: Individualization & Mutuality  Outcome: Adequate for Discharge Date Met: 07/17/18  /73 (BP Location: Left arm)  Pulse 59  Temp 97.5  F (36.4  C) (Oral)  Resp 18  Wt 64 kg (141 lb 1.6 oz)  SpO2 100%  BMI 22.77 kg/m2  Pt on room air.  He is forgetful, but alert and orient X 4.   Denied pain all day.  Denied nausea and ate one meal today.   All output charted.   Blood glucose monitored and corrected as charted. Electrolytes WNL.   All discharge instructions reviewed with patient and his wife with an  present.   Time given for questions.   Right and Left PIV removed by NST.    Pt left in stable condition via wheelchair with his wife.

## 2018-07-18 NOTE — PROGRESS NOTES
Paracentesis Nursing Note  Yaneli Miles presents today to [facility] for a paracentesis.    During today's appointment orders from Fazal Lamas were completed.    Progress Note:    present during entire visit  Patient identification verified by name and date of birth.  Assessment completed.  Vitals monitored throughout appointment and recorded in Doc Flowsheets.  See proceduralist note in ultrasound.    Date of consent or authorization: 7/5/18.  Invasive Procedure Safety Checklist was completed and sent for scanning.     Paracentesis performed by Brandon Irvin PA-C Radiology.    The following labs were communicated to provider performing paracentesis:  Lab Results   Component Value Date    PLT 43 07/17/2018       Total amount of ascites fluid drained: 4.9 liters.  Color of ascites fluid: maroon.  Total amount of albumin given: 50  grams.    Patient tolerated procedure well.    Post procedure,denies pain or discomfort post paracentesis.      Discharge Plan:  Discharge instructions were reviewed with patient.  Patient/Representative verbalized understanding and all questions were answered.   Discharged from [facility] in stable condition.    Michelle Simon RN        /63  Pulse 75  Temp 98.4  F (36.9  C) (Oral)  Wt 65.3 kg (144 lb)  SpO2 100%  BMI 23.24 kg/m2     Administrations This Visit     albumin human 25 % injection 12.5 g     Admin Date Action Dose Route Administered By             07/18/2018 Given 12.5 g Intravenous Michelle Simon RN                    lidocaine 1 % 20 mL     Admin Date Action Dose Route Administered By             07/18/2018 Given 15 mL Other Michelle Simon RN

## 2018-07-18 NOTE — MR AVS SNAPSHOT
After Visit Summary   7/18/2018    Yaneli Miles    MRN: [id number]           Patient Information     Date Of Birth          1975        Visit Information        Provider Department      7/18/2018 9:15 AM Provider, Nicolas Victor Inf Para; [place] [employer]; UC 40 ATC [facility] Specialty [facility] Procedure        Today's Diagnoses     Alcoholic cirrhosis of liver with ascites (H)    -  1    Chronic hepatitis B with cirrhosis (H)           Follow-ups after your visit        Your next 10 appointments already scheduled     Jul 25, 2018  9:30 AM CDT   Paracentesis Visit with Nicolas Spec Inf Para Provider, UC 40 ATC   [facility] Specialty and Procedure ([facility])    [address]  [address]  [place] [facility] [address]   [phone]            Jul 30, 2018  8:30 AM CDT   NUTRITION VISIT with Silva Godinez RD   [facility] Solid Organ Transplant ([facility])    [address]  [address]  [place] [facility] [address]   [phone]            Jul 30, 2018  9:00 AM CDT   Lab with NICOLAS LAB   [facility] Lab ([facility])    [address]  1st Floor  [place] [facility] [address]   [phone]            Jul 30, 2018 10:00 AM CDT   (Arrive by 9:45 AM)   New General Liver with Collette Helton MD   [facility] Hepatology ([facility])    [address]  [address]  [place] [facility] [address]   [phone]            Jul 30, 2018 11:00 AM CDT   Transplant Class with UC TRANSPLANT CLASS   [facility] Solid Organ Transplant ([facility])    [address]  [address]  [place] [facility] [address]   [phone]            Jul 31, 2018 12:30 PM CDT   (Arrive by 12:00 PM)   Hospital Follow Up with Teresa Orta NP   [facility] Nephrology ([facility])    [address]  [address]  [place]  "[place] [address]   [phone]            Aug 01, 2018  9:30 AM CDT   Paracentesis Visit with Uc Spec Inf Para Provider, UC 40 ATC   [facility] Specialty and Procedure ([facility])    [address]  [address]  [facility] [address]   [phone]            Aug 08, 2018  2:00 PM CDT   Paracentesis Visit with Uc Spec Inf Para Provider   [facility] Specialty and Procedure ([facility])    [address]  [address]  [place] [facility] [address] [phone]              Who to contact     If you have questions or need follow up information about today's clinic visit or your schedule please contact [facility] SPECIALTY AND PROCEDURE directly at [phone].  Normal or non-critical lab and imaging results will be communicated to you by MyChart, letter or phone within 4 business days after the clinic has received the results. If you do not hear from us within 7 days, please contact the clinic through MyChart or phone. If you have a critical or abnormal lab result, we will notify you by phone as soon as possible.  Submit refill requests through [employer] or call your pharmacy and they will forward the refill request to us. Please allow 3 business days for your refill to be completed.          Additional Information About Your Visit        MyChart Information     [employer] lets you send messages to your doctor, view your test results, renew your prescriptions, schedule appointments and more. To sign up, go to [email or web address] . Click on \"Log in\" on the left side of the screen, which will take you to the Welcome page. Then click on \"Sign up Now\" on the right side of the page.     You will be asked to enter the access code listed below, as well as some personal information. Please follow the directions to create your username and password.     Your access code is: [email or web address]  Expires: " 2018  8:19 AM     Your access code will  in 90 days. If you need help or a new code, please call your [place] clinic or [phone].        Care EveryWhere ID     This is your Care EveryWhere ID. This could be used by other organizations to access your [place] medical records  [id number]        Your Vitals Were     Pulse Temperature Pulse Oximetry BMI (Body Mass Index)          80 98.4  F (36.9  C) (Oral) 100% 21.69 kg/m2         Blood Pressure from Last 3 Encounters:   18 115/60   18 107/73   18 113/66    Weight from Last 3 Encounters:   18 61 kg (134 lb 6.4 oz)   18 64 kg (141 lb 1.6 oz)   18 61.2 kg (135 lb)              We Performed the Following     US Paracentesis        Primary Care Provider Fax #    Provider Not In System [phone]                Equal Access to Services     [facility] : Hadii yolanda quinteroo Sokannan, waaxda luqadaha, qaybta kaalmada melinda, jose m ramirez . So [facility] [phone].    ATENCIÓN: Si habla español, tiene a william disposición servicios gratuitos de asistencia lingüística. Llame al [phone].    We comply with applicable federal civil rights laws and [place] laws. We do not discriminate on the basis of race, color, national origin, age, disability, sex, sexual orientation, or gender identity.            Thank you!     Thank you for choosing [facility]  for your care. Our goal is always to provide you with excellent care. Hearing back from our patients is one way we can continue to improve our services. Please take a few minutes to complete the written survey that you may receive in the mail after your visit with us. Thank you!             Your Updated Medication List - Protect others around you: Learn how to safely use, store and throw away your medicines at www.disposemymeds.org.          This list is accurate as of 18 11:20 AM.  Always use  your most recent med list.                   Brand Name Dispense Instructions for use Diagnosis    acetaminophen 500 MG tablet    TYLENOL    120 tablet    Take 1 tablet (500 mg) by mouth every 6 hours as needed for mild pain or fever    Cirrhosis of liver with ascites, unspecified hepatic cirrhosis type (H)       ciprofloxacin 500 MG tablet    CIPRO    30 tablet    Take 1 tablet (500 mg) by mouth every 24 hours    SBP (spontaneous bacterial peritonitis) (H), Cirrhosis of liver with ascites, unspecified hepatic cirrhosis type (H)       ferrous sulfate 325 (65 Fe) MG tablet    IRON     Take 325 mg by mouth        insulin aspart 100 UNIT/ML injection    NovoLOG PEN    3 mL    Inject 1 Units Subcutaneous 3 times daily (with meals)    Type 2 diabetes mellitus without complication, without long-term current use of insulin (H)       lactulose 10 GM/15ML solution    CHRONULAC    1892 mL    Take 30 mLs (20 g) by mouth 3 times daily Goal of 3-4 BMs per day    Hepatic encephalopathy (H)       lidocaine 5 % ointment    XYLOCAINE    240 g    Apply topically every 4 hours as needed for moderate pain    Cirrhosis of liver with ascites, unspecified hepatic cirrhosis type (H)       MI-ACID GAS RELIEF 80 MG chewable tablet   Generic drug:  simethicone      Take 80 mg by mouth        miconazole 2 % powder    MICATIN; MICRO GUARD    90 g    Apply topically 2 times daily    Candidiasis of skin       midodrine HCl 10 MG Tabs     30 tablet    Take 10 mg by mouth 3 times daily (with meals)    Alcoholic cirrhosis of liver with ascites (H)       pantoprazole 40 MG EC tablet    PROTONIX    180 tablet    Take 1 tablet (40 mg) by mouth 2 times daily (before meals)    Bleeding gastric varices       phosphorus tablet 250 mg 250 MG per tablet    PHOSPHA 250 NEUTRAL    120 tablet    Take 2 tablets (500 mg) by mouth 2 times daily    Hypophosphatasia       potassium chloride SA 20 MEQ CR tablet    KLOR-CON    90 tablet    Take 1 tablet (20 mEq) by  mouth daily    Hypokalemia       rifaximin 550 MG Tabs tablet    XIFAXAN    180 tablet    Take 1 tablet (550 mg) by mouth 2 times daily    Hepatic encephalopathy (H)       tenofovir 300 MG tablet    VIREAD     Take 300 mg by mouth

## 2018-07-19 NOTE — Clinical Note
Patient is primary on import from [place] liver offer. Waiting to see if donor has Factor V. If they don't we will proceed. No OR time set yet--waiting on those results. Will call patient in if negative.

## 2018-07-19 NOTE — Clinical Note
Still waiting on Factor V tests--hopefully have them this afternoon-evening. Will update patient again around then.

## 2018-07-20 NOTE — H&P
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 He was admitted to the hospital from -, and initially presented for a liver transplant but the surgery was cancelled due to acute on chronic anemia, thrombocytopenia, and BEN. Patient presenting today after another donor liver has become available for potential transplant.    Mr. Miles reports that since going home from the hospital several days ago, he has felt much better. Spent time with his family and rested. He had a paracentesis on Wednesday. Patient is still having some episodes of confusion and tremors. He denied any fevers, chills, nausea, vomiting, constipation, lower extremity edema, shortness of breath, or chest pain.    ROS:   CONSTITUTIONAL: Denies fever, chills, fatigue, or weight fluctuations  HEAD: Denies headache.  EENT: Denies vision changes, hearing changes, dysphagia, or sore throat.   NECK: Denies lymphadenopathy.   CV:Denies chest pain, palpitations, or shortness of breath.   PULMONARY:Denies shortness of breath, cough, or previous TB exposure.   GI:Denies nausea, vomiting, diarrhea, and abdominal pain. Bowel movements are regular.   :Denies urinary alterations, dysuria, urinary frequency, hematuria, and abnormal drainage.   EXT:Denies lower extremity edema.   SKIN:Denies abnormal rashes or lesions.   MUSCULOSKELETAL:Denies upper or lower extremity weakness and pain.   NEUROLOGIC:Denies lightheadedness, dizziness, seizures, or upper or lower extremity paresthesia.   HEMATOLOGICAL:No abnormal bruising or bleeding.   PSYCHIATRIC:Denies any mood alterations.     PMH:  Past Medical History:   Diagnosis  Date     Choledocholithiasis      Chronic viral hepatitis B without delta agent and without coma (H) 6/14/2018     Cirrhosis of liver with ascites (H) 6/14/2018     Esophageal varices (H)     prior GIB     Hepatic encephalopathy (H)      Hepatitis delta with hepatitis B carrier state 06/14/2018    positive antigen     Portal vein thrombosis 6/14/2018 5/29/18 care everywhere US     SBP (spontaneous bacterial peritonitis) (H) 06/2018     Type 2 diabetes mellitus without complication, without long-term current use of insulin (H) 6/14/2018       PSH:  Past Surgical History:   Procedure Laterality Date     ENDOSCOPIC RETROGRADE CHOLANGIOPANCREATOGRAM      with stone removal     ENDOSCOPIC ULTRASOUND UPPER GASTROINTESTINAL TRACT (GI) N/A 6/22/2018    Procedure: ENDOSCOPIC ULTRASOUND, ESOPHAGOSCOPY / UPPER GASTROINTESTINAL TRACT (GI);  Esophagogastroduodenoscopy, removal of [place] tube, ENDOSCOPIC ULTRASOUND with embolization of gastric varices, placement of nasogastric tube;  Surgeon: Armando Kraft MD;  Location: [facility] OR       MEDICATIONS:   Prior to Admission Medications   Prescriptions Last Dose Informant Patient Reported? Taking?   acetaminophen (TYLENOL) 500 MG tablet   No No   Sig: Take 1 tablet (500 mg) by mouth every 6 hours as needed for mild pain or fever   ciprofloxacin (CIPRO) 500 MG tablet   No No   Sig: Take 1 tablet (500 mg) by mouth every 24 hours   ferrous sulfate (IRON) 325 (65 Fe) MG tablet   Yes No   Sig: Take 325 mg by mouth   insulin aspart (NOVOLOG PEN) 100 UNIT/ML injection   No No   Sig: Inject 1 Units Subcutaneous 3 times daily (with meals)   lactulose (CHRONULAC) 10 GM/15ML solution   No No   Sig: Take 30 mLs (20 g) by mouth 3 times daily Goal of 3-4 BMs per day   lidocaine (XYLOCAINE) 5 % ointment   No No   Sig: Apply topically every 4 hours as needed for moderate pain   miconazole (MICATIN; MICRO GUARD) 2 % powder   No No   Sig: Apply topically 2 times daily   midodrine HCl 10 MG  TABS   No No   Sig: Take 10 mg by mouth 3 times daily (with meals)   pantoprazole (PROTONIX) 40 MG EC tablet   No No   Sig: Take 1 tablet (40 mg) by mouth 2 times daily (before meals)   phosphorus tablet 250 mg (PHOSPHA 250 NEUTRAL) 250 MG per tablet   No No   Sig: Take 2 tablets (500 mg) by mouth 2 times daily   potassium chloride SA (KLOR-CON) 20 MEQ CR tablet   No No   Sig: Take 1 tablet (20 mEq) by mouth daily   rifaximin (XIFAXAN) 550 MG TABS tablet   No No   Sig: Take 1 tablet (550 mg) by mouth 2 times daily   simethicone (MI-ACID GAS RELIEF) 80 MG chewable tablet   Yes No   Sig: Take 80 mg by mouth   tenofovir (VIREAD) 300 MG tablet   Yes No   Sig: Take 300 mg by mouth      Facility-Administered Medications: None        ALLERGIES:     Allergies   Allergen Reactions     Nsaids      Contraindicated        FAMILY HISTORY:  No family history on file.    SOCIAL HISTORY:  Social History     Social History     Marital status: Single     Spouse name: N/A     Number of children: N/A     Years of education: N/A     Occupational History     Not on file.     Social History Main Topics     Smoking status: Never Smoker     Smokeless tobacco: Not on file     Alcohol use No     Drug use: No     Sexual activity: Not on file     Other Topics Concern     Not on file     Social History Narrative       PHYSICAL EXAM:  Blood pressure 120/72, pulse 68, temperature 97.7  F (36.5  C), temperature source Axillary, resp. rate 22, weight 60.7 kg (133 lb 12.8 oz), SpO2 100 %.   GENERAL: Appears tired, but oriented times 3. Grossly jaundiced.  HEENT: Eye symmetrical and free of discharge bilaterally. Mucous membranes moist and without lesions.  NECK: Supple and without lymphadenopathy.  CV: RRR, S1S2 present without murmur, rub, or gallop.   RESPIRATORY: Respirations regular, even, and unlabored. Lungs CTA throughout.   GI: Soft and non distended with normoactive bowel sounds present in all quadrants. No tenderness, rebound, guarding. No  organomegaly.   EXTREMITIES: No peripheral edema. 2+ bilateral pedal pulses.   NEUROLOGIC: Alert and orientated x 3. CN II-XII grossly intact. No focal deficits.   MUSCULOSKELETAL: No joint swelling or tenderness.   SKIN: No jaundice. No rashes or lesions.     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 He was admitted to the hospital from 7/12-7/17, and initially presented for a liver transplant but the surgery was cancelled due to acute on chronic anemia, thrombocytopenia, and BEN. Patient presenting today after another donor liver has become available for potential transplant. Placed preop orders, including scrubs, EKG, CXR, and labs. Will wait for these results before determining whether to proceed with liver transplant.    FEN: NPO for OR  PROPHY: SCDs  LINES:  PIV  DISPO:  Admit to 7A  CODE STATUS:  Full    Carlos Starr MD  General Surgery PGY-1      Attestation:    The patient has been seen and evaluated by me.   Vital signs, labs, medications and orders were reviewed.   When obtained, diagnostic images were reviewed by me and interpreted as above.    The care plan was discussed with the multidisciplinary team and I agree with the findings and plan in this note, with any differences recorded in blue.    Immunosuppressive medication management was reviewed and adjusted as reflected in the note and orders.     .

## 2018-07-20 NOTE — PLAN OF CARE
Problem: Liver Transplant (Adult)  Goal: Signs and Symptoms of Listed Potential Problems Will be Absent, Minimized or Managed (Liver Transplant)  Signs and symptoms of listed potential problems will be absent, minimized or managed by discharge/transition of care (reference Liver Transplant (Adult) CPG).  Outcome: No Change  Pt is here for pre-op liver transplant

## 2018-07-20 NOTE — LETTER
[address] [facility]  [address] [place] [address]  Phone: [phone]    September 8, 2018        Dusty Miles  [address] [address]          To whom it may concern:    RE: Dusty Miles was here from 9/7-9/9 for a family medical emergency. Please excuse Dusty from any responsibilities during this time.    Please contact me for questions or concerns.      Sincerely,        Jacquie Cerna MD

## 2018-07-20 NOTE — LETTER
[address] [facility]  [address] [facility] [address]  Phone: [phone]    September 9, 2018        To whom it may concern:                                     was here from 9/7-9/9/18 due to a family emergency and a death in the family. Please excuse                                         from any responsibilities during this time.        Please contact me for questions or concerns.      Sincerely,        Jacquie Cerna MD

## 2018-07-20 NOTE — LETTER
[address] [facility]  [address] [place] [address]  Phone: [phone]    September 9, 2018            To whom it may concern:    RE: Negro Jd    Negro Miles was here from 9/7-9/9/2018 for a family medical emergency.  Please excuse Negro from any responsibilities during this time.    Please contact me for questions or concerns.      Sincerely,      Jacquie Cerna MD

## 2018-07-20 NOTE — TELEPHONE ENCOUNTER
Organ Offer Encounter Information    Organ Offer Information   Organ offer date & time:  7/19/2018  6:10 PM   Coordinator/Fellow/Attending name:  JULIO JOHNNY SHEA   Organ(s):   Organ UNOS ID Match Run ID Comment Organ Laterality   Liver [id number]          Recent infections?:  No    New medications?:  No Recent pregnancy?:  No (Comment: NA)   Angicoagulation medications?:  No Recent vaccinations?:  No   Recent blood transfusions?:  Yes Recent hospitalizations?:  Yes (Comment: Admitted for transplant--low platelets--discharged 2 days ago)   Has your insurance changed in the last 6-12 months?:  Neg    Discussed organ offer with:  Spouse, Patient   Discussed risk category with Patient/Other:  N/A   Understood donor criteria, verbalized understanding   Patient/Other asked to speak to a surgeon?:  No   Discussed program-specific outcomes:  Did not have questions regarding SRTR   Right to decline organ offer without penalty, Patient/Other:  Aware of option to decline without penalty   Organ offer decision status Patient/Other:  Declined Offer   UNOS decline reason:  803 - Candidate transplanted   Detail:  Pursuing another offer   Additional Comments:  7/19/2018 6:25 PM:  Liver: Primary  MD: Melisa  Plan: Called patient and wife regarding this primary liver offer via [email or web address]  (ID# [id number]). Informed them donor was DBD, import-- but that we were still waiting on test results from the donor and our plan was to call them tomorrow regarding this donor. Yaneli can eat and drink like normal but stay close by a phone in case we call with updates. They understood. Provided contact information.  Johnny Alexander  Transplant Coordinator    7/20/2018 10:34 AM:  Called patient and wife via [email or web address]  (ID #[id number]). Informed them we were still waiting on donor tests to come back to see if donor is viable for transplant. They understood--are waiting and able to come at any point. Told them I would  call them with another update this afternoon/evening.   Yanelis Kim  Transplant Coordinator    7/20/2018 2:52 PM:  Called patient and wife via [employer]  (ID #[id number]). Informed them our surgeon would prefer a different liver offer that would be better suited size wise and didn't pose a risk of unknown Factor V mutation. They understood and wanted to proceed with other liver offer.  Yanelis Kim  Transplant Coordinator   Attestation I have discussed all of the above with the Patient/Legal Guardian/Caregiver regarding this organ offer.:  Yes   Coordinator/Fellow/Attending name:  YANELIS KIM

## 2018-07-20 NOTE — LETTER
[address] [facility]  [address] [place] [address]  Phone: [phone]    September 9, 2018        To whom it may concern:    Silas Miles was here from 9/7-9/9/18 due to a family emergency and a death in the family. Please excuse Silas from any responsibilities during this time. Please contact me for questions or concerns.      Sincerely,        Jacquie Cerna MD

## 2018-07-20 NOTE — LETTER
UNIT 4A [facility]  [address] [place] [id number]  Phone: [phone]    September 8, 2018        Jeovanny Borrego  [address] [facility] [address]          To whom it may concern:    RE: Jeovanny Borrego was here from 9/7-9/9 for a family medical emergency. Please excuse Jeovanny Lamb from any responsibilities during this time.    Please contact me for questions or concerns.      Sincerely,          Jacquie Cerna MD

## 2018-07-20 NOTE — PLAN OF CARE
Problem: Patient Care Overview  Goal: Plan of Care/Patient Progress Review  Outcome: No Change  /72  Pulse 68  Temp 97.7  F (36.5  C) (Axillary)  Resp 22  Wt 60.7 kg (133 lb 12.8 oz)  SpO2 100%  BMI 21.6 kg/m2 Pt is a pre-op liver pt who arrived around 1500. VS stable on room air. Patient denied pain and nausea. Urine Output - no void seen since arriving on unit. Bowel Function - no BM reported since arriving on unit. Nutrition - NPO for surgery. Order placed for PIV placement. Pt so far has had labs drawn, cross match sent, chest xray and EKG completed. Was unable to complete admission profile d/t no .  services called and stated an  will be coming to the unit this evening; MD notified and staff will page when  on unit. MD also paged with critical lab values k: 2.5 and total bili: 47.5; waiting for response at this time. Pt up assist x 1-2. Pt will need to take 2 showers overnight prior to surgery planned for early AM. Wife at bedside and able to interpret minimal amounts.  but no orders to treat at this time. All care explained and questions answered. Will continue POC and notify team with changes.

## 2018-07-21 PROBLEM — Z94.4 LIVER TRANSPLANTED (H): Status: ACTIVE | Noted: 2018-01-01

## 2018-07-21 NOTE — PLAN OF CARE
Problem: Patient Care Overview  Goal: Plan of Care/Patient Progress Review  Outcome: No Change    RN:  Patient's K+ was 2.5 at 1741, KCL 10meq IV given x3 per electrolyte replacement protocol, K+ recheck was 2.6, MD on call notified and ordered another KCL 10meq IV per electrolyte protocol till patient be taken down to OR. Patient had his shower with Chlorhexidine solution, patient ready for OR.

## 2018-07-21 NOTE — PROGRESS NOTES
"CLINICAL NUTRITION SERVICES - ASSESSMENT NOTE     Nutrition Prescription    RECOMMENDATIONS FOR MDs/PROVIDERS TO ORDER:  None at this time     Malnutrition Status:    Unable to determine due to pt in OR    Recommendations already ordered by Registered Dietitian (RD):  Ordered TF via OGT (AXR confirmation pending) Nutren 1.5 @ 60 mL/hr to provide 2160 kcals (35 kcal/kg/day), 98 g PRO (1.6 g/kg/day), 1094 mL H2O, 253 g CHO and no fiber daily.    - Do not start feeds until FT position verified   - Initiate @ 10 ml/hr and advance by 10 ml q8hr as tolerated  - Do not start or advance until lytes (Mg++,K+) WNL and phos>1.9   - 30 ml q4hr fluid flushes for tube patency  - Multivitamin/mineral ordered (15 ml/day via FT)   - Daily check of K+, Mg, Phos until TF adv to goal rate for evaluation of refeeding syndrome and need for lyte replacement.     Future/Additional Recommendations:  Pending K+ trends, if lower-potassium formula indicated, would recommend:  Nepro @ goal 50 ml/hr (1200 ml/day) to provide 2160 kcals (35 kcal/kg/day), 97 g PRO (1.6 g/kg/day), 876 ml free H2O, 193 g CHO and 15 g Fiber daily.       REASON FOR ASSESSMENT  Yaneli Miles is a 43 year old male assessed by the dietitian for Provider Order - Registered Dietitian to Assess and Order TF per Medical Nutrition Therapy Protocol    NUTRITION HISTORY  PMH of DM, ESLD 2/2 chronic hepatitis B. Now POD#0 s/p liver transplant     Unable to obtain nutrition history per pt, currently in OR. Pt was recently admitted (7/12- 7/17). During that admission, pt noted to eat \"only a very little amount of food\" at home.     CURRENT NUTRITION ORDERS  Diet: NPO  Enteral access: OGT (position not yet confirmed by AXR)       LABS  Lactic acid 5.9      MEDICATIONS  Medications reviewed    ANTHROPOMETRICS  Height: 0 cm (Data Unavailable)   Ht Readings from Last 2 Encounters:   07/11/18 1.676 m (5' 6\")   06/16/18 1.676 m (5' 6\")     Most Recent Weight: 60.7 kg (133 lb 12.8 oz)    IBW: " 64.5 kg  BMI: Normal BMI  Weight History: Noted 12 kg wt loss over 1 week period (6/27 - 7/5), however unable to determine if loss r/t changes in fluid status.   Wt Readings from Last 10 Encounters:   07/20/18 60.7 kg (133 lb 12.8 oz)   07/18/18 61 kg (134 lb 6.4 oz)   07/17/18 64 kg (141 lb 1.6 oz)   07/11/18 61.2 kg (135 lb)   07/05/18 59 kg (130 lb 1.6 oz)   06/27/18 71.1 kg (156 lb 12 oz)       Dosing Weight: 61 kg (actual, based on admit wt of 60.7 kg on 7/20/18)    ASSESSED NUTRITION NEEDS  Estimated Energy Needs: 1830 - 2135 kcals/day (30 - 35 kcals/kg)  Justification: Increased needs and Post-op  Estimated Protein Needs: 92 - 122 grams protein/day (1.5 - 2 grams of pro/kg)  Justification: Increased needs and Post-op  Estimated Fluid Needs: 1 mL/kcal  Justification: Maintenance    PHYSICAL FINDINGS  See malnutrition section below.    MALNUTRITION  % Intake: Unable to assess  % Weight Loss: > 5% in 1 month (severe) --> Unable to rule out fluctuations in fluid status   Subcutaneous Fat Loss: Unable to assess  Muscle Loss: Unable to assess  Fluid Accumulation/Edema: Unable to assess  Malnutrition Diagnosis: Unable to determine due to pt in OR    NUTRITION DIAGNOSIS  Inadequate protein-energy intake related to NPO status as evidenced by currently meeting 0% nutrition needs       INTERVENTIONS  Implementation  Nutrition Education: Unable to complete due to pt in OR    Enteral Nutrition - Initiate     Goals  Diet adv v nutrition support within 2-3 days.  Total avg nutritional intake to meet a minimum of 30 kcal/kg and 1.5 g PRO/kg daily (per dosing wt 61 kg).     Monitoring/Evaluation  Progress toward goals will be monitored and evaluated per protocol.    Zoila Evans RD, TENA  SICU Pager: [phone]  Weekend Pager: [phone]

## 2018-07-21 NOTE — OP NOTE
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  - Brain Death Piggyback liver transplant   2. End-to-end Choledochocholedochostomy over an 8 pediatric feeding tube   3. Liver biopsy  4.  Eversion thrombectomy of right PV, extending into the main PV  5.  Lysis of adhesions taking an additional 20  minutes of operating time         Surgeon  Surgeon(s) and Role:     * Stew Min MD - Primary     * Mihir Nj MD - Resident - Assisting     * Flaco Chase MD  - Assisting    Co-Surgeon:  Stew Min M.D.    Fellow/Assistant:  Mihir Nj, fellow,  There was no qualified resident to assist with this procedure.     Anesthesia:  General    Specimen:  liver    Drains:  19 Fr Carlos x2     Urine output:  600 mls    Estimated blood loss:  3500    Fluids administered:    Fluid Amount   Colloid (mL) 5300   RBC (Units) 3300   FFP (Units) 2219   Cryoprecipitate (Units)  308   Platelets (Units) 2056   Cell Saver (CCs) 1910        Intraoperative Events: none unexpected    Complications: During the dissection of the liver, there was an episode of ventricular tachycardia, requiring transthoracic cardioversion.    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 A suitable donor offer has become available. After discussing the risks and benefits of proceeding, the patient agreed to proceed with surgery and provided informed consent.    Final ABO/Crossmatch verification: After the donor organ arrived to the operating room and prior to anastomosis, I participated in the transplant pre-verification upon organ receipt timeout by visually verifying the donor ID, organ and laterality, donor blood type, recipient unique  identifier, recipient blood type, and that the donor and recipient are blood type compatible.    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    Fellow/Assistant:  Mihir Nj, fellow, There was no qualified resident to assist with this procedure.    Anesthesia:  None    Graft biopsy:  Yes-  no    Macroscopic steatosis:      Back table reconstruction:  No Reconstruction    Intimal flap repair:      # of hepatic arteries:  1    # of portal veins:  1    Accessory arteries:  No    # of hepatic veins:  3    # of bile ducts:  1    Graft weight:       Findings:  Liver laceration: No  Overall quality of liver: Good    Back Table Procedure: The liver allograft from a mid 60s 65 kg woman was received and inspected and the aforementioned findings were noted. It was not flushed. The donor liver was placed in fresh ice-cold preservation solution. The inferior vena cava was identified. Two stay sutures were placed on the supra-hepatic portion of the cava. Two stay sutures were placed on the infra-hepatic portion of the cava. The fibro-fatty tissue and adrenal gland was cleared of inferior vena cava. The phrenic vein was ligated. The  adrenal vein was ligated. The IVC was tested for leaks by using a bulb syringe. All the leaks identified were suture ligated. The portal vein was identified. All the fibro-fatty area or tissue around the portal vein was removed and the portal vein was dissected up to its bifurcation. A  cannula was placed in the portal vein and fixed with a stitch. The portal vein was tested for leaks. All the leaks identified were suture ligated. The cannula was left in place to be used for flushing the liver at the time of implantation. The hepatic artery anatomy was identified. The celiac axis  was traced all the way from the aortic patch to the level of the gastro-duodenal artery. Dissection was stopped at the level of the gastro-duodenal artery. All the leaks in the hepatic artery tributaries were suture ligated. The bile duct was inspected and flushed. No reconstruction was required. The liver was placed back in ice-cold preservation solution until ready for transplantation. Faculty was present for the critical portions of the procedure.    Findings: normal anatomy    Operative Procedure:   Arterial anastomosis start:  7/21/2018  9:41 AM    Recipient arterial unclamp:  7/21/2018 11:00 AM    Extra vessels used:  no    Extra vessels banked:  Yes    Previous upper abd surgery?  No    Previous cholecystectomy?  No    Portal vein:  Thrombus? Yes-     Specify: old partially occlusive     Patent? Yes-      On portal bypass?  No    Arterial flow:  Sufficient? Yes-     Specify:    Bile duct anastomosis:  To bowel? No   Specify:    To duct?    Specify:      Yaneli Miles was brought to the operating room, placed in a supine position, and a time out was performed. Sequential compression devices were placed on both lower extremities and general endotracheal anesthesia was induced. The patient was given IV antibiotics, and  Basiliximab, Cellcept and Solumedrol. A Luis catheter was placed. A central line was placed by Anesthesia service. The  abdomen was then shaved, prepped, and draped in the usual sterile fashion. The backtable preparation occurred prior to implantation.    The abdomen was opened through a Bilateral subcostal incision. The falciform was taken down. We placed the retractors. The abdomen was examined. Lysis of adhesions took  an additional 20  minutes of operating time. The left lateral segment was mobilized off the diaphragm and the lesser omentum opened. The right lobe was mobilized from the inferior peritoneal reflections.     Retractors were set up. Exposure of the braden hepatis was made. The cystic duct was identified, ligated, and divided. The right lateral side of the braden hepatis was opened. The cystic artery was identified, ligated, and divided. Proper hepatic artery and right and small left hepatic artery were identified. The remaining portion of the braden hepatis was opened. No significant arteries were found to the right of the bile duct. The bile duct was then divided near the bifurcation. A grossly patent portal vein was identified and dissected from below the GDA to the bifurcation. Due to ESLD, coagulopathy and bleeding was encountered.    At this juncture, we mobilized the remaining portion of the right lobe to the midline. The small accessory hepatic veins were isolated, ligated and divided as they entered into the inferior vena cava.     It was elected to devascularize the liver to more easily remove it. The right hepatic artery was ligated. The portal vein was clamped just below the duodenum and divided at the bifurcation.  It was at the juncture that he went into VT that required cardioversion. At this point, a right PV thrombus was identified and removed from the main PV.  It extended almost the entire distance of the MPV,  It was sent to path. The remaining portion of the liver was taken off the anterior surface of the inferior vena cava. Eventually, the liver was only suspended on the hepatic veins. The right  hepatic vein was ligated and divided. A Indian clamp was placed on the confluence of the middle and left hepatic vein and the liver was resected off. The specimen was handed off. Hemostasis was obtained.     The clamp was repositioned to include the right, middle, and left hepatic veins. An orifice was made on the cava incorporating these 3. At this juncture, the donor liver had been brought back, it had been backtabled and the liver was found to be suitable.  The suprahepatic cava of the donor was anastomosed to the recipient venous confluence with a running 4-0 Prolene, the liver was then flushed of its preservation solution with cold 5% albumin, 500 cc. We ligated the donor infrahepatic cava. The portal vein of the donor and recipient was anastomosed in an end-to-end fashion with a running 6-0 Prolene with a growth knot the diameter of the recipient vein. Clamps were released. The patient tolerated the unclamping reasonably well. There were no ventricular arrythmias.    During the anastomosis, any coagulopathic bleeding was corrected by anesthesia. Common arterial patch was anastomosed to recipient's proper hepatic artery. When hemostasis was secured, the bile ducts were trimmed. The donor gallbladder was removed. The 2 ducts were anastomosed in an end-to-end fashion using running 6-0 PDS. After the posterior wall was run, a #8 pediatric feeding tube was introduced across the anastomosis into the duodenum and then the anterior wall was closed.     We irrigated the abdomen. Hemostasis again verified as being adequate. The liver had been making a good amount of bile. A liver biopsy was taken. Two Carlos drains were placed in RUQ. The abdomen was irrigated again and then the fascia closed with a running looped PDS. The skin approximated with staples. All needle, sponge, and instrument counts were accurate. The patient  tolerated the procedure well without apparent complications and was transferred to the ICU in good  condition. Faculty was present for the procedure.    ADDENDUM:  I was present for the entire procedure from incision to fascial closure, including backtable.

## 2018-07-21 NOTE — PROGRESS NOTES
4:37 PM  2018  Patient removed from UNOS waitlist after  donor liver transplant. UNOS ID [id number].   Donor PHS increased risk: No.   If Yes, MD documentation NA  Nevaeh Yost RN, CCTC  On Call Organ Coordinator  .

## 2018-07-21 NOTE — ANESTHESIA PROCEDURE NOTES
Arterial Line Procedure Note  Staff:     Anesthesiologist:  GIOVANNI ARCHIBALD    Resident/CRNA:  ORION OJEDA    Arterial line performed by resident/CRNA in presence of a teaching physician    Location: In OR After Induction  Procedure Start/Stop Times:     patient identified, IV checked, site marked, risks and benefits discussed, informed consent, monitors and equipment checked, pre-op evaluation and at physician/surgeon's request      Correct Patient: Yes      Correct Position: Yes      Correct Site: Yes      Correct Procedure: Yes      Correct Laterality:  Yes    Site Marked:  Yes  Line Placement:     Procedure:  Arterial Line    Insertion Site:  Radial    Insertion laterality:  Right    Skin Prep: Chloraprep      Patient Prep: patient draped, mask, sterile gloves, sterile gown, hat and hand hygiene      Local skin infiltration:  None    Ultrasound Guided?: No      Catheter size:  20 gauge, Quick cath    Cath secured with: suture      Dressing:  Tegaderm    Complications:  None obvious    Arterial waveform: Yes      IBP within 10% of NIBP: Yes

## 2018-07-21 NOTE — ANESTHESIA PROCEDURE NOTES
TOÑO Probe Insertion Note  Probe Inserted by: GIOVANNI ARCHIBALD   Insertion method: TOÑO probe insertion required a jaw lift   Bite block used:   Yes      Probe type:  Adult 2D   Insertion complications: No complications.      TOÑO report NOT being generated

## 2018-07-21 NOTE — PLAN OF CARE
Problem: Patient Care Overview  Goal: Plan of Care/Patient Progress Review  PT evaluation cx, patient in OR for transplant.

## 2018-07-21 NOTE — ANESTHESIA PROCEDURE NOTES
Central Line Procedure Note  Staff:     Anesthesiologist:  GIOVANNI ARCHIBALD    Resident/CRNA:  ERIKA LUTHER    Central line placed by Resident/CRNA in the presence of a teaching physician    Location: In OR after induction  Procedure Start/Stop Times:     patient identified, IV checked, site marked, risks and benefits discussed, informed consent, monitors and equipment checked, pre-op evaluation and at physician/surgeon's request      Correct Patient: Yes      Correct Position: Yes      Correct Site: Yes      Correct Procedure: Yes      Correct Laterality:  Yes    Site Marked:  Yes  Line Placement:     Procedure:  Central Line    Insertion laterality:  Right    Insertion site:  Internal Jugular    Position:  Supine      Maximal Sterile Barriers: All elements of maximal sterile barrier technique followed      (Maximal sterile barriers include:   Sterile gown, Sterile Gloves, Mask, Cap, Whole body draped, hand hygiene and acceptable skin prep).Skin Prep: Chloraprep         Injection Technique:  Ultrasound guided and Seldinger Technique    Sterile Ultrasound Technique:  Sterile probe cover and Sterile gel    Vein evaluated via U/S for patency/adequacy of catheter insertion and is adequate.  Using realtime U/S imaging the vein was punctured, and needle was observed entering vein on U/S      Permanent Image entered into patient's record      Local skin infiltration:  None    Catheter size:  9 Fr, 2 lumen 11.5 cm (MAC)    Cath secured with: suture      Dressing:  Tegaderm and Biopatch    Complications:  None obvious    Blood aspirated all lumens: Yes      All Lumens Flushed: Yes      Verification method:  X-ray and Placement to be verified post-op

## 2018-07-21 NOTE — ANESTHESIA PROCEDURE NOTES
PA Catheter Insertion Note  Anesthesiologist: GIOVANNI ARCHIBALD  Resident:  ERIKA LUTHER   PA Catheter placed by resident/CRNA in the presence of a teaching physician.  Introducer: Introducer already in place.   Skin prep: Cap, Full body drape, hand hygiene, Mask, Sterile gloves and Sterile gown    PA Catheter type:  CCO    Distance catheter advanced:  45 cm.    Appropriate RA, RV, PA  waveforms?: Yes    Dressing:  Tegaderm    Complications:  None apparent

## 2018-07-21 NOTE — PHARMACY-CONSULT NOTE
Pharmacy Tube Feeding Consult    Medication reviewed for administration by feeding tube and for potential food/drug interactions.    Recommendation: No changes are needed at this time.     Pharmacy will continue to follow as new medications are ordered.    Amanda Melchor, RichD

## 2018-07-21 NOTE — ANESTHESIA PROCEDURE NOTES
TOÑO Probe Insertion Note:    Inserted by:  Perico Valiente MD (Responsible Anesthesiologist)    Probe type:  Adult 2D    Bite block used:   Yes    Billing Report:A TOÑO report is NOT being generated.      Comments: Probe placed by me personally (Perico Valiente)

## 2018-07-21 NOTE — H&P
SURGICAL ICU ADMISSION NOTE  2018              Assessment:      Mr. 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             Plan:      Neuro/pain/sedation:  #Postoperative Pain  -Monitor neurological status. Notify the MD for any acute changes in exam.  -Fentanyl gtt and PRN for pain control   -Propofol gtt for sedation; RASS goal -1 to 0    Pulmonary care:   - Intubated and mechanically ventilated in the postop period while on pressors and requiring aggressive resuscitation; maintain saturations above 92%  - f/u ABG    Cardiovascular:  #Hypotension, hypovolemic/hemorrhagic shock    - Monitor hemodynamic status with current MAP goal > 60; permissive hypotension as there is active effluent of blood from bilateral bar drains in the setting of coagulopathy.   - Levophed/vasopressin gtt to maintain perfusion.  - massive transfusion protocol activated due to need for aggressive resuscitation     GI care:   # ESLD, cirrhosis  # Chronic Hep B infection  # Esophageal varices  - NPO; ngt in situ to low cont suction.  - Zofran and Compazine  - US liver tomorrow    Fluids/Electrolytes/Nutrition:   - 100mL/h D51/2NS for IV fluid hydration  - PRN fluid bolus to maintain hemodynamic stability  - Will plan to place NJ following immediate post operative period to begin feeds once pt stabilizes, goal rate determined to be 60ml/hr  - q2h post operative labs  - No indication for parenteral nutrition.    Renal/Fluid Balance:    -Urine output intraop charted as 600ml, adequate so far.  -Will continue to monitor intake and output.  -Luis catheter intact for strict I&Os    Endocrine:    #Hx DMTII  - Insulin gtt    ID/Antibiotics:  #s/p liver transplant  - Zosyn x 48 hours post operative period  - Prophylaxis including Bactrim/Valcyte  - Immunosuppressive medications per transplant and steroid taper    Heme:  #Post operative  Blood loss     - EBL 3500ml  - Received intraoperative colloid, cryoprecipitate, pRBC and plasma and cell saver units  - Trend post operative CBC and coags q2hs, will continue transfusing PRN.  - MTP activated; pt continues to have bloody output from jps     Prophylaxis:    -Mechanical prophylaxis for DVT.   -No chemical DVT prophylaxis due to active bleeding.   - PPI ordered for ulcer prophylaxis    Lines/ tubes/ drains:  - PA cath R IJ  - R cordis x2  - 3 PIV  - NG  - Luis  - ETT    Disposition:  -Surgical ICU.    Patient seen, findings and plan discussed with surgical ICU staff Dr. Finn  - - - - - - - - - - - - - - - - - -      - - - - - - - - - - - - - - - - - - - - - - - - - - - - - - - - - - - - - - - - - - - - - - - - - - - - - - - - - - - - - - - -     PRIMARY TEAM: Transplant  PRIMARY PHYSICIAN: Pako    REASON FOR CRITICAL CARE ADMISSION: Liver transplant  ADMITTING PHYSICIAN: Dr. Finn         History of Present Illness:   Yaneli Miles with a history of End Stage Liver Disease due to hepatitis B  who presents for  - Brain Death Whole Liver liver transplant. A suitable donor offer has become available. After discussing the risks and benefits of proceeding, the patient agreed to proceed with surgery and provided informed consent.         Review of Systems:   Unable to obtain ROS due to patient condition.         Past Medical History:     Past Medical History:   Diagnosis Date     Choledocholithiasis      Chronic viral hepatitis B without delta agent and without coma (H) 2018     Cirrhosis of liver with ascites (H) 2018     Esophageal varices (H)     prior GIB     Hepatic encephalopathy (H)      Hepatitis delta with hepatitis B carrier state 2018    positive antigen     Portal vein thrombosis 2018 care everywhere US     SBP (spontaneous bacterial peritonitis) (H) 2018     Type 2 diabetes mellitus without complication, without long-term current use of insulin  (H) 6/14/2018             Past Surgical History:     Past Surgical History:   Procedure Laterality Date     ENDOSCOPIC RETROGRADE CHOLANGIOPANCREATOGRAM      with stone removal     ENDOSCOPIC ULTRASOUND UPPER GASTROINTESTINAL TRACT (GI) N/A 6/22/2018    Procedure: ENDOSCOPIC ULTRASOUND, ESOPHAGOSCOPY / UPPER GASTROINTESTINAL TRACT (GI);  Esophagogastroduodenoscopy, removal of [place] tube, ENDOSCOPIC ULTRASOUND with embolization of gastric varices, placement of nasogastric tube;  Surgeon: Armando Kraft MD;  Location:  OR             Social History:     Social History   Substance Use Topics     Smoking status: Never Smoker     Smokeless tobacco: Not on file     Alcohol use No             Family History:     Negative for bleeding disorders, clotting disorders, or problems with anesthesia.  No family history on file.             Allergies:     Allergies   Allergen Reactions     Nsaids      Contraindicated              Medications:   Denies use of anticoagulants.  No current outpatient prescriptions on file.       Current Facility-Administered Medications on File Prior to Encounter:  [DISCONTINUED] calcium chloride infusion   [DISCONTINUED] calcium chloride injection   [DISCONTINUED] EPINEPHrine 100 mcg/10 mL (1:100,000) in NS (Pharmacy Prepared for OR)   [DISCONTINUED] EPINEPHrine drip (mcg/kg/min)   [DISCONTINUED] fentaNYL (PF) (SUBLIMAZE) injection   [DISCONTINUED] insulin 1 units/1 mL saline (NovoLIN, HumuLIN Regular) infusion ADULT/PEDS   [DISCONTINUED] lactated ringers infusion   [DISCONTINUED] midazolam (VERSED) injection   [DISCONTINUED] norepinephrine (LEVOPHED) drip 16 mg   [DISCONTINUED] piperacillin-tazobactam (ZOSYN) 3.375 g vial to attach to  mL bag   [DISCONTINUED] PLASMA-LYTE A (electrolyte A) solution   [DISCONTINUED] propofol (DIPRIVAN) infusion   [DISCONTINUED] propofol (DIPRIVAN) injection 10 mg/mL vial   [DISCONTINUED] rocuronium (ZEMURON) injection   [DISCONTINUED] sodium  bicarbonate 8.4 % injection   [DISCONTINUED] succinylcholine (ANECTINE) injection   [DISCONTINUED] vasopressin (VASOSTRICT) injection   [DISCONTINUED] vasopressin drip 1 unit/mL ADULT     Current Outpatient Prescriptions on File Prior to Encounter:  acetaminophen (TYLENOL) 500 MG tablet Take 1 tablet (500 mg) by mouth every 6 hours as needed for mild pain or fever   ciprofloxacin (CIPRO) 500 MG tablet Take 1 tablet (500 mg) by mouth every 24 hours   ferrous sulfate (IRON) 325 (65 Fe) MG tablet Take 325 mg by mouth   insulin aspart (NOVOLOG PEN) 100 UNIT/ML injection Inject 1 Units Subcutaneous 3 times daily (with meals)   lactulose (CHRONULAC) 10 GM/15ML solution Take 30 mLs (20 g) by mouth 3 times daily Goal of 3-4 BMs per day   lidocaine (XYLOCAINE) 5 % ointment Apply topically every 4 hours as needed for moderate pain   miconazole (MICATIN; MICRO GUARD) 2 % powder Apply topically 2 times daily   midodrine HCl 10 MG TABS Take 10 mg by mouth 3 times daily (with meals)   pantoprazole (PROTONIX) 40 MG EC tablet Take 1 tablet (40 mg) by mouth 2 times daily (before meals)   phosphorus tablet 250 mg (PHOSPHA 250 NEUTRAL) 250 MG per tablet Take 2 tablets (500 mg) by mouth 2 times daily   potassium chloride SA (KLOR-CON) 20 MEQ CR tablet Take 1 tablet (20 mEq) by mouth daily   rifaximin (XIFAXAN) 550 MG TABS tablet Take 1 tablet (550 mg) by mouth 2 times daily   simethicone (MI-ACID GAS RELIEF) 80 MG chewable tablet Take 80 mg by mouth   tenofovir (VIREAD) 300 MG tablet Take 300 mg by mouth              Physical Exam:   Temp:  [97.6  F (36.4  C)-97.8  F (36.6  C)] 97.8  F (36.6  C)  Pulse:  [68] 68  Heart Rate:  [61-71] 71  Resp:  [20-22] 20  BP: (116-120)/(71-76) 116/76  SpO2:  [99 %-100 %] 99 %     General: middle aged male appearing older than stated age; intubated and sedated  Neuro: GCS 3i  HEENT: Normocephalic, atraumatic. Patent nares. ngt in situ; peerl  Neck: No cervical lymphadenopathy. No thyromegaly. RIJ  cordis x2 with swan in situ, site cdi  Respiratory: rhonchi bilaterally  Cardiovascular: rrr  Gastrointestinal: Abdomen soft, distended, dressing with minimal blood shadowing, jps with bloody effluent  Genitourinary: Normal male genitalia; bah in situ  Extremities: 2+ palpable pulses x4 limbs, jaundiced  Skin: As noted above. No rashes or lesions appreciated.               Data:   Labs:  Arterial Blood Gases     Recent Labs  Lab 07/21/18  1226 07/21/18  1130 07/21/18  1040 07/21/18  0940   PH 7.33* 7.35 7.23* 7.23*   PCO2 31* 29* 31* 31*   PO2 180* 186* 178* 180*   HCO3 16* 16* 13* 13*        Complete Blood Count     Recent Labs  Lab 07/21/18  1226 07/21/18  1130 07/21/18  1040 07/21/18  0940  07/21/18  0755 07/20/18  1741 07/17/18  0704   WBC 3.7*  --   --   --   --  4.7 5.0 3.7*   HGB 9.5*  9.8* 6.5* 7.1* 9.5*  < > 7.0*  7.0* 9.0* 8.5*   PLT 53*  --   --   --   --  48* 37* 43*   < > = values in this interval not displayed.    Basic Metabolic Panel    Recent Labs  Lab 07/21/18  1226 07/21/18  1130 07/21/18  1040 07/21/18  0940  07/21/18  0524  07/20/18  1741 07/17/18  0704  07/16/18  0638 07/15/18  0703    133 132* 132*  < >  --   --  134 129*  --  128* 127*   POTASSIUM 4.1 3.6 3.6 3.6  < > 2.8*  < > 2.5* 3.8  < > 3.2* 3.5   CHLORIDE  --   --   --   --   --   --   --  102 101  --  99 98   CO2  --   --   --   --   --   --   --  16* 14*  --  17* 16*   BUN  --   --   --   --   --   --   --  34* 30  --  27 31*   CR  --   --   --   --   --   --   --  1.68* 1.78*  --  1.70* 2.00*   * 233* 232* 157*  < >  --   --  223* 110*  --  85 107*   JESUS  --   --   --   --   --   --   --  8.8 9.4  --  8.7 8.8   MAG  --   --   --   --   --  2.0  --  2.2  --   --   --   --    PHOS  --   --   --   --   --   --   --  3.6  --   --   --   --    < > = values in this interval not displayed.    Liver Function Tests    Recent Labs  Lab 07/20/18  1741 07/17/18  0704 07/16/18  0638 07/15/18  0703   * 301* 249* 305*    ALKPHOS 107 82 61 80   BILITOTAL 47.5* 40.6* 35.7* 37.0*   ALBUMIN 4.0 4.0 4.0 3.4       Pancreatic Enzymes    Recent Labs  Lab 18  1741   AMYLASE 60       Coagulation Profile    Recent Labs  Lab 18  1226 18  0755 18  1741 18  0704   INR 3.52* 2.23* 1.96* 2.12*   * 56* 46*  --        Lactate    Recent Labs  Lab 18  1226 18  1130 18  1040 18  0940   LACT 5.9* 5.6* 5.7* 4.3*       Imaging:   Results for orders placed or performed during the hospital encounter of 18   XR Chest 2 Views    Narrative    XR CHEST 2 VW  2018 5:55 PM      HISTORY:  donor transplant going to OR NOW;     COMPARISON: CT on 2018    FINDINGS:   PA and lateral views of the chest were obtained.   Cardiomediastinal silhouette is unremarkable. Pulmonary vasculature is  distinct. No focal airspace opacity. No pleural effusion. No  pneumothorax. The trachea is midline. No acute osseous abnormality.  Stable hyperdense foci projecting over the GE junction presumably  embolization material, otherwise visualized upper abdomen  unremarkable.      Impression    IMPRESSION:  Clear lungs.    I have personally reviewed the examination and initial interpretation  and I agree with the findings.    MD Omega MARADIAGA  SICU Fellow  4:24 PM  18

## 2018-07-21 NOTE — ANESTHESIA CARE TRANSFER NOTE
Patient: Yaneli Miles    Procedure(s):  Liver Transplant - Wound Class: II-Clean Contaminated    Diagnosis: Chronic Hep B  Diagnosis Additional Information: No value filed.    Anesthesia Type:   General, RSI, ETT     Note:  Airway :ETT  Patient transferred to:ICU  Comments: Patient sedated, intubated.  Transported with monitors, VSS.  Eddie Acuna  ICU Handoff: Call for PAUSE to initiate/utilize ICU HANDOFF, Identified Patient, Identified Responsible Provider, Reviewed the Pertinent Medical History, Discussed Surgical Course, Reviewed Intra-OP Anesthesia Management and Issues during Anesthesia, Set Expectations for Post Procedure Period and Allowed Opportunity for Questions and Acknowledgement of Understanding      Vitals: (Last set prior to Anesthesia Care Transfer)    CRNA VITALS  7/21/2018 1303 - 7/21/2018 1344      7/21/2018             Resp Rate (observed): 15    Resp Rate (set): 15                Electronically Signed By: Eddie Acuna MD  July 21, 2018  1:44 PM

## 2018-07-21 NOTE — PROGRESS NOTES
ICU Update:    Patient initiated on massive transfusion protocol at 1500. Liver transplant fellow notified. No additional recommendations other than checking Hgb and coags which are pending.     Tracy Zhong PA-C  Surgical ICU

## 2018-07-21 NOTE — ANESTHESIA PREPROCEDURE EVALUATION
Anesthesia Evaluation     . Pt has had prior anesthetic. Type: General    No history of anesthetic complications (Prior Airway - Easy intubation (direct view Grade I))          ROS/MED HX    ENT/Pulmonary: Comment:     -Recent intubation requirements in ICU after admission for hepatic encephalopathy and decompensation   - neg pulmonary ROS    (-) tobacco use   Neurologic: Comment:   -History of hepatic encephalopathy 2/2 ESLD      (+)delerium resolved Yes,    (-) seizures, CVA and TIA   Cardiovascular: Comment:   *PROLONGED QTC*      (+) ----. : . . . :. . Previous cardiac testing Echodate:results:Stress Testdate:6/2018 results:  STRESS ECHO: Interpretation Summary  Normal stress echocardiogram.  Normal global LV systolic function at rest (LVEF 65-70%) and peak dobutamine  infusion (LVEF >80%.)  No regional wall motion abnormalities at rest and with infusion.  Normal ECG at rest and with infusion.  No subjective symptoms at rest and with infusion.  Normal BP at rest with mild BP fall (expected response) with infusion.  No resting valvular abnormalities. Normal ascending aortic segment. The RVSP  is 13 mmHg above the RAP (from a limited quality Doppler signal.)    ECG reviewed date:7/20/2018 results:  -EKG: Sinus Rhythm in 70's, no ectopy, no ST/T changes. Noted Prolonged QTc (~640ms)     date: results:         (-) arrhythmias, irregular heartbeat/palpitations and pulmonary hypertension   METS/Exercise Tolerance:     Hematologic: Comments:   -THROMBOCYTOPENIA      (+) Anemia, History of Transfusion no previous transfusion reaction Other Hematologic Disorder-      Musculoskeletal:  - neg musculoskeletal ROS       GI/Hepatic: Comment:     -Chronic Viral Hepatitis B with cirrhosis, known portal vein thrombosis  -s/p prior ERCP for choledocholithiasis     # Chronic viral Hepatitis B  # Cirrhosis   # Known portal vein thrombosis  # Esophageal varices  # Elevated INR           (+) GERD hepatitis type B, liver disease (c/b  hepatoencephalopathy, SBP, ascites, portal vein thrombosis; MELD 40), Other GI/Hepatic       Renal/Genitourinary: Comment:   -Recent BEN after admission for cirrhosis  -Chronic hyponatremia      (+) chronic renal disease,       Endo:     (+) type II DM Last HgA1c: 5.4 date: 7/13/2018 Using insulin - not using insulin pump Normal glucose range: 150-250 .      Psychiatric:         Infectious Disease: Comment:     # VRE positive      (+) VRE,       Malignancy:      - no malignancy   Other:                   Lab Results   Component Value Date    WBC 5.0 07/20/2018    HGB 9.0 (L) 07/20/2018    HCT 24.2 (L) 07/20/2018    PLT 37 (LL) 07/20/2018     07/20/2018    POTASSIUM 2.5 (LL) 07/20/2018    CHLORIDE 102 07/20/2018    CO2 16 (L) 07/20/2018    BUN 34 (H) 07/20/2018    CR 1.68 (H) 07/20/2018     (H) 07/20/2018     (H) 07/20/2018     (H) 07/20/2018    ALKPHOS 107 07/20/2018    BILITOTAL 47.5 (HH) 07/20/2018    CALVIN 51 (H) 06/24/2018    INR 1.96 (H) 07/20/2018       Physical Exam  Normal systems: dental    Airway   Mallampati: III  TM distance: <3 FB  Neck ROM: full    Dental     Cardiovascular   Rhythm and rate: regular and normal  (-) no weak pulses and no murmur    Pulmonary    breath sounds clear to auscultation(-) no rhonchi                    Anesthesia Plan      History & Physical Review  History and physical reviewed and following examination; no interval change.    ASA Status:  4 emergent.    NPO Status:  > 8 hours    Plan for General, RSI and ETT with Intravenous and Propofol induction. Maintenance will be Balanced.    PONV prophylaxis:  Ondansetron (or other 5HT-3) and Dexamethasone or Solumedrol  Additional equipment: 2nd IV, Arterial Line, Central Line, CVP, PA Catheter, TOÑO and Videolaryngoscope GETA. PIVx2-3 (ideally) + MAC x2. Standard ASA monitors, arterial line, CVP, PAP. ICU postop    Discussed preop K replacement with surgical resident and floor nurse.    Presented opportunity  to answer patient and family questions. Questions addressed.        Postoperative Care  Postoperative pain management:  IV analgesics, Oral pain medications and Multi-modal analgesia.      Consents  Anesthetic plan, risks, benefits and alternatives discussed with:  Patient.  Use of blood products discussed: Yes.   Use of blood products discussed with Patient.  Consented to blood products.  .        Gwyn Rothman MD  7/20/2018 8:09 PM  Anesthesia Resident  [id number]/[id number]

## 2018-07-21 NOTE — PROGRESS NOTES
Patient's K+ were 2.5, 2.6 and 2.8, after KCL 10meq IV x3 and KDUR 40meq were given, MD on call and fellow aware. Patient was taken down to OR on his bed at about 0600.

## 2018-07-21 NOTE — ANESTHESIA POSTPROCEDURE EVALUATION
Patient: Yaneli Miles    Procedure(s):  Liver Transplant - Wound Class: II-Clean Contaminated    Diagnosis:Chronic Hep B  Diagnosis Additional Information: No value filed.    Anesthesia Type:  General, RSI, ETT    Note:  Anesthesia Post Evaluation    Patient location during evaluation: ICU  Patient participation: Unable to evaluate secondary to administered sedation  Post-procedure mental status: sedated.  Pain management: unable to assess  Airway patency: intubated.  Cardiovascular status: hypotensive  Respiratory status: ETT  Hydration status: hypovolemic  PONV: unable to assess     Anesthetic complications: None    Comments: Patient currently with significant output from drains, hypotensive. Patient now back on pressors and being resuscitated with blood products in ICU.        Last vitals:  Vitals:    07/21/18 1400 07/21/18 1415 07/21/18 1440   BP: 92/56     Pulse:      Resp: 15 (!) 35 20   Temp:   36.4  C (97.5  F)   SpO2: 100%  100%         Electronically Signed By: Perico Valiente MD  July 21, 2018  2:50 PM

## 2018-07-22 NOTE — PROGRESS NOTES
18 1139   Quick Adds   Type of Visit Initial PT Evaluation       Present yes   Language Hmong   Living Environment   Lives With spouse;child(doug), dependent   Living Arrangements house   Home Accessibility stairs within home;stairs to enter home;bed and bath on same level   Number of Stairs to Enter Home 2   Number of Stairs Within Home 12  (basement pt does not need to access)   Stair Railings at Home inside, present at both sides   Living Environment Comment Pt intubated, history obtained from wife.   Self-Care   Usual Activity Tolerance moderate   Current Activity Tolerance moderate   Regular Exercise yes   Activity/Exercise Type walking   Exercise Amount/Frequency daily   Equipment Currently Used at Home walker, rolling   Functional Level Prior   Ambulation 1-->assistive equipment   Transferring 1-->assistive equipment   Toileting 2-->assistive person   Bathing 2-->assistive person   Dressing 2-->assistive person   Eating 0-->independent   Communication 0-->understands/communicates without difficulty   Swallowing 0-->swallows foods/liquids without difficulty   Which of the above functional risks had a recent onset or change? ambulation;transferring   General Information   Onset of Illness/Injury or Date of Surgery - Date 18   Referring Physician Mihir Nj MD   Pertinent History of Current Problem (include personal factors and/or comorbidities that impact the POC) Mr. 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 Stefan Butler.  "  Cognitive Status Examination   Level of Consciousness alert   Follows Commands and Answers Questions 100% of the time   Personal Safety and Judgment intact   Pain Assessment   Patient Currently in Pain No   Integumentary/Edema   Integumentary/Edema Comments B CAROL drains    Posture    Posture Not impaired   Range of Motion (ROM)   ROM Comment B LE WNL/WFL   Strength   Strength Comments B LE >3+/5 secondary to functional mobility   Bed Mobility   Bed Mobility Comments Mod A x 2   Transfer Skills   Transfer Comments CGA   Gait   Gait Comments Not Assessed   Clinical Impression   Criteria for Skilled Therapeutic Intervention yes, treatment indicated   PT Diagnosis Impaired functional mobility   Influenced by the following impairments Post-op precautions, hemodynamic stability, supplemental O2   Functional limitations due to impairments Impaired bed mobility, transfers, gait, stairs   Clinical Presentation Evolving/Changing   Clinical Presentation Rationale clinical judgement, current mobility, social support   Clinical Decision Making (Complexity) Moderate complexity   Therapy Frequency` 5 times/week   Predicted Duration of Therapy Intervention (days/wks) 8/4/18   Anticipated Discharge Disposition Acute Rehabilitation Facility;Home with Outpatient Therapy   Risk & Benefits of therapy have been explained Yes   Patient, Family & other staff in agreement with plan of care Yes   [facility] TM \"6 Clicks\"   2016, Trustees of [facility], under license to [employer].  All rights reserved.   6 Clicks Short Forms Basic Mobility Inpatient Short Form   [facility]  \"6 Clicks\" V.2 Basic Mobility Inpatient Short Form   1. Turning from your back to your side while in a flat bed without using bedrails? 2 - A Lot   2. Moving from lying on your back to sitting on the side of a flat bed without using bedrails? 2 - A Lot   3. Moving to and from a bed to a chair (including a wheelchair)? 2 - A Lot   4. " Standing up from a chair using your arms (e.g., wheelchair, or bedside chair)? 3 - A Little   5. To walk in hospital room? 2 - A Lot   6. Climbing 3-5 steps with a railing? 2 - A Lot   Basic Mobility Raw Score (Score out of 24.Lower scores equate to lower levels of function) 13   Total Evaluation Time   Total Evaluation Time (Minutes) 7

## 2018-07-22 NOTE — PROGRESS NOTES
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      Graft function:good, improving LFTs today .  Immunosuppression management: No change Continue steroid taper, Cellcept  did get Simulect intra-op, will start Tacrolimus tomorrow.  Complexity of management:Medium. Contributing factors: thrombocytopenia and anemia  Hematology: Hemoglobin 7.8 platelets 47 last RBC transfusion last night at 6pm, will continue to follow and replace as needed.  Cardiorespiratory: Stable, still intubated on mechanical ventilation, FiO2 30, O2 saturation in 100%,    GI/Nutrition: NPO for now, will consider feeding tube tomorrow;   Endocrine: Insulin drip   Fluid/Electrolytes: MIVF:Decreased to 50 ml/h, replace electrolytes as needed.  : Luis to remain due to intubation with mechanical ventilation   Infectious disease: prophylactic Zosyn for 48 h,   Prophylaxis: DVT, fall, GI, fungal  Disposition: SICU monitor for       Medical Decision Making: High  Admit 75009 (straight-forward/low level decision making), Subsequent visit 88809 (high level decision making) and Consult 36583 high complexity, 110 minutes    JAY/Fellow/Resident Provider: Mihir jN, fellow      Faculty: Stew Min M.D.    __________________________________________________________________  Transplant History: Admitted 2018 for  donor liver transplant.   The patient has a history of liver failure due to hepatitis B .    2018 (Liver), Postoperative day: 1     Interval History: History is obtained from the patient's nurse, due to patient being intubated.   Overnight events: no acute events overnight, weaned off vasopressors, awake and respond to verbal command.       ROS:   A 10-point review of systems was negative except as noted above.    Curent Meds:    [START ON 7/25/2018] basiliximab (SIMULECT) infusion  20 mg Intravenous Once     hepatitis B immune globulin intermittent infusion  10,000 Units Intravenous Once     [START ON 7/23/2018] methylPREDNISolone  100 mg Intravenous Once    Followed by     [START ON 7/24/2018] methylPREDNISolone  50 mg Intravenous Once    Followed by     [START ON 7/25/2018] predniSONE  25 mg Oral Once    Followed by     [START ON 7/26/2018] predniSONE  10 mg Oral Once     multivitamins with minerals  15 mL Per Feeding Tube Daily     mycophenolate  1,000 mg Oral BID IS    Or     mycophenolate  1,000 mg Oral or NG Tube BID IS     pantoprazole  40 mg Oral BID AC     piperacillin-tazobactam  3.375 g Intravenous Q6H     sodium chloride (PF)  3 mL Intravenous Q8H     sulfamethoxazole-trimethoprim  10 mL Oral Daily     valGANciclovir  450 mg Oral Daily    Or     valGANciclovir  450 mg Oral or NG Tube Daily       Physical Exam:     Admit Weight: 60.7 kg (133 lb 12.8 oz)    Current Vitals:   /66  Pulse 78  Temp 99.9  F (37.7  C)  Resp 25  Wt 68.6 kg (151 lb 3.8 oz)  SpO2 100%  BMI 24.41 kg/m2    CVP (mmHg): 6 mmHg    Vital sign ranges:    Temp:  [96.4  F (35.8  C)-101.3  F (38.5  C)] 99.9  F (37.7  C)  Heart Rate:  [] 85  Resp:  [8-28] 25  BP: ()/(56-75) 104/66  MAP:  [59 mmHg-83 mmHg] 82 mmHg  Arterial Line BP: ()/(48-65) 121/63  FiO2 (%):  [30 %-45 %] 30 %  SpO2:  [98 %-100 %] 100 %  Patient Vitals for the past 24 hrs:   BP Temp Temp src Heart Rate Resp SpO2 Weight   07/22/18 1417 - 99.9  F (37.7  C) - - - - -   07/22/18 1415 - 99.9  F (37.7  C) - - - - -   07/22/18 1400 - - - 85 25 100 % -   07/22/18 1309 - - - - - 100 % -   07/22/18 1300 - 100.4  F (38  C) Pulm Art 87 8 100 % -   07/22/18 1200 - 101.3  F (38.5  C) Pulm Art 98 25 100 % -   07/22/18 1100 - 101.3  F (38.5  C) Pulm Art 92 18 100 % -   07/22/18 1000 104/66  100.8  F (38.2  C) Pulm Art 92 11 100 % -   07/22/18 0903 - - - - - 100 % -   07/22/18 0900 105/66 - - 94 12 100 % -   07/22/18 0845 - - - 93 12 100 % -   07/22/18 0830 - - - 96 12 100 % -   07/22/18 0815 - - - 96 14 100 % -   07/22/18 0800 120/75 100.4  F (38  C) Pulm Art 93 12 100 % -   07/22/18 0745 - - - 90 13 100 % -   07/22/18 0730 - - - 90 23 100 % -   07/22/18 0700 111/66 100.2  F (37.9  C) Pulm Art 92 14 100 % -   07/22/18 0645 - - - 92 13 100 % -   07/22/18 0635 - 100.2  F (37.9  C) Pulm Art 91 10 100 % -   07/22/18 0630 - 100.2  F (37.9  C) Pulm Art 90 14 100 % -   07/22/18 0615 - - - - 12 - -   07/22/18 0603 - - - - - 100 % -   07/22/18 0600 103/64 - - 90 21 100 % -   07/22/18 0545 - - - 92 16 100 % -   07/22/18 0530 - - - 98 9 100 % -   07/22/18 0515 - - - 88 12 100 % -   07/22/18 0510 - 100.2  F (37.9  C) Pulm Art 91 14 100 % -   07/22/18 0500 102/64 - - 90 13 100 % -   07/22/18 0445 - 100.2  F (37.9  C) Pulm Art 93 13 100 % -   07/22/18 0430 - - - 97 13 100 % -   07/22/18 0415 - - - 92 10 100 % -   07/22/18 0400 100/66 100.4  F (38  C) Pulm Art 92 15 100 % 68.6 kg (151 lb 3.8 oz)   07/22/18 0355 100/66 100.4  F (38  C) Pulm Art 91 21 100 % -   07/22/18 0345 - - - 93 17 100 % -   07/22/18 0340 - 100.6  F (38.1  C) Pulm Art 92 17 100 % -   07/22/18 0330 - 100.6  F (38.1  C) Pulm Art 95 17 100 % -   07/22/18 0315 - - - 95 12 100 % -   07/22/18 0306 - 100.9  F (38.3  C) Pulm Art 96 16 100 % -   07/22/18 0300 102/69 - - 96 14 100 % -   07/22/18 0256 102/69 100.6  F (38.1  C) Pulm Art 98 14 100 % -   07/22/18 0245 - - - 96 23 100 % -   07/22/18 0230 - - - 96 18 100 % -   07/22/18 0215 - - - 94 19 100 % -   07/22/18 0200 95/61 - - 91 16 100 % -   07/22/18 0145 - - - 93 13 100 % -   07/22/18 0130 - - - 90 13 100 % -   07/22/18 0115 - - - 92 13 100 % -   07/22/18 0100 103/63 100.4  F (38  C) Pulm Art 92 14 100 % -   07/22/18 0045 - - - 91 17 100 % -   07/22/18 0030 - - - 93 12 100 % -   07/22/18 0015 - - - 93  17 100 % -   07/22/18 0000 95/56 100.4  F (38  C) Pulm Art 93 12 100 % -   07/21/18 2345 - - - 93 10 100 % -   07/21/18 2330 - - - 93 13 100 % -   07/21/18 2315 - - - 92 18 100 % -   07/21/18 2300 93/63 - - 92 15 100 % -   07/21/18 2255 - - - - - 98 % -   07/21/18 2245 - - - 93 14 100 % -   07/21/18 2230 - 100.4  F (38  C) Pulm Art 95 24 100 % -   07/21/18 2215 - - - 98 26 100 % -   07/21/18 2200 109/74 - - 105 20 100 % -   07/21/18 2145 - - - 105 14 100 % -   07/21/18 2130 - - - 107 9 100 % -   07/21/18 2115 - - - 107 18 100 % -   07/21/18 2100 92/67 100.6  F (38.1  C) Pulm Art 109 16 100 % -   07/21/18 2045 - - - 104 10 100 % -   07/21/18 2030 - - - 105 14 100 % -   07/21/18 2015 - - - 105 20 100 % -   07/21/18 2000 91/63 99.9  F (37.7  C) Pulm Art 101 16 100 % -   07/21/18 1945 92/68 - - 100 15 100 % -   07/21/18 1930 - - - 98 28 100 % -   07/21/18 1915 - - - 96 13 100 % -   07/21/18 1900 - 99  F (37.2  C) Pulm Art 93 10 100 % -   07/21/18 1845 - - - 87 12 100 % -   07/21/18 1830 - - - 81 11 100 % -   07/21/18 1815 - - - 82 26 100 % -   07/21/18 1800 - 98.8  F (37.1  C) Pulm Art 82 15 100 % -   07/21/18 1745 - - - 79 20 100 % -   07/21/18 1730 - - - 83 16 100 % -   07/21/18 1715 - - - 86 17 100 % -   07/21/18 1700 - 97  F (36.1  C) Pulm Art 80 17 100 % -   07/21/18 1658 - - - - - 100 % -   07/21/18 1645 - - - 75 17 100 % -   07/21/18 1630 - - - 78 13 100 % -   07/21/18 1615 - - - 78 15 100 % -   07/21/18 1600 - 96.4  F (35.8  C) Pulm Art 77 16 100 % -   07/21/18 1545 - - - 80 15 100 % -     General Appearance: intubated, on mechanical ventilation    Skin: warm, moist, slightly improving jaundice   Heart: regular rate and rhythm, normal S1 and S2  Lungs: clear to auscultation, without wheezes  Abdomen: rounded and symmetric, and  mildly tender over the incision. The wound is well approximated, without signs of infection and no drainage.  : The genitalia are minimally edematous.   Extremities: edema: present  bilaterally. 1+. There is no skin breakdown.  Neurologic: awake. Tremor absent.    Data:   CMP  Recent Labs  Lab 07/22/18  0354 07/21/18  2345 07/21/18 1932 07/21/18  1608 07/21/18  1359  07/20/18  1741     --   --  142 142  < > 134   POTASSIUM 4.1 3.9 4.3 2.9* 3.4  < > 2.5*   CHLORIDE 106  --   --  105 104  --  102   CO2 21  --   --  22 20  --  16*   *  --   --  178* 220*  < > 223*   BUN 26  --   --  22 23  --  34*   CR 1.46*  --   --  1.16 1.20  --  1.68*   GFRESTIMATED 52*  --   --  68 65  --  44*   GFRESTBLACK 63  --   --  82 79  --  54*   JESUS 7.9*  --   --  8.3* 8.2*  --  8.8   ICAW 4.5  --  4.5  --   --   < >  --    MAG 1.7  --   --   --  2.2  < > 2.2   PHOS 4.4  --   --   --  5.0*  --  3.6   AMYLASE 42  --   --   --   --   --  60   LIPASE 75  --   --   --   --   --   --    ALBUMIN 2.5*  --   --   --  1.6*  --  4.0   BILITOTAL 10.3*  --   --   --  7.8*  --  47.5*   ALKPHOS 43  --   --   --  60  --  107   *  --   --   --  504*  --  287*   *  --   --   --  166*  --  137*   < > = values in this interval not displayed.  CBC  Recent Labs  Lab 07/22/18  1120 07/22/18  0754 07/22/18  0601 07/22/18  0354   HGB 8.5*  --  7.6* 8.3*   WBC 5.7  --   --  8.8   PLT 28* 32*  --  29*     COAGS  Recent Labs  Lab 07/22/18 0354 07/21/18 2345 07/21/18  1932 07/21/18  1608   INR 1.63* 1.64* 1.69* 1.81*   PTT  --   --  39* 49*      Urinalysis  Recent Labs   Lab Test  07/13/18   1315  06/18/18   1200   COLOR  Dark Yellow  Yellow   APPEARANCE  Clear  Clear   URINEGLC  Negative  Negative   URINEBILI  Large*  Small*   URINEKETONE  Negative  Negative   SG  1.013  1.008   UBLD  Negative  Negative   URINEPH  6.0  5.0   PROTEIN  10*  Negative   NITRITE  Negative  Negative   LEUKEST  Negative  Negative   RBCU  <1  1   WBCU  None  2   UTPG   --   0.75*     Virology:  Hepatitis C Antibody   Date Value Ref Range Status   07/12/2018 Nonreactive NR^Nonreactive Final     Comment:     Assay performance characteristics  have not been established for newborns,   infants, and children     Attestation:    The patient has been seen and evaluated by me.   Vital signs, labs, medications and orders were reviewed.   When obtained, diagnostic images were reviewed by me and interpreted as above.    The care plan was discussed with the multidisciplinary team and I agree with the findings and plan in this note, with any differences recorded in blue.    Immunosuppressive medication management was reviewed and adjusted as reflected in the note and orders.     .

## 2018-07-22 NOTE — PHARMACY-TRANSPLANT NOTE
Adult Liver Transplant Post Operative Note    43 year old male s/p  donor liver transplant on 18 for Hepatitis B.      Planned immunosuppression regimen to include:   INDUCTION with: basiliximab on POD #0 and POD #4 due to poor kidney function and methylprednisolone/prednisone taper per liver transplant protocol  MAINTENANCE to include mycophenolate and tacrolimus (not ordered at this time) with initial goal trough levels of 5-10 mcg/L for 0-3 months post-transplant for patients with poor kidney function.  Patient may continue prednisone at 5 mg PO daily until tacrolimus level is therapeutic.     Surgical prophylaxis includes: piperacillin-tazobactam IV for 48 hours.    Opportunistic pathogen prophylaxis includes: trimethoprim/sulfamethoxazole, valganciclovir, and a topical antifungal (not ordered at this time)    Patient is not enrolled in medication study.    Pharmacy will monitor for medication interactions and immunosuppression levels in conjunction with the team. Medication therapy needs for discharge planning will continue to be addressed throughout the current admission via multidisciplinary rounds and order review.  Pharmacy will make recommendations as appropriate.

## 2018-07-22 NOTE — PLAN OF CARE
Problem: Patient Care Overview  Goal: Plan of Care/Patient Progress Review  PT / 4A -     Discharge Planner PT   Patient plan for discharge: agreeable to PT recommendation  Current status: PT evaluation completed.  AVSS, intubated on VC-AC, FiO2 30%.  Educated on role of IP PT and POC, abdominal precautions, progression of activity.  Performed rolling B with Mod A.  Needing Mod A x 2 for lying->sitting transfer secondary to lines.  Sitting EOB for ~ 15 minutes.  Pt able to partially stand to reposition self on EOB.   Barriers to return to prior living situation: Post-op precautions, hemodynamic stability, increased respiratory needs, LE strength  Recommendations for discharge: ARU  Rationale for recommendations: Currently recommend discharge to ARU; pt has strong family support, is motivated and would like to return to independence (prior to surgery needing assistance with ADLs secondary to ESLD) with multidisciplinary needs.       Entered by: Donna Horn 07/22/2018 12:25 PM

## 2018-07-22 NOTE — PLAN OF CARE
Problem: Patient Care Overview  Goal: Plan of Care/Patient Progress Review  RN, SICU fellow, and Transplant fellow Updated wife Luann via Hmong .    Pt returned direct from OR s/p  liver transplant at 1345. Per anesthesia report, he was maxed on Levo, Vaso, and Epi in the OR. Vaso and epi weaned off, Levo at low dose and turned off when arrived to 4A.  Within 1 hour 900 ml sanguinous output from left CAROL, and 200 ml from Right CAROL, abdomen soft, CVP 5, PA 22/10, NSR, started to bolus LR with patient continuing to be hypotensive (MAPs 50s) despite 0.15 levo and 2.4 Units vaso. Massive transfusion protocol started. 2 Units RBCs, 2 cryo, 2 platelets, 2 plasma, 2.5 liter LR, 500 ml 5% albumin. Pt resuscitated. 1600 labs drawn 20 mins after MTP. 2 units PRBCs transfused via MTP around 1630 due to Hgb 7.3 and return of hypotension despite Levophed restarted. CVP 8-11 after blood products. SICU team with Dr. Moore, Dr. Min, and Dr. Naidu in room and aware of all lab values, I/O, vital signs. MAP goal > 60 with goal of permissive hypotension.     Neuro: JEAN. Slight withdrawal to pain, no spontaneous or purposeful movement. Propofol off since ~1415, fentanyl drip started at 25 mcg/hr.    CV: NSR with occasional PACs and PVCs. K+ 2.9 replaced with 20 mEq IV and 40 mEq PO. MAP goal >60, currently on 0.07 levophed. Bear hugger placed for temps 35.8, current temp 37.2. PA cath at 45 cm with appropriate waveforms. CVP goal >8, ranges 5-11. CCI range from 2.2-3.3.    Pulm: AC; Fi02 decreased to 30%, PEEP 5, RR 15, Tv 480. Overbreathes 2-4/min. Thick small amt  bright yellow secretions.    GI: abdomen soft on arrival, approx 1830 abdomen more distended, firm, but not rigid. Dr. Alonso notified- changed post op liver transplant to be done STAT. OG to LCS, replace with 1/2 NS ml: ml, 200 ml bilious output, currently clamped after med administration. To remain NPO, will assess nutrition tomorrow. Hypoactive  bowel sounds.    : bah with 75 ml/hr yellow/yary.    Skin: jaundiced, ecchymosis on abdomen. Transverse abdominal incision with original dressing, dried drainage marked. Left CAROL to bulb suction, right CAROL to bulb suction.    Insulin gtt adjusted per protocol.

## 2018-07-22 NOTE — PLAN OF CARE
Problem: Patient Care Overview  Goal: Plan of Care/Patient Progress Review  Discharge Planner OT   Patient plan for discharge: Pt unable to state as he is intubated.   Current status: Evaluation completed and treatment initiated. Educated pt on OT role and discussed POC/Goals. Educated pt on abdominal precautions and transfer safety. Pt required Min A x2 and vc's to sit forward from bed surface for sling placement. Pt dependently lift to bedside recliner chair. Pt able to reposition self in chair with CGA and vc's. Pt tolerated this activity well with VSS.   Barriers to return to prior living situation: Decreased independence with functional transfers/ADLS. Decreased strength and endurance, Abdominal precautions   Recommendations for discharge:ARU  Rationale for recommendations: Pt will benefit from continued therapy to address barriers above and to maximize functional independence for return home.       Entered by: Carlos Jorge 07/22/2018 4:58 PM

## 2018-07-22 NOTE — PROVIDER NOTIFICATION
PROVIDER NOTIFICATION    Date/Time 07/22 02:15     Provider Name/Title Dr. Alonso   Method of Notification Via text   Notification Reason Update on hgb result of 7.4 (see midnight and 0200 hgb result times).    Intervention Awaiting decision. Order to transfuse 2 cells and 1 FFP   Plan of Action Next draw 0400

## 2018-07-22 NOTE — PLAN OF CARE
Problem: Patient Care Overview  Goal: Plan of Care/Patient Progress Review  Outcome: Improving  D/I:      Pulm: 30% on vent. Breaths 1-2 breaths over settings. Lungs clear. Scant yellow secretions. PA pressures mid to upper 20's/ upper teens. PA cath locked at 45 cm.       CV: Levophed titrated to off by 0400. SR/ST 's with minimal ectopy. No VT runs. CVP variable with fluid status 8-12. CI trending up towards morning, now in mid 4's.  Albumin 5% 500 ml given for low urine output and increasing pressor needs early. Weight up 18 pounds since transplant. I>O by 750 since midnight.       Heme: Low 7.4, high 9.9. 2 units PRBC's, 1 platelet, 1 FFP overnight.       GI: NGT to LCS. Bile colored output and replace with 1/2 NS per order. No BM overnight. Increasing BS. Abdomen still soft,but distended (see U/S report of hematoma)      : Urine output 30-65 ml/hour. Slowly increasing towards morning. Cr elevated, but closer to pre transplant.       Neuro: CARTWRIGHT, weakly squeezes hand. Nods to questions (though unsure of understanding due to language barrier). Pupils equal and reactive. Assists with turning and lifts all extremities.       Extremities: Mild edema throughout.       Skin: Incision with drainage saturating dressing. Two CAROL sites intact.  JPs pulling 25-75cc left and 25-200cc right every hour.       ID: Tm 100.9 via pulm art cath.       Gtt's: Fentanyl at 50 mcg due to pain. Insulin on algorithm #4.       Pain: Nods yes to questions of pain (also nods yes to questions of no pain). May need increase in Fentanyl today is moving.       Activity: Wedge pads for turns due to status. Tolerating more turns without hypotension seen previously.       Social: Wife Shoua here sleeping all night.  scheduled for 4 hours today.   A: Higher than normal product needs overnight. Known Hematoma.   P: Awaiting decision on hematoma evacuation today.

## 2018-07-22 NOTE — PROGRESS NOTES
" 18 1600   Quick Adds   Type of Visit Initial Occupational Therapy Evaluation   Living Environment   Lives With child(doug), adult;child(doug), dependent;spouse   Living Arrangements house   Home Accessibility bed and bath on same level;stairs (2 railings present);stairs to enter home;stairs within home;tub/shower is not walk in   Number of Stairs to Enter Home 2   Number of Stairs Within Home 12   Stair Railings at Home inside, present at both sides   Transportation Available car;family or friend will provide   Living Environment Comment Pt intubated. Information obtained from wife and from pt.   Self-Care   Dominant Hand right   Usual Activity Tolerance moderate   Current Activity Tolerance fair   Regular Exercise yes   Activity/Exercise Type walking   Exercise Amount/Frequency daily   Equipment Currently Used at Home walker, rolling   Functional Level Prior   Ambulation 1-->assistive equipment   Transferring 1-->assistive equipment   Toileting 2-->assistive person   Bathing 2-->assistive person   Dressing 2-->assistive person   Eating 0-->independent   Communication 0-->understands/communicates without difficulty   Swallowing 0-->swallows foods/liquids without difficulty   Cognition 0 - no cognition issues reported   Fall history within last six months no   Which of the above functional risks had a recent onset or change? ambulation;transferring;toileting;bathing;dressing   General Information   Onset of Illness/Injury or Date of Surgery - Date 18   Referring Physician Mihir Nj MD   Patient/Family Goals Statement Pt/family would like to return home independently.    Additional Occupational Profile Info/Pertinent History of Current Problem Per chart, \"Mr. Yaneli Miles is a 43 year old male with a h/o ESLD secondary to chronic hepatitis B with ascites and varices; POD#1 s/p  donor liver transplant.\"   Precautions/Limitations fall precautions;abdominal precautions   Weight-Bearing Status - LUE " (10lbs)   Weight-Bearing Status - RUE (10lbs)   Weight-Bearing Status - LLE full weight-bearing   Weight-Bearing Status - RLE full weight-bearing   Cognitive Status Examination   Orientation person   Level of Consciousness alert   Able to Follow Commands mild impairment   Personal Safety (Cognitive) mild impairment   Visual Perception   Visual Perception Wears glasses   Visual Perception Comments Pt unable to read clock as he does not have his glasses. Pt reports no changes in vision.    Sensory Examination   Sensory Comments Pt reports no changes in sensation. Pt able to feel light touch and pressure throughout BUE's and BLE's    Pain Assessment   Patient Currently in Pain No   Integumentary/Edema   Integumentary/Edema Comments Pt has edema in BLE's.    Range of Motion (ROM)   ROM Comment BUE AROM WFL.    Strength   Strength Comments MMT not performed due to precautions. Pt demonstrates a mild impairement in  strength.    Transfer Skills   Transfer Comments Min A x 2 for sitting forward in bed. Pt required dependent sling transfer to bedside chair.    Activities of Daily Living Analysis   Impairments Contributing to Impaired Activities of Daily Living cognition impaired;flexibility decreased;pain;post surgical precautions;postural control impaired;strength decreased   General Therapy Interventions   Planned Therapy Interventions ADL retraining;IADL retraining;bed mobility training;cognition;ROM;strengthening;stretching;transfer training;home program guidelines;progressive activity/exercise   Clinical Impression   Criteria for Skilled Therapeutic Interventions Met yes, treatment indicated   OT Diagnosis Decreased independence with functional transfers and ADLs.    Influenced by the following impairments Decreased strength and endurance,    Assessment of Occupational Performance 3-5 Performance Deficits   Identified Performance Deficits Decreased independence with functional transfers/ADLS.    Clinical Decision  "Making (Complexity) Low complexity   Therapy Frequency daily   Predicted Duration of Therapy Intervention (days/wks) 7/29/2018   Anticipated Discharge Disposition Acute Rehabilitation Facility   Risks and Benefits of Treatment have been explained. Yes   Patient, Family & other staff in agreement with plan of care Yes   [facility] TM \"6 Clicks\"   2016, Trustees of [facility], under license to [employer].  All rights reserved.   6 Clicks Short Forms Daily Activity Inpatient Short Form   [facility]  \"6 Clicks\" Daily Activity Inpatient Short Form   1. Putting on and taking off regular lower body clothing? 2 - A Lot   2. Bathing (including washing, rinsing, drying)? 2 - A Lot   3. Toileting, which includes using toilet, bedpan or urinal? 1 - Total   4. Putting on and taking off regular upper body clothing? 2 - A Lot   5. Taking care of personal grooming such as brushing teeth? 2 - A Lot   6. Eating meals? 1 - Total   Daily Activity Raw Score (Score out of 24.Lower scores equate to lower levels of function) 10   Total Evaluation Time   Total Evaluation Time (Minutes) 15     "

## 2018-07-22 NOTE — PROGRESS NOTES
SURGICAL ICU PROGRESS NOTE  2018      CO-MORBIDITIES:   Liver transplanted (H)  (primary encounter diagnosis)  Chronic viral hepatitis B without delta agent and without coma (H)    ASSESSMENT:   Mr. Yaneli Miles is a 43 year old male with a h/o ESLD secondary to chronic hepatitis B with ascites and varices; POD#1 s/p  donor liver transplant.    TODAY'S PROGRESS/PLANS:   -Massive transfusion protocol yesterday  -Hgb this AM 7.6, redraw now  -Off pressors  -Change electrolyte replacement protocol from low to high   -QTc >600msec  -Q6H coag labs  -Q4H Hgb+plts  -CXR  -Hold off on PST today  -Remove PA catheter with one of the MACs    PLAN:     Neuro/pain/sedation:  #Postoperative Pain  -Monitor neurological status. Notify the MD for any acute changes in exam.  -Fentanyl gtt and PRN for pain control   -Propofol gtt for sedation; RASS goal -1 to 0     Pulmonary care:   - Intubated and mechanically ventilated in the postop period maintain saturations above 92%  - f/u ABG     Cardiovascular:  #Hypotension, hypovolemic/hemorrhagic shock    - Monitor hemodynamic status with current MAP goal > 60; permissive hypotension as there is active effluent of blood from bilateral bar drains in the setting of coagulopathy.   - Currently off pressors  - massive transfusion protocol    GI care:   # ESLD, cirrhosis  # Chronic Hep B infection  # Esophageal varices  - NPO; ngt in situ to low cont suction.  - Zofran and Compazine  - US with large 13cm hematoma inferior to new liver     Fluids/Electrolytes/Nutrition:   - 100mL/h D51/2NS for IV fluid hydration  - PRN fluid bolus to maintain hemodynamic stability  - Will plan to place NJ tomorrow immediate post operative period to begin feeds once pt stabilizes, goal rate determined to be 60ml/hr  - q2h post operative labs  - No indication for parenteral nutrition.    Renal/Fluid Balance:    -Urine output intraop charted as 600ml, adequate so far.  -Will continue to monitor intake  and output.  -Luis catheter intact for strict I&Os     Endocrine:    #Hx DMTII  - Insulin gtt     ID/Antibiotics:  #s/p liver transplant  - Zosyn x 48 hours post operative period  - Prophylaxis including Bactrim/Valcyte  - Immunosuppressive medications per transplant and steroid taper     Heme:  #Post operative Blood loss     - EBL 3500ml  - Received intraoperative colloid, cryoprecipitate, pRBC and plasma and cell saver units  - Trend post operative CBC and coags q2hs, will continue transfusing PRN.  - MTP activated; pt continues to have bloody output from jps   - Hgb Recheck 7.6, repeat now  - Q4H Hgb+plts  - Q6H coags     Prophylaxis:    -Mechanical prophylaxis for DVT.   -No chemical DVT prophylaxis due to active bleeding.   - PPI ordered for ulcer prophylaxis     Lines/ tubes/ drains:  - PA cath R IJ  - R cordis x2  - 3 PIV  - NG  - Luis  - ETT       Disposition:  -  Surgical ICU.       ====================================    SUBJECTIVE:   - Massive transfusion protocol  - High drain output       OBJECTIVE:   1. VITAL SIGNS:   Temp:  [96.4  F (35.8  C)-100.9  F (38.3  C)] 100.8  F (38.2  C)  Pulse:  [78] 78  Heart Rate:  [] 92  Resp:  [9-35] 11  BP: ()/(56-75) 104/66  MAP:  [52 mmHg-92 mmHg] 71 mmHg  Arterial Line BP: ()/(42-74) 105/55  FiO2 (%):  [30 %-45 %] 30 %  SpO2:  [98 %-100 %] 100 %  Ventilation Mode: CMV/AC  (Continuous Mandatory Ventilation/ Assist Control)  FiO2 (%): 30 %  Rate Set (breaths/minute): 12 breaths/min  Tidal Volume Set (mL): 480 mL  PEEP (cm H2O): 5 cmH2O  Oxygen Concentration (%): 30 %  Resp: 11    2. INTAKE/ OUTPUT:   I/O last 3 completed shifts:  In: 59530.66 [I.V.:2599.66; Other:1960; NG/GT:360; IV Piggyback:2500]  Out: 8263 [Urine:1698; Emesis/NG output:300; Drains:2765; Blood:3500]    3. PHYSICAL EXAMINATION:   General: Intubated and comfortable  Neuro: A&O, NAD  Resp: Breathing non-labored on minimal vent settings  CV: RRR  Abdomen: Soft, appropriately tender,  moderately distended; CAROL drains in place with large amount of sanguinous output  Incisions, clean, intact  Extremities: warm and well perfused    4. INVESTIGATIONS:   Arterial Blood Gases     Recent Labs  Lab 07/22/18 0754 07/22/18 0354 07/22/18 0137 07/21/18 2132   PH 7.42 7.43 7.44 7.46*   PCO2 39 38 35 33*   PO2 100 116* 103 112*   HCO3 25 25 24 23     Complete Blood Count     Recent Labs  Lab 07/22/18  0754 07/22/18  0601 07/22/18  0354 07/22/18 0137 07/21/18 2345 07/21/18 2132 07/21/18 1932 07/21/18  1608 07/21/18  1359   WBC  --   --  8.8  --   --   --  14.8*  --  5.6 4.7   HGB  --  7.6* 8.3* 7.4* 7.9* 9.5* 9.9*  < > 7.3* 8.6*   PLT 32*  --  29*  --   --  59* 64*  --  56* 66*   < > = values in this interval not displayed.  Basic Metabolic Panel    Recent Labs  Lab 07/22/18 0354 07/21/18 2345 07/21/18 1932 07/21/18 1608 07/21/18  1359 07/21/18  1226  07/20/18  1741     --   --  142 142 133  < > 134   POTASSIUM 4.1 3.9 4.3 2.9* 3.4 4.1  < > 2.5*   CHLORIDE 106  --   --  105 104  --   --  102   CO2 21  --   --  22 20  --   --  16*   BUN 26  --   --  22 23  --   --  34*   CR 1.46*  --   --  1.16 1.20  --   --  1.68*   *  --   --  178* 220* 241*  < > 223*   < > = values in this interval not displayed.  Liver Function Tests    Recent Labs  Lab 07/22/18 0354 07/21/18 2345 07/21/18 1932 07/21/18  1608 07/21/18  1359  07/20/18  1741 07/17/18  0704   *  --   --   --  504*  --  287* 301*   *  --   --   --  166*  --  137* 122*   ALKPHOS 43  --   --   --  60  --  107 82   BILITOTAL 10.3*  --   --   --  7.8*  --  47.5* 40.6*   ALBUMIN 2.5*  --   --   --  1.6*  --  4.0 4.0   INR 1.63* 1.64* 1.69* 1.81* 1.87*  < > 1.96* 2.12*   < > = values in this interval not displayed.  Pancreatic Enzymes    Recent Labs  Lab 07/22/18  0354 07/20/18  1741   LIPASE 75  --    AMYLASE 42 60     Coagulation Profile    Recent Labs  Lab 07/22/18 0354 07/21/18  2345 07/21/18  1932 07/21/18  1603  07/21/18  1359 07/21/18  1226   INR 1.63* 1.64* 1.69* 1.81* 1.87* 3.52*   PTT  --   --  39* 49* 55* 179*     Lactate  Invalid input(s): LACTATE    5. RADIOLOGY:   Recent Results (from the past 24 hour(s))   XR Chest Port 1 View    Narrative    XR CHEST PORT 1 VW, 7/21/2018 2:28 PM.    Comparison: 7/20/2018, 7/12/2018, 6/24/2018.    History: Check ET tube placement - Liver transplant; .    Technique: AP chest radiograph    Findings:   Endotracheal tube tip projects approximately 1.6 cm above the tano.  Right IJ [place] catheter tip projects over the bifurcation of the  main pulmonary artery. Enteric tube passes below the diaphragm and  courses inferior to the field-of-view. Right IJ central venous  catheter tip projects over the low SVC. Cardiomediastinal silhouette  is within normal limits.  Pulmonary vasculature is mildly indistinct.   Mild bilateral patchy opacities. Bibasilar patchy opacities, likely  atelectasis. No pleural effusion.  No pneumothorax.  No acute  intra-abdominal abnormalities. Surgical clips and radiopacities  project over the upper abdomen.      Impression    Impression:   1. Endotracheal tube tip projects approximately 1.6 cm above the  tano, recommend retracting.  2. Bilateral patchy opacities, likely represents mild pulmonary  vascular congestion.    I have personally reviewed the examination and initial interpretation  and I agree with the findings.    DARRIUS MANTILLA MD   XR Abdomen Port 1 View    Narrative    Abdomen one view    HISTORY: OG placement    COMPARISON STUDY: 7/13/2018    FINDINGS: OG tip projects over the stomach. The sidehole is outside of  the field of view. Surgical changes in the abdomen and right upper  quadrant with drains and staples. Gas-filled colon. Paucity of small  bowel gas. Luis catheter.      Impression    IMPRESSION: OG tube projects over the stomach. Sidehole is outside the  field of view but likely in the stomach.    DARRIUS MANTILLA MD    Liver  Transplant    Narrative    EXAMINATION: US LIVER TRANSPLANT, 7/22/2018 1:20 AM     COMPARISON: None.    HISTORY: Postop liver transplant.    TECHNIQUE:  Gray-scale, color Doppler and spectral flow analysis.    FINDINGS:   There is anechoic ascites, extending into the lower quadrants.    Liver:   The liver demonstrates normal homogeneous echotexture. No  evidence of a focal hepatic mass. However, fluid collection with  low-level internal echoes adjacent to the inferior aspect of the  liver, measuring approximately 13.6 x 7.0 x 8.5 cm. No internal flow  on color Doppler evaluation.    Bile Ducts: Both the intra- and extrahepatic biliary system are of  normal caliber.  The common bile duct measures 1 mm in diameter.    Gallbladder: The gallbladder is surgically absent.    Kidneys:   Right kidney:  The right kidney demonstrates normal echotexture with  no evidence of a shadowing stone, focal mass or hydronephrosis.   11.6  cm in long axis dimension.  Left kidney:  The left kidney demonstrates normal echotexture with no  evidence of a shadowing stone, focal mass or hydronephrosis.   12.0 cm  in long axis dimension.    Pancreas: Visualized portions of the pancreas are normal in  appearance.      Spleen:  The spleen is enlarged, measuring 15.7 cm.    Visualized portions of the aorta are unremarkable.    LIVER DOPPLER:  Splenic vein:  Patent continuous normal antegrade direction flow  towards the liver, 29 cm/sec.  Extrahepatic portal vein:  Patent continuous antegrade flow, 15  cm/sec.  Portal vein at anastomosis: Patent continuous antegrade flow, 19  cm/sec.  Intrahepatic portal vein:  Patent continuous antegrade flow, 14  cm/sec.  Right portal vein flow is antegrade, measuring 12 cm/sec.  Left portal vein flow is retrograde, measuring 9 cm/sec.    Inferior vena cava: patent with flow toward the heart throughout..  IVC above anastomosis:  90 cm/sec.  IVC at anastomosis:  93 cm/sec.  Intrahepatic IVC:  157  cm/sec.  Extrahepatic IVC:  54 cm/sec.    Right, mid, left hepatic veins: Patent with flow towards the inferior  vena cava.    Extrahepatic hepatic artery: Low resistance waveform with flow towards  the liver. 84 cm/sec with resistive index 0.37.  Right hepatic artery: 87 cm/sec with resistive index 0.47.  Left hepatic artery: 61 cm/sec with resistive index 0.59.      Impression    Impression:   1.  Hematoma associated with the inferior aspect of the perihepatic  space anteriorly, measuring 13.7 x 7.0 x 8.5 cm.  2.  Retrograde flow of the left portal vein. Additionally, low  velocities of the portal venous system. Single low resistive index of  the extrahepatic aspect of the hepatic artery, measuring 0.37. These  findings are likely within normal limits in the early postoperative  context. However, continued attention on follow-up recommended.      [Result: Large hematoma adjacent to the transplant liver allograft]    Finding was identified on 7/22/2018 1:17 AM.     Dr. Anna Alonso  was contacted by Dr. Pablo at 7/22/2018 1:25 AM  and verbalized understanding of the urgent finding.     I have personally reviewed the examination and initial interpretation  and I agree with the findings.    ZULEMA GRANDA MD       =========================================          Patient seen, findings and plan discussed with surgical ICU staff Dr. Finn.  - - - - - - - - - - - - - - - - - -  Chris Cerna MD  PGY-2 Surgery  pager [phone]      See [facility] for on-call pager information.

## 2018-07-23 NOTE — PROGRESS NOTES
Immunosuppression Note:    Yaneli Miles is a 43 year old male who is seen today  for immunosuppression management     I, Mamadou Norwood MD, I have examined the patient with the resident/PA/Fellow, discussed and agree with the note and findings.  I have reviewed today's vital signs, medications, labs and imaging. I reviewed the immunosuppression medications and levels. I spoke to the patient/family and explained below clinical details and answered all the questions      Transplant Surgery  Inpatient Daily Progress Note  07/23/2018    Assessment & Plan: Mr Yaneli Miles is a 43 year old male with ESLD due to Hep B and ALD complicated with portal hypertension, ascites requiring multiple tapping, esophageal varices, encephalopathy, severe thrombocytopenia, history of DM, who underwent a DBD OLT on 7/21/2018.      Graft function: Good initial function. TB/alk phos/ALT/AST decreased. INR 1.27. Lactic acid 1  Immunosuppression management:   Simulect intra-op due to BEN  Steroid taper per protocol.  MMF 1gm BID  Start tac 1 mg BID  Complexity of management:Medium. Contributing factors: thrombocytopenia and anemia  Hematology: Hemoglobin 8 (7.6). PLT 38. INR 1.27. DDAVP x 1 due to BEN. Vit K 10 mg IV x 3 doses. Recommend PLT > 50 if active bleeding, check next Hbg. 2 units RBCs yesterday, no RBC o/n.  Last transfusion PLT last evening ~2300. Continue serial CBC q 4 hours.   Cardiorespiratory: On vent. Plan to consider extubation tomorrow AM if status stable.  GI/Nutrition: NPO. Would place NJ for TF.  Endocrine: Insulin drip   Fluid/Electrolytes: MIVF: Decreased to 50 ml/h, replace electrolytes as needed.  : Luis to remain due to intubation with mechanical ventilation   Infectious disease: prophylactic Zosyn for 48 h.  HBV: Hepatits B PCR positive prior to tx. Received HBIG on 7/21 and 7/22. If Hep B surface antigen positive (should be resulted by 1500) will give dose HBIG today.   Prophylaxis: DVT PCDs, fall, GI,  fungal  Disposition: SICU     Medical Decision Making: High  Admit 24975 (straight-forward/low level decision making), Subsequent visit 48920 (high level decision making) and Consult 17117 high complexity, 110 minutes    JAY/Fellow/Resident Provider: GINGER Segovia [id number]     Faculty: Mamadou Nowrood M.D.      __________________________________________________________________  Transplant History: Admitted 2018 for  donor liver transplant.   The patient has a history of liver failure due to hepatitis B .    2018 (Liver), Postoperative day: 2     Interval History: History is obtained from the patient's nurse, due to patient being intubated.   No events o/n. Hemoglobin stable this AM.    ROS:   A 10-point review of systems was negative except as noted above.    Curent Meds:    [START ON 2018] basiliximab (SIMULECT) infusion  20 mg Intravenous Once     [START ON 2018] methylPREDNISolone  50 mg Intravenous Once    Followed by     [START ON 2018] predniSONE  25 mg Oral Once    Followed by     [START ON 2018] predniSONE  10 mg Oral Once     multivitamins with minerals  15 mL Per Feeding Tube Daily     mycophenolate  1,000 mg Oral BID IS    Or     mycophenolate  1,000 mg Oral or NG Tube BID IS     pantoprazole  40 mg Oral BID AC     phytonadione  10 mg Intravenous Daily     sodium chloride (PF)  3 mL Intravenous Q8H     sulfamethoxazole-trimethoprim  10 mL Oral Daily     tacrolimus  1 mg Oral or NG Tube BID IS     valGANciclovir  450 mg Oral Daily    Or     valGANciclovir  450 mg Oral or NG Tube Daily       Physical Exam:     Admit Weight: 60.7 kg (133 lb 12.8 oz)    Current Vitals:   BP (!) 152/95  Pulse 78  Temp 98.7  F (37.1  C) (Axillary)  Resp 20  Wt 68.6 kg (151 lb 3.8 oz)  SpO2 98%  BMI 24.41 kg/m2    CVP (mmHg): 6 mmHg    Vital sign ranges:    Temp:  [98  F (36.7  C)-100.4  F (38  C)] 98.7  F (37.1  C)  Heart Rate:  [53-87] 79  Resp:  [8-30] 20  BP: (103-152)/(75-95)  152/95  MAP:  [80 mmHg-141 mmHg] 101 mmHg  Arterial Line BP: (107-153)/() 136/85  FiO2 (%):  [30 %] 30 %  SpO2:  [98 %-100 %] 98 %  Patient Vitals for the past 24 hrs:   BP Temp Temp src Heart Rate Resp SpO2   07/23/18 1200 - 98.7  F (37.1  C) Axillary - 20 -   07/23/18 1145 - - - - 14 -   07/23/18 0900 (!) 152/95 - - 79 16 98 %   07/23/18 0850 - - - - 10 -   07/23/18 0800 122/76 99.5  F (37.5  C) Pulm Art 62 16 100 %   07/23/18 0600 (!) 125/93 - - 68 18 100 %   07/23/18 0500 (!) 135/91 - - 60 12 100 %   07/23/18 0400 140/88 99  F (37.2  C) Pulm Art 64 24 100 %   07/23/18 0300 108/75 - - 53 12 100 %   07/23/18 0200 119/79 - - 56 16 100 %   07/23/18 0100 103/75 - - 59 9 100 %   07/23/18 0000 - - - 69 9 100 %   07/22/18 2352 - 98.6  F (37  C) Pulm Art 74 8 100 %   07/22/18 2342 - 98.4  F (36.9  C) Pulm Art 63 10 100 %   07/22/18 2300 - - - 70 30 100 %   07/22/18 2253 - - - - - 100 %   07/22/18 2200 - - - 54 13 100 %   07/22/18 2100 - - - 61 16 100 %   07/22/18 2000 - 98.4  F (36.9  C) Pulm Art 67 13 100 %   07/22/18 1800 - - - 82 12 100 %   07/22/18 1733 - - - - - 100 %   07/22/18 1700 - - - 85 8 100 %   07/22/18 1600 - 98  F (36.7  C) Axillary 82 12 100 %   07/22/18 1417 - 99.9  F (37.7  C) - - - -   07/22/18 1415 - 99.9  F (37.7  C) - - - -   07/22/18 1400 - - - 85 25 100 %   07/22/18 1309 - - - - - 100 %   07/22/18 1300 - 100.4  F (38  C) Pulm Art 87 8 100 %     General Appearance: intubated, on mechanical ventilation    Skin: warm, dry, jaundice  Heart: NSR  Lungs: on vent  Abdomen: soft, non distended. The wound is well approximated, covered with surgical dressing.  : bah  Extremities: well perfused  Neurologic: awake. Tremor absent.    Data:   CMP  Recent Labs  Lab 07/23/18  0324 07/23/18  0323 07/22/18  1549 07/22/18  0354  07/20/18  1741   NA  --  141 141 140  < > 134   POTASSIUM  --  3.6 4.0 4.1  < > 2.5*   CHLORIDE  --  106 105 106  < > 102   CO2  --  24 24 21  < > 16*   GLC  --  113* 185* 105*  <  > 223*   BUN  --  34* 31* 26  < > 34*   CR  --  1.59* 1.65* 1.46*  < > 1.68*   GFRESTIMATED  --  47* 45* 52*  < > 44*   GFRESTBLACK  --  57* 55* 63  < > 54*   JESUS  --  8.1* 8.2* 7.9*  < > 8.8   ICAW 4.7  --   --  4.5  < >  --    MAG  --  2.4*  --  1.7  < > 2.2   PHOS  --  4.5  --  4.4  < > 3.6   AMYLASE  --   --   --  42  --  60   LIPASE  --   --   --  75  --   --    ALBUMIN  --  2.6*  --  2.5*  < > 4.0   BILITOTAL  --  6.5*  --  10.3*  < > 47.5*   ALKPHOS  --  56  --  43  < > 107   AST  --  169*  --  368*  < > 287*   ALT  --  132*  --  142*  < > 137*   < > = values in this interval not displayed.  CBC    Recent Labs  Lab 07/23/18  0817 07/23/18  0323   HGB 8.0* 7.6*   WBC 5.4 4.9   PLT 38* 42*     COAGS  Recent Labs  Lab 07/23/18  0323 07/22/18  1549  07/21/18  1932 07/21/18  1608   INR 1.27* 1.45*  < > 1.69* 1.81*   PTT  --   --   --  39* 49*   < > = values in this interval not displayed.   Urinalysis  Recent Labs   Lab Test  07/13/18   1315  06/18/18   1200   COLOR  Dark Yellow  Yellow   APPEARANCE  Clear  Clear   URINEGLC  Negative  Negative   URINEBILI  Large*  Small*   URINEKETONE  Negative  Negative   SG  1.013  1.008   UBLD  Negative  Negative   URINEPH  6.0  5.0   PROTEIN  10*  Negative   NITRITE  Negative  Negative   LEUKEST  Negative  Negative   RBCU  <1  1   WBCU  None  2   UTPG   --   0.75*     Virology:  Hepatitis C Antibody   Date Value Ref Range Status   07/20/2018 Nonreactive NR^Nonreactive Final     Comment:     Assay performance characteristics have not been established for newborns,   infants, and children

## 2018-07-23 NOTE — PROGRESS NOTES
NUTRITION SERVICES - BRIEF NOTE    TFs ordered over the weekend to start via OGT but were never started. RD placed post-pyloric FT at the bedside this morning. Plan to initiate TFs.    Implementations  -Modify TF order to reflect start of TF via new enteral access    Monitoring  Nutrition will continue to follow and monitor per POC (see 7/21 RD note)    Halina Crouch RD, LD  SICU RD Pgr: [phone]

## 2018-07-23 NOTE — PROGRESS NOTES
Transplant Admission Psychosocial Assessment    Patient Name: Yaneli Miles  : 1975  Age: 43 year old  MRN: [id number]  Date of Initial Social Work Evaluation: 2018    Patient underwent  donor liver transplant on 18.  Met with very briefly with his wife and his two teenage daughters this afternoon in SICU. The patient was with other providers.     Presenting Information   Living Situation: He lives in [place] with his wife and four of their children.    If not local, plans for short term stay:  N/A  Previous Functional Status: He has been unable to work for about four months. His wife and children provide for his care needs at home. When recently discharged from this hospital, outpatient PT was recommended.   Cultural/Language/Spiritual Considerations: A [place]  is needed for  and his spouse.     Support System  Primary Support Person His wife Luann Gill  Other support:  His nine children  Plan for support in immediate post-transplant period: His family will continue to assist. They were previously not interested in pursuing PCA services, but will continue to benefit from home care.     Health Care Directive  Decision Maker: Patient.   Alternate Decision Maker: His wife, Luann, as next of kin  Health Care Directive: Provided education when previously seen.     Mental Health/Coping:   History of Mental Health: No prior concerns  History of Chemical Health: No concerns  Current status: No alcohol, cigarette or herbal use.   Coping: Unable to assess as intubated when seen and with other providers.   Services Needed/Recommended: None at present.     Financial   Income: It was from wages until April. As of  cash and food assistance began from [facility]. An SSDI/SSI application is pending.   Impact of transplant on income: Illness resulted in a lack of income.   Insurance and medication coverage: [facility] coverage.   Financial concerns: Additional income source is  needed. He may need assistance finding out the status of his Social Security application.   Resources needed: Potential financial assistance while his SSDI/SSI application is pending.     Education provided by SW: Social Work role inpatient setting, availability of support groups, parking information .    Assessment and recommendations and plan:  I spoke briefly with 's family. His wife was on her way out to eat. As she is usually here, I reinforced her ability to leave the nursing station, even for a brief time. His two teenage daughters remained with their father for support.   Social work remains available to this family for support, resource needs and disposition planning.     Shreya Araya, KAIA, [facility]  Pager [phone]

## 2018-07-23 NOTE — PLAN OF CARE
Problem: Patient Care Overview  Goal: Plan of Care/Patient Progress Review    Discharge Planner OT   Patient plan for discharge: not stated  Current status: Dependent transfer with lift for supine>sitting EOB and EOB>recliner. Able to sit EOB with CGA for 5 min for UE exercise. Was CGAx2 for sit>stand from recliner, standing for 5 min with FWW, marching in place, and performing UE exercise. Alert, following all commands, tolerating quite well with VSS.  Barriers to return to prior living situation: decreased ADL independence and activity tolerance  Recommendations for discharge: TCU, potentially ARU with continued tolerance and adequate needs  Rationale for recommendations: increase functional endurance and ADL independence at rehab       Entered by: Derek Che 07/23/2018 12:22 PM

## 2018-07-23 NOTE — PLAN OF CARE
Problem: Liver Transplant (Adult)  Goal: Anesthesia/Sedation Recovery  Outcome: Improving  VSS throught the night.  Follows commands. Is only on Fentanyl @50mcg/hr.  Remains w/ lg CAROL output.  The right CAROL is putting ~300ml/2hr. Out.  Hgb checks have been stable.  1 unit of Plts given for Plts of 36.  UO ~[id number].  Pt NPO for a possible washout today for the intra-abdominal hematoma.  Will continue to monitor enedina.

## 2018-07-23 NOTE — PLAN OF CARE
Problem: Patient Care Overview  Goal: Plan of Care/Patient Progress Review  PT - 4AB  Discharge Planner PT   Patient plan for discharge: not stated  Current status: Pt minAx1 for bed mobility, minAx2 for STS from bed with BUE support on therapists forearms, CGA-minAx2 for SPT from bed to chair. Pt very motivated to work with therapy. Pt completes 2x10 repetitions for seated LE exercises.   Barriers to return to prior living situation: activity tolerance, strength, medical status  Recommendations for discharge: TCU, possibly ARU pending progression in therapies through inpatient stay  Rationale for recommendations: Pt very motivated to work with therapy, good family support. Pt would benefit from continued therapy at rehab in order to address decreased activity tolerance, strength, and increased level of assist to increase safety and independence with functional mobility.  Entered by: Jaycee Pearson 07/23/2018 4:46 PM

## 2018-07-23 NOTE — PROGRESS NOTES
SURGICAL ICU PROGRESS NOTE  2018      CO-MORBIDITIES:   Liver transplanted (H)  (primary encounter diagnosis)  Chronic viral hepatitis B without delta agent and without coma (H)    ASSESSMENT:   Mr. Yaneli Miles is a 43 year old male with a h/o ESLD secondary to chronic hepatitis B with ascites and varices; POD#2 s/p  donor liver transplant complicated by 3.5L estimated intraoperative blood loss leading to activation of the massive transfusion protocol postoperatively. In total he received 4u pRBC, 2u Cryo, 3u plasma, 4 units platelets.    TODAY'S PROGRESS/PLANS:   - Removed swan, 1 MAC, rewired 2nd MAC for triple lumen CVC  - Wean fentanyl gtt, add oxycodone  - Vitamin K and DDAVP per transplant  - PST    PLAN:     Neuro/pain/sedation:  #Postoperative Pain  -Monitor neurological status. Notify the MD for any acute changes in exam.  -Weaning off fentanyl drip, PRN available for pain control   -Stop propofol  -Oxycodone Q4H PRN     Pulmonary care:   - Intubated and mechanically ventilated in the postop period maintain saturations above 92%  - PST BID     Cardiovascular:  #Hypotension, hypovolemic/hemorrhagic shock    - Monitor hemodynamic status with current MAP goal > 60; permissive hypotension as there is active effluent of blood from bilateral bar drains in the setting of coagulopathy.   - Currently off pressors  - massive transfusion protocol    GI care:   # ESLD, cirrhosis  # Chronic Hep B infection  # Esophageal varices  - NPO; tube feeds through NJ at 60cc/hr  - Zofran and Compazine  - US with large 13cm hematoma inferior to new liver     Fluids/Electrolytes/Nutrition:   - 100mL/h D51/2NS for IV fluid hydration  - PRN fluid bolus to maintain hemodynamic stability  - TFs via NJ at 60cc/hr  - q2h post operative labs  - No indication for parenteral nutrition.    Renal/Fluid Balance:    -Will continue to monitor intake and output.  -Luis catheter intact for strict I&Os     Endocrine:    #Hx DMTII  -  Insulin gtt     ID/Antibiotics:  #s/p liver transplant  - Last dose zosyn tonight  - Prophylaxis including Bactrim/Valcyte  - Immunosuppressive medications per transplant and steroid taper     Heme:  #Post operative Blood loss     - EBL 3500ml  - Received intraoperative colloid, cryoprecipitate, pRBC and plasma and cell saver units  - Vitamin K and DDAVP per transplant    Prophylaxis:    - Mechanical prophylaxis for DVT.   - No chemical DVT prophylaxis due to active bleeding.   - PPI ordered for ulcer prophylaxis     Lines/ tubes/ drains:  - 3 PIV  - NJ  - Luis  - ETT       Disposition:  -  Surgical ICU.       ====================================    SUBJECTIVE:   No acute events overnight, received one unit platelets with only a 2k bump.  Able to shake his head, nod, and gesture appropriately. Denies pain, shortness of breath.     Removed one MAC and wire-exchanged second one.     OBJECTIVE:   1. VITAL SIGNS:   Temp:  [98  F (36.7  C)-101.3  F (38.5  C)] 99  F (37.2  C)  Heart Rate:  [53-98] 68  Resp:  [8-30] 18  BP: (103-140)/(66-93) 125/93  MAP:  [70 mmHg-141 mmHg] 86 mmHg  Arterial Line BP: (105-153)/() 108/73  FiO2 (%):  [30 %] 30 %  SpO2:  [100 %] 100 %  Ventilation Mode: CMV/AC  (Continuous Mandatory Ventilation/ Assist Control)  FiO2 (%): 30 %  Rate Set (breaths/minute): 12 breaths/min  Tidal Volume Set (mL): 480 mL  PEEP (cm H2O): 5 cmH2O  Oxygen Concentration (%): 30 %  Resp: 18    2. INTAKE/ OUTPUT:   I/O last 3 completed shifts:  In: 4399.82 [I.V.:3180.82; NG/GT:250]  Out: 4990 [Urine:1480; Emesis/NG output:150; Drains:3360]    3. PHYSICAL EXAMINATION:   General: Intubated and comfortable,  Neuro:  NAD, awakens to voice, responds apppropriately  Resp: Breathing non-labored on minimal vent settings, initiating PST right now  CV: RRR, minimal LE edema  Abdomen: Soft, appropriately tender, moderately distended; CAROL drains in place with large amount of serosanguinous output  Incisions, clean, intact,  appropriate erythema surrounding staple line  Extremities: warm and well perfused, moving all 4 spontaneously.     4. INVESTIGATIONS:   Arterial Blood Gases     Recent Labs  Lab 07/23/18 0324 07/22/18  1120 07/22/18  0754 07/22/18  0354   PH 7.45 7.43 7.42 7.43   PCO2 39 37 39 38   PO2 143* 106* 100 116*   HCO3 27 25 25 25     Complete Blood Count     Recent Labs  Lab 07/23/18 0323 07/22/18 2009 07/22/18  1549 07/22/18  1120   WBC 4.9 4.6 5.6 5.7   HGB 7.6* 7.2* 7.8* 8.5*   PLT 42* 36* 47* 28*     Basic Metabolic Panel    Recent Labs  Lab 07/23/18 0323 07/22/18  1549 07/22/18  0354 07/21/18  2345  07/21/18  1608    141 140  --   --  142   POTASSIUM 3.6 4.0 4.1 3.9  < > 2.9*   CHLORIDE 106 105 106  --   --  105   CO2 24 24 21  --   --  22   BUN 34* 31* 26  --   --  22   CR 1.59* 1.65* 1.46*  --   --  1.16   * 185* 105*  --   --  178*   < > = values in this interval not displayed.  Liver Function Tests    Recent Labs  Lab 07/23/18 0323 07/22/18 1549 07/22/18 0354 07/21/18 2345  07/21/18  1359  07/20/18  1741   *  --  368*  --   --  504*  --  287*   *  --  142*  --   --  166*  --  137*   ALKPHOS 56  --  43  --   --  60  --  107   BILITOTAL 6.5*  --  10.3*  --   --  7.8*  --  47.5*   ALBUMIN 2.6*  --  2.5*  --   --  1.6*  --  4.0   INR 1.27* 1.45* 1.63* 1.64*  < > 1.87*  < > 1.96*   < > = values in this interval not displayed.  Pancreatic Enzymes    Recent Labs  Lab 07/22/18 0354 07/20/18  1741   LIPASE 75  --    AMYLASE 42 60     Coagulation Profile    Recent Labs  Lab 07/23/18  0323 07/22/18  1549 07/22/18  0354 07/21/18  2345 07/21/18  1932 07/21/18  1608 07/21/18  1359 07/21/18  1226   INR 1.27* 1.45* 1.63* 1.64* 1.69* 1.81* 1.87* 3.52*   PTT  --   --   --   --  39* 49* 55* 179*     Lactate  Invalid input(s): LACTATE    5. RADIOLOGY:   Recent Results (from the past 24 hour(s))   XR Chest Port 1 View    Narrative    XR CHEST PORT 1 VW, 7/21/2018 2:28 PM.    Comparison: 7/20/2018,  7/12/2018, 6/24/2018.    History: Check ET tube placement - Liver transplant; .    Technique: AP chest radiograph    Findings:   Endotracheal tube tip projects approximately 1.6 cm above the tano.  Right IJ [place]-Luke catheter tip projects over the bifurcation of the  main pulmonary artery. Enteric tube passes below the diaphragm and  courses inferior to the field-of-view. Right IJ central venous  catheter tip projects over the low SVC. Cardiomediastinal silhouette  is within normal limits.  Pulmonary vasculature is mildly indistinct.   Mild bilateral patchy opacities. Bibasilar patchy opacities, likely  atelectasis. No pleural effusion.  No pneumothorax.  No acute  intra-abdominal abnormalities. Surgical clips and radiopacities  project over the upper abdomen.      Impression    Impression:   1. Endotracheal tube tip projects approximately 1.6 cm above the  tano, recommend retracting.  2. Bilateral patchy opacities, likely represents mild pulmonary  vascular congestion.    I have personally reviewed the examination and initial interpretation  and I agree with the findings.    DARRIUS MANTILLA MD   XR Abdomen Port 1 View    Narrative    Abdomen one view    HISTORY: OG placement    COMPARISON STUDY: 7/13/2018    FINDINGS: OG tip projects over the stomach. The sidehole is outside of  the field of view. Surgical changes in the abdomen and right upper  quadrant with drains and staples. Gas-filled colon. Paucity of small  bowel gas. Luis catheter.      Impression    IMPRESSION: OG tube projects over the stomach. Sidehole is outside the  field of view but likely in the stomach.    DARRIUS MANTILLA MD   US Liver Transplant    Narrative    EXAMINATION:  LIVER TRANSPLANT, 7/22/2018 1:20 AM     COMPARISON: None.    HISTORY: Postop liver transplant.    TECHNIQUE:  Gray-scale, color Doppler and spectral flow analysis.    FINDINGS:   There is anechoic ascites, extending into the lower quadrants.    Liver:   The liver  demonstrates normal homogeneous echotexture. No  evidence of a focal hepatic mass. However, fluid collection with  low-level internal echoes adjacent to the inferior aspect of the  liver, measuring approximately 13.6 x 7.0 x 8.5 cm. No internal flow  on color Doppler evaluation.    Bile Ducts: Both the intra- and extrahepatic biliary system are of  normal caliber.  The common bile duct measures 1 mm in diameter.    Gallbladder: The gallbladder is surgically absent.    Kidneys:   Right kidney:  The right kidney demonstrates normal echotexture with  no evidence of a shadowing stone, focal mass or hydronephrosis.   11.6  cm in long axis dimension.  Left kidney:  The left kidney demonstrates normal echotexture with no  evidence of a shadowing stone, focal mass or hydronephrosis.   12.0 cm  in long axis dimension.    Pancreas: Visualized portions of the pancreas are normal in  appearance.      Spleen:  The spleen is enlarged, measuring 15.7 cm.    Visualized portions of the aorta are unremarkable.    LIVER DOPPLER:  Splenic vein:  Patent continuous normal antegrade direction flow  towards the liver, 29 cm/sec.  Extrahepatic portal vein:  Patent continuous antegrade flow, 15  cm/sec.  Portal vein at anastomosis: Patent continuous antegrade flow, 19  cm/sec.  Intrahepatic portal vein:  Patent continuous antegrade flow, 14  cm/sec.  Right portal vein flow is antegrade, measuring 12 cm/sec.  Left portal vein flow is retrograde, measuring 9 cm/sec.    Inferior vena cava: patent with flow toward the heart throughout..  IVC above anastomosis:  90 cm/sec.  IVC at anastomosis:  93 cm/sec.  Intrahepatic IVC:  157 cm/sec.  Extrahepatic IVC:  54 cm/sec.    Right, mid, left hepatic veins: Patent with flow towards the inferior  vena cava.    Extrahepatic hepatic artery: Low resistance waveform with flow towards  the liver. 84 cm/sec with resistive index 0.37.  Right hepatic artery: 87 cm/sec with resistive index 0.47.  Left hepatic  artery: 61 cm/sec with resistive index 0.59.      Impression    Impression:   1.  Hematoma associated with the inferior aspect of the perihepatic  space anteriorly, measuring 13.7 x 7.0 x 8.5 cm.  2.  Retrograde flow of the left portal vein. Additionally, low  velocities of the portal venous system. Single low resistive index of  the extrahepatic aspect of the hepatic artery, measuring 0.37. These  findings are likely within normal limits in the early postoperative  context. However, continued attention on follow-up recommended.      [Result: Large hematoma adjacent to the transplant liver allograft]    Finding was identified on 7/22/2018 1:17 AM.     Dr. Anna Alonso  was contacted by Dr. Pablo at 7/22/2018 1:25 AM  and verbalized understanding of the urgent finding.     I have personally reviewed the examination and initial interpretation  and I agree with the findings.    ZULEMA GRANDA MD       =========================================  Matthew Garcia, MS4    Patient seen, findings and plan discussed with surgical ICU staff Dr. Feliciano.  - - - - - - - - - - - - - - - - - -    I, Chris Cerna MD, agree with the above documentation by student doctor Matthew Garcia and have made any necessary edits to the note.    Chris Cerna MD  PGY-2 Surgery  pager [phone]          See [facility] for on-call pager information.

## 2018-07-23 NOTE — PROCEDURES
Bridle Placement:   Reason for bridle placement: Securement of FT per RN   Medicine delivered during procedure: lubricating jelly    Procedure: Successful   Location of top of clip on FT: @ 91 cm marker   Condition of nose/skin at time of bridle placement: Unremarkable   Face to Face time with patient: <5 minutes.  Halina Crouch, RD, LD  SICU RD Pgr: [phone]

## 2018-07-23 NOTE — PROCEDURES
Small Bowel Feeding Tube Placement Assessment  Reason for Feeding Tube Placement: Provider request for post-pyloric FT  Cortrak Start Time: 8:59   Cortrak End Time: 9:12  Medicine Delivered During Procedure: Lidocaine  Placement Successful:  Presume post-pyloric (pending AXR confirmation).    Procedure Complications: None  Final Placement Mandeep at exit of nare 90 cm  Face to Face time with patient: 15 minutes    Halina Crouch RD, LD  SICU RD Pgr: [phone]

## 2018-07-23 NOTE — PLAN OF CARE
Problem: Patient Care Overview  Goal: Plan of Care/Patient Progress Review  Outcome: Improving  D/I: Pt on unit 4A Surgical/Neuro ICU s/p liver  donor liver transplant  Neuro- alert, appropriate. Follows commands, PERRL, moves all extremities  CV- sinus rhythm, HR 60s-70s. Afebrile. Arterial line, swan monitoring CO/CI/SvO2  Pulm- intubated on minimal vent settings, LS clear, dim at bases, PA cath in place.  GI- NPO, OG tube to cont LWS, hypoactive BS. Abd distended, rounded, LBM 2018. No OG output this shift. Order to replace output with 1/2NS 1ml per 1ml  - bah in place with adequate urine output.   Gtts-fentanyl, LR, insulin  Skin- clamshell dressing on abdomen, clean dry intact, CAROL x2 draining sanguinous fluid (R>L)  Pain- moderate c/o pain, well controlled on current meds.  See flow sheets for further interventions and assessments.  A: Stable  P: Continue to monitor pt closely. Notify MD of significant changes.

## 2018-07-24 NOTE — PROGRESS NOTES
Immunosuppression Note:    Yaneli Miles is a 43 year old male who is seen today  for immunosuppression management     I, Mamadou Norwood MD, I have examined the patient with the resident/PA/Fellow, discussed and agree with the note and findings.  I have reviewed today's vital signs, medications, labs and imaging. I reviewed the immunosuppression medications and levels. I spoke to the patient/family and explained below clinical details and answered all the questions      Transplant Surgery  Inpatient Daily Progress Note  07/24/2018    Assessment & Plan: Mr Yaneli Miles is a 43 year old male with ESLD due to Hep B and ALD complicated with portal hypertension, ascites requiring multiple tapping, esophageal varices, encephalopathy, severe thrombocytopenia, history of DM, who underwent a DBD OLT on 7/21/2018.      Graft function: Good initial function. TB/alk phos/ALT/AST decreased.   Immunosuppression management:   Simulect intra-op due to BEN  Steroid taper per protocol.  MMF 1gm BID  Tac 1 mg BID, increase to 2 mg BID.   Complexity of management:Medium. Contributing factors: thrombocytopenia and anemia  Hematology: Hemoglobin stable, 7.7 (7.9). PLT 32. INR 1.27. DDAVP x 1 due to BEN and vit K 10 mg IV x 3 doses. Hold start of DVT ppx due to thrombocytopenia   Cardiorespiratory: Extubated this AM.  GI/Nutrition: FT placed. TF titrate to goal. Start CLD.  Endocrine: Insulin drip   Fluid/Electrolytes: Replace potassium. High CAROL drain output  : Remove bah   Infectious disease: prophylactic Zosyn for 48 h. Valcyte, bactrim  HBV: Hepatits B DNA PCR positive prior to tx. Received HBIG on 7/21 and 7/22. Hep B surface antigen negative. No further HBIG planned. Start tenofovir.  Prophylaxis: DVT PCDs,  fall, GI, fungal  Disposition: Transfer to floor     Medical Decision Making: High  Admit [id number] (straight-forward/low level decision making), Subsequent visit 75818 (high level decision making) and Consult 83423 high  complexity, 110 minutes    JAY/Fellow/Resident Provider: Rebecca Haas, PAC [id number]     Faculty: Mamadou Norwood M.D.      __________________________________________________________________  Transplant History: Admitted 2018 for  donor liver transplant.   The patient has a history of liver failure due to hepatitis B .    2018 (Liver), Postoperative day: 3     Interval History: History is obtained from the patient  No events o/n. Pain controlled. No nausea. Ambulating    ROS:   A 10-point review of systems was negative except as noted above.    Curent Meds:    [START ON 2018] basiliximab (SIMULECT) infusion  20 mg Intravenous Once     multivitamins with minerals  15 mL Per Feeding Tube Daily     mycophenolate  1,000 mg Oral BID IS    Or     mycophenolate  1,000 mg Oral or NG Tube BID IS     pantoprazole  40 mg Oral BID AC     phytonadione  10 mg Intravenous Daily     [START ON 2018] predniSONE  25 mg Oral Once    Followed by     [START ON 2018] predniSONE  10 mg Oral Once     senna-docusate  1 tablet Oral BID    Or     senna-docusate  2 tablet Oral BID     sodium chloride (PF)  3 mL Intravenous Q8H     sulfamethoxazole-trimethoprim  10 mL Oral Daily     tacrolimus  1 mg Oral or NG Tube BID IS     tenofovir  300 mg Per Feeding Tube Daily     valGANciclovir  450 mg Oral Daily    Or     valGANciclovir  450 mg Oral or NG Tube Daily       Physical Exam:     Admit Weight: 60.7 kg (133 lb 12.8 oz)    Current Vitals:   BP (!) 123/94  Pulse 78  Temp 98.3  F (36.8  C) (Oral)  Resp 10  Wt 64.2 kg (141 lb 8.6 oz)  SpO2 96%  BMI 22.84 kg/m2      Vital sign ranges:    Temp:  [97.6  F (36.4  C)-98.3  F (36.8  C)] 98.3  F (36.8  C)  Heart Rate:  [50-72] 57  Resp:  [8-16] 10  BP: (110-151)/() 123/94  FiO2 (%):  [30 %] 30 %  SpO2:  [90 %-100 %] 96 %  Patient Vitals for the past 24 hrs:   BP Temp Temp src Heart Rate Resp SpO2 Weight   18 1200 (!) 123/94 - - 57 10 96 % -   18 1100  (!) 149/92 - - 60 12 99 % -   07/24/18 1037 129/85 - - 68 - 97 % -   07/24/18 1000 (!) 151/106 - - 60 12 100 % -   07/24/18 0900 (!) 146/105 - - 58 10 100 % -   07/24/18 0830 - - - - 8 99 % -   07/24/18 0800 (!) 126/104 98.3  F (36.8  C) Oral 59 10 100 % -   07/24/18 0700 (!) 125/97 - - 54 9 100 % -   07/24/18 0612 110/71 - - 61 - - -   07/24/18 0600 124/80 - - 52 12 100 % -   07/24/18 0500 123/77 - - 54 12 100 % -   07/24/18 0400 118/79 97.9  F (36.6  C) Axillary 50 12 100 % 64.2 kg (141 lb 8.6 oz)   07/24/18 0300 122/83 - - 64 12 100 % -   07/24/18 0230 - - - 56 12 100 % -   07/24/18 0200 128/82 - - 53 12 100 % -   07/24/18 0119 - - - - - 100 % -   07/24/18 0100 124/83 - - 52 12 100 % -   07/24/18 0000 139/88 97.6  F (36.4  C) Axillary 52 12 100 % -   07/23/18 2300 (!) 144/94 - - 53 12 100 % -   07/23/18 2200 138/85 - - 72 12 96 % -   07/23/18 2100 (!) 147/93 - - 65 12 100 % -   07/23/18 2000 (!) 148/95 97.6  F (36.4  C) Axillary 64 16 90 % -   07/23/18 1900 123/82 - - 57 12 100 % -   07/23/18 1800 136/86 - - 59 12 100 % -   07/23/18 1700 (!) 139/92 - - 59 12 100 % -   07/23/18 1626 - - - - 8 - -   07/23/18 1600 119/82 98.2  F (36.8  C) Axillary 58 12 100 % -   07/23/18 1500 124/81 - - 60 12 100 % -   07/23/18 1400 130/86 - - 66 12 100 % -   07/23/18 1300 131/78 - - 64 12 100 % -     General Appearance: NAD  Skin: warm, dry, jaundice  Heart: NSR  Lungs: NLB on RA, extubated this AM  Abdomen: soft, non distended, mild ttp. Incision with ecchymosis, scant clear drainage, intact.  : bah  Extremities: well perfused  Neurologic: awake. Tremor absent.    Data:   CMP  Recent Labs  Lab 07/24/18  0446 07/24/18  0438 07/23/18  0324 07/23/18  0323  07/22/18  0354  07/20/18  1741     --   --  141  < > 140  < > 134   POTASSIUM 3.3*  --   --  3.6  < > 4.1  < > 2.5*   CHLORIDE 106  --   --  106  < > 106  < > 102   CO2 25  --   --  24  < > 21  < > 16*   GLC 97  --   --  113*  < > 105*  < > 223*   BUN 38*  --   --  34*   < > 26  < > 34*   CR 1.63*  --   --  1.59*  < > 1.46*  < > 1.68*   GFRESTIMATED 46*  --   --  47*  < > 52*  < > 44*   GFRESTBLACK 56*  --   --  57*  < > 63  < > 54*   JESUS 8.1*  --   --  8.1*  < > 7.9*  < > 8.8   ICAW  --  4.7 4.7  --   --  4.5  < >  --    MAG 2.2  --   --  2.4*  --  1.7  < > 2.2   PHOS 3.4  --   --  4.5  --  4.4  < > 3.6   AMYLASE  --   --   --   --   --  42  --  60   LIPASE  --   --   --   --   --  75  --   --    ALBUMIN 2.5*  --   --  2.6*  --  2.5*  < > 4.0   BILITOTAL 5.4*  --   --  6.5*  --  10.3*  < > 47.5*   ALKPHOS 64  --   --  56  --  43  < > 107   AST 96*  --   --  169*  --  368*  < > 287*   *  --   --  132*  --  142*  < > 137*   < > = values in this interval not displayed.  CBC    Recent Labs  Lab 07/24/18  0929 07/24/18  0446   HGB 7.8* 7.7*   WBC 5.2 4.9   PLT 34* 32*     COAGS  Recent Labs  Lab 07/24/18  0446 07/23/18  0323  07/21/18  1932 07/21/18  1608   INR 1.24* 1.27*  < > 1.69* 1.81*   PTT  --   --   --  39* 49*   < > = values in this interval not displayed.   Urinalysis  Recent Labs   Lab Test  07/13/18   1315  06/18/18   1200   COLOR  Dark Yellow  Yellow   APPEARANCE  Clear  Clear   URINEGLC  Negative  Negative   URINEBILI  Large*  Small*   URINEKETONE  Negative  Negative   SG  1.013  1.008   UBLD  Negative  Negative   URINEPH  6.0  5.0   PROTEIN  10*  Negative   NITRITE  Negative  Negative   LEUKEST  Negative  Negative   RBCU  <1  1   WBCU  None  2   UTPG   --   0.75*     Virology:  Hepatitis C Antibody   Date Value Ref Range Status   07/20/2018 Nonreactive NR^Nonreactive Final     Comment:     Assay performance characteristics have not been established for newborns,   infants, and children

## 2018-07-24 NOTE — PLAN OF CARE
Problem: Patient Care Overview  Goal: Plan of Care/Patient Progress Review  Outcome: Improving    Neuro: Pt alert and follows commands. Pupils 3mm, round, and reactive. Pt c/o incisional pain - PRN Oxycodone given x2.     Cardiac: SR/SB. HR 50s-60s. SBP 110s-140s. MAPs>60.    Respiratory: On CMV settings overnight at 30% FiO2. Lung sounds clear with diminished bases. Scant, clear, thin secretions present. PST at 30% FiO2 since ~0615 - tolerating well.    GI/: Bowel sounds hypoactive. Abdomen soft, nondistended. No bowel movement. Denied nausea. Tube feeding rate increased per order up to 30ml/hr. OG to LCS with 50ml of green/bile output total. Urine output of 75-95ml/hr.    Incisons: Abdominal incision dressing C/D with small amount of unchanged serosanguinous drainage present. Left CAROL with 12ml/hr of serosanguinous output present. Right CAROL with 100-150ml/hr of serosanguinous output present - MD notified, no new orders received.    Lines: PIVx3. Right IJ . LR infusing at 50ml/hr. 1/2NS infusing at 10ml/hr. Remains on insulin gtt at 1.75ml/hr.    Labs: MD notified of hemoglobin and platelet results at 2000, 0000, and 0400 - no new orders received.     Spouse remained at bedside overnight.     Continue to monitor and update MD as needed. Continue plan of care.

## 2018-07-24 NOTE — PROGRESS NOTES
SURGICAL ICU PROGRESS NOTE  2018      CO-MORBIDITIES:   Liver transplanted (H)  (primary encounter diagnosis)  Chronic viral hepatitis B without delta agent and without coma (H)    ASSESSMENT:   Mr. Yaneli Miles is a 43 year old male with a h/o ESLD secondary to chronic hepatitis B with ascites and varices; POD#3 s/p  donor liver transplant complicated by 3.5L estimated intraoperative blood loss leading to activation of the massive transfusion protocol postoperatively. In total he received 4u pRBC, 2u Cryo, 3u plasma, 4 units platelets.  Remains hemodynamically stable.      TODAY'S PROGRESS/PLANS:   - Extubated  - DC fentanyl gtt   - TKO IVF  - Bowel regimen  - Talk to transplant about bah and DVT ppx     PLAN:     Neuro/pain/sedation:  #Postoperative Pain  -Monitor neurological status. Notify the MD for any acute changes in exam.  - Oxycodone Q4H PRN  - Dilaudid for breakthrough pain  Pulmonary care:   - Extubated  - Aggressive pulmonary hygiene      Cardiovascular:  #Hypotension, hypovolemic/hemorrhagic shock    - Monitor hemodynamic status with current MAP goal > 60; permissive hypotension as there is active effluent of blood from bilateral bar drains in the setting of coagulopathy.   - Off pressors    GI care:   # ESLD, cirrhosis  # Chronic Hep B infection  # Esophageal varices  - NPO; tube feeds through NJ at 60cc/hr  - Zofran and Compazine  - US with large 13cm hematoma inferior to new liver     Fluids/Electrolytes/Nutrition:   - Stop mIVF  - PRN fluid bolus to maintain hemodynamic stability  - TFs via NJ at 60cc/hr  - No indication for parenteral nutrition.    Renal/Fluid Balance:    -Will continue to monitor intake and output.  -Bah catheter intact for strict I&Os, discuss removing with transplant        Endocrine:    #Hx DMTII  - Insulin gtt     ID/Antibiotics:  #s/p liver transplant  - s/p 48h zosyn  - Prophylaxis including Bactrim/Valcyte  - Immunosuppressive medications per transplant and  steroid taper     Heme:  #Post operative Blood loss     - EBL 3500ml  - Received intraoperative colloid, cryoprecipitate, pRBC and plasma and cell saver units  - Vitamin K and DDAVP per transplant    Prophylaxis:    - Mechanical prophylaxis for DVT.   - No chemical DVT prophylaxis due to active bleeding.   - PPI ordered for ulcer prophylaxis     Lines/ tubes/ drains:  - 3 PIV  - NJ  - Luis       Disposition:  -  Surgical ICU.       ====================================    SUBJECTIVE:   No acute events overnight, pressure supporting early this AM and subsequently extubated. Walking around unit with only mild incisional pain but no shortness of breath, chest pain, or dizziness.     OBJECTIVE:   1. VITAL SIGNS:   Temp:  [97.6  F (36.4  C)-99.5  F (37.5  C)] 97.9  F (36.6  C)  Heart Rate:  [50-79] 54  Resp:  [8-20] 9  BP: (110-152)/(71-97) 125/97  MAP:  [86 mmHg-109 mmHg] 109 mmHg  Arterial Line BP: (110-137)/(74-92) 137/92  FiO2 (%):  [30 %] 30 %  SpO2:  [90 %-100 %] 100 %  Ventilation Mode: CPAP/PS  (Continuous positive airway pressure with Pressure Support)  FiO2 (%): 30 %  Rate Set (breaths/minute): 12 breaths/min  Tidal Volume Set (mL): 480 mL  PEEP (cm H2O): 5 cmH2O  Pressure Support (cm H2O): 7 cmH2O  Oxygen Concentration (%): 30 %  Resp: 9    2. INTAKE/ OUTPUT:   I/O last 3 completed shifts:  In: 3258.73 [I.V.:2463.73; NG/GT:525]  Out: 4316 [Urine:1756; Emesis/NG output:250; Drains:2310]    3. PHYSICAL EXAMINATION:   General: extubated and comfortable in chair  Neuro:  Communicates appropriately via , no gross neurologic deficits  Resp: Breathing comfortably on room air, mild crackles bilaterally  CV: RRR, minimal LE edema  Abdomen: Soft, appropriately tender, moderately distended; CAROL drains in place with large amount of serosanguinous output  Incisions, clean, intact, appropriate erythema surrounding staple line  Extremities: warm and well perfused, moving all 4 spontaneously.     4. INVESTIGATIONS:    Arterial Blood Gases     Recent Labs  Lab 07/23/18  0324 07/22/18  1120 07/22/18  0754 07/22/18  0354   PH 7.45 7.43 7.42 7.43   PCO2 39 37 39 38   PO2 143* 106* 100 116*   HCO3 27 25 25 25     Complete Blood Count     Recent Labs  Lab 07/24/18  0446 07/24/18  0015 07/23/18  2031 07/23/18  1614   WBC 4.9 5.3 5.1 5.3   HGB 7.7* 7.9* 8.0* 8.2*   PLT 32* 31* 32* 41*     Basic Metabolic Panel    Recent Labs  Lab 07/24/18  0446 07/23/18  0323 07/22/18  1549 07/22/18  0354    141 141 140   POTASSIUM 3.3* 3.6 4.0 4.1   CHLORIDE 106 106 105 106   CO2 25 24 24 21   BUN 38* 34* 31* 26   CR 1.63* 1.59* 1.65* 1.46*   GLC 97 113* 185* 105*     Liver Function Tests    Recent Labs  Lab 07/24/18 0446 07/23/18  0323 07/22/18  1549 07/22/18  0354  07/21/18  1359   AST 96* 169*  --  368*  --  504*   * 132*  --  142*  --  166*   ALKPHOS 64 56  --  43  --  60   BILITOTAL 5.4* 6.5*  --  10.3*  --  7.8*   ALBUMIN 2.5* 2.6*  --  2.5*  --  1.6*   INR 1.24* 1.27* 1.45* 1.63*  < > 1.87*   < > = values in this interval not displayed.  Pancreatic Enzymes    Recent Labs  Lab 07/22/18  0354 07/20/18  1741   LIPASE 75  --    AMYLASE 42 60     Coagulation Profile    Recent Labs  Lab 07/24/18 0446 07/23/18  0323 07/22/18  1549 07/22/18  0354  07/21/18  1932 07/21/18  1608 07/21/18  1359 07/21/18  1226   INR 1.24* 1.27* 1.45* 1.63*  < > 1.69* 1.81* 1.87* 3.52*   PTT  --   --   --   --   --  39* 49* 55* 179*   < > = values in this interval not displayed.  Lactate  Invalid input(s): LACTATE    5. RADIOLOGY:   Recent Results (from the past 24 hour(s))   XR Chest Port 1 View    Narrative    XR CHEST PORT 1 VW, 7/21/2018 2:28 PM.    Comparison: 7/20/2018, 7/12/2018, 6/24/2018.    History: Check ET tube placement - Liver transplant; .    Technique: AP chest radiograph    Findings:   Endotracheal tube tip projects approximately 1.6 cm above the tano.  Right IJ [place] catheter tip projects over the bifurcation of the  main pulmonary  artery. Enteric tube passes below the diaphragm and  courses inferior to the field-of-view. Right IJ central venous  catheter tip projects over the low SVC. Cardiomediastinal silhouette  is within normal limits.  Pulmonary vasculature is mildly indistinct.   Mild bilateral patchy opacities. Bibasilar patchy opacities, likely  atelectasis. No pleural effusion.  No pneumothorax.  No acute  intra-abdominal abnormalities. Surgical clips and radiopacities  project over the upper abdomen.      Impression    Impression:   1. Endotracheal tube tip projects approximately 1.6 cm above the  tano, recommend retracting.  2. Bilateral patchy opacities, likely represents mild pulmonary  vascular congestion.    I have personally reviewed the examination and initial interpretation  and I agree with the findings.    DARRIUS MANTILLA MD   XR Abdomen Port 1 View    Narrative    Abdomen one view    HISTORY: OG placement    COMPARISON STUDY: 7/13/2018    FINDINGS: OG tip projects over the stomach. The sidehole is outside of  the field of view. Surgical changes in the abdomen and right upper  quadrant with drains and staples. Gas-filled colon. Paucity of small  bowel gas. Luis catheter.      Impression    IMPRESSION: OG tube projects over the stomach. Sidehole is outside the  field of view but likely in the stomach.    DARRIUS MANTILLA MD   US Liver Transplant    Narrative    EXAMINATION: US LIVER TRANSPLANT, 7/22/2018 1:20 AM     COMPARISON: None.    HISTORY: Postop liver transplant.    TECHNIQUE:  Gray-scale, color Doppler and spectral flow analysis.    FINDINGS:   There is anechoic ascites, extending into the lower quadrants.    Liver:   The liver demonstrates normal homogeneous echotexture. No  evidence of a focal hepatic mass. However, fluid collection with  low-level internal echoes adjacent to the inferior aspect of the  liver, measuring approximately 13.6 x 7.0 x 8.5 cm. No internal flow  on color Doppler evaluation.    Bile Ducts:  Both the intra- and extrahepatic biliary system are of  normal caliber.  The common bile duct measures 1 mm in diameter.    Gallbladder: The gallbladder is surgically absent.    Kidneys:   Right kidney:  The right kidney demonstrates normal echotexture with  no evidence of a shadowing stone, focal mass or hydronephrosis.   11.6  cm in long axis dimension.  Left kidney:  The left kidney demonstrates normal echotexture with no  evidence of a shadowing stone, focal mass or hydronephrosis.   12.0 cm  in long axis dimension.    Pancreas: Visualized portions of the pancreas are normal in  appearance.      Spleen:  The spleen is enlarged, measuring 15.7 cm.    Visualized portions of the aorta are unremarkable.    LIVER DOPPLER:  Splenic vein:  Patent continuous normal antegrade direction flow  towards the liver, 29 cm/sec.  Extrahepatic portal vein:  Patent continuous antegrade flow, 15  cm/sec.  Portal vein at anastomosis: Patent continuous antegrade flow, 19  cm/sec.  Intrahepatic portal vein:  Patent continuous antegrade flow, 14  cm/sec.  Right portal vein flow is antegrade, measuring 12 cm/sec.  Left portal vein flow is retrograde, measuring 9 cm/sec.    Inferior vena cava: patent with flow toward the heart throughout..  IVC above anastomosis:  90 cm/sec.  IVC at anastomosis:  93 cm/sec.  Intrahepatic IVC:  157 cm/sec.  Extrahepatic IVC:  54 cm/sec.    Right, mid, left hepatic veins: Patent with flow towards the inferior  vena cava.    Extrahepatic hepatic artery: Low resistance waveform with flow towards  the liver. 84 cm/sec with resistive index 0.37.  Right hepatic artery: 87 cm/sec with resistive index 0.47.  Left hepatic artery: 61 cm/sec with resistive index 0.59.      Impression    Impression:   1.  Hematoma associated with the inferior aspect of the perihepatic  space anteriorly, measuring 13.7 x 7.0 x 8.5 cm.  2.  Retrograde flow of the left portal vein. Additionally, low  velocities of the portal venous  system. Single low resistive index of  the extrahepatic aspect of the hepatic artery, measuring 0.37. These  findings are likely within normal limits in the early postoperative  context. However, continued attention on follow-up recommended.      [Result: Large hematoma adjacent to the transplant liver allograft]    Finding was identified on 7/22/2018 1:17 AM.     Dr. Anna Alonso  was contacted by Dr. Pablo at 7/22/2018 1:25 AM  and verbalized understanding of the urgent finding.     I have personally reviewed the examination and initial interpretation  and I agree with the findings.    ZULEMA GRANDA MD       =========================================  Godfrey Velasquez MD  Anesthesiology, PGY3    Patient seen, findings and plan discussed with surgical ICU staff Dr. Feliciano.  - - - - - - - - - - - - - - - - - -  See [employer] for on-call pager information.

## 2018-07-24 NOTE — PLAN OF CARE
Problem: Patient Care Overview  Goal: Plan of Care/Patient Progress Review  Outcome: Improving           Nursing Progress Note    Hmong  used for majority of cares.    D/I/A:  Neuro: Alert to drowsy, appropriate. Calm, cooperative. Following commands. PERRL. Hand /ankle strength strong, equal bilaterally. Fentanyl gtt d/c'ed. Dilaudid and Oxycodone ordered for pain. C/o generalized and incisional pain, adequate pain control.  CV: Sinus rhythm to sinus bradycardia, no ectopy. BP stable, no interventions. Afebrile.   Pulm: LS clear, diminished throughout. CMV/AC Rate 12, TV 480mL, PEEP 5, FiO2 30%. CPAP/PS attempted x3 this shift, apneic ventilation with each attempt.  GI/: NJ placed and verified, TF initiated. NG to LIS with minimal bile, green output this shift. BG checked q1hr, Insulin gtt maintained. BS a&ax4, -BM, +Flatus. Luis UOP adequate, no interventions. Bilateral CAROL to incisional sites with R 50-75cc/hr and L 25-50cc/hr.    Worked with PT/OT today, tolerated well. Up to chair x2 this shift with two assist.    P: Continue to work toward extubation. Continue to monitor and assess. Update POC as needed.    /82  Pulse 78  Temp 97.6  F (36.4  C) (Axillary)  Resp 16  Wt 68.6 kg (151 lb 3.8 oz)  SpO2 100%  BMI 24.41 kg/m2  Davidadeondre Thakursh  July 23, 2018  8:11 PM

## 2018-07-24 NOTE — PROGRESS NOTES
[facility], [place]  Procedure Note          Extubation:       Yaneli Miles  MRN# [id number]   July 24, 2018, 08:25 AM         Patient extubated at: July 24, 2018, 08:25 AM   Supplemental Oxygen: Via nasal cannula at 3 liters per minute   Cough: The cough is good   Secretion Mode: Able to clear   Secretion Amount: Small amount, thin and clear in color   Respiratory Exam:: Breath sounds: equal and clear     Location: all lobes   Skin Exam:: Patient color: natural   Patient Status: Currently appears comfortable   Arterial Blood Gasses: pH Arterial (pH)   Date Value   07/23/2018 7.45     pO2 Arterial (mm Hg)   Date Value   07/23/2018 143 (H)     pCO2 Arterial (mm Hg)   Date Value   07/23/2018 39     Bicarbonate Arterial (mmol/L)   Date Value   07/23/2018 27            Pt extubated per order. Pt had adequate cuff leak. Pt had no significant events.     Recorded by Ama Lu

## 2018-07-24 NOTE — PLAN OF CARE
Problem: Patient Care Overview  Goal: Plan of Care/Patient Progress Review  OT 4AB: Discharge Planner OT   Patient plan for discharge: not directly addressed; has good family support, likely anticipates return to home  Current status: Pt sitting in recliner at arrival, willing to participate in PT. PT STS CGA, amb >400' FWW CGA with dynamic balance challenges. Pt demonstrates instability with dynamic balance challenges, corrected with stumble step and CGA.  Barriers to return to prior living situation: acute medical needs, deconditioning, post surgical precautions, balance  Recommendations for discharge:  ARU with good potential to progress to Home with Assist  Rationale for recommendations: Pt is currently below baseline with regards to mobility and independence. Would currently benefit from continued skilled PT at ARU to address deconditioning and decreased balance/increased level of assistive device. However, is progressing well with therapy and anticipate pt could progress to being safe to return to home by time of discharge.

## 2018-07-24 NOTE — TELEPHONE ENCOUNTER
Organ Offer Encounter Information    Organ Offer Information   Organ offer date & time:  7/20/2018  2:28 PM   Coordinator/Fellow/Attending name:  JOHNNY KIM   Organ(s):   Organ UNOS ID Match Run ID Comment Organ Laterality   Liver [id number] WIDN          Recent infections?:  No    New medications?:  No Recent pregnancy?:  No (Comment: NA)   Angicoagulation medications?:  No Recent vaccinations?:  No   Recent blood transfusions?:  Yes Recent hospitalizations?:  Yes (Comment: Discharged 3 days ago for other possible transplant--low platelets)   Has your insurance changed in the last 6-12 months?:  Neg    Discussed organ offer with:  Patient, Spouse   Patient/Caregiver name:  Luann and Yaneli   Discussed risk category with Patient/Other:  N/A   Understood donor criteria, verbalized understanding   Patient/Other asked to speak to a surgeon?:  No   Discussed program-specific outcomes:  Did not have questions regarding SRTR   Right to decline organ offer without penalty, Patient/Other:  Aware of option to decline without penalty   Organ offer decision status Patient/Other:  Accepted Offer   Organ disposition:  Transplanted   Additional Comments:  7/20/2018 2:56 PM:  Liver: Primary  MD: Clem/Pako  Plan: Called patient and wife via  (ID # [id number]) and informed them of this early 60s DBD. They understood and wish to proceed as long as surgeon thinks liver is acceptable for Yaneli. Informed them we would evaluate in the OR and take only if surgeon thought it would be a good fit for recipient. Plan to admit patient. Requested midnight OR for donor.  Johnny Kim  Transplant Coordinator    7/20/2018 3:51 PM:  Called patient and wife back with [facility]  (ID# [id number]) and informed them to come to the [facility] at 4:45PM on 7A. Provided admission instructions.They understood and will come then.  Johnny Kim  Transplant Coordinator    Admissions: [place] 1449  Unit: 7A [place] 1546--4:45  Admit  Admitting Provider: Particio Liver NP 1556--called back at 1558--informed recip needs final xm drawn  Immunology: Liliana 1558  OR: Tali 1603-- Set for 5AM  Blood Bank: Allyssa 1610  Research: NA- not consented  TransNet/ABO Verification: Done 1622  Add Organ: Done 1557    [employer]: Julienne--Informed her of onsite coordinator name--who was procuring--and OR time set for midnight.   Nicolasa: ([phone])  Procuring Surgeons: William Chase med student  Pickup: 2200 outside [facility]  Tail #: [id number]   Attestation I have discussed all of the above with the Patient/Legal Guardian/Caregiver regarding this organ offer.:  Yes   Coordinator/Fellow/Attending name:  JOHNNY KIM

## 2018-07-24 NOTE — PLAN OF CARE
Problem: Patient Care Overview  Goal: Plan of Care/Patient Progress Review  OT 4AB: Discharge Planner OT   Patient plan for discharge: not directly addressed; has good family support, likely anticipates return to home  Current status: Pt is now extubated, able to wean to RA during session with RN monitoring.  Following commands and responding to questions appropriately. Oriented to place, situation and season.  Min A for bed mobility, CGA-SBA with FWW for functional transfers, g/h at sink and ambulation. Tolerates walking approx 250ft with CGA and FWW, LOB x 1 though able to self correct.  VSS  Barriers to return to prior living situation: acute medical needs, deconditioning, post surgical precautions  Recommendations for discharge: Home with Assist  Rationale for recommendations: Pt is currently below baseline with regards to mobility and independence with self cares; however, is progressing well with therapy and anticipate pt will be safe to return to home by time of discharge.         Entered by: Kim Valiente 07/24/2018 11:15 AM

## 2018-07-25 NOTE — PROGRESS NOTES
07/25/18 1340   General Information   Onset Date 07/20/18   Start of Care Date 07/25/18   Referring Physician Rebecca Haas PA-C   Patient Profile Review/OT: Additional Occupational Profile Info See Profile for full history and prior level of function   Patient/Family Goals Statement Pt's goal is to eat and go home   Swallowing Evaluation Bedside swallow evaluation   Behaviorial Observations Alert   Mode of current nutrition Oral diet;NJ   Type of oral diet Thin liquid  (full liquids)   Respiratory Status Room air   Comments Pt is a 43 year old male with ESLD due to Hep B and ALD complicated with portal hypertension, ascites requiring multiple tapping, esophageal varices, encephalopathy, severe thrombocytopenia, history of DM, who underwent a DBD OLT on 7/21/2018.  Pt successfully extubated 7/24.  Pt seen for swallow evaluation while sitting up in his chair eating lunch.    Clinical Swallow Evaluation   Oral Musculature (general weakness)   Structural Abnormalities none present   Dentition present and adequate   Mucosal Quality dry   Mandibular Strength and Mobility (general weakness)   Oral Labial Strength and Mobility (general weakness)   Lingual Strength and Mobility (general weakness)   Velar Elevation (general weakness)   Buccal Strength and Mobility (general weakness)   Laryngeal Function (soft voicing)   Additional Documentation Yes   Clinical Swallow Eval: Thin Liquid Texture Trial   Mode of Presentation, Thin Liquids cup;self-fed   Volume of Liquid or Food Presented small sips   Oral Phase of Swallow Premature pharyngeal entry   Pharyngeal Phase of Swallow impaired;coughing/choking;reduction in laryngeal movement   Diagnostic Statement Pt with immediate cough s/p 2 small trials of thin liquids. pt w/ delayed swallow response and reduced laryngeal elevation   Clinical Swallow Eval: Nectar Thick Liquid Texture Trial   Mode of Presentation, Nectar cup;self-fed   Volume of Nectar Presented 3 oz   Oral Phase,  Nectar WFL   Pharyngeal Phase, Nectar reduction in laryngeal movement   Diagnostic Statement Pt tolerated nectar thick liquids without outward s/sx of aspiration.    Clinical Swallow Eval: Puree Solid Texture Trial   Mode of Presentation, Puree spoon;self-fed   Volume of Puree Presented small bites of apple sauce   Oral Phase, Puree WFL   Pharyngeal Phase, Puree reduction in laryngeal movement   Diagnostic Statement Tolerated puree solids w/o outward s/sx of aspiration    Clinical Swallow Eval: Semisolid Texture Trial   Mode of Presentation, Semisolid self-fed   Volume of Semisolid Food Presented tan cracker coated with apple sauce   Oral Phase, Semisolid (extra time for mastication and oral transit. )   Pharyngeal Phase, Semisolid reduction in laryngeal movement   Diagnostic Statement Pt needed extra time for mastication and oral transit w/ semisolids but was adequate. No oral residuals remained after trials. No outward s/sx of aspiration   Esophageal Phase of Swallow   Esophageal comments h/o esophageal varices   General Therapy Interventions   Planned Therapy Interventions Dysphagia Treatment   Swallow Eval: Clinical Impressions   Skilled Criteria for Therapy Intervention Skilled criteria met.  Treatment indicated.   Functional Assessment Scale (FAS) 4   Treatment Diagnosis Mild-moderate oropharyngeal dysphagia   Diet texture recommendations Dysphagia diet level 2;Nectar thick liquids   Recommended Feeding/Eating Techniques small sips/bites;no straws;maintain upright posture during/after eating for 30 mins;alternate between small bites and sips of food/liquid   Demonstrates Need for Referral to Another Service dietitian;occupational therapy;physical therapy   Therapy Frequency daily   Predicted Duration of Therapy Intervention (days/wks) 2 weejs   Anticipated Discharge Disposition inpatient rehabilitation facility   Risks and Benefits of Treatment have been explained. Yes   Patient, family and/or staff in  agreement with Plan of Care Yes   Clinical Impression Comments Pt seen for a bedside swallow evaluation s/p liver transplant 7/21.  Pt successfully extubated 7/24.  Pt seen for evaluation while up in his chair w/  present.  Motivated for eval.  Pt presents with mild-moderate oropharyngeal dysphagia marked by reduced laryngeal elevation and mild swallow delay. Pt with soft voicing noted with questionable reduced airway protection s/p extubation.  Pt demonstrated s/sx of aspiration with thin liquids.  Tolerated nectar thick liquids by cup, puree solids and semisolid trials without outward s/sx of aspiration; however, did take extra time for mastication likely due to dry mouth.  At this time recommend dysphagia diet level 2 with nectar thick liquids. Pt should be fully alert and upright for all PO, take small sips/bites, avoid straws.  ST will plan to f/u for diet tolerance and ability to adv diet as tolerated.    Total Evaluation Time   Total Evaluation Time (Minutes) 15

## 2018-07-25 NOTE — PLAN OF CARE
Problem: Patient Care Overview  Goal: Plan of Care/Patient Progress Review  OT 7A: Discharge Planner OT   Patient plan for discharge: home with assist  Current status: Pt c/o incisional pain, otherwise progressing and tolerating activity well.  Continue to educate and reinforce completion of self cares within post surgical precautions.  SBA supine to EOB with vc for use of log roll technique, SBA-CGA for functional transfers.  Instructed pt in LE dressing, verbalizes understanding though declines active attempt this session.  Family willing to assist with all self cares prn. Pt able to ambulate approx 500ft with SBA and FWW to increase tolerance for daily activity.  VSS on RA  Barriers to return to prior living situation: acute medical needs, post surgical precautions, stairs  Recommendations for discharge: home with assist  Rationale for recommendations: Pt progressing activity well.  Decreased pt frequency for OT as family is able and willing to assist with all ADLs prn.  Pt will benefit from continued OT intervention while inpatient to maximize functional independence and reinforce proper techniques for self care completion.       Entered by: Kim Valiente 07/25/2018 3:12 PM

## 2018-07-25 NOTE — PROGRESS NOTES
CLINICAL NUTRITION SERVICES - BRIEF NOTE    Nutrition Prescription    RECOMMENDATIONS FOR MDs/PROVIDERS TO ORDER:  Please DO NOT remove FT until pt can prove that he can meet at least 60% of estimated energy and protein needs (~1100 kcal, 55 g PRO) via calorie counts    Recommendations already ordered by Registered Dietitian (RD):  -Boost shakes BID  -Calorie counts  -Continue currently ordered enteral nutrition regimen     Pt's diet advanced to FLD on 7/25. Pt still receiving TFs, currently running @ 50 mL/hr and advancing towards goal of 60 mL/hr.    Nutrition Progress Note - f/u for progress towards previous nutrition POC (see previous 7/21 reassessment for details)    Halina Crouch, RD, LD  SICU RD Pgr: [phone]

## 2018-07-25 NOTE — PLAN OF CARE
Problem: Patient Care Overview  Goal: Plan of Care/Patient Progress Review  Discharge Planner PT   Patient plan for discharge: not discussed  Current status: Pt with improved functional mobility today, ambulated 400'+ total with CGA - pt able to progress from FWW>unilateral support on UE>no UE support. No overt LOB. Completed standing therex to promote LE strengthening and progress activity tolerance.   Barriers to return to prior living situation: medical needs   Recommendations for discharge: home with A   Rationale for recommendations: Pt is progressing functional mobility, anticipate mobility will be adequate for safe dc home. PT to continue to follow for further progression during IP stay.        Entered by: Raina Chapa 07/25/2018 11:35 AM

## 2018-07-25 NOTE — PLAN OF CARE
Problem: Patient Care Overview  Goal: Plan of Care/Patient Progress Review  Outcome: No Change  [id number]:  VSS, on RA. Appears to be resting comfortably in bed. Voiding adequately, incontinent of large BM. TF increased to goal of 60cc/hr. Diet advanced to DD2, nectar thick. BG elevated, increased to alg. #4 with last check of 205. Med card and lab book started, will need to review with  tomorrow. CAROL's with large output, will replace with PRN albumin order at 1900.

## 2018-07-25 NOTE — PROGRESS NOTES
../87 (BP Location: Left arm)  Pulse 74  Temp 98.2  F (36.8  C)  Resp 18  Wt 61.8 kg (136 lb 3.9 oz)  SpO2 98%  BMI 21.99 kg/m2  Pt transferred from ICU this morning up to 7A rm 08 at 0900. VSS. A&O. Lungs clear. Voiding. CAROL both bulbs have been emptied twice since pt arrived and IV albumin given. IV Phos replacement running with re-check to be 2 hr after finish time before increasing TF rate to goal of 60 ml/hr, lab ordered for 1500. Insulin gtt following Ag 2 with checking hourly. Wife present. No c/o pain at present after receiving pain meds in ICU before transferring up to our unit.  here and team paged. NOTE: This afternoon gave 10 mg oral oxy for pain/incisional discomfort before working with PT. CAROL output has been increased so team ordered albumin replacements for output > 500 ml. Pt was seen by speech therapy and assess swallowing. Pt up in chair for lunch.  Continue with plan of cares and medicate per orders.

## 2018-07-25 NOTE — PLAN OF CARE
Problem: Patient Care Overview  Goal: Plan of Care/Patient Progress Review  Discharge Planner SLP   Patient plan for discharge: unknown  Current status: Pt seen for a bedside swallow evaluation s/p liver transplant 7/21.  Pt successfully extubated 7/24.  Pt seen for evaluation while up in his chair w/  present.  Motivated for eval.  Pt presents with mild-moderate oropharyngeal dysphagia marked by reduced laryngeal elevation and mild swallow delay. Pt with soft voicing noted with questionable reduced airway protection s/p extubation.  Pt demonstrated s/sx of aspiration with thin liquids.  Tolerated nectar thick liquids by cup, puree solids and semisolid trials without outward s/sx of aspiration; however, did take extra time for mastication likely due to dry mouth.  At this time recommend dysphagia diet level 2 with nectar thick liquids. Pt should be fully alert and upright for all PO, take small sips/bites, avoid straws.  ST will plan to f/u for diet tolerance and ability to adv diet as tolerated.   Barriers to return to prior living situation: overall weakness  Recommendations for discharge: inpt rehab  Rationale for recommendations: Pt would benefit from ongoing swallow tx at discharge if dysphagia continues at that time.        Entered by: Alyssa Quintanilla 07/25/2018 1:43 PM

## 2018-07-25 NOTE — PLAN OF CARE
Problem: Patient Care Overview  Goal: Plan of Care/Patient Progress Review  Outcome: Improving    Neuro: Pt A&Ox4. Pt c/o abdominal incision pain - PRN Oxycodone given x1.     Cardiac: SR/SB. HR 50s-70s. SBP 120s-140s. MAPs >60.     Respiratory: On RA. Lung sounds clear with diminished bases.     GI/: Abdomen soft, distended with active bowel sounds present. Small, loose, brown stool overnight. Adequate urine output post bah removal. Tolerating tube feedings which were increased per order until AM labs resulted.    Incisons: Abdominal incision dry and approximated with staples present. Right CAROL continues to put out 100-150ml/hr of serosanguinous drainage. Left CAROL with 40-50ml/hr of serosanguinous drainage present; MD updated - orders received for 5% Albumin 25g once.    Lines: PIVx2. R IJ . Remains on Insulin gtt.     Labs: 0400 phosphate 1.6; phosphate replacement ordered. Tube feeding rate not increased at 0600 until phosphate level replaced and rechecked.     Spouse remained at bedside overnight.  Continue to monitor and update MD as needed. Continue plan of care.

## 2018-07-25 NOTE — PLAN OF CARE
Problem: Patient Care Overview  Goal: Plan of Care/Patient Progress Review  Outcome: Improving         Nursing Progress Note    D/I/A:  Neuro: A&Ox4, d/o to time upon initial assessment but has improvement. PERRL. Strong throughout.  CV: Sinus rhythm, sinus bradycardia. BP stable, no interventions. Afebrile.  Pulm: Extubated at 0830. Currently on RA. LS clear, diminished at bases.  GI/: TF infusing, tolerating well. +Flatus, -BM, BS a&ax4. Abd distended, tender. CAROL with large output; CDI. Insulin gtt maintained, currently requiring 4 Units/hr. Luis removed, +Void at 1900, continue to monitor.    Up in hallway x2. Up in chair x2.     P: Transfer as able out of SICU, orders initiated. Continue to monitor and assess. Update POC as needed.    BP (!) 143/99  Pulse 74  Temp 98.1  F (36.7  C) (Oral)  Resp 14  Wt 64.2 kg (141 lb 8.6 oz)  SpO2 95%  BMI 22.84 kg/m2  Tevin Granado  July 24, 2018  7:44 PM

## 2018-07-25 NOTE — PROGRESS NOTES
SICU brief note    Patient chart checked, nursing concerns addressed, and seen without concern for acute critical care needs. Patient is floor status. Will continue to follow while in ICU.    Chris Cerna MD  PGY-2 Surgery  pager [phone]

## 2018-07-25 NOTE — PROGRESS NOTES
Immunosuppression Note:    Yaneli Miles is a 43 year old male who is seen today  for immunosuppression management     IMamadou MD, I have examined the patient with the resident/PA/Fellow, discussed and agree with the note and findings.  I have reviewed today's vital signs, medications, labs and imaging. I reviewed the immunosuppression medications and levels. 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      POD 1 US liver: Impression:   1.  Hematoma associated with the inferior aspect of the perihepatic  space anteriorly, measuring 13.7 x 7.0 x 8.5 cm.  2.  Retrograde flow of the left portal vein. Additionally, low  velocities of the portal venous system. Single low resistive index of  the extrahepatic aspect of the hepatic artery, measuring 0.37. These  findings are likely within normal limits in the early postoperative  context. However, continued attention on follow-up recommended.    Graft function: POD 4. Good initial function. TB/alk phos/ALT/AST decreased. Start ASA 81 mg for thrombosis ppx. Further anticoagulation and imaging per staff   Immunosuppression management:   Simulect intra-op due to BEN  Steroid taper per protocol. Pred 25 today  MMF 1gm BID  Tac 2 mg BID. Level in process, will be ~ 11 hr trough.  Complexity of management:Medium. Contributing factors: thrombocytopenia and anemia  Hematology: Hemoglobin stable, 8 (8.6). PLT 29. INR 1.28. DDAVP x 1 due to BEN and vit K 10 mg IV x 3 doses due to post op bleeding. Hold start of DVT ppx due to thrombocytopenia   Cardiorespiratory: Stable on RA. HDS.  Pulmonary toilet, OOB to chair as much as possible, ambulate.  GI/Nutrition: FT placed. TF titrate to goal 60 ml/hr. SARAH, advance to FLD today. Consider change senna/colace PRN if continues to have loose stools.   Endocrine: Insulin drip. Transition to subcutaneous insulin once TF at goal   Fluid/Electrolytes: TKO IV.  Replace phos. High CAROL output. Received albumin 25g o/n. Will give albumin 12.5g also this AM due to CAROL output 3.2L yesterday. Albumin 2.8.   : Luis removed  Infectious disease: afebrile, WBC 3.5  HBV: Hepatits B DNA PCR positive prior to tx. Received HBIG on  and . Hep B surface antigen negative. No further HBIG planned. Continue tenofovir 300 mg daily  PPX: Zosyn ppx, completed. Valcyte x 12 weeks (CMVand EBV IgG +), bactrim x 6 months and clotrimazole colleen x 12 weeks    Prophylaxis: DVT PCDs, fall, GI, fungal  Disposition: Transfer to floor    Coordinator: Kay Reyes       Medical Decision Making: High  Admit 72561 (straight-forward/low level decision making), Subsequent visit 96982 (high level decision making) and Consult [id number] high complexity, 110 minutes    JAY/Fellow/Resident Provider: GINGER Segovia [id number]     Faculty: Mamadou Norwood M.D.      __________________________________________________________________  Transplant History: Admitted 2018 for  donor liver transplant.   The patient has a history of liver failure due to hepatitis B .    2018 (Liver), Postoperative day: 4     Interval History: History is obtained from the patient's nurse, due to patient being intubated.   No events o/n. Pain controlled. Denies nausea. +BMs, loose. Denies SOB.     ROS:   A 10-point review of systems was negative except as noted above.    Curent Meds:    albumin human  12.5 g Intravenous Once     aspirin  81 mg Oral Daily     basiliximab (SIMULECT) infusion  20 mg Intravenous Once     multivitamins with minerals  15 mL Per Feeding Tube Daily     mycophenolate  1,000 mg Oral  BID IS    Or     mycophenolate  1,000 mg Oral or NG Tube BID IS     pantoprazole  40 mg Oral BID AC     phytonadione  10 mg Intravenous Daily     predniSONE  25 mg Oral Once    Followed by     [START ON 7/26/2018] predniSONE  10 mg Oral Once     senna-docusate  1 tablet Oral BID    Or     senna-docusate  2 tablet Oral BID     sodium chloride (PF)  3 mL Intravenous Q8H     sulfamethoxazole-trimethoprim  10 mL Oral Daily     tacrolimus  2 mg Oral or NG Tube BID IS     tenofovir  300 mg Per Feeding Tube Daily     valGANciclovir  450 mg Oral Daily    Or     valGANciclovir  450 mg Oral or NG Tube Daily       Physical Exam:     Admit Weight: 60.7 kg (133 lb 12.8 oz)    Current Vitals:   /87 (BP Location: Left arm)  Pulse 74  Temp 98.2  F (36.8  C)  Resp 18  Wt 61.8 kg (136 lb 3.9 oz)  SpO2 98%  BMI 21.99 kg/m2      Vital sign ranges:    Temp:  [97.9  F (36.6  C)-98.6  F (37  C)] 98.2  F (36.8  C)  Pulse:  [74] 74  Heart Rate:  [55-75] 69  Resp:  [9-19] 18  BP: (123-151)/() 123/87  SpO2:  [94 %-100 %] 98 %  Patient Vitals for the past 24 hrs:   BP Temp Temp src Pulse Heart Rate Resp SpO2 Weight   07/25/18 0900 123/87 98.2  F (36.8  C) - - 69 18 98 % -   07/25/18 0700 134/80 - - - 65 13 97 % -   07/25/18 0600 128/81 - - - 71 18 96 % -   07/25/18 0500 124/80 - - - 62 12 97 % -   07/25/18 0400 127/83 98.6  F (37  C) Oral - 75 19 94 % -   07/25/18 0300 141/89 - - - 59 14 95 % -   07/25/18 0200 145/85 - - - 63 12 97 % 61.8 kg (136 lb 3.9 oz)   07/25/18 0100 132/88 - - - 67 13 96 % -   07/25/18 0000 138/84 98.6  F (37  C) Oral - 71 13 94 % -   07/24/18 2300 132/81 - - - 67 11 94 % -   07/24/18 2200 126/86 - - - 68 12 95 % -   07/24/18 2100 140/90 - - - 71 15 97 % -   07/24/18 2000 (!) 139/96 98.1  F (36.7  C) Oral - 64 12 97 % -   07/24/18 1900 (!) 143/99 - - - 73 14 95 % -   07/24/18 1800 (!) 148/93 - - - 65 14 96 % -   07/24/18 1700 (!) 141/92 - - - 58 10 98 % -   07/24/18 1600 138/85 98.2  F (36.8  C)  Axillary - 60 14 98 % -   07/24/18 1500 (!) 148/93 - - - 59 12 99 % -   07/24/18 1400 (!) 143/91 - - - 59 9 98 % -   07/24/18 1309 (!) 141/96 - - 74 56 - 98 % -   07/24/18 1300 144/90 - - - 55 11 98 % -   07/24/18 1200 (!) 123/94 97.9  F (36.6  C) Axillary - 57 10 96 % -   07/24/18 1100 (!) 149/92 - - - 60 12 99 % -   07/24/18 1037 129/85 - - - 68 - 97 % -   07/24/18 1000 (!) 151/106 - - - 60 12 100 % -     General Appearance: NAD    Skin: warm, dry  Heart: RRR, well perfused  Lungs: BCTA. NLB on RA  Abdomen: soft, non distended, mild ttp generalized. Incision with ecchymosis, scant clear drainage, intact.  : no bah  Extremities: well perfused  Neurologic: A&O. Tremor absent.  CVC    Data:   CMP  Recent Labs  Lab 07/25/18  0428 07/24/18  1315 07/24/18  0446 07/24/18  0438 07/23/18  0324  07/22/18  0354  07/20/18  1741     --  142  --   --   < > 140  < > 134   POTASSIUM 3.5 4.1 3.3*  --   --   < > 4.1  < > 2.5*   CHLORIDE 104  --  106  --   --   < > 106  < > 102   CO2 25  --  25  --   --   < > 21  < > 16*   *  --  97  --   --   < > 105*  < > 223*   BUN 31*  --  38*  --   --   < > 26  < > 34*   CR 1.39*  --  1.63*  --   --   < > 1.46*  < > 1.68*   GFRESTIMATED 55*  --  46*  --   --   < > 52*  < > 44*   GFRESTBLACK 67  --  56*  --   --   < > 63  < > 54*   JESUS 7.8*  --  8.1*  --   --   < > 7.9*  < > 8.8   ICAW  --   --   --  4.7 4.7  --  4.5  < >  --    MAG 1.8  --  2.2  --   --   < > 1.7  < > 2.2   PHOS 1.6*  --  3.4  --   --   < > 4.4  < > 3.6   AMYLASE  --   --   --   --   --   --  42  --  60   LIPASE  --   --   --   --   --   --  75  --   --    ALBUMIN 2.8*  --  2.5*  --   --   < > 2.5*  < > 4.0   BILITOTAL 4.5*  --  5.4*  --   --   < > 10.3*  < > 47.5*   ALKPHOS 79  --  64  --   --   < > 43  < > 107   AST 49*  --  96*  --   --   < > 368*  < > 287*   ALT 78*  --  106*  --   --   < > 142*  < > 137*   < > = values in this interval not displayed.  CBC    Recent Labs  Lab 07/25/18  0428 07/24/18  0638    HGB 8.0* 8.6*   WBC 3.5* 6.1   PLT 29* 33*     COAGS  Recent Labs  Lab 07/25/18  0428 07/24/18  0446  07/21/18  1932 07/21/18  1608   INR 1.28* 1.24*  < > 1.69* 1.81*   PTT  --   --   --  39* 49*   < > = values in this interval not displayed.   Urinalysis  Recent Labs   Lab Test  07/13/18   1315  06/18/18   1200   COLOR  Dark Yellow  Yellow   APPEARANCE  Clear  Clear   URINEGLC  Negative  Negative   URINEBILI  Large*  Small*   URINEKETONE  Negative  Negative   SG  1.013  1.008   UBLD  Negative  Negative   URINEPH  6.0  5.0   PROTEIN  10*  Negative   NITRITE  Negative  Negative   LEUKEST  Negative  Negative   RBCU  <1  1   WBCU  None  2   UTPG   --   0.75*     Virology:  Hepatitis C Antibody   Date Value Ref Range Status   07/20/2018 Nonreactive NR^Nonreactive Final     Comment:     Assay performance characteristics have not been established for newborns,   infants, and children

## 2018-07-26 NOTE — PROGRESS NOTES
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  I: Per chart review, pt has had [facility] for home PT--is currently closed. I called [facility] [phone]  [facility] to check coverage for enteral and pt has [employer] and will have 100% coverage for therse as long as it goes thru a tube--I have updated kevin Gonzales([facility] will find a HHN agency for RN) and Rebecca.  I called Alyssa in [facility] and he is scheduled for class on 7.27.18 @ 1:45pm with Braulio Toledo for teach.  A: plan discharge in 2-3 days with enteral feeds  P: [facility] for enteral supplies and HHN. Will follow. OPCC: Kay Reyes. I did not talk with pt/wife due to time and no , I have updated charge nurse Alyssa.

## 2018-07-26 NOTE — PLAN OF CARE
Problem: Patient Care Overview  Goal: Plan of Care/Patient Progress Review  PT 7A: Cancel - Pt no longer available at set PT time. Discussed with  services and confirmed appointment for 830am on 7/27. Please keep time for PT only if you would like pt to receive services.

## 2018-07-26 NOTE — PLAN OF CARE
Problem: Patient Care Overview  Goal: Plan of Care/Patient Progress Review  Outcome: No Change  Tmax 100.1(o), OVSS on RA. BG range: 127-167, insulin gtt titrated per algorithm 4.  Abdominal pain decreased with prn Oxycodone x1.  No c/o's nausea.  Tube feeds at 60cc/hour via NJT. Pt. incontinent of urine & stool x2.  Right & left JPs with large serosang. output with right CAROL much more than left (see I/O flowsheet). Albumin replacement given x2 per order. Pt. on & off between cares.  Wife at bedside.   ordered for 10am & 11am.  Continue to follow POC & notify team with changes.

## 2018-07-26 NOTE — PLAN OF CARE
Problem: Patient Care Overview  Goal: Plan of Care/Patient Progress Review  Discharge Planner SLP   Patient plan for discharge: Did not discuss  Current status: Pt demonstrates improved swallowing function. Single episode of throat clearing noted on trials of thin liquid. Recommend advancing pt to Dysphagia diet level 3 with thin liquids. Sit pt upright for po intake. Encourage small bites/sips and slow rate. SLP to follow.   Barriers to return to prior living situation: decreased P.O. Intake.   Recommendations for discharge: Home with assist  Rationale for recommendations: anticipate pt will achieve SLP goals prior to discharge.        Entered by: Dorie Corley 07/26/2018 10:37 AM

## 2018-07-26 NOTE — DISCHARGE INSTRUCTIONS
________________________________________________________  Discharge RN please fax discharge orders to home care agency: [facility]  --they need signed discharge orders by 12 noon on the day of discharge  ---page kevin Gonzales @ [phone] Monday-Friday  ---weekends call office [phone]________________________________________________________

## 2018-07-26 NOTE — CONSULTS
Inpatient Diabetes note  Received consult, unable to have Hmong  today  Continue on IV insulin for today, full consult tomorrow when   Catalina Valadez, CNP  Pager [phone]

## 2018-07-26 NOTE — PLAN OF CARE
Problem: Patient Care Overview  Goal: Plan of Care/Patient Progress Review  Outcome: Improving  ../84  Pulse 80  Temp 98.3  F (36.8  C)  Resp 16  Wt 63.3 kg (139 lb 8 oz)  SpO2 98%  BMI 22.52 kg/m2  With the use of an  pt stated he is feeling well and that his pain med Oxy does help decrease incisional discomfort/ Oxy given twice on day shift. Am meds given.Insulin gtt remains on Ag#4 with insulin gtt needs ranging from 4-10 units of IV insulin/hr. Pt blood glucose checks every hr but tends to increase levels with meals. TF at goal 60 ml/hr. Dc's 1 peripheral IV in rt arm. Both CAROL drains had site care done and new drsg applied. CAROL output is still high but the team dc'd albumin treatments that were prn. Wife here and is very supportive. Speech here and dietary here to order meals. Pt had a large incontinent bowel movement in his bed today, all linen changed and new depends in place. Continue with plan of cares and medicate per orders.

## 2018-07-26 NOTE — PROGRESS NOTES
Immunosuppression Note:    Yaneli Miles is a 43 year old male who is seen today  for immunosuppression management     IMamadou MD, I have examined the patient with the resident/PA/Fellow, discussed and agree with the note and findings.  I have reviewed today's vital signs, medications, labs and imaging. I reviewed the immunosuppression medications and levels. 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      POD 1 US liver: Impression:   1.  Hematoma associated with the inferior aspect of the perihepatic  space anteriorly, measuring 13.7 x 7.0 x 8.5 cm.  2.  Retrograde flow of the left portal vein. Additionally, low  velocities of the portal venous system. Single low resistive index of  the extrahepatic aspect of the hepatic artery, measuring 0.37. These  findings are likely within normal limits in the early postoperative  context. However, continued attention on follow-up recommended.    Graft function: POD 5. Good initial function. TB/alk phos/ALT/AST decreased. Started ASA 81 mg for thrombosis ppx. Further anticoagulation and imaging per staff   Immunosuppression management:   Simulect intra-op due to BEN  Steroid taper per protocol. Pred 25 today  MMF 1gm BID  Tac 2 mg BID, change to PO. Last level < 3, dose increased. Level in process.  Complexity of management:Medium. Contributing factors: thrombocytopenia and anemia  Hematology: Hemoglobin stable, 8.6 (8). PLT 31. WBC 2.9. INR 1.28. Monitor  DDAVP x 1 due to BEN and vit K 10 mg IV x 3 doses due to post op bleeding.    Cardiorespiratory: Stable on RA. HDS.  Pulmonary toilet, OOB to chair as much as possible, ambulate.  GI/Nutrition: FT placed. TF at goal, 60 ml/hr. DD3 with thin liquids. Calorie counts. Change senna/colace PRN due to loose stools.   Endocrine: Insulin drip. Transition to subcutaneous insulin. DM2 with high insulin requirements with TF. Will consult endocrine for recommendations.   Fluid/Electrolytes:  Replace phos. High CAROL output. Albumin 25g IV x 1 this AM. Alb 3.8, will stop albumin replacement. Bumex 1 mg daily due to high CAROL output. Monitor for dehydraiton.   : Luis removed  Infectious disease: afebrile, WBC 3.5  HBV: Hepatits B DNA PCR positive prior to tx. Received HBIG on  and . Hep B surface antigen negative. No further HBIG planned. Continue tenofovir 300 mg daily  PPX: Zosyn ppx, completed. Valcyte x 12 weeks (CMVand EBV IgG +), bactrim x 6 months and clotrimazole colleen x 12 weeks    Prophylaxis: DVT PCDs, fall, GI, fungal  Disposition: 7A. Discussed with patient, wife with  plan to start transplant education today. PT/OT recommending discharge to home with assist    Coordinator: Kay Reyes       Medical Decision Making: Medium  Subsequent visit 16974 (moderate level decision making)      JAY/Fellow/Resident Provider: GINGER Segovia 5079     Faculty: Mamadou Norwood M.D.      __________________________________________________________________  Transplant History: Admitted 2018 for  donor liver transplant.   The patient has a history of liver failure due to hepatitis B .    2018 (Liver), Postoperative day: 5     Interval History: History is obtained from the patient's nurse, due to patient being intubated.   Doing well. Pain controlled with oxycodone. Denies nausea. Tolerating diet. Loose stools. Ambulating well.    ROS:   A 10-point review of systems was negative except as noted above.    Curent Meds:    aspirin  81 mg Oral Daily     bumetanide  1 mg Oral Daily     clotrimazole  1 Colleen Buccal 4x  Daily     multivitamins with minerals  15 mL Per Feeding Tube Daily     mycophenolate  1,000 mg Oral BID IS    Or     mycophenolate  1,000 mg Oral or NG Tube BID IS     pantoprazole  40 mg Oral BID AC     sodium chloride (PF)  3 mL Intravenous Q8H     sulfamethoxazole-trimethoprim  10 mL Oral Daily     tacrolimus  2 mg Oral BID IS     tenofovir  300 mg Per Feeding Tube Daily     valGANciclovir  450 mg Oral Daily    Or     valGANciclovir  450 mg Oral or NG Tube Daily       Physical Exam:     Admit Weight: 60.7 kg (133 lb 12.8 oz)    Current Vitals:   /84  Pulse 80  Temp 98.3  F (36.8  C)  Resp 16  Wt 63.3 kg (139 lb 8 oz)  SpO2 98%  BMI 22.52 kg/m2      Vital sign ranges:    Temp:  [97.9  F (36.6  C)-100.1  F (37.8  C)] 98.3  F (36.8  C)  Pulse:  [71-80] 80  Heart Rate:  [76-80] 80  Resp:  [16] 16  BP: (118-134)/(78-89) 128/84  SpO2:  [98 %-100 %] 98 %  Patient Vitals for the past 24 hrs:   BP Temp Temp src Pulse Heart Rate Resp SpO2 Height Weight   07/26/18 0749 128/84 98.3  F (36.8  C) - 80 - - 98 % - -   07/26/18 0503 - 98.7  F (37.1  C) Oral - - - - - -   07/26/18 0306 128/78 100.1  F (37.8  C) Oral 78 - 16 98 % - -   07/25/18 2313 124/78 98.8  F (37.1  C) Oral - 80 16 99 % - -   07/25/18 2102 124/79 98  F (36.7  C) Oral - 76 16 98 % - -   07/25/18 1600 134/89 98.1  F (36.7  C) Oral 71 - - 100 % - -   07/25/18 1200 118/79 97.9  F (36.6  C) Oral 78 78 16 98 % - -   07/25/18 1100 - - - - - - - - 63.3 kg (139 lb 8 oz)     General Appearance: NAD    Skin: warm, dry  Heart: RRR, well perfused  Lungs: BCTA. NLB on RA  Abdomen: soft, non distended, mild ttp generalized, lower quadrant. Incision with ecchymosis, dry, no signs of infection. CAROL x 2 serosang output, large amount  : no bah  Extremities: well perfused  Neurologic: A&O. Tremor absent.  CVC    Data:   CMP  Recent Labs  Lab 07/26/18  0819 07/25/18  1622 07/25/18  0428 07/24/18  1315 07/24/18  0446 07/24/18  0438 07/23/18  0324  07/22/18  0354   07/20/18  1741   NA  --   --  139  --  142  --   --   < > 140  < > 134   POTASSIUM  --   --  3.5 4.1 3.3*  --   --   < > 4.1  < > 2.5*   CHLORIDE  --   --  104  --  106  --   --   < > 106  < > 102   CO2  --   --  25  --  25  --   --   < > 21  < > 16*   GLC  --   --  147*  --  97  --   --   < > 105*  < > 223*   BUN  --   --  31*  --  38*  --   --   < > 26  < > 34*   CR  --   --  1.39*  --  1.63*  --   --   < > 1.46*  < > 1.68*   GFRESTIMATED  --   --  55*  --  46*  --   --   < > 52*  < > 44*   GFRESTBLACK  --   --  67  --  56*  --   --   < > 63  < > 54*   JESUS  --   --  7.8*  --  8.1*  --   --   < > 7.9*  < > 8.8   ICAW  --   --   --   --   --  4.7 4.7  --  4.5  < >  --    MAG 1.9  --  1.8  --  2.2  --   --   < > 1.7  < > 2.2   PHOS 1.4* 2.3* 1.6*  --  3.4  --   --   < > 4.4  < > 3.6   AMYLASE  --   --   --   --   --   --   --   --  42  --  60   LIPASE  --   --   --   --   --   --   --   --  75  --   --    ALBUMIN 3.8  --  2.8*  --  2.5*  --   --   < > 2.5*  < > 4.0   BILITOTAL 3.6*  --  4.5*  --  5.4*  --   --   < > 10.3*  < > 47.5*   ALKPHOS 83  --  79  --  64  --   --   < > 43  < > 107   AST 28  --  49*  --  96*  --   --   < > 368*  < > 287*   ALT 49  --  78*  --  106*  --   --   < > 142*  < > 137*   < > = values in this interval not displayed.  CBC    Recent Labs  Lab 07/26/18  0819 07/25/18  0428   HGB 8.6* 8.0*   WBC 2.9* 3.5*   PLT 31* 29*     COAGS  Recent Labs  Lab 07/25/18  0428 07/24/18  0446  07/21/18  1932 07/21/18  1608   INR 1.28* 1.24*  < > 1.69* 1.81*   PTT  --   --   --  39* 49*   < > = values in this interval not displayed.   Urinalysis  Recent Labs   Lab Test  07/13/18   1315  06/18/18   1200   COLOR  Dark Yellow  Yellow   APPEARANCE  Clear  Clear   URINEGLC  Negative  Negative   URINEBILI  Large*  Small*   URINEKETONE  Negative  Negative   SG  1.013  1.008   UBLD  Negative  Negative   URINEPH  6.0  5.0   PROTEIN  10*  Negative   NITRITE  Negative  Negative   LEUKEST  Negative  Negative   RBCU  <1   1   WBCU  None  2   UTPG   --   0.75*     Virology:  Hepatitis C Antibody   Date Value Ref Range Status   07/20/2018 Nonreactive NR^Nonreactive Final     Comment:     Assay performance characteristics have not been established for newborns,   infants, and children

## 2018-07-27 NOTE — PLAN OF CARE
Problem: Patient Care Overview  Goal: Plan of Care/Patient Progress Review  /63 (BP Location: Left arm)  Pulse 78  Temp 97.9  F (36.6  C) (Oral)  Resp 16  Wt 60.9 kg (134 lb 3.2 oz)  SpO2 98%  BMI 21.66 kg/m2     Tmax 100.8, AOVSS on RA. Temp resolved on own, most recent 97.9. C/o abdominal pain, given PRN oxycodone 10 mg x 2, PRN dilaudid 0.3 mg x 1 with relief. Denies nausea, SOB & chest pain. BGs 129-151; most recent  on Alg 4. TF reduced to 50 ml/hr per order. Abd incision cleansed. CAROL dressings replaced, C/D/I. CAROL serosanguineous output L 275 mL, R 270 mL. Fair appetite.  mL. Incontinent of stool x 2. Blood cultures taken. Phos replaced this AM, recheck 2.8. Next recheck due tomorrow AM. Ambulated in farrar with PT. OT unable to work w pt due to difficulty with . Met with liver tx coordinator. PLC appointment re TF @ 1816. Follow-up with PLC on Sunday at 1000. Pt still needs UAUC & chest XR. Specialty pharm consult on Monday. Continue to monitor and w POC.

## 2018-07-27 NOTE — PROGRESS NOTES
CLINICAL NUTRITION SERVICES - DISCHARGE NOTE    Pt was followed by this service while inpatient. See previous nutrition notes for all recs. Diet discharge instructions in chart. Patient s discharge needs assessed and discharge planning has been conducted with the multidisciplinary transplant care team including physicians, pharmacy, social work and transplant coordinator.    Follow up/Monitoring:  Once discharged, place outpatient nutrition consult via the transplant team if nutrition concerns arise.        Sera Gonzalez MS/JESSICA/TENA/CNSC  7A RD Pager: [phone]

## 2018-07-27 NOTE — PLAN OF CARE
Problem: Patient Care Overview  Goal: Plan of Care/Patient Progress Review    Tmax 100.8, all other vital signs stable. PRN oxycodone given x2 and PRN IV Dilaudid given x1 for incisional pain. Poor appetite on dysphagia 3 diet. Continuous tube feeds running at 60 mL/hr. Phos 1.4, replaced with 20 mmol IV, recheck ordered for 0200. Blood sugars ranged from 142-197 on insulin drip using algorithm #4. Up to bathroom with assist of one. Adequate urine output, 1 large BM this shift. 425 mL serosanguinous output via left CAROL, 600 mL sliding scale output via right CAROL. Wife at bedside supportive of patient.

## 2018-07-27 NOTE — PLAN OF CARE
Problem: Patient Care Overview  Goal: Plan of Care/Patient Progress Review  OT 7A: Cancel. Multiple attempts to coordinate with  services using multiple modalities, unable to. Reschedule.

## 2018-07-27 NOTE — PROGRESS NOTES
Organ specific education completed, and discharge planning has been conducted with multidisciplinary transplant care team including physicians, pharmacy, nutrition, and social work.     Met with liver JAY to discuss Yaneli.  He developed small fever last night but is close to being able to go home.  Discussed pertinent health issues related to liver transplant and discharge planning.  Upon discharge, pt is of the understanding that he will be going home.  He knows very little about liver transplant as he did not attend any pre transplant classes because of his acute liver failure.    Met w/ ing and his wife with the assistance of an .  I explained the role of the post liver transplant coordinator and support team. Neither he nor his wife know the names of any of their doctors.  I asked them who set up medications for them pre transplant, they explained that they had a nurse that did all of this.  I did stress with them that I want them to learn how to set up meds and learn about liver transplant care.  I explained that the hope long term will be that they don't have a nurse but will instead be able to administer meds independently, come for appt's.  Neither Yaneli nor his wife drive.  They depend on medical transportation.  They have 2 boys that drive but state that the boys are not very helpful. I asked them who they call when Yaneli is sick, they told me 911.  I told them that they should have a primary care doctor and that if Yaneli develops temp >101 he needs to call our office, if after hours, ask for the transplant nurse on call.     Very briefly discussed the following topics:  1.) immunosuppression and the importance of taking regularly as directed.  Introduced prograf and cellcept to them so that they might start to be familiar w/ the names of immunosuppression.  2.) pertinent labs and their interpretation  3.) rejection signs / symptoms and treatment and 4.) Importance of long term follow-up with the  transplant center and primary care physician.  I gave Yaneli a lab book explained that we will teach him about recording results and which labs indicate liver function. Education in process.  (See inpatient education teaching flowsheet).  Yaneli and his wife seemed very overwhelmed by all of this information.  I spoke to the charge nurse on 7A and asked that if possible he have reinforcement of teaching over the weekend.  I explained that they will have [facility] / ATC visit the monday after discharge from hospital.  They are unfamiliar w/ our campus and do not know where to go for appts.   Discharge education will be reinforced again next week if the patient is still in the hospital and at subsequent clinic visits. I mentioned that he needs a follow-up appointment with pre transplant primary care within 1-2 weeks of returning home.  Told Yaneli that he will need follow-up with the transplant team for the rest of his / her life.  Initial follow-up will be with the transplant surgeon, then move to pre-transplant hepatologist. Explained that he will need lab testing, initially every Monday and Thursday to monitor liver function on a regular basis for the rest of his / her life.   The [facility] seemed very overwhelmed by the information I offered to them.  I will try to stop back in again next week to review teaching w/ them and see them at subsequent appt's.

## 2018-07-27 NOTE — PROGRESS NOTES
Immunosuppression Note:    Yaneli Miles is a 43 year old male who is seen today  for immunosuppression management     IMamadou MD, I have examined the patient with the resident/PA/Fellow, discussed and agree with the note and findings.  I have reviewed today's vital signs, medications, labs and imaging. I reviewed the immunosuppression medications and levels. 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      POD 1 US liver: Impression:   1.  Hematoma associated with the inferior aspect of the perihepatic  space anteriorly, measuring 13.7 x 7.0 x 8.5 cm.  2.  Retrograde flow of the left portal vein. Additionally, low  velocities of the portal venous system. Single low resistive index of  the extrahepatic aspect of the hepatic artery, measuring 0.37. These  findings are likely within normal limits in the early postoperative  context. However, continued attention on follow-up recommended.    Graft function: POD 6. Good initial function. TB/alk phos/ALT/AST decreased. Tbili 3.5. Started ASA 81 mg for thrombosis ppx.   Immunosuppression management:   Simulect intra-op due to BEN  Steroid taper per protocol.   MMF 1gm BID  Tac 2.5 mg BID. 7/27 level 8.7 (11.2 hour trough).   Complexity of management:Medium. Contributing factors: thrombocytopenia and anemia  Hematology: Hemoglobin stable, 9.2. PLT 34. WBC 2.9->6.6. INR 1.3. Monitor  DDAVP x 1 due to BEN and vit K 10 mg IV x 3 doses due to post op bleeding.    Cardiorespiratory: Stable on RA. HDS. Pulmonary toilet, OOB to chair as much as possible, ambulate.  GI/Nutrition:  FT placed. TF at goal, 60 ml/hr. DD3 with thin liquids. Calorie counts, 1307 kcal yesterday. Will plan to initiate cycled tube feeds this evening. Change senna/colace PRN due to loose stools.   Endocrine: Insulin drip. Transition to subcutaneous insulin tomorrow after tube feeds are transitioned. DM2 with high insulin requirements with TF. Consulted endocrine for recommendations.   Fluid/Electrolytes: Hypophosphatemia: replace. High CAROL output. Albumin 25g IV x 1 yesterday. Alb 3.8, stopped albumin replacement. Bumex 1 mg daily due to high CAROL output started on , increased to TID today. Monitor for dehydration.   : Luis removed  Infectious disease: Tmax 100.8 this AM, WBC 6.6<-2.9. Will send UA/UC/BCx and CXR. Consider removing central line if work up negative and fever continues.   HBV: Hepatits B DNA PCR positive prior to tx. Received HBIG on  and . Hep B surface antigen negative. No further HBIG planned. Continue tenofovir 300 mg daily  PPX: Zosyn ppx, completed. Valcyte x 12 weeks (CMVand EBV IgG +), bactrim x 6 months and clotrimazole colleen x 12 weeks    Prophylaxis: DVT PCDs, fall, GI, fungal  Disposition: 7A. Discussed with patient, wife with  plan to start transplant education. PT/OT recommending discharge to home with assist    Coordinator: Kay Reyes       Medical Decision Making: Medium  Subsequent visit 18813 (moderate level decision making)      JAY/Fellow/Resident Provider: Janet Benavides, PAC 5035     Faculty: Mamadou Norwood M.D.  __________________________________________________________________  Transplant History: Admitted 2018 for  donor liver transplant.   The patient has a history of liver failure due to hepatitis B .    2018 (Liver), Postoperative day: 6     Interval History:   Doing well. Pain not well controlled overnight but it seems like no analgesics were given possibly due to a communication issue. BM x 2 yesterday.   ROS:   A 10-point review  of systems was negative except as noted above.    Curent Meds:    aspirin  81 mg Oral Daily     bumetanide  1 mg Oral TID     clotrimazole  1 Gabo Buccal 4x Daily     multivitamins with minerals  15 mL Per Feeding Tube Daily     mycophenolate  1,000 mg Oral BID IS    Or     mycophenolate  1,000 mg Oral or NG Tube BID IS     pantoprazole  40 mg Oral BID AC     sodium chloride (PF)  3 mL Intravenous Q8H     sulfamethoxazole-trimethoprim  10 mL Oral Daily     tacrolimus  2.5 mg Oral BID IS     tenofovir  300 mg Per Feeding Tube Daily     valGANciclovir  450 mg Oral Daily    Or     valGANciclovir  450 mg Oral or NG Tube Daily       Physical Exam:     Admit Weight: 60.7 kg (133 lb 12.8 oz)    Current Vitals:   /73 (BP Location: Left arm)  Pulse 78  Temp 100.6  F (38.1  C) (Oral)  Resp 16  Wt 60.9 kg (134 lb 3.2 oz)  SpO2 98%  BMI 21.66 kg/m2      Vital sign ranges:    Temp:  [98.5  F (36.9  C)-100.8  F (38.2  C)] 100.6  F (38.1  C)  Pulse:  [78] 78  Heart Rate:  [] 100  Resp:  [16-19] 16  BP: (110-127)/(73-86) 110/73  SpO2:  [97 %-99 %] 98 %  Patient Vitals for the past 24 hrs:   BP Temp Temp src Pulse Heart Rate Resp SpO2 Weight   07/27/18 1040 - - - - - - - 60.9 kg (134 lb 3.2 oz)   07/27/18 0822 110/73 100.6  F (38.1  C) Oral - 100 16 98 % -   07/27/18 0600 - 98.6  F (37  C) Oral - - - - -   07/27/18 0327 116/73 100.8  F (38.2  C) Oral - 104 18 98 % -   07/26/18 2307 127/86 99  F (37.2  C) Oral - 104 18 99 % -   07/26/18 1906 116/78 99.3  F (37.4  C) Oral - 96 18 99 % -   07/26/18 1555 115/75 100.8  F (38.2  C) Oral - 92 19 99 % -   07/26/18 1206 126/74 98.5  F (36.9  C) - 78 - - 97 % -     General Appearance: NAD    Skin: warm, dry  Heart: RRR, well perfused  Lungs: NLB on RA  Abdomen: soft, non distended, mild ttp generalized, lower quadrant. Incision with ecchymosis, dry, no signs of infection. CAROL x 2 serosang output, large amount  : no bah  Extremities: well perfused  Neurologic: A&O.  Tremor absent.   CVC    Data:   [facility]  Recent Labs  Lab 07/27/18  1101 07/27/18  0606  07/26/18  0819  07/24/18  0438 07/23/18  0324  07/22/18  0354  07/20/18  1741   NA  --  134  --  137  < >  --   --   < > 140  < > 134   POTASSIUM  --  4.3  --  4.1  < >  --   --   < > 4.1  < > 2.5*   CHLORIDE  --  100  --  103  < >  --   --   < > 106  < > 102   CO2  --  26  --  28  < >  --   --   < > 21  < > 16*   GLC  --  148*  --  124  < >  --   --   < > 105*  < > 223*   BUN  --  25  --  23  < >  --   --   < > 26  < > 34*   CR  --  1.37*  --  1.17  < >  --   --   < > 1.46*  < > 1.68*   GFRESTIMATED  --  56*  --  67  < >  --   --   < > 52*  < > 44*   GFRESTBLACK  --  68  --  82  < >  --   --   < > 63  < > 54*   JESUS  --  7.4*  --  8.1*  < >  --   --   < > 7.9*  < > 8.8   ICAW  --   --   --   --   --  4.7 4.7  --  4.5  < >  --    MAG  --  1.6  --  1.9  < >  --   --   < > 1.7  < > 2.2   PHOS 2.8 1.4*  < > 1.4*  < >  --   --   < > 4.4  < > 3.6   AMYLASE  --   --   --   --   --   --   --   --  42  --  60   LIPASE  --   --   --   --   --   --   --   --  75  --   --    ALBUMIN  --  3.0*  --  3.8  < >  --   --   < > 2.5*  < > 4.0   BILITOTAL  --  3.5*  --  3.6*  < >  --   --   < > 10.3*  < > 47.5*   ALKPHOS  --  88  --  83  < >  --   --   < > 43  < > 107   AST  --  24  --  28  < >  --   --   < > 368*  < > 287*   ALT  --  40  --  49  < >  --   --   < > 142*  < > 137*   < > = values in this interval not displayed.  CBC    Recent Labs  Lab 07/27/18  0606 07/26/18  0819   HGB 9.2* 8.6*   WBC 6.6 2.9*   PLT 34* 31*     COAGS  Recent Labs  Lab 07/27/18  0606 07/25/18  0428  07/21/18  1932 07/21/18  1608   INR 1.30* 1.28*  < > 1.69* 1.81*   PTT  --   --   --  39* 49*   < > = values in this interval not displayed.   Urinalysis  Recent Labs   Lab Test  07/13/18   1315  06/18/18   1200   COLOR  Dark Yellow  Yellow   APPEARANCE  Clear  Clear   URINEGLC  Negative  Negative   URINEBILI  Large*  Small*   URINEKETONE  Negative  Negative   SG  1.013   1.008   UBLD  Negative  Negative   URINEPH  6.0  5.0   PROTEIN  10*  Negative   NITRITE  Negative  Negative   LEUKEST  Negative  Negative   RBCU  <1  1   WBCU  None  2   UTPG   --   0.75*     Virology:  Hepatitis C Antibody   Date Value Ref Range Status   07/20/2018 Nonreactive NR^Nonreactive Final     Comment:     Assay performance characteristics have not been established for newborns,   infants, and children

## 2018-07-27 NOTE — CONSULTS
Diabetes/Hyperglycemia Management Consult    Chief Complaint glycemic management, conversion to sub-q  Consult requested by: Rebecca Haas PA-C  History of Present Illness Yaneli Miles is a 43 year old male with history of type 2 diabetes,  ESLD secondary to chronic hepatitis B and ALD complicated with portal hypertension, ascites, esophageal varices, encephalopathy, severe thrombocytopenia who underwent  donor liver transplant on (2108) by Dr. Min.    Inpatient Diabetes team was consulted for glycemic management conversion to sub-q    Mr. Miles seen with [facility] [place]     Mr. Miles was dx with type 2 diabetes, but does not remember when. PTA medications were pills and injections, but does not remember. Had been testing glucose but does not remember how many times per day nor what his readings were. No family history of diabetes. Called patients pharmacy for diabetes medications and dosages as listed below.    Is on IV insulin and continuous TF Nutren 1.5 at goal 60 ml/hour. Insulin rates 4-6 units per hour.  Is also having oral intake, up to 1300 calories yesterday. Glucose < 180 and no hypoglycemia    Currently, denies fever chills chest pain, shortness of breath, abdominal pain, nausea and or vomiting. Tolerating oral intake and is working with therapies.      Recent Labs  Lab 18  1306 18  1202 18  1139 18  1004 18  0902 18  0804  18  0606  18  0819  18  0428  18  0446  18  0323  18  1549   GLC  --   --   --   --   --   --   --  148*  --  124  --  147*  --  97  --  113*  --  185*   * 151* 146* 141* 146* 129*  < > 153*  < >  --   < >  --   < >  --   < >  --   < >  --    < > = values in this interval not displayed.      Diabetes Type:   Type 2 Diabetes  Diabetes Duration: unknown  Usual Diabetes Regimen:   Called pharmacy ([phone]) for medications and dosages  Basaglar 8 units daily and increase as directed  januvia 100  mg dialy  Byetta 10 mcg twice daily    Ability to [place] Prescribed Regimen: yes, may have help from family  Diabetes Control:   Lab Results   Component Value Date    A1C 5.4 07/13/2018     Diabetes Complications: unknown  History of DKA: unknown  Able to Detect Hypoglycemia: unknown, Mr. Miles was unable to remember  Usual Diabetes Care Provider: unknown  Factors Impacting Glucose Control: Enteral feedings and steroids      Review of Systems  10 point ROS completed with pertinent positives and negatives noted in the HPI    Past medical, family and social histories are reviewed and updated.    Past Medical History  Past Medical History:   Diagnosis Date     Choledocholithiasis      Chronic viral hepatitis B without delta agent and without coma (H) 6/14/2018     Cirrhosis of liver with ascites (H) 6/14/2018     Esophageal varices (H)     prior GIB     Hepatic encephalopathy (H)      Hepatitis delta with hepatitis B carrier state 06/14/2018    positive antigen     Portal vein thrombosis 6/14/2018 5/29/18 care everywhere [place]     SBP (spontaneous bacterial peritonitis) (H) 06/2018     Type 2 diabetes mellitus without complication, without long-term current use of insulin (H) 6/14/2018       Family History  No family history specific for diabetes    Social History  Social History     Social History     Marital status: Single     Spouse name: N/A     Number of children: N/A     Years of education: N/A     Social History Main Topics     Smoking status: Never Smoker     Smokeless tobacco: Not on file     Alcohol use No     Drug use: No     Sexual activity: Not on file     Other Topics Concern     Not on file     Social History Narrative         Physical Exam  /63 (BP Location: Left arm)  Pulse 78  Temp 97.9  F (36.6  C) (Oral)  Resp 16  Wt 60.9 kg (134 lb 3.2 oz)  SpO2 98%  BMI 21.66 kg/m2    General:  Pleasant, sitting in chair, in no distress.   HEENT: PER and anicteric, non-injected, oral mucous membranes  moist.   Lungs: non-labored  ABD: soft  Skin: warm and dry, generalized jaundice  MSK: moving all extremties  Mental status:  alert, oriented person ( place and time not assessed), communicating clearly  Psych:  calm, even mood    Laboratory  Recent Labs   Lab Test  07/27/18 0606 07/26/18   0819   NA  134  137   POTASSIUM  4.3  4.1   CHLORIDE  100  103   CO2  26  28   ANIONGAP  8  6   GLC  148*  124   BUN  25  23   CR  1.37*  1.17   JESUS  7.4*  8.1*     CBC RESULTS:   Recent Labs   Lab Test  07/27/18 0606   WBC  6.6   RBC  3.15*   HGB  9.2*   HCT  27.7*   MCV  88   MCH  29.2   MCHC  33.2   RDW  16.3*   PLT  34*       Liver Function Studies -   Recent Labs   Lab Test  07/27/18 0606   PROTTOTAL  5.3*   ALBUMIN  3.0*   BILITOTAL  3.5*   ALKPHOS  88   AST  24   ALT  40       Active Medications  Current Facility-Administered Medications   Medication     aspirin suspension 81 mg     bumetanide (BUMEX) tablet 1 mg     clotrimazole (MYCELEX) lozenge 1 Gabo     dextrose 10 % 1,000 mL infusion     dextrose 10 % 1,000 mL infusion     glucose gel 15-30 g    Or     dextrose 50 % injection 25-50 mL    Or     glucagon injection 1 mg     sennosides (SENOKOT) syrup 5-10 mL    And     docusate (COLACE) 50 MG/5ML liquid  mg     sennosides (SENOKOT) syrup 5-10 mL    And     docusate (COLACE) 50 MG/5ML liquid  mg     HYDROmorphone (PF) (DILAUDID) injection 0.3-0.5 mg     insulin 1 unit/mL in saline (NovoLIN, HumuLIN Regular) drip - ADULT IV Infusion     lactated ringers infusion     magnesium sulfate 2 g in water intermittent infusion     magnesium sulfate 4 g in 100 mL sterile water (premade)     multivitamins with minerals (CERTAVITE/CEROVITE) liquid 15 mL     mycophenolate (GENERIC EQUIVALENT) capsule 1,000 mg    Or     mycophenolate (CELLCEPT BRAND) suspension 1,000 mg     naloxone (NARCAN) injection 0.1-0.4 mg     ondansetron (ZOFRAN-ODT) ODT tab 4 mg    Or     ondansetron (ZOFRAN) injection 4 mg     oxyCODONE  (ROXICODONE) solution 5-10 mg     pantoprazole (PROTONIX) 2 mg/mL suspension 40 mg     potassium chloride (KLOR-CON) Packet 20-40 mEq     potassium chloride 10 mEq in 100 mL intermittent infusion with 10 mg lidocaine     potassium chloride 10 mEq in 100 mL sterile water intermittent infusion (premix)     potassium chloride 20 mEq in 50 mL intermittent infusion     potassium chloride SA (K-DUR/KLOR-CON M) CR tablet 20-40 mEq     potassium phosphate 10 mmol in D5W 250 mL intermittent infusion     potassium phosphate 15 mmol in D5W 250 mL intermittent infusion     potassium phosphate 20 mmol in D5W 250 mL intermittent infusion     potassium phosphate 20 mmol in D5W 500 mL intermittent infusion     potassium phosphate 25 mmol in D5W 500 mL intermittent infusion     prochlorperazine (COMPAZINE) injection 10 mg     Reason beta blocker order not selected     sodium chloride (PF) 0.9% PF flush 3 mL     sodium chloride (PF) 0.9% PF flush 3 mL     sodium chloride (PF) 0.9% PF flush 3 mL     sulfamethoxazole-trimethoprim (BACTRIM/SEPTRA) suspension 80 mg     tacrolimus (GENERIC EQUIVALENT) capsule 2.5 mg     tenofovir (VIREAD) tablet 300 mg     valGANciclovir (VALCYTE) tablet 450 mg    Or     valGANciclovir (VALCYTE) solution 450 mg     No current outpatient prescriptions on file.       Current Diet    Active Diet Order      Dysphagia Diet Level 3 Advanced Thin Liquids (water, ice chips, juice, milk gelatin, ice cream, etc)      Assessment: Yaneli Miles is a 43 year old male with history of type 2 diabetes,  ESLD secondary to chronic hepatitis B and ALD complicated with portal hypertension, ascites, esophageal varices, encephalopathy, severe thrombocytopenia who underwent  donor liver transplant on (2108) by Dr. Min.    Type 2 diabetic: PA on januvia, basaglar and byetta,   On continuous TF at 60 ml/hour, Nutren  Plan to cycle TF starting at 8pm and run to 8 am, will decrease rate to 50 ml/hour  Plan  Continue on  IV insulin for now  Plan to transition off IV insulin on Saturday after determining insulin amount while on cycled TF  -will add prandial insulin 1 unit per 20 grams of CHO for meals and snacks  Will continue to follow    Catalina Valadez, EVONNE [phone]    Diabetes Management Team job code: [id number]  Time Spent on this Encounter   I spent 80 minutes on the unit/floor managing the care of Yaneli Miles. Over 50% of my time was spent counseling the patient and/or coordinating care regarding services listed in this note.

## 2018-07-27 NOTE — PLAN OF CARE
7/26/18 I saw the patient (pt),wife and  in the patient's room for PLC TEN education.Several interruptions in the room due to other staff members also needing to connect with the patient with cares,etc.Patient started to observe the appointment but did not want to return any demonstrations today.Pt.slept through most of the appointment.Patient's transplant Coordinator(Kay Reyes) discussed in length with the pt.and his wife about their involvement in their own care just before the PLC appointment.Wife returned the very basics of dual bag /Sergio pump,water flushes with medications via [place] tube with many reminders and reminders with teach back.I encouraged the pt.and wife to continue to observe all TEN cares and practice all skills with nursing supervision before discharge.Home care to follow.Above information discussed with the pt's nurse and [facility] liaison(Zohreh)  Wife states their are family members at home that read English.  Written material given today:Handwashing,Sergio Pump,Dual Bag,[place] cares.

## 2018-07-27 NOTE — PROGRESS NOTES
Calorie Counts  Intake recorded for: 7/26  Kcals: 1307  Protein: 65g  # Meals Recorded: 2 meals (First - 100% green tea, milk, orange juice, cream of wheat w/ brown sugar, broth)      (Second - 100% grilled chicken & mashed potatoes w/ gravy, green beans, milk, peaches)  # Supplements Recorded: 100% 1 Boost Plus

## 2018-07-27 NOTE — PLAN OF CARE
Problem: Patient Care Overview  Goal: Plan of Care/Patient Progress Review  Outcome: No Change  9155-2519: A&O x 4; Tmax 100.8, Tachycardic, oVSS on RA; -186, Phos 1.5, replacing IV; c/o pain but declines intervention; c/o nausea administered IV zofran x 1; denies SOB; DD3 diet with nectar thick liquids, TF through NG/OG at 60 which is goal; Bilateral JPs with moderate serosanguinous output; Incontinent of bowel x 1; Voiding into urinal; Skin remarkable for Jaundice and significant ecchymosis along incision and flanks; Up with assist of 1; Will continue to monitor and update Liver service with any significant changes.

## 2018-07-27 NOTE — PLAN OF CARE
Problem: Patient Care Overview  Goal: Plan of Care/Patient Progress Review  SLP: Session canceled.  services not available. SLP to follow per POC.

## 2018-07-27 NOTE — PLAN OF CARE
Problem: Patient Care Overview  Goal: Plan of Care/Patient Progress Review  Discharge Planner PT  7A  Patient plan for discharge: Home with family  Current status: Pt performs supine>sit with Jan, cues for log roll technique with proper adherence following cues, sit<>stand with SBA and FWW. Pt ambulates 100' with FWW and SBA + 450' with CGA and no assistive device. Mild unsteadiness noted with no assistive device with 3 lateral LOB requiring Jan to recover balance. Pt ascends/descends 4 stairs with B rails and SBA.   Barriers to return to prior living situation: Medical status, level of assist, abdominal precautions, balance  Recommendations for discharge: Home with assist and HHPT  Rationale for recommendations: Pt has good support of family at home who will be able to provide assist as needed. Currently recommend HHPT services to progress strength, balance, activity tolerance, and safety with independent functional mobility.        Entered by: Sindy Martinez 07/27/2018 10:17 AM

## 2018-07-27 NOTE — PROGRESS NOTES
CLINICAL NUTRITION SERVICES - REASSESSMENT NOTE     Nutrition Prescription    RECOMMENDATIONS FOR MDs/PROVIDERS TO ORDER:  None at this time     Malnutrition Status:    Non-severe malnutrition in the context of acute on chronic illness    Recommendations already ordered by Registered Dietitian (RD):  1. Change TF to cycle over 12 hrs: Nutren 1.5 at 50 ml/hr x 12 hrs (600 ml): 900 kcals (15 kcals/kg), 41 g/kg PRO (0.7 g/kg), 456 ml Free H2O, 106 g CHO, 9 g Fiber  - continue TF today and then decrease rate and run from 8 pm to 8 am     Future/Additional Recommendations:  1. Calorie counts and ability to decrease TF (wean off)     EVALUATION OF THE PROGRESS TOWARD GOALS   Diet: DD3 with boost shakes between meals  Nutrition Support: Nutren 1.5 @ 60 mL/hr = 2160 kcals (35 kcal/kg), 98 g PRO (1.6 g/kg), 1094 mL H2O, 253 g CHO and no fiber daily.  Intake:   Calorie count: (7/26): 1307 kcals, 65 g PRO (1 boost plus)  4 day average intake of TF providing (773 ml): 1160 kcals (19 kcals/kg) and 53 g PRO (0.9 g/kg) per dosing weight 61 kg     Per patient appetite improving. Likes the boost plus shakes and patient does drink them at home.     NEW FINDINGS   Labs: (7/27): phos 1.4 mg/dL (L); Cr 1.37 mg/dL (H)  Meds: certavite  Wt: 63.3 kg (7/25) trending up since admit wt 60.7 kg (7/20)    MALNUTRITION  % Intake: Decreased intake does not meet criteria  % Weight Loss: > 5% in 1 month (severe)  Subcutaneous Fat Loss: None observed  Muscle Loss: Temporal:  mild and Facial & jaw region: mld  Fluid Accumulation/Edema: None noted  Malnutrition Diagnosis: Non-severe malnutrition in the context of acute on chronic illness    Previous Goals   Diet adv v nutrition support within 2-3 days.  Evaluation: Met  Total avg nutritional intake to meet a minimum of 30 kcal/kg and 1.5 g PRO/kg daily (per dosing wt 61 kg  Evaluation: Met    Previous Nutrition Diagnosis  Inadequate protein-energy intake   Evaluation: Improving    CURRENT NUTRITION  DIAGNOSIS  Inadequate oral intake related to decreased appetite, s/p surgery as evidenced by above calorie count meeting 71% jose needs/PRO needs    INTERVENTIONS  Implementation  1. Nutrition Education: Post transplant nutrition guidelines. Handouts provided: Organ Transplant Nutrition Therapy. Reviewed with patient. Patient's spouse via . Both verbalized understanding   2. Collaboration with other providers - discussed cycling TF with team and endo. Team agreed to start cycling TFs  3. Enteral Nutrition - Modify rate and schedule (see above)    Goals  Total avg nutritional intake to meet a minimum of 30 kcal/kg and 1.5 g PRO/kg daily (per dosing wt 61 kg    Monitoring/Evaluation  Progress toward goals will be monitored and evaluated per protocol.    Sera Gonzalez MS/RD/LD/CNSC  7A RD Pager: [phone]

## 2018-07-27 NOTE — PROGRESS NOTES
Care Coordinator Progress Note    Admission Date/Time:  7/20/2018  Attending MD:  Stew Min MD    Data  Chart reviewed, discussed with interdisciplinary team.   Patient was admitted for:    Liver transplanted (H)  Chronic viral hepatitis B without delta agent and without coma (H)  Immunosuppression (H)  On enteral nutrition.    Concerns with insurance coverage for discharge needs: None.  Current Living Situation: Patient lives with spouse.  Support System: Supportive and Involved  Services Involved: Home Infusion  Transportation at Discharge: Family or friend will provide  Transportation to Medical Appointments:   - Name of caregiver: Luann, spouse  Barriers to Discharge: chronically ill    Coordination of Care and Referrals: Provided patient/family with options for Home Infusion.        Assessment  Assessment completed with phone . Met with pt and his spouse to discuss discharge planning.  Pt/spouse agreed with plan for pt to return home once medically cleared.  We discussed that he will discharge with tube feedings and a PLC class is scheduled this afternoon with an .  Also discussed home infusion services and they agreed with using [facility] after provided choice.  They offered no other questions at this time, RNCC will continue to follow.     Plan  Anticipated Discharge Date:  TBD-pending medical clearance  Anticipated Discharge Plan:  Home with home infusion services    Alyssa Scherer RN   Casual Care Coordinator Covering unit 7A  Pager [phone]

## 2018-07-28 NOTE — PROGRESS NOTES
Immunosuppression Note:    Yaneli Miles is a 43 year old male who is seen today  for immunosuppression management     IMamadou MD, I have examined the patient with the resident/PA/Fellow, discussed and agree with the note and findings.  I have reviewed today's vital signs, medications, labs and imaging. I reviewed the immunosuppression medications and levels. 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      POD 1 US liver: Impression:   1.  Hematoma associated with the inferior aspect of the perihepatic  space anteriorly, measuring 13.7 x 7.0 x 8.5 cm.  2.  Retrograde flow of the left portal vein. Additionally, low  velocities of the portal venous system. Single low resistive index of  the extrahepatic aspect of the hepatic artery, measuring 0.37. These  findings are likely within normal limits in the early postoperative  context. However, continued attention on follow-up recommended.    Graft function: POD 7. Good graft function. TB/alk phos/ALT/AST decreased. Tbili 3.2<3.5 . Started ASA 81 mg for thrombosis ppx.   Immunosuppression management:   Simulect intra-op due to BEN  Steroid taper per protocol.   MMF 1gm BID  Tac 2.5 mg BID. 7/27 level 8.7 (11.2 hour trough).   Complexity of management:Medium. Contributing factors: thrombocytopenia and anemia  Hematology: Hemoglobin stable, 9.2. PLT 34. WBC 2.9->6.6. INR 1.3. Monitor  DDAVP x 1 due to BEN and vit K 10 mg IV x 3 doses due to post op bleeding.    Cardiorespiratory: Stable on RA. HDS. Pulmonary toilet, OOB to chair as much as possible,  ambulate.  GI/Nutrition: FT placed. TF at goal, 60 ml/hr. DD3 with thin liquids. Calorie counts, 1307 kcal yesterday. Will plan to initiate cycled tube feeds this evening. Change senna/colace PRN due to loose stools.   Endocrine: Insulin drip. Transition to subcutaneous insulin tomorrow after tube feeds are transitioned. DM2 with high insulin requirements with TF. Consulted endocrine for recommendations.   Fluid/Electrolytes: Hypophosphatemia: replace. High CAROL output. Albumin 25g IV x 1 yesterday. Alb 3.8, stopped albumin replacement. Bumex 1 mg daily due to high CAROL output started on , increased to TID today. Monitor for dehydration.   : Luis removed  Infectious disease: Tmax 100.8 this AM, WBC 6.6<-2.9. Will send UA/UC/BCx and CXR. 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 Will plan to DC central line(presumed infected), if have adequate peripheral IVs. Also will get an abdominal CT with oral contrast.    HBV: Hepatits B DNA PCR positive prior to tx. Received HBIG on  and . Hep B surface antigen negative. No further HBIG planned. Continue tenofovir 300 mg daily  PPX: Zosyn DC, completed. Valcyte x 12 weeks (CMVand EBV IgG +), bactrim x 6 months and clotrimazole colleen x 12 weeks    Prophylaxis: DVT PCDs, fall, GI, fungal  Disposition: 7A. Discussed with patient, wife with  plan to start transplant education. PT/OT recommending discharge to home with assist    Coordinator: Kay Reyes       Medical Decision Making: Medium  Subsequent visit 24132 (moderate level decision making)      JAY/Fellow/Resident Provider: Mihir Nj, fellow      Faculty: Mamadou Norwood M.D.  __________________________________________________________________  Transplant History: Admitted 2018 for  donor liver transplant.   The patient has a history of liver failure due to hepatitis B .    2018 (Liver),  Postoperative day: 7     Interval History:     No acute events overnight, still complains of partha-incisional pain, denies nausea, vomiting, denies fever, chills, reports passing flatus.    ROS:   A 10-point review of systems was negative except as noted above.    Curent Meds:    aspirin  81 mg Oral Daily     clotrimazole  1 Gabo Buccal 4x Daily     linezolid  600 mg Intravenous Q12H     multivitamins with minerals  15 mL Per Feeding Tube Daily     mycophenolate  1,000 mg Oral BID IS    Or     mycophenolate  1,000 mg Oral or NG Tube BID IS     pantoprazole  40 mg Oral BID AC     sodium chloride (PF)  3 mL Intravenous Q8H     sulfamethoxazole-trimethoprim  10 mL Oral Daily     tacrolimus  2.5 mg Oral BID IS     tenofovir  300 mg Per Feeding Tube Daily     valGANciclovir  450 mg Oral Daily    Or     valGANciclovir  450 mg Oral or NG Tube Daily       Physical Exam:     Admit Weight: 60.7 kg (133 lb 12.8 oz)    Current Vitals:   /75 (BP Location: Left arm)  Pulse 78  Temp 98.8  F (37.1  C) (Oral)  Resp 18  Wt 60.9 kg (134 lb 3.2 oz)  SpO2 98%  BMI 21.66 kg/m2      Vital sign ranges:    Temp:  [97.9  F (36.6  C)-103  F (39.4  C)] 98.8  F (37.1  C)  Heart Rate:  [101-118] 111  Resp:  [16-20] 18  BP: ()/(63-78) 109/75  SpO2:  [96 %-99 %] 98 %  Patient Vitals for the past 24 hrs:   BP Temp Temp src Heart Rate Resp SpO2 Weight   07/28/18 0824 109/75 98.8  F (37.1  C) Oral 111 18 98 % -   07/28/18 0300 - 99.3  F (37.4  C) Oral - - - -   07/28/18 0017 97/68 100.4  F (38  C) Oral 111 20 99 % -   07/27/18 2210 - 100  F (37.8  C) Oral - - - -   07/27/18 2032 - 102.1  F (38.9  C) Oral - - - -   07/27/18 2011 119/78 103  F (39.4  C) Oral 118 16 96 % -   07/27/18 1552 115/77 99  F (37.2  C) Oral 105 18 99 % -   07/27/18 1201 104/63 97.9  F (36.6  C) Oral 101 16 98 % -   07/27/18 1040 - - - - - - 60.9 kg (134 lb 3.2 oz)     General Appearance: NAD    Skin: warm, dry  Heart: RRR, well perfused  Lungs: NLB on  RA  Abdomen: soft, non distended, mild ttp generalized, lower quadrant. Incision with ecchymosis, dry, no signs of infection. CAROL x 2 serosang output, large amount  : no bah  Extremities: well perfused  Neurologic: A&O. Tremor absent.   CVC    Data:   CMP  Recent Labs  Lab 07/28/18  0557 07/27/18  1101 07/27/18  0606  07/24/18  0438 07/23/18  0324  07/22/18  0354   *  --  134  < >  --   --   < > 140   POTASSIUM 4.9  --  4.3  < >  --   --   < > 4.1   CHLORIDE 97  --  100  < >  --   --   < > 106   CO2 26  --  26  < >  --   --   < > 21   *  --  148*  < >  --   --   < > 105*   BUN 32*  --  25  < >  --   --   < > 26   CR 1.53*  --  1.37*  < >  --   --   < > 1.46*   GFRESTIMATED 49*  --  56*  < >  --   --   < > 52*   GFRESTBLACK 60*  --  68  < >  --   --   < > 63   JESUS 7.3*  --  7.4*  < >  --   --   < > 7.9*   ICAW  --   --   --   --  4.7 4.7  --  4.5   MAG 1.6  --  1.6  < >  --   --   < > 1.7   PHOS 2.0* 2.8 1.4*  < >  --   --   < > 4.4   AMYLASE  --   --   --   --   --   --   --  42   LIPASE  --   --   --   --   --   --   --  75   ALBUMIN 2.7*  --  3.0*  < >  --   --   < > 2.5*   BILITOTAL 3.2*  --  3.5*  < >  --   --   < > 10.3*   ALKPHOS 75  --  88  < >  --   --   < > 43   AST 19  --  24  < >  --   --   < > 368*   ALT 32  --  40  < >  --   --   < > 142*   < > = values in this interval not displayed.  CBC    Recent Labs  Lab 07/28/18  0557 07/27/18  0606   HGB 9.2* 9.2*   WBC 9.2 6.6   PLT 47* 34*     COAGS  Recent Labs  Lab 07/27/18  0606 07/25/18  0428  07/21/18  1932 07/21/18  1608   INR 1.30* 1.28*  < > 1.69* 1.81*   PTT  --   --   --  39* 49*   < > = values in this interval not displayed.   Urinalysis  Recent Labs   Lab Test  07/27/18   2330  07/13/18   1315  06/18/18   1200   COLOR  Yellow  Dark Yellow  Yellow   APPEARANCE  Clear  Clear  Clear   URINEGLC  Negative  Negative  Negative   URINEBILI  Negative  Large*  Small*   URINEKETONE  Negative  Negative  Negative   SG  1.008  1.013  1.008   UBLD   Negative  Negative  Negative   URINEPH  7.5*  6.0  5.0   PROTEIN  30*  10*  Negative   NITRITE  Negative  Negative  Negative   LEUKEST  Negative  Negative  Negative   RBCU  5*  <1  1   WBCU  3  None  2   UTPG   --    --   0.75*     Virology:  Hepatitis C Antibody   Date Value Ref Range Status   07/20/2018 Nonreactive NR^Nonreactive Final     Comment:     Assay performance characteristics have not been established for newborns,   infants, and children

## 2018-07-28 NOTE — PROGRESS NOTES
[facility], [place]    Sepsis Evaluation Progress Note    Date of Service: 07/27/2018    I was called to see Yaneli Miles due to abnormal vital signs triggering the Sepsis SIRS screening alert. He is undergoing workup to identify an infection.    Physical Exam    Vital Signs:  Temp: 102.1  F (38.9  C) Temp src: Oral (different oral thermometer used) BP: 119/78   Heart Rate: 118 Resp: 16 SpO2: 96 % O2 Device: None (Room air)      Lab:  Lactic Acid   Date Value Ref Range Status   07/22/2018 1.0 0.7 - 2.0 mmol/L Final     Lactate for Sepsis Protocol   Date Value Ref Range Status   07/27/2018 1.2 0.7 - 2.0 mmol/L Final       The patient is at baseline mental status.    The rest of their physical exam is significant for RUQ pain, no rebound tenderness, and jaundice.    Assessment and Plan    The SIRS and exam findings are likely due to   sepsis.     ID: The patient is currently on the following antibiotics:  Anti-infectives (Future)    Start     Dose/Rate Route Frequency Ordered Stop    07/27/18 1700  levofloxacin (LEVAQUIN) tablet 250 mg      250 mg Oral EVERY 24 HOURS 07/27/18 1638      07/24/18 0915  tenofovir (VIREAD) tablet 300 mg      300 mg Per Feeding Tube DAILY 07/24/18 0907      07/21/18 1700  sulfamethoxazole-trimethoprim (BACTRIM/SEPTRA) suspension 80 mg      10 mL Oral DAILY 07/21/18 1649      07/21/18 1404  valGANciclovir (VALCYTE) tablet 450 mg      450 mg Oral DAILY 07/21/18 1404      07/21/18 1404  valGANciclovir (VALCYTE) solution 450 mg      450 mg Oral or NG Tube DAILY 07/21/18 1404          Current antibiotic coverage Levoquin.    Fluid: Fluid bolus not indicated due to stable blood pressures. .    Lab: Repeat lactic acid is not indicated.    Disposition: The patient will remain on the current unit. We will continue to monitor this patient closely.    Amanda Suazo MD

## 2018-07-28 NOTE — PROGRESS NOTES
Calorie Count  Intake recorded for: 7/27  Kcals: 933  Protein: 59g  # Meals Recorded: 2 meals (First - 100% cream of wheat, orange juice, Greek yogurt, homemade porridge)      (Second - 100% roast turkey & mashed potatoes w/ gravy, green beans, peaches, milk, jello)  # Supplements Recorded: 0

## 2018-07-28 NOTE — PLAN OF CARE
Problem: Patient Care Overview  Goal: Plan of Care/Patient Progress Review  6713-0426: Tmax 103.0 at 2000, crosscover notified and assessed the patient, fellow consulted and no interventions were ordered. Ice packs/fan/cool washcloths used and pt's temperature naturally decreased to 99.3 at 0400. Tachycardic in 110s, during this time of high temps but eventually came down. AOVSS on RA. Insulin drip: 195-113, was mostly on 4U/hr in 110s-120s, but was high at start and end of shift--algorithm 4 as guidance. TKO for insulin changed to NS v. LR. Brown lumen infusing of R triple lumen CVC. R hand access was indifferent/tender to pt and may have to be pulled later on today. Pt was having difficulties describing pain, gave oxycodone 5mg x2 for pain--generalized abdominal pain. DD3 diet with calorie counts, no intake overnight. NG running tube feeds at 50ml/hr & 66xsy1e water flushes. Cycled tube feeds to start today: turned off at 8am and restarted 8pm-8am. L CAROL output 340ml bloody output, R CAROL output 225ml bloody output. Incontinent of stool x2 and urine x2--uses urinal as well. Pt did not sleep overly well overnight. Will continue to monitor and follow POC.    Positive VRE cultures of blue port of R CVC came back during shift and sticky note was placed to team. IV zyvox ordered and IV levaquin d/c'd to reflect this at 0700.

## 2018-07-28 NOTE — PROGRESS NOTES
Diabetes Consult Daily  Progress Note          Assessment/Plan:                          Yaneli Miles is a 43 year old male with history of type 2 diabetes,  ESLD secondary to chronic hepatitis B and ALD complicated with portal hypertension, ascites, esophageal varices, encephalopathy, severe thrombocytopenia who underwent  donor liver transplant on (2108) by Dr. Min. Is on IV insulin, was on continuous TF Nutren 1.5 at goal 60 ml/hour but now cycled.      Diabetes Type:   Type 2 Diabetes  Plan:  -give Lantus 10 units daily starting now  -start meal coverage 1:10 CHO, snacks (Boost) also 1:10  -high corrective scale  -give 30 units of NPH with initiation of cycled tube feeds at   -stop insulin infusion 2 hrs after Lantus is given today    Diabetes Duration: unknown  Usual Diabetes Regimen:   Called pharmacy ([phone]) for medications and dosages  Basaglar 8 units daily and increase as directed  januvia 100 mg dialy  Byetta 10 mcg twice daily    Outpatient diabetes follow up: unknown    Plan discussed with patient with use of telephone .           Interval History:   The last 24 hours progress and nursing notes reviewed.  ong  required. Pt on insulin as outpt. Pt eating. Unsure of discharge date.          Recent Labs  Lab 18  1305 18  1154 18  1105 18  1004 18  0858 18  0817  18  0557  18  0606  18  0819  18  0428  18  0446  18  0323   GLC  --   --   --   --   --   --   --  156*  --  148*  --  124  --  147*  --  97  --  113*   * 137* 166* 163* 140* 138*  < >  --   < > 153*  < >  --   < >  --   < >  --   < >  --    < > = values in this interval not displayed.            Review of Systems:   See interval hx          Medications:       Active Diet Order      Dysphagia Diet Level 3 Advanced Thin Liquids (water, ice chips, juice, milk gelatin, ice cream, etc)     Physical  Exam:  Gen: Alert, resting in bed, in NAD   HEENT: NC/AT, mucous membranes are moist  Resp: Unlabored  Ext: No lower extremity edema   Neuro:oriented x3, communicating clearly  BP 98/70 (BP Location: Left arm)  Pulse 78  Temp 99.1  F (37.3  C) (Oral)  Resp 18  Wt 60.9 kg (134 lb 3.2 oz)  SpO2 97%  BMI 21.66 kg/m2           Data:     Lab Results   Component Value Date    A1C 5.4 07/13/2018              CBC RESULTS:   Recent Labs   Lab Test  07/28/18   0557   WBC  9.2   RBC  3.17*   HGB  9.2*   HCT  27.8*   MCV  88   MCH  29.0   MCHC  33.1   RDW  16.5*   PLT  47*     Recent Labs   Lab Test  07/28/18   0557  07/27/18   0606   NA  132*  134   POTASSIUM  4.9  4.3   CHLORIDE  97  100   CO2  26  26   ANIONGAP  9  8   GLC  156*  148*   BUN  32*  25   CR  1.53*  1.37*   JESUS  7.3*  7.4*     Liver Function Studies -   Recent Labs   Lab Test  07/28/18   0557   PROTTOTAL  5.6*   ALBUMIN  2.7*   BILITOTAL  3.2*   ALKPHOS  75   AST  19   ALT  32     Lab Results   Component Value Date    INR 1.30 07/27/2018    INR 1.28 07/25/2018    INR 1.24 07/24/2018    INR 1.27 07/23/2018    INR 1.45 07/22/2018    INR 1.63 07/22/2018    INR 1.64 07/21/2018    INR 1.69 07/21/2018    INR 1.81 07/21/2018    INR 1.87 07/21/2018    INR 3.52 07/21/2018    INR 2.23 07/21/2018     Andrew Burroughs MD   Endocrinology Fellow  Pager: [phone]    I have seen and examined the patient and agree with the fellow's plan of care as noted.  July 28, 2018    Dr. Sarah Sosa [phone]

## 2018-07-28 NOTE — PLAN OF CARE
Problem: Patient Care Overview  Goal: Plan of Care/Patient Progress Review  Discharge Planner SLP   Patient plan for discharge: Did not discuss  Current status: Pt demonstrates improving function. Recommend pt continue on Dysphagia diet level 3 with thin liquids. Sit pt upright for po intake. Encourage small bites/sips and slow rate. SLP to follow.   Barriers to return to prior living situation: Decreased po intake with continued need for NJ.   Recommendations for discharge: home with assist  Rationale for recommendations: Anticipate pt will achieve SLP goals prior to discharge.        Entered by: Dorie Corley 07/28/2018 12:11 PM

## 2018-07-29 NOTE — PLAN OF CARE
Problem: Patient Care Overview  Goal: Plan of Care/Patient Progress Review  Outcome: No Change  Remains tachycardic (HR 110s), Tmax 99.1. OVSS on RA. Phos 2.0, replaced per protocol. BG , insulin gtt dc'd around 1545, received 10 units Lantus and now on sliding scale insulin and carb coverage insulin. Started on Levaquin. Abdominal CT done. Reports pain control is improving with 10 mg PRN oxycodone given x3. Denies nausea. Tolerating DD3 diet w/ fair appetite. Calorie counts in place. TF to cycle at 4685-2933 via NJT. CARLO x2 w/ good amounts of bloody output. Voiding and having BMs. Up w/ SBA. Ambulated the unit x1, activity encouraged. Will continue to monitor and update team w/ changes.

## 2018-07-29 NOTE — PROGRESS NOTES
Diabetes Consult Daily  Progress Note          Assessment/Plan:                          Yaneli Miles is a 43 year old male with history of type 2 diabetes,  ESLD secondary to chronic hepatitis B and ALD complicated with portal hypertension, ascites, esophageal varices, encephalopathy, severe thrombocytopenia who underwent  donor liver transplant on (2108) by Dr. Min. On continuous TF Nutren 1.5 at goal 60 ml/hour but now cycled. Transitioned to basal/bolus insulin on 18 but is severely hyperglycemic today 18. Suspect under-dosing of NPH and improved intake with not enough carb coverage.       Diabetes Type:   Type 2 Diabetes  Plan:  -Lantus 10 units daily  -Increase meal coverage to 1:8 CHO, snacks (Boost) also 1:8  -increase NPH from 30 units to 40 units with initiation of cycled tube feeds at   -if hyperglycemia >300 persists this afternoon, may need to reinitiate insulin drip    Addendum: gave 1x dose of NPH 10 units now to assist with bringing down his blood sugars (3:30 pm)    Diabetes Duration: unknown  Usual Diabetes Regimen:   Called pharmacy ([phone]) for medications and dosages  Basaglar 8 units daily and increase as directed  januvia 100 mg dialy  Byetta 10 mcg twice daily    Outpatient diabetes follow up: unknown    Plan discussed with patient with use of telephone .           Interval History:   The last 24 hours progress and nursing notes reviewed. Severely hyperglycemic today to nearly 500. Asymptomatic. Oral intake improved dramatically past few days and may not have received carb coverage at times.     ong  required.       Recent Labs  Lab 18  1137 18  0803 18  0618 18  0505 18  0203 18  2222 18  1859  18  0557  18  0606  18  0819  18  0428  18  0446   GLC  --   --  284*  --   --   --   --   --  156*  --  148*  --  124  --  147*  --  97   * 295*   --  272* 288* 306* 199*  < >  --   < > 153*  < >  --   < >  --   < >  --    < > = values in this interval not displayed.            Review of Systems:   See interval hx          Medications:       Active Diet Order      Dysphagia Diet Level 3 Advanced Thin Liquids (water, ice chips, juice, milk gelatin, ice cream, etc)     Physical Exam:  Gen: Alert, resting in bed, in NAD   HEENT: NC/AT, mucous membranes are moist  Resp: Unlabored  Ext: No lower extremity edema   Neuro:oriented x3, communicating clearly  /71 (BP Location: Left arm)  Pulse 117  Temp 98.2  F (36.8  C) (Oral)  Resp 16  Wt 61 kg (134 lb 8 oz)  SpO2 100%  BMI 21.71 kg/m2           Data:     Lab Results   Component Value Date    A1C 5.4 07/13/2018              CBC RESULTS:   Recent Labs   Lab Test  07/28/18   0557   WBC  9.2   RBC  3.17*   HGB  9.2*   HCT  27.8*   MCV  88   MCH  29.0   MCHC  33.1   RDW  16.5*   PLT  47*     Recent Labs   Lab Test  07/28/18   0557  07/27/18   0606   NA  132*  134   POTASSIUM  4.9  4.3   CHLORIDE  97  100   CO2  26  26   ANIONGAP  9  8   GLC  156*  148*   BUN  32*  25   CR  1.53*  1.37*   JESUS  7.3*  7.4*     Liver Function Studies -   Recent Labs   Lab Test  07/28/18   0557   PROTTOTAL  5.6*   ALBUMIN  2.7*   BILITOTAL  3.2*   ALKPHOS  75   AST  19   ALT  32     Lab Results   Component Value Date    INR 1.30 07/27/2018    INR 1.28 07/25/2018    INR 1.24 07/24/2018    INR 1.27 07/23/2018    INR 1.45 07/22/2018    INR 1.63 07/22/2018    INR 1.64 07/21/2018    INR 1.69 07/21/2018    INR 1.81 07/21/2018    INR 1.87 07/21/2018    INR 3.52 07/21/2018    INR 2.23 07/21/2018     Andrew Burroughs MD   Endocrinology Fellow  Pager: [phone]      I have seen and examined the patient and agree with the fellow's plan of care as noted.  July 29, 2018    Dr. Sarah Sosa [phone]

## 2018-07-29 NOTE — PLAN OF CARE
Problem: Patient Care Overview  Goal: Individualization & Mutuality  Outcome: No Change  Tmax 100.2, triggered sepsis protocol, lactic acid 2.2. PA notified and 1 L bolus of NS currently infusing, lactic acid recheck scheduled and to be drawn. Remains tachycardic, HR 110s. OVSS on RA. BG remains elevated, 295, 457, and 397; endocrinologist notified, carb coverage ratio adjusted and additional dose of NPH ordered and given. K elevated at 5.4, received 50 g albumin and rechecked for 5.1. Mg 1.4, replaced w/ 2 g. Central line removed and tip cultured. PIV w/ TKO for antibiotics. PRN oxycodone given x3 w/ good relief of pain. Denies nausea. Tolerating DD3 diet w/ good appetite, calorie counts in place. JPs w/ copious amounts of brown/tea-colored drainage, PA notified and assessed drains, no changes to POC. NJT clamped, TF to cycle from 2543-5002. Up w/ SBA, ambulated the unit x1. Will continue to monitor and update team w/ changes.

## 2018-07-29 NOTE — PROVIDER NOTIFICATION
Endocrinologist notified of current BG- 397, awaiting any new orders. Transplant PA notified of lactic acid 2.2, awaiting any new orders. Will continue to monitor pt.

## 2018-07-29 NOTE — PROGRESS NOTES
Immunosuppression Note:    Yaneli Miles is a 43 year old male who is seen today  for immunosuppression management     IMamadou MD, I have examined the patient with the resident/PA/Fellow, discussed and agree with the note and findings.  I have reviewed today's vital signs, medications, labs and imaging. I reviewed the immunosuppression medications and levels. 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      POD 1 US liver: Impression:   1.  Hematoma associated with the inferior aspect of the perihepatic  space anteriorly, measuring 13.7 x 7.0 x 8.5 cm.  2.  Retrograde flow of the left portal vein. Additionally, low  velocities of the portal venous system. Single low resistive index of  the extrahepatic aspect of the hepatic artery, measuring 0.37. These  findings are likely within normal limits in the early postoperative  context. However, continued attention on follow-up recommended.    Graft function: POD 8. Good graft function. TB/alk phos/ALT/AST decreased. Tbili 3.0<3.2<3.5 . ASA 81 mg for thrombosis ppx.   Immunosuppression management:   Simulect intra-op due to BEN  Steroid taper per protocol.   MMF 1gm BID  Tac 2.5 mg BID. 7/27 and 7/28 level 8.7 (11.2 hour trough).   Complexity of management:Medium. Contributing factors: thrombocytopenia and anemia  Hematology: Hemoglobin stable, 9.5. PLT improving, today 81. WBC increasing today to15.9>9.2>2.9->6.6. INR 1.3. Monitor  DDAVP x 1 due to BEN and vit K 10 mg IV x 3 doses due to post op bleeding.    Cardiorespiratory: Stable on RA. HDS. Pulmonary toilet, OOB to  chair as much as possible, ambulate.  GI/Nutrition: FT placed. TF at goal, 60 ml/hr. DD3 with thin liquids. Calorie counts, 1307 kcal yesterday. Will plan to initiate cycled tube feeds this evening. Change senna/colace PRN due to loose stools.   Endocrine: Insulin drip. Transition to subcutaneous insulin tomorrow after tube feeds are transitioned. DM2 with high insulin requirements with TF. Consulted endocrine for recommendations.   Fluid/Electrolytes: Hypophosphatemia: replace. High CAROL output. Hyponatermic today at 125 and potassium 5.4 with a weight of 61. Will give albumin 25% 25g    : Luis removed  Infectious disease: Tmax 100.2 this AM, WBC 6.6<-2.9. Will send UA/UC/BCx and CXR. 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 Central line(presumed infected) removed today. Abdominal CT with oral contrast did show 2 intra-abdominal fluid collections, potentially infected. Will repeat blood cultures today.   HBV: Hepatits B DNA PCR positive prior to tx. Received HBIG on  and . Hep B surface antigen negative. No further HBIG planned. Continue tenofovir 300 mg daily.   PPX: Zosyn DC, completed. Valcyte x 12 weeks (CMVand EBV IgG +), bactrim x 6 months and clotrimazole colleen x 12 weeks    Prophylaxis: DVT PCDs, fall, GI, fungal  Disposition: 7A. Discussed with patient, wife with  plan to start transplant education. PT/OT recommending discharge to home with assist    Coordinator: Kay Reyes       Medical Decision Making: Medium  Subsequent visit [id number] (moderate level decision making)      JAY/Fellow/Resident Provider: Mihir Nj, fellow      Faculty: Mamadou Norwood M.D.  __________________________________________________________________  Transplant History: Admitted 2018 for  donor liver transplant.   The patient has a history of liver failure due to hepatitis B .    2018 (Liver), Postoperative  day: 8     Interval History:     No acute events overnight, still complains of partha-incisional pain, denies nausea, vomiting, denies fever, chills, reports passing flatus.      ROS:   A 10-point review of systems was negative except as noted above.    Curent Meds:    aspirin  81 mg Oral Daily     clotrimazole  1 Gabo Buccal 4x Daily     furosemide  20 mg Intravenous Once     insulin aspart  1-10 Units Subcutaneous TID AC     insulin aspart  1-7 Units Subcutaneous At Bedtime     insulin aspart   Subcutaneous TID w/meals     insulin glargine  10 Units Subcutaneous QAM AC     insulin isophane human  30 Units Subcutaneous QPM     linezolid  600 mg Intravenous Q12H     multivitamins with minerals  15 mL Per Feeding Tube Daily     mycophenolate  1,000 mg Oral BID IS    Or     mycophenolate  1,000 mg Oral or NG Tube BID IS     pantoprazole  40 mg Oral BID AC     sodium chloride (PF)  3 mL Intravenous Q8H     sulfamethoxazole-trimethoprim  10 mL Oral Daily     tacrolimus  2.5 mg Oral BID IS     tenofovir  300 mg Per Feeding Tube Daily     valGANciclovir  450 mg Oral Daily    Or     valGANciclovir  450 mg Oral or NG Tube Daily       Physical Exam:     Admit Weight: 60.7 kg (133 lb 12.8 oz)    Current Vitals:   /77 (BP Location: Left arm)  Pulse 115  Temp 99.4  F (37.4  C) (Oral)  Resp 16  Wt 61 kg (134 lb 8 oz)  SpO2 95%  BMI 21.71 kg/m2      Vital sign ranges:    Temp:  [98.5  F (36.9  C)-99.9  F (37.7  C)] 99.4  F (37.4  C)  Pulse:  [112-115] 115  Heart Rate:  [111-118] 111  Resp:  [12-18] 16  BP: ()/(70-77) 115/77  SpO2:  [95 %-100 %] 95 %  Patient Vitals for the past 24 hrs:   BP Temp Temp src Pulse Heart Rate Resp SpO2 Weight   07/29/18 0910 - - - - - - - 61 kg (134 lb 8 oz)   07/29/18 0313 115/77 99.4  F (37.4  C) Oral 115 - 16 95 % -   07/28/18 2327 114/73 98.5  F (36.9  C) Oral - 111 14 95 % -   07/28/18 2254 107/74 99.2  F (37.3  C) Oral - 117 12 96 % -   07/28/18 2024 105/70 99.9  F (37.7  C)  Oral - 118 16 96 % -   07/28/18 1519 110/77 98.6  F (37  C) Axillary 112 - 18 100 % -   07/28/18 1401 - - - - - - - 61.7 kg (136 lb)   07/28/18 1103 98/70 99.1  F (37.3  C) Oral - 113 18 97 % -     General Appearance: NAD    Skin: warm, dry  Heart: RRR, well perfused  Lungs: NLB on RA  Abdomen: soft, non distended, mild ttp generalized, lower quadrant. Incision with ecchymosis, dry, no signs of infection. CAROL x 2 serosang output, large amount  : no bah  Extremities: well perfused  Neurologic: A&O. Tremor absent.   CVC    Data:   CMP  Recent Labs  Lab 07/29/18  0618 07/28/18  0557  07/24/18  0438 07/23/18  0324   * 132*  < >  --   --    POTASSIUM 5.4* 4.9  < >  --   --    CHLORIDE 90* 97  < >  --   --    CO2 24 26  < >  --   --    * 156*  < >  --   --    BUN 38* 32*  < >  --   --    CR 1.94* 1.53*  < >  --   --    GFRESTIMATED 38* 49*  < >  --   --    GFRESTBLACK 45* 60*  < >  --   --    JESUS 7.5* 7.3*  < >  --   --    ICAW  --   --   --  4.7 4.7   MAG 1.4* 1.6  < >  --   --    PHOS 2.5 2.0*  < >  --   --    ALBUMIN 2.4* 2.7*  < >  --   --    BILITOTAL 3.0* 3.2*  < >  --   --    ALKPHOS 97 75  < >  --   --    AST 13 19  < >  --   --    ALT 25 32  < >  --   --    < > = values in this interval not displayed.  CBC    Recent Labs  Lab 07/29/18 0618 07/28/18  0557   HGB 9.5* 9.2*   WBC 15.9* 9.2   PLT 81* 47*     COAGS    Recent Labs  Lab 07/27/18  0606 07/25/18  0428   INR 1.30* 1.28*      Urinalysis  Recent Labs   Lab Test  07/27/18   2330  07/13/18   1315  06/18/18   1200   COLOR  Yellow  Dark Yellow  Yellow   APPEARANCE  Clear  Clear  Clear   URINEGLC  Negative  Negative  Negative   URINEBILI  Negative  Large*  Small*   URINEKETONE  Negative  Negative  Negative   SG  1.008  1.013  1.008   UBLD  Negative  Negative  Negative   URINEPH  7.5*  6.0  5.0   PROTEIN  30*  10*  Negative   NITRITE  Negative  Negative  Negative   LEUKEST  Negative  Negative  Negative   RBCU  5*  <1  1   WBCU  3  None  2   UTPG    --    --   0.75*     Virology:  Hepatitis C Antibody   Date Value Ref Range Status   07/20/2018 Nonreactive NR^Nonreactive Final     Comment:     Assay performance characteristics have not been established for newborns,   infants, and children

## 2018-07-29 NOTE — PROVIDER NOTIFICATION
Notified dorita LORENZ of pt 2am BG check of 288; pt transitioned from insulin gtt to subcutaneous insulin yesterday evening. Per MD, administer 4 units of novolog and recheck BG in two hours. Pt resting quietly/sleeping now. Will continue to monitor.     Recheck @0530 was 272; dorita LORENZ paged and another dose of novolog to be given; day team to address further. Will continue to monitor.

## 2018-07-29 NOTE — PROGRESS NOTES
Calorie Counts  Intake recorded for: 7/28 Kcal: 1406 Protein: 49 g  # Meals ordered: 3 meals (First: 90% cream of wheat, 30% of 1% milk, chicken broth)      (Second: 100% jello, chicken broth)      (Third: 100% jello, white rice, chicken broth, 1%-milk)  # Supplements: 100% 1 Boost Plus Shake, 90% 1 Boost Plus Shake

## 2018-07-30 NOTE — PROGRESS NOTES
Patient was intubated prior to going to OR with a 7.5 ETT.  ETT placement confirmed with CO2 detector, bilateral breath sounds, chest rise, and pending CXR. ETT secured at 25 @ the lip and placed on mechanical ventilation.

## 2018-07-30 NOTE — PLAN OF CARE
Problem: Patient Care Overview  Goal: Plan of Care/Patient Progress Review  Outcome: No Change  /65 (BP Location: Left arm)  Pulse 121  Temp 98.3  F (36.8  C) (Oral)  Resp 18  Wt 61 kg (134 lb 8 oz)  SpO2 97%  BMI 21.71 kg/m2. Pt. tachy. 120's, AOVSS, on RA.  On-call MD aware of tachycardia with no treatment ordered. Pt. had large emesis of undigested food about 800cc shortly after midnight & on-call, Dr. Tee, notified & ordered to stop tube feeds & stat CXR. Writer released order for abdominal xry & complete.  Pt. had another large emesis about 400cc & ordered NGT.  NGT placed by writer & Float-Float RN with no complications with gastric contents returned & abdominal xry done & pending verification.  NGT to LIS with  50cc tan/ yellow output.  D10 at 50cc/hour started d/t tube feeds off. Oxycodone x1 for abdominal pain with some relief. Right & left CAROL with large output.  Pt. voided twice, no stools this shift. B, on-call notified & ordered to recheck BG.   this morning. Prn IV Zofran x1 & prn Compazine IV x1 given. Spouse at bedside & very supportive. Continue to monitor pt. closely & update team with changes.

## 2018-07-30 NOTE — PLAN OF CARE
Problem: Patient Care Overview  Goal: Plan of Care/Patient Progress Review  ST 4A: Cx- Rapid response called this AM; Pt transferred to ICU and intubated. Will sign off and await new orders as appropriate.    Speech Language Therapy Discharge Summary    Reason for therapy discharge:    Change in medical status.    Progress towards therapy goal(s). See goals on Care Plan in [facility] electronic health record for goal details.  Goals not met.  Barriers to achieving goals:   change in medical status.    Therapy recommendation(s):    Please re-consult as appropriate post extubation.   Pt on dysphagia diet level 3 with thin liquids prior to intubation

## 2018-07-30 NOTE — BRIEF OP NOTE
[facility], [place]    Pre-operative diagnosis: Ischemic Bowel    Post-operative diagnosis same   Procedure: Exploratory laparotomy, peritoneal fluid culture, liver biopsy.  Wound class-II   Surgeon: DIANA Min M.D.   Assistants(s): Mihir Nj   Anesthesia: General    Estimated blood loss: Less than 50 ml    Total IV fluids: (See anesthesia record)   Blood transfusion: No transfusion was given during surgery   Total urine output: (See anesthesia record)  250ml   Drains: Carlos   Specimens: Peritoneal fluid culture, liver biopsy   Implants: None   Findings: Patchy difuse small bowel ischemia.  Doppler demonstrated arterial flow in main vessels to gut and liver.   Complications: None.   Condition: Stable  Transferred to ICU   Comments: See dictated operative report for full details

## 2018-07-30 NOTE — PROVIDER NOTIFICATION
NP notified of critical labs: K 6.5 (EKG ordered), , Na 119, and WBC 37.5. Awaiting any new orders. Will continue to monitor pt.

## 2018-07-30 NOTE — PROGRESS NOTES
NUTRITION SERVICES - BRIEF NOTE    Pt now NPO with TFs held r/t large emesis of undigested food last night, CT on 7/28 showing significant stool burden, and intubation this morning.    Implementations  Discontinue supplement order and any remaining TF orders given TFs to be held and pt now intubated    Monitoring  Nutrition will continue to follow and monitor per POC (see 7/27 RD note)    Halina Crouch, RD, LD  SICU RD Pgr: [phone]

## 2018-07-30 NOTE — PROGRESS NOTES
Diabetes Consult Daily  Progress Note          Assessment/Plan:   Yaneli Miles is a 43 year old male with history of type 2 diabetes,  ESLD secondary to chronic hepatitis B and ALD complicated with portal hypertension, ascites, esophageal varices, encephalopathy, severe thrombocytopenia who underwent  donor liver transplant on (2108) by Dr. Min.         Persistent hyperglycemia in setting of deteriorating clinical status    Plan:  -discontinue subcutaneous insulin and restart IV insulin infusion per protocol          Outpatient diabetes follow up: TBD  Plan discussed with transplant team.           Interval History:     The last 24 hours progress and nursing notes reviewed.  Transitioned to basal/bolus insulin on 18 but was severely hyperglycemic r/t Suspected under-dosing of NPH and improved intake with not enough carb coverage.    On  Cycled TF, though stopped 0020 due to high volume vomiting.  LA, WBC, K all high this morning-  pt transferred to SICU.  Now intubated and headed to OR.      PTA Regimen:  Per pt's outpatient pharmacy ([phone])-  Basaglar 8 units daily and increase as directed  januvia 100 mg dialy  Byetta 10 mcg twice daily          Recent Labs  Lab 18  0855 18  0806 18  0752 18  0653 18  0621 18  0219 18  2243 18  2026  18  0618  18  0557  18  0606  18  0819   GLC  --  282*  --   --  273*  --   --   --   --  284*  --  156*  --  148*  --  124   *  --  273* 260*  --  238* 275* 207*  < >  --   < >  --   < > 153*  < >  --    < > = values in this interval not displayed.            Review of Systems:   See interval hx          Medications:       Active Diet Order      NPO for Medical/Clinical Reasons Except for: Meds, Ice Chips     Physical Exam:  Gen: lying in bed  Resp: ETT in place  BP (!) 78/49 (BP Location: Left arm)  Pulse 121  Temp 97.6  F (36.4  C) (Oral)  Resp (!) 34   Wt 61 kg (134 lb 8 oz)  SpO2 96%  BMI 21.71 kg/m2           Data:     Lab Results   Component Value Date    A1C 5.4 07/13/2018              CBC RESULTS:   Recent Labs   Lab Test  07/30/18   0621   WBC  37.5*   RBC  4.33*   HGB  12.9*   HCT  37.6*   MCV  87   MCH  29.8   MCHC  34.3   RDW  16.4*   PLT  176     Recent Labs   Lab Test  07/30/18   0816  07/30/18   0806  07/30/18   0621   NA  112*  121*  119*   POTASSIUM  6.6*  6.7*  6.5*   CHLORIDE   --   85*  84*   CO2   --   13*  17*   ANIONGAP   --   23*  18*   GLC   --   282*  273*   BUN   --   33*  48*   CR   --   2.93*  2.89*   JESUS   --   8.6  8.8     Liver Function Studies -   Recent Labs   Lab Test  07/30/18   0621   PROTTOTAL  6.4*   ALBUMIN  2.9*   BILITOTAL  5.0*   ALKPHOS  157*   AST  671*   ALT  338*     Lab Results   Component Value Date    INR 1.30 07/27/2018    INR 1.28 07/25/2018    INR 1.24 07/24/2018         Donna Figueroapton APRN -[id number]    Diabetes Management job code [id number]

## 2018-07-30 NOTE — PROCEDURES
Name of Procedure:  Right Internal Jugular Central Line Placement    Date of Procedure: 7/30/18    Description of Procedure    The patient was placed in the supine position with head in neutral position. A shoulder roll was placed vertically between his shoulder blades and the bed was positioned in slight Trendelenburg. The site for insertion of the central line catheter was marked 1 cm caudad to the clavicle at roughly two-thirds of its length lateral from the sternal notch. The site was then prepped and draped in the usual sterile fashion.    An 18-gauge needle, attached to a 5 mL syringe, was then penetrated at the site and advanced through the patient's skin and subcutaneous tissue while being directed medially towards the sternal notch. The syringe was continuously aspirated as the needle was advanced until entry into the vein was marked by a flash of blood. Using the Seldinger technique, a guidewire was advanced through the needle. The guidewire was then secured with a fingertip at the skin as the needle and syringe were removed over it. Using a scalpel, a small incision was then made in the skin along the guidewire at a point just adjacent to the entry site. A dilator was then passed over the guidewire and advanced 2 cm through the subcutaneous tissue. The dilator was subsequently removed and a triple-lumen catheter was threaded over the guidewire until the 16 cm heriberto reached the entry site on the skin. The guidewire was then removed.     At this point, all three ports of the central line catheter were drawing well and flushing easily with sterile saline. A Biopatch was placed at the skin entry site and the triple-lumen catheter was secured in place using a 3-0 silk suture, after which a sterile tegaderm dressing was applied. There were not any immediate complications.    A post-procedure chest x-ray was obtained and it showed that the catheter tip was in the correct position and that there was not a  pneumothorax on the side of the central line placement.    Lisa Soler was immediately available during the entirety of the procedure.

## 2018-07-30 NOTE — PROVIDER NOTIFICATION
Notified Resident at 0020 AM regarding emesis.      Spoke with:  CHRISTIN Suazo MD    Orders were obtained.    Comments: MD notified of pt's large emesis of undigested food, about 800cc.  MD assessed pt. & ordered to stop tube feeds & stat CXR.  Writer released standing order for abdominal xry.  Pt's abdomen very firm/taut & distended. Pt. tachy. 120, AOVSS, on RA. Pt. given IV Zofran.

## 2018-07-30 NOTE — H&P
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 He returns to the ICU today for shock, severe lactic acidosis and respiratory failure.     PLAN:   Neuro/ pain/ sedation:  #Acute pain  #Sedation  - Monitor neurological status. Notify the MD for any acute changes in exam.  - Fentanyl and Versed for sedation.   - PRN Hydromorphone IV for BTP      Pulmonary care:   #Acute respiratory failure  - Intubated for airway protection  - CMV 16/400/5/40%  - ABG 1300 7.22/43/131/18     Cardiovascular:    #Shock  - Monitor hemodynamic status.   - Shock dose vasopressin  - Norepinephrine, requiring moderate doses. If he gets over 0.2 mcg/kg/min, will start stress alec steroids.   - MAP goal >65  - Continue albumin resuscitation- 75 grams of Albumin today.      GI care:   #DDLT 7/21/18  #Intrahepatic left portal venous thrombosis, infarction of left liver lobe, patchy infarction of right lobe, suspected hepatic arterial thrombosis  #Splenic infarctions  #Patchy bowel ischemia  - NPO except ice chips and medications.  - to OR today for exploratory laparotomy, abthera closure device in place. Findings of patchy diffuse SB ischemia. S/p liver biopsy  - Post operative CT abdomen pelvis with the above findings. Continue heparin gtt high intensity.   - Very large stool burden- enema today     Fluids/ Electrolytes/ Nutrition:  # Severe metabolic acidosis, lactic acidosis  # Severe hyponatremia  - Acute sodium drop 139-->123. Now up to 127. Will avoid rapid correction.   - CRRT  - Lactic acid peak 10-->9.4.   - ICU electrolyte replacement  protocol   - No indication for parenteral nutrition.     Renal/ Fluid Balance:    #BEN  - Will monitor intake and output.  - Bicarb down 13, creatinine up 3.17. Oliguric. Started on CRRT for clearance. Keep even to positive.      Endocrine:    #DM II  - Insulin gtt     ID/ Antibiotics:  #VRE Bacteremia  #Hepatitis B  - Continue Daptomycin. Blood cultures 7/27 VRE.   - Bactrim and Valgancyclovir for prophylaxis.   - Continue Tenofovir  - Start micafungin and Zosyn for empiric coverage for sepsis     Heme:     #Acute blood loss anemia  #Anemia of critical illness  #Portal venous thrombosis  #Thrombocytopenia  - Hemoglobin 10, stable.   -  High intensity heparin gtt. Continue aspirin.      Prophylaxis:    - Mechanical prophylaxis for DVT.   - Heparin gtt     MSK:    - PT and OT consults on hold     Lines/ tubes/ drains:  - R internal jugular TLC 7/30  - L internal jugular dialysis line 7/30  - Right femoral arterial line  - NJ   - PIVs  - ETT     Disposition:  - Surgical ICU.    Patient seen, findings and plan discussed with surgical ICU staff.    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 He returns to the ICU today for shock, severe lactic acidosis and respiratory failure.     REVIEW OF SYSTEMS: 10 point ROS neg other than the symptoms noted above in the HPI.    PAST MEDICAL HISTORY:   Past Medical History:   Diagnosis Date     Choledocholithiasis      Chronic viral hepatitis B without delta agent and without coma (H) 6/14/2018     Cirrhosis of liver with ascites (H) 6/14/2018      Esophageal varices (H)     prior GIB     Hepatic encephalopathy (H)      Hepatitis delta with hepatitis B carrier state 2018    positive antigen     Portal vein thrombosis 2018 care everywhere [place]     SBP (spontaneous bacterial peritonitis) (H) 2018     Type 2 diabetes mellitus without complication, without long-term current use of insulin (H) 2018       SURGICAL HISTORY:   Past Surgical History:   Procedure Laterality Date     ENDOSCOPIC RETROGRADE CHOLANGIOPANCREATOGRAM      with stone removal     ENDOSCOPIC ULTRASOUND UPPER GASTROINTESTINAL TRACT (GI) N/A 2018    Procedure: ENDOSCOPIC ULTRASOUND, ESOPHAGOSCOPY / UPPER GASTROINTESTINAL TRACT (GI);  Esophagogastroduodenoscopy, removal of [place] tube, ENDOSCOPIC ULTRASOUND with embolization of gastric varices, placement of nasogastric tube;  Surgeon: Armando Kraft MD;  Location: [facility] OR     TRANSPLANT LIVER RECIPIENT  DONOR N/A 2018    Procedure: TRANSPLANT LIVER RECIPIENT  DONOR;  Liver Transplant;  Surgeon: Stew Min MD;  Location: [facility] OR       SOCIAL HISTORY:   Social History     Social History     Marital status: Single     Spouse name: N/A     Number of children: N/A     Years of education: N/A     Social History Main Topics     Smoking status: Never Smoker     Smokeless tobacco: Not on file     Alcohol use No     Drug use: No     Sexual activity: Not on file     Other Topics Concern     Not on file     Social History Narrative       FAMILY HISTORY: No bleeding/clotting disorders nor problems with anesthesia.     ALLERGIES:      Allergies   Allergen Reactions     Nsaids      Contraindicated        MEDICATIONS:    No current facility-administered medications on file prior to encounter.   Current Outpatient Prescriptions on File Prior to Encounter:  acetaminophen (TYLENOL) 500 MG tablet Take 1 tablet (500 mg) by mouth every 6 hours as needed for mild pain or fever   ciprofloxacin (CIPRO)  500 MG tablet Take 1 tablet (500 mg) by mouth every 24 hours   ferrous sulfate (IRON) 325 (65 Fe) MG tablet Take 325 mg by mouth   insulin aspart (NOVOLOG PEN) 100 UNIT/ML injection Inject 1 Units Subcutaneous 3 times daily (with meals)   lactulose (CHRONULAC) 10 GM/15ML solution Take 30 mLs (20 g) by mouth 3 times daily Goal of 3-4 BMs per day   lidocaine (XYLOCAINE) 5 % ointment Apply topically every 4 hours as needed for moderate pain   miconazole (MICATIN; MICRO GUARD) 2 % powder Apply topically 2 times daily   midodrine HCl 10 MG TABS Take 10 mg by mouth 3 times daily (with meals)   pantoprazole (PROTONIX) 40 MG EC tablet Take 1 tablet (40 mg) by mouth 2 times daily (before meals)   phosphorus tablet 250 mg (PHOSPHA 250 NEUTRAL) 250 MG per tablet Take 2 tablets (500 mg) by mouth 2 times daily   potassium chloride SA (KLOR-CON) 20 MEQ CR tablet Take 1 tablet (20 mEq) by mouth daily   rifaximin (XIFAXAN) 550 MG TABS tablet Take 1 tablet (550 mg) by mouth 2 times daily   simethicone (MI-ACID GAS RELIEF) 80 MG chewable tablet Take 80 mg by mouth   tenofovir (VIREAD) 300 MG tablet Take 300 mg by mouth       PHYSICAL EXAMINATION:  Temp:  [93.5  F (34.2  C)-99  F (37.2  C)] 93.7  F (34.3  C)  Pulse:  [121-127] 121  Heart Rate:  [] 100  Resp:  [16-34] 25  BP: ()/(43-72) 84/49  MAP:  [6 mmHg-77 mmHg] 62 mmHg  Arterial Line BP: ()/(16-57) 82/48  SpO2:  [92 %-100 %] 99 %    General: intubated, NAD  HEENT: normocephalic, atraumatic.   Neuro: PERRL 2 mm brisk and round. Withdrawing all extremities.   CV: S1 S2, no murmurs.   Pulm: clear lung fields throughout. Symmetric chest rise  Abd: distended, tense.   Extremities: cool, pale  Skin: abdominal incision CDI. Bilateral JPs with serosanguinous fluid.   : bah in place  Psych: DILLAN, sedated    LABS: Reviewed.   Arterial Blood Gases     Recent Labs  Lab 07/30/18  1325 07/30/18  1213 07/30/18  1146 07/30/18  0816   PH 7.22* 7.29* 7.41 7.35   PCO2 43 39 30*  27*   PO2 131* 161* 72* 56*   HCO3 18* 19* 19* 15*     Complete Blood Count     Recent Labs  Lab 07/30/18  1444 07/30/18  1325 07/30/18  1230 07/30/18  1213 07/30/18  1146  07/30/18  0621   WBC 23.2*  --  25.7*  --  28.9*  --  37.5*   HGB 10.0* 8.9* 9.7* 10.5* 10.6*  < > 12.9*   *  --  139*  --  154  --  176   < > = values in this interval not displayed.  Basic Metabolic Panel    Recent Labs  Lab 07/30/18  1444 07/30/18  1325 07/30/18  1213 07/30/18  1146 07/30/18  0806 07/30/18  0621   * 124* 122* 123*  < > 121* 119*   POTASSIUM 5.2 5.3 5.9* 6.1*  < > 6.7* 6.5*   CHLORIDE 90*  --   --  85*  --  85* 84*   CO2 19*  --   --  18*  --  13* 17*   BUN 48*  --   --  52*  --  33* 48*   CR 2.80*  --   --  3.17*  --  2.93* 2.89*   * 243* 267* 264*  < > 282* 273*   < > = values in this interval not displayed.  Liver Function Tests    Recent Labs  Lab 07/30/18  1444 07/30/18  1230 07/30/18  1146 07/30/18  0621 07/29/18  0618  07/27/18  0606  07/25/18  0428   AST 6162*  --  4916* 671* 13  < > 24  < > 49*   ALT 2195*  --  2100* 338* 25  < > 40  < > 78*   ALKPHOS 178*  --  157* 157* 97  < > 88  < > 79   BILITOTAL 4.1*  --  3.9* 5.0* 3.0*  < > 3.5*  < > 4.5*   ALBUMIN 3.0*  --  2.8* 2.9* 2.4*  < > 3.0*  < > 2.8*   INR 2.32* 1.91*  --   --   --   --  1.30*  --  1.28*   < > = values in this interval not displayed.  Pancreatic Enzymes  No lab results found in last 7 days.  Coagulation Profile    Recent Labs  Lab 07/30/18  1444 07/30/18  1230 07/27/18  0606 07/25/18  0428   INR 2.32* 1.91* 1.30* 1.28*   PTT  --  41*  --   --        IMAGING:  Recent Results (from the past 24 hour(s))   XR Abdomen Port 1 View    Narrative    XR ABDOMEN PORT 1 VW  7/30/2018 1:39 AM      HISTORY: Verify small bowel feeding tube bedside placement;     COMPARISON: 7/28/2018    FINDINGS: Extensive pneumobilia, unchanged from CT. Common bile duct  stent is in place. Feeding tube tip is projecting just proximal to the  ligament of Treitz.  Skin staples over the mid abdomen. Diffuse gaseous  distention of the small bowel and colon. Right and left paracolic  drains remains in place.      Impression    IMPRESSION:   1. Feeding tube tip is projecting just proximal to the ligament of  Treitz.  2. Diffuse gaseous distention of the small bowel and colon, this may  represent an adynamic ileus although bowel obstruction is of concern.    I have personally reviewed the examination and initial interpretation  and I agree with the findings.    ZULEMA GRANDA MD   XR Chest Port 1 View    Narrative    XR CHEST PORT 1 VW  7/30/2018 1:39 AM      HISTORY: large emesis while supine, please eval for aspiration;     COMPARISON: 7/27/2018    FINDINGS: Pulmonary vascular congestion. Increased streaky bibasilar  and perihilar opacities. No pleural effusion or pneumothorax. Right  axilla is within normal limits. Large redd calcifications projected  to the left of midline, likely in the region of the braden hepatis.  Feeding tube transversing the esophagus, the tip is off the bottom of  the film.      Impression    IMPRESSION:   1. Increased streaky bibasilar and perihilar opacities, which may  represent aspiration versus atelectasis.  2. Gastric distention.    I have personally reviewed the examination and initial interpretation  and I agree with the findings.    ZULEMA GRANDA MD   XR Abdomen Port 1 View    Narrative    XR ABDOMEN PORT 1 VW  7/30/2018 6:52 AM      HISTORY: Verify small bowel feeding tube bedside placement;     COMPARISON: 7/30/2018 earlier today    FINDINGS: There is a new OG/NG tube type projected with the tip over  the body of the stomach. Feeding tube tip projects just proximal to  the ligament of Treitz. Skin staples over the upper abdomen. Extensive  pneumobilia is unchanged. Partially visualized gaseous distention of  the small bowel and colon. Postsurgical changes of soft tissue  staples. Bilateral paracolic gutter drains.      Impression     IMPRESSION:   1. OG/NG type tube tip projects over the gastric body. Feeding tube is  unchanged from previous studies the tip projected just proximal to the  ligament of Treitz.  2. Partially visualized gaseous distention of the small bowel and  colon, likely secondary to adynamic ileus, though cannot rule out  bowel obstruction.    I have personally reviewed the examination and initial interpretation  and I agree with the findings.    ZULEMA GRANDA MD   US Liver Transplant Portable    Narrative    EXAMINATION: US LIVER TRANSPLANT PORTABLE, 7/30/2018 8:37 AM     COMPARISON: Ultrasound 7/22/2019    HISTORY: Increased LFTs, acute change, postop day 9    TECHNIQUE:  Gray-scale, color Doppler and spectral flow analysis.    FINDINGS:   Technically challenging exam due to bowel gas. The transplanted liver  and relevant vascularity are not seen. Redemonstration of a complex  fluid collection with low-level internal echoes, measuring 10.0 x 9.0  x 6.7 cm, previously measuring 13.6 x 7.0 x 8.5 cm. Visualized  portions of left kidney are unremarkable; unable to visualize inferior  pole.  Mild splenomegaly measuring 15.8 cm in craniocaudal dimension.      Impression    Impression:   1.  Transplanted liver is not visualized due to overlying bowel gas.   Nondiagnostic Doppler exam for this reason as well.  2.  Peritransplant hematoma has decreased in size, now measuring up to  10.0 cm.    I have personally reviewed the examination and initial interpretation  and I agree with the findings.    PAUL COOL MD   XR Abdomen Port 1 View    Narrative    Exam:  XR ABDOMEN PORT 1 VW, 7/30/2018 12:05 PM    History: abdominal distention;     Comparison:  Abdominal radiograph 7/30/2018.    Findings:  Portable supine radiograph the abdomen. Abnormally dilated  small bowel throughout the abdomen with extensive pneumatosis.  Partially visualized portal venous gas. Surgical drains are in stable  position. Feeding tube tip projects over the  fourth portion of the  duodenum. Gastric tube tip and sidehole projects over the stomach.  Skin staples overlying the upper abdomen. Bones are unremarkable.      Impression    Impression:    Diffuse abnormal bowel dilation with extensive pneumatosis and  partially visualized portal venous gas, highly concerning for ischemic  bowel.    [Result: Bowel ischemia]    Finding was identified on 7/30/2018 12:05 PM.     Dr. Nj was contacted by Dr. Kenny at 7/30/2018 12:07 PM and  verbalized understanding of the urgent finding.     I have personally reviewed the examination and initial interpretation  and I agree with the findings.    GEOVANY SHEPARD MD   XR Chest Port 1 View    Narrative    XR CHEST PORT 1 VW  7/30/2018 11:42 AM      HISTORY: line placement;     COMPARISON: Earlier same day    FINDINGS: Endotracheal tube tip projects over the mid to low thoracic  trachea. Left IJ central venous catheter tip projects over the high  SVC. Right IJ central venous catheter tip projects over the high right  atrium. Enteric tubes course below the level of the diaphragm and the  inferior margin of the field-of-view. Gastric distention appears  improved. Perihilar and bibasilar opacities are unchanged. Left upper  quadrant drain.      Impression    IMPRESSION: Endotracheal tube tip projects over the mid to low  thoracic trachea. Left IJ central venous catheter tip projects over  the high SVC. Right IJ central venous catheter tip projects over the  high right atrium. Enteric tubes course below the level of the  diaphragm and the inferior margin of the field-of-view.    I have personally reviewed the examination and initial interpretation  and I agree with the findings.    DARRIUS MANTILLA MD   CTA Abdomen Pelvis with Contrast    Narrative    EXAMINATION: CTA ABDOMEN PELVIS WITH CONTRAST, 7/30/2018 4:55 PM    TECHNIQUE:  Helical CT images from the lung bases through the  symphysis pubis were obtained with IV contrast in late  arterial and  venous phase. Contrast dose: 82ml isovue 370    COMPARISON: CT 7/20/2018    HISTORY: S/p liver transplant, and Ex lap for ischemic small bowel;  please evaluate mesenteric vessels patency.;     FINDINGS:    Abdomen and pelvis: Postsurgical changes of orthotopic liver  transplant. Small free air noted in the abdominal cavity. Post  surgical changes of evacuation of the pretransplant collection. The  surgical drain remain in place adjacent to the hepatorenal fossa.  Intra and extrahepatic biliary stent remain in place. There is  heterogeneous enhancement of the left lobe of the liver, which is more  appreciated on portal venous phase. There is also an additional  hypoenhancement/infarction of the posterior aspect of the right lobe  of the liver. Heterogeneous enhancement of the spleen, consistent with  multiple infarction. There is periportal edema.    Portal vein and right intrahepatic portal venous branches are patent.  There is occlusion of the left portal vein from the origin. Splenic  vein, SMV, and IMV are normally opacified.    Celiac trunk, SMA, and MATT are normally opacified. There is only  proximal portion of the hepatic artery noted outside the hepatic  allograft. Splenic artery is opacified.    Small interloop ascites present. Gastric and enteric tubes are in  proper position. Pneumatosis is present mostly involving the small  bowel. Moderate amount of stool in the bowel.    Luis catheter is in place. Bladder is decompressed.    Lung bases: No significant pleural effusion. Left greater than right  fibroatelectasis. Patchy groundglass attenuation in the lingula.    Bones and soft tissues: No acute osseous abnormality. Postsurgical  changes of laparotomy, with midline anterior soft tissue defect. Mild  soft tissue edema and early anasarca.      Impression    IMPRESSION:   1. Intrahepatic left portal venous thrombosis, with infarction of the  left lobe of the liver. Right portal vein is patent.  Patchy infarction  within the right lobe of the liver hepatic segment 5. There is also  suspected hepatic arterial thrombosis. There is only proximal portion  of the hepatic artery noted outside the hepatic allograft.   2. Multiple splenic infarctions.  3. Extensive pneumatosis, bowel ischemia is suspected.However, SMA and  MATT are opacified.  4. Post surgical changes of evacuation of peritransplant hematoma.    [Result: Portal venous thrombosis, pneumatosis and bowel ischemia]    Finding was identified on 7/30/2018 4:59 PM.     Dr. Azul and Dr. Nj was contacted by Dr. Francis at 7/30/2018 5:36  PM and verbalized understanding of the urgent finding.

## 2018-07-30 NOTE — PROGRESS NOTES
Cross-cover note:    07/30/2018    General Surgery Cross Cover Note    Was called by nursing regarding significant emesis x2.    B/P: 106/65, T: 98.3, P: 127, R: 18    PE:  Alert and awake, mile distress  Breathing non-labored  Abd:significant for ascites with taut abdomen, diffusely tender to palpation  Extr. Warm to touch  Incisions clean, dry, intact    Plan:  -stop tube feeds  -chest xray for aspiration risk  -NGT   D10 per protocol    Discussed with fellow    Amanda Suazo MD  PGY-1 General Surgery

## 2018-07-30 NOTE — PLAN OF CARE
Problem: Patient Care Overview  Goal: Plan of Care/Patient Progress Review    /72 (BP Location: Left arm)  Pulse 117  Temp 99  F (37.2  C) (Oral)  Resp 18  Wt 61 kg (134 lb 8 oz)  SpO2 98%  BMI 21.71 kg/m2    5623-1465  Tmax 99; HR tachy 110s; Soft BPs 90s/60s; OVSS on RA, no s/s of distress. A/Ox4; pleasant and cooperative with cares; happy tonight with family visiting. Pt's wife at bedside; supportive and helpful with cares. Up SBA, voiding via urinal; intermittently incontinent; no BMs this shift. Cycled TF @50ml/hr from 8p-8a thru NJ; pt tolerating well; denied n/v. On regular diet with carb coverage and sliding scale; pt did have some food from home tonight (rice noodle porridge) and had good appetite. AC/HS BG, bedtime  and covered per sliding scale. PIV TKO for abx therapy; R and L CAROL patent with moderate output. Abdominal incision CDI; pt did endorse abdominal pain--treated with PRN oxy x1 with relief. Pt sleeping now; call light and belongings within reach; will continue to monitor and continue POC/notify team as needed.

## 2018-07-30 NOTE — ANESTHESIA PROCEDURE NOTES
ANESTHESIOLOGY RESIDENT/CRNA INTUBATION NOTE  Indication for intubation: respiratory insufficiency, hemodynamic instability, airway protection.  Provider Ordering Intubation: LORENZO LANG  History regarding the most recent potassium obtained: Yes  History regarding renal failure obtained: Yes  History of presence or absence of CVA/stroke was obtained: Yes  History of presence or absence of NM disorder obtained: Yes  Post Intubation: ETT secured, Vent settings by primary/ICU team, Primary/ICU team to review CXR, Sedation to be ordered by primary/ICU team, No apparent complications and Report given to primary nurse and/or team

## 2018-07-30 NOTE — PLAN OF CARE
Problem: Patient Care Overview  Goal: Plan of Care/Patient Progress Review  OT 7A: Cx. Pt not medically appropriate. Rapid response called and pt transferred to ICU. Will reschedule.

## 2018-07-30 NOTE — ANESTHESIA POSTPROCEDURE EVALUATION
Patient: Yaneli Miles    Procedure(s):  Exploratory Laparotomy, hematoma evacuation - Wound Class: II-Clean Contaminated    Diagnosis:Ischemic Bowel   Diagnosis Additional Information: No value filed.    Anesthesia Type:  General    Note:  Anesthesia Post Evaluation    Patient location during evaluation: ICU  Patient participation: Unable to evaluate secondary to administered sedation  Level of consciousness: obtunded/minimal responses (sedated)  Pain management: adequate  Airway patency: patent  Cardiovascular status: acceptable  Respiratory status: acceptable  Hydration status: acceptable  PONV: none             Last vitals:  Vitals:    07/30/18 1515 07/30/18 1530 07/30/18 1545   BP:      Pulse:      Resp:      Temp:      SpO2: 100% 100% 99%         Electronically Signed By: Leo Marks MD  July 30, 2018  4:44 PM

## 2018-07-30 NOTE — PROGRESS NOTES
NGT Insertion    D: Received a call from bedside RN regarding pt needing NGT per Dr. CHRISTIN Suazo's order.  I:   Teaching regarding NGT, its purpose and function, given to pt. 16F [place] Sump Tube inserted through left nare, placement initially verified via auscultation and palpation of vibration upon air injection and aspiration of gastric content, and is connected to LIS after two attempts by bedside RN, one via oral route and the second one through the left nare without success.  A:  Verbalized understanding of teaching. No resistance met during entire tube insertion. Pt had not gagged our coughed. No change in respiratory status. Suctioning out brownish/yellowish thin gastric secretion.  P:  Abdominal x-ray to be ordered/to be released by bedside RN for final verification of NGT placement.

## 2018-07-30 NOTE — PROGRESS NOTES
CRRT INITIATION NOTE    Consent for CRRT Completed:  YES  Patient s Vascular Access: Catheter              Placement Confirmed: YES  Manufacture:  VersionEye  Model:  Solexa elite  Length/Romanian Size:  12F/16cm  Flush Volume:  1.3mL    DATA:  Procedure:  cvvhdf  Start Time:  1231  Machine#:  7  Filter:  M150  Blood Flow:  200  ML/min  Pre-Replacement Solution: Prismasol 2/3.5  Post-Replacement Solution:  Prismasol 2/3.5  Dialysate Solution:  Prismasol 2/3.5  Pre-Replacement Solution Rate:  800 mL/hr  Post-Replacement Solution Rate:  200 mL/hr  Dialysate Flow Rate:  800 mL/hr   Patient Removal Rate:  0 mL/hr  Anticoagulation Type and Rate:  none    ASSESSMENT:  How Patient Tolerated Initiation:   Vital Signs:  BP (!) 84/49  Pulse 121  Temp 97.6  F (36.4  C) (Oral)  Resp 26  Wt 61 kg (134 lb 8 oz)  SpO2 100%  BMI 21.71 kg/m2  Initial Pressures:  Access:  -60  Filter:  113  Return:  84  TMP:  55  Change in Filter Pressure:  31      INTERVENTIONS:  CRRT therapy initiated in OR at the request of surgery staff. Both ports of access line with good blood return. Lines cleaned and connected to machine. Fluid removal rate set to zero at request of surgery staff.    PLAN:  Continue treatment as ordered. Monitor closely while in OR.

## 2018-07-30 NOTE — ANESTHESIA CARE TRANSFER NOTE
Patient: Yaneli Miles    * No procedures listed *    Diagnosis: * No pre-op diagnosis entered *  Diagnosis Additional Information: No value filed.    Anesthesia Type:   No value filed.     Note:  Airway :ETT and Ventilator  Patient transferred to:ICU  Comments: Pt intubated without complications. ICU team at bedside. Sedation discussed and ICU team will manage.  2mg Versed given prior to intubation.  ICU team managing BP. ICU Handoff: Call for PAUSE to initiate/utilize ICU HANDOFF, Identified Patient, Identified Responsible Provider, Reviewed the Pertinent Medical History, Discussed Surgical Course, Reviewed Intra-OP Anesthesia Management and Issues during Anesthesia, Set Expectations for Post Procedure Period and Allowed Opportunity for Questions and Acknowledgement of Understanding      Vitals: (Last set prior to Anesthesia Care Transfer)    CRNA VITALS  7/30/2018 1132 - 7/30/2018 1202      7/30/2018             Resp Rate (observed): 10                Electronically Signed By: BUD Funk CRNA  July 30, 2018  12:02 PM

## 2018-07-30 NOTE — PROGRESS NOTES
CRRT STATUS NOTE    DATA:  Time:  5:57 PM  Pressures WNL:  YES  Filter Status:  WDL    Problems Reported/Alarms Noted:  none    Supplies Present:  YES    ASSESSMENT:  Patient Net Fluid Balance:  Net negative 1.7L since midnight, primarily due to output from NGT and drains.   Vital Signs:  BP (!) 84/49  Pulse 121  Temp 93.7  F (34.3  C) (Axillary)  Resp 25  Wt 61 kg (134 lb 8 oz)  SpO2 99%  BMI 21.71 kg/m2  Levophed @ 0.16mcg/kg/min, vasopressin at 2.4 units/hr  Labs:  K 5.2, Na 127, Cr 2.8, BUN 48, lactate 9.4, ALT 2195, AST 6162, WBC 23.2  Goals of Therapy:  I=O    INTERVENTIONS:   CRRT therapy started in OR during ex lap and continued upon return. Recirculation performed for trip to CT scan with no issues. Filter and TMP pressures remain WNL. Post filter replacement changed to D5W to prevent over correction of sodium. Lines reversed due to access extremely negative alarms; currently resolved.    PLAN:  Continue to monitor closely overnight. Q2 hour sodium levels. Monitor blood glucose closely. Please call CRRT with any questions or concerns at [phone]. Check circuit every shift and change as needed, at least every 72 hours. Next scheduled change 8/2/2018 at 1427.

## 2018-07-30 NOTE — ANESTHESIA PREPROCEDURE EVALUATION
Anesthesia Evaluation     . Pt has had prior anesthetic. Type: General    No history of anesthetic complications          ROS/MED HX    ENT/Pulmonary:      (-) asthma and COPD   Neurologic:     (+)neuropathy    (-) CVA and TIA   Cardiovascular:        (-) hypertension, CAD, irregular heartbeat/palpitations and stent   METS/Exercise Tolerance:     Hematologic:     (+) History of Transfusion -      Musculoskeletal:         GI/Hepatic:     (+) hepatitis liver disease (s/p liver transplant 7/21/18),       Renal/Genitourinary:      (-) renal disease   Endo:     (+) type II DM Using insulin .   (-) Type I DM and thyroid disease   Psychiatric:         Infectious Disease:  - neg infectious disease ROS (+) Recent Fever, Other Infectious Disease       Malignancy:         Other:                     Physical Exam  Normal systems: dental    Airway   Comment: Intubated    Dental     Cardiovascular   Rhythm and rate: abnormal    PE comment: Tachycardic    Pulmonary     Other findings: LIJ cordis, RIJ triple lumen, R femoral a-line                Anesthesia Plan      History & Physical Review  History and physical reviewed and following examination; no interval change.    ASA Status:  5 emergent.        Plan for General with Intravenous induction. Maintenance will be Balanced.    PONV prophylaxis:  Ondansetron (or other 5HT-3)  Additional equipment: 2nd IV      Postoperative Care  Postoperative pain management:  IV analgesics.      Consents  Anesthetic plan, risks, benefits and alternatives discussed with:  Implied consent/emergency..        Leo Marks MD  11:55 AM July 30, 2018

## 2018-07-30 NOTE — PLAN OF CARE
Problem: Patient Care Overview  Goal: Plan of Care/Patient Progress Review  7A PT: Cancel, pt not currently medically appropriate for PT session. Rapid response called and pt transferred to ICU. Will reschedule per POC.

## 2018-07-30 NOTE — ANESTHESIA CARE TRANSFER NOTE
Patient: Yaneli Miles    Procedure(s):  Exploratory Laparotomy, hematoma evacuation - Wound Class: II-Clean Contaminated    Diagnosis: Ischemic Bowel   Diagnosis Additional Information: No value filed.    Anesthesia Type:   General     Note:  Airway :ETT  Patient transferred to:ICU  Comments: Patient stable en route to ICU, ICU team at bedside upon arrival ICU Handoff: Call for PAUSE to initiate/utilize ICU HANDOFF, Identified Patient, Identified Responsible Provider, Reviewed the Pertinent Medical History, Discussed Surgical Course, Reviewed Intra-OP Anesthesia Management and Issues during Anesthesia, Set Expectations for Post Procedure Period and Allowed Opportunity for Questions and Acknowledgement of Understanding      Vitals: (Last set prior to Anesthesia Care Transfer)    CRNA VITALS  7/30/2018 1331 - 7/30/2018 1417      7/30/2018             NIBP: 120/55    Pulse: 109    SpO2: 100 %    Resp Rate (observed): 16                Electronically Signed By: Sofya Hernandez CRNA, APRN CRNA  July 30, 2018  2:17 PM

## 2018-07-30 NOTE — PROVIDER NOTIFICATION
18 0700   Call Information   Date of Call 18   Time of Call 0750   Name of person requesting the team Socorro   Title of person requesting team RN   RRT Arrival time 0851   Time RRT ended 0903   Reason for call   Type of RRT Adult   Primary reason for call Cardiovascular   Cardiovascular SBP less than 90   Fluid status Other (describe)  (Questionable versus fluid/gas)   Was patient transferred from the ED, ICU, or PACU within last 24 hours prior to RRT call? 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

## 2018-07-31 NOTE — CONSULTS
Nephrology Initial Consult  July 30, 2018      Yaneli Miles MRN:[id number] YOB: 1975  Date of Admission:7/20/2018  Primary care provider: [facility], [facility]  Requesting physician: Stew Min MD    ASSESSMENT AND RECOMMENDATIONS:   Yaneli Miles is a 43 year old male with ESLD due to Hep B and ALD complicated with portal hypertension, ascites requiring multiple paracentesis, esophageal varices, encephalopathy, severe thrombocytopenia, history of DM, who underwent a DBD OLT on 7/21/2018, nephrology consulted for acute hyponatremia and BEN     BEN with creatinine elevation to 2.8  Bl is 1.3-2 prior to Sx  Creatinine elevation is likely sec to increased intra-abdominal pressure and possible severe pre -renal state with sepsis and high output from the CAROL drain in excess of 2L/day, intrinsic injury with hypoxic/ischemic injury to tubules with ATN is a possibility as well.     -- Started on CRRT due to severe hyperkalemia , severe acidosis and managing periop  -- will continue on CRRT with K2 bath unless K is <4  -- on D5 post dialysis fluid due to hyponatremia corrected to fast by amy, Goal is 124 for tonight and will recalcute in the am for correction. If sodium is <122 please call will switch D5 to 1/2 NS  -- CRRT prescribed at 25ml/kg/min with I=O for now, bolus with IVF as needed for hypotension, albumin preferred for resuscitation to avoid any changes to sodium  -- continue with labs per CRRT protocol, Sodium Q2hrs and call if sodium is <122 or more the 124 for adjustment of CRRT  -- daily weights and strict IO monitoring  -- continue with Intra abdominal pressure monitoring Q shift    Severe hyponatremia  Likely related to free water flushes/BEN , ADH dependant with hypotension as well since he did receive NS   Goals as above , for today not more then 124  Hyperkalemia  With BEN , hematoma and poor renal clearnace and met acidosis  CRRT prescription to address    Hypervolemia  With  hypotension on presser support  CRRT with I=O for now  Will monitor for needs    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  Micafungin and zosym for additional coverage for sepsis per MICU    DDLT 7/12/18   ESLD sec to hep B cb EV , HE , ascites and portal HTN  Ex lap 7/30  Intrahepatic left portal venous thrombosis, infarction of left liver lobe, patchy infarction of right lobe, suspected hepatic arterial thrombosis, Splenic infarctions and Patchy bowel ischemia on CT post op  On valcyte and bactrim  On tenofovir    Acute resp failure   Intubated on vent support   MICU managing    DMII on insulin gtt    Anemia/ thrombocytopenia/leucocytosis  ACD/inflamm anemia  Low PLT due to ESLD  Hb 10   On heparin       Recommendations were communicated to primary team       Seen and discussed with Dr. Charlotte Rodrigues MD   [phone]      REASON FOR CONSULT: BEN , hyponatremia and lactic acidosis    HISTORY OF PRESENT ILLNESS:  Yaneli Miles is a 43 year old male with ESLD due to Hep B and ALD complicated with portal hypertension, ascites requiring multiple paracentesis, esophageal varices, encephalopathy, severe thrombocytopenia, history of DM, who underwent a DBD OLT on 7/21/2018, nephrology consulted for acute hyponatremia and BEN   He has a baseline creatinine of 1.3-2 prior to Tx and got to ~1.2 post transplant until it slowly trended up again since 7/27 with worsening hyponatremia. He was also found to have VRE bacteremia on 7/27 after a fever spike and started on Linezolid possible sources intra abdominal and or central line, now on daptomycin. He had a fall in serum sodium to 119 then 112 over the last 24hrs from 125 yesterday. He also has been dealing with ileus and possible aspiration with vomiting overnight with replaced NGT now. He had received multiple flushes with free water from the NGT due to tube blockade that are not recorded. Creatinine increased to 2.89 and lactic acid elevated to >8 in the last 24hrs from 1.2 with  hypotension and severely distended abdomen.  He was taken to ICU and then OR for ex lap sec to concerns for bowl ischemia.    PAST MEDICAL HISTORY:  Reviewed with patient on 2018     Past Medical History:   Diagnosis Date     Choledocholithiasis      Chronic viral hepatitis B without delta agent and without coma (H) 2018     Cirrhosis of liver with ascites (H) 2018     Esophageal varices (H)     prior GIB     Hepatic encephalopathy (H)      Hepatitis delta with hepatitis B carrier state 2018    positive antigen     Portal vein thrombosis 2018 care everywhere [place]     SBP (spontaneous bacterial peritonitis) (H) 2018     Type 2 diabetes mellitus without complication, without long-term current use of insulin (H) 2018       Past Surgical History:   Procedure Laterality Date     ENDOSCOPIC RETROGRADE CHOLANGIOPANCREATOGRAM      with stone removal     ENDOSCOPIC ULTRASOUND UPPER GASTROINTESTINAL TRACT (GI) N/A 2018    Procedure: ENDOSCOPIC ULTRASOUND, ESOPHAGOSCOPY / UPPER GASTROINTESTINAL TRACT (GI);  Esophagogastroduodenoscopy, removal of [place] tube, ENDOSCOPIC ULTRASOUND with embolization of gastric varices, placement of nasogastric tube;  Surgeon: Armando Kraft MD;  Location: [facility] OR     TRANSPLANT LIVER RECIPIENT  DONOR N/A 2018    Procedure: TRANSPLANT LIVER RECIPIENT  DONOR;  Liver Transplant;  Surgeon: Stew Min MD;  Location: [facility] OR        MEDICATIONS:  PTA Meds  Prior to Admission medications    Medication Sig Last Dose Taking? Auth Provider   acetaminophen (TYLENOL) 500 MG tablet Take 1 tablet (500 mg) by mouth every 6 hours as needed for mild pain or fever   Fazal Lamas MD   ciprofloxacin (CIPRO) 500 MG tablet Take 1 tablet (500 mg) by mouth every 24 hours   Fazal Lamas MD   ferrous sulfate (IRON) 325 (65 Fe) MG tablet Take 325 mg by mouth   Reported, Patient   insulin aspart (NOVOLOG PEN) 100 UNIT/ML injection  Inject 1 Units Subcutaneous 3 times daily (with meals)   Fazal Lamas MD   lactulose (CHRONULAC) 10 GM/15ML solution Take 30 mLs (20 g) by mouth 3 times daily Goal of 3-4 BMs per day   Fazal Lamas MD   lidocaine (XYLOCAINE) 5 % ointment Apply topically every 4 hours as needed for moderate pain   Fazal Lamas MD   miconazole (MICATIN; MICRO GUARD) 2 % powder Apply topically 2 times daily   Fazal Lamas MD   midodrine HCl 10 MG TABS Take 10 mg by mouth 3 times daily (with meals)   Harley Hayes MD   pantoprazole (PROTONIX) 40 MG EC tablet Take 1 tablet (40 mg) by mouth 2 times daily (before meals)   Fazal Lamas MD   phosphorus tablet 250 mg (PHOSPHA 250 NEUTRAL) 250 MG per tablet Take 2 tablets (500 mg) by mouth 2 times daily   Fazal Lamas MD   potassium chloride SA (KLOR-CON) 20 MEQ CR tablet Take 1 tablet (20 mEq) by mouth daily   Harley Hayes MD   rifaximin (XIFAXAN) 550 MG TABS tablet Take 1 tablet (550 mg) by mouth 2 times daily   Fazal Lamas MD   simethicone (MI-ACID GAS RELIEF) 80 MG chewable tablet Take 80 mg by mouth   Reported, Patient   tenofovir (VIREAD) 300 MG tablet Take 300 mg by mouth   Reported, Patient      Current Meds    albumin human  25 g Intravenous Once     aspirin  81 mg Oral Daily     calcium chloride  2 g Intravenous Once     calcium gluconate  1 g Intravenous Once     calcium gluconate  2 g Intravenous Once     clotrimazole  1 Gabo Buccal 4x Daily     [START ON 7/31/2018] DAPTOmycin (CUBICIN) intermittent infusion  10 mg/kg (Dosing Weight) Intravenous Q24H     dextrose  50 mL Intravenous Once     hydrocortisone sodium succinate PF  50 mg Intravenous Q6H     insulin (Human Regular) (HumuLIN R/NovoLIN R) for intravenous use (SHORT ACTING)  10 Units Intravenous Once     lactulose  200 g Rectal Once     micafungin  100 mg Intravenous Q24H     mycophenolate  1,000 mg Oral BID IS    Or     mycophenolate  1,000 mg Oral or NG Tube BID IS     [START ON  7/31/2018] pantoprazole (PROTONIX) IV  40 mg Intravenous Daily with breakfast     piperacillin-tazobactam  4.5 g Intravenous Q6H     sodium chloride (PF)  3 mL Intravenous Q8H     [START ON 8/1/2018] sulfamethoxazole-trimethoprim  10 mL Oral Once per day on Mon Wed Fri     [START ON 7/31/2018] tacrolimus  2 mg Oral or Feeding Tube BID IS     tenofovir  300 mg Per Feeding Tube Q48H     [START ON 7/31/2018] valGANciclovir  450 mg Oral Every Other Day    Or     [START ON 7/31/2018] valGANciclovir  450 mg Oral or NG Tube Every Other Day     Infusion Meds    IV fluid REPLACEMENT ONLY Stopped (07/30/18 0800)     D5W 150 mL/hr at 07/30/18 1719     dialysate for CVVHD & CVVHDF (PrismaSol BGK 2/3.5) 12.5 mL/kg/hr (07/30/18 1432)     fentaNYL 50 mcg/hr (07/30/18 1800)     HEParin 1,100 Units/hr (07/30/18 1800)     insulin (regular) 4 Units/hr (07/30/18 1923)     lactated ringers 10 mL/hr at 07/30/18 1519     - MEDICATION INSTRUCTIONS -       midazolam 2 mg/hr (07/30/18 1457)     - MEDICATION INSTRUCTIONS -       norepinephrine 0.2 mcg/kg/min (07/30/18 1900)     replacement solution for CVVHD & CVVHDF (PrismaSol BGK 2/3.5) 12.5 mL/kg/hr (07/30/18 1432)     Reason beta blocker order not selected       vasopressin (PITRESSIN) infusion ADULT (40 mL) 2.4 Units/hr (07/30/18 1800)       ALLERGIES:    Allergies   Allergen Reactions     Nsaids      Contraindicated        REVIEW OF SYSTEMS:  A comprehensive of systems was negative except as noted above.    SOCIAL HISTORY:   Social History     Social History     Marital status: Single     Spouse name: N/A     Number of children: N/A     Years of education: N/A     Occupational History     Not on file.     Social History Main Topics     Smoking status: Never Smoker     Smokeless tobacco: Not on file     Alcohol use No     Drug use: No     Sexual activity: Not on file     Other Topics Concern     Not on file     Social History Narrative     Reviewed with patient and wife    accompanies Yaneli Miles in hospital room    FAMILY MEDICAL HISTORY:   No hx of renal disease    PHYSICAL EXAM:   Temp  Av.3  F (36.8  C)  Min: 93.5  F (34.2  C)  Max: 103  F (39.4  C)  Arterial Line MAP (mmHg)  Av.5 mmHg  Min: 6 mmHg  Max: 141 mmHg  Arterial Line BP  Min: 73/55  Max: 153/136      Pulse  Av.8  Min: 59  Max: 127 Resp  Avg: 15.4  Min: 8  Max: 35  SpO2  Av.6 %  Min: 90 %  Max: 100 %  FiO2 (%)  Av.7 %  Min: 30 %  Max: 60 %    CVP (mmHg): 3 mmHg  BP (!) 84/49  Pulse 121  Temp 95.2  F (35.1  C)  Resp 20  Wt 61 kg (134 lb 8 oz)  SpO2 99%  BMI 21.71 kg/m2   Date 18 0700 - 18 0659   Shift 0129-2616 2888-4151 0063-5272 24 Hour Total   I  N  T  A  K  E   I.V. 532.92 269.55  802.47    NG/GT  40  40    Colloid 550 500  1050    Shift Total  (mL/kg) 1082.92  (17.75) 809.55  (13.27)  1892.47  (31.02)   O  U  T  P  U  T   Urine  18  18    Emesis/NG output  700  700    Drains 220   220    Other 7 51  58    Blood 20   20    Shift Total  (mL/kg) 247  (4.05) 769  (12.6)  1016  (16.65)   Weight (kg) 61.01 61.01 61.01 61.01        Admit Weight: 60.7 kg (133 lb 12.8 oz)     GENERAL APPEARANCE: mild  distress, sleepy but arousable   EYES: + scleral icterus, pupils equal  Endo: - goiter, - moon facies  Lymphatics: no cervical or supraclavicular LAD  Pulmonary: lungs clear to auscultation with equal breath sounds bilaterally, - clubbing  CV: regular rhythm, normal rate, no rub   - JVD unable to access   - Edema +  GI: soft, nontender, normal bowel sounds, CAROL drains with serosanguinous discharge  MS: no evidence of inflammation in joints, no muscle tenderness  : + bah  SKIN: no rash, warm, dry, no cyanosis  NEURO: face symmetric, no focal deficit grossly    LABS:   CMP  Recent Labs  Lab 18  1757 18  1444 18  1325 18  1213 18  1146  18  0806 18  0621  18  0618   * 127* 124* 122* 123*  < > 121* 119*  --  125*   POTASSIUM  --  5.2  5.3 5.9* 6.1*  < > 6.7* 6.5*  < > 5.4*   CHLORIDE  --  90*  --   --  85*  --  85* 84*  --  90*   CO2  --  19*  --   --  18*  --  13* 17*  --  24   ANIONGAP  --  18*  --   --  21*  --  23* 18*  --  11   GLC  --  227* 243* 267* 264*  < > 282* 273*  --  284*   BUN  --  48*  --   --  52*  --  33* 48*  --  38*   CR  --  2.80*  --   --  3.17*  --  2.93* 2.89*  --  1.94*   GFRESTIMATED  --  25*  --   --  21*  --  23* 24*  --  38*   GFRESTBLACK  --  30*  --   --  26*  --  28* 29*  --  45*   JESUS  --  8.1*  --   --  9.8  --  8.6 8.8  --  7.5*   MAG  --  2.5*  --   --  2.4*  --   --  2.8*  --  1.4*   PHOS  --  6.5*  --   --  5.8*  --   --  5.1*  --  2.5   PROTTOTAL  --  5.0*  --   --  5.6*  --   --  6.4*  --  5.5*   ALBUMIN  --  3.0*  --   --  2.8*  --   --  2.9*  --  2.4*   BILITOTAL  --  4.1*  --   --  3.9*  --   --  5.0*  --  3.0*   ALKPHOS  --  178*  --   --  157*  --   --  157*  --  97   AST  --  6162*  --   --  4916*  --   --  671*  --  13   ALT  --  2195*  --   --  2100*  --   --  338*  --  25   < > = values in this interval not displayed.  CBC  Recent Labs  Lab 07/30/18  1444 07/30/18  1325 07/30/18  1230 07/30/18  1213 07/30/18  1146  07/30/18  0621   HGB 10.0* 8.9* 9.7* 10.5* 10.6*  < > 12.9*   WBC 23.2*  --  25.7*  --  28.9*  --  37.5*   RBC 3.43*  --  3.25*  --  3.59*  --  4.33*   HCT 29.5*  --  27.8*  --  30.0*  --  37.6*   MCV 86  --  86  --  84  --  87   MCH 29.2  --  29.8  --  29.5  --  29.8   MCHC 33.9  --  34.9  --  35.3  --  34.3   RDW 16.4*  --  16.3*  --  16.1*  --  16.4*   *  --  139*  --  154  --  176   < > = values in this interval not displayed.  INR  Recent Labs  Lab 07/30/18  1444 07/30/18  1230 07/27/18  0606 07/25/18  0428   INR 2.32* 1.91* 1.30* 1.28*   PTT  --  41*  --   --      ABG  Recent Labs  Lab 07/30/18  1325 07/30/18  1213 07/30/18  1146 07/30/18  0816   PH 7.22* 7.29* 7.41 7.35   PCO2 43 39 30* 27*   PO2 131* 161* 72* 56*   HCO3 18* 19* 19* 15*   O2PER 60.0 100.0 50.0 21      URINE  STUDIES  Recent Labs   Lab Test  07/27/18   2330  07/13/18   1315  06/18/18   1200  06/16/18   2220   COLOR  Yellow  Dark Yellow  Yellow  Dark Yellow   APPEARANCE  Clear  Clear  Clear  Slightly Cloudy   URINEGLC  Negative  Negative  Negative  Negative   URINEBILI  Negative  Large*  Small*  Large*   URINEKETONE  Negative  Negative  Negative  Negative   SG  1.008  1.013  1.008  1.011   UBLD  Negative  Negative  Negative  Large*   URINEPH  7.5*  6.0  5.0  5.5   PROTEIN  30*  10*  Negative  30*   NITRITE  Negative  Negative  Negative  Negative   LEUKEST  Negative  Negative  Negative  Large*   RBCU  5*  <1  1  16*   WBCU  3  None  2  66*     Recent Labs   Lab Test  06/18/18   1200   [facility]PG  0.75*     PTH  No lab results found.  IRON STUDIES  Recent Labs   Lab Test  07/13/18   0334  06/17/18   2138   IRON  97  66   FEB  117*  61*   IRONSAT  83*  109*   ANGELLA  Unsatisfactory specimen - icteric   --        IMAGING:  All imaging studies reviewed by me.   IMPRESSION:   1. Intrahepatic left portal venous thrombosis, with infarction of the  left lobe of the liver. Right portal vein is patent. Patchy infarction  within the right lobe of the liver hepatic segment 5. There is also  suspected hepatic arterial thrombosis. There is only proximal portion  of the hepatic artery noted outside the hepatic allograft.   2. Multiple splenic infarctions.  3. Extensive pneumatosis, bowel ischemia is suspected.However, SMA and  MATT are opacified.  4. Post surgical changes of evacuation of peritransplant hematoma.  Janes Rodrigues MD

## 2018-07-31 NOTE — PROGRESS NOTES
Antimicrobial Stewardship Team Note    Antimicrobial Stewardship Program - A joint venture between [facility] and  Physicians to optimize antibiotic management.  NOT a formal consult - Restricted Antimicrobial Review     Patient: Yaneli Miles  MRN: [id number]  Allergies: Nsaids    Brief Summary:  Yaneli Miles is a 43 year old male with a h/o ESLD secondary to chronic hepatitis B with ascites and varices; s/p  donor liver transplant on  (POD #9).  His course has been complicated by post op pain, abdominal distention and concern for sepsis-found to have VRE bacteremia.  Subsequently, repeat lactic acid drawn and was elevated to 8.7 with repeat of 8.7 (up from 1.1).  He was initiated on linezolid therapy for the VRE bacteremia on .    PMH: type 2 diabetes,  ESLD secondary to chronic hepatitis B and ALD complicated with portal hypertension, ascites, esophageal varices, encephalopathy, severe thrombocytopenia    Interval history:   hyper K, peaked t waves, tachypnic, tachycardic, hypotensive, WBC and SCr way up, lactate 8.7 -       Active Anti-infective Medications                Start     Stop      18 0800  sulfamethoxazole-trimethoprim  10 mL,   Oral,   Once per day on      PCP prophylaxis        --    18 0800  valGANciclovir  450 mg,   Oral,   EVERY OTHER DAY     Cytomegalovirus Disease Pharmacoprophylaxis        --    18 0800  valGANciclovir  450 mg,   Oral or NG Tube,   EVERY OTHER DAY     Cytomegalovirus Disease Pharmacoprophylaxis        --    18 0800  tenofovir  300 mg,   Per Feeding Tube,   EVERY 48 HOURS     HBV        --    18 0700  linezolid  600 mg,   Intravenous,   EVERY 12 HOURS     Bacteremia    VRE        --          Assessment:   -Sepsis with VRE bacteremia (unclear source IAI vs PICC)-Possible Linezolid induced acidosis (vs new SB ischemia or both).  Initial concern of spesis on - with leucopenia developing followed by fever  to 103 on 7/27, tachycardia, tachypnea, and soft BPs  90s/70s). Infectious work up started and blood cutlures from 7/27 (2 of 2 ) positive for VRE, no blood cultures on 7/28, PICC (placed ) was removed on 7/29 and tip is negative to date, 7/29 repeat blood cx (2 of 2)also positive for gpc pairs and chains, 7/30 blood cultures pending and fluid cultures have been ordered.  OR today for exploratory lap  and findings of patchy small bowel ischemia.      He was currently day 2 of linezolid therapy with an acute increase in severely elevated lactic acid. Linezolid is known to cause acute acidosis given its mechanism of aciton on the mitocondrial 16S rRNA synthesis.  This adverse effect would require prompt discontinuation of therapy.  Per discussion with ICU pharmacy team, determination to stop linezolid and initiate alternative treatment for VRE bacteremia with daptomycin.  The SHARMIN of the VRE to daptomycin is elelvated (SHARMIN of 3, with breakpoint of 4) therefore high dose dapto of 10 mg/kg recommended.  Given the source is not clearly identified, central line assessment needs to be considered source.  CVCs appeared to have been removed on 7/29 but new placement of Left femoral on 7/30.  With findings of bowel ischemia empiric broad spectrum therapy with pip/tazo and micafungin have been [id number] of therapy.    Recommendations:  1. D/C linezolid given risk of drug induced lactic acidosis. Initiate daptomycin at 10mg/kg IV Q24hr (on CRRT).   2. Monitor CK given adverse risk with dapto, particularly high dose    3. Repeat blood cultures from 2 different sites until documented negative at 48h.   Central lines have been removed that were in at the time of positive blood culture but new femoral line placed today and needs to be monitored closely as can not ensure this was placed during blood being cleared.  4. Agree with pip/tazo and micafungin for now, but re-assess ongoing therapy at 48hr  5. May consider Tx ID  consult if any repeat blood cultures return positive for ongoing workup/management of VRE bacteremia.      Discussed with ID Staff Dr. Ramandeep Marmolejo, PharmD, MPH, BCPS AQ-ID ([phone])

## 2018-07-31 NOTE — PROGRESS NOTES
CRRT STATUS NOTE    DATA:  Time:  4:54 PM  Pressures WNL:  YES  Filter Status:  WNL    Problems Reported/Alarms Noted:  NONE    Supplies Present:  YES    ASSESSMENT:  Patient Net Fluid Balance:  Net negative 441 ml ~ 1700  Vital Signs: T 97.7, , /47 (61), O2 sats 100%  Labs:  Na 125, K 5.3, I Ca 4.9, lactic 5.1, Plt 75  Goals of Therapy:  I=O    INTERVENTIONS:   None needed this shift.    PLAN:  Continue therapy goals.  Call CRRT RN with questions/concerns.

## 2018-07-31 NOTE — PLAN OF CARE
Problem: Patient Care Overview  Goal: Plan of Care/Patient Progress Review  Outcome: Declining  RADHA: Pt admitted to  from 7A d/t hypotension and increasing lethargy at 1000. Pt was intubated and lined for TLC and CRRT. Pt's abdomen was extremely distended and taut. He went emergently to the OR for an exploratory laparotomy. Pt returned to  at 1400 with an Abthera in place. He then went emergently to CT for an abdomen CT. See results for CT. Pt continued to have low BP's and levophed increased to 0.2 mcg/kg/min and vasopressin is now at 2.4mg/hr. They remained low and an extra 500cc 5% albumin was given. No fluid is being removed with the CRRT d/t the low BP and fluids being given. Increased NO output noted when MD's noted that stomach and bowels were extremely distended. Enema will be given tonight.  P: Family updated by MD's through . Continue to closely monitor VS and notify MD's of changes. See flowsheets for more details.

## 2018-07-31 NOTE — PROGRESS NOTES
CRRT RESTART NOTE    Reason for Restart:  Filter clotted  Error Code:  NA    Patient s Vascular Access: LIJ Catheter                   Placement Confirmed:  Yes  Manufacture:  [employer]  Model:  ITC Global Elite  Length/Azeri Size:  16 cm/12 Fr  Flush Volume:  A/V  1.3 mL    DATA:  Procedure:  CVVHDF  Start Time:  2226  Machine#:  7  Filter:  M150  Blood Flow:   200 mL/min  Pre-Replacement Solution:  PrismaSol BGK 2/3.5  Post-Replacement Solution:  D5W  Dialysate Solution:  PrismaSol BGK 2/3.5  Pre-Replacement Solution Rate:  800 mL/hr  Post-Replacement Solution Rate:  150 mL/hr  Dialysate Flow Rate:  800 mL/hr  Patient Removal Rate:  0 mL/hr  Anticoagulation Type and Rate:  NA    ASSESSMENT:  How Patient Tolerated Restart:  Tolerated well  Vital Signs:  Temp 97.3 esophageal        BP 98/50 (64) mmHg    RR 19    SpO2 97%    Initial Pressures:  Access:  -64  Filter:  60  Return:  20  TMP:  44  Change in Filter Pressure:  26    INTERVENTIONS:  CRRT restart. Lines reversed 2/2 extremely negative access pressures. Blood warmer applied to return line.     PLAN:  Goal I=O. Continue plan of care. Contact CRRT resource RN at [phone] with any questions or concerns.

## 2018-07-31 NOTE — PLAN OF CARE
Problem: Patient Care Overview  Goal: Plan of Care/Patient Progress Review  Outcome: No Change  Patient remains in the ICU on vasopressors and CRRT. BP continues to be labile- levophed/vaso for pressor support, 500ml LR given at 2330 for hypotension with CVP of 3 with good response. -112, rare PVCs. Lactulose enema given last evening, NG remains to suction but may have migrated post-pyloric (abd xray pending). Luis with minimal output. Abthera remains in place at 125 with serosang. Output. JPs with serosang output. Na levels 125-128. Lactate and LFTs trending down. BLE US done overnight. SICU MD aware of growth in hematoma and abdominal fluid.

## 2018-07-31 NOTE — PROGRESS NOTES
07/31/18 1600   Quick Adds   Type of Visit PT Re-evaluation       Present no   Living Environment   Living Environment Comment Please see initial eval dated 7/22 as no  available.   Self-Care   Current Activity Tolerance poor   Activity/Exercise/Self-Care Comment Please see initial eval dated 7/22 as no  available.   Functional Level Prior   Prior Functional Level Comment Please see initial eval dated 7/22 as no  available.   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 He returns to the ICU 7/30  for shock, severe lactic acidosis and respiratory failure. S/p ex-lap with hematoma evacuation 7/31/18.    Precautions/Limitations fall precautions;abdominal precautions;oxygen therapy device and L/min   General Observations Pt supine in bed with vent in place.   Cognitive Status Examination   Orientation not oriented to person, place or time   Level of Consciousness sedated;intubated   Follows Commands and Answers Questions unable to follow commands   Personal Safety and Judgment impaired   Memory impaired   Pain Assessment   Patient Currently in Pain No   Integumentary/Edema   Integumentary/Edema no deficits were identifed   Posture    Posture Comments Unable to assess   Range of Motion (ROM)   ROM Comment PROM WFL; AROM severely limited due to sedation/cognition   Strength   Strength Comments Grossly 0/5 due to cognition   Bed Mobility   Bed Mobility Comments Not formally assessed; pt not medically  "appropriate.   Transfer Skills   Transfer Comments Not formally assessed; pt not medically appropriate.   Gait   Gait Comments Not formally assessed; pt not medically appropriate.   Balance   Balance Comments Not formally assessed; pt not medically appropriate.   Sensory Examination   Sensory Perception no deficits were identified   General Therapy Interventions   Planned Therapy Interventions balance training;bed mobility training;cognition;gait training;neuromuscular re-education;ROM;strengthening;stretching;transfer training;home program guidelines;progressive activity/exercise   Clinical Impression   Criteria for Skilled Therapeutic Intervention yes, treatment indicated   PT Diagnosis Impaired functional mobility   Influenced by the following impairments Cognition, dec ROM, dec strength, medical status   Functional limitations due to impairments Bed mobility, transfers, gait, balance, endurance   Clinical Presentation Evolving/Changing   Clinical Presentation Rationale CLinical judgement   Clinical Decision Making (Complexity) Moderate complexity   Therapy Frequency` 3 times/week   Predicted Duration of Therapy Intervention (days/wks) 3 weeks   Anticipated Discharge Disposition Transitional Care Facility   Risk & Benefits of therapy have been explained Yes   Patient, Family & other staff in agreement with plan of care Yes   [facility] AM-PAC  \"6 Clicks\" V.2 Basic Mobility Inpatient Short Form   1. Turning from your back to your side while in a flat bed without using bedrails? 1 - Total   2. Moving from lying on your back to sitting on the side of a flat bed without using bedrails? 1 - Total   3. Moving to and from a bed to a chair (including a wheelchair)? 1 - Total   4. Standing up from a chair using your arms (e.g., wheelchair, or bedside chair)? 1 - Total   5. To walk in hospital room? 1 - Total   6. Climbing 3-5 steps with a railing? 1 - Total   Basic Mobility Raw Score (Score out of 24.Lower scores " equate to lower levels of function) 6   Total Evaluation Time   Total Evaluation Time (Minutes) 3

## 2018-07-31 NOTE — PLAN OF CARE
Problem: Patient Care Overview  Goal: Plan of Care/Patient Progress Review  OT 4AB: Cancel and HOLD.  Pt transferred to higher level of care, now re-intubated, on CRRT and pressors.  BPs labile.  Not medically appropriate for therapy intervention at this time.  Pt following to provide ROM and advance mobility once appropriate.  Will monitor pt status and resume OT services once medically appropriate.

## 2018-07-31 NOTE — PLAN OF CARE
Problem: Patient Care Overview  Goal: Plan of Care/Patient Progress Review  PT 4A: Pt tolerated PROM without adverse events. Will continue to follow.

## 2018-07-31 NOTE — PROGRESS NOTES
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 Now on CRRT SP exlap with hematoma evacuation on 7/30     -- continued on CRRT due to severe hyperkalemia , severe acidosis   -- will continue on CRRT with K2 bath unless K is <4  -- on D5 post dialysis fluid due to hyponatremia corrected to fast by amy, Goal is 132 for tonight and will recalcute in the am for correction. If sodium is <125 please call will switch D5 to 1/2 NS  -- Albumin and NS can be used for resuscitation , avoid LR since he has liver shock likely not able to metabolize lactate well  -- CRRT prescribed at 30ml/kg/min with 0 UF for now, bolus with IVF as needed for hypotension, albumin preferred for resuscitation to avoid any changes to sodium  -- continue with labs per CRRT protocol, Sodium Q2hrs and call if sodium is <125 or more the 132 for adjustment of CRRT  -- daily weights and strict IO monitoring  -- continue with Intra abdominal pressure monitoring Q shift     Severe hyponatremia improving  Likely related to free water flushes/BEN , ADH dependant with hypotension as well since he did receive NS   Goals as above  Hyperkalemia  With BEN , hematoma and poor renal clearnace and met acidosis  CRRT prescription to address     Hypervolemia  With hypotension on presser support  CRRT with 0  UF for now  Will monitor for needs     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  Micafungin and zosym for additional coverage for sepsis per MICU     DDLT 7/12/18   ESLD sec to hep B cb EV , HE , ascites and portal HTN  Ex lap 7/30  Intrahepatic left portal venous thrombosis, infarction of left liver lobe, patchy infarction of right lobe, suspected hepatic arterial thrombosis, Splenic infarctions and Patchy bowel ischemia on CT post op  On valcyte and bactrim  On tenofovir     Acute resp failure   Intubated on vent support   MICU managing     DMII on insulin gtt     Anemia/ thrombocytopenia/leucocytosis  ACD/inflamm anemia  Low PLT due to ESLD  Hb 10   On heparin      Recommendations were communicated to primary team     Seen and discussed with Dr. Charlotte Rodrigues MD   [phone]    Interval History :   In the last 24 hours Yaneli Miles has been in the ICU on CRRT with minimal UO and has aggressive drain output continues to need low dose pressers   Review of Systems:   Unable to obtain    Physical Exam:   I/O last 3 completed shifts:  In: 3168.55 [I.V.:1815.55; Other:23; NG/GT:330; IV Piggyback:500]  Out: 3557 [Urine:142; Emesis/NG output:1675; Drains:1170; Stool:1500]   BP 95/61  Pulse 121  Temp 97.7  F (36.5  C) (Axillary)  Resp 19  Wt 63.1 kg (139 lb 1.8 oz)  SpO2 100%  BMI 22.45 kg/m2     GENERAL APPEARANCE: ill appearing vent support no distress  EYES:  + scleral icterus, pupils equal  HENT: mouth without ulcers or lesions  PULM: lungs low air entry  to auscultation,  bilaterally, equal air movement, no clubbing  CV: regular rhythm, normal rate, no rub     -JVD unable to access     -edema +   GI: soft, , +distended, bowel sounds are +/-  INTEGUMENT: no cyanosis, - rash  NEURO:  sedated   Access LIJ non tunneled    Labs:   All labs reviewed by me  Electrolytes/Renal -   Recent Labs   Lab Test  07/31/18   1540  07/31/18   1417  07/31/18   1156  07/31/18   1010   07/31/18   0411   NA  128*  125*  128*  128*   < >   127*   POTASSIUM  5.0   --    --   5.3   --   5.6*   CHLORIDE  93*   --    --   94   --   95   CO2  22   --    --   22   --   18*   BUN  27   --    --   31*   --   35*   CR  1.60*   --    --   1.79*   --   2.00*   GLC  158*   --    --   143*   --   125*   JESUS  8.4*   --    --   8.7   --   8.7   MAG  2.5*   --    --   2.6*   --   1.9   PHOS  4.8*   --    --   5.0*   --   5.0*    < > = values in this interval not displayed.       CBC -   Recent Labs   Lab Test  07/31/18   1540  07/31/18   1010  07/31/18   0411   WBC  23.4*  20.2*  17.3*   HGB  8.8*  9.0*  9.2*   PLT  76*  75*  83*       LFTs -   Recent Labs   Lab Test  07/31/18   1540  07/31/18   1010  07/31/18   0411   ALKPHOS  292*  276*  240*   BILITOTAL  5.1*  5.0*  4.8*   ALT  1594*  1730*  1756*   AST  2168*  3095*  3763*   PROTTOTAL  4.8*  4.7*  4.8*   ALBUMIN  2.5*  2.6*  2.7*       Iron Panel -   Recent Labs   Lab Test  07/13/18   0334  06/17/18   2138   IRON  97  66   IRONSAT  83*  109*   ANGELLA  Unsatisfactory specimen - icteric   --          Imaging:  All imaging studies reviewed by me.     Current Medications:    albumin human  25 g Intravenous Once     aspirin  81 mg Oral Daily     calcium chloride  2 g Intravenous Once     calcium gluconate  1 g Intravenous Once     calcium gluconate  2 g Intravenous Once     clotrimazole  1 Gabo Buccal 4x Daily     DAPTOmycin (CUBICIN) intermittent infusion  10 mg/kg (Dosing Weight) Intravenous Q24H     dextrose  50 mL Intravenous Once     hydrocortisone sodium succinate PF  50 mg Intravenous Q6H     insulin (Human Regular) (HumuLIN R/NovoLIN R) for intravenous use (SHORT ACTING)  10 Units Intravenous Once     micafungin  100 mg Intravenous Q24H     pantoprazole (PROTONIX) IV  40 mg Intravenous Daily with breakfast     piperacillin-tazobactam  4.5 g Intravenous Q6H     sodium chloride (PF)  3 mL Intravenous Q8H     [START ON 8/1/2018] sulfamethoxazole-trimethoprim  10 mL Oral Once per day on Mon Wed Fri     tenofovir   300 mg Per Feeding Tube Q48H     valGANciclovir  450 mg Oral Every Other Day    Or     valGANciclovir  450 mg Oral or NG Tube Every Other Day       IV fluid REPLACEMENT ONLY Stopped (07/30/18 0800)     D5W 150 mL/hr at 07/31/18 1428     dialysate for CVVHD & CVVHDF (PrismaSol BGK 2/3.5) 17.5 mL/kg/hr (07/31/18 1814)     fentaNYL 50 mcg/hr (07/31/18 1800)     HEParin 1,500 Units/hr (07/31/18 1800)     insulin (regular) 2 Units/hr (07/31/18 1800)     lactated ringers 10 mL/hr at 07/30/18 1519     midazolam Stopped (07/31/18 0557)     - MEDICATION INSTRUCTIONS -       norepinephrine 0.4 mcg/kg/min (07/31/18 1800)     replacement solution for CVVHD & CVVHDF (PrismaSol BGK 2/3.5) 12.5 mL/kg/hr (07/31/18 1602)     Reason beta blocker order not selected       Janes Rodrigues MD

## 2018-07-31 NOTE — PROGRESS NOTES
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      POD 1 US liver: Impression:   1.  Hematoma associated with the inferior aspect of the perihepatic  space anteriorly, measuring 13.7 x 7.0 x 8.5 cm.  2.  Retrograde flow of the left portal vein. Additionally, low  velocities of the portal venous system. Single low resistive index of  the extrahepatic aspect of the hepatic artery, measuring 0.37. These  findings are likely within normal limits in the early postoperative  context. However, continued attention on follow-up recommended.    7/30 CT scan abd/pelvis:  IMPRESSION:   1. Hypoenhancing areas in the transplant liver concerning for  infarction. Transplant left portal vein occlusion with bubbles of  portal venous gas. Nearly occlusive filling defect in the native  hepatic artery. Narrowed and irregular appearance of the transplant  hepatic artery. Focal occlusion of the left main hepatic artery.  Segmental occlusions of right hepatic arterial branches.   2. Pneumatosis intestinalis with nonocclusive filling defects in  superior mesenteric vein branches, concerning for bowel ischemia or  infarction.   3. Splenic infarctions.   4. Bibasilar consolidation with air bronchograms. Pneumonia cannot be  excluded.   5. Post surgical changes of evacuation of peritransplant hematoma.       Graft function: POD 10. Infarction of left liver lobe, patchy infarction right liver lobe. Left PV thrombosis. Narrow and irregular appearance HA. Bowel ischemia. 7/30 Ex lap, patchy diffuse SB ischemia. Doppler demonstrated arterial flow main vessels to bowel and liver. Splenic infarction. Liver bx and cx.  On Heparin gtt. Plan to return to OR  tomorrow vs Thursday.   Immunosuppression management:   Simulect intra-op due to BEN  Steroid taper per protocol.   MMF 1gm BID, hold due to status  Tac. Level 11.3. Hold due to status.   Hydrocortisone q 6hrs due to shock  Complexity of management:Medium. Contributing factors: thrombocytopenia and anemia  Hematology: Hemoglobin stable, 8.8. PLT 76.  INR 2.3 <-1.91<-1.30. On high intensity heparin gtt due to thrombosis.    Cardiorespiratory: Respiratory failure secondary to sepsis, on vent. On norepinephrine gtt, dose increased and vasopressin discontinued due to mesenteric ischemia.    GI/Nutrition: NGT. NPO. Hold TF. Large stool burden, received enemas, pt to receive mineral oil per feeding tube.  Endocrine: Insulin drip.    Fluid/Electrolytes: BEN. CRRT I=O. Drain output 1.2L. IVF bolus. Lactic acid decreased, 5.1  : Luis removed  Infectious disease: Temp 97.9 up from 93.5. Leukocytosis, 17.3->23.4.   -Blood cultures growing VRE; started Linezolid 600 mg BID. Stopped linezolid due to lactic acidosis. Switched to daptomycin.  Repeat bcx.   -fluid cultures 18L Enteroccus faecium--sensitivities pending.  -empiric coverage aide and zosyn  HBV: Hepatits B DNA PCR positive prior to tx. Received HBIG on  and . Hep B surface antigen negative. No further HBIG planned. Continue tenofovir 300 mg daily.   PPX: Valcyte x 12 weeks (CMVand EBV IgG +), bactrim. mycelex stopped as patient on micafungin. Will start nystatin if aide discontinued.     Prophylaxis: DVT PCDs, on heparin, fall, GI, fungal  Disposition: SICU    Coordinator: Kay Reyes       Medical Decision Making: Medium  Subsequent visit 85433 (moderate level decision making)      JAY/Fellow/Resident Provider: GINGER Segovia [id number]    Faculty: Leonie Elizalde MD  __________________________________________________________________  Transplant History: Admitted 2018 for  donor liver transplant.   The patient has a history of liver failure due  to hepatitis B .    7/21/2018 (Liver), Postoperative day: 10     Interval History:   On pressor. CRRT with no fluid removal.     ROS:   A 10-point review of systems was negative except as noted above.    Curent Meds:    albumin human  25 g Intravenous Once     aspirin  81 mg Oral Daily     calcium chloride  2 g Intravenous Once     calcium gluconate  1 g Intravenous Once     calcium gluconate  2 g Intravenous Once     clotrimazole  1 Gabo Buccal 4x Daily     DAPTOmycin (CUBICIN) intermittent infusion  10 mg/kg (Dosing Weight) Intravenous Q24H     dextrose  50 mL Intravenous Once     hydrocortisone sodium succinate PF  50 mg Intravenous Q6H     insulin (Human Regular) (HumuLIN R/NovoLIN R) for intravenous use (SHORT ACTING)  10 Units Intravenous Once     micafungin  100 mg Intravenous Q24H     pantoprazole (PROTONIX) IV  40 mg Intravenous Daily with breakfast     piperacillin-tazobactam  4.5 g Intravenous Q6H     sodium chloride (PF)  3 mL Intravenous Q8H     [START ON 8/1/2018] sulfamethoxazole-trimethoprim  10 mL Oral Once per day on Mon Wed Fri     tenofovir  300 mg Per Feeding Tube Q48H     valGANciclovir  450 mg Oral Every Other Day    Or     valGANciclovir  450 mg Oral or NG Tube Every Other Day       Physical Exam:     Admit Weight: 60.7 kg (133 lb 12.8 oz)    Current Vitals:   BP 95/61  Pulse 121  Temp 96.5  F (35.8  C) (Axillary)  Resp 19  Wt 63.1 kg (139 lb 1.8 oz)  SpO2 100%  BMI 22.45 kg/m2      Vital sign ranges:    Temp:  [93.5  F (34.2  C)-98.2  F (36.8  C)] 96.5  F (35.8  C)  Heart Rate:  [] 110  Resp:  [17-25] 19  BP: (90-95)/(61-69) 95/61  MAP:  [59 mmHg-81 mmHg] 61 mmHg  Arterial Line BP: ()/(25-66) 99/25  FiO2 (%):  [40 %] 40 %  SpO2:  [93 %-100 %] 100 %  Patient Vitals for the past 24 hrs:   BP Temp Temp src Heart Rate Resp SpO2 Weight   07/31/18 1400 - - - 110 - 100 % -   07/31/18 1300 - - - 99 - 100 % -   07/31/18 1200 - - - 100 - 100 % -   07/31/18 1100 - - - 100 - 100 % -    07/31/18 1000 - - - 103 - 99 % -   07/31/18 0900 - - - 104 - 100 % -   07/31/18 0800 - 96.5  F (35.8  C) Axillary 107 19 98 % -   07/31/18 0700 - - - 112 17 98 % -   07/31/18 0645 - 97.9  F (36.6  C) Axillary - - - -   07/31/18 0600 - 98.2  F (36.8  C) Esophageal 111 18 99 % -   07/31/18 0530 - - - 113 - 98 % -   07/31/18 0515 - - - 112 - 98 % -   07/31/18 0500 - 98.1  F (36.7  C) - 112 17 99 % -   07/31/18 0445 - - - 112 - 99 % -   07/31/18 0430 - - - 113 - 99 % -   07/31/18 0415 - - - 112 - 99 % -   07/31/18 0400 - 97.9  F (36.6  C) Esophageal 113 17 100 % -   07/31/18 0345 - - - 112 - 100 % -   07/31/18 0330 - - - 109 - 100 % -   07/31/18 0315 - - - 108 - 100 % -   07/31/18 0300 - 97  F (36.1  C) - 106 17 100 % -   07/31/18 0245 - - - 106 - 100 % -   07/31/18 0230 - - - 105 - 100 % -   07/31/18 0215 - - - 107 - 94 % -   07/31/18 0200 - 96.8  F (36  C) - 102 18 99 % 63.1 kg (139 lb 1.8 oz)   07/31/18 0145 - - - 103 - 99 % -   07/31/18 0130 - - - 101 - 99 % -   07/31/18 0120 - - - - - 100 % -   07/31/18 0115 - - - 103 - 100 % -   07/31/18 0100 - 96.6  F (35.9  C) - 103 18 99 % -   07/31/18 0045 - - - 99 - 100 % -   07/31/18 0030 - - - 101 - 100 % -   07/31/18 0015 - - - 100 - 100 % -   07/31/18 0000 - 96.4  F (35.8  C) Esophageal 100 18 100 % -   07/30/18 2345 - 96.4  F (35.8  C) - 101 - 100 % -   07/30/18 2330 - - - 104 - 98 % -   07/30/18 2315 - - - 103 - 99 % -   07/30/18 2300 - 96.8  F (36  C) Esophageal 103 18 98 % -   07/30/18 2245 - - - 104 - 96 % -   07/30/18 2230 95/61 97.3  F (36.3  C) - 105 - 97 % -   07/30/18 2215 - 97.3  F (36.3  C) - 109 - 99 % -   07/30/18 2200 - 97.2  F (36.2  C) - 108 17 99 % -   07/30/18 2145 - - - 109 - 98 % -   07/30/18 2130 - - - 106 - 93 % -   07/30/18 2115 - - - 104 - 100 % -   07/30/18 2100 - 95.9  F (35.5  C) - 104 17 100 % -   07/30/18 2045 - - - 104 - 100 % -   07/30/18 2030 - - - 105 - 100 % -   07/30/18 2020 90/69 95.7  F (35.4  C) - 104 - 100 % -   07/30/18 2015 - -  - 104 - 98 % -   07/30/18 2000 - 95.5  F (35.3  C) Esophageal 100 22 98 % -   07/30/18 1945 - - - 101 - 99 % -   07/30/18 1915 - 95.2  F (35.1  C) - 100 - 99 % -   07/30/18 1900 - 95.2  F (35.1  C) - 100 20 98 % -   07/30/18 1845 - 94.8  F (34.9  C) - 99 - 99 % -   07/30/18 1830 - 94.6  F (34.8  C) - 97 - 99 % -   07/30/18 1815 - 94.6  F (34.8  C) - 99 - 97 % -   07/30/18 1800 - 94.8  F (34.9  C) Esophageal 101 21 98 % -   07/30/18 1745 - - - 100 - 99 % -   07/30/18 1730 - - - 101 - 99 % -   07/30/18 1715 - - - 102 - 97 % -   07/30/18 1700 - 93.7  F (34.3  C) Axillary 101 25 98 % -   07/30/18 1615 - - - 101 - 100 % -   07/30/18 1600 - - - 100 - 100 % -   07/30/18 1545 - - - 99 - 99 % -   07/30/18 1530 - - - 99 - 100 % -   07/30/18 1515 - - - 100 - 100 % -   07/30/18 1500 - 93.5  F (34.2  C) Axillary 102 19 100 % -   07/30/18 1445 - - - 104 - 100 % -     General Appearance: Intubated, CRRT  HEENT: NGT to suction.  Skin: warm, dry  Heart: NSR  Lungs: CMV, FiO2 40%  Abdomen: distended, CAROL x 2. Abthera in place.   :  bah  Extremities: no edema  Neurologic: withdraws to pain.   CVC    Data:   CMP  Recent Labs  Lab 07/31/18  1156 07/31/18  1010  07/31/18  0411  07/30/18  1115   * 128*  < > 127*  < > 139   POTASSIUM  --  5.3  --  5.6*  < > 5.9*   CHLORIDE  --  94  --  95  < >  --    CO2  --  22  --  18*  < >  --    GLC  --  143*  --  125*  < > 275*   BUN  --  31*  --  35*  < >  --    CR  --  1.79*  --  2.00*  < >  --    GFRESTIMATED  --  41*  --  36*  < >  --    GFRESTBLACK  --  50*  --  44*  < >  --    JESUS  --  8.7  --  8.7  < >  --    ICAPOC  --   --   --   --   --  3.5*   ICAW  --  4.9  --  4.6  < >  --    MAG  --  2.6*  --  1.9  < >  --    PHOS  --  5.0*  --  5.0*  < >  --    ALBUMIN  --  2.6*  --  2.7*  < >  --    BILITOTAL  --  5.0*  --  4.8*  < >  --    ALKPHOS  --  276*  --  240*  < >  --    AST  --  3095*  --  3763*  < >  --    ALT  --  1730*  --  1756*  < >  --    < > = values in this interval not  displayed.  CBC    Recent Labs  Lab 07/31/18  1010 07/31/18  0411   HGB 9.0* 9.2*   WBC 20.2* 17.3*   PLT 75* 83*     COAGS    Recent Labs  Lab 07/30/18  1444 07/30/18  1230   INR 2.32* 1.91*   PTT  --  41*      Urinalysis  Recent Labs   Lab Test  07/27/18   2330  07/13/18   1315  06/18/18   1200   COLOR  Yellow  Dark Yellow  Yellow   APPEARANCE  Clear  Clear  Clear   URINEGLC  Negative  Negative  Negative   URINEBILI  Negative  Large*  Small*   URINEKETONE  Negative  Negative  Negative   SG  1.008  1.013  1.008   UBLD  Negative  Negative  Negative   URINEPH  7.5*  6.0  5.0   PROTEIN  30*  10*  Negative   NITRITE  Negative  Negative  Negative   LEUKEST  Negative  Negative  Negative   RBCU  5*  <1  1   WBCU  3  None  2   UTPG   --    --   0.75*     Virology:  Hepatitis C Antibody   Date Value Ref Range Status   07/20/2018 Nonreactive NR^Nonreactive Final     Comment:     Assay performance characteristics have not been established for newborns,   infants, and children

## 2018-07-31 NOTE — PROGRESS NOTES
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 He returns to the ICU 7/30  for shock, severe lactic acidosis and respiratory failure. S/p ex-lap with hematoma evacuation 7/31/18.      PLAN:   Neuro/ pain/ sedation:  #Acute pain  #Sedation  - Monitor neurological status. Notify the MD for any acute changes in exam.  - Fentanyl and Versed for sedation.   - PRN Hydromorphone IV as needed for pain      Pulmonary care:   #Acute respiratory failure  - continue with full mechanical support.   - HOB elevations. Vent bundle.   - CMV 16/400/5/40%      Cardiovascular:    #Shock  - Monitor hemodynamic status.   - Norepinephrine gtt-- stopped Vasopressin per Transplant, norepi doses now increased. - MAP goal >65  -Solu-cortef 50 every 6 hours for stress dose steroids   - IVF bolus     GI:   #DDLT 7/21/18  #Intrahepatic left portal venous thrombosis, infarction of left liver lobe, patchy infarction of right lobe, suspected hepatic arterial thrombosis  #  7/30/18 sp exploratory laparotomy, abthera closure device in place. Findings of patchy diffuse SB ischemia. S/p liver biopsy  #Splenic infarctions  #Patchy bowel ischemia  # ileus   - s/p exploratory laparotomy, abthera closure device in place. Findings of patchy diffuse SB ischemia. S/p liver biopsy  - Continue heparin gtt high intensity given thrombosis   -  Enemas done. CXR with large stool burden. Give mineral  oil down feeding tube. Enemas today       Fluids/ Electrolytes/ Nutrition:  # Severe metabolic acidosis, lactic acidosis  # Severe hyponatremia  - Acute sodium drop 139-->119 noted,  correcting slowly and now 125--serial sodiums every 2 hours.   - CRRT  - Lactic appears to be trending downward most recent 5.1  - ICU electrolyte replacement protocol, on CRRT replacement protocol.   - TF's on hold at this time given pressor needs and ileus. Reassess tomorrow.      Renal/ Fluid Balance:    #BEN  - Will monitor intake and output.  - creat 1.79--improved with HD,  Oliguric. Started on CRRT 7/30/18. I=O's at this point        Endocrine:    #DM II  - Insulin gtt. Continue with insulin drip       ID/ Antibiotics:  # VRE Bacteremia  #Hepatitis B  - Continue Daptomycin. Blood cultures 7/27 VRE.   - fluid cultures 7/31/18L Enteroccus faecium--sensitivities pending   - immunosuppression: on hold, will defer to transplant   Immunosuppression prophylaxis: Bactrim. Valgancyclovir   Tenofovir  - 7/30 micafungin and Zosyn for empiric coverage for sepsis      Heme:     #Acute blood loss anemia  #Anemia of critical illness  #Portal venous thrombosis  #Thrombocytopenia  - Hemoglobin 10, stable.   -  High intensity heparin gtt. Continue aspirin.       Prophylaxis:    - Mechanical prophylaxis for DVT.   - Heparin gtt      MSK:    # weakness and deconditioning of critical illness   - PT and OT consults on hold      Lines/ tubes/ drains:  - R internal jugular TLC 7/30  - L internal jugular dialysis line 7/30  - Right femoral arterial line  - NJ   - PIVs  - ETT      Disposition:  - Surgical ICU.    Mega Alvarez      Time spent on this Encounter   Billing:  I spent 45 minutes bedside and on the inpatient unit today managing the critical care of Yaneli Miles in relation to the issues listed in this note.      ====================================    SUBJECTIVE:  Course reviewed no acute events overnight.Sedation weaned but without response to  verbal stimuli.Wife present and updated. Not able to perform ROS.     OBJECTIVE:   1. VITAL SIGNS:   Temp:  [93.5  F (34.2  C)-98.2  F (36.8  C)] 96.5  F (35.8  C)  Heart Rate:  [] 107  Resp:  [17-26] 19  BP: (76-95)/(47-69) 95/61  MAP:  [6 mmHg-81 mmHg] 72 mmHg  Arterial Line BP: ()/(16-66) 110/59  FiO2 (%):  [40 %] 40 %  SpO2:  [93 %-100 %] 98 %  Ventilation Mode: CMV/AC  (Continuous Mandatory Ventilation/ Assist Control)  FiO2 (%): 40 %  Rate Set (breaths/minute): 16 breaths/min  Tidal Volume Set (mL): 400 mL  PEEP (cm H2O): 5 cmH2O  Oxygen Concentration (%): 40 %  Resp: 19    2. INTAKE/ OUTPUT:   I/O last 3 completed shifts:  In: 3517.22 [I.V.:1745.22; Other:12; NG/GT:210; IV Piggyback:500]  Out: 3556 [Urine:126; Emesis/NG output:1600; Drains:1240; Stool:1500; Blood:20]    3. PHYSICAL EXAMINATION:   General: deeply sedated. No response to verbally stimuli   HEENT: ETT present and secured. NJ and NH present,  Pupils 3mm and equal  Neuro: sedated, no commands, pt does not interact or respond to verbal stimuli   Resp: intubated, breathing unlabored. No wheezes, rales or rhonchi   CV: RRR, S1,S2 tachycardia   Abdomen: abdomen with VAC in place-- suction intact with serosanguinous drainage in collection tubing. CAROL drains x2 with serosanguinous drainage.   Incisions: suction intact on Wound vac.   Extremities: Calves soft and non-tender, PP2+.     4. INVESTIGATIONS:   Arterial Blood Gases     Recent Labs  Lab 07/30/18  1444 07/30/18  1325 07/30/18  1213 07/30/18  1146   PH 7.22* 7.22* 7.29* 7.41   PCO2 41 43 39 30*   PO2 85 131* 161* 72*   HCO3 17* 18* 19* 19*     Complete Blood Count     Recent Labs  Lab 07/31/18  0411 07/30/18  2212 07/30/18  1444 07/30/18  1325 07/30/18  1230   WBC 17.3* 15.5* 23.2*  --  25.7*   HGB 9.2* 9.8* 10.0* 8.9* 9.7*   PLT 83* 94* 142*  --  139*     Basic Metabolic Panel    Recent Labs  Lab 07/31/18  0811 07/31/18  0607 07/31/18  0411 07/31/18  0205  07/30/18  2212   07/30/18  1444 07/30/18  1325  07/30/18  1146   * 126* 127* 125*  < > 126*  < > 127* 124*  < > 123*   POTASSIUM  --   --  5.6*  --   --  5.3  --  5.2 5.3  < > 6.1*   CHLORIDE  --   --  95  --   --  93*  --  90*  --   --  85*   CO2  --   --  18*  --   --  20  --  19*  --   --  18*   BUN  --   --  35*  --   --  40*  --  48*  --   --  52*   CR  --   --  2.00*  --   --  2.41*  --  2.80*  --   --  3.17*   GLC  --   --  125*  --   --  98  --  227* 243*  < > 264*   < > = values in this interval not displayed.  Liver Function Tests    Recent Labs  Lab 07/31/18  0411 07/30/18  2212 07/30/18 2000 07/30/18  1444 07/30/18  1230 07/30/18  1146  07/27/18  0606  07/25/18  0428   AST 3763* 4995* 5308* 6162*  --  4916*  < > 24  < > 49*   ALT 1756* 1846* 1890* 2195*  --  2100*  < > 40  < > 78*   ALKPHOS 240* 193*  --  178*  --  157*  < > 88  < > 79   BILITOTAL 4.8* 5.0*  --  4.1*  --  3.9*  < > 3.5*  < > 4.5*   ALBUMIN 2.7* 3.0*  --  3.0*  --  2.8*  < > 3.0*  < > 2.8*   INR  --   --   --  2.32* 1.91*  --   --  1.30*  --  1.28*   < > = values in this interval not displayed.  Pancreatic Enzymes  No lab results found in last 7 days.  Coagulation Profile    Recent Labs  Lab 07/30/18  1444 07/30/18  1230 07/27/18  0606 07/25/18  0428   INR 2.32* 1.91* 1.30* 1.28*   PTT  --  41*  --   --      Lactate  Invalid input(s): LACTATE    5. RADIOLOGY:   Recent Results (from the past 24 hour(s))   XR Abdomen Port 1 View    Narrative    Exam:  XR ABDOMEN PORT 1 VW, 7/30/2018 12:05 PM    History: abdominal distention;     Comparison:  Abdominal radiograph 7/30/2018.    Findings:  Portable supine radiograph the abdomen. Abnormally dilated  small bowel throughout the abdomen with extensive pneumatosis.  Partially visualized portal venous gas. Surgical drains are in stable  position. Feeding tube tip projects over the fourth portion of the  duodenum. Gastric tube tip and sidehole projects over the stomach.  Skin staples overlying the upper  abdomen. Bones are unremarkable.      Impression    Impression:    Diffuse abnormal bowel dilation with extensive pneumatosis and  partially visualized portal venous gas, highly concerning for ischemic  bowel.    [Result: Bowel ischemia]    Finding was identified on 7/30/2018 12:05 PM.     Dr. Nj was contacted by Dr. Kenny at 7/30/2018 12:07 PM and  verbalized understanding of the urgent finding.     I have personally reviewed the examination and initial interpretation  and I agree with the findings.    GEOVANY SHEPARD MD   XR Chest Port 1 View    Narrative    XR CHEST PORT 1 VW  7/30/2018 11:42 AM      HISTORY: line placement;     COMPARISON: Earlier same day    FINDINGS: Endotracheal tube tip projects over the mid to low thoracic  trachea. Left IJ central venous catheter tip projects over the high  SVC. Right IJ central venous catheter tip projects over the high right  atrium. Enteric tubes course below the level of the diaphragm and the  inferior margin of the field-of-view. Gastric distention appears  improved. Perihilar and bibasilar opacities are unchanged. Left upper  quadrant drain.      Impression    IMPRESSION: Endotracheal tube tip projects over the mid to low  thoracic trachea. Left IJ central venous catheter tip projects over  the high SVC. Right IJ central venous catheter tip projects over the  high right atrium. Enteric tubes course below the level of the  diaphragm and the inferior margin of the field-of-view.    I have personally reviewed the examination and initial interpretation  and I agree with the findings.    DARRIUS MANTILLA MD   CTA Abdomen Pelvis with Contrast    Narrative    EXAMINATION: CTA ABDOMEN PELVIS WITH CONTRAST, 7/30/2018 4:55 PM    TECHNIQUE:  Helical CT images from the lung bases through the  symphysis pubis were obtained with IV contrast in late arterial and  venous phase. Contrast dose: 82ml isovue 370    COMPARISON: CT 7/20/2018    HISTORY: S/p liver transplant, and  Ex lap for ischemic small bowel;  please evaluate mesenteric vessels patency.;     FINDINGS:    Abdomen and pelvis: Postsurgical changes of orthotopic liver  transplant. Small free air noted in the abdominal cavity. Post  surgical changes of evacuation of the pretransplant collection. The  surgical drain remain in place adjacent to the hepatorenal fossa.  Intra and extrahepatic biliary stent remain in place. There is  heterogeneous enhancement of the left lobe of the liver, which is more  appreciated on portal venous phase. There is also an additional  hypoenhancement/infarction of the posterior aspect of the right lobe  of the liver. Heterogeneous enhancement of the spleen, consistent with  multiple infarction. There is periportal edema.    Portal vein and right intrahepatic portal venous branches are patent.  There is occlusion of the left portal vein from the origin. Splenic  vein, SMV, and IMV are normally opacified.    Celiac trunk, SMA, and MATT are normally opacified. There is only  proximal portion of the hepatic artery noted outside the hepatic  allograft. Splenic artery is opacified.    Small interloop ascites present. Gastric and enteric tubes are in  proper position. Pneumatosis is present mostly involving the small  bowel. Moderate amount of stool in the bowel.    Luis catheter is in place. Bladder is decompressed.    Lung bases: No significant pleural effusion. Left greater than right  fibroatelectasis. Patchy groundglass attenuation in the lingula.    Bones and soft tissues: No acute osseous abnormality. Postsurgical  changes of laparotomy, with midline anterior soft tissue defect. Mild  soft tissue edema and early anasarca.      Impression    IMPRESSION:   1. Intrahepatic left portal venous thrombosis, with infarction of the  left lobe of the liver. Right portal vein is patent. Patchy infarction  within the right lobe of the liver hepatic segment 5. There is also  suspected hepatic arterial  thrombosis. There is only proximal portion  of the hepatic artery noted outside the hepatic allograft.   2. Multiple splenic infarctions.  3. Extensive pneumatosis, bowel ischemia is suspected.However, SMA and  MATT are opacified.  4. Post surgical changes of evacuation of peritransplant hematoma.    [Result: Portal venous thrombosis, pneumatosis and bowel ischemia]    Finding was identified on 7/30/2018 4:59 PM.     Dr. Azul and Dr. Nj was contacted by Dr. Francis at 7/30/2018 5:36  PM and verbalized understanding of the urgent finding.     US Lower Extremity Venous Duplex Bilateral    Narrative    EXAMINATION: Ultrasound lower extremity venous duplex bilateral,  7/31/2018 12:33 AM     COMPARISON: None.    HISTORY: Evaluate for DVT    TECHNIQUE:  Gray-scale evaluation with compression, spectral flow and  color Doppler assessment of the deep venous system of both legs from  groin to knee, and then at the ankles.    FINDINGS:  In both lower extremities, the common femoral, superficial femoral,  popliteal and posterior tibial veins demonstrate normal  compressibility and blood flow.        Impression    IMPRESSION:  1.  No evidence of deep venous thrombosis in either lower extremity.    I have personally reviewed the examination and initial interpretation  and I agree with the findings.    ZULEMA GRANDA MD   XR Abdomen Port 1 View    Narrative    XR ABDOMEN PORT 1 VW  7/31/2018 6:19 AM      HISTORY: Re-evaluate NG tube, new dysfunction of NG tube;     COMPARISON: CT 7/30/2018    FINDINGS: Gastric tube tip and sidehole project over the stomach.  Right upper quadrant and left upper quadrant drains. Biliary stent.  Extensive pneumatosis and dilation of the small bowel. Feeding tube  tip projects in the 3rd-4th portion of duodenum.      Impression    IMPRESSION:   1. OG/NG tube tip and sidehole project over the stomach prepylorus.   2. Extensive pneumatosis and dilation of the small bowel.    I have personally  reviewed the examination and initial interpretation  and I agree with the findings.    ZULEMA GRANDA MD       =========================================

## 2018-07-31 NOTE — PROGRESS NOTES
CRRT STATUS NOTE    DATA:  Time:  0635 AM  Pressures WNL:  Yes  Filter Status:  WDL    Problems Reported/Alarms Noted:  None    Supplies Present:  Yes    ASSESSMENT:  Patient Net Fluid Balance:  -711 mL since MN 2/2 high drain and stool output  Vital Signs:  Temp 98.2 esophageal        /52 (64) mmHg    CVP 4    RR 18    SpO2 97%  Labs:  Na+ 127, K+ 5.6, Cr 2, LFTs elevated, lactate 5.8, WBC 17.3, plt 83  Goals of Therapy:  I=O    INTERVENTIONS:   None needed at this time.     PLAN:  Continue plan of care. Contact CRRT resource RN at [phone] with any questions or concerns.

## 2018-07-31 NOTE — PLAN OF CARE
Problem: Patient Care Overview  Goal: Plan of Care/Patient Progress Review  Outcome: Declining  D/I/A:  Neuro: Pt will intermittently will move legs on own. Not moving to commands, but withdraws to pain. Pupils equal and reactive but sluggish. No signs of pain. Off sedation other than 50 mcg of fentanyl     CV: BP controlled by Levo maxed at .4, SICU called and maximum dose increased to .6, patient remains at .4. When medication is stopped for a short period of time, patients BP drops significantly and fast. 1L bolus of LR given due to tachycardia and signs of being dry along with hypotension. New MAP goal of 60ml/hr and replace every 1L lost through drains with 12.5g albumin. Vaso DC'd due to mesentery artery concerns.     Pulm: vented and remains on minimal vent settings. See flowsheets. No PS trial today. LS clear/diminished, only cough with stimulation.     GI: TF still on hold due to compaction. Dig stim today once Rectal tube was taken out. No results. Mineral oil was given in hopes for a BM today. No BM. Abdomen still remains firm and taut. NG in place and flushed. Dark foul stool smell output.     : bah in place with minimal output. Annmarie still running, pulling no fluid. q6h lab draws, replace electrolytes as needed. sodiums remain in the upper 120's, D5 remains the replacement fluid for CRRT.    Skin: intact other than incisions, abthera wound vac on abdomen, minimal output on that. 2 CAROL's minimal output, stripped.     P: back to OR to evaluate Bowel. Continue to monitor plan of care, notify MD of any changes.

## 2018-07-31 NOTE — PROCEDURES
BEDSIDE PROCEDURE - Hemodialysis CENTRAL LINE    Date of Procedure: 07/30/2018     Patient: Yaneli Miles   MRN: [id number]     RESIDENT: Chris Cerna MD PGY2    Fellow: Ruddy Arias MD    Staff: Dr. Soler    LOCATION OF CENTRAL LINE: [facility]      SEDATION: Versed      ESTIMATED BLOOD LOSS: Minimal       COMPLICATIONS: None.        INDICATIONS FOR PROCEDURE: Emergent hemodialysis access    DESCRIPTION OF PROCEDURE:  Due to the emergent need of the procedure, consent was not obtained.   The patient was placed in a dependent position appropriate for central line placement based on the vein to be cannulated. The patient s left neck was prepped and draped in sterile fashion. 3 mL 1% Lidocaine was used to anesthetize the surrounding skin area. Each lumen of a triple lumen 12-Egyptian Kj catheter was evacuated of air prior to insertion and flushed with sterile saline. The catheter was introduced into the IJ vein using the Seldinger technique with serial dilation and under ultrasound guidance. The catheter was threaded smoothly over the guide wire and appropriate blood return was obtained. The catheter was advanced to the 15 cm heriberto. The catheter was then sutured in place to the skin and a sterile dressing with biopatch applied. CXR was obtained showing no pneumothorax and appropriate line position. Dr. Soler was immediately available for the entire procedure.    Chris Cerna MD  PGY-2 Surgery  pager [phone]

## 2018-07-31 NOTE — OP NOTE
Transplant Surgery  Operative Note    Preop Dx:  Mesenteric ischemia   Postop Dx: Patchy ischemic small bowel   Procedure: Exploratory laparotomy, peritoneal fluid culture, liver biopsy  Surgeon: Stew Min M.D.  ASSISTANT:  Mihir Nj fellow.  There was no qualified general surgery resident available to assist during this procedure.   Anesthesia: General  EBL: <50 ml  Fluids:   UO: 250 ml   Drains: Carlos drain x2  Specimen: Liver biopsy, peritoneal fluid culture.  Complications: None  Findings:  Minimal serous ascites, patchy ischemic changes throughout the entire small bowel length, Doppler signal present in main intestine vessels and liver.     Indication: The patient had a liver transplant, complicated on POD11 with acute abdominal distention and pain, increased WBC to 37.5 lactate up to 8.7 picture concerning for mesenteric ischemia. After discussing the risks and benefits of surgery and potential complications, the patient provided informed consent.     DETAILS OF PROCEDURE:  The patient was brought to the operating room, placed in a supine position.  Perioperative prophylactic IV antibiotics were given.  Anesthesia was adminisitered. The abdomen was prepped and draped in the usual sterile fashion.  Time out was performed.    The previous incision in the bilateral subcostal area was opened and carried down through the subcutaneous tissue and the abdominal wall fascia. Peritoneal cavity revealed moderate serous-sanguinous ascitic fluid. This was sent for cultures. Small bowel loops distended, edematous, with patchy diffuse ischemic changes throughout the entire length. The bowel was run from ligament of Trietz to the ileocecal valve.  There was no perforations. Several small intraloop hematomas were removed. The bowel tissue was appreciated as viable, there was appreciated Doppler signal in all major vessels of intestine and hepatic artery. The subhepatic hematoma was removed and cultured. The liver  looked normal in color with slightly softer left lobe at palpation. The hilum was exposed and the hepatic directly inspected.  It had a palpable pulse and looked normal.  Liver needle biopsy was performed. The peritoneal cavity was abundantly washed out with normal saline. Two 19 Fr Carlso drains were placed in right upper quadrant and left perihilar space. The abdominal wall was temporarily closed with Abthera mesh with the plan to washout in 2-3 days for possibility that one of the patchy ischemic areas could necrose.     All needle, sponge, and instrument counts were accurate.  The patient tolerated the procedure well without apparent complications and was trasfered to the PACU in good condition.  Faculty was present for the procedure.    ADDENDUM:  I was present for the entire procedure.  He will need to return to OR for evaluation of ischemic areas.

## 2018-08-01 NOTE — PROVIDER NOTIFICATION
"Dr. Casillas, SICU resident, called to discuss PRN ablumin order due to confusing wording \"abthera drain losses\". Over last 12 hours, abthera and JPs with 394ml out, if NG is included output is 1232ml. Per Dr Casillas's request- will hold PRN albumin since order only states abthera.  "

## 2018-08-01 NOTE — PLAN OF CARE
Problem: Patient Care Overview  Goal: Plan of Care/Patient Progress Review  Outcome: No Change  Patient's status remains quite labile. Versed remains off, fentanyl at 50mcg/h- patient appears comfortable with this. Levophed 0.4-0.36mcg/kg/min to keep MAP >60, CVP 2-3, HR 1teens-120. NG continues to have stool colored output and needs to be frequently irrigated to remain patent- 601ml out since midnight. Patient has not yet stooled, mineral oil given again overnight. Luis with very minimal urine output. JPs with minimal tea-colored output, abthera continues to have 25-50ml serosang output/h. LFTs continue to trend down. Na remaining ~128. We have continued to run at 0ml/h on CRRT. Dr. Casillas, overnight SICU resident, notified of 3 blood culture results overnight. Wife has remained at the bedside with multiple visitors last evening.

## 2018-08-01 NOTE — PROGRESS NOTES
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 Now on CRRT SP exlap with hematoma evacuation on 7/30  Drain output decreased but still high output from NGT     -- continued on CRRT improved hyperkalemia , severe acidosis , is anuric  -- will continue on CRRT with K2 bath unless K is <4  -- Post filter fluid switched to 1/2NS today will monitor sodium Goal to <137 for today and will recalcute in the am for correction. If sodium is <130 please call , he can receive NS IV if needed  -- Albumin and NS can be used for resuscitation , avoid LR since he has liver shock likely not able to metabolize lactate well  -- CRRT prescribed at 30ml/kg/min with 0 UF for now, bolus with IVF as needed for hypotension, albumin preferred for resuscitation to avoid any changes to sodium  -- continue with labs per CRRT protocol. Q2 hr sodium can be DC  -- daily weights and strict IO monitoring  -- continue with Intra abdominal pressure monitoring Q shift     Severe hyponatremia improving  Likely related to free water flushes/BEN , ADH dependant with hypotension as well since he did receive NS   Goals as above  Hyperkalemia improved  With BEN , hematoma and poor renal clearnace and met acidosis  CRRT prescription to address     Hypervolemia  With hypotension on  presser support  CRRT with 0 UF for now  Will monitor for needs     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  Off Linezolid due to LA  Micafungin and zosym for additional coverage for sepsis per MICU     DDLT 7/12/18   ESLD sec to hep B cb EV , HE , ascites and portal HTN  Ex lap 7/30  Graft function: Intrahepatic left portal venous thrombosis, infarction of left liver lobe, patchy infarction of right lobe, suspected hepatic arterial thrombosis, Splenic infarctions and Patchy bowel ischemia on heparin gtt  On valcyte and bactrim  On tenofovir  -- OR plans tomorrow      Acute resp failure   Intubated on vent support   MICU managing     DMII on insulin gtt     Anemia/ thrombocytopenia/leucocytosis  ACD/inflamm anemia  Low PLT due to ESLD  Hb 8  On heparin      Recommendations were communicated to primary team     Seen and discussed with Dr. Charlotte Rodrigues MD   [phone]    Interval History :   In the last 24 hours Yaneli Miles has been in the ICU on CRRT with minimal to none UO tolerating CRRT off vasopressin now   Review of Systems:   Unable to obtain    Physical Exam:   I/O last 3 completed shifts:  In: 3082.49 [I.V.:2728.49; Other:54; NG/GT:300]  Out: 1834 [Urine:40; Emesis/NG output:1006; Drains:750; Other:38]   BP 93/57  Pulse 121  Temp 97.9  F (36.6  C) (Axillary)  Resp 17  Wt 62.9 kg (138 lb 10.7 oz)  SpO2 98%  BMI 22.38 kg/m2     GENERAL APPEARANCE: ill appearing vent support no distress  EYES:  + scleral icterus, pupils equal  HENT: mouth without ulcers or lesions  PULM: lungs low air entry  to auscultation,  bilaterally, equal air movement, no clubbing  CV: regular rhythm, normal rate, no rub     -JVD unable to access     -edema +   GI: soft, , +distended, bowel sounds are +/-  INTEGUMENT: no cyanosis, - rash  NEURO:  sedated   Access LIJ non tunneled    Labs:   All labs reviewed by me  Electrolytes/Renal -   Recent Labs   Lab Test  08/01/18   0605  08/01/18   0405  08/01/18   0210   07/31/18   2200   NA   128*  128*  128*   < >  127*   POTASSIUM  4.4  4.3   --    --   4.6   CHLORIDE  95  94   --    --   94   CO2  20  22   --    --   22   BUN  23  23   --    --   23   CR  1.31*  1.33*   --    --   1.43*   GLC  142*  148*   --    --   160*   JESUS  8.6  8.6   --    --   8.1*   MAG  2.1  2.1   --    --   2.1   PHOS  3.6  3.6   --    --   4.3    < > = values in this interval not displayed.       CBC -   Recent Labs   Lab Test  08/01/18   0605  08/01/18   0405  07/31/18   2200   WBC  16.6*  18.5*  20.9*   HGB  8.0*  8.1*  8.5*   PLT  67*  71*  76*       LFTs -   Recent Labs   Lab Test  08/01/18   0605  08/01/18   0405  07/31/18   2200   ALKPHOS  270*  272*  280*   BILITOTAL  4.8*  4.7*  4.8*   ALT  1183*  1239*  1372*   AST  1176*  1297*  1608*   PROTTOTAL  4.4*  4.4*  4.5*   ALBUMIN  2.0*  2.0*  2.2*       Iron Panel -   Recent Labs   Lab Test  07/13/18   0334  06/17/18   2138   IRON  97  66   IRONSAT  83*  109*   ANGELLA  Unsatisfactory specimen - icteric   --          Imaging:  All imaging studies reviewed by me.     Current Medications:    albumin human  25 g Intravenous Once     aspirin  81 mg Oral Daily     calcium chloride  2 g Intravenous Once     calcium gluconate  1 g Intravenous Once     calcium gluconate  2 g Intravenous Once     DAPTOmycin (CUBICIN) intermittent infusion  10 mg/kg (Dosing Weight) Intravenous Q24H     dextrose  50 mL Intravenous Once     hydrocortisone sodium succinate PF  50 mg Intravenous Q6H     insulin (Human Regular) (HumuLIN R/NovoLIN R) for intravenous use (SHORT ACTING)  10 Units Intravenous Once     micafungin  100 mg Intravenous Q24H     pantoprazole (PROTONIX) IV  40 mg Intravenous Daily with breakfast     piperacillin-tazobactam  4.5 g Intravenous Q6H     sodium chloride (PF)  3 mL Intravenous Q8H     sulfamethoxazole-trimethoprim  10 mL Oral Once per day on Mon Wed Fri     tenofovir  300 mg Per Feeding Tube Q48H     valGANciclovir  450 mg Oral Every Other Day    Or     valGANciclovir   450 mg Oral or NG Tube Every Other Day       IV fluid REPLACEMENT ONLY Stopped (07/30/18 0800)     D5W 150 mL/hr at 08/01/18 0358     dialysate for CVVHD & CVVHDF (PrismaSol BGK 2/3.5) 17.5 mL/kg/hr (08/01/18 0424)     fentaNYL 50 mcg/hr (08/01/18 0700)     HEParin 1,500 Units/hr (08/01/18 0700)     insulin (regular) 3 Units/hr (08/01/18 0700)     lactated ringers 10 mL/hr at 07/30/18 1519     midazolam Stopped (07/31/18 0557)     - MEDICATION INSTRUCTIONS -       norepinephrine 0.45 mcg/kg/min (08/01/18 0707)     replacement solution for CVVHD & CVVHDF (PrismaSol BGK 2/3.5) 12.5 mL/kg/hr (08/01/18 0629)     Reason beta blocker order not selected       Janes Rodrigues MD

## 2018-08-01 NOTE — PROGRESS NOTES
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      POD 1 US liver: Impression:   1.  Hematoma associated with the inferior aspect of the perihepatic  space anteriorly, measuring 13.7 x 7.0 x 8.5 cm.  2.  Retrograde flow of the left portal vein. Additionally, low  velocities of the portal venous system. Single low resistive index of  the extrahepatic aspect of the hepatic artery, measuring 0.37. These  findings are likely within normal limits in the early postoperative  context. However, continued attention on follow-up recommended.    7/30 CT scan abd/pelvis:  IMPRESSION:   1. Hypoenhancing areas in the transplant liver concerning for  infarction. Transplant left portal vein occlusion with bubbles of  portal venous gas. Nearly occlusive filling defect in the native  hepatic artery. Narrowed and irregular appearance of the transplant  hepatic artery. Focal occlusion of the left main hepatic artery.  Segmental occlusions of right hepatic arterial branches.   2. Pneumatosis intestinalis with nonocclusive filling defects in  superior mesenteric vein branches, concerning for bowel ischemia or  infarction.   3. Splenic infarctions.   4. Bibasilar consolidation with air bronchograms. Pneumonia cannot be  excluded.   5. Post surgical changes of evacuation of peritransplant hematoma.       Graft function: POD 11. Infarction of left liver lobe, patchy infarction right liver lobe. Left PV thrombosis. Narrow and irregular appearance HA. Bowel ischemia. 7/30 Ex lap, patchy diffuse SB ischemia. Doppler demonstrated arterial flow main vessels to bowel and liver. Splenic infarction. Liver bx and cx.  On Heparin gtt. Plan to return to OR,  likely tomorrow per surgeon.  Immunosuppression management:   Simulect intra-op due to BEN  Steroid taper per protocol, competed.  MMF 1gm BID, hold due to status  Tac. Tac level 11.3. Hold due to status.   Hydrocortisone 50 q 6hrs due to shock  Complexity of management:Medium. Contributing factors: thrombocytopenia and anemia sepsis  Hematology: Hemoglobin stable, 8. PLT 63.  INR 2.32 <-1.91<-1.30. On high intensity heparin gtt due to thrombosis.    Cardiorespiratory: Respiratory failure secondary to sepsis, on vent. On norepinephrine gtt, dose increased and vasopressin discontinued due to mesenteric ischemia.    GI/Nutrition: NGT. NPO. Hold TF. Large stool burden, no stool output today  Endocrine: Insulin drip.    Fluid/Electrolytes: BEN. CRRT I=O. Fluid bolus given. Remains on pressor. NG 1305 (mL) and CAROL output (838mL) ~ 2L yesterday.  Lactic acid decreased, 3.2  : Luis  Infectious disease: Temp 96.5-99.5. Leukocytosis, 19.7 (20.9).   -Blood cultures growing VRE; started Linezolid 600 mg BID. Stopped linezolid due to lactic acidosis. Switched to daptomycin.  Repeat bcx.   -fluid cultures 18L VRE  -empiric coverage aide and zosyn, continue  HBV: Hepatits B DNA PCR positive prior to tx. Received HBIG on  and . Hep B surface antigen negative. No further HBIG planned. Continue tenofovir 300 mg daily.   PPX: Valcyte x 12 weeks (CMVand EBV IgG +), bactrim. mycelex stopped as patient on micafungin. Will start nystatin if aide discontinued.     Prophylaxis: DVT PCDs, on heparin, fall, GI, fungal  Disposition: SICU    Coordinator: Kay Reyes       Medical Decision Making: High      JAY/Fellow/Resident Provider: GINGER Segovia [id number]    Faculty: Leonie Elizalde MD  __________________________________________________________________  Transplant History: Admitted 2018 for  donor liver transplant.   The patient has a history of liver failure due to hepatitis B .    2018 (Liver), Postoperative  day: 11     Interval History:   On pressor, labile BP. CRRT with no fluid removal.     ROS:   A 10-point review of systems was negative except as noted above.    Curent Meds:    albumin human  25 g Intravenous Once     aspirin  81 mg Oral Daily     DAPTOmycin (CUBICIN) intermittent infusion  10 mg/kg (Dosing Weight) Intravenous Q24H     hydrocortisone sodium succinate PF  50 mg Intravenous Q6H     micafungin  100 mg Intravenous Q24H     pantoprazole (PROTONIX) IV  40 mg Intravenous Daily with breakfast     piperacillin-tazobactam  4.5 g Intravenous Q6H     sodium chloride (PF)  3 mL Intravenous Q8H     sulfamethoxazole-trimethoprim  10 mL Oral Once per day on Mon Wed Fri     tenofovir  300 mg Per Feeding Tube Q48H     valGANciclovir  450 mg Oral Every Other Day    Or     valGANciclovir  450 mg Oral or NG Tube Every Other Day       Physical Exam:     Admit Weight: 60.7 kg (133 lb 12.8 oz)    Current Vitals:   BP 91/50  Pulse 121  Temp 98.4  F (36.9  C) (Axillary)  Resp 16  Wt 62.9 kg (138 lb 10.7 oz)  SpO2 99%  BMI 22.38 kg/m2      Vital sign ranges:    Temp:  [97.1  F (36.2  C)-99.5  F (37.5  C)] 98.4  F (36.9  C)  Heart Rate:  [] 120  Resp:  [13-20] 16  BP: (91-96)/(49-57) 91/50  MAP:  [53 mmHg-72 mmHg] 72 mmHg  Arterial Line BP: ()/(25-64) 103/57  FiO2 (%):  [40 %] 40 %  SpO2:  [93 %-100 %] 99 %  Patient Vitals for the past 24 hrs:   BP Temp Temp src Heart Rate Resp SpO2 Weight   08/01/18 1200 - 98.4  F (36.9  C) Axillary 120 16 99 % -   08/01/18 1100 - - - 123 18 100 % -   08/01/18 1056 91/50 - - 122 - - -   08/01/18 1000 - - - 120 17 100 % -   08/01/18 0900 - - - 120 13 100 % -   08/01/18 0840 96/49 - - 123 - - -   08/01/18 0800 - 99.5  F (37.5  C) Axillary 123 17 96 % -   08/01/18 0700 - - - 121 - 98 % -   08/01/18 0657 93/57 - - 123 - 98 % -   08/01/18 0630 - - - 119 - 98 % -   08/01/18 0615 - - - 119 - 97 % -   08/01/18 0600 - - - 120 17 96 % -   08/01/18 0545 - - - 121 - 97 % -   08/01/18  0530 - - - 120 - 96 % -   08/01/18 0515 - - - 119 - 96 % -   08/01/18 0500 - - - 119 17 93 % -   08/01/18 0453 - - - 99 - 95 % -   08/01/18 0445 - - - 119 - 94 % -   08/01/18 0430 - - - 119 - 95 % -   08/01/18 0415 - - - 121 - 100 % -   08/01/18 0400 - 97.9  F (36.6  C) Axillary 121 19 96 % -   08/01/18 0345 - - - 119 - 93 % -   08/01/18 0330 - - - 118 - 95 % -   08/01/18 0315 - - - 118 - 97 % -   08/01/18 0300 - - - 118 17 97 % -   08/01/18 0245 - - - 117 - 97 % -   08/01/18 0230 - - - 113 - 99 % -   08/01/18 0215 - - - 116 - 99 % -   08/01/18 0200 - - - 119 16 99 % 62.9 kg (138 lb 10.7 oz)   08/01/18 0145 - - - 112 - 96 % -   08/01/18 0130 - - - 111 - 95 % -   08/01/18 0115 - - - 110 - 96 % -   08/01/18 0100 - - - 110 16 96 % -   08/01/18 0045 - - - 111 - 97 % -   08/01/18 0039 - - - 112 - 97 % -   08/01/18 0030 - - - 111 - 97 % -   08/01/18 0015 - - - 111 - 98 % -   08/01/18 0000 - 97.9  F (36.6  C) Axillary 115 16 98 % -   07/31/18 2345 - - - 112 - 95 % -   07/31/18 2330 - - - 113 - 96 % -   07/31/18 2315 - - - 119 - - -   07/31/18 2300 - - - 117 16 100 % -   07/31/18 2245 - - - 118 - 99 % -   07/31/18 2230 - - - 116 - 98 % -   07/31/18 2215 - - - 120 - 99 % -   07/31/18 2200 - - - 119 19 99 % -   07/31/18 2145 - - - 118 - 98 % -   07/31/18 2130 - - - 118 - 98 % -   07/31/18 2115 - - - 116 - 99 % -   07/31/18 2100 - - - 119 18 98 % -   07/31/18 2045 - - - 118 - 99 % -   07/31/18 2030 - - - 119 - 100 % -   07/31/18 2024 - - - 120 - 100 % -   07/31/18 2015 - - - 119 - 100 % -   07/31/18 2000 - 97.1  F (36.2  C) Axillary 122 20 100 % -   07/31/18 1945 - - - 120 - 100 % -   07/31/18 1930 - - - 119 - 100 % -   07/31/18 1915 - - - 120 - 100 % -   07/31/18 1900 - - - 120 18 100 % -   07/31/18 1700 - - - 119 - 100 % -   07/31/18 1600 - 97.7  F (36.5  C) Axillary 120 - 100 % -   07/31/18 1500 - - - 117 - 100 % -   07/31/18 1445 - - - 115 - 100 % -   07/31/18 1430 - - - 113 - 100 % -   07/31/18 1400 - - - 110 - 100 % -      General Appearance: Intubated, CRRT  HEENT: NGT to suction.  Skin: warm, dry  Heart: sinus tachycardia   Lungs: CMV, FiO2 40%  Abdomen: less distended (examined by surgeon), CAROL x 2, serous output. Abthera in place, serous outupt.   :  bah  Extremities: no edema  Neurologic: withdraws to pain.       Data:   CMP  Recent Labs  Lab 08/01/18  1152 08/01/18  0958  08/01/18  0605 08/01/18  0405  07/30/18  1115   * 128*  < > 128* 128*  < > 139   POTASSIUM  --  4.1  --  4.4 4.3  < > 5.9*   CHLORIDE  --  96  --  95 94  < >  --    CO2  --  23  --  20 22  < >  --    GLC  --  101*  --  142* 148*  < > 275*   BUN  --  22  --  23 23  < >  --    CR  --  1.30*  --  1.31* 1.33*  < >  --    GFRESTIMATED  --  60*  --  59* 58*  < >  --    GFRESTBLACK  --  72  --  72 70  < >  --    JESUS  --  8.4*  --  8.6 8.6  < >  --    ICAPOC  --   --   --   --   --   --  3.5*   ICAW  --  4.8  --   --  4.8  < >  --    MAG  --  1.9  --  2.1 2.1  < >  --    PHOS  --  3.1  --  3.6 3.6  < >  --    ALBUMIN  --  1.9*  --  2.0* 2.0*  < >  --    BILITOTAL  --  4.8*  --  4.8* 4.7*  < >  --    ALKPHOS  --  266*  --  270* 272*  < >  --    AST  --  889*  --  1176* 1297*  < >  --    ALT  --  1080*  --  1183* 1239*  < >  --    < > = values in this interval not displayed.  CBC    Recent Labs  Lab 08/01/18  0958 08/01/18  0605   HGB 8.0* 8.0*   WBC 19.7* 16.6*   PLT 63* 67*     COAGS    Recent Labs  Lab 07/30/18  1444 07/30/18  1230   INR 2.32* 1.91*   PTT  --  41*      Urinalysis  Recent Labs   Lab Test  07/27/18   2330  07/13/18   1315  06/18/18   1200   COLOR  Yellow  Dark Yellow  Yellow   APPEARANCE  Clear  Clear  Clear   URINEGLC  Negative  Negative  Negative   URINEBILI  Negative  Large*  Small*   URINEKETONE  Negative  Negative  Negative   SG  1.008  1.013  1.008   UBLD  Negative  Negative  Negative   URINEPH  7.5*  6.0  5.0   PROTEIN  30*  10*  Negative   NITRITE  Negative  Negative  Negative   LEUKEST  Negative  Negative  Negative   RBCU  5*  <1   1   WBCU  3  None  2   UTPG   --    --   0.75*     Virology:  Hepatitis C Antibody   Date Value Ref Range Status   07/20/2018 Nonreactive NR^Nonreactive Final     Comment:     Assay performance characteristics have not been established for newborns,   infants, and children

## 2018-08-01 NOTE — PLAN OF CARE
Problem: Patient Care Overview  Goal: Plan of Care/Patient Progress Review  Outcome: No Change  D: Pt remains afebrile, adequate MAP on Norepi gtt ranging from 0.4 to 0.5. CARTWRIGHT to deep pain, moves LUE spontaneously and to command. Urine outputs via bah catheter noted to be less than 1cc/hr. Abthera continues to put out average of 25cc/hr serosanguinous fluid. Bilateral CAROL drains 1 to 2 ml/hr output. I: Norepi titrated to maintain MAP of 60 and better. Insulin titrated from 0.5u/hr to 3. Bah catheter discontinued. Unable to pull UF via CRRT due to high pressor requirements. 250cc of 5% albumin infused to replace 1 liter of abthera output over the last 2 days. Pink Lady enema administered via Dignisheild rectal tube, no stool output, discontinued rectal tube after 3 hours.   A: Pt appears comfortable despite off of sedation nearly 36 hours now. P; Anticipate washout and Abthera dressing change as well as CRRT circuit change tomorrow. Continue plan of care.

## 2018-08-01 NOTE — PROGRESS NOTES
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 He returns to the ICU 7/30  for shock, severe lactic acidosis and respiratory failure. S/p ex-lap with hematoma evacuation 7/31/18.     TODAY'S PROGRESS/PLANS:   - keep MAP goals at 60, re-evaluate  - discuss nutrition plan with Transplant team  - No laxative or bowl stimulants per Transplant, enema today   - OR possibly tomorrow   - Continue with CRRT   - remove Luis as pt not making urine, prn bladder scan   - give albumin if losses from drains > 1 L       PLAN:  Neuro/ pain/ sedation:  # Acute pain  # Sedation  - Monitor neurological status. Notify the MD for any acute changes in exam.  - Fentanyl gtt. Versed  Infusion currently off,   - PRN oxycodone and Hydromorphone IV as needed for pain     Pulmonary care:   # Acute respiratory failure  - continue with full mechanical support.   - HOB elevations. Vent bundle.   - CMV 16/400/5/40%. C      Cardiovascular:    # Septic Shock  - Monitor hemodynamic status.   - Norepinephrine gtt at this time. Stopped Vasopressin 7/31/18 per Transplant due to concerns of bowel ischemia.  - Epi gtt if needed.   - Solu-cortef 50 every 6 hours for stress dose steroids for 72 hour course.      GI:   # DDLT 7/21/18  # Intrahepatic left portal venous thrombosis, infarction of left liver lobe, patchy infarction of right lobe, suspected hepatic arterial thrombosis  # 7/30/18 sp exploratory laparotomy, abthera closure device in place. Findings of patchy diffuse SB ischemia. S/p liver biopsy  # Splenic infarctions  # Patchy bowel ischemia  # ileus   - s/p exploratory laparotomy, abthera closure device in place. Findings of patchy diffuse SB ischemia. S/p liver biopsy on 7/30/18  -  Continue heparin gtt high intensity given thrombosis   - CXR with large stool burden. Gave mineral oil down feeding tube 7/31 and enemas. Will given enema gain today.   - Possible OR tomorrow per transplant      Nutrition:  # protein calorie deficit malnutrition.   - TF's on hold at this time given pressor needs and ileus. Reassess tomorrow.      Renal/ Fluid Balance/electrolytres :    # BEN  # Severe metabolic acidosis, lactic acidosis  # Severe hyponatremia  # hyperkalemia--corrected with CRRT    Acute sodium drop 139-->119 noted,  correcting slowly, serial Na labs. Goal for Na 132 per renal.  Call neprhology if Na >132 or < 125.   - CRRT per Nephrology. CRRT 7/30/18. I=O's at this point    - Lactic appears to be trending downward most recent 3.2  - On CRRT electrolyte replacement protocol.       Endocrine:    # DM II  - Insulin gtt. Continue with insulin drip      ID/ Antibiotics:  # VRE Bacteremia  # Hepatitis B  - Continue Daptomycin.   - Micafungin and Zosyn for empiric coverage for sepsis started 7/30  - Immunosuppression: on hold, will defer to transplant when to start.  - Immunosuppression prophylaxis: Bactrim, Valgancyclovir and Tenofovir  - resend cultures today.   - Patient with KNOWN bacteremia at time of TLC placement on 7/30/18. Will keep line for now as patient with bacteremia and not line as source.     Positive culture   - 7/27 VRE (line/arm)   - 7/29: VRE (left arm)   - 7/30: VRE (left arm)   - 7/31- VRE (Line and peripheral)    - Fluid culture 7/31/18 with probable Enteroccus faecium--sensitivities pending       Heme:     # Acute blood loss anemia  # Anemia of critical illness  # Portal venous thrombosis  # Thrombocytopenia  - Hemoglobin 8.0, stable.   - High intensity heparin gtt. Continue aspirin 81mg.       MSK:    # Weakness and deconditioning of critical illness   - PT and OT       Lines/ tubes/ drains:  - R internal jugular TLC 7/30  - L internal jugular dialysis line 7/30  - Right femoral  arterial line  - NJ   - PIVs  - ETT     General Cares and  Prophylaxis:    - Mechanical prophylaxis for DVT and Heparin gtt  - GI: Protonix  prophylaxis      Disposition:  - Surgical ICU.    [place]      Time spent on this Encounter   Billing: I spent 45 minutes bedside and on the inpatient unit today managing the critical care of Yaneli Miles in relation to the issues listed in this note.      ====================================    SUBJECTIVE:  Course reviewed no acute events overnight.Sedation weaned but without response to verbal stimuli, patient does withdraw to noxious stimuli. Not able to perform ROS.     OBJECTIVE:   1. VITAL SIGNS:   Temp:  [96.5  F (35.8  C)-97.9  F (36.6  C)] 97.9  F (36.6  C)  Heart Rate:  [] 123  Resp:  [16-20] 17  BP: (93)/(57) 93/57  MAP:  [53 mmHg-81 mmHg] 61 mmHg  Arterial Line BP: ()/(25-64) 92/47  FiO2 (%):  [40 %] 40 %  SpO2:  [93 %-100 %] 98 %  Ventilation Mode: CMV/AC  (Continuous Mandatory Ventilation/ Assist Control)  FiO2 (%): 40 %  Rate Set (breaths/minute): 16 breaths/min  Tidal Volume Set (mL): 400 mL  PEEP (cm H2O): 5 cmH2O  Oxygen Concentration (%): 40 %  Resp: 17    2. INTAKE/ OUTPUT:   I/O last 3 completed shifts:  In: 3082.49 [I.V.:2728.49; Other:54; NG/GT:300]  Out: 1834 [Urine:40; Emesis/NG output:1006; Drains:750; Other:38]    3. PHYSICAL EXAMINATION:   General: deeply sedated. Withdraws to noxious stimuli   HEENT: ETT present and secured. NJ and NG present,  Pupils 3mm and equal  Neuro: sedated, withdraws to painpt does not interact or respond to verbal stimuli   Resp: intubated, breathing unlabored. No wheezes, rales or rhonchi   CV: RRR, S1,S2 tachycardia   Abdomen: abdomen with abthera in place-- suction intact, serosanguinous drainage in collection tubing. CAROL drains x2 with serosanguinous drainage.   Incisions: suction intact on Wound vac.   Extremities: Calves soft and non-tender, PP2+. CARTWRIGHT.     4. INVESTIGATIONS:   Arterial Blood Gases      Recent Labs  Lab 07/30/18  1444 07/30/18  1325 07/30/18  1213 07/30/18  1146   PH 7.22* 7.22* 7.29* 7.41   PCO2 41 43 39 30*   PO2 85 131* 161* 72*   HCO3 17* 18* 19* 19*     Complete Blood Count     Recent Labs  Lab 08/01/18  0605 08/01/18  0405 07/31/18  2200 07/31/18  1540   WBC 16.6* 18.5* 20.9* 23.4*   HGB 8.0* 8.1* 8.5* 8.8*   PLT 67* 71* 76* 76*     Basic Metabolic Panel    Recent Labs  Lab 08/01/18  0605 08/01/18  0405 08/01/18  0210 08/01/18  0013 07/31/18  2200  07/31/18  1540   * 128* 128* 128* 127*  < > 128*   POTASSIUM 4.4 4.3  --   --  4.6  --  5.0   CHLORIDE 95 94  --   --  94  --  93*   CO2 20 22  --   --  22  --  22   BUN 23 23  --   --  23  --  27   CR 1.31* 1.33*  --   --  1.43*  --  1.60*   * 148*  --   --  160*  --  158*   < > = values in this interval not displayed.  Liver Function Tests    Recent Labs  Lab 08/01/18  0605 08/01/18  0405 07/31/18  2200 07/31/18  1540  07/30/18  1444 07/30/18  1230  07/27/18  0606   AST 1176* 1297* 1608* 2168*  < > 6162*  --   < > 24   ALT 1183* 1239* 1372* 1594*  < > 2195*  --   < > 40   ALKPHOS 270* 272* 280* 292*  < > 178*  --   < > 88   BILITOTAL 4.8* 4.7* 4.8* 5.1*  < > 4.1*  --   < > 3.5*   ALBUMIN 2.0* 2.0* 2.2* 2.5*  < > 3.0*  --   < > 3.0*   INR  --   --   --   --   --  2.32* 1.91*  --  1.30*   < > = values in this interval not displayed.  Pancreatic Enzymes  No lab results found in last 7 days.  Coagulation Profile    Recent Labs  Lab 07/30/18  1444 07/30/18  1230 07/27/18  0606   INR 2.32* 1.91* 1.30*   PTT  --  41*  --      Lactate  Invalid input(s): LACTATE    5. RADIOLOGY:   Recent Results (from the past 24 hour(s))   XR Abdomen Port 1 View    Narrative    Exam:  XR ABDOMEN PORT 1 VW, 7/30/2018 12:05 PM    History: abdominal distention;     Comparison:  Abdominal radiograph 7/30/2018.    Findings:  Portable supine radiograph the abdomen. Abnormally dilated  small bowel throughout the abdomen with extensive pneumatosis.  Partially  visualized portal venous gas. Surgical drains are in stable  position. Feeding tube tip projects over the fourth portion of the  duodenum. Gastric tube tip and sidehole projects over the stomach.  Skin staples overlying the upper abdomen. Bones are unremarkable.      Impression:    Diffuse abnormal bowel dilation with extensive pneumatosis and  partially visualized portal venous gas, highly concerning for ischemic bowel.    [Result: Bowel ischemia]    Finding was identified on 7/30/2018 12:05 PM.     Dr. Nj was contacted by Dr. Kenny at 7/30/2018 12:07 PM and  verbalized understanding of the urgent finding.     I have personally reviewed the examination and initial interpretation and I agree with the findings.    GEOVANY SHEPARD MD   XR Chest Port 1 View    Narrative    XR CHEST PORT 1 VW  7/30/2018 11:42 AM      HISTORY: line placement;     COMPARISON: Earlier same day    FINDINGS: Endotracheal tube tip projects over the mid to low thoracic  trachea. Left IJ central venous catheter tip projects over the high  SVC. Right IJ central venous catheter tip projects over the high right  atrium. Enteric tubes course below the level of the diaphragm and the  inferior margin of the field-of-view. Gastric distention appears  improved. Perihilar and bibasilar opacities are unchanged. Left upper  quadrant drain.      IMPRESSION: Endotracheal tube tip projects over the mid to low  thoracic trachea. Left IJ central venous catheter tip projects over  the high SVC. Right IJ central venous catheter tip projects over the  high right atrium. Enteric tubes course below the level of the  diaphragm and the inferior margin of the field-of-view.    I have personally reviewed the examination and initial interpretation and I agree with the findings.    DARRIUS MANTILLA MD   CTA Abdomen Pelvis with Contrast    Narrative    EXAMINATION: CTA ABDOMEN PELVIS WITH CONTRAST, 7/30/2018 4:55 PM    TECHNIQUE:  Helical CT images from the lung  bases through the  symphysis pubis were obtained with IV contrast in late arterial and  venous phase. Contrast dose: 82ml isovue 370    COMPARISON: CT 7/20/2018    HISTORY: S/p liver transplant, and Ex lap for ischemic small bowel;  please evaluate mesenteric vessels patency.;     FINDINGS:    Abdomen and pelvis: Postsurgical changes of orthotopic liver  transplant. Small free air noted in the abdominal cavity. Post  surgical changes of evacuation of the pretransplant collection. The  surgical drain remain in place adjacent to the hepatorenal fossa.  Intra and extrahepatic biliary stent remain in place. There is  heterogeneous enhancement of the left lobe of the liver, which is more  appreciated on portal venous phase. There is also an additional  hypoenhancement/infarction of the posterior aspect of the right lobe  of the liver. Heterogeneous enhancement of the spleen, consistent with  multiple infarction. There is periportal edema.    Portal vein and right intrahepatic portal venous branches are patent.  There is occlusion of the left portal vein from the origin. Splenic  vein, SMV, and IMV are normally opacified.    Celiac trunk, SMA, and MATT are normally opacified. There is only  proximal portion of the hepatic artery noted outside the hepatic  allograft. Splenic artery is opacified.    Small interloop ascites present. Gastric and enteric tubes are in  proper position. Pneumatosis is present mostly involving the small  bowel. Moderate amount of stool in the bowel.    Luis catheter is in place. Bladder is decompressed.    Lung bases: No significant pleural effusion. Left greater than right  fibroatelectasis. Patchy groundglass attenuation in the lingula.    Bones and soft tissues: No acute osseous abnormality. Postsurgical  changes of laparotomy, with midline anterior soft tissue defect. Mild  soft tissue edema and early anasarca.      IMPRESSION:   1. Intrahepatic left portal venous thrombosis, with infarction  of the  left lobe of the liver. Right portal vein is patent. Patchy infarction  within the right lobe of the liver hepatic segment 5. There is also  suspected hepatic arterial thrombosis. There is only proximal portion  of the hepatic artery noted outside the hepatic allograft.   2. Multiple splenic infarctions.  3. Extensive pneumatosis, bowel ischemia is suspected.However, SMA and  MATT are opacified.  4. Post surgical changes of evacuation of peritransplant hematoma.    [Result: Portal venous thrombosis, pneumatosis and bowel ischemia]    Finding was identified on 7/30/2018 4:59 PM.     Dr. Azul and Dr. Nj was contacted by Dr. Francis at 7/30/2018 5:36  PM and verbalized understanding of the urgent finding.     US Lower Extremity Venous Duplex Bilateral    Narrative    EXAMINATION: Ultrasound lower extremity venous duplex bilateral,  7/31/2018 12:33 AM     COMPARISON: None.    HISTORY: Evaluate for DVT    TECHNIQUE:  Gray-scale evaluation with compression, spectral flow and  color Doppler assessment of the deep venous system of both legs from  groin to knee, and then at the ankles.    FINDINGS:  In both lower extremities, the common femoral, superficial femoral,  popliteal and posterior tibial veins demonstrate normal  compressibility and blood flow.      IMPRESSION:  1.  No evidence of deep venous thrombosis in either lower extremity.    I have personally reviewed the examination and initial interpretation  and I agree with the findings.    ZULEMA GRANDA MD   XR Abdomen Port 1 View    Narrative    XR ABDOMEN PORT 1 VW  7/31/2018 6:19 AM      HISTORY: Re-evaluate NG tube, new dysfunction of NG tube;     COMPARISON: CT 7/30/2018    FINDINGS: Gastric tube tip and sidehole project over the stomach.  Right upper quadrant and left upper quadrant drains. Biliary stent.  Extensive pneumatosis and dilation of the small bowel. Feeding tube  tip projects in the 3rd-4th portion of duodenum.      IMPRESSION:   1. OG/NG  tube tip and sidehole project over the stomach prepylorus.   2. Extensive pneumatosis and dilation of the small bowel.    I have personally reviewed the examination and initial interpretation  and I agree with the findings.    ZULEMA GRANDA MD       =========================================

## 2018-08-02 NOTE — PROGRESS NOTES
CRRT STATUS NOTE    DATA:   Time:  7:33 PM  Pressures WNL:  YES  Filter Status:  WDL    Problems Reported/Alarms Noted:  none    Supplies Present:  YES    ASSESSMENT:  Patient Net Fluid Balance:  +500 since midnight  Vital Signs:  , /54(74), spo2 100%  Labs:  Na 130, K 3.9, Ical 4.7, mg 1.9  Goals of Therapy:  I=O    INTERVENTIONS:   CRRT continued throughout shift w/o issues. D5 removed from post replacement bag.    PLAN:  Continue to removed fluid per nephrology goal.

## 2018-08-02 NOTE — PLAN OF CARE
Problem: Patient Care Overview  Goal: Plan of Care/Patient Progress Review  Outcome: No Change  Patient following commands, nodding/shaking head appropriately. Fentanyl at 50mcg/h, patient shaking head no when asked if he's having pain. Levophed titrated down throughout the night and is currently infusing at 0.28mcg/kg/min, MAP >60, HR down to  with occasional PVCs. CVP 4-5. Small amount of mucous passed from rectum this morning but no stool. CAROL with minimal output, abthera with 0-25ml/h, NG continues to have stool appearing output and needs to be frequently flushed to remain patent. Heparin titrated this morning after HepXa 0.27. Pre-filter fluid and dialysate changed overnight to K+4 after K+ level 3.6. Multiple blood cultures positive overnight, platelets trending down- see provider notification note. Patient's wife went home overnight and daughter's stayed at the bedside.

## 2018-08-02 NOTE — PROGRESS NOTES
CRRT RESTART NOTE    Reason for Restart:  Scheduled 72 hr circuit change  Error Code:      Patient s Vascular Access: Catheter                   Placement Confirmed: YES  Manufacture:  [employer]  Model:  Palindrome  Length/Norwegian Size:  16 cm 12 fr  Flush Volume:  1.3cc/1.3cc    DATA:  Procedure:  CVVHDF  Start Time:  1850  Machine#:  7  Filter:  M150  Blood Flow:   200 mL/min  Pre-Replacement Solution:  Phox BK 4/2.5  Post-Replacement Solution: Phox BK 4/2.5  Dialysate Solution:  Phox BK 4/2.5  Pre-Replacement Solution Rate:  800mL/hr  Post-Replacement Solution Rate:  200mL/hr  Dialysate Flow Rate:  1100mL/hr  Patient Removal Rate:  0 mL/hr  Anticoagulation Type and Rate:  n/a    ASSESSMENT:  How Patient Tolerated Restart:  well  Vital Signs:  HR 91 Art BP 90/49 MAP 65 RR 17 SaO2 100%  Initial Pressures:  Access:  -63  Filter:  95  Return:  6  TMP:  51  Change in Filter Pressure:  14    INTERVENTIONS:  Rinsed back and primed new filter set per 72 hr change protocol before expiration at 2230 tonight.    PLAN:  Continue POC.

## 2018-08-02 NOTE — PROGRESS NOTES
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 He returns to the ICU 7/30  for shock, severe lactic acidosis and respiratory failure. S/p ex-lap with hematoma evacuation 7/31/18.      TODAY'S PROGRESS/PLANS:   - To OR tomorrow with transplant   - Start meropenem and discontinue Zosyn  - Follow-up HORTENCIA for HIT--Negative   - Wean pressors as able.   - Continue with CRRT, stop q 2 serial sodium labs   - one unit PRBC for hgb 6.8      PLAN:  Neuro/ pain/ sedation:  # Acute pain  # Sedation  - Monitor neurological status. Pt awake and following commands this am.    - Fentanyl gtt. Versed drop currently off.   - PRN oxycodone and Hydromorphone IV as needed for pain     Pulmonary care:   # Acute respiratory failure  - continue with full mechanical support at this time. PST when meets criteria.   - HOB elevation. Vent bundle.   - CMV 16/400/5/40%.       Cardiovascular:    # Septic Shock  - Monitor hemodynamic status.   - Norepinephrine gtt, needs down today. Stopped Vasopressin 7/31/18 per Transplant due to concerns of bowel ischemia.  - Epi gtt if needed.   - Solu-cortef 50 every 6 hours for stress dose steroids for 72 hour total    Gastroenterology:   # DDLT 7/21/18  # Intrahepatic left portal venous thrombosis, infarction of left liver lobe, patchy infarction of right lobe, suspected hepatic arterial thrombosis  # 7/30/18 sp exploratory laparotomy, abthera closure device in place. Findings of patchy diffuse SB ischemia. S/p liver biopsy  # Splenic infarctions, Patchy bowel ischemia  # ileus   - s/p exploratory laparotomy, abthera closure device in place. Findings of patchy diffuse SB ischemia. S/p liver biopsy on 7/30/18. Possible OR tomorrow per  transplant   - Continue heparin gtt high intensity given thrombosis   - ileus: CXR 8/1: stool burden, hold bowel stimulants or laxatives given concerns of bowel ischemia per primary. S/p multiple enemas and mineral oil x2 doses. Will given enema again today.   - lactic improved to 1.3    Nutrition:  # protein calorie deficit malnutrition.   - TF's on hold at this time given pressor needs and ileus and concerns of bowel ischemia      Renal/ Fluid Balance/electrolytres :    # BEN, anuric   # Severe metabolic acidosis, lactic acidosis, corrected with CRRT  # Severe hyponatremia, corrected   # hyperkalemia--corrected with CRRT    Acute sodium drop  Note with CRRT initation.   - CRRT per Nephrology. CRRT 7/30/18. I=O's at this point, not pulling fluids at this point   - Lactic now normal   - On CRRT electrolyte replacement protocol.   - Renal managing dialysis   - albumin replacement 12.5g/1 Liter of abthera and CAROL drain losses.      Endocrine:    # DM II  - Insulin gtt. Continue with insulin drip for now     ID/ Antibiotics:  # VRE Bacteremia  # ESBL Bacteremia  # Hepatitis B  # leukocytosis   - Continue Daptomycin. change Zosyn to Meropenem   - Micafungin- continue 7/30,   - WBC improving. WBC 14.8k, pt afebrile  - source likely GI tract--Plan for OR tomorrow for repeat washout--continue with Abthera.  - Immunosuppression: on hold, will defer to transplant when to start.  - Immunosuppression prophylaxis: Bactrim, Valgancyclovir and Tenofovir, nystatin when micafungin stopped.   - resend blood cultures until negative.   - Patient with KNOWN bacteremia at time of TLC placement on 7/30/18. Will keep line for now  - hold off ID consult for now       Positive cultures   - 7/27 VRE (line/arm)   - 7/29: VRE (left arm)   - 7/30: VRE (left arm)   - 7/31: VRE (Line and peripheral)  - 8/1: ESBL from HD/hand/central line      - Fluid culture 7/31/18 with probable Enteroccus faecium--sensitivities pending        Heme:     # Acute  blood loss anemia  # Anemia of critical illness  # Portal venous thrombosis  # Thrombocytopenia  - Hemoglobin 8.0, stable.   - High intensity heparin gtt. Continue aspirin 81mg.   - KEN panel send and negative.   - Platelets 38K--hold off transfusion at this time        MSK:    # Weakness and deconditioning of critical illness   - PT and OT        Lines/ tubes/ drains:  - R internal jugular TLC 7/30  - L internal jugular dialysis line 7/30  - Right femoral arterial line  - NJ   - PIVs  - ETT  - Abthera       General Cares and  Prophylaxis:    - Mechanical prophylaxis for DVT and Heparin gtt  - GI: Protonix  prophylaxis       Disposition:  - Surgical ICU.     Mega Alvarez      Time spent on this Encounter   Billing:  I spent 45 minutes bedside and on the inpatient unit today managing the critical care of Yaneli Miles in relation to the issues listed in this note.      ====================================    SUBJECTIVE:  Course reviewed. No acute events overnight. Pressors needs decreased, now growing ESBL in addition to VRE in cultures. This am, pt arouses to voice, follows commands consistently when asked. Denied pain, chest pain, fever or chills.     OBJECTIVE:   1. VITAL SIGNS:   Temp:  [97.9  F (36.6  C)-99  F (37.2  C)] 98.6  F (37  C)  Heart Rate:  [] 94  Resp:  [16-17] 17  BP: (100)/(56) 100/56  MAP:  [58 mmHg-98 mmHg] 72 mmHg  Arterial Line BP: ()/(16-75) 99/56  FiO2 (%):  [40 %] 40 %  SpO2:  [96 %-100 %] 100 %  Ventilation Mode: CMV/AC  (Continuous Mandatory Ventilation/ Assist Control)  FiO2 (%): 40 %  Rate Set (breaths/minute): 16 breaths/min  Tidal Volume Set (mL): 400 mL  PEEP (cm H2O): 5 cmH2O  Pressure Support (cm H2O): 7 cmH2O  Oxygen Concentration (%): 40 %  Resp: 17    2. INTAKE/ OUTPUT:   I/O last 3 completed shifts:  In: 2338.4 [I.V.:1835.4; Other:43; NG/GT:210]  Out: 1334 [Urine:3; Emesis/NG output:681; Drains:628; Other:22]    3. PHYSICAL EXAMINATION:   General: Laying in bed, easily  aroused to voice.    HEENT: ETT present and secured. NJ present. NG present and to LIS, brown-green output.   Neuro: follows command--simple commands when asked, nod/shakes head appropriately.   Resp: BBS, coarse breath sounds,  diminished at bases. No wheezes   CV: RRR, S1, S2, tachycardiac   Abdomen: abthera present, staples at lower edge of abthera, drain with serous output, upper abdomen distended and tender. Bilateral drains present--thin serosangious drainage  Incisions:  abthera present--suction intact.   Extremities: 1+ edema in feet. Calves soft and non-tender. CARTWRIGHT. PP2+.     4. INVESTIGATIONS:   Arterial Blood Gases     Recent Labs  Lab 07/30/18  1444 07/30/18  1325 07/30/18  1213 07/30/18  1146   PH 7.22* 7.22* 7.29* 7.41   PCO2 41 43 39 30*   PO2 85 131* 161* 72*   HCO3 17* 18* 19* 19*     Complete Blood Count     Recent Labs  Lab 08/02/18  0900 08/02/18  0404 08/01/18 2158 08/01/18  1551   WBC 12.2* 14.8* 16.6* 22.5*   HGB 6.8* 7.2* 7.1* 7.8*   PLT 36* 40* 41* 55*     Basic Metabolic Panel    Recent Labs  Lab 08/02/18  0900 08/02/18  0605 08/02/18  0404 08/02/18  0207  08/01/18 2158 08/01/18  1551    133 133 132*  < > 132*  < > 130*   POTASSIUM 3.6  --  3.8  --   --  3.6  --  3.9   CHLORIDE 100  --  100  --   --  99  --  97   CO2 24  --  26  --   --  22  --  24   BUN 27  --  23  --   --  25  --  22   CR 1.26*  --  1.26*  --   --  1.30*  --  1.30*   *  --  106*  --   --  130*  --  114*   < > = values in this interval not displayed.  Liver Function Tests    Recent Labs  Lab 08/02/18  0900 08/02/18  0404 08/01/18 2158 08/01/18  1551  07/30/18  1444 07/30/18  1230  07/27/18  0606   * 538* 719* 628*  < > 6162*  --   < > 24   * 756* 832* 915*  < > 2195*  --   < > 40   ALKPHOS 211* 223* 224* 257*  < > 178*  --   < > 88   BILITOTAL 4.8* 5.0* 5.0* 5.2*  < > 4.1*  --   < > 3.5*   ALBUMIN 1.8* 1.9* 1.9* 2.0*  < > 3.0*  --   < > 3.0*   INR  --   --   --   --   --  2.32* 1.91*  --   1.30*   < > = values in this interval not displayed.  Pancreatic Enzymes  No lab results found in last 7 days.  Coagulation Profile    Recent Labs  Lab 07/30/18  1444 07/30/18  1230 07/27/18  0606   INR 2.32* 1.91* 1.30*   PTT  --  41*  --      Lactate  Invalid input(s): LACTATE    5. RADIOLOGY:   Recent Results (from the past 24 hour(s))   XR Abdomen Port 1 View    Narrative    Exam: XR ABDOMEN PORT 1 VW 8/2/2018 1:14 AM    History: Evaluate for free intraperitoneal air.    Comparison: Earlier same date    Findings: Skin staples over the upper abdomen. Surgical drain over the  right upper quadrant. NG/OG tube tip and sidehole project over the  stomach body. Feeding tube tip projects over the distal duodenum, just  proximal to the region of the ligament of Treitz.  Biliary stent is in  place. Gaseous distention of the bowel and colon. Diffuse pneumatosis  of the colon is again present, this may be improving? No portal venous  gas. Dense material near the gastroesophageal junction representing  embolic material. The bilateral paracolic outer drains remain in  place.      Impression    Impression:  1. Changes of pneumatosis again demonstrated.  2. On this supine film no free air is evident but a decubitus or  upright film would be better.    I have personally reviewed the examination and initial interpretation  and I agree with the findings.    ZULEMA GRANDA MD   XR Chest Port 1 View    Narrative    XR CHEST PORT 1 VW  8/2/2018 1:15 AM      HISTORY: intubation. Monitor;     COMPARISON: 7/30/2018    FINDINGS: Endotracheal tube tip projects over the mid to low thoracic  trachea. Left IJ central venous catheter tip projects over the high  SVC. Right IJ central venous catheter tip projects over the high right  atrium. Enteric tubes course below the level of the diaphragm and the  inferior margin of the field-of-view. Gastric distention appears  improved. Perihilar and bibasilar opacities are unchanged. Left  upper  quadrant drain. The dense calcifications at the GE junction are again  identified.      Impression    IMPRESSION:   1. Endotracheal tube tip projects over the mid to low thoracic  trachea.   2. Improving left basilar presumed atelectasis.    I have personally reviewed the examination and initial interpretation  and I agree with the findings.    ZULEMA GRANDA MD       =========================================

## 2018-08-02 NOTE — PROVIDER NOTIFICATION
SICU resident, Dr. Casillas, notified of 2200 lab results Platelets 41, Hgb 7.1,  Also notified that blood cultures from 8/1 are growing gram negative rods which she stated should be covered by zosyn/

## 2018-08-02 NOTE — PROGRESS NOTES
CRRT STATUS NOTE    DATA:  Time:  6:25 AM  Pressures WNL:  YES  Filter Status:  WDL    Problems Reported/Alarms Noted:  none    Supplies Present:  YES    ASSESSMENT:  Patient Net Fluid Balance:  8/1 +696  8/2 0600 +20  Vital Signs:  Levophed to keep >60.  Maintaining goal on .28mcg/kg/min titrating down.  Vented 40%  Labs:  K 3.6-3.8  Na 133  Hgb 7.2  plts 40  Goals of Therapy:  0 no UF.  Goal met.     INTERVENTIONS:   Changed from PrismaSol K2 to Phoxillum K4 solution when K 3.6 per renal note.  Pt tolerating change in solution.      PLAN:  Cont POC per renal orders.  Call CRRT resource RN [phone] with concerns.

## 2018-08-02 NOTE — PLAN OF CARE
Problem: Patient Care Overview  Goal: Plan of Care/Patient Progress Review  Outcome: Improving  D/I: Pt remains afebrile, MAP remains above 60mmHg on Norepi between 0.18 and 0.22. Pt more alert today, moves all extremities to command and nods appropriately to all questions. Lung sounds clear diminished throughout. Abthera continues to drain 25cc/hr. Bladder scanned for 49cc, no straight cath done. Pt did have small spot of urine on hospital gown. A: Pt tolerated HOB 10 degrees with 10 degrees reverse trendelenberg for an hour. Pink lady enema administered again with small mucousy results. Pt acknowledged need for one extra dose hydromorphone on top of fentanyl gtt. P; Anticipate change of CRRT circuit this evening and OR tomorrow for abdominal washout and dressing change.

## 2018-08-02 NOTE — PROGRESS NOTES
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  Developed shock with severe lactic acidosis and respiratory failure on POD9. CT scan showed left PV venous thrombosis, infarction of left liver lobe, patchy infarction of right lobe and possible hepatic arterial thrombosis, patchy bowel ischemia. Transferred to ICU.  7/30 Ex lap, patchy diffuse SB ischemia. Doppler demonstrated arterial flow main vessels to bowel and liver. Splenic infarction. Liver bx and cx.  On Heparin gtt. Plan to return to OR tomorrow    Graft function: OLT POD 12. Infarction of left liver lobe, patchy infarction right liver lobe. Left PV thrombosis. Narrow and irregular appearance HA. Bowel ischemia. 7/30 Ex lap, patchy diffuse SB ischemia. Doppler demonstrated arterial flow main vessels to bowel and liver. Splenic infarction. Liver bx and cx.  On Heparin gtt. Plan to return to OR tomorrow, scheduled for 11:10.  Immunosuppression management:   Simulect intra-op due to BEN  Steroid taper per protocol, competed.  MMF 1gm BID, hold due to status  Tac stopped . Tac level 10.8. Hold due to status.   Hydrocortisone 50 q 6hrs due to shock  Complexity of management:Medium. Contributing factors: thrombocytopenia and anemia sepsis  Hematology: Hemoglobin stable, 7.1 down form 8. Recheck 6.8. PLT 41. 7/30 INR 2.32 <-1.91<-1.30. On high intensity heparin gtt due to thrombosis.    Cardiorespiratory: Respiratory failure secondary to sepsis, on vent. On norepinephrine gtt, dose increased and vasopressin discontinued due to mesenteric ischemia.    GI/Nutrition: NGT. NPO. Hold TF. Large stool burden transverse colon, no stool output today. Will speak with surgeon regarding  options for bowel regimen.   Endocrine: Insulin drip.    Fluid/Electrolytes: BEN. CRRT net positive yesterday.  Remains on pressor, dose decreased. NG (1032mL) and CAROL output (721mL) ~ 2L yesterday.  Lactic acid decreased, 1.2  : Luis  Infectious disease: Temp 99.5. Leukocytosis, 14.8  (19.7). Previously on Linezolid - and zosyn -. Continue daptomycin (VRE, -present), meropenem (Ecoli ESBL, -) and micafungin (empiric, -).   1. VRE intra abdominal and bacteremia. Fluid cultures 18 growing VRE.  (peripheral) and  (peripheral and VAD) blood cultures growing VRE  2. Ecoli ESBL bacteremia. Repeat bcx (peripheral and VAD) growing Ecoli ESBL. Zosyn changed to Meropenem  SICU to exchange lines as able.   HBV: Hepatits B DNA PCR positive prior to tx. Received HBIG on  and . Hep B surface antigen negative. No further HBIG planned. Continue tenofovir 300 mg daily.   PPX: Valcyte x 12 weeks (CMVand EBV IgG +), bactrim. mycelex stopped as patient on micafungin. Will start nystatin if aide discontinued.     Prophylaxis: DVT PCDs, on heparin, fall, GI, fungal  Disposition: SICU    Coordinator: Kay Reyes       Medical Decision Making: High      JAY/Fellow/Resident Provider: GINGER Segovia [id number]    Faculty: Leonie Elizalde MD  __________________________________________________________________  Transplant History: Admitted 2018 for  donor liver transplant.   The patient has a history of liver failure due to hepatitis B .    2018 (Liver), Postoperative day: 12     Interval History:   Norepinephrine decreased today. Drain and wound output serous. Abdominal exam unchanged.    ROS:   A 10-point review of systems was negative except as noted above.    Curent Meds:    albumin human  25 g Intravenous Once     aspirin  81 mg Oral Daily     DAPTOmycin (CUBICIN) intermittent infusion  10 mg/kg (Dosing Weight) Intravenous Q24H     hydrocortisone sodium succinate PF  50 mg  Intravenous Q6H     meropenem  1,000 mg Intravenous Q8H     micafungin  100 mg Intravenous Q24H     pantoprazole (PROTONIX) IV  40 mg Intravenous Daily with breakfast     sodium chloride (PF)  3 mL Intravenous Q8H     sodium chloride         sulfamethoxazole-trimethoprim  10 mL Oral Once per day on Mon Wed Fri     tenofovir  300 mg Per Feeding Tube Q48H     valGANciclovir  450 mg Oral Every Other Day    Or     valGANciclovir  450 mg Oral or NG Tube Every Other Day       Physical Exam:     Admit Weight: 60.7 kg (133 lb 12.8 oz)    Current Vitals:   /56  Pulse 121  Temp 98.6  F (37  C) (Axillary)  Resp 17  Wt 63.7 kg (140 lb 6.9 oz)  SpO2 100%  BMI 22.67 kg/m2      Vital sign ranges:    Temp:  [97.9  F (36.6  C)-99  F (37.2  C)] 98.6  F (37  C)  Heart Rate:  [] 94  Resp:  [16-17] 17  BP: (100)/(56) 100/56  MAP:  [58 mmHg-98 mmHg] 72 mmHg  Arterial Line BP: ()/(16-75) 99/56  FiO2 (%):  [40 %] 40 %  SpO2:  [96 %-100 %] 100 %  Patient Vitals for the past 24 hrs:   BP Temp Temp src Heart Rate Resp SpO2 Weight   08/02/18 1140 - 98.6  F (37  C) Axillary 94 - 100 % -   08/02/18 1130 - - - 93 - 100 % -   08/02/18 1115 - 98.6  F (37  C) Axillary 88 - 100 % -   08/02/18 1100 - - - 91 - 100 % -   08/02/18 1045 - - - 92 - 100 % -   08/02/18 1031 - - - 89 - 100 % -   08/02/18 1000 - - - 88 17 100 % -   08/02/18 0900 - - - 90 16 100 % -   08/02/18 0800 - 99  F (37.2  C) Axillary 91 16 100 % -   08/02/18 0700 - - - 88 16 100 % -   08/02/18 0645 - - - 90 - 100 % -   08/02/18 0630 - - - 89 - 100 % -   08/02/18 0615 - - - 96 - 100 % -   08/02/18 0600 - - - 94 16 100 % -   08/02/18 0545 - - - 92 - 100 % -   08/02/18 0530 - - - 93 - 100 % -   08/02/18 0515 - - - 94 - 100 % -   08/02/18 0500 - - - 96 16 100 % -   08/02/18 0445 - - - 94 - - -   08/02/18 0430 - - - 99 - - -   08/02/18 0427 - - - 95 - 100 % -   08/02/18 0415 - - - 104 - 100 % -   08/02/18 0400 - 98.6  F (37  C) Axillary 88 17 100 % 63.7 kg (140 lb 6.9  oz)   08/02/18 0345 - - - 91 - 100 % -   08/02/18 0330 - - - 89 - 100 % -   08/02/18 0315 - - - 87 - 100 % -   08/02/18 0300 - - - 87 16 100 % -   08/02/18 0245 - - - 87 - 100 % -   08/02/18 0230 - - - 86 - 100 % -   08/02/18 0215 - - - 88 - 100 % -   08/02/18 0200 - - - 92 16 100 % -   08/02/18 0145 - - - 88 - 100 % -   08/02/18 0130 - - - 85 - 100 % -   08/02/18 0115 - - - 87 - 100 % -   08/02/18 0100 - - - 97 16 100 % -   08/02/18 0045 - - - 83 - 100 % -   08/02/18 0030 - - - 85 - 100 % -   08/02/18 0015 - - - 89 - 100 % -   08/02/18 0011 - - - 87 - 100 % -   08/02/18 0000 - 97.9  F (36.6  C) Axillary 89 16 100 % -   08/01/18 2345 - - - 90 - 100 % -   08/01/18 2330 - - - 92 - 100 % -   08/01/18 2315 - - - 100 - 96 % -   08/01/18 2300 - - - 95 16 97 % -   08/01/18 2245 - - - 98 - 97 % -   08/01/18 2230 - - - 102 - 96 % -   08/01/18 2215 - - - 96 - 97 % -   08/01/18 2200 - - - 86 16 97 % -   08/01/18 2145 - - - 101 - 97 % -   08/01/18 2130 - - - 100 - 99 % -   08/01/18 2115 - - - 104 - 99 % -   08/01/18 2100 - - - 106 16 98 % -   08/01/18 2045 - - - 108 - 99 % -   08/01/18 2030 - - - 108 - 99 % -   08/01/18 2024 - - - 108 - 98 % -   08/01/18 2015 - - - 107 - 99 % -   08/01/18 2000 - 98.8  F (37.1  C) Axillary 108 16 99 % -   08/01/18 1945 - - - 112 - 99 % -   08/01/18 1930 - - - 113 - 100 % -   08/01/18 1926 100/56 - - 116 - 100 % -   08/01/18 1915 - - - 107 - 100 % -   08/01/18 1900 - - - 108 16 100 % -   08/01/18 1830 - - - 110 - 100 % -   08/01/18 1815 - - - 110 - - -   08/01/18 1800 - - - 109 16 100 % -   08/01/18 1758 - - - - - 100 % -   08/01/18 1745 - - - 110 - - -   08/01/18 1700 - - - 111 - 100 % -   08/01/18 1643 - - - 110 17 100 % -   08/01/18 1633 - - - 123 - 100 % -   08/01/18 1600 - 98.6  F (37  C) Axillary 114 17 100 % -   08/01/18 1500 - - - 118 - 100 % -   08/01/18 1400 - - - 117 16 100 % -   08/01/18 1345 - - - 114 - 100 % -   08/01/18 1330 - - - 114 - 100 % -   08/01/18 1315 - - - 116 - 100 % -    08/01/18 1300 - - - 120 - 97 % -   08/01/18 1245 - - - 120 - 98 % -   08/01/18 1200 - 98.4  F (36.9  C) Axillary 120 16 99 % -     General Appearance: Intubated, CRRT  HEENT: NGT to suction, dark output.  Skin: warm, dry  Heart: sinus tachycardia   Lungs: CMV, FiO2 40%  Abdomen: distended lower abdomen softer than upper abdomen, CAROL x 2, serous output. Abthera in place, serous outupt.   :  Luis removed  Extremities: no edema  Neurologic: alert, following some commands      Data:   CMP  Recent Labs  Lab 08/02/18  0900 08/02/18  0605 08/02/18  0404  07/30/18  1115    133 133  < > 139   POTASSIUM 3.6  --  3.8  < > 5.9*   CHLORIDE 100  --  100  < >  --    CO2 24  --  26  < >  --    *  --  106*  < > 275*   BUN 27  --  23  < >  --    CR 1.26*  --  1.26*  < >  --    GFRESTIMATED 62  --  62  < >  --    GFRESTBLACK 75  --  75  < >  --    JESUS 7.3*  --  7.9*  < >  --    ICAPOC  --   --   --   --  3.5*   ICAW 4.5  --  4.8  < >  --    MAG 2.2  --  2.1  < >  --    PHOS 4.1  --  3.6  < >  --    ALBUMIN 1.8*  --  1.9*  < >  --    BILITOTAL 4.8*  --  5.0*  < >  --    ALKPHOS 211*  --  223*  < >  --    *  --  538*  < >  --    *  --  756*  < >  --    < > = values in this interval not displayed.  CBC    Recent Labs  Lab 08/02/18  0900 08/02/18  0404   HGB 6.8* 7.2*   WBC 12.2* 14.8*   PLT 36* 40*     COAGS    Recent Labs  Lab 07/30/18  1444 07/30/18  1230   INR 2.32* 1.91*   PTT  --  41*      Urinalysis  Recent Labs   Lab Test  07/27/18   2330  07/13/18   1315  06/18/18   1200   COLOR  Yellow  Dark Yellow  Yellow   APPEARANCE  Clear  Clear  Clear   URINEGLC  Negative  Negative  Negative   URINEBILI  Negative  Large*  Small*   URINEKETONE  Negative  Negative  Negative   SG  1.008  1.013  1.008   UBLD  Negative  Negative  Negative   URINEPH  7.5*  6.0  5.0   PROTEIN  30*  10*  Negative   NITRITE  Negative  Negative  Negative   LEUKEST  Negative  Negative  Negative   RBCU  5*  <1  1   WBCU  3  None  2   UTPG    --    --   0.75*     Virology:  Hepatitis C Antibody   Date Value Ref Range Status   07/20/2018 Nonreactive NR^Nonreactive Final     Comment:     Assay performance characteristics have not been established for newborns,   infants, and children           POD 1  liver: Impression:   1.  Hematoma associated with the inferior aspect of the perihepatic  space anteriorly, measuring 13.7 x 7.0 x 8.5 cm.  2.  Retrograde flow of the left portal vein. Additionally, low  velocities of the portal venous system. Single low resistive index of  the extrahepatic aspect of the hepatic artery, measuring 0.37. These  findings are likely within normal limits in the early postoperative  context. However, continued attention on follow-up recommended.    7/30 CT scan abd/pelvis:  IMPRESSION:   1. Hypoenhancing areas in the transplant liver concerning for  infarction. Transplant left portal vein occlusion with bubbles of  portal venous gas. Nearly occlusive filling defect in the native  hepatic artery. Narrowed and irregular appearance of the transplant  hepatic artery. Focal occlusion of the left main hepatic artery.  Segmental occlusions of right hepatic arterial branches.   2. Pneumatosis intestinalis with nonocclusive filling defects in  superior mesenteric vein branches, concerning for bowel ischemia or  infarction.   3. Splenic infarctions.   4. Bibasilar consolidation with air bronchograms. Pneumonia cannot be  excluded.   5. Post surgical changes of evacuation of peritransplant hematoma.

## 2018-08-02 NOTE — PLAN OF CARE
Problem: Patient Care Overview  Goal: Plan of Care/Patient Progress Review  Discharge Planner PT   Patient plan for discharge: Unknown  Current status: Delivered PROM and performed AROM while supine in bed. Follows 100% commands. Still cannot get OOB due to presence of femoral art line  Barriers to return to prior living situation: Weakness, cognition  Recommendations for discharge: TCU once LTACH goals met  Rationale for recommendations: Current level of function       Entered by: Antonio Leonardo 08/02/2018 5:35 PM

## 2018-08-02 NOTE — PROGRESS NOTES
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 Now on CRRT SP exlap with hematoma evacuation on 7/30  Drain output decreased but still high output from NGT     -- continued on CRRT improved hyperkalemia , severe acidosis , is anuric  -- Albumin and NS can be used for resuscitation , avoid LR since he has liver shock likely not able to metabolize lactate well  -- CRRT prescribed at 30ml/kg/min with 0 UF for now, bolus with IVF as needed for hypotension, on Prismasol and prismasate only now  -- continue with labs per CRRT protocol.   -- daily weights and strict IO monitoring  -- continue with Intra abdominal pressure monitoring Q shift     Severe hyponatremia improving  Likely related to free water flushes/BEN , ADH dependant with hypotension as well since he did receive NS   Goals as above  Hyperkalemia improved  With BEN , hematoma and poor renal clearnace and met acidosis  CRRT prescription to address     Hypervolemia  With hypotension on presser support  CRRT with 0 UF for now  Will monitor for needs     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  Off Linezolid due to LA  Micafungin and zosym for additional coverage for sepsis per MICU     DDLT 7/12/18   ESLD sec to hep B cb EV , HE , ascites and portal HTN  Ex lap 7/30  Graft function: Intrahepatic  left portal venous thrombosis, infarction of left liver lobe, patchy infarction of right lobe, suspected hepatic arterial thrombosis, Splenic infarctions and Patchy bowel ischemia on heparin gtt  On valcyte and bactrim  On tenofovir  -- OR plans tomorrow per notes 11:10     Acute resp failure   Intubated on vent support   MICU managing     DMII on insulin gtt     Anemia/ thrombocytopenia/leucocytosis  ACD/inflamm anemia  Low PLT due to ESLD  Hb ~ 8  On heparin      Recommendations were communicated to primary team     Seen and discussed with Dr. Charlotte Rodrigues MD   [phone]    Interval History :   In the last 24 hours Yaneli Miles has been in the ICU on CRRT with minimal to none UO tolerating CRRT off vasopressin now   Review of Systems:   Unable to obtain    Physical Exam:   I/O last 3 completed shifts:  In: 2377.43 [I.V.:1626.43; NG/GT:210]  Out: 1325 [Emesis/NG output:501; Drains:687; Other:137]   /56  Pulse 121  Temp 98.8  F (37.1  C) (Axillary)  Resp 17  Wt 63.7 kg (140 lb 6.9 oz)  SpO2 100%  BMI 22.67 kg/m2     GENERAL APPEARANCE: ill appearing vent support no distress  EYES:  + scleral icterus, pupils equal  HENT: mouth without ulcers or lesions  PULM: lungs low air entry  to auscultation,  bilaterally, equal air movement, no clubbing  CV: regular rhythm, normal rate, no rub     -JVD unable to access     -edema +   GI: soft, , +distended, bowel sounds are +/-  INTEGUMENT: no cyanosis, - rash  NEURO:  sedated   Access LIJ non tunneled    Labs:   All labs reviewed by me  Electrolytes/Renal -   Recent Labs   Lab Test  08/02/18   1600  08/02/18   0900  08/02/18   0605  08/02/18   0404   NA  134  133  133  133   POTASSIUM  3.6  3.6   --   3.8   CHLORIDE  102  100   --   100   CO2  22  24   --   26   BUN  28  27   --   23   CR  1.23  1.26*   --   1.26*   GLC  124*  109*   --   106*   JESUS  7.1*  7.3*   --   7.9*   MAG  2.4*  2.2   --   2.1   PHOS  5.0*  4.1   --   3.6       CBC -   Recent Labs    Lab Test  08/02/18   1600  08/02/18   0900  08/02/18   0404   WBC  11.2*  12.2*  14.8*   HGB  8.4*  6.8*  7.2*   PLT  27*  36*  40*       LFTs -   Recent Labs   Lab Test  08/02/18   1600  08/02/18   0900  08/02/18   0404   ALKPHOS  199*  211*  223*   BILITOTAL  5.4*  4.8*  5.0*   ALT  602*  699*  756*   AST  247*  386*  538*   PROTTOTAL  4.2*  4.3*  4.4*   ALBUMIN  1.7*  1.8*  1.9*       Iron Panel -   Recent Labs   Lab Test  07/13/18   0334  06/17/18   2138   IRON  97  66   IRONSAT  83*  109*   ANGELLA  Unsatisfactory specimen - icteric   --          Imaging:  All imaging studies reviewed by me.     Current Medications:    albumin human  25 g Intravenous Once     aspirin  81 mg Oral Daily     DAPTOmycin (CUBICIN) intermittent infusion  10 mg/kg (Dosing Weight) Intravenous Q24H     meropenem  1,000 mg Intravenous Q8H     micafungin  100 mg Intravenous Q24H     NaCl         [START ON 8/3/2018] pantoprazole  40 mg Oral QAM AC     sodium chloride (PF)  3 mL Intravenous Q8H     sodium chloride         sodium chloride         sulfamethoxazole-trimethoprim  10 mL Oral Once per day on Mon Wed Fri     tenofovir  300 mg Per Feeding Tube Q48H     valGANciclovir  450 mg Oral Every Other Day    Or     valGANciclovir  450 mg Oral or NG Tube Every Other Day       IV fluid REPLACEMENT ONLY Stopped (07/30/18 0800)     dialysate for CVVHD & CVVHDF (Phoxillum BK4/2.5)       fentaNYL 50 mcg/hr (08/02/18 1700)     HEParin 1,650 Units/hr (08/02/18 1700)     insulin (regular) 1 Units/hr (08/02/18 1700)     lactated ringers 10 mL/hr at 07/30/18 1519     midazolam Stopped (07/31/18 0557)     - MEDICATION INSTRUCTIONS -       - MEDICATION INSTRUCTIONS -       - MEDICATION INSTRUCTIONS -       norepinephrine 0.2 mcg/kg/min (08/02/18 1700)     replacement solution for CVVHD & CVVHDF (Phoxillum BK4/2.5)       replacement solution for CVVHD & CVVHDF (Phoxillum BK4/2.5)       Reason beta blocker order not selected       Janes Rodrigues MD

## 2018-08-03 NOTE — PROGRESS NOTES
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  Developed shock with severe lactic acidosis and respiratory failure on POD9. CT scan showed left PV venous thrombosis, infarction of left liver lobe, patchy infarction of right lobe and possible hepatic arterial thrombosis, patchy bowel ischemia. Transferred to ICU.   Ex lap, patchy diffuse SB ischemia. Doppler demonstrated arterial flow main vessels to bowel and liver. Splenic infarction. Liver biopsy negative for acute rejection. Fluid culture growing VRE. On Heparin gtt. 8/3 OR for reexploration liver transplant, bowel ischemia, findings improved patchy iscemic changes of small bowel, patchy ischemic changes of left liver lobe. Plan for reexploration in a few days.      Procedures:    donor (DBD) liver transplant with duct to duct anastomosis over biliary stent.    Ex lap due to concern for ischemic bowel, findings: patchy diffuse SB ischemia. Doppler demonstrated arterial flow main vessels to bowel and liver. Splenic infarction.   8/3 Reexploration liver transplant, bowel ischemia, findings improved patchy iscemic changes of small bowel, patchy ischemic changes of left liver lobe.     Graft function: OLT POD 13. Infarction of left liver lobe, patchy infarction right liver lobe. Left PV thrombosis. Narrow and irregular appearance HA. Bowel ischemia. Procedures, see above. Liver biopsy negative for acute rejection. Fluid culture growing VRE. Continue high intensity heparin gtt. Plan to return to OR in a few days for reexploration.  Immunosuppression management:   Simulect intra-op due to BEN  Steroid taper per protocol, competed.  MMF 1gm BID, hold due to  status  Tac stopped, last dose .  Tac level 10.8. Hold due to status.   Hydrocortisone 50 q 6hrs due to shock  Complexity of management:Medium. Contributing factors: thrombocytopenia and anemia sepsis  Hematology: Hemoglobin stable, 7.7.6. PLT 42. 7/30 INR 1.52. Continue high intensity heparin gtt due to thrombosis.    Cardiorespiratory: Respiratory failure secondary to sepsis, on vent. On norepinephrine gtt, dose decreased 0.16.  Vasopressin discontinued due to mesenteric ischemia.    GI/Nutrition: NGT. NPO. Hold TF. No BM yesterday. Per surgeon, transverse colon w/o significant stool as observed in OR.   Endocrine: Insulin drip.    Fluid/Electrolytes: BEN. CRRT, no fluid removal.  Remains on pressor, dose decreased.   : Luis  Infectious disease: Afebrile. Leukocytosis, WBC decreased, today 11.4.   -Previously on Linezolid - and zosyn -.   -Continue daptomycin (VRE, -present), meropenem (Ecoli ESBL, -) and micafungin (empiric, -).     1. VRE intra abdominal and bacteremia. Fluid cultures 18 growing VRE.  (peripheral) and  (peripheral and VAD) blood cultures growing VRE  2. Ecoli ESBL bacteremia. Repeat bcx (peripheral and VAD) growing Ecoli ESBL. Zosyn changed to Meropenem  SICU to exchange lines as able.     3. HBV: Hepatits B DNA PCR positive prior to tx. Received HBIG on  and . Hep B surface antigen negative. No further HBIG planned. Continue tenofovir 300 mg daily.   PPX: Valcyte x 12 weeks (CMVand EBV IgG +), bactrim. mycelex stopped as patient on micafungin. Will start nystatin if aide discontinued.     Prophylaxis: DVT PCDs, on heparin, fall, GI, fungal  Disposition: SICU    Coordinator: Kay Reyes       Medical Decision Making: High      JAY/Fellow/Resident Provider: Rebecca Haas PAC [id number]    Faculty: Leonie Elizalde MD  __________________________________________________________________  Transplant History: Admitted 2018 for  donor liver  transplant.   The patient has a history of liver failure due to hepatitis B .    7/21/2018 (Liver), Postoperative day: 13     Interval History:   Norepinephrine decreased. Drain and wound output serous. OR today.    ROS:   A 10-point review of systems was negative except as noted above.    Curent Meds:    albumin human  25 g Intravenous Once     aspirin  81 mg Oral Daily     DAPTOmycin (CUBICIN) intermittent infusion  10 mg/kg (Dosing Weight) Intravenous Q24H     meropenem  1,000 mg Intravenous Q8H     micafungin  100 mg Intravenous Q24H     midazolam  4 mg Intravenous Once     pantoprazole  40 mg Oral QAM AC     pink lady enema (COMPOUNDED)  286 mL Rectal Once     sodium chloride (PF)  3 mL Intravenous Q8H     sulfamethoxazole-trimethoprim  10 mL Oral Once per day on Mon Wed Fri     tenofovir  300 mg Per Feeding Tube Q48H     valGANciclovir  450 mg Oral Every Other Day    Or     valGANciclovir  450 mg Oral or NG Tube Every Other Day       Physical Exam:     Admit Weight: 60.7 kg (133 lb 12.8 oz)    Current Vitals:   /56  Pulse 121  Temp 95.4  F (35.2  C) (Axillary)  Resp 16  Wt 64.4 kg (141 lb 15.6 oz)  SpO2 100%  BMI 22.92 kg/m2      Vital sign ranges:    Temp:  [95.4  F (35.2  C)-99.5  F (37.5  C)] 95.4  F (35.2  C)  Heart Rate:  [] 83  Resp:  [16-17] 16  MAP:  [58 mmHg-94 mmHg] 70 mmHg  Arterial Line BP: ()/(30-89) 98/55  FiO2 (%):  [40 %] 40 %  SpO2:  [99 %-100 %] 100 %  Patient Vitals for the past 24 hrs:   Temp Temp src Heart Rate Resp SpO2 Weight   08/03/18 1100 - - 83 - 100 % -   08/03/18 1000 95.4  F (35.2  C) Axillary - - - -   08/03/18 0715 - - 86 - 100 % -   08/03/18 0700 - - 85 16 100 % -   08/03/18 0645 - - 81 - 100 % -   08/03/18 0630 - - 84 - 100 % -   08/03/18 0615 - - 84 - 100 % -   08/03/18 0600 - - 86 16 100 % -   08/03/18 0545 - - 84 - 100 % -   08/03/18 0530 - - 84 - 100 % -   08/03/18 0515 - - 82 - 100 % -   08/03/18 0500 - - 78 16 100 % 64.4 kg (141 lb 15.6 oz)    08/03/18 0445 - - 81 - 100 % -   08/03/18 0430 - - 80 - 100 % -   08/03/18 0427 - - 81 - 100 % -   08/03/18 0415 - - 84 - 100 % -   08/03/18 0400 97.3  F (36.3  C) Axillary 78 16 100 % -   08/03/18 0345 - - 80 - 100 % -   08/03/18 0330 - - 79 - 100 % -   08/03/18 0315 - - 83 - 100 % -   08/03/18 0300 - - 82 16 100 % -   08/03/18 0245 - - 78 - 100 % -   08/03/18 0230 - - 82 - 100 % -   08/03/18 0215 - - 72 - 100 % -   08/03/18 0200 - - 86 16 100 % -   08/03/18 0145 - - 76 - 100 % -   08/03/18 0130 - - 81 - 100 % -   08/03/18 0115 - - 81 - 100 % -   08/03/18 0100 - - 73 16 100 % -   08/03/18 0051 - - 85 - 100 % -   08/03/18 0045 - - 75 - 100 % -   08/03/18 0039 - - 78 - 100 % -   08/03/18 0030 - - 77 - 100 % -   08/03/18 0015 - - 79 - 100 % -   08/03/18 0000 97.5  F (36.4  C) Axillary 75 16 100 % -   08/02/18 2345 - - 80 - 100 % -   08/02/18 2330 - - 79 - 100 % -   08/02/18 2315 - - 77 - 100 % -   08/02/18 2300 - - 77 16 100 % -   08/02/18 2245 - - 79 - 100 % -   08/02/18 2230 - - 82 - 100 % -   08/02/18 2215 - - 79 - 100 % -   08/02/18 2200 - - 81 - 100 % -   08/02/18 2145 - - 82 - 100 % -   08/02/18 2130 - - 80 - 100 % -   08/02/18 2115 - - 83 - 100 % -   08/02/18 2100 97.1  F (36.2  C) Axillary 82 - 100 % -   08/02/18 2045 - - 94 - 100 % -   08/02/18 2039 - - 101 - 100 % -   08/02/18 2030 - - 85 - 100 % -   08/02/18 2015 - - 89 - 100 % -   08/02/18 2000 99.5  F (37.5  C) Axillary 95 16 99 % -   08/02/18 1945 - - 91 - 100 % -   08/02/18 1930 - - 88 - 100 % -   08/02/18 1915 - - 88 - 100 % -   08/02/18 1900 - - 90 - 100 % -   08/02/18 1700 - - 93 - 100 % -   08/02/18 1600 98.8  F (37.1  C) Axillary 98 17 100 % -   08/02/18 1500 - - 93 - - -   08/02/18 1445 - - 97 - 100 % -   08/02/18 1400 - - 99 - 100 % -   08/02/18 1336 - - 96 - 100 % -   08/02/18 1300 - - 91 - 100 % -   08/02/18 1200 - - 94 17 100 % -   08/02/18 1140 98.6  F (37  C) Axillary 94 - 100 % -   08/02/18 1130 - - 93 - 100 % -     General Appearance:  Intubated, CRRT  HEENT: NGT to suction, dark output.  Skin: warm, dry  Heart: sinus tachycardia   Lungs: CMV, FiO2 40%  Abdomen: distended lower abdomen softer than upper abdomen, CAROL x 2, serous output. Abthera in place, serous outupt.   :  bah removed  Extremities: no edema  Neurologic: alert, following some commands      Data:   CMP  Recent Labs  Lab 08/03/18  1041 08/03/18  0844 08/03/18  0311 08/02/18  2202  07/30/18  1115   NA  --  134 136 136  < > 139   POTASSIUM  --  3.9 3.7 3.5  < > 5.9*   CHLORIDE  --   --  104 102  < >  --    CO2  --   --  22 22  < >  --    GLC  --  132* 109* 110*  < > 275*   BUN  --   --  31* 32*  < >  --    CR  --   --  1.18 1.29*  < >  --    GFRESTIMATED  --   --  67 60*  < >  --    GFRESTBLACK  --   --  81 73  < >  --    JESUS  --   --  7.4* 7.1*  < >  --    ICAPOC  --   --   --   --   --  3.5*   ICAW 4.5 4.7 4.3* 4.2*  < >  --    MAG  --   --  2.6* 2.4*  < >  --    PHOS  --   --  5.0* 4.8*  < >  --    ALBUMIN  --   --  1.8* 1.8*  < >  --    BILITOTAL  --   --  5.3* 5.6*  < >  --    ALKPHOS  --   --  202* 196*  < >  --    AST  --   --  142* 193*  < >  --    ALT  --   --  488* 526*  < >  --    < > = values in this interval not displayed.  CBC    Recent Labs  Lab 08/03/18  1041 08/03/18  0844 08/03/18  0311   HGB 7.6* 8.7* 8.3*   WBC 7.6  --  7.8   PLT 42*  --  26*     COAGS    Recent Labs  Lab 08/02/18  0404 07/30/18  1444 07/30/18  1230   INR 1.52* 2.32* 1.91*   PTT  --   --  41*      Urinalysis  Recent Labs   Lab Test  07/27/18   2330  07/13/18   1315  06/18/18   1200   COLOR  Yellow  Dark Yellow  Yellow   APPEARANCE  Clear  Clear  Clear   URINEGLC  Negative  Negative  Negative   URINEBILI  Negative  Large*  Small*   URINEKETONE  Negative  Negative  Negative   SG  1.008  1.013  1.008   UBLD  Negative  Negative  Negative   URINEPH  7.5*  6.0  5.0   PROTEIN  30*  10*  Negative   NITRITE  Negative  Negative  Negative   LEUKEST  Negative  Negative  Negative   RBCU  5*  <1  1   WBCU  3   None  2   UTPG   --    --   0.75*     Virology:  Hepatitis C Antibody   Date Value Ref Range Status   07/20/2018 Nonreactive NR^Nonreactive Final     Comment:     Assay performance characteristics have not been established for newborns,   infants, and children           POD 1  liver: Impression:   1.  Hematoma associated with the inferior aspect of the perihepatic  space anteriorly, measuring 13.7 x 7.0 x 8.5 cm.  2.  Retrograde flow of the left portal vein. Additionally, low  velocities of the portal venous system. Single low resistive index of  the extrahepatic aspect of the hepatic artery, measuring 0.37. These  findings are likely within normal limits in the early postoperative  context. However, continued attention on follow-up recommended.    7/30 CT scan abd/pelvis:  IMPRESSION:   1. Hypoenhancing areas in the transplant liver concerning for  infarction. Transplant left portal vein occlusion with bubbles of  portal venous gas. Nearly occlusive filling defect in the native  hepatic artery. Narrowed and irregular appearance of the transplant  hepatic artery. Focal occlusion of the left main hepatic artery.  Segmental occlusions of right hepatic arterial branches.   2. Pneumatosis intestinalis with nonocclusive filling defects in  superior mesenteric vein branches, concerning for bowel ischemia or  infarction.   3. Splenic infarctions.   4. Bibasilar consolidation with air bronchograms. Pneumonia cannot be  excluded.   5. Post surgical changes of evacuation of peritransplant hematoma.

## 2018-08-03 NOTE — OP NOTE
Transplant Surgery  Operative Note    Preop Dx:  Mesenteric ischemia, s/p liver transplant.   Postop Dx: Same   Procedure: Abdominal washout, and cultures.  Surgeon: Leonie Elizalde M.D.  ASSISTANT:  Mihir Nj fellow. Kim Hussein, resident. There was no qualified general surgery resident available to assist during this procedure.   Anesthesia: General  EBL: 30 ml  Fluids: 300 ml crystalloids, 500 ml albumin  UO: not measured  Drains: Carlos drain x2   Specimen: old peritoneal abscess for cultures  Complications: None  Findings:  Improved patchy ischemic changes of small bowel and left liver lobe.   Indication: The patient has had a liver transplant on 7/21/18 complicated on POD11 with portal vein thrombosis with mesenteric ischemia.  After discussing the risks and benefits of surgery and potential complications, the patient provided informed consent.     DETAILS OF PROCEDURE:  The patient was brought to the operating room, placed in a supine position.  Perioperative prophylactic IV antibiotics were given.  Anesthesia was adminisitered. The abdomen was prepped and draped in the usual sterile fashion.  Time out was performed.    The previous incision in the bilateral subcostal area was opened by removing the Abterra mesh. Peritoneal cavity revealed moderate serous-sanguinous, and turbid fluid. This was sent for cultures. Small bowel loops distended, edematous, with patchy diffuse ischemic trophic changes improved compared to previous washout. There were 3 spots of 1-2 cm in diameter with more dark looking serosa, but no serosal defects appreciated. Right lobe of the liver looked normal color and consistency. Left lobe appeared with patchy ischemic changes and soft at palpation. Hepatic artery was appreciated patent, but diminished flow.The peritoneal cavity was abundantly washed out with normal saline, triple antibiotic and Amphotericin B irrigation solution. Two 19 Fr Carlos drains were placed in right upper  quadrant and left perihilar space. The abdominal wall was temporarily closed with Abthera mesh with the plan to washout on Monday, 8/6/18    All needle, sponge, and instrument counts were accurate.  The patient tolerated the procedure well without apparent complications and was trasfered to the PACU in good condition.  Faculty was present for critical portions of the procedure.    I was present during the key portions of the procedure, and I was immediately available for the entire procedure.

## 2018-08-03 NOTE — PROGRESS NOTES
CRRT STATUS NOTE    DATA:  Time:  7:26 AM  Pressures WNL:  YES  Filter Status:  WDL    Problems Reported/Alarms Noted:  None    Supplies Present:  YES    ASSESSMENT:  Patient Net Fluid Balance:  +159 ml @ 0600  Vital Signs:  HR 85, RR 16, /70, MAP 93  Labs:  Na 136, K 3.7, Plt 26, Hgb 8.3  Goals of Therapy:  No fluid removal.    INTERVENTIONS:   Started on recirculation for trip to OR at 0652.    PLAN:  Continue to monitor circuit daily and change set q72 hours or PRN for clogging/clotting. Please call CRRT RN with any questions/problems.

## 2018-08-03 NOTE — PROGRESS NOTES
CRRT RESTART NOTE    Reason for Restart:  Recirc time expiration  Error Code:  NA    Patient s Vascular Access: Catheter                   Placement Confirmed:  YES  Manufacture:  Tune[employer]  Model:  vitalclip Elite  Length/Bel[place] Size:  12F/16cm  Flush Volume:  1.3 A/V    DATA:  Procedure:  Restart circuit  Start Time:  0951  Machine#:  7  Filter:  M150  Blood Flow:   200 mL/min  Pre-Replacement Solution:  Phoxillum 4/2.5  Post-Replacement Solution:  Phoxillum 4/2.5  Dialysate Solution:  Phoxillum 4/2.5  Pre-Replacement Solution Rate:  800mL/hr  Post-Replacement Solution Rate:  200mL/hr  Dialysate Flow Rate:  1100mL/hr  Patient Removal Rate:  0 mL/hr  Anticoagulation Type and Rate:  NONE    ASSESSMENT:  How Patient Tolerated Restart:  Well.  Required reversed lines.  Vital Signs:  96/49 (66), 86 HR, 100% SPO2 on 35% fio2 on vent.  RR 16/16 on vent.  Initial Pressures:  Access:  -67  Filter:  85  Return:  48  TMP:  49  Change in Filter Pressure:  13    INTERVENTIONS:  Restart filter only    PLAN:  Continue with POC as set by nephrology.  Contact CRRT RN at [phone] with questions or concerns.

## 2018-08-03 NOTE — ANESTHESIA CARE TRANSFER NOTE
Patient: Yaneli Miles    Procedure(s):   Abdominal Wash Out - Wound Class: II-Clean Contaminated    Diagnosis: Post Liver Transplant, Mesenteric Ischemia   Diagnosis Additional Information: No value filed.    Anesthesia Type:   General     Note:  Airway :ETT  Patient transferred to:ICU  Comments: Pt remains intubated and sedated on a fentanyl PCA. Pt vent settings: 450/12/5/60%. Pt VSS throughout transport. Pt care report given to receiving RN.       Vitals: (Last set prior to Anesthesia Care Transfer)    CRNA VITALS  8/3/2018 0904 - 8/3/2018 0953      8/3/2018             Resp Rate (observed): (!)  1                Electronically Signed By: BUD Cano CRNA  August 3, 2018  9:53 AM

## 2018-08-03 NOTE — PROGRESS NOTES
CRRT STATUS NOTE    DATA:  Time:  5:25 PM  Pressures WNL:  YES  Filter Status:  WDL    Problems Reported/Alarms Noted:  None noted, none reported    Supplies Present:  YES    ASSESSMENT:  Patient Net Fluid Balance:  No fluid being pulled.  +1390cc yesterday, +1023cc.    Vital Signs:  90/45 (58) on 0.3mcg/kg/min Levo.  95.3F. All other VSS.  Labs:  Labs unremarkable  Goals of Therapy:  No fluid removal.      INTERVENTIONS:   Circuit change this morning.  Line reversal.    PLAN:  Continue with POC as made by nephrology.  COntact CRRT RN at [phone] with questions or concerns.

## 2018-08-03 NOTE — PROGRESS NOTES
"CLINICAL NUTRITION SERVICES - REASSESSMENT NOTE     Nutrition Prescription    RECOMMENDATIONS FOR MDs/PROVIDERS TO ORDER:  -If pt reaches day 7 NPO (8/6/18), would strongly consider initiating TPN. If TPN is indicated, consult = \"Pharmacy/Nutrition to start & manage TPN\"    Malnutrition Status:    Severe malnutrition in the context of acute on chronic illness    Recommendations already ordered by Registered Dietitian (RD):  None at this time    Future/Additional Recommendations:  1. If TF's to restart via NDT, would recommend:  -Nepro @ 10 mL/hr  -If pt tolerates, adv TF by 10 mL q8h to goal 50 ml/hr (1200 ml/day) to provide 2160 kcals (35 kcal/kg/day), 97 g PRO (1.6 g/kg/day), 876 ml free H2O, 193 g CHO and 15 g Fiber daily.  -Nephronex MVI for micronutrient needs while on dialysis  -30 mL water flush q4h for tube patency    2. If TPN to start, would recommend:  --Use dosing weight 61 kg  --Begin CPN, goal volume 1200 ml/day (or per MD/PharmD discretion) with initial 145g Dex daily (493 kcal, GIR = 1.7), 120g AA daily (480 kcal), and 250 ml 20% IV lipids 3x/week.  Micro/Rx: Per PharmD  --ONLY once pt receives ~100% of initial continuous PN volume with K+/Mg++/Phos WNL, advance PN dex by 50 g every 1-2 days (pending lytes/Glu and Pharm D/RD discretion) to initial goal of 245g Dex (833 kcal) to increase provisions to 1527 kcals (25 kcal/kg/day), 2.0 g PRO/kg/day, GIR = 2.8 with 14% kcals from Fat.       EVALUATION OF THE PROGRESS TOWARD GOALS   Diet: NPO (NTUB since 7/30)    Nutrition Support: 7/27-7/30: Nutren 1.5 at 50 ml/hr x 12 hrs (600 ml): 900 kcals (15 kcals/kg), 41 g/kg PRO (0.7 g/kg), 456 ml Free H2O, 106 g CHO, 9 g Fiber    Intake:   -6 day TF infusion average = 208 mL to provide 313 kcal (5 kcal/kg) and 14 g PRO (0.2 g/kg)  -PO: Calorie counts (7/26-7/28):  7/26  Kcals: 1307  Protein: 65g  7/27  Kcals: 933    Protein: 59g  7/28  Kcal: 1406    Protein: 49 g  Average = 1215 kcal (20 kcal/kg) and 58 g PRO " (0.9 g/kg); 7 day average = 174 kcal (3 kcal/kg) and 8 g PRO (0.1 g/kg)     NEW FINDINGS   7/28: CT showed massive stool burden  7/30: NGT placed for decompression; INTUB, CRRT start; Ex lap, peritoneal fluid culture, liver biopsy, found diffuse patchy small bowel ischemia  8/3: Abdominal Wash Out    GI: Pt has not received any nutrition since 7/30 r/t large emesis of undigested food and CT on 7/28 showed stool impaction, thus TFs were held. Prior to that, pt had increasing appetite and was eating fairly well per calorie counts (see above). Despite multiple bowel meds on board, pt has not stooled in 3 days, stool burden ongoing.    Resp: Pt continues on mechanical ventilation, FiO2 = 40%. Metabolic cart was not ordered for this note r/t pt has received no nutrition for the past 4 days (see above).    Renal: Pt continues on CRRT.     Labs (8/3): Mg = 2.6 (H), Phos = 5 (H), TBili = 5.3 (H), Alk Phos = 202 (H), ALT = 488 (H), AST = 142 (H)    Weight: Today's weight of 64.4 kg is up from lowest admit wt of 60.7 kg on 7/20 likely r/t fluid    MALNUTRITION  % Intake: </= 50% for >/= 5 days (severe)  % Weight Loss: None noted  Subcutaneous Fat Loss: Facial region, Upper arm, Lower arm and Thoracic/intercostal:  MIld-Moderate  Muscle Loss: Temporal, Facial & jaw region, Thoracic region (clavicle, acromium bone, deltoid, trapezius, pectoral), Upper arm (bicep, tricep), Lower arm  (forearm), Upper leg (quadricep, hamstring), Patellar region and Posterior calf:  Moderate-Severe  Fluid Accumulation/Edema: Mild  Malnutrition Diagnosis: Severe malnutrition in the context of acute on chronic illness    Previous Goals   Total avg nutritional intake to meet a minimum of 30 kcal/kg and 1.5 g PRO/kg daily (per dosing wt 61 kg)  Evaluation: Not met    Previous Nutrition Diagnosis  Inadequate oral intake related to decreased appetite, s/p surgery as evidenced by above calorie count meeting 71% jose needs/PRO needs  Evaluation: No  change    CURRENT NUTRITION DIAGNOSIS  Inadequate protein-energy intake related to holding of TFs since 7/30 as evidenced by 6 day average intake of 8 kcal/kg and 0.3 g PRO/kg      INTERVENTIONS  Implementation  Collaboration and Referral of Nutrition care - Discussed plan for FEN/GI on rounds with Providers  Enteral Nutrition - Recs  Parenteral Nutrition/IV Fluids - Recs    Goals  Total avg nutritional intake to meet a minimum of 30 kcal/kg and 1.5 g PRO/kg daily (per dosing wt 61 kg)    Monitoring/Evaluation  Progress toward goals will be monitored and evaluated per protocol.    Halina Crouch, RD, LD  SICU RD Pgr: [phone]

## 2018-08-03 NOTE — ANESTHESIA PREPROCEDURE EVALUATION
Anesthesia Evaluation     . Pt has had prior anesthetic. Type: General    No history of anesthetic complications          ROS/MED HX    ENT/Pulmonary: Comment: intubated      Neurologic:  - neg neurologic ROS     Cardiovascular: Comment: On norepi gtt    (+) ----. : . . . :. . Previous cardiac testing Echodate:07/20/18results:Left ventricular function, chamber size, wall motion, and wall thickness are normal.The EF is > 65%. LV cavity appears small. No regional wall motion abnormalities are seen. Right ventricular function, chamber size, wall motion, and thickness are normal. No significant valvular abnormalities seen, although not able to completely exclude endocarditis based on TTE. The inferior vena cava cannot be assessed. No pericardial effusion is present.date: results: date: results: date: results:          METS/Exercise Tolerance:     Hematologic: Comments: plt of 26k at 0300 08/03/18    (+) History of Transfusion Other Hematologic Disorder-thrombocytopenia      Musculoskeletal:         GI/Hepatic: Comment: S/p liver transplant 07/21/18 for chronic hep B, cirrhosis, esophageal varices, intubated prior to transplant for encephalopathy    Brought back to OR for emergent ex lap on 07/30, found to have mesenteric ischemia    (+) hepatitis type B, liver disease,       Renal/Genitourinary: Comment: Currently on CVVHD        Endo:     (+) type II DM .      Psychiatric:  - neg psychiatric ROS       Infectious Disease:  - neg infectious disease ROS       Malignancy:         Other:                     Physical Exam  Normal systems: cardiovascular and pulmonary    Airway   Comment: ETT    Dental     Cardiovascular       Pulmonary                     Anesthesia Plan      History & Physical Review  History and physical reviewed and following examination; no interval change.    ASA Status:  4 .    NPO Status:  > 8 hours    Plan for General with Intravenous induction. Maintenance will be Inhalation.    PONV prophylaxis:   Ondansetron (or other 5HT-3)  Additional equipment: 2nd IV and Arterial Line      Postoperative Care  Postoperative pain management:  IV analgesics.      Consents  Anesthetic plan, risks, benefits and alternatives discussed with:  Patient (consent in ICU w )..        Procedure: Procedure(s):  Exploratory Laparotomy, Abdominal Wash Out, Possible Small Bowel Resection  - Wound Class: II-Clean Contaminated   - Wound Class: II-Clean Contaminated    HPI: Yaneli Miles is a 43 year old male scheduled for ex-lap, possible small bowel resection.  Pt w h/o chronic Hep B cirrhosis, s/p liver transplant 07/21/18, was brought back to OR for emergent ex lap on 07/30 found to have mesenteric ischemia.  Relatively stable since, ICU slowly weaning off pressors - vaso off, norepi still running as of AM 08/03.  BEN - now on CVVHD.  Patient remaining intubated, to be brought down to OR for ex-lap, washout, possible small bowel resection.  Plan for GETA, standard ASA monitors, continued use of a-line and vascular access from ICU.    PMHx/PSHx:  Past Medical History:   Diagnosis Date     Choledocholithiasis      Chronic viral hepatitis B without delta agent and without coma (H) 6/14/2018     Cirrhosis of liver with ascites (H) 6/14/2018     Esophageal varices (H)     prior GIB     Hepatic encephalopathy (H)      Hepatitis delta with hepatitis B carrier state 06/14/2018    positive antigen     Portal vein thrombosis 6/14/2018 5/29/18 care everywhere US     SBP (spontaneous bacterial peritonitis) (H) 06/2018     Type 2 diabetes mellitus without complication, without long-term current use of insulin (H) 6/14/2018       Past Surgical History:   Procedure Laterality Date     ENDOSCOPIC RETROGRADE CHOLANGIOPANCREATOGRAM      with stone removal     ENDOSCOPIC ULTRASOUND UPPER GASTROINTESTINAL TRACT (GI) N/A 6/22/2018    Procedure: ENDOSCOPIC ULTRASOUND, ESOPHAGOSCOPY / UPPER GASTROINTESTINAL TRACT (GI);  Esophagogastroduodenoscopy,  removal of [place] tube, ENDOSCOPIC ULTRASOUND with embolization of gastric varices, placement of nasogastric tube;  Surgeon: Armando Kraft MD;  Location: [facility] OR     TRANSPLANT LIVER RECIPIENT  DONOR N/A 2018    Procedure: TRANSPLANT LIVER RECIPIENT  DONOR;  Liver Transplant;  Surgeon: Stew Min MD;  Location: [facility] OR         No current facility-administered medications on file prior to encounter.   Current Outpatient Prescriptions on File Prior to Encounter:  acetaminophen (TYLENOL) 500 MG tablet Take 1 tablet (500 mg) by mouth every 6 hours as needed for mild pain or fever   ciprofloxacin (CIPRO) 500 MG tablet Take 1 tablet (500 mg) by mouth every 24 hours   ferrous sulfate (IRON) 325 (65 Fe) MG tablet Take 325 mg by mouth   insulin aspart (NOVOLOG PEN) 100 UNIT/ML injection Inject 1 Units Subcutaneous 3 times daily (with meals)   lactulose (CHRONULAC) 10 GM/15ML solution Take 30 mLs (20 g) by mouth 3 times daily Goal of 3-4 BMs per day   lidocaine (XYLOCAINE) 5 % ointment Apply topically every 4 hours as needed for moderate pain   miconazole (MICATIN; MICRO GUARD) 2 % powder Apply topically 2 times daily   midodrine HCl 10 MG TABS Take 10 mg by mouth 3 times daily (with meals)   pantoprazole (PROTONIX) 40 MG EC tablet Take 1 tablet (40 mg) by mouth 2 times daily (before meals)   phosphorus tablet 250 mg (PHOSPHA 250 NEUTRAL) 250 MG per tablet Take 2 tablets (500 mg) by mouth 2 times daily   potassium chloride SA (KLOR-CON) 20 MEQ CR tablet Take 1 tablet (20 mEq) by mouth daily   rifaximin (XIFAXAN) 550 MG TABS tablet Take 1 tablet (550 mg) by mouth 2 times daily   simethicone (MI-ACID GAS RELIEF) 80 MG chewable tablet Take 80 mg by mouth   tenofovir (VIREAD) 300 MG tablet Take 300 mg by mouth       Social Hx:   Social History   Substance Use Topics     Smoking status: Never Smoker     Smokeless tobacco: Not on file     Alcohol use No       Allergies:   Allergies   Allergen  Reactions     Nsaids      Contraindicated          NPO Status: Per ASA Guidelines    Labs:    Blood Bank:  Lab Results   Component Value Date    ABO B 08/02/2018    RH Pos 08/02/2018    AS Neg 08/02/2018     BMP:  Recent Labs   Lab Test  08/03/18   0311   NA  136   POTASSIUM  3.7   CHLORIDE  104   CO2  22   BUN  31*   CR  1.18   GLC  109*   JESUS  7.4*     CBC:   Recent Labs   Lab Test  08/03/18   0311   WBC  7.8   RBC  2.81*   HGB  8.3*   HCT  23.6*   MCV  84   MCH  29.5   MCHC  35.2   RDW  16.8*   PLT  26*     Coags:  Recent Labs   Lab Test  08/02/18   0404  07/30/18   1444  07/30/18   1230   INR  1.52*  2.32*  1.91*   PTT   --    --   41*   FIBR   --   334  391       Talib Worrell MD  Staff Anesthesiologist  [phone]

## 2018-08-03 NOTE — PLAN OF CARE
Problem: Patient Care Overview  Goal: Plan of Care/Patient Progress Review  Outcome: Improving  D=Pt remain vented.heart rate in 70s-80s w/ rare PVCs.SBP dx531-550z w/ MAP >60s.Norepi drip at 0.16mcg/kg/min.Fentanyl at 50mcg/hr.Insulin at 0.2units/hr.Abdominalwound vac (Abthera) w/moderate amt of serosanguinous drainagedsg dry intact. Trevor abdominal CAROL to bulb suction w/ small amt of serosanguinous drainage dsg to both sites dry intact.NJ clamped.OG to LIS w/ small amt of thick brown drainage.ON CRRT ,no fluid removal.PLT at 04 was 26 MD was called,1 pack PLT was ordered.Pt for OR today,No consent Transplant aware ,Waiting for .Pt was disconnected  From CRRT at 0700.Heparin drip off since 0039   P=Continue to closely monitor pt/For OR

## 2018-08-03 NOTE — BRIEF OP NOTE
[facility], [place]    Pre-operative diagnosis: Post Liver Transplant, Mesenteric Ischemia    Post-operative diagnosis * No post-op diagnosis entered *   Procedure: Procedure(s):   Abdominal Wash Out - Wound Class: II-Clean Contaminated   Surgeon: Leonie Elizalde M.D.   Assistants(s): Kim Dorsey   Anesthesia: General    Estimated blood loss: Less than 50 ml    Total IV fluids: (See anesthesia record)  300 ml crystalloids, 500 ml albumin   Blood transfusion: 1 units of platelets given during surgery   Total urine output: (See anesthesia record)  Not measured   Drains: Carlos   Specimens: Abdominal blood clots for culture   Implants: None   Findings: Improved patchy ischemic changes of small bowel, patchy ischemic changes of left liver lobe..   Complications: None.   Condition: Stable  Transferred to ICU   Comments: See dictated operative report for full details

## 2018-08-03 NOTE — PLAN OF CARE
Problem: Patient Care Overview  Goal: Plan of Care/Patient Progress Review  Outcome: No Change  D/I/A: Down to OR today for wash out and possible close. Was not able to close. Back to floor shortly before 10.     Neuro: Pt will intermittently will move legs on own. Not moving to commands, but moves with touch and arouses to voice. Language barrier. Pupils equal and reactive but sluggish. No signs of pain. Off sedation other than 50 mcg of fentanyl      CV: BP controlled by Levo ranging from .16-.3, which currently at. 12.5g of albumin given for 1L output. MAP goal of 60ml/hr and replace every 1L lost through drains with 12.5g albumin.      Pulm: vented and remains on minimal vent settings. See flowsheets. No PS trial today. LS clear/diminished, only cough with stimulation.      GI: TF started at 1800 at 10ml/hr, okay per transplant, ordered by SICU. Loco lady enema for no stool, Rectal tube was taken out. No results. Abdomen still remains firm and taut. NG in place and flushed, remains at LIS. Dark foul stool smell output.      : anuric and Bladder Scanned for 112 ml. Annmarie still running, pulling no fluid. q6h lab draws, replace electrolytes as needed.     Skin: intact other than incisions, abthera wound vac on abdomen, minimal output on that. 2 CAROL's minimal output, stripped.      P: back to OR to evaluate Bowel on Monday with a possible close. Continue to monitor plan of care, notify MD of any changes.

## 2018-08-03 NOTE — ANESTHESIA POSTPROCEDURE EVALUATION
Patient: Yaneli Miles    Procedure(s):   Abdominal Wash Out - Wound Class: II-Clean Contaminated    Diagnosis:Post Liver Transplant, Mesenteric Ischemia   Diagnosis Additional Information: No value filed.    Anesthesia Type:  General    Note:  Anesthesia Post Evaluation    Patient location during evaluation: ICU  Patient participation: Unable to evaluate secondary to administered sedation  Level of consciousness: responsive to physical stimuli  Pain management: adequate  Airway patency: ETT.  Cardiovascular status: acceptable (on norepi gtt - same dose as pre-op)  Respiratory status: acceptable  Hydration status: acceptable  PONV: none             Last vitals:  Vitals:    08/03/18 0645 08/03/18 0700 08/03/18 0715   BP:      Pulse:      Resp:  16    Temp:      SpO2: 100% 100% 100%         Electronically Signed By: Talib Worrell MD  August 3, 2018  10:02 AM

## 2018-08-03 NOTE — PROGRESS NOTES
Transplant Social Work Services Progress Note      Date of Liver Transplant: 7/21/2018  Data: Mr. Miles was in the OR this morning. He and his wife were sleeping in his ICU room when I stopped earlier this week.   Intervention: Chart review.   Assessment: Mr. Miles remains in ICU with complications post transplant. His wife continues to be present most of the time.   Plan:    Discharge Plans in Progress: Not at present.     Barriers to d/c plan: Medical condition.     Follow up Plan: Social work remains available for support, disposition and other needs as they arise. Please page.     KAIA Park  Pager [phone]

## 2018-08-03 NOTE — PROGRESS NOTES
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 He returns to the ICU 7/30  for shock, severe lactic acidosis and respiratory failure. S/p ex-lap with hematoma evacuation 7/31/18.      TODAY'S PROGRESS/PLANS:   - To OR today with transplant   - Wean pressors as able.   - Continue with CRRT  - Contact transplant regarding nutrition, per the primary team they did not want TPN.  They want trickle feeds through the [place].  - Restart heparin      PLAN:  Neuro/ pain/ sedation:  # Acute pain  # Sedation  - Monitor neurological status. Pt awake and following commands this am.    - Fentanyl gtt. Versed drop currently off.   - PRN oxycodone and Hydromorphone IV as needed for pain     Pulmonary care:   # Acute respiratory failure  - continue with full mechanical support at this time. PST when meets criteria.   - HOB elevation. Vent bundle.   - CMV 16/400/5/40%.       Cardiovascular:    # Septic Shock  - Monitor hemodynamic status.   - Norepinephrine gtt, needs down today. Stopped Vasopressin 7/31/18 per Transplant due to concerns of bowel ischemia.  - Epi gtt if needed.   - Solu-cortef 50 every 6 hours for stress dose steroids for 72 hour total, finished 08/02.    Gastroenterology:   # DDLT 7/21/18  # Intrahepatic left portal venous thrombosis, infarction of left liver lobe, patchy infarction of right lobe, suspected hepatic arterial thrombosis  # 7/30/18 sp exploratory laparotomy, abthera closure device in place. Findings of patchy diffuse SB ischemia. S/p liver biopsy  # Splenic infarctions, Patchy bowel ischemia  # ileus   - s/p exploratory laparotomy, abthera closure device in place. Findings of patchy diffuse SB ischemia. S/p liver biopsy on 7/30/18. Possible  OR today per transplant   - Continue heparin gtt high intensity given thrombosis   - ileus: CXR 8/1: stool burden, hold bowel stimulants or laxatives given concerns of bowel ischemia per primary. S/p multiple enemas and mineral oil x2 doses.   - lactic improved to 1.3    Nutrition:  # protein calorie deficit malnutrition.   - TF's on hold at this time given pressor needs and ileus and concerns of bowel ischemia      Renal/ Fluid Balance/electrolytres :    # BEN, anuric   # Severe metabolic acidosis, lactic acidosis, corrected with CRRT  # Severe hyponatremia, corrected   # hyperkalemia--corrected with CRRT    Acute sodium drop  Note with CRRT initation.   - CRRT per Nephrology. CRRT 7/30/18. I=O's at this point, not pulling fluids at this point   - On CRRT electrolyte replacement protocol.   - Renal managing dialysis   - albumin replacement 12.5g/1 Liter of abthera and CAROL drain losses.      Endocrine:    # DM II  - Insulin gtt. Continue with insulin drip for now     ID/ Antibiotics:  # VRE Bacteremia  # ESBL Bacteremia  # Hepatitis B  # leukocytosis   - Continue Daptomycin. change Zosyn to Meropenem   - Micafungin- continue 7/30,   - WBC improving. WBC 14.8k, pt afebrile  - source likely GI tract--Plan for OR tomorrow for repeat washout--continue with Abthera.  - Immunosuppression: on hold, will defer to transplant when to start.  - Immunosuppression prophylaxis: Bactrim, Valgancyclovir and Tenofovir, nystatin when micafungin stopped.   - resend blood cultures until negative.   - Patient with KNOWN bacteremia at time of TLC placement on 7/30/18. Will keep line for now  - hold off ID consult for now       Positive cultures   - 7/27 VRE (line/arm)   - 7/29: VRE (left arm)   - 7/30: VRE (left arm)   - 7/31: VRE (Line and peripheral)  - 8/1: ESBL from HD/hand/central line      - Fluid culture 7/31/18 with probable Enteroccus faecium--sensitivities pending        Heme:     # Acute blood loss anemia  # Anemia of critical  illness  # Portal venous thrombosis  # Thrombocytopenia  - Hemoglobin 8.0, stable.   - High intensity heparin gtt. Continue aspirin 81mg.   - KEN panel sent and negative.   - Platelets 38K--hold off transfusion at this time        MSK:    # Weakness and deconditioning of critical illness   - PT and OT        Lines/ tubes/ drains:  - R internal jugular TLC 7/30  - L internal jugular dialysis line 7/30  - Right femoral arterial line  - NJ   - PIVs  - ETT  - Abthera       General Cares and  Prophylaxis:    - Mechanical prophylaxis for DVT and Heparin gtt  - GI: Protonix  prophylaxis       Disposition:  - Surgical ICU.     ====================================    SUBJECTIVE:  Course reviewed. No acute events overnight. Pressors needs decreased.  Went to OR this am.  Intraoperatively bowels appear improved.      OBJECTIVE:   1. VITAL SIGNS:   Temp:  [97.1  F (36.2  C)-99.5  F (37.5  C)] 97.3  F (36.3  C)  Heart Rate:  [] 86  Resp:  [16-17] (P) 16  MAP:  [58 mmHg-94 mmHg] 83 mmHg  Arterial Line BP: ()/(30-89) 116/70  FiO2 (%):  [40 %] 40 %  SpO2:  [99 %-100 %] 100 %  Ventilation Mode: CMV/AC  (Continuous Mandatory Ventilation/ Assist Control)  FiO2 (%): 40 %  Rate Set (breaths/minute): 16 breaths/min  Tidal Volume Set (mL): 400 mL  PEEP (cm H2O): 5 cmH2O  Oxygen Concentration (%): 40 %  Resp: 16    2. INTAKE/ OUTPUT:   I/O last 3 completed shifts:  In: 2274.04 [I.V.:1730.04; NG/GT:255]  Out: 884 [Emesis/NG output:400; Drains:625]    3. PHYSICAL EXAMINATION:   General: Laying in bed, easily aroused to voice.    HEENT: ETT present and secured. NJ present.   Neuro: follows some commands, opens eyes.  Nods at times.  Resp: BBS, coarse breath sounds,  diminished at bases. No wheezes   CV: RRR, S1, S2, tachycardiac   Abdomen: abthera present, staples at lower edge of abthera, drain with serous output, upper abdomen distended and tender. Bilateral drains present--thin serosangious drainage  Incisions:  abthera  present--suction intact.   Extremities: 1+ edema in feet. Calves soft and non-tender.    4. INVESTIGATIONS:   Arterial Blood Gases     Recent Labs  Lab 07/30/18  1444 07/30/18  1325 07/30/18  1213 07/30/18  1146   PH 7.22* 7.22* 7.29* 7.41   PCO2 41 43 39 30*   PO2 85 131* 161* 72*   HCO3 17* 18* 19* 19*     Complete Blood Count     Recent Labs  Lab 08/03/18 0311 08/02/18 2202 08/02/18  1600 08/02/18  0900   WBC 7.8 9.2 11.2* 12.2*   HGB 8.3* 8.2* 8.4* 6.8*   PLT 26* 29* 27* 36*     Basic Metabolic Panel    Recent Labs  Lab 08/03/18 0311 08/02/18 2202 08/02/18  1600 08/02/18  0900    136 134 133   POTASSIUM 3.7 3.5 3.6 3.6   CHLORIDE 104 102 102 100   CO2 22 22 22 24   BUN 31* 32* 28 27   CR 1.18 1.29* 1.23 1.26*   * 110* 124* 109*     Liver Function Tests    Recent Labs  Lab 08/03/18 0311 08/02/18 2202 08/02/18 1600 08/02/18  0900 08/02/18  0404  07/30/18  1444 07/30/18  1230   * 193* 247* 386* 538*  < > 6162*  --    * 526* 602* 699* 756*  < > 2195*  --    ALKPHOS 202* 196* 199* 211* 223*  < > 178*  --    BILITOTAL 5.3* 5.6* 5.4* 4.8* 5.0*  < > 4.1*  --    ALBUMIN 1.8* 1.8* 1.7* 1.8* 1.9*  < > 3.0*  --    INR  --   --   --   --  1.52*  --  2.32* 1.91*   < > = values in this interval not displayed.  Pancreatic Enzymes  No lab results found in last 7 days.  Coagulation Profile    Recent Labs  Lab 08/02/18  0404 07/30/18  1444 07/30/18  1230   INR 1.52* 2.32* 1.91*   PTT  --   --  41*     Lactate  Invalid input(s): LACTATE    5. RADIOLOGY:   Recent Results (from the past 24 hour(s))   XR Abdomen Port 1 View    Narrative    Exam: XR ABDOMEN PORT 1 VW 8/2/2018 1:14 AM    History: Evaluate for free intraperitoneal air.    Comparison: Earlier same date    Findings: Skin staples over the upper abdomen. Surgical drain over the  right upper quadrant. NG/OG tube tip and sidehole project over the  stomach body. Feeding tube tip projects over the distal duodenum, just  proximal to the region of  the ligament of Treitz.  Biliary stent is in  place. Gaseous distention of the bowel and colon. Diffuse pneumatosis  of the colon is again present, this may be improving? No portal venous  gas. Dense material near the gastroesophageal junction representing  embolic material. The bilateral paracolic outer drains remain in  place.      Impression    Impression:  1. Changes of pneumatosis again demonstrated.  2. On this supine film no free air is evident but a decubitus or  upright film would be better.    I have personally reviewed the examination and initial interpretation  and I agree with the findings.    ZULEMA GRANDA MD   XR Chest Port 1 View    Narrative    XR CHEST PORT 1 VW  8/2/2018 1:15 AM      HISTORY: intubation. Monitor;     COMPARISON: 7/30/2018    FINDINGS: Endotracheal tube tip projects over the mid to low thoracic  trachea. Left IJ central venous catheter tip projects over the high  SVC. Right IJ central venous catheter tip projects over the high right  atrium. Enteric tubes course below the level of the diaphragm and the  inferior margin of the field-of-view. Gastric distention appears  improved. Perihilar and bibasilar opacities are unchanged. Left upper  quadrant drain. The dense calcifications at the GE junction are again  identified.      Impression    IMPRESSION:   1. Endotracheal tube tip projects over the mid to low thoracic  trachea.   2. Improving left basilar presumed atelectasis.    I have personally reviewed the examination and initial interpretation  and I agree with the findings.    ZULEMA GRANDA MD       =========================================    Patient seen and discussed with SICU attending Dr. Soler.    Eddie Acuna  CA-2/PGY-3

## 2018-08-04 NOTE — PROGRESS NOTES
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 He returns to the ICU 7/30  for shock, severe lactic acidosis and respiratory failure. S/p ex-lap with hematoma evacuation 7/31/18.  Returned to OR on 08/03/18.     TODAY'S PROGRESS/PLANS:   - To OR Monday 08/06 with transplant   - Wean pressors as able.   - Continue with CRRT  - Increase TF to 20 ml/hr      PLAN:  Neuro/ pain/ sedation:  # Acute pain  # Sedation  - Monitor neurological status. Pt awake and following commands this am.    - Fentanyl gtt.   - PRN oxycodone and Hydromorphone IV as needed for pain     Pulmonary care:   # Acute respiratory failure  - continue with full mechanical support at this time. PST when meets criteria.   - HOB elevation. Vent bundle.   - CMV 16/400/5/40%.       Cardiovascular:    # Septic Shock  - Monitor hemodynamic status.   - Norepinephrine gtt, needs down today. Stopped Vasopressin 7/31/18 per Transplant due to concerns of bowel ischemia.  - Epi gtt if needed.   - Solu-cortef 50 every 6 hours for stress dose steroids for 72 hour total, finished 08/02.    Gastroenterology:   # DDLT 7/21/18  # Intrahepatic left portal venous thrombosis, infarction of left liver lobe, patchy infarction of right lobe, suspected hepatic arterial thrombosis  # 7/30/18 sp exploratory laparotomy, abthera closure device in place. Findings of patchy diffuse SB ischemia. S/p liver biopsy  # Splenic infarctions, Patchy bowel ischemia  # ileus   - s/p exploratory laparotomy, abthera closure device in place. Findings of patchy diffuse SB ischemia. S/p liver biopsy on 7/30/18. Possible OR today per transplant   - Continue heparin gtt high intensity given thrombosis   - ileus: CXR 8/1: stool  burden, hold bowel stimulants or laxatives given concerns of bowel ischemia per primary. S/p multiple enemas and mineral oil x2 doses.   - lactic improved to 1.3    Nutrition:  # protein calorie deficit malnutrition.   - TF's at 20 ml/hr, plan to go up by 10 ml per day as tolerated     Renal/ Fluid Balance/electrolytres :    # BEN, anuric   # Severe metabolic acidosis, lactic acidosis, corrected with CRRT  # Severe hyponatremia, corrected   # hyperkalemia--corrected with CRRT    Acute sodium drop  Note with CRRT initation.   - CRRT per Nephrology. CRRT 7/30/18. I=O's at this point, not pulling fluids at this point   - On CRRT electrolyte replacement protocol.   - Renal managing dialysis   - albumin replacement 12.5g/1 Liter of abthera and CAROL drain losses.      Endocrine:    # DM II  - Insulin gtt. Continue with insulin drip for now     ID/ Antibiotics:  # VRE Bacteremia  # ESBL Bacteremia  # Hepatitis B  # leukocytosis   - Continue Daptomycin, Meropenem   - Micafungin- continue 7/30  - WBC improving. WBC 14.8k, pt afebrile  - source likely GI tract--Plan for OR 08/06 for repeat washout--continue with Abthera.  - Immunosuppression: on hold, will defer to transplant when to start.  - Immunosuppression prophylaxis: Bactrim, Valgancyclovir and Tenofovir, nystatin when micafungin stopped.   - resend blood cultures until negative.   - Patient with KNOWN bacteremia at time of TLC placement on 7/30/18. Will keep line for now  - hold off ID consult for now    - Likely had bacterial translocation post OR given current worsening picture     Positive cultures   - 7/27 VRE (line/arm)   - 7/29: VRE (left arm)   - 7/30: VRE (left arm)   - 7/31: VRE (Line and peripheral)  - 8/1: ESBL from HD/hand/central line      - Fluid culture 7/31/18 with probable Enteroccus faecium--sensitivities pending        Heme:     # Acute blood loss anemia  # Anemia of critical illness  # Portal venous thrombosis  # Thrombocytopenia  - Hemoglobin 8.0,  stable.   - High intensity heparin gtt. Continue aspirin 81mg.   - KEN panel sent and negative.   - Platelets 38K--hold off transfusion at this time        MSK:    # Weakness and deconditioning of critical illness   - PT and OT        Lines/ tubes/ drains:  - R internal jugular TLC 7/30  - L internal jugular dialysis line 7/30  - Right femoral arterial line  - [place]   - PIVs  - ETT  - Abthera       General Cares and  Prophylaxis:    - Mechanical prophylaxis for DVT and Heparin gtt  - GI: Protonix  prophylaxis       Disposition:  - Surgical ICU.     ====================================    SUBJECTIVE:  Course reviewed. No acute events overnight. Pressor requirements increased overnight.    OBJECTIVE:   1. VITAL SIGNS:   Temp:  [94.6  F (34.8  C)-97.2  F (36.2  C)] 96.5  F (35.8  C)  Heart Rate:  [] 110  Resp:  [16-20] 18  BP: (87)/(67) 87/67  MAP:  [54 mmHg-70 mmHg] 61 mmHg  Arterial Line BP: ()/(40-55) 92/47  FiO2 (%):  [35 %-40 %] 35 %  SpO2:  [99 %-100 %] 100 %  Ventilation Mode: CMV/AC  (Continuous Mandatory Ventilation/ Assist Control)  FiO2 (%): 35 %  Rate Set (breaths/minute): 16 breaths/min  Tidal Volume Set (mL): 400 mL  PEEP (cm H2O): 5 cmH2O  Oxygen Concentration (%): 35 %  Resp: 18    2. INTAKE/ OUTPUT:   I/O last 3 completed shifts:  In: 3497.07 [I.V.:2186.07; Other:61; NG/GT:150]  Out: 1805 [Emesis/NG output:125; Drains:1306; Other:344; Blood:30]    3. PHYSICAL EXAMINATION:   General: Laying in bed, easily aroused to voice.    HEENT: ETT present and secured. NJ present.   Neuro: follows some commands, opens eyes.  Nods at times.  Resp: BBS, coarse breath sounds,  diminished at bases. No wheezes   CV: RRR, S1, S2, tachycardiac   Abdomen: abthera present, staples at lower edge of abthera, drain with serous output, upper abdomen distended and tender. Bilateral drains present--thin serosangious drainage  Incisions:  abthera present--suction intact.   Extremities: 1+ edema in feet. Calves soft and  non-tender.    4. INVESTIGATIONS:   Arterial Blood Gases     Recent Labs  Lab 08/03/18  0844 07/30/18  1444 07/30/18  1325 07/30/18  1213   PH 7.32* 7.22* 7.22* 7.29*   PCO2 33* 41 43 39   PO2 199* 85 131* 161*   HCO3 17* 17* 18* 19*     Complete Blood Count     Recent Labs  Lab 08/04/18  0400 08/03/18  2145 08/03/18  1530 08/03/18  1041   WBC 8.5 9.6 10.5 7.6   HGB 7.0* 7.4* 7.5* 7.6*   PLT 62* 62* 52* 42*     Basic Metabolic Panel    Recent Labs  Lab 08/04/18 0400 08/03/18 2145 08/03/18  1530 08/03/18  1041    137 137 136   POTASSIUM 4.1 4.0 3.9 3.6   CHLORIDE 105 105 106 106   CO2 20 19* 20 19*   BUN 27 31* 31* 33*   CR 1.05 1.13 1.13 1.18   * 133* 124* 125*     Liver Function Tests    Recent Labs  Lab 08/04/18  0400 08/03/18  2145 08/03/18  1530 08/03/18  1041  08/02/18  0404  07/30/18  1444 07/30/18  1230   * 105* 76* 95*  < > 538*  < > 6162*  --    * 302* 326* 332*  < > 756*  < > 2195*  --    ALKPHOS 144 151* 148 153*  < > 223*  < > 178*  --    BILITOTAL 5.5* 5.8* 5.2* 4.7*  < > 5.0*  < > 4.1*  --    ALBUMIN 2.6* 2.3* 2.1* 2.1*  < > 1.9*  < > 3.0*  --    INR  --   --   --   --   --  1.52*  --  2.32* 1.91*   < > = values in this interval not displayed.  Pancreatic Enzymes  No lab results found in last 7 days.  Coagulation Profile    Recent Labs  Lab 08/02/18  0404 07/30/18  1444 07/30/18  1230   INR 1.52* 2.32* 1.91*   PTT  --   --  41*     Lactate  Invalid input(s): LACTATE    5. RADIOLOGY:   Recent Results (from the past 24 hour(s))   XR Abdomen Port 1 View    Narrative    Exam: XR ABDOMEN PORT 1 VW 8/2/2018 1:14 AM    History: Evaluate for free intraperitoneal air.    Comparison: Earlier same date    Findings: Skin staples over the upper abdomen. Surgical drain over the  right upper quadrant. NG/OG tube tip and sidehole project over the  stomach body. Feeding tube tip projects over the distal duodenum, just  proximal to the region of the ligament of Treitz.  Biliary stent is  in  place. Gaseous distention of the bowel and colon. Diffuse pneumatosis  of the colon is again present, this may be improving? No portal venous  gas. Dense material near the gastroesophageal junction representing  embolic material. The bilateral paracolic outer drains remain in  place.      Impression    Impression:  1. Changes of pneumatosis again demonstrated.  2. On this supine film no free air is evident but a decubitus or  upright film would be better.    I have personally reviewed the examination and initial interpretation  and I agree with the findings.    ZULEMA GARNDA MD   XR Chest Port 1 View    Narrative    XR CHEST PORT 1 VW  8/2/2018 1:15 AM      HISTORY: intubation. Monitor;     COMPARISON: 7/30/2018    FINDINGS: Endotracheal tube tip projects over the mid to low thoracic  trachea. Left IJ central venous catheter tip projects over the high  SVC. Right IJ central venous catheter tip projects over the high right  atrium. Enteric tubes course below the level of the diaphragm and the  inferior margin of the field-of-view. Gastric distention appears  improved. Perihilar and bibasilar opacities are unchanged. Left upper  quadrant drain. The dense calcifications at the GE junction are again  identified.      Impression    IMPRESSION:   1. Endotracheal tube tip projects over the mid to low thoracic  trachea.   2. Improving left basilar presumed atelectasis.    I have personally reviewed the examination and initial interpretation  and I agree with the findings.    ZULEMA GRANDA MD       =========================================    Patient seen and discussed with SICU attending Dr. Soler.    Eddie Acuna  CA-2/PGY-3

## 2018-08-04 NOTE — PROVIDER NOTIFICATION
Pressure Support Trial Summary    Settings:  Ventilation Mode: CPAP/PS  (Continuous positive airway pressure with Pressure Support)  PEEP (cm H2O): 5 cmH2O  Pressure Support (cm H2O): 10 cmH2O  Oxygen Concentration (%): 35 %    Patient Parameters:  Heart Rate: 123  BP: 93/48  BP - Mean: 61  Spontaneous Respiratory Rate: 20 breaths/min  Spontaneous Tidal Volume: 0.34 mL  Spontaneous Minute Ventilation: 6.5 mL  RSBI (calculation): 58.82  RSBI trend: steady  Breathing Trial Total Time   (min): 88 minutes  Weaning trial discontinued due to:: End of designated trial.  See RT [facility] for complete documentation.    Based on pressure support trial results and RT assessment, recommend do not extubate without further physician assessment.    Brian Woods, RRT  8/4/2018 6:00 PM

## 2018-08-04 NOTE — PLAN OF CARE
Problem: Patient Care Overview  Goal: Plan of Care/Patient Progress Review  Outcome: No Change  D/I: Pt remains intubated. No sedation. Fentanyl gtt at 50 mcg/hr. Pt follows commands with . -828. Unable to wean Levophed gtt. MAPs 58-65. Pt PS trial x 1 hr this pm. CRRT continues with no fluid removal. CVP = 3-6. Abtherra abd dressing intact. Liver US done. Pt given 1 unit PRBCs for hgb = 7.0. Post transfusion Hgb=8.9.  No results from pink lady enema. Family at bedside and updated with  present.  A:Pt remains critically ill.  P: Continue to monitor. See EPIC flow sheets for VS and further assessments.

## 2018-08-04 NOTE — PLAN OF CARE
Problem: Diabetes Comorbidity  Goal: Diabetes  Patient comorbidity will be monitored for signs and symptoms of hyperglycemia or hypoglycemia. Problems will be absent, minimized or managed by discharge/transition of care.   Outcome: No Change  Patient has a history of DM 2. Insulin gtt infusing and hourly blood glucose checks completed to keep patient euglycemic.

## 2018-08-04 NOTE — PROGRESS NOTES
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  Developed shock with severe lactic acidosis and respiratory failure on POD9. CT scan showed left PV venous thrombosis, infarction of left liver lobe, patchy infarction of right lobe and possible hepatic arterial thrombosis, patchy bowel ischemia. Transferred to ICU.   Ex lap, patchy diffuse SB ischemia. Doppler demonstrated arterial flow main vessels to bowel and liver. Splenic infarction. Liver biopsy negative for acute rejection. Fluid culture growing VRE. On Heparin gtt. 8/3 OR for reexploration liver transplant, bowel ischemia, findings improved patchy iscemic changes of small bowel, patchy ischemic changes of left liver lobe. Plan for reexploration in a few days.      Procedures:    donor (DBD) liver transplant with duct to duct anastomosis over biliary stent.    Ex lap due to concern for ischemic bowel, findings: patchy diffuse SB ischemia. Doppler demonstrated arterial flow main vessels to bowel and liver. Splenic infarction.   8/3 Reexploration liver transplant, bowel ischemia, findings improved patchy iscemic changes of small bowel, patchy ischemic changes of left liver lobe.     Graft function: OLT POD 14. Infarction of left liver lobe, patchy infarction right liver lobe. Left PV thrombosis. Narrow and irregular appearance HA. Bowel ischemia. Procedures, see above. Liver biopsy negative for acute rejection. Fluid culture growing VRE. Continue high intensity heparin gtt. Plan to return to OR on Monday for reexploration.  Immunosuppression management:   Simulect intra-op due to BEN  Steroid taper per protocol, competed.  MMF 1gm BID, hold due to  status  Tac stopped, last dose 7/31. 8/3 Tac level 12.1. Hold due to status.   Hydrocortisone 50 q 6hrs due to shock  Complexity of management:Medium. Contributing factors: thrombocytopenia and anemia sepsis  Hematology: Hemoglobin stable, 7.0. PLT 59. 8/4 INR 1.52. Continue high intensity heparin gtt due to thrombosis.    Cardiorespiratory: Respiratory failure secondary to sepsis, on vent. On norepinephrine gtt, dose increased to 0.48.  Vasopressin discontinued due to mesenteric ischemia.    GI/Nutrition: NGT. NPO. Restarted trickle TF. No BM yesterday. Per surgeon, transverse colon w/o significant stool as observed in OR. Stomach full with liquid and    Endocrine: Insulin drip.    Fluid/Electrolytes: BEN. CRRT, no fluid removal.  Remains on pressor, dose decreased.   : Luis  Infectious disease: Afebrile. Leukocytosis, WBC decreased, today 8.4.   -Previously on Linezolid 7/28-7/30 and zosyn 7/30-8/2.   -Continue daptomycin (VRE, 7/30-present), meropenem (Ecoli ESBL, 8/2-) and micafungin (empiric, 7/30-).     1. VRE intra abdominal and bacteremia. Fluid cultures 7/31/18 growing VRE. 7/30 (peripheral) and 7/31 (peripheral and VAD) blood cultures growing VRE  2. Ecoli ESBL bacteremia. Repeat bcx (peripheral and VAD) growing Ecoli ESBL. Zosyn changed to Meropenem  SICU to exchange lines as able.     3. HBV: Hepatits B DNA PCR positive prior to tx. Received HBIG on 7/21 and 7/22. Hep B surface antigen negative. No further HBIG planned. Continue tenofovir 300 mg daily.   PPX: Valcyte x 12 weeks (CMVand EBV IgG +), bactrim. mycelex stopped as patient on micafungin. Will start nystatin if aide discontinued.     Prophylaxis: DVT PCDs, on heparin, fall, GI, fungal  Disposition: SICU    Coordinator: Kay Reyes       Medical Decision Making: High      JAY/Fellow/Resident Provider: Mihir Nj, fellow    Faculty: Leonie Elizalde MD  __________________________________________________________________  Transplant History:  Admitted 2018 for  donor liver transplant.   The patient has a history of liver failure due to hepatitis B .    2018 (Liver), Postoperative day: 14     Interval History:   Norepinephrine increased after washout up to 4.8 and slowly decreased overnight till 0.40 this am. Drain and wound output serous-sanguinous.    ROS:   A 10-point review of systems was negative except as noted above.    Curent Meds:    albumin human  25 g Intravenous Once     aspirin  81 mg Oral Daily     DAPTOmycin (CUBICIN) intermittent infusion  10 mg/kg (Dosing Weight) Intravenous Q24H     meropenem  1,000 mg Intravenous Q8H     micafungin  100 mg Intravenous Q24H     pantoprazole  40 mg Oral QAM AC     pink lady enema  286 mL Rectal Once     sodium chloride (PF)  3 mL Intravenous Q8H     sulfamethoxazole-trimethoprim  10 mL Oral Once per day on      tenofovir  300 mg Per Feeding Tube Q48H     valGANciclovir  450 mg Oral Every Other Day    Or     valGANciclovir  450 mg Oral or NG Tube Every Other Day       Physical Exam:     Admit Weight: 60.7 kg (133 lb 12.8 oz)    Current Vitals:   BP 96/48  Pulse 121  Temp 96.5  F (35.8  C) (Axillary)  Resp 25  Wt 65.1 kg (143 lb 8.3 oz)  SpO2 100%  BMI 23.16 kg/m2      Vital sign ranges:    Temp:  [94.6  F (34.8  C)-97.2  F (36.2  C)] 96.5  F (35.8  C)  Heart Rate:  [] 110  Resp:  [16-25] 25  BP: (87-96)/(48-67) 96/48  MAP:  [54 mmHg-70 mmHg] 60 mmHg  Arterial Line BP: ()/(40-55) 93/47  FiO2 (%):  [35 %-40 %] 35 %  SpO2:  [99 %-100 %] 100 %  Patient Vitals for the past 24 hrs:   BP Temp Temp src Heart Rate Resp SpO2 Weight   18 0950 96/48 - - 110 25 - -   18 0945 - - - 109 - 100 % -   18 0930 - - - 110 - 100 % -   18 0915 - - - 109 - 100 % -   18 - - - 112 18 100 % -   18 - - - 107 - 100 % -   18 - - - 108 - 100 % -   18 - - - 110 - 100 % -   18 - 96.5  F (35.8  C) Axillary 108 19  100 % -   08/04/18 0745 - - - 104 - 100 % -   08/04/18 0730 - - - 106 - 100 % -   08/04/18 0715 - - - 106 - 100 % -   08/04/18 0700 - - - 106 18 100 % -   08/04/18 0645 - - - 105 - 100 % -   08/04/18 0630 - - - 106 - 100 % -   08/04/18 0615 - - - 109 - 100 % -   08/04/18 0600 - - - 109 18 100 % -   08/04/18 0545 - - - 107 - 100 % -   08/04/18 0530 - - - 107 - 100 % -   08/04/18 0515 - - - 108 - 100 % -   08/04/18 0500 - - - 105 20 100 % -   08/04/18 0445 - - - 107 - 100 % -   08/04/18 0430 - - - 105 - 100 % -   08/04/18 0415 - - - 107 - 100 % -   08/04/18 0400 - 97.2  F (36.2  C) Axillary 108 19 100 % 65.1 kg (143 lb 8.3 oz)   08/04/18 0345 - - - 108 - 100 % -   08/04/18 0330 - - - 103 - 100 % -   08/04/18 0315 - - - 107 - 100 % -   08/04/18 0300 - - - 107 18 100 % -   08/04/18 0245 - - - 105 - 100 % -   08/04/18 0230 - - - 105 - 100 % -   08/04/18 0215 - - - 105 - 100 % -   08/04/18 0200 - - - 105 17 100 % -   08/04/18 0145 - - - 105 - 100 % -   08/04/18 0130 - - - 105 - 100 % -   08/04/18 0115 - - - 105 - 99 % -   08/04/18 0100 - - - 106 17 100 % -   08/04/18 0045 - - - 109 - 99 % -   08/04/18 0030 - - - 106 - 100 % -   08/04/18 0015 - - - 103 - 100 % -   08/04/18 0000 - 96.6  F (35.9  C) Axillary 101 20 99 % -   08/03/18 2345 - - - 104 - 99 % -   08/03/18 2330 - - - 103 - 99 % -   08/03/18 2315 - - - 104 - 99 % -   08/03/18 2300 - - - 103 18 100 % -   08/03/18 2245 - - - 104 - 100 % -   08/03/18 2230 - - - 104 - 99 % -   08/03/18 2215 - - - 107 - 100 % -   08/03/18 2200 - - - 105 18 100 % -   08/03/18 2145 - - - 102 - 100 % -   08/03/18 2130 - - - 102 - 100 % -   08/03/18 2115 - - - 103 - 100 % -   08/03/18 2100 - - - 103 17 100 % -   08/03/18 2045 - - - 102 - 100 % -   08/03/18 2030 - - - 103 - 100 % -   08/03/18 2015 - - - 102 - 100 % -   08/03/18 2000 (!) 87/67 96.7  F (35.9  C) Axillary 105 19 100 % -   08/03/18 1945 - - - 102 - 100 % -   08/03/18 1930 - - - 102 - 100 % -   08/03/18 1900 - - - 107 - 100 % -    08/03/18 1800 - 96.6  F (35.9  C) - 104 - 99 % -   08/03/18 1700 - - - 99 - 100 % -   08/03/18 1624 - - - 103 - 100 % -   08/03/18 1600 - 95.3  F (35.2  C) Axillary 102 - 100 % -   08/03/18 1518 - - - 105 - 100 % -   08/03/18 1500 - - - 103 - 100 % -   08/03/18 1400 - - - 98 - 100 % -   08/03/18 1300 - - - 91 - 100 % -   08/03/18 1200 - 94.6  F (34.8  C) Axillary 94 16 100 % -   08/03/18 1100 - - - 83 - 100 % -     General Appearance: Intubated, CRRT  HEENT: NGT to suction, dark output.  Skin: warm, dry  Heart: sinus tachycardia   Lungs: CMV, FiO2 40%  Abdomen: distended lower abdomen softer than upper abdomen, CAROL x 2, serous output. Abthera in place, serous outupt.   :  bah removed  Extremities: no edema  Neurologic: alert, following some commands      Data:   CMP  Recent Labs  Lab 08/04/18  0401 08/04/18  0400 08/03/18  2145 08/03/18  1530  07/30/18  1115   NA  --  136 137 137  < > 139   POTASSIUM  --  4.1 4.0 3.9  < > 5.9*   CHLORIDE  --  105 105 106  < >  --    CO2  --  20 19* 20  < >  --    GLC  --  131* 133* 124*  < > 275*   BUN  --  27 31* 31*  < >  --    CR  --  1.05 1.13 1.13  < >  --    GFRESTIMATED  --  76 70 70  < >  --    GFRESTBLACK  --  >90 85 85  < >  --    JESUS  --  7.5* 7.3* 7.1*  < >  --    ICAPOC  --   --   --   --   --  3.5*   ICAW 4.4  --   --  4.4  < >  --    MAG  --  2.4* 2.6* 2.3  < >  --    PHOS  --  5.1* 5.4* 5.9*  < >  --    ALBUMIN  --  2.6* 2.3* 2.1*  < >  --    BILITOTAL  --  5.5* 5.8* 5.2*  < >  --    ALKPHOS  --  144 151* 148  < >  --    AST  --  242* 105* 76*  < >  --    ALT  --  320* 302* 326*  < >  --    < > = values in this interval not displayed.  CBC    Recent Labs  Lab 08/04/18  0948 08/04/18  0400   HGB 7.0* 7.0*   WBC 8.4 8.5   PLT 59* 62*     COAGS    Recent Labs  Lab 08/02/18  0404 07/30/18  1444 07/30/18  1230   INR 1.52* 2.32* 1.91*   PTT  --   --  41*      Urinalysis  Recent Labs   Lab Test  07/27/18   2330  07/13/18   1315  06/18/18   1200   COLOR  Yellow  Dark  Yellow  Yellow   APPEARANCE  Clear  Clear  Clear   URINEGLC  Negative  Negative  Negative   URINEBILI  Negative  Large*  Small*   URINEKETONE  Negative  Negative  Negative   SG  1.008  1.013  1.008   UBLD  Negative  Negative  Negative   URINEPH  7.5*  6.0  5.0   PROTEIN  30*  10*  Negative   NITRITE  Negative  Negative  Negative   LEUKEST  Negative  Negative  Negative   RBCU  5*  <1  1   WBCU  3  None  2   UTPG   --    --   0.75*     Virology:  Hepatitis C Antibody   Date Value Ref Range Status   07/20/2018 Nonreactive NR^Nonreactive Final     Comment:     Assay performance characteristics have not been established for newborns,   infants, and children           POD 1 US liver: Impression:   1.  Hematoma associated with the inferior aspect of the perihepatic  space anteriorly, measuring 13.7 x 7.0 x 8.5 cm.  2.  Retrograde flow of the left portal vein. Additionally, low  velocities of the portal venous system. Single low resistive index of  the extrahepatic aspect of the hepatic artery, measuring 0.37. These  findings are likely within normal limits in the early postoperative  context. However, continued attention on follow-up recommended.    7/30 CT scan abd/pelvis:  IMPRESSION:   1. Hypoenhancing areas in the transplant liver concerning for  infarction. Transplant left portal vein occlusion with bubbles of  portal venous gas. Nearly occlusive filling defect in the native  hepatic artery. Narrowed and irregular appearance of the transplant  hepatic artery. Focal occlusion of the left main hepatic artery.  Segmental occlusions of right hepatic arterial branches.   2. Pneumatosis intestinalis with nonocclusive filling defects in  superior mesenteric vein branches, concerning for bowel ischemia or  infarction.   3. Splenic infarctions.   4. Bibasilar consolidation with air bronchograms. Pneumonia cannot be  excluded.   5. Post surgical changes of evacuation of peritransplant hematoma.

## 2018-08-04 NOTE — PROGRESS NOTES
CRRT STATUS NOTE    DATA:  Time: 0500    Pressures WNL:  YES    Filter Status:  WDL    Problems Reported/Alarms Noted:  None    Supplies Present:  YES    ASSESSMENT:    Patient Net Fluid Balance:  08/03/18 I/O= + 1207.19cc (went to OR yesterday/albumin and plts given yesterday)    Vital Signs:  0400- temp=97.2 (ax), FJ=748, RR=19, BP=98/48 (MAP=63) via eve, LPP8=421 on FIO2 35% via vent.    Labs:  All labs reviewed.  0400- K=4.1, Cr=1.05, I Ca=4.4, LA=2.0 (decreased from 2.2 @2200), LFT's still elevated, Hgb=7.0 ( trending down from 7.4 @2200), plt=62.    Goals of Therapy:  No fluid removal    INTERVENTIONS:   Albumin 5% 250cc IV given x1    Still on heparin gtt via IV pump.    Still on levophed gtt to keep MAP greater than 60 (removal rate on CRRT kept at zero overnight), per bedside RN Transplant team does not want patient on a vasopressin gtt due to mesenteric ischemia (see Transplant PA note from 08/03/18).    Using blood warmer    PLAN:  Continue to monitor.  Change CRRT set q72hrs and PRN.  Please call CRRT RN at [phone] with any questions.  See flowsheets for details.

## 2018-08-04 NOTE — PROGRESS NOTES
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 He may also have intrinsic injury with hypoxic/ischemic injury to tubules with ATN.  - Now on CRRT s/p exlap with hematoma evacuation on 7/30, reexploration 8/3 showing patchy ischemic changes of the liver lobes. Plan to return to the OR on Monday.   - continued on CRRT, remains anuric  - Albumin and NS can be used for resuscitation, avoid LR since he has liver shock and likely not able to metabolize lactate well  - CRRT prescribed at 30ml/kg/min with I=O  - continue with labs per CRRT protocol.   - daily weights and strict IO monitoring  - continue with Intra abdominal pressure monitoring Q shift     Hyperkalemia improved  Likely due to hematoma and poor renal clearance with met acidosis  CRRT prescription to address     Hypervolemia  With hypotension on presser support  CRRT with 0 UF for now. Will change to I=O  Will monitor for needs     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  Off Linezolid due to lactic acidosis  Micafungin and zosym for additional coverage for sepsis per MICU    Acute resp failure   Intubated on vent support   MICU managing     DMII   on insulin gtt     Anemia/ thrombocytopenia/leucocytosis  ACD/inflamm anemia, with low platelets due to ESLD  Hb ~ 8, on heparin. Keep Hgb >7     Recommendations were communicated to primary team     Seen and discussed  with Dr. Charlotte Amanda MD   [phone]    Interval History :   In the last 24 hours Yaneli Miles has been in the ICU on CRRT with minimal to none UO, tolerating CRRT. He remains on norepinephrine    Review of Systems:   Unable to obtain    Physical Exam:   I/O last 3 completed shifts:  In: 3271.08 [I.V.:2200.08; Other:81; NG/GT:180]  Out: 915 [Emesis/NG output:150; Drains:806]   BP 93/48  Pulse 121  Temp 97.8  F (36.6  C) (Axillary)  Resp 21  Wt 65.1 kg (143 lb 8.3 oz)  SpO2 98%  BMI 23.16 kg/m2     GENERAL APPEARANCE: ill appearing, on vent support, no distress  EYES:  + scleral icterus, pupils equal  HENT: mouth without ulcers or lesions  PULM: lungs low air entry  to auscultation bilaterally, no clubbing  CV: regular rhythm, normal rate, no rub     -JVD unable to access     -edema +2   GI: soft, , +distended  INTEGUMENT: no cyanosis, - rash  NEURO:  sedated   Access LIJ non tunneled    Labs:   All labs reviewed by me  Electrolytes/Renal -   Recent Labs   Lab Test  08/04/18   0948  08/04/18   0400  08/03/18   2145   NA  137  136  137   POTASSIUM  4.2  4.1  4.0   CHLORIDE  105  105  105   CO2  20  20  19*   BUN  26  27  31*   CR  1.06  1.05  1.13   GLC  123*  131*  133*   JESUS  7.5*  7.5*  7.3*   MAG  2.4*  2.4*  2.6*   PHOS  4.7*  5.1*  5.4*     CBC -   Recent Labs   Lab Test  08/04/18   1559  08/04/18   0948  08/04/18   0400   WBC  9.3  8.4  8.5   HGB  8.9*  7.0*  7.0*   PLT  61*  59*  62*     LFTs -   Recent Labs   Lab Test  08/04/18   0948  08/04/18   0400  08/03/18   2145   ALKPHOS  164*  144  151*   BILITOTAL  5.4*  5.5*  5.8*   ALT  422*  320*  302*   AST  691*  242*  105*   PROTTOTAL  4.4*  4.5*  4.3*   ALBUMIN  2.5*  2.6*  2.3*     Iron Panel -   Recent Labs   Lab Test  07/13/18   0334  06/17/18   2138   IRON  97  66   IRONSAT  83*  109*   ANGELLA  Unsatisfactory specimen - icteric   --      Imaging:  All imaging studies reviewed by me.     Current Medications:    albumin human  25 g Intravenous Once      aspirin  81 mg Oral Daily     DAPTOmycin (CUBICIN) intermittent infusion  10 mg/kg (Dosing Weight) Intravenous Q24H     meropenem  1,000 mg Intravenous Q8H     micafungin  100 mg Intravenous Q24H     pantoprazole  40 mg Oral QAM AC     sodium chloride (PF)  3 mL Intravenous Q8H     sulfamethoxazole-trimethoprim  10 mL Oral Once per day on Mon Wed Fri     tenofovir  300 mg Per Feeding Tube Q48H     valGANciclovir  450 mg Oral Every Other Day    Or     valGANciclovir  450 mg Oral or NG Tube Every Other Day       IV fluid REPLACEMENT ONLY Stopped (07/30/18 0800)     dialysate for CVVHD & CVVHDF (Phoxillum BK4/2.5) 17 mL/kg/hr (08/04/18 1556)     fentaNYL 50 mcg/hr (08/04/18 1600)     HEParin 1,400 Units/hr (08/04/18 1600)     insulin (regular) 0.2 Units/hr (08/04/18 1600)     lactated ringers 10 mL/hr at 07/30/18 1519     norepinephrine 0.43 mcg/kg/min (08/04/18 1600)     replacement solution for CVVHD & CVVHDF (Phoxillum BK4/2.5) 200 mL/hr at 08/04/18 1010     replacement solution for CVVHD & CVVHDF (Phoxillum BK4/2.5) 12.5 mL/kg/hr (08/04/18 1619)     Reason beta blocker order not selected       Esau Amanda MD

## 2018-08-05 NOTE — PROGRESS NOTES
Pressure Support Trial Summary    Settings:  Ventilation Mode: CPAP/PS  PEEP (cm H2O): 5 cmH2O  Pressure Support (cm H2O): 8 cmH2O  Oxygen Concentration (%): 40 %    Patient Parameters:  Heart Rate: 105  BP: 102/55  BP - Mean: 71  Spontaneous Respiratory Rate: 13 breaths/min  Spontaneous Tidal Volume: 0.44 mL  Spontaneous Minute Ventilation: 5.9 mL  RSBI (calculation): 29.55  RSBI trend: steady  Breathing Trial Total Time   (min): 53 minutes  Weaning trial discontinued due to:: End of designated trial.  See RT [facility] for complete documentation.  Allyssa Funez, RRT  8/5/2018 5:27 PM

## 2018-08-05 NOTE — PROGRESS NOTES
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 He may also have intrinsic injury with hypoxic/ischemic injury to tubules with ATN.  - Now on CRRT s/p exlap with hematoma evacuation on 7/30, reexploration 8/3 showing patchy ischemic changes of the liver lobes.   - continued on CRRT, remains anuric  - Albumin and NS can be used for resuscitation, avoid LR since he has liver shock and likely not able to metabolize lactate well  - CRRT prescribed at 30ml/kg/min with I=O  - continue with labs per CRRT protocol.   - daily weights and strict IO monitoring  - continue with Intra abdominal pressure monitoring Q shift     Hyperkalemia improved  Likely due to hematoma and poor renal clearance with met acidosis  CRRT prescription to address     Hypervolemia   I>O yesterday.  Try to increase UF with CRRT to match I and O today  Will monitor for needs     Acute resp failure   Intubated on vent support   MICU managing     DMII   on insulin gtt     Anemia/ thrombocytopenia/leucocytosis  ACD/inflamm anemia, with low platelets due to ESLD  Hb ~ 8, on heparin. Keep Hgb >7       Ravindra Porter MD   [phone]    Interval History :   In the last 24 hours Yaneli Miles has been in the ICU on CRRT with minimal to none UO, tolerating CRRT. He remains on norepinephrine,  off vasopressin    Review of Systems:   Unable to obtain    Physical  Exam:   I/O last 3 completed shifts:  In: 3052.36 [I.V.:1914.36; Other:98; NG/GT:290]  Out: 1058 [Emesis/NG output:425; Drains:621; Other:12]   BP 93/48  Pulse 121  Temp 98.4  F (36.9  C) (Axillary)  Resp 22  Wt 66.9 kg (147 lb 7.8 oz)  SpO2 98%  BMI 23.81 kg/m2     GENERAL APPEARANCE: ill appearing, on vent support, no distress.  Awake.  Blinked in response to voice.   EYES:  + scleral icterus, pupils equal  PULM: lungs low air entry  to auscultation bilaterally,   CV: regular rhythm, normal rate, no rub.  3/6 syst m.     -JVD unable to access     -edema 3+ dependent  GI: soft, , +distended  INTEGUMENT: no cyanosis, - rash  NEURO:  sedated   Access LIJ non tunneled    Labs:   All labs reviewed by me  Electrolytes/Renal -   Recent Labs   Lab Test  08/05/18 0358  08/04/18 2143 08/04/18   1559   NA  138  138  138   POTASSIUM  3.9  3.9  4.0   CHLORIDE  107  106  107   CO2  21  22  20   BUN  21  22  24   CR  0.96  0.99  1.03   GLC  139*  124*  124*   JESUS  7.7*  7.8*  8.3*   MAG  2.6*  2.5*  2.6*   PHOS  4.3  4.6*  4.8*     CBC -   Recent Labs   Lab Test  08/05/18   0603  08/05/18 0358 08/04/18 2143   WBC  10.0  10.3  10.1   HGB  8.1*  8.2*  8.7*   PLT  68*  68*  65*     LFTs -   Recent Labs   Lab Test  08/05/18   0358  08/04/18 2143 08/04/18   1559   ALKPHOS  252*  242*  218*   BILITOTAL  4.9*  5.2*  5.5*   ALT  527*  613*  648*   AST  593*  864*  1328*   PROTTOTAL  4.3*  4.5*  4.6*   ALBUMIN  2.2*  2.4*  2.5*     Iron Panel -   Recent Labs   Lab Test  07/13/18   0334  06/17/18   2138   IRON  97  66   IRONSAT  83*  109*   ANGELLA  Unsatisfactory specimen - icteric   --      Current Medications:    albumin human  25 g Intravenous Once     aspirin  81 mg Oral Daily     DAPTOmycin (CUBICIN) intermittent infusion  10 mg/kg (Dosing Weight) Intravenous Q24H     meropenem  1,000 mg Intravenous Q8H     micafungin  100 mg Intravenous Q24H     pantoprazole  40 mg Oral QAM AC     sodium chloride (PF)  3 mL  Intravenous Q8H     sulfamethoxazole-trimethoprim  10 mL Oral Once per day on Mon Wed Fri     tenofovir  300 mg Per Feeding Tube Q48H     valGANciclovir  450 mg Oral Every Other Day    Or     valGANciclovir  450 mg Oral or NG Tube Every Other Day       IV fluid REPLACEMENT ONLY Stopped (07/30/18 0800)     dialysate for CVVHD & CVVHDF (Phoxillum BK4/2.5) 17 mL/kg/hr (08/05/18 0544)     fentaNYL 50 mcg/hr (08/05/18 0700)     HEParin 1,550 Units/hr (08/05/18 0700)     insulin (regular) 0.5 Units/hr (08/05/18 0817)     lactated ringers 10 mL/hr at 07/30/18 1519     norepinephrine 0.4 mcg/kg/min (08/05/18 0740)     replacement solution for CVVHD & CVVHDF (Phoxillum BK4/2.5) 200 mL/hr at 08/04/18 1010     replacement solution for CVVHD & CVVHDF (Phoxillum BK4/2.5) 12.5 mL/kg/hr (08/05/18 0421)     Reason beta blocker order not selected       Ravindra Porter MD

## 2018-08-05 NOTE — PROGRESS NOTES
CRRT STATUS NOTE    DATA:  Time:  4:30 PM  Pressures WNL: YES  Filter Status: WDL    Problems Reported/Alarms Noted:  None reported    Supplies Present: YES    ASSESSMENT:  Patient Net Fluid Balance:  Up 6 kg  From admission 8/3.  Net positive 1112cc today.  Vital Signs: T 97.5, MAP 60's, NSR, remains vented 60%, peep 5 with O2 sats 100%.    Labs: Unremarkable  Goals of Therapy:  intake=output    INTERVENTIONS:   Bedside RN changed catheter dressing per unit standard.     PLAN:  Continue with POC and notify CRRT with concerns #[id number]

## 2018-08-05 NOTE — PLAN OF CARE
Problem: Patient Care Overview  Goal: Plan of Care/Patient Progress Review  Outcome: No Change  D/I: Pt remains intubated. Fentanyl gtt at 50 mcg/hr for abd pain. Pt improving hemodynamically. Levophed gtt weaned from 0.43 to 0.18 mcg/kg/min. HR decreased from `120s to 100s. CVP = 2-4.  Pt did PS trial x2 today and tolerated well. Pt is following commands with  present. Pt had 3 BMs today. Bladder scanned for 97 ml of urine this am. Pt continues on CRRT and increasing goals of therapy to Is=Os. Transplant MD here and spoke with wife with  for consent for Monday 8/6 surgery.   A:Pt stable with current cares.  P: Continue POC. See EPIC flow sheets for VS and further assessments. NPO after MN 8/6 for OR.

## 2018-08-05 NOTE — PROGRESS NOTES
CRRT STATUS NOTE    DATA:  Time:  6:37 AM  Pressures WNL:  YES  Filter Status:  WDL    Problems Reported/Alarms Noted:  None    Supplies Present:  YES    ASSESSMENT:  Patient Net Fluid Balance:  +287ml  Vital Signs:  Afebrile. Tachycardic. MAP goal >60, weaning down levo. Remains on CMV settings.   Labs:  Elevated LFTs. Lactic 1.7. Hgb 8.1.   Goals of Therapy:  0    INTERVENTIONS:   None    PLAN:  Continue with POC. Contact CRRT RN with questions or concerns at [phone]

## 2018-08-05 NOTE — PROGRESS NOTES
Patient to surgery tomorrow.  Hold feeds at midnight.  Hold heparin at 8 am tomorrow - transplant will order.    Eddie Acuna MD

## 2018-08-05 NOTE — PROGRESS NOTES
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  Developed shock with severe lactic acidosis and respiratory failure on POD9. CT scan showed left PV venous thrombosis, infarction of left liver lobe, patchy infarction of right lobe and possible hepatic arterial thrombosis, patchy bowel ischemia. Transferred to ICU.   Ex lap, patchy diffuse SB ischemia. Doppler demonstrated arterial flow main vessels to bowel and liver. Splenic infarction. Liver biopsy negative for acute rejection. Fluid culture growing VRE. On Heparin gtt. 8/3 OR for reexploration liver transplant, bowel ischemia, findings improved patchy iscemic changes of small bowel, patchy ischemic changes of left liver lobe. Plan for reexploration in a few days.      Procedures:    donor (DBD) liver transplant with duct to duct anastomosis over biliary stent.    Ex lap due to concern for ischemic bowel, findings: patchy diffuse SB ischemia. Doppler demonstrated arterial flow main vessels to bowel and liver. Splenic infarction.   8/3 Reexploration liver transplant, bowel ischemia, findings improved patchy iscemic changes of small bowel, patchy ischemic changes of left liver lobe.     Graft function: OLT POD 15. Infarction of left liver lobe, patchy infarction right liver lobe. Left PV thrombosis. Narrow and irregular appearance HA. Bowel ischemia. Procedures, see above. Liver biopsy negative for acute rejection. Fluid culture growing VRE. Continue high intensity heparin gtt. Plan to return to OR on Monday for reexploration.  Immunosuppression management:   Simulect intra-op due to BEN  Steroid taper per protocol, competed.  MMF 1gm BID, hold due to  status  Tac stopped, last dose 7/31. 8/3 Tac level 12.1. Hold due to status.   Hydrocortisone 50 q 6hrs due to shock  Complexity of management:Medium. Contributing factors: thrombocytopenia and anemia sepsis  Hematology: Hemoglobin stable, 7.0. PLT 59. 8/4 INR 1.52. Continue high intensity heparin gtt due to thrombosis.    Cardiorespiratory: Respiratory failure secondary to sepsis, on vent. On norepinephrine gtt, dose increased to 0.48.  Vasopressin discontinued due to mesenteric ischemia.    GI/Nutrition: NGT. NPO. Restarted trickle TF. No BM yesterday. Per surgeon, transverse colon w/o significant stool as observed in OR. Stomach full with liquid and    Endocrine: Insulin drip.    Fluid/Electrolytes: BEN. CRRT, no fluid removal.  Remains on pressor, dose decreased.   : Luis  Infectious disease: Afebrile. Leukocytosis, WBC decreased, today 8.4.   -Previously on Linezolid 7/28-7/30 and zosyn 7/30-8/2.   -Continue daptomycin (VRE, 7/30-present), meropenem (Ecoli ESBL, 8/2-) and micafungin (empiric, 7/30-).     1. VRE intra abdominal and bacteremia. Fluid cultures 7/31/18 growing VRE. 7/30 (peripheral) and 7/31 (peripheral and VAD) blood cultures growing VRE  2. Ecoli ESBL bacteremia. Repeat bcx (peripheral and VAD) growing Ecoli ESBL. Zosyn changed to Meropenem  SICU to exchange lines as able.     3. HBV: Hepatits B DNA PCR positive prior to tx. Received HBIG on 7/21 and 7/22. Hep B surface antigen negative. No further HBIG planned. Continue tenofovir 300 mg daily.   PPX: Valcyte x 12 weeks (CMVand EBV IgG +), bactrim. mycelex stopped as patient on micafungin. Will start nystatin if aide discontinued.     Prophylaxis: DVT PCDs, on heparin, fall, GI, fungal  Disposition: SICU    Coordinator: Kay Reyes       Medical Decision Making: High      JAY/Fellow/Resident Provider: Mihir Nj, fellow    Faculty: Leonie Elizalde MD  __________________________________________________________________  Transplant History:  Admitted 2018 for  donor liver transplant.   The patient has a history of liver failure due to hepatitis B .    2018 (Liver), Postoperative day: 15     Interval History:   More tachycardic overnight, norepinephrine at about the same rate 0.40. Drain and wound output serous-sanguinous. Had a few bowel movements overnight.     ROS:   A 10-point review of systems was negative except as noted above.    Curent Meds:    albumin human  25 g Intravenous Once     aspirin  81 mg Oral Daily     DAPTOmycin (CUBICIN) intermittent infusion  10 mg/kg (Dosing Weight) Intravenous Q24H     meropenem  1,000 mg Intravenous Q8H     micafungin  100 mg Intravenous Q24H     pantoprazole  40 mg Oral QAM AC     sodium chloride (PF)  3 mL Intravenous Q8H     sulfamethoxazole-trimethoprim  10 mL Oral Once per day on      tenofovir  300 mg Per Feeding Tube Q48H     valGANciclovir  450 mg Oral Every Other Day    Or     valGANciclovir  450 mg Oral or NG Tube Every Other Day       Physical Exam:     Admit Weight: 60.7 kg (133 lb 12.8 oz)    Current Vitals:   BP 93/48  Pulse 121  Temp 98.4  F (36.9  C) (Axillary)  Resp 17  Wt 66.9 kg (147 lb 7.8 oz)  SpO2 100%  BMI 23.81 kg/m2      Vital sign ranges:    Temp:  [96.7  F (35.9  C)-98.4  F (36.9  C)] 98.4  F (36.9  C)  Heart Rate:  [103-129] 123  Resp:  [16-23] 17  BP: (90-93)/(45-48) 93/48  MAP:  [56 mmHg-97 mmHg] 62 mmHg  Arterial Line BP: ()/(42-99) 89/49  FiO2 (%):  [35 %] 35 %  SpO2:  [93 %-100 %] 100 %  Patient Vitals for the past 24 hrs:   BP Temp Temp src Heart Rate Resp SpO2 Weight   18 0945 - - - 123 - 100 % -   18 0930 - - - 126 - 100 % -   18 0915 - - - 122 - 100 % -   18 0900 - - - 123 17 100 % -   18 0845 - - - 123 - 99 % -   18 0830 - - - 125 - 99 % -   18 0817 - - - 127 - 98 % -   1815 - - - 129 - 98 % -   18 0800 - 98.4  F (36.9  C) Axillary 122 22 100 % -   18 0745 - - - 120 - 100  % -   08/05/18 0740 - - - 120 - 100 % -   08/05/18 0730 - - - 121 - 100 % -   08/05/18 0715 - - - 118 - 100 % -   08/05/18 0700 - - - 119 16 100 % -   08/05/18 0645 - - - 119 - 100 % -   08/05/18 0630 - - - 120 - 100 % -   08/05/18 0615 - - - 120 - 100 % -   08/05/18 0600 - - - 120 18 100 % -   08/05/18 0545 - - - 123 - 100 % -   08/05/18 0530 - - - 119 - 100 % -   08/05/18 0515 - - - 120 - 100 % -   08/05/18 0500 - - - 122 18 100 % -   08/05/18 0445 - - - 121 - 100 % -   08/05/18 0430 - - - 122 - 100 % -   08/05/18 0415 - - - 125 - 100 % -   08/05/18 0400 - 98  F (36.7  C) Axillary 121 18 100 % 66.9 kg (147 lb 7.8 oz)   08/05/18 0345 - - - 119 - 100 % -   08/05/18 0330 - - - 123 - 100 % -   08/05/18 0315 - - - 121 - 100 % -   08/05/18 0300 - - - 122 17 100 % -   08/05/18 0245 - - - 121 - 100 % -   08/05/18 0230 - - - 121 - 100 % -   08/05/18 0215 - - - 123 - 100 % -   08/05/18 0200 - - - 123 18 100 % -   08/05/18 0145 - - - 124 - 100 % -   08/05/18 0130 - - - 122 - 100 % -   08/05/18 0115 - - - 120 - 100 % -   08/05/18 0100 - - - 122 16 100 % -   08/05/18 0045 - - - 120 - 100 % -   08/05/18 0030 - - - 121 - 100 % -   08/05/18 0015 - - - 121 - 100 % -   08/05/18 0000 - 97.6  F (36.4  C) Axillary 123 20 100 % -   08/04/18 2345 - - - 118 - 98 % -   08/04/18 2330 - - - 118 - 98 % -   08/04/18 2315 - - - 118 - 97 % -   08/04/18 2300 - - - 118 18 98 % -   08/04/18 2245 - - - 116 - 99 % -   08/04/18 2230 - - - 117 - 98 % -   08/04/18 2215 - - - 117 - 99 % -   08/04/18 2200 - - - 117 18 99 % -   08/04/18 2145 - - - 117 - 99 % -   08/04/18 2130 - - - 117 - 98 % -   08/04/18 2115 - - - 122 - 99 % -   08/04/18 2100 - - - 121 17 96 % -   08/04/18 2045 - - - 119 - 99 % -   08/04/18 2030 - - - 116 - 99 % -   08/04/18 2015 - - - 112 - 99 % -   08/04/18 2000 - 98  F (36.7  C) Axillary 118 17 99 % -   08/04/18 1945 - - - 117 - 96 % -   08/04/18 1930 - - - 116 - 93 % -   08/04/18 1915 - - - 116 - 95 % -   08/04/18 1900 - - - 109 17  99 % -   08/04/18 1845 - - - 116 - 100 % -   08/04/18 1830 - - - 117 - 99 % -   08/04/18 1815 - - - 120 - 99 % -   08/04/18 1800 - - - 123 19 100 % -   08/04/18 1745 - - - 120 20 97 % -   08/04/18 1730 - - - 118 18 99 % -   08/04/18 1715 - - - 115 - 98 % -   08/04/18 1700 - - - 115 19 98 % -   08/04/18 1645 - - - 119 20 97 % -   08/04/18 1630 93/48 - - 117 - 98 % -   08/04/18 1615 - - - 120 - 100 % -   08/04/18 1600 - 97.8  F (36.6  C) Axillary 118 21 100 % -   08/04/18 1545 - - - 119 - 100 % -   08/04/18 1530 - - - 116 - 100 % -   08/04/18 1515 - - - 114 - 100 % -   08/04/18 1500 - - - 113 18 100 % -   08/04/18 1445 - - - 113 - 100 % -   08/04/18 1430 - - - 114 - 99 % -   08/04/18 1415 - - - 115 - 99 % -   08/04/18 1400 - - - 113 18 100 % -   08/04/18 1345 - - - 113 - 96 % -   08/04/18 1330 - - - 113 - 96 % -   08/04/18 1315 - - - 112 - 97 % -   08/04/18 1300 - - - 113 17 97 % -   08/04/18 1245 - - - 112 - 96 % -   08/04/18 1240 - 97.5  F (36.4  C) Axillary 114 20 96 % -   08/04/18 1230 - - - 112 - 97 % -   08/04/18 1215 - - - 114 - 98 % -   08/04/18 1200 - - - 103 23 96 % -   08/04/18 1145 - - - 105 - 97 % -   08/04/18 1140 - 97  F (36.1  C) Axillary 105 18 97 % -   08/04/18 1130 - - - 106 - 97 % -   08/04/18 1115 - - - 105 - 97 % -   08/04/18 1100 - - - 106 18 100 % -   08/04/18 1050 - 96.8  F (36  C) - 107 19 100 % -   08/04/18 1045 - - - 106 - 100 % -   08/04/18 1040 - 96.7  F (35.9  C) - 108 18 100 % -   08/04/18 1035 90/45 - - 106 - - -   08/04/18 1030 - - - 106 - 100 % -   08/04/18 1015 - - - 109 - 100 % -   08/04/18 1000 - - - 109 19 100 % -     General Appearance: Intubated, CRRT  HEENT: NGT to suction, dark output.  Skin: warm, dry  Heart: sinus tachycardia   Lungs: CMV, FiO2 40%  Abdomen: distended lower abdomen softer than upper abdomen, CAROL x 2, serous output. Abthera in place, serous outupt.   :  bah removed  Extremities: no edema  Neurologic: alert, following some commands      Data:   CMP  Recent  Labs  Lab 08/05/18  0358 08/04/18  2143  07/30/18  1115    138  < > 139   POTASSIUM 3.9 3.9  < > 5.9*   CHLORIDE 107 106  < >  --    CO2 21 22  < >  --    * 124*  < > 275*   BUN 21 22  < >  --    CR 0.96 0.99  < >  --    GFRESTIMATED 84 82  < >  --    GFRESTBLACK >90 >90  < >  --    JESUS 7.7* 7.8*  < >  --    ICAPOC  --   --   --  3.5*   ICAW 4.4 4.5  < >  --    MAG 2.6* 2.5*  < >  --    PHOS 4.3 4.6*  < >  --    ALBUMIN 2.2* 2.4*  < >  --    BILITOTAL 4.9* 5.2*  < >  --    ALKPHOS 252* 242*  < >  --    * 864*  < >  --    * 613*  < >  --    < > = values in this interval not displayed.  CBC    Recent Labs  Lab 08/05/18  0603 08/05/18  0358   HGB 8.1* 8.2*   WBC 10.0 10.3   PLT 68* 68*     COAGS    Recent Labs  Lab 08/02/18  0404 07/30/18  1444 07/30/18  1230   INR 1.52* 2.32* 1.91*   PTT  --   --  41*      Urinalysis  Recent Labs   Lab Test  07/27/18   2330  07/13/18   1315  06/18/18   1200   COLOR  Yellow  Dark Yellow  Yellow   APPEARANCE  Clear  Clear  Clear   URINEGLC  Negative  Negative  Negative   URINEBILI  Negative  Large*  Small*   URINEKETONE  Negative  Negative  Negative   SG  1.008  1.013  1.008   UBLD  Negative  Negative  Negative   URINEPH  7.5*  6.0  5.0   PROTEIN  30*  10*  Negative   NITRITE  Negative  Negative  Negative   LEUKEST  Negative  Negative  Negative   RBCU  5*  <1  1   WBCU  3  None  2   UTPG   --    --   0.75*     Virology:  Hepatitis C Antibody   Date Value Ref Range Status   07/20/2018 Nonreactive NR^Nonreactive Final     Comment:     Assay performance characteristics have not been established for newborns,   infants, and children         POD 14 US liver:  1.  The left portal vein is antegrade with decreased velocity,  measuring approximately 15 cm/second. This vessel was occluded on CT  7/30/2018.  2.  Elevated velocities of the main hepatic artery, now 203 cm/sec  (previously 94 cm/sec). However left hepatic arteries demonstrates  steep upstroke suggesting  there is not a significant stenosis.   2a. Right hepatic artery was not visualized, could be occluded or  difficult to see postoperatively.  3. Echogenic focus of the right lobe in close proximity to the  hepatorenal fossa. This likely corresponds with nonenhancing lesion on  CT 7/30/2018 and likely representing subcapsular hematoma.  4. Additional small hematomas of the braden hepatis and posterior right  perihepatic region.  5. Small volume anechoic ascites.  6. Left hepatic lobe not well visualized secondary to open wound  preventing imaging. Note the left lobe was grossly abnormal on CT  7/30/2018    POD 1 US liver: Impression:   1.  Hematoma associated with the inferior aspect of the perihepatic  space anteriorly, measuring 13.7 x 7.0 x 8.5 cm.  2.  Retrograde flow of the left portal vein. Additionally, low  velocities of the portal venous system. Single low resistive index of  the extrahepatic aspect of the hepatic artery, measuring 0.37. These  findings are likely within normal limits in the early postoperative  context. However, continued attention on follow-up recommended.    7/30 CT scan abd/pelvis:  IMPRESSION:   1. Hypoenhancing areas in the transplant liver concerning for  infarction. Transplant left portal vein occlusion with bubbles of  portal venous gas. Nearly occlusive filling defect in the native  hepatic artery. Narrowed and irregular appearance of the transplant  hepatic artery. Focal occlusion of the left main hepatic artery.  Segmental occlusions of right hepatic arterial branches.   2. Pneumatosis intestinalis with nonocclusive filling defects in  superior mesenteric vein branches, concerning for bowel ischemia or  infarction.   3. Splenic infarctions.   4. Bibasilar consolidation with air bronchograms. Pneumonia cannot be  excluded.   5. Post surgical changes of evacuation of peritransplant hematoma.

## 2018-08-05 NOTE — PROGRESS NOTES
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 He returns to the ICU 7/30  for shock, severe lactic acidosis and respiratory failure. S/p ex-lap with hematoma evacuation 7/31/18.  Returned to OR on 08/03/18.     TODAY'S PROGRESS/PLANS:   - To OR Monday 08/06 with transplant   - Wean pressors as able.   - Continue with CRRT  - No pink lady today given good stool output      PLAN:  Neuro/ pain/ sedation:  # Acute pain  # Sedation  - Monitor neurological status. Pt awake and following commands this am.    - Fentanyl gtt.   - PRN oxycodone and Hydromorphone IV as needed for pain     Pulmonary care:   # Acute respiratory failure  - continue with full mechanical support at this time. PST when meets criteria.   - HOB elevation.   - CMV 16/400/5/40%.       Cardiovascular:    # Septic Shock  - Monitor hemodynamic status.   - Norepinephrine gtt, needs down today. Stopped Vasopressin 7/31/18 per Transplant due to concerns of bowel ischemia.  - Epi gtt if needed.   - Solu-cortef 50 every 6 hours for stress dose steroids for 72 hour total, finished 08/02.     Gastroenterology:   # DDLT 7/21/18  # Intrahepatic left portal venous thrombosis, infarction of left liver lobe, patchy infarction of right lobe, suspected hepatic arterial thrombosis  # 7/30/18 sp exploratory laparotomy, abthera closure device in place. Findings of patchy diffuse SB ischemia. S/p liver biopsy  # Splenic infarctions, Patchy bowel ischemia  # ileus - resolving  - s/p exploratory laparotomy, abthera closure device in place. Findings of patchy diffuse SB ischemia. S/p liver biopsy on 7/30/18. Possible OR today per transplant   - Continue heparin gtt high intensity given thrombosis   -  ileus: CXR 8/1: stool burden, hold bowel stimulants or laxatives given concerns of bowel ischemia per primary. S/p multiple enemas and mineral oil x2 doses.   - Immunosuppression per transplant    Nutrition:  # protein calorie deficit malnutrition.   - TF's at 20 ml/hr, will hold steady given higher NG output     Renal/ Fluid Balance/electrolytres :    # BEN, anuric   # Severe metabolic acidosis, lactic acidosis, corrected with CRRT  # Severe hyponatremia, corrected   # hyperkalemia--corrected with CRRT    Acute sodium drop  Note with CRRT initation.   - CRRT per Nephrology. CRRT 7/30/18. I=O's at this point, not pulling fluids at this point   - On CRRT electrolyte replacement protocol.   - Renal managing dialysis   - albumin replacement 12.5g/1 Liter of abthera and CAROL drain losses.      Endocrine:    # DM II  - Insulin gtt. Continue with insulin drip for now     ID/ Antibiotics:  # VRE Bacteremia  # ESBL Bacteremia  # Hepatitis B  # leukocytosis   - Continue Daptomycin, Meropenem   - Micafungin- continue 7/30  - WBC stable, pt afebrile  - source likely GI tract--Plan for OR 08/06 for repeat washout--continue with Abthera.  - Immunosuppression: on hold, will defer to transplant when to start.  - Immunosuppression prophylaxis: Bactrim, Valgancyclovir and Tenofovir, nystatin when micafungin stopped.   - resend blood cultures until negative.   - Patient with KNOWN bacteremia at time of TLC placement on 7/30/18. Will keep line for now  - hold off ID consult for now    - Likely had bacterial translocation post OR given current worsening picture     Positive cultures   - 7/27 VRE (line/arm)   - 7/29: VRE (left arm)   - 7/30: VRE (left arm)   - 7/31: VRE (Line and peripheral)  - 8/1: ESBL from HD/hand/central line   - 8/3: VRE from intra-abdominal tissue       Heme:     # Acute blood loss anemia  # Anemia of critical illness  # Portal venous thrombosis  # Thrombocytopenia  - Hemoglobin 7.9, stable.   - High intensity  heparin gtt. Continue aspirin 81mg.   - KEN panel sent and negative.   - Platelets 62--hold off transfusion at this time        MSK:    # Weakness and deconditioning of critical illness   - PT and OT        Lines/ tubes/ drains:  - R internal jugular TLC 7/30  - L internal jugular dialysis line 7/30  - Right femoral arterial line  - NJ   - PIVs  - ETT  - Abthera       General Cares and  Prophylaxis:    - Mechanical prophylaxis for DVT and Heparin gtt  - GI: Protonix  prophylaxis       Disposition:  - Surgical ICU.     ====================================    SUBJECTIVE:  Course reviewed. No acute events overnight. Pressor requirements decreased overnight.  More awake, alert this am.    OBJECTIVE:   1. VITAL SIGNS:   Temp:  [97  F (36.1  C)-98.4  F (36.9  C)] 98.4  F (36.9  C)  Heart Rate:  [103-129] 115  Resp:  [16-23] 21  BP: (87-93)/(48-49) 87/49  MAP:  [56 mmHg-97 mmHg] 59 mmHg  Arterial Line BP: ()/(43-99) 87/45  FiO2 (%):  [35 %] 35 %  SpO2:  [93 %-100 %] 100 %  Ventilation Mode: CMV/AC  (Continuous Mandatory Ventilation/ Assist Control)  FiO2 (%): 35 %  Rate Set (breaths/minute): 16 breaths/min  Tidal Volume Set (mL): 400 mL  PEEP (cm H2O): 5 cmH2O  Pressure Support (cm H2O): 8 cmH2O  Oxygen Concentration (%): 40 %  Resp: 21    2. INTAKE/ OUTPUT:   I/O last 3 completed shifts:  In: 3052.36 [I.V.:1914.36; Other:98; NG/GT:290]  Out: 1058 [Emesis/NG output:425; Drains:621; Other:12]    3. PHYSICAL EXAMINATION:   General: Laying in bed, easily aroused to voice.    HEENT: ETT present and secured. NJ present.   Neuro: follows some commands, opens eyes.  Nods at times.  Resp: BBS, coarse breath sounds,  diminished at bases. No wheezes   CV: RRR, S1, S2, tachycardiac   Abdomen: abthera present, staples at lower edge of abthera, drain with serous output, upper abdomen distended and tender. Bilateral drains present--thin serosangious drainage  Incisions:  abthera present--suction intact.   Extremities: 1+ edema in  feet. Calves soft and non-tender.    4. INVESTIGATIONS:   Arterial Blood Gases     Recent Labs  Lab 08/03/18  0844 07/30/18  1444 07/30/18  1325 07/30/18  1213   PH 7.32* 7.22* 7.22* 7.29*   PCO2 33* 41 43 39   PO2 199* 85 131* 161*   HCO3 17* 17* 18* 19*     Complete Blood Count     Recent Labs  Lab 08/05/18  1000 08/05/18  0603 08/05/18  0358 08/04/18  2143   WBC 7.5 10.0 10.3 10.1   HGB 7.9* 8.1* 8.2* 8.7*   PLT 62* 68* 68* 65*     Basic Metabolic Panel    Recent Labs  Lab 08/05/18  1000 08/05/18  0358 08/04/18  2143 08/04/18  1559    138 138 138   POTASSIUM 3.8 3.9 3.9 4.0   CHLORIDE 108 107 106 107   CO2 22 21 22 20   BUN 20 21 22 24   CR 1.04 0.96 0.99 1.03   * 139* 124* 124*     Liver Function Tests    Recent Labs  Lab 08/05/18  1000 08/05/18  0358 08/04/18  2143 08/04/18  1559  08/02/18  0404  07/30/18  1444 07/30/18  1230   * 593* 864* 1328*  < > 538*  < > 6162*  --    * 527* 613* 648*  < > 756*  < > 2195*  --    ALKPHOS 254* 252* 242* 218*  < > 223*  < > 178*  --    BILITOTAL 4.7* 4.9* 5.2* 5.5*  < > 5.0*  < > 4.1*  --    ALBUMIN 2.0* 2.2* 2.4* 2.5*  < > 1.9*  < > 3.0*  --    INR  --   --   --   --   --  1.52*  --  2.32* 1.91*   < > = values in this interval not displayed.  Pancreatic Enzymes  No lab results found in last 7 days.  Coagulation Profile    Recent Labs  Lab 08/02/18  0404 07/30/18  1444 07/30/18  1230   INR 1.52* 2.32* 1.91*   PTT  --   --  41*     Lactate  Invalid input(s): LACTATE    5. RADIOLOGY:   Recent Results (from the past 24 hour(s))   XR Abdomen Port 1 View    Narrative    Exam: XR ABDOMEN PORT 1 VW 8/2/2018 1:14 AM    History: Evaluate for free intraperitoneal air.    Comparison: Earlier same date    Findings: Skin staples over the upper abdomen. Surgical drain over the  right upper quadrant. NG/OG tube tip and sidehole project over the  stomach body. Feeding tube tip projects over the distal duodenum, just  proximal to the region of the ligament of  Treitz.  Biliary stent is in  place. Gaseous distention of the bowel and colon. Diffuse pneumatosis  of the colon is again present, this may be improving? No portal venous  gas. Dense material near the gastroesophageal junction representing  embolic material. The bilateral paracolic outer drains remain in  place.      Impression    Impression:  1. Changes of pneumatosis again demonstrated.  2. On this supine film no free air is evident but a decubitus or  upright film would be better.    I have personally reviewed the examination and initial interpretation  and I agree with the findings.    ZULEMA GRANDA MD   XR Chest Port 1 View    Narrative    XR CHEST PORT 1 VW  8/2/2018 1:15 AM      HISTORY: intubation. Monitor;     COMPARISON: 7/30/2018    FINDINGS: Endotracheal tube tip projects over the mid to low thoracic  trachea. Left IJ central venous catheter tip projects over the high  SVC. Right IJ central venous catheter tip projects over the high right  atrium. Enteric tubes course below the level of the diaphragm and the  inferior margin of the field-of-view. Gastric distention appears  improved. Perihilar and bibasilar opacities are unchanged. Left upper  quadrant drain. The dense calcifications at the GE junction are again  identified.      Impression    IMPRESSION:   1. Endotracheal tube tip projects over the mid to low thoracic  trachea.   2. Improving left basilar presumed atelectasis.    I have personally reviewed the examination and initial interpretation  and I agree with the findings.    ZULEMA GRANDA MD       =========================================    Patient seen and discussed with SICU attending Dr. Soler.    Eddie Acuna  CA-2/PGY-3

## 2018-08-05 NOTE — PLAN OF CARE
Called Neurology department and requested an appointment for patient, however Ada informed me that patient can only see Dr Anguiano and she can not scheduled an appointment sooner with anybody else and she transferred my call to Dr Annmarie COSTELLO.  Unable to speak with medical assistant left specific message for medical assistant regarding patient problem with medication and requested to contact me at [phone].      Problem: Diabetes Comorbidity  Goal: Diabetes  Patient comorbidity will be monitored for signs and symptoms of hyperglycemia or hypoglycemia. Problems will be absent, minimized or managed by discharge/transition of care.   Outcome: No Change  Blood glucose checks every 1-2 hours and insulin gtt infusing to keep patient euglycemic.

## 2018-08-05 NOTE — PLAN OF CARE
Problem: Patient Care Overview  Goal: Plan of Care/Patient Progress Review  Outcome: No Change  D/I/A: Patient admitted on 7/20/18 s/p liver transplant.   Neuro- Opens eyes to voice. PERRL. Moves all extremities on command. Nods/shakes head appropriately to questions from .   CV- ST with PVCs. 's. Afebrile. MAP > 60 maintained with Levophed gtt. MD notified for need of increasing dose 2 X overnight. CVP 2-5.  Albumin ordered and given. Gave 1 unit PRBC for Hgb 7.0.   Resp- Lung sounds clear, slightly diminished in bases. Vent mode CMV with FiO2 35% and PEEP 5.0. Small amount of white sputum from ETT. PS trial X 45 min this morning, tolerated well.   GI- Abdomen distended. Abthera in place, CDI. TF via NJ increased to 20 mL/hr per order. NG to LIS with minimal output. No BM this shift. Pink lady enema currently dwelling.   - Anuric. Bladder scanned patient for 94 mL. St cath for volume 400 or greater. CRRT per orders.   P: Continue to monitor and notify MD of any changes. Wean Levophed as able.

## 2018-08-06 NOTE — OP NOTE
Transplant Surgery  Operative Note    Preop Dx:  Mesenteric ischemia, s/p liver transplant  Postop Dx: Same  Procedure: Abdominal washout, segmental jejunal resection with primary anastomosis side to side  Surgeon: Cristino Che M.D.  ASSISTANT:  Mihir Nj fellow. There was no qualified general surgery resident available to assist during this procedure.   Anesthesia: General  EBL: 50 ml  Fluids: 500 ml LR  UO: Not measured   Drains: no drain  Specimen: ischemic-necrotic 80 cm of jejunum about 90 cm distal from Treitz ligament.  Complications: None  Findings:  Minimal ascites fluid, some adhesions, ischemic-necrotic 100 cm of jejunum about 100 cm distal from Treitz ligament. Patchy ischemic changes of left liver lobe.  Indication: The patient has had a liver transplant on 7/21/2018 complicated on POD11 with mesenteric ischemia and left portal vein occlusion. He underwent twice abdominal washout. After discussing the risks and benefits of surgery and potential complications, the patient's wife provided informed consent.     DETAILS OF PROCEDURE:  The patient was brought to the operating room, placed in a supine position.  Perioperative prophylactic IV antibiotics were given.  Anesthesia was adminisitered. AbThera was removed. The abdomen was prepped and draped in the usual sterile fashion.  Time out was performed.     Peritoneal cavity revealed moderate serous-sanguinous, and turbid fluid with some adhesions. This was sent for cultures. Small bowel loops mildly distended, edematous, with patchy diffuse ischemic trophic changes, with dark-necrotic looking serosa of jejunum starting about 100 cm distal Treitz ligament, and extending for almost 100 cm. There are nor serosal defects appreciated, but the the intestine wall is appreciated as completely necrotic. At this point we proceeded with segmental jejunal resection. The remained jejunum edges were healthy-bleeding, so we performed a side to side double layer  anastomosis using two-layer hand-sewn technique.  Right lobe of the liver looked normal color and consistency. Left lobe appeared with patchy ischemic changes and soft at palpation. Hepatic artery was appreciated patent, but diminished flow.The peritoneal cavity was abundantly washed out with normal saline, triple antibiotic and Amphotericin B irrigation solution. The abdominal wall was temporarily closed with Abthera mesh with the plan to re-evaluate in 2-3 days.     All needle, sponge, and instrument counts were accurate.  The patient tolerated the procedure well without apparent complications and was trasfered to the ICU in good condition.  Faculty was present for critical portions of the procedure.    I was present for the entire procedure.    Attestation:      Cristino Che MD

## 2018-08-06 NOTE — PLAN OF CARE
Problem: Diabetes Comorbidity  Goal: Diabetes  Patient comorbidity will be monitored for signs and symptoms of hyperglycemia or hypoglycemia. Problems will be absent, minimized or managed by discharge/transition of care.   Outcome: No Change  Patient has history of DM. Insulin infusion per orders and hourly blood glucose checks. Patient currently euglycemic.

## 2018-08-06 NOTE — ANESTHESIA POSTPROCEDURE EVALUATION
Patient: Yaneli Miles    Procedure(s):  liver brin back, exploratory laparotomy, bowel resection, primary anastomosis  - Wound Class: III-Contaminated    Diagnosis:Mesenteric Ischemia   Diagnosis Additional Information: No value filed.    Anesthesia Type:  General    Note:  Anesthesia Post Evaluation    Patient location during evaluation: ICU  Patient participation: Unable to participate in evaluation secondary to underlying medical condition  Level of consciousness: obtunded/minimal responses  Pain management: adequate  Airway patency: patent  Cardiovascular status: On norepinephrine.  Respiratory status: ETT and ventilator  Hydration status: acceptable  PONV: none     Anesthetic complications: None          Last vitals:  Vitals:    08/06/18 0915 08/06/18 0930 08/06/18 1000   BP:      Pulse:      Resp:      Temp:      SpO2: 100% 100% 100%         Electronically Signed By: Fallon Villafuerte MD  August 6, 2018  2:35 PM

## 2018-08-06 NOTE — PLAN OF CARE
Problem: Patient Care Overview  Goal: Plan of Care/Patient Progress Review  Outcome: Improving  D/I/A: Patient admitted on 18 POD# 16  Donor Liver Transplant.   Neuro- Alert/Opens eyes spontaneously. PERRL. Moves all extremities on command. Nods/shakes head to questions.   CV- ST/SR with rare PVCs. HR 's. Afebrile. MAP > 60 maintained with Levophed gtt. CVP 2-5. Gave 12.5 Albumin PRN for 1 L Abthera output X 1.   Resp- Lung sounds clear, slightly diminished in bases. Vent mode CMV with FiO2 30% and PEEP 5.0. Small amount of white sputum from ETT.   GI- Abdomen distended. Abthera in place, CDI. TF stopped at midnight for procedure at 10:30. NG to LIS with minimal output, flushed every 4 hours. Multiple watery BMs, rectal pouch applied.    - Anuric. Bladder scan volume 110 mL. CRRT set at 0, net negative due to drain output. Net hour fluid removal rate goal I=O.      P: Continue to monitor and notify MD of any changes. Wean Levophed as able. PS trial BID.

## 2018-08-06 NOTE — PLAN OF CARE
Problem: Patient Care Overview  Goal: Plan of Care/Patient Progress Review  PT 4A: Pt tolerated PROM and AROM while supine in bed without adverse events. Continuing to hold on OOB as R femoral arterial line remains in place. Will continue to follow.

## 2018-08-06 NOTE — ANESTHESIA CARE TRANSFER NOTE
Patient: Yaneli Miles    Procedure(s):  liver brin back, exploratory laparotomy, bowel resection, primary anastomosis  - Wound Class: III-Contaminated    Diagnosis: Mesenteric Ischemia   Diagnosis Additional Information: No value filed.    Anesthesia Type:   General     Note:  Airway :ETT  Patient transferred to:ICU  Comments: Patient to ICU ETT, O2, Hand ventilation, ASA Standard monitors, placed on vent on arrival to room, VSS, RN at bedside.      Vitals: (Last set prior to Anesthesia Care Transfer)    CRNA VITALS  8/6/2018 1316 - 8/6/2018 1401      8/6/2018             Resp Rate (observed): 8    Resp Rate (set): 12                Electronically Signed By: BUD Benson CRNA  August 6, 2018  2:01 PM

## 2018-08-06 NOTE — BRIEF OP NOTE
[facility], [place]    Pre-operative diagnosis: Mesenteric Ischemia    Post-operative diagnosis * No post-op diagnosis entered *   Procedure: Procedure(s):  Liver Washout Segmental jejunal resection with primary anastomosis side-to side.   Surgeon: Pamela Che M.D.   Assistants(s): Mihir Nj, fellow   Anesthesia: General    Estimated blood loss: Less than 50 ml    Total IV fluids: (See anesthesia record)  500ml LR,    Blood transfusion: 2 units of pRBC's given during surgery   Total urine output: (See anesthesia record)  Not measured   Drains: None   Specimens: Ischemic necrotic jejunum    Implants: Abterra mesh    Findings: ischemic-nectotic 80 cm of jejunum, about 90cm distal to Treitz ligament .   Complications: None.   Condition: Stable  Transferred to ICU   Comments: See dictated operative report for full details

## 2018-08-06 NOTE — PROGRESS NOTES
CRRT STATUS NOTE    DATA:  Time:  5:36 AM  Pressures WNL:  YES  Filter Status:  WDL    Problems Reported/Alarms Noted:  None    Supplies Present:  YES    ASSESSMENT:  Patient Net Fluid Balance: -545 ml, Overall net positive. Wt. Trending up ( no 8/6 wt as of now).   Vital Signs:  MAPs 60's, SR 90's, CVP 2-5.  Levo .14 mcg/kg/min  Labs:  K 3.9, Bili 4.6, hgb 7.5  Goals of Therapy:  I=O. CRRT set at zero all night.  High drain outputs account for net negative fluid balance for the day    INTERVENTIONS:   Discussed POC with Bedside RN.     PLAN:  Patient to go for washout today.

## 2018-08-06 NOTE — PROGRESS NOTES
CLINICAL NUTRITION SERVICES - BRIEF NOTE    Nutrition Prescription    RECOMMENDATIONS FOR MDs/PROVIDERS TO ORDER:  -Total fluid adjustment per MD/PharmD discretion pending sodium and fluid status    Recommendations already ordered by Registered Dietitian (RD):  1. TPN Change:  --Use dosing weight 61 kg  --Begin CPN, goal volume 1200 ml/day (or per MD/PharmD discretion) with initial 145g Dex daily (493 kcal, GIR = 1.7), 120g AA daily (480 kcal), and 250 ml 20% IV lipids 3x/week.  Micro/Rx: Per PharmD  --ONLY once pt receives ~100% of initial continuous PN volume with K+/Mg++/Phos WNL, advance PN dex by 50 g every 1-2 days (pending lytes/Glu and Pharm D/RD discretion) to initial goal of 245g Dex (833 kcal) to increase provisions to 1527 kcals (25 kcal/kg/day), 2.0 g PRO/kg/day, GIR = 2.8 with 14% kcals from Fat.    2. Discontinue any remaining TF orders    Future/Additional Recommendations:  -PN start, adv, lytes     Pt returned to OR this afternoon for Liver Washout Segmental jejunal resection with primary anastomosis side-to side. Plan today is to start TPN via a central line. Pt was receiving trickled TFs from 8/4-8/6.    Nutrition Progress Note - f/u for progress towards previous nutrition POC (see previous 8/3 reassessment for details)    Halina Crouch, RD, LD  SICU RD Pgr: [phone]

## 2018-08-06 NOTE — PROGRESS NOTES
Nephrology dialysis note    This patient was seen and examined while on CRRT. Laboratory results and nurses' notes were reviewed. Ran positive 1.2L yesterday. Labs acceptable. To OR today for washout.    No changes to management of volume, anemia, BMD, acidosis, or electrolytes.    Diagnosis - BEN after liver transplant, continue CRRT.    Grayson Rushing MD  [phone]

## 2018-08-06 NOTE — PROGRESS NOTES
CRRT STATUS NOTE    DATA:  Time:  1845 PM  Pressures WNL:  YES  Filter Status:  WDL  Problems Reported/Alarms Noted:  None reported / none noted  Supplies Present:  YES    ASSESSMENT:  Patient Net Fluid Balance:  8/4: +2.3L.  8/5: +1.2L. 8/6 thus far: +300mL.  Vital Signs:  Temp up to normal, 97.6. On Levo at 0.12mcg/kg/min. BP 97/48 (65)). HR 76. O2: 100% on min vent settings. RR 16.  Labs:  Lytes stable. LFTs slightly elevated. Bicarb 20. Ph 7.32.   Goals of Therapy:  I=O. Meeting goals of therapy b/c Drain putting out up to 150mL/hr. CRRT set at zero.     INTERVENTIONS:   Restarted upon return from OR at 1454. Lines reversed as previous. No interventions needed since then.     PLAN:  Continue CRRT to meet goals of therapy.   Contact CRRT RN [phone] with concerns.

## 2018-08-06 NOTE — PROGRESS NOTES
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 He returned to the SICU for shock, severe lactic acidosis and respiratory failure. He is s/p exploratory laparotomy, with plans to return to the OR 8/6 for washout.     TODAY'S PROGRESS/PLANS:   - OR today with Dr. Che at 10:30 AM for liver washout  - Ask Anesthesia to change femoral line to radial arterial line  - Discontinue blood cultures pending results of blood culture drawn 8/6     PLAN:  Neuro/ pain/ sedation:  # Acute pain  - Monitor neurological status. Pt awake and following commands this am.    - Fentanyl gtt.   - PRN oxycodone and Hydromorphone IV as needed for pain     Pulmonary care:   # Acute respiratory failure  - continue with full mechanical support at this time. PST daily.    - HOB elevation, ventilator bundle  - CMV 16/400/5/30%.       Cardiovascular:    #Septic Shock  - Monitor hemodynamic status   - Norepinephrine gtt, needs down today. Stopped Vasopressin 7/31/18 per Transplant due to concerns of bowel ischemia.  - Epi gtt if needs additional pressor support  - Stress dose steroids for 72 hour total finished 08/02      Gastroenterology:   # DDLT 7/21/18  # Intrahepatic left portal venous thrombosis, infarction of left liver lobe, patchy infarction of right lobe, suspected hepatic arterial thrombosis  # 7/30/18 sp exploratory laparotomy, abthera closure device in place. Findings of patchy diffuse SB ischemia. S/p liver biopsy  # Splenic infarctions, patchy bowel ischemia  # Ileus  -resolving  - s/p exploratory laparotomy, abthera closure device in place. Findings of patchy diffuse SB ischemia. S/p liver biopsy on 7/30/18. Return to OR 8/6 for washout, reexploration.   - Continue heparin gtt high intensity given PV thrombosis   - Ileus: CXR 8/1: stool burden, hold PO bowel stimulants or laxatives given concerns of bowel ischemia per primary. S/p multiple enemas and mineral oil x2 doses. Has started having multiple BMs.   - 8/4  Liver: right hepatic artery not visualized, cannot entirely exclude occlusion of the right hepatic artery.      Nutrition:  # protein calorie deficit malnutrition.   - TF's at 20 ml/hr. Currently on hold pending OR.      Renal/ Fluid Balance/electrolytres :    # BEN, anuric   # Severe metabolic acidosis, lactic acidosis, corrected with CRRT  # Severe hyponatremia, corrected   # hyperkalemia--corrected with CRRT   - CRRT per Nephrology. CRRT started 7/30/18. I=O's at this point, not pulling fluids at this point due to pressor requirements  - On CRRT electrolyte replacement protocol.   - albumin replacement 12.5g/1 Liter of abthera and CAROL drain losses.       Endocrine:    # DM II  - Insulin gtt. Continue with insulin drip for now      ID/ Antibiotics:  # VRE Bacteremia  # ESBL Bacteremia  # Hepatitis B  - Continue Daptomycin (started 7/30), Meropenem (started 8/2) for 14 day course  - Micafungin - continue 7/30--  - WBC stable, pt afebrile  - Immunosuppression: on hold, will defer to transplant when to start.  - Immunosuppression prophylaxis: Bactrim, Valgancyclovir and Tenofovir, nystatin when micafungin stopped.   - Will discontinue blood cultures pending negative results of blood culture drawn 8/6  - Patient with KNOWN bacteremia at time of TLC placement on 7/30/18. Will keep line for now      Positive cultures   - 7/27 VRE (line/arm)   - 7/29: VRE (left arm)   - 7/30: VRE (left arm)   - 7/31: VRE (Line and peripheral)  - 8/1: ESBL from HD/hand/central line   - 8/3:  VRE from intra-abdominal tissue  - 8/4: NGTD blood culture  - 8/5: NGTD blood culture  - 8/6: NGTD blood culture       Heme:     # Acute blood loss anemia  # Anemia of critical illness  # Portal venous thrombosis  # Thrombocytopenia  - Hemoglobin 7.3, slowly trending down.   - High intensity heparin gtt. Continue aspirin 81mg.   - KEN panel sent and negative.   - Platelets 61--hold off transfusion at this time         MSK:    # Weakness and deconditioning of critical illness   - PT and OT        Lines/ tubes/ drains:  - R internal jugular TLC 7/30  - L internal jugular dialysis line 7/30  - Right femoral arterial line  - NJ   - PIVs  - ETT  - Abthera        General Cares and  Prophylaxis:    - Mechanical prophylaxis for DVT and Heparin gtt  - GI: Protonix  prophylaxis        Disposition:  - Surgical ICU.    [facility]  Critical care time 45 minutes    ====================================    SUBJECTIVE:   - no acute events overnight  - received PRN dilaudid for pain  - Awake, denies dyspnea, chest pain and abdominal pain.     OBJECTIVE:   1. VITAL SIGNS:   Temp:  [96.7  F (35.9  C)-98.4  F (36.9  C)] 97.6  F (36.4  C)  Heart Rate:  [] 87  Resp:  [14-22] 18  BP: ()/(49-57) 102/55  MAP:  [57 mmHg-82 mmHg] 70 mmHg  Arterial Line BP: ()/(40-63) 103/53  FiO2 (%):  [30 %-35 %] 30 %  SpO2:  [95 %-100 %] 100 %  Ventilation Mode: CMV/AC  (Continuous Mandatory Ventilation/ Assist Control)  FiO2 (%): 30 %  Rate Set (breaths/minute): 16 breaths/min  Tidal Volume Set (mL): 400 mL  PEEP (cm H2O): 5 cmH2O  Pressure Support (cm H2O): 8 cmH2O  Oxygen Concentration (%): 35 %  Resp: 18    2. INTAKE/ OUTPUT:   I/O last 3 completed shifts:  In: 3014.33 [I.V.:2309.33; Other:55; NG/GT:310]  Out: 2666 [Emesis/NG output:250; Drains:1749; Other:667]    3. PHYSICAL EXAMINATION:   General: intubated in bed, responsive  Neuro: Responds to voice, opens eyes, follows commands. Pupils 2 mm brisk, round and equal. No focal  deficits.   Resp: Intubated. Lung sounds diminished throughout.   CV: Regular rhythm, mild tachycardia  Abdomen: Soft, less distended than previous day, grimace to palpation, abthera intact with serosanguinous output, two CAROL drains, right with serosanguinous fluid, left with serous fluid.   Incisions: Abthera device covering open abdomen.  : No bah in place.   Extremities: warm and well perfused, pitting peripheral edema in the feet bilaterally    4. INVESTIGATIONS:   Arterial Blood Gases     Recent Labs  Lab 08/03/18  0844 07/30/18  1444 07/30/18  1325 07/30/18  1213   PH 7.32* 7.22* 7.22* 7.29*   PCO2 33* 41 43 39   PO2 199* 85 131* 161*   HCO3 17* 17* 18* 19*     Complete Blood Count     Recent Labs  Lab 08/06/18  0357 08/05/18 2158 08/05/18  1539 08/05/18  1000   WBC 5.6 5.6 6.9 7.5   HGB 7.5* 7.5* 7.6* 7.9*   PLT 55* 49* 59* 62*     Basic Metabolic Panel    Recent Labs  Lab 08/06/18  0357 08/05/18  2158 08/05/18  1539 08/05/18  1000    139 138 139   POTASSIUM 3.9 4.0 4.0 3.8   CHLORIDE 107 107 107 108   CO2 21 22 23 22   BUN 20 19 18 20   CR 0.87 0.96 1.02 1.04   * 147* 136* 154*     Liver Function Tests    Recent Labs  Lab 08/06/18  0357 08/05/18 2158 08/05/18  1539 08/05/18  1000  08/02/18  0404  07/30/18  1444 07/30/18  1230   * 226* 322* 418*  < > 538*  < > 6162*  --    * 336* 386* 438*  < > 756*  < > 2195*  --    ALKPHOS 258* 277* 272* 254*  < > 223*  < > 178*  --    BILITOTAL 4.6* 4.8* 4.9* 4.7*  < > 5.0*  < > 4.1*  --    ALBUMIN 2.2* 2.0* 2.2* 2.0*  < > 1.9*  < > 3.0*  --    INR  --   --   --   --   --  1.52*  --  2.32* 1.91*   < > = values in this interval not displayed.  Pancreatic Enzymes  No lab results found in last 7 days.  Coagulation Profile    Recent Labs  Lab 08/02/18  0404 07/30/18  1444 07/30/18  1230   INR 1.52* 2.32* 1.91*   PTT  --   --  41*     Lactate  Invalid input(s): LACTATE    5. RADIOLOGY:   No results found for this or any previous visit (from the  past 24 hour(s)).    =========================================      Patient seen, findings and plan discussed with surgical ICU staff     I was present with the medical student who participated in the service and in the   documentation of the note. I have verified the history and personally performed the   physical exam and medical decision making. I agree with the assessment and plan of   care as documented in the note.  Lisa De La Mater

## 2018-08-06 NOTE — PROGRESS NOTES
CRRT RESTART NOTE    Reason for Restart:  Scheduled 72hr restart + pt to OR for washout.     Patient s Vascular Access:  L IJ Catheter                   Placement Confirmed:  YES  Manufacture:  CEYX  Model:  Palindrome  Length/[place]ian Size:  16cm x 12Fr  Flush Volume:  1.3cc / 1.3cc    DATA:  Procedure:  CVVHDF  Start Time:  1454  Machine#:  7  Filter:  M150  Blood Flow:   200 mL/min  Pre-Replacement Solution:  Phoxillum BK 4/2.5  Post-Replacement Solution:  Phoxillum BK 4/2.5  Dialysate Solution:  Phoxillum BK 4/2.5  Pre-Replacement Solution Rate:  800mL/hr  Post-Replacement Solution Rate:  200mL/hr  Dialysate Flow Rate:  1100mL/hr  Patient Removal Rate:  0 mL/hr per bedside RN to start  Anticoagulation Type and Rate:  N/A    ASSESSMENT:  How Patient Tolerated Restart:  well  Vital Signs:  On Levo at 0.12mcg/kg/min. /78 (99). HR 69. O2: 100% on min vent settings. RR 16. T 92.5F upon return from OR. Jb hugger placed on high. Blood warmer on per order.     Initial Pressures:  Access:  -87  Filter:  111  Return:  67  TMP:  28  Change in Filter Pressure:  21    INTERVENTIONS:  Returned blood. Bedside RN sent pt to OR. Pt returned at 1430. Hooked up to new set at 1454.   Lines reversed as red lumen does not draw blood. Running well with lines reversed.     PLAN:  Continue CRRT to meet goals of therapy.  Contact CRRT RN [phone] with concerns.

## 2018-08-06 NOTE — ANESTHESIA PREPROCEDURE EVALUATION
Anesthesia Evaluation     . Pt has had prior anesthetic. Type: General    No history of anesthetic complications          ROS/MED HX    ENT/Pulmonary: Comment: intubated      Neurologic:  - neg neurologic ROS     Cardiovascular: Comment: On norepi gtt    (+) ----. : . . . :. . Previous cardiac testing Echodate:07/20/18results:Left ventricular function, chamber size, wall motion, and wall thickness are normal.The EF is > 65%. LV cavity appears small. No regional wall motion abnormalities are seen. Right ventricular function, chamber size, wall motion, and thickness are normal. No significant valvular abnormalities seen, although not able to completely exclude endocarditis based on TTE. The inferior vena cava cannot be assessed. No pericardial effusion is present.date: results: date: results: date: results:          METS/Exercise Tolerance:     Hematologic: Comments: plt of 26k at 0300 08/03/18    (+) History of Transfusion Other Hematologic Disorder-thrombocytopenia      Musculoskeletal:         GI/Hepatic: Comment: S/p liver transplant 07/21/18 for chronic hep B, cirrhosis, esophageal varices, intubated prior to transplant for encephalopathy    Brought back to OR for emergent ex lap on 07/30, found to have mesenteric ischemia    (+) hepatitis type B, liver disease,       Renal/Genitourinary: Comment: Currently on CVVHD        Endo:     (+) type II DM .      Psychiatric:  - neg psychiatric ROS       Infectious Disease:  - neg infectious disease ROS       Malignancy:         Other:                     Physical Exam  Normal systems: cardiovascular and pulmonary    Airway   Comment: ETT    Dental     Cardiovascular       Pulmonary                         Anesthesia Plan      History & Physical Review  History and physical reviewed and following examination; no interval change.    ASA Status:  4 .    NPO Status:  > 8 hours    Plan for General with Intravenous induction. Maintenance will be Inhalation.    PONV  prophylaxis:  Ondansetron (or other 5HT-3)  Additional equipment: 2nd IV and Arterial Line      Postoperative Care  Postoperative pain management:  IV analgesics.      Consents  Anesthetic plan, risks, benefits and alternatives discussed with:  Patient (consent in ICU w )..        Procedure: Procedure(s):  Liver Washout    HPI: Yaneli Miles is a 43 year old male scheduled for ex-lap, possible small bowel resection.  Pt w h/o chronic Hep B cirrhosis, s/p liver transplant 07/21/18, was brought back to OR for emergent ex lap on 07/30 found to have mesenteric ischemia.  Relatively stable since, ICU slowly weaning off pressors - vaso off, norepi still running as of AM 08/03.  BEN - now on CVVHD.  Patient remaining intubated, to be brought down to OR for ex-lap, washout, possible small bowel resection.  Plan for GETA, standard ASA monitors, continued use of a-line and vascular access from ICU.    PMHx/PSHx:  Past Medical History:   Diagnosis Date     Choledocholithiasis      Chronic viral hepatitis B without delta agent and without coma (H) 6/14/2018     Cirrhosis of liver with ascites (H) 6/14/2018     Esophageal varices (H)     prior GIB     Hepatic encephalopathy (H)      Hepatitis delta with hepatitis B carrier state 06/14/2018    positive antigen     Portal vein thrombosis 6/14/2018 5/29/18 care everywhere US     SBP (spontaneous bacterial peritonitis) (H) 06/2018     Type 2 diabetes mellitus without complication, without long-term current use of insulin (H) 6/14/2018       Past Surgical History:   Procedure Laterality Date     ENDOSCOPIC RETROGRADE CHOLANGIOPANCREATOGRAM      with stone removal     ENDOSCOPIC ULTRASOUND UPPER GASTROINTESTINAL TRACT (GI) N/A 6/22/2018    Procedure: ENDOSCOPIC ULTRASOUND, ESOPHAGOSCOPY / UPPER GASTROINTESTINAL TRACT (GI);  Esophagogastroduodenoscopy, removal of [place] tube, ENDOSCOPIC ULTRASOUND with embolization of gastric varices, placement of nasogastric tube;   Surgeon: Armando Kraft MD;  Location: [facility] OR     LAPAROTOMY EXPLORATORY N/A 2018    Procedure: LAPAROTOMY EXPLORATORY;  Exploratory Laparotomy, hematoma evacuation;  Surgeon: Stew Min MD;  Location: [facility] OR     TRANSPLANT LIVER RECIPIENT  DONOR N/A 2018    Procedure: TRANSPLANT LIVER RECIPIENT  DONOR;  Liver Transplant;  Surgeon: Stew Min MD;  Location: [facility] OR         No current facility-administered medications on file prior to encounter.   Current Outpatient Prescriptions on File Prior to Encounter:  acetaminophen (TYLENOL) 500 MG tablet Take 1 tablet (500 mg) by mouth every 6 hours as needed for mild pain or fever   ciprofloxacin (CIPRO) 500 MG tablet Take 1 tablet (500 mg) by mouth every 24 hours   ferrous sulfate (IRON) 325 (65 Fe) MG tablet Take 325 mg by mouth   insulin aspart (NOVOLOG PEN) 100 UNIT/ML injection Inject 1 Units Subcutaneous 3 times daily (with meals)   lactulose (CHRONULAC) 10 GM/15ML solution Take 30 mLs (20 g) by mouth 3 times daily Goal of 3-4 BMs per day   lidocaine (XYLOCAINE) 5 % ointment Apply topically every 4 hours as needed for moderate pain   miconazole (MICATIN; MICRO GUARD) 2 % powder Apply topically 2 times daily   midodrine HCl 10 MG TABS Take 10 mg by mouth 3 times daily (with meals)   pantoprazole (PROTONIX) 40 MG EC tablet Take 1 tablet (40 mg) by mouth 2 times daily (before meals)   phosphorus tablet 250 mg (PHOSPHA 250 NEUTRAL) 250 MG per tablet Take 2 tablets (500 mg) by mouth 2 times daily   potassium chloride SA (KLOR-CON) 20 MEQ CR tablet Take 1 tablet (20 mEq) by mouth daily   rifaximin (XIFAXAN) 550 MG TABS tablet Take 1 tablet (550 mg) by mouth 2 times daily   simethicone (MI-ACID GAS RELIEF) 80 MG chewable tablet Take 80 mg by mouth   tenofovir (VIREAD) 300 MG tablet Take 300 mg by mouth       Social Hx:   Social History   Substance Use Topics     Smoking status: Never Smoker     Smokeless tobacco: Not on file      Alcohol use No       Allergies:   Allergies   Allergen Reactions     Nsaids      Contraindicated          NPO Status: Per ASA Guidelines    Labs:    Blood Bank:  Lab Results   Component Value Date    ABO B 08/02/2018    RH Pos 08/02/2018    AS Neg 08/02/2018     BMP:  Recent Labs   Lab Test  08/03/18   0311   NA  136   POTASSIUM  3.7   CHLORIDE  104   CO2  22   BUN  31*   CR  1.18   GLC  109*   JESUS  7.4*     CBC:   Recent Labs   Lab Test  08/03/18   0311   WBC  7.8   RBC  2.81*   HGB  8.3*   HCT  23.6*   MCV  84   MCH  29.5   MCHC  35.2   RDW  16.8*   PLT  26*     Coags:  Recent Labs   Lab Test  08/02/18   0404  07/30/18   1444  07/30/18   1230   INR  1.52*  2.32*  1.91*   PTT   --    --   41*   FIBR   --   334  391

## 2018-08-06 NOTE — PROGRESS NOTES
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  Developed shock with severe lactic acidosis and respiratory failure on POD9. CT scan showed left PV venous thrombosis, infarction of left liver lobe, patchy infarction of right lobe and possible hepatic arterial thrombosis, patchy bowel ischemia. Transferred to ICU.   Ex lap, patchy diffuse SB ischemia. Doppler demonstrated arterial flow main vessels to bowel and liver. Splenic infarction. Liver biopsy negative for acute rejection. Fluid culture growing VRE. On Heparin gtt. 8/3 OR for reexploration liver transplant, bowel ischemia, findings improved patchy iscemic changes of small bowel, patchy ischemic changes of left liver lobe. OR this AM () which showed some ischemic/necrotic jejunum, s/p resection.      Procedures:    donor (DBD) liver transplant with duct to duct anastomosis over biliary stent.    Ex lap due to concern for ischemic bowel, findings: patchy diffuse SB ischemia. Doppler demonstrated arterial flow main vessels to bowel and liver. Splenic infarction.   8/3 Reexploration liver transplant, bowel ischemia, findings improved patchy iscemic changes of small bowel, patchy ischemic changes of left liver lobe.    Re-exploration, ischemic/necrotic jejunum, s/p resection    Graft function: OLT POD 16. Infarction of left liver lobe, patchy infarction right liver lobe. Left PV thrombosis. Narrow and irregular appearance HA. Bowel ischemia. Procedures, see above. Liver biopsy negative for acute rejection. Fluid culture growing VRE. Continue high intensity heparin gtt.  Will need to return to the OR in a few more days.  Immunosuppression management:    Simulect intra-op due to BEN  Steroid taper per protocol, completed.  MMF 1gm BID, hold due to status  Tac stopped, last dose 7/31. Level trending down, will restart at 1 mg BID.  Hydrocortisone 100 q 8hrs due to shock  Complexity of management:Medium. Contributing factors: thrombocytopenia and anemia sepsis  Hematology: Hemoglobin stable, 7.5. PLT 55. 8/4 INR 1.5. Continue high intensity heparin gtt due to thrombosis.    Cardiorespiratory: Respiratory failure secondary to sepsis, on vent. On norepinephrine gtt, 0.12.  Vasopressin discontinued due to mesenteric ischemia.    GI/Nutrition: NGT. NPO.  S/p resection of necrotic jejunum, see above  Endocrine: Insulin drip.    Fluid/Electrolytes: BEN. CRRT, no fluid removal.  Remains on pressor, dose decreased.   : Luis  Infectious disease: Afebrile. Leukocytosis, WBC decreased, today 5.6.   -Previously on Linezolid 7/28-7/30 and zosyn 7/30-8/2.   -Continue daptomycin (VRE, 7/30-present), meropenem (Ecoli ESBL, 8/2-) and micafungin (empiric, 7/30-).     1. VRE intra abdominal and bacteremia. Fluid cultures 7/31/18 growing VRE. 7/30 (peripheral) and 7/31 (peripheral and VAD) blood cultures growing VRE  2. Ecoli ESBL bacteremia. Repeat bcx (peripheral and VAD) growing Ecoli ESBL. Zosyn changed to Meropenem  SICU to exchange lines as able.     3. HBV: Hepatits B DNA PCR positive prior to tx. Received HBIG on 7/21 and 7/22. Hep B surface antigen negative. No further HBIG planned. Continue tenofovir 300 mg daily.   PPX: Valcyte x 12 weeks (CMVand EBV IgG +), bactrim. mycelex stopped as patient on micafungin. Will start nystatin if aide discontinued.     Prophylaxis: DVT PCDs, on heparin, fall, GI, fungal  Disposition: SICU    Coordinator: Kay Reyes    Medical Decision Making: High    JAY/Fellow/Resident Provider: Milana King PA-C    Faculty: Pamela Che MD  __________________________________________________________________  Transplant History: Admitted 7/20/2018 for   donor liver transplant.   The patient has a history of liver failure due to hepatitis B .    2018 (Liver), Postoperative day: 16     Interval History:   OR today, s/p resection of ischemic jejunum    ROS:   A 10-point review of systems was negative except as noted above.    Curent Meds:    albumin human  25 g Intravenous Once     aspirin  81 mg Oral Daily     DAPTOmycin (CUBICIN) intermittent infusion  10 mg/kg (Dosing Weight) Intravenous Q24H     hydrocortisone sodium succinate PF  100 mg Intravenous Q8H     lipids  250 mL Intravenous Once per day on      meropenem  1,000 mg Intravenous Q8H     micafungin  100 mg Intravenous Q24H     pantoprazole  40 mg Oral QAM AC     sodium chloride (PF)  3 mL Intravenous Q8H     sulfamethoxazole-trimethoprim  10 mL Oral Once per day on      tacrolimus  1 mg Oral or Feeding Tube BID IS     tenofovir  300 mg Per Feeding Tube Q48H     valGANciclovir  450 mg Oral Every Other Day    Or     valGANciclovir  450 mg Oral or NG Tube Every Other Day       Physical Exam:     Admit Weight: 60.7 kg (133 lb 12.8 oz)    Current Vitals:   /55  Pulse 121  Temp 93.2  F (34  C) (Axillary)  Resp 16  Wt 64.5 kg (142 lb 3.2 oz)  SpO2 100%  BMI 22.95 kg/m2      Vital sign ranges:    Temp:  [92.8  F (33.8  C)-97.6  F (36.4  C)] 93.2  F (34  C)  Heart Rate:  [] 75  Resp:  [14-22] 16  BP: (102-111)/(55-57) 102/55  MAP:  [57 mmHg-96 mmHg] 96 mmHg  Arterial Line BP: ()/(33-77) 135/77  FiO2 (%):  [30 %-35 %] 30 %  SpO2:  [95 %-100 %] 100 %  Patient Vitals for the past 24 hrs:   BP Temp Temp src Heart Rate Resp SpO2 Weight   18 1500 - 93.2  F (34  C) Axillary 75 16 100 % -   18 1400 - 92.8  F (33.8  C) Axillary 77 16 100 % -   18 1000 - - - 97 - 100 % -   18 0930 - - - 90 - 100 % -   18 - - - 86 - 100 % -   18 - - - 88 - 99 % -   18 - - - 90 - 100 % -   18 - - - 91 - 98 % -    08/06/18 0800 - 97.6  F (36.4  C) Axillary 86 18 100 % -   08/06/18 0700 - - - 93 16 100 % -   08/06/18 0645 - - - 92 - 99 % -   08/06/18 0630 - - - 94 - 98 % -   08/06/18 0615 - - - 95 - 95 % -   08/06/18 0600 - - - 96 17 96 % -   08/06/18 0545 - - - 100 - 97 % -   08/06/18 0530 - - - 103 - 100 % -   08/06/18 0515 - - - 99 - 100 % -   08/06/18 0504 - - - - - 100 % -   08/06/18 0500 - - - 92 17 100 % 64.5 kg (142 lb 3.2 oz)   08/06/18 0445 - - - 93 - 100 % -   08/06/18 0430 - - - 92 - 100 % -   08/06/18 0415 - - - 93 - 100 % -   08/06/18 0400 - 96.7  F (35.9  C) Axillary 93 16 100 % -   08/06/18 0345 - - - 93 - 100 % -   08/06/18 0330 - - - 88 - 100 % -   08/06/18 0315 - - - 94 - 100 % -   08/06/18 0300 - - - 96 17 100 % -   08/06/18 0245 - - - 100 - 100 % -   08/06/18 0230 - - - 101 - 100 % -   08/06/18 0215 - - - 95 - 100 % -   08/06/18 0200 - - - 94 21 100 % -   08/06/18 0145 - - - 94 - 100 % -   08/06/18 0130 - - - 93 - 100 % -   08/06/18 0115 - - - 95 - 100 % -   08/06/18 0100 - - - 95 22 100 % -   08/06/18 0045 - - - 96 - 100 % -   08/06/18 0030 - - - 97 - 100 % -   08/06/18 0015 - - - 94 - 100 % -   08/06/18 0000 - 97.6  F (36.4  C) Axillary 95 18 100 % -   08/05/18 2345 - - - 93 - 100 % -   08/05/18 2330 - - - 95 - 100 % -   08/05/18 2315 - - - 94 - 100 % -   08/05/18 2300 - - - 97 18 100 % -   08/05/18 2245 - - - 97 - 100 % -   08/05/18 2230 - - - 96 - 98 % -   08/05/18 2215 - - - 95 - 100 % -   08/05/18 2200 - - - 94 19 100 % -   08/05/18 2145 - - - 95 - 100 % -   08/05/18 2130 - - - 95 - 100 % -   08/05/18 2115 - - - 96 - 100 % -   08/05/18 2100 - - - 97 18 100 % -   08/05/18 2045 - - - 97 - 100 % -   08/05/18 2030 - - - 98 - 99 % -   08/05/18 2015 - - - 100 - 98 % -   08/05/18 2000 - 97  F (36.1  C) Axillary 101 18 100 % -   08/05/18 1945 - - - 104 - 100 % -   08/05/18 1930 - - - 103 - 100 % -   08/05/18 1915 - - - 106 - 100 % -   08/05/18 1900 - - - 103 18 100 % -   08/05/18 1845 - - - 103 - 100 % -    08/05/18 1830 - - - 102 - 99 % -   08/05/18 1815 - - - 104 - 98 % -   08/05/18 1800 - 97.4  F (36.3  C) Axillary 105 20 100 % -   08/05/18 1745 - - - 105 - 100 % -   08/05/18 1730 - - - 105 - 100 % -   08/05/18 1725 102/55 - - 105 - - -   08/05/18 1723 - - - 105 - 100 % -   08/05/18 1700 - - - 105 - 100 % -   08/05/18 1654 - - - 109 - 100 % -   08/05/18 1645 - - - 107 14 100 % -   08/05/18 1635 - - - 107 - 100 % -   08/05/18 1632 111/57 - - 108 - 100 % -   08/05/18 1630 - - - 105 - 100 % -   08/05/18 1615 - - - 107 - 99 % -   08/05/18 1611 - - - 108 - 99 % -     General Appearance: Intubated  HEENT: NGT to suction, dark output.  Skin: warm, dry  Heart: sinus tachycardia   Lungs: Itubated  Abdomen: Abdomen soft, CAROL x 2, serous output. Abthera in place, serous outupt.   :  bah removed  Extremities: no edema  Neurologic: alert, following some commands      Data:   CMP  Recent Labs  Lab 08/06/18  1323 08/06/18  1200  08/06/18  0923  08/06/18  0357    133  < > 137  --  138   POTASSIUM 3.8 3.7  < > 3.9  --  3.9   CHLORIDE  --   --   --  107  --  107   CO2  --   --   --  25  --  21   * 143*  < > 114*  --  161*   BUN  --   --   --  19  --  20   CR  --   --   --  0.96  --  0.87   GFRESTIMATED  --   --   --  85  --  >90   GFRESTBLACK  --   --   --  >90  --  >90   JESUS  --   --   --  7.8*  --  8.0*   ICAW 4.6 5.7*  < >  --   < >  --    MAG  --   --   --  2.4*  --  2.5*   PHOS  --   --   --  3.8  --  4.1   ALBUMIN  --   --   --  2.1*  --  2.2*   BILITOTAL  --   --   --  4.4*  --  4.6*   ALKPHOS  --   --   --  252*  --  258*   AST  --   --   --  118*  --  133*   ALT  --   --   --  248*  --  267*   < > = values in this interval not displayed.  CBC    Recent Labs  Lab 08/06/18  1424 08/06/18  1323  08/06/18  0923   HGB 10.7* 11.1*  < > 7.3*   WBC 4.4  --   --  4.9   PLT 45*  --   --  61*   < > = values in this interval not displayed.  COAGS    Recent Labs  Lab 08/06/18  0923 08/02/18  0404   INR 1.49* 1.52*       Urinalysis  Recent Labs   Lab Test  07/27/18   2330  07/13/18   1315  06/18/18   1200   COLOR  Yellow  Dark Yellow  Yellow   APPEARANCE  Clear  Clear  Clear   URINEGLC  Negative  Negative  Negative   URINEBILI  Negative  Large*  Small*   URINEKETONE  Negative  Negative  Negative   SG  1.008  1.013  1.008   UBLD  Negative  Negative  Negative   URINEPH  7.5*  6.0  5.0   PROTEIN  30*  10*  Negative   NITRITE  Negative  Negative  Negative   LEUKEST  Negative  Negative  Negative   RBCU  5*  <1  1   WBCU  3  None  2   UTPG   --    --   0.75*     Virology:  Hepatitis C Antibody   Date Value Ref Range Status   07/20/2018 Nonreactive NR^Nonreactive Final     Comment:     Assay performance characteristics have not been established for newborns,   infants, and children         POD 14 US liver:  1.  The left portal vein is antegrade with decreased velocity,  measuring approximately 15 cm/second. This vessel was occluded on CT  7/30/2018.  2.  Elevated velocities of the main hepatic artery, now 203 cm/sec  (previously 94 cm/sec). However left hepatic arteries demonstrates  steep upstroke suggesting there is not a significant stenosis.   2a. Right hepatic artery was not visualized, could be occluded or  difficult to see postoperatively.  3. Echogenic focus of the right lobe in close proximity to the  hepatorenal fossa. This likely corresponds with nonenhancing lesion on  CT 7/30/2018 and likely representing subcapsular hematoma.  4. Additional small hematomas of the braden hepatis and posterior right  perihepatic region.  5. Small volume anechoic ascites.  6. Left hepatic lobe not well visualized secondary to open wound  preventing imaging. Note the left lobe was grossly abnormal on CT  7/30/2018    POD 1 US liver: Impression:   1.  Hematoma associated with the inferior aspect of the perihepatic  space anteriorly, measuring 13.7 x 7.0 x 8.5 cm.  2.  Retrograde flow of the left portal vein. Additionally, low  velocities of the  portal venous system. Single low resistive index of  the extrahepatic aspect of the hepatic artery, measuring 0.37. These  findings are likely within normal limits in the early postoperative  context. However, continued attention on follow-up recommended.    7/30 CT scan abd/pelvis:  IMPRESSION:   1. Hypoenhancing areas in the transplant liver concerning for  infarction. Transplant left portal vein occlusion with bubbles of  portal venous gas. Nearly occlusive filling defect in the native  hepatic artery. Narrowed and irregular appearance of the transplant  hepatic artery. Focal occlusion of the left main hepatic artery.  Segmental occlusions of right hepatic arterial branches.   2. Pneumatosis intestinalis with nonocclusive filling defects in  superior mesenteric vein branches, concerning for bowel ischemia or  infarction.   3. Splenic infarctions.   4. Bibasilar consolidation with air bronchograms. Pneumonia cannot be  excluded.   5. Post surgical changes of evacuation of peritransplant hematoma.

## 2018-08-06 NOTE — ANESTHESIA PROCEDURE NOTES
Arterial Line Procedure Note  Staff:     Anesthesiologist:  RIMA CRUZ  Location: In OR After Induction  Procedure Start/Stop Times:     patient identified, IV checked, site marked, risks and benefits discussed, informed consent, monitors and equipment checked, pre-op evaluation and at physician/surgeon's request      Correct Patient: Yes      Correct Position: Yes      Correct Site: Yes      Correct Procedure: Yes      Correct Laterality:  Yes    Site Marked:  Yes  Line Placement:     Procedure:  Arterial Line    Insertion Site:  Radial    Insertion laterality:  Left    Skin Prep: Chloraprep      Patient Prep: patient draped, mask, sterile gloves and hat      Local skin infiltration:  None    Ultrasound Guided?: Yes      Artery evaluated via ultrasound confirming patency.   Using realtime imaging, the artery was punctured and the needle was observed entering the artery.      A permanent image is entered into patient's chart.      Catheter size:  3 Fr, 8 cm    Cath secured with: suture      Dressing:  Tegaderm    Complications:  None obvious    Arterial waveform: Yes      IBP within 10% of NIBP: Yes

## 2018-08-07 NOTE — PROGRESS NOTES
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  Developed shock with severe lactic acidosis and respiratory failure on POD9. CT scan showed left PV venous thrombosis, infarction of left liver lobe, patchy infarction of right lobe and possible hepatic arterial thrombosis, patchy bowel ischemia. Transferred to ICU.   Ex lap, patchy diffuse SB ischemia. Doppler demonstrated arterial flow main vessels to bowel and liver. Splenic infarction. Liver biopsy negative for acute rejection. Fluid culture growing VRE. On Heparin gtt. 8/3 OR for reexploration liver transplant, bowel ischemia, findings improved patchy iscemic changes of small bowel, patchy ischemic changes of left liver lobe.  OR: some ischemic/necrotic jejunum, s/p resection.      Procedures:    donor (DBD) liver transplant with duct to duct anastomosis over biliary stent.    Ex lap due to concern for ischemic bowel, findings: patchy diffuse SB ischemia. Doppler demonstrated arterial flow main vessels to bowel and liver. Splenic infarction.   8/3 Reexploration liver transplant, bowel ischemia, findings improved patchy iscemic changes of small bowel, patchy ischemic changes of left liver lobe.    Re-exploration, ischemic/necrotic jejunum, s/p resection    Graft function: OLT POD 17/1. Infarction of left liver lobe, patchy infarction right liver lobe. Left PV thrombosis. Narrow and irregular appearance HA. Bowel ischemia. Procedures, see above. Liver biopsy negative for acute rejection. Intra-abdominal clot cultures from  + VRE and  + enterococcus (sens pending). Continue high intensity heparin gtt.  Plan abdominal closure .  Immunosuppression  management:   Simulect intra-op due to BEN  Steroid taper per protocol, completed.  MMF 1gm BID, hold due to status  Tac 1mg BID restarted 8/6.  Level 7.1 today, increased, no dose change.  Hydrocortisone 100 q 8hrs due to shock  Complexity of management:Medium. Contributing factors: thrombocytopenia and anemia sepsis  Hematology: Hemoglobin stable, 10. PLT 57.  INR 1.5. Continue high intensity heparin gtt due to thrombosis.  Hold at midnight for OR.  Cardiorespiratory: Remains ventilated due to frequent operations.  Hypotension secondary to sepsis, pressor needs decreased today.   GI/Nutrition: NGT. NPO.  S/p resection of necrotic jejunum, see above.  On TPN.  Endocrine:  DM type 2, contineu insulin drip.    Fluid/Electrolytes: BEN/ARF. CRRT, no fluid removal.  Remains on pressor, dose decreased.   : Luis, anuric  Infectious disease: Afebrile. Leukopenia.  -Previously on Linezolid 7/28-7/30 and zosyn 7/30-8/2.   -Continue daptomycin (VRE, 7/30-present), meropenem (Ecoli ESBL, 8/2-) and micafungin (empiric, 7/30-).     1. VRE intra abdominal and bacteremia. Fluid cultures 7/31/18 + VRE. 7/30 (peripheral) and 7/31 (peripheral and VAD) blood cultures growing VRE.  8/6 intra-abdominal clot + enterococcus, sens pending.  8/6 blood + VRE.  2. Ecoli ESBL bacteremia. Repeat bcx (peripheral and VAD) growing Ecoli ESBL. Zosyn changed to Meropenem  SICU to exchange lines as able. Blood cultures negative for E. Coli since 8/2.  3. HBV: Hepatits B DNA PCR positive prior to tx. Received HBIG on 7/21 and 7/22. Hep B surface antigen negative. No further HBIG planned. Continue tenofovir 300 mg daily.   PPX: Valcyte x 12 weeks (CMVand EBV IgG +), bactrim. mycelex stopped as patient on micafungin. Will start nystatin if aide discontinued.     Prophylaxis: DVT PCDs, on heparin, fall, GI, fungal  Disposition: SICU    Coordinator: Kay Reyes    Medical Decision Making: High    JAY/Fellow/Resident Provider: Geovanna Burris NP  [id number]    Faculty: Pamela Che MD  __________________________________________________________________  Transplant History: Admitted 2018 for  donor liver transplant.   The patient has a history of liver failure due to hepatitis B .    2018 (Liver), Postoperative day: 17     Interval History:   Remains intubated, on pressor and CRRT.    ROS:   A 10-point review of systems was negative except as noted above.    Curent Meds:    albumin human  25 g Intravenous Once     aspirin  81 mg Oral Daily     DAPTOmycin (CUBICIN) intermittent infusion  10 mg/kg (Dosing Weight) Intravenous Q24H     hydrocortisone sodium succinate PF  100 mg Intravenous Q8H     lipids  250 mL Intravenous Once per day on      meropenem  1,000 mg Intravenous Q8H     micafungin  100 mg Intravenous Q24H     pantoprazole  40 mg Oral QAM AC     sodium chloride (PF)  3 mL Intravenous Q8H     sulfamethoxazole-trimethoprim  10 mL Oral Once per day on      tacrolimus  1 mg Oral or Feeding Tube BID IS     tenofovir  300 mg Per Feeding Tube Q48H     valGANciclovir  450 mg Oral Every Other Day    Or     valGANciclovir  450 mg Oral or NG Tube Every Other Day       Physical Exam:     Admit Weight: 60.7 kg (133 lb 12.8 oz)    Current Vitals:   /79 (BP Location: Right arm)  Pulse 121  Temp 97.5  F (36.4  C) (Axillary)  Resp 16  Wt 65.1 kg (143 lb 8.3 oz)  SpO2 100%  BMI 23.16 kg/m2      Vital sign ranges:    Temp:  [97.2  F (36.2  C)-97.6  F (36.4  C)] 97.5  F (36.4  C)  Heart Rate:  [64-96] 74  Resp:  [9-20] 16  BP: ()/(69-79) 135/79  MAP:  [59 mmHg-106 mmHg] 88 mmHg  Arterial Line BP: ()/(46-78) 124/66  FiO2 (%):  [30 %] 30 %  SpO2:  [93 %-100 %] 100 %  Patient Vitals for the past 24 hrs:   BP Temp Temp src Heart Rate Resp SpO2 Weight   18 1500 - - - 74 16 100 % -   18 1445 - - - 73 - 100 % -   18 1430 - - - 74 - 100 % -   18 1415 - - - 75 - 100 % -   18 1400 - - - 77  16 100 % -   08/07/18 1345 - - - 78 - 100 % -   08/07/18 1330 - - - 77 - 100 % -   08/07/18 1315 - - - 78 - 100 % -   08/07/18 1300 - - - 79 18 100 % -   08/07/18 1245 - - - 75 - 100 % -   08/07/18 1230 - - - 75 - 100 % -   08/07/18 1215 - - - 76 - 100 % -   08/07/18 1200 - 97.5  F (36.4  C) Axillary 76 16 100 % -   08/07/18 1145 - - - 76 10 100 % -   08/07/18 1130 - - - 75 9 100 % -   08/07/18 1115 - - - 78 10 100 % -   08/07/18 1100 - - - 80 11 100 % -   08/07/18 1058 - - - 79 - 100 % -   08/07/18 1045 - - - 84 10 100 % -   08/07/18 1035 - - - 86 17 100 % -   08/07/18 1030 - - - 83 - 100 % -   08/07/18 1022 - - - 80 - 100 % -   08/07/18 1015 - - - 75 - 100 % -   08/07/18 1000 - - - 82 20 100 % -   08/07/18 0948 - - - 77 - 98 % -   08/07/18 0945 - - - 80 - 97 % -   08/07/18 0930 - - - 80 - 96 % -   08/07/18 0915 - - - 75 - 98 % -   08/07/18 0900 - - - 88 16 100 % -   08/07/18 0845 - - - 85 - 100 % -   08/07/18 0830 - - - 79 - 100 % -   08/07/18 0815 - - - 83 - 100 % -   08/07/18 0800 - 97.4  F (36.3  C) Axillary 80 18 100 % -   08/07/18 0745 - - - 92 - 100 % -   08/07/18 0736 135/79 - - 73 - 100 % -   08/07/18 0730 135/79 - - 71 - 100 % -   08/07/18 0715 - - - 75 - 100 % -   08/07/18 0700 108/69 - - 79 - 100 % -   08/07/18 0630 - - - 75 - 100 % -   08/07/18 0615 - - - 78 - 100 % -   08/07/18 0600 106/73 - - - - - -   08/07/18 0545 - - - 77 - 100 % -   08/07/18 0530 - - - 79 - 100 % -   08/07/18 0515 - - - 81 - 100 % -   08/07/18 0500 101/71 - - 81 - 100 % -   08/07/18 0445 - - - 82 - 100 % -   08/07/18 0430 - - - 84 - 100 % -   08/07/18 0415 - - - 84 - 100 % -   08/07/18 0400 111/79 97.3  F (36.3  C) Axillary 87 16 98 % 65.1 kg (143 lb 8.3 oz)   08/07/18 0330 - - - 80 - 100 % -   08/07/18 0315 - - - 82 - 100 % -   08/07/18 0300 97/73 - - 88 - 100 % -   08/07/18 0245 - - - 81 - 100 % -   08/07/18 0230 - - - 85 - 100 % -   08/07/18 0215 - - - 85 - 100 % -   08/07/18 0200 - - - 83 - 100 % -   08/07/18 0145 - - - 84 -  100 % -   08/07/18 0115 - - - 88 - 100 % -   08/07/18 0100 - - - 96 - 100 % -   08/07/18 0030 - - - 90 - 100 % -   08/07/18 0015 - - - 88 - 100 % -   08/07/18 0000 - 97.2  F (36.2  C) Axillary 88 17 100 % -   08/06/18 2330 - - - 81 - 100 % -   08/06/18 2315 - - - 83 - 100 % -   08/06/18 2300 - - - 79 - 100 % -   08/06/18 2245 - - - 81 - 100 % -   08/06/18 2230 - - - 77 - 100 % -   08/06/18 2215 - - - 75 - 100 % -   08/06/18 2200 - - - 74 - 100 % -   08/06/18 2145 - - - 71 - 100 % -   08/06/18 2130 - - - 73 - 100 % -   08/06/18 2100 - - - 74 - 100 % -   08/06/18 2045 - - - 83 - 93 % -   08/06/18 2030 - - - 75 - 100 % -   08/06/18 2015 - - - 71 - 100 % -   08/06/18 2000 - 97.2  F (36.2  C) Axillary 74 20 100 % -   08/06/18 1945 - - - 75 - 100 % -   08/06/18 1930 - - - 75 - 100 % -   08/06/18 1915 - - - 76 - 100 % -   08/06/18 1900 - - - 74 16 100 % -   08/06/18 1845 - - - 76 - 100 % -   08/06/18 1830 - - - 76 - 100 % -   08/06/18 1815 - - - 77 - 100 % -   08/06/18 1800 - - - 73 16 100 % -   08/06/18 1745 - - - 72 - 100 % -   08/06/18 1730 - - - 74 - 100 % -   08/06/18 1715 - - - 73 - 100 % -   08/06/18 1700 - - - 72 16 100 % -   08/06/18 1645 - - - 74 - 100 % -   08/06/18 1630 - - - 69 - 100 % -   08/06/18 1615 - - - 67 - 100 % -   08/06/18 1600 - 97.6  F (36.4  C) Axillary 64 16 100 % -   08/06/18 1545 - - - 64 - 100 % -   08/06/18 1530 - - - 66 - 100 % -     General Appearance: Intubated  HEENT: NGT  Skin: warm, dry  Heart: NSR  Lungs: CMV, 30%, P5  Abdomen: Abdomen soft, CAROL x 2, serous output. Abthera in place, serosang outupt.   : No bah  Extremities: +1 BLE edema  Neurologic: alert, following some commands      Data:   CMP    Recent Labs  Lab 08/07/18  0950 08/07/18  0336 08/06/18  2152    139 139   POTASSIUM 3.6 3.4 3.8   CHLORIDE 111* 109 109   CO2 24 22 22   * 129* 156*   BUN 23 24 21   CR 0.86 0.91 0.95   GFRESTIMATED >90 90 86   GFRESTBLACK >90 >90 >90   JESUS 7.0* 6.9* 6.8*   ICAW 4.8  --   4.3*   MAG 2.0 2.1 2.1   PHOS 2.5 2.9 3.8   ALBUMIN 1.8* 1.8* 1.8*   BILITOTAL 3.0* 3.8* 4.6*   ALKPHOS 218* 242* 239*   AST 59* 81* 98*   * 162* 177*     CBC    Recent Labs  Lab 08/07/18  0950 08/07/18  0336   HGB 8.7* 10.0*   WBC 1.9* 2.9*   PLT 35* 57*     COAGS    Recent Labs  Lab 08/07/18  0336 08/06/18  0923   INR 1.53* 1.49*      Urinalysis  Recent Labs   Lab Test  07/27/18   2330  07/13/18   1315  06/18/18   1200   COLOR  Yellow  Dark Yellow  Yellow   APPEARANCE  Clear  Clear  Clear   URINEGLC  Negative  Negative  Negative   URINEBILI  Negative  Large*  Small*   URINEKETONE  Negative  Negative  Negative   SG  1.008  1.013  1.008   UBLD  Negative  Negative  Negative   URINEPH  7.5*  6.0  5.0   PROTEIN  30*  10*  Negative   NITRITE  Negative  Negative  Negative   LEUKEST  Negative  Negative  Negative   RBCU  5*  <1  1   WBCU  3  None  2   UTPG   --    --   0.75*     Virology:  Hepatitis C Antibody   Date Value Ref Range Status   07/20/2018 Nonreactive NR^Nonreactive Final     Comment:     Assay performance characteristics have not been established for newborns,   infants, and children         POD 14  liver:  1.  The left portal vein is antegrade with decreased velocity,  measuring approximately 15 cm/second. This vessel was occluded on CT  7/30/2018.  2.  Elevated velocities of the main hepatic artery, now 203 cm/sec  (previously 94 cm/sec). However left hepatic arteries demonstrates  steep upstroke suggesting there is not a significant stenosis.   2a. Right hepatic artery was not visualized, could be occluded or  difficult to see postoperatively.  3. Echogenic focus of the right lobe in close proximity to the  hepatorenal fossa. This likely corresponds with nonenhancing lesion on  CT 7/30/2018 and likely representing subcapsular hematoma.  4. Additional small hematomas of the braden hepatis and posterior right  perihepatic region.  5. Small volume anechoic ascites.  6. Left hepatic lobe not well  visualized secondary to open wound  preventing imaging. Note the left lobe was grossly abnormal on CT  7/30/2018    POD 1 US liver: Impression:   1.  Hematoma associated with the inferior aspect of the perihepatic  space anteriorly, measuring 13.7 x 7.0 x 8.5 cm.  2.  Retrograde flow of the left portal vein. Additionally, low  velocities of the portal venous system. Single low resistive index of  the extrahepatic aspect of the hepatic artery, measuring 0.37. These  findings are likely within normal limits in the early postoperative  context. However, continued attention on follow-up recommended.    7/30 CT scan abd/pelvis:  IMPRESSION:   1. Hypoenhancing areas in the transplant liver concerning for  infarction. Transplant left portal vein occlusion with bubbles of  portal venous gas. Nearly occlusive filling defect in the native  hepatic artery. Narrowed and irregular appearance of the transplant  hepatic artery. Focal occlusion of the left main hepatic artery.  Segmental occlusions of right hepatic arterial branches.   2. Pneumatosis intestinalis with nonocclusive filling defects in  superior mesenteric vein branches, concerning for bowel ischemia or  infarction.   3. Splenic infarctions.   4. Bibasilar consolidation with air bronchograms. Pneumonia cannot be  excluded.   5. Post surgical changes of evacuation of peritransplant hematoma.

## 2018-08-07 NOTE — PLAN OF CARE
Problem: Patient Care Overview  Goal: Plan of Care/Patient Progress Review  Outcome: No Change  Patient went to OR for a washout and possible closure today. Patient returned at 1400 with abthera. Both CAROL's were removed in the OR. A new Left radial A-line was placed in The OR. The Right femoral arterial line was discontinued when the patient returned. Levophed titrated to keep MAP>60. Tube feedings have been discontinued and TPN will be started this evening. Patient's wife at his bedside and aware of the current plan of care. Refer to flow sheets for further details.

## 2018-08-07 NOTE — PROGRESS NOTES
CRRT STATUS NOTE    DATA:  Time:  5:33 AM  Pressures WNL:  yes  Filter Status: WNL    Problems Reported/Alarms Noted: no alarms noted    Supplies Present:all supplies restocked for day shift    ASSESSMENT:  Patient Net Fluid Balance:  -17.28 mls since 12 midnight, goal met of I=O  Vital Signs: MAP 70s most all night, NSR afebrile,98F   Labs:  HGB 10.0, K+ 3.4 LFTs elevated, Ca++ 6.9  Goals of Therapy:  I=O , met goal at -17.28cc since 12 midnight    INTERVENTIONS:   Continues on Levo gtt to keep MAPS > 60 and to keep even in fluid status    PLAN:   Continue plan of care,meeting goal I=O, call CRRT resource for any questions or problems at [phone]

## 2018-08-07 NOTE — PLAN OF CARE
Problem: Patient Care Overview  Goal: Plan of Care/Patient Progress Review  Outcome: No Change  D/I: Pt remains intubated. Fentanyl gtt decreased from 75 to 50 mcg/hr. Pt arouses and follows commands. Levophed gtt titrated to keep MAPs >60. Levophed off x 2, but needed to resume gtt in 1 hr. PS trial x 2 for 1 hr each. Lung sounds clear, but diminished in the bases. Abthera draining 100-150cc/hr of bloody drainage. Albumin 12.5 gms given x1 at 1800 per prn order. Hgb at 1000=8.7. MDs notified. CRRT continues pulling 0. Order received from Renal MDs for Is=Os if CVP >8. CVP today 4-7. NGT output bloody. MDs aware. Consent signed by wife for OR 8/8 with  present. Pt's wife updated per MDs with .  A:Pt stable with current cares.  P: Continue to monitor. See EPIC flow sheets for VS and further assessments. Pt to OR 8/8 per Transplant team.

## 2018-08-07 NOTE — PROGRESS NOTES
CRRT STATUS NOTE    DATA:  Time:  5:38 PM  Pressures WNL:  YES  Filter Status:  WDL    Problems Reported/Alarms Noted:  None reported    Supplies Present:  YES    ASSESSMENT:  Patient Net Fluid Balance:  + 417ml since midnight  Vital Signs:  HR 80's to 90's, MAPs >65 on levophed, Sats 100% on 30% FiO2.  Labs:  k 3.5, Hgb 8.8, Plt 35, WBC 1.9  Goals of Therapy:  I=O, if CVP < 8 turn UF to 0.    INTERVENTIONS:   Checked in with bedside RN.    PLAN:  Continue with treatment plan. Call CRRT RN at [phone] with questions or concerns.

## 2018-08-07 NOTE — PLAN OF CARE
Problem: Patient Care Overview  Goal: Plan of Care/Patient Progress Review  Outcome: Therapy, progress towards functional goals is fair  Neuro: Patient is starting to open eyes and arouse to voice. Nods yes and no to questions. Very weak hand  and wiggles toes intermittently. Pupils equal and reactive.   CV: NSR with occasional PVCs. MAP goal >60 achieved with levo drip.  CVP 3-4. Afebrile, juan hugger on and off overnight.   Resp: Intubated. 400/16/30%/5. Lung sounds starting to clear. Thin white in-line secretions.   Gi/Gu: Do not use NJ. Okay to give tacro through NG and clamp for 1 hour. NG to LIS, draining minimal bile/green. TPN and lipids started overnight. Anuric, bladder scanned for 150cc. Rectal pouch in place. Abthera continuous -125, drainage is serosang about 100cc/hr. Continuous insulin.  Skin: Jaundice throughout. Edema in lower extremities.   Labs: Hep Xa therapeutic at 0.58, heparin drip at 1850cc. Replaced Ca twice overnight. Replaced K. Old femoral A-line culture came back positive for MD NITESH notified  CRRT: Net hour fluid removal rate goal I=O.    Drips/IV: Left IJ: CRRT, Right IJ, Left radial Windy, 2 left PIV, 1 Right PIV  Heparin:1850  Fentanyl: 75  Insulin:4   Levo: 0.5

## 2018-08-07 NOTE — PROGRESS NOTES
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 complicated with portal hypertension, ascites requiring multiple paracentesis, esophageal varices, encephalopathy, severe thrombocytopenia, history of DM, who underwent a DBD OLT on 7/21/2018, nephrology consulted for acute hyponatremia and BEN , Sp multiple OR explorations and continues on CRRT     Anuric BEN with creatinine elevation now on CRRT  Bl is 1.3-2 prior to Sx  Creatinine elevation was likely sec to increased intra-abdominal pressure and possible severe pre -renal state with sepsis and high output from the CAROL drain in excess of 2L/day, intrinsic injury with hypoxic/ischemic injury to tubules with ATN is a possibility as well. Now on CRRT started intra op exlap with hematoma evacuation on 7/30     -- continued on CRRT, goals to keep I=O unless CVP <8 then decrease UF to 0, with open abdomen , multiple surgeries he likely has excessive losses and may need IVF (albumin or NS)  -- Albumin and NS can be used for resuscitation , avoid LR since he has liver shock likely not able to metabolize lactate well  -- CRRT prescribed at 30ml/kg/min with 0 UF for now, bolus with IVF as needed for hypotension  -- continue with labs per CRRT protocol.   -- daily weights and strict IO monitoring  -- continue with Intra abdominal pressure monitoring Q shift     Severe hyponatremia improved  Likely related to free water flushes/BEN , ADH dependant with hypotension as well since he did receive NS   Goals as above  Hyperkalemia improved  With BEN , hematoma and poor renal clearnace and met acidosis  CRRT prescription to address     Hypervolemia with relative intra vascular depletion  With hypotension on presser support,   albumin 5% /NSS as needed  CRRT as above  Will monitor for needs     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  Off Linezolid due to LA  Persistent bacteremia   Micafungin and  Jasmynem for additional coverage for sepsis per MICU  Ampho B and cefazolin 8/6 preop     DDLT 7/12/18   ESLD sec to hep B cb EV , HE , ascites and portal HTN  Ex lap 7/30, Graft function: Intrahepatic left portal venous thrombosis, infarction of left liver lobe, patchy infarction of right lobe, suspected hepatic arterial thrombosis, Splenic infarctions and Patchy bowel ischemia on heparin gtt  8/3 Reexploration liver transplant, bowel ischemia, findings improved patchy iscemic changes of small bowel, patchy ischemic changes of left liver lobe.   8/6 Re-exploration, ischemic/necrotic jejunum, s/p resection  On valcyte and bactrim  On tenofovir    Acute resp failure   Intubated on vent support   MICU managing     DMII on insulin gtt     Anemia/ thrombocytopenia/leucocytosis  ACD/inflamm anemia/post op and bleed  Low PLT due to ESLD  Hb ~ 8  On heparin      Recommendations were communicated to primary team     Seen and discussed with Dr. Сергей Rodrigues MD   [phone]    Interval History :   In the last 24 hours Yaneli Miles has been in the ICU on CRRT with anuria tolerating CRRT on low dose nor epi , CVp has been low 3-4  Review of Systems:   Unable to obtain    Physical Exam:   I/O last 3 completed shifts:  In: 3501.01 [I.V.:2015.25; Other:7; NG/GT:160]  Out: 2622 [Emesis/NG output:265; Drains:2168; Other:189]   /79 (BP Location: Right arm)  Pulse 121  Temp 97.4  F (36.3  C) (Axillary)  Resp 18  Wt 65.1 kg (143 lb 8.3 oz)  SpO2 100%  BMI 23.16 kg/m2     GENERAL APPEARANCE: ill appearing vent support no distress  EYES:  + scleral icterus, pupils equal  HENT: mouth without ulcers or lesions  PULM: lungs low air entry  to auscultation,  bilaterally, equal air movement, no clubbing  CV: regular rhythm, normal rate, no rub     -JVD unable to access     -edema +   GI: soft, , +distended, bowel sounds are +/-  INTEGUMENT: no cyanosis, - rash  NEURO:  sedated   Access LIJ non tunneled    Labs:   All labs reviewed  by me  Electrolytes/Renal -   Recent Labs   Lab Test  08/07/18   0336  08/06/18 2152 08/06/18   1614   NA  139  139  139   POTASSIUM  3.4  3.8  3.7   CHLORIDE  109  109  109   CO2  22  22  21   BUN  24  21  21   CR  0.91  0.95  1.06   GLC  129*  156*  137*   JESUS  6.9*  6.8*  7.2*   MAG  2.1  2.1  2.2   PHOS  2.9  3.8  4.4       CBC -   Recent Labs   Lab Test  08/07/18 0336 08/06/18 2152 08/06/18   1614   WBC  2.9*  4.2  4.7   HGB  10.0*  10.2*  10.4*   PLT  57*  64*  51*       LFTs -   Recent Labs   Lab Test  08/07/18 0336 08/06/18 2152 08/06/18   1614   ALKPHOS  242*  239*  240*   BILITOTAL  3.8*  4.6*  4.4*   ALT  162*  177*  192*   AST  81*  98*  111*   PROTTOTAL  3.5*  3.7*  3.8*   ALBUMIN  1.8*  1.8*  2.0*       Iron Panel -   Recent Labs   Lab Test  07/13/18 0334 06/17/18   2138   IRON  97  66   IRONSAT  83*  109*   ANGELLA  Unsatisfactory specimen - icteric   --          Imaging:  All imaging studies reviewed by me.     Current Medications:    albumin human  25 g Intravenous Once     aspirin  81 mg Oral Daily     DAPTOmycin (CUBICIN) intermittent infusion  10 mg/kg (Dosing Weight) Intravenous Q24H     hydrocortisone sodium succinate PF  100 mg Intravenous Q8H     lipids  250 mL Intravenous Once per day on Mon Wed Fri     meropenem  1,000 mg Intravenous Q8H     micafungin  100 mg Intravenous Q24H     pantoprazole  40 mg Oral QAM AC     sodium chloride (PF)  3 mL Intravenous Q8H     sulfamethoxazole-trimethoprim  10 mL Oral Once per day on Mon Wed Fri     tacrolimus  1 mg Oral or Feeding Tube BID IS     tenofovir  300 mg Per Feeding Tube Q48H     valGANciclovir  450 mg Oral Every Other Day    Or     valGANciclovir  450 mg Oral or NG Tube Every Other Day       IV fluid REPLACEMENT ONLY       dialysate for CVVHD & CVVHDF (Phoxillum BK4/2.5) 17 mL/kg/hr (08/07/18 0437)     fentaNYL 75 mcg/hr (08/07/18 0800)     HEParin 1,850 Units/hr (08/07/18 0800)     insulin (regular) 4 Units/hr (08/07/18 0800)      lactated ringers 10 mL/hr at 07/30/18 1519     norepinephrine Stopped (08/07/18 0736)     parenteral nutrition - ADULT compounded formula 50 mL/hr at 08/06/18 1950     replacement solution for CVVHD & CVVHDF (Phoxillum BK4/2.5) 200 mL/hr at 08/05/18 1121     replacement solution for CVVHD & CVVHDF (Phoxillum BK4/2.5) 12.5 mL/kg/hr (08/07/18 0304)     Reason beta blocker order not selected       Janes Rodrigues MD

## 2018-08-07 NOTE — PROGRESS NOTES
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 He returned to the SICU for shock, severe lactic acidosis and respiratory failure. He is s/p exploratory laparotomy on 08/06 with resection of necrotic jejunum.    TODAY'S PROGRESS/PLANS:   - Resume blood cultures  - PST  - Discuss with renal regarding titrating cvp to 8  - Discuss with transplant activity  - Wean pressors as appropiate  - Continue Abx    PLAN:  Neuro/ pain/ sedation:  # Acute pain  - Monitor neurological status. Pt awake and following commands this am.    - Fentanyl gtt.   - PRN oxycodone and Hydromorphone IV as needed for pain     Pulmonary care:   # Acute respiratory failure  - continue with full mechanical support at this time. PST daily.    - HOB elevation, ventilator bundle  - CMV 16/400/5/30%.       Cardiovascular:    #Septic Shock  - Monitor hemodynamic status   - Norepinephrine gtt, needs down today. Stopped Vasopressin 7/31/18 per Transplant due to concerns of bowel ischemia.  - Epi gtt if needs additional pressor support  - Stress dose steroids for 72 hour total finished 08/02      Gastroenterology:   # DDLT 7/21/18  # Intrahepatic left portal venous thrombosis, infarction of left liver lobe, patchy infarction of right lobe, suspected hepatic arterial thrombosis  # 7/30/18 sp exploratory laparotomy, abthera closure device in place. Findings of patchy diffuse SB ischemia. S/p liver biopsy  # Splenic infarctions, patchy bowel ischemia  # Ileus -resolving  - s/p exploratory laparotomy,  abthera closure device in place. Findings of patchy diffuse SB ischemia. S/p liver biopsy on 7/30/18. Return to OR 8/6 for washout, reexploration.   - Continue heparin gtt high intensity given PV thrombosis   - Ileus: CXR 8/1: stool burden, hold PO bowel stimulants or laxatives given concerns of bowel ischemia per primary. S/p multiple enemas and mineral oil x2 doses. Has started having multiple BMs.   - 8/4  Liver: right hepatic artery not visualized, cannot entirely exclude occlusion of the right hepatic artery.      Nutrition:  # protein calorie deficit malnutrition.   - TF's at 20 ml/hr. Currently on hold pending OR.      Renal/ Fluid Balance/electrolytres :    # BEN, anuric   # Severe metabolic acidosis, lactic acidosis, corrected with CRRT  # Severe hyponatremia, corrected   # hyperkalemia--corrected with CRRT   - CRRT per Nephrology. CRRT started 7/30/18. I=O's at this point, not pulling fluids at this point due to pressor requirements  - On CRRT electrolyte replacement protocol.   - albumin replacement 12.5g/1 Liter of abthera and CAROL drain losses.       Endocrine:    # DM II  - Insulin gtt. Continue with insulin drip for now      ID/ Antibiotics:  # VRE Bacteremia  # ESBL Bacteremia  # Hepatitis B  - Continue Daptomycin (started 7/30), Meropenem (started 8/2) for 14 day course  - Micafungin - continue 7/30--  - WBC stable, pt afebrile  - Immunosuppression: on hold, will defer to transplant when to start.  - Immunosuppression prophylaxis: Bactrim, Valgancyclovir and Tenofovir, nystatin when micafungin stopped.   - Will discontinue blood cultures pending negative results of blood culture drawn 8/6  - Patient with KNOWN bacteremia at time of TLC placement on 7/30/18. Will keep line for now      Positive cultures   - 7/27 VRE (line/arm)   - 7/29: VRE (left arm)   - 7/30: VRE (left arm)   - 7/31: VRE (Line and peripheral)  - 8/1: ESBL from HD/hand/central line   - 8/3: VRE from intra-abdominal tissue  - 8/4:  NGTD blood culture  - 8/5: NGTD blood culture  - 8/6: VRE - art line       Heme:     # Acute blood loss anemia  # Anemia of critical illness  # Portal venous thrombosis  # Thrombocytopenia  - Hemoglobin 7.3, slowly trending down.   - High intensity heparin gtt. Continue aspirin 81mg.   - KEN panel sent and negative.   - Platelets 61--hold off transfusion at this time         MSK:    # Weakness and deconditioning of critical illness   - PT and OT        Lines/ tubes/ drains:  - R internal jugular TLC 7/30  - L internal jugular dialysis line 7/30  - L radial art line  - NJ   - PIVs  - ETT  - Abthera        General Cares and  Prophylaxis:    - Mechanical prophylaxis for DVT and Heparin gtt  - GI: Protonix  prophylaxis        Disposition:  - Surgical ICU.      ====================================    SUBJECTIVE:   - no acute events overnight  - Awake, doing well this am.     OBJECTIVE:   1. VITAL SIGNS:   Temp:  [92.8  F (33.8  C)-97.6  F (36.4  C)] 97.3  F (36.3  C)  Heart Rate:  [64-97] 75  Resp:  [16-20] 16  BP: ()/(71-79) 106/73  MAP:  [59 mmHg-102 mmHg] 91 mmHg  Arterial Line BP: ()/(33-79) 135/70  FiO2 (%):  [30 %] 30 %  SpO2:  [93 %-100 %] 100 %  Ventilation Mode: CMV/AC  (Continuous Mandatory Ventilation/ Assist Control)  FiO2 (%): 30 %  Rate Set (breaths/minute): 16 breaths/min  Tidal Volume Set (mL): 400 mL  PEEP (cm H2O): 5 cmH2O  Oxygen Concentration (%): 30 %  Resp: 16    2. INTAKE/ OUTPUT:   I/O last 3 completed shifts:  In: 3501.01 [I.V.:2015.25; Other:7; NG/GT:160]  Out: 2622 [Emesis/NG output:265; Drains:2168; Other:189]    3. PHYSICAL EXAMINATION:   General: intubated in bed, responsive  Neuro: Responds to voice, opens eyes, follows commands. Pupils 2 mm brisk, round and equal. No focal deficits.   Resp: Intubated. Lung sounds diminished throughout.   CV: Regular rhythm, mild tachycardia  Abdomen: Soft, less distended than previous day, grimace to palpation, abthera intact with  serosanguinous output, two CAROL drains, right with serosanguinous fluid, left with serous fluid.   Incisions: Abthera device covering open abdomen.  : No bah in place.   Extremities: warm and well perfused, pitting peripheral edema in the feet bilaterally    4. INVESTIGATIONS:   Arterial Blood Gases     Recent Labs  Lab 08/06/18  1323 08/06/18  1200 08/06/18  1120 08/03/18  0844   PH 7.32* 7.36 7.39 7.32*   PCO2 39 37 37 33*   PO2 178* 142* 164* 199*   HCO3 20* 21 22 17*     Complete Blood Count     Recent Labs  Lab 08/07/18  0336 08/06/18  2152 08/06/18  1614 08/06/18  1424   WBC 2.9* 4.2 4.7 4.4   HGB 10.0* 10.2* 10.4* 10.7*   PLT 57* 64* 51* 45*     Basic Metabolic Panel    Recent Labs  Lab 08/07/18  0336 08/06/18  2152 08/06/18  1614 08/06/18  1323  08/06/18  0923    139 139 134  < > 137   POTASSIUM 3.4 3.8 3.7 3.8  < > 3.9   CHLORIDE 109 109 109  --   --  107   CO2 22 22 21  --   --  25   BUN 24 21 21  --   --  19   CR 0.91 0.95 1.06  --   --  0.96   * 156* 137* 159*  < > 114*   < > = values in this interval not displayed.  Liver Function Tests    Recent Labs  Lab 08/07/18  0336 08/06/18 2152 08/06/18  1614 08/06/18  0923  08/02/18  0404   AST 81* 98* 111* 118*  < > 538*   * 177* 192* 248*  < > 756*   ALKPHOS 242* 239* 240* 252*  < > 223*   BILITOTAL 3.8* 4.6* 4.4* 4.4*  < > 5.0*   ALBUMIN 1.8* 1.8* 2.0* 2.1*  < > 1.9*   INR 1.53*  --   --  1.49*  --  1.52*   < > = values in this interval not displayed.  Pancreatic Enzymes  No lab results found in last 7 days.  Coagulation Profile    Recent Labs  Lab 08/07/18  0336 08/06/18  0923 08/02/18  0404   INR 1.53* 1.49* 1.52*     Lactate  Invalid input(s): LACTATE    5. RADIOLOGY:   No results found for this or any previous visit (from the past 24 hour(s)).    =========================================      Patient seen, findings and plan discussed with surgical ICU staff     Patient was seen with staff ICU Dr. Davis.    Ky  Kalpana  CA-2/PGY-3

## 2018-08-08 PROBLEM — Z16.21 VRE BACTEREMIA: Status: ACTIVE | Noted: 2018-01-01

## 2018-08-08 PROBLEM — K55.9 SMALL BOWEL ISCHEMIA (H): Status: ACTIVE | Noted: 2018-01-01

## 2018-08-08 PROBLEM — D84.9 IMMUNOSUPPRESSED STATUS (H): Status: ACTIVE | Noted: 2018-01-01

## 2018-08-08 PROBLEM — Z16.12 INFECTION DUE TO ESBL-PRODUCING ESCHERICHIA COLI: Status: ACTIVE | Noted: 2018-01-01

## 2018-08-08 PROBLEM — N17.9 ARF (ACUTE RENAL FAILURE) (H): Status: ACTIVE | Noted: 2018-01-01

## 2018-08-08 PROBLEM — I81 PORTAL VEIN THROMBOSIS OF TRANSPLANTED LIVER (H): Status: ACTIVE | Noted: 2018-01-01

## 2018-08-08 PROBLEM — S31.109A OPEN WOUND OF ANTERIOR ABDOMINAL WALL: Status: ACTIVE | Noted: 2018-01-01

## 2018-08-08 PROBLEM — A41.9 SEPTIC SHOCK (H): Status: ACTIVE | Noted: 2018-01-01

## 2018-08-08 PROBLEM — A49.8 INFECTION DUE TO ESBL-PRODUCING ESCHERICHIA COLI: Status: ACTIVE | Noted: 2018-01-01

## 2018-08-08 PROBLEM — R65.21 SEPTIC SHOCK (H): Status: ACTIVE | Noted: 2018-01-01

## 2018-08-08 PROBLEM — Z76.82 LIVER TRANSPLANT CANDIDATE: Status: RESOLVED | Noted: 2018-01-01 | Resolved: 2018-01-01

## 2018-08-08 PROBLEM — T86.49 PORTAL VEIN THROMBOSIS OF TRANSPLANTED LIVER (H): Status: ACTIVE | Noted: 2018-01-01

## 2018-08-08 PROBLEM — B95.2 VRE BACTEREMIA: Status: ACTIVE | Noted: 2018-01-01

## 2018-08-08 PROBLEM — A41.9 SEPSIS (H): Status: ACTIVE | Noted: 2018-01-01

## 2018-08-08 PROBLEM — R78.81 VRE BACTEREMIA: Status: ACTIVE | Noted: 2018-01-01

## 2018-08-08 PROBLEM — B99.9 INTRA-ABDOMINAL INFECTION: Status: ACTIVE | Noted: 2018-01-01

## 2018-08-08 NOTE — PROGRESS NOTES
CRRT STATUS NOTE    DATA:  Time:  4:39 PM  Pressures WNL: YES  Filter Status:  CRRT Filter Status:WDL. Currently off while in OR for abdominal wound closure.     Problems Reported/Alarms Noted:     Supplies Present:  YES    ASSESSMENT:  Patient Net Fluid Balance:  Net positive 380cc since midnight  Vital Signs:  T 96.7 HR 78 /61 MAP 78 CVP 3  Labs: K 3.7 ALT 87 Hgb 8.5 Mg 2.2   Goals of Therapy: 0 to 50cc/hr net off if MAP >60 otherwise I=O.    INTERVENTIONS: Discussed goals with bedside RN      PLAN:  Continue plan of care. Plan to  Collect culture from [facility] dialysis access and discontinue. Plan to hold CRRT 24 to 48 hrs.

## 2018-08-08 NOTE — PLAN OF CARE
Problem: Patient Care Overview  Goal: Plan of Care/Patient Progress Review  Discharge Planner PT   Patient plan for discharge: not discussed  Current status: Pt tolerated AAROM>AROM supine in bed without adverse events. Plan for OR later today. Will continue to follow.   Barriers to return to prior living situation: Medical needs, current mobility   Recommendations for discharge: TCU once LTACH goals are met   Rationale for recommendations: Pt would benefit from continued skilled PT to address deficits in/progress overall mobility.        Entered by: Raina Chapa 08/08/2018 10:33 AM

## 2018-08-08 NOTE — PROGRESS NOTES
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  Developed shock with severe lactic acidosis and respiratory failure on POD9. CT scan showed left PV venous thrombosis, infarction of left liver lobe, patchy infarction of right lobe and possible hepatic arterial thrombosis, patchy bowel ischemia. Transferred to ICU.   Ex lap, patchy diffuse SB ischemia. Doppler demonstrated arterial flow main vessels to bowel and liver. Splenic infarction. Liver biopsy negative for acute rejection. Fluid culture growing VRE. On Heparin gtt. 8/3 OR for reexploration liver transplant, bowel ischemia, findings improved patchy iscemic changes of small bowel, patchy ischemic changes of left liver lobe. 8 OR: some ischemic/necrotic jejunum, s/p resection.      Procedures:    donor (DBD) liver transplant with duct to duct anastomosis over biliary stent.    Ex lap due to concern for ischemic bowel, findings: patchy diffuse SB ischemia. Doppler demonstrated arterial flow main vessels to bowel and liver. Splenic infarction.   8/3 Reexploration liver transplant, bowel ischemia, findings improved patchy iscemic changes of small bowel, patchy ischemic changes of left liver lobe.    Re-exploration, ischemic/necrotic jejunum, s/p resection    Graft function: POD #18/2. Infarction of left liver lobe, patchy infarction right liver lobe. Left PV thrombosis. Narrow and irregular appearance HA. Bowel ischemia. Procedures, see above. Liver biopsy negative for acute rejection. Intra-abdominal clot cultures from  + VRE and  + enterococcus (sens pending). Continue high intensity heparin gtt.  Plan abdominal closure today.  Immunosuppression  management:   Simulect intra-op due to BEN  Steroid taper per protocol, completed.  MMF 1gm BID, hold due to status  Tac 1mg BID restarted 8/6.  Level 6 (9.5h) today, increase to 1.5 BID.  Hydrocortisone 100 q 8hrs due to shock  Complexity of management:Medium. Contributing factors: thrombocytopenia and anemia sepsis  Hematology: Pancytopenia.  Anemia: Hemoglobin trending down to 7.1.  Last transfusion 2u on 8/6.  Transfuse 1u this morning.    Thrombocytopenia:  PLT 47.    Leukopenia:  New onset, WBC 1.7.  MMF already on hold.  Remains on valcyte.  Anticoagulation:  Restart high intensity heparin gtt due to PV thrombosis post op.    Cardiorespiratory: Remains ventilated due to frequent operations.  Hypotension secondary to sepsis, pressor off this morning.   GI/Nutrition: NGT.  NPO.  On TPN.  Ischemic bowel:  7/30 CTA: nonocclusive filling defects in superior mesenteric vein branches.  Anticoagulation as above.  S/p resection of necrotic jejunum on 8/6.  Endocrine:  DM type 2, continue insulin drip.    Fluid/Electrolytes: BEN/ARF. CRRT, no fluid removal.    : Luis, anuric  Infectious disease: Afebrile. Leukopenia.  -Previously on Linezolid 7/28-7/30 and zosyn 7/30-8/2.   -Continue daptomycin (VRE, 7/30-present), meropenem (Ecoli ESBL, 8/2-) and micafungin (empiric, 7/30-).     1. VRE intra abdominal and bacteremia. Fluid cultures 7/31/18 + VRE. 7/30 (peripheral) and 7/31 (peripheral and VAD) blood cultures growing VRE.  8/6 intra-abdominal clot + enterococcus, sens pending.  8/6 blood + VRE.  2. Ecoli ESBL bacteremia. Repeat bcx (peripheral and VAD) growing Ecoli ESBL. Zosyn changed to Meropenem  SICU to exchange lines as able. Blood cultures negative for E. Coli since 8/2.  3. HBV: Hepatits B DNA PCR positive prior to tx. Received HBIG on 7/21 and 7/22. Hep B surface antigen negative. No further HBIG planned. Continue tenofovir 300 mg daily.   PPX: Valcyte x 12 weeks (CMVand EBV IgG +), bactrim. mycelex stopped  as patient on micafungin. Will start nystatin if aide discontinued.     Prophylaxis: DVT PCDs, on heparin, fall, GI  Disposition: SICU    Coordinator: Kay Reyes    Medical Decision Making: High    JAY/Fellow/Resident Provider: Geovanna Burris NP [id number]    Faculty: Pamela Che MD  __________________________________________________________________  Transplant History: Admitted 2018 for  donor liver transplant.   The patient has a history of liver failure due to hepatitis B .    2018 (Liver), Postoperative day: 18     Interval History:   Remains intubated, on pressor and CRRT.    ROS:   A 10-point review of systems was negative except as noted above.    Curent Meds:    aspirin  81 mg Oral Daily     DAPTOmycin (CUBICIN) intermittent infusion  10 mg/kg (Dosing Weight) Intravenous Q24H     hydrocortisone sodium succinate PF  100 mg Intravenous Q8H     lipids  250 mL Intravenous Once per day on      meropenem  1,000 mg Intravenous Q8H     micafungin  100 mg Intravenous Q24H     midodrine  10 mg Per Feeding Tube Q8H     pantoprazole  40 mg Oral QAM AC     sodium chloride (PF)  3 mL Intravenous Q8H     sulfamethoxazole-trimethoprim  10 mL Oral Once per day on      tacrolimus  1 mg Oral or Feeding Tube BID IS     tenofovir  300 mg Per Feeding Tube Q48H     valGANciclovir  450 mg Oral Every Other Day    Or     valGANciclovir  450 mg Oral or NG Tube Every Other Day       Physical Exam:     Admit Weight: 60.7 kg (133 lb 12.8 oz)    Current Vitals:   /79 (BP Location: Right arm)  Pulse 121  Temp 97.4  F (36.3  C) (Axillary)  Resp 18  Wt 64.6 kg (142 lb 6.7 oz)  SpO2 98%  BMI 22.99 kg/m2      Vital sign ranges:    Temp:  [96.5  F (35.8  C)-97.6  F (36.4  C)] 97.4  F (36.3  C)  Heart Rate:  [73-97] 81  Resp:  [10-24] 18  MAP:  [55 mmHg-88 mmHg] 73 mmHg  Arterial Line BP: ()/(43-70) 109/59  FiO2 (%):  [30 %] 30 %  SpO2:  [96 %-100 %] 98 %  Patient Vitals for the past 24 hrs:    Temp Temp src Heart Rate Resp SpO2 Weight   08/08/18 1130 - - 81 - 98 % -   08/08/18 1115 - - 78 - 98 % -   08/08/18 1100 - - 78 18 97 % -   08/08/18 1045 97.4  F (36.3  C) Axillary 82 17 99 % -   08/08/18 1030 - - 83 - 100 % -   08/08/18 1015 - - 79 - 98 % -   08/08/18 1000 96.5  F (35.8  C) Axillary 78 17 99 % -   08/08/18 0945 - - 76 - 100 % -   08/08/18 0930 - - 76 - 96 % -   08/08/18 0917 - - 81 - 97 % -   08/08/18 0915 - - 81 - 97 % -   08/08/18 0914 96.8  F (36  C) Axillary 82 15 97 % -   08/08/18 0904 96.8  F (36  C) Axillary 78 13 97 % -   08/08/18 0900 - - 79 13 97 % -   08/08/18 0845 - - 80 - 97 % -   08/08/18 0830 - - 75 - 98 % -   08/08/18 0815 - - 77 - 99 % -   08/08/18 0800 96.7  F (35.9  C) Axillary 85 16 96 % -   08/08/18 0745 - - 90 - 99 % -   08/08/18 0730 - - 81 - 100 % -   08/08/18 0715 - - 82 - 100 % -   08/08/18 0700 - - 84 18 99 % -   08/08/18 0600 - - 79 - 99 % -   08/08/18 0500 - - 79 - 99 % 64.6 kg (142 lb 6.7 oz)   08/08/18 0400 97.5  F (36.4  C) Axillary 77 17 99 % -   08/08/18 0345 - - 87 - 99 % -   08/08/18 0330 - - 82 - 99 % -   08/08/18 0315 - - 80 - 99 % -   08/08/18 0300 - - 80 - 99 % -   08/08/18 0245 - - 79 - 99 % -   08/08/18 0230 - - 81 - 100 % -   08/08/18 0215 - - 79 - 100 % -   08/08/18 0200 - - 82 - 100 % -   08/08/18 0145 - - 80 - 100 % -   08/08/18 0130 - - 83 - 100 % -   08/08/18 0115 - - 80 - 100 % -   08/08/18 0100 - - 84 - 99 % -   08/08/18 0045 - - 80 - 99 % -   08/08/18 0030 - - 80 - 99 % -   08/08/18 0015 - - 82 - 99 % -   08/08/18 0000 97.1  F (36.2  C) Axillary 76 16 100 % -   08/07/18 2345 - - 81 - 100 % -   08/07/18 2330 - - 85 - 100 % -   08/07/18 2315 - - 83 - 100 % -   08/07/18 2300 - - 85 - 100 % -   08/07/18 2245 - - 79 - 100 % -   08/07/18 2215 - - 74 - 100 % -   08/07/18 2200 - - 83 - 100 % -   08/07/18 2145 - - 80 - 100 % -   08/07/18 2130 - - 85 - 100 % -   08/07/18 2115 - - 82 - 100 % -   08/07/18 2100 - - 81 - 100 % -   08/07/18 2030 - - 81 - 100 % -    08/07/18 2015 - - 80 - 100 % -   08/07/18 2000 97.6  F (36.4  C) Axillary 85 17 100 % -   08/07/18 1945 - - 79 - 100 % -   08/07/18 1930 - - 80 - 100 % -   08/07/18 1900 - - 78 16 100 % -   08/07/18 1845 - - 76 - 100 % -   08/07/18 1830 - - 77 - 100 % -   08/07/18 1815 - - 84 - 100 % -   08/07/18 1800 - - 82 19 100 % -   08/07/18 1745 - - 82 21 100 % -   08/07/18 1730 - - 80 19 100 % -   08/07/18 1715 - - 79 18 100 % -   08/07/18 1700 - - 80 19 100 % -   08/07/18 1645 - - 81 19 100 % -   08/07/18 1630 - - 84 20 99 % -   08/07/18 1625 - - 87 24 100 % -   08/07/18 1615 - - 84 - 100 % -   08/07/18 1600 97.2  F (36.2  C) Axillary 97 22 100 % -   08/07/18 1545 - - 83 - 99 % -   08/07/18 1530 - - 85 - 100 % -   08/07/18 1515 - - 82 - 100 % -   08/07/18 1500 - - 74 16 100 % -   08/07/18 1445 - - 73 - 100 % -   08/07/18 1430 - - 74 - 100 % -   08/07/18 1415 - - 75 - 100 % -   08/07/18 1400 - - 77 16 100 % -   08/07/18 1345 - - 78 - 100 % -   08/07/18 1330 - - 77 - 100 % -   08/07/18 1315 - - 78 - 100 % -   08/07/18 1300 - - 79 18 100 % -   08/07/18 1245 - - 75 - 100 % -   08/07/18 1230 - - 75 - 100 % -   08/07/18 1215 - - 76 - 100 % -   08/07/18 1200 97.5  F (36.4  C) Axillary 76 16 100 % -   08/07/18 1145 - - 76 10 100 % -     General Appearance: Intubated  HEENT: NGT  Skin: warm, dry  Heart: NSR  Lungs: PS 8/5, diminished  Abdomen: Abdomen soft, CAROL x 2, serous output. Abthera in place, serosang outupt.   : No bah  Extremities: +1 BLE edema  Neurologic: alert, following commands      Data:   CMP    Recent Labs  Lab 08/08/18  0954 08/08/18  0535 08/08/18  0339    139 138   POTASSIUM 3.3* 3.6 3.7   CHLORIDE 111* 109 108   CO2 22 23 22   * 122* 129*   BUN 26 27 29   CR 0.81 0.89 0.89   GFRESTIMATED >90 >90 >90   GFRESTBLACK >90 >90 >90   JESUS 6.7* 7.2* 7.4*   ICAW 4.5  --  4.7   MAG 2.2 2.2 2.4*   PHOS 2.8 3.1 3.3   ALBUMIN 1.6* 1.7* 1.8*   BILITOTAL 2.4* 2.8* 2.9*   ALKPHOS 227* 263* 269*   AST 41 50* 55*    ALT 78* 94* 101*     CBC    Recent Labs  Lab 08/08/18  0954 08/08/18  0535   HGB 7.1* 7.1*   WBC 1.7* 2.0*   PLT 34* 47*     COAGS    Recent Labs  Lab 08/07/18  0336 08/06/18  0923   INR 1.53* 1.49*      Urinalysis  Recent Labs   Lab Test  07/27/18   2330  07/13/18   1315  06/18/18   1200   COLOR  Yellow  Dark Yellow  Yellow   APPEARANCE  Clear  Clear  Clear   URINEGLC  Negative  Negative  Negative   URINEBILI  Negative  Large*  Small*   URINEKETONE  Negative  Negative  Negative   SG  1.008  1.013  1.008   UBLD  Negative  Negative  Negative   URINEPH  7.5*  6.0  5.0   PROTEIN  30*  10*  Negative   NITRITE  Negative  Negative  Negative   LEUKEST  Negative  Negative  Negative   RBCU  5*  <1  1   WBCU  3  None  2   UTPG   --    --   0.75*     Virology:  Hepatitis C Antibody   Date Value Ref Range Status   07/20/2018 Nonreactive NR^Nonreactive Final     Comment:     Assay performance characteristics have not been established for newborns,   infants, and children         POD 14 US liver:  1.  The left portal vein is antegrade with decreased velocity,  measuring approximately 15 cm/second. This vessel was occluded on CT  7/30/2018.  2.  Elevated velocities of the main hepatic artery, now 203 cm/sec  (previously 94 cm/sec). However left hepatic arteries demonstrates  steep upstroke suggesting there is not a significant stenosis.   2a. Right hepatic artery was not visualized, could be occluded or  difficult to see postoperatively.  3. Echogenic focus of the right lobe in close proximity to the  hepatorenal fossa. This likely corresponds with nonenhancing lesion on  CT 7/30/2018 and likely representing subcapsular hematoma.  4. Additional small hematomas of the braden hepatis and posterior right  perihepatic region.  5. Small volume anechoic ascites.  6. Left hepatic lobe not well visualized secondary to open wound  preventing imaging. Note the left lobe was grossly abnormal on CT  7/30/2018    POD 1 US liver: Impression:    1.  Hematoma associated with the inferior aspect of the perihepatic  space anteriorly, measuring 13.7 x 7.0 x 8.5 cm.  2.  Retrograde flow of the left portal vein. Additionally, low  velocities of the portal venous system. Single low resistive index of  the extrahepatic aspect of the hepatic artery, measuring 0.37. These  findings are likely within normal limits in the early postoperative  context. However, continued attention on follow-up recommended.    7/30 CT scan abd/pelvis:  IMPRESSION:   1. Hypoenhancing areas in the transplant liver concerning for  infarction. Transplant left portal vein occlusion with bubbles of  portal venous gas. Nearly occlusive filling defect in the native  hepatic artery. Narrowed and irregular appearance of the transplant  hepatic artery. Focal occlusion of the left main hepatic artery.  Segmental occlusions of right hepatic arterial branches.   2. Pneumatosis intestinalis with nonocclusive filling defects in  superior mesenteric vein branches, concerning for bowel ischemia or  infarction.   3. Splenic infarctions.   4. Bibasilar consolidation with air bronchograms. Pneumonia cannot be  excluded.   5. Post surgical changes of evacuation of peritransplant hematoma.

## 2018-08-08 NOTE — ANESTHESIA PREPROCEDURE EVALUATION
Anesthesia Evaluation     . Pt has had prior anesthetic. Type: General    No history of anesthetic complications          ROS/MED HX    ENT/Pulmonary: Comment: intubated      Neurologic:  - neg neurologic ROS     Cardiovascular: Comment: On norepi gtt    (+) ----. : . . . :. . Previous cardiac testing Echodate:07/20/18results:Left ventricular function, chamber size, wall motion, and wall thickness are normal.The EF is > 65%. LV cavity appears small. No regional wall motion abnormalities are seen. Right ventricular function, chamber size, wall motion, and thickness are normal. No significant valvular abnormalities seen, although not able to completely exclude endocarditis based on TTE. The inferior vena cava cannot be assessed. No pericardial effusion is present.date: results: date: results: date: results:          METS/Exercise Tolerance:     Hematologic: Comments: plt of 26k at 0300 08/03/18    (+) History of Transfusion Other Hematologic Disorder-thrombocytopenia      Musculoskeletal:         GI/Hepatic: Comment: S/p liver transplant 07/21/18 for chronic hep B, cirrhosis, esophageal varices, intubated prior to transplant for encephalopathy    Brought back to OR for emergent ex lap on 07/30, found to have mesenteric ischemia    (+) hepatitis type B, liver disease,       Renal/Genitourinary: Comment: Currently on CVVHD        Endo:     (+) type II DM .      Psychiatric:  - neg psychiatric ROS       Infectious Disease:  - neg infectious disease ROS       Malignancy:         Other:                     Physical Exam  Normal systems: cardiovascular and pulmonary    Airway   Comment: ETT    Dental     Cardiovascular       Pulmonary     Other findings: Intubated and ventilated with 30% FiO2  Awake and alert  Triple lumen catheter Right internal jugular  2 PIV  CRRT                    Anesthesia Plan      History & Physical Review  History and physical reviewed and following examination; no interval change.    ASA Status:   4 .    NPO Status:  > 8 hours    Plan for General and ETT with Intravenous induction. Maintenance will be Inhalation.    PONV prophylaxis:  Ondansetron (or other 5HT-3)  Additional equipment: 2nd IV and Arterial Line I have examined the patient and reviewed the medical record  Patient remains intubated for respiratory failure.  Patient on norepinepherine for hypotension attributed to shock related to ischemic bowel  Patient with ARF on CRRT  Patient with impaired liver function post transplant  Patient anemic with Hgb 7.1  Plan to bring to OR to irrigate and close abdomen    Plan GA with existing ETT  COntinue pressure support with norepinepherine, add epi if more support needed (avoid vasopressin with ongoing bowel ischemia)  TO receive 1 unit p RBC for hgb 7  Return to ICU intubated, ventilated, sedated    Tino Hopson MD        Postoperative Care  Postoperative pain management:  IV analgesics.      Consents  Anesthetic plan, risks, benefits and alternatives discussed with:  Patient (consent in ICU w )..        Procedure: Procedure(s):  liver washout  - Wound Class: II-Clean Contaminated    HPI: Yaneli Miles is a 43 year old male scheduled for ex-lap, possible small bowel resection.  Pt w h/o chronic Hep B cirrhosis, s/p liver transplant 07/21/18, was brought back to OR for emergent ex lap on 07/30 found to have mesenteric ischemia.  Relatively stable since, ICU slowly weaning off pressors - vaso off, norepi still running as of AM 08/03.  BEN - now on CVVHD.  Patient remaining intubated, to be brought down to OR for ex-lap, washout, possible small bowel resection.  Plan for GETA, standard ASA monitors, continued use of a-line and vascular access from ICU.    PMHx/PSHx:  Past Medical History:   Diagnosis Date     Choledocholithiasis      Chronic viral hepatitis B without delta agent and without coma (H) 6/14/2018     Cirrhosis of liver with ascites (H) 6/14/2018     Esophageal varices (H)     prior GIB      Hepatic encephalopathy (H)      Hepatitis delta with hepatitis B carrier state 2018    positive antigen     Portal vein thrombosis 2018 care everywhere [facility]     SBP (spontaneous bacterial peritonitis) (H) 2018     Type 2 diabetes mellitus without complication, without long-term current use of insulin (H) 2018       Past Surgical History:   Procedure Laterality Date     ENDOSCOPIC RETROGRADE CHOLANGIOPANCREATOGRAM      with stone removal     ENDOSCOPIC ULTRASOUND UPPER GASTROINTESTINAL TRACT (GI) N/A 2018    Procedure: ENDOSCOPIC ULTRASOUND, ESOPHAGOSCOPY / UPPER GASTROINTESTINAL TRACT (GI);  Esophagogastroduodenoscopy, removal of [place] tube, ENDOSCOPIC ULTRASOUND with embolization of gastric varices, placement of nasogastric tube;  Surgeon: Armando Kraft MD;  Location: [facility] OR     IRRIGATION AND DEBRIDEMENT ABDOMEN WASHOUT, COMBINED N/A 8/3/2018    Procedure: COMBINED IRRIGATION AND DEBRIDEMENT ABDOMEN WASHOUT;   Abdominal Wash Out;  Surgeon: Leonie Elizalde MD;  Location: [facility] OR     LAPAROTOMY EXPLORATORY N/A 2018    Procedure: LAPAROTOMY EXPLORATORY;  Exploratory Laparotomy, hematoma evacuation;  Surgeon: Stew Min MD;  Location: [facility] OR     RETURN LIVER TRANSPLANT N/A 2018    Procedure: RETURN LIVER TRANSPLANT;  liver bring back, exploratory laparotomy, bowel resection, primary anastomosis ;  Surgeon: Cristino Che MD;  Location: [facility] OR     TRANSPLANT LIVER RECIPIENT  DONOR N/A 2018    Procedure: TRANSPLANT LIVER RECIPIENT  DONOR;  Liver Transplant;  Surgeon: Stew Min MD;  Location: [facility] OR         No current facility-administered medications on file prior to encounter.   Current Outpatient Prescriptions on File Prior to Encounter:  acetaminophen (TYLENOL) 500 MG tablet Take 1 tablet (500 mg) by mouth every 6 hours as needed for mild pain or fever   ciprofloxacin (CIPRO) 500 MG tablet Take 1 tablet (500 mg) by  mouth every 24 hours   ferrous sulfate (IRON) 325 (65 Fe) MG tablet Take 325 mg by mouth   insulin aspart (NOVOLOG PEN) 100 UNIT/ML injection Inject 1 Units Subcutaneous 3 times daily (with meals)   lactulose (CHRONULAC) 10 GM/15ML solution Take 30 mLs (20 g) by mouth 3 times daily Goal of 3-4 BMs per day   lidocaine (XYLOCAINE) 5 % ointment Apply topically every 4 hours as needed for moderate pain   miconazole (MICATIN; MICRO GUARD) 2 % powder Apply topically 2 times daily   midodrine HCl 10 MG TABS Take 10 mg by mouth 3 times daily (with meals)   pantoprazole (PROTONIX) 40 MG EC tablet Take 1 tablet (40 mg) by mouth 2 times daily (before meals)   phosphorus tablet 250 mg (PHOSPHA 250 NEUTRAL) 250 MG per tablet Take 2 tablets (500 mg) by mouth 2 times daily   potassium chloride SA (KLOR-CON) 20 MEQ CR tablet Take 1 tablet (20 mEq) by mouth daily   rifaximin (XIFAXAN) 550 MG TABS tablet Take 1 tablet (550 mg) by mouth 2 times daily   simethicone (MI-ACID GAS RELIEF) 80 MG chewable tablet Take 80 mg by mouth   tenofovir (VIREAD) 300 MG tablet Take 300 mg by mouth       Social Hx:   Social History   Substance Use Topics     Smoking status: Never Smoker     Smokeless tobacco: Not on file     Alcohol use No       Allergies:   Allergies   Allergen Reactions     Nsaids      Contraindicated          NPO Status: Per ASA Guidelines    Labs:    Blood Bank:  Lab Results   Component Value Date    ABO B 08/02/2018    RH Pos 08/02/2018    AS Neg 08/02/2018     BMP:  Recent Labs   Lab Test  08/03/18   0311   NA  136   POTASSIUM  3.7   CHLORIDE  104   CO2  22   BUN  31*   CR  1.18   GLC  109*   JESUS  7.4*     CBC:   Recent Labs   Lab Test  08/03/18   0311   WBC  7.8   RBC  2.81*   HGB  8.3*   HCT  23.6*   MCV  84   MCH  29.5   MCHC  35.2   RDW  16.8*   PLT  26*     Coags:  Recent Labs   Lab Test  08/02/18   0404  07/30/18   1444  07/30/18   1230   INR  1.52*  2.32*  1.91*   PTT   --    --   41*   FIBR   --   334  391

## 2018-08-08 NOTE — PROGRESS NOTES
CRRT STATUS NOTE    DATA:  Time:  5:04 AM  Pressures WNL:  YES  Filter Status:  WDL    Problems Reported/Alarms Noted:  None    Supplies Present:  YES    ASSESSMENT:  Patient Net Fluid Balance:  -16ml  Vital Signs:  HR 70-80s. MAP goal >60, weaning levo. Sats 92% with 30% FiO2.   Labs:  plt- 40. LFT slightly elevated.   Goals of Therapy:  I=O, if CVP <8 turn UF to 0    INTERVENTIONS:   None     PLAN:  Continue with POC. Contact CRRT RN [place] questions or concerns at [phone]

## 2018-08-08 NOTE — PROGRESS NOTES
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 He returned to the SICU for shock, severe lactic acidosis and respiratory failure. He is s/p exploratory laparotomy on 08/06 with resection of necrotic jejunum.    TODAY'S PROGRESS/PLANS:   - To OR today for closure  - Given 1 unit of PRBC prior to OR  - Remove central lines  - Place triple lumen PICC  - TTE and TOÑO for endocarditis rule out  - Follow up with transplant regarding nutrition   - Start midodrine     PLAN:  Neuro/ pain/ sedation:  # Acute pain  - Monitor neurological status. Pt awake and following commands this am.    - Fentanyl gtt.   - PRN oxycodone and Hydromorphone IV as needed for pain     Pulmonary care:   # Acute respiratory failure  - continue with full mechanical support at this time. PST daily.    - HOB elevation, ventilator bundle  - CMV 16/400/5/30%.       Cardiovascular:    #Septic Shock  - Monitor hemodynamic status   - Norepinephrine gtt, needs down today. Stopped Vasopressin 7/31/18 per Transplant due to concerns of bowel ischemia.  - Epi gtt if needs additional pressor support  - Stress dose steroids for 72 hour total finished 08/02    - Started midodrine 8/8 10 mg Q8h    Gastroenterology:   # DDLT 7/21/18  # Intrahepatic left portal venous thrombosis, infarction of left liver lobe, patchy infarction of right lobe, suspected hepatic arterial thrombosis  # 7/30/18 sp exploratory laparotomy, abthera closure device in place. Findings of patchy diffuse SB ischemia. S/p liver biopsy  #  Splenic infarctions, patchy bowel ischemia  # Ileus -resolving  # Ex Lap 8/6 - resection of 90 cm of necrotic ileum   - s/p exploratory laparotomy, abthera closure device in place. Findings of patchy diffuse SB ischemia. S/p liver biopsy on 7/30/18. Return to OR 8/6 for washout, reexploration.   - Continue heparin gtt high intensity given PV thrombosis   - Ileus: CXR 8/1: stool burden, hold PO bowel stimulants or laxatives given concerns of bowel ischemia per primary. S/p multiple enemas and mineral oil x2 doses. Has started having multiple BMs.   - 8/4  Liver: right hepatic artery not visualized, cannot entirely exclude occlusion of the right hepatic artery.      Nutrition:  # protein calorie deficit malnutrition.   - TF's at 20 ml/hr. Currently on hold pending OR.   - On TPN, started 8/6     Renal/ Fluid Balance/electrolytres :    # BEN, anuric   # Severe metabolic acidosis, lactic acidosis, corrected with CRRT  # Severe hyponatremia, corrected   # hyperkalemia--corrected with CRRT   - CRRT per Nephrology. CRRT started 7/30/18. I=O's at this point, not pulling fluids at this point due to pressor requirements  - On CRRT electrolyte replacement protocol.   - albumin replacement 12.5g/1 Liter of abthera and CAROL drain losses.   - Plan HD holiday after removal of central line      Endocrine:    # DM II  - Insulin gtt. Continue with insulin drip for now      ID/ Antibiotics:  # VRE Bacteremia  # ESBL Bacteremia  # Hepatitis B  - Continue Daptomycin (started 7/30), Meropenem (started 8/2) for 14 day course  - Micafungin - continue 7/30--  - WBC stable, pt afebrile  - Immunosuppression: on hold, will defer to transplant when to start.  - Immunosuppression prophylaxis: Bactrim, Valgancyclovir and Tenofovir, nystatin when micafungin stopped.   - Will discontinue blood cultures pending negative results of blood culture drawn 8/6  - Patient with KNOWN bacteremia at time of TLC placement on 7/30/18. Will plan to remove line  after OR today.  Will replace lines within 24 hours.      Positive cultures   - 7/27 VRE (line/arm)   - 7/29: VRE (left arm)   - 7/30: VRE (left arm)   - 7/31: VRE (Line and peripheral)  - 8/1: ESBL from HD/hand/central line   - 8/3: VRE from intra-abdominal tissue  - 8/4: NGTD blood culture  - 8/5: NGTD blood culture  - 8/6: VRE - art line       Heme:     # Acute blood loss anemia  # Anemia of critical illness  # Portal venous thrombosis  # Thrombocytopenia  - Hemoglobin 7.3, slowly trending down.   - High intensity heparin gtt. Continue aspirin 81mg.   - KEN panel sent and negative.   - Platelets chronically low--hold off transfusion at this time         MSK:    # Weakness and deconditioning of critical illness   - PT and OT        Lines/ tubes/ drains:  - R internal jugular TLC 7/30  - L internal jugular dialysis line 7/30  - L radial art line  - NJ   - PIVs  - ETT  - Abthera        General Cares and  Prophylaxis:    - Mechanical prophylaxis for DVT and Heparin gtt  - GI: Protonix  prophylaxis        Disposition:  - Surgical ICU.      ====================================    SUBJECTIVE:   - no acute events overnight  - Awake, following commands this am    OBJECTIVE:   1. VITAL SIGNS:   Temp:  [97.1  F (36.2  C)-97.6  F (36.4  C)] 97.5  F (36.4  C)  Heart Rate:  [71-97] 79  Resp:  [9-24] 17  BP: (135)/(79) 135/79  MAP:  [59 mmHg-106 mmHg] 77 mmHg  Arterial Line BP: ()/(47-78) 114/58  FiO2 (%):  [30 %] 30 %  SpO2:  [96 %-100 %] 99 %  Ventilation Mode: CMV/AC  (Continuous Mandatory Ventilation/ Assist Control)  FiO2 (%): 30 %  Rate Set (breaths/minute): 16 breaths/min  Tidal Volume Set (mL): 400 mL  PEEP (cm H2O): 5 cmH2O  Pressure Support (cm H2O): 8 cmH2O  Oxygen Concentration (%): 30 %  Resp: 17    2. INTAKE/ OUTPUT:   I/O last 3 completed shifts:  In: 3848.22 [I.V.:2511.79; Other:14; NG/GT:100]  Out: 2608 [Emesis/NG output:150; Drains:2200; Other:258]    3. PHYSICAL EXAMINATION:   General: intubated in  bed, responsive  Neuro: Responds to voice, opens eyes, follows commands. Pupils 2 mm brisk, round and equal. No focal deficits.   Resp: Intubated. Lung sounds diminished throughout.   CV: Regular rhythm, mild tachycardia  Abdomen: Soft, less distended than previous day, grimace to palpation, abthera intact with serosanguinous output  Incisions: Abthera device covering open abdomen.  : No bah in place.   Extremities: warm and well perfused, pitting peripheral edema in the feet bilaterally    4. INVESTIGATIONS:   Arterial Blood Gases     Recent Labs  Lab 08/06/18  1323 08/06/18  1200 08/06/18  1120 08/03/18  0844   PH 7.32* 7.36 7.39 7.32*   PCO2 39 37 37 33*   PO2 178* 142* 164* 199*   HCO3 20* 21 22 17*     Complete Blood Count     Recent Labs  Lab 08/08/18  0535 08/08/18  0339 08/07/18 2139 08/07/18  1532   WBC 2.0* 2.0* 1.8* 2.1*   HGB 7.1* 7.4* 7.7* 8.8*   PLT 47* 40* 33* 32*     Basic Metabolic Panel    Recent Labs  Lab 08/08/18  0535 08/08/18  0339 08/07/18  2139 08/07/18  1532    138 138 138   POTASSIUM 3.6 3.7 3.7 3.5   CHLORIDE 109 108 107 109   CO2 23 22 23 22   BUN 27 29 28 25   CR 0.89 0.89 0.93 0.90   * 129* 125* 124*     Liver Function Tests    Recent Labs  Lab 08/08/18  0535 08/08/18  0339 08/07/18  2139 08/07/18  1532  08/07/18  0336  08/06/18  0923  08/02/18  0404   AST 50* 55* 63* 69*  < > 81*  < > 118*  < > 538*   ALT 94* 101* 111* 123*  < > 162*  < > 248*  < > 756*   ALKPHOS 263* 269* 252* 249*  < > 242*  < > 252*  < > 223*   BILITOTAL 2.8* 2.9* 3.2* 3.2*  < > 3.8*  < > 4.4*  < > 5.0*   ALBUMIN 1.7* 1.8* 2.0* 1.8*  < > 1.8*  < > 2.1*  < > 1.9*   INR  --   --   --   --   --  1.53*  --  1.49*  --  1.52*   < > = values in this interval not displayed.  Pancreatic Enzymes  No lab results found in last 7 days.  Coagulation Profile    Recent Labs  Lab 08/07/18  0336 08/06/18  0923 08/02/18  0404   INR 1.53* 1.49* 1.52*     Lactate  Invalid input(s): LACTATE    5. RADIOLOGY:   No results  found for this or any previous visit (from the past 24 hour(s)).    =========================================      Patient seen, findings and plan discussed with surgical ICU staff     Patient was seen with staff ICU Dr. Davis.    Eddie Acuna  CA-2/PGY-3

## 2018-08-08 NOTE — BRIEF OP NOTE
[facility], [place]    Pre-operative diagnosis: Open abdomen, s/p mesenteric ischemia, s/p liver transplant.   Post-operative diagnosis Same   Procedure: Procedure(s):  liver washout and Abdominal Closure with Mesh. - Wound Class: II-Clean Contaminated   Surgeon: Pamela Che M.D.   Assistants(s): Mihir Nj   Anesthesia: General    Estimated blood loss: Less than 50 ml    Total IV fluids: (See anesthesia record)  700ml   Blood transfusion: No transfusion was given during surgery   Total urine output: (See anesthesia record)  None   Drains: None   Specimens: Liver biopsy   Implants: Stratice mesh   Findings: Minimal ascites, intact small bowel anastomosis, patchy ischemic left liver lobe.   Complications: None.   Condition: Stable  Transferred to post-anesthesia recovery   Comments: See dictated operative report for full details

## 2018-08-08 NOTE — ANESTHESIA CARE TRANSFER NOTE
Patient: Yaneli Miles    Procedure(s):  liver washout and Abdominal Closure with Mesh. - Wound Class: II-Clean Contaminated    Diagnosis: post liver transplant   Diagnosis Additional Information: No value filed.    Anesthesia Type:   General, ETT     Note:  Airway :ETT  Patient transferred to:ICU  Comments: Patient transported from OR 22 to ICU with ventilation via ambu and full monitors on.  Patien tolerated transport VSS report to RN.  Placed on ICu vent on arrival vent settings per ICu team. ICU Handoff: Call for PAUSE to initiate/utilize ICU HANDOFF, Identified Patient, Identified Responsible Provider, Reviewed the Pertinent Medical History, Discussed Surgical Course, Reviewed Intra-OP Anesthesia Management and Issues during Anesthesia, Set Expectations for Post Procedure Period and Allowed Opportunity for Questions and Acknowledgement of Understanding      Vitals: (Last set prior to Anesthesia Care Transfer)    CRNA VITALS  8/8/2018 1812 - 8/8/2018 1842      8/8/2018             Pulse: 62    ART BP: 123/67    Ht Rate: 62    SpO2: 100 %    Resp Rate (observed): 16    Resp Rate (set): 16                Electronically Signed By: BUD Tidwell CRNA  August 8, 2018  6:42 PM

## 2018-08-08 NOTE — PLAN OF CARE
Problem: Patient Care Overview  Goal: Plan of Care/Patient Progress Review  OT/4A: Cancel- Per discussion with PT, will continue to hold OT at this time. Will reassess 8/10

## 2018-08-08 NOTE — PLAN OF CARE
Problem: Patient Care Overview  Goal: Plan of Care/Patient Progress Review  Outcome: No Change  D/I: Pt remains intubated. Fentanyl gtt at 50 mcg/hr. Pt awake and follows commands. Levophed gtt weaned off. MAPs >60. Pt given 1 unit PRBCs for Hgb = 7.1. Post transfusion hgb=8.5. PS trial x 1 hr this am. Pt tolerated well. Abthera intact draining 50-100cc/hr. CRRT stopped at 1550 for OR. + VRE blood cultures from central lines, peripheral lines and arterial line. MDs aware. Pt's wife consented for PICC line placement with .  Pt's wife updated per MDs   A: Pt stable with current cares.  P: Continue POC. See EPIC flow sheets for VS and further assessments. DC L internal jugular HD catheter after blood culture obtained upon return from OR. PICC line to be placed later today or tomorrow, then plan to dc R internal jugular central line.

## 2018-08-08 NOTE — ANESTHESIA POSTPROCEDURE EVALUATION
Patient: Yaneli Miles    Procedure(s):  liver washout and Abdominal Closure with Mesh. - Wound Class: II-Clean Contaminated    Diagnosis:post liver transplant   Diagnosis Additional Information: No value filed.    Anesthesia Type:  General, ETT    Note:  Anesthesia Post Evaluation    Patient location during evaluation: ICU  Patient participation: Unable to evaluate secondary to administered sedation  Level of consciousness: obtunded/minimal responses  Pain management: unable to assess  Cardiovascular status: acceptable  Respiratory status: intubated, ventilator and ETT  Hydration status: acceptable  PONV: unable to assess             Last vitals:  Vitals:    08/08/18 1530 08/08/18 1545 08/08/18 1600   BP:      Pulse:      Resp:   16   Temp:   35.9  C (96.7  F)   SpO2: 100% 100% 100%         Electronically Signed By: Ewelina Moss MD  August 8, 2018  6:35 PM

## 2018-08-08 NOTE — PROGRESS NOTES
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 Currently patient is anuric on CRRT, but Cr/BUN as well as lytes are stable. Discussed with primary team and agreed to remove/change central lines and stop CRRT for 24-48h according to lab results. Patient has been presenting intermittently hypotension so will need to have a central line despite persistent +BC for continuing pressors as needed.       Severe hyponatremia, hyperkalemia- resolved  Likely related to free water flushes/BEN , ADH dependant with hypotension. Mild hypokalemia (K 3.3) noted this morning, but otherwise lytes WNL. Will keep monitoring.     Hypervolemia with relative intra vascular depletion  With intermittent hypotension receiving minimal doses of levophed overnight.       Polymicrobial BSI due to ESBL E. coli and VRE E. faecium  Presenting persistently +BC for VRE despite high-dose daptomycin. Planned to have central lines removed as a possible source of persistent bacteremia. Managed by primary team.     OLT 7/12/18   ESLD sec to hep B cb EV , HE , ascites and portal HTN  Ex lap 7/30, Graft function: Intrahepatic left portal venous thrombosis, infarction of left liver lobe, patchy infarction of right lobe, suspected hepatic arterial thrombosis, Splenic infarctions and Patchy bowel ischemia on heparin gtt  8/3 Reexploration liver transplant, bowel ischemia, findings improved patchy  iscemic changes of small bowel, patchy ischemic changes of left liver lobe.   8/6 Re-exploration, ischemic/necrotic jejunum, s/p resection  Planned abdominal closure today.     Acute resp failure   Intubated on vent support   MICU managing     DMII on insulin gtt     Pancytopenia  Related with ongoing sepsis vs liver failure. Managed by primary team    Recommendations:  -- Continued on CRRT today, prescribed at 30ml/kg/min with 0 UF for now, bolus with IVF as needed for hypotension  -- Agree with removal of catheter later today and holding off CRRT afterwards  -- Keep on labs per CRRT protocol; once CRRT is stopped, take BMP/Mg labs Q12h   -- Albumin and NS can be used for resuscitation , avoid LR since he has liver shock likely not able to metabolize lactate well  -- daily weights and strict IO monitoring  -- continue with Intra abdominal pressure monitoring Q shift  -- Will keep following    Recommendations were communicated to primary team     Seen and discussed with Dr. Сергей Booth MD   IM - PGY2  P: [phone]    Interval History :   In the last 24 hours Yaneli Miles has been in the ICU on CRRT with anuria tolerating CRRT requiring intermittently low dose of levophed. CVP around 3 - 6. No reports of fever or worsening oxygen requirements    Review of Systems:   Unable to obtain    Physical Exam:   I/O last 3 completed shifts:  In: 3692.26 [I.V.:2069.43; Other:12; NG/GT:130]  Out: 2743 [Emesis/NG output:150; Drains:2200; Other:68; Stool:325]   /79 (BP Location: Right arm)  Pulse 121  Temp 97.4  F (36.3  C) (Axillary)  Resp 16  Wt 64.6 kg (142 lb 6.7 oz)  SpO2 100%  BMI 22.99 kg/m2     GENERAL APPEARANCE: ill appearing, intubated  EYES:  + scleral icterus, pupils equal  HENT: mouth without ulcers or lesions  PULM: lungs low air entry  to auscultation,  bilaterally, equal air movement, no clubbing  CV: regular rhythm, normal rate, no rub     -JVD unable to access     -edema 2+ in LE   GI: soft,  distended, bowel sounds are +/-  INTEGUMENT: no cyanosis, - rash  NEURO:  sedated   Access LIJ non tunneled    Labs:   All labs reviewed by me  Electrolytes/Renal -   Recent Labs   Lab Test  08/08/18   0954  08/08/18   0535  08/08/18   0339   NA  140  139  138   POTASSIUM  3.3*  3.6  3.7   CHLORIDE  111*  109  108   CO2  22  23  22   BUN  26  27  29   CR  0.81  0.89  0.89   GLC  126*  122*  129*   JESUS  6.7*  7.2*  7.4*   MAG  2.2  2.2  2.4*   PHOS  2.8  3.1  3.3       CBC -   Recent Labs   Lab Test  08/08/18   0954  08/08/18   0535  08/08/18   0339   WBC  1.7*  2.0*  2.0*   HGB  7.1*  7.1*  7.4*   PLT  34*  47*  40*       LFTs -   Recent Labs   Lab Test  08/08/18   0954  08/08/18   0535  08/08/18   0339   ALKPHOS  227*  263*  269*   BILITOTAL  2.4*  2.8*  2.9*   ALT  78*  94*  101*   AST  41  50*  55*   PROTTOTAL  3.3*  3.5*  3.7*   ALBUMIN  1.6*  1.7*  1.8*       Iron Panel -   Recent Labs   Lab Test  07/13/18   0334  06/17/18   2138   IRON  97  66   IRONSAT  83*  109*   ANGELLA  Unsatisfactory specimen - icteric   --          Imaging:  All imaging studies reviewed by me.     Current Medications:    aspirin  81 mg Oral Daily     DAPTOmycin (CUBICIN) intermittent infusion  10 mg/kg (Dosing Weight) Intravenous Q24H     hydrocortisone sodium succinate PF  100 mg Intravenous Q8H     lipids  250 mL Intravenous Once per day on Mon Wed Fri     meropenem  1,000 mg Intravenous Q8H     micafungin  100 mg Intravenous Q24H     midodrine  10 mg Per Feeding Tube Q8H     pantoprazole  40 mg Oral QAM AC     sodium chloride (PF)  3 mL Intravenous Q8H     sulfamethoxazole-trimethoprim  10 mL Oral Once per day on Mon Wed Fri     tacrolimus  1.5 mg Oral or Feeding Tube BID IS     tenofovir  300 mg Per Feeding Tube Q48H     valGANciclovir  450 mg Oral Every Other Day    Or     valGANciclovir  450 mg Oral or NG Tube Every Other Day       IV fluid REPLACEMENT ONLY       dialysate for CVVHD & CVVHDF (Phoxillum BK4/2.5) 17 mL/kg/hr (08/08/18  1249)     fentaNYL 50 mcg/hr (08/08/18 1500)     insulin (regular) 4 Units/hr (08/08/18 1500)     lactated ringers 10 mL/hr at 07/30/18 1519     norepinephrine Stopped (08/08/18 1331)     parenteral nutrition - ADULT compounded formula       parenteral nutrition - ADULT compounded formula 50 mL/hr at 08/08/18 0008     Patient RECEIVING antibiotic to treat a different condition and it provides ADEQUATE COVERAGE for this surgical procedure.       replacement solution for CVVHD & CVVHDF (Phoxillum BK4/2.5) 200 mL/hr at 08/07/18 1553     replacement solution for CVVHD & CVVHDF (Phoxillum BK4/2.5) 12.5 mL/kg/hr (08/08/18 0948)     Reason beta blocker order not selected

## 2018-08-08 NOTE — PLAN OF CARE
Problem: Patient Care Overview  Goal: Plan of Care/Patient Progress Review  Outcome: No Change  Neuro: patient moves all extremities, follows commands. Pupils equal and reactive.    CV: NSR, HR 70-80s overnight. MAP goal >60 achieved with levo drip at 0.02 for the majority of the night, currently levo is off and patient sustaining MAP goal.  CVP <8. Afebrile, juan hugger used per patient request. Heparin DC'd at 0000 per MD order.   Resp: Intubated. 400/16/30%/5. Lung sounds starting to clear. Thin white in-line secretions.   Gi/Gu: NG to LIS, drained 100cc overnight of brown bile. TPN 50cc/hr Anuric, bladder scanned for 200cc. Rectal pouch in place, small output. Abthera continuous -125, drainage is serosang about 100cc/hr, PRN albumin given for 1L loss of fluid from abthera. Continuous insulin.  Skin: Edema in lower extremities.   Labs:  Replaced Ca once overnight. Blood cultures came back positive  CRRT: Net hour fluid removal rate goal I=O if CVP >8, if CVP is <8 then rate is 0  Drips/IV: Left IJ: CRRT, Right IJ, Left radial Windy, 2 left PIV, 1 Right PIV    Fentanyl: 50  Insulin:4   Levo: off

## 2018-08-09 NOTE — PROGRESS NOTES
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 Patient has been receiving CRRT until yesterday when tunneled catheter was removed to give him a dialysis holiday. Currently not hypervolemic, no severe lytes abnormalities, so no indication to re-start CRRT today. In addition, bladder scan was done this morning showing 100-200 ml of urine, despite previous readings showing lack of urine. Therefore, it is possible that patient has been producing minimal amounts of urine that has not been quantified vs true anuria. In order to get a better monitoring of output, we recommended to place a bah and start diuretics to see if there is any response. If no response, then will be Ok to remove bah tomorrow.      Severe hyponatremia, hyperkalemia- resolved  Likely related to free water flushes/BEN , ADH dependant with hypotension. Borderline hypokalemia (K 3.5) and mild hypocalcemia (ICa 4.3) noted this morning, but otherwise lytes WNL. Will keep monitoring.     Hypervolemia with relative intra vascular depletion  With intermittent hypotension receiving minimal doses of levophed overnight, being most of the time off pressors. Received one dose of albumin 12.5 mg.      Polymicrobial BSI due to ESBL E. coli and VRE E. faecium  Presenting persistently +BC for VRE despite high-dose daptomycin. Tunneled catheter was removed last night at 3 am, but still RIJ is in place  and PICC line in the left arm was placed this morning. Of note, BC taken from yesterday is still positive for GPCs in chains. Managed by primary team.      OLT 7/12/18   ESLD sec to hep B cb EV , HE , ascites and portal HTN  Ex lap 7/30, Graft function: Intrahepatic left portal venous thrombosis, infarction of left liver lobe, patchy infarction of right lobe, suspected hepatic arterial thrombosis, Splenic infarctions and Patchy bowel ischemia on heparin gtt  8/3 Reexploration liver transplant, bowel ischemia, findings improved patchy iscemic changes of small bowel, patchy ischemic changes of left liver lobe.   8/6 Re-exploration, ischemic/necrotic jejunum, s/p resection  8/8 abdomen was closed yesterday.    Acute resp failure   Intubated on vent support   MICU managing     DMII on insulin gtt     Pancytopenia  Related with ongoing sepsis vs liver failure. Managed by primary team    Recommendations:  -- Continued holding CRRT for now  -- Start IV bumex 2 mg TID  -- Monitor BMP/Mg BID  -- Monitor urine output; if not output, consider to remove bah tomorrow  -- Albumin and NS can be used for resuscitation , avoid LR since he has liver shock likely not able to metabolize lactate well  -- daily weights and strict IO monitoring  -- Will keep following    Recommendations were communicated to primary team via note    Seen and discussed with Dr. Сергей Booth MD   IM - PGY2  P: [phone]    Interval History :   Patient went to OR yesterday and underwent abdomen closure. Overnight, patient presented one episode of hypotension re-starting low dose levophed and receiving albumin. No reports of fever or increased oxygen requirements overnight.      Review of Systems:   Unable to obtain    Physical Exam:   I/O last 3 completed shifts:  In: 3881.48 [I.V.:1770.59; Other:7; NG/GT:240]  Out: 1491 [Emesis/NG output:150; Drains:900; Other:16; Stool:425]   /54  Pulse 121  Temp 98.5  F (36.9  C) (Axillary)  Resp 14   Wt 67.5 kg (148 lb 13 oz)  SpO2 100%  BMI 24.02 kg/m2     GENERAL APPEARANCE: ill appearing, intubated  EYES:  + scleral icterus, pupils equal  HENT: mouth without ulcers or lesions  PULM: lungs low air entry  to auscultation,  bilaterally, equal air movement, no clubbing  CV: regular rhythm, normal rate, no rub     -JVD unable to access     -edema 2+ in LE   GI: soft, distended, bowel sounds are +/-, with subcostal surgical wound covered with gauze with minimal serosanguinous drainage.  INTEGUMENT: no cyanosis, - rash  NEURO:  sedated   Access LIJ non tunneled removed; RIJ still in placed, PICC left arm placed this morning    Labs:   All labs reviewed by me  Electrolytes/Renal -   Recent Labs   Lab Test  08/09/18 0335 08/08/18 2206 08/08/18 1929   NA  136  137  137   POTASSIUM  3.5  3.5  3.9   CHLORIDE  106  106  107   CO2  22  22  22   BUN  42*  39*  35*   CR  1.28*  1.13  1.05   GLC  169*  210*  154*   JESUS  6.6*  6.6*  6.9*   MAG  2.1  2.1  2.2   PHOS  2.5  2.8  3.3       CBC -   Recent Labs   Lab Test  08/09/18 0335 08/08/18 2206 08/08/18 1929   WBC  3.8*  2.4*  3.4*   HGB  7.7*  8.1*  8.8*   PLT  56*  52*  49*       LFTs -   Recent Labs   Lab Test  08/09/18 0335 08/08/18 2206 08/08/18   1553   ALKPHOS  290*  266*  291*   BILITOTAL  2.5*  2.9*  3.0*   ALT  69  75*  87*   AST  50*  51*  49*   PROTTOTAL  3.6*  3.4*  3.9*   ALBUMIN  1.9*  1.6*  1.9*       Iron Panel -   Recent Labs   Lab Test  07/13/18 0334 06/17/18 2138   IRON  97  66   IRONSAT  83*  109*   ANGELLA  Unsatisfactory specimen - icteric   --          Imaging:  All imaging studies reviewed by me.     Current Medications:    aspirin  81 mg Oral Daily     [START ON 8/10/2018] DAPTOmycin (CUBICIN) intermittent infusion  10 mg/kg (Dosing Weight) Intravenous Q48H     heparin lock flush  5-10 mL Intracatheter Q24H     hydrocortisone sodium succinate PF  100 mg Intravenous Q8H     lipids  250 mL Intravenous Once per day on Mon Wed Fri      meropenem  500 mg Intravenous Q24H     micafungin  100 mg Intravenous Q24H     midodrine  10 mg Per Feeding Tube Q8H     pantoprazole  40 mg Oral QAM AC     sodium chloride (PF)  3 mL Intravenous Q8H     sulfamethoxazole-trimethoprim  10 mL Oral Once per day on Mon Wed Fri     tacrolimus  1.5 mg Oral or Feeding Tube BID IS     tenofovir  300 mg Per Feeding Tube Q48H     [START ON 8/10/2018] valGANciclovir  450 mg Oral or NG Tube Once per day on Tue Fri    Or     [START ON 8/10/2018] valGANciclovir  450 mg Oral Once per day on Tue Fri       IV fluid REPLACEMENT ONLY       dialysate for CVVHD & CVVHDF (Phoxillum BK4/2.5) 17 mL/kg/hr (08/08/18 1249)     fentaNYL 75 mcg/hr (08/09/18 1000)     heparin 500 Units/hr (08/09/18 0758)     insulin (regular) 6 Units/hr (08/09/18 0756)     lactated ringers 10 mL/hr at 07/30/18 1519     norepinephrine Stopped (08/09/18 0757)     parenteral nutrition - ADULT compounded formula 50 mL/hr at 08/09/18 0800     replacement solution for CVVHD & CVVHDF (Phoxillum BK4/2.5) 200 mL/hr at 08/07/18 1553     replacement solution for CVVHD & CVVHDF (Phoxillum BK4/2.5) 12.5 mL/kg/hr (08/08/18 0948)     Reason beta blocker order not selected

## 2018-08-09 NOTE — PROVIDER NOTIFICATION
D: Bladder scanned for questionable amount of fluid (50-100cc).  Urojet used prior to bah insertion. Attempted placement of 16 fr standard bah with resistance met.  Bah removed with noted bright red bleeding from meatus post removal.   I:  SICU notified of inability to place bah and bleeding.  Coude 16 Fr placed with no resistance upon insertion and approximately 10cc bright red blood returning. SICU notified of continued bleeding from meatus and no urine out.  Urology irrigated bah at bedside and confirmed correct placement.   A: No output from bah after Bumex 2mg IV given.  P:  Continue to monitor output.  Plan to DC bah in am in no output.

## 2018-08-09 NOTE — PLAN OF CARE
Problem: Patient Care Overview  Goal: Plan of Care/Patient Progress Review  4A-PT: CXL: Pt awaiting medical procedure (line change(s)) at scheduled  time. CXL PT today. Will continue to follow per POC.

## 2018-08-09 NOTE — PLAN OF CARE
Problem: Patient Care Overview  Goal: Individualization & Mutuality  D/I: Patient on unit 4A Surgical/Neuro ICU following liver transplant with post op complications  Neuro- Pt currently drowsy, awakens to voice, follows commands and nods yes / no. PERRL. Pt can indicate pain in abdomen.   CV-  Pt hemodynamically stable, NSR with HR 80s. Norepinephrine turned of this morning, BPs 100s/60s with MAP >60. Pt has generalized 2-3+ edema.   Pulm-  Pt pressure supported for 3-4 hours this morning at 7/5. Tolerated well, RR 10-16 and SaO2 %. Lung sounds clear bilaterally, diminished in bases.   GI- Pt abdomen soft, round and non-distended. Two small, soft, brown stools during shift. Pt on TPN and starting trickle feeds today at 10cc/hr.   -  Pt bladder scanned this morning, noted around 100-200 ml of urine. Luis catheter with UroJet inserted, first attempt unsuccessful, second with coude catheter.   Gtts-  Norepinephrine gtt turned off this morning. Insulin gtt increased to 6 units/ hr to maintain blood glucose below 140 per algorithm 4+. Heparin gtt increased to 900 units /hr per MD order. Fentanyl infusion maintained at 75 mcg /hr.   Skin-  Abdominal incision covered by dressing, moderate amount of serosanguinous drainage under dressing. CAROL site on mid left and right abdomen, packed with gauze and covered with new dressing.   Pain-  Pt indicated pain in abdomen. Pain managed by continuous fentanyl infusion.   Lines/Drain- PICC inserted today in left arm, Xray verified and okay to use. Left CVC internal jugular removed, dressing applied. Continues infusions switched to PICC line. Left and right peripheral IVs in place saline locked.    See flow sheets for further interventions and assessments.   A: Stable   P: Continue to monitor pt closely. Notify MD of significant changes. Continue pressure support trials with goal of extubation soon.

## 2018-08-09 NOTE — PROGRESS NOTES
Pressure Support Trial Summary    Settings:  Ventilation Mode: CPAP/PS  PEEP (cm H2O): 5 cmH2O  Pressure Support (cm H2O): 7 cmH2O  Oxygen Concentration (%): 30 %    Patient Parameters:  Heart Rate: 98  BP: 108/54  BP - Mean: 68  Spontaneous Respiratory Rate: 15 breaths/min  Spontaneous Tidal Volume: 0.53 mL  No Data Recorded  RSBI (calculation): 28.3  RSBI trend: steady  Breathing Trial Total Time   (min): 330 minutes  Weaning trial discontinued due to:: End of designated trial.  See RT [facility] for complete documentation.    Based on pressure support trial results and RT assessment, recommend do not extubate without further physician assessment.    Kristian Oro, RRT  8/9/2018 12:58 PM

## 2018-08-09 NOTE — PROGRESS NOTES
Left CVC removed at bedside. Patient placed in trendelenburg, chlorhexidine applied to site and sutures were removed. The catheter was then removed in a controlled fashion with pressure applied to site. The catheter was found to be intact once removed. Nursing held pressure for 5-10 minutes assuring hemostasis.    Janneth Casillas MD  PGY-2, General Surgery  [phone]

## 2018-08-09 NOTE — PROGRESS NOTES
CLINICAL NUTRITION SERVICES - BRIEF NOTE    Nutrition Prescription    RECOMMENDATIONS FOR MDs/PROVIDERS TO ORDER:  Free water flush adjustment per MD discretion pending sodium and fluid status    Recommendations already ordered by Registered Dietitian (RD):  1. Start TFs via pt's NDT:  -Nutren 1.5 @ 10 mL/hr. NO advancement at this time  -Advancement per MD discretion ONLY to goal 60 mL/hr to provide 2160 kcals (35 kcal/kg/day), 98 g PRO (1.6 g/kg/day), 1094 mL H2O, 253 g CHO and no fiber daily.  -30 mL water flush q4h for tube patency    2. Metabolic cart study to assess adequacy of nutrition to prevent over/underfeeding    3. Triglyceride check while on PN    Future/Additional Recommendations:  1. TF tolerance, ability to advance beyond trophic    2. EN/PN wean:  -Once pt tolerating TF @ 20 mL/hr, can discontinue IV lipids  -Once pt tolerating TF @ 40 mL/hr, can cut PN rate in half to run bag out to discontinue PN  -Once PN weaned, off order appropriate MVI-M (pending dialysis) to meet micronutrient needs     RD spoke with transplant team who would like to initiate trophic TFs on pt today. Pt is currently receiving 100% of nutrition via PN. Pt continues on mechanical ventilation, FiO2 = 30%.    Nutrition Progress Note - f/u for progress towards previous nutrition POC (see previous 8/3 reassessment for details)    Halina Crouch, RD, LD  SICU RD Pgr: [phone]

## 2018-08-09 NOTE — PLAN OF CARE
Problem: Patient Care Overview  Goal: Plan of Care/Patient Progress Review    D: Admitted for dcsd donor liver tx on 7/20. Complicated by patchy ischemic small bowel (s/p resection 8/6), PV venous thrombosis and bilat liver lobe infarcts, biopsy 7/30 neg for rejection. Abd closed and abthera removed in OR yesterday.      I/A: Neuro: Able to follow simple commands by gesturing. Can nod yes or no appropriately to questions. Fent gtt for pain, supplemented with 5mg po oxy when pt grimaces/shows other s/sxs of discomfort.   CV: SR/ST . BP labile 100-140/50-70s. MAP goal > 60. 250cc 12.5% albumin given x 1, moderately effective. Levo off since 1930 yesterday. Pulses +1 in BUEs, +2 in BLEs. Tmax 99.6 oral.   Resp: ETT at 23cm. CMV 30%/400/16/5. Lungs coarse to clear, good cough. Small thin clear/white secretions. Will attempt PS this AM.   GI: TPN @ 50cc/hr. Insulin drip in place. NG to LIS, small green/white clear bile out. NG ok for meds. NJ in place, not to be used until ok'd by MD. Rectal pouch with 100cc liquid/loose brown stool overnight.   : Anuric. Bladder scanned for 232cc. To be straight cathed for volumes > 400cc. Plan to be off CRRT 24-48 total, will continue to reassess need. L HD line removed by MD Casillas overnight with pressure held for 5 minutes.   Skin: Gen +2 edema. Bilat old CAROL sites, L site draining more serosang drainage than R. Midline abd incision with mod moist serosang drainage.   Drains: None.   Gtts: Insulin at 9cc/hr (alg 4), Heparin at 500U/hr (straight rate), Fentanyl 75mcg/hr.    P: Continue current POC. Alert MD of any acute changes. Reassess need for CRRT. PICC placement at some point today, remove R 3L CVC when PICC confirmed.

## 2018-08-09 NOTE — PROGRESS NOTES
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 He returned to the SICU for shock, severe lactic acidosis and respiratory failure. He is s/p exploratory laparotomy on 08/06 with resection of necrotic jejunum, abdomen closed 8/8.     TODAY'S PROGRESS/PLANS:   - PST of 0/5 today for assessment of extubation date   - PICC placement  - OOB   - discontinue norepinephrine   - follow up with Transplant regarding trickle feeds, given bacteremia  - Follow up with transplant regarding high intensity heparin  - HD holiday   - blood culture x2    PLAN:  Neuro/ pain/ sedation:  # Acute pain  - Monitor neurological status. Pt awake and following commands this am.    - Fentanyl gtt.   - PRN oxycodone and Hydromorphone IV as needed for pain     Pulmonary care:   # Acute respiratory failure  - continue with full mechanical support at this time. PST daily.  Potential extubation in the next few days.   - HOB elevation, ventilator bundle  - CMV 16/400/5/30%.       Cardiovascular:    #Septic Shock  - Monitor hemodynamic status   - Norepinephrine gtt, needs decreased today. Stopped Vasopressin 7/31/18 per Transplant due to concerns of bowel ischemia.  - Epi gtt if needs additional pressor support   - Started midodrine 8/8 10 mg Q8h  - Will place PICC line today    Gastroenterology:   # DDLT 7/21/18  # Intrahepatic left portal venous thrombosis, infarction of left liver lobe, patchy infarction of right lobe, suspected hepatic arterial thrombosis  # 7/30/18 sp exploratory  laparotomy, abthera closure device in place. Findings of patchy diffuse SB ischemia. S/p liver biopsy  # Splenic infarctions, patchy bowel ischemia  # Ileus -resolving  # Ex Lap 8/6 - resection of 90 cm of necrotic ileum   # Ex Lap 8/8 - abdomen closed  - s/p exploratory laparotomy, abthera closure device in place. Findings of patchy diffuse SB ischemia. S/p liver biopsy on 7/30/18. Returned to OR 8/6 for washout, reexploration.   - Abdomen closed in OR 8/8  - Continue heparin gtt high intensity given PV thrombosis - held for OR 8/8, restarted on straight rate heparin gtt  - Ileus: CXR 8/1: stool burden, hold PO bowel stimulants or laxatives given concerns of bowel ischemia per primary. S/p multiple enemas and mineral oil x2 doses. Has started having multiple BMs.   - 8/8 US Liver: unchanged decreased velocity in the left portal vein, improved velocities of the hepatic artery     Nutrition:  # protein calorie deficit malnutrition.   - TF's currently on hold due to bowel anastomosis.   - On TPN, started 8/6. Will ask Transplant about nutrition goals.      Renal/ Fluid Balance/electrolytres :    # BEN, anuric   # Severe metabolic acidosis, lactic acidosis, corrected with CRRT  # Severe hyponatremia, corrected   # hyperkalemia--corrected with CRRT   - CRRT per Nephrology. CRRT started 7/30/18. I=O's at this point, not pulling fluids at this point due to pressor requirements - On CRRT holiday for 24-48 hours  - albumin replacement 12.5g/1 Liter of abthera and CAROL drain losses.       Endocrine:    # DM II  - Insulin gtt. Continue with insulin drip for now      ID/ Antibiotics:  # VRE Bacteremia  # ESBL Bacteremia  # Hepatitis B  - Continue Daptomycin (started 7/30), Meropenem (started 8/2) for 14 day course  - Micafungin - continue 7/30--  - WBC stable, pt afebrile  - Immunosuppression: on hold, will defer to transplant when to start.  - Immunosuppression prophylaxis: Bactrim, Valgancyclovir and Tenofovir, nystatin when  micafungin stopped.   - Will discontinue blood cultures pending negative results of blood culture drawn 8/6  - PICC line today      Positive cultures   - 7/27 VRE (line/arm)   - 7/29: VRE (left arm)   - 7/30: VRE (left arm)   - 7/31: VRE (Line and peripheral)  - 8/1: ESBL from HD/hand/central line   - 8/3: VRE from intra-abdominal tissue  - 8/4: NGTD blood culture  - 8/5: NGTD blood culture  - 8/6: VRE - art line  - 8/7: VRE (blood)  - 8/8: VRE (blood)       Heme:     # Acute blood loss anemia  # Anemia of critical illness  # Portal venous thrombosis  # Thrombocytopenia  - Hemoglobin 7.7, slowly trending down.   - High intensity heparin gtt held for OR 8/8, restarted on straight rate heparin, will restart heparin gtt per Transplant recs  - Continue aspirin 81mg.   - KEN panel sent and negative.   - Platelets chronically low--hold off transfusion at this time         MSK:    # Weakness and deconditioning of critical illness   - PT and OT        Lines/ tubes/ drains:  - R internal jugular TLC 7/30  - L radial art line  - NJ   - PIVs  - ETT       General Cares and  Prophylaxis:    - Mechanical prophylaxis for DVT and Heparin gtt  - GI: Protonix  prophylaxis        Disposition:  - Surgical ICU.      ====================================    SUBJECTIVE:   - no acute events overnight  - abdomen closed in OR yesterday evening    OBJECTIVE:   1. VITAL SIGNS:   Temp:  [96.5  F (35.8  C)-99.6  F (37.6  C)] 98.2  F (36.8  C)  Heart Rate:  [] 92  Resp:  [11-22] 11  BP: (108)/(54) 108/54  MAP:  [55 mmHg-94 mmHg] 76 mmHg  Arterial Line BP: ()/(32-74) 123/58  FiO2 (%):  [30 %] 30 %  SpO2:  [94 %-100 %] 100 %  Ventilation Mode: CPAP/PS  (Continuous positive airway pressure with Pressure Support)  FiO2 (%): 30 %  Rate Set (breaths/minute): 16 breaths/min  Tidal Volume Set (mL): 400 mL  PEEP (cm H2O): 5 cmH2O  Pressure Support (cm H2O): 7 cmH2O  Oxygen Concentration (%): 30 %  Resp: 11    2. INTAKE/ OUTPUT:   I/O last 3  completed shifts:  In: 3881.48 [I.V.:1770.59; Other:7; NG/GT:240]  Out: 1491 [Emesis/NG output:150; Drains:900; Other:16; Stool:425]    3. PHYSICAL EXAMINATION:   General: intubated in bed, responsive  Neuro: Responds to voice, opens eyes, follows commands. Pupils 2 mm brisk, round and equal. No focal deficits.   Resp: Intubated. Lung sounds diminished throughout.   CV: RRR  Abdomen: Soft, non-distended, grimace to palpation  Incisions: dressings with mild serosanguinous drainage at upper abdominal closure site and right and left CAROL drain closure sites  : No bah in place.   Extremities: warm and well perfused, pitting peripheral edema in the feet bilaterally    4. INVESTIGATIONS:   Arterial Blood Gases     Recent Labs  Lab 08/08/18 1705 08/06/18  1323 08/06/18  1200 08/06/18  1120   PH 7.38 7.32* 7.36 7.39   PCO2 37 39 37 37   PO2 126* 178* 142* 164*   HCO3 22 20* 21 22     Complete Blood Count     Recent Labs  Lab 08/09/18 0335 08/08/18 2206 08/08/18 1929 08/08/18  1705 08/08/18  1553   WBC 3.8* 2.4* 3.4*  --  2.4*   HGB 7.7* 8.1* 8.8* 8.3* 8.5*   PLT 56* 52* 49*  --  39*     Basic Metabolic Panel    Recent Labs  Lab 08/09/18 0335 08/08/18 2206 08/08/18 1929 08/08/18  1705 08/08/18  1553    137 137 133 137   POTASSIUM 3.5 3.5 3.9 3.4 3.7   CHLORIDE 106 106 107  --  107   CO2 22 22 22  --  25   BUN 42* 39* 35*  --  30   CR 1.28* 1.13 1.05  --  0.88   * 210* 154* 142* 140*     Liver Function Tests    Recent Labs  Lab 08/09/18 0335 08/08/18 2206 08/08/18 1929 08/08/18  1553 08/08/18  0954  08/07/18  0336 08/06/18  0923   AST 50* 51*  --  49* 41  < > 81*  < > 118*   ALT 69 75*  --  87* 78*  < > 162*  < > 248*   ALKPHOS 290* 266*  --  291* 227*  < > 242*  < > 252*   BILITOTAL 2.5* 2.9*  --  3.0* 2.4*  < > 3.8*  < > 4.4*   ALBUMIN 1.9* 1.6*  --  1.9* 1.6*  < > 1.8*  < > 2.1*   INR  --   --  1.36*  --   --   --  1.53*  --  1.49*   < > = values in this interval not displayed.  Pancreatic  Enzymes  No lab results found in last 7 days.  Coagulation Profile    Recent Labs  Lab 08/09/18  0335 08/08/18  1929 08/07/18  0336 08/06/18  0923   INR  --  1.36* 1.53* 1.49*   PTT 53* 34  --   --      Lactate  Invalid input(s): LACTATE    5. RADIOLOGY:   No results found for this or any previous visit (from the past 24 hour(s)).    =========================================    Patient was seen with staff ICU Dr. Davis.    Carlos Castellanos  Medical Student, MS4  [employer]      Eddie Acuna  CA-2/PGY-3

## 2018-08-09 NOTE — PROGRESS NOTES
D/I: Pt arrived from OR at 1845 with anesthesia. Report received from anesthesia. Pt settled in bed and closely monitored. Report given to MISAEL Doss at 1900. Dr Che here prior to pt arrival looking for pt's wife. Pt's wife not here.  P: Continue POC. LIVER US to be done this pm.

## 2018-08-09 NOTE — PHARMACY-MEDICATION REGIMEN REVIEW
Pharmacy Tube Feeding Consult    Medication reviewed for administration by feeding tube and for potential food/drug interactions.    Recommendation: No changes are needed at this time.     Pharmacy will continue to follow as new medications are ordered.    Luci Cuevas, RichD

## 2018-08-09 NOTE — CONSULTS
"[facility], [place]   Hematology Consult Note    Yaneli Miles MRN# [id number]   Age: 43 year old YOB: 1975          Reason for Consult:   Assess for coagulopathy         Assessment and Plan:   Yaneli Miles is a 43 year old man with ESLD, now s/p DBD OLT on 7/21/18. His course has been complicated by intra-hepatic portal vein thrombosis (now on heparin drip), septic shock (with intermittent pressor requirements), bacteremia (VRE and ESBL), severe lactic acidosis, respiratory failure (s/p intubation), ileus, mesenteric ischemia (s/p exploratory laparotomy on 8/6/18, with resection of necrotic jejunum), hyperglycemia (requiring insulin drip), and anuric renal failure (on CRRT). The hematology team has been consulted to assess the patient for coagulopathy.  This patient has multiple reasons for coagulopathy. Individuals with liver disease have a variety of hemostatic abnormalities, resulting in rebalanced hemostasis. These changes increase both risk of bleeding and risk of thrombosis. This patient had a history of portal vein thrombosis in May 2018, pre-dating his transplant. In the post-transplant period, he was again found to have portal vein thrombosis and mesenteric ischemia. This is likely a consequence of the hypercoagulable state that is the post-operative period, likely also compounded by the fact that the patient was critically ill with polymicrobial bacteremia, on multiple pressors, and immobile. He has no family history of bleeding or clotting disorders. It is highly unlikely there is an underlying \"coagulopathy\" other than pre-existing liver disease requiring transplant, and hypercoagulable state secondary to transplant, critical illness, etc. He has appropriately been maintained on anticoagulation, and seems to be tolerating this well.   Summary of Recommendations:    No additional work-up for coagulopathy is necessary.    Continue heparin drip for anticoagulation until " patient is stable enough to be transitioned to an alternative anticoagulant, potentially enoxaparin, warfarin, or DOAC (choice of agent will depend on renal and liver function).    Anticoagulation should continue for at least 3-6 months.    Can follow-up in hematology clinic as an outpatient prior to the 3 month mandeep to assess for duration recommendations at that time.    Thank you for involving us in the care of this patient. We will sign-off. Please page with any questions or concerns.    Plan was discussed with attending physician Dr. Ceballos.    Kim Colorado MD/PhD  Heme/Onc Fellow, PGY4  08/09/2018  [phone]      HEMATOLOGY STAFF:  Seen with fellow, whose note reflects our joint evaluation, assessment, and plan.    ;;  Mandeep Ceballos MD  Associate Professor of Medicine  Division of Hematology, Oncology, and Transplantation  Director, [facility]             History of Present Illness:   Yaneli Miles is a 43 year old ong man with history of ESLD secondary to chronic HBV, complicated by portal hypertension with prior portal vein thrombosis (pre-transplant), esophageal varices, hepatic encephalopathy, ascites and severe thrombocytopenia. He is now s/p DBD OLT on 7/21/18. His post-operative course has been complicated by intra-hepatic portal vein thrombosis (identified POD #9, now on heparin drip), septic shock (with intermittent pressor requirements), bacteremia (VRE and ESBL), severe lactic acidosis, respiratory failure (s/p intubation), ileus, mesenteric ischemia (s/p exploratory laparotomy on 8/6/18, with resection of necrotic jejunum), hyperglycemia (requiring insulin drip), and anuric renal failure (on CRRT). The hematology team has been consulted to assess the patient for coagulopathy.  The patient is intubated. He is able to answer simple yes/no questions and does follow commands. Personal history of portal vein thrombosis (prior to transplant), no other VTEs noted.  No family history of any bleeding or clotting issues. Went to OR yesterday for abdominal closure. Pain well controlled at present. On a CRRT holiday today. No significant bleeding from surgical sites or line insertions.       Past Medical History:     Past Medical History:   Diagnosis Date     Choledocholithiasis      Chronic viral hepatitis B without delta agent and without coma (H) 6/14/2018     Cirrhosis of liver with ascites (H) 6/14/2018     Esophageal varices (H)     prior GIB     Hepatic encephalopathy (H)      Hepatitis delta with hepatitis B carrier state 06/14/2018    positive antigen     Portal vein thrombosis 6/14/2018 5/29/18 care everywhere US     SBP (spontaneous bacterial peritonitis) (H) 06/2018     Type 2 diabetes mellitus without complication, without long-term current use of insulin (H) 6/14/2018            Past Surgical History:     Past Surgical History:   Procedure Laterality Date     ENDOSCOPIC RETROGRADE CHOLANGIOPANCREATOGRAM      with stone removal     ENDOSCOPIC ULTRASOUND UPPER GASTROINTESTINAL TRACT (GI) N/A 6/22/2018    Procedure: ENDOSCOPIC ULTRASOUND, ESOPHAGOSCOPY / UPPER GASTROINTESTINAL TRACT (GI);  Esophagogastroduodenoscopy, removal of [place] tube, ENDOSCOPIC ULTRASOUND with embolization of gastric varices, placement of nasogastric tube;  Surgeon: Armando Kraft MD;  Location: [facility] OR     INCISION AND DRAINAGE ABDOMEN WASHOUT, COMBINED N/A 8/8/2018    Procedure: COMBINED INCISION AND DRAINAGE ABDOMEN WASHOUT;  liver washout and Abdominal Closure with Mesh.;  Surgeon: Cristino Che MD;  Location: [facility] OR     IRRIGATION AND DEBRIDEMENT ABDOMEN WASHOUT, COMBINED N/A 8/3/2018    Procedure: COMBINED IRRIGATION AND DEBRIDEMENT ABDOMEN WASHOUT;   Abdominal Wash Out;  Surgeon: Leonie Elizalde MD;  Location: [facility] OR     LAPAROTOMY EXPLORATORY N/A 7/30/2018    Procedure: LAPAROTOMY EXPLORATORY;  Exploratory Laparotomy, hematoma evacuation;  Surgeon: Stew Min,  MD;  Location: [facility] OR     RETURN LIVER TRANSPLANT N/A 2018    Procedure: RETURN LIVER TRANSPLANT;  liver bring back, exploratory laparotomy, bowel resection, primary anastomosis ;  Surgeon: Cristino Che MD;  Location: [facility] OR     TRANSPLANT LIVER RECIPIENT  DONOR N/A 2018    Procedure: TRANSPLANT LIVER RECIPIENT  DONOR;  Liver Transplant;  Surgeon: Stew Min MD;  Location: [facility] OR            Social History:     Social History     Social History     Marital status: Single     Spouse name: N/A     Number of children: N/A     Years of education: N/A     Occupational History     Not on file.     Social History Main Topics     Smoking status: Never Smoker     Smokeless tobacco: Not on file     Alcohol use No     Drug use: No     Sexual activity: Not on file     Other Topics Concern     Not on file     Social History Narrative            Family History:     Family History   Problem Relation Age of Onset     Bleeding Disorder No family hx of      Clotting Disorder (Unknown) No family hx of             Allergies:     Allergies   Allergen Reactions     Nsaids      Contraindicated             Medications:     Prescriptions Prior to Admission   Medication Sig Dispense Refill Last Dose     acetaminophen (TYLENOL) 500 MG tablet Take 1 tablet (500 mg) by mouth every 6 hours as needed for mild pain or fever 120 tablet 0 Taking     ciprofloxacin (CIPRO) 500 MG tablet Take 1 tablet (500 mg) by mouth every 24 hours 30 tablet 0 Taking     ferrous sulfate (IRON) 325 (65 Fe) MG tablet Take 325 mg by mouth   Taking     insulin aspart (NOVOLOG PEN) 100 UNIT/ML injection Inject 1 Units Subcutaneous 3 times daily (with meals) 3 mL 0 Taking     lactulose (CHRONULAC) 10 GM/15ML solution Take 30 mLs (20 g) by mouth 3 times daily Goal of 3-4 BMs per day 1892 mL 2 Taking     lidocaine (XYLOCAINE) 5 % ointment Apply topically every 4 hours as needed for moderate pain 240 g 0 Taking     miconazole (MICATIN;  MICRO GUARD) 2 % powder Apply topically 2 times daily 90 g 0 Taking     midodrine HCl 10 MG TABS Take 10 mg by mouth 3 times daily (with meals) 30 tablet 3      pantoprazole (PROTONIX) 40 MG EC tablet Take 1 tablet (40 mg) by mouth 2 times daily (before meals) 180 tablet 0 Taking     phosphorus tablet 250 mg (PHOSPHA 250 NEUTRAL) 250 MG per tablet Take 2 tablets (500 mg) by mouth 2 times daily 120 tablet 0 Taking     potassium chloride SA (KLOR-CON) 20 MEQ CR tablet Take 1 tablet (20 mEq) by mouth daily 90 tablet 1      rifaximin (XIFAXAN) 550 MG TABS tablet Take 1 tablet (550 mg) by mouth 2 times daily 180 tablet 0 Taking     simethicone (MI-ACID GAS RELIEF) 80 MG chewable tablet Take 80 mg by mouth   Taking     tenofovir (VIREAD) 300 MG tablet Take 300 mg by mouth   Taking            Physical Exam:   /54  Pulse 121  Temp 98.5  F (36.9  C) (Axillary)  Resp 11  Wt 67.5 kg (148 lb 13 oz)  SpO2 100%  BMI 24.02 kg/m2  Vitals:    08/07/18 0400 08/08/18 0500 08/09/18 0600   Weight: 65.1 kg (143 lb 8.3 oz) 64.6 kg (142 lb 6.7 oz) 67.5 kg (148 lb 13 oz)     General: Intubated, lying in bed, in no acute distress.  Heme/Lymph: No overt bleeding.  Skin: No concerning lesions, rash, jaundice, cyanosis, erythema, or ecchymoses on exposed surfaces.  HEENT: NCAT. EOMI, anicteric sclera. Orally intubated.  Respiratory: Breathing comfortably with the ventilator, coarse breath sounds bilaterally. No wheezing.  Cardiovascular: Tachycardic, regular rhythm. No murmur or rub.   Gastrointestinal: Normoactive bowel sounds. Abdomen soft, non-distended, and non-tender. No palpable masses or organomegaly.  Extremities: 2+ pitting edema bilaterally.   Neurologic: Awakens to voice, answers yes/no questions, follows commands.         Data:   I have personally reviewed the following labs/imaging:      Liver US 7/22/18: no portal vein thrombus    CT abd/pelvis 7/28/18: post-transplant hematomas overlying liver, ascites,  splenomegaly    Liver US 7/30/18: non-diagnostic Doppler due to overlying bowel gas    CT abd/pelvis 7/30/18: liver infarction, left portal vein thrombosis, filling defect in native hepatic artery, occlusion of left main hepatic artery, occlusion of SMV with bowel ischemia    US Liver 8/8/18: stable left portal vein thrombosis, no evidence of stenosis of the hepatic artery, resolved hematoma

## 2018-08-09 NOTE — PROGRESS NOTES
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  Developed shock with severe lactic acidosis and respiratory failure on POD9. CT scan showed left PV venous thrombosis, infarction of left liver lobe, patchy infarction of right lobe and possible hepatic arterial thrombosis, patchy bowel ischemia. Transferred to ICU.   Ex lap, patchy diffuse SB ischemia. Doppler demonstrated arterial flow main vessels to bowel and liver. Splenic infarction. Liver biopsy negative for acute rejection. Fluid culture growing VRE. On Heparin gtt. 8/3 OR for reexploration liver transplant, bowel ischemia, findings improved patchy iscemic changes of small bowel, patchy ischemic changes of left liver lobe. 8 OR: some ischemic/necrotic jejunum, s/p resection.  / Washout and abdominal closure with stratice mesh, no drains placed. Minimal ascites, intact small bowel anastomosis, patchy ischemia left liver lobe.     Procedures:    donor (DBD) liver transplant with duct to duct anastomosis over biliary stent.    Ex lap due to concern for ischemic bowel, findings: patchy diffuse SB ischemia. Doppler demonstrated arterial flow main vessels to bowel and liver. Splenic infarction.   8/3 Reexploration liver transplant, bowel ischemia, findings improved patchy iscemic changes of small bowel, patchy ischemic changes of left liver lobe.    Re-exploration, ischemic/necrotic jejunum, s/p resection  8/ Washout and abdominal closure with stratice mesh, no drains placed. Minimal ascites, intact small bowel anastomosis, patchy ischemia left liver lobe.    Graft function: POD #19/1. Infarction of left liver lobe, patchy infarction right liver lobe.  Left PV thrombosis. Narrow and irregular appearance HA. Bowel ischemia, s/p resection small bowel on 8/6. Procedures, see above. Liver biopsy negative for acute rejection. Intra-abdominal clot cultures from 8/1 + VRE and 8/6 + VRE.    Immunosuppression management:   Simulect intra-op due to BEN  Steroid taper per protocol, completed.  MMF 1gm BID held due to infection, sepsis. Will restart today, 250 mg BID  Tac 1mg BID restarted 8/6.  Level 7.8, short trough. Increased to 1.5 BID yesterday. Check 12 hr trough tomorrow. Due to BEN, will aim for tac level 8-10.  Hydrocortisone 100 q 8hrs, taper to 50 mg q 8hrs today as MMF restarted.  Complexity of management:Medium. Contributing factors: thrombocytopenia and anemia sepsis  Hematology: Pancytopenia.   Anemia: Hemoglobin trending down to 7.7.  Transfusion RBC 1 unit(s) on 8/8 and 2 units on 8/6.    Thrombocytopenia:  PLT 56.    Leukopenia:  New onset, WBC 1.7 on 8/8. Today WBC 3.8.  On valcyte, bactrim. Will restart MMF today.  Anticoagulation:  Restart high intensity heparin gtt due to PV thrombosis post op.    Hematology consult, evaluation for coagulopathy given portal vein thrombosis and mesenteric ischemia.   Cardiorespiratory: Remains ventilated due to frequent operations.  Hypotension secondary to sepsis, pressor off since yesterday. PS trial today. Extubation per SICU, plan for tomorrow.   GI/Nutrition: NGT.  NPO.  On TPN. Start TF, 10 ml/hr hold on increase at this time.   Ischemic bowel:  7/30 CTA: nonocclusive filling defects in superior mesenteric vein branches.  Anticoagulation as above.  S/p resection of necrotic jejunum on 8/6.   Endocrine:  DM type 2, continue insulin drip.    Fluid/Electrolytes: BEN/ARF. CRRT stopped. HD line removed due to bacteremia. No HD for today.    : Luis, anuric  Infectious disease: Afebrile. Leukopenia.  -Previously on Linezolid 7/28-7/30 and zosyn 7/30-8/2.   -Continue daptomycin (VRE, 7/30-present), meropenem (Ecoli  ESBL, -) and micafungin (empiric, -).     1. VRE intra abdominal and bacteremia. Fluid cultures 18 + VRE.  (peripheral) and  (peripheral and VAD) blood cultures growing VRE.   intra-abdominal clot + VRE.  , ,  blood + VRE. HD line removed. CVC line to be removed today. PICC line was placed due to needing central access.   2. Ecoli ESBL bacteremia. Repeat bcx on  (peripheral and VAD) growing Ecoli ESBL. Zosyn changed to Meropenem  SICU to exchange lines as able. Blood cultures negative for E. Coli since .  3. HBV: Hepatits B DNA PCR positive prior to tx. Received HBIG on  and . Hep B surface antigen negative. No further HBIG planned. Continue tenofovir 300 mg daily.   PPX: Valcyte x 12 weeks (CMVand EBV IgG +), bactrim. mycelex stopped as patient on micafungin. Will start nystatin if aide discontinued.     Prophylaxis: DVT PCDs, on heparin, fall, GI  Disposition: SICU    Coordinator: Kay Reyes    Medical Decision Making: High    JAY/Fellow/Resident Provider: GINGER Segovia [id number]    Faculty: Pamela Che MD  __________________________________________________________________  Transplant History: Admitted 2018 for  donor liver transplant.   The patient has a history of liver failure due to hepatitis B .    2018 (Liver), Postoperative day: 19     Interval History:   Off pressor since yesterday. Plan for PS trial this AM and possible extubation today. No BM since rectal pouch removed today. NG output small amount green/clear.     ROS:   A 10-point review of systems was negative except as noted above.    Curent Meds:    aspirin  81 mg Oral Daily     [START ON 8/10/2018] DAPTOmycin (CUBICIN) intermittent infusion  10 mg/kg (Dosing Weight) Intravenous Q48H     heparin lock flush  5-10 mL Intracatheter Q24H     hydrocortisone sodium succinate PF  100 mg Intravenous Q8H     lipids  250 mL Intravenous Once per day on      meropenem  500 mg  Intravenous Q24H     micafungin  100 mg Intravenous Q24H     midodrine  10 mg Per Feeding Tube Q8H     pantoprazole  40 mg Oral QAM AC     sodium chloride (PF)  3 mL Intravenous Q8H     sulfamethoxazole-trimethoprim  10 mL Oral Once per day on Mon Wed Fri     tacrolimus  1.5 mg Oral or Feeding Tube BID IS     tenofovir  300 mg Per Feeding Tube Q48H     [START ON 8/10/2018] valGANciclovir  450 mg Oral or NG Tube Once per day on Tue Fri    Or     [START ON 8/10/2018] valGANciclovir  450 mg Oral Once per day on Tue Fri       Physical Exam:     Admit Weight: 60.7 kg (133 lb 12.8 oz)    Current Vitals:   /54  Pulse 121  Temp 98.5  F (36.9  C) (Axillary)  Resp 11  Wt 67.5 kg (148 lb 13 oz)  SpO2 100%  BMI 24.02 kg/m2      Vital sign ranges:    Temp:  [96.7  F (35.9  C)-99.6  F (37.6  C)] 98.5  F (36.9  C)  Heart Rate:  [] 89  Resp:  [11-22] 11  BP: (108)/(54) 108/54  MAP:  [55 mmHg-97 mmHg] 56 mmHg  Arterial Line BP: ()/(32-74) 95/42  FiO2 (%):  [30 %] 30 %  SpO2:  [94 %-100 %] 100 %  Patient Vitals for the past 24 hrs:   BP Temp Temp src Heart Rate Resp SpO2 Weight   08/09/18 1030 - - - 89 - 100 % -   08/09/18 1015 - - - 88 - 100 % -   08/09/18 1000 - - - 90 11 100 % -   08/09/18 0945 - - - 92 - 100 % -   08/09/18 0930 - - - 89 - 100 % -   08/09/18 0915 - - - 91 - 100 % -   08/09/18 0900 - - - 92 - 100 % -   08/09/18 0845 - - - 94 - 100 % -   08/09/18 0830 - - - 91 - 100 % -   08/09/18 0815 - - - 90 - 100 % -   08/09/18 0800 - 98.5  F (36.9  C) Axillary 73 14 100 % -   08/09/18 0745 - - - 89 - 100 % -   08/09/18 0730 - - - 86 - 100 % -   08/09/18 0715 - - - 91 - 100 % -   08/09/18 0700 - - - 94 - 100 % -   08/09/18 0602 108/54 - - 92 11 - -   08/09/18 0600 - - - 91 16 100 % 67.5 kg (148 lb 13 oz)   08/09/18 0500 - - - 92 17 100 % -   08/09/18 0400 - 98.2  F (36.8  C) Oral 91 17 100 % -   08/09/18 0300 - - - 97 16 98 % -   08/09/18 0200 - - - 102 16 100 % -   08/09/18 0120 - - - 103 - 100 % -    08/09/18 0100 - - - 101 16 100 % -   08/09/18 0000 - 99.6  F (37.6  C) Oral 105 21 100 % -   08/08/18 2300 - - - 99 18 100 % -   08/08/18 2200 - - - 89 16 94 % -   08/08/18 2100 - - - 92 16 99 % -   08/08/18 2000 - 98.2  F (36.8  C) Oral 89 16 99 % -   08/08/18 1900 - - - 75 21 100 % -   08/08/18 1845 - - - 73 16 100 % -   08/08/18 1600 - 96.7  F (35.9  C) Axillary 78 16 100 % -   08/08/18 1545 - - - 77 - 100 % -   08/08/18 1530 - - - 78 - 100 % -   08/08/18 1515 - - - 82 - 100 % -   08/08/18 1500 - - - 84 16 100 % -   08/08/18 1445 - - - 82 - 100 % -   08/08/18 1430 - - - 76 - 100 % -   08/08/18 1415 - - - 89 - 100 % -   08/08/18 1400 - - - 88 22 100 % -   08/08/18 1345 - - - 85 - 100 % -   08/08/18 1331 - - - 84 - 100 % -   08/08/18 1330 - - - 85 - 100 % -   08/08/18 1315 - - - 77 - 100 % -   08/08/18 1300 - - - 82 17 99 % -   08/08/18 1245 - - - 81 - 100 % -   08/08/18 1230 - - - 78 - 99 % -   08/08/18 1215 - - - 76 - 100 % -   08/08/18 1200 - 97.4  F (36.3  C) Axillary 81 18 100 % -   08/08/18 1145 - - - 80 - 98 % -     General Appearance: Intubated  HEENT: NGT to suction. NJ in place.  Skin: warm, dry  Heart: NSR  Lungs: PS trial. B/l clear, small amount, thin secretions   Abdomen: Abdomen soft, mild discomfort with exam. CAROL x 2, serous output.    : No bah  Extremities: +1 BLE edema  Neurologic: alert, following commands      Data:   CMP    Recent Labs  Lab 08/09/18  0335 08/09/18 0325 08/08/18 2206     --  137   POTASSIUM 3.5  --  3.5   CHLORIDE 106  --  106   CO2 22  --  22   *  --  210*   BUN 42*  --  39*   CR 1.28*  --  1.13   GFRESTIMATED 61  --  70   GFRESTBLACK 73  --  85   JESUS 6.6*  --  6.6*   ICAW  --  4.3* 4.3*   MAG 2.1  --  2.1   PHOS 2.5  --  2.8   ALBUMIN 1.9*  --  1.6*   BILITOTAL 2.5*  --  2.9*   ALKPHOS 290*  --  266*   AST 50*  --  51*   ALT 69  --  75*     CBC    Recent Labs  Lab 08/09/18 0335 08/08/18 2206   HGB 7.7* 8.1*   WBC 3.8* 2.4*   PLT 56* 52*      COAGS    Recent Labs  Lab 08/09/18  0335 08/08/18  1929 08/07/18  0336   INR  --  1.36* 1.53*   PTT 53* 34  --       Urinalysis  Recent Labs   Lab Test  07/27/18   2330  07/13/18   1315  06/18/18   1200   COLOR  Yellow  Dark Yellow  Yellow   APPEARANCE  Clear  Clear  Clear   URINEGLC  Negative  Negative  Negative   URINEBILI  Negative  Large*  Small*   URINEKETONE  Negative  Negative  Negative   SG  1.008  1.013  1.008   UBLD  Negative  Negative  Negative   URINEPH  7.5*  6.0  5.0   PROTEIN  30*  10*  Negative   NITRITE  Negative  Negative  Negative   LEUKEST  Negative  Negative  Negative   RBCU  5*  <1  1   WBCU  3  None  2   UTPG   --    --   0.75*     Virology:  Hepatitis C Antibody   Date Value Ref Range Status   07/20/2018 Nonreactive NR^Nonreactive Final     Comment:     Assay performance characteristics have not been established for newborns,   infants, and children         POD 14 US liver:  1.  The left portal vein is antegrade with decreased velocity,  measuring approximately 15 cm/second. This vessel was occluded on CT  7/30/2018.  2.  Elevated velocities of the main hepatic artery, now 203 cm/sec  (previously 94 cm/sec). However left hepatic arteries demonstrates  steep upstroke suggesting there is not a significant stenosis.   2a. Right hepatic artery was not visualized, could be occluded or  difficult to see postoperatively.  3. Echogenic focus of the right lobe in close proximity to the  hepatorenal fossa. This likely corresponds with nonenhancing lesion on  CT 7/30/2018 and likely representing subcapsular hematoma.  4. Additional small hematomas of the braden hepatis and posterior right  perihepatic region.  5. Small volume anechoic ascites.  6. Left hepatic lobe not well visualized secondary to open wound  preventing imaging. Note the left lobe was grossly abnormal on CT  7/30/2018    POD 1 US liver: Impression:   1.  Hematoma associated with the inferior aspect of the perihepatic  space  anteriorly, measuring 13.7 x 7.0 x 8.5 cm.  2.  Retrograde flow of the left portal vein. Additionally, low  velocities of the portal venous system. Single low resistive index of  the extrahepatic aspect of the hepatic artery, measuring 0.37. These  findings are likely within normal limits in the early postoperative  context. However, continued attention on follow-up recommended.    7/30 CT scan abd/pelvis:  IMPRESSION:   1. Hypoenhancing areas in the transplant liver concerning for  infarction. Transplant left portal vein occlusion with bubbles of  portal venous gas. Nearly occlusive filling defect in the native  hepatic artery. Narrowed and irregular appearance of the transplant  hepatic artery. Focal occlusion of the left main hepatic artery.  Segmental occlusions of right hepatic arterial branches.   2. Pneumatosis intestinalis with nonocclusive filling defects in  superior mesenteric vein branches, concerning for bowel ischemia or  infarction.   3. Splenic infarctions.   4. Bibasilar consolidation with air bronchograms. Pneumonia cannot be  excluded.   5. Post surgical changes of evacuation of peritransplant hematoma.

## 2018-08-10 NOTE — PROGRESS NOTES
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  Developed shock with severe lactic acidosis and respiratory failure on POD9. CT scan showed left PV venous thrombosis, infarction of left liver lobe, patchy infarction of right lobe and possible hepatic arterial thrombosis, patchy bowel ischemia. Transferred to ICU.   Ex lap, patchy diffuse SB ischemia. Doppler demonstrated arterial flow main vessels to bowel and liver. Splenic infarction. Liver biopsy negative for acute rejection. Fluid culture growing VRE. On Heparin gtt. 8/3 OR for reexploration liver transplant, bowel ischemia, findings improved patchy iscemic changes of small bowel, patchy ischemic changes of left liver lobe. 8 OR: some ischemic/necrotic jejunum, s/p resection.   Washout and abdominal closure with stratice mesh, no drains placed. Minimal ascites, intact small bowel anastomosis, patchy ischemia left liver lobe.     Procedures:    donor (DBD) liver transplant with duct to duct anastomosis over biliary stent.    Ex lap due to concern for ischemic bowel, findings: patchy diffuse SB ischemia. Doppler demonstrated arterial flow main vessels to bowel and liver. Splenic infarction.   8/3 Reexploration liver transplant, bowel ischemia, findings improved patchy iscemic changes of small bowel, patchy ischemic changes of left liver lobe.    Re-exploration, ischemic/necrotic jejunum, s/p resection  / Washout and abdominal closure with stratice mesh, no drains placed. Minimal ascites, intact small bowel anastomosis, patchy ischemia left liver lobe.    Graft function: POD #20/2. Infarction of left liver lobe, patchy infarction right liver lobe.  Left PV thrombosis. Narrow and irregular appearance HA. Bowel ischemia, s/p resection small bowel on 8/6. Procedures, see above. Liver biopsy negative for acute rejection. Intra-abdominal clot cultures from 8/1 + VRE and 8/6 + VRE, now s/p washout of hematoma.     Immunosuppression management:   Simulect intra-op due to BEN  Steroid taper per protocol, completed.  MMF 1gm BID held due to infection, sepsis. Continue 250 mg BID  Tac 1mg BID restarted 8/6, increased to 1.5 mg BID.  Level 10.3, expect it will continue to increase on this dose. Will decreased to 1.5mg AM/1 mg PM. Due to BEN, will aim for tac level 8-10.  Hydrocortisone taper, decrease to 25 mg every 8 hours today.  Complexity of management:Medium. Contributing factors: thrombocytopenia and anemia sepsis  Hematology: Pancytopenia.   Anemia: Hemoglobin 8.1.   Thrombocytopenia:  PLT 89.    Leukopenia: Decreased to WBC 1.7 on 8/8. Today WBC 6.6.  On valcyte, bactrim. Restarted MMF.  Anticoagulation:  Restart high intensity heparin gtt due to PV thrombosis post op.    Hematology consult, evaluation for coagulopathy given portal vein thrombosis and mesenteric ischemia. No further evaluation for coagulopathy per hematology. See consult note.   Cardiorespiratory:  Hypotension secondary to sepsis, now off pressor. PS trial today.   GI/Nutrition: NGT.  NPO.  On TPN. TF started, ok to titrate to goal.   Ischemic bowel:  7/30 CTA: nonocclusive filling defects in superior mesenteric vein branches.  Anticoagulation as above.  S/p resection of necrotic jejunum on 8/6.   Endocrine:  DM type 2, continue insulin drip.    Fluid/Electrolytes: BEN/ARF. CRRT stopped. HD line removed due to bacteremia. No HD for today.    : Bah, anuric.  Traumatic bah placement, seen by urology and bah replaced.   Infectious disease: Afebrile. WBC trending up. Blood cx x 2 today.  -Previously on Linezolid 7/28-7/30 and zosyn 7/30-8/2.   -Continue daptomycin (VRE, 7/30-present),  meropenem (Ecoli ESBL, -) and micafungin (empiric, -).     1. VRE intra abdominal and bacteremia. Fluid cultures 18 + VRE.  (peripheral) and  (peripheral and VAD) blood cultures growing VRE.   intra-abdominal clot + VRE.  , , , .  blood + VRE. HD line removed. CVC line to be removed 8/10. PICC line was placed due to needing central access. Persistant bacteremia, possible due to lines, blood culture drawn prior to CVC removal. Tx ID consulted. TOÑO to evaluate for endocarditis. Ext x 4 negative for DVT.   2. Ecoli ESBL bacteremia. Repeat bcx on  (peripheral and VAD) growing Ecoli ESBL. Zosyn changed to Meropenem  SICU to exchange lines as able. Blood cultures negative for E. Coli since .  3. HBV: Hepatits B DNA PCR positive prior to tx. Received HBIG on  and . Hep B surface antigen negative. No further HBIG planned. Continue tenofovir 300 mg daily.   PPX: Valcyte x 12 weeks (CMVand EBV IgG +), bactrim. mycelex stopped as patient on micafungin. Will start nystatin if aide discontinued.     Prophylaxis: DVT PCDs, on heparin, fall, GI  Disposition: SICU    Coordinator: Kay Reyes    Medical Decision Making: High    JAY/Fellow/Resident Provider: Rebecca Haas PAC [id number]    Faculty: Pamela Che MD  __________________________________________________________________  Transplant History: Admitted 2018 for  donor liver transplant.   The patient has a history of liver failure due to hepatitis B .    2018 (Liver), Postoperative day: 20     Interval History:  Briefly on pressor, this was during TOÑO procedure with sedation. +BM.     ROS:   A 10-point review of systems was negative except as noted above.    Curent Meds:    aspirin  81 mg Oral or Feeding Tube Daily     bumetanide  2 mg Intravenous Q8H     DAPTOmycin (CUBICIN) intermittent infusion  10 mg/kg (Dosing Weight) Intravenous Q48H     heparin lock flush  5-10 mL Intracatheter Q24H     hydrocortisone sodium  succinate PF  50 mg Intravenous Q8H     meropenem  500 mg Intravenous Q24H     micafungin  100 mg Intravenous Q24H     midodrine  10 mg Per Feeding Tube Q8H     mycophenolate  250 mg Oral or Feeding Tube BID IS     oxyCODONE  10 mg Oral Q6H     pantoprazole  40 mg Oral or Feeding Tube QAM AC     sodium chloride (PF)  3 mL Intravenous Q8H     sodium chloride (PF)  3 mL Intravenous Q8H     sulfamethoxazole-trimethoprim  10 mL Oral or Feeding Tube Once per day on Mon Wed Fri     tacrolimus  1.5 mg Oral or Feeding Tube BID IS     [START ON 8/15/2018] tenofovir  300 mg Per Feeding Tube Weekly     valGANciclovir  450 mg Oral or NG Tube Once per day on Tue Fri    Or     valGANciclovir  450 mg Oral Once per day on Tue Fri       Physical Exam:     Admit Weight: 60.7 kg (133 lb 12.8 oz)    Current Vitals:   /72 (BP Location: Right arm)  Pulse 121  Temp 97.6  F (36.4  C) (Axillary)  Resp 14  Wt 70 kg (154 lb 5.2 oz)  SpO2 100%  BMI 24.91 kg/m2      Vital sign ranges:    Temp:  [97.6  F (36.4  C)-98.8  F (37.1  C)] 97.6  F (36.4  C)  Heart Rate:  [] 105  Resp:  [12-20] 14  BP: (111-141)/(72-76) 111/72  MAP:  [50 mmHg-97 mmHg] 77 mmHg  Arterial Line BP: ()/(36-76) 114/61  FiO2 (%):  [30 %] 30 %  SpO2:  [100 %] 100 %  Patient Vitals for the past 24 hrs:   BP Temp Temp src Heart Rate Resp SpO2 Weight   08/10/18 1248 - - - 105 - 100 % -   08/10/18 1245 - - - 105 - 100 % -   08/10/18 1230 - - - 105 - 100 % -   08/10/18 1225 - - - 103 - 100 % -   08/10/18 1220 - - - 101 - 100 % -   08/10/18 1200 - 97.6  F (36.4  C) Axillary 101 14 100 % -   08/10/18 1100 - - - 105 - 100 % -   08/10/18 1000 - - - 105 13 100 % -   08/10/18 0900 - - - 102 - 100 % -   08/10/18 0800 - 97.6  F (36.4  C) Axillary 97 16 100 % -   08/10/18 0700 - - - 102 15 100 % -   08/10/18 0645 - - - - 13 - -   08/10/18 0630 - - - - 14 - -   08/10/18 0600 - - - 102 19 100 % -   08/10/18 0500 - - - 100 19 100 % -   08/10/18 0441 - - - 99 - 100 % -    08/10/18 0400 - 98.7  F (37.1  C) Oral 105 16 100 % 70 kg (154 lb 5.2 oz)   08/10/18 0300 - - - 102 17 100 % -   08/10/18 0200 - - - 97 20 100 % -   08/10/18 0100 - - - 94 19 100 % -   08/10/18 0015 - - - 95 - 100 % -   08/10/18 0000 - 98.8  F (37.1  C) Oral 95 17 100 % -   08/09/18 2300 - - - 93 17 100 % -   08/09/18 2200 - - - 96 18 100 % -   08/09/18 2100 - - - 99 17 100 % -   08/09/18 2040 - - - 87 - 100 % -   08/09/18 2030 111/72 - - - - - -   08/09/18 2000 - 97.8  F (36.6  C) Axillary 85 17 100 % -   08/09/18 1900 - - - 90 - 100 % -   08/09/18 1815 - - - 92 - 100 % -   08/09/18 1800 - - - 87 14 100 % -   08/09/18 1745 - - - 91 13 100 % -   08/09/18 1730 - - - 93 14 100 % -   08/09/18 1715 - - - 92 - 100 % -   08/09/18 1700 - - - 89 - 100 % -   08/09/18 1645 - - - 90 - 100 % -   08/09/18 1630 - - - 91 14 100 % -   08/09/18 1615 - - - 87 - 100 % -   08/09/18 1600 - 98.1  F (36.7  C) Axillary 89 12 100 % -   08/09/18 1555 141/76 - - 88 - - -   08/09/18 1545 - - - 87 - 100 % -   08/09/18 1530 - - - 84 - 100 % -   08/09/18 1515 - - - 81 - 100 % -   08/09/18 1500 - - - 84 - 100 % -   08/09/18 1445 - - - 83 - 100 % -   08/09/18 1430 - - - 86 - 100 % -   08/09/18 1415 - - - 93 - 100 % -     General Appearance: Intubated  HEENT: NGT to suction. NJ in place.  Skin: warm, dry  Heart: NSR  Lungs: CMV  Abdomen: Abdomen soft, mild discomfort with exam.   : No bah  Extremities: well perfused.  Neurologic: alert, following commands      Data:   CMP    Recent Labs  Lab 08/10/18  0349 08/09/18  2031 08/09/18  1626 08/09/18  0335 08/09/18  0325 08/08/18  2206     --  137 136  --  137   POTASSIUM 3.5 3.3* 3.4 3.5  --  3.5   CHLORIDE 104  --  107 106  --  106   CO2 20  --  20 22  --  22   *  --  139* 169*  --  210*   BUN 66*  --  58* 42*  --  39*   CR 2.00*  --  1.63* 1.28*  --  1.13   GFRESTIMATED 36*  --  46* 61  --  70   GFRESTBLACK 44*  --  56* 73  --  85   JESUS 6.6*  --  6.6* 6.6*  --  6.6*   ICAW  --   --    --   --  4.3* 4.3*   MAG 1.9 1.8 1.8 2.1  --  2.1   PHOS 3.6  --  2.2* 2.5  --  2.8   ALBUMIN 1.5*  --   --  1.9*  --  1.6*   BILITOTAL 2.3*  --   --  2.5*  --  2.9*   ALKPHOS 283*  --   --  290*  --  266*   AST 38  --   --  50*  --  51*   ALT 51  --   --  69  --  75*     CBC    Recent Labs  Lab 08/10/18  0349 08/10/18  0019 08/09/18  1626   HGB 8.1* 8.4* 8.5*   WBC 6.6  --  5.8   PLT 89*  --  68*     COAGS    Recent Labs  Lab 08/09/18  0335 08/08/18  1929 08/07/18  0336   INR  --  1.36* 1.53*   PTT 53* 34  --       Urinalysis  Recent Labs   Lab Test  07/27/18   2330  07/13/18   1315  06/18/18   1200   COLOR  Yellow  Dark Yellow  Yellow   APPEARANCE  Clear  Clear  Clear   URINEGLC  Negative  Negative  Negative   URINEBILI  Negative  Large*  Small*   URINEKETONE  Negative  Negative  Negative   SG  1.008  1.013  1.008   UBLD  Negative  Negative  Negative   URINEPH  7.5*  6.0  5.0   PROTEIN  30*  10*  Negative   NITRITE  Negative  Negative  Negative   LEUKEST  Negative  Negative  Negative   RBCU  5*  <1  1   WBCU  3  None  2   UTPG   --    --   0.75*     Virology:  Hepatitis C Antibody   Date Value Ref Range Status   07/20/2018 Nonreactive NR^Nonreactive Final     Comment:     Assay performance characteristics have not been established for newborns,   infants, and children         POD 14 US liver:  1.  The left portal vein is antegrade with decreased velocity,  measuring approximately 15 cm/second. This vessel was occluded on CT  7/30/2018.  2.  Elevated velocities of the main hepatic artery, now 203 cm/sec  (previously 94 cm/sec). However left hepatic arteries demonstrates  steep upstroke suggesting there is not a significant stenosis.   2a. Right hepatic artery was not visualized, could be occluded or  difficult to see postoperatively.  3. Echogenic focus of the right lobe in close proximity to the  hepatorenal fossa. This likely corresponds with nonenhancing lesion on  CT 7/30/2018 and likely representing  subcapsular hematoma.  4. Additional small hematomas of the braden hepatis and posterior right  perihepatic region.  5. Small volume anechoic ascites.  6. Left hepatic lobe not well visualized secondary to open wound  preventing imaging. Note the left lobe was grossly abnormal on CT  7/30/2018    POD 1 US liver: Impression:   1.  Hematoma associated with the inferior aspect of the perihepatic  space anteriorly, measuring 13.7 x 7.0 x 8.5 cm.  2.  Retrograde flow of the left portal vein. Additionally, low  velocities of the portal venous system. Single low resistive index of  the extrahepatic aspect of the hepatic artery, measuring 0.37. These  findings are likely within normal limits in the early postoperative  context. However, continued attention on follow-up recommended.    7/30 CT scan abd/pelvis:  IMPRESSION:   1. Hypoenhancing areas in the transplant liver concerning for  infarction. Transplant left portal vein occlusion with bubbles of  portal venous gas. Nearly occlusive filling defect in the native  hepatic artery. Narrowed and irregular appearance of the transplant  hepatic artery. Focal occlusion of the left main hepatic artery.  Segmental occlusions of right hepatic arterial branches.   2. Pneumatosis intestinalis with nonocclusive filling defects in  superior mesenteric vein branches, concerning for bowel ischemia or  infarction.   3. Splenic infarctions.   4. Bibasilar consolidation with air bronchograms. Pneumonia cannot be  excluded.   5. Post surgical changes of evacuation of peritransplant hematoma.

## 2018-08-10 NOTE — PROGRESS NOTES
CLINICAL NUTRITION SERVICES - REASSESSMENT NOTE     Nutrition Prescription    RECOMMENDATIONS FOR MDs/PROVIDERS TO ORDER:  PN can be weaned off on 8/11 if pt tolerates advancement of TF to goal rate but cutting PN rate in half to run bag out to discontinue.    Malnutrition Status:    Severe malnutrition in the context of acute on chronic illness    Recommendations already ordered by Registered Dietitian (RD):  1. Modify TF order to advance TF's per transplant team to:  -Nutren 1.5 @ 20 mL/hr to provide 720 kcal (12 kcal/kg) and 33 g PRO (0.5 g/kg)  -Adv per MD discretion ONLY to goal of Nutren 1.5 @ 60 mL/hr to provide 2160 kcals (35 kcal/kg/day), 98 g PRO (1.6 g/kg/day), 1094 mL H2O, 253 g CHO and no fiber daily.    2. Spoke with PharmD regarding changing PN regimen to:  -Goal volume = 960 mL, 245g Dex (833 kcal), 80g AA daily (320 kcal) and no IV lipids = 1153 kcals (19 kcal/kg/day), 1.3 g PRO/kg/day, GIR = 2.8 with 0% kcals from Fat.    ---New PN regimen and TF @ 20 mL/hr provide total provisions of 31 kcal/kg and 1.8 g PRO/kg    ADDENDUM (14:17pm):  Spoke with transplant provider who would like to now advance TF's to goal 10 mL q8h to goal. RD will change that order and relayed to SICU team plan to discontinue PN on 8/11 if pt tolerates advancement towards goal.    Future/Additional Recommendations:  1. If renal TF formula indicated, would recommend:  -Nepro @ 10 mL/hr  -If pt tolerates, adv TF by 10 mL q8h to goal 50 ml/hr (1200 ml/day) to provide 2160 kcals (35 kcal/kg/day), 97 g PRO (1.6 g/kg/day), 876 ml free H2O, 193 g CHO and 15 g Fiber daily.  -Nephronex MVI for micronutrient needs while on dialysis  -30 mL water flush q4h for tube patency    2. EN/PN wean:  -Once pt tolerating TF @ 20 mL/hr, can discontinue IV lipids  -Once pt tolerating TF @ 30-40 mL/hr, can cut PN rate in half to run bag out to discontinue PN  -Once PN weaned, off order appropriate MVI-M (pending dialysis) to meet micronutrient needs      EVALUATION OF THE PROGRESS TOWARD GOALS   Diet: NPO (NTUB since 7/30)    Nutrition Support:   8/4-8/6: Nepro @ 20 mL/hr    8/6-8/7: CPN, goal volume 1200 ml/day (or per MD/PharmD discretion) with initial 145g Dex daily (493 kcal, GIR = 1.7), 120g AA daily (480 kcal), and 250 ml 20% IV lipids 3x/week.    8/7- 8/8: dex = 195 g    8/8-___: goal of 245g Dex (833 kcal) to increase provisions to 1527 kcals (25 kcal/kg/day), 2.0 g PRO/kg/day, GIR = 2.8 with 14% kcals from Fat.    8/9-___: Nutren 1.5 @ 10 mL/hr    Intake:   -6 day TF average intake = 282 kcal (5 kcal/kg), 23 g PRO (0.4 g/kg)  -6 day PN average intake = 933 kcal (15 kcal/kg), 80 g PRO (1.3 g/kg)     NEW FINDINGS   8/6: Liver Washout Segmental jejunal resection with primary anastomosis side-to side - NO Cortrak!!  88: CRRT stop; liver washout and Abdominal Closure with Mesh    GI: TPN started 8/6 r/t small bowel necrosis with resection in OR on 8/6. Transplant was OK with starting trickled feeds yesterday. Can advance to 20 mL/hr today. Will adjust PN regimen accordingly    Resp: Pt continues on mechanical ventilation, FiO2 = 40%. Obtained metabolic cart study 8/10 @ 1100 with the following results: MREE = 1489 kcals/day (equiv to 24 kcal/kg/day) with RQ = 1.02.  Pt received 170 ml of TF and 1200 mL of PN in 24 hours preceding the study providing 1887 kcals (127 % MREE).  RQ just outside physiologic range; RQ is logical given provisions (IV dextrose) received prior to study.  Would aim energy intakes minimally at 100-120% of this MREE (equiv to 24-29 kcal/kg/day).    Renal: CRRT stopped on 8/8 and renal lytes have stayed WNL since that time.    Labs (8/10): Creatinine = 2 (H), BUN = 66 (H), TBili = 2.3 (H), Alk Phos = 283 (H), DBili = 1.8 (H), Triglycerides (8/9) = 351 (H)    Weight: Today's weight of 70 kg is up from lowest admit wt of 60.7 kg on 7/20 likely r/t fluid    MALNUTRITION  % Intake: Decreased intake does not meet criteria  % Weight Loss: None  noted  Subcutaneous Fat Loss: Facial region, Upper arm, Lower arm and Thoracic/intercostal:  Moderate  Muscle Loss: Temporal, Facial & jaw region, Thoracic region (clavicle, acromium bone, deltoid, trapezius, pectoral), Upper arm (bicep, tricep), Lower arm  (forearm), Upper leg (quadricep, hamstring), Patellar region and Posterior calf:  Moderate-Severe  Fluid Accumulation/Edema: Mild-Moderate  Malnutrition Diagnosis: Severe malnutrition in the context of acute on chronic illness    Previous Goals   Total avg nutritional intake to meet a minimum of 30 kcal/kg and 1.5 g PRO/kg daily (per dosing wt 61 kg)  Evaluation: Met for PRO, not for kcal    Previous Nutrition Diagnosis  Inadequate protein-energy intake related to holding of TFs since 7/30 as evidenced by 6 day average intake of 8 kcal/kg and 0.3 g PRO/kg    Evaluation: Improving    CURRENT NUTRITION DIAGNOSIS  Inadequate energy intake related to holding of TFs for majority of this week with start of PN (goal provides <30 kcal/kg) AEB 6 day energy intake of 20 kcal/kg    INTERVENTIONS  Implementation  Collaboration and Referral of Nutrition care - Discussed plan for FEN/GI on rounds with Providers  Enteral Nutrition - Modify advancement schedule  Parenteral Nutrition/IV Fluids - Spoke with PharmD regarding change to regimen    Goals  Total avg nutritional intake to meet a minimum of 30 kcal/kg (versus 25 kcal/kg if on PN) and 1.5 g PRO/kg daily (per dosing wt 61 kg)    Monitoring/Evaluation  Progress toward goals will be monitored and evaluated per protocol.    Halina Crouch, RD, LD  SICU RD Pgr: [phone]

## 2018-08-10 NOTE — PROGRESS NOTES
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 He returned to the SICU for shock, severe lactic acidosis and respiratory failure. He is s/p exploratory laparotomy on 08/06 with resection of necrotic jejunum, abdomen closed 8/8.     TODAY'S PROGRESS/PLANS:   - discontinue fentanyl gtt  - scheduled oxycodone   - appreciate Urology recommendations   - PST, anticipate extubation today 8/10  - advance TF to 20 ml/hr and wean TPN appropriately, discussed with Transplant  - continue HD holiday  - add of metolazone for diuresis, continue to hold CRRT  - TOÑO, rule-out endocarditis   - US of upper/lower extremities and neck, rule-out thrombosis  - consult Transplant Infectious Disease, discuss appropriateness of current antibiotic regiment   - would like to decrease heparin gtt to low intensity, discuss with Transplant team     PLAN:  Neuro/ pain/ sedation:  # acute pain  - Monitor neurological status. Notify the MD/DO for any acute changes in exam.  - discontinue fentanyl gtt  - initiate scheduled oxycodone   - PRN oxycodone and IV hydromorphone for pain.    Pulmonary care:   #acute respiratory failure  - Intubated CMV/AC 16/400/5/30%, plans to extubate today 8/10  - PST 0/5 for 2 hours  - HOB elevation, ventilator bundle    Cardiovascular:    #Septic Shock  - Monitor hemodynamic status   - Midodrine 10 mg Q8h  - Has not been requiring pressor support. Norepinephrine gtt available. Stopped Vasopressin 7/31/18 per Transplant due to concerns of bowel ischemia.  - TTE 8/8  showed no vegetations or valve abnormalities  - TOÑO today 8/10 to rule-out endocarditis in setting of unknown bacteremia source    GI:   ## DDLT 7/21/18  # Intrahepatic left portal venous thrombosis, infarction of left liver lobe, patchy infarction of right lobe, suspected hepatic arterial thrombosis  # 7/30/18 sp exploratory laparotomy, abthera closure device in place. Findings of patchy diffuse SB ischemia. S/p liver biopsy  # Splenic infarctions, patchy bowel ischemia  # Ileus -resolving  # Ex Lap 8/6 - resection of 90 cm of necrotic ileum   # Ex Lap 8/8 - abdomen closed  - s/p exploratory laparotomy, abthera closure device in place. Findings of patchy diffuse SB ischemia. S/p liver biopsy on 7/30/18. Returned to OR 8/6 for washout, reexploration.   - Abdomen closed in OR 8/8  - Heparin gtt high intensity given PV thrombosis, consider change to low intensity heparin gtt due to urethral bleeding  - Ileus: CXR 8/1: stool burden, hold PO bowel stimulants or laxatives given concerns of bowel ischemia per primary. S/p multiple enemas and mineral oil x2 doses. Having multiple BMs.   - 8/8 US Liver: unchanged decreased velocity in the left portal vein, improved velocities of the hepatic artery    Nutrition:  # protein calorie deficit malnutrition   - Advance TFs today  - on TPN, started 8/6 - discuss with transplant nutrition goals, will discontinue TPN if TFs advanced     Renal/Fluids/ Electrolytes:   # BEN, anuric  # severe metabolic acidosis, lactic acidosis - corrected with CRRT  # severe hyponatremia - corrected   # hyperkaemia - corrected with CRRT   # hematuria   - HD holiday, continue today 8/10   - Bumex 2 mg TID and metolazone 10 mg for diuresis, making urine output  - Will continue to monitor intake and output.  - Urethral bleeding: Luis replaced by urology, Webb clamp placed, Q1H penis checks      Endocrine:    # DM II  - insulin gtt    ID/ Antibiotics:  # VRE Bacteremia  # ESBL Bacteremia  #  Hepatitis B  - Continue Daptomycin (started 7/30), Meropenem (started 8/2) continue while blood cultures still positive  - Micafungin   - WBC stable, pt afebrile  - Immunosuppression: (restarted 8/6) tacrolimus, hydrocortisone  - Immunosuppression prophylaxis: Bactrim, Valgancyclovir and Tenofovir, nystatin when micafungin stopped.   - Consult Transplant ID due to continued bacteremia with unknown source    Positive cultures   - 7/27 VRE (line/arm)   - 7/29: VRE (left arm)   - 7/30: VRE (left arm)   - 7/31: VRE (Line and peripheral)  - 8/1: ESBL from HD/hand/central line   - 8/3: VRE from intra-abdominal tissue  - 8/4: NGTD blood culture  - 8/5: NGTD blood culture  - 8/6: VRE - art line  - 8/7: VRE (blood)  - 8/8: VRE (blood)  - 8/9: VRE (blood)    Heme:     # Acute blood loss anemia  # Anemia of critical illness  # Portal venous thrombosis  # Thrombocytopenia  - Hemoglobin 8.1, stable from yesterday  - Heparin gtt high intensity given PV thrombosis, consider change to low intensity heparin gtt due to urethral bleeding  - Continue aspirin 81mg.   - KEN panel sent and negative.     MSK:  # weakness and deconditioning of critical illness   - PT/OT     Prophylaxis:    - Mechanical prophylaxis for DVT and Heparin gtt  - GI: Protonix prophylaxis     Lines/ tubes/ drains:  - L radial art line  - PICC  - NJ   - PIVs  - ETT     Disposition:  - SICU      ====================================    SUBJECTIVE:   - resistance to Luis placement, Luis successfully placed and hematuria noted. Urology consulted and believe it is urethral bleeding. Placed Cunningham clamp  - no pressors required overnight  - otherwise doing well this am      OBJECTIVE:   1. VITAL SIGNS:   Temp:  [97.6  F (36.4  C)-98.8  F (37.1  C)] 97.6  F (36.4  C)  Heart Rate:  [] 97  Resp:  [11-20] 16  BP: (111-141)/(72-76) 111/72  MAP:  [56 mmHg-97 mmHg] 78 mmHg  Arterial Line BP: ()/(42-76) 118/61  FiO2 (%):  [30 %] 30 %  SpO2:  [96 %-100 %] 100  %  Ventilation Mode: CPAP/PS  (Continuous positive airway pressure with Pressure Support)  FiO2 (%): 30 %  Rate Set (breaths/minute): 16 breaths/min  Tidal Volume Set (mL): 400 mL  PEEP (cm H2O): 5 cmH2O  Pressure Support (cm H2O): 5 cmH2O  Oxygen Concentration (%): 30 %  Resp: 16    2. INTAKE/ OUTPUT:   I/O last 3 completed shifts:  In: 2820.23 [I.V.:1189.4; NG/GT:175]  Out: 745 [Urine:645; Emesis/NG output:100]    3. PHYSICAL EXAMINATION:   General: intubated, lying in bed, responsive  Neuro: Following commands, shakes head yes/no for questions. No focal defecits  Resp: Intubated. Decreased lung sound diffusely  CV: RRR  Abdomen: Soft, Non-distended, grimace to palpation  Incisions: dressings at upper abdominal closure site and right and left CAROL drain closure sites clean without drainage  Extremities: warm and well perfused, pitting peripheral edema in the hand and feet bilaterally    4. INVESTIGATIONS:   Arterial Blood Gases     Recent Labs  Lab 08/08/18  1705 08/06/18  1323 08/06/18  1200 08/06/18  1120   PH 7.38 7.32* 7.36 7.39   PCO2 37 39 37 37   PO2 126* 178* 142* 164*   HCO3 22 20* 21 22     Complete Blood Count     Recent Labs  Lab 08/10/18  0349 08/10/18  0019 08/09/18  1626 08/09/18  0335 08/08/18  2206   WBC 6.6  --  5.8 3.8* 2.4*   HGB 8.1* 8.4* 8.5* 7.7* 8.1*   PLT 89*  --  68* 56* 52*     Basic Metabolic Panel    Recent Labs  Lab 08/10/18  0349 08/09/18  2031 08/09/18  1626 08/09/18  0335 08/08/18  2206     --  137 136 137   POTASSIUM 3.5 3.3* 3.4 3.5 3.5   CHLORIDE 104  --  107 106 106   CO2 20  --  20 22 22   BUN 66*  --  58* 42* 39*   CR 2.00*  --  1.63* 1.28* 1.13   *  --  139* 169* 210*     Liver Function Tests    Recent Labs  Lab 08/10/18  0349 08/09/18  0335 08/08/18  2206 08/08/18  1929 08/08/18  1553  08/07/18  0336  08/06/18  0923   AST 38 50* 51*  --  49*  < > 81*  < > 118*   ALT 51 69 75*  --  87*  < > 162*  < > 248*   ALKPHOS 283* 290* 266*  --  291*  < > 242*  < > 252*    BILITOTAL 2.3* 2.5* 2.9*  --  3.0*  < > 3.8*  < > 4.4*   ALBUMIN 1.5* 1.9* 1.6*  --  1.9*  < > 1.8*  < > 2.1*   INR  --   --   --  1.36*  --   --  1.53*  --  1.49*   < > = values in this interval not displayed.  Pancreatic Enzymes  No lab results found in last 7 days.  Coagulation Profile    Recent Labs  Lab 08/09/18  0335 08/08/18  1929 08/07/18  0336 08/06/18  0923   INR  --  1.36* 1.53* 1.49*   PTT 53* 34  --   --      Lactate  Invalid input(s): LACTATE    5. RADIOLOGY:   Recent Results (from the past 24 hour(s))   XR Chest Port 1 View    Narrative    XR CHEST PORT 1 VW  8/9/2018 11:36 AM      HISTORY: RN placed PICC - verify tip placement;     COMPARISON: 8/6/2018    FINDINGS: Epigastric calcifications and surgical clips. Enteric tube  sidehole projects over the stomach. Additional enteric tube, tip not  visualized. Endotracheal tube tip projects over the mid trachea,  approximately 3.2 cm from the tano. Left upper extremity PICC tip  projects over the mid right atrium. Right IJ approach intravenous  catheter tip projects over the atriocaval junction. The cardiac  silhouette is upper limits of normal in size. Low lung volumes with  perihilar and bibasilar linear opacities. No pneumothorax or pleural  effusion. Degenerative changes of the glenohumeral joints, including  subchondral bony sclerosis.      Impression    IMPRESSION:   1. New left upper extremity PICC tip projects over the mid right  atrium. Previously demonstrated left IJ approach central venous  catheter has been removed.  2. Low lung volumes with perihilar and bibasilar linear opacities,  suggestive of atelectasis.    I have personally reviewed the examination and initial interpretation  and I agree with the findings.    DARRIUS MANTILLA MD       =========================================      Patient seen, findings and plan discussed with surgical ICU staff Dr. Davis.    Carlos Castellanos  Medical Student, MS4  [facility]  School    - - - - - - - - - - - - - - - - - -    See [facility] for on-call pager information.    I agree with the contents of this note, patient was seen and discussed collectively as a team.    Eddie Acuna MD  CA-2/PGY-3

## 2018-08-10 NOTE — OP NOTE
Pre-operative dz: sepsis, bowel ischemia, open abdomen    Post-operative dz: same    Procedure: 1. Abdominal wash-out, 2. Closure using permacol mesh, 3. Liver biopsy    Surgeon: RAQUEL Che    Fellow: JACLYN Nj    Anesthesia: general    EBL: 50 ml    Complications: none    Findings: intact primary anastomosis J-J; patchy partial thickness ischemia in distal bowel.    Indications: Patient had severe sepsis, bowel ischemia, s/p 3 wash-outs with 100 cm jejunal resection and primary anastomosis. His abdomen was left open to evaluate for possible progression of bowel ischemia.    Procedure: Patient was brought to the operating room already intubated.  The consent was obtained from his wife.  After verification of the consent the AbThera was removed and abdomen was prepped and draped.    The abdominal cavity was examined.  There anastomosis was intacta.  There were small areas of partial ischemia along the distal bowel but no full-thickness bowel necrosis.      The abdomen was irrigated and the liver biopsy was obtained.  Left lobe biopsy looked grossly necrotic.    The fascia was closed on the edges using 1-0 non-looped prolene.  Due to the edema and concern for increased intraabdominal pressure, 10x3 cm defect in the abdominal was was closed using permacol mesh.      Anterior fascia was closed over the mesh.  Skin was closed with 2-0 Nylons interrupted vertical mattress sutures.  Patient was transferred to ICU in stable condition.

## 2018-08-10 NOTE — PROGRESS NOTES
[facility] [place]  Procedure Note          Extubation:       Yaneli Miles  MRN# [id number]   August 10, 2018, 5:21 PM         Patient extubated at: August 10, 2018, 4:30 PM   Supplemental Oxygen: Via face mask at 4 liters per minute   Cough: The cough is strong and productive   Secretion Mode: Able to clear  PRN suction with assistance   Secretion Amount: Large amount, moderately thick and white / yellow in color   Respiratory Exam:: Breath sounds: equal and clear     Location: bilaterally   Skin Exam:: Patient color: natural   Patient Status: Currently appears comfortable   Arterial Blood Gasses: pH Arterial (pH)   Date Value   08/08/2018 7.38     pO2 Arterial (mm Hg)   Date Value   08/08/2018 126 (H)     pCO2 Arterial (mm Hg)   Date Value   08/08/2018 37     Bicarbonate Arterial (mmol/L)   Date Value   08/08/2018 22            Recorded by Kristian Oro, RRT  8/10/2018

## 2018-08-10 NOTE — PROGRESS NOTES
Interventions/assessment:  1940: d/w SICU currently no prns for electrolyte replacement. Per SICU will replace w/ 1X orders given pt was on CRRT.  2100-d/w SICU K & mag were 3.3 and 1.8; 20 mmol potass phos given-recheck in AM  2345: d/w SICU that catheter draining dark red blood at this time and pt saturating small cloth at catheter insertion site q1-2 hrs, vitals stable at this time, hgb ordered/reviewed & SICU came to assess, updated that pt received heparin bolus-2450 units & rate increased to 1200 units/hr @ 2130-per SICU keep heparin at current rate throughout night regardless of 10a in AM  0100: urology came to bedside to assess and flushed bah-cunningham clamp applied    NEURO: Pt follows simple commands; PERRLA; able to move all extrems/ weak; calm/cooperative  CV: HR mostly 90s-100s, MAP goal >60 remained off pressors overnight & BP WNL, MAPs mostly 80s throughout night; L radial ART line w/ adequate waveform; afebrile; pitting edema to all extrems  RESP: Vented, small amount tracheal/oral secretions, lungs clear to coarse throughout  GI: Incontinent of stool-small amount at a time-small amount blood noted in stool yesterday and throughout night-SICU aware; remains on trickle feeds via NJ; NG to LIS w/ small amount bilious output  : On scheduled bumex and some urine visualized-unsure how much urine produced since large amount of bloody output which continues from catheter insertion site & in collection bag-bleeding resumes after cunningham clamp removed intermittently so reapplied-no signs of compromised perfusion, pt denying discomfort &continuing to check hourly-removing clamp intermittently to ensure perfusion given language barrier    Hep gtt currently @  1200 units/hr-10a within goal this AM at 0.31  Continuous fentanyl for pain; prn dilaudid for breakthrough w/ repositioning  titrating insulin gtt to achieve glycemic control  tpn @ 50 ml/hr  AM labs reviewed d/w SICU-no replacements at this time,  gram+cocci from blood cx 8/9    Please see MAR/flowsheets for further interventions/assessments    Plan: Continue to monitor pt and notify MD of any acute changes; continue to monitor drainage from catheter. ? Extubate today    Tracy Simms RN

## 2018-08-10 NOTE — CONSULTS
"[facility]  Transplant Infectious Disease Consult Note - New Patient     Patient:  Yaneli Miles, Date of birth 1975, Medical record number [id number]  Date of Visit:  08/10/2018  Consult requested by Dr. Sarkar for evaluation of Persistent VRE bacteremia         Assessment and Recommendations:   Recommendations:  - Recommend increasing daptomycin dose to 12mg/kg q24hrs  - Recommend checking daily CK  - Recommend checking VRE sensitivity to tigecycline and telavancin in case we need to switch to these agents later    Thank you very much for this consultation. Transplant Infectious Disease will continue to follow with you.    Assessment:  43 year old male s/p Liver transplant on 7/21 whose post-op course was complicated by small bowel ischemia requiring multiple trips to OR for washouts and jejunal resection and anastomosis, left lobe of the liver ischemia, splenic infarcts, VRE infected intra-abdominal hematoma and blood clots, and VRE bacteremia, who continues to have blood cultures positive for VRE despite 12 days of daptomycin.    # Persistent VRE bacteremia  Despite being on daptomycin for 12 days, he continues to have positive blood cultures. Possibly the ischemic parts of his liver are seeded with VRE and are shedding it into the blood. Possibly he continues to have clots that are infected and shedding VRE into blood. Possibly, since he is currently getting q48hr dosing, the daptomycin blood level is dropping too low. In \"A retrospective clinical comparison of daptomycin vs daptomycin and a beta-lactam antibiotic for treating vancomycin-resistant Enterococcus faecium bloodstream infections\" by Ascencion et. Al (2018), they found that \"patients given high-dose DAP [daptomycin]?+?CONNOR [beta lactamase inhibitor] had significantly better survival than those receiving low-dose DAP alone (aHR, 5.82; P?=?0.01), low-dose DAP?+?CONNOR (aHR, 3.99; P?=?0.03), and high-dose DAP alone (aHR, " "26.61; P?=?0.001).\" They defined high dose DAP as >9mg/kg. Mr. Miles is already on high dose daptomycin and a carbapenem so he is already on the high-dose DAP + CONNOR regimen. Other case reports have shown that doses as high as 12-15mg/kg have been tolerated. Thus, we recommend increasing the dose of dapto to improve penetration into these possible infectious sources. We also recommend obtaining sensitivities to tigecycline and telavancin in case we need to switch to a different agent down the road.    Infectious Disease issues include:  - IS: on hold for now  - QTc interval:462  - Bacterial prophylaxis: TMP/SMX  - Pneumocystis prophylaxis:TMP/SMX  - Viral serostatus & prophylaxis: HCV neg, HBV+, CMV+, EBV+, valgancicilovir  - Fungal prophylaxis: topical antifungal  - Gamma globulin status: Never done  - Isolation status: Contact, Good hand hygiene.    Corinna Galindo MD   Pager [phone]         History of Infectious Disease Illness:   Mr. Miles is a 43 year old male with a PMH significant for DM, ESLD 2/2 chronic hep B with known varices s/p liver transplant on 7/21/18 who was admitted on 7/20 for liver transplant. Basiliximab given intra-operatively due to BEN. Post-op course complicated by hemorrhagic shock which resolved with MTP and balanced resuscitation with pRBC/FFP/Platelets/Cryo. He was started on zosyn 7/21 per transplant protocol. This was continued for 48 hours (7/21-7/24). He was also started on immunosuppression and prophylaxis.  Immunosuppression induction was basiliximab on POD 0 and POD 4 and methylprednisolone/prednisone taper. Maintenance immunosuppression includes mycophenolate 1g BID and tacrolimus with initial goal trough of 5-10 mcg/L for 0-3 months. Prophylaxis regimen includes TMP/SMX, valganciclovir, and a topical antifungal. HBIG given 7/21, 7/22. Tenofovir 300mg daily.    7/27 fever spiked to 102.1, tachycardic at 118, and WBC increased from 2.9 to 6.6. UA/UCx/BCx and CXR done. " He was started on levofloxacin which was discontinued and Linezolid was started per transplant surgery recommendations for VRE bacteremia. Central line was removed. Abd CT with contrast showed 2 intra-abdominal fluid collections, which potentially were infected.     On 7/30 he developed emesis. He then became lethargic, hypotensive, tachypneic, had a distended abdomen with increased NGT output. He was empirically started on micafungin and zosyn for sepsis. Immunosuppression was held. CT scan demonstrated findings concerning for liver infarct, left portal vein occlusion, hepatic artery occlusion, pneumatosis intestinalis with non-occlusive filling defects in SMV branches concerning for bowel ischemia, and splenic infarcts. He was intubated and taken to the OR for exploratory laparotomy with peritoneal fluid culture and liver biopsy on 7/30. He was found to have patchy diffuse small bowel ischemia. Several small intraloop hematomas were removed and the known subhepatic hematoma was removed and cultured. Blood cultures grew VRE and in the setting of lactic acidosis, linezolid was discontinued and daptomycin was started    7/31: Fluid cultures from intra-abdominal hematoma grew VRE as well. On 8/1 blood cultures grew ESBL e.coli so zosyn was discontinued and meropenem was started. On 8/3 he was taken back to the OR for an abdominal washout where they found improved patchy ishcemic changes of small bowel and left liver lobe, On 8/6 he returned to the OR for another abdominal washout and a segmental jejunal resection with primary side to side anastomosis after finding ischemic-necrotic 80 cm of jejunum. Patchy ischemic changes of left liver lobe also seen. On 8/8, he was taken to the OR for a fourth time since his transplant for liver washout and abdominal closure where minimal ascites was found with an intact small bowel anastomosis and patchy ischemic left liver lobe.    Anti-infectives history this admission:  - Zosyn  (Post-transplant per protocol):7/21-7/23  - TMP/SMX (PCP/bacterial ppx): 7/21-present  - Tenofovir (Hep B post-transplant): 7/24-present  - Valganciclovir (Viral ppx): 7/21-present  - Levofloxacin (Sepsis, unknown source): 7/27  - Linezolid (VRE bacteremia): 7/28-7/30  - Zosyn (Sepsis, empiric): 7/30-8/2  - Daptomycin (VRE bacteremia with intra-abdominal source): 7/30-present  - Meropenem (Ecoli ESBL bacteremia): 8/2-present  - Micafungin (sepsis, empiric): 7/30-present    Cultures this admission:  7/27: VRE (line, arm)  7/29: VRE (left arm)  7/30: VRE (left arm)  7/31: VRE (line and peripheral)           Enterococcus faecium (fluid culture)  8/1: ESBL e.coli (HD/peripheral, central line)  8/3: VRE from intra-abdominal hematoma  8/4: NGTD  8/5: NGTD  8/6: VRE (art line, intra-abdominal clot)  8/7: VRE (blood)  8/8: VRE (blood)  8/9: Gram positive cocci in pairs and chains (blood-arm)      Transplants:  7/21/2018 (Liver), Postoperative day:  20.  Coordinator Kay Reyes    Review of Systems:   Unable to obtain as patient is intubated    Past Medical History:   Diagnosis Date     Choledocholithiasis      Chronic viral hepatitis B without delta agent and without coma (H) 6/14/2018     Cirrhosis of liver with ascites (H) 6/14/2018     Esophageal varices (H)     prior GIB     Hepatic encephalopathy (H)      Hepatitis delta with hepatitis B carrier state 06/14/2018    positive antigen     Portal vein thrombosis 6/14/2018 5/29/18 care everywhere US     SBP (spontaneous bacterial peritonitis) (H) 06/2018     Type 2 diabetes mellitus without complication, without long-term current use of insulin (H) 6/14/2018       Past Surgical History:   Procedure Laterality Date     ENDOSCOPIC RETROGRADE CHOLANGIOPANCREATOGRAM      with stone removal     ENDOSCOPIC ULTRASOUND UPPER GASTROINTESTINAL TRACT (GI) N/A 6/22/2018    Procedure: ENDOSCOPIC ULTRASOUND, ESOPHAGOSCOPY / UPPER GASTROINTESTINAL TRACT (GI);   Esophagogastroduodenoscopy, removal of [place] tube, ENDOSCOPIC ULTRASOUND with embolization of gastric varices, placement of nasogastric tube;  Surgeon: Armando Kraft MD;  Location: [facility] OR     INCISION AND DRAINAGE ABDOMEN WASHOUT, COMBINED N/A 2018    Procedure: COMBINED INCISION AND DRAINAGE ABDOMEN WASHOUT;  liver washout and Abdominal Closure with Mesh.;  Surgeon: Cristino Che MD;  Location: [facility] OR     IRRIGATION AND DEBRIDEMENT ABDOMEN WASHOUT, COMBINED N/A 8/3/2018    Procedure: COMBINED IRRIGATION AND DEBRIDEMENT ABDOMEN WASHOUT;   Abdominal Wash Out;  Surgeon: Leonie Elizalde MD;  Location: [facility] OR     LAPAROTOMY EXPLORATORY N/A 2018    Procedure: LAPAROTOMY EXPLORATORY;  Exploratory Laparotomy, hematoma evacuation;  Surgeon: Stew Min MD;  Location: [facility] OR     RETURN LIVER TRANSPLANT N/A 2018    Procedure: RETURN LIVER TRANSPLANT;  liver bring back, exploratory laparotomy, bowel resection, primary anastomosis ;  Surgeon: Cristino Che MD;  Location: [facility] OR     TRANSPLANT LIVER RECIPIENT  DONOR N/A 2018    Procedure: TRANSPLANT LIVER RECIPIENT  DONOR;  Liver Transplant;  Surgeon: Stew Min MD;  Location: [facility] OR       Family History   Problem Relation Age of Onset     Bleeding Disorder No family hx of      Clotting Disorder (Unknown) No family hx of        Social History     Social History Narrative     Social History   Substance Use Topics     Smoking status: Never Smoker     Smokeless tobacco: Not on file     Alcohol use No       There is no immunization history for the selected administration types on file for this patient.    Patient Active Problem List   Diagnosis     Chronic viral hepatitis B without delta agent and without coma (H)     Portal vein thrombosis     Cirrhosis of liver with ascites (H)     Type 2 diabetes mellitus without complication, without long-term current use of insulin (H)     Encephalopathy     Alcoholic  cirrhosis of liver with ascites (H)     Thrombocytopenia (H)     Liver transplanted (H)     Immunosuppressed status (H)     ARF (acute renal failure) (H)     Intra-abdominal infection     Infection due to ESBL-producing Escherichia coli     VRE bacteremia     Sepsis (H)     Septic shock (H)     Small bowel ischemia (H)     Portal vein thrombosis of transplanted liver (H)     Open wound of anterior abdominal wall            Current Medications & Allergies:       aspirin  81 mg Oral or Feeding Tube Daily     bumetanide  2 mg Intravenous Q8H     DAPTOmycin (CUBICIN) intermittent infusion  10 mg/kg (Dosing Weight) Intravenous Q48H     heparin lock flush  5-10 mL Intracatheter Q24H     hydrocortisone sodium succinate PF  50 mg Intravenous Q8H     lipids  250 mL Intravenous Once per day on Mon Wed Fri     meropenem  500 mg Intravenous Q24H     metolazone  10 mg Oral Once     micafungin  100 mg Intravenous Q24H     midodrine  10 mg Per Feeding Tube Q8H     mycophenolate  250 mg Oral or Feeding Tube BID IS     oxyCODONE  10 mg Oral Q6H     pantoprazole  40 mg Oral or Feeding Tube QAM AC     sodium chloride (PF)  3 mL Intravenous Q8H     sodium chloride (PF)  3 mL Intravenous Q8H     sulfamethoxazole-trimethoprim  10 mL Oral or Feeding Tube Once per day on Mon Wed Fri     tacrolimus  1.5 mg Oral or Feeding Tube BID IS     [START ON 8/15/2018] tenofovir  300 mg Per Feeding Tube Weekly     valGANciclovir  450 mg Oral or NG Tube Once per day on Tue Fri    Or     valGANciclovir  450 mg Oral Once per day on Tue Fri       Infusions/Drips:    IV fluid REPLACEMENT ONLY       - MEDICATION INSTRUCTIONS -       HEParin 1,200 Units/hr (08/10/18 0900)     insulin (regular) 7 Units/hr (08/10/18 0900)     lactated ringers 10 mL/hr at 07/30/18 6688     - MEDICATION INSTRUCTIONS -       norepinephrine Stopped (08/09/18 0499)     parenteral nutrition - ADULT compounded formula 50 mL/hr at 08/10/18 0900     Reason beta blocker order not  selected         Allergies   Allergen Reactions     Nsaids      Contraindicated             Physical Exam:   Vitals were reviewed.  All vitals stable.  Patient Vitals for the past 24 hrs:   BP Temp Temp src Resp SpO2 Weight   08/10/18 0900 - - - - 100 % -   08/10/18 0800 - 97.6  F (36.4  C) Axillary 16 100 % -   08/10/18 0700 - - - 15 100 % -   08/10/18 0645 - - - 13 - -   08/10/18 0630 - - - 14 - -   08/10/18 0600 - - - 19 100 % -   08/10/18 0500 - - - 19 100 % -   08/10/18 0441 - - - - 100 % -   08/10/18 0400 - 98.7  F (37.1  C) Oral 16 100 % 70 kg (154 lb 5.2 oz)   08/10/18 0300 - - - 17 100 % -   08/10/18 0200 - - - 20 100 % -   08/10/18 0100 - - - 19 100 % -   08/10/18 0015 - - - - 100 % -   08/10/18 0000 - 98.8  F (37.1  C) Oral 17 100 % -   08/09/18 2300 - - - 17 100 % -   08/09/18 2200 - - - 18 100 % -   08/09/18 2100 - - - 17 100 % -   08/09/18 2040 - - - - 100 % -   08/09/18 2030 111/72 - - - - -   08/09/18 2000 - 97.8  F (36.6  C) Axillary 17 100 % -   08/09/18 1900 - - - - 100 % -   08/09/18 1815 - - - - 100 % -   08/09/18 1800 - - - 14 100 % -   08/09/18 1745 - - - 13 100 % -   08/09/18 1730 - - - 14 100 % -   08/09/18 1715 - - - - 100 % -   08/09/18 1700 - - - - 100 % -   08/09/18 1645 - - - - 100 % -   08/09/18 1630 - - - 14 100 % -   08/09/18 1615 - - - - 100 % -   08/09/18 1600 - 98.1  F (36.7  C) Axillary 12 100 % -   08/09/18 1555 141/76 - - - - -   08/09/18 1545 - - - - 100 % -   08/09/18 1530 - - - - 100 % -   08/09/18 1515 - - - - 100 % -   08/09/18 1500 - - - - 100 % -   08/09/18 1445 - - - - 100 % -   08/09/18 1430 - - - - 100 % -   08/09/18 1415 - - - - 100 % -   08/09/18 1400 - - - - 100 % -   08/09/18 1345 - - - - 100 % -   08/09/18 1330 - - - - 100 % -   08/09/18 1315 - - - - 100 % -   08/09/18 1230 - - - - 100 % -   08/09/18 1215 - - - - 99 % -   08/09/18 1200 - 97.7  F (36.5  C) Axillary 16 96 % -   08/09/18 1145 - - - - 100 % -   08/09/18 1143 - - - - 100 % -   08/09/18 1130 - - - -  100 % -   08/09/18 1115 - - - - 100 % -   08/09/18 1100 - - - - 100 % -   08/09/18 1045 - - - - 100 % -   08/09/18 1030 - - - 12 100 % -   08/09/18 1015 - - - - 100 % -   08/09/18 1000 - - - 11 100 % -   08/09/18 0945 - - - - 100 % -     Ranges for vital signs:  Temp:  [97.6  F (36.4  C)-98.8  F (37.1  C)] 97.6  F (36.4  C)  Heart Rate:  [] 102  Resp:  [11-20] 16  BP: (111-141)/(72-76) 111/72  MAP:  [56 mmHg-97 mmHg] 65 mmHg  Arterial Line BP: ()/(42-76) 103/52  FiO2 (%):  [30 %] 30 %  SpO2:  [96 %-100 %] 100 %  Vitals:    08/08/18 0500 08/09/18 0600 08/10/18 0400   Weight: 64.6 kg (142 lb 6.7 oz) 67.5 kg (148 lb 13 oz) 70 kg (154 lb 5.2 oz)       Physical Examination:   GENERAL:  Cachectic, ill-appearing man, sedated and ventilated.  HEAD:  Head is normocephalic, atraumatic, temporal wasting  EYES:  Eyes have anicteric sclerae without conjunctival injection   ENT:  Oropharynx is moist without exudates or ulcers. Tongue is midline  NECK:  Supple.   LUNGS:  Clear to auscultation bilateral.   CARDIOVASCULAR:  Regular rate and rhythm with systolic murmur  ABDOMEN:  Normal bowel sounds, soft, nontender. Surgical dressing in place CDI  SKIN:  No acute rashes.  Line in place without any surrounding erythema or exudate.  EXT: 2+ pitting edema of lower extremities bilaterally  NEUROLOGIC:  sedated         Laboratory Data:     Metabolic Studies       Recent Labs   Lab Test  08/10/18   0349  08/09/18   2031  08/09/18   1626  08/09/18   1106   08/09/18   0325   08/08/18   0535   08/01/18   0405   07/13/18   2050   07/13/18   0334   NA  134   --   137   --    < >   --    < >  139   < >  128*   < >   --    < >  132*   POTASSIUM  3.5  3.3*  3.4   --    < >   --    < >  3.6   < >  4.3   < >   --    < >  2.6*   CHLORIDE  104   --   107   --    < >   --    < >  109   < >  94   < >   --    < >  101   CO2  20   --   20   --    < >   --    < >  23   < >  22   < >   --    < >  20   ANIONGAP  9   --   10   --    < >   --    <  >  7   < >  11   < >   --    < >  12   BUN  66*   --   58*   --    < >   --    < >  27   < >  23   < >   --    < >  30   CR  2.00*   --   1.63*   --    < >   --    < >  0.89   < >  1.33*   < >   --    < >  2.08*   GFRESTIMATED  36*   --   46*   --    < >   --    < >  >90   < >  58*   < >   --    < >  35*   GLC  137*   --   139*   --    < >   --    < >  122*   < >  148*   < >   --    < >  104*   A1C   --    --    --    --    --    --    --    --    --    --    --   5.4   --    --    JESUS  6.6*   --   6.6*   --    < >   --    < >  7.2*   < >  8.6   < >   --    < >  8.1*   PHOS  3.6   --   2.2*   --    < >   --    < >  3.1   < >  3.6   < >   --    --    --    MAG  1.9  1.8  1.8   --    < >   --    < >  2.2   < >  2.1   < >   --    --    --    LACT   --    --    --   1.2   --   2.2*   < >   --    < >  3.2*   < >  3.9*   --   1.1   PCAL   --    --    --    --    --    --    --    --    --    --    --    --    --   0.89   CKT   --    --    --    --    --    --    --   32   --   70   --    --    --    --     < > = values in this interval not displayed.       Hepatic Studies    Recent Labs   Lab Test  08/10/18   0349  08/09/18   0335  08/08/18   2206   07/13/18   0334   06/21/18   0612   BILITOTAL  2.3*  2.5*  2.9*   < >  36.3*   < >  23.4*   DBIL  1.8*   --    --    < >  28.1*   < >   --    ALKPHOS  283*  290*  266*   < >  96   < >  78   PROTTOTAL  3.7*  3.6*  3.4*   < >  5.8*   < >  5.1*   ALBUMIN  1.5*  1.9*  1.6*   < >  2.8*   < >  2.9*   AST  38  50*  51*   < >  287*   < >  124*   ALT  51  69  75*   < >  125*   < >  79*   LDH   --    --    --    --   259*   --   177    < > = values in this interval not displayed.       Pancreatitis testing    Recent Labs   Lab Test  08/09/18   0335  07/22/18   0354  07/20/18   1741  07/12/18   1913   06/16/18   1815   AMYLASE   --   42  60  35   --    --    LIPASE   --   75   --    --    --   260   TRIG  351*   --    --    --    < >   --     < > = values in this interval not displayed.        Hematology Studies      Recent Labs   Lab Test  08/10/18   0349   08/09/18   1626  08/09/18   0335  08/08/18   2206  08/08/18 1929 08/08/18   1553   WBC  6.6   --   5.8  3.8*  2.4*  3.4*   --   2.4*   ANEU  4.2   --    --   2.8  1.7   --    --   1.9   ALYM  0.2*   --    --   0.4*  0.3*   --    --   0.1*   MARCELA  1.3   --    --   0.3  0.2   --    --   0.4   AEOS  0.0   --    --   0.0  0.0   --    --   0.0   HGB  8.1*   < >  8.5*  7.7*  8.1*  8.8*   < >  8.5*   HCT  24.0*   --   25.2*  22.4*  23.6*  26.1*   --   25.4*   PLT  89*   --   68*  56*  52*  49*   --   39*    < > = values in this interval not displayed.       Clotting Studies    Recent Labs   Lab Test  08/09/18   0335 08/08/18 1929 08/07/18   0336 08/06/18   0923  08/02/18   0404   INR   --   1.36*  1.53*  1.49*  1.52*   PTT  53*  34   --    --    --        Iron Testing    Recent Labs   Lab Test  08/10/18   0349   07/13/18   0334 06/17/18   2138   IRON   --    --   97   --   66   FEB   --    --   117*   --   61*   IRONSAT   --    --   83*   --   109*   ANGELLA   --    --   Unsatisfactory specimen - icteric   --    --    MCV  90   < >  81   < >   --    FOLIC   --    --   10.5   --    --    B12   --    --   Canceled, Test credited   --    --    HAPT   --    --   13*   < >   --    RETP   --    --   1.6   < >   --    RETICABSCT   --    --   46.0   < >   --     < > = values in this interval not displayed.       Markers    Alpha Fetoprotein   Date Value Ref Range Status   07/11/2018 Unsatisfactory specimen - icteric 0 - 8 ug/L Final     Comment:     Canceled, Test credited  NOTIFIED DR PRINCE QUIROS MD [facility] 1959 7/11/2018 BY [facility]         Arterial Blood Gas Testing    Recent Labs   Lab Test  08/08/18   1705  08/06/18   1323  08/06/18   1200  08/06/18   1120  08/03/18   0844   PH  7.38  7.32*  7.36  7.39  7.32*   PCO2  37  39  37  37  33*   PO2  126*  178*  142*  164*  199*   HCO3  22  20*  21  22  17*   O2PER  60%  50.0  48.0  50.0  57.0        Thyroid  Studies     Recent Labs   Lab Test  06/17/18   2138   TSH  0.77  0.77   T4  Unsatisfactory specimen - icteric       Urine Studies     Recent Labs   Lab Test  07/27/18   2330  07/13/18   1315  06/18/18   1200  06/16/18   2220   URINEPH  7.5*  6.0  5.0  5.5   NITRITE  Negative  Negative  Negative  Negative   LEUKEST  Negative  Negative  Negative  Large*   WBCU  3  None  2  66*       Medication levels    Recent Labs   Lab Test  08/09/18   0604   06/18/18   1230   VANCOMYCIN   --    --   16.8   TACROL  7.8   < >   --     < > = values in this interval not displayed.       Body fluid stats    Recent Labs   Lab Test  07/30/18   1255   07/15/18   1115  07/13/18   1240  06/28/18   1111   FTYP   --    --   Ascites  Ascites  Ascites   FCOL   --    --   Bloody  Red  Yellow   FAPR   --    --   Cloudy  Cloudy  Slightly Cloudy   FWBC   --    --   245  373  234   FNEU   --    --   3  28  6   FLYM   --    --   19  11  24   FMONO   --    --   78   --    --    FALB   --    --    --   1.0   --    FTP   --    --    --   1.6   --    GS  Moderate  Gram positive cocci in pairs and chains  *  Few  PMNs seen     < >  No organisms seen  Few  WBC'S seen  predominantly mononuclear cells    No organisms seen  Few  WBC'S seen  PMNs seen    Many  Red blood cells seen     --     < > = values in this interval not displayed.       Microbiology:  Last Culture results with specimen source  Culture Micro   Date Value Ref Range Status   08/09/2018 (A)  Preliminary    Cultured on the 1st day of incubation:  Gram positive cocci in pairs and chains     08/09/2018   Preliminary    Critical Value/Significant Value, preliminary result only, called to and read back by  VIVIAN WILSON RN ( 4AB).  08.10.18 0420 [facility]     08/08/2018   Final    Canceled, Test credited  Test canceled by PCU/Clinic  Duplicate request  PER MISAEL GONZALEZ ON [id number]     08/08/2018   Final    Canceled, Test credited  Test canceled by PCU/Clinic  Duplicate request  PER MISAEL GONAZLEZ ON  [id number]     08/08/2018 (A)  Preliminary    Cultured on the 1st day of incubation:  Gram positive cocci in pairs and chains     08/08/2018   Preliminary    Critical Value/Significant Value, preliminary result only, called to and read back by  Jt Bills RN @ 1210 8/9/18. [facility]     08/08/2018   Preliminary    (Note)  POSITIVE for VANCOMYCIN-RESISTANT ENTEROCOCCUS FAECIUM (VRE) - Wanda  gene POSITIVE by [employer] multiplex nucleic acid test. Final  identification and antimicrobial susceptibility testing will be  verified by standard methods.    Specimen tested with Verigene multiplex, gram-positive blood culture  nucleic acid test for the following targets: Staph aureus, Staph  epidermidis, Staph lugdunensis, other Staph species, Enterococcus  faecalis, Enterococcus faecium, Streptococcus species, S. agalactiae,  S. anginosus grp., S. pneumoniae, S. pyogenes, Listeria sp., mecA  (methicillin resistance) and Wanda/B (vancomycin resistance).    Critical Value/Significant Value called to and read back by ALEXANDRE SALEEM, MISAEL 1455 8.9.18 [facility]     08/08/2018 (A)  Preliminary    Cultured on the 1st day of incubation:  Enterococcus faecium (VRE)  Susceptibility testing done on previous specimen     08/08/2018   Preliminary    Critical Value/Significant Value, preliminary result only, called to and read back by  GEORGETTE RIOS RN @0139 8/9/18. [facility]     08/07/2018 (A)  Preliminary    Cultured on the 1st day of incubation:  Enterococcus faecium (VRE)  Susceptibility testing done on previous specimen     08/07/2018   Preliminary    Critical Value/Significant Value, preliminary result only, called to and read back by  Halima BURGER) on 8.8.18 @ 2232, .     08/06/2018 Culture negative monitoring continues  Preliminary   08/06/2018 (A)  Final    Moderate growth  Enterococcus faecium (VRE)  Susceptibility testing done on previous specimen     08/06/2018 Culture negative after 3 days  Preliminary   08/06/2018   Final    Canceled, Test  credited  Unable to obtain specimen     08/06/2018 PER [facility]  Final   08/06/2018 (A)  Preliminary    Cultured on the 1st day of incubation:  Enterococcus faecium (VRE)     08/06/2018   Preliminary    Critical Value/Significant Value, preliminary result only, called to and read back by  Halima Keith RN at 1959 on 8.6.18. hd     08/06/2018   Preliminary    (Note)  POSITIVE for VANCOMYCIN-RESISTANT ENTEROCOCCUS FAECIUM (VRE) - Wanda  gene POSITIVE by [employer] multiplex nucleic acid test. Final  identification and antimicrobial susceptibility testing will be  verified by standard methods.    Specimen tested with Verigene multiplex, gram-positive blood culture  nucleic acid test for the following targets: Staph aureus, Staph  epidermidis, Staph lugdunensis, other Staph species, Enterococcus  faecalis, Enterococcus faecium, Streptococcus species, S. agalactiae,  S. anginosus grp., S. pneumoniae, S. pyogenes, Listeria sp., mecA  (methicillin resistance) and Wanda/B (vancomycin resistance).    Critical Value/Significant Value called to and read back by halima keith rn @2240 8/6/18. [facility] [place]        [place]imen Description   Date Value Ref Range Status   08/09/2018 Blood Left Arm  Final   08/08/2018 Blood  Final   08/08/2018 Blood  Final   08/08/2018 Blood Blue port  Final   08/08/2018 Blood  Final   08/07/2018 Blood Arterial blood  Final   08/06/2018 Blood Clot  Final   08/06/2018 Blood Clot  Final   08/06/2018 Blood Clot  Final   08/06/2018 Blood  Final   08/06/2018 Blood Arterial line  Final   08/05/2018 Blood  Final   08/05/2018 Blood Arterial line  Final   08/04/2018 Blood RHAND  Final   08/04/2018 Blood Arterial blood  Final   08/03/2018 Tissue INTRAABDOMINAL HEMATOMA  Final   08/03/2018 Tissue Intraabdominal hematoma  Final   08/03/2018 Tissue Intraabdominal hematoma  Final   08/03/2018 Blood  Final        Infectious Disease Testing     Recent Labs   Lab Test  06/18/18   0440   TBRSLT  Negative        Virology:  Hepatitis B Testing     Recent Labs   Lab Test  07/24/18   0446  07/23/18   0817  07/21/18   1359  07/20/18   1741  07/12/18   1913   AUSAB  551.06*   --    --   0.00  0.00   HBCAB   --    --    --   Reactive*  Reactive*   HEPBANG   --   Nonreactive  Nonreactive   --    --    HBCM   --    --    --   Nonreactive  Nonreactive        Hepatitis C Antibody   Date Value Ref Range Status   07/20/2018 Nonreactive NR^Nonreactive Final     Comment:     Assay performance characteristics have not been established for newborns,   infants, and children     07/12/2018 Nonreactive NR^Nonreactive Final     Comment:     Assay performance characteristics have not been established for newborns,   infants, and children         CMV Antibody IgG   Date Value Ref Range Status   07/20/2018 >8.0 (H) 0.0 - 0.8 AI Final     Comment:     Positive  Antibody index (AI) values reflect qualitative changes in antibody   concentration that cannot be directly associated with clinical condition or   disease state.     07/12/2018 >8.0 (H) 0.0 - 0.8 AI Final     Comment:     Positive  Antibody index (AI) values reflect qualitative changes in antibody   concentration that cannot be directly associated with clinical condition or   disease state.     06/17/2018 >8.0 (H) 0.0 - 0.8 AI Final     Comment:     Positive  Antibody index (AI) values reflect qualitative changes in antibody   concentration that cannot be directly associated with clinical condition or   disease state.       CMV Antibody IgM   Date Value Ref Range Status   07/20/2018 <0.2 0.0 - 0.8 AI Final     Comment:     Negative  Antibody index (AI) values reflect qualitative changes in antibody   concentration that cannot be directly associated with clinical condition or   disease state.     07/12/2018 <0.2 0.0 - 0.8 AI Final     Comment:     Negative  Antibody index (AI) values reflect qualitative changes in antibody   concentration that cannot be directly associated with  clinical condition or   disease state.       EBV Capsid Antibody IgG   Date Value Ref Range Status   07/20/2018 7.6 (H) 0.0 - 0.8 AI Final     Comment:     Positive, suggests recent or past exposure  Antibody index (AI) values reflect qualitative changes in antibody   concentration that cannot be directly associated with clinical condition or   disease state.     07/12/2018 7.6 (H) 0.0 - 0.8 AI Final     Comment:     Positive, suggests recent or past exposure  Antibody index (AI) values reflect qualitative changes in antibody   concentration that cannot be directly associated with clinical condition or   disease state.     06/17/2018 >8.0 (H) 0.0 - 0.8 AI Final     Comment:     Positive, suggests recent or past exposure  Antibody index (AI) values reflect qualitative changes in antibody   concentration that cannot be directly associated with clinical condition or   disease state.       EBV Capsid Antibody IgM   Date Value Ref Range Status   07/20/2018 <0.2 0.0 - 0.8 AI Final     Comment:     No detectable antibody.  Antibody index (AI) values reflect qualitative changes in antibody   concentration that cannot be directly associated with clinical condition or   disease state.     07/12/2018 <0.2 0.0 - 0.8 AI Final     Comment:     No detectable antibody.  Antibody index (AI) values reflect qualitative changes in antibody   concentration that cannot be directly associated with clinical condition or   disease state.         Imaging:  Recent Results (from the past 48 hour(s))   XR Abdomen Port 1 View    Narrative    XR ABDOMEN PORT 1 VW  8/8/2018 5:33 PM      HISTORY: Patient's abdomen has been open for a few weeks; x-ray per  protocol;     COMPARISON: 8/2/2018    FINDINGS: Gastric tube sidehole projecting over the body of the  stomach. Enteric tube tip at the level of ligamentum Treitz.  Nonobstructive bowel gas pattern. No definitive pneumatosis. Drain in  the right upper quadrant. There is a linear metallic density  material  projecting over the left sacral ala, new since prior exam. There is a  single surgical clip projecting over the right upper abdominal wall,  external to the patient. There are also additional surgical clips  projecting over the mid upper abdomen, similar to prior examination.      Impression    IMPRESSION: Small linear metallic density projecting over the left  sacral ala, new since prior exam, discussed with the surgical team.    I have personally reviewed the examination and initial interpretation  and I agree with the findings.    AMANDA PORTER MD   US Liver Transplant Portable    Narrative    EXAMINATION: Ultrasound liver transplant portable, 8/8/2018 8:15 PM     COMPARISON: 8/4/2018.    HISTORY: Evaluate for vascular flow, known left portal vein occlusion    TECHNIQUE:  Gray-scale, color Doppler and spectral flow analysis.    FINDINGS:   There is trace ascites.    Liver:   The liver demonstrates normal homogeneous echotexture. No  evidence of a focal hepatic mass.     Bile Ducts: Dilated common bile duct at 11 mm, likely reservoir  effect.    Gallbladder: The gallbladder is surgically absent.    Kidneys:   Right kidney:  The right kidney demonstrates normal echotexture with  no evidence of a shadowing stone, focal mass or hydronephrosis.   10.9  cm in long axis dimension.  Left kidney:  The left kidney demonstrates normal echotexture with no  evidence of a shadowing stone, focal mass or hydronephrosis.   12.1 cm  in long axis dimension.    Pancreas: Visualized portions of the pancreas are normal in  appearance.      Spleen:  The spleen is enlarged, measuring 15.1 cm.    Visualized portions of the aorta are unremarkable.    LIVER DOPPLER:  Splenic vein:  Patent continuous normal antegrade direction flow  towards the liver, 40 cm/sec.  Extrahepatic portal vein:  Patent continuous antegrade flow, 46  cm/sec.  Portal vein at anastomosis: Patent continuous antegrade flow, 73  cm/sec.  Intrahepatic  portal vein:  Patent continuous antegrade flow, 80  cm/sec.  Right portal vein flow is antegrade, measuring 98 cm/sec.  Left portal vein flow is antegrade, measuring 16 cm/sec.    Inferior vena cava: patent with flow toward the heart throughout..  IVC above anastomosis:  66 cm/sec.  IVC at anastomosis:  126 cm/sec.  Extrahepatic IVC:  39 cm/sec.    Right, mid, left hepatic veins: Patent with flow towards the inferior  vena cava.    Extrahepatic hepatic artery: Low resistance waveform with flow towards  the liver. 101 cm/sec with resistive index 0.69.  Right hepatic artery: 54 cm/sec with resistive index 0.70.  Left hepatic artery: 74 cm/sec with resistive index 0.46.      Impression    Impression:   1.  Unchanged decreased velocity of left portal vein with antegrade  flow, occluded on CT 7/30/2018.  2.  Improved velocities of the hepatic artery with no evidence of  stenosis.  3.  Trace ascites. The previously seen braden hepatic echogenic lesion  is not visualized, possibly resolved hematoma.    I have personally reviewed the examination and initial interpretation  and I agree with the findings.    AMANDA PORTER MD   XR Chest Port 1 View    Narrative    XR CHEST PORT 1 VW  8/9/2018 11:36 AM      HISTORY: RN placed PICC - verify tip placement;     COMPARISON: 8/6/2018    FINDINGS: Epigastric calcifications and surgical clips. Enteric tube  sidehole projects over the stomach. Additional enteric tube, tip not  visualized. Endotracheal tube tip projects over the mid trachea,  approximately 3.2 cm from the tano. Left upper extremity PICC tip  projects over the mid right atrium. Right IJ approach intravenous  catheter tip projects over the atriocaval junction. The cardiac  silhouette is upper limits of normal in size. Low lung volumes with  perihilar and bibasilar linear opacities. No pneumothorax or pleural  effusion. Degenerative changes of the glenohumeral joints, including  subchondral bony sclerosis.       Impression    IMPRESSION:   1. New left upper extremity PICC tip projects over the mid right  atrium. Previously demonstrated left IJ approach central venous  catheter has been removed.  2. Low lung volumes with perihilar and bibasilar linear opacities,  suggestive of atelectasis.    I have personally reviewed the examination and initial interpretation  and I agree with the findings.    DARRIUS MANTILLA MD

## 2018-08-10 NOTE — PROGRESS NOTES
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 Patient has been receiving CRRT until yesterday when tunneled catheter was removed to give him a dialysis holiday. Creatinine and BUN are trending up (2/66), presented hematuria but patient has been able to produce some urine yesterday and this morning, currently not hypervolemic, no severe lytes abnormalities, so no indication to re-start CRRT today. If labs and patient are stable, will plan to re-start CRRT likely on 8/13, sooner if needed. Patient responding well to bumex bolus, but started on drip this afternoon and received one dose of metolazone. We need to be very cautious to avoid dehydration given recent surgery and liver injury.       Severe hyponatremia, hyperkalemia- resolved  Likely related to free water flushes/BEN , ADH dependant with hypotension. This morning, Na 134, K 3.5, Ca corrected 8.6, phos 3.6, Mg 1.9. Will keep monitoring.      Hypervolemia with relative intra vascular depletion  With intermittent hypotension without need of levophed overnight.     Polymicrobial BSI due to ESBL E. coli and VRE E. faecium  Presenting persistently +BC for VRE despite high-dose daptomycin. Tunneled catheter was removed early on 8/09, but still RIJ is in place and PICC line in the left arm was placed on 8/09. Of note, BC taken from previous days are still positive for GPCs in chains. Managed by primary  team.      OLT 7/12/18   ESLD sec to hep B cb EV , HE , ascites and portal HTN  Ex lap 7/30, Graft function: Intrahepatic left portal venous thrombosis, infarction of left liver lobe, patchy infarction of right lobe, suspected hepatic arterial thrombosis, Splenic infarctions and Patchy bowel ischemia on heparin gtt  8/3 Reexploration liver transplant, bowel ischemia, findings improved patchy iscemic changes of small bowel, patchy ischemic changes of left liver lobe.   8/6 Re-exploration, ischemic/necrotic jejunum, s/p resection  8/8 abdomen was closed  Management per primary team.    Acute resp failure   Intubated on vent support, possible extubation soon  MICU managing     DMII on insulin gtt     Pancytopenia  Related with ongoing sepsis vs liver failure. Managed by primary team    Recommendations:  -- Continued holding CRRT for now, possible re-initiation on 8/13  -- Continue bumex drip for now; would have the same response to intermittent bolus, so will discuss further tomorrow  -- Goal is to keep NET even  -- Monitor BMP/Mg BID  -- Albumin and NS can be used for resuscitation , avoid LR since he has liver shock likely not able to metabolize lactate well  -- daily weights and strict IO monitoring  -- Will keep following    Recommendations were communicated to primary team via note    Seen and discussed with Dr. Сергей Booth MD   IM - PGY2  P: [phone]    Interval History :   Patient presents hematuria and bleeding around bah overnight, that improved this morning. Patient has been able to keep urination, so continued with diuretics. No reports of fever and increased oxygen requirements overnight.    Review of Systems:   Unable to obtain    Physical Exam:   I/O last 3 completed shifts:  In: 2820.23 [I.V.:1189.4; NG/GT:175]  Out: 745 [Urine:645; Emesis/NG output:100]   /72 (BP Location: Right arm)  Pulse 121  Temp 97.6  F (36.4  C) (Axillary)  Resp 16  Wt 70 kg (154 lb 5.2 oz)  SpO2 100%   BMI 24.91 kg/m2     GENERAL APPEARANCE: ill appearing, intubated  EYES:  + scleral icterus, pupils equal  HENT: mouth without ulcers or lesions  PULM: lungs low air entry  to auscultation,  bilaterally, equal air movement, no clubbing  CV: regular rhythm, normal rate, no rub     -JVD unable to access     -edema 2+ in LE   GI: soft, distended, bowel sounds are +/-, with subcostal surgical wound covered with gauze with minimal serosanguinous drainage.  INTEGUMENT: no cyanosis, - rash  NEURO:  sedated   Access  RIJ still in placed, PICC left arm placed this morning    Labs:   All labs reviewed by me  Electrolytes/Renal -   Recent Labs   Lab Test  08/10/18   0349  08/09/18   2031  08/09/18   1626 08/09/18   0335   NA  134   --   137  136   POTASSIUM  3.5  3.3*  3.4  3.5   CHLORIDE  104   --   107  106   CO2  20   --   20  22   BUN  66*   --   58*  42*   CR  2.00*   --   1.63*  1.28*   GLC  137*   --   139*  169*   JESUS  6.6*   --   6.6*  6.6*   MAG  1.9  1.8  1.8  2.1   PHOS  3.6   --   2.2*  2.5       CBC -   Recent Labs   Lab Test  08/10/18   0349  08/10/18   0019  08/09/18   1626 08/09/18   0335   WBC  6.6   --   5.8  3.8*   HGB  8.1*  8.4*  8.5*  7.7*   PLT  89*   --   68*  56*       LFTs -   Recent Labs   Lab Test  08/10/18   0349  08/09/18   0335  08/08/18   2206   ALKPHOS  283*  290*  266*   BILITOTAL  2.3*  2.5*  2.9*   ALT  51  69  75*   AST  38  50*  51*   PROTTOTAL  3.7*  3.6*  3.4*   ALBUMIN  1.5*  1.9*  1.6*       Iron Panel -   Recent Labs   Lab Test  07/13/18   0334  06/17/18   2138   IRON  97  66   IRONSAT  83*  109*   ANGELLA  Unsatisfactory specimen - icteric   --          Imaging:  All imaging studies reviewed by me.     Current Medications:    aspirin  81 mg Oral or Feeding Tube Daily     bumetanide  2 mg Intravenous Q8H     DAPTOmycin (CUBICIN) intermittent infusion  10 mg/kg (Dosing Weight) Intravenous Q48H     heparin lock flush  5-10 mL Intracatheter Q24H     hydrocortisone sodium succinate PF  50  mg Intravenous Q8H     lipids  250 mL Intravenous Once per day on Mon Wed Fri     meropenem  500 mg Intravenous Q24H     micafungin  100 mg Intravenous Q24H     midodrine  10 mg Per Feeding Tube Q8H     mycophenolate  250 mg Oral or Feeding Tube BID IS     pantoprazole  40 mg Oral or Feeding Tube QAM AC     sodium chloride (PF)  3 mL Intravenous Q8H     sulfamethoxazole-trimethoprim  10 mL Oral or Feeding Tube Once per day on Mon Wed Fri     tacrolimus  1.5 mg Oral or Feeding Tube BID IS     [START ON 8/15/2018] tenofovir  300 mg Per Feeding Tube Weekly     valGANciclovir  450 mg Oral or NG Tube Once per day on Tue Fri    Or     valGANciclovir  450 mg Oral Once per day on Tue Fri       IV fluid REPLACEMENT ONLY       fentaNYL 50 mcg/hr (08/10/18 0800)     HEParin 1,200 Units/hr (08/10/18 0800)     insulin (regular) 7 Units/hr (08/10/18 0800)     lactated ringers 10 mL/hr at 07/30/18 4959     norepinephrine Stopped (08/09/18 7247)     parenteral nutrition - ADULT compounded formula 50 mL/hr at 08/10/18 0800     Reason beta blocker order not selected

## 2018-08-10 NOTE — PROGRESS NOTES
Curbsided by Dr Janneth Casillas for bleeding around the Luis catheter. Hgb stable. Likely source is urethral. Applied pressure at base of penis for 5 minutes, bleeding stopped while holding pressure and resumed at slower rate when pressure relieved. Applied Webb clamp at base of penis. Instructed RN on its use.    - check penis q1h for signs of glans ischemia or tissue necrosis  - remove Cunningham clamp after 2 hours and assess for bleeding  - if bleeding continues, okay to continue Webb clamp for another two hours  - next step would be upsizing catheter but this will cause more trauma  - Urology team will assess in AM    Discussed with chief resident Dr Severino.    Sophie Flynn MD  Urology PGY2        ADDENDUM-----  Visited patient this morning. Bleeding has not stopped with application of Cunningham clamp overnight. Due to concerns yesterday about position of Luis and continued bleeding, the catheter was replaced for a 18 Fr Coude catheter.This was done without difficulty. 20cc was put into the balloon. We put the catheter on traction, please take off traction in 8 hours.

## 2018-08-10 NOTE — PLAN OF CARE
Problem: Patient Care Overview  Goal: Plan of Care/Patient Progress Review  Discharge Planner PT   Patient plan for discharge: not discussed   Current status: Pt inc lethargy today. Performed rolling R and L and max A. Dependently transferred via OH lift bed>chiar. Performed min PROM to UE and LE. AVSS.   Barriers to return to prior living situation: medical needs  Recommendations for discharge: deferred  Rationale for recommendations: Pt would benefit from continued skilled PT to address deficits in overall mobility.       Entered by: Raina Chapa 08/10/2018 2:04 PM

## 2018-08-11 NOTE — PROGRESS NOTES
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 He returned to the SICU for shock, severe lactic acidosis and respiratory failure. He is s/p exploratory laparotomy on 8/6 with resection of necrotic jejunum, abdomen closed 8/8.    MAIN PLAN FOR TODAY:  - wean TPN to off  - continue to advacne TF  - SLP consult  - increase daptomycin dosing  - wean NE as able, keep MAP > 60     PLAN BY SYSTEMS:    Neuro/ pain/ sedation:  # acute pain  - Monitor neurological status. Notify the MD/DO for any acute changes in exam.  - discontinued scheduled oxycodone    - PRN oxycodone and IV hydromorphone for pain.      Pulmonary care:   #acute respiratory failure  - extubated  - continue aggressive pulmonary hygiene      Cardiovascular:    #Septic Shock  - Monitor hemodynamic status   - Midodrine 10 mg Q8h  - levophed gtt, wean as tolerated   - TTE 8/8 showed no vegetations or valve abnormalities  - TOÑO 8/10 no evidence of endocarditis   - ASA 81 mg PO daily       GI:   # DDLT 7/21/18  # Intrahepatic left portal venous thrombosis, infarction of left liver lobe, patchy infarction of right lobe, suspected hepatic arterial thrombosis  # 7/30/18 sp exploratory laparotomy, abthera closure device in place. Findings of patchy diffuse SB ischemia. S/p liver biopsy  # Splenic infarctions, patchy bowel ischemia  # Ileus  # Ex Lap 8/6 - resection of 90 cm of necrotic ileum   # Ex Lap 8/8 - abdomen closed  - s/p exploratory laparotomy, abthera closure device in place. Findings of patchy diffuse SB ischemia. S/p liver biopsy on 7/30/18. Returned to  OR 8/6 for washout, reexploration.   - Abdomen closed in OR 8/8  - Heparin gtt high intensity given PV thrombosis, consider change to low intensity heparin gtt due to urethral bleeding  - Ileus: CXR 8/1: stool burden, hold PO bowel stimulants or laxatives given concerns of bowel ischemia per primary. S/p multiple enemas and mineral oil x2 doses. Having multiple BMs.   - 8/8 US Liver: unchanged decreased velocity in the left portal vein, improved velocities of the hepatic artery      Nutrition:   # protein calorie deficit malnutrition   - Advance TFs today  - wean TPN       Renal/Fluids/ Electrolytes:   # BEN  # severe metabolic acidosis, lactic acidosis, resolved  # severe hyponatremia, resolved  # hyperkalemia, resolved  # hematuria   - HD holiday to continue    - Bumex gtt   - Will continue to monitor intake and output.  - Urethral bleeding: Luis replaced by urology, current recommendations to keep Luis until Monday and f/u with urology       Endocrine:    # DM II  - insulin gtt  - steriod taper per Transplant       ID/ Antibiotics:  # VRE Bacteremia  # ESBL Bacteremia  # Hepatitis B  - Daptomycin (started 7/30) and Meropenem (started 8/2) continue while blood cultures still positive  - Micafungin   - WBC stable, pt afebrile  - Immunosuppression: (restarted 8/6) tacrolimus, hydrocortisone  - Immunosuppression prophylaxis: Bactrim, Valgancyclovir and Tenofovir, nystatin when micafungin stopped.   - Consult Transplant ID due to continued bacteremia with unknown source      Positive cultures   - 7/27 VRE (line/arm)   - 7/29: VRE (left arm)   - 7/30: VRE (left arm)   - 7/31: VRE (Line and peripheral)  - 8/1: ESBL from HD/hand/central line   - 8/3: VRE from intra-abdominal tissue  - 8/4: NGTD blood culture  - 8/5: NGTD blood culture  - 8/6: VRE - art line  - 8/7: VRE (blood)  - 8/8: VRE (blood)  - 8/9: VRE (blood)      Heme:     # Acute blood loss anemia  # Anemia of critical illness  # Portal venous thrombosis  #  Thrombocytopenia  - Hemoglobin stable  - Heparin gtt high intensity given PV thrombosis, consider change to low intensity heparin gtt due to urethral bleeding  - Continue aspirin 81mg  - KEN panel sent and negative      MSK:  # weakness and deconditioning of critical illness   - PT/OT   - mobilize       Prophylaxis:    - DVT: Heparin gtt, SCD's  - GI: Protonix prophylaxis       Lines/ tubes/ drains:  - L radial art line  - L PICC  - NJ, NG  - PIVs      Disposition:  - SICU      GENERAL CARES  DVT Prophylaxis: heparin gtt, currently @ 1350 units/hr  GI Prophylaxis: PPI  Restraints: Restraints for medical healing needed: NO  Family update by me today: Yes     Stepan Plata APRN, CNP    Time Spent on this Encounter   Billing:  I spent 35 minutes bedside and on the inpatient unit today managing the critical care of Yaneli Miles in relation to the issues listed in this note.    Main Plans for Today   - see above    Interval History   - no acute events overnight  - patient seen and examined, chart reviewed  - discussed with Dr. Davis and SICU team on multidisciplinary rounds      Physical Exam   Temp: 98  F (36.7  C) Temp src: Axillary Temp  Min: 97.6  F (36.4  C)  Max: 98.6  F (37  C) BP: (!) 76/52   Heart Rate: 118 Resp: 18 SpO2: 98 % O2 Device: None (Room air) Oxygen Delivery: 4 LPM  Vitals:    08/08/18 0500 08/09/18 0600 08/10/18 0400   Weight: 64.6 kg (142 lb 6.7 oz) 67.5 kg (148 lb 13 oz) 70 kg (154 lb 5.2 oz)     I/O last 3 completed shifts:  In: 3037.94 [I.V.:1036.94; NG/GT:395]  Out: 2895 [Urine:2495; Emesis/NG output:400]    GEN: slightly distressed  EYES: PERRL, Anicteric sclera.    HEENT:  Normocephalic, atraumatic, trachea midline  CV: RRR, no gallops, rubs, or murmurs  PULM/CHEST: Clear breath sounds bilaterally without rhonchi, crackles or wheeze, symmetric chest rise, slightly labored  GI: normal bowel sounds, soft, non-tender, no rebound tenderness or guarding, no masses  : bah catheter in place, urine  yellow and clear  EXTREMITIES: 2+  peripheral edema, moving all extremities, peripheral pulses intact  NEURO: Cranial nerves II-XII grossly intact, no motor-sensory deficits noted  SKIN: No rashes, sores or ulcerations, transverse incision c/d/i  PSYCH:  Affect: appropriate not anxious, not altered, no hallucinations  Imaging personally reviewed:  ECG    Medications     bumetanide 1 mg/hr (08/11/18 1100)     IV fluid REPLACEMENT ONLY       HEParin 1,350 Units/hr (08/11/18 1100)     insulin (regular) 5 Units/hr (08/11/18 1100)     lactated ringers 10 mL/hr at 07/30/18 1519     norepinephrine 0.12 mcg/kg/min (08/11/18 1100)     parenteral nutrition - ADULT compounded formula 20 mL/hr at 08/11/18 1100     Reason beta blocker order not selected         aspirin  81 mg Oral or Feeding Tube Daily     [START ON 8/12/2018] DAPTOmycin (CUBICIN) intermittent infusion  12 mg/kg Intravenous Q48H     heparin lock flush  5-10 mL Intracatheter Q24H     hydrocortisone  10 mg Oral TID     hydrocortisone  25 mg Oral TID     meropenem  500 mg Intravenous Q12H     micafungin  100 mg Intravenous Q24H     midodrine  10 mg Per Feeding Tube Q8H     mycophenolate  250 mg Oral or Feeding Tube BID IS     pantoprazole  40 mg Oral or Feeding Tube QAM AC     sodium chloride (PF)  3 mL Intravenous Q8H     sulfamethoxazole-trimethoprim  10 mL Oral or Feeding Tube Once per day on Mon Wed Fri     tacrolimus  1 mg Oral or Feeding Tube QPM     tacrolimus  1.5 mg Oral QAM     [START ON 8/15/2018] tenofovir  300 mg Per Feeding Tube Weekly     valGANciclovir  450 mg Oral or NG Tube Once per day on Tue Fri    Or     valGANciclovir  450 mg Oral Once per day on Tue Fri       Data     Recent Labs  Lab 08/11/18  0953 08/11/18  0401 08/10/18  2125 08/10/18  1606 08/10/18  0349  08/08/18  1929  08/07/18  0336   WBC 17.1* 13.4*  --   --  6.6  < > 3.4*  < > 2.9*   HGB 7.8* 7.7*  --   --  8.1*  < > 8.8*  < > 10.0*   MCV 90 90  --   --  90  < > 89  < > 86     172  --   --  89*  < > 49*  < > 57*   INR 1.39*  --   --   --   --   --  1.36*  --  1.53*   NA  --  133 136 136 134  < > 137  < > 139   POTASSIUM  --  3.0* 3.3* 2.9* 3.5  < > 3.9  < > 3.4   CHLORIDE  --  102 105 105 104  < > 107  < > 109   CO2  --  19* 20 20 20  < > 22  < > 22   BUN  --  93* 82* 74* 66*  < > 35*  < > 24   CR  --  2.58* 2.37* 2.15* 2.00*  < > 1.05  < > 0.91   ANIONGAP  --  13 11 10 9  < > 8  < > 8   JESUS  --  7.0* 6.3* 6.0* 6.6*  < > 6.9*  < > 6.9*   GLC  --  124* 116* 100* 137*  < > 154*  < > 129*   ALBUMIN  --  1.4*  --   --  1.5*  < >  --   < > 1.8*   PROTTOTAL  --  4.2*  --   --  3.7*  < >  --   < > 3.5*   BILITOTAL  --  2.1*  --   --  2.3*  < >  --   < > 3.8*   ALKPHOS  --  289*  --   --  283*  < >  --   < > 242*   ALT  --  36  --   --  51  < >  --   < > 162*   AST  --  30  --   --  38  < >  --   < > 81*   < > = values in this interval not displayed.  Recent Results (from the past 24 hour(s))   XR Abdomen Port 1 View    Narrative    Examination:  XR ABDOMEN PORT 1 VW    Date:  8/11/2018 11:06 AM     Clinical Information: Verify small bowel feeding tube bedside  placement;      Additional Information: none    Comparison: 8/8/2018 abdominal film.    Findings:     The feeding tube continues to project just proximal to the region of  the ligament of Treitz. There is an OG/NG tube projected over the body  of the stomach. Abdominal bowel gas pattern is normal. There is a  catheter projected over the bladder. Surgical material is projected in  the right midabdomen. Calcified lymph node complex is present near the  gastrohepatic ligament. Catheter material projected over right upper  abdomen, origin uncertain.      Impression    Impression:    1. No change in position of feeding tube, projected near ligament of  Treitz..  2. Normal bowel gas pattern.    ZULEMA GRANDA MD

## 2018-08-11 NOTE — PLAN OF CARE
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 He returned to the SICU for shock, severe lactic acidosis and respiratory failure. He is s/p exploratory laparotomy on 08/06 with resection of necrotic jejunum, abdomen closed 8/8. Alert. PERRL. Able to follow commands. Strength 2/5 in all extremities. Afebrile. Levo titrated to maintain MAPs >60. Sinus tach on cardiac monitor. Hep 10a level 0.28 this morning. Heparin dosing adjusted per protocol. Sating upper 90's on RA. LS clear and diminished on the bases. No S+S of respiratory distress noted.  NPO. TPN @ 40 ml/hr continuous. TF @ 40 ml/hr (goal 60 ml/hr). Tolerating well with no C/o nausea or vomiting. Bowel sounds normoactive. No stool overnight. On Bumex drip @ 1mg/hr. Luis output  ml/hr pink colored urine. No penile bleeding noted. Abdominal surgical incision dressing with moist drainage intact. Abdominal pain is optimally controlled with scheduled oxycodone and prn IV dilaudid. Creatinine trending up. Plan: see flow sheet for detailed assessments and interventions, monitor and manage pain + BP, support POC.

## 2018-08-11 NOTE — PROGRESS NOTES
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  Developed shock with severe lactic acidosis and respiratory failure on POD9. CT scan showed left PV venous thrombosis, infarction of left liver lobe, patchy infarction of right lobe and possible hepatic arterial thrombosis, patchy bowel ischemia. Transferred to ICU.   Ex lap, patchy diffuse SB ischemia. Doppler demonstrated arterial flow main vessels to bowel and liver. Splenic infarction. Liver biopsy negative for acute rejection. Fluid culture growing VRE. On Heparin gtt. 8/3 OR for reexploration liver transplant, bowel ischemia, findings improved patchy iscemic changes of small bowel, patchy ischemic changes of left liver lobe. 8/ OR: some ischemic/necrotic jejunum, s/p resection.  / Washout and abdominal closure with stratice mesh, no drains placed. Minimal ascites, intact small bowel anastomosis, patchy ischemia left liver lobe.     Procedures:    donor (DBD) liver transplant with duct to duct anastomosis over biliary stent.    Ex lap due to concern for ischemic bowel, findings: patchy diffuse SB ischemia. Doppler demonstrated arterial flow main vessels to bowel and liver. Splenic infarction.   8/3 Reexploration liver transplant, bowel ischemia, findings improved patchy iscemic changes of small bowel, patchy ischemic changes of left liver lobe.    Re-exploration, ischemic/necrotic jejunum, s/p resection  8/ Washout and abdominal closure with stratice mesh, no drains placed. Minimal ascites, intact small bowel anastomosis, patchy ischemia left liver lobe.    Graft function: POD #21/2. Infarction of left liver lobe, patchy infarction right liver lobe.  Left PV thrombosis. Narrow and irregular appearance HA. Bowel ischemia, s/p resection small bowel on 8/6. Procedures, see above. Liver biopsy negative for acute rejection. Intra-abdominal clot cultures from 8/1 + VRE and 8/6 + VRE, now s/p washout of hematoma.     Immunosuppression management:   Simulect intra-op due to BEN  Steroid taper per protocol, completed.  MMF restarted at 250 mg BID  Tac 1mg BID restarted 8/6, increased to 1.5 mg BID.  Level 10.3, expect it will continue to increase on this dose. Will decreased to 1.5mg AM/1 mg PM. Due to BEN, will aim for tac level 8-10.  Hydrocortisone taper, decrease to 10 mg every 8 hours today.  Complexity of management:Medium. Contributing factors: thrombocytopenia and anemia sepsis  Hematology: Pancytopenia.   Anemia: Hemoglobin 7.8.   Thrombocytopenia:  .    Leukopenia: Decreased  WBC 1.7 on 8/8. Today WBC 17. On valcyte, bactrim. Restarted MMF.  Anticoagulation: Restart high intensity heparin gtt due to PV thrombosis post op.    Hematology consult, evaluation for coagulopathy given portal vein thrombosis and mesenteric ischemia. No further evaluation for coagulopathy per hematology. See consult note.   Cardiorespiratory:  Hypotension secondary to sepsis, now back on pressors. Extubated last night, on Bumex drip due to line holiday for HD.   GI/Nutrition: NGT.  NPO.  On TPN. TF started, ok to titrate to goal and stop TPN if tolerating TF  Ischemic bowel:  7/30 CTA: nonocclusive filling defects in superior mesenteric vein branches.  Anticoagulation as above.  S/p resection of necrotic jejunum on 8/6.   Endocrine:  DM type 2, continue insulin drip.    Fluid/Electrolytes: BEN/ARF. CRRT stopped. HD line removed due to bacteremia. Hold HD for today.     : Bah, UOP improved since off CRRT and bumex drip.  Traumatic bah placement, seen by urology and bah replaced.   Infectious disease: Afebrile. WBC trending up. Blood cx x 2 today.  -Previously on Linezolid  - and zosyn -.   -Continue daptomycin (VRE, -present), meropenem (Ecoli ESBL, -) and micafungin (empiric, -).   -Due to concerns for a bile leak may need a HIDA scan with further IR drain placement.     1. VRE intra abdominal and bacteremia. Fluid cultures 18 + VRE.  (peripheral) and  (peripheral and VAD) blood cultures growing VRE.   intra-abdominal clot + VRE.  , , , .  blood + VRE. HD line removed. CVC line to be removed 8/10. PICC line was placed due to needing central access. Persistant bacteremia, possible due to lines, blood culture drawn prior to CVC removal. Tx ID consulted. TOÑO to evaluate for endocarditis. Ext x 4 negative for DVT.   2. Ecoli ESBL bacteremia. Repeat bcx on  (peripheral and VAD) growing Ecoli ESBL. Zosyn changed to Meropenem  SICU to exchange lines as able. Blood cultures negative for E. Coli since .  3. HBV: Hepatits B DNA PCR positive prior to tx. Received HBIG on  and . Hep B surface antigen negative. No further HBIG planned. Continue tenofovir 300 mg daily.   PPX: Valcyte x 12 weeks (CMVand EBV IgG +), bactrim. mycelex stopped as patient on micafungin. Will start nystatin if aide discontinued.     Prophylaxis: DVT PCDs, on heparin, fall, GI  Disposition: SICU    Coordinator: Kay Reyes    Medical Decision Making: High    JAY/Fellow/Resident Provider: Mihir Nj, fellow    Faculty: Pmaela Che MD  __________________________________________________________________  Transplant History: Admitted 2018 for  donor liver transplant.   The patient has a history of liver failure due to hepatitis B .    2018 (Liver), Postoperative day: 21     Interval History:  No acute events overnight, extubated last night, on norepinephrine 0.12 reports increased abdominal pain.      ROS:   A 10-point review of systems was negative except as noted above.    Curent Meds:    aspirin  81 mg Oral or Feeding Tube Daily      bisacodyl  10 mg Rectal Once     [START ON 8/12/2018] DAPTOmycin (CUBICIN) intermittent infusion  12 mg/kg Intravenous Q48H     heparin lock flush  5-10 mL Intracatheter Q24H     hydrocortisone  10 mg Oral TID     hydrocortisone  25 mg Oral TID     meropenem  500 mg Intravenous Q12H     micafungin  100 mg Intravenous Q24H     midodrine  10 mg Per Feeding Tube Q8H     mycophenolate  250 mg Oral or Feeding Tube BID IS     pantoprazole  40 mg Oral or Feeding Tube QAM AC     sodium chloride (PF)  3 mL Intravenous Q8H     sulfamethoxazole-trimethoprim  10 mL Oral or Feeding Tube Once per day on Mon Wed Fri     tacrolimus  1 mg Oral or Feeding Tube QPM     tacrolimus  1.5 mg Oral QAM     [START ON 8/15/2018] tenofovir  300 mg Per Feeding Tube Weekly     valGANciclovir  450 mg Oral or NG Tube Once per day on Tue Fri    Or     valGANciclovir  450 mg Oral Once per day on Tue Fri       Physical Exam:     Admit Weight: 60.7 kg (133 lb 12.8 oz)    Current Vitals:   BP (!) 76/52  Pulse 121  Temp 98  F (36.7  C) (Axillary)  Resp 18  Wt 70 kg (154 lb 5.2 oz)  SpO2 98%  BMI 24.91 kg/m2      Vital sign ranges:    Temp:  [97.6  F (36.4  C)-98.6  F (37  C)] 98  F (36.7  C)  Heart Rate:  [] 118  Resp:  [14-21] 18  BP: (76)/(52) 76/52  MAP:  [50 mmHg-85 mmHg] 71 mmHg  Arterial Line BP: ()/(36-67) 110/56  SpO2:  [97 %-100 %] 98 %  Patient Vitals for the past 24 hrs:   BP Temp Temp src Heart Rate Resp SpO2   08/11/18 1100 - - - 118 - 98 %   08/11/18 1045 - - - 115 - 98 %   08/11/18 1030 - - - 110 - 98 %   08/11/18 1015 - - - 112 - 98 %   08/11/18 1000 - - - 118 - 98 %   08/11/18 0945 - - - 115 - 97 %   08/11/18 0930 - - - 115 - 100 %   08/11/18 0915 - - - 117 - 100 %   08/11/18 0900 - - - 113 - 99 %   08/11/18 0845 - - - 111 - 99 %   08/11/18 0830 - - - 111 - 99 %   08/11/18 0815 - - - 109 - 99 %   08/11/18 0800 - 98  F (36.7  C) Axillary 108 18 99 %   08/11/18 0745 - - - 107 - 98 %   08/11/18 0730 - - - 112 - 100 %    08/11/18 0715 - - - 108 - 99 %   08/11/18 0700 - - - 106 - 99 %   08/11/18 0656 - - - 107 - 99 %   08/11/18 0600 - - - 105 - 99 %   08/11/18 0530 - - - 105 - 99 %   08/11/18 0500 - - - 106 - 99 %   08/11/18 0400 - 98.1  F (36.7  C) Axillary 106 16 99 %   08/11/18 0300 - - - 103 - 99 %   08/11/18 0200 - - - 110 - 99 %   08/11/18 0100 - - - 105 - 99 %   08/11/18 0000 - 98.6  F (37  C) Axillary 104 20 99 %   08/10/18 2300 - - - 102 - 99 %   08/10/18 2245 - - - 102 - 100 %   08/10/18 2230 - - - 102 - 100 %   08/10/18 2215 - - - 104 - 100 %   08/10/18 2200 - - - 103 16 98 %   08/10/18 2145 - - - 103 - 100 %   08/10/18 2130 - - - 102 - 100 %   08/10/18 2115 - - - 101 - 99 %   08/10/18 2100 - - - 101 18 99 %   08/10/18 2045 - - - 101 - 99 %   08/10/18 2030 - - - 103 - 99 %   08/10/18 2015 - - - 102 - 99 %   08/10/18 2000 - 97.6  F (36.4  C) Axillary 102 17 99 %   08/10/18 1945 - - - 101 - 99 %   08/10/18 1930 - - - 99 - 99 %   08/10/18 1915 - - - 102 - 99 %   08/10/18 1900 - - - 101 15 100 %   08/10/18 1845 - - - 100 - 100 %   08/10/18 1830 - - - 103 - 100 %   08/10/18 1815 - - - 100 - 100 %   08/10/18 1800 - - - 103 18 100 %   08/10/18 1745 - - - 112 - 99 %   08/10/18 1730 - - - 108 - 100 %   08/10/18 1725 - - - 106 - 100 %   08/10/18 1720 - - - 107 - 100 %   08/10/18 1715 - - - 103 - 100 %   08/10/18 1710 - - - 103 - 100 %   08/10/18 1705 (!) 76/52 - - 112 - 100 %   08/10/18 1700 (!) 76/52 - - 112 - 100 %   08/10/18 1630 - - - - - 100 %   08/10/18 1600 - 97.8  F (36.6  C) Axillary 111 19 100 %   08/10/18 1500 - - - 108 18 100 %   08/10/18 1400 - - - 104 20 99 %   08/10/18 1300 - - - 110 21 100 %   08/10/18 1248 - - - 105 - 100 %   08/10/18 1245 - - - 105 - 100 %   08/10/18 1230 - - - 105 - 100 %   08/10/18 1225 - - - 103 - 100 %   08/10/18 1220 - - - 101 - 100 %   08/10/18 1200 - 97.6  F (36.4  C) Axillary 101 14 100 %     General Appearance: Intubated  HEENT: NGT to suction. NJ in place.  Skin: warm, dry  Heart:  NSR  Lungs: CMV  Abdomen: Abdomen soft, mild discomfort with exam.   : No bah  Extremities: well perfused.  Neurologic: alert, following commands      Data:   CMP    Recent Labs  Lab 08/11/18  0401 08/10/18  2125  08/10/18  0349  08/09/18  0325 08/08/18  2206    136  < > 134  < >  --  137   POTASSIUM 3.0* 3.3*  < > 3.5  < >  --  3.5   CHLORIDE 102 105  < > 104  < >  --  106   CO2 19* 20  < > 20  < >  --  22   * 116*  < > 137*  < >  --  210*   BUN 93* 82*  < > 66*  < >  --  39*   CR 2.58* 2.37*  < > 2.00*  < >  --  1.13   GFRESTIMATED 27* 30*  < > 36*  < >  --  70   GFRESTBLACK 33* 36*  < > 44*  < >  --  85   JESUS 7.0* 6.3*  < > 6.6*  < >  --  6.6*   ICAW  --   --   --   --   --  4.3* 4.3*   MAG 1.9  --   --  1.9  < >  --  2.1   PHOS 3.1  --   --  3.6  < >  --  2.8   ALBUMIN 1.4*  --   --  1.5*  < >  --  1.6*   BILITOTAL 2.1*  --   --  2.3*  < >  --  2.9*   ALKPHOS 289*  --   --  283*  < >  --  266*   AST 30  --   --  38  < >  --  51*   ALT 36  --   --  51  < >  --  75*   < > = values in this interval not displayed.  CBC    Recent Labs  Lab 08/11/18  0953 08/11/18  0401   HGB 7.8* 7.7*   WBC 17.1* 13.4*    172     COAGS    Recent Labs  Lab 08/11/18  0953 08/09/18  0335 08/08/18  1929   INR 1.39*  --  1.36*   PTT  --  53* 34      Urinalysis  Recent Labs   Lab Test  07/27/18   2330  07/13/18   1315  06/18/18   1200   COLOR  Yellow  Dark Yellow  Yellow   APPEARANCE  Clear  Clear  Clear   URINEGLC  Negative  Negative  Negative   URINEBILI  Negative  Large*  Small*   URINEKETONE  Negative  Negative  Negative   SG  1.008  1.013  1.008   UBLD  Negative  Negative  Negative   URINEPH  7.5*  6.0  5.0   PROTEIN  30*  10*  Negative   NITRITE  Negative  Negative  Negative   LEUKEST  Negative  Negative  Negative   RBCU  5*  <1  1   WBCU  3  None  2   UTPG   --    --   0.75*     Virology:  Hepatitis C Antibody   Date Value Ref Range Status   07/20/2018 Nonreactive NR^Nonreactive Final     Comment:     Assay  performance characteristics have not been established for newborns,   infants, and children         POD 14 US liver:  1.  The left portal vein is antegrade with decreased velocity,  measuring approximately 15 cm/second. This vessel was occluded on CT  7/30/2018.  2.  Elevated velocities of the main hepatic artery, now 203 cm/sec  (previously 94 cm/sec). However left hepatic arteries demonstrates  steep upstroke suggesting there is not a significant stenosis.   2a. Right hepatic artery was not visualized, could be occluded or  difficult to see postoperatively.  3. Echogenic focus of the right lobe in close proximity to the  hepatorenal fossa. This likely corresponds with nonenhancing lesion on  CT 7/30/2018 and likely representing subcapsular hematoma.  4. Additional small hematomas of the braden hepatis and posterior right  perihepatic region.  5. Small volume anechoic ascites.  6. Left hepatic lobe not well visualized secondary to open wound  preventing imaging. Note the left lobe was grossly abnormal on CT  7/30/2018    POD 1 US liver: Impression:   1.  Hematoma associated with the inferior aspect of the perihepatic  space anteriorly, measuring 13.7 x 7.0 x 8.5 cm.  2.  Retrograde flow of the left portal vein. Additionally, low  velocities of the portal venous system. Single low resistive index of  the extrahepatic aspect of the hepatic artery, measuring 0.37. These  findings are likely within normal limits in the early postoperative  context. However, continued attention on follow-up recommended.    7/30 CT scan abd/pelvis:  IMPRESSION:   1. Hypoenhancing areas in the transplant liver concerning for  infarction. Transplant left portal vein occlusion with bubbles of  portal venous gas. Nearly occlusive filling defect in the native  hepatic artery. Narrowed and irregular appearance of the transplant  hepatic artery. Focal occlusion of the left main hepatic artery.  Segmental occlusions of right hepatic arterial  branches.   2. Pneumatosis intestinalis with nonocclusive filling defects in  superior mesenteric vein branches, concerning for bowel ischemia or  infarction.   3. Splenic infarctions.   4. Bibasilar consolidation with air bronchograms. Pneumonia cannot be  excluded.   5. Post surgical changes of evacuation of peritransplant hematoma.

## 2018-08-11 NOTE — PLAN OF CARE
Problem: Patient Care Overview  Goal: Plan of Care/Patient Progress Review  Outcome: Declining  D: 42 y/o M POD 20 for liver txp c/b sepsis, necrotic bowel and portal vein thrombus     I/A:   Neuro: Follows simple commands, nods yes/no to best of ability with language barrier. C/O in adequate pain control. PRN oxy and dilaudid given. Afebrile  CV: Levo, sinus tach 110-120s no ectopy. +4 edema in lower extremities  Resp: Productive cough, LS clear, room air  GI: TF off. TPN decreased to 20 ml/hr. Suppository given. NG unclogged, ~1000 ml brown, seedy ouput  : Luis. Urine output bright red with clots. Bladder scanned, Luis flushed. MD aware. Bumex drip decreased to 0.5 mg/hr.   Labs: K 3.0 no PRN replacement orders; Creat up to 2.58. HGB 7.8; Xa 0.48  Gtts: Heparin; Bumex; levo; Insulin; TPN   Lines: PICC; 2 PIV; Art line     Plan: Continue to monitor fluid status and evaluate for CRRT daily. Titrate levo down when possible.

## 2018-08-11 NOTE — PROGRESS NOTES
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   Patient has been receiving CRRT until 8/8 when tunneled catheter was removed to give him a dialysis holiday.   Creatinine and BUN are trending up slowly as expected  Mild hematuria but has been clearing    -- Continued holding CRRT for now, possible re-initiation on 8/13 or sooner if needed for volume and clearance  -- Continue bumex drip for now; Ok to give one dose metolazone for today  -- Goal is to keep net 0.5-1L net -ve if possible (UO ~100-150/hr)  -- electrolytes Q12hrs while on diuresis , replete magnesium to 2 and potassium to 4  -- daily weights and strict IO monitoring  -- Will keep following     Electrolytes  Severe hyponatremia, hyperkalemia- resolved  stable      Hypervolemia with relative intra vascular depletion  BP is stable now  On bumex gtt +metolazone     Polymicrobial BSI due to ESBL E. coli and VRE E. faecium  Presenting persistently +BC for VRE despite high-dose daptomycin. Tunneled catheter was removed early on 8/09, but still RIJ is in place and PICC line in the left arm was placed on 8/09. Of note, BC taken from previous days are still positive for GPCs in chains. Managed by primary team.      OLT 7/12/18   ESLD sec to hep B cb EV , HE , ascites and portal HTN  Ex lap 7/30, Graft function: Intrahepatic left portal venous thrombosis, infarction of left liver  lobe, patchy infarction of right lobe, suspected hepatic arterial thrombosis, Splenic infarctions and Patchy bowel ischemia on heparin gtt  8/3 Reexploration liver transplant, bowel ischemia, findings improved patchy iscemic changes of small bowel, patchy ischemic changes of left liver lobe.   8/6 Re-exploration, ischemic/necrotic jejunum, s/p resection  8/8 abdomen was closed  Management per primary team.    Acute resp failure   Extubated and stable  MICU managing     DMII on insulin gtt     Pancytopenia  Related with ongoing sepsis vs liver failure. Managed by primary team    Recommendations were communicated to primary team    Seen and discussed with Dr. Janelle Rodrigues  [phone]    Interval History :   No acute events and has been responsive to diuresis over night    Review of Systems:   Unable to obtain    Physical Exam:   I/O last 3 completed shifts:  In: 3037.94 [I.V.:1036.94; NG/GT:395]  Out: 2895 [Urine:2495; Emesis/NG output:400]   BP (!) 76/52  Pulse 121  Temp 98.1  F (36.7  C) (Axillary)  Resp 24  Wt 70 kg (154 lb 5.2 oz)  SpO2 98%  BMI 24.91 kg/m2     GENERAL APPEARANCE: ill appearing, intubated  EYES:  + scleral icterus, pupils equal  HENT: mouth without ulcers or lesions  PULM: lungs low air entry  to auscultation,  bilaterally, equal air movement, no clubbing  CV: regular rhythm, normal rate, no rub     -JVD unable to access     -edema 2+ in LE   GI: soft, distended, bowel sounds are +/-, with subcostal surgical wound covered with gauze with minimal serosanguinous drainage.  INTEGUMENT: no cyanosis, - rash  NEURO:  sedated   Access  RIJ still in placed, PICC left arm placed this morning    Labs:   All labs reviewed by me  Electrolytes/Renal -   Recent Labs   Lab Test  08/11/18   0401  08/10/18   2125  08/10/18   1606  08/10/18   0349  08/09/18   2031  08/09/18   1626   NA  133  136  136  134   --   137   POTASSIUM  3.0*  3.3*  2.9*  3.5  3.3*  3.4   CHLORIDE  102  105  105  104   --   107    CO2  19*  20  20  20   --   20   BUN  93*  82*  74*  66*   --   58*   CR  2.58*  2.37*  2.15*  2.00*   --   1.63*   GLC  124*  116*  100*  137*   --   139*   JESUS  7.0*  6.3*  6.0*  6.6*   --   6.6*   MAG  1.9   --    --   1.9  1.8  1.8   PHOS  3.1   --    --   3.6   --   2.2*       CBC -   Recent Labs   Lab Test  08/11/18   0953  08/11/18   0401  08/10/18   0349   WBC  17.1*  13.4*  6.6   HGB  7.8*  7.7*  8.1*   PLT  199  172  89*       LFTs -   Recent Labs   Lab Test  08/11/18   0401  08/10/18   0349  08/09/18   0335   ALKPHOS  289*  283*  290*   BILITOTAL  2.1*  2.3*  2.5*   ALT  36  51  69   AST  30  38  50*   PROTTOTAL  4.2*  3.7*  3.6*   ALBUMIN  1.4*  1.5*  1.9*       Iron Panel -   Recent Labs   Lab Test  07/13/18   0334  06/17/18   2138   IRON  97  66   IRONSAT  83*  109*   ANGELLA  Unsatisfactory specimen - icteric   --          Imaging:  All imaging studies reviewed by me.     Current Medications:    aspirin  81 mg Oral or Feeding Tube Daily     [START ON 8/12/2018] DAPTOmycin (CUBICIN) intermittent infusion  12 mg/kg Intravenous Q48H     heparin lock flush  5-10 mL Intracatheter Q24H     hydrocortisone  10 mg Oral TID     hydrocortisone  25 mg Oral TID     meropenem  500 mg Intravenous Q12H     micafungin  100 mg Intravenous Q24H     midodrine  10 mg Per Feeding Tube Q8H     mycophenolate  250 mg Oral or Feeding Tube BID IS     pantoprazole  40 mg Oral or Feeding Tube QAM AC     sodium chloride (PF)  3 mL Intravenous Q8H     sulfamethoxazole-trimethoprim  10 mL Oral or Feeding Tube Once per day on Mon Wed Fri     tacrolimus  1.25 mg Oral or Feeding Tube QPM     tacrolimus  1.5 mg Oral QAM     [START ON 8/15/2018] tenofovir  300 mg Per Feeding Tube Weekly     valGANciclovir  450 mg Oral or NG Tube Once per day on Tue Fri    Or     valGANciclovir  450 mg Oral Once per day on Tue Fri       bumetanide 1 mg/hr (08/11/18 1300)     IV fluid REPLACEMENT ONLY       HEParin 1,350 Units/hr (08/11/18 1300)     insulin  (regular) Stopped (08/11/18 1307)     lactated ringers 10 mL/hr at 07/30/18 1519     norepinephrine 0.12 mcg/kg/min (08/11/18 1300)     parenteral nutrition - ADULT compounded formula 20 mL/hr at 08/11/18 1400     Reason beta blocker order not selected       Kennedy AGUILLON, saw this patient with Dr. Rodrigues and agree with the findings and plan of care as documented in the note.

## 2018-08-11 NOTE — PLAN OF CARE
Problem: Patient Care Overview  Goal: Plan of Care/Patient Progress Review  Outcome: Improving  D: 44 y/o M POD 20 for liver txp c/b sepsis, necrotic bowel and portal vein thrombus    I/A:  Neuro: Lethargic, sleeps between cares. Sedated for TOÑO, but follows simple commands, nods yes/no to best of ability with language barrier. C/O pain. Fentanyl dc'd Scheduled oxy and PRN dilaudid given. Afebrile  CV: Levo restarted, titrated to keep MAPs >65. Sinus rhythm, no ectopy. +4 edema in lower extremities  Resp: Extubated at 1630. Good productive cough. LS clear, room air  GI: TF advanced to 30. Goal is 60. TPN decreased to 40 ml/hr. No BM today.   : Traumatic bah insertion, urology consulted, new coude placed. Urine output bright red. MD aware. Bumex drip started ~100 ml/hr output.   Labs: K replaced for 2.9, recheck 3.3 with no PRN orders. Sodium steady 136; Creat up to 2.37. HGB 8.1  Gtts: Heparin; Bumex; levo; Insulin; TPN   Lines: PICC; 2 PIV; Art line    Plan: Continue to monitor fluid status and evaluate for CRRT daily. Titrate levo when possible.

## 2018-08-11 NOTE — PROVIDER NOTIFICATION
Updated MD on pts increasing scrotal edema and clots in urine. This RN flushed catheter with 100cc NS and unable to pull back. UOP slightly decreasing, scrotal edema has increased throughout the day. Recommended urology to see patient.

## 2018-08-12 NOTE — PROGRESS NOTES
CLINICAL NUTRITION SERVICES - BRIEF NOTE    Nutrition Prescription    RECOMMENDATIONS FOR MDs/PROVIDERS TO ORDER:  - Total daily fluids/adjustments per MD     Recommendations already ordered by Registered Dietitian (RD):  - Continue to hold EN.  - Received consult for PN orders. Recommend prior recommended regimen:  --Use dosing weight 61 kg  **Addendum: ok to initiate full PN goal due to recency of pt receiving full goal dextrose. Appreciate pharmacist assistance.   --Begin CPN, goal volume 1200 ml/day (or per MD/PharmD discretion) with GOAL 245g Dex daily (833 kcal, GIR = 1.7), 120g AA daily (480 kcal), and 250 ml 20% IV lipids 3x/week.  Micro/Rx: Per PharmD  Provisions provide 1527 kcals (25 kcal/kg/day), 2.0 g PRO/kg/day, GIR = 2.8 with 14% kcals from Fat.  --Monitor LFTs and TG accordingly     Future/Additional Recommendations:  - PN initiation and advancement/tolerance/lytes   - GI status and ability to restart EN      Nutrition Progress Note - f/u for progress towards previous nutrition POC (see previous 8/10 reassessment for details)     Marlen Montenegro, JESSICA, LD, CNSC  Unit Pager: [phone]

## 2018-08-12 NOTE — PLAN OF CARE
Problem: Patient Care Overview  Goal: Plan of Care/Patient Progress Review  Outcome: No Change  D: 44 y/o M POD 22 for liver txp c/b sepsis, necrotic bowel and portal vein thrombus      I/A:   Neuro: Follows commands, nods yes/no to best of ability with language barrier. C/O inadequate pain control. PRN oxy and dilaudid given. Afebrile. Up to chair  CV: ECHO completed to monitor fluid status. 750 albumin given; vaso started at straight rate; Levo to be titrated off, sinus rhythm 90s no ectopy. +4 edema in lower extremities. Heparin drip restarted at straight rate  Resp: Productive cough, LS clear, room air  GI: Strict NPO. TPN to be started this evening. Suppository given with no results. NG to LIS, brown, seedy ouput  : Luis. Urine output pink with minimal clots. Bumex drip stopped.   Gtts: Heparin; vaso; levo; Insulin; sodium bicarb; MIV @100  Lines: PICC; 2 PIV; Art line  Skin: MD opened fascia for hematoma biopsy. Wet to dry drsg until wound vac is placed.       Plan: Continue to monitor fluid status and evaluate for CRRT daily. Titrate levo off when possible. Notify SICU with any concerns.

## 2018-08-12 NOTE — PLAN OF CARE
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 He returned to the SICU for shock, severe lactic acidosis and respiratory failure. He is s/p exploratory laparotomy on 08/06 with resection of necrotic jejunum, abdomen closed 8/8. Lethargic. PERRL. Able to follow commands. strength 2/5 in all extremities. Afebrile. Levo titrated to maintain MAPs >60. Sinus tach on cardiac monitor. Hep 10a level 0.55 this morning, therapeutic . Heparin drip stopped @ 0430 per SICU MD due to penile bleeding with a drop in hgb level to 6.5. 1 unit RBCs currently transfusing, tolerating well. Sating upper 90's on RA. LS clear and diminished on the bases. No S+S of respiratory distress noted.  NPO. TPN @ 20 ml/hr continuous. No C/o nausea or vomiting. Bowel sounds normoactive. No stool overnight. NG to LIS with no output. On Bumex drip @ 0.5 mg/hr. Luis output  ml/hr red colored urine. Continues to have penile bleeding. Abdominal surgical incision well approximated with sutures in place and open to air. Abdominal pain managed with prn oxycodone and IV dilaudid. Creatinine trending up. Plan: see flow sheet for detailed assessments and interventions, monitor and manage pain, BP and penile bleed, support POC.

## 2018-08-12 NOTE — PLAN OF CARE
Lab called with left radial Windy +ve blood culture growing gram +ve cocci in pairs and chains. SICU resident MD updated. No new orders.

## 2018-08-12 NOTE — PLAN OF CARE
Problem: Patient Care Overview  Goal: Plan of Care/Patient Progress Review  Discharge Planner PT   Patient plan for discharge: not discussed   Current status: pt participates in seated LE and UE exercises while up in recliner chair. Pt with poor strength and endurance, endorses pain with active or passive movement.  Barriers to return to prior living situation: medical status  Recommendations for discharge: rehab  Rationale for recommendations: pt currently far below baseline mobility, would benefit from continued therapy to progress strength and independence.       Entered by: Darlene Reis 08/12/2018 12:11 PM

## 2018-08-12 NOTE — PROGRESS NOTES
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 He returned to the SICU for shock, severe lactic acidosis and respiratory failure. He is s/p exploratory laparotomy on 8/6 with resection of necrotic jejunum, abdomen closed 8/8.     MAIN PLAN FOR TODAY:  - restart stress dose steroids and fludrocortisone for BP  - discuss source control w/ transplant  - restart TPN  - TEG  - restart heparin at 500 straight rate  - DDAVP  - start bicarb  - albumin  - move from enteral meds to IV  - stop Bumex        PLAN BY SYSTEMS:     Neuro/ pain/ sedation:  # acute pain  - Monitor neurological status. Notify the MD/DO for any acute changes in exam.  - PRN oxycodone and IV hydromorphone for pain.       Pulmonary care:   #acute respiratory failure  - extubated  - continue aggressive pulmonary hygiene  - remains on room air      Cardiovascular:    #Septic Shock  - Monitor hemodynamic status   - Midodrine 10 mg Q8h  - levophed gtt, wean as tolerated   - Add vasopressin for pressure support   - hydrocortisone and fludrocortisone stress dose  - TTE 8/8 showed no vegetations or valve abnormalities  - TOÑO 8/10 no evidence of endocarditis   - ASA 81 mg PO daily       GI:   # DDLT 7/21/18  # Intrahepatic left portal venous thrombosis, infarction of left liver lobe, patchy infarction of right lobe, suspected hepatic arterial thrombosis  # 7/30/18 sp exploratory laparotomy, abthera closure device in place. Findings of patchy diffuse SB ischemia. S/p liver biopsy  # Splenic infarctions, patchy bowel ischemia  # Ileus  # Ex Lap 8/6 - resection of 90 cm of necrotic ileum   # Ex Lap 8/8 - abdomen closed  - s/p exploratory laparotomy, abthera closure device in place.  Findings of patchy diffuse SB ischemia. S/p liver biopsy on 7/30/18. Returned to OR 8/6 for washout, reexploration.   - Abdomen closed in OR 8/8  - Heparin for PV thrombosis, change heparin to 500 straight rate due to urethral bleeding  - 8/8 US Liver: unchanged decreased velocity in the left portal vein, improved velocities of the hepatic artery      Nutrition:   # protein calorie deficit malnutrition   - Continue to hold TFs today  - restart TPN      Renal/Fluids/ Electrolytes:   # BEN  # severe metabolic acidosis, lactic acidosis, resolved  # severe hyponatremia, resolved  # hyperkalemia, resolved  # hematuria   - HD holiday to continue    - discontinue Bumex gtt   - Will continue to monitor intake and output.  - sodium bicarbonate 50 ml/hr until TPN starts  - Urethral bleeding: Luis replaced by urology, current recommendations to keep Luis until Monday and f/u with urology. If bleeding, place in tension       Endocrine:    # DM II  - insulin gtt  - stress dose steroids      ID/ Antibiotics:  # VRE Bacteremia  # ESBL Bacteremia  # Hepatitis B  - Daptomycin (started 7/30) and Meropenem (started 8/2) continue while blood cultures still positive  - Micafungin   - WBC increasing  - Immunosuppression: (restarted 8/6) tacrolimus, hydrocortisone  - Immunosuppression prophylaxis: Bactrim, Valgancyclovir and Tenofovir, nystatin when micafungin stopped.   - Consulted Transplant ID due to continued bacteremia with unknown source, rec to increase Daptomycin dose      Positive cultures   - 7/27 VRE (line/arm)   - 7/29: VRE (left arm)   - 7/30: VRE (left arm)   - 7/31: VRE (Line and peripheral)  - 8/1: ESBL from HD/hand/central line   - 8/3: VRE from intra-abdominal tissue  - 8/4: NGTD blood culture  - 8/5: NGTD blood culture  - 8/6: VRE - art line  - 8/7: VRE (blood)  - 8/8: VRE (blood)  - 8/9: VRE (blood)  - 8/11: VRE      Heme:     # Acute blood loss anemia  # Anemia of critical illness  # Portal venous thrombosis  #  Thrombocytopenia  - Heparin for PV thrombosis, change heparin to 500 straight rate due to urethral bleeding  - Continue aspirin 81mg  - KEN panel sent and negative  - Give DDAVP  - Follow up TEG      MSK:  # weakness and deconditioning of critical illness   - PT/OT   - mobilize       Prophylaxis:    - DVT: Heparin gtt, SCD's  - GI: Protonix prophylaxis       Lines/ tubes/ drains:  - L radial art line  - L PICC  - NJ, NG  - PIVs      Disposition:  - SICU      ====================================    SUBJECTIVE:   - remains critically ill  - NE requirements increasing  - tachycardic  - received 1 unit RBCs for low hemoglobin       OBJECTIVE:   1. VITAL SIGNS:   Temp:  [97.6  F (36.4  C)-98.8  F (37.1  C)] 98.1  F (36.7  C)  Heart Rate:  [110-128] 111  Resp:  [16-25] 20  BP: ()/(53-67) 101/62  MAP:  [46 mmHg-75 mmHg] 71 mmHg  Arterial Line BP: ()/(40-61) 102/60  SpO2:  [96 %-100 %] 98 %  Resp: 20    2. INTAKE/ OUTPUT:   I/O last 3 completed shifts:  In: 3070.06 [I.V.:1172.06; NG/GT:850]  Out: 3310 [Urine:2110; Emesis/NG output:1200]    3. PHYSICAL EXAMINATION:   General: sitting up in chair  Neuro: A&Ox3, NAD  Resp: Breathing non-labored  CV: tachycardic  Abdomen: slightly firm, distended, tender  Incisions: intact  Extremities: warm and well perfused, 4 + edema BLE, scrotal, UE    4. INVESTIGATIONS:   Arterial Blood Gases     Recent Labs  Lab 08/08/18  1705 08/06/18  1323 08/06/18  1200 08/06/18  1120   PH 7.38 7.32* 7.36 7.39   PCO2 37 39 37 37   PO2 126* 178* 142* 164*   HCO3 22 20* 21 22     Complete Blood Count     Recent Labs  Lab 08/12/18  0346 08/11/18  0953 08/11/18  0401 08/10/18  0349   WBC 20.1* 17.1* 13.4* 6.6   HGB 6.5* 7.8* 7.7* 8.1*    199 172 89*     Basic Metabolic Panel    Recent Labs  Lab 08/12/18  0346 08/11/18  0401 08/10/18  2125 08/10/18  1606   * 133 136 136   POTASSIUM 3.6 3.0* 3.3* 2.9*   CHLORIDE 99 102 105 105   CO2 18* 19* 20 20   * 93* 82* 74*   CR 3.23*  2.58* 2.37* 2.15*   * 124* 116* 100*     Liver Function Tests    Recent Labs  Lab 08/12/18  0346 08/11/18  0953 08/11/18  0401 08/10/18  0349 08/09/18  0335  08/08/18 1929 08/07/18  0336  08/06/18  0923   AST 44  --  30 38 50*  < >  --   < > 81*  < > 118*   ALT 34  --  36 51 69  < >  --   < > 162*  < > 248*   ALKPHOS 296*  --  289* 283* 290*  < >  --   < > 242*  < > 252*   BILITOTAL 2.2*  --  2.1* 2.3* 2.5*  < >  --   < > 3.8*  < > 4.4*   ALBUMIN 1.3*  --  1.4* 1.5* 1.9*  < >  --   < > 1.8*  < > 2.1*   INR  --  1.39*  --   --   --   --  1.36*  --  1.53*  --  1.49*   < > = values in this interval not displayed.  Pancreatic Enzymes  No lab results found in last 7 days.  Coagulation Profile    Recent Labs  Lab 08/11/18  0953 08/09/18  0335 08/08/18 1929 08/07/18  0336 08/06/18  0923   INR 1.39*  --  1.36* 1.53* 1.49*   PTT  --  53* 34  --   --      Lactate  Invalid input(s): LACTATE    5. RADIOLOGY:   Recent Results (from the past 24 hour(s))   XR Abdomen Port 1 View    Narrative    Examination:  XR ABDOMEN PORT 1 VW    Date:  8/11/2018 11:06 AM     Clinical Information: Verify small bowel feeding tube bedside  placement;      Additional Information: none    Comparison: 8/8/2018 abdominal film.    Findings:     The feeding tube continues to project just proximal to the region of  the ligament of Treitz. There is an OG/NG tube projected over the body  of the stomach. Abdominal bowel gas pattern is normal. There is a  catheter projected over the bladder. Surgical material is projected in  the right midabdomen. Calcified lymph node complex is present near the  gastrohepatic ligament. Catheter material projected over right upper  abdomen, origin uncertain.      Impression    Impression:    1. No change in position of feeding tube, projected near ligament of  Treitz..  2. Normal bowel gas pattern.    ZULEMA GRANDA MD   CT Abdomen Pelvis w/o Contrast    Narrative    Examination:  CT ABDOMEN PELVIS W/O CONTRAST  8/11/2018 4:14 PM     History: Status post liver transplant complicated by mesenteric  ischemia with small bowel resection. Abdomen closed 8/8/2018. Now with  leukocytosis. Evaluate for intra-abdominal fluid collection.    Comparison: Abdominal radiograph 8/11/2018, CT abdomen pelvis  7/30/2018    Technique: CT of the abdomen and pelvis were obtained without  intravenous contrast. Sagittal and coronal reconstructions created and  reviewed.    Findings:  Lines and tubes: Gastric tube terminating in the gastric body. Enteric  tube with tip terminating in the third portion of the duodenum.  Biliary stent. Luis catheter. Interval removal of left abdominal  drain.     Postoperative changes of orthotopic liver transplantation. Hepatic  segments III and IVb demonstrate relative hypoenhancement and contain  tiny foci of air. There are additional areas of hypoattenuation in  hepatic segment VI similar to CT 7/30/2018. Periportal edema.  Hyperdensity of the main and left portal vein again noted. The spleen  is unremarkable, no areas of definite hypoattenuation. However,  compared to 7/20/2018 study, spleen is smaller. Gallbladder is  surgically absent. The pancreas, kidneys and adrenal glands are  unremarkable within the confines of a noncontrast exam.    The stomach is filled with radiodense material. There is marked  dilatation of the small intestine measuring up to 5 mm in maximal  diameter, which extends across the small bowel anastomosis through the  terminal ileum. No discrete transition point identified. The colon is  decompressed. There is diffuse mesenteric and omental edema and fat  stranding. Large amount of fluid throughout the peritoneal cavity with  small areas of layering hyperdense material, hemoperitoneum. Small  areas of pneumoperitoneum. In the right abdomen, there is a loculated  fluid collection (series 2 image 74 through 100), similar to prior.     Lower thorax: Heart size is enlarged, stable. Small  pericardial  effusion. Bilateral pleural effusions, left greater than right, with  overlying atelectasis/consolidation.    Bones and soft tissues: Interval closure of the abdominal wall. Along  the mesh covering the right abdominal wall, there is a complex fluid  collection containing hyperdensity, presumably blood, simple fluid,  and multiple foci of air. The collection is approximately 3.1 x 3.1 cm  and runs horizontally along the abdominal wall across midline,  approximately 17 cm. No acute or suspicious osseous abnormality.  Diffuse anasarca.      Impression    Impression:   1. Postoperative changes of orthotopic liver transplantation with  relative hypoattenuation of hepatic segments III, IVb, and VI,  suspicious for infarct with superimposed infection possible. There are  tiny foci of air in the left hepatic lobe, which may represent  pneumobilia, portal venous gas or necrosis/infection.   2. Complex fluid collection along the abdominal wall mesh containing  simple fluid, blood, and multiple foci of air. Findings may be related  to early postoperative change rather than infection.  3. Large amount of fluid throughout the abdomen, including a loculated  collection in the right deep abdomen, not significantly changed.  Hemoperitoneum and pneumoperitoneum, presumably postsurgical.  4. Diffuse dilatation of the small bowel, likely represents  postoperative ileus in the setting of recent bowel resection. No  discrete transition point identified.  5. Bilateral pleural effusions, left greater than right with  associated atelectasis/consolidation.     Findings discussed with Dr. Grissom at 17:11.    I have personally reviewed the examination and initial interpretation  and I agree with the findings.    SCOTT MCKINNEY MD       =========================================

## 2018-08-12 NOTE — PROGRESS NOTES
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  Developed shock with severe lactic acidosis and respiratory failure on POD9. CT scan showed left PV venous thrombosis, infarction of left liver lobe, patchy infarction of right lobe and possible hepatic arterial thrombosis, patchy bowel ischemia. Transferred to ICU.   Ex lap, patchy diffuse SB ischemia. Doppler demonstrated arterial flow main vessels to bowel and liver. Splenic infarction. Liver biopsy negative for acute rejection. Fluid culture growing VRE. On Heparin gtt. 8/3 OR for reexploration liver transplant, bowel ischemia, findings improved patchy iscemic changes of small bowel, patchy ischemic changes of left liver lobe. 8/ OR: some ischemic/necrotic jejunum, s/p resection.  / Washout and abdominal closure with stratice mesh, no drains placed. Minimal ascites, intact small bowel anastomosis, patchy ischemia left liver lobe.     Procedures:    donor (DBD) liver transplant with duct to duct anastomosis over biliary stent.    Ex lap due to concern for ischemic bowel, findings: patchy diffuse SB ischemia. Doppler demonstrated arterial flow main vessels to bowel and liver. Splenic infarction.   8/3 Reexploration liver transplant, bowel ischemia, findings improved patchy iscemic changes of small bowel, patchy ischemic changes of left liver lobe.    Re-exploration, ischemic/necrotic jejunum, s/p resection  8/ Washout and abdominal closure with stratice mesh, no drains placed. Minimal ascites, intact small bowel anastomosis, patchy ischemia left liver lobe.    Graft function: POD #22/3. Infarction of left liver lobe, patchy infarction right liver lobe.  Left PV thrombosis. Narrow and irregular appearance HA. Bowel ischemia, s/p resection small bowel on 8/6. Procedures, see above. Liver biopsy negative for acute rejection. Intra-abdominal clot cultures from 8/1 + VRE and 8/6 + VRE, now s/p washout of hematoma.   Incision opened at bedside today, hematoma removed.  Immunosuppression management:   Simulect intra-op due to BEN  Steroid taper per protocol, completed.   mg BID  Tac goal level ~6 due to sepsis and BEN.  Hydrocortisone, restart stress dosing today  Complexity of management:Medium. Contributing factors: thrombocytopenia and anemia sepsis  Hematology:   Anemia: Hemoglobin 7.8->6.5.  Hematuria and urethral bleeding.  Transfused today.   Thrombocytopenia:  Resolved   Anticoagulation: Restart high intensity heparin gtt due to PV thrombosis post op.  Held today due to bleeding, restart at 500u/hr.  Hematology consult, evaluation for coagulopathy given portal vein thrombosis and mesenteric ischemia. No further evaluation for coagulopathy per hematology. See consult note.   Cardiorespiratory:  Hypotension secondary to sepsis and intravascular hypovolemia, now back on pressors with increasing requirements. Albumin today.  GI/Nutrition: NGT.  NPO.  On TPN.   Ischemic bowel:  7/30 CTA: nonocclusive filling defects in superior mesenteric vein branches.  Anticoagulation as above.  S/p resection of necrotic jejunum on 8/6.   Endocrine:  DM type 2, continue insulin drip.    Fluid/Electrolytes: BEN/ARF. CRRT stopped. HD line removed due to bacteremia. Hold HD for today.  Cr 2.6->3.2.  On bumex gtt, concern for intravascular volume depletion with increasing pressor requirements.  IVC very small on echo, stop bumex and give albumin today.  : Traumatic bah placement with ongoing bleeding.  Urology consulted.   Infectious disease: Afebrile. WBC trending up.   -Previously on Linezolid 7/28-7/30 and zosyn 7/30-8/2.   -Continue daptomycin (VRE, 7/30-present),  meropenem (Ecoli ESBL, -) and micafungin (empiric, -).   -CT today did not demonstrate drainable fluid.  Hepatic infarcts would not be drainable at this time.    1. VRE intra abdominal and bacteremia. Fluid cultures 18 + VRE.  (peripheral) and  (peripheral and VAD) blood cultures growing VRE.   intra-abdominal clot + VRE.  -.  blood + VRE or GPCs. HD line removed. CVC line removed. PICC line was placed due to needing central access.  Tx ID consulted. TOÑO to evaluate for endocarditis. Ext x 4 negative for DVT. Persistant bacteremia, uncertain source (liver infarct, line, wound?).   2. Ecoli ESBL bacteremia. Repeat bcx on  (peripheral and VAD) grew Ecoli ESBL. Zosyn changed to Meropenem  SICU to exchange lines as able. Blood cultures negative for E. Coli since .  3. HBV: Hepatits B DNA PCR positive prior to tx. Received HBIG on  and . Hep B surface antigen negative. No further HBIG planned. Continue tenofovir 300 mg daily.   PPX: Valcyte x 12 weeks (CMVand EBV IgG +), bactrim. mycelex stopped as patient on micafungin. Will start nystatin if aide discontinued.     Prophylaxis: DVT PCDs, on heparin, fall, GI  Disposition: SICU    Coordinator: Kay Reyes    Medical Decision Making: High    JAY/Fellow/Resident Provider: Geovanna Burris NP     Faculty: Pamela Che MD  __________________________________________________________________  Transplant History: Admitted 2018 for  donor liver transplant.   The patient has a history of liver failure due to hepatitis B .    2018 (Liver), Postoperative day: 22     Interval History:  Increased pressor requirements.  Reporting abdominal pain.  Staff opened incision at bedside.    ROS:   A 10-point review of systems was negative except as noted above.    Curent Meds:    aspirin  81 mg Oral or Feeding Tube Daily     DAPTOmycin (CUBICIN) intermittent infusion  12 mg/kg Intravenous Q48H     fludrocortisone  50 mcg Per J Tube  Daily     heparin lock flush  5-10 mL Intracatheter Q24H     hydrocortisone sodium succinate PF  50 mg Intravenous Q6H     [START ON 8/13/2018] lipids  250 mL Intravenous Once per day on Mon Wed Fri     meropenem  500 mg Intravenous Q12H     micafungin  100 mg Intravenous Q24H     midodrine  10 mg Per Feeding Tube Q8H     mycophenolate  250 mg Oral or Feeding Tube BID IS     pantoprazole  40 mg Oral or Feeding Tube QAM AC     sodium chloride (PF)  3 mL Intravenous Q8H     sulfamethoxazole-trimethoprim  10 mL Oral or Feeding Tube Once per day on Mon Wed Fri     tacrolimus  1.25 mg Oral or Feeding Tube QPM     tacrolimus  1.5 mg Oral QAM     tenofovir  300 mg Per Feeding Tube Q96H     valGANciclovir  450 mg Oral or NG Tube Once per day on Tue Fri    Or     valGANciclovir  450 mg Oral Once per day on Tue Fri       Physical Exam:     Admit Weight: 60.7 kg (133 lb 12.8 oz)    Current Vitals:   /65  Pulse 121  Temp 98  F (36.7  C) (Axillary)  Resp 20  Wt 70 kg (154 lb 5.2 oz)  SpO2 97%  BMI 24.91 kg/m2      Vital sign ranges:    Temp:  [97.6  F (36.4  C)-98.8  F (37.1  C)] 98  F (36.7  C)  Heart Rate:  [] 97  Resp:  [16-25] 20  BP: ()/(53-78) 100/65  MAP:  [55 mmHg-84 mmHg] 64 mmHg  Arterial Line BP: ()/(45-72) 96/52  SpO2:  [96 %-100 %] 97 %  Patient Vitals for the past 24 hrs:   BP Temp Temp src Heart Rate Resp SpO2   08/12/18 1330 - - - 97 - 97 %   08/12/18 1315 - - - 98 - 98 %   08/12/18 1300 100/65 - - 96 - 98 %   08/12/18 1245 - - - 100 - 98 %   08/12/18 1230 - - - 102 - 99 %   08/12/18 1215 - - - 102 - 99 %   08/12/18 1200 118/78 98  F (36.7  C) Axillary 105 20 99 %   08/12/18 1145 - - - 114 - 98 %   08/12/18 1130 - - - 117 - 99 %   08/12/18 1115 - - - 120 - 99 %   08/12/18 1100 95/63 - - 121 - 97 %   08/12/18 1045 - - - 123 - 98 %   08/12/18 1030 - - - 122 - 97 %   08/12/18 1015 - - - 120 - 97 %   08/12/18 1000 (!) 83/59 - - 121 - 97 %   08/12/18 0945 - - - 122 - 98 %   08/12/18 0930  - - - 119 - 97 %   08/12/18 0915 - - - 119 - 97 %   08/12/18 0900 101/70 - - 118 - 98 %   08/12/18 0845 - - - 117 - 98 %   08/12/18 0830 - - - 113 - 97 %   08/12/18 0815 - - - 110 - 98 %   08/12/18 0800 101/62 98.1  F (36.7  C) Axillary 111 21 98 %   08/12/18 0745 - - - 112 - 98 %   08/12/18 0730 - - - 114 - 98 %   08/12/18 0715 - - - 116 - 98 %   08/12/18 0700 90/59 - - 118 - 97 %   08/12/18 0645 - - - 118 - 96 %   08/12/18 0630 - 98.1  F (36.7  C) Axillary 121 20 98 %   08/12/18 0620 - 98  F (36.7  C) Axillary 119 20 97 %   08/12/18 0600 (!) 85/53 - - 120 - 98 %   08/12/18 0512 - - - 118 - 97 %   08/12/18 0500 (!) 89/54 - - 119 - 98 %   08/12/18 0400 (!) 87/54 98.5  F (36.9  C) Axillary 120 16 97 %   08/12/18 0300 (!) 84/55 - - 119 - 97 %   08/12/18 0200 96/66 - - 122 - 98 %   08/12/18 0100 98/67 - - 122 - 98 %   08/12/18 0000 99/67 98.8  F (37.1  C) Axillary 122 20 98 %   08/11/18 2300 98/67 - - 121 - 98 %   08/11/18 2200 94/64 - - 119 - 98 %   08/11/18 2152 92/57 - - 119 - 98 %   08/11/18 2120 - - - 121 - 99 %   08/11/18 2100 - - - 122 - 98 %   08/11/18 2000 - 97.6  F (36.4  C) Axillary 123 24 99 %   08/11/18 1900 - - - 124 - 98 %   08/11/18 1845 - - - 121 - 99 %   08/11/18 1830 - - - 123 - 99 %   08/11/18 1815 - - - 121 - 99 %   08/11/18 1800 - - - 120 - 98 %   08/11/18 1745 - - - 118 - 99 %   08/11/18 1730 - - - 119 - 98 %   08/11/18 1715 - - - 119 - 98 %   08/11/18 1700 - - - 120 - 99 %   08/11/18 1645 - - - 122 - 99 %   08/11/18 1630 - - - 125 - 99 %   08/11/18 1615 - - - 125 - 99 %   08/11/18 1600 - 97.8  F (36.6  C) Axillary 122 24 96 %   08/11/18 1545 - - - 121 - 96 %   08/11/18 1530 - - - 116 - 100 %   08/11/18 1515 - - - 119 - 100 %   08/11/18 1500 - - - 123 25 99 %   08/11/18 1445 - - - 125 - 98 %     General Appearance: NAD  HEENT: NGT to suction. NJ in place.  Skin: warm, dry  Heart: sinus tach  Lungs: RA, rhonchi right side  Abdomen: Abdomen soft, mild discomfort with exam.   : Janelle with  hematuria, cherry,  Bleeding around bah.  Extremities: well perfused.  Neurologic: Lethargic, following commands      Data:   CMP    Recent Labs  Lab 08/12/18  0346 08/11/18  0401  08/09/18  0325 08/08/18  2206   * 133  < >  --  137   POTASSIUM 3.6 3.0*  < >  --  3.5   CHLORIDE 99 102  < >  --  106   CO2 18* 19*  < >  --  22   * 124*  < >  --  210*   * 93*  < >  --  39*   CR 3.23* 2.58*  < >  --  1.13   GFRESTIMATED 21* 27*  < >  --  70   GFRESTBLACK 25* 33*  < >  --  85   JESUS 7.1* 7.0*  < >  --  6.6*   ICAW  --   --   --  4.3* 4.3*   MAG 1.9 1.9  < >  --  2.1   PHOS 4.6* 3.1  < >  --  2.8   ALBUMIN 1.3* 1.4*  < >  --  1.6*   BILITOTAL 2.2* 2.1*  < >  --  2.9*   ALKPHOS 296* 289*  < >  --  266*   AST 44 30  < >  --  51*   ALT 34 36  < >  --  75*   < > = values in this interval not displayed.  CBC    Recent Labs  Lab 08/12/18  1144 08/12/18  0346   HGB 8.7* 6.5*   WBC 19.1* 20.1*    251     COAGS    Recent Labs  Lab 08/11/18  0953 08/09/18  0335 08/08/18  1929   INR 1.39*  --  1.36*   PTT  --  53* 34      Urinalysis  Recent Labs   Lab Test  07/27/18   2330  07/13/18   1315  06/18/18   1200   COLOR  Yellow  Dark Yellow  Yellow   APPEARANCE  Clear  Clear  Clear   URINEGLC  Negative  Negative  Negative   URINEBILI  Negative  Large*  Small*   URINEKETONE  Negative  Negative  Negative   SG  1.008  1.013  1.008   UBLD  Negative  Negative  Negative   URINEPH  7.5*  6.0  5.0   PROTEIN  30*  10*  Negative   NITRITE  Negative  Negative  Negative   LEUKEST  Negative  Negative  Negative   RBCU  5*  <1  1   WBCU  3  None  2   UTPG   --    --   0.75*     Virology:  Hepatitis C Antibody   Date Value Ref Range Status   07/20/2018 Nonreactive NR^Nonreactive Final     Comment:     Assay performance characteristics have not been established for newborns,   infants, and children         POD 14  liver:  1.  The left portal vein is antegrade with decreased velocity,  measuring approximately 15 cm/second.  This vessel was occluded on CT  7/30/2018.  2.  Elevated velocities of the main hepatic artery, now 203 cm/sec  (previously 94 cm/sec). However left hepatic arteries demonstrates  steep upstroke suggesting there is not a significant stenosis.   2a. Right hepatic artery was not visualized, could be occluded or  difficult to see postoperatively.  3. Echogenic focus of the right lobe in close proximity to the  hepatorenal fossa. This likely corresponds with nonenhancing lesion on  CT 7/30/2018 and likely representing subcapsular hematoma.  4. Additional small hematomas of the braden hepatis and posterior right  perihepatic region.  5. Small volume anechoic ascites.  6. Left hepatic lobe not well visualized secondary to open wound  preventing imaging. Note the left lobe was grossly abnormal on CT  7/30/2018    POD 1 US liver: Impression:   1.  Hematoma associated with the inferior aspect of the perihepatic  space anteriorly, measuring 13.7 x 7.0 x 8.5 cm.  2.  Retrograde flow of the left portal vein. Additionally, low  velocities of the portal venous system. Single low resistive index of  the extrahepatic aspect of the hepatic artery, measuring 0.37. These  findings are likely within normal limits in the early postoperative  context. However, continued attention on follow-up recommended.    7/30 CT scan abd/pelvis:  IMPRESSION:   1. Hypoenhancing areas in the transplant liver concerning for  infarction. Transplant left portal vein occlusion with bubbles of  portal venous gas. Nearly occlusive filling defect in the native  hepatic artery. Narrowed and irregular appearance of the transplant  hepatic artery. Focal occlusion of the left main hepatic artery.  Segmental occlusions of right hepatic arterial branches.   2. Pneumatosis intestinalis with nonocclusive filling defects in  superior mesenteric vein branches, concerning for bowel ischemia or  infarction.   3. Splenic infarctions.   4. Bibasilar consolidation with air  bronchograms. Pneumonia cannot be  excluded.   5. Post surgical changes of evacuation of peritransplant hematoma.

## 2018-08-12 NOTE — PLAN OF CARE
Problem: Patient Care Overview  Goal: Plan of Care/Patient Progress Review  SLP: Bedside swallow evaluation ordered; however, orders completed by MD team due to concern for bowel.  Swallow evaluation not completed with pt to remain medically NPO.  Please re-order as medically appropriate.

## 2018-08-12 NOTE — PROGRESS NOTES
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   Patient has been receiving CRRT until 8/8 when tunneled catheter was removed to give him a dialysis holiday on 8/9.   Creatinine and BUN are trending up slowly as expected  Mild hematuria but has been clearing    -- Continued holding CRRT for now, possible re-initiation on 8/13 or sooner if needed for volume and clearance  -- Bumex DC today sec to concerns from transplant with more liver ischemia  -- he has needed more presser support in last 24hrs as well , at this time giving him albumin to volume expand may be a better option  -- Goal is to keep net 0.5 -ve or even , he has third spaced fluid and will likely need HD for volume and clearance and I am not sure if he will be able to tolerate IHD at this time , will follow on am labs and tentative plan to try IHD tomorrow  -- has been net -ve with bumex gtt although with creatinine almost anephric rise in last 24hrs  -- electrolytes Q12hrs while on diuresis , replete magnesium to 2 and potassium to 4  -- daily weights and strict IO monitoring  -- Will keep following     Electrolytes  Mild hyponatremia likely hypervolemic  Hyperkalemia resolved  stable      Hypervolemia with relative intra vascular depletion  Hypotension on norepi  Was on bumex DC 8/12 for concerns with hypotension and ischemic injury to the liver     Polymicrobial BSI  due to ESBL E. coli and VRE E. faecium  Presenting persistently +BC for VRE despite high-dose daptomycin. Tunneled catheter was removed early on 8/09, but still RIJ is in place and PICC line in the left arm was placed on 8/09. Of note, BC taken from 8/11 are still positive for GPCs in chains. Managed by primary team.      OLT 7/12/18   ESLD sec to hep B cb EV , HE , ascites and portal HTN  Ex lap 7/30, Graft function: Intrahepatic left portal venous thrombosis, infarction of left liver lobe, patchy infarction of right lobe, suspected hepatic arterial thrombosis, Splenic infarctions and Patchy bowel ischemia on heparin gtt  8/3 Reexploration liver transplant, bowel ischemia, findings improved patchy iscemic changes of small bowel, patchy ischemic changes of left liver lobe.   8/6 Re-exploration, ischemic/necrotic jejunum, s/p resection  8/8 abdomen was closed  Management per primary team.    Acute resp failure   Extubated and stable  MICU managing     DMII on insulin gtt     Pancytopenia  Related with ongoing sepsis vs liver failure. Managed by primary team    Recommendations were communicated to primary team    Seen and discussed with Dr. Janelle Rodrigues  [phone]    Interval History :   No acute events and has been responsive to diuresis over night  He is back on norepi now and edema appears more worse even with net -ve balance    Review of Systems:   Unable to obtain    Physical Exam:   I/O last 3 completed shifts:  In: 3070.06 [I.V.:1172.06; NG/GT:850]  Out: 3310 [Urine:2110; Emesis/NG output:1200]   /65  Pulse 121  Temp 98  F (36.7  C) (Axillary)  Resp 20  Wt 70 kg (154 lb 5.2 oz)  SpO2 97%  BMI 24.91 kg/m2     GENERAL APPEARANCE: ill appearing, intubated  EYES:  + scleral icterus, pupils equal  HENT: mouth without ulcers or lesions  PULM: lungs low air entry  to auscultation,  bilaterally, equal air movement, no clubbing  CV: regular rhythm, normal rate, no rub     -JVD unable to access      -edema 3+ in LE   GI: soft, distended, bowel sounds are +/-, with subcostal surgical wound covered with gauze with minimal serosanguinous drainage.  INTEGUMENT: no cyanosis, - rash  NEURO:  sedated        Labs:   All labs reviewed by me  Electrolytes/Renal -   Recent Labs   Lab Test  08/12/18   0346  08/11/18   0401  08/10/18   2125   08/10/18   0349   NA  132*  133  136   < >  134   POTASSIUM  3.6  3.0*  3.3*   < >  3.5   CHLORIDE  99  102  105   < >  104   CO2  18*  19*  20   < >  20   BUN  116*  93*  82*   < >  66*   CR  3.23*  2.58*  2.37*   < >  2.00*   GLC  123*  124*  116*   < >  137*   JESUS  7.1*  7.0*  6.3*   < >  6.6*   MAG  1.9  1.9   --    --   1.9   PHOS  4.6*  3.1   --    --   3.6    < > = values in this interval not displayed.       CBC -   Recent Labs   Lab Test  08/12/18   1144  08/12/18   0346  08/11/18   0953   WBC  19.1*  20.1*  17.1*   HGB  8.7*  6.5*  7.8*   PLT  246  251  199       LFTs -   Recent Labs   Lab Test  08/12/18   0346  08/11/18   0401  08/10/18   0349   ALKPHOS  296*  289*  283*   BILITOTAL  2.2*  2.1*  2.3*   ALT  34  36  51   AST  44  30  38   PROTTOTAL  4.4*  4.2*  3.7*   ALBUMIN  1.3*  1.4*  1.5*       Iron Panel -   Recent Labs   Lab Test  07/13/18   0334  06/17/18   2138   IRON  97  66   IRONSAT  83*  109*   ANGELLA  Unsatisfactory specimen - icteric   --          Imaging:  All imaging studies reviewed by me.     Current Medications:    aspirin  81 mg Oral or Feeding Tube Daily     DAPTOmycin (CUBICIN) intermittent infusion  12 mg/kg Intravenous Q48H     fludrocortisone  50 mcg Per J Tube Daily     heparin lock flush  5-10 mL Intracatheter Q24H     hydrocortisone sodium succinate PF  50 mg Intravenous Q6H     [START ON 8/13/2018] lipids  250 mL Intravenous Once per day on Mon Wed Fri     meropenem  500 mg Intravenous Q12H     micafungin  100 mg Intravenous Q24H     midodrine  10 mg Per Feeding Tube Q8H     mycophenolate  250 mg Oral or Feeding Tube BID IS     pantoprazole  40 mg  Oral or Feeding Tube QAM AC     sodium chloride (PF)  3 mL Intravenous Q8H     sulfamethoxazole-trimethoprim  10 mL Oral or Feeding Tube Once per day on Mon Wed Fri     tacrolimus  1.25 mg Oral or Feeding Tube QPM     tacrolimus  1.5 mg Oral QAM     tenofovir  300 mg Per Feeding Tube Q96H     valGANciclovir  450 mg Oral or NG Tube Once per day on Tue Fri    Or     valGANciclovir  450 mg Oral Once per day on Tue Fri       IV fluid REPLACEMENT ONLY       heparin 500 Units/hr (08/12/18 1300)     insulin (regular) 2 Units/hr (08/12/18 1300)     lactated ringers 100 mL/hr at 08/12/18 1356     lactated ringers 10 mL/hr at 07/30/18 1519     norepinephrine 0.06 mcg/kg/min (08/12/18 1402)     parenteral nutrition - ADULT compounded formula       Reason beta blocker order not selected       sodium bicabonate in 5% dextrose for infusion 50 mL/hr at 08/12/18 1115     vasopressin (PITRESSIN) infusion ADULT (40 mL) 2.4 Units/hr (08/12/18 1300)     IKennedy, saw this patient with Dr. Rodrigues and agree with the findings and plan of care as documented in the note.

## 2018-08-13 NOTE — PLAN OF CARE
Problem: Patient Care Overview  Goal: Plan of Care/Patient Progress Review  Outcome: Improving  -Neuro: pt lethargic/sleeping in between cares. Arouses to voice. PERRL. Follows commands and moves all extremities. Patient still very weak.  -Cardiac: HR NSR 80-90s, no ectopy noted. Blood pressures stable on vasopressin. Levo weaned off overnight. MAP goal >60. Generalized pitting edema throughout. Per MD, BLE and scrotum wrapped in ACE wraps yesterday for compression. Verbal order to remove overnight last night. Afebrile  -Resp: Lungs clear, dim in bases on room air. Infrequent productive cough.   -GI: NGT to LIS with small amt of output overnight (brown, thick). NJT clamped, pt NPO except meds d/t ongoing ileus. TPN restarted last night. No flatus noted. Bowel sounds hypoactive. No c/o nausea  -: bah with traction per urology. Bleeding has decreased in and around bah. Urine now yellow in color. Adequate amt of output  -Pain: controlled with prn oxycodone, last given at 0500.  -Skin: transverse abdominal incision sutured on both sides. Open in middle and packed with WTD kerlix. Reinforcing overnight. Awaitng wound care orders, MDs to change this AM. Old CAROL sites open to air  -Activity: sling to chair, q2h turns  -Access: L radial a-line, L TL PICC, PIV x2  -Gtts: heparin at 500 units/hr (straight rate), Vaso at 2.4 (straight rate), levo OFF, insulin per algorithm 4, LR at 55 and TPN at 45 ml/hr to total 100 ml/hr  -Labs: Hgb 5.0, 2 units PRBCs ordered, 1st unit infusing. K 2.6, replaced with 40 mEq. MD aware.       Plan: continue nursing cares, notify MD with any changes in patient condition, transfuse 2 units PRBCs.    Avelina Coppola  8/13/2018  6:28 AM

## 2018-08-13 NOTE — CARE CONFERENCE
Care Conference    Care conference was held on August 13, 2018.  Conference was coordinated by Care Coordinator in the conference room with a professional  during the entire meeting.     Attending the conference were:   Family members: Wife Luann, pt's son, uncle, cousin and his wife    Transplant team: Geovanna Burris, NP and Dr Nj  SICU team: Dr Duenas, Dr Acuna and Dr Tyrese Howell      Purpose of the conference was medical update.      Discussion/Outcomes/Follow-Up:     Introductions took place. Dr Howell led the conference a medical update from pt's initial transplant surgery to present day. Family was able to ask questions through the  and verbalized understanding of answers provided. Family is grateful for all of the cares being provided to pt and would like us to continue to take good care of him. Pt's cousin did most of the talking during the conference. Pt's uncle and son inquired if there is anything they should be worried about at this time. Dr Nj updated that pt's bowel did get necrotic and require surgery. Because we do not know why this happened, it could happen again. However, we are doing everything we can to prevent that from happening. Dr Howell added that pt is getting a lot of IV fluids right now which could lead to pt need to be re-intubated if he is not able to clear this from his lungs. Family verbalized understanding and would like everything done as we are right now. RNCC to follow for any further family support and care coordination needs.       Marcus Escalante RN, BSN  ICU Care Coordinator  Pager: [phone]  Phone:  [phone]

## 2018-08-13 NOTE — PROGRESS NOTES
Immunosuppression Note:    Yaneli Miles is a 43 year old male who is seen today  for immunosuppression management     I, Mamadou Norwood MD, I have examined the patient with the resident/PA/Fellow, discussed and agree with the note and findings.  I have reviewed today's vital signs, medications, labs and imaging. I reviewed the immunosuppression medications and levels. 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  Developed shock with severe lactic acidosis and respiratory failure on POD9. CT scan showed left PV venous thrombosis, infarction of left liver lobe, patchy infarction of right lobe and possible hepatic arterial thrombosis, patchy bowel ischemia. Transferred to ICU.   Ex lap, patchy diffuse SB ischemia. Doppler demonstrated arterial flow main vessels to bowel and liver. Splenic infarction. Liver biopsy negative for acute rejection. Fluid culture growing VRE. On Heparin gtt. 8/3 OR for reexploration liver transplant, bowel ischemia, findings improved patchy iscemic changes of small bowel, patchy ischemic changes of left liver lobe.  OR: some ischemic/necrotic jejunum, s/p resection.   Washout and abdominal closure with stratice mesh, no drains placed. Minimal ascites, intact small bowel anastomosis, patchy ischemia left liver lobe.     Procedures:    donor (DBD) liver transplant with duct to duct anastomosis over biliary stent.    Ex lap due to concern for ischemic bowel, findings: patchy diffuse SB ischemia. Doppler demonstrated arterial flow main vessels to bowel and liver. Splenic  infarction.   8/3 Reexploration liver transplant, bowel ischemia, findings improved patchy iscemic changes of small bowel, patchy ischemic changes of left liver lobe.   8/6 Re-exploration, ischemic/necrotic jejunum, s/p resection  8/8 Washout and abdominal closure with stratice mesh, no drains placed. Minimal ascites, intact small bowel anastomosis, patchy ischemia left liver lobe.    Graft function: POD #23/5. Infarction of left liver lobe, patchy infarction right liver lobe. Left PV thrombosis. Narrow and irregular appearance HA. Bowel ischemia, s/p resection small bowel on 8/6. Procedures, see above. Liver biopsy negative for acute rejection. Intra-abdominal clot cultures from 8/1 + VRE and 8/6 + VRE, now s/p washout of hematoma.   Incision opened at bedside 8/12, hematoma removed.  8/13 US: new perihepatic fluid collections (largest 7x2x4), small volume of septated ascites.  Plan for possible VAC tomorrow.  Immunosuppression management:   Simulect intra-op due to BEN  Steroid taper per protocol, completed.   mg BID  Tac goal level ~6 due to sepsis and BEN.  Hydrocortisone, restarted stress dosing 8/12  Complexity of management:Medium. Contributing factors: thrombocytopenia and anemia sepsis  Hematology:   Anemia: Hemoglobin drop on 8/11 attributed to hematuria and urethral bleeding.  Hgb drop to 5 this morning in setting of resolved  bleeding.  US abdomen shows new perihepatic collections, although small.  No evidence of GIB at this time.  Transfused 8/12, 3u today.  Thrombocytopenia:  Acute drop in plt overnight.  R/o HIT.  Anticoagulation: Anticoagulate for bowel ischemia and portal thrombosis.  Heparin reduced to 500u/hr on 8/12 d/t  bleeding.  Hematology consult, evaluation for coagulopathy given portal vein thrombosis and mesenteric ischemia. No further evaluation for coagulopathy per hematology.  Will ask team to comment on thrombocytopenia today.  Cardiorespiratory:    Hypotension:  Secondary  to sepsis and intravascular hypovolemia, improved with albumin yesterday.  Stress dose steroids also restarted.  Now on vasopressin 2.4, off levophed.  Continue midodrine.    GI/Nutrition: NGT.  NPO.  On TPN.  May start trickle feeds today.  Ischemic bowel:  7/30 CTA: nonocclusive filling defects in superior mesenteric vein branches.  Anticoagulation as above.  S/p resection of necrotic jejunum on 8/6.   Endocrine:  DM type 2, continue insulin drip.    Fluid/Electrolytes: BEN/ARF. Initially on CRRT, stopped 8/8 for line holiday.  May need dialysis for clearance soon.  Cr 3.4, .  Found to be intravascularly volume depleted on bedside echo yesterday.  BP and HR improved with albumin replacement.  : Traumatic bah placement with bleeding, now resolved.   Hematuria now resolved.  Infectious disease: Afebrile. WBC now trending down.   -Previously on Linezolid 7/28-7/30 and zosyn 7/30-8/2.   -Continue daptomycin (VRE, 7/30-present), meropenem (Ecoli ESBL, 8/2-) and micafungin (empiric, 7/30-).   -8/12 CT did not demonstrate drainable fluid.  Hepatic infarcts would not be drainable at this time.    1. VRE intra abdominal and bacteremia. Fluid cultures 7/31/18 + VRE. 7/30 (peripheral) and 7/31 (peripheral and VAD) blood cultures growing VRE.  8/6 intra-abdominal clot + VRE.  8/6-8/12 blood and lines + VRE or GPCs. HD line removed. CVC line removed. PICC line was placed due to needing central access.  Tx ID consulted. TOÑO to evaluate for endocarditis, negative. Ext x 4 negative for DVT. Persistant bacteremia, uncertain source (liver infarct, line, wound?).   2. Ecoli ESBL bacteremia. Repeat bcx on 8/1 (peripheral and VAD) grew Ecoli ESBL. Zosyn changed to Meropenem  SICU to exchange lines as able. Blood cultures negative for E. Coli since 8/2.  3. HBV: Hepatits B DNA PCR positive prior to tx. Received HBIG on 7/21 and 7/22. Hep B surface antigen negative. No further HBIG planned. Continue tenofovir 300 mg daily.      Prophylaxis: DVT PCDs, on heparin, fall, GI, Valcyte x 12 weeks (CMVand EBV IgG +), bactrim. mycelex stopped as patient on micafungin. Will start nystatin if aide discontinued.   Disposition: SICU    Coordinator: Kay Reyes    Medical Decision Making: High    JAY/Fellow/Resident Provider: Geovanna Burris NP [id number]    Faculty: Mamadou Norwood MD  __________________________________________________________________  Transplant History: Admitted 2018 for  donor liver transplant.   The patient has a history of liver failure due to hepatitis B .    2018 (Liver), Postoperative day: 23     Interval History:  Acute drop in hgb and plt this morning.  Bleeding around bah and hematuria have resolved.  Drainage from would is serosang.  Overall pressor requirements lower than yesterday.  Tachycardia has resolved.    ROS:   A 10-point review of systems was negative except as noted above.    Curent Meds:    aspirin  81 mg Oral or Feeding Tube Daily     DAPTOmycin (CUBICIN) intermittent infusion  12 mg/kg Intravenous Q48H     hydrocortisone sodium succinate PF  50 mg Intravenous Q8H    Followed by     [START ON 8/15/2018] hydrocortisone sodium succinate PF  50 mg Intravenous Q12H    Followed by     [START ON 2018] hydrocortisone sodium succinate PF  25 mg Intravenous Q12H    Followed by     [START ON 2018] hydrocortisone sodium succinate PF  25 mg Intravenous Daily     lipids  250 mL Intravenous Once per day on      meropenem  500 mg Intravenous Q12H     micafungin  100 mg Intravenous Q24H     midodrine  10 mg Per Feeding Tube Q8H     mycophenolate  250 mg Oral or Feeding Tube BID IS     pantoprazole  40 mg Oral or Feeding Tube QAM AC     sodium chloride (PF)  3 mL Intravenous Q8H     sulfamethoxazole-trimethoprim  10 mL Oral or Feeding Tube Once per day on      tacrolimus  1.25 mg Oral or Feeding Tube QPM     tacrolimus  1.5 mg Oral QAM     tenofovir  300 mg Per Feeding Tube Q96H      valGANciclovir  450 mg Oral or NG Tube Once per day on Tue Fri    Or     valGANciclovir  450 mg Oral Once per day on Tue Fri       Physical Exam:     Admit Weight: 60.7 kg (133 lb 12.8 oz)    Current Vitals:   BP (!) 89/55  Pulse 121  Temp 97.5  F (36.4  C) (Oral)  Resp 8  Wt 69.1 kg (152 lb 5.4 oz)  SpO2 99%  BMI 24.59 kg/m2      Vital sign ranges:    Temp:  [96.8  F (36  C)-98.1  F (36.7  C)] 97.5  F (36.4  C)  Heart Rate:  [] 89  Resp:  [7-18] 8  BP: ()/(43-78) 89/55  MAP:  [56 mmHg-222 mmHg] 66 mmHg  Arterial Line BP: ()/(29-67) 114/53  SpO2:  [95 %-100 %] 99 %  Patient Vitals for the past 24 hrs:   BP Temp Temp src Heart Rate Resp SpO2 Weight   08/13/18 1100 (!) 89/55 97.5  F (36.4  C) Oral 89 8 - -   08/13/18 1045 - - - 90 9 - -   08/13/18 1030 - - - 90 9 - -   08/13/18 1015 - - - 90 9 - -   08/13/18 1000 (!) 87/62 - - 91 12 - -   08/13/18 0915 101/43 96.9  F (36.1  C) - 95 12 99 % -   08/13/18 0900 (!) 86/58 - - 93 (!) 7 100 % -   08/13/18 0850 - 97.5  F (36.4  C) Oral 93 9 99 % -   08/13/18 0845 - - - 96 (!) 7 99 % -   08/13/18 0830 - - - 95 16 99 % -   08/13/18 0800 110/71 97.5  F (36.4  C) Oral 91 12 - -   08/13/18 0707 - 97.3  F (36.3  C) - 90 - 98 % -   08/13/18 0700 113/75 - - 90 - 99 % -   08/13/18 0657 - 97.5  F (36.4  C) - 90 - 98 % -   08/13/18 0645 - - - 89 - 98 % -   08/13/18 0630 - - - 88 - 97 % -   08/13/18 0615 - - - 90 - 96 % -   08/13/18 0600 107/73 - - 91 - 98 % -   08/13/18 0545 - - - 92 - 98 % -   08/13/18 0530 - - - 92 - 97 % -   08/13/18 0515 - - - 89 - 96 % -   08/13/18 0500 107/68 - - 88 - 96 % 69.1 kg (152 lb 5.4 oz)   08/13/18 0445 - - - 89 - 98 % -   08/13/18 0430 - - - 90 - 98 % -   08/13/18 0415 - - - 92 - 97 % -   08/13/18 0400 108/78 97  F (36.1  C) Axillary 90 18 99 % -   08/13/18 0345 - - - 89 - 98 % -   08/13/18 0330 - - - 91 - 96 % -   08/13/18 0315 - - - 91 - 96 % -   08/13/18 0300 102/66 - - 95 - 98 % -   08/13/18 0245 - - - 87 - 98 % -    08/13/18 0230 - - - 86 - 97 % -   08/13/18 0215 - - - 88 - 98 % -   08/13/18 0200 98/62 - - 89 - 97 % -   08/13/18 0145 - - - 88 - 98 % -   08/13/18 0130 - - - 90 - 97 % -   08/13/18 0115 - - - 89 - 98 % -   08/13/18 0100 92/57 - - 92 - 97 % -   08/13/18 0045 - - - 100 - 97 % -   08/13/18 0030 - - - 94 - 97 % -   08/13/18 0015 - - - 96 - 96 % -   08/13/18 0000 90/58 96.8  F (36  C) Axillary 95 18 97 % -   08/12/18 2345 - - - 95 - 97 % -   08/12/18 2330 - - - 96 - 97 % -   08/12/18 2315 - - - 95 - 96 % -   08/12/18 2300 112/67 - - 96 - 97 % -   08/12/18 2245 - - - 95 - 96 % -   08/12/18 2230 - - - 94 - 96 % -   08/12/18 2215 - - - 92 - 98 % -   08/12/18 2200 115/62 - - 92 - 99 % -   08/12/18 2145 - - - 92 - 98 % -   08/12/18 2130 - - - 94 - 98 % -   08/12/18 2115 - - - 92 - 97 % -   08/12/18 2100 112/73 - - 93 - 97 % -   08/12/18 2045 - - - 92 - 97 % -   08/12/18 2030 - - - 89 - 99 % -   08/12/18 2015 - - - 91 - 98 % -   08/12/18 2000 108/61 97.9  F (36.6  C) Axillary 94 14 98 % -   08/12/18 1945 - - - 92 - 97 % -   08/12/18 1930 - - - 90 - 98 % -   08/12/18 1915 - - - 94 - 97 % -   08/12/18 1900 90/60 - - 93 - 97 % -   08/12/18 1830 - - - 97 - 96 % -   08/12/18 1815 - - - 98 - 95 % -   08/12/18 1800 (!) 89/49 - - 99 18 96 % -   08/12/18 1745 - - - 99 - 98 % -   08/12/18 1730 - - - 97 - 95 % -   08/12/18 1715 - - - 96 - 95 % -   08/12/18 1700 106/62 - - 96 - 96 % -   08/12/18 1645 - - - 93 - 97 % -   08/12/18 1630 - - - 94 - 97 % -   08/12/18 1615 - - - 94 - 97 % -   08/12/18 1600 114/64 98.1  F (36.7  C) Axillary 95 16 97 % -   08/12/18 1545 - - - 96 - 96 % -   08/12/18 1530 - - - 95 - 97 % -   08/12/18 1515 - - - 95 - 97 % -   08/12/18 1500 112/64 - - 96 18 97 % -   08/12/18 1445 - - - 95 - 97 % -   08/12/18 1430 - - - 97 - 96 % -   08/12/18 1415 - - - 98 - 96 % -   08/12/18 1400 (!) 89/71 - - 95 17 95 % -   08/12/18 1345 - - - 96 - 96 % -   08/12/18 1330 - - - 97 - 97 % -   08/12/18 1315 - - - 98 - 98 % -      General Appearance: NAD  HEENT: NGT to suction. NJ in place.  Skin: warm, dry  Heart: sinus tach  Lungs: RA, lungs clearer today  Abdomen: Abdomen soft with right sided fullness, open incision with pink tissue and visible mesh  : Luis, hematuria and urethral bleeding have resolved  Extremities: well perfused. +1-2 edema  Neurologic: Lethargic, following commands  Access: PICC    Data:   CMP    Recent Labs  Lab 08/13/18  1048 08/13/18  0531 08/13/18  0409 08/12/18  0346  08/09/18  0325 08/08/18  2206   NA  --   --  132* 132*  < >  --  137   POTASSIUM 2.7* 2.6* 2.6* 3.6  < >  --  3.5   CHLORIDE  --   --  96 99  < >  --  106   CO2  --   --  22 18*  < >  --  22   GLC  --   --  134* 123*  < >  --  210*   BUN  --   --  120* 116*  < >  --  39*   CR  --   --  3.40* 3.23*  < >  --  1.13   GFRESTIMATED  --   --  20* 21*  < >  --  70   GFRESTBLACK  --   --  24* 25*  < >  --  85   JESUS  --   --  6.7* 7.1*  < >  --  6.6*   ICAW  --   --   --   --   --  4.3* 4.3*   MAG  --   --  2.0 1.9  < >  --  2.1   PHOS  --   --  5.5* 4.6*  < >  --  2.8   ALBUMIN  --   --  1.7* 1.3*  < >  --  1.6*   BILITOTAL  --   --  1.6* 2.2*  < >  --  2.9*   ALKPHOS  --   --  228* 296*  < >  --  266*   AST  --   --  30 44  < >  --  51*   ALT  --   --  25 34  < >  --  75*   < > = values in this interval not displayed.  CBC    Recent Labs  Lab 08/13/18  0531 08/13/18  0409   HGB 5.0* 4.9*   WBC 10.3 8.6   PLT 58* 54*     COAGS    Recent Labs  Lab 08/13/18  0409 08/11/18  0953 08/09/18  0335 08/08/18  1929   INR 1.41* 1.39*  --  1.36*   PTT  --   --  53* 34      Urinalysis  Recent Labs   Lab Test  07/27/18   2330  07/13/18   1315  06/18/18   1200   COLOR  Yellow  Dark Yellow  Yellow   APPEARANCE  Clear  Clear  Clear   URINEGLC  Negative  Negative  Negative   URINEBILI  Negative  Large*  Small*   URINEKETONE  Negative  Negative  Negative   SG  1.008  1.013  1.008   UBLD  Negative  Negative  Negative   URINEPH  7.5*  6.0  5.0   PROTEIN  30*  10*   Negative   NITRITE  Negative  Negative  Negative   LEUKEST  Negative  Negative  Negative   RBCU  5*  <1  1   WBCU  3  None  2   UTPG   --    --   0.75*     Virology:  Hepatitis C Antibody   Date Value Ref Range Status   07/20/2018 Nonreactive NR^Nonreactive Final     Comment:     Assay performance characteristics have not been established for newborns,   infants, and children         POD 14 US liver:  1.  The left portal vein is antegrade with decreased velocity,  measuring approximately 15 cm/second. This vessel was occluded on CT  7/30/2018.  2.  Elevated velocities of the main hepatic artery, now 203 cm/sec  (previously 94 cm/sec). However left hepatic arteries demonstrates  steep upstroke suggesting there is not a significant stenosis.   2a. Right hepatic artery was not visualized, could be occluded or  difficult to see postoperatively.  3. Echogenic focus of the right lobe in close proximity to the  hepatorenal fossa. This likely corresponds with nonenhancing lesion on  CT 7/30/2018 and likely representing subcapsular hematoma.  4. Additional small hematomas of the braden hepatis and posterior right  perihepatic region.  5. Small volume anechoic ascites.  6. Left hepatic lobe not well visualized secondary to open wound  preventing imaging. Note the left lobe was grossly abnormal on CT  7/30/2018    POD 1 US liver: Impression:   1.  Hematoma associated with the inferior aspect of the perihepatic  space anteriorly, measuring 13.7 x 7.0 x 8.5 cm.  2.  Retrograde flow of the left portal vein. Additionally, low  velocities of the portal venous system. Single low resistive index of  the extrahepatic aspect of the hepatic artery, measuring 0.37. These  findings are likely within normal limits in the early postoperative  context. However, continued attention on follow-up recommended.    7/30 CT scan abd/pelvis:  IMPRESSION:   1. Hypoenhancing areas in the transplant liver concerning for  infarction. Transplant left  portal vein occlusion with bubbles of  portal venous gas. Nearly occlusive filling defect in the native  hepatic artery. Narrowed and irregular appearance of the transplant  hepatic artery. Focal occlusion of the left main hepatic artery.  Segmental occlusions of right hepatic arterial branches.   2. Pneumatosis intestinalis with nonocclusive filling defects in  superior mesenteric vein branches, concerning for bowel ischemia or  infarction.   3. Splenic infarctions.   4. Bibasilar consolidation with air bronchograms. Pneumonia cannot be  excluded.   5. Post surgical changes of evacuation of peritransplant hematoma.

## 2018-08-13 NOTE — PROGRESS NOTES
[facility]  Transplant Infectious Disease Progress Note      Patient:  Yaneli Miles, Date of birth 1975, Medical record number [id number]  Date of Visit:  08/13/2018         Assessment and Recommendations:   Recommendations:  - Reassess lungs for pulmonary infection w/ CXR  - Assess R anterior upper forearm PIV (in place since 7/30) for removal  - Continue checking daily CK  - We have already asked the lab to check VRE sensitivity to tigecycline    Transplant Infectious Disease will continue to follow with you.    Assessment:  43 year old male s/p Liver transplant on 7/21 whose post-op course was complicated by small bowel ischemia requiring multiple trips to OR for washouts and jejunal resection and anastomosis, left lobe of the liver ischemia, splenic infarcts, VRE infected intra-abdominal hematoma and blood clots, and VRE bacteremia, who continues to have blood cultures positive for VRE despite 15 days of daptomycin.     # Persistent VRE bacteremia  Despite being on daptomycin for 15 days, he continues to have positive blood cultures. Daptomycin dosing was increased on 8/12. Possibly the ischemic parts of his liver are seeded with VRE and are shedding it into the blood. Possibly he continues to have clots that are infected and shedding VRE into blood. It is also possible that his daptomycin is becoming inactivated in the lungs by surfactant which would account for possible pulmonary source. His WBC count was elevated over the weekend but is now downtrending but cultures over the weekend were still producing VRE. We would consider assessing PIV from 7/30 for removal, which might be a nidus of infection causing residual positive blood cultures. There is some thought to combining current daptomycin with ceftaroline, as described in In vitro activity of daptomycin in combination with ß-lactams, gentamicin, rifampin, and tigecycline against daptomycin-nonsusceptible enterococci, where  there was an apparent synergistic effect to combining the two. In the event that the patient's daptomycin is becoming inactivated by surfactant in the lungs by an occult pulmonary process, tigecycline can be added. The other option is to allow the increased dose of daptomycin to have an effect since the patient has only had one administration of the increased dose without time for cultures to reflect that.   If there are further positive blood cultures, we can add:  - ceftaroline 300 mg q12h while not on dialysis, will re-dose if CRRT re-started or if HD initiated.  - tigecycline, 100 mg load, 50 mg q12h, with ICU pharmacy to re-dose based on any changes in liver function.    Infectious Disease issues include:  - QTc interval:462 on 7/30/18  - Bacterial prophylaxis: TMP/SMX  - Pneumocystis prophylaxis:TMP/SMX  - Viral serostatus D/R & prophylaxis: HCV negative/reactive, HBVnegative/reactive, CMV -/+, EBV+/+, valgancicilovir ppx  - Fungal prophylaxis: topical antifungal  - Gamma globulin status: Never done  - Isolation status: Contact, Good hand hygiene.    Barry Montanez MD MS    Internal Medicine and Pediatrics, PGY-1  [facility]  P: [phone]     Interval Events:  Extubated 8/10.   CT Abdomen/Pelvis 8/11 demonstrated possible graft infarction.   Abdominal cultures had light growth GPC's 8/12/18. Blood cultures collected 8/12/18 grew VRE x2.   Nephrology continues to follow for BEN, will defer CRRT, possibly continue 8/14.   8/13 the pt was anemic to Hg 5.0 and transfused 3U pRBC.     Review of Systems:   Unable to obtain         Current Medications & Allergies:       aspirin  81 mg Oral or Feeding Tube Daily     DAPTOmycin (CUBICIN) intermittent infusion  12 mg/kg Intravenous Q48H     hydrocortisone sodium succinate PF  50 mg Intravenous Q8H    Followed by     [START ON 8/15/2018] hydrocortisone sodium succinate PF  50 mg Intravenous Q12H    Followed by     [START ON 8/17/2018] hydrocortisone  sodium succinate PF  25 mg Intravenous Q12H    Followed by     [START ON 8/19/2018] hydrocortisone sodium succinate PF  25 mg Intravenous Daily     lipids  250 mL Intravenous Once per day on Mon Wed Fri     meropenem  500 mg Intravenous Q12H     micafungin  100 mg Intravenous Q24H     midodrine  10 mg Per Feeding Tube Q8H     mycophenolate  250 mg Oral or Feeding Tube BID IS     pantoprazole  40 mg Oral or Feeding Tube QAM AC     sodium chloride (PF)  3 mL Intravenous Q8H     sulfamethoxazole-trimethoprim  10 mL Oral or Feeding Tube Once per day on Mon Wed Fri     tacrolimus  1.25 mg Oral or Feeding Tube QPM     tacrolimus  1.5 mg Oral QAM     tenofovir  300 mg Per Feeding Tube Q96H     valGANciclovir  450 mg Oral or NG Tube Once per day on Tue Fri    Or     valGANciclovir  450 mg Oral Once per day on Tue Fri       Infusions/Drips:    IV fluid REPLACEMENT ONLY       insulin (regular) 8 Units/hr (08/13/18 1204)     lactated ringers 10 mL/hr at 07/30/18 1519     norepinephrine Stopped (08/12/18 2300)     parenteral nutrition - ADULT compounded formula       parenteral nutrition - ADULT compounded formula 45 mL/hr at 08/13/18 0831     Reason beta blocker order not selected         Allergies   Allergen Reactions     Nsaids      Contraindicated             Physical Exam:   Vitals were reviewed.  All vitals stable.  Patient Vitals for the past 24 hrs:   BP Temp Temp src Resp SpO2 Weight   08/13/18 1100 (!) 89/55 97.5  F (36.4  C) Oral 8 - -   08/13/18 1045 - - - 9 - -   08/13/18 1030 - - - 9 - -   08/13/18 1015 - - - 9 - -   08/13/18 1000 (!) 87/62 - - 12 - -   08/13/18 0915 101/43 96.9  F (36.1  C) - 12 99 % -   08/13/18 0900 (!) 86/58 - - (!) 7 100 % -   08/13/18 0850 - 97.5  F (36.4  C) Oral 9 99 % -   08/13/18 0845 - - - (!) 7 99 % -   08/13/18 0830 - - - 16 99 % -   08/13/18 0800 110/71 97.5  F (36.4  C) Oral 12 - -   08/13/18 0707 - 97.3  F (36.3  C) - - 98 % -   08/13/18 0700 113/75 - - - 99 % -   08/13/18 0657 -  97.5  F (36.4  C) - - 98 % -   08/13/18 0645 - - - - 98 % -   08/13/18 0630 - - - - 97 % -   08/13/18 0615 - - - - 96 % -   08/13/18 0600 107/73 - - - 98 % -   08/13/18 0545 - - - - 98 % -   08/13/18 0530 - - - - 97 % -   08/13/18 0515 - - - - 96 % -   08/13/18 0500 107/68 - - - 96 % 69.1 kg (152 lb 5.4 oz)   08/13/18 0445 - - - - 98 % -   08/13/18 0430 - - - - 98 % -   08/13/18 0415 - - - - 97 % -   08/13/18 0400 108/78 97  F (36.1  C) Axillary 18 99 % -   08/13/18 0345 - - - - 98 % -   08/13/18 0330 - - - - 96 % -   08/13/18 0315 - - - - 96 % -   08/13/18 0300 102/66 - - - 98 % -   08/13/18 0245 - - - - 98 % -   08/13/18 0230 - - - - 97 % -   08/13/18 0215 - - - - 98 % -   08/13/18 0200 98/62 - - - 97 % -   08/13/18 0145 - - - - 98 % -   08/13/18 0130 - - - - 97 % -   08/13/18 0115 - - - - 98 % -   08/13/18 0100 92/57 - - - 97 % -   08/13/18 0045 - - - - 97 % -   08/13/18 0030 - - - - 97 % -   08/13/18 0015 - - - - 96 % -   08/13/18 0000 90/58 96.8  F (36  C) Axillary 18 97 % -   08/12/18 2345 - - - - 97 % -   08/12/18 2330 - - - - 97 % -   08/12/18 2315 - - - - 96 % -   08/12/18 2300 112/67 - - - 97 % -   08/12/18 2245 - - - - 96 % -   08/12/18 2230 - - - - 96 % -   08/12/18 2215 - - - - 98 % -   08/12/18 2200 115/62 - - - 99 % -   08/12/18 2145 - - - - 98 % -   08/12/18 2130 - - - - 98 % -   08/12/18 2115 - - - - 97 % -   08/12/18 2100 112/73 - - - 97 % -   08/12/18 2045 - - - - 97 % -   08/12/18 2030 - - - - 99 % -   08/12/18 2015 - - - - 98 % -   08/12/18 2000 108/61 97.9  F (36.6  C) Axillary 14 98 % -   08/12/18 1945 - - - - 97 % -   08/12/18 1930 - - - - 98 % -   08/12/18 1915 - - - - 97 % -   08/12/18 1900 90/60 - - - 97 % -   08/12/18 1830 - - - - 96 % -   08/12/18 1815 - - - - 95 % -   08/12/18 1800 (!) 89/49 - - 18 96 % -   08/12/18 1745 - - - - 98 % -   08/12/18 1730 - - - - 95 % -   08/12/18 1715 - - - - 95 % -   08/12/18 1700 106/62 - - - 96 % -   08/12/18 1645 - - - - 97 % -   08/12/18 1630 - - - -  97 % -   08/12/18 1615 - - - - 97 % -   08/12/18 1600 114/64 98.1  F (36.7  C) Axillary 16 97 % -   08/12/18 1545 - - - - 96 % -   08/12/18 1530 - - - - 97 % -   08/12/18 1515 - - - - 97 % -   08/12/18 1500 112/64 - - 18 97 % -   08/12/18 1445 - - - - 97 % -   08/12/18 1430 - - - - 96 % -   08/12/18 1415 - - - - 96 % -   08/12/18 1400 (!) 89/71 - - 17 95 % -   08/12/18 1345 - - - - 96 % -   08/12/18 1330 - - - - 97 % -   08/12/18 1315 - - - - 98 % -     Ranges for vital signs:  Temp:  [96.8  F (36  C)-98.1  F (36.7  C)] 97.5  F (36.4  C)  Heart Rate:  [] 89  Resp:  [7-18] 8  BP: ()/(43-78) 89/55  MAP:  [56 mmHg-222 mmHg] 66 mmHg  Arterial Line BP: ()/(29-67) 114/53  SpO2:  [95 %-100 %] 99 %  Vitals:    08/09/18 0600 08/10/18 0400 08/13/18 0500   Weight: 67.5 kg (148 lb 13 oz) 70 kg (154 lb 5.2 oz) 69.1 kg (152 lb 5.4 oz)       Physical Examination:   GENERAL:  Tired appearing man lying in bed with family in close attendance  HEAD:  Head is normocephalic, atraumatic, temporal wasting  EYES:  Eyes have anicteric sclerae without conjunctival injection   ENT:  Oropharynx is moist without exudates or ulcers. Tongue is midline. Mucous membranes tacky.  NECK:  Supple.   LUNGS:  Noisy lung fields bilaterally w/ copious upper airway noises and crackles appreciated in L lower field.  CARDIOVASCULAR:  Regular rate and rhythm with systolic and diastolic murmur, best appreciated at L lower sternal border  ABDOMEN:  Normal bowel sounds, soft, nontender. RUQ surgical dressing in place with serosanguinous drainage  SKIN:  No acute rashes.  Lines in place without any surrounding erythema or exudate.  EXT: 1+ pitting edema of lower extremities bilaterally         Laboratory Data:     Metabolic Studies       Recent Labs   Lab Test  08/13/18   1048  08/13/18   0531  08/13/18   0409  08/12/18   1157  08/12/18   0346   08/09/18   1106   07/13/18   2050   07/13/18   0334   NA   --    --   132*   --   132*   < >   --    <  >   --    < >  132*   POTASSIUM  2.7*  2.6*  2.6*   --   3.6   < >   --    < >   --    < >  2.6*   CHLORIDE   --    --   96   --   99   < >   --    < >   --    < >  101   CO2   --    --   22   --   18*   < >   --    < >   --    < >  20   ANIONGAP   --    --   14   --   14   < >   --    < >   --    < >  12   BUN   --    --   120*   --   116*   < >   --    < >   --    < >  30   CR   --    --   3.40*   --   3.23*   < >   --    < >   --    < >  2.08*   GFRESTIMATED   --    --   20*   --   21*   < >   --    < >   --    < >  35*   GLC   --    --   134*   --   123*   < >   --    < >   --    < >  104*   A1C   --    --    --    --    --    --    --    --   5.4   --    --    JESUS   --    --   6.7*   --   7.1*   < >   --    < >   --    < >  8.1*   PHOS   --    --   5.5*   --   4.6*   < >   --    < >   --    --    --    MAG   --    --   2.0   --   1.9   < >   --    < >   --    --    --    LACT   --    --    --   1.6   --    --   1.2   < >  3.9*   --   1.1   PCAL   --    --    --    --    --    --    --    --    --    --   0.89   CKT   --    --   41   --   40   --    --    < >   --    --    --     < > = values in this interval not displayed.       Hepatic Studies    Recent Labs   Lab Test  08/13/18   0409  08/12/18   0346  08/11/18   0401   07/13/18   0334   06/21/18   0612   BILITOTAL  1.6*  2.2*  2.1*   < >  36.3*   < >  23.4*   DBIL  1.3*  1.7*  1.6*   < >  28.1*   < >   --    ALKPHOS  228*  296*  289*   < >  96   < >  78   PROTTOTAL  4.2*  4.4*  4.2*   < >  5.8*   < >  5.1*   ALBUMIN  1.7*  1.3*  1.4*   < >  2.8*   < >  2.9*   AST  30  44  30   < >  287*   < >  124*   ALT  25  34  36   < >  125*   < >  79*   LDH   --    --    --    --   259*   --   177    < > = values in this interval not displayed.       Pancreatitis testing    Recent Labs   Lab Test  08/13/18   1048   07/22/18   0354  07/20/18   1741  07/12/18   1913   06/16/18   1815   AMYLASE   --    --   42  60  35   --    --    LIPASE   --    --   75   --    --    --    260   TRIG  173*   < >   --    --    --    < >   --     < > = values in this interval not displayed.       Hematology Studies      Recent Labs   Lab Test  08/13/18   0531  08/13/18   0409  08/12/18   1144  08/12/18   0346  08/11/18   0953  08/11/18   0401   WBC  10.3  8.6  19.1*  20.1*  17.1*  13.4*   ANEU  8.5*  7.5   --   16.6*   --   11.1*   ALYM  0.6*  0.3*   --   0.2*   --   0.2*   MARCELA  1.0  0.8   --   3.1*   --   1.9*   AEOS  0.0  0.0   --   0.0   --   0.0   HGB  5.0*  4.9*  8.7*  6.5*  7.8*  7.7*   HCT  14.8*  14.5*  25.9*  19.4*  23.4*  22.9*   PLT  58*  54*  246  251  199  172       Clotting Studies    Recent Labs   Lab Test  08/13/18   0409  08/11/18   0953  08/09/18   0335  08/08/18   1929  08/07/18   0336   INR  1.41*  1.39*   --   1.36*  1.53*   PTT   --    --   53*  34   --        Iron Testing    Recent Labs   Lab Test  08/13/18   0531   07/13/18   0334   06/17/18   2138   IRON   --    --   97   --   66   FEB   --    --   117*   --   61*   IRONSAT   --    --   83*   --   109*   ANGELLA   --    --   Unsatisfactory specimen - icteric   --    --    MCV  89   < >  81   < >   --    FOLIC   --    --   10.5   --    --    B12   --    --   Canceled, Test credited   --    --    HAPT   --    --   13*   < >   --    RETP   --    --   1.6   < >   --    RETICABSCT   --    --   46.0   < >   --     < > = values in this interval not displayed.       Markers    Alpha Fetoprotein   Date Value Ref Range Status   07/11/2018 Unsatisfactory specimen - icteric 0 - 8 ug/L Final     Comment:     Canceled, Test credited  NOTIFIED DR PRINCE QUIROS [facility] 1959 7/11/2018 BY AA         Arterial Blood Gas Testing    Recent Labs   Lab Test  08/08/18   1705  08/06/18   1323  08/06/18   1200  08/06/18   1120  08/03/18   0844   PH  7.38  7.32*  7.36  7.39  7.32*   PCO2  37  39  37  37  33*   PO2  126*  178*  142*  164*  199*   HCO3  22  20*  21  22  17*   O2PER  60%  50.0  48.0  50.0  57.0        Thyroid Studies     Recent Labs   Lab Test   06/17/18   2138   TSH  0.77  0.77   T4  Unsatisfactory specimen - icteric       Urine Studies     Recent Labs   Lab Test  07/27/18   2330  07/13/18   1315  06/18/18   1200  06/16/18   2220   URINEPH  7.5*  6.0  5.0  5.5   NITRITE  Negative  Negative  Negative  Negative   LEUKEST  Negative  Negative  Negative  Large*   WBCU  3  None  2  66*       Medication levels    Recent Labs   Lab Test  08/13/18   0531   06/18/18   1230   VANCOMYCIN   --    --   16.8   TACROL  5.9   < >   --     < > = values in this interval not displayed.       Body fluid stats    Recent Labs   Lab Test  08/12/18   1217   07/15/18   1115  07/13/18   1240  06/28/18   1111   FTYP   --    --   Ascites  Ascites  Ascites   FCOL   --    --   Bloody  Red  Yellow   FAPR   --    --   Cloudy  Cloudy  Slightly Cloudy   FWBC   --    --   245  373  234   FNEU   --    --   3  28  6   FLYM   --    --   19  11  24   FMONO   --    --   78   --    --    FALB   --    --    --   1.0   --    FTP   --    --    --   1.6   --    GS  No organisms seen  Many  WBC'S seen  predominantly PMN's    Many RBCs seen   < >  No organisms seen  Few  WBC'S seen  predominantly mononuclear cells    No organisms seen  Few  WBC'S seen  PMNs seen    Many  Red blood cells seen     --     < > = values in this interval not displayed.       Microbiology:  Last Culture results with specimen source  Culture Micro   Date Value Ref Range Status   08/13/2018 No growth after 1 hour  Preliminary   08/13/2018 No growth after 1 hour  Preliminary   08/12/2018 Culture in progress  Preliminary   08/12/2018 Culture negative monitoring continues  Preliminary   08/12/2018 (A)  Preliminary    Cultured on the 1st day of incubation:  Enterococcus faecium (VRE)     08/12/2018   Preliminary    Critical Value/Significant Value, preliminary result only, called to and read back by  Ruddy Coppola RN at 2200 [email or web address]     08/12/2018 Susceptibility testing done on previous specimen  Preliminary   08/12/2018 (A)   Preliminary    Cultured on the 1st day of incubation:  Enterococcus faecium (VRE)     08/12/2018   Preliminary    Critical Value/Significant Value, preliminary result only, called to and read back by  Ruddy Coppola RN at 2026 [email or web address]     08/12/2018 Susceptibility testing done on previous specimen  Preliminary   08/11/2018 Canceled, Test credited  Specimen not received    Final   08/11/2018   Final    Patient was recollected on 8/12/18 @ 0340, both bottles have been received in IDDL   08/11/2018 (A)  Preliminary    Cultured on the 1st day of incubation:  Enterococcus faecium (VRE)     08/11/2018   Preliminary    Critical Value/Significant Value, preliminary result only, called to and read back by  Katja March RN on 4AB at 1312 on 8/12/18 ac.     08/11/2018 Susceptibility testing done on previous specimen  Preliminary   08/11/2018 (A)  Final    Cultured on the 1st day of incubation:  Enterococcus faecium (VRE)     08/11/2018   Final    Critical Value/Significant Value, preliminary result only, called to and read back by  Duy Williamson RN @ 2312 8/11/18 CS     08/11/2018   Final    Susceptibility testing requested by  Dr. Barry Montanez pager [phone], for Tigecycline, Ceftaroline, and Telavancin 8.13.18 @1143 AV     08/11/2018   Final    Ceftaroline will not be tested as there are no breakpoints for this organism.  Telavancin will not be tested due to no FDA clearance for this organism.  Rosa M Roberto M.D., Medical Director     08/11/2018 Notified Dr. Montanez 8.13.18 @1200 AV  Final    Specimen Description   Date Value Ref Range Status   08/13/2018 Blood Right Hand  Final   08/13/2018 Blood Arterial blood  Final   08/12/2018 Abdominal Fluid  Final   08/12/2018 Abdominal Fluid  Final   08/12/2018   Final    Abdominal Fluid Not received in an anaerobic transport container.   08/12/2018 Blood Right Hand  Final   08/12/2018 Blood Arterial blood  Final   08/11/2018 Blood  Final   08/11/2018 Blood White port  Final    08/11/2018 Blood Right Hand  Final   08/11/2018 Blood Arterial line  Final   08/09/2018 Blood Left Arm  Final   08/09/2018 Blood  Final   08/08/2018 Blood  Final   08/08/2018 Blood  Final   08/08/2018 Blood Blue port  Final   08/08/2018 Blood  Final   08/07/2018 Blood Arterial blood  Final   08/06/2018 Blood Clot  Final   08/06/2018 Blood Clot  Final   08/06/2018 Blood Clot  Final        Infectious Disease Testing     Recent Labs   Lab Test  06/18/18   0440   TBRSLT  Negative       Virology:  Hepatitis B Testing     Recent Labs   Lab Test  08/10/18   0349  07/24/18   0446  07/23/18   0817   07/20/18   1741  07/12/18   1913   AUSAB   --   551.06*   --    --   0.00  0.00   HBCAB   --    --    --    --   Reactive*  Reactive*   HEPBANG  Nonreactive   --   Nonreactive   < >   --    --    HBCM   --    --    --    --   Nonreactive  Nonreactive    < > = values in this interval not displayed.        Imaging:  Recent Results (from the past 48 hour(s))   CT Abdomen Pelvis w/o Contrast    Narrative    Examination:  CT ABDOMEN PELVIS W/O CONTRAST 8/11/2018 4:14 PM     History: Status post liver transplant complicated by mesenteric  ischemia with small bowel resection. Abdomen closed 8/8/2018. Now with  leukocytosis. Evaluate for intra-abdominal fluid collection.    Comparison: Abdominal radiograph 8/11/2018, CT abdomen pelvis  7/30/2018    Technique: CT of the abdomen and pelvis were obtained without  intravenous contrast. Sagittal and coronal reconstructions created and  reviewed.    Findings:  Lines and tubes: Gastric tube terminating in the gastric body. Enteric  tube with tip terminating in the third portion of the duodenum.  Biliary stent. Luis catheter. Interval removal of left abdominal  drain.     Postoperative changes of orthotopic liver transplantation. Hepatic  segments III and IVb demonstrate relative hypoenhancement and contain  tiny foci of air. There are additional areas of hypoattenuation in  hepatic segment  VI similar to CT 7/30/2018. Periportal edema.  Hyperdensity of the main and left portal vein again noted. The spleen  is unremarkable, no areas of definite hypoattenuation. However,  compared to 7/20/2018 study, spleen is smaller. Gallbladder is  surgically absent. The pancreas, kidneys and adrenal glands are  unremarkable within the confines of a noncontrast exam.    The stomach is filled with radiodense material. There is marked  dilatation of the small intestine measuring up to 5 mm in maximal  diameter, which extends across the small bowel anastomosis through the  terminal ileum. No discrete transition point identified. The colon is  decompressed. There is diffuse mesenteric and omental edema and fat  stranding. Large amount of fluid throughout the peritoneal cavity with  small areas of layering hyperdense material, hemoperitoneum. Small  areas of pneumoperitoneum. In the right abdomen, there is a loculated  fluid collection (series 2 image 74 through 100), similar to prior.     Lower thorax: Heart size is enlarged, stable. Small pericardial  effusion. Bilateral pleural effusions, left greater than right, with  overlying atelectasis/consolidation.    Bones and soft tissues: Interval closure of the abdominal wall. Along  the mesh covering the right abdominal wall, there is a complex fluid  collection containing hyperdensity, presumably blood, simple fluid,  and multiple foci of air. The collection is approximately 3.1 x 3.1 cm  and runs horizontally along the abdominal wall across midline,  approximately 17 cm. No acute or suspicious osseous abnormality.  Diffuse anasarca.      Impression    Impression:   1. Postoperative changes of orthotopic liver transplantation with  relative hypoattenuation of hepatic segments III, IVb, and VI,  suspicious for infarct with superimposed infection possible. There are  tiny foci of air in the left hepatic lobe, which may represent  pneumobilia, portal venous gas or  necrosis/infection.   2. Complex fluid collection along the abdominal wall mesh containing  simple fluid, blood, and multiple foci of air. Findings may be related  to early postoperative change rather than infection.  3. Large amount of fluid throughout the abdomen, including a loculated  collection in the right deep abdomen, not significantly changed.  Hemoperitoneum and pneumoperitoneum, presumably postsurgical.  4. Diffuse dilatation of the small bowel, likely represents  postoperative ileus in the setting of recent bowel resection. No  discrete transition point identified.  5. Bilateral pleural effusions, left greater than right with  associated atelectasis/consolidation.     Findings discussed with Dr. Grissom at 17:11.    I have personally reviewed the examination and initial interpretation  and I agree with the findings.    SCOTT MCKINNEY MD   US Abdomen Limited    Narrative    Exam: US ABDOMEN LIMITED, 8/13/2018 9:15 AM    Indication: Acute anemia, eval for intra-abdominal hematoma.  POD #5,  known hepatic infarcts;  Status post liver transplantation 7/21 with  subsequent return to OR with sepsis and bowel ischemia, abdominal  washouts (most recently 8/8/2018, jejunal resection with primary  anastomosis.    Comparison: 8/8/2018 ultrasound. 8/11/2018 CT.    Technique: Multiplanar grayscale and color imaging of the abdomen.  Technically challenging examination secondary to limited window for  evaluation.    Findings:   Inferior to the liver, there is a 3.1 x 0.7 x 0.7 cm anechoic fluid  collection that demonstrates no internal flow on color imaging. Near  the braden hepatis, there is a 2.3 x 1.5 x 2.4 cm anechoic fluid  collection that demonstrates no flowing color imaging. Inferior to the  right lobe of the liver there is a heterogeneously fluid collection  7.2 x 1.8 x 4.1 cm that demonstrates no flow on color imaging. Right  lower quadrant and left lower quadrant demonstrate septated anechoic  fluid  collection. Partially visualized heterogeneous left hepatic lobe  parenchyma.      Impression    Impression:   1. Perihepatic fluid collections as above, largest of which is mildly  complex and measures up to 7.2 cm inferior to the right hepatic lobe.   2. Small volume ascites with septations in the lower quadrants.    I have personally reviewed the examination and initial interpretation  and I agree with the findings.    PAUL COOL MD

## 2018-08-13 NOTE — PROGRESS NOTES
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 He returned to the SICU for shock, severe lactic acidosis and respiratory failure. He is s/p exploratory laparotomy on 8/6 with resection of necrotic jejunum, abdomen closed 8/8.    TODAY'S PROGRESS/PLANS:   - 3 U pRBC  - care conference at 2 pm   - norepinephrine as needed    - start trickle TF  - KUB   - suppository PRN  - f/u Nephrology recommendations of dialysis   - f/u ID regarding ABX  - TKO IVF  - begin to taper stress dose steroids  - hematology consult for low platelets per Transplant request  - Repeat echo for fluid status per transplant    PLAN:  Neuro/ pain/ sedation:  # acute pain  - Monitor neurological status. Notify the MD/DO for any acute changes in exam.  - PRN oxycodone and IV hydromorphone for pain.     Pulmonary care:   # acute respiratory failure - resolved  - extubated (8/10)  - continue aggressive pulmonary hygiene  - remains on room air    Cardiovascular:    #Septic Shock without source control  - Monitor hemodynamic status   - Midodrine 10 mg Q8h  - levophed gtt to keep MAP >60, wean as tolerated   - vasopressin discontinue 8/13  - discontinue fludrocortisone stress dose steroids, taper hydrocortisone  - TTE 8/8 showed no vegetations or valve abnormalities  - TOÑO 8/10 no evidence of endocarditis   - ASA 81 mg PO daily     GI care:   # DDLT 7/21/18  # Intrahepatic left portal venous thrombosis, infarction of left liver lobe, patchy infarction of right lobe, suspected hepatic arterial  thrombosis  # 7/30/18 sp exploratory laparotomy, abthera closure device in place. Findings of patchy diffuse SB ischemia. S/p liver biopsy  # Splenic infarctions, patchy bowel ischemia  # Ileus  # Ex Lap 8/6 - resection of 90 cm of necrotic ileum   # Ex Lap 8/8 - abdomen closed  - s/p exploratory laparotomy, abthera closure device in place. Findings of patchy diffuse SB ischemia. S/p liver biopsy on 7/30/18. Returned to OR 8/6 for washout, reexploration.   - Abdomen closed in OR 8/8  - Heparin for PV thrombosis, change heparin to 500 straight rate due to urethral bleeding.  Heparin held on 8/13 due to concerns regarding HIT.  - 8/8 US Liver: unchanged decreased velocity in the left portal vein, improved velocities of the hepatic artery  - Right side of wound opened at bedside and hematoma drained 8/13    Fluids/ Electrolytes/ Nutrition:   # protein calorie deficit malnutrition   - Start trickle tube feeds 10 ml/hr  - Continue TPN    Renal/ Fluid Balance:    # BEN  # severe metabolic acidosis, lactic acidosis, resolved  # severe hyponatremia, resolved  # hyperkalemia, resolved  # hematuria   - HD holiday today, will f/u with nephrology   - Will continue to monitor intake and output.  - Urethral bleeding: Luis replaced by urology, current recommendations to keep Luis until Monday and f/u with urology. If bleeding, place in tension.      Endocrine:    # DM II  - insulin gtt  - stress dose to be tapered starting 8/13    ID/ Antibiotics:  # VRE Bacteremia  # ESBL Bacteremia  # Hepatitis B  - Daptomycin (started 7/30) and Meropenem (started 8/2) continue while blood cultures still positive  - Micafungin   - Immunosuppression: (restarted 8/6) tacrolimus, hydrocortisone  - Immunosuppression prophylaxis: Bactrim, Valgancyclovir and Tenofovir, nystatin when micafungin stopped.   - Consulted Transplant ID due to continued bacteremia with unknown source, rec to increase Daptomycin dose    Positive cultures   - 7/27 VRE  (line/arm)   - 7/29: VRE (left arm)   - 7/30: VRE (left arm)   - 7/31: VRE (Line and peripheral)  - 8/1: ESBL from HD/hand/central line   - 8/3: VRE from intra-abdominal tissue  - 8/4: NGTD blood culture  - 8/5: NGTD blood culture  - 8/6: VRE - art line  - 8/7: VRE (blood)  - 8/8: VRE (blood)  - 8/9: VRE (blood)  - 8/11: VRE    Heme:     # Acute blood loss anemia  # Anemia of critical illness  # Portal venous thrombosis  # Thrombocytopenia  - Heparin for PV thrombosis, change heparin to 500 straight rate due to urethral bleeding, discontinued 8/13 for concern for HIT.  Held heparin on 8/13 due to HIT concern.  - Continue aspirin 81mg  - HIT panel sent and negative 8/2.  Repeat HIT panel on 8/13.  - DDAVP given 8/12  - TEG normal 8/12  - Hematology consult per Transplant request for potential HIT in setting of low platelets    MSK:  # weakness and deconditioning of critical illness   - PT/OT   - mobilize     Prophylaxis:    - DVT: no chemical, SCD's  - GI: protonix     Lines/ tubes/ drains:  - L radial art line, L PICC, NJ, NG, PIVs    Disposition:  - SICU      ====================================    SUBJECTIVE:   - Hemoglobin of 4.9 overnight, received 2 units of RBCs  - Decreasing pressor requirements  - Increasing shortness of breath, pain this am      OBJECTIVE:   1. VITAL SIGNS:   Temp:  [96.8  F (36  C)-98.1  F (36.7  C)] 97.3  F (36.3  C)  Heart Rate:  [] 90  Resp:  [14-21] 18  BP: ()/(49-78) 113/75  MAP:  [59 mmHg-222 mmHg] 74 mmHg  Arterial Line BP: ()/(44-72) 124/51  SpO2:  [95 %-99 %] 98 %  Resp: 18    2. INTAKE/ OUTPUT:   I/O last 3 completed shifts:  In: 3910.11 [I.V.:2610.11; NG/GT:450]  Out: 4475 [Urine:3925; Emesis/NG output:550]    3. PHYSICAL EXAMINATION:   General: lying in bed, no acute distress  Neuro: alert, responds to questions with gestures  Resp: Breathing non-labored, upper airway secretion noises  CV: Regular rhythm, tachycardic  Abdomen: Soft, mild distension, tenderness  around dressings  Incisions: right side of abdominal incision packed and covered with clean dressing  Extremities: warm and well perfused, peripheral edema in bilateral hands and feet    4. INVESTIGATIONS:   Arterial Blood Gases     Recent Labs  Lab 08/08/18  1705 08/06/18  1323 08/06/18  1200 08/06/18  1120   PH 7.38 7.32* 7.36 7.39   PCO2 37 39 37 37   PO2 126* 178* 142* 164*   HCO3 22 20* 21 22     Complete Blood Count     Recent Labs  Lab 08/13/18  0531 08/13/18  0409 08/12/18  1144 08/12/18  0346   WBC 10.3 8.6 19.1* 20.1*   HGB 5.0* 4.9* 8.7* 6.5*   PLT 58* 54* 246 251     Basic Metabolic Panel    Recent Labs  Lab 08/13/18  0531 08/13/18  0409 08/12/18  0346 08/11/18  0401 08/10/18  2125   NA  --  132* 132* 133 136   POTASSIUM 2.6* 2.6* 3.6 3.0* 3.3*   CHLORIDE  --  96 99 102 105   CO2  --  22 18* 19* 20   BUN  --  120* 116* 93* 82*   CR  --  3.40* 3.23* 2.58* 2.37*   GLC  --  134* 123* 124* 116*     Liver Function Tests    Recent Labs  Lab 08/13/18  0409 08/12/18  0346 08/11/18  0953 08/11/18  0401 08/10/18  0349  08/08/18  1929  08/07/18  0336   AST 30 44  --  30 38  < >  --   < > 81*   ALT 25 34  --  36 51  < >  --   < > 162*   ALKPHOS 228* 296*  --  289* 283*  < >  --   < > 242*   BILITOTAL 1.6* 2.2*  --  2.1* 2.3*  < >  --   < > 3.8*   ALBUMIN 1.7* 1.3*  --  1.4* 1.5*  < >  --   < > 1.8*   INR 1.41*  --  1.39*  --   --   --  1.36*  --  1.53*   < > = values in this interval not displayed.  Pancreatic Enzymes  No lab results found in last 7 days.  Coagulation Profile    Recent Labs  Lab 08/13/18  0409 08/11/18  0953 08/09/18  0335 08/08/18  1929 08/07/18  0336   INR 1.41* 1.39*  --  1.36* 1.53*   PTT  --   --  53* 34  --      Lactate  Invalid input(s): LACTATE    5. RADIOLOGY:   No results found for this or any previous visit (from the past 24 hour(s)).    =========================================      Patient seen, findings and plan discussed with surgical ICU staff Dr. Holland.    Carlos  Jasmin  Medical Student, MS4  [employer]    See [facility] for on-call pager information.    Eddie Acuna MD  CA-2/PGY-3

## 2018-08-13 NOTE — PLAN OF CARE
Problem: Diabetes Comorbidity  Goal: Diabetes  Patient comorbidity will be monitored for signs and symptoms of hyperglycemia or hypoglycemia. Problems will be absent, minimized or managed by discharge/transition of care.   Outcome: No Change  Titrating insulin gtt based on blood glucose q1-2 hours. Currently following algorithm 4

## 2018-08-13 NOTE — PROGRESS NOTES
CLINICAL NUTRITION SERVICES - BRIEF NOTE    Nutrition Prescription    RECOMMENDATIONS FOR MDs/PROVIDERS TO ORDER:  -Free water flush adjustment per MD discretion pending sodium and fluid status  -TF advancement beyond trophic per MD discretion only    Recommendations already ordered by Registered Dietitian (RD):  1. Restart trophic TF via NDT:   -Nutren 1.5 @ 10 mL/hr. NO advancement at this time  -30 mL water flush q4h for tube patency    2. Order triglyceride check for every Monday while pt on PN    Future/Additional Recommendations:  1. Ability to advance TF beyond trophic per MD discretion    2. EN/PN wean if able to advance TFs:  -Once pt tolerating TF @ 20 mL/hr, can discontinue IV lipids  -Once pt tolerating TF @ 30-40 mL/hr, can cut PN rate in half to run bag out to discontinue PN  -Once PN weaned, off order appropriate MVI-M (pending dialysis) to meet micronutrient needs     Per SICU rounds, team would like to initiate trickled feeds today. Pt continues on full PN (meeting estimated energy and protein needs).    Nutrition Progress Note - f/u for progress towards previous nutrition POC (see previous 8/10 reassessment for details)    Halina Crouch, RD, LD  SICU RD Pgr: [phone]

## 2018-08-14 NOTE — PLAN OF CARE
Problem: Patient Care Overview  Goal: Plan of Care/Patient Progress Review  Outcome: Improving  D/I/A: PERRL intact, sclera yellow; neuro's intact, following commands, moving all extremities independently. Pain adequately controlled. Bear Hugger initiated to maintain normothermia. MAP goal of >60 maintained w/o intervention; BP soft, however stable. HR NSR w/ infrequent PAC. Moderate edema in BLE as well as scrotum. ABD distended, tender; old CAROL sites c/d/i; midline abd incision partially open w/ packing in place, packing changed per orders - plan for ABD wound vac placement today, supplies at bedside. NG to LIS w/ small amount of brownish output. NJ in place w/ trickle feeds at 20ml/hr. Urethral bah in place w/diminishing outputs, pink in color; small amount of bleeding from urethral anround bah. Suppository given, mucoid stool x2. Up to chair for 2 hrs w/ ceiling lift assist to and from. Coccyx reddened. Incentive spirometry strongly encouraged. Insulin drip infusing at algorithm 4. Hep drip initiated. Art line in place. CRRT active, I=O goal maintained; right groin line dressing reinforced.   P: Continue to closely monitor.

## 2018-08-14 NOTE — PROGRESS NOTES
Nephrology Progress Note  08/14/2018         Assessment & Recommendations:     Recommendations  1. Continue CRRT julian with goal of matching I/O   -Plan to pull Femoral Line in ~36-48 hrs and monitor renal function of RRT  2. Serial electrolyte monitoring  3. 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    Anuric BEN with creatinine elevation on CRRT  Patient with previous baseline Cr of 1.3-2, presented anuric BEN due to sepsis/hypotension/multiple sx/high CAROL drainage.   Tolerated CRRT overnight with good clearance with BUN < 90 now. BP is improving. Positive sign that he continues to make good urine. Will plan to continue CRRT via Femoral line for the next 48 hrs and then pull the line due to infection risk in the setting of persistent bacteremia. Will observe renal function of RRT at that time.      Electrolytes  Hypokalemia likely due to GI losses and high UOP. Potassium should improve with 4K bath on CRRT. Na and Bicarb acceptable.       Hypervolemia with relative intra vascular depletion  Limited ECHO from yesterday with collapsing IVC. Given large vol UOP, he was actually net neg 1.5L. At this time, will match I/O with CRRT.      Polymicrobial BSI due to ESBL E. coli and VRE E. faecium  Presenting persistently +BC for VRE, including 8/12. ID on board. Multiple antimicrobial agents.     OLT 7/12/18   ESLD sec to hep B. Complicated by venous thrombosis, liver infarction, possible arterial thrombosis, bowel ischemia req bowel resection.   Currently on stress dose steroids, prograf, and cellcept.     DMII  On insulin gtt     Pancytopenia  Acute Anemia and Thrombocytopenia  Unclear etiology of acute  drop in Hgb and platelets. Hgb responded to PRBC and has stabilized. No change in Plts at this point. HIT screen neg.     Recommendations were communicated to primary team face to face    Seen and discussed with Dr. Radha Ricardo MD   [phone]    Interval History :   O/N, was taken off Norepi. Hgb stable. Plt remain low. BCx positive from yesterday. Tolerating CRRT without clot.     Review of Systems:   Unable to obtain    Physical Exam:   I/O last 3 completed shifts:  In: 4268.95 [I.V.:1930.95; NG/GT:455]  Out: 5256 [Urine:3670; Emesis/NG output:500; Other:1086]   BP 98/66  Pulse 121  Temp 96.9  F (36.1  C) (Axillary)  Resp 12  Wt 68.8 kg (151 lb 10.8 oz)  SpO2 98%  BMI 24.48 kg/m2     GENERAL APPEARANCE: ill appearing  EYES:  Sclerae icteric, pupils equal  HENT: mouth without ulcers or lesions, NGT in place  PULM: air entry bilaterally, few crackles  CV: regular rhythm, tachycardic, no rub     -edema 3+ in LE bilaterally  GI: soft, distended, dressings in place  INTEGUMENT: no cyanosis, no rash  NEURO: Alert to voice. Tracks movement. Follows some commands.       Labs:   All labs reviewed by me  Electrolytes/Renal -   Recent Labs   Lab Test  08/14/18   1050 08/14/18   0344 08/13/18   2150   NA  138  136  135   POTASSIUM  3.1*  2.9*  2.6*   CHLORIDE  104  103  101   CO2  23  22  22   BUN  87*  100*  118*   CR  1.99*  2.38*  3.00*   GLC  122*  130*  139*   JESUS  7.0*  7.4*  6.4*   MAG  2.1  2.0  1.9   PHOS  3.9  4.0  4.3       CBC -   Recent Labs   Lab Test  08/14/18   1050  08/14/18   0344  08/13/18   2150   WBC  24.0*  20.0*  17.8*   HGB  10.1*  9.8*  9.8*   PLT  86*  79*  76*       LFTs -   Recent Labs   Lab Test  08/14/18   1050  08/14/18   0344  08/13/18   2150   ALKPHOS  307*  273*  279*   BILITOTAL  1.5*  1.5*  1.6*   ALT  26  28  26   AST  33  37  38   PROTTOTAL  4.6*  4.2*  4.3*   ALBUMIN  1.6*  1.6*  1.6*       Iron Panel -   Recent Labs   Lab Test  07/13/18   0334  06/17/18   2139    IRON  97  66   IRONSAT  83*  109*   ANGELLA  Unsatisfactory specimen - icteric   --          Imaging:  All imaging studies reviewed by me.     Current Medications:    aspirin  81 mg Oral or Feeding Tube Daily     ceftaroline (TEFLARO) intermittent infusion  400 mg Intravenous Q12H     DAPTOmycin (CUBICIN) intermittent infusion  12 mg/kg Intravenous Q24H     lipids  250 mL Intravenous Once per day on Mon Wed Fri     meropenem  1 g Intravenous Q8H     micafungin  100 mg Intravenous Q24H     midodrine  10 mg Per Feeding Tube Q8H     mycophenolate  250 mg Oral or Feeding Tube BID IS     pantoprazole  40 mg Oral or Feeding Tube QAM AC     sodium chloride (PF)  3 mL Intravenous Q8H     sulfamethoxazole-trimethoprim  10 mL Oral or Feeding Tube Daily     tacrolimus  1.25 mg Oral or Feeding Tube QPM     tacrolimus  1.5 mg Oral QAM     tenofovir  300 mg Per Feeding Tube Q48H     tigecycline (TYGACIL) intermittent infusion  50 mg Intravenous Q12H     valGANciclovir  450 mg Oral or NG Tube Every Other Day    Or     valGANciclovir  450 mg Oral Every Other Day       IV fluid REPLACEMENT ONLY       dialysate for CVVHD & CVVHDF (Phoxillum BK4/2.5) 12.5 mL/kg/hr (08/14/18 1329)     heparin 500 Units/hr (08/14/18 1200)     insulin (regular) 2 Units/hr (08/14/18 1200)     lactated ringers 10 mL/hr at 07/30/18 1519     - MEDICATION INSTRUCTIONS -       norepinephrine Stopped (08/13/18 1830)     parenteral nutrition - ADULT compounded formula       parenteral nutrition - ADULT compounded formula 45 mL/hr at 08/13/18 1956     replacement solution for CVVHD & CVVHDF (Phoxillum BK4/2.5) 200 mL/hr at 08/13/18 1956     replacement solution for CVVHD & CVVHDF (Phoxillum BK4/2.5) 12.5 mL/kg/hr (08/14/18 1329)     Reason beta blocker order not selected

## 2018-08-14 NOTE — PLAN OF CARE
Problem: Patient Care Overview  Goal: Plan of Care/Patient Progress Review  Discharge Planner PT   Patient plan for discharge: Unknown  Current status: Requires max A for bed mobility and transferred bed<>chair with ceiling lift. Femoral access CRRT running and no alarms noted bed>chair. 1 alarm noted chair>bed. Resolved with nsg intervention.  Barriers to return to prior living situation: Deconditioning, medical  Recommendations for discharge: ARC once LTACH goals met  Rationale for recommendations: Current level of function       Entered by: Antonio Leonardo 08/14/2018 4:49 PM

## 2018-08-14 NOTE — PROGRESS NOTES
CRRT STATUS NOTE    DATA:  Time:  5:42 AM  Pressures WNL:  YES  Filter Status:  WDL    Problems Reported/Alarms Noted:  None    Supplies Present:  YES    ASSESSMENT:  Patient Net Fluid Balance: 08/13 (-1469), 08/14 (-377). Wt down 0.3 kg. Patient makes urine.   Vital Signs:  MAP 70-80, SR 70-80's  Labs:  K 2.9 (patient is on the  4K bath). Na 136.   Goals of Therapy:  I=O. Patient about 50ml/hour net negative this shift r/t urine output. .     INTERVENTIONS:   Goals of therapy discussed with RN. Patient started on this shift.     PLAN:  Continue per POC. No pressors needed to maintain goal MAPs. Address change in order to pull fluid today.

## 2018-08-14 NOTE — PROGRESS NOTES
Nephrology Progress Note  08/13/2018         Assessment & Recommendations:     Recommendations  1. Initiate CRRT Today with goal of matching I/O  2. Serial electrolyte monitoring  3. 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    Anuric BEN with creatinine elevation on CRRT  Patient with previous baseline Cr of 1.3-2, presented anuric BEN due to sepsis/hypotension/multiple sx/high CAROL drainage.   Patient has been receiving CRRT until 8/8 when tunneled catheter was removed to give him a dialysis holiday on 8/9.   Creatinine and BUN continuing to trend up.   At this point, given BUN > 120, and no signs of improving clearance despite good UOP, will initiate CRRT for clearance. Hopefully will be temporary.      Electrolytes  Hypokalemia likely due to GI losses and high UOP. Potassium should improve with 4K bath on CRRT. Na and Bicarb acceptable.       Hypervolemia with relative intra vascular depletion  ECHO with collapsing IVC, improvement in BP with albumin. At this time, will match I/O with CRRT.      Polymicrobial BSI due to ESBL E. coli and VRE E. faecium  Presenting persistently +BC for VRE, including 8/12. ID on board.      OLT 7/12/18   ESLD sec to hep B. Complicated by venous thrombosis, liver infarction, possible arterial thrombosis, bowel ischemia req bowel resection.   Currently on stress dose steroids, prograf, and cellcept. Multiple antimicrobial agents.    DMII  On insulin gtt     Pancytopenia  Acute Anemia and Thrombocytopenia  Unclear etiology of acute drop in Hgb and platelets. Responding to PRBC. HIT panel ordered.    Recommendations were communicated to primary team face to face    Seen  and discussed with Dr. Radha Ricardo MD   [phone]    Interval History :   O/N, received Albumin and was taken off Norepi. Additionally, acute drop in Hgb and Platelets. He has continued to have good UOP. No fevers.     Review of Systems:   Unable to obtain    Physical Exam:   I/O last 3 completed shifts:  In: 3229.79 [I.V.:2079.79; NG/GT:450]  Out: 4925 [Urine:4225; Emesis/NG output:700]   /72  Pulse 121  Temp 98.4  F (36.9  C) (Axillary)  Resp 12  Wt 69.1 kg (152 lb 5.4 oz)  SpO2 100%  BMI 24.59 kg/m2     GENERAL APPEARANCE: ill appearing  EYES:  Sclerae icteric, pupils equal  HENT: mouth without ulcers or lesions, NGT in place  PULM: air entry bilaterally, few crackles  CV: regular rhythm, tachycardic, no rub     -edema 3+ in LE   GI: soft, distended, dressings in place  INTEGUMENT: no cyanosis, no rash  NEURO: Alert to voice. Tracks movement. Follows some commands.       Labs:   All labs reviewed by me  Electrolytes/Renal -   Recent Labs   Lab Test  08/13/18   1447  08/13/18   1048  08/13/18   0531  08/13/18   0409  08/12/18   0346  08/11/18   0401   NA  136   --    --   132*  132*  133   POTASSIUM  3.0*  2.7*  2.6*  2.6*  3.6  3.0*   CHLORIDE  98   --    --   96  99  102   CO2  22   --    --   22  18*  19*   BUN  125*   --    --   120*  116*  93*   CR  3.34*   --    --   3.40*  3.23*  2.58*   GLC  105*   --    --   134*  123*  124*   JESUS  6.8*   --    --   6.7*  7.1*  7.0*   MAG   --    --    --   2.0  1.9  1.9   PHOS   --    --    --   5.5*  4.6*  3.1       CBC -   Recent Labs   Lab Test  08/13/18   1447  08/13/18   0531  08/13/18   0409   WBC  14.9*  10.3  8.6   HGB  9.3*  5.0*  4.9*   PLT  73*  58*  54*       LFTs -   Recent Labs   Lab Test  08/13/18   0409  08/12/18   0346  08/11/18   0401   ALKPHOS  228*  296*  289*   BILITOTAL  1.6*  2.2*  2.1*   ALT  25  34  36   AST  30  44  30   PROTTOTAL  4.2*  4.4*  4.2*   ALBUMIN  1.7*  1.3*  1.4*       Iron Panel -   Recent Labs   Lab  Test  07/13/18   0334  06/17/18   2138   IRON  97  66   IRONSAT  83*  109*   ANGELLA  Unsatisfactory specimen - icteric   --          Imaging:  All imaging studies reviewed by me.     Current Medications:    aspirin  81 mg Oral or Feeding Tube Daily     ceftaroline (TEFLARO) intermittent infusion  300 mg Intravenous Q12H     DAPTOmycin (CUBICIN) intermittent infusion  12 mg/kg Intravenous Q48H     hydrocortisone sodium succinate PF  50 mg Intravenous Q8H    Followed by     [START ON 8/15/2018] hydrocortisone sodium succinate PF  50 mg Intravenous Q12H    Followed by     [START ON 8/17/2018] hydrocortisone sodium succinate PF  25 mg Intravenous Q12H    Followed by     [START ON 8/19/2018] hydrocortisone sodium succinate PF  25 mg Intravenous Daily     lipids  250 mL Intravenous Once per day on Mon Wed Fri     meropenem  500 mg Intravenous Q12H     micafungin  100 mg Intravenous Q24H     midodrine  10 mg Per Feeding Tube Q8H     mycophenolate  250 mg Oral or Feeding Tube BID IS     pantoprazole  40 mg Oral or Feeding Tube QAM AC     sodium chloride (PF)  3 mL Intravenous Q8H     sodium chloride         sulfamethoxazole-trimethoprim  10 mL Oral or Feeding Tube Once per day on Mon Wed Fri     tacrolimus  1.25 mg Oral or Feeding Tube QPM     tacrolimus  1.5 mg Oral QAM     tenofovir  300 mg Per Feeding Tube Q96H     [START ON 8/14/2018] tigecycline (TYGACIL) intermittent infusion  50 mg Intravenous Q12H     valGANciclovir  450 mg Oral or NG Tube Once per day on Tue Fri    Or     valGANciclovir  450 mg Oral Once per day on Tue Fri       IV fluid REPLACEMENT ONLY       dialysate for CVVHD & CVVHDF (Phoxillum BK4/2.5) 12.5 mL/kg/hr (08/13/18 1956)     insulin (regular) 4 Units/hr (08/13/18 1840)     lactated ringers 10 mL/hr at 07/30/18 1519     - MEDICATION INSTRUCTIONS -       norepinephrine Stopped (08/13/18 1830)     parenteral nutrition - ADULT compounded formula 45 mL/hr at 08/13/18 1956     replacement solution for CVVHD  & CVVHDF (Phoxillum BK4/2.5) 200 mL/hr at 08/13/18 1956     replacement solution for CVVHD & CVVHDF (Phoxillum BK4/2.5) 12.5 mL/kg/hr (08/13/18 1956)     Reason beta blocker order not selected

## 2018-08-14 NOTE — PROGRESS NOTES
NUTRITION SERVICES - BRIEF NOTE    Per transplant team, OK to advance TFs to 20 mL/hr today. PN is still on board but will discontinue IV lipids today.    Implementations  -Modified TF order to reflect advancement to 20 mL/hr today.  -Spoke with PharmD regarding discontinuing IV lipids from TPN regimen today.    Monitoring  Nutrition will continue to follow and monitor per POC (see 8/10 RD note)    Halina Crouch RD, LD  SICU RD Pgr: [phone]

## 2018-08-14 NOTE — PROCEDURES
08/13/2018     Supervising Physician Dr. Garcia    Procedure: Placement of  temporary dual lumen dialysis catheter  Indications: Renal Failure  Consent obtained   The indications and risks of the temporary dialysis catheter placement, including infection, bleeding severe enough to require transfusion, unintended trauma to adjacent organs or blood vessels, pneumothorax, or death, were explained, understood and agreed with the patient and the patient's family via assistance from . Consent form was signed by the patient (or proxy) and a copy is in the paper chart.   Physician Bulmaro Ricardo MD   Patient prepped and draped in sterile fashion.  R Femoral Vein identified with ultrasound, anesthesia with 1% lidocaine, vessel cannulated with 18 g needle under direct ultrasound guidance, Using Seldinger technique dialysis catheter was placed in the R Femoral vein.  Blood flow in both ports. Patient tolerated with no apparent complications.      Dialysis catheter is ready to use for hemodialysis or CRRT    Bulmaro Ricardo MD  [facility] Nephrology Fellow  [facility]  Department of Medicine  Division of Renal Disease and Hypertension  [phone]

## 2018-08-14 NOTE — PROGRESS NOTES
[facility], [place]   Hematology/Oncology Progress Note    Yaneli Miles MRN# [id number]   Age: 43 year old YOB: 1975          Subjective:   Hematology team called to evaluate thrombocytopenia and concern for HIT. Reviewed chart and recent labs/imaging. Recent notable events include  donor liver transplant (), exploratory laparotomy for ischemic bowel (), re-exploration of liver transplant with bowel ischemia (8/3), re-exploration of ischemic/necrotic jejunum with resection (), wash-out and abdominal closure with mesh (), and bedside evacuation of liver hematoma (). His hospitalization is also complicated by septic shock (with intermittent pressor requirements), persistent VRE bacteremia, respiratory failure (requiring intubation, now extubated), anuric renal failure (on CRRT), and portal vein thrombosis with extension to the mesenteric vein leading to bowel ischemia. Heparin drip was recently stopped due to concern for bleeding (hematuria and urethral bleeding), and Hgb drop to 4.9. Interestingly, platelet count jumped into the 200s over the last couple of days, but is now back to the 50s (closer to baseline).         Objective:   /72  Pulse 121  Temp 98.4  F (36.9  C) (Axillary)  Resp 12  Wt 69.1 kg (152 lb 5.4 oz)  SpO2 100%  BMI 24.59 kg/m2    General: Lying in bed, in no acute distress.  Heme/Lymph: No overt bleeding.  Skin: No concerning lesions, rash, jaundice, cyanosis, erythema, or ecchymoses on exposed surfaces.  HEENT: NCAT. EOMI, anicteric sclera.   Respiratory: Breathing comfortably on NC, coarse breath sounds bilaterally. No wheezing.  Cardiovascular: Tachycardic, regular rhythm. No murmur or rub.   Gastrointestinal: Normoactive bowel sounds. Abdomen soft, non-distended, and non-tender. No palpable masses or organomegaly.  Extremities: 2+ pitting edema bilaterally.   Neurologic: Awakens to voice, answers yes/no questions,  follows commands.    Notable labs, with recent trends:  CBC  Recent Labs  Lab 08/13/18  1447 08/13/18  0531 08/13/18  0409 08/12/18  1144   WBC 14.9* 10.3 8.6 19.1*   RBC 3.20* 1.66* 1.65* 2.95*   HGB 9.3* 5.0* 4.9* 8.7*   HCT 27.4* 14.8* 14.5* 25.9*   MCV 86 89 88 88   MCH 29.1 30.1 29.7 29.5   MCHC 33.9 33.8 33.8 33.6   RDW 16.7* 20.2* 20.1* 19.8*   PLT 73* 58* 54* 246     CMP  Recent Labs  Lab 08/13/18  1447 08/13/18  1048 08/13/18  0531 08/13/18  0409 08/12/18  0346 08/11/18  0401  08/10/18  0349     --   --  132* 132* 133  < > 134   POTASSIUM 3.0* 2.7* 2.6* 2.6* 3.6 3.0*  < > 3.5   CHLORIDE 98  --   --  96 99 102  < > 104   CO2 22  --   --  22 18* 19*  < > 20   ANIONGAP 16*  --   --  14 14 13  < > 9   *  --   --  134* 123* 124*  < > 137*   *  --   --  120* 116* 93*  < > 66*   CR 3.34*  --   --  3.40* 3.23* 2.58*  < > 2.00*   GFRESTIMATED 20*  --   --  20* 21* 27*  < > 36*   GFRESTBLACK 24*  --   --  24* 25* 33*  < > 44*   JESUS 6.8*  --   --  6.7* 7.1* 7.0*  < > 6.6*   MAG  --   --   --  2.0 1.9 1.9  --  1.9   PHOS  --   --   --  5.5* 4.6* 3.1  --  3.6   PROTTOTAL  --   --   --  4.2* 4.4* 4.2*  --  3.7*   ALBUMIN  --   --   --  1.7* 1.3* 1.4*  --  1.5*   BILITOTAL  --   --   --  1.6* 2.2* 2.1*  --  2.3*   ALKPHOS  --   --   --  228* 296* 289*  --  283*   AST  --   --   --  30 44 30  --  38   ALT  --   --   --  25 34 36  --  51   < > = values in this interval not displayed.     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 His course has been complicated by intra-hepatic portal vein thrombosis (previously on heparin drip, stopped due to concern for bleeding), septic shock (with intermittent pressor requirements), bacteremia (VRE and ESBL), severe lactic acidosis, respiratory failure (s/p intubation, now extubated),  ileus, mesenteric ischemia (s/p exploratory laparotomy on 8/6/18 with resection of necrotic jejunum, abdominal closure w/mesh on 8/8/18), and anuric renal failure (on CRRT). The hematology team has been re-consulted to evaluate thrombocytopenia.  Interestingly, platelet count and WBC count increased over the last couple of days, which likely represents either a stress-response or acute inflammation/illness. Hemoglobin dropped over this time period, without any signs of significant bleeding (minor hematuria and urethral bleeding). HIT very unlikely in this setting, and HIT panel was negative on 8/13/18. Co-existing multifactorial coagulopathy (INR 1.41). Hemolysis labs were not checked. He was transfused 3 units pRBCs with appropriate increase in Hgb.   This patient has multiple reasons for thrombocytopenia, and these current levels likely represent his new baseline s/p transplant, rather than the platelet counts in the 100s and 200s on 8/11-8/12/18. Differential diagnosis includes critical illness, liver dysfunction, renal failure, acute bleeding, splenic sequestration, and medications, etc. Cannot rule-out DIC.  Recommendations:  - Please check CBC and coagulation labs daily (INR, fibrinogen, PTT).  - Transfuse for Hgb < 7, platelets < 50K, and INR >1.8.  - Give 5 units cryoprecipitate if fibrinogen < 100.  - For portal vein thrombosis, would resume heparin as soon as possible.  - If hemoglobin drops acutely again, would check haptoglobin and LDH, perhaps peripheral smear.  - Consider changing prophylactic Bactrim to inhaled pentamadine, as this may also be contributing to low counts.    Kim Colorado MD/PhD  Heme/Onc Fellow, PGY4  08/13/2018  [phone]

## 2018-08-14 NOTE — PROGRESS NOTES
SURGICAL ICU PROGRESS NOTE  08/14/2018      CO-MORBIDITIES:   Liver transplanted (H)  (primary encounter diagnosis)  Chronic viral hepatitis B without delta agent and without coma (H)  Immunosuppression (H)  On enteral nutrition    ASSESSMENT: Yaneli Miles is a 43 year old male with past medical history of ESLD secondary to hepatitis B and ALD complicated by portal hypertension, ascites requiring multiple paracentesis, esophogeal varices, encephalopathy, severe thrombocytopenia, DM, who is s/p DBD OLT on 7/21. He returned to the SICU for shock, severe lactic acidosis, renal failure and respiratory failure, requiring intubation and dialysis. He is s/p exploratory laparotomy on 8/6 with resection of necrotic jejunum, abdomen closed 8/8.    TODAY'S PROGRESS/PLANS:   - resume straight rate heparin at 500 units/hr  - advance TF to 20 ml/hr  - no diuresis today   - appreciate Transplant ID recommendations   - CRRT  - discontinue steroids   - discontinue meropenum   - No prednisone  - Wound vac placement    PLAN:  Neuro/ pain/ sedation:  # acute pain  - Monitor neurological status. Notify the MD/DO for any acute changes in exam.  - PRN oxycodone and IV hydromorphone for pain.     Pulmonary care:   # acute respiratory failure - resolved  - extubated (8/10)  - continue aggressive pulmonary hygiene  - remains on room air    Cardiovascular:    #Septic Shock without source control  - Monitor hemodynamic status   - Midodrine 10 mg Q8h  - levophed gtt to keep MAP >60, wean as tolerated   - vasopressin discontinue 8/13  - discontinue fludrocortisone and hydrocortisone stress dose steroids  - TTE 8/8 showed no vegetations or valve abnormalities  - TOÑO 8/10 no evidence of endocarditis   - ASA 81 mg PO daily     GI care:   # DDLT 7/21/18  # Intrahepatic left portal venous thrombosis, infarction of left liver lobe, patchy infarction of right lobe, suspected hepatic arterial thrombosis  # 7/30/18 sp exploratory laparotomy, abthera  closure device in place. Findings of patchy diffuse SB ischemia. S/p liver biopsy  # Splenic infarctions, patchy bowel ischemia  # Ileus  # Ex Lap 8/6 - resection of 90 cm of necrotic ileum   # Ex Lap 8/8 - abdomen closed  - s/p exploratory laparotomy, liver biopsy on 7/30/18. Returned to OR 8/6 for washout and partial jejunal resection.    - Abdomen closed in OR 8/8  - US Liver 8/8: unchanged decreased velocity in the left portal vein, improved velocities of the hepatic artery  - Heparin for PV thrombosis, 500 straight rate due to urethral bleeding.    - Right side of wound opened at bedside and hematoma drained 8/13    Fluids/ Electrolytes/ Nutrition:   # protein calorie deficit malnutrition   - Advance tube feeds to 20 ml/hr  - Continue TPN    Renal/ Fluid Balance:    # BEN  # severe metabolic acidosis, lactic acidosis, resolved  # severe hyponatremia, resolved  # hyperkalemia, resolved  # renal failure  # hematuria   - CRRT restarted 8/13  - Will continue to monitor intake and output.  - Urethral bleeding: - resolved    Endocrine:    # DM II  - insulin gtt  - stress dose steroids stopped 8/14    ID/ Antibiotics:  # VRE Bacteremia  # ESBL Bacteremia - treated  # Hepatitis B  - Daptomycin (7/30- ), ceftaroline (8/13- ), tigecycline (8/13- ) continue while blood cultures still positive  - Meropenem (8/2-8/14), discontinued after 12 days of negative cultures for ESBL  - Micafungin (7/30- )  - Immunosuppression: (restarted 8/6) tacrolimus, hydrocortisone  - Immunosuppression prophylaxis: Bactrim, Valgancyclovir and Tenofovir, nystatin when micafungin stopped.   - Consulted Transplant ID due to continued bacteremia with unknown source, rec to increase Daptomycin dose, start ceftaroline and tigecycline    Positive cultures   - 7/27 VRE (line/arm)   - 7/29: VRE (left arm)   - 7/30: VRE (left arm)   - 7/31: VRE (Line and peripheral)  - 8/1: ESBL (HD/hand/central line)   - 8/3: VRE (intra-abdominal tissue)  - 8/4: NGTD  (blood)  - 8/5: NGTD (blood)  - 8/6: VRE (art line)  - 8/7: VRE (blood)  - 8/8: VRE (blood)  - 8/9: VRE (blood)  - 8/11: VRE (blood)  - 8/12: VRE (abdominal fluid)  - 8/13: VRE (blood)    Heme:     # Acute blood loss anemia  # Anemia of critical illness  # Portal venous thrombosis  # Thrombocytopenia  - Heparin for PV thrombosis, heparin to 500 straight rate   - Continue aspirin 81mg  - HIT panel sent and negative 8/2.  Repeat HIT panel on 8/13 negative.  - DDAVP given 8/12  - TEG normal 8/12  - Hematology consult per Transplant in setting of low platelets - believe this is likely his new baseline    MSK:  # weakness and deconditioning of critical illness   - PT/OT   - mobilize     Prophylaxis:    - DVT: straight rate heparin, SCD's  - GI: protonix     Lines/ tubes/ drains:  - L radial art line, L PICC, NJ, NG, PIVs, groin central cath    Disposition:  - SICU      ====================================    SUBJECTIVE:   - stable and no acute events overnight  - continues to sat well on room air and required no pressors overnight  - CRRT started yesterday evening  - replaced potassium for low level       OBJECTIVE:   1. VITAL SIGNS:   Temp:  [97.4  F (36.3  C)-98.4  F (36.9  C)] 97.7  F (36.5  C)  Heart Rate:  [] 82  Resp:  [7-19] 12  BP: ()/(52-79) 112/72  MAP:  [53 mmHg-85 mmHg] 78 mmHg  Arterial Line BP: ()/(29-66) 111/61  SpO2:  [93 %-100 %] 99 %  Resp: 12    2. INTAKE/ OUTPUT:   I/O last 3 completed shifts:  In: 4268.95 [I.V.:1930.95; NG/GT:455]  Out: 5256 [Urine:3670; Emesis/NG output:500; Other:1086]    3. PHYSICAL EXAMINATION:   General: lying in bed, no acute distress  Neuro: alert, responds to questions with gestures  Resp: Breathing non-labored  CV: RRR  Abdomen: Soft, mild distension, tenderness around dressings  Incisions: open incision packed and covered in clean dressing, porcine graft visible below packing  Extremities: warm and well perfused, peripheral edema in bilateral hands and  feet decreased from yesterday    4. INVESTIGATIONS:   Arterial Blood Gases     Recent Labs  Lab 08/08/18  1705   PH 7.38   PCO2 37   PO2 126*   HCO3 22     Complete Blood Count     Recent Labs  Lab 08/14/18 0344 08/13/18 2150 08/13/18  1447 08/13/18  0531   WBC 20.0* 17.8* 14.9* 10.3   HGB 9.8* 9.8* 9.3* 5.0*   PLT 79* 76* 73* 58*     Basic Metabolic Panel    Recent Labs  Lab 08/14/18  0344 08/13/18 2150 08/13/18  1447 08/13/18  1048  08/13/18  0409    135 136  --   --  132*   POTASSIUM 2.9* 2.6* 3.0* 2.7*  < > 2.6*   CHLORIDE 103 101 98  --   --  96   CO2 22 22 22  --   --  22   * 118* 125*  --   --  120*   CR 2.38* 3.00* 3.34*  --   --  3.40*   * 139* 105*  --   --  134*   < > = values in this interval not displayed.  Liver Function Tests    Recent Labs  Lab 08/14/18 0344 08/13/18 2150 08/13/18 0409 08/12/18  0346 08/11/18  0953  08/08/18 1929   AST 37 38 30 44  --   < >  --    ALT 28 26 25 34  --   < >  --    ALKPHOS 273* 279* 228* 296*  --   < >  --    BILITOTAL 1.5* 1.6* 1.6* 2.2*  --   < >  --    ALBUMIN 1.6* 1.6* 1.7* 1.3*  --   < >  --    INR  --   --  1.41*  --  1.39*  --  1.36*   < > = values in this interval not displayed.  Pancreatic Enzymes  No lab results found in last 7 days.  Coagulation Profile    Recent Labs  Lab 08/13/18  0409 08/11/18  0953 08/09/18  0335 08/08/18 1929   INR 1.41* 1.39*  --  1.36*   PTT  --   --  53* 34     Lactate  Invalid input(s): LACTATE    5. RADIOLOGY:   No results found for this or any previous visit (from the past 24 hour(s)).    =========================================      Patient seen, findings and plan discussed with surgical ICU staff Dr. Holland.    Carlos Castellanos  Medical Student, MS4  [employer]    See [facility] for on-call pager information.    Eddie Acuna MD  CA-2/PGY-3

## 2018-08-14 NOTE — PROGRESS NOTES
Immunosuppression Note:    Yaneli Miles is a 43 year old male who is seen today  for immunosuppression management     I, Mamadou Norwood MD, I have examined the patient with the resident/PA/Fellow, discussed and agree with the note and findings.  I have reviewed today's vital signs, medications, labs and imaging. I reviewed the immunosuppression medications and levels. 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  Developed shock with severe lactic acidosis and respiratory failure on POD9. CT scan showed left PV venous thrombosis, infarction of left liver lobe, patchy infarction of right lobe and possible hepatic arterial thrombosis, patchy bowel ischemia. Transferred to ICU.   Ex lap, patchy diffuse SB ischemia. Doppler demonstrated arterial flow main vessels to bowel and liver. Splenic infarction. Liver biopsy negative for acute rejection. Fluid culture growing VRE. On Heparin gtt. 8/3 OR for reexploration liver transplant, bowel ischemia, findings improved patchy iscemic changes of small bowel, patchy ischemic changes of left liver lobe.  OR: some ischemic/necrotic jejunum, s/p resection.   Washout and abdominal closure with stratice mesh, no drains placed. Minimal ascites, intact small bowel anastomosis, patchy ischemia left liver lobe.     Procedures:    donor (DBD) liver transplant with duct to duct anastomosis over biliary stent.    Ex lap due to concern for ischemic bowel, findings: patchy diffuse SB ischemia. Doppler demonstrated arterial flow main vessels to bowel and liver. Splenic  infarction.   8/3 Reexploration liver transplant, bowel ischemia, findings improved patchy iscemic changes of small bowel, patchy ischemic changes of left liver lobe.   8/6 Re-exploration, ischemic/necrotic jejunum, s/p resection  8/8 Washout and abdominal closure with stratice mesh, no drains placed. Minimal ascites, intact small bowel anastomosis, patchy ischemia left liver lobe.    Graft function: POD #24/6. Infarction of left liver lobe, patchy infarction right liver lobe. Left PV thrombosis. Narrow and irregular appearance HA. Bowel ischemia, s/p resection small bowel on 8/6. Procedures, see above. Liver biopsy negative for acute rejection. Intra-abdominal clot cultures from 8/1 + VRE and 8/6 + VRE, now s/p washout of hematoma.   Incision opened at bedside 8/12, hematoma removed.  8/13 US: new perihepatic fluid collections (largest 7x2x4), small volume of septated ascites.  Plan for VAC placement today.  Immunosuppression management:   Simulect intra-op due to BEN  Steroid taper per protocol, completed.   mg BID, reduced dose due to critical status.  Tac goal level ~6 due to sepsis and BEN. Level 11.5, decrease dose to 1.25 mg BID.   Hydrocortisone, stress dosing 8/12, stopped today.  Complexity of management:Medium. Contributing factors: thrombocytopenia and anemia sepsis    Hematology:   Anemia: Hemoglobin drop on 8/11 attributed to hematuria and urethral bleeding.  Hgb drop to 5 8/13 in setting of resolved  bleeding.  US abdomen shows new perihepatic collections, although small.  No evidence of GIB at this time.  Transfused 8/12, 3u on 8/13. Transfuse for Hbg < 7, PLT < 50K and INR > 1.8. If Hbg acutely drops again, will check p smear, haptoglobin and LDH.    Thrombocytopenia: Thrombocytopenia, unlikely HIT (HIT panel negative 8/13), multiple reasons for thrombocytopenia such as renal failure, acute bleeding, splenic sequestration and medications, can't rule out DIC  -Monitor CBC, INR, fibrinogen  and PTT daily.  Anticoagulation: Anticoagulate for bowel ischemia and portal thrombosis.  Resumed Heparin reduced to 500u/hr, had been held since  8/12 d/t  bleeding.  -Hematology consult, evaluation for coagulopathy given portal vein thrombosis and mesenteric ischemia. No further evaluation for coagulopathy per hematology.      Cardiorespiratory:    Hypotension:  Secondary to sepsis and intravascular hypovolemia, improved with albumin 8/12.  Stress dose steroids also restarted, will stop today.  Now off pressors.  Continue midodrine.      GI/Nutrition: NGT.  NPO.  On TPN.  TF increased to 20 ml/hr, continue at this rate for now.  Ischemic bowel:  7/30 CTA: nonocclusive filling defects in superior mesenteric vein branches.  Anticoagulation as above.  S/p resection of necrotic jejunum on 8/6.     Endocrine:  DM type 2, continue insulin drip.      Fluid/Electrolytes: BEN/ARF. Initially on CRRT, stopped 8/8 for line holiday.  Found to be intravascularly volume depleted on bedside echo yesterday.  BP and HR improved with albumin replacement. HD restarted.    : Traumatic bah placement with bleeding, now resolved.   Hematuria now resolved.    Infectious disease: Afebrile. WBC 20 (17.8).   -Previously on Linezolid 7/28-7/30 and zosyn 7/30-8/2.   -Continue daptomycin (VRE, 7/30-present), meropenem (Ecoli ESBL, 8/2-) and micafungin (empiric, 7/30-).   -8/12 CT did not demonstrate drainable fluid.  Hepatic infarcts would not be drainable at this time.    1. VRE intra abdominal and bacteremia. Fluid cultures 7/31/18 + VRE. 7/30 (peripheral) and 7/31 (peripheral and VAD) blood cultures growing VRE.  8/6 intra-abdominal clot + VRE.  8/6-8/13 blood and lines + VRE or GPCs. HD line removed. CVC line removed. PICC line was placed due to needing central access.  Tx ID consulted. TOÑO to evaluate for endocarditis, negative. Ext x 4 negative for DVT. Persistant bacteremia, uncertain source (liver infarct, line, wound?).  Plan to  titrate TPN as possible and remove central line; ideally would be able to remove HD line at this time for line holiday.  2. Ecoli ESBL bacteremia. Repeat bcx on  (peripheral and VAD) grew Ecoli ESBL. Zosyn changed to Meropenem  SICU to exchange lines as able. Blood cultures negative for E. Coli since .  3. HBV: Hepatits B DNA PCR positive prior to tx. Received HBIG on  and . 8/10 Hep B surface antigen negative. No further HBIG planned. Continue tenofovir 300 mg, dose adjusted for HD     Prophylaxis: DVT PCDs, on heparin, fall, GI, Valcyte x 12 weeks (CMVand EBV IgG +), bactrim. mycelex stopped as patient on micafungin. Will start nystatin if aide discontinued.   Disposition: SICU    Coordinator: Kay Reyes  Surgeon: Pako    Check Hep B surface antigen at 3 months, 6 months then annually     Medical Decision Making: High    JAY/Fellow/Resident Provider: Rebecca Haas, PAC [id number]    Faculty: Mamadou Norwood MD  __________________________________________________________________  Transplant History: Admitted 2018 for  donor liver transplant.   The patient has a history of liver failure due to hepatitis B .    2018 (Liver), Postoperative day: 24     Interval History:  No events o/n.     ROS:   A 10-point review of systems was negative except as noted above.    Curent Meds:    aspirin  81 mg Oral or Feeding Tube Daily     ceftaroline (TEFLARO) intermittent infusion  400 mg Intravenous Q12H     DAPTOmycin (CUBICIN) intermittent infusion  12 mg/kg Intravenous Q24H     meropenem  1 g Intravenous Q8H     micafungin  100 mg Intravenous Q24H     midodrine  10 mg Per Feeding Tube Q8H     mycophenolate  250 mg Oral or Feeding Tube BID IS     pantoprazole  40 mg Oral or Feeding Tube QAM AC     sodium chloride (PF)  3 mL Intravenous Q8H     sulfamethoxazole-trimethoprim  10 mL Oral or Feeding Tube Daily     tacrolimus  1.25 mg Oral or Feeding Tube QPM     tacrolimus  1.5 mg Oral QAM     tenofovir   300 mg Per Feeding Tube Q48H     tigecycline (TYGACIL) intermittent infusion  50 mg Intravenous Q12H     valGANciclovir  450 mg Oral or NG Tube Every Other Day    Or     valGANciclovir  450 mg Oral Every Other Day       Physical Exam:     Admit Weight: 60.7 kg (133 lb 12.8 oz)    Current Vitals:   BP 98/66  Pulse 121  Temp 96.9  F (36.1  C) (Axillary)  Resp 11  Wt 68.8 kg (151 lb 10.8 oz)  SpO2 99%  BMI 24.48 kg/m2      Vital sign ranges:    Temp:  [96.9  F (36.1  C)-98.4  F (36.9  C)] 96.9  F (36.1  C)  Heart Rate:  [] 78  Resp:  [9-19] 11  BP: ()/(65-79) 98/66  MAP:  [60 mmHg-85 mmHg] 73 mmHg  Arterial Line BP: ()/(44-66) 105/58  SpO2:  [93 %-100 %] 99 %  Patient Vitals for the past 24 hrs:   BP Temp Temp src Heart Rate Resp SpO2 Weight   08/14/18 1500 - - - 78 11 99 % -   08/14/18 1400 - - - 80 16 97 % -   08/14/18 1300 - - - 79 12 98 % -   08/14/18 1200 - 96.9  F (36.1  C) Axillary 77 12 98 % -   08/14/18 1100 - - - 80 9 98 % -   08/14/18 1000 98/66 - - 82 12 99 % -   08/14/18 0900 102/68 - - 83 12 98 % -   08/14/18 0800 112/72 97.7  F (36.5  C) Axillary 82 12 99 % -   08/14/18 0700 109/74 - - 79 12 99 % -   08/14/18 0600 106/71 - - 80 13 98 % -   08/14/18 0500 101/66 - - 78 10 98 % -   08/14/18 0400 103/70 97.4  F (36.3  C) Axillary 79 9 99 % -   08/14/18 0300 108/68 - - 82 11 99 % -   08/14/18 0200 100/68 - - 83 11 100 % -   08/14/18 0100 97/75 - - 81 12 99 % -   08/14/18 0000 118/79 97.4  F (36.3  C) Axillary 83 14 100 % 68.8 kg (151 lb 10.8 oz)   08/13/18 2300 122/78 - - 81 14 99 % -   08/13/18 2200 108/66 - - 86 14 99 % -   08/13/18 2100 112/71 - - 91 15 98 % -   08/13/18 2000 105/68 98.4  F (36.9  C) Axillary 100 12 97 % -   08/13/18 1900 108/72 - - 99 11 100 % -   08/13/18 1800 95/65 - - 101 12 93 % -   08/13/18 1700 103/73 - - 100 19 93 % -     General Appearance: NAD  HEENT: NGT to suction. NJ in place.  Skin: warm, dry  Heart: NSR  Lungs: NLB on RA  Abdomen: Abdomen soft with  right sided fullness, open incision with pink tissue and visible mesh  : Luis  Extremities: well perfused. +1-2 edema  Neurologic: Lethargic, following commands  Access: PICC  Femoral HD line    Data:   CMP    Recent Labs  Lab 08/14/18  1050 08/14/18  0344  08/09/18  0325 08/08/18  2206    136  < >  --  137   POTASSIUM 3.1* 2.9*  < >  --  3.5   CHLORIDE 104 103  < >  --  106   CO2 23 22  < >  --  22   * 130*  < >  --  210*   BUN 87* 100*  < >  --  39*   CR 1.99* 2.38*  < >  --  1.13   GFRESTIMATED 37* 30*  < >  --  70   GFRESTBLACK 44* 36*  < >  --  85   JESUS 7.0* 7.4*  < >  --  6.6*   ICAW  --   --   --  4.3* 4.3*   MAG 2.1 2.0  < >  --  2.1   PHOS 3.9 4.0  < >  --  2.8   ALBUMIN 1.6* 1.6*  < >  --  1.6*   BILITOTAL 1.5* 1.5*  < >  --  2.9*   ALKPHOS 307* 273*  < >  --  266*   AST 33 37  < >  --  51*   ALT 26 28  < >  --  75*   < > = values in this interval not displayed.  CBC    Recent Labs  Lab 08/14/18  1050 08/14/18  0344   HGB 10.1* 9.8*   WBC 24.0* 20.0*   PLT 86* 79*     COAGS    Recent Labs  Lab 08/13/18  0409 08/11/18  0953 08/09/18  0335 08/08/18  1929   INR 1.41* 1.39*  --  1.36*   PTT  --   --  53* 34      Urinalysis  Recent Labs   Lab Test  07/27/18   2330  07/13/18   1315  06/18/18   1200   COLOR  Yellow  Dark Yellow  Yellow   APPEARANCE  Clear  Clear  Clear   URINEGLC  Negative  Negative  Negative   URINEBILI  Negative  Large*  Small*   URINEKETONE  Negative  Negative  Negative   SG  1.008  1.013  1.008   UBLD  Negative  Negative  Negative   URINEPH  7.5*  6.0  5.0   PROTEIN  30*  10*  Negative   NITRITE  Negative  Negative  Negative   LEUKEST  Negative  Negative  Negative   RBCU  5*  <1  1   WBCU  3  None  2   UTPG   --    --   0.75*     Virology:  Hepatitis C Antibody   Date Value Ref Range Status   07/20/2018 Nonreactive NR^Nonreactive Final     Comment:     Assay performance characteristics have not been established for newborns,   infants, and children         POD 14  liver:  1.   The left portal vein is antegrade with decreased velocity,  measuring approximately 15 cm/second. This vessel was occluded on CT  7/30/2018.  2.  Elevated velocities of the main hepatic artery, now 203 cm/sec  (previously 94 cm/sec). However left hepatic arteries demonstrates  steep upstroke suggesting there is not a significant stenosis.   2a. Right hepatic artery was not visualized, could be occluded or  difficult to see postoperatively.  3. Echogenic focus of the right lobe in close proximity to the  hepatorenal fossa. This likely corresponds with nonenhancing lesion on  CT 7/30/2018 and likely representing subcapsular hematoma.  4. Additional small hematomas of the braden hepatis and posterior right  perihepatic region.  5. Small volume anechoic ascites.  6. Left hepatic lobe not well visualized secondary to open wound  preventing imaging. Note the left lobe was grossly abnormal on CT  7/30/2018    POD 1 US liver: Impression:   1.  Hematoma associated with the inferior aspect of the perihepatic  space anteriorly, measuring 13.7 x 7.0 x 8.5 cm.  2.  Retrograde flow of the left portal vein. Additionally, low  velocities of the portal venous system. Single low resistive index of  the extrahepatic aspect of the hepatic artery, measuring 0.37. These  findings are likely within normal limits in the early postoperative  context. However, continued attention on follow-up recommended.    7/30 CT scan abd/pelvis:  IMPRESSION:   1. Hypoenhancing areas in the transplant liver concerning for  infarction. Transplant left portal vein occlusion with bubbles of  portal venous gas. Nearly occlusive filling defect in the native  hepatic artery. Narrowed and irregular appearance of the transplant  hepatic artery. Focal occlusion of the left main hepatic artery.  Segmental occlusions of right hepatic arterial branches.   2. Pneumatosis intestinalis with nonocclusive filling defects in  superior mesenteric vein branches, concerning  for bowel ischemia or  infarction.   3. Splenic infarctions.   4. Bibasilar consolidation with air bronchograms. Pneumonia cannot be  excluded.   5. Post surgical changes of evacuation of peritransplant hematoma.

## 2018-08-14 NOTE — PROGRESS NOTES
[facility]  Transplant Infectious Disease Progress Note      Patient:  Yaneli Miles, Date of birth 1975, Medical record number [id number]  Date of Visit:  08/14/2018         Assessment and Recommendations:   Recommendations:  - Reassess lungs for pulmonary infection w/ CXR  - Continue checking daily CK  - Continue ceftaroline 300 mg q12h while not on dialysis, will re-dose if CRRT re-started or if HD initiated.  - Continue tigecycline, 100 mg load, 50 mg q12h, with ICU pharmacy to re-dose based on any changes in liver function.  - Continue increased dose daptomycin  - ID prefers continuing bactrim for pjp ppx as it is also prophylactic against nocardia and toxoplasmosis, and because its potential neutropenic side effect is not evident at this time.    Transplant Infectious Disease will continue to follow with you.    Assessment:  43 year old male s/p Liver transplant on 7/21 whose post-op course was complicated by small bowel ischemia requiring multiple trips to OR for washouts and jejunal resection and anastomosis, left lobe of the liver ischemia, splenic infarcts, VRE infected intra-abdominal hematoma and blood clots, and VRE bacteremia, who continues to have blood cultures positive for VRE (latest 8/13) despite daptomycin starting 7/30/18.    # Persistent VRE bacteremia  Despite being on daptomycin for 15 days, he continues to have positive blood cultures. Daptomycin dosing was increased on 8/12. Possibly the ischemic parts of his liver are seeded with VRE and are shedding it into the blood. Possibly he continues to have clots that are infected and shedding VRE into blood. It is also possible that his daptomycin is becoming inactivated in the lungs by surfactant which would account for possible pulmonary source. His WBC count was elevated over the weekend, downtrended but now are elevated again, and cultures continue to grow VRE. There is some thought to combining current daptomycin  with ceftaroline, as described in In vitro activity of daptomycin in combination with ß-lactams, gentamicin, rifampin, and tigecycline against daptomycin-nonsusceptible enterococci, where there was an apparent synergistic effect to combining the two. In the event that the patient's daptomycin is becoming inactivated by surfactant in the lungs by an occult pulmonary process, tigecycline can be added.      # Pneumocystis pneumonia prophylaxis  Heme/Onc note indicated a preference to switch from bactrim to pentamidine inhaled as it could contribute to low blood counts. ID prefers to continue with bactrim because a switch to pentamidine would be a loss of prophylaxis to nocardia and toxoplasmosis. Also, bactrim has more of a neutropenic effect which we're not seeing at present.     Infectious Disease issues include:  - QTc interval:462 on 7/30/18  - Pneumocystis prophylaxis:TMP/SMX  - Viral serostatus D/R & prophylaxis: HCV negative/reactive, HBVnegative/reactive, CMV -/+, EBV+/+, valgancicilovir ppx  - Fungal prophylaxis: micafungin  - Gamma globulin status: Never done  - Isolation status: Contact, Good hand hygiene.    Barry Montanez MD MS    Internal Medicine and Pediatrics, PGY-1  [facility]  P: [phone]     Interval Events:  Extubated 8/10.   CT Abdomen/Pelvis 8/11 demonstrated possible graft infarction.   Abdominal cultures had light growth GPC's 8/12/18. Blood cultures collected 8/13 grew GPC's likely VRE  8/13 the pt was anemic to Hg 5.0 and transfused 3U pRBC.   8/14 restarted CRRT per nephrology recs  8/14 with increasing leukocytosis primary team started ceftaroline and tegecycline  Overnight pt rec'd warmer for temps < 36.5  Less sedated, more participatory on interview.  Pt denies fevers/chills on interview  Endorses pain in nose w/ NG insertion as well as abd pain  Denies SOB, chest pain    Review of Systems:   Unable to obtain         Current Medications & Allergies:       aspirin  81  mg Oral or Feeding Tube Daily     ceftaroline (TEFLARO) intermittent infusion  400 mg Intravenous Q12H     DAPTOmycin (CUBICIN) intermittent infusion  12 mg/kg Intravenous Q24H     lipids  250 mL Intravenous Once per day on Mon Wed Fri     meropenem  1 g Intravenous Q8H     micafungin  100 mg Intravenous Q24H     midodrine  10 mg Per Feeding Tube Q8H     mycophenolate  250 mg Oral or Feeding Tube BID IS     pantoprazole  40 mg Oral or Feeding Tube QAM AC     sodium chloride (PF)  3 mL Intravenous Q8H     sulfamethoxazole-trimethoprim  10 mL Oral or Feeding Tube Daily     tacrolimus  1.25 mg Oral or Feeding Tube QPM     tacrolimus  1.5 mg Oral QAM     tenofovir  300 mg Per Feeding Tube Q48H     tigecycline (TYGACIL) intermittent infusion  50 mg Intravenous Q12H     valGANciclovir  450 mg Oral or NG Tube Every Other Day    Or     valGANciclovir  450 mg Oral Every Other Day       Infusions/Drips:    IV fluid REPLACEMENT ONLY       dialysate for CVVHD & CVVHDF (Phoxillum BK4/2.5) 12.5 mL/kg/hr (08/14/18 0749)     heparin 500 Units/hr (08/14/18 1031)     insulin (regular) 2 Units/hr (08/14/18 1030)     lactated ringers 10 mL/hr at 07/30/18 1519     - MEDICATION INSTRUCTIONS -       norepinephrine Stopped (08/13/18 1830)     parenteral nutrition - ADULT compounded formula 45 mL/hr at 08/13/18 1956     replacement solution for CVVHD & CVVHDF (Phoxillum BK4/2.5) 200 mL/hr at 08/13/18 1956     replacement solution for CVVHD & CVVHDF (Phoxillum BK4/2.5) 12.5 mL/kg/hr (08/14/18 0749)     Reason beta blocker order not selected         Allergies   Allergen Reactions     Nsaids      Contraindicated             Physical Exam:   Vitals were reviewed.  All vitals stable.  Patient Vitals for the past 24 hrs:   BP Temp Temp src Resp SpO2 Weight   08/14/18 1100 - - - 9 98 % -   08/14/18 1000 98/66 - - 12 99 % -   08/14/18 0900 102/68 - - 12 98 % -   08/14/18 0800 112/72 97.7  F (36.5  C) Axillary 12 99 % -   08/14/18 0700 109/74 - - 12  99 % -   08/14/18 0600 106/71 - - 13 98 % -   08/14/18 0500 101/66 - - 10 98 % -   08/14/18 0400 103/70 97.4  F (36.3  C) Axillary 9 99 % -   08/14/18 0300 108/68 - - 11 99 % -   08/14/18 0200 100/68 - - 11 100 % -   08/14/18 0100 97/75 - - 12 99 % -   08/14/18 0000 118/79 97.4  F (36.3  C) Axillary 14 100 % 68.8 kg (151 lb 10.8 oz)   08/13/18 2300 122/78 - - 14 99 % -   08/13/18 2200 108/66 - - 14 99 % -   08/13/18 2100 112/71 - - 15 98 % -   08/13/18 2000 105/68 98.4  F (36.9  C) Axillary 12 97 % -   08/13/18 1900 108/72 - - 11 100 % -   08/13/18 1800 95/65 - - 12 93 % -   08/13/18 1700 103/73 - - 19 93 % -   08/13/18 1600 99/60 - - 11 100 % -   08/13/18 1500 101/58 - - 11 100 % -   08/13/18 1400 91/59 - - 8 100 % -   08/13/18 1300 103/60 - - 10 100 % -   08/13/18 1200 (!) 86/52 - - (!) 7 98 % -     Ranges for vital signs:  Temp:  [97.4  F (36.3  C)-98.4  F (36.9  C)] 97.7  F (36.5  C)  Heart Rate:  [] 80  Resp:  [7-19] 9  BP: ()/(52-79) 98/66  MAP:  [53 mmHg-85 mmHg] 68 mmHg  Arterial Line BP: ()/(37-66) 103/56  SpO2:  [93 %-100 %] 98 %  Vitals:    08/10/18 0400 08/13/18 0500 08/14/18 0000   Weight: 70 kg (154 lb 5.2 oz) 69.1 kg (152 lb 5.4 oz) 68.8 kg (151 lb 10.8 oz)       Physical Examination:   GENERAL:  Tired appearing man lying in bed  HEAD:  Head is normocephalic, atraumatic, temporal wasting  EYES:  Eyes have slightly icteric sclerae without conjunctival injection   ENT:  Oropharynx is moist without exudates or ulcers. Tongue is midline. No sublingual jaundice Mucous membranes tacky.  NECK:  Supple.   LUNGS:  Less noisy lung fields bilaterally w/ someupper airway noises, less crackles in L anterior fields than yesterday  CARDIOVASCULAR:  Regular rate and rhythm, murmur from yesterday has disappeared.  ABDOMEN:  Normal bowel sounds, soft, nontender. RUQ surgical dressing in place with serosanguinous drainage  SKIN:  No acute rashes.  Lines in place without any surrounding erythema or  exudate.  EXT: 2+ pitting edema of lower extremities bilaterally         Laboratory Data:     Metabolic Studies       Recent Labs   Lab Test  08/14/18 0344 08/13/18 2150 08/13/18 0409 08/12/18   1157   08/09/18   1106   07/13/18 2050 07/13/18 0334   NA  136  135   < >  132*   --    < >   --    < >   --    < >  132*   POTASSIUM  2.9*  2.6*   < >  2.6*   --    < >   --    < >   --    < >  2.6*   CHLORIDE  103  101   < >  96   --    < >   --    < >   --    < >  101   CO2  22  22   < >  22   --    < >   --    < >   --    < >  20   ANIONGAP  12  12   < >  14   --    < >   --    < >   --    < >  12   BUN  100*  118*   < >  120*   --    < >   --    < >   --    < >  30   CR  2.38*  3.00*   < >  3.40*   --    < >   --    < >   --    < >  2.08*   GFRESTIMATED  30*  23*   < >  20*   --    < >   --    < >   --    < >  35*   GLC  130*  139*   < >  134*   --    < >   --    < >   --    < >  104*   A1C   --    --    --    --    --    --    --    --   5.4   --    --    JESUS  7.4*  6.4*   < >  6.7*   --    < >   --    < >   --    < >  8.1*   PHOS  4.0  4.3   --   5.5*   --    < >   --    < >   --    --    --    MAG  2.0  1.9   --   2.0   --    < >   --    < >   --    --    --    LACT   --    --    --    --   1.6   --   1.2   < >  3.9*   --   1.1   PCAL   --    --    --    --    --    --    --    --    --    --   0.89   CKT  58   --    --   41   --    < >   --    < >   --    --    --     < > = values in this interval not displayed.       Hepatic Studies    Recent Labs   Lab Test  08/14/18 0344 08/13/18 2150 08/13/18 0409 07/13/18   0334   06/21/18   0612   BILITOTAL  1.5*  1.6*  1.6*   < >  36.3*   < >  23.4*   DBIL  1.1*   --   1.3*   < >  28.1*   < >   --    ALKPHOS  273*  279*  228*   < >  96   < >  78   PROTTOTAL  4.2*  4.3*  4.2*   < >  5.8*   < >  5.1*   ALBUMIN  1.6*  1.6*  1.7*   < >  2.8*   < >  2.9*   AST  37  38  30   < >  287*   < >  124*   ALT  28  26  25   < >  125*   < >  79*   LDH   --    --     --    --   259*   --   177    < > = values in this interval not displayed.       Pancreatitis testing    Recent Labs   Lab Test  08/13/18   1048   07/22/18   0354  07/20/18   1741  07/12/18   1913   06/16/18   1815   AMYLASE   --    --   42  60  35   --    --    LIPASE   --    --   75   --    --    --   260   TRIG  173*   < >   --    --    --    < >   --     < > = values in this interval not displayed.       Hematology Studies      Recent Labs   Lab Test  08/14/18   0344 08/13/18   2150  08/13/18   1447  08/13/18   0531  08/13/18   0409  08/12/18   1144   WBC  20.0*  17.8*  14.9*  10.3  8.6  19.1*   ANEU  18.8*  15.6*   --   8.5*  7.5   --    ALYM  0.3*  0.8   --   0.6*  0.3*   --    MARCELA  0.9  1.1   --   1.0  0.8   --    AEOS  0.0  0.0   --   0.0  0.0   --    HGB  9.8*  9.8*  9.3*  5.0*  4.9*  8.7*   HCT  28.9*  28.7*  27.4*  14.8*  14.5*  25.9*   PLT  79*  76*  73*  58*  54*  246       Clotting Studies    Recent Labs   Lab Test  08/13/18   0409  08/11/18   0953  08/09/18   0335  08/08/18   1929  08/07/18   0336   INR  1.41*  1.39*   --   1.36*  1.53*   PTT   --    --   53*  34   --        Iron Testing    Recent Labs   Lab Test  08/14/18   0344   07/13/18   0334   06/17/18   2138   IRON   --    --   97   --   66   FEB   --    --   117*   --   61*   IRONSAT   --    --   83*   --   109*   ANGELLA   --    --   Unsatisfactory specimen - icteric   --    --    MCV  86   < >  81   < >   --    FOLIC   --    --   10.5   --    --    B12   --    --   Canceled, Test credited   --    --    HAPT   --    --   13*   < >   --    RETP   --    --   1.6   < >   --    RETICABSCT   --    --   46.0   < >   --     < > = values in this interval not displayed.       Markers    Alpha Fetoprotein   Date Value Ref Range Status   07/11/2018 Unsatisfactory specimen - icteric 0 - 8 ug/L Final     Comment:     Canceled, Test credited  NOTIFIED DR PRINCE QUIROS MD [facility] 1959 7/11/2018 BY AA         Arterial Blood Gas Testing    Recent Labs   Lab Test   08/08/18   1705  08/06/18   1323  08/06/18   1200  08/06/18   1120  08/03/18   0844   PH  7.38  7.32*  7.36  7.39  7.32*   PCO2  37  39  37  37  33*   PO2  126*  178*  142*  164*  199*   HCO3  22  20*  21  22  17*   O2PER  60%  50.0  48.0  50.0  57.0        Thyroid Studies     Recent Labs   Lab Test  06/17/18   2138   TSH  0.77  0.77   T4  Unsatisfactory specimen - icteric       Urine Studies     Recent Labs   Lab Test  07/27/18   2330  07/13/18   1315  06/18/18   1200  06/16/18   2220   URINEPH  7.5*  6.0  5.0  5.5   NITRITE  Negative  Negative  Negative  Negative   LEUKEST  Negative  Negative  Negative  Large*   WBCU  3  None  2  66*       Medication levels    Recent Labs   Lab Test  08/14/18   0608   06/18/18   1230   VANCOMYCIN   --    --   16.8   TACROL  11.5   < >   --     < > = values in this interval not displayed.       Body fluid stats    Recent Labs   Lab Test  08/12/18   1217   07/15/18   1115  07/13/18   1240  06/28/18   1111   FTYP   --    --   Ascites  Ascites  Ascites   FCOL   --    --   Bloody  Red  Yellow   FAPR   --    --   Cloudy  Cloudy  Slightly Cloudy   FWBC   --    --   245  373  234   FNEU   --    --   3  28  6   FLYM   --    --   19  11  24   FMONO   --    --   78   --    --    FALB   --    --    --   1.0   --    FTP   --    --    --   1.6   --    GS  No organisms seen  Many  WBC'S seen  predominantly PMN's    Many RBCs seen   < >  No organisms seen  Few  WBC'S seen  predominantly mononuclear cells    No organisms seen  Few  WBC'S seen  PMNs seen    Many  Red blood cells seen     --     < > = values in this interval not displayed.       Microbiology:  Last Culture results with specimen source  Culture Micro   Date Value Ref Range Status   08/14/2018 No growth after 1 hour  Preliminary   08/14/2018 No growth after 3 hours  Preliminary   08/13/2018 (A)  Preliminary    Cultured on the 1st day of incubation:  Gram positive cocci in pairs and chains     08/13/2018   Preliminary    Critical  Value/Significant Value, preliminary result only, called to and read back by  Latrice Marshall RN, @2120 08/13/18.DH.     08/13/2018 (A)  Preliminary    Cultured on the 1st day of incubation:  Gram positive cocci in pairs and chains     08/13/2018   Preliminary    Critical Value/Significant Value, preliminary result only, called to and read back by  Latrice Kramer RN, @2147 08/13/18.DH.     08/12/2018 Light growth  Gram positive cocci   (A)  Preliminary   08/12/2018   Preliminary    Critical Value/Significant Value, preliminary result only, called to and read back by  Darlene Ramos RN on UU4A at 1439 8/13/18 SRQ     08/12/2018 Culture negative after 1 day  Preliminary   08/12/2018 Culture negative monitoring continues  Preliminary   08/12/2018 (A)  Final    Cultured on the 1st day of incubation:  Enterococcus faecium (VRE)     08/12/2018   Final    Critical Value/Significant Value, preliminary result only, called to and read back by  Ruddy Coppola RN at 2200 8.12.18.DK     08/12/2018 Susceptibility testing done on previous specimen  Final   08/12/2018 (A)  Final    Cultured on the 1st day of incubation:  Enterococcus faecium (VRE)     08/12/2018   Final    Critical Value/Significant Value, preliminary result only, called to and read back by  Ruddy Coppola RN at 2026 8.12.18.DK     08/12/2018 Susceptibility testing done on previous specimen  Final   08/11/2018 Canceled, Test credited  Specimen not received    Final   08/11/2018   Final    Patient was recollected on 8/12/18 @ 0340, both bottles have been received in IDDL   08/11/2018 (A)  Final    Cultured on the 1st day of incubation:  Enterococcus faecium (VRE)     08/11/2018   Final    Critical Value/Significant Value, preliminary result only, called to and read back by  Katja March RN on 4AB at 1312 on 8/12/18 ac.     08/11/2018 Susceptibility testing done on previous specimen  Final    Specimen Description   Date Value Ref Range Status   08/14/2018 Blood Right  Hand  Final   08/14/2018 Blood Arterial blood  Final   08/13/2018 Blood Right Hand  Final   08/13/2018 Blood Arterial blood  Final   08/12/2018 Abdominal Fluid  Final   08/12/2018 Abdominal Fluid  Final   08/12/2018 Abdominal Fluid  Final   08/12/2018   Final    Abdominal Fluid Not received in an anaerobic transport container.   08/12/2018 Blood Right Hand  Final   08/12/2018 Blood Arterial blood  Final   08/11/2018 Blood  Final   08/11/2018 Blood White port  Final   08/11/2018 Blood Right Hand  Final   08/11/2018 Blood Arterial line  Final   08/09/2018 Blood Left Arm  Final   08/09/2018 Blood  Final   08/08/2018 Blood  Final   08/08/2018 Blood  Final   08/08/2018 Blood Blue port  Final        Infectious Disease Testing     Recent Labs   Lab Test  06/18/18   0440   TBRSLT  Negative       Virology:  Hepatitis B Testing     Recent Labs   Lab Test  08/10/18   0349  07/24/18   0446  07/23/18   0817   07/20/18   1741  07/12/18   1913   AUSAB   --   551.06*   --    --   0.00  0.00   HBCAB   --    --    --    --   Reactive*  Reactive*   HEPBANG  Nonreactive   --   Nonreactive   < >   --    --    HBCM   --    --    --    --   Nonreactive  Nonreactive    < > = values in this interval not displayed.        Imaging:  Recent Results (from the past 48 hour(s))   US Abdomen Limited    Narrative    Exam: US ABDOMEN LIMITED, 8/13/2018 9:15 AM    Indication: Acute anemia, eval for intra-abdominal hematoma.  POD #5,  known hepatic infarcts;  Status post liver transplantation 7/21 with  subsequent return to OR with sepsis and bowel ischemia, abdominal  washouts (most recently 8/8/2018, jejunal resection with primary  anastomosis.    Comparison: 8/8/2018 ultrasound. 8/11/2018 CT.    Technique: Multiplanar grayscale and color imaging of the abdomen.  Technically challenging examination secondary to limited window for  evaluation.    Findings:   Inferior to the liver, there is a 3.1 x 0.7 x 0.7 cm anechoic fluid  collection that  demonstrates no internal flow on color imaging. Near  the braden hepatis, there is a 2.3 x 1.5 x 2.4 cm anechoic fluid  collection that demonstrates no flowing color imaging. Inferior to the  right lobe of the liver there is a heterogeneously fluid collection  7.2 x 1.8 x 4.1 cm that demonstrates no flow on color imaging. Right  lower quadrant and left lower quadrant demonstrate septated anechoic  fluid collection. Partially visualized heterogeneous left hepatic lobe  parenchyma.      Impression    Impression:   1. Perihepatic fluid collections as above, largest of which is mildly  complex and measures up to 7.2 cm inferior to the right hepatic lobe.   2. Small volume ascites with septations in the lower quadrants.    I have personally reviewed the examination and initial interpretation  and I agree with the findings.    PAUL COOL MD   XR Abdomen Port 1 View    Narrative    Examination:  XR ABDOMEN PORT 1 VW 8/13/2018 11:01 AM     Comparison: CT images 8/11/2018, radiograph 8/11/2018.    History: eval ileus    Findings: Enteric tube tip and sidehole project over the expected  location of stomach. Feeding tube tip projects over the expected  location of the ligament of Treitz. In the pelvis, there is catheter  that projects over the expected location of the bladder. Right upper  quadrant biliary drain in stable position. Anastomotic staple line  projects over the right abdomen. Surgical material projects over the  upper mid-abdomen, stable. The colon is not distended. Unchanged  appearance of air-filled dilated small intestine There are no abnormal  calcifications. There are no abnormal soft tissue densities. There is  no free air. No evidence of pneumatosis.      Impression    Impression:   Stable appearance of small bowel dilatation, differential remains  ileus versus obstruction.    I have personally reviewed the examination and initial interpretation  and I agree with the findings.    HERBERT LAMAS MD    XR Abdomen Port 1 View    Narrative    EXAM: XR ABDOMEN PORT 1 VW  8/13/2018 6:20 PM      HISTORY: Confirm Femoral Line Placement;     COMPARISON: Radiograph 8/13/2018, CT 8/11/2018    FINDINGS: The right femoral line tip projects over the L4 vertebral  body. Stable right upper quadrant biliary stent. Feeding tube tip  projects near the DJ junction. NG/OG tube projects near the GE  junction. Residual contrast in the stomach. Cholecystectomy. Surgical  suture projecting over the right lower quadrant.    Increased gaseous distention of the left lower quadrant small bowel  loop measuring up to 5 cm. No pneumatosis.      Impression    IMPRESSION:   1. The right femoral line tip projects over the L4 vertebral body.  2. Slight increased gaseous distention of the left lower quadrant  small bowel loop measuring up to 5 cm.    I have personally reviewed the examination and initial interpretation  and I agree with the findings.    MIR DURAN MD

## 2018-08-14 NOTE — PROGRESS NOTES
CRRT STATUS NOTE    DATA:  Time:  5:56 PM  Pressures WNL:  YES  Filter Status:  WDL    Problems Reported/Alarms Noted:  none    Supplies Present:  YES    ASSESSMENT:  Patient Net Fluid Balance:  Net - 296 ml since MN 8/14  Vital Signs:  BP 98/66  Pulse 121  Temp 97  F (36.1  C) (Axillary)  Resp 8  Wt 68.8 kg (151 lb 10.8 oz)  SpO2 100%  BMI 24.48 kg/m2    Labs:  K=3.6, Creat = 1.71, And Hgb= 10.1  Goals of Therapy:  Is=Os    INTERVENTIONS:   none    PLAN:  Continue POC. Call CRRT RN at [phone] with any questions.

## 2018-08-14 NOTE — PLAN OF CARE
Problem: Patient Care Overview  Goal: Plan of Care/Patient Progress Review  Neuro: Pupils equal, reactive. Moves all extremities 2/5. Nod to simple questions. Received IV dilaudid x 1 for pain control.    CV: HR NSR-sinus tachycardia. Goal MAP >60, titrating levophed as needed range 0.02-0.03. Afebrile. Noted severe edema in scrotum, R leg. Patient received 3 units PRBCs.     Resp: LS coarse-crackles. Patient is maintaining sats at  room air. Bumex given. Suctioning small amount of secretions. Patient did not do IS this shift.    : Bah output adequate. Urine yary-pink color. Small amount of clot noted around urethra area, cloth applied underneath changed q 2 hours.     GI: ABD rounded, BS+. NG to LIS draining dark brown, coffee ground color. TF started at 10cc/hr. TPN at 45 ml/hr.     Skin: Area around ABD opened covered with wet to dry dressing. Wound Vac to be placed tomorrow. Buttock reddened, blanchable, mepilix applied.    Lines: NG/NJ/PICC/2PIVs/ dialysis catheter/bah.     ENDO: Insulin infusing at 4ml/hr. Algorithm 4.     Plan for wound vac tomorrow, continue to monitor patient status, update MD as appropriate.

## 2018-08-14 NOTE — PROGRESS NOTES
CRRT INITIATION NOTE    Consent for CRRT Completed: Yes (confirmed with wife).   Patient s Vascular Access: Right Groin Femoral Line  Catheter                Placement Confirmed: Yes  Manufacture:  [employer]  Model:  [employer] Elite   Length/Peruvian Size:  24 cm/12 Fr     Flush Volume:  1.5/1.5 a/V    DATA:  Procedure:  CVVHDF  Start Time:  2030  Machine#:  1  Filter:  M150  Blood Flow:  180  ML/min  Pre-Replacement Solution:  Phoxillum BK4/2.5  Post-Replacement Solution:  Phoxillum BK4/2.5  Dialysate Solution:  Phoxillum BK4/2.5  Pre-Replacement Solution Rate:  900 mL/hr  Post-Replacement Solution Rate:  200 mL/hr  Dialysate Flow Rate:  900 mL/hr   Patient Removal Rate:  80 mL/hr  Anticoagulation Type and Rate:  none    ASSESSMENT:  How Patient Tolerated Initiation:   Vital Signs:  /72  Pulse 121  Temp 98.4  F (36.9  C) (Axillary)  Resp 12  Wt 69.1 kg (152 lb 5.4 oz)  SpO2 100%  BMI 24.59 kg/m2    Initial Pressures:  Access:  -64  Filter:  16  Return:  68  TMP:  47  Change in Filter Pressure:  20      INTERVENTIONS:  Blood warmer on.     PLAN:  I=O as pressures allow.

## 2018-08-14 NOTE — PLAN OF CARE
Problem: Patient Care Overview  Goal: Plan of Care/Patient Progress Review  Outcome: No Change    D/I/A:    Neuro- Pt alert, orientation difficult 2/2 language barrier.  Patient does follow commands when shown what to do.  Weak but equal bilaterally.  Pupils equal and reactive.    CV- SR throughout night, pressures adequate with no intervention.  Afebrile, bare hugger applied.  Pulses good, mild to moderate pitting edema throughout.  Resp-Lungs coarse throughout, less crackles than prior to CRRT start.  Room air, sats high 90's.  GI- TPN and trickle feeds, patient tolerating.  NG to LIS with ~150 thick, brown, coffee ground output overnight.  - Luis with good uop.  Aparna/pink but without clots.  Dime size amount bloody drainage from urethra q4h.  Skin- Abd incision with packing reinforced, serosanguinous drainage soaked previous abd pads.  2 old CAROL sites scabbed over.  Prophylactic mepilex on coccyx.   Gtts- Insulin at 4 all shift.  TKO.    Electrolytes- Potassium remains low, at 2.9 this morning.  Total of 40 mEq given overnight.  On Annmarie with 4k bath, expect rise in levels.  CRRT-Started last evening, goal I=O.  Patient currently net negative ~400, mostly due to good uop.  Pressures steady all night, could tolerate pulling fluid.  See flow sheets for further interventions and assessments.  P: Plan for possible vac to abdomen today.  Continue to monitor pt closely, Notify MD of changes/concerns.

## 2018-08-15 NOTE — PROGRESS NOTES
[facility]  Transplant Infectious Disease Progress Note      Patient:  Yaneli Miles, Date of birth 1975, Medical record number [id number]  Date of Visit:  08/15/2018         Assessment and Recommendations:   Recommendations:  - Continue checking daily CK  - Continue ceftaroline with dialysis dosing per ICU pharmacy as pt is on CRRT.  - Continue tigecycline 50 mg q12h, with ICU pharmacy to re-dose based on any changes in liver function.  - Continue increased dose daptomycin  - Await formal read of CT Abd/Pelvis which may indicate an organized pocket of infection that could be drained.    Transplant Infectious Disease will continue to follow with you.    Assessment:  43 year old male s/p Liver transplant on 7/21 whose post-op course was complicated by small bowel ischemia requiring multiple trips to OR for washouts and jejunal resection and anastomosis, left lobe of the liver ischemia, splenic infarcts, VRE infected intra-abdominal hematoma and blood clots, and VRE bacteremia, who continues to have blood cultures positive for VRE (latest 8/13) despite daptomycin starting 7/30/18.    # Persistent VRE bacteremia  Despite being on daptomycin for 16 days, he continues to have positive blood cultures with latest culture from 8/14 growing GPC's. Daptomycin dosing was increased on 8/12. Possibly the ischemic parts of his liver are seeded with VRE and are shedding it into the blood. Possibly he continues to have clots that are infected and shedding VRE into blood. It is also possible that his daptomycin is becoming inactivated in the lungs by surfactant which would account for possible pulmonary source. His WBC count was elevated over the weekend, downtrended but now continues to climb in a linear fashion. There is some thought to combining current daptomycin with ceftaroline, as described in In vitro activity of daptomycin in combination with ß-lactams, gentamicin, rifampin, and tigecycline  against daptomycin-nonsusceptible enterococci, where there was an apparent synergistic effect to combining the two. With concern that the patient's daptomycin is becoming inactivated by surfactant in the lungs by an occult pulmonary process, tigecycline was added.      Infectious Disease issues include:  - QTc interval:462 on 7/30/18  - Pneumocystis prophylaxis:TMP/SMX  - Viral serostatus D/R & prophylaxis: HCV negative/reactive, HBVnegative/reactive, CMV -/+, EBV+/+, valgancicilovir ppx  - Fungal prophylaxis: micafungin  - Gamma globulin status: Never done  - Isolation status: Contact, Good hand hygiene.    Barry Montanez MD MS    Internal Medicine and Pediatrics, PGY-1  [facility]  P: [phone]     Interval Events:  CT Abdomen/Pelvis 8/11 demonstrated possible graft infarction. Re-image today 8/15.  Abdominal cultures had light growth GPC's 8/12/18. Blood cultures collected 8/14 grew GPC's likely VRE  8/14 restarted CRRT per nephrology recs  8/14 with increasing leukocytosis primary team started ceftaroline and tegecycline  Overnight the patient's blood pressures were labile.  He received albumin with no improvement, so he was started on norepinephrine.  This morning his blood pressures are more stable and he is currently off pressors.  The patient also complained of increased pain in his abdomen, and oxycodone and Dilaudid were not helpful per nursing.  Additionally the patient had a large brown/maroon stool overnight.  Hemoglobin did decrease from 10.9-9.7 early this morning but on recheck later this morning and had recovered to 10.4, which is consistent with what has been for the last day.  The patient continues to have leukocytosis with neutrophilic predominance.  Subjectively, on interview, the patient says that he has some fevers and chills, as well as abdominal pain.  He denies chest pain or shortness of breath.    Review of Systems:   Unable to obtain          Current Medications &  Allergies:       aspirin  81 mg Oral or Feeding Tube Daily     ceftaroline (TEFLARO) intermittent infusion  400 mg Intravenous Q12H     DAPTOmycin (CUBICIN) intermittent infusion  12 mg/kg Intravenous Q24H     micafungin  100 mg Intravenous Q24H     midodrine  10 mg Per Feeding Tube Q8H     mycophenolate  250 mg Oral or Feeding Tube BID IS     pantoprazole  40 mg Oral or Feeding Tube QAM AC     sodium chloride (PF)  3 mL Intravenous Q8H     sodium chloride         sulfamethoxazole-trimethoprim  10 mL Oral or Feeding Tube Daily     tacrolimus  1.25 mg Oral QAM     tacrolimus  1.25 mg Oral or Feeding Tube QPM     tenofovir  300 mg Per Feeding Tube Q48H     tigecycline (TYGACIL) intermittent infusion  50 mg Intravenous Q12H     valGANciclovir  450 mg Oral or NG Tube Every Other Day    Or     valGANciclovir  450 mg Oral Every Other Day       Infusions/Drips:    IV fluid REPLACEMENT ONLY       dialysate for CVVHD & CVVHDF (Phoxillum BK4/2.5) 12.5 mL/kg/hr (08/15/18 0603)     heparin 500 Units/hr (08/15/18 0800)     insulin (regular) 1 Units/hr (08/15/18 0906)     lactated ringers 10 mL/hr at 07/30/18 1519     - MEDICATION INSTRUCTIONS -       norepinephrine Stopped (08/15/18 0700)     parenteral nutrition - ADULT compounded formula 45 mL/hr at 08/14/18 1946     replacement solution for CVVHD & CVVHDF (Phoxillum BK4/2.5) 200 mL/hr at 08/14/18 2219     replacement solution for CVVHD & CVVHDF (Phoxillum BK4/2.5) 12.5 mL/kg/hr (08/15/18 0603)     Reason beta blocker order not selected         Allergies   Allergen Reactions     Nsaids      Contraindicated             Physical Exam:   Vitals were reviewed.  All vitals stable.  Patient Vitals for the past 24 hrs:   BP Temp Temp src Resp SpO2 Weight   08/15/18 1000 - - - 11 98 % -   08/15/18 0900 - - - 14 98 % -   08/15/18 0800 - - - 16 99 % -   08/15/18 0730 - 97.6  F (36.4  C) Axillary 18 99 % -   08/15/18 0700 - - - 13 99 % -   08/15/18 0600 - - - 14 99 % -   08/15/18 9480 -  - - 16 99 % -   08/15/18 0400 100/69 98.3  F (36.8  C) Axillary 16 100 % -   08/15/18 0300 - - - 12 99 % -   08/15/18 0200 - - - 10 98 % -   08/15/18 0150 - - - 11 98 % -   08/15/18 0130 - - - 12 99 % -   08/15/18 0115 - - - 10 97 % -   08/15/18 0110 - - - 16 97 % -   08/15/18 0105 - - - 19 97 % -   08/15/18 0100 - - - 11 97 % -   08/15/18 0055 - - - 11 97 % -   08/15/18 0000 - 98.1  F (36.7  C) Axillary 12 97 % 69.4 kg (153 lb)   08/14/18 2300 - - - 9 98 % -   08/14/18 2200 - - - 10 99 % -   08/14/18 2100 - - - 15 98 % -   08/14/18 2000 - 97.4  F (36.3  C) Axillary 8 98 % -   08/14/18 1900 - - - 10 99 % -   08/14/18 1800 - - - 13 98 % -   08/14/18 1700 - 97  F (36.1  C) Axillary 8 100 % -   08/14/18 1600 - 96.5  F (35.8  C) Axillary 12 100 % -   08/14/18 1500 - - - 11 99 % -   08/14/18 1400 - - - 16 97 % -   08/14/18 1300 - - - 12 98 % -   08/14/18 1200 - 96.9  F (36.1  C) Axillary 12 98 % -   08/14/18 1100 - - - 9 98 % -     Ranges for vital signs:  Temp:  [96.5  F (35.8  C)-98.3  F (36.8  C)] 97.6  F (36.4  C)  Heart Rate:  [] 92  Resp:  [8-19] 11  BP: (100)/(69) 100/69  MAP:  [55 mmHg-84 mmHg] 62 mmHg  Arterial Line BP: ()/(45-65) 92/52  SpO2:  [97 %-100 %] 98 %  Vitals:    08/13/18 0500 08/14/18 0000 08/15/18 0000   Weight: 69.1 kg (152 lb 5.4 oz) 68.8 kg (151 lb 10.8 oz) 69.4 kg (153 lb)       Physical Examination:   GENERAL:  Tired appearing man lying in bed  HEAD:  Head is normocephalic, atraumatic, temporal wasting  EYES:  Eyes have slightly icteric sclerae without conjunctival injection   ENT:  Oropharynx is moist without exudates or ulcers. Tongue is midline. No sublingual jaundice, Mucous membranes tacky.  NECK:  Supple.   LUNGS: Somewhat coarse breath sounds appreciated in both lung fields with scant fine crackles appreciated bilaterally.  No wheezes or rhonchi were heard.  CARDIOVASCULAR:  Regular rate and rhythm, without murmurs gallops or rubs  ABDOMEN:  Normal bowel sounds, soft,  nontender. RUQ surgical dressing in place with serosanguinous drainage  SKIN:  No acute rashes.  Lines in place without any surrounding erythema or exudate.  EXT: 1+ pitting edema of lower extremities bilaterally         Laboratory Data:     Metabolic Studies       Recent Labs   Lab Test  08/15/18   0326  08/14/18   2203   08/14/18   0344   08/12/18   1157   08/09/18   1106   07/13/18   2050   07/13/18   0334   NA  140  140   < >  136   < >   --    < >   --    < >   --    < >  132*   POTASSIUM  3.3*  3.2*   < >  2.9*   < >   --    < >   --    < >   --    < >  2.6*   CHLORIDE  107  105   < >  103   < >   --    < >   --    < >   --    < >  101   CO2  24  22   < >  22   < >   --    < >   --    < >   --    < >  20   ANIONGAP  9  12   < >  12   < >   --    < >   --    < >   --    < >  12   BUN  68*  74*   < >  100*   < >   --    < >   --    < >   --    < >  30   CR  1.46*  1.58*   < >  2.38*   < >   --    < >   --    < >   --    < >  2.08*   GFRESTIMATED  52*  48*   < >  30*   < >   --    < >   --    < >   --    < >  35*   GLC  131*  139*   < >  130*   < >   --    < >   --    < >   --    < >  104*   A1C   --    --    --    --    --    --    --    --    --   5.4   --    --    JESUS  7.0*  7.0*   < >  7.4*   < >   --    < >   --    < >   --    < >  8.1*   PHOS  3.4  3.8   < >  4.0   < >   --    < >   --    < >   --    --    --    MAG  2.1  2.1   < >  2.0   < >   --    < >   --    < >   --    --    --    LACT   --    --    --    --    --   1.6   --   1.2   < >  3.9*   --   1.1   PCAL   --    --    --    --    --    --    --    --    --    --    --   0.89   CKT  38   --    --   58   < >   --    < >   --    < >   --    --    --     < > = values in this interval not displayed.       Hepatic Studies    Recent Labs   Lab Test  08/15/18   0326  08/14/18   2203  08/14/18   1625   07/13/18   0334   06/21/18   0612   BILITOTAL  1.4*  1.9*  1.5*   < >  36.3*   < >  23.4*   DBIL  1.0*   --    --    < >  28.1*   < >   --    ALKPHOS  251*   282*  298*   < >  96   < >  78   PROTTOTAL  4.2*  4.3*  4.6*   < >  5.8*   < >  5.1*   ALBUMIN  1.6*  1.5*  1.6*   < >  2.8*   < >  2.9*   AST  26  33  35   < >  287*   < >  124*   ALT  20  24  26   < >  125*   < >  79*   LDH   --    --    --    --   259*   --   177    < > = values in this interval not displayed.       Pancreatitis testing    Recent Labs   Lab Test  08/13/18   1048   07/22/18   0354  07/20/18   1741  07/12/18   1913   06/16/18   1815   AMYLASE   --    --   42  60  35   --    --    LIPASE   --    --   75   --    --    --   260   TRIG  173*   < >   --    --    --    < >   --     < > = values in this interval not displayed.       Hematology Studies      Recent Labs   Lab Test  08/15/18   0326  08/14/18   2203  08/14/18   1625  08/14/18   1050  08/14/18   0344  08/13/18   2150   WBC  29.3*  30.0*  27.1*  24.0*  20.0*  17.8*   ANEU  29.0*  28.5*  25.5*  22.1*  18.8*  15.6*   ALYM  0.0*  0.0*  0.3*  0.0*  0.3*  0.8   MARCELA  0.3  1.3  0.4  0.6  0.9  1.1   AEOS  0.0  0.0  0.0  0.0  0.0  0.0   HGB  9.7*  10.9*  10.1*  10.1*  9.8*  9.8*   HCT  28.8*  32.1*  29.6*  29.7*  28.9*  28.7*   PLT  122*  116*  85*  86*  79*  76*       Clotting Studies    Recent Labs   Lab Test  08/15/18   0326  08/13/18   0409  08/11/18   0953   08/08/18   1929   INR  1.47*  1.41*  1.39*   --   1.36*   PTT  51*   --    --    < >  34    < > = values in this interval not displayed.       Iron Testing    Recent Labs   Lab Test  08/15/18   0326   07/13/18   0334   06/17/18   2138   IRON   --    --   97   --   66   FEB   --    --   117*   --   61*   IRONSAT   --    --   83*   --   109*   ANGELLA   --    --   Unsatisfactory specimen - icteric   --    --    MCV  87   < >  81   < >   --    FOLIC   --    --   10.5   --    --    B12   --    --   Canceled, Test credited   --    --    HAPT   --    --   13*   < >   --    RETP   --    --   1.6   < >   --    RETICABSCT   --    --   46.0   < >   --     < > = values in this interval not displayed.        Markers    Alpha Fetoprotein   Date Value Ref Range Status   07/11/2018 Unsatisfactory specimen - icteric 0 - 8 ug/L Final     Comment:     Canceled, Test credited  NOTIFIED DR PRINCE QUIROS MD [facility] 1959 7/11/2018 BY AA         Arterial Blood Gas Testing    Recent Labs   Lab Test  08/08/18   1705  08/06/18   1323  08/06/18   1200  08/06/18   1120  08/03/18   0844   PH  7.38  7.32*  7.36  7.39  7.32*   PCO2  37  39  37  37  33*   PO2  126*  178*  142*  164*  199*   HCO3  22  20*  21  22  17*   O2PER  60%  50.0  48.0  50.0  57.0        Thyroid Studies     Recent Labs   Lab Test  06/17/18   2138   TSH  0.77  0.77   T4  Unsatisfactory specimen - icteric       Urine Studies     Recent Labs   Lab Test  07/27/18   2330  07/13/18   1315  06/18/18   1200  06/16/18   2220   URINEPH  7.5*  6.0  5.0  5.5   NITRITE  Negative  Negative  Negative  Negative   LEUKEST  Negative  Negative  Negative  Large*   WBCU  3  None  2  66*       Medication levels    Recent Labs   Lab Test  08/14/18   0608   06/18/18   1230   VANCOMYCIN   --    --   16.8   TACROL  11.5   < >   --     < > = values in this interval not displayed.       Body fluid stats    Recent Labs   Lab Test  08/12/18   1217   07/15/18   1115  07/13/18   1240  06/28/18   1111   FTYP   --    --   Ascites  Ascites  Ascites   FCOL   --    --   Bloody  Red  Yellow   FAPR   --    --   Cloudy  Cloudy  Slightly Cloudy   FWBC   --    --   245  373  234   FNEU   --    --   3  28  6   FLYM   --    --   19  11  24   FMONO   --    --   78   --    --    FALB   --    --    --   1.0   --    FTP   --    --    --   1.6   --    GS  No organisms seen  Many  WBC'S seen  predominantly PMN's    Many RBCs seen   < >  No organisms seen  Few  WBC'S seen  predominantly mononuclear cells    No organisms seen  Few  WBC'S seen  PMNs seen    Many  Red blood cells seen     --     < > = values in this interval not displayed.       Microbiology:  Last Culture results with specimen source  Culture  Micro   Date Value Ref Range Status   08/15/2018 PENDING  Preliminary   08/15/2018 PENDING  Preliminary   08/14/2018 (A)  Preliminary    Cultured on the 1st day of incubation:  Gram positive cocci in pairs and chains     08/14/2018   Preliminary    Critical Value/Significant Value, preliminary result only, called to and read back by  KASI MARSHALL RN @0420 8/15/18. [facility] [place]     08/14/2018 (A)  Preliminary    Cultured on the 1st day of incubation:  Gram positive cocci in pairs and chains     08/14/2018   Preliminary    Critical Value/Significant Value, preliminary result only, called to and read back by  KASI GUTIERREZ RN @0020 8/15/18. [facility] [place]     08/13/2018 (A)  Final    Cultured on the 1st day of incubation:  Enterococcus faecium (VRE)  Susceptibility testing done on previous specimen     08/13/2018   Final    Critical Value/Significant Value, preliminary result only, called to and read back by  Latrice Marshall RN, @2120 08/13/18..     08/13/2018 (A)  Final    Cultured on the 1st day of incubation:  Enterococcus faecium (VRE)  Susceptibility testing done on previous specimen     08/13/2018   Final    Critical Value/Significant Value, preliminary result only, called to and read back by  Latrice Kramer RN, @2147 08/13/18..     08/12/2018 Light growth  Enterococcus faecium (VRE)   (A)  Final   08/12/2018   Final    Critical Value/Significant Value, preliminary result only, called to and read back by  Darlene Ramos RN on UU4A at 1439 8/13/18 SRQ     08/12/2018 Culture negative after 3 days  Preliminary   08/12/2018 Culture negative monitoring continues  Preliminary   08/12/2018 (A)  Final    Cultured on the 1st day of incubation:  Enterococcus faecium (VRE)     08/12/2018   Final    Critical Value/Significant Value, preliminary result only, called to and read back by  Ruddy Coppola RN at 2200 [email or web address].DK     08/12/2018 Susceptibility testing done on previous specimen  Final   08/12/2018 (A)  Final    Cultured on the 1st day of  incubation:  Enterococcus faecium (VRE)     08/12/2018   Final    Critical Value/Significant Value, preliminary result only, called to and read back by  Ruddy Coppola RN at 2026 [email or web address].DK     08/12/2018 Susceptibility testing done on previous specimen  Final    Specimen Description   Date Value Ref Range Status   08/15/2018 Blood Right Hand  Final   08/15/2018 Blood  Final   08/14/2018 Blood Right Hand  Final   08/14/2018 Blood Arterial blood  Final   08/13/2018 Blood Right Hand  Final   08/13/2018 Blood Arterial blood  Final   08/12/2018 Abdominal Fluid  Final   08/12/2018 Abdominal Fluid  Final   08/12/2018 Abdominal Fluid  Final   08/12/2018   Final    Abdominal Fluid Not received in an anaerobic transport container.   08/12/2018 Blood Right Hand  Final   08/12/2018 Blood Arterial blood  Final   08/11/2018 Blood  Final   08/11/2018 Blood White port  Final   08/11/2018 Blood Right Hand  Final   08/11/2018 Blood Arterial line  Final   08/09/2018 Blood Left Arm  Final   08/09/2018 Blood  Final   08/08/2018 Blood  Final   08/08/2018 Blood  Final        Infectious Disease Testing     Recent Labs   Lab Test  06/18/18   0440   TBRSLT  Negative       Virology:  Hepatitis B Testing     Recent Labs   Lab Test  08/10/18   0349  07/24/18   0446  07/23/18   0817   07/20/18   1741  07/12/18   1913   AUSAB   --   551.06*   --    --   0.00  0.00   HBCAB   --    --    --    --   Reactive*  Reactive*   HEPBANG  Nonreactive   --   Nonreactive   < >   --    --    HBCM   --    --    --    --   Nonreactive  Nonreactive    < > = values in this interval not displayed.        Imaging:  Recent Results (from the past 48 hour(s))   XR Abdomen Port 1 View    Narrative    Examination:  XR ABDOMEN PORT 1 VW 8/13/2018 11:01 AM     Comparison: CT images 8/11/2018, radiograph 8/11/2018.    History: eval ileus    Findings: Enteric tube tip and sidehole project over the expected  location of stomach. Feeding tube tip projects over the  expected  location of the ligament of Treitz. In the pelvis, there is catheter  that projects over the expected location of the bladder. Right upper  quadrant biliary drain in stable position. Anastomotic staple line  projects over the right abdomen. Surgical material projects over the  upper mid-abdomen, stable. The colon is not distended. Unchanged  appearance of air-filled dilated small intestine There are no abnormal  calcifications. There are no abnormal soft tissue densities. There is  no free air. No evidence of pneumatosis.      Impression    Impression:   Stable appearance of small bowel dilatation, differential remains  ileus versus obstruction.    I have personally reviewed the examination and initial interpretation  and I agree with the findings.    HERBERT LAMAS MD   XR Abdomen Port 1 View    Narrative    EXAM: XR ABDOMEN PORT 1 VW  8/13/2018 6:20 PM      HISTORY: Confirm Femoral Line Placement;     COMPARISON: Radiograph 8/13/2018, CT 8/11/2018    FINDINGS: The right femoral line tip projects over the L4 vertebral  body. Stable right upper quadrant biliary stent. Feeding tube tip  projects near the DJ junction. NG/OG tube projects near the GE  junction. Residual contrast in the stomach. Cholecystectomy. Surgical  suture projecting over the right lower quadrant.    Increased gaseous distention of the left lower quadrant small bowel  loop measuring up to 5 cm. No pneumatosis.      Impression    IMPRESSION:   1. The right femoral line tip projects over the L4 vertebral body.  2. Slight increased gaseous distention of the left lower quadrant  small bowel loop measuring up to 5 cm.    I have personally reviewed the examination and initial interpretation  and I agree with the findings.    MIR DURAN MD   XR Chest Port 1 View    Narrative    1. Interval removal of the endotracheal tube and right IJ central  venous catheter.   2. Persistent low lung volumes and perihilar and bibasilar  opacities,  likely atelectasis.

## 2018-08-15 NOTE — PROGRESS NOTES
CRRT STATUS NOTE    DATA:  Time:  6:06 AM  Pressures WNL:  Yes  Filter Status:  WDL    Problems Reported/Alarms Noted:  No problems or alarms noted or reported.    Supplies Present:  Yes    ASSESSMENT:  Patient Net Fluid Balance:  Pt is net I=O for the lawswt 24hrs and the last 8hrs.  Vital Signs:  /69  Pulse 121  Temp 98.3  F (36.8  C) (Axillary)  Resp 14  Wt 69.4 kg (153 lb)  SpO2 99%  BMI 24.69 kg/m2  Labs:  K+3.3, BUN/Cr 68/1.46, ICa 4.3, Hgb 9.7, INR 1.47, Xa < 0.10  Goals of Therapy:  Goal of therapy is I=O.    INTERVENTIONS:   No interventions required this shift.    PLAN:  Continue current POC.  Check circuit qshift and PRN. Change circuit q72hrs or PRN.  For questions or concerns please call MILLER RN @ #[phone].

## 2018-08-15 NOTE — PROGRESS NOTES
NUTRITION SERVICES - BRIEF NOTE    Per SICU rounds this morning, plan to hold TF's @ 20 mL/hr today = 720 kcal (12 kcal/kg) and 33 g PRO (0.5 g/kg)    Yesterday IV lipids were removed from PN regimen. 245 g dex, and 120 g AA (2 g/kg) remained = 1313 kcal (22 kcal/kg) daily.    Given plan to keep TFs @ 20 mL/hr today, will modify PN provisions to best meet pt's protein needs.    Implementations  -TPN change:  Goal volume = 960 mL, 245g Dex (833 kcal), 80g AA daily (320 kcal) and no IV lipids = 1153 kcals (19 kcal/kg/day), 1.3 g PRO/kg/day, GIR = 2.8 with 0% kcals from Fat.    Monitoring  Nutrition will continue to follow and monitor per POC (see 8/10 RD note)    Halina Crouch, RD, LD  SICU RD Pgr: [phone]

## 2018-08-15 NOTE — PLAN OF CARE
Problem: Patient Care Overview  Goal: Plan of Care/Patient Progress Review  Outcome: No Change    D/I/A:    Neuro- Pt alert, able to follow commands and make needs known.  General weakness but able to move all extremities purposefully.  Pupils equal and reactive.    CV-SR, rare PAC.  Pressures labile; given 250 albumin with no improvement, restarted back on norepi.  0.03-0.12.  Afebrile, bear hugger in place.  Moderate edema in lower extremities.    Resp- Room air, small amount oral secretions suctioned.  Clear to coarse.  GI- Large soft/liquid stool, brown-maroon in color.  Patient c/o increased abdominal pain, oxy and dilaudid given with mediocre results.  TPN and trickle feeds infusing.  NG with ~175mL brown output.  - Decreasing output, 12-40 mL per hour, dark yary/tea colored.  Skin- Blanchable redness to coccyx, abdomen partially open with packing and serosanguinous output.  Old CAROL sites scabbed over with out drainage.  Gtts-Insulin, heparin, abx, tpn, tko.  Electrolytes- K and calcium replaced x2 each.  CRRT-Goal I=O.  Unable to meet goal due to labile pressures and medication infusions.  See flow sheets for further interventions and assessments.  P: Awaiting placement of wound vac.  Continue to monitor pt closely, Notify MD of changes/concerns.

## 2018-08-15 NOTE — PROGRESS NOTES
CRRT STATUS NOTE    DATA:  Time:  17;53 PM  Pressures WNL:  yes  Filter Status: WNL    Problems Reported/Alarms Noted:  none    Supplies Present: yes    ASSESSMENT:  Patient Net Fluid Balance:  8/14:  +197 ml,  8/15:  approx -334 ml since MN  Vital Signs:  T 96.4 (juan hugger),  HR 92, RR 14, O2 sats 100%, 104/60(72( via eve).  Labs:  K 3.2, Calcium replaced x2,  WBC 29.2  Goals of Therapy: intake=output    INTERVENTIONS:   None    PLAN:  Continue to meet goals of therapy.  Change circuit q 72 hrs and prn.  Call resource RN with concerns/issues @ [phone].

## 2018-08-15 NOTE — PLAN OF CARE
Problem: Patient Care Overview  Goal: Plan of Care/Patient Progress Review  Outcome: No Change  D/I/A:  Neuro: A/O, appropriate and following commands. Oxycodone and dilaudid for pain control with improvement.  Pulm: Lungs clear, RA. Coughing up tan sputum.  CV:  HR 80-90s. Maintain MAP>60, levophed required at times today.  : CRRT goal I=O. Better urine output earlier today, now 0-5ml/hr, SICU aware. Bloody drainage around bah at meatus.   GI: TF 20ml/hr, suppository given with small mucus BM after. Abd firm/tender, BS hypoactive. NG to LIS, needing frequent flushing. Abd CT done, see epic for results.   Skin: Wound vac placed to abd incision around 1800, -125 suction. Meplex to intact coccyx.   Gtt's: TPN 45ml/hr, insulin 1-2u/hr, heparin 500u/hrTKO  Labs: Lytes replaced per protocol   ID:. Afebrile, IV abx as scheduled. WBC trending up.    P: Continue to monitor and treat as ordered. CRRT and hemodynamic management. Notify SICU with changes/concerns.

## 2018-08-15 NOTE — PLAN OF CARE
Problem: Patient Care Overview  Goal: Plan of Care/Patient Progress Review  4A  Discharge Planner PT   Patient plan for discharge: not discussed due to medical status  Current status: Pt completed B rolling with mod A and increased cues for technique/sequencing. CRRT with no alarms with knee flexion and LE positioning with rolling. Dependently transferred to chair with Ax2, able to assist with positioning head/extremities while in sling. Completed LE exercises while seated in chair  Barriers to return to prior living situation: medical status, decreased strength, edema, impaired mobility  Recommendations for discharge: deferred  Rationale for recommendations: medical status       Entered by: Kathleen Colon 08/15/2018 5:20 PM

## 2018-08-15 NOTE — PROGRESS NOTES
CRRT RESTART NOTE    Reason for Restart:  Filter clotted    DATA:  Procedure:  CVVHDF  Start Time:  1235  Machine#:  1  Filter:  M150  Blood Flow:   180 mL/min  Pre-Replacement Solution:  Phoxillum 4/2.5  Post-Replacement Solution:  Same as above  Dialysate Solution:  Same as above  Pre-Replacement Solution Rate:  900 mL/hr  Post-Replacement Solution Rate: 200 mL/hr  Dialysate Flow Rate:  900 mL/hr  Patient Removal Rate:  150 mL/hr  Anticoagulation Type and Rate:  N/A    ASSESSMENT:  How Patient Tolerated Restart: Well  Vital Signs:  HR 97 NSR, /56(68) via eve, RR 14 on room air.  Initial Pressures:  Access:  -76  Filter:  86  Return:  16  TMP:  17  Change in Filter Pressure:  44    INTERVENTIONS:  Lines flushed with 10 ml of NS without difficulty with good blood return.    PLAN:  Continue with goals of therapy.

## 2018-08-15 NOTE — PROGRESS NOTES
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  Developed shock with severe lactic acidosis and respiratory failure on POD9. CT scan showed left PV venous thrombosis, infarction of left liver lobe, patchy infarction of right lobe and possible hepatic arterial thrombosis, patchy bowel ischemia. Transferred to ICU.   Ex lap, patchy diffuse SB ischemia. Doppler demonstrated arterial flow main vessels to bowel and liver. Splenic infarction. Liver biopsy negative for acute rejection. Fluid culture growing VRE. On Heparin gtt. 8/3 OR for reexploration liver transplant, bowel ischemia, findings improved patchy iscemic changes of small bowel, patchy ischemic changes of left liver lobe. 8/ OR: some ischemic/necrotic jejunum, s/p resection.  / Washout and abdominal closure with stratice mesh, no drains placed. Minimal ascites, intact small bowel anastomosis, patchy ischemia left liver lobe.     Procedures:    donor (DBD) liver transplant with duct to duct anastomosis over biliary stent.    Ex lap due to concern for ischemic bowel, findings: patchy diffuse SB ischemia. Doppler demonstrated arterial flow main vessels to bowel and liver. Splenic infarction.   8/3 Reexploration liver transplant, bowel ischemia, findings improved patchy iscemic changes of small bowel, patchy ischemic changes of left liver lobe.    Re-exploration, ischemic/necrotic jejunum, s/p resection  / Washout and abdominal closure with stratice mesh, no drains placed. Minimal ascites, intact small bowel anastomosis, patchy ischemia left liver lobe.    Graft function: POD #25/7. Infarction of left liver lobe, patchy infarction right liver lobe.  Left PV thrombosis. Narrow and irregular appearance HA. Bowel ischemia, s/p resection small bowel on 8/6. Procedures, see above. Liver biopsy negative for acute rejection. Intra-abdominal clot cultures from 8/1 + VRE and 8/6 + VRE, now s/p washout of hematoma.   Incision opened at bedside 8/12, hematoma removed.  8/13 US: new perihepatic fluid collections (largest 7x2x4), small volume of septated ascites.  Plan for VAC placement today. 8/15 CT scan, left lobe infarct with complex fluid collection inferior, plan for US liver Friday, if fluid seen will have IR aspirate fluid.     Wound: Plan to place wound VAC today.      Immunosuppression management:   Simulect intra-op due to BEN  Steroid taper per protocol, completed.   mg BID, reduced dose due to critical status.  Tac goal level ~6 due to sepsis and BEN. Level 11.5, decreased dose to 1.25 mg BID.   Hydrocortisone, stress dosing 8/12-8/14.  Complexity of management:Medium. Contributing factors: thrombocytopenia and anemia sepsis    Hematology:   Anemia: Hbg 9.7, stable  -Hemoglobin drop on 8/11 attributed to hematuria and urethral bleeding.  Hgb drop to 5 on 8/13 in setting of resolved  bleeding.  US abdomen shows new perihepatic collections, although small.  No evidence of GIB at this time.  Transfused 8/12, 3u on 8/13.   -Transfuse for Hbg < 7, PLT < 50K and INR > 1.8. If Hbg acutely drops again, will check p smear, haptoglobin and LDH.    Thrombocytopenia:   -Thrombocytopenia, unlikely HIT (HIT panel negative 8/13), multiple reasons for thrombocytopenia such as renal failure, acute bleeding, splenic sequestration and medications, can't rule out DIC  -Monitor CBC, INR, fibrinogen and PTT daily.  Anticoagulation: Anticoagulate for bowel ischemia and portal thrombosis.  Resumed Heparin reduced to 500u/hr, had been held since  8/12 d/t  bleeding.  -Hematology consult, evaluation for coagulopathy given portal vein thrombosis and mesenteric ischemia.  No further evaluation for coagulopathy per hematology.      Cardiorespiratory:    Hypotension:  Secondary to sepsis and intravascular hypovolemia, improved with albumin 8/12.  Stress dose steroids also restarted, stopped 8/14.  Briefly on pressor yesterday, hypotension resolved with albumin, now off pressor. Continue midodrine.      GI/Nutrition: NGT.  NPO.  On TPN.  TF 20 ml/hr, continue at this rate for now.  Ischemic bowel:  7/30 CTA: nonocclusive filling defects in superior mesenteric vein branches.  Anticoagulation as above.  S/p resection of necrotic jejunum on 8/6.   Possible SBO seen on CT scan. +BMs. NG output ~ 500 mL. Continue TF.    Endocrine:  DM type 2, continue insulin drip.      Fluid/Electrolytes: BEN/ARF. Initially on CRRT, stopped 8/8 for line holiday.  Found to be intravascularly volume depleted on bedside echo.  BP and HR improved with albumin replacement. CRRT resumed,  I=O    : Traumatic bah placement with bleeding, now resolved.   Hematuria now resolved. Bah, coude 18fr in place    Infectious disease: Afebrile. WBC 29 (20).    -Previously on Linezolid 7/28-7/30 and zosyn 7/30-8/2.   -Continue daptomycin (VRE, 7/30-present), meropenem (Ecoli ESBL, 8/2-) and micafungin (empiric, 7/30-).   -8/12 CT did not demonstrate drainable fluid.  Hepatic infarcts would not be drainable at this time.  -8/15 CT scan, left lobe infarct with complex fluid collection inferior, plan for US liver Friday, if fluid seen will have IR aspirate fluid.     1. VRE intra abdominal and bacteremia. Fluid cultures 7/31/18 + VRE. 7/30 (peripheral) and 7/31 (peripheral and VAD) blood cultures growing VRE.  8/6 intra-abdominal clot + VRE.  8/6-8/14 blood and lines + VRE or GPCs. HD line removed. CVC line removed. PICC line was placed due to needing central access.  Tx ID consulted. TOÑO to evaluate for endocarditis, negative. Ext x 4 negative for DVT. Persistant bacteremia, uncertain source (liver infarct, line, wound?).   Plan to titrate TPN as possible and remove central line; ideally would be able to remove HD line at this time for line holiday.  2. Infarct left lobe liver: Will re image on Friday, will consider IR consult for drainage.     Previous ID:   3. Ecoli ESBL bacteremia. Repeat bcx on  (peripheral and VAD) grew Ecoli ESBL. Zosyn changed to Meropenem  SICU to exchange lines as able. Blood cultures negative for E. Coli since .  4. HBV: Hepatits B DNA PCR positive prior to tx. Received HBIG on  and . 8/10 Hep B surface antigen negative. No further HBIG planned. Continue tenofovir 300 mg, dose adjusted for HD       Prophylaxis: DVT PCDs, on heparin, fall, GI, Valcyte x 12 weeks (CMVand EBV IgG +), bactrim. mycelex stopped as patient on micafungin. Will start nystatin if aide discontinued.   Disposition: SICU    Coordinator: Kay Reyes  Surgeon: Pako    Check Hep B surface antigen at 3 months, 6 months then annually     Medical Decision Making: High    JAY/Fellow/Resident Provider: GINGER Segovia [id number]    Faculty: Mamadou Norwood MD  __________________________________________________________________  Transplant History: Admitted 2018 for  donor liver transplant.   The patient has a history of liver failure due to hepatitis B .    2018 (Liver), Postoperative day: 25     Interval History:  No events o/n.     ROS:   A 10-point review of systems was negative except as noted above.    Curent Meds:    aspirin  81 mg Oral or Feeding Tube Daily     ceftaroline (TEFLARO) intermittent infusion  400 mg Intravenous Q12H     DAPTOmycin (CUBICIN) intermittent infusion  12 mg/kg Intravenous Q24H     micafungin  100 mg Intravenous Q24H     midodrine  10 mg Per Feeding Tube Q8H     mycophenolate  250 mg Oral or Feeding Tube BID IS     pantoprazole  40 mg Oral or Feeding Tube QAM AC     sodium chloride (PF)  3 mL Intravenous Q8H     sulfamethoxazole-trimethoprim  10 mL Oral or Feeding Tube Daily      tacrolimus  1.25 mg Oral QAM     tacrolimus  1.25 mg Oral or Feeding Tube QPM     tenofovir  300 mg Per Feeding Tube Q48H     tigecycline (TYGACIL) intermittent infusion  50 mg Intravenous Q12H     valGANciclovir  450 mg Oral or NG Tube Every Other Day    Or     valGANciclovir  450 mg Oral Every Other Day       Physical Exam:     Admit Weight: 60.7 kg (133 lb 12.8 oz)    Current Vitals:   /69  Pulse 121  Temp 96.5  F (35.8  C) (Axillary)  Resp 9  Wt 69.4 kg (153 lb)  SpO2 99%  BMI 24.69 kg/m2      Vital sign ranges:    Temp:  [96.5  F (35.8  C)-98.3  F (36.8  C)] 96.5  F (35.8  C)  Heart Rate:  [] 95  Resp:  [8-19] 9  BP: (100)/(69) 100/69  MAP:  [55 mmHg-84 mmHg] 55 mmHg  Arterial Line BP: ()/(45-65) 81/47  SpO2:  [97 %-100 %] 99 %  Patient Vitals for the past 24 hrs:   BP Temp Temp src Heart Rate Resp SpO2 Weight   08/15/18 1400 - - - 95 9 99 % -   08/15/18 1300 - - - 114 16 100 % -   08/15/18 1200 - 96.5  F (35.8  C) Axillary 95 15 100 % -   08/15/18 1100 - - - 98 15 99 % -   08/15/18 1000 - - - 92 11 98 % -   08/15/18 0900 - - - 95 14 98 % -   08/15/18 0800 - - - 92 16 99 % -   08/15/18 0730 - 97.6  F (36.4  C) Axillary 93 18 99 % -   08/15/18 0700 - - - 88 13 99 % -   08/15/18 0600 - - - 91 14 99 % -   08/15/18 0500 - - - 86 16 99 % -   08/15/18 0400 100/69 98.3  F (36.8  C) Axillary 90 16 100 % -   08/15/18 0300 - - - 93 12 99 % -   08/15/18 0200 - - - 93 10 98 % -   08/15/18 0150 - - - 94 11 98 % -   08/15/18 0130 - - - 88 12 99 % -   08/15/18 0115 - - - 100 10 97 % -   08/15/18 0110 - - - 104 16 97 % -   08/15/18 0105 - - - 102 19 97 % -   08/15/18 0100 - - - 97 11 97 % -   08/15/18 0055 - - - 99 11 97 % -   08/15/18 0000 - 98.1  F (36.7  C) Axillary 90 12 97 % 69.4 kg (153 lb)   08/14/18 2300 - - - 87 9 98 % -   08/14/18 2200 - - - 88 10 99 % -   08/14/18 2100 - - - 86 15 98 % -   08/14/18 2000 - 97.4  F (36.3  C) Axillary 87 8 98 % -   08/14/18 1900 - - - 84 10 99 % -   08/14/18  1800 - - - 84 13 98 % -   08/14/18 1700 - 97  F (36.1  C) Axillary 79 8 100 % -   08/14/18 1600 - 96.5  F (35.8  C) Axillary 80 12 100 % -   08/14/18 1500 - - - 78 11 99 % -     General Appearance: NAD  HEENT: NGT to suction. NJ in place.  Skin: warm, dry  Heart: NSR  Lungs: NLB on RA  Abdomen: Abdomen soft with right sided fullness, open incision with pink tissue and visible mesh  : Luis  Extremities: well perfused. +1-2 edema  Neurologic: Lethargic, following commands  Access: PICC  Femoral HD line    Data:   CMP    Recent Labs  Lab 08/15/18  0952 08/15/18  0326  08/09/18  0325 08/08/18  2206    140  < >  --  137   POTASSIUM 3.1* 3.3*  < >  --  3.5   CHLORIDE 111* 107  < >  --  106   CO2 21 24  < >  --  22   * 131*  < >  --  210*   BUN 57* 68*  < >  --  39*   CR 1.19 1.46*  < >  --  1.13   GFRESTIMATED 66 52*  < >  --  70   GFRESTBLACK 80 63  < >  --  85   JESUS 6.2* 7.0*  < >  --  6.6*   ICAW  --   --   --  4.3* 4.3*   MAG 1.9 2.1  < >  --  2.1   PHOS 2.8 3.4  < >  --  2.8   ALBUMIN 1.4* 1.6*  < >  --  1.6*   BILITOTAL 1.2 1.4*  < >  --  2.9*   ALKPHOS 233* 251*  < >  --  266*   AST 20 26  < >  --  51*   ALT 18 20  < >  --  75*   < > = values in this interval not displayed.  CBC    Recent Labs  Lab 08/15/18  0952 08/15/18  0326   HGB 10.4* 9.7*   WBC 27.1* 29.3*   * 122*     COAGS    Recent Labs  Lab 08/15/18  0326 08/13/18  0409  08/09/18  0335   INR 1.47* 1.41*  < >  --    PTT 51*  --   --  53*   < > = values in this interval not displayed.   Urinalysis  Recent Labs   Lab Test  07/27/18   2330  07/13/18   1315  06/18/18   1200   COLOR  Yellow  Dark Yellow  Yellow   APPEARANCE  Clear  Clear  Clear   URINEGLC  Negative  Negative  Negative   URINEBILI  Negative  Large*  Small*   URINEKETONE  Negative  Negative  Negative   SG  1.008  1.013  1.008   UBLD  Negative  Negative  Negative   URINEPH  7.5*  6.0  5.0   PROTEIN  30*  10*  Negative   NITRITE  Negative  Negative  Negative   LEUKEST   Negative  Negative  Negative   RBCU  5*  <1  1   WBCU  3  None  2   UTPG   --    --   0.75*     Virology:  Hepatitis C Antibody   Date Value Ref Range Status   07/20/2018 Nonreactive NR^Nonreactive Final     Comment:     Assay performance characteristics have not been established for newborns,   infants, and children         POD 14 US liver:  1.  The left portal vein is antegrade with decreased velocity,  measuring approximately 15 cm/second. This vessel was occluded on CT  7/30/2018.  2.  Elevated velocities of the main hepatic artery, now 203 cm/sec  (previously 94 cm/sec). However left hepatic arteries demonstrates  steep upstroke suggesting there is not a significant stenosis.   2a. Right hepatic artery was not visualized, could be occluded or  difficult to see postoperatively.  3. Echogenic focus of the right lobe in close proximity to the  hepatorenal fossa. This likely corresponds with nonenhancing lesion on  CT 7/30/2018 and likely representing subcapsular hematoma.  4. Additional small hematomas of the braden hepatis and posterior right  perihepatic region.  5. Small volume anechoic ascites.  6. Left hepatic lobe not well visualized secondary to open wound  preventing imaging. Note the left lobe was grossly abnormal on CT  7/30/2018    POD 1 US liver: Impression:   1.  Hematoma associated with the inferior aspect of the perihepatic  space anteriorly, measuring 13.7 x 7.0 x 8.5 cm.  2.  Retrograde flow of the left portal vein. Additionally, low  velocities of the portal venous system. Single low resistive index of  the extrahepatic aspect of the hepatic artery, measuring 0.37. These  findings are likely within normal limits in the early postoperative  context. However, continued attention on follow-up recommended.    7/30 CT scan abd/pelvis:  IMPRESSION:   1. Hypoenhancing areas in the transplant liver concerning for  infarction. Transplant left portal vein occlusion with bubbles of  portal venous gas. Nearly  occlusive filling defect in the native  hepatic artery. Narrowed and irregular appearance of the transplant  hepatic artery. Focal occlusion of the left main hepatic artery.  Segmental occlusions of right hepatic arterial branches.   2. Pneumatosis intestinalis with nonocclusive filling defects in  superior mesenteric vein branches, concerning for bowel ischemia or  infarction.   3. Splenic infarctions.   4. Bibasilar consolidation with air bronchograms. Pneumonia cannot be  excluded.   5. Post surgical changes of evacuation of peritransplant hematoma.

## 2018-08-15 NOTE — PROGRESS NOTES
Transplant Social Work Services Progress Note      Date of Liver Transplant: 7/21/2018  Collaborated with: Patient's bedside RN and his wife Luann; Marcus Escalante, RNCC    Data: I stopped to see  today, and he was resting. I wanting to check in with his family to see how they are coping, as I was unable to attend the care conference earlier this week.   Intervention: Offer of support to Mr. Miles's wife, and encouragement to make sure she cares for herself as well. Chart review.   Assessment: Luann has continued to spend most of her time here with her . She does go home to shower, etc, on occasion. She also reports eating okay.   Education provided by : Need for caregiver self-care.   Plan:    Discharge Plans in Progress: No.     Barriers to d/c plan: Medical status.     Follow up Plan: Social work remains available for support, disposition planning and other psychosocial needs as they arise.     Shreya Araya, [facility]  Pager [phone]

## 2018-08-15 NOTE — PROGRESS NOTES
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 He returned to the SICU for shock, severe lactic acidosis, renal failure and respiratory failure, requiring intubation and dialysis. He is s/p exploratory laparotomy on 8/6 with resection of necrotic jejunum, abdomen closed 8/8.    TODAY'S PROGRESS/PLANS:   - Increase Dilaudid to Q1  - Keep TFs at 20 d/t abdominal distension  - CT abdomen w/ contrast   - Suppository  - f/u with Transplant ID about decreasing frequency of blood cultures  - CXR today showed no other source for sepsis   - f/u renal regarding duration of femoral line    PLAN:  Neuro/ pain/ sedation:  # acute pain  - Monitor neurological status. Notify the MD/DO for any acute changes in exam.  - PRN oxycodone and IV hydromorphone for pain; increase hydromorphone to Q1h    Pulmonary care:   # acute respiratory failure - resolved  - extubated (8/10)  - continue aggressive pulmonary hygiene  - remains on room air    Cardiovascular:    #Septic shock without source control  - Monitor hemodynamic status   - Midodrine 10 mg Q8h  - levophed gtt to keep MAP >60, wean as tolerated   - TTE 8/8 showed no vegetations or valve abnormalities  - TOÑO 8/10 no evidence of endocarditis   - ASA 81 mg PO daily     GI care:   # DDLT 7/21/18  # Intrahepatic left portal venous thrombosis, infarction of left liver lobe, patchy infarction of right lobe, suspected hepatic arterial thrombosis  # 7/30/18 sp exploratory laparotomy. S/p liver biopsy  # Splenic infarctions, patchy  bowel ischemia  # Ileus  # Ex Lap 8/6 - resection of 90 cm of necrotic ileum   # Ex Lap 8/8 - abdomen closed  - s/p exploratory laparotomy, liver biopsy on 7/30/18. Returned to OR 8/6 for washout and partial jejunal resection.    - Abdomen closed in OR 8/8  - US Liver 8/8: unchanged decreased velocity in the left portal vein, improved velocities of the hepatic artery  - Heparin for PV thrombosis, 500 straight rate due to urethral bleeding.    - Right side of wound opened at bedside and hematoma drained 8/13. Transplant to place wound vac   - CT abdomen w/ contrast today    Fluids/ Electrolytes/ Nutrition:   # protein calorie deficit malnutrition   - Tube feed at 20 ml/hr- keep TFs at 20 d/t abdominal distension  - Continue TPN    Renal/ Fluid Balance:    # BEN   # severe metabolic acidosis, lactic acidosis - resolved  # severe hyponatremia - resolved  # hyperkalemia - resolved  # renal failure  # hematuria   - CRRT restarted 8/13. Nephrology placed femoral dialysis line, would like to remove after 36-48 hours  - Will continue to monitor intake and output.  - Urethral bleeding - resolved    Endocrine:    # DM II  - insulin gtt  - stress dose steroids stopped 8/14    ID/ Antibiotics:  # VRE Bacteremia  # ESBL Bacteremia - treated  # Hepatitis B  - Daptomycin (7/30- ), ceftaroline (8/13- ), tigecycline (8/13- ) continue while blood cultures still positive  - Meropenem (8/2-8/14), discontinued after 12 days of negative cultures for ESBL  - Micafungin (7/30- )  - Immunosuppression: (restarted 8/6) tacrolimus  - Immunosuppression prophylaxis: Bactrim, Valgancyclovir and Tenofovir, nystatin when micafungin stopped.   - Consulted Transplant ID due to continued bacteremia with unknown source, rec to increase Daptomycin dose, start ceftaroline and tigecycline    Positive cultures   - 7/27 VRE (line/arm)   - 7/29: VRE (left arm)   - 7/30: VRE (left arm)   - 7/31: VRE (Line and peripheral)  - 8/1: ESBL (HD/hand/central line)    - 8/3: VRE (intra-abdominal tissue)  - 8/4: NGTD (blood)  - 8/5: NGTD (blood)  - 8/6: VRE (art line)  - 8/7: VRE (blood)  - 8/8: VRE (blood)  - 8/9: VRE (blood)  - 8/11: VRE (blood)  - 8/12: VRE (abdominal fluid)  - 8/13: VRE (blood)  - 8/14: VRE (blood)    Heme:     # Acute blood loss anemia  # Anemia of critical illness  # Portal venous thrombosis  # Thrombocytopenia  - Heparin for PV thrombosis, heparin to 500 straight rate   - Continue aspirin 81mg  - HIT panel sent and negative 8/2.  Repeat HIT panel on 8/13 negative.  - DDAVP given 8/12  - TEG normal 8/12  - Hematology consult per Transplant in setting of low platelets - believe this is likely his new baseline     MSK:  # weakness and deconditioning of critical illness   - PT/OT   - mobilize     Prophylaxis:    - DVT: straight rate heparin, SCD's  - GI: protonix     Lines/ tubes/ drains:  - L radial art line, L PICC, NJ, NG, PIVs, groin central cath    Disposition:  - SICU      ====================================    SUBJECTIVE:   - large maroon colored stool  - re-started on pressors for low MAPs and given given albumin bolus  - expressing more pain and receiving all PRNs      OBJECTIVE:   1. VITAL SIGNS:   Temp:  [96.5  F (35.8  C)-98.3  F (36.8  C)] 97.6  F (36.4  C)  Heart Rate:  [] 93  Resp:  [8-19] 18  BP: ()/(66-72) 100/69  MAP:  [55 mmHg-84 mmHg] 77 mmHg  Arterial Line BP: ()/(45-65) 115/63  SpO2:  [97 %-100 %] 99 %  Resp: 18    2. INTAKE/ OUTPUT:   I/O last 3 completed shifts:  In: 3978.41 [I.V.:1887.61; NG/GT:610]  Out: 3008 [Urine:975; Emesis/NG output:300; Other:1533; Stool:200]    3. PHYSICAL EXAMINATION:   General: lying in bed, no acute distress  Neuro: alert, responds to questions with gestures  Resp: Breathing non-labored  CV: RRR  Abdomen: tense, moderate distension, tenderness to palpation  Incisions: open incision packed and covered in clean dressing, porcine graft visible below packing  Extremities: warm and well  perfused, peripheral edema in bilateral feet same as prior day    4. INVESTIGATIONS:   Arterial Blood Gases     Recent Labs  Lab 08/08/18  1705   PH 7.38   PCO2 37   PO2 126*   HCO3 22     Complete Blood Count     Recent Labs  Lab 08/15/18  0326 08/14/18  2203 08/14/18  1625 08/14/18  1050   WBC 29.3* 30.0* 27.1* 24.0*   HGB 9.7* 10.9* 10.1* 10.1*   * 116* 85* 86*     Basic Metabolic Panel    Recent Labs  Lab 08/15/18  0326 08/14/18  2203 08/14/18  1625 08/14/18  1050    140 140 138   POTASSIUM 3.3* 3.2* 3.6 3.1*   CHLORIDE 107 105 105 104   CO2 24 22 22 23   BUN 68* 74* 77* 87*   CR 1.46* 1.58* 1.71* 1.99*   * 139* 135* 122*     Liver Function Tests    Recent Labs  Lab 08/15/18  0326 08/14/18  2203 08/14/18  1625 08/14/18  1050  08/13/18  0409  08/11/18  0953  08/08/18 1929   AST 26 33 35 33  < > 30  < >  --   < >  --    ALT 20 24 26 26  < > 25  < >  --   < >  --    ALKPHOS 251* 282* 298* 307*  < > 228*  < >  --   < >  --    BILITOTAL 1.4* 1.9* 1.5* 1.5*  < > 1.6*  < >  --   < >  --    ALBUMIN 1.6* 1.5* 1.6* 1.6*  < > 1.7*  < >  --   < >  --    INR 1.47*  --   --   --   --  1.41*  --  1.39*  --  1.36*   < > = values in this interval not displayed.  Pancreatic Enzymes  No lab results found in last 7 days.  Coagulation Profile    Recent Labs  Lab 08/15/18  0326 08/13/18  0409 08/11/18  0953 08/09/18  0335 08/08/18 1929   INR 1.47* 1.41* 1.39*  --  1.36*   PTT 51*  --   --  53* 34     Lactate  Invalid input(s): LACTATE    5. RADIOLOGY:   No results found for this or any previous visit (from the past 24 hour(s)).    =========================================      Patient seen, findings and plan discussed with surgical ICU staff Dr. Holland.    Carlos Castellanos  Medical Student, MS4  [employer]    See [facility] for on-call pager information.    Eddie Acuna MD  CA-2/PGY-3

## 2018-08-15 NOTE — PROGRESS NOTES
Nephrology Progress Note  08/15/2018         Assessment & Recommendations:     Recommendations  1. Continue CRRT today with goal of matching I/O  2. Serial electrolyte monitoring  3. 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    Anuric BEN with creatinine elevation on CRRT  Patient with previous baseline Cr of 1.3-2, presented anuric BEN due to sepsis/hypotension/multiple sx/high CAROL drainage.   Tolerating CRRT overnight with good clearance. He continues to make good urine.  However, he did get a contrast load today which may impact his renal recovery.  For now, we will continue CRRT through tomorrow.  We will likely plan to discontinue CRRT on Friday and give him a holiday over the weekend if he continues to have good urine output.       Electrolytes  Hypokalemia likely due to GI losses and high UOP. Potassium should improve with 4K bath on CRRT. Na and Bicarb acceptable.       Hypervolemia with relative intra vascular depletion  TBW up, but overall clinically improving. Will continue to match I/O for now, will consider UF tmrw if can remains off pressors overnight.     Polymicrobial BSI due to ESBL E. coli and VRE E. faecium  Persistently +BC for VRE, including 8/14. ID on board. Multiple antimicrobial agents.     OLT 7/12/18   ESLD sec to hep B. Complicated by venous thrombosis, liver infarction, possible arterial thrombosis, bowel ischemia req bowel resection. Concern for possible infection in the abdomen given persistent postive BCx.  Currently on stress dose steroids, prograf, and cellcept.     DMII  On insulin gtt     Pancytopenia  Acute Anemia and Thrombocytopenia  Unclear etiology of acute drop  in Hgb and platelets. Hgb responded to PRBC and has stabilized. Plts improved as well.     Recommendations were communicated to primary team face to face    Seen and discussed with Dr. Radha Ricardo MD   [id number]    Interval History :   O/N, was taken off Norepi. Tolerating CRRT. Continues to have climbing WBC, plan for CT today. Otherwise, no major events overnight.    Review of Systems:   Unable to obtain    Physical Exam:   I/O last 3 completed shifts:  In: 3978.41 [I.V.:1887.61; NG/GT:610]  Out: 3008 [Urine:975; Emesis/NG output:300; Other:1533; Stool:200]   /69  Pulse 121  Temp 96.5  F (35.8  C) (Axillary)  Resp 9  Wt 69.4 kg (153 lb)  SpO2 100%  BMI 24.69 kg/m2     GENERAL APPEARANCE: ill appearing  EYES:  Sclerae icteric, pupils equal  HENT: mouth without ulcers or lesions, NGT in place  PULM: air entry bilaterally, few crackles  CV: regular rhythm, tachycardic, no rub     -edema 3+ in LE bilaterally  GI: soft, distended, dressings in place  INTEGUMENT: no cyanosis, no rash  NEURO: Alert to voice. Tracks movement. Follows some commands.       Labs:   All labs reviewed by me  Electrolytes/Renal -   Recent Labs   Lab Test  08/15/18   0952  08/15/18   0326  08/14/18   2203   NA  143  140  140   POTASSIUM  3.1*  3.3*  3.2*   CHLORIDE  111*  107  105   CO2  21  24  22   BUN  57*  68*  74*   CR  1.19  1.46*  1.58*   GLC  115*  131*  139*   JESUS  6.2*  7.0*  7.0*   MAG  1.9  2.1  2.1   PHOS  2.8  3.4  3.8       CBC -   Recent Labs   Lab Test  08/15/18   0952  08/15/18   0326 08/14/18 2203   WBC  27.1*  29.3*  30.0*   HGB  10.4*  9.7*  10.9*   PLT  105*  122*  116*       LFTs -   Recent Labs   Lab Test  08/15/18   0952  08/15/18   0326  08/14/18 2203   ALKPHOS  233*  251*  282*   BILITOTAL  1.2  1.4*  1.9*   ALT  18  20  24   AST  20  26  33   PROTTOTAL  3.6*  4.2*  4.3*   ALBUMIN  1.4*  1.6*  1.5*       Iron Panel -   Recent Labs   Lab Test  07/13/18   0334  06/17/18   2138   IRON  97   66   HARRY  83*  109*   ANGELLA  Unsatisfactory specimen - icteric   --          Imaging:  All imaging studies reviewed by me.     Current Medications:    aspirin  81 mg Oral or Feeding Tube Daily     ceftaroline (TEFLARO) intermittent infusion  400 mg Intravenous Q12H     DAPTOmycin (CUBICIN) intermittent infusion  12 mg/kg Intravenous Q24H     micafungin  100 mg Intravenous Q24H     midodrine  10 mg Per Feeding Tube Q8H     mycophenolate  250 mg Oral or Feeding Tube BID IS     pantoprazole  40 mg Oral or Feeding Tube QAM AC     sodium chloride (PF)  3 mL Intravenous Q8H     sulfamethoxazole-trimethoprim  10 mL Oral or Feeding Tube Daily     tacrolimus  1.25 mg Oral QAM     tacrolimus  1.25 mg Oral or Feeding Tube QPM     tenofovir  300 mg Per Feeding Tube Q48H     tigecycline (TYGACIL) intermittent infusion  50 mg Intravenous Q12H     valGANciclovir  450 mg Oral or NG Tube Every Other Day    Or     valGANciclovir  450 mg Oral Every Other Day       IV fluid REPLACEMENT ONLY       dialysate for CVVHD & CVVHDF (Phoxillum BK4/2.5) 12.5 mL/kg/hr (08/15/18 1210)     heparin 500 Units/hr (08/15/18 0800)     insulin (regular) 1.5 Units/hr (08/15/18 1106)     lactated ringers 10 mL/hr at 07/30/18 1519     - MEDICATION INSTRUCTIONS -       norepinephrine 0.03 mcg/kg/min (08/15/18 1426)     parenteral nutrition - ADULT compounded formula       parenteral nutrition - ADULT compounded formula 45 mL/hr at 08/14/18 1946     replacement solution for CVVHD & CVVHDF (Phoxillum BK4/2.5) 200 mL/hr at 08/15/18 1210     replacement solution for CVVHD & CVVHDF (Phoxillum BK4/2.5) 12.5 mL/kg/hr (08/15/18 1210)     Reason beta blocker order not selected

## 2018-08-16 NOTE — PROGRESS NOTES
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 He returned to the SICU for shock, severe lactic acidosis, renal failure and respiratory failure, requiring intubation and dialysis. He is s/p exploratory laparotomy on 8/6 with resection of necrotic jejunum, abdomen closed 8/8.    TODAY'S PROGRESS/PLANS:   - 5 mg Q4hr oxycodone, hold if patient is sleeping or confused   - discuss with Transplant regarding scheduling of low dose Tylenol   - TF stopped per Transplant  - titrate up TPN   - CRRT  - discuss with Nephrology regarding dialysis lines and goal net negative   - Protonix increased by Transplant    PLAN:  Neuro/ pain/ sedation:  # acute pain  - Monitor neurological status. Notify the MD/DO for any acute changes in exam.  - oxycodone 5 mg Q4, PRN oxycodone and IV hydromorphone for pain  - Consider adding scheduled low dose Tylenol    Pulmonary care:   # acute respiratory failure - resolved  - extubated (8/10)  - continue aggressive pulmonary hygiene  - remains on room air    Cardiovascular:    #Septic shock without source control  - Monitor hemodynamic status   - Midodrine 10 mg Q8h  - levophed gtt to keep MAP >60, wean as tolerated   - TTE 8/8 showed no vegetations or valve abnormalities  - TOÑO 8/10 no evidence of endocarditis   - ASA 81 mg PO daily     GI care:   # DDLT 7/21/18  # Intrahepatic left portal venous thrombosis, infarction of left liver lobe, patchy infarction of right lobe, suspected hepatic arterial thrombosis  # 7/30/18 s/p exploratory  laparotomy and liver biopsy  # Splenic infarctions, patchy bowel ischemia  # Ileus  # Ex Lap 8/6 - resection of 90 cm of necrotic jejunum  # Ex Lap 8/8 - abdomen closed  - s/p exploratory laparotomy, liver biopsy on 7/30/18. Returned to OR 8/6 for washout and partial jejunal resection.    - Abdomen closed in OR 8/8  - US Liver 8/8: unchanged decreased velocity in the left portal vein, improved velocities of the hepatic artery  - Heparin for PV thrombosis, 500 straight rate due to urethral bleeding.    - Right side of wound opened at bedside and hematoma drained 8/13. Transplant placed wound vac   - NG set to continuous suction    Fluids/ Electrolytes/ Nutrition:   # protein calorie deficit malnutrition   - TF's discontinued  - Increase TPN for adequate nutrition    Renal/ Fluid Balance:    # BEN   # severe metabolic acidosis, lactic acidosis - resolved  # severe hyponatremia - resolved  # hyperkalemia - resolved  # renal failure  # hematuria   - CRRT restarted 8/13. Nephrology placed femoral dialysis line, would like to remove Friday. Will ask to reassess dialysis needs prior to removal.   - Will continue to monitor intake and output.  - Urethral bleeding, only minimal    Endocrine:    # DM II  - insulin gtt  - stress dose steroids stopped 8/14    ID/ Antibiotics:  # VRE Bacteremia  # ESBL Bacteremia - treated  # Hepatitis B  - Daptomycin (7/30- ), ceftaroline (8/13- ), tigecycline (8/13- ) continue while blood cultures still positive  - Meropenem (8/2-8/14), discontinued after 12 days of negative cultures for ESBL  - Micafungin (7/30- )  - Immunosuppression: (restarted 8/6) tacrolimus  - Immunosuppression prophylaxis: Bactrim, Valgancyclovir and Tenofovir, nystatin when micafungin stopped.   - Consulted Transplant ID due to continued bacteremia with unknown source, rec to increase Daptomycin dose, start ceftaroline and tigecycline    Positive cultures   - 7/27 VRE (line/arm)   - 7/29: VRE (left arm)   - 7/30: VRE  (left arm)   - 7/31: VRE (Line and peripheral)  - 8/1: ESBL (HD/hand/central line)   - 8/3: VRE (intra-abdominal tissue)  - 8/4: NGTD (blood)  - 8/5: NGTD (blood)  - 8/6: VRE (art line)  - 8/7: VRE (blood)  - 8/8: VRE (blood)  - 8/9: VRE (blood)  - 8/11: VRE (blood)  - 8/12: VRE (abdominal fluid)  - 8/13: VRE (blood)  - 8/14: VRE (blood)  -8/15: VRE (blood)    Heme:     # Acute blood loss anemia  # Anemia of critical illness  # Portal venous thrombosis  # Thrombocytopenia  - Heparin for PV thrombosis, heparin to 500 straight rate   - Continue aspirin 81mg  - HIT panel sent and negative 8/2.  Repeat HIT panel on 8/13 negative.  - DDAVP given 8/12  - TEG normal 8/12  - Hematology consult per Transplant in setting of low platelets - believe this is likely his new baseline     MSK:  # weakness and deconditioning of critical illness   - PT/OT   - mobilize     Prophylaxis:    - DVT: straight rate heparin, SCD's  - GI: protonix     Lines/ tubes/ drains:  - L radial art line, L PICC, NJ, NG, PIVs, groin central cath    Disposition:  - SICU      ====================================    SUBJECTIVE:   - required pressors overnight to maintain MAPs  - wound vac placed on abdominal incision yesterday by Transplant      OBJECTIVE:   1. VITAL SIGNS:   Temp:  [96.4  F (35.8  C)-97.6  F (36.4  C)] 97.5  F (36.4  C)  Heart Rate:  [] 81  Resp:  [8-17] 11  MAP:  [55 mmHg-271 mmHg] 79 mmHg  Arterial Line BP: ()/() 109/62  SpO2:  [98 %-100 %] 100 %  Resp: 11    2. INTAKE/ OUTPUT:   I/O last 3 completed shifts:  In: 3614.11 [I.V.:1652.11; Other:7; NG/GT:450]  Out: 5483 [Urine:830; Emesis/NG output:2675; Other:1978]    3. PHYSICAL EXAMINATION:   General: lying in bed, no acute distress, uncomfortable appearing  Neuro: alert, responds to questions with head nods  Resp: Breathing non-labored  CV: RRR  Abdomen: moderate distension, tenderness to palpation, softer than previous day  Incisions: abdominal incision with wound vac  on left portion  Extremities: warm and well perfused, peripheral edema in bilateral LE to thighs    4. INVESTIGATIONS:   Arterial Blood Gases   No lab results found in last 7 days.  Complete Blood Count     Recent Labs  Lab 08/16/18  0329 08/15/18  2313 08/15/18  1606 08/15/18  0952   WBC 25.5* 27.3* 29.2* 27.1*   HGB 10.0* 10.1* 10.4* 10.4*   PLT 94* 111* 111* 105*     Basic Metabolic Panel    Recent Labs  Lab 08/16/18  0329 08/15/18  2313 08/15/18  1606 08/15/18  0952    141 143 143   POTASSIUM 3.9 4.0 3.2* 3.1*   CHLORIDE 108 111* 113* 111*   CO2 23 21 20 21   BUN 52* 54* 51* 57*   CR 1.16 1.03 1.11 1.19   * 198* 112* 115*     Liver Function Tests    Recent Labs  Lab 08/16/18  0329 08/15/18  2313 08/15/18  1606 08/15/18  0952 08/15/18  0326  08/13/18  0409  08/11/18  0953   AST 21 26 20 20 26  < > 30  < >  --    ALT 20 18 16 18 20  < > 25  < >  --    ALKPHOS 276* 227* 224* 233* 251*  < > 228*  < >  --    BILITOTAL 1.4* 1.4* 1.2 1.2 1.4*  < > 1.6*  < >  --    ALBUMIN 1.4* 1.4* 1.3* 1.4* 1.6*  < > 1.7*  < >  --    INR  --   --   --   --  1.47*  --  1.41*  --  1.39*   < > = values in this interval not displayed.  Pancreatic Enzymes  No lab results found in last 7 days.  Coagulation Profile    Recent Labs  Lab 08/16/18  0329 08/15/18  0326 08/13/18  0409 08/11/18  0953   INR  --  1.47* 1.41* 1.39*   PTT 51* 51*  --   --      Lactate  Invalid input(s): LACTATE    5. RADIOLOGY:   No results found for this or any previous visit (from the past 24 hour(s)).    =========================================      Patient seen, findings and plan discussed with surgical ICU staff Dr. Holland.    Carlos Castellanos  Medical Student, MS4  [employer]    See [facility] for on-call pager information.    Eddie Acuna MD  CA-2/PGY-3

## 2018-08-16 NOTE — PROVIDER NOTIFICATION
Pt having decrease in BP and HR increased to 1teens. Not pulling anything on CRRT. Increased levophed from 0.05 to 0.22 mcg/kg/min. Also, arterial line not having reliable waveform and will need replacing. Contacted SICU and it was agreed to give 5% albumin. BP and pressor needs unchanged so another 5% being given. Will continue to monitor and update SICU with progress.

## 2018-08-16 NOTE — PLAN OF CARE
Problem: Liver Transplant (Adult)  Goal: Signs and Symptoms of Listed Potential Problems Will be Absent, Minimized or Managed (Liver Transplant)  Signs and symptoms of listed potential problems will be absent, minimized or managed by discharge/transition of care (reference Liver Transplant (Adult) CPG).   Outcome: No Change  VS per flowsheet.  Norepinephrine has been from off to 0.05mcg/kg/min. To maintain MAP>60. Pt w/ lg NG output, looks and smells like stool.  MD's aware.  Intermittently putting out urine.  ~550mls for the last 24hrs.  Remains on CRRT. Not removing fluid for most of the shift 2/2 UO and high NG output. Insulin gtt titrated for BS <80 and less than 140.  Multiple abx given per orders.  Medicating ~q2hrs for ABD pain.  Pt slept comfortably most of the night.  Will continue to monitor closely.

## 2018-08-16 NOTE — PROGRESS NOTES
Nephrology Progress Note  08/16/2018         Assessment & Recommendations:     Recommendations  1. Continue CRRT today with goal of matching I/O   -Do not restart if clots - plan to trial conventional HD if needed  2. Serial electrolyte monitoring  3. 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    Anuric BEN with creatinine elevation on CRRT  Patient with previous baseline Cr of 1.3-2, presented anuric BEN due to sepsis/hypotension/multiple sx/high CAROL drainage.   Tolerated CRRT overnight with good clearance. He has had steady decline in urine output, which may be related to contrast administration.  We discussed timing of pulling the HD line with surgery.  But given he has remained persistently bacteremic, has many additional lines in place, and he will likely will continue to need renal replacement therapy, plan to keep femoral line in place until absolutely necessary to remove.  We will continue CRRT until the machine clots or the cartridge runs out.  Will hold off on RRT at that time and see what kind of quality of urine he is making.  If he needs to continue dialysis, may trial conventional HD.     Electrolytes  Hypokalemia likely due to GI losses. Potassium should improve with 4K bath on CRRT.       Hypervolemia with relative intra vascular depletion  TBW up, but overall clinically improving. Will continue to match I/O for now, will consider UF at a later time if hemodynamics dictate.     Polymicrobial BSI due to ESBL E. coli and VRE E. faecium  Persistently +BC for VRE, including 8/14. ID on board. Multiple antimicrobial agents.     OLT 7/12/18   ESLD sec to hep B. Complicated by venous  thrombosis, liver infarction, possible arterial thrombosis, bowel ischemia req bowel resection. Concern for possible infection in the abdomen given persistent postive BCx.  Currently on stress dose steroids, prograf, and cellcept.     DMII  On insulin gtt     Recommendations were communicated to primary team face to face    Seen and discussed with Dr. Radah Ricardo MD   [id number]    Interval History :   O/N, not on pressor support.  Continues to tolerate CRRT with good clearance.  White blood cell count remains elevated.  His urine output has declined.    Review of Systems:   Unable to obtain    Physical Exam:   I/O last 3 completed shifts:  In: 3627.41 [I.V.:1665.41; Other:7; NG/GT:450]  Out: 5483 [Urine:830; Emesis/NG output:2675; Other:1978]   /69  Pulse 121  Temp 97.6  F (36.4  C) (Axillary)  Resp 13  Wt 69.4 kg (153 lb)  SpO2 100%  BMI 24.69 kg/m2     GENERAL APPEARANCE: ill appearing  EYES:  Sclerae icteric, pupils equal  HENT: mouth without ulcers or lesions, NGT in place  PULM: air entry bilaterally, few crackles  CV: regular rhythm, tachycardic, no rub     -edema 3+ in LE bilaterally  GI: soft, distended, dressings in place  INTEGUMENT: no cyanosis, no rash  NEURO: Alert to voice. Tracks movement. Follows some commands.       Labs:   All labs reviewed by me  Electrolytes/Renal -   Recent Labs   Lab Test  08/16/18   0951  08/16/18   0329  08/15/18   2313   NA  144  140  141   POTASSIUM  3.3*  3.9  4.0   CHLORIDE  115*  108  111*   CO2  20  23  21   BUN  37*  52*  54*   CR  0.84  1.16  1.03   GLC  123*  160*  198*   JESUS  6.1*  8.1*  7.1*   MAG  1.8  2.4*  2.1   PHOS  3.2  3.5  3.2       CBC -   Recent Labs   Lab Test  08/16/18   0951  08/16/18   0329  08/15/18   2313   WBC  21.6*  25.5*  27.3*   HGB  9.3*  10.0*  10.1*   PLT  72*  94*  111*       LFTs -   Recent Labs   Lab Test  08/16/18   0951  08/16/18   0329  08/15/18   2313   ALKPHOS  206*  276*  227*   BILITOTAL  1.1  1.4*   1.4*   ALT  15  20  18   AST  18  21  26   PROTTOTAL  2.9*  4.0*  3.8*   ALBUMIN  1.0*  1.4*  1.4*       Iron Panel -   Recent Labs   Lab Test  07/13/18   0334  06/17/18   2138   IRON  97  66   IRONSAT  83*  109*   ANGELLA  Unsatisfactory specimen - icteric   --          Imaging:  All imaging studies reviewed by me.     Current Medications:    aspirin  81 mg Oral or Feeding Tube Daily     ceftaroline (TEFLARO) intermittent infusion  400 mg Intravenous Q12H     DAPTOmycin (CUBICIN) intermittent infusion  12 mg/kg Intravenous Q24H     micafungin  100 mg Intravenous Q24H     midodrine  10 mg Per Feeding Tube Q8H     mycophenolate  250 mg Oral or Feeding Tube BID IS     oxyCODONE IR  5 mg Oral or Feeding Tube Q4H     pantoprazole  40 mg Oral or Feeding Tube BID     sodium chloride (PF)  3 mL Intravenous Q8H     sulfamethoxazole-trimethoprim  10 mL Oral or Feeding Tube Daily     tacrolimus  1.25 mg Oral QAM     tacrolimus  1.25 mg Oral or Feeding Tube QPM     tenofovir  300 mg Per Feeding Tube Q48H     tigecycline (TYGACIL) intermittent infusion  50 mg Intravenous Q12H     valGANciclovir  450 mg Oral or NG Tube Every Other Day    Or     valGANciclovir  450 mg Oral Every Other Day       IV fluid REPLACEMENT ONLY       dialysate for CVVHD & CVVHDF (Phoxillum BK4/2.5) 12.5 mL/kg/hr (08/16/18 1034)     heparin 500 Units/hr (08/16/18 1133)     insulin (regular) 1.5 Units/hr (08/16/18 0902)     lactated ringers 10 mL/hr at 07/30/18 1519     - MEDICATION INSTRUCTIONS -       norepinephrine 0.05 mcg/kg/min (08/16/18 1219)     parenteral nutrition - ADULT compounded formula 40 mL/hr at 08/15/18 1939     replacement solution for CVVHD & CVVHDF (Phoxillum BK4/2.5) 200 mL/hr at 08/15/18 1210     replacement solution for CVVHD & CVVHDF (Phoxillum BK4/2.5) 12.5 mL/kg/hr (08/16/18 1034)     Reason beta blocker order not selected

## 2018-08-16 NOTE — PROGRESS NOTES
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  Developed shock with severe lactic acidosis and respiratory failure on POD9. CT scan showed left PV venous thrombosis, infarction of left liver lobe, patchy infarction of right lobe and possible hepatic arterial thrombosis, patchy bowel ischemia. Transferred to ICU.   Ex lap, patchy diffuse SB ischemia. Doppler demonstrated arterial flow main vessels to bowel and liver. Splenic infarction. Liver biopsy negative for acute rejection. Fluid culture growing VRE. On Heparin gtt. 8/3 OR for reexploration liver transplant, bowel ischemia, findings improved patchy iscemic changes of small bowel, patchy ischemic changes of left liver lobe. 8/ OR: some ischemic/necrotic jejunum, s/p resection.  / Washout and abdominal closure with stratice mesh, no drains placed. Minimal ascites, intact small bowel anastomosis, patchy ischemia left liver lobe.     Procedures:    donor (DBD) liver transplant with duct to duct anastomosis over biliary stent.    Ex lap due to concern for ischemic bowel, findings: patchy diffuse SB ischemia. Doppler demonstrated arterial flow main vessels to bowel and liver. Splenic infarction.   8/3 Reexploration liver transplant, bowel ischemia, findings improved patchy iscemic changes of small bowel, patchy ischemic changes of left liver lobe.    Re-exploration, ischemic/necrotic jejunum, s/p resection  8/ Washout and abdominal closure with stratice mesh, no drains placed. Minimal ascites, intact small bowel anastomosis, patchy ischemia left liver lobe.    Graft function: POD #26/8. Infarction of left liver lobe, patchy infarction right liver lobe.  Left PV thrombosis. Narrow and irregular appearance HA. Bowel ischemia, s/p resection small bowel on 8/6. Procedures, see above. Liver biopsy negative for acute rejection. Intra-abdominal clot cultures from 8/1 + VRE and 8/6 + VRE, now s/p washout of hematoma.   Incision opened at bedside 8/12, hematoma removed.  8/13 US: new perihepatic fluid collections (largest 7x2x4), small volume of septated ascites.  VAC placed 8/15. 8/15 CT scan, left lobe infarct with complex fluid collection inferior, plan for US liver Friday, if fluid seen will have IR aspirate fluid.     Wound:  Continue wound VAC, reduce to -50mmHg per fellow.      Immunosuppression management:   Simulect intra-op due to BEN  Steroid taper per protocol, completed.   mg BID, reduced dose due to critical status.  Tac goal level ~6 due to sepsis and BEN. Level 11.5, decreased dose to 1.25 mg BID.   Hydrocortisone, stress dosing 8/12-8/14.  Complexity of management:Medium. Contributing factors: thrombocytopenia and anemia sepsis    Hematology:   Anemia: Hbg 10, stable  -Hemoglobin drop on 8/11 attributed to hematuria and urethral bleeding.  Hgb drop to 5 on 8/13 in setting of resolved  bleeding.  US abdomen showed new perihepatic collections, although small.  No evidence of GIB at this time.  Transfused 8/12, 3u on 8/13. Now having some maroon stools, but hgb stable.  -Transfuse for Hbg < 7, PLT < 50K and INR > 1.8. If Hbg acutely drops again, will check p smear, haptoglobin and LDH.    Thrombocytopenia: PLT 94  -Thrombocytopenia, unlikely HIT (HIT panel negative 8/13), multiple reasons for thrombocytopenia such as renal failure, acute bleeding, splenic sequestration and medications, can't rule out DIC  -Monitor CBC, INR, fibrinogen and PTT daily.  Anticoagulation: Anticoagulate for bowel ischemia and portal thrombosis.  Resumed Heparin reduced to 500u/hr, had been held since  8/12 d/t  bleeding.  -Hematology consult, evaluation for coagulopathy  given portal vein thrombosis and mesenteric ischemia. No further evaluation for coagulopathy per hematology.      Cardiorespiratory:    Hypotension:  Secondary to sepsis and intravascular hypovolemia, improved with albumin 8/12.  Stress dose steroids also restarted, stopped 8/14.  Continue midodrine.  Has been on and off low dose Norepi last 24h.    GI/Nutrition: NGT.  NPO.  On TPN.    Ischemic bowel:  7/30 CTA: nonocclusive filling defects in superior mesenteric vein branches.  Anticoagulation as above.  S/p resection of necrotic jejunum on 8/6.   PSBO:  Noted on 8/15 CT, transition point RLQ. +BMs.  NG output 2700 mL.  TF on hold.   Concern for GIB:  Maroon tinged stools.  Hgb stable.  Increase PPI to BID and monitor.    Endocrine:  DM type 2, continue insulin drip.      Fluid/Electrolytes: BEN/ARF. Initially on CRRT, stopped 8/8 for line holiday.  Found to be intravascularly volume depleted on bedside echo.  BP and HR improved with albumin replacement. CRRT resumed,  I=O.    : Traumatic bah placement with intermittent bleeding.   Hematuria now resolved. Bah, coude 18fr in place    Infectious disease: Afebrile. WBC 29->25.5    -Previously on Linezolid 7/28-7/30 and zosyn 7/30-8/2.   -Continue daptomycin (VRE, 7/30-present), tigecycline (8/14-), ceftaroline (8/14-), and micafungin (empiric, 7/30-).   -8/12 CT did not demonstrate drainable fluid.  Hepatic infarcts would not be drainable at this time.  -8/15 CT scan, left lobe infarct with complex fluid collection inferior, plan for US liver Friday, if fluid seen will have IR aspirate fluid.     1. VRE intra abdominal and bacteremia. Fluid cultures 7/31/18 + VRE. 7/30 (peripheral) and 7/31 (peripheral and VAD) blood cultures growing VRE.  8/6 intra-abdominal clot + VRE.  8/6-8/15 blood and lines + VRE or GPCs. HD line removed. CVC line removed. PICC line was placed due to needing central access.  Tx ID consulted. TOÑO to evaluate for endocarditis, negative. Ext  x 4 negative for DVT. Persistant bacteremia, uncertain source (liver infarct, line, fluid collection?).  Plan to titrate TPN as possible and remove central line; ideally would be able to remove HD line at this time for line holiday.  2. Infarct left lobe liver with inferior perihepatic fluid collection: Will re image on Friday, will consider IR consult for drainage.     Previous ID:   3. Ecoli ESBL bacteremia. Repeat bcx on  (peripheral and VAD) grew Ecoli ESBL. Zosyn changed to Meropenem, completed on .  SICU to exchange lines as able. Blood cultures negative for E. Coli since .  4. HBV: Hepatits B DNA PCR positive prior to tx. Received HBIG on  and . 8/10 Hep B surface antigen negative. No further HBIG planned. Continue tenofovir 300 mg, dose adjusted for HD       Prophylaxis: DVT PCDs, on heparin, fall, GI, Valcyte x 12 weeks (CMVand EBV IgG +), bactrim. mycelex stopped as patient on micafungin. Will start nystatin if aide discontinued.   Disposition: SICU    Coordinator: Kay Reyes  Surgeon: Pako    Check Hep B surface antigen at 3 months, 6 months then annually     Medical Decision Making: High    JAY/Fellow/Resident Provider: Geovanna Burris NP  [id number]    Faculty: Cristino Che MD   __________________________________________________________________  Transplant History: Admitted 2018 for  donor liver transplant.   The patient has a history of liver failure due to hepatitis B .    2018 (Liver), Postoperative day: 26     Interval History:  Deep LUQ pain, brown/maroon stool per nursing, some bleeding around bah overnight    ROS:   A 10-point review of systems was negative except as noted above.    Curent Meds:    aspirin  81 mg Oral or Feeding Tube Daily     ceftaroline (TEFLARO) intermittent infusion  400 mg Intravenous Q12H     DAPTOmycin (CUBICIN) intermittent infusion  12 mg/kg Intravenous Q24H     micafungin  100 mg Intravenous Q24H     midodrine  10 mg Per Feeding Tube  Q8H     mycophenolate  250 mg Oral or Feeding Tube BID IS     oxyCODONE IR  5 mg Oral or Feeding Tube Q4H     pantoprazole  40 mg Oral or Feeding Tube BID     sodium chloride (PF)  3 mL Intravenous Q8H     sulfamethoxazole-trimethoprim  10 mL Oral or Feeding Tube Daily     tacrolimus  1.25 mg Oral QAM     tacrolimus  1.25 mg Oral or Feeding Tube QPM     tenofovir  300 mg Per Feeding Tube Q48H     tigecycline (TYGACIL) intermittent infusion  50 mg Intravenous Q12H     valGANciclovir  450 mg Oral or NG Tube Every Other Day    Or     valGANciclovir  450 mg Oral Every Other Day       Physical Exam:     Admit Weight: 60.7 kg (133 lb 12.8 oz)    Current Vitals:   /69  Pulse 121  Temp 97.6  F (36.4  C) (Axillary)  Resp 13  Wt 69.4 kg (153 lb)  SpO2 100%  BMI 24.69 kg/m2      Vital sign ranges:    Temp:  [96.4  F (35.8  C)-98.4  F (36.9  C)] 97.6  F (36.4  C)  Heart Rate:  [] 88  Resp:  [8-25] 13  MAP:  [55 mmHg-271 mmHg] 61 mmHg  Arterial Line BP: ()/() 87/52  SpO2:  [99 %-100 %] 100 %  Patient Vitals for the past 24 hrs:   Temp Temp src Heart Rate Resp SpO2   08/16/18 1200 97.6  F (36.4  C) Axillary 88 13 100 %   08/16/18 1145 - - 93 20 100 %   08/16/18 1130 - - 91 18 100 %   08/16/18 1115 - - 95 25 99 %   08/16/18 1100 - - 90 15 100 %   08/16/18 1045 - - 95 18 100 %   08/16/18 1000 98.4  F (36.9  C) Axillary 89 16 100 %   08/16/18 0945 - - 89 13 100 %   08/16/18 0930 - - 91 16 100 %   08/16/18 0900 - - 90 13 100 %   08/16/18 0830 - - 92 14 100 %   08/16/18 0800 96.5  F (35.8  C) Axillary 93 16 100 %   08/16/18 0700 - - 81 11 100 %   08/16/18 0645 - - 82 13 100 %   08/16/18 0630 - - 82 12 100 %   08/16/18 0615 - - 80 13 100 %   08/16/18 0600 - - 81 11 100 %   08/16/18 0545 - - 81 11 100 %   08/16/18 0530 - - 81 11 100 %   08/16/18 0515 - - 77 14 100 %   08/16/18 0500 - - 84 12 100 %   08/16/18 0445 - - 86 12 100 %   08/16/18 0430 - - 86 12 100 %   08/16/18 0415 - - 80 13 100 %   08/16/18 0400  97.5  F (36.4  C) Axillary 80 13 100 %   08/16/18 0345 - - 82 16 100 %   08/16/18 0330 - - 83 11 100 %   08/16/18 0315 - - 82 10 100 %   08/16/18 0300 - - 80 8 100 %   08/16/18 0245 - - 79 9 100 %   08/16/18 0230 - - 84 9 100 %   08/16/18 0215 - - 81 9 99 %   08/16/18 0200 - - 83 9 100 %   08/16/18 0145 - - 83 12 100 %   08/16/18 0130 - - 87 10 100 %   08/16/18 0115 - - 89 13 100 %   08/16/18 0100 - - 91 10 100 %   08/16/18 0045 - - 89 8 100 %   08/16/18 0030 - - 91 10 100 %   08/16/18 0015 - - 90 10 100 %   08/16/18 0000 97.6  F (36.4  C) Axillary 90 10 100 %   08/15/18 2345 - - 92 10 99 %   08/15/18 2330 - - 91 11 100 %   08/15/18 2315 - - 92 12 100 %   08/15/18 2300 - - 93 9 100 %   08/15/18 2245 - - 97 13 100 %   08/15/18 2230 - - 90 13 100 %   08/15/18 2215 - - 98 12 99 %   08/15/18 2200 - - 96 14 100 %   08/15/18 2145 - - 99 14 100 %   08/15/18 2130 - - 99 17 100 %   08/15/18 2115 - - 87 14 100 %   08/15/18 2100 - - 91 12 100 %   08/15/18 2045 - - 102 17 99 %   08/15/18 2030 - - 93 14 100 %   08/15/18 2015 - - 91 13 100 %   08/15/18 2000 97.5  F (36.4  C) Axillary 92 12 100 %   08/15/18 1900 - - 91 14 100 %   08/15/18 1800 - - 93 14 100 %   08/15/18 1700 - - 92 14 100 %   08/15/18 1600 96.4  F (35.8  C) Axillary 87 15 100 %   08/15/18 1500 - - 81 9 100 %   08/15/18 1415 - - 84 9 100 %   08/15/18 1400 - - 95 9 99 %   08/15/18 1345 - - 99 15 100 %   08/15/18 1300 - - 114 16 100 %     General Appearance: NAD  HEENT: NGT to suction (green with some coffee ground). NJ in place.  Skin: warm, dry  Heart: NSR  Lungs: NLB on RA  Abdomen: Abdomen rounded, tympanic, semi-soft, VAC right side of incision  : Luis, no hematuria  Extremities: well perfused. +1-2 edema  Neurologic: Alert, following commands  Access: PICC  Femoral HD line    Data:   CMP    Recent Labs  Lab 08/16/18  0951 08/16/18  0329    140   POTASSIUM 3.3* 3.9   CHLORIDE 115* 108   CO2 20 23   * 160*   BUN 37* 52*   CR 0.84 1.16    GFRESTIMATED >90 68   GFRESTBLACK >90 82   JESUS 6.1* 8.1*   MAG 1.8 2.4*   PHOS 3.2 3.5   ALBUMIN 1.0* 1.4*   BILITOTAL 1.1 1.4*   ALKPHOS 206* 276*   AST 18 21   ALT 15 20     CBC    Recent Labs  Lab 08/16/18  0951 08/16/18  0329   HGB 9.3* 10.0*   WBC 21.6* 25.5*   PLT 72* 94*     COAGS    Recent Labs  Lab 08/16/18  0329 08/15/18  0326 08/13/18  0409   INR  --  1.47* 1.41*   PTT 51* 51*  --       Urinalysis  Recent Labs   Lab Test  07/27/18   2330  07/13/18   1315  06/18/18   1200   COLOR  Yellow  Dark Yellow  Yellow   APPEARANCE  Clear  Clear  Clear   URINEGLC  Negative  Negative  Negative   URINEBILI  Negative  Large*  Small*   URINEKETONE  Negative  Negative  Negative   SG  1.008  1.013  1.008   UBLD  Negative  Negative  Negative   URINEPH  7.5*  6.0  5.0   PROTEIN  30*  10*  Negative   NITRITE  Negative  Negative  Negative   LEUKEST  Negative  Negative  Negative   RBCU  5*  <1  1   WBCU  3  None  2   UTPG   --    --   0.75*     Virology:  Hepatitis C Antibody   Date Value Ref Range Status   07/20/2018 Nonreactive NR^Nonreactive Final     Comment:     Assay performance characteristics have not been established for newborns,   infants, and children         POD 14  liver:  1.  The left portal vein is antegrade with decreased velocity,  measuring approximately 15 cm/second. This vessel was occluded on CT  7/30/2018.  2.  Elevated velocities of the main hepatic artery, now 203 cm/sec  (previously 94 cm/sec). However left hepatic arteries demonstrates  steep upstroke suggesting there is not a significant stenosis.   2a. Right hepatic artery was not visualized, could be occluded or  difficult to see postoperatively.  3. Echogenic focus of the right lobe in close proximity to the  hepatorenal fossa. This likely corresponds with nonenhancing lesion on  CT 7/30/2018 and likely representing subcapsular hematoma.  4. Additional small hematomas of the braden hepatis and posterior right  perihepatic region.  5. Small  volume anechoic ascites.  6. Left hepatic lobe not well visualized secondary to open wound  preventing imaging. Note the left lobe was grossly abnormal on CT  7/30/2018    POD 1 US liver: Impression:   1.  Hematoma associated with the inferior aspect of the perihepatic  space anteriorly, measuring 13.7 x 7.0 x 8.5 cm.  2.  Retrograde flow of the left portal vein. Additionally, low  velocities of the portal venous system. Single low resistive index of  the extrahepatic aspect of the hepatic artery, measuring 0.37. These  findings are likely within normal limits in the early postoperative  context. However, continued attention on follow-up recommended.    7/30 CT scan abd/pelvis:  IMPRESSION:   1. Hypoenhancing areas in the transplant liver concerning for  infarction. Transplant left portal vein occlusion with bubbles of  portal venous gas. Nearly occlusive filling defect in the native  hepatic artery. Narrowed and irregular appearance of the transplant  hepatic artery. Focal occlusion of the left main hepatic artery.  Segmental occlusions of right hepatic arterial branches.   2. Pneumatosis intestinalis with nonocclusive filling defects in  superior mesenteric vein branches, concerning for bowel ischemia or  infarction.   3. Splenic infarctions.   4. Bibasilar consolidation with air bronchograms. Pneumonia cannot be  excluded.   5. Post surgical changes of evacuation of peritransplant hematoma.

## 2018-08-16 NOTE — PLAN OF CARE
Problem: Patient Care Overview  Goal: Plan of Care/Patient Progress Review  Discharge Planner PT   Patient plan for discharge: not discussed   Current status: Pt performed rolling in bed, to the R, for self cares (total A) and placement of sling. Dependently transferred bed>chair via OH lift. Co of pain, utilized pillow for bracing. Completed LE therex to promote strengthening and ROM.   Barriers to return to prior living situation: medical needs, current mobility   Recommendations for discharge: deferred   Rationale for recommendations: Pt would benefit from continued skilled PT to address deficits in overall mobility and improve function.        Entered by: Raina Chapa 08/16/2018 12:49 PM

## 2018-08-16 NOTE — PROGRESS NOTES
CLINICAL NUTRITION SERVICES - REASSESSMENT NOTE     Nutrition Prescription    RECOMMENDATIONS FOR MDs/PROVIDERS TO ORDER:  PN can be weaned off on 8/11 if pt tolerates advancement of TF to goal rate but cutting PN rate in half to run bag out to discontinue.    Malnutrition Status:    Severe malnutrition in the context of acute on chronic illness    Recommendations already ordered by Registered Dietitian (RD):  1. Discontinue TF orders    2. TPN change:  PN 1200 ml/day with 245g Dex daily (833 kcal), 120g AA daily (480 kcal), and 250 ml 20% IV lipids 3x/week = 1527 kcals (25 kcal/kg/day), 2.0 g PRO/kg/day, GIR = 2.8 with 14% kcals from Fat.    Future/Additional Recommendations:  1. If TF can be restarted, would recommend:  -Nutren @ 10 mL/hr  -If pt tolerates, adv per MD to discretion to goal 60 mL/hr = 2160 kcals (35 kcal/kg), 98 g PRO (1.6 g/kg), 1094 mL H2O, 253 g CHO and no fiber daily.  -Nephronex MVI for micronutrient needs while on dialysis  -30 mL water flush q4h for tube patency    2. If renal TF formula indicated, would recommend:  -Nepro @ 10 mL/hr  -If pt tolerates, adv TF by 10 mL q8h to goal 50 ml/hr (1200 ml/day) to provide 2160 kcals (35 kcal/kg/day), 97 g PRO (1.6 g/kg/day), 876 ml free H2O, 193 g CHO and 15 g Fiber daily.  -Nephronex MVI for micronutrient needs while on dialysis  -30 mL water flush q4h for tube patency    3. EN/PN wean:  -Once pt tolerating TF @ 20 mL/hr, can discontinue IV lipids and change PN regimen to Goal volume = 960 mL, 245g Dex (833 kcal), 80g AA daily (320 kcal) and no IV lipids = 1153 kcals (19 kcal/kg/day), 1.3 g PRO/kg/day, GIR = 2.8 with 0% kcals from Fat.  -Once pt tolerating TF @ 30-40 mL/hr, can cut PN rate in half to run bag out to discontinue PN  -Once PN weaned, off order appropriate MVI-M (pending dialysis) to meet micronutrient needs     EVALUATION OF THE PROGRESS TOWARD GOALS   Diet: NPO (NTUB since 7/30)    Nutrition Support:   8/10-8/11: goal volume = 960 mL,  245g Dex (833 kcal), 80g AA daily (320 kcal) and no IV lipids = 1153 kcals (19 kcal/kg/day), 1.3 g PRO/kg/day, GIR = 2.8 with 0% kcals from Fat.    8/10-8/11: Nutren @ 60 mL/hr = 2160 kcals (35 kcal/kg), 98 g PRO (1.6 g/kg), 1094 mL H2O, 253 g CHO and no fiber daily.    8/12-8/14: PN 1200 ml/day with 245g Dex daily (833 kcal), 120g AA daily (480 kcal), and 250 ml 20% IV lipids 3x/week = 1527 kcals (25 kcal/kg/day), 2.0 g PRO/kg/day, GIR = 2.8 with 14% kcals from Fat.    8/13-8/14: Nutren 1.5 @ 10 mL/hr    8/14-8/16: Nutren 1.2 @ 20 mL/hr = 720 kcal (12 kcal/kg) and 33 g PRO (0.5 g/kg)    8/14-8/15: D/C'd IV lipids, 245 g dex, and 120 g AA (2 g/kg) = 1313 kcal (22 kcal/kg)     8/15-8/16: Goal volume = 960 mL, 245g Dex (833 kcal), 80g AA daily (320 kcal) and no IV lipids = 1153 kcals (19 kcal/kg/day), 1.3 g PRO/kg/day, GIR = 2.8 with 0% kcals from Fat.    Intake:   -6 day TF average intake = 425 kcal (7 kcal/kg), 19 g PRO (0.3 g/kg)  -6 day PN average intake = 1304 kcal (21 kcal/kg), 100 g PRO (1.6 g/kg)     NEW FINDINGS   8/13: CRRT restart    GI: TPN/tricked TFs over the past week. Abd CT yesterday showed extensive small bowel dilation, large ascites so transplant team would like TFs turned off today. PN will be adjusted accordingly.    Resp: Pt continues on mechanical ventilation, FiO2 = 30%.    Renal: CRRT restart on 8/13    Labs (8/10): BUN = 52 (H), TBili = 1.4 (H), Alk Phos = 276 (H), DBili = 1 (H), Triglycerides (8/13) = 173 (H)    Weight: Yesterday's weight of 69.4 kg is up from lowest admit wt of 60.7 kg on 7/20 likely r/t fluid    MALNUTRITION  % Intake: Decreased intake does not meet criteria  % Weight Loss: None noted  Subcutaneous Fat Loss: Facial region, Upper arm, Lower arm and Thoracic/intercostal:  Moderate  Muscle Loss: Temporal, Facial & jaw region, Thoracic region (clavicle, acromium bone, deltoid, trapezius, pectoral), Upper arm (bicep, tricep), Lower arm  (forearm), Upper leg (quadricep,  hamstring), Patellar region and Posterior calf:  Severe  Fluid Accumulation/Edema: Mild-Moderate  Malnutrition Diagnosis: Severe malnutrition in the context of acute on chronic illness    Previous Goals   Total avg nutritional intake to meet a minimum of 30 kcal/kg (versus 25 kcal/kg if on PN) and 1.5 g PRO/kg daily (per dosing wt 61 kg)  Evaluation: Met     Previous Nutrition Diagnosis  Inadequate energy intake related to holding of TFs for majority of this week with start of PN (goal provides <30 kcal/kg) AEB 6 day energy intake of 20 kcal/kg   Evaluation: Improving    CURRENT NUTRITION DIAGNOSIS  Inadequate energy intake related to ongoing PN (goal provides <30 kcal/kg) as primary source of nutrition AEB 6 day energy intake of 28 kcal/kg    INTERVENTIONS  Implementation  Collaboration and Referral of Nutrition care - Discussed plan for FEN/GI on rounds with Providers  Enteral Nutrition - Modify advancement schedule  Parenteral Nutrition/IV Fluids - Spoke with PharmD regarding change to regimen    Goals  Total avg nutritional intake to meet a minimum of 30 kcal/kg (versus 25 kcal/kg if on PN) and 1.5 g PRO/kg daily (per dosing wt 61 kg)    Monitoring/Evaluation  Progress toward goals will be monitored and evaluated per protocol.    Halina Crouch, RD, LD  SICU RD Pgr: [phone]

## 2018-08-16 NOTE — PLAN OF CARE
Problem: Patient Care Overview  Goal: Plan of Care/Patient Progress Review  Outcome: No Change  D/I/A:  Neuro A/O, scheduled oxycodone, PRN oxycodone and dilaudid for abdominal pain.  Pulm: lungs clear, RA  CV:  Hypotensive requiring pressors. HR was 80-90s, now 1teens. Adding vasopressin now.  : bah irrigated x2 with clots second time, now small output.   GI: TF stopped due to increased NG output,  2.2L today. Output now blood tinged and SICU aware  Skin: wound vac -50 suction, scant serosanguinous output. Meplex to coccyx. Bilateral old CAROL sites with primapore dressing.  Gtt's: heparin 500, levo 0.25, TPN 40, TKO  Labs: electrolytes replaced as needed, See results for specifics.       P: continue to monitor and treat as ordered.

## 2018-08-16 NOTE — PROGRESS NOTES
CRRT STATUS NOTE    DATA:  Time:  5:23 AM  Pressures WNL:  yes  Filter Status: WNL    Problems Reported/Alarms Noted:  None reported by bedside RN    Supplies Present:  Supplies stocked    ASSESSMENT:  Patient Net Fluid Balance: -1051 since 12 midnight  Vital Signs: (&8 hours ax with bear hugger, MAPs in 70s, off vent,  Labs: mjag.2.4, creat 1.16, Hgb 10.1, and wbc 25.5 ,see result flowsheet for other lab values  Goals of Therapy: I=O, exceeded goal of therapy, RN backing down, patient still up 19,652 ml since admit date    INTERVENTIONS: taking fluid without increase in pressors, bear hugger keeping temperature with in normothermic.    PLAN: Bedside  RN stated that will keep on CRRT till fri and reassess due to kidney function starting to return some, continue to try to keep patient goal of I+O as per order. Monitor all parameters and continue plan of care, call CRRT resource for any problems or questions at [phone].

## 2018-08-16 NOTE — PROGRESS NOTES
CRRT STATUS NOTE    DATA:  Time:  6:30 PM  Pressures WNL:  YES  Filter Status:  WDL    Problems Reported/Alarms Noted:  none    Supplies Present:  YES    ASSESSMENT:  Patient Net Fluid Balance:  +639 for the 12 hr shift   Vital Signs:  BP (!) 74/47  Pulse 121  Temp 96.4  F (35.8  C) (Axillary)  Resp 25  Wt 69.4 kg (153 lb)  SpO2 100%  BMI 24.69 kg/m2  Labs:  K+3.9 Hgb 11.6 Plt 89 Ionized Ca+ 4.4   Goals of Therapy:  I=O    INTERVENTIONS:   None     PLAN:  Continue POC If circuit goes down no restart

## 2018-08-17 NOTE — PROCEDURES
Procedure: Central Venous Catheter Placement    Hat, sterile gloves, gown, face mask, and hygiene was performed prior to start of procedure.      Technique: The neck was scanned with US and the right internal jugular vein was selected. The site was prepped with 2% chlorhexidine and the patient was draped with a full length sterile sheet in the usual fashion. The site was re-prepped with 2% chlorhexidine. Using realtime ultrasound the vessel was accessed with an introducer needle and a wire was passed through the needle.  Ultrasound was used to confirm wire placement.  A MAC catheter was inserted via the seldinger technique over the wire. No ectopy was noted while performing the Seldinger technique. Blood was withdrawn from all lumens and flushed with normal saline. The line was secured with suture, biopatch, and a large tegaderm prior to the removal of the sterile drape.        The patient tolerated the procedure well and there were no complications.     Chest x ray post procedure for verification of line placement.      Ky Acuna  Ca-2/PGY-3       Dr. Holland was immediately available for the critical portions of the procedure.

## 2018-08-17 NOTE — PROGRESS NOTES
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 He returned to the SICU for shock, severe lactic acidosis, renal failure and respiratory failure, requiring intubation and dialysis. He is s/p exploratory laparotomy on 8/6 with resection of necrotic jejunum, abdomen closed 8/8. He acutely decompensated beginning the night of 8/16 and was emergently taken to the OR the morning of 8/17.     TODAY'S PROGRESS/PLANS:   - Emergently taken to the OR due to acute abdomen and decompensation - left in discontinuity, approximately 1 m of small bowel remaining.  - RIJ MAC placed  - L fem artery placed  - Wean vasopressors as appropiate  - Discuss with family regarding goals of care     PLAN:  Neuro/ pain/ sedation:  # acute pain  - Monitor neurological status. Notify the MD/DO for any acute changes in exam.  - oxycodone increased to 10-15 mg Q4 prior to leaving for the OR, PRN oxycodone and IV hydromorphone for pain  - Fentanyl gtt for pain    Pulmonary care:   # acute respiratory failure  - extubated (8/10). Re-intubated in the OR for emergent ex-lap 8/17  - daily pst and extubate as appropiate    Cardiovascular:    #Septic shock without source control  # Hemodynamic instability  - Monitor hemodynamic status   - Midodrine 10 mg Q8h  - Pressors changed from norepinephrine to phenylephrine overnight due to tachycardia, vasopressin added with need for additional pressor support  - TTE 8/8 showed no vegetations or valve abnormalities  - TOÑO 8/10 no evidence of endocarditis    - ASA 81 mg PO daily     GI care:   # DDLT 7/21/18  # Intrahepatic left portal venous thrombosis, infarction of left liver lobe, patchy infarction of right lobe, suspected hepatic arterial thrombosis  # 7/30/18 s/p exploratory laparotomy and liver biopsy  # Splenic infarctions, patchy bowel ischemia  # Ileus  # Ex Lap 8/6 - resection of 90 cm of necrotic jejunum  # Ex Lap 8/8 - abdomen closed  # Ex Lap 8/17 - Found to have widespread bowel ischemia requiring bowel resection.  Left in discontinuity, approximately 1 meter of small bowel remaining.  - s/p exploratory laparotomy, liver biopsy on 7/30/18. Returned to OR 8/6 for washout and partial jejunal resection.    - Abdomen closed in OR 8/8  - US Liver 8/8: unchanged decreased velocity in the left portal vein, improved velocities of the hepatic artery  - Heparin for PV thrombosis, 500 straight rate due to urethral bleeding.    - Right side of wound opened at bedside and hematoma drained 8/13. Transplant placed wound vac.  - NG set to continuous suction, decreasing suction noted over the past 24 hours.   - Decompensation with acute abdomen noted AM of 8/17, taken emergently to the OR for exp lap    Fluids/ Electrolytes/ Nutrition:   # protein calorie deficit malnutrition   #NPO  - TF's discontinued  - TPN for adequate nutrition    Renal/ Fluid Balance:    # BEN   # severe metabolic acidosis, lactic acidosis   # severe hyponatremia - resolved  # hyperkalemia - resolved  # renal failure  # hematuria - resolved  - CRRT restarted 8/13. Nephrology placed femoral dialysis line, would like to remove Friday. Will ask to reassess dialysis needs prior to removal.   - Will continue to monitor intake and output.  - Urethral bleeding, only minimal    Endocrine:    # DM II  - insulin gtt  - stress dose steroids stopped 8/14, restarted 8/17   - Hydrocortisone 100 mg q8h    ID/ Antibiotics:  # VRE Bacteremia  # ESBL Bacteremia - treated  # Hepatitis B  - Daptomycin (7/30- ),  ceftaroline (8/13- ), tigecycline (8/13- ) continue while blood cultures still positive  - Meropenem (8/2-8/14), discontinued after 12 days of negative cultures for ESBL  - Micafungin (7/30- )  - Immunosuppression: (restarted 8/6) tacrolimus  - Immunosuppression prophylaxis: Bactrim, Valgancyclovir and Tenofovir, nystatin when micafungin stopped.   - Consulted Transplant ID due to continued bacteremia with unknown source, rec to increase Daptomycin dose, start ceftaroline and tigecycline  - Amikacin (8/17 -)  - Flagyl (8/17 -)    Positive cultures   - 7/27 VRE (line/arm)   - 7/29: VRE (left arm)   - 7/30: VRE (left arm)   - 7/31: VRE (Line and peripheral)  - 8/1: ESBL (HD/hand/central line)   - 8/3: VRE (intra-abdominal tissue)  - 8/4: NGTD (blood)  - 8/5: NGTD (blood)  - 8/6: VRE (art line)  - 8/7: VRE (blood)  - 8/8: VRE (blood)  - 8/9: VRE (blood)  - 8/11: VRE (blood)  - 8/12: VRE (abdominal fluid)  - 8/13: VRE (blood)  - 8/14: VRE (blood)  - 8/15: G+ cocci in pairs and chains (blood)  - 8/17: pending (blood)     Heme:     # Acute blood loss anemia  # Anemia of critical illness  # Portal venous thrombosis  # Thrombocytopenia  - Heparin for PV thrombosis, heparin to 500 straight rate, restarted 8/14 after stopping 8/13 due to concern for HIT by Transplant team  - Hold aspirin 81mg  - HIT panel sent and negative 8/2.  Repeat HIT panel on 8/13 negative.  - DDAVP given 8/12  - TEG normal 8/12  - Hematology consult per Transplant in setting of low platelets - believe this is likely his new baseline     MSK:  # weakness and deconditioning of critical illness   - PT/OT   - mobilize     Prophylaxis:    - DVT: straight rate heparin, SCD's  - GI: protonix     Lines/ tubes/ drains:  - L fem art line, L PICC, NJ, NG, PIVs, groin central cath, RIJ MAC    Disposition:  - SICU      ====================================    SUBJECTIVE:   Mr. Miles had decreasing MAPs overnight, initially treated with three albumin boluses without  response. Increased norepinephrine was required with continued decompensation. Norepinephrine was changed to phenylephrine due to tachycardia and vasopressin added for additional pressor requirements. Transplant team was paged and assessed patient at bedside, and asked for additional albumin boluses, amikacin and discontinued cellcept. A bedside ultrasound demonstrated abdominal free fluid. Pressor requirements continued to increase and on physical exam he had a potential acute abdomen. These findings were communicated with the Transplant team who elected to take the patient emergently to the OR for ex-lap.        OBJECTIVE:   1. VITAL SIGNS:   Temp:  [96.4  F (35.8  C)-98.4  F (36.9  C)] 97  F (36.1  C)  Heart Rate:  [] 124  Resp:  [13-37] 29  BP: ()/(23-83) 90/48  MAP:  [35 mmHg-84 mmHg] 54 mmHg  Arterial Line BP: ()/(28-71) 62/47  SpO2:  [96 %-100 %] 96 %  Resp: 29    2. INTAKE/ OUTPUT:   I/O last 3 completed shifts:  In: 6420.14 [I.V.:1609.14; Other:16; NG/GT:470; IV Piggyback:2000]  Out: 2630 [Urine:785; Emesis/NG output:1250; Other:595]    3. PHYSICAL EXAMINATION:   General: Intubated, sedated  Neuro: intubated, sedated.  Resp: Mechanically ventilated.  CV: Regular rhythm, tachycardic  Abdomen: severe distension, tenderness to palpation, tense  Incisions: abdominal incision with wound vac,  no drainage or bleeding  Extremities: warm and well perfused, peripheral edema in bilateral hands and bilateral LE to thighs    4. INVESTIGATIONS:   Arterial Blood Gases     Recent Labs  Lab 08/17/18 0913   PH 7.35   PCO2 35   PO2 75*   HCO3 20*     Complete Blood Count     Recent Labs  Lab 08/17/18  0913 08/17/18  0407 08/16/18  2221 08/16/18  1532 08/16/18  0951   WBC  --  56.0* 44.2* 31.7* 21.6*   HGB 8.6* 10.7* 12.3* 11.6* 9.3*   PLT  --  92* 101* 89* 72*     Basic Metabolic Panel    Recent Labs  Lab 08/17/18  0913 08/17/18  0407 08/16/18  2221 08/16/18  1532 08/16/18  0951    139 142 142 144    POTASSIUM 3.9 4.3 4.0 3.9 3.3*   CHLORIDE  --  107 113* 115* 115*   CO2  --  19* 20 22 20   BUN  --  40* 39* 38* 37*   CR  --  0.89 0.78 0.83 0.84   * 152* 192* 99 123*     Liver Function Tests    Recent Labs  Lab 08/17/18  0407 08/16/18  2221 08/16/18  1532 08/16/18  0951  08/15/18  0326  08/13/18  0409  08/11/18  0953   AST 30 22 25 18  < > 26  < > 30  < >  --    ALT 16 13 16 15  < > 20  < > 25  < >  --    ALKPHOS 192* 162* 204* 206*  < > 251*  < > 228*  < >  --    BILITOTAL 1.7* 1.6* 1.1 1.1  < > 1.4*  < > 1.6*  < >  --    ALBUMIN 1.4* 1.4* 1.1* 1.0*  < > 1.6*  < > 1.7*  < >  --    INR  --   --   --   --   --  1.47*  --  1.41*  --  1.39*   < > = values in this interval not displayed.  Pancreatic Enzymes  No lab results found in last 7 days.  Coagulation Profile    Recent Labs  Lab 08/17/18  0407 08/16/18  0329 08/15/18  0326 08/13/18  0409 08/11/18  0953   INR  --   --  1.47* 1.41* 1.39*   PTT 95* 51* 51*  --   --      Lactate  Invalid input(s): LACTATE    5. RADIOLOGY:   No results found for this or any previous visit (from the past 24 hour(s)).    =========================================      Patient seen, findings and plan discussed with surgical ICU staff Dr. Holland.    Carlos Castellanos  Medical Student, MS4  [employer]    See [facility] for on-call pager information.    Eddie Acuna MD  CA-2/PGY-3

## 2018-08-17 NOTE — PHARMACY-AMINOGLYCOSIDE DOSING SERVICE
Pharmacy Aminoglycoside Initial Note  Date of Service 2018  Patient's  1975  43 year old, male    Weight (Actual):  69.4 kg    Indication: Intra-abdominal Infection    Current estimated CrCl = Estimated Creatinine Clearance: 122.1 mL/min (based on Cr of 0.75).    Creatinine for last 3 days  2018:  4:25 PM Creatinine 1.71 mg/dL; 10:03 PM Creatinine 1.58 mg/dL  8/15/2018:  3:26 AM Creatinine 1.46 mg/dL;  9:52 AM Creatinine 1.19 mg/dL;  4:06 PM Creatinine 1.11 mg/dL; 11:13 PM Creatinine 1.03 mg/dL  2018:  3:29 AM Creatinine 1.16 mg/dL;  9:51 AM Creatinine 0.84 mg/dL;  3:32 PM Creatinine 0.83 mg/dL; 10:21 PM Creatinine 0.78 mg/dL  2018:  4:07 AM Creatinine 0.89 mg/dL; 11:34 AM Creatinine 0.75 mg/dL     Nephrotoxins and other renal medications (Future)    Start     Dose/Rate Route Frequency Ordered Stop    18 0800  phenylephrine (JEFF-SYNEPHRINE) 200 mg in sodium chloride 0.9 % 250 mL infusion      0.5-6 mcg/kg/min × 60.7 kg (Dosing Weight)  2.3-27.3 mL/hr  Intravenous CONTINUOUS 18 0755      18 0701  amikacin (AMIKIN) [place] - receiving intermittent dosing      1 each Injection SEE ADMIN INSTRUCTIONS 18 0701      18 1900  vasopressin (VASOSTRICT) 40 Units in D5W 40 mL infusion      2.4 Units/hr  2.4 mL/hr  Intravenous CONTINUOUS 18 1848      08/15/18 0800  tacrolimus (GENERIC EQUIVALENT) suspension 1.25 mg      1.25 mg Oral EVERY MORNING. 18 1606      18 1800  tacrolimus (GENERIC EQUIVALENT) suspension 1.25 mg      1.25 mg Oral or Feeding Tube EVERY EVENING. 18 1338      18 0945  norepinephrine (LEVOPHED) 16 mg in D5W 250 mL infusion      0.03-0.6 mcg/kg/min × 60.7 kg (Dosing Weight)  1.7-34.1 mL/hr  Intravenous CONTINUOUS 18 0937            Contrast Orders - past 72 hours (72h ago through future)    Start     Dose/Rate Route Frequency Ordered Stop    08/15/18 1030  iopamidol (ISOVUE-370) solution 93 mL      93 mL  Intravenous ONCE 08/15/18 1017 08/15/18 1041          Aminoglycoside Levels - past 2 days  8/17/2018: 11:34 AM Amikacin Level 12 mg/L    Aminoglycosides IV Administrations (past 72 hours)                   amikacin (AMIKIN) 500 mg in D5W 100 mL intermittent infusion (mg) 500 mg New Bag 08/17/18 0900                    Plan:  1.  Start Amikacin 500 mg (7.2 mg/kg) IV once, then intermittent dosing (on CRRT).   2.  Target goals based on conventional dosing  3.  Goal peak level: 20-25 mg/L  4.  Goal trough level: <5 mg/L four hours before the dose  5.  Pharmacy will continue to follow and check levels as appropriate in 1-3 Days      Toyin Adair, RichD

## 2018-08-17 NOTE — PLAN OF CARE
Problem: Patient Care Overview  Goal: Plan of Care/Patient Progress Review  4AB-PT: CXL: Pt planned for OR most of day. Will not be appropriate for therapy today. Will continue to follow per POC and initiate as appropriate post-op.

## 2018-08-17 NOTE — ANESTHESIA CARE TRANSFER NOTE
Patient: Yaneli Miles    Procedure(s):  Return Liver Transplant, explore laparotomy    Diagnosis: dead bowel  Diagnosis Additional Information: No value filed.    Anesthesia Type:   General, ETT     Note:  Airway :ETT and Ventilator  Patient transferred to:ICU (4A)  Comments: Pt transported with  Monitor, zoll, ambu, and O2. Uneventful transport and transfer of care to ICU RN. Pt stable in ICU upon transfer of care. ICU Handoff: Call for PAUSE to initiate/utilize ICU HANDOFF, Identified Patient, Identified Responsible Provider, Reviewed the Pertinent Medical History, Discussed Surgical Course, Reviewed Intra-OP Anesthesia Management and Issues during Anesthesia, Set Expectations for Post Procedure Period and Allowed Opportunity for Questions and Acknowledgement of Understanding      Vitals: (Last set prior to Anesthesia Care Transfer)    CRNA VITALS  8/17/2018 1027 - 8/17/2018 1124      8/17/2018             EKG: Sinus tachycardia                Electronically Signed By: BUD Mendez CRNA  August 17, 2018  11:24 AM

## 2018-08-17 NOTE — PROGRESS NOTES
[facility]  Transplant Infectious Disease Progress Note      Patient:  Yaneli Miles, Date of birth 1975, Medical record number [id number]  Date of Visit:  08/17/2018         Assessment and Recommendations:   Recommendations:  - Since ceftaroline has limited gram negative activity including Pseudomonas, we agree with adding new gram neg coverage, as has been done in the form of amikacin.  - Since amikacin is strictly a gram negative drug, would be in favor of adding flagyl too.  - Await results of operative intervention to take place later this morning.   - Continue ceftaroline with dialysis dosing per ICU pharmacy as pt is on CRRT.  - Continue tigecycline 50 mg q12h, with ICU pharmacy to re-dose based on any changes in liver function.  - Continue daptomycin    Transplant Infectious Disease will continue to follow with you.    Assessment:  43 year old male s/p Liver transplant on 7/21 whose post-op course was complicated by small bowel ischemia requiring multiple trips to OR for washouts and jejunal resection and anastomosis, left lobe of the liver ischemia, splenic infarcts, VRE infected intra-abdominal hematoma and blood clots, and VRE bacteremia.  Infectious Disease issues include:  - Hepatic abscess per 8/15/2018 CT imaging, possible source of continuing VRE bacteremia. He has hypotension and increasing abd pain, medicated with Oxycodone, Dilaudid, Phenylephrine, Albumin and LR w/ no response in BP or HR. Lactate is climbing. He will be going back emergently to the OR this morning for ischemic bowel. Since ceftaroline has limited gram negative activity including Pseudomonas, we agree with adding new gram neg coverage, as has been done in the form of amikacin. Since amikacin is strictly a gram negative drug, would be in favor of adding flagyl too. Await results of operative intervention to take place later this morning.   - Persistent VRE bacteremia. Despite being on daptomycin for 16  days, he continues to have positive blood cultures with latest culture from 8/15 growing. His WBC count is climbing, probably not related to VRE. He is on combination therapy with daptomycin & ceftaroline, as described in In vitro activity of daptomycin in combination with ß-lactams, gentamicin, rifampin, and tigecycline against daptomycin-nonsusceptible enterococci, where there was an apparent synergistic effect to combining the two. In the event that daptomycin is inactivated by surfactant in the lungs by an occult pulmonary process, tigecycline is also part of his regimen.   - QTc interval:462 on 7/30/18  - Pneumocystis prophylaxis:TMP/SMX  - Viral serostatus D/R & prophylaxis: HCV negative/reactive, HBVnegative/reactive, CMV -/+, EBV+/+, valgancicilovir ppx  - Fungal prophylaxis: micafungin  - Gamma globulin status: Never done  - Isolation status: Contact, Good hand hygiene.    Nicky Kumar MD. Pager [phone].         Interval History:   Since Yaneli was last seen by ID on 8/15/2018, he has hypotension and increasing abd pain, medicated with Oxycodone, Dilaudid, Phenylephrine, Albumin and LR w/ no response in BP or HR. Lactate is climbing. He will be going back emergently to the OR this morning for ischemic bowel. WBC continues to rise, with BCx showing only gram positive organisms. CRRT disconnected in anticipation of OR. No fever, but blood cultures continue to be drawn.     Review of Systems:   Unable to obtain review of systems          Current Medications & Allergies:       amikacin  7.5 mg/kg Intravenous Once     amikacin (AMIKIN) place de la cruz - receiving intermittent dosing  1 each Injection See Admin Instructions     amphotericin B 10 mg in sterile water 1000 mL   Irrigation Once     aspirin  81 mg Oral or Feeding Tube Daily     ceFAZolin 1 g, gentamicin 80 mg, bacitracin 50,000 units in 1000 mL saline   Irrigation Once     ceftaroline (TEFLARO) intermittent infusion  400 mg Intravenous Q12H      DAPTOmycin (CUBICIN) intermittent infusion  12 mg/kg Intravenous Q24H     lipids  250 mL Intravenous Once per day on Mon Wed Fri     micafungin  100 mg Intravenous Q24H     midodrine  10 mg Per Feeding Tube Q8H     oxyCODONE IR  5 mg Oral or Feeding Tube Q4H     pantoprazole  40 mg Oral or Feeding Tube BID     sodium chloride (PF)  3 mL Intravenous Q8H     sodium chloride         sulfamethoxazole-trimethoprim  10 mL Oral or Feeding Tube Daily     tacrolimus  1.25 mg Oral QAM     tacrolimus  1.25 mg Oral or Feeding Tube QPM     tenofovir  300 mg Per Feeding Tube Q48H     tigecycline (TYGACIL) intermittent infusion  50 mg Intravenous Q12H     valGANciclovir  450 mg Oral or NG Tube Every Other Day    Or     valGANciclovir  450 mg Oral Every Other Day       Infusions/Drips:    IV fluid REPLACEMENT ONLY       dialysate for CVVHD & CVVHDF (Phoxillum BK4/2.5) 12.5 mL/kg/hr (08/17/18 0345)     heparin 500 Units/hr (08/16/18 1133)     insulin (regular) Stopped (08/16/18 1800)     lactated ringers 10 mL/hr at 07/30/18 1519     - MEDICATION INSTRUCTIONS -       norepinephrine Stopped (08/17/18 0430)     parenteral nutrition - ADULT compounded formula 45 mL/hr at 08/16/18 1945     phenylephrine IV infusion ADULT 4.3 mcg/kg/min (08/17/18 0725)     replacement solution for CVVHD & CVVHDF (Phoxillum BK4/2.5) 200 mL/hr at 08/16/18 1320     replacement solution for CVVHD & CVVHDF (Phoxillum BK4/2.5) 12.5 mL/kg/hr (08/17/18 0344)     Reason beta blocker order not selected       vasopressin (PITRESSIN) infusion ADULT (40 mL) 2.4 Units/hr (08/17/18 0458)       Allergies   Allergen Reactions     Nsaids      Contraindicated             Physical Exam:   Vitals were reviewed.  All vitals stable.  Patient Vitals for the past 24 hrs:   BP Temp Temp src Resp SpO2   08/17/18 0645 90/48 - - 29 96 %   08/17/18 0630 (!) 83/48 - - 30 97 %   08/17/18 0615 (!) 88/49 - - 29 96 %   08/17/18 0600 (!) 91/37 - - (!) 32 98 %   08/17/18 0545 91/58 - - 27  98 %   08/17/18 0530 98/62 - - (!) 31 97 %   08/17/18 0515 103/41 - - 30 98 %   08/17/18 0500 (!) 71/47 - - 30 98 %   08/17/18 0445 (!) 122/23 - - (!) 32 98 %   08/17/18 0430 91/61 - - 23 99 %   08/17/18 0415 99/57 - - 27 98 %   08/17/18 0400 107/61 97  F (36.1  C) Axillary 28 98 %   08/17/18 0345 122/67 - - 24 97 %   08/17/18 0330 108/76 - - 26 99 %   08/17/18 0315 93/58 - - 22 99 %   08/17/18 0300 (!) 83/54 - - 23 98 %   08/17/18 0245 91/49 - - 23 98 %   08/17/18 0230 (!) 68/26 - - 28 99 %   08/17/18 0215 102/63 - - 26 98 %   08/17/18 0200 (!) 84/40 - - 23 99 %   08/17/18 0145 113/78 - - 30 98 %   08/17/18 0130 105/62 - - (!) 37 99 %   08/17/18 0115 (!) 82/58 - - 24 99 %   08/17/18 0100 (!) 88/76 - - 24 99 %   08/17/18 0045 112/70 - - 27 98 %   08/17/18 0030 (!) 89/62 - - 22 99 %   08/17/18 0015 (!) 81/68 - - 22 99 %   08/17/18 0000 94/83 97.4  F (36.3  C) Axillary 23 99 %   08/16/18 2345 94/62 - - 22 100 %   08/16/18 2330 114/83 - - 27 100 %   08/16/18 2315 119/59 - - 21 100 %   08/16/18 2300 116/54 - - 30 100 %   08/16/18 2245 91/49 - - 27 100 %   08/16/18 2230 (!) 70/59 - - 21 100 %   08/16/18 2215 - - - 28 99 %   08/16/18 2200 - - - 27 100 %   08/16/18 2145 - - - 23 99 %   08/16/18 2130 - - - 27 100 %   08/16/18 2115 - - - (!) 33 99 %   08/16/18 2100 - - - 24 98 %   08/16/18 2045 - - - 26 97 %   08/16/18 2030 - - - 24 98 %   08/16/18 2015 - - - 23 99 %   08/16/18 2000 (!) 83/62 97.5  F (36.4  C) Axillary 26 98 %   08/16/18 1945 92/53 - - 24 97 %   08/16/18 1930 93/68 - - 23 99 %   08/16/18 1915 (!) 70/52 - - 21 99 %   08/16/18 1900 (!) 85/45 - - 26 98 %   08/16/18 1845 (!) 82/57 - - 23 99 %   08/16/18 1830 (!) 75/48 - - 26 98 %   08/16/18 1815 (!) 74/47 - - 28 100 %   08/16/18 1800 (!) 66/46 97.3  F (36.3  C) Axillary 24 100 %   08/16/18 1745 (!) 68/43 - - 26 99 %   08/16/18 1730 (!) 69/31 - - 27 100 %   08/16/18 1715 (!) 76/55 - - 21 -   08/16/18 1700 - - - 21 100 %   08/16/18 1600 - 96.4  F (35.8  C)  Axillary 20 98 %   08/16/18 1500 - - - 17 100 %   08/16/18 1400 - - - 19 97 %   08/16/18 1300 - - - 14 100 %   08/16/18 1200 - 97.6  F (36.4  C) Axillary 13 100 %   08/16/18 1145 - - - 20 100 %   08/16/18 1130 - - - 18 100 %   08/16/18 1115 - - - 25 99 %   08/16/18 1100 - - - 15 100 %   08/16/18 1045 - - - 18 100 %   08/16/18 1000 - 98.4  F (36.9  C) Axillary 16 100 %   08/16/18 0945 - - - 13 100 %   08/16/18 0930 - - - 16 100 %   08/16/18 0900 - - - 13 100 %   08/16/18 0830 - - - 14 100 %     Ranges for vital signs:  Temp:  [96.4  F (35.8  C)-98.4  F (36.9  C)] 97  F (36.1  C)  Heart Rate:  [] 124  Resp:  [13-37] 29  BP: ()/(23-83) 90/48  MAP:  [35 mmHg-84 mmHg] 54 mmHg  Arterial Line BP: ()/(28-71) 62/47  SpO2:  [96 %-100 %] 96 %  Vitals:    08/13/18 0500 08/14/18 0000 08/15/18 0000   Weight: 69.1 kg (152 lb 5.4 oz) 68.8 kg (151 lb 10.8 oz) 69.4 kg (153 lb)       Physical Examination:   GENERAL:  Tired appearing man lying in bed  HEAD:  Head is normocephalic, atraumatic, temporal wasting  EYES:  Eyes have muddy sclerae, rolling eyes back under upper lids when resting.  ENT:  Oropharynx is somewhat tacky without exudates or ulcers, with accumulation of saliva.   NECK:  Supple.   LUNGS: Anterior coarse breath sounds with no wheezes or rhonchi   CARDIOVASCULAR:  Tachycardic rate and rhythm, without murmur  ABDOMEN:  Distended, tympanitic.   SKIN:  No acute rashes.  Lines in place without any surrounding erythema or exudate.         Laboratory Data:     Metabolic Studies       Recent Labs   Lab Test  08/17/18   0407  08/17/18   0246  08/16/18   2221   08/16/18   0329   07/13/18 2050 07/13/18   0334   NA  139   --   142   < >  140   < >   --    < >  132*   POTASSIUM  4.3   --   4.0   < >  3.9   < >   --    < >  2.6*   CHLORIDE  107   --   113*   < >  108   < >   --    < >  101   CO2  19*   --   20   < >  23   < >   --    < >  20   ANIONGAP  12   --   9   < >  10   < >   --    < >  12   BUN  40*    --   39*   < >  52*   < >   --    < >  30   CR  0.89   --   0.78   < >  1.16   < >   --    < >  2.08*   GFRESTIMATED  >90   --   >90   < >  68   < >   --    < >  35*   GLC  152*   --   192*   < >  160*   < >   --    < >  104*   A1C   --    --    --    --    --    --   5.4   --    --    JESUS  7.3*   --   6.6*   < >  8.1*   < >   --    < >  8.1*   PHOS  4.1   --   3.6   < >  3.5   < >   --    --    --    MAG  2.2   --   2.1   < >  2.4*   < >   --    --    --    LACT  4.3*  2.8*  1.8   --    --    < >  3.9*   --   1.1   PCAL   --    --    --    --    --    --    --    --   0.89   CKT  34   --    --    --   33   < >   --    --    --     < > = values in this interval not displayed.       Hepatic Studies    Recent Labs   Lab Test  08/17/18   0407  08/16/18   2221  08/16/18   1532   07/13/18   0334   06/21/18   0612   BILITOTAL  1.7*  1.6*  1.1   < >  36.3*   < >  23.4*   DBIL  1.0*   --    --    < >  28.1*   < >   --    ALKPHOS  192*  162*  204*   < >  96   < >  78   PROTTOTAL  3.1*  3.0*  3.2*   < >  5.8*   < >  5.1*   ALBUMIN  1.4*  1.4*  1.1*   < >  2.8*   < >  2.9*   AST  30  22  25   < >  287*   < >  124*   ALT  16  13  16   < >  125*   < >  79*   LDH   --    --    --    --   259*   --   177    < > = values in this interval not displayed.       Pancreatitis testing    Recent Labs   Lab Test  08/13/18   1048   07/22/18   0354  07/20/18   1741  07/12/18   1913   06/16/18   1815   AMYLASE   --    --   42  60  35   --    --    LIPASE   --    --   75   --    --    --   260   TRIG  173*   < >   --    --    --    < >   --     < > = values in this interval not displayed.       Hematology Studies      Recent Labs   Lab Test  08/17/18   0407  08/16/18   2221  08/16/18   1532  08/16/18   0951  08/16/18   0329  08/15/18   2313   WBC  56.0*  44.2*  31.7*  21.6*  25.5*  27.3*   ANEU  53.5*  41.9*  27.7*  21.4*  24.4*  24.7*   ALYM  1.0  0.4*  0.8  0.0*  0.0*  0.0*   MARCELA  1.0  1.1  1.4*  0.2  0.4  1.7*   AEOS  0.0  0.0  0.3  0.0   0.0  0.7   HGB  10.7*  12.3*  11.6*  9.3*  10.0*  10.1*   HCT  32.6*  36.6*  35.2*  28.3*  30.1*  30.2*   PLT  92*  101*  89*  72*  94*  111*       Clotting Studies    Recent Labs   Lab Test  08/17/18   0407   08/15/18   0326  08/13/18   0409  08/11/18   0953   08/08/18   1929   INR   --    --   1.47*  1.41*  1.39*   --   1.36*   PTT  95*   < >  51*   --    --    < >  34    < > = values in this interval not displayed.       Iron Testing    Recent Labs   Lab Test  08/17/18 0407 07/13/18 0334 06/17/18 2138   IRON   --    --   97   --   66   FEB   --    --   117*   --   61*   IRONSAT   --    --   83*   --   109*   ANGELLA   --    --   Unsatisfactory specimen - icteric   --    --    MCV  89   < >  81   < >   --    FOLIC   --    --   10.5   --    --    B12   --    --   Canceled, Test credited   --    --    HAPT   --    --   13*   < >   --    RETP   --    --   1.6   < >   --    RETICABSCT   --    --   46.0   < >   --     < > = values in this interval not displayed.       Markers    Alpha Fetoprotein   Date Value Ref Range Status   07/11/2018 Unsatisfactory specimen - icteric 0 - 8 ug/L Final     Comment:     Canceled, Test credited  NOTIFIED DR PRINCE QUIROS MD [facility] 1959 7/11/2018 BY AA         Arterial Blood Gas Testing    Recent Labs   Lab Test  08/08/18   1705  08/06/18   1323  08/06/18   1200  08/06/18   1120  08/03/18   0844   PH  7.38  7.32*  7.36  7.39  7.32*   PCO2  37  39  37  37  33*   PO2  126*  178*  142*  164*  199*   HCO3  22  20*  21  22  17*   O2PER  60%  50.0  48.0  50.0  57.0        Thyroid Studies     Recent Labs   Lab Test  06/17/18 2138   TSH  0.77  0.77   T4  Unsatisfactory specimen - icteric       Urine Studies     Recent Labs   Lab Test  07/27/18   2330  07/13/18   1315  06/18/18   1200  06/16/18   2220   URINEPH  7.5*  6.0  5.0  5.5   NITRITE  Negative  Negative  Negative  Negative   LEUKEST  Negative  Negative  Negative  Large*   WBCU  3  None  2  66*       Medication levels    Recent Labs    Lab Test  08/16/18   0737   06/18/18   1230   VANCOMYCIN   --    --   16.8   TACROL  8.6   < >   --     < > = values in this interval not displayed.       Body fluid stats    Recent Labs   Lab Test  08/12/18   1217   07/15/18   1115  07/13/18   1240  06/28/18   1111   FTYP   --    --   Ascites  Ascites  Ascites   FCOL   --    --   Bloody  Red  Yellow   FAPR   --    --   Cloudy  Cloudy  Slightly Cloudy   FWBC   --    --   245  373  234   FNEU   --    --   3  28  6   FLYM   --    --   19  11  24   FMONO   --    --   78   --    --    FALB   --    --    --   1.0   --    FTP   --    --    --   1.6   --    GS  No organisms seen  Many  WBC'S seen  predominantly PMN's    Many RBCs seen   < >  No organisms seen  Few  WBC'S seen  predominantly mononuclear cells    No organisms seen  Few  WBC'S seen  PMNs seen    Many  Red blood cells seen     --     < > = values in this interval not displayed.       Microbiology:  Last Culture results with specimen source  Culture Micro   Date Value Ref Range Status   08/17/2018 No growth after 2 hours  Preliminary   08/17/2018 No growth after 3 hours  Preliminary   08/15/2018 (A)  Preliminary    Cultured on the 1st day of incubation:  Gram positive cocci in pairs and chains     08/15/2018   Preliminary    Critical Value/Significant Value, preliminary result only, called to and read back by  Marta Staples RN 0600 8/16/18. [facility]     08/15/2018 (A)  Preliminary    Cultured on the 1st day of incubation:  Gram positive cocci in pairs and chains     08/15/2018   Preliminary    Critical Value/Significant Value, preliminary result only, called to and read back by  Audrey Last on 4A on 18.16.18 at 1058. CHIO/SG     08/14/2018 (A)  Final    Cultured on the 1st day of incubation:  Enterococcus faecium (VRE)  Susceptibility testing done on previous specimen     08/14/2018   Final    Critical Value/Significant Value, preliminary result only, called to and read back by  KASI ROSADO RN @0429  8/15/18. [facility] [place]     08/14/2018 (A)  Final    Cultured on the 1st day of incubation:  Enterococcus faecium (VRE)  Susceptibility testing done on previous specimen     08/14/2018   Final    Critical Value/Significant Value, preliminary result only, called to and read back by  KASI GUTIERREZ RN @0020 8/15/18. [facility] [place]     08/13/2018 (A)  Final    Cultured on the 1st day of incubation:  Enterococcus faecium (VRE)  Susceptibility testing done on previous specimen     08/13/2018   Final    Critical Value/Significant Value, preliminary result only, called to and read back by  Latrice Marshall RN, @2120 08/13/18..     08/13/2018 (A)  Final    Cultured on the 1st day of incubation:  Enterococcus faecium (VRE)  Susceptibility testing done on previous specimen     08/13/2018   Final    Critical Value/Significant Value, preliminary result only, called to and read back by  Latrice Kramer RN, @2147 08/13/18..     08/12/2018 Light growth  Enterococcus faecium (VRE)   (A)  Final   08/12/2018   Final    Critical Value/Significant Value, preliminary result only, called to and read back by  Darlene Ramos RN on UU4A at 1439 8/13/18 SRQ     08/12/2018 Culture negative after 4 days  Preliminary   08/12/2018 Culture negative monitoring continues  Preliminary   08/12/2018 (A)  Final    Cultured on the 1st day of incubation:  Enterococcus faecium (VRE)     08/12/2018   Final    Critical Value/Significant Value, preliminary result only, called to and read back by  Ruddy Coppola RN at 2200 8.12.18.     08/12/2018 Susceptibility testing done on previous specimen  Final    Specimen Description   Date Value Ref Range Status   08/17/2018 Blood Left Arm  Final   08/17/2018 Blood Right Hand  Final   08/15/2018 Blood Right Hand  Final   08/15/2018 Blood LEFT PIV  Final   08/14/2018 Blood Right Hand  Final   08/14/2018 Blood Arterial blood  Final   08/13/2018 Blood Right Hand  Final   08/13/2018 Blood Arterial blood  Final   08/12/2018 Abdominal Fluid   Final   08/12/2018 Abdominal Fluid  Final   08/12/2018 Abdominal Fluid  Final   08/12/2018   Final    Abdominal Fluid Not received in an anaerobic transport container.   08/12/2018 Blood Right Hand  Final   08/12/2018 Blood Arterial blood  Final   08/11/2018 Blood  Final   08/11/2018 Blood White port  Final   08/11/2018 Blood Right Hand  Final   08/11/2018 Blood Arterial line  Final   08/09/2018 Blood Left Arm  Final        Infectious Disease Testing     Recent Labs   Lab Test  06/18/18   0440   TBRSLT  Negative       Virology:  Hepatitis B Testing     Recent Labs   Lab Test  08/10/18   0349  07/24/18   0446  07/23/18   0817   07/20/18   1741  07/12/18   1913   AUSAB   --   551.06*   --    --   0.00  0.00   HBCAB   --    --    --    --   Reactive*  Reactive*   HEPBANG  Nonreactive   --   Nonreactive   < >   --    --    HBCM   --    --    --    --   Nonreactive  Nonreactive    < > = values in this interval not displayed.        Imaging:  Recent Results (from the past 48 hour(s))   XR Chest Port 1 View    Narrative    Exam: XR CHEST PORT 1 VW, 8/15/2018 8:52 AM    Indication: Evaluation for possible source of bacteremia;     Comparison: Chest x-ray 8/9/2018    Findings:   Portable supine AP radiograph of the chest. Interval removal of the  endotracheal tube and right IJ central venous catheter. Left upper  extremity with PICC with tip projecting over the right atrium. Enteric  tubes course below the diaphragm, their tips project beyond the  field-of-view. The cardiomediastinal silhouette is stable. The  pulmonary vasculature is mildly indistinct. Low lung volumes.  Perihilar and bibasilar opacities. No pneumothorax. No pleural  effusion. Surgical clips the right upper quadrant.      Impression    Impression:   1. Interval removal of the endotracheal tube and right IJ central  venous catheter.   2. Persistent low lung volumes and perihilar and bibasilar opacities,  likely atelectasis.    I have personally reviewed the  examination and initial interpretation  and I agree with the findings.    NICANOR MARIN MD   CT Abdomen Pelvis w Contrast   Result Value    Radiologist flags Hepatic abscess (Urgent)    Narrative    EXAMINATION: CT abdomen and pelvis with contrast, 8/15/2018 10:49 AM    TECHNIQUE:  Helical CT images from the lung bases through the  symphysis pubis were obtained with contrast.  Coronal reformatted  images were generated at a workstation for further assessment.    CONTRAST:  93 cc Isovue 370 IV.    COMPARISON: 8/11/2018.    HISTORY: Leukocytosis, evaluate for abscess    FINDINGS:    Abdomen and pelvis:   Postsurgical changes of orthotopic liver transplantation.  Redemonstration of hypoenhancement hepatic segments 3, 4B, and 6.  There is a complex fluid collection replacing the parenchyma of the  inferior left hepatic lobe and extending inferiorly. This collection  measures 8.9 x 4.6 cm.    Unchanged intrahepatic biliary dilation, greatest in the left hepatic  lobe. There is a right sided biliary stent in place.    Multiple splenic infarcts. The pancreas, kidneys, and adrenal glands  are unremarkable.    Extensive small bowel dilation measuring up to 4.9 cm with a  transition point in the distal ileum. No pneumatosis or portal venous  gas. The colon is decompressed.    Large ascites. Abdominal wall defect with packing in place over the  right upper quadrant status post evacuation of abdominal wall  hematoma/infection.    Unchanged prominent retroperitoneal lymph nodes. There is an  indwelling Luis catheter with several foci of air in the bladder,  likely secondary to catheterization.    There is a filling defect in the left portal vein (series 3 image 50).    There is a smaller moderate left pleural effusion and trace right  pleural effusion. Atelectasis in the posterior lower lobes.    Bones and soft tissues: No suspicious osseous lesions. Diffuse  anasarca.      Impression    IMPRESSION:   1.  Findings concerning  for hepatic infarct involving the majority of  the left hepatic lobe with associated complex fluid collection along  inferior aspect, measuring up to 8.9 x 4.6 cm. The fluid collection  likely represents evolving hematoma. Superimposed infection cannot be  completely excluded in this patient with leukocytosis, although  thought less likely based on evolving nature of this process since  7/30/2018 and sonographic appearance from ultrasound exams dated  7/30/2018 and 8/13/2018.   2.  Continued diffuse dilation of the small bowel transition point in  the right lower quadrant (series 5 image [id number]). Findings concerning  for small bowel obstruction.  3.  Postsurgical changes of abdominal wall abscess/hematoma evacuation  with new soft tissue defect over the right upper quadrant and packing  in place. There is surrounding postoperative hematoma.  4.  Unchanged biliary dilation, left lobe dominant, with a right-sided  biliary stent in place.    [Access Center: Hepatic abscess]    This report will be copied to the [facility] to ensure a  provider acknowledges the finding. Access Center is available Monday  through Friday 8am-3:30 pm.     I have personally reviewed the examination and initial interpretation  and I agree with the findings.    MIR DURAN MD

## 2018-08-17 NOTE — PROGRESS NOTES
CRRT RESTART NOTE    Reason for Restart:  Return from OR     Patient s Vascular Access: R Fem CVC Catheter                   Placement Confirmed:  YES  Manufacture:  DILLAN  Model:  DILLAN  Length/Kiswahili Size:  DILLAN  Flush Volume:  1.5/1.5    DATA:  Procedure:  CVVHDF  Start Time:  1453  Machine#:  1  Filter:  M150  Blood Flow:   180 mL/min  Pre-Replacement Solution:  Phoxillum BK 4/2.5  Post-Replacement Solution:  Phoxillum BK 4/2.5   Dialysate Solution:  Phoxillum BK 4/2.5  Pre-Replacement Solution Rate:  900mL/hr  Post-Replacement Solution Rate:  200mL/hr  Dialysate Flow Rate:  900mL/hr  Patient Removal Rate:  100 mL/hr per bedside RN   Anticoagulation Type and Rate:  N/A    ASSESSMENT:  How Patient Tolerated Restart:  well  Vital Signs:  B/P: 93/69 (67), on vaso 2.4 and Levo 0.2. T: 97.5, HR: 96, R: 14, O2: 100% on min vent settings.     Initial Pressures:  Access:  -60  Filter:  111  Return:  66  TMP:  37  Change in Filter Pressure:  20    INTERVENTIONS:  Restarted CRRT per Dr. Ricardo's order to resume upon return from OR.     PLAN:  Continue CRRT at this time.   Nephrology was considering intermittent HD but pt's lactic julio and unstable currently.  Contact CRRT RN [phone] with concerns.

## 2018-08-17 NOTE — PROGRESS NOTES
Nephrology Progress Note  08/17/2018         Assessment & Recommendations:     Recommendations  1. Continue CRRT today with goal of matching I/O  2. Serial electrolyte monitoring  3. 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    Anuric BEN with creatinine elevation on CRRT  Patient with previous baseline Cr of 1.3-2, presented anuric BEN due to sepsis/hypotension/multiple sx/high CAROL drainage.   At this point, positive sign that he is making urine despite decompensation. Suspect contrast induced tubular injury is resolving.   Otherwise, critically ill, post-op and in shock - will plan to restart CRRT after OR for clearance.      Electrolytes  Acceptable.       Hypervolemia with relative intra vascular depletion  TBW up, but given shock, will match I/O for now.     OLT 7/12/18   Polymicrobial BSI due to ESBL E. coli and VRE E. Faecium  Shock  ESLD sec to hep B. Complicated by venous thrombosis, liver infarction, possible arterial thrombosis, bowel ischemia req bowel resection. OR this AM due to decompensation - bowel resected with wound vac placed. Persistent VRE bacteremia.  Currently on stress dose steroids, prograf and ABx. ID, Liver Tx on board.    DMII  On insulin gtt     Recommendations were communicated to primary team face to face    Seen and discussed with Dr. Radha Ricardo MD   [id number]    Interval History :   O/N, decompensation, OR this AM with bowel resectoin, wound vac placement. On 3 pressors at this time. Of note, continuing to make urine.    Review of Systems:   Unable to obtain    Physical Exam:   I/O last 3 completed shifts:  In: 6420.14 [I.V.:1609.14; Other:16;  NG/GT:470; IV Piggyback:2000]  Out: 2630 [Urine:785; Emesis/NG output:1250; Other:595]   BP 90/48  Pulse 121  Temp 97.6  F (36.4  C)  Resp 29  Wt 69.4 kg (153 lb)  SpO2 96%  BMI 24.69 kg/m2     GENERAL APPEARANCE: Ill appearing  EYES:  Sclerae icteric  HENT: NGT in place  PULM: air entry bilaterally with few crackles  CV: regular rhythm, tachycardic, no rub     -edema 3+ in LE bilaterally  GI: Wound Vac in place  INTEGUMENT: no cyanosis, no rash  NEURO: Sedated       Labs:   All labs reviewed by me  Electrolytes/Renal -   Recent Labs   Lab Test  08/17/18   1134  08/17/18   1010  08/17/18   0913  08/17/18   0407  08/16/18   2221   NA  138  134  135  139  142   POTASSIUM  3.6  3.6  3.9  4.3  4.0   CHLORIDE  108   --    --   107  113*   CO2  21   --    --   19*  20   BUN  38*   --    --   40*  39*   CR  0.75   --    --   0.89  0.78   GLC  311*  174*  153*  152*  192*   JESUS  6.1*   --    --   7.3*  6.6*   MAG  1.7   --    --   2.2  2.1   PHOS  4.2   --    --   4.1  3.6       CBC -   Recent Labs   Lab Test  08/17/18   1134  08/17/18   1010  08/17/18   0913  08/17/18   0407  08/16/18   2221   WBC  64.5*   --    --   56.0*  44.2*   HGB  8.7*  9.8*  8.6*  10.7*  12.3*   PLT  55*  57*   --   92*  101*       LFTs -   Recent Labs   Lab Test  08/17/18   1134  08/17/18   0407  08/16/18   2221   ALKPHOS  111  192*  162*   BILITOTAL  1.4*  1.7*  1.6*   ALT  24  16  13   AST  81*  30  22   PROTTOTAL  PENDING  3.1*  3.0*   ALBUMIN  1.5*  1.4*  1.4*       Iron Panel -   Recent Labs   Lab Test  07/13/18   0334  06/17/18   2138   IRON  97  66   IRONSAT  83*  109*   ANGELLA  Unsatisfactory specimen - icteric   --          Imaging:  All imaging studies reviewed by me.     Current Medications:    amikacin (AMIKIN) place [place] cruz - receiving intermittent dosing  1 each Injection See Admin Instructions     calcium chloride  1 g Intravenous Once     ceftaroline (TEFLARO) intermittent infusion  400 mg Intravenous Q12H     DAPTOmycin (CUBICIN)  intermittent infusion  12 mg/kg Intravenous Q24H     hydrocortisone sodium succinate PF  100 mg Intravenous Q8H     lipids  250 mL Intravenous Once per day on Mon Wed Fri     metroNIDAZOLE  500 mg Intravenous Q6H     micafungin  100 mg Intravenous Q24H     midodrine  10 mg Per Feeding Tube Q8H     oxyCODONE IR  5 mg Oral or Feeding Tube Q4H     pantoprazole  40 mg Oral or Feeding Tube BID     sodium chloride (PF)  3 mL Intravenous Q8H     sodium chloride         sulfamethoxazole-trimethoprim  10 mL Oral or Feeding Tube Daily     tacrolimus  1.25 mg Oral QAM     tacrolimus  1.25 mg Oral or Feeding Tube QPM     tenofovir  300 mg Per Feeding Tube Q48H     tigecycline (TYGACIL) intermittent infusion  50 mg Intravenous Q12H     valGANciclovir  450 mg Oral or NG Tube Every Other Day    Or     valGANciclovir  450 mg Oral Every Other Day       IV fluid REPLACEMENT ONLY       dialysate for CVVHD & CVVHDF (Phoxillum BK4/2.5) 12.5 mL/kg/hr (08/17/18 0345)     insulin (regular) 0.75 Units/hr (08/17/18 0804)     lactated ringers 10 mL/hr at 07/30/18 1519     - MEDICATION INSTRUCTIONS -       norepinephrine Stopped (08/17/18 0430)     parenteral nutrition - ADULT compounded formula 45 mL/hr (08/17/18 0900)     phenylephrine IV infusion ADULT 3 mcg/kg/min (08/17/18 0851)     replacement solution for CVVHD & CVVHDF (Phoxillum BK4/2.5) 200 mL/hr at 08/16/18 1320     replacement solution for CVVHD & CVVHDF (Phoxillum BK4/2.5) 12.5 mL/kg/hr (08/17/18 0344)     Reason beta blocker order not selected       vasopressin (PITRESSIN) infusion ADULT (40 mL) 2 Units/hr (08/17/18 0804)

## 2018-08-17 NOTE — PROGRESS NOTES
Pt requiring significantly increased vasopressor support overnight not responsive to albumin 250 x 3 earlier in the night. Was up to 0.33 of norepi and 2.4 of vasopressin. Suspected to be intravascularly dry. Responded some to 1 liter of LR with decreased norepi to 0.3. Continued to be tachycardic to 125. Given suspicion of intravascularly dry and tachy, switched to phenylephrine for less beta stimulation and higher fluid amount. Given another liter of LR with some response. A-line intermittently functioning and correlating with cuff pressures.    Pt in moderate amount of distress due to abdominal pain throughout night. Saturating well on room air and no increased work of breathing. Abdomen distended and tense.  Bedside US placed to observe significant amount of free fluid in belly, which has been known since Wednesday. No free air identified. NG output significantly decreased despite flushes and adequate return of fluid at times. Transplant notified of exam and pressor requirements.    Dr. Norwood at bedside to see pt and ordered 500cc albumin x 2, amikacin, stop cellcept, and formal US in AM to eval liver.     Chris Cerna MD  PGY-2 Surgery  pager [phone]

## 2018-08-17 NOTE — ANESTHESIA PROCEDURE NOTES
Arterial Line Procedure Note  Staff:     Anesthesiologist:  JAZZ TIPTON  Location: In OR Before Induction  Procedure Start/Stop Times:     patient identified, IV checked, risks and benefits discussed, informed consent, monitors and equipment checked and pre-op evaluation      Correct Patient: Yes      Correct Position: Yes      Correct Site: Yes      Correct Procedure: Yes      Correct Laterality:  Yes    Site Marked:  Yes  Line Placement:     Procedure:  Arterial Line    Insertion Site:  Brachial    Insertion laterality:  Left    Skin Prep: Chloraprep      Patient Prep: mask, sterile gloves and hat      Local skin infiltration:  None    Ultrasound Guided?: Yes      Artery evaluated via ultrasound confirming patency.   Using realtime imaging, the artery was punctured and the needle was observed entering the artery.      A permanent image is entered into patient's chart.      Catheter size:  20 gauge, Quick cath    Dressing:  Tegaderm    Complications:  None obvious    Arterial waveform: Yes      IBP within 10% of NIBP: Yes    Assessment/Narrative:      Attempt to place an arterial line on the right brachial artery. Unable to obtain thus decided to do an arterial line placement on the left brachial artery.

## 2018-08-17 NOTE — PLAN OF CARE
Problem: Liver Transplant (Adult)  Goal: Signs and Symptoms of Listed Potential Problems Will be Absent, Minimized or Managed (Liver Transplant)  Signs and symptoms of listed potential problems will be absent, minimized or managed by discharge/transition of care (reference Liver Transplant (Adult) CPG).   Outcome: Declining  VS per flow sheet.  Pt with hypotension and increasing abd pain.  Pt medicated w/ Oxycodone and Dilaudid.  Pt was on Levophed @0.33mcg/kg/min.  Dr. Cerna decided to change to Phenylephrine.  Currently running at 4.3mcg/kg/min.  Pt has been given 750mls of 5% Albumin and 2 liters of LR w/ no response in BP or HR.  Lactate is climbing, currently >4.  Dr. Barajas in to see pt.  Pt will be going back emergently to the OR this AM.  CRRT stopped and rinsed back. Will continue to monitor closely.

## 2018-08-17 NOTE — PROCEDURES
Procedure: Femoral arterial line    Sterile hand hygiene, gloves, hat and mask were used for the entirety of the procedure.     Technique: The left groin was positioned with partial flexion and external rotation to expose the groin. The site was prepped with 2% chlorhexidine. Using ultrasound imaging the vessel was punctured and the needle was observed entering the vessel. Using the Seldinger technique a 20 gauge 12 cm catheter was introduced into the vessel over a wire. The catheter was attached to the arterial line and a good waveforms were observed.    EBL: minimal    The patient tolerated the procedure well and there were no complications.      Dr. Holland was immediately available for the procedure.     Eddie Acuna MD  CA-2/PGY-3

## 2018-08-17 NOTE — ANESTHESIA POSTPROCEDURE EVALUATION
Patient: Yaneli Miles    Procedure(s):  Return Liver Transplant, explore laparotomy    Diagnosis:dead bowel  Diagnosis Additional Information: No value filed.    Anesthesia Type:  General, ETT    Note:  Anesthesia Post Evaluation    Patient location during evaluation: ICU  Patient participation: Unable to participate in evaluation secondary to underlying medical condition  Level of consciousness: obtunded/minimal responses  Pain management: adequate  Airway patency: patent  Cardiovascular status: acceptable  Respiratory status: acceptable  Hydration status: acceptable  PONV: none             Last vitals:  Vitals:    08/17/18 0630 08/17/18 0645 08/17/18 1230   BP: (!) 83/48 90/48    Pulse:      Resp: 30 29    Temp:   36.4  C (97.6  F)   SpO2: 97% 96%          Electronically Signed By: John Garcia DO  August 17, 2018  12:50 PM

## 2018-08-17 NOTE — PROGRESS NOTES
CRRT STATUS NOTE    DATA:  Time:  5:52 AM  Pressures WNL: yes  Filter Status:small clot at top of filter    Problems Reported/Alarms Noted:   None note4d    Supplies Present:yes stocked    ASSESSMENT:  Patient Net Fluid Balance:+ since 12midnight , RN unable to pull fluid due to b/p  Vital Signs:  ST 120s, RR 20-30s on room air MAPs 50s to 60,temp 97   Labs:  WBC 56, lactate increase from 2.8 to 4.3  Sicne last lucho  Goals of Therapy:  I=O goAL not met    INTERVENTIONS:   Pt on Ge gt at 4.0, vaso at 2.4 , bedside RN gave 5% albumin and 1 liter LR, pt having increase abd pain    PLAN:  Continue to meet goal as tolerated, Bedside RN states may go to OR for ex lap of abd pain and increase in WBC and lactate, MDs planned to give pt a HD run but staus up in IR AT THIS TIME, PLEASE CALL crrt resource for any questions or problems.

## 2018-08-17 NOTE — PROGRESS NOTES
CRRT STATUS NOTE    DATA:  Time:  5:11 PM  Pressures WNL:  YES  Filter Status:  WDL  Problems Reported/Alarms Noted:  None reported/ none noted  Supplies Present:  YES    ASSESSMENT:  Patient Net Fluid Balance:  8/15: -300mL, 8/16: +200mL, 8/17 thus far: +6.7L  Vital Signs:  B/P: 90/56 (63) on vaso 2.4 and 0.2 levo, T: 97.5, HR 99, R: 16, O2: 100% on min vent settings.   Labs:  WBC 31> 64.5. Lactic Acid 1.8 > 4.3 preop > 3.3, 3.4 postop.   Lytes: K 3.6, Mag 1.7, replacements ordered.  Coags: Plt 55. INR 3.81. Fibrinogen 91. Hgb stable.   Goals of Therapy:  I=O; meeting goals of therapy when CRRT running; but pt was off CRRT many hours today in OR so +6.7L currently. Bedside RN attempting to remove more than I=O to meet daily goal, if BP tolerates without increase in pressors.      INTERVENTIONS:   Returned from OR at 1300. Line then placed by MDs at bedside. CRRT restarted at 1453. 2 RNs at bedside frequently today. Care conference this evening.     PLAN:  Continue CRRT to meet goals of therapy.   Contact CRRT RN [phone] with concerns.

## 2018-08-17 NOTE — ANESTHESIA PREPROCEDURE EVALUATION
Anesthesia Evaluation     . Pt has had prior anesthetic. Type: General    No history of anesthetic complications          ROS/MED HX    ENT/Pulmonary: Comment: intubated      Neurologic:  - neg neurologic ROS     Cardiovascular: Comment: On norepi gtt    (+) ----. : . . . :. . Previous cardiac testing Echodate:07/20/18results:Left ventricular function, chamber size, wall motion, and wall thickness are normal.The EF is > 65%. LV cavity appears small. No regional wall motion abnormalities are seen. Right ventricular function, chamber size, wall motion, and thickness are normal. No significant valvular abnormalities seen, although not able to completely exclude endocarditis based on TTE. The inferior vena cava cannot be assessed. No pericardial effusion is present.date: results: date: results: date: results:          METS/Exercise Tolerance:     Hematologic: Comments: plt of 26k at 0300 08/03/18    (+) History of Transfusion Other Hematologic Disorder-thrombocytopenia      Musculoskeletal:         GI/Hepatic: Comment: S/p liver transplant 07/21/18 for chronic hep B, cirrhosis, esophageal varices, intubated prior to transplant for encephalopathy    Brought back to OR for emergent ex lap on 07/30, found to have mesenteric ischemia    (+) hepatitis type B, liver disease,       Renal/Genitourinary: Comment: Currently on CVVHD        Endo:     (+) type II DM .      Psychiatric:  - neg psychiatric ROS       Infectious Disease:  - neg infectious disease ROS       Malignancy:         Other:                     Physical Exam  Normal systems: cardiovascular and pulmonary    Airway   Comment: ETT    Dental     Cardiovascular       Pulmonary     Other findings: Intubated and ventilated with 30% FiO2  Awake and alert  Triple lumen catheter Right internal jugular  2 PIV  CRRT                    Anesthesia Plan      History & Physical Review  History and physical reviewed and following examination; no interval change.    ASA Status:   4 .    NPO Status:  > 8 hours    Plan for General and ETT with Intravenous induction. Maintenance will be Inhalation.    PONV prophylaxis:  Ondansetron (or other 5HT-3)  Additional equipment: 2nd IV and Arterial Line      Postoperative Care  Postoperative pain management:  IV analgesics.      Consents  Anesthetic plan, risks, benefits and alternatives discussed with:  Patient (consent in ICU w )..        Procedure: Procedure(s):  Return Liver Transplant, explore laparotomy    HPI: Yaneli Miles is a 43 year old male scheduled for ex-lap, possible small bowel resection.  Pt w h/o chronic Hep B cirrhosis, s/p liver transplant 07/21/18, was brought back to OR for emergent ex lap on 07/30 found to have mesenteric ischemia.  Relatively stable since, ICU slowly weaning off pressors - vaso off, norepi still running as of AM 08/03.  BEN - now on CVVHD.  Patient remaining intubated, to be brought down to OR for ex-lap, washout, possible small bowel resection.  Plan for GETA, standard ASA monitors, continued use of a-line and vascular access from ICU.    PMHx/PSHx:  Past Medical History:   Diagnosis Date     Choledocholithiasis      Chronic viral hepatitis B without delta agent and without coma (H) 6/14/2018     Cirrhosis of liver with ascites (H) 6/14/2018     Esophageal varices (H)     prior GIB     Hepatic encephalopathy (H)      Hepatitis delta with hepatitis B carrier state 06/14/2018    positive antigen     Portal vein thrombosis 6/14/2018 5/29/18 care everywhere US     SBP (spontaneous bacterial peritonitis) (H) 06/2018     Type 2 diabetes mellitus without complication, without long-term current use of insulin (H) 6/14/2018       Past Surgical History:   Procedure Laterality Date     ENDOSCOPIC RETROGRADE CHOLANGIOPANCREATOGRAM      with stone removal     ENDOSCOPIC ULTRASOUND UPPER GASTROINTESTINAL TRACT (GI) N/A 6/22/2018    Procedure: ENDOSCOPIC ULTRASOUND, ESOPHAGOSCOPY / UPPER GASTROINTESTINAL TRACT  (GI);  Esophagogastroduodenoscopy, removal of [place] tube, ENDOSCOPIC ULTRASOUND with embolization of gastric varices, placement of nasogastric tube;  Surgeon: Armando Kraft MD;  Location: [facility] OR     INCISION AND DRAINAGE ABDOMEN WASHOUT, COMBINED N/A 2018    Procedure: COMBINED INCISION AND DRAINAGE ABDOMEN WASHOUT;  liver washout and Abdominal Closure with Mesh.;  Surgeon: Cristino Che MD;  Location: [facility] OR     IRRIGATION AND DEBRIDEMENT ABDOMEN WASHOUT, COMBINED N/A 8/3/2018    Procedure: COMBINED IRRIGATION AND DEBRIDEMENT ABDOMEN WASHOUT;   Abdominal Wash Out;  Surgeon: Leonie Elizalde MD;  Location: [facility] OR     LAPAROTOMY EXPLORATORY N/A 2018    Procedure: LAPAROTOMY EXPLORATORY;  Exploratory Laparotomy, hematoma evacuation;  Surgeon: Stew Min MD;  Location: [facility] OR     RETURN LIVER TRANSPLANT N/A 2018    Procedure: RETURN LIVER TRANSPLANT;  liver bring back, exploratory laparotomy, bowel resection, primary anastomosis ;  Surgeon: Cristino Che MD;  Location: [facility] OR     TRANSPLANT LIVER RECIPIENT  DONOR N/A 2018    Procedure: TRANSPLANT LIVER RECIPIENT  DONOR;  Liver Transplant;  Surgeon: Stew Min MD;  Location: [facility] OR         No current facility-administered medications on file prior to encounter.   Current Outpatient Prescriptions on File Prior to Encounter:  acetaminophen (TYLENOL) 500 MG tablet Take 1 tablet (500 mg) by mouth every 6 hours as needed for mild pain or fever   ciprofloxacin (CIPRO) 500 MG tablet Take 1 tablet (500 mg) by mouth every 24 hours   ferrous sulfate (IRON) 325 (65 Fe) MG tablet Take 325 mg by mouth   insulin aspart (NOVOLOG PEN) 100 UNIT/ML injection Inject 1 Units Subcutaneous 3 times daily (with meals)   lactulose (CHRONULAC) 10 GM/15ML solution Take 30 mLs (20 g) by mouth 3 times daily Goal of 3-4 BMs per day   lidocaine (XYLOCAINE) 5 % ointment Apply topically every 4 hours as needed for moderate  pain   miconazole (MICATIN; MICRO GUARD) 2 % powder Apply topically 2 times daily   midodrine HCl 10 MG TABS Take 10 mg by mouth 3 times daily (with meals)   pantoprazole (PROTONIX) 40 MG EC tablet Take 1 tablet (40 mg) by mouth 2 times daily (before meals)   phosphorus tablet 250 mg (PHOSPHA 250 NEUTRAL) 250 MG per tablet Take 2 tablets (500 mg) by mouth 2 times daily   potassium chloride SA (KLOR-CON) 20 MEQ CR tablet Take 1 tablet (20 mEq) by mouth daily   rifaximin (XIFAXAN) 550 MG TABS tablet Take 1 tablet (550 mg) by mouth 2 times daily   simethicone (MI-ACID GAS RELIEF) 80 MG chewable tablet Take 80 mg by mouth   tenofovir (VIREAD) 300 MG tablet Take 300 mg by mouth       Social Hx:   Social History   Substance Use Topics     Smoking status: Never Smoker     Smokeless tobacco: Not on file     Alcohol use No       Allergies:   Allergies   Allergen Reactions     Nsaids      Contraindicated          NPO Status: Per ASA Guidelines    Labs:    Blood Bank:  Lab Results   Component Value Date    ABO B 08/02/2018    RH Pos 08/02/2018    AS Neg 08/02/2018     Labs:  BMP:  Recent Labs   Lab Test  08/17/18 0407   NA  139   POTASSIUM  4.3   CHLORIDE  107   CO2  19*   BUN  40*   CR  0.89   GLC  152*   JESUS  7.3*     LFTs:   Recent Labs   Lab Test  08/17/18 0407   PROTTOTAL  3.1*   ALBUMIN  1.4*   BILITOTAL  1.7*   ALKPHOS  192*   AST  30   ALT  16     CBC:   Recent Labs   Lab Test  08/17/18 0407   WBC  56.0*   RBC  3.66*   HGB  10.7*   HCT  32.6*   MCV  89   MCH  29.2   MCHC  32.8   RDW  20.9*   PLT  92*     Coags:  Recent Labs   Lab Test  08/17/18   0407   08/15/18   0326   INR   --    --   1.47*   PTT  95*   < >  51*   FIBR  141*   < >  210    < > = values in this interval not displayed.

## 2018-08-18 NOTE — PROGRESS NOTES
CRRT RESTART NOTE    Reason for Restart:  Return from OR  Error Code:      Patient s Vascular Access: Catheter                   Placement Confirmed:  YES  Manufacture:  DILLAN  Model:  DILLAN  Length/Korean Size:  DILLAN  Flush Volume:  1.5/1.5    DATA:  Procedure:  CVVHDF  Start Time:  1408  Machine#:  1  Filter:  M150  Blood Flow:   180 mL/min  Pre-Replacement Solution:  Phox BK4/2.5  Post-Replacement Solution:  Phox BK4/2.5  Dialysate Solution: Phox BK4/2.5  Pre-Replacement Solution Rate:  900mL/hr  Post-Replacement Solution Rate:  200mL/hr  Dialysate Flow Rate: 900mL/hr  Patient Removal Rate: 100 mL/hr  Anticoagulation Type and Rate:  n/a    ASSESSMENT:  How Patient Tolerated Restart:  well  Vital Signs: HR 75 SaO2 100% BP 80/48 MAP 58 RR 16  Initial Pressures:  Access:  -54  Filter:  90  Return:  12  TMP:  33  Change in Filter Pressure:  8    INTERVENTIONS:  Restarted CRRT via existing catheter upon return from OR.     PLAN:  Continue plan of care

## 2018-08-18 NOTE — PROGRESS NOTES
CRRT STATUS NOTE    DATA:  Time:  5:31 AM  Pressures WNL:  YES  Filter Status:  WDL    Problems Reported/Alarms Noted:  No    Supplies Present:  YES    ASSESSMENT:  Patient Net Fluid Balance:  +42ml since 0000, +7.5L yesterday  Vital Signs:  T 97.9, HR 69, 102/59 (74) on vaso 1.2, 100% on vent at 40%, RR 16  Labs: K 3.6, Mag 2.3, Phos 3.7, WBC 17.1, Plt 25   Goals of Therapy: I=O    INTERVENTIONS:   None      PLAN:  Continue CVVHDF per renal orders.

## 2018-08-18 NOTE — PROGRESS NOTES
[facility]  Transplant Infectious Disease Progress Note      Patient:  Yaneli Miles, Date of birth 1975, Medical record number [id number]  Date of Visit:  08/18/2018         Assessment and Recommendations:   Recommendations:  - Await results of operative intervention to take place later this morning.   - Continue amikacin and flagyl.  - Continue ceftaroline with dialysis dosing per ICU pharmacy as pt is on CRRT.  - Continue tigecycline 50 mg q12h, with ICU pharmacy to re-dose based on any changes in liver function.  - Continue daptomycin    Transplant Infectious Disease will continue to follow with you.    Assessment:  43 year old male s/p Liver transplant on 7/21 whose post-op course was complicated by small bowel ischemia requiring multiple trips to OR for washouts and jejunal resection and anastomosis, left lobe of the liver ischemia, splenic infarcts, VRE infected intra-abdominal hematoma and blood clots, and VRE bacteremia.  Infectious Disease issues include:  - Hepatic abscess per 8/15/2018 CT imaging. Continue amikacin and flagyl. Since this is a possible source of continuing VRE bacteremia, on ceftaroline, tigecycline, and daptomycin. Await results of operative intervention to take place later this morning 8/18/2018.  - Persistent VRE bacteremia. He continues to have positive blood cultures with latest culture from 8/17/2018 growing. He is on combination therapy with daptomycin & ceftaroline, as described in In vitro activity of daptomycin in combination with ß-lactams, gentamicin, rifampin, and tigecycline against daptomycin-nonsusceptible enterococci, where there was an apparent synergistic effect to combining the two. In the event that daptomycin is inactivated by surfactant in the lungs by an occult pulmonary process, tigecycline is also part of his regimen.   - QTc interval:462 on 7/30/18  - Pneumocystis prophylaxis:TMP/SMX  - Viral serostatus D/R & prophylaxis: HCV  negative/reactive, HBVnegative/reactive, CMV -/+, EBV+/+, valgancicilovir ppx  - Fungal prophylaxis: micafungin  - Gamma globulin status: Never checked.  - Isolation status: Contact, Good hand hygiene.    Nicky Kumar MD. Pager [phone].         Interval History:   Since Yaneli was last seen by ID on 8/18/2018, he had 70+ cm of large bowel and small bowel resected in the OR yesterday. WBC trending down since operation yesterday. He was transfused. Continues on CRRT, pressor requirements a little bit less. Making 20-50 ml/hr of urine, with Luis in place. CRRT continues, although there is now also a new neck line and a new A-line. Tube feeds stopped for NPO status for OR. He will go back to the OR today, possibly with creation of an ostomy/stoma.     Transplants:  7/21/2018 (Liver), Postoperative day:  28.  Coordinator Kay Reyes    Review of Systems:   Unable to obtain review of systems          Current Medications & Allergies:       [Auto Hold] amikacin (AMIKIN) place de la cruz - receiving intermittent dosing  1 each Injection See Admin Instructions     [Auto Hold] ceFAZolin 1 g, gentamicin 80 mg, bacitracin 50,000 units in 1000 mL saline   Irrigation Once     [Auto Hold] ceftaroline (TEFLARO) intermittent infusion  400 mg Intravenous Q12H     [Auto Hold] DAPTOmycin (CUBICIN) intermittent infusion  12 mg/kg Intravenous Q24H     [Auto Hold] hydrocortisone sodium succinate PF  100 mg Intravenous Q8H     [Auto Hold] lipids  250 mL Intravenous Once per day on Mon Wed Fri     [Auto Hold] metroNIDAZOLE  500 mg Intravenous Q6H     [Auto Hold] micafungin  100 mg Intravenous Q24H     [Auto Hold] midodrine  10 mg Per Feeding Tube Q8H     [Auto Hold] pantoprazole (PROTONIX) IV  40 mg Intravenous BID     [Auto Hold] sodium chloride (PF)  3 mL Intravenous Q8H     [Auto Hold] sulfamethoxazole-trimethoprim  10 mL Oral or Feeding Tube Daily     [Auto Hold] tacrolimus  1.25 mg Oral QAM     [Auto Hold] tacrolimus  1.25 mg Oral or  Feeding Tube QPM     [Auto Hold] tenofovir  300 mg Per Feeding Tube Q48H     [Auto Hold] tigecycline (TYGACIL) intermittent infusion  50 mg Intravenous Q12H     [Auto Hold] valGANciclovir  450 mg Oral or NG Tube Every Other Day    Or     [Auto Hold] valGANciclovir  450 mg Oral Every Other Day       Infusions/Drips:    amphotericin B in sterile water irrigation (bottle)       IV fluid REPLACEMENT ONLY       dialysate for CVVHD & CVVHDF (Phoxillum BK4/2.5) 12.5 mL/kg/hr (08/18/18 0744)     fentaNYL 50 mcg/hr (08/18/18 0800)     insulin (regular) 2 Units/hr (08/18/18 0800)     lactated ringers 10 mL/hr at 07/30/18 1519     - MEDICATION INSTRUCTIONS -       norepinephrine Stopped (08/18/18 0245)     parenteral nutrition - ADULT compounded formula 45 mL/hr at 08/17/18 1956     phenylephrine IV infusion ADULT Stopped (08/17/18 1300)     replacement solution for CVVHD & CVVHDF (Phoxillum BK4/2.5) 200 mL/hr at 08/17/18 1445     replacement solution for CVVHD & CVVHDF (Phoxillum BK4/2.5) 12.5 mL/kg/hr (08/18/18 0743)     propofol (DIPRIVAN) infusion 5 mcg/kg/min (08/18/18 0916)     Reason beta blocker order not selected       vasopressin (PITRESSIN) infusion ADULT (40 mL) 2.4 Units/hr (08/18/18 0800)       Allergies   Allergen Reactions     Nsaids      Contraindicated             Physical Exam:   Vitals were reviewed.  All vitals stable.  Patient Vitals for the past 24 hrs:   BP Temp Temp src Resp SpO2 Weight   08/18/18 0715 91/61 97.9  F (36.6  C) - - 100 % -   08/18/18 0700 (!) 88/59 97.7  F (36.5  C) Esophageal 16 100 % -   08/18/18 0655 - 97.9  F (36.6  C) Esophageal 16 100 % -   08/18/18 0645 93/66 97.7  F (36.5  C) - - 100 % -   08/18/18 0630 (!) 89/62 97.7  F (36.5  C) Esophageal 16 100 % -   08/18/18 0615 93/59 97.7  F (36.5  C) - - 100 % -   08/18/18 0610 - 97.7  F (36.5  C) Esophageal 16 100 % -   08/18/18 0600 (!) 82/57 97.7  F (36.5  C) Esophageal 16 100 % -   08/18/18 0545 (!) 80/46 97.9  F (36.6  C) - - 100 % -    08/18/18 0530 (!) 88/53 97.9  F (36.6  C) - - 100 % -   08/18/18 0515 90/57 97.9  F (36.6  C) - - 100 % -   08/18/18 0500 91/58 97.9  F (36.6  C) - 16 100 % -   08/18/18 0445 91/55 97.9  F (36.6  C) - - 100 % -   08/18/18 0430 (!) 89/53 98.1  F (36.7  C) - - 100 % -   08/18/18 0415 96/55 98.1  F (36.7  C) - - 100 % 71.1 kg (156 lb 12 oz)   08/18/18 0400 117/74 98.2  F (36.8  C) Esophageal 16 100 % -   08/18/18 0345 100/64 98.1  F (36.7  C) - - 100 % -   08/18/18 0342 - 97.9  F (36.6  C) Esophageal 16 100 % -   08/18/18 0330 93/61 97.9  F (36.6  C) - - 100 % -   08/18/18 0315 97/62 97.9  F (36.6  C) - - 100 % -   08/18/18 0300 106/64 97.7  F (36.5  C) Esophageal - 100 % -   08/18/18 0250 - 97.7  F (36.5  C) Esophageal 16 100 % -   08/18/18 0245 110/68 97.7  F (36.5  C) - - 100 % -   08/18/18 0240 - 97.5  F (36.4  C) Esophageal 16 100 % -   08/18/18 0238 - 97.5  F (36.4  C) Esophageal 16 100 % -   08/18/18 0230 108/52 97.5  F (36.4  C) - - 100 % -   08/18/18 0215 103/64 97.3  F (36.3  C) - - 100 % -   08/18/18 0200 104/58 97.3  F (36.3  C) Esophageal 16 99 % -   08/18/18 0150 105/54 96.6  F (35.9  C) Esophageal 16 100 % -   08/18/18 0145 105/54 97.2  F (36.2  C) - - 100 % -   08/18/18 0140 - 97  F (36.1  C) Esophageal 16 100 % -   08/18/18 0130 103/53 97  F (36.1  C) - - 100 % -   08/18/18 0125 103/53 96.8  F (36  C) Esophageal 16 100 % -   08/18/18 0115 105/48 96.8  F (36  C) - - 100 % -   08/18/18 0100 104/61 96.6  F (35.9  C) Esophageal 16 100 % -   08/18/18 0045 106/40 96.4  F (35.8  C) Esophageal 16 99 % -   08/18/18 0035 - 96.3  F (35.7  C) Esophageal 16 - -   08/18/18 0030 103/61 96.3  F (35.7  C) - - 97 % -   08/18/18 0015 96/65 96.1  F (35.6  C) - - 98 % -   08/18/18 0006 99/55 - - - 100 % -   08/18/18 0000 99/55 95.9  F (35.5  C) Esophageal 16 100 % -   08/17/18 2345 90/53 95.7  F (35.4  C) - - 100 % -   08/17/18 2330 95/42 95.5  F (35.3  C) - - 100 % -   08/17/18 2315 - 95.4  F (35.2  C) - - 100 % -    08/17/18 2310 94/56 - - - 100 % -   08/17/18 2300 99/57 95.4  F (35.2  C) Esophageal 16 100 % -   08/17/18 2245 101/57 95.2  F (35.1  C) - - 100 % -   08/17/18 2230 (!) 87/30 95  F (35  C) - - 100 % -   08/17/18 2215 (!) 100/38 95  F (35  C) - - 100 % -   08/17/18 2200 104/63 95.2  F (35.1  C) Esophageal 16 100 % -   08/17/18 2145 (!) 98/35 95.2  F (35.1  C) - - 100 % -   08/17/18 2130 98/59 95.2  F (35.1  C) - - 100 % -   08/17/18 2115 91/66 95.2  F (35.1  C) - - 98 % -   08/17/18 2104 - 95  F (35  C) Esophageal 16 - -   08/17/18 2100 104/44 95  F (35  C) Esophageal 16 100 % -   08/17/18 2045 112/42 95  F (35  C) - - 100 % -   08/17/18 2030 106/40 95  F (35  C) Esophageal - 99 % -   08/17/18 2015 113/51 95  F (35  C) - - 100 % -   08/17/18 2010 113/51 95  F (35  C) Esophageal 16 100 % -   08/17/18 2000 103/49 95  F (35  C) Esophageal 16 100 % -   08/17/18 1945 116/70 95  F (35  C) - - 100 % -   08/17/18 1930 114/72 95  F (35  C) Esophageal 16 100 % -   08/17/18 1915 122/76 95  F (35  C) - - 100 % -   08/17/18 1900 110/69 95  F (35  C) - - 100 % -   08/17/18 1845 100/67 95  F (35  C) - - 100 % -   08/17/18 1830 101/61 95.2  F (35.1  C) Esophageal - 98 % -   08/17/18 1820 - 95.2  F (35.1  C) Esophageal - - -   08/17/18 1815 107/83 - - - 100 % -   08/17/18 1800 93/77 - - 16 100 % -   08/17/18 1745 96/43 - - - 100 % -   08/17/18 1730 97/45 - - - 100 % -   08/17/18 1715 (!) 84/71 - - - 100 % -   08/17/18 1700 105/68 96  F (35.6  C) Oral - 100 % -   08/17/18 1645 108/74 - - - 99 % -   08/17/18 1630 90/76 - - - 98 % -   08/17/18 1615 99/89 - - - 99 % -   08/17/18 1600 (!) 86/71 97  F (36.1  C) Oral 16 100 % -   08/17/18 1545 (!) 84/37 - - - 95 % -   08/17/18 1530 94/82 97.4  F (36.3  C) Oral - 99 % -   08/17/18 1515 90/79 - - - 100 % -   08/17/18 1500 93/69 97.5  F (36.4  C) Oral 16 100 % -   08/17/18 1445 (!) 66/43 - - - 100 % -   08/17/18 1430 111/80 - - - 100 % -   08/17/18 1415 100/63 97.7  F (36.5  C) - - 100 % -    08/17/18 1400 104/73 - - - 100 % -   08/17/18 1345 92/71 97.8  F (36.6  C) - - 100 % -   08/17/18 1330 (!) 68/50 - - - 92 % -   08/17/18 1315 (!) 92/35 - - - 97 % -   08/17/18 1300 118/88 97.6  F (36.4  C) - - 93 % -   08/17/18 1245 125/84 - - - 94 % -   08/17/18 1230 101/75 97.6  F (36.4  C) - - (!) 80 % -   08/17/18 1215 109/64 - - - (!) 86 % -   08/17/18 1200 97/60 - - - 100 % -   08/17/18 1145 106/84 - - - (!) 75 % -     Ranges for vital signs:  Temp:  [95  F (35  C)-98.2  F (36.8  C)] 97.9  F (36.6  C)  Heart Rate:  [] 67  Resp:  [16] 16  BP: ()/(30-89) 91/61  MAP:  [53 mmHg-95 mmHg] 69 mmHg  Arterial Line BP: ()/(42-84) 99/53  FiO2 (%):  [40 %-50 %] 40 %  SpO2:  [75 %-100 %] 100 %  Vitals:    08/14/18 0000 08/15/18 0000 08/18/18 0415   Weight: 68.8 kg (151 lb 10.8 oz) 69.4 kg (153 lb) 71.1 kg (156 lb 12 oz)       Physical Examination:   GENERAL:  Thin man on the ventilator. Going to the OR shortly.   HEAD:  Head is normocephalic, atraumatic, temporal wasting  EYES:  Eyes have muddy sclerae  ENT:  Oropharynx is obscured by ETT. R nare feeding tube.   : Luis in place.  SKIN:  No acute rashes. R femoral CVC, R internal jugular with introducer, Left PICC, 2 PIVs.          Laboratory Data:     Metabolic Studies       Recent Labs   Lab Test  08/18/18   0411  08/17/18   2320   08/17/18   1608   08/17/18   0407   07/13/18 2050 07/13/18   0334   NA  141  141   < >  140   < >  139   < >   --    < >  132*   POTASSIUM  3.6  3.8   < >  3.9   < >  4.3   < >   --    < >  2.6*   CHLORIDE  108  108   < >  108   < >  107   < >   --    < >  101   CO2  23  25   < >  22   < >  19*   < >   --    < >  20   ANIONGAP  9  9   < >  11   < >  12   < >   --    < >  12   BUN  38*  37*   < >  40*   < >  40*   < >   --    < >  30   CR  0.80  0.87   < >  0.98   < >  0.89   < >   --    < >  2.08*   GFRESTIMATED  >90  >90   < >  83   < >  >90   < >   --    < >  35*   GLC  139*  147*   < >  194*   < >  152*   < >   --     < >  104*   A1C   --    --    --    --    --    --    --   5.4   --    --    JESUS  7.3*  7.5*   < >  7.7*   < >  7.3*   < >   --    < >  8.1*   PHOS  3.7  4.2   < >  4.0   < >  4.1   < >   --    --    --    MAG  2.3  2.4*   < >  1.9   < >  2.2   < >   --    --    --    LACT   --   2.4*   --   3.4*   < >  4.3*   < >  3.9*   --   1.1   PCAL   --    --    --    --    --    --    --    --    --   0.89   CKT  53   --    --    --    --   34   < >   --    --    --     < > = values in this interval not displayed.       Hepatic Studies    Recent Labs   Lab Test  08/18/18   0411 08/17/18   2205  08/17/18   1608   07/13/18   0334   06/21/18   0612   BILITOTAL  2.2*  1.9*  1.8*   < >  36.3*   < >  23.4*   DBIL  1.4*   --    --    < >  28.1*   < >   --    ALKPHOS  127  111  103   < >  96   < >  78   PROTTOTAL  3.5*  3.8*  3.4*   < >  5.8*   < >  5.1*   ALBUMIN  1.9*  2.0*  1.8*   < >  2.8*   < >  2.9*   AST  102*  83*  74*   < >  287*   < >  124*   ALT  31  30  31   < >  125*   < >  79*   LDH   --    --    --    --   259*   --   177    < > = values in this interval not displayed.       Pancreatitis testing    Recent Labs   Lab Test  08/13/18   1048   07/22/18   0354  07/20/18   1741  07/12/18   1913   06/16/18   1815   AMYLASE   --    --   42  60  35   --    --    LIPASE   --    --   75   --    --    --   260   TRIG  173*   < >   --    --    --    < >   --     < > = values in this interval not displayed.       Hematology Studies      Recent Labs   Lab Test  08/18/18   0904  08/18/18   0411 08/17/18   2320  08/17/18   2205  08/17/18   1608  08/17/18   1134   WBC  17.3*  17.1*  25.0*  28.1*  41.3*  64.5*   ANEU   --   16.5*   --   27.3*  37.7*  55.4*   ALYM   --   0.3*   --   0.3*  0.0*  0.6*   MARCELA   --   0.3   --   0.3  1.4*  7.9*   AEOS   --   0.0   --   0.0  0.0  0.0   HGB  7.2*  7.5*  5.0*  5.3*  8.5*  8.7*   HCT  20.5*  21.4*  14.6*  15.8*  24.9*  25.6*   PLT  23*  25*  25*  27*  56*  55*       Clotting Studies    Recent  Labs   Lab Test  08/17/18   2320  08/17/18   2205  08/17/18   1608  08/17/18   1010   INR  1.99*  1.88*  2.05*  3.81*   PTT   --    --   53*  >240*       Iron Testing    Recent Labs   Lab Test  08/18/18   0904   07/13/18   0334   06/17/18   2138   IRON   --    --   97   --   66   FEB   --    --   117*   --   61*   IRONSAT   --    --   83*   --   109*   ANGELLA   --    --   Unsatisfactory specimen - icteric   --    --    MCV  84   < >  81   < >   --    FOLIC   --    --   10.5   --    --    B12   --    --   Canceled, Test credited   --    --    HAPT   --    --   13*   < >   --    RETP   --    --   1.6   < >   --    RETICABSCT   --    --   46.0   < >   --     < > = values in this interval not displayed.       Markers    Alpha Fetoprotein   Date Value Ref Range Status   07/11/2018 Unsatisfactory specimen - icteric 0 - 8 ug/L Final     Comment:     Canceled, Test credited  NOTIFIED DR PRINCE QUIROS MD [facility] 1959 7/11/2018 BY CELESTE         Arterial Blood Gas Testing    Recent Labs   Lab Test  08/17/18   2320  08/17/18   1608  08/17/18   1313  08/17/18   1155  08/17/18   1010   PH  7.45  7.36  7.29*  7.23*  7.31*   PCO2  36  36  39  46*  37   PO2  172*  177*  112*  128*  73*   HCO3  25  20*  19*  19*  19*   O2PER  40.0  50  60  60  34        Thyroid Studies     Recent Labs   Lab Test  06/17/18   2138   TSH  0.77  0.77   T4  Unsatisfactory specimen - icteric       Urine Studies     Recent Labs   Lab Test  07/27/18   2330  07/13/18   1315  06/18/18   1200  06/16/18   2220   URINEPH  7.5*  6.0  5.0  5.5   NITRITE  Negative  Negative  Negative  Negative   LEUKEST  Negative  Negative  Negative  Large*   WBCU  3  None  2  66*       Medication levels    Recent Labs   Lab Test  08/17/18   1134   06/18/18   1230   VANCOMYCIN   --    --   16.8   TACROL  9.0   < >   --     < > = values in this interval not displayed.       Body fluid stats    Recent Labs   Lab Test  08/17/18   0916   07/15/18   1115  07/13/18   1240  06/28/18   1111   FTYP    --    --   Ascites  Ascites  Ascites   FCOL   --    --   Bloody  Red  Yellow   FAPR   --    --   Cloudy  Cloudy  Slightly Cloudy   FWBC   --    --   245  373  234   FNEU   --    --   3  28  6   FLYM   --    --   19  11  24   FMONO   --    --   78   --    --    FALB   --    --    --   1.0   --    FTP   --    --    --   1.6   --    GS  Moderate  Gram positive cocci in pairs and chains  *  Many  WBC'S seen  predominantly PMN's    Gram stain and culture performed on concentrated specimen   < >  No organisms seen  Few  WBC'S seen  predominantly mononuclear cells    No organisms seen  Few  WBC'S seen  PMNs seen    Many  Red blood cells seen     --     < > = values in this interval not displayed.       Microbiology:  Last Culture results with specimen source  Culture Micro   Date Value Ref Range Status   08/17/2018 PENDING  Preliminary   08/17/2018 PENDING  Preliminary   08/17/2018 Culture in progress  Preliminary   08/17/2018 Culture negative after 2 hours  Preliminary   08/17/2018 (A)  Preliminary    Cultured on the 1st day of incubation:  Gram positive cocci in pairs and chains     08/17/2018   Preliminary    Critical Value/Significant Value, preliminary result only, called to and read back by  Jamal Paulson RN @ 2011. 08/17/18 08/17/2018 (A)  Preliminary    Cultured on the 1st day of incubation:  Gram positive cocci in pairs and chains     08/17/2018   Preliminary    Critical Value/Significant Value, preliminary result only, called to and read back by  Ramiro Donis RN 4A @ 1900. 08/17/18 08/17/2018 Susceptibility testing in progress  Preliminary   08/17/2018   Preliminary    (Note)  POSITIVE for VANCOMYCIN-RESISTANT ENTEROCOCCUS FAECIUM (VRE) - Wanda  gene POSITIVE by [employer] multiplex nucleic acid test. Final  identification and antimicrobial susceptibility testing will be  verified by standard methods.    Specimen tested with Verigene multiplex, gram-positive blood culture  nucleic acid test for the  following targets: Staph aureus, Staph  epidermidis, Staph lugdunensis, other Staph species, Enterococcus  faecalis, Enterococcus faecium, Streptococcus species, S. agalactiae,  S. anginosus grp., S. pneumoniae, S. pyogenes, Listeria sp., mecA  (methicillin resistance) and Wanda/B (vancomycin resistance).    Critical Value/Significant Value called to and read back by  ROSE MARY HOLGUIN RN (4A).  08.17.18 2249 GJS     08/15/2018 (A)  Preliminary    Cultured on the 1st day of incubation:  Enterococcus faecium (VRE)  Susceptibility testing done on previous specimen     08/15/2018   Preliminary    Critical Value/Significant Value, preliminary result only, called to and read back by  Marta Staples RN 0600 8/16/18. [facility]     08/15/2018 (A)  Preliminary    Cultured on the 1st day of incubation:  Enterococcus faecium (VRE)  Susceptibility testing done on previous specimen     08/15/2018   Preliminary    Critical Value/Significant Value, preliminary result only, called to and read back by  Audrey Last on 4A on 18.16.18 at 1058. [facility]/     08/14/2018 (A)  Final    Cultured on the 1st day of incubation:  Enterococcus faecium (VRE)  Susceptibility testing done on previous specimen     08/14/2018   Final    Critical Value/Significant Value, preliminary result only, called to and read back by  KASI MARSHALL RN @0420 8/15/18. [facility]     08/14/2018 (A)  Final    Cultured on the 1st day of incubation:  Enterococcus faecium (VRE)  Susceptibility testing done on previous specimen     08/14/2018   Final    Critical Value/Significant Value, preliminary result only, called to and read back by  KASI GUTIERREZ RN @0020 8/15/18. [facility]     08/13/2018 (A)  Final    Cultured on the 1st day of incubation:  Enterococcus faecium (VRE)  Susceptibility testing done on previous specimen     08/13/2018   Final    Critical Value/Significant Value, preliminary result only, called to and read back by  Latrice Marshall RN, @2120 08/13/18.[facility].      Specimen Description    Date Value Ref Range Status   08/17/2018 Blood Arterial blood  Final   08/17/2018 Peritoneal fluid  Final   08/17/2018 Peritoneal fluid  Final   08/17/2018 Peritoneal fluid  Final   08/17/2018 Peritoneal fluid  Final   08/17/2018 Blood Left Arm  Final   08/17/2018 Blood Right Hand  Final   08/15/2018 Blood Right Hand  Final   08/15/2018 Blood LEFT PIV  Final   08/14/2018 Blood Right Hand  Final   08/14/2018 Blood Arterial blood  Final   08/13/2018 Blood Right Hand  Final   08/13/2018 Blood Arterial blood  Final   08/12/2018 Abdominal Fluid  Final   08/12/2018 Abdominal Fluid  Final   08/12/2018 Abdominal Fluid  Final   08/12/2018   Final    Abdominal Fluid Not received in an anaerobic transport container.   08/12/2018 Blood Right Hand  Final   08/12/2018 Blood Arterial blood  Final        Infectious Disease Testing     Recent Labs   Lab Test  06/18/18   0440   TBRSLT  Negative       Virology:  Hepatitis B Testing     Recent Labs   Lab Test  08/10/18   0349  07/24/18   0446  07/23/18   0817   07/20/18   1741  07/12/18   1913   AUSAB   --   551.06*   --    --   0.00  0.00   HBCAB   --    --    --    --   Reactive*  Reactive*   HEPBANG  Nonreactive   --   Nonreactive   < >   --    --    HBCM   --    --    --    --   Nonreactive  Nonreactive    < > = values in this interval not displayed.        Imaging:  Recent Results (from the past 24 hour(s))   XR Abdomen Port 1 View    Narrative    XR ABDOMEN PORT 1 VW  8/17/2018 11:48 AM      HISTORY: confirm line placement;     COMPARISON: CT 8/15/2018    FINDINGS: Feeding tube tip projects over the ligament of Treitz.  Gastric tube tip and sidehole project over the stomach. Comparing to  prior CT exam 8/15/2018 5 lumbar vertebral bodies were considered with  transitional L5 with pseudoarticulation on the right. The right  femoral line tip projects over the inferior endplate of L3. Unchanged  right upper quadrant biliary stent. Suture material on the right.  Nonspecific paucity  of bowel gas throughout the abdomen. No definite  pneumatosis or portal venous gas. Hyperdense material projecting over  the epigastric area, previously seen on CT exam 8/15/2018.      Impression    IMPRESSION:   1. The right femoral line tip projects over the inferior endplate of  L3.   2. Feeding tube tip projects over the ligament of Treitz.    I have personally reviewed the examination and initial interpretation  and I agree with the findings.    GEOVANY SHEPARD MD   XR Chest Port 1 View    Narrative    Exam: XR CHEST PORT 1 VW, 8/17/2018 11:48 AM    Indication: confirm line placement;     Comparison: Chest x-ray 8/15/2018    Findings:   Single portable AP radiograph of the chest. Endotracheal tube tip  projects 4.2 cm above the tano. Left upper extremity PICC with tip  projecting over the right atrium. Gastric tube and feeding tubes  course below the diaphragm. The gastric tube sidehole projects over  the stomach, the feeding tube tip projects beyond the field-of-view.  The cardiomediastinal silhouette is normal in size. No pneumothorax or  large pleural effusion. Left retrocardiac opacities with air  bronchograms. Surgical clips project over the upper abdomen.      Impression    Impression:   1. Endotracheal tube tip projects 4.2 cm above the tano. Stable  position of other supportive devices as above.  2. Left retrocardiac opacities are unchanged, atelectasis and/or  consolidation.    I have personally reviewed the examination and initial interpretation  and I agree with the findings.    GIOVANNI LORENZANA MD   XR Chest Port 1 View    Narrative    XR CHEST PORT 1 VW  8/17/2018 1:29 PM      HISTORY: Line confirmation;     COMPARISON: 8/17/2018    FINDINGS: Endotracheal tube tip projects over the midthoracic trachea.  Right IJ central venous line tip projects over the mid SVC. Enteric  tubes course below the diaphragm. Left PICC line tip projects over the  mid right atrium. Increased left basilar opacity  and retrocardiac  opacity. Pulmonary vasculature is distinct. No pneumothorax.      Impression    IMPRESSION:   1. Right IJ line tip projects over the mid SVC.  2. Left-sided PICC tip projects over the mid right atrium.  3. Increased retrocardiac and left basilar opacities, atelectasis  versus infection.    I have personally reviewed the examination and initial interpretation  and I agree with the findings.    NICANOR MARIN MD   US Liver Transplant Follow Up Portable    Narrative    EXAMINATION: US LIVER TRANSPLANT FOLLOW UP PORTABLE, 8/17/2018 2:19 PM      COMPARISON: 13 2018, 8/4/2018.    HISTORY: Assess for hepatic artery and portal venous flow.    TECHNIQUE:  Gray-scale, color Doppler and spectral flow analysis.    FINDINGS:   There is no ascites. Right pleural effusion.    Liver:   The left lobe of the liver is not well seen as patient still  has open wound VAC however the visualized portion demonstrates  heterogeneous echotexture. The right lobe of the liver demonstrates  normal homogenous echotexture. There is a 2.2 x 2.3 x 1.9 cm  hyperechoic rounded lesion in the right lateral lobe of the liver.  This has been previously seen on comparison CT as a hypodense lesion.  There is a perihepatic fluid collection measuring 1.9 x 1.9 x 1.5 cm  in the right lobe at the braden hepatis, this has been previously seen  and is stable in size. The liver demonstrates normal homogeneous  echotexture. No evidence of a focal hepatic mass.     Bile Ducts: Both the intra- and extrahepatic biliary system are of  normal caliber. Biliary stent is in place in the common bile duct  measures 5.1 mm.    Gallbladder: The gallbladder is surgically absent.    Kidneys:   Right kidney:  The right kidney demonstrates normal echotexture with  no evidence of a shadowing stone, focal mass or hydronephrosis.   10.7  cm in long axis dimension.    Pancreas: The pancreas is not visualized on this evaluation.     Visualized portions of the aorta are  unremarkable.    LIVER DOPPLER:  Splenic vein: Not visualized.   Extrahepatic portal vein:  Patent continuous antegrade flow, 20.8  cm/sec.  Portal vein at anastomosis: Patent continuous antegrade flow, 28.4  cm/sec.  Intrahepatic portal vein:  Patent continuous antegrade flow, 42.9  cm/sec.  Right portal vein flow is antegrade, measuring 28 cm/sec.  Left portal vein flow is antegrade, measuring 13.8 cm/sec.    Inferior vena cava: patent with flow toward the heart throughout..  IVC above anastomosis:  58.1 cm/sec.  IVC at anastomosis:  77.6 cm/sec.  Intrahepatic IVC:  49.8 cm/sec.  Extrahepatic IVC:  26.8 cm/sec.    Right, mid, left hepatic veins: Patent with flow towards the inferior  vena cava.    Extrahepatic hepatic artery: Low resistance waveform with flow towards  the liver. 295 cm/sec with resistive index 0.38.  Right hepatic artery: 28.3 cm/sec with resistive index 0.4.  Left hepatic artery: 78 cm/sec with resistive index 0.3.      Impression    Impression:   1.  Transplant liver with patent vasculature.  2.  Decreased resistive indices in the hepatic artery (0.3-0.4),  highly concerning for upstream stenosis.  3.  Stable appearance of perihepatic fluid collection near the braden  hepatis. Other previously described fluid collections on 8/13/2018  evaluation were not seen on this exam.  4.  Hyperechoic round lesion in the right lateral lobe, also seen on  comparison CT, likely represents hemangioma.  5.  Right pleural effusion.    I have personally reviewed the examination and initial interpretation  and I agree with the findings.    MANDO TAVERAS MD

## 2018-08-18 NOTE — PLAN OF CARE
Problem: Patient Care Overview  Goal: Plan of Care/Patient Progress Review  Outcome: Therapy, progress toward functional goals is gradual  D/I:  Patient has remained hemodynamically stable this shift.  Insulin drip at 2 units/hr IV with glucoses consistently in the 130's.  Levophed drip weaned to off maintaining goal MAP greater than 60.  Vasopressin continues at 2.4 units/hr IV with MAP goal greater than 60.  Fentanyl at 50 mcg/hr IV for pain management as well as oxycodone 5 mg via NGT every 4 hours with adequate control.  Minimal sedation on propofol drip at 5 mcg/kg/min IV.  Some noted tremors this AM with painful stimuli.  Some furrowing of brow with painful stimuli and noted some attempts at eye opening late this shift to painful stimuli.  Serosang drainage of Abthera wound vac this shift.  NGT to LIWS with green drainage.  NJT clamped.  Nutrition with TPN at 45 ml/hr IV.  Antibiotic therapy continued as ordered with no changes this shift.  WBC trending down.  Hemoglobin decreased to 5 and 2 units of pRBC's ordered and transfused without complication with recheck hemoglobin 7.5.  MD aware.  Platelet count 25 x2 checks and order received and transfused 2 units of platelets this shift.  Luis with approximately 20-40 ml/hr urine output.  CRRT continues as ordered with successful fluid removal I=O. Consent signed by wife last night for return to OR this AM for re-exploration and probable stoma creation.  Family updated by MD via .  Wife and young female child spent night resting at patient bedside.  Please see flowsheets for frequent vitals and assessments.  A:  Hemodynamically stable post-op  P:  Continue to monitor and treat as ordered.  Offer support as able to patient and family.  Titrate pressors as needed to maintain MAP goal greater than 60.  Transfuse PRN.  Plan for OR this AM.

## 2018-08-18 NOTE — PLAN OF CARE
Problem: Liver Transplant (Adult)  Goal: Signs and Symptoms of Listed Potential Problems Will be Absent, Minimized or Managed (Liver Transplant)  Signs and symptoms of listed potential problems will be absent, minimized or managed by discharge/transition of care (reference Liver Transplant (Adult) CPG).   Outcome: No Change  D:  Patient s/p LT 7/21 with complicated hospitalization.     I/A: Patient taken to the OR due to acute abdomen and decompensation - left in discontinuity, approximately 1 m of small bowel remaining. Patient requiring a lot of pressor support. Was given FFP, platelets, and cryo after OR, and is now more hemodynamically stable.     VS: Pressor support to keep MAP greater than 60. Hypothermic, bear hugger on. HR 80-140s. Less tachycardic after receiving blood products.   Lines/drains/airway: Right I.J. Central line. Right groin access for CRRT. Left femoral A-line. 2 PIVs. Luis. ETT. Abthera wound vac @ -125 continuous suction.   Gtts: Levo @ 0.25. Vaso @ 2.4. Propfol @ 30. Fentanyl @ 50. Insulin @ 2. TKO.   Neuro: Unresponsive to painful stimuli. Pupils sluggish. MD aware.   CV: SR/ST with occasional PVCs.   Pulm: Intubated/vented CMV settings.  GI:  NPO. TPN @ 45/hr. NJT for meds only. NGT to suction.  : Luis. Making 20-50 ml/hr.   Skin: Jaundiced.   I/D: ID following to manage abx. Positive blood cultures; MD notified @ 6:55 PM with results.   Pain: Fentanyl drip for pain.    P: Continue to monitor and notify MD of changes as they occur.

## 2018-08-18 NOTE — PROGRESS NOTES
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 He returned to the SICU for shock, severe lactic acidosis, renal failure and respiratory failure, requiring intubation and dialysis.  Ischemic injury to left hemiliver and non-occlusive mesenteric ischemia.  He underwent a laparotomy and several subsequent take-backs to examine ischemic bowel, on 8/6 he underwent resection of necrotic jejunum with a primary anastomosis, and his abdomen was closed 8/8. He acutely decompensated beginning the night of 8/16, with increasing pressor needs, increasing abdominal pain and lactic acidosis.  Taken to the OR on 8/17 for exploratory laparotomy notable for further intestinal necrosis requiring further bowel resection. Plans for OR today for exploratory laparotomy, possible bowel resection and possible ostomy. Will reassess cares when returns to SICU.    TODAY'S PROGRESS/PLANS:   - OR today for exploratory laparotomy, possible bowel resection, possible ostomy  - resumed heparin this AM per Transplant  - will be Full Code in OR  - plan to follow up with Family regarding future cares when patient returns from OR today     PLAN:  Neuro/ pain/ sedation:  # acute pain  - monitor neurological status; notify the MD/DO for any acute changes in exam.  - fentanyl gtt for pain; PRN  IV dilaudid   - propofol gtt for sedation    Pulmonary care:   # acute respiratory failure  - extubated (8/10), re-intubated in the OR for emergent ex-lap 8/17  - daily pst and extubate as  appropiate    Cardiovascular:    #Septic shock without source control  # Hemodynamic instability  - monitor hemodynamic status   -  Stop Midodrine 10 mg Q8h  - norepinephrine gtt  (off 8/18) and vasopressin gtt for MAPs > 60  - stress dose hydrocortisone  - TTE 8/8 showed no vegetations or valve abnormalities  - TOÑO 8/10 no evidence of endocarditis       GI care:   # DDLT 7/21/18  # Intrahepatic left portal venous thrombosis, infarction of left liver lobe, patchy infarction of right lobe, suspected hepatic arterial thrombosis  # 7/30/18 s/p exploratory laparotomy and liver biopsy  # Splenic infarctions, patchy bowel ischemia  # Ex Lap 8/6 - resection of 90 cm of necrotic jejunum  # Ex Lap 8/8 - abdomen closed  # Ex Lap 8/17 - Found to have widespread bowel ischemia requiring bowel resection.  Left in discontinuity, approximately 1 meter of small bowel remaining.  - s/p exploratory laparotomy, liver biopsy on 7/30/18. Returned to OR 8/6 for washout and partial jejunal resection.    - Abdomen closed in OR 8/8  - US Liver 8/8: unchanged decreased velocity in the left portal vein, improved velocities of the hepatic artery  - Heparin for PV thrombosis, 500 straight rate due to bleeding concerns  - NG set to continuous suction   - Decompensation with acute abdomen noted AM of 8/17, taken emergently to the OR for exp lap  - plans for OR 8/18, ex lap with possible bowel resection and possible ostomy     Fluids/ Electrolytes/ Nutrition:   # protein calorie deficit malnutrition   #NPO  - TF's discontinued  - TPN for adequate nutrition    Renal/ Fluid Balance:    # BEN   # severe metabolic acidosis, lactic acidosis   # severe hyponatremia - resolved  # hyperkalemia - resolved  # renal failure  # hematuria - resolved  - CRRT restarted 8/13. Nephrology placed femoral dialysis line.  - Will continue to monitor intake and output.  - Urethral bleeding appears to have resolved    Endocrine:    # DM II  - insulin gtt  - stress dose  steroids stopped 8/14, restarted 8/17   - Hydrocortisone 100 mg q8h due to high pressor requirements 8/17    ID/ Antibiotics:  # VRE Bacteremia  # ESBL Bacteremia - treated  # Hepatitis B  Has been unable to clear VRE bacteremia.  ABX ongoing and ID transplant following.    - Daptomycin (7/30- ), ceftaroline (8/13- ), tigecycline (8/13- ) continue while blood cultures still positive for VRE  - Meropenem (8/2-8/14), discontinued after 12 days of negative cultures for ESBL  - Micafungin (7/30- )  - Immunosuppression: (restarted 8/6) tacrolimus  - Immunosuppression prophylaxis: Bactrim, Valgancyclovir and Tenofovir, nystatin when micafungin stopped.   - Consulted Transplant ID due to continued bacteremia with unknown source, rec to increase Daptomycin dose, start ceftaroline and tigecycline  - Amikacin (8/17 -)  - Flagyl (8/17 -)    Positive cultures   - 7/27 VRE (line/arm)   - 7/29: VRE (left arm)   - 7/30: VRE (left arm)   - 7/31: VRE (Line and peripheral)  - 8/1: ESBL (HD/hand/central line)   - 8/3: VRE (intra-abdominal tissue)  - 8/4: NGTD (blood)  - 8/5: NGTD (blood)  - 8/6: VRE (art line)  - 8/7: VRE (blood)  - 8/8: VRE (blood)  - 8/9: VRE (blood)  - 8/11: VRE (blood)  - 8/12: VRE (abdominal fluid)  - 8/13: VRE (blood)  - 8/14: VRE (blood)  - 8/15: G+ cocci in pairs and chains (blood)  - 8/17: pending (blood)     Heme:     # Acute blood loss anemia  # Anemia of critical illness  # Portal venous thrombosis  # Thrombocytopenia  - Heparin for PV thrombosis, heparin to 500 straight rate, restarted 8/14 after stopping 8/13 due to concern for HIT by Transplant team  - Hold aspirin 81mg  - HIT panel sent and negative 8/2.  Repeat HIT panel on 8/13 negative.  - DDAVP given 8/12  - TEG normal 8/12  - Hematology consult per Transplant in setting of low platelets - believe this is likely his new baseline     MSK:  # weakness and deconditioning of critical illness   - PT/OT   - mobilize     Prophylaxis:    - DVT: straight  rate heparin, SCD's  - GI: protonix     Lines/ tubes/ drains:  - L fem art line, L PICC, NJ, NG, PIVs, groin central cath, RIJ MAC    Disposition:  - SICU      ====================================    SUBJECTIVE:   Yesterday, pt had increased pressor requirements, a tense abdomen on exam and found to have free abdominal fliud on bedside US. Emergently taken to the OR for exploratory laparotomy with bowel resection. Intra-operative findings were notable for bowel ischemia and large perforation in small bowel. Remaining small bowel estimated to be 80 cm.     Overnight and this morning, patient has remained hemodynamically stable with the norepinephrine gtt weaned off and decreasing requirements from vasopressin gtt, requiring minimal sedation.       OBJECTIVE:   1. VITAL SIGNS:   Temp:  [95  F (35  C)-98.2  F (36.8  C)] 97.7  F (36.5  C)  Heart Rate:  [] 72  Resp:  [16-30] 16  BP: ()/(30-89) 93/59  MAP:  [48 mmHg-95 mmHg] 68 mmHg  Arterial Line BP: ()/(42-84) 97/54  FiO2 (%):  [40 %-50 %] 40 %  SpO2:  [75 %-100 %] 100 %  Ventilation Mode: CMV/AC  (Continuous Mandatory Ventilation/ Assist Control)  FiO2 (%): 40 %  Rate Set (breaths/minute): 16 breaths/min  Tidal Volume Set (mL): 450 mL  PEEP (cm H2O): 5 cmH2O  Oxygen Concentration (%): 50 %  Resp: 16    2. INTAKE/ OUTPUT:   I/O last 3 completed shifts:  In: 05629.76 [I.V.:4979.76; Other:3; NG/GT:240; IV Piggyback:2000]  Out: 4545 [Urine:1495; Emesis/NG output:550; Drains:1450; Other:1050]    3. PHYSICAL EXAMINATION:   General: intubated, sedated  Neuro: intubated, sedated.  Resp: mechanically ventilated  CV: regular rhythm, tachycardic  Abdomen: Abthera in place to suction, serosang output  Incisions: abdominal incision with wound vac,  no drainage or bleeding  Extremities: warm and well perfused, peripheral edema in bilateral hands and bilateral LE to thighs  PICC, MAC line, dialysis line, Art line all appropriately dressed    4. INVESTIGATIONS:    Arterial Blood Gases     Recent Labs  Lab 08/17/18  2320 08/17/18  1608 08/17/18  1313 08/17/18  1155   PH 7.45 7.36 7.29* 7.23*   PCO2 36 36 39 46*   PO2 172* 177* 112* 128*   HCO3 25 20* 19* 19*     Complete Blood Count     Recent Labs  Lab 08/18/18  0411 08/17/18  2320 08/17/18  2205 08/17/18  1608   WBC 17.1* 25.0* 28.1* 41.3*   HGB 7.5* 5.0* 5.3* 8.5*   PLT 25* 25* 27* 56*     Basic Metabolic Panel    Recent Labs  Lab 08/18/18  0411 08/17/18 2320 08/17/18  2205 08/17/18  1608    141 140 140   POTASSIUM 3.6 3.8 3.9 3.9   CHLORIDE 108 108 108 108   CO2 23 25 23 22   BUN 38* 37* 38* 40*   CR 0.80 0.87 0.89 0.98   * 147* 139* 194*     Liver Function Tests    Recent Labs  Lab 08/18/18  0411 08/17/18  2320 08/17/18  2205 08/17/18  1608 08/17/18  1134 08/17/18  1010   *  --  83* 74* 81*  --    ALT 31  --  30 31 24  --    ALKPHOS 127  --  111 103 111  --    BILITOTAL 2.2*  --  1.9* 1.8* 1.4*  --    ALBUMIN 1.9*  --  2.0* 1.8* 1.5*  --    INR  --  1.99* 1.88* 2.05*  --  3.81*     Pancreatic Enzymes  No lab results found in last 7 days.  Coagulation Profile    Recent Labs  Lab 08/17/18  2320 08/17/18 2205 08/17/18  1608 08/17/18  1010 08/17/18  0407 08/16/18  0329   INR 1.99* 1.88* 2.05* 3.81*  --   --    PTT  --   --  53* >240* 95* 51*     Lactate  Invalid input(s): LACTATE    5. RADIOLOGY:   No results found for this or any previous visit (from the past 24 hour(s)).       Patient seen, findings and plan discussed with surgical ICU staff Dr. Feliciano.

## 2018-08-18 NOTE — BRIEF OP NOTE
[facility], [place]    Brief Operative Note    Pre-operative diagnosis: dead bowel  Post-operative diagnosis Ischemic bowel with perforation, feculent peritonitis  Procedure: Procedure(s):  Return Liver Transplant, explore laparotomy  Surgeon: Surgeon(s) and Role:     * Mamadou Norwood MD - Primary     * Rah Thomas MD - Resident - Assisting  Anesthesia: General   Estimated blood loss: Less than 50 ml  Drains: Ab Thera  Specimens:   ID Type Source Tests Collected by Time Destination   1 : peritoneum fluid Fluid Other ANAEROBIC BACTERIAL CULTURE, FLUID CULTURE AEROBIC BACTERIAL, FUNGUS CULTURE, GRAM STAIN Mamadou Norwood MD 8/17/2018  9:16 AM    A : small bowel Tissue Other SURGICAL PATHOLOGY EXAM Mamadou Norwood MD 8/17/2018  9:14 AM      Findings:   significant feculent contamination throughout abdominal cavity as well as ascites. Bowel uniformly dilated to great degree with 3cm perforation in proximal ileum. resected en kera with other areas of ischemia. Clip and drop performed. Patient felt too unstable for anastomosis given pressor requirements, and ab thera placed.  Complications: None.  Implants: None.

## 2018-08-18 NOTE — ANESTHESIA CARE TRANSFER NOTE
Patient: Yaneli Miles    Procedure(s):  Exploratory Laparotomy, abdominal washout, bowel to bowel anastomosis, primary closure - Wound Class: II-Clean Contaminated    Diagnosis: Dead Bowel  Diagnosis Additional Information: No value filed.    Anesthesia Type:   No value filed.     Note:  Airway :ETT  Patient transferred to:ICU  Comments: The patient was transferred to the icu intubated and sedated with propofol. Bedside report was given. No anesthesia issues. ICU Handoff: Call for PAUSE to initiate/utilize ICU HANDOFF, Identified Patient, Identified Responsible Provider, Reviewed the Pertinent Medical History, Discussed Surgical Course, Reviewed Intra-OP Anesthesia Management and Issues during Anesthesia, Set Expectations for Post Procedure Period and Allowed Opportunity for Questions and Acknowledgement of Understanding      Vitals: (Last set prior to Anesthesia Care Transfer)    CRNA VITALS  8/18/2018 1210 - 8/18/2018 1309      8/18/2018             Resp Rate (observed): (!)  5                Electronically Signed By: Akosua Alcaraz MD  August 18, 2018  1:09 PM

## 2018-08-18 NOTE — ANESTHESIA POSTPROCEDURE EVALUATION
Patient: Yaneli Miles    Procedure(s):  Exploratory Laparotomy, abdominal washout, bowel to bowel anastomosis, primary closure - Wound Class: II-Clean Contaminated    Diagnosis:Dead Bowel  Diagnosis Additional Information: No value filed.    Anesthesia Type:  No value filed.    Note:  Anesthesia Post Evaluation    Patient location during evaluation: ICU  Patient participation: Able to fully participate in evaluation  Post-procedure mental status: sedated.  Pain management: unable to assess  Airway patency: patent  Cardiovascular status: hemodynamically stable  Respiratory status: ETT  PONV: unable to assess     Anesthetic complications: None          Last vitals:  Vitals:    08/18/18 1400 08/18/18 1415 08/18/18 1430   BP: 111/81 (!) 88/63 99/72   Pulse:      Resp: 16 16 16   Temp:      SpO2: 98% 100% 100%         Electronically Signed By: Perico Valiente MD  August 18, 2018  2:43 PM

## 2018-08-18 NOTE — PROGRESS NOTES
Surgery discussed in details with the family in the presence of the . Remaining small and large intestine looked healthy and decision was made to proceed with primary anastomosis followed by fascia closure. Pressors requirements significantly decreased at the end of surgery. At this time point patient has a reasonable good prognosis. Family in agreement to change resuscitation status from DNR to Full Code.    Tracy Quesada MD  Fellow,  Division of Transplantation  [phone]

## 2018-08-18 NOTE — ANESTHESIA PREPROCEDURE EVALUATION
Anesthesia Evaluation     . Pt has had prior anesthetic. Type: General    No history of anesthetic complications          ROS/MED HX    ENT/Pulmonary: Comment: intubated      Neurologic: Comment: sedated      Cardiovascular: Comment: On vaso gtt    (+) ----. : . . . :. . Previous cardiac testing Echodate:07/20/18results:Left ventricular function, chamber size, wall motion, and wall thickness are normal.The EF is > 65%. LV cavity appears small. No regional wall motion abnormalities are seen. Right ventricular function, chamber size, wall motion, and thickness are normal. No significant valvular abnormalities seen, although not able to completely exclude endocarditis based on TTE. The inferior vena cava cannot be assessed. No pericardial effusion is present.date: results: date: results: date: results:          METS/Exercise Tolerance:     Hematologic: Comments: plt of 26k at 0300 08/03/18    (+) History of Transfusion Other Hematologic Disorder-thrombocytopenia      Musculoskeletal:         GI/Hepatic: Comment: S/p liver transplant 07/21/18 for chronic hep B, cirrhosis, esophageal varices, intubated prior to transplant for encephalopathy    Brought back to OR for emergent ex lap on 07/30, found to have mesenteric ischemia, small bowel resection, temporary abdominal closure.    (+) hepatitis type B, liver disease,       Renal/Genitourinary: Comment: Currently on CVVHD        Endo:     (+) type II DM .      Psychiatric:  - neg psychiatric ROS       Infectious Disease:  - neg infectious disease ROS       Malignancy:         Other:                     Physical Exam  Normal systems: cardiovascular and pulmonary    Airway   Comment: DILLAN, intubated    Dental   Comment: DILLAN, intubated    Cardiovascular   Rhythm and rate: regular and normal  (-) no weak pulses    Pulmonary     Other findings: Intubated and ventilated with 30% FiO2  Awake and alert  Triple lumen catheter Right internal jugular  2 PIV  CRRT                     Anesthesia Plan      History & Physical Review  History and physical reviewed and following examination; no interval change.    ASA Status:  4 .        Plan for General and ETT with Intravenous induction. Maintenance will be Inhalation.      Additional equipment: 2nd IV and Arterial Line (PICC, MAC line) GETA. PICC, MAC line. Standard ASA monitors + arterial line. ICU postop    Discussed code status with wife, she wishes for chest compressions and defibrillation in OR during surgery. He will be full code perioperatively.      Postoperative Care      Consents  Anesthetic plan, risks, benefits and alternatives discussed with:  Spouse (consent in ICU w ).  Use of blood products discussed: Yes.   Use of blood products discussed with Spouse.  Consented to blood products.  .        Procedure: Procedure(s):  Explore Laparotomy, Possible bowel resection, possible ostomy, possible Abthera    HPI: Yaneli Miles is a 43 year old male scheduled for ex-lap, possible small bowel resection.  Pt w h/o chronic Hep B cirrhosis, s/p liver transplant 07/21/18, was brought back to OR for emergent ex lap on 07/30 found to have mesenteric ischemia.  Relatively stable since, ICU slowly weaning off pressors - vaso off, norepi still running as of AM 08/03.  BEN - now on CVVHD.  Patient remaining intubated, to be brought down to OR for ex-lap, washout, possible small bowel resection.  Plan for GETA, standard ASA monitors, continued use of a-line and vascular access from ICU.    PMHx/PSHx:  Past Medical History:   Diagnosis Date     Choledocholithiasis      Chronic viral hepatitis B without delta agent and without coma (H) 6/14/2018     Cirrhosis of liver with ascites (H) 6/14/2018     Esophageal varices (H)     prior GIB     Hepatic encephalopathy (H)      Hepatitis delta with hepatitis B carrier state 06/14/2018    positive antigen     Portal vein thrombosis 6/14/2018 5/29/18 care everywhere [facility]     SBP (spontaneous bacterial  peritonitis) (H) 2018     Type 2 diabetes mellitus without complication, without long-term current use of insulin (H) 2018       Past Surgical History:   Procedure Laterality Date     ENDOSCOPIC RETROGRADE CHOLANGIOPANCREATOGRAM      with stone removal     ENDOSCOPIC ULTRASOUND UPPER GASTROINTESTINAL TRACT (GI) N/A 2018    Procedure: ENDOSCOPIC ULTRASOUND, ESOPHAGOSCOPY / UPPER GASTROINTESTINAL TRACT (GI);  Esophagogastroduodenoscopy, removal of [place] tube, ENDOSCOPIC ULTRASOUND with embolization of gastric varices, placement of nasogastric tube;  Surgeon: Armando Kraft MD;  Location: [facility] OR     INCISION AND DRAINAGE ABDOMEN WASHOUT, COMBINED N/A 2018    Procedure: COMBINED INCISION AND DRAINAGE ABDOMEN WASHOUT;  liver washout and Abdominal Closure with Mesh.;  Surgeon: Cristino Che MD;  Location: [facility] OR     IRRIGATION AND DEBRIDEMENT ABDOMEN WASHOUT, COMBINED N/A 8/3/2018    Procedure: COMBINED IRRIGATION AND DEBRIDEMENT ABDOMEN WASHOUT;   Abdominal Wash Out;  Surgeon: Leonie Elizalde MD;  Location: [facility] OR     LAPAROTOMY EXPLORATORY N/A 2018    Procedure: LAPAROTOMY EXPLORATORY;  Exploratory Laparotomy, hematoma evacuation;  Surgeon: Stew Min MD;  Location: [facility] OR     RETURN LIVER TRANSPLANT N/A 2018    Procedure: RETURN LIVER TRANSPLANT;  liver bring back, exploratory laparotomy, bowel resection, primary anastomosis ;  Surgeon: Cristino Che MD;  Location: [facility] OR     TRANSPLANT LIVER RECIPIENT  DONOR N/A 2018    Procedure: TRANSPLANT LIVER RECIPIENT  DONOR;  Liver Transplant;  Surgeon: Stew Min MD;  Location: [facility] OR         No current facility-administered medications on file prior to encounter.   Current Outpatient Prescriptions on File Prior to Encounter:  acetaminophen (TYLENOL) 500 MG tablet Take 1 tablet (500 mg) by mouth every 6 hours as needed for mild pain or fever   ciprofloxacin (CIPRO) 500 MG tablet Take 1  tablet (500 mg) by mouth every 24 hours   ferrous sulfate (IRON) 325 (65 Fe) MG tablet Take 325 mg by mouth   insulin aspart (NOVOLOG PEN) 100 UNIT/ML injection Inject 1 Units Subcutaneous 3 times daily (with meals)   lactulose (CHRONULAC) 10 GM/15ML solution Take 30 mLs (20 g) by mouth 3 times daily Goal of 3-4 BMs per day   lidocaine (XYLOCAINE) 5 % ointment Apply topically every 4 hours as needed for moderate pain   miconazole (MICATIN; MICRO GUARD) 2 % powder Apply topically 2 times daily   midodrine HCl 10 MG TABS Take 10 mg by mouth 3 times daily (with meals)   pantoprazole (PROTONIX) 40 MG EC tablet Take 1 tablet (40 mg) by mouth 2 times daily (before meals)   phosphorus tablet 250 mg (PHOSPHA 250 NEUTRAL) 250 MG per tablet Take 2 tablets (500 mg) by mouth 2 times daily   potassium chloride SA (KLOR-CON) 20 MEQ CR tablet Take 1 tablet (20 mEq) by mouth daily   rifaximin (XIFAXAN) 550 MG TABS tablet Take 1 tablet (550 mg) by mouth 2 times daily   simethicone (MI-ACID GAS RELIEF) 80 MG chewable tablet Take 80 mg by mouth   tenofovir (VIREAD) 300 MG tablet Take 300 mg by mouth       Social Hx:   Social History   Substance Use Topics     Smoking status: Never Smoker     Smokeless tobacco: Not on file     Alcohol use No       Allergies:   Allergies   Allergen Reactions     Nsaids      Contraindicated          NPO Status: Per ASA Guidelines    Labs:    Blood Bank:  Lab Results   Component Value Date    ABO B 08/02/2018    RH Pos 08/02/2018    AS Neg 08/02/2018     Labs:  BMP:  Recent Labs   Lab Test  08/17/18 0407   NA  139   POTASSIUM  4.3   CHLORIDE  107   CO2  19*   BUN  40*   CR  0.89   GLC  152*   JESUS  7.3*     LFTs:   Recent Labs   Lab Test  08/17/18 0407   PROTTOTAL  3.1*   ALBUMIN  1.4*   BILITOTAL  1.7*   ALKPHOS  192*   AST  30   ALT  16     CBC:   Recent Labs   Lab Test  08/17/18 0407   WBC  56.0*   RBC  3.66*   HGB  10.7*   HCT  32.6*   MCV  89   MCH  29.2   MCHC  32.8   RDW  20.9*   PLT  92*      Coags:  Recent Labs   Lab Test  08/17/18   0407   08/15/18   0326   INR   --    --   1.47*   PTT  95*   < >  51*   FIBR  141*   < >  210    < > = values in this interval not displayed.

## 2018-08-18 NOTE — OP NOTE
Transplant Surgery  Operative Note    Preop Dx:  Mesenteric ischemia, peritonitis  Postop Dx: same. Large segment of ischemic bowel from mid-jejunum (prior anastomosis) with distal perforation and feculent peritonitis  Procedure: reopening of laparotomy. abd washout, bowel resection (clip and drop fashion)  Surgeon: Mamadou Norwood M.D.  ASSISTANT:  Rah Thomas fellow. Jose Haynes, student. There was no qualified general surgery resident available to assist during this procedure.   Anesthesia: General  EBL: less than 50 ml  Fluids: crystalloid, colloid, 1uPRBC  UO: minimal  Drains: no drain  Specimen:   1) peritoneal fluid- culture  2) small intestine- 200cm length.  Complications: None  Findings:  Obstruction with diffusely dilated bowel for majority of length. Intact anastomosis with mild leak. Dusky/ischemic appearance to bowel from anastomosis to 50cm of terminal ileum. Large perforation noted in small bowel with extensive feculent contamination  Complications: None.    Indication: The patient has peritonitis with concern for mesenteric ischemia.  After discussing the risks and benefits of surgery and potential complications, the patient provided informed consent.     DETAILS OF PROCEDURE:  The patient was brought to the operating room, placed in a supine position.  Perioperative prophylactic IV antibiotics were given.  Anesthesia was adminisitered. The abdomen was prepped and draped in the usual sterile fashion.  Time out was performed.    His pre-existing chevron incision was opened and the mesh closure divided. We encountered a large rush of dark fluid and particulate succus. The bowels were eviscerated and interloop adhesions lysed. The bowel was grossly dilated and edematous for the majority of small intestine length. A 3cm hole was encountered in the mid-ileum. Due to known perforation as well as ischemic areas with concern for impending perforation, bowel resection performed. Bowel divided  with firings of blue load HIREN stapler. Mesentery taken with firings of the echelon flex white load stapler. Specimen resected approximately 150cm length, remnant approximately 100cm length with entirety of normal colon. Bowel left in discontinuity. Abdomen copiously irrigated with 24 liters of warm saline and antibiotic (triple abx or ampho B) irrigation. Temporary abdominal closure performed with Ab-Thera sponge. Prior to closure all needle and sponge counts verified correct. Dr. Norwood present and scrubbed for all critical portions of procedure. He was transferred to SICU intubated and in critical condition.   Physician Attestation   I was present for the key portions of the procedure and I was immediately available for the entire procedure between opening and closing.    Mamadou Norwood  Date of Service (when I saw the patient): 8/17/2018

## 2018-08-18 NOTE — PHARMACY-AMINOGLYCOSIDE DOSING SERVICE
[facility] Aminoglycoside Follow-Up Note  Date of Service 2018  Patient's  1975   43 year old, male    Weight (Actual):  69.4 kg    Indication: Intra-abdominal Infection  Current Amikacin regimen:  Intermittent dosing.  500 mg given x 1 on   Day of therapy: 2    Target goals based on conventional dosing  Goal Peak level: 20-25 mg/L  Goal Trough level: < 5mg/L for four hours before dose    Renal function: CRRT and some UOP    Nephrotoxins and other renal medications (Future)    Start     Dose/Rate Route Frequency Ordered Stop    18 0945  amphotericin B (FUNGIZONE) 10 mg in sterile water (bottle) 1,000 mL Bottle for irrigation       Irrigation CONTINUOUS 18 0932      18 0945  ceFAZolin 1 g, gentamicin 80 mg, bacitracin 50,000 units in 1000 mL saline       Irrigation ONCE 18 0935      18 0800  phenylephrine (JEFF-SYNEPHRINE) 200 mg in sodium chloride 0.9 % 250 mL infusion      0.5-6 mcg/kg/min × 60.7 kg (Dosing Weight)  2.3-27.3 mL/hr  Intravenous CONTINUOUS 18 0755      18 0701  amikacin (AMIKIN) place [place] - receiving intermittent dosing      1 each Injection SEE ADMIN INSTRUCTIONS 18 0701      08/15/18 0800  tacrolimus (GENERIC EQUIVALENT) suspension 1.25 mg      1.25 mg Oral EVERY MORNING. 18 1606      18 1800  tacrolimus (GENERIC EQUIVALENT) suspension 1.25 mg      1.25 mg Oral or Feeding Tube EVERY EVENING. 18 1338      18 0945  norepinephrine (LEVOPHED) 16 mg in D5W 250 mL infusion      0.03-0.6 mcg/kg/min × 60.7 kg (Dosing Weight)  1.7-34.1 mL/hr  Intravenous CONTINUOUS 18 0937            Contrast Orders - past 72 hours     None          Aminoglycoside Levels - past 2 days  2018: 11:34 AM Amikacin Level 12 mg/L  2018:  4:11 AM Amikacin Level 4 mg/L;  1:21 PM Amikacin Level 13 mg/L    Aminoglycosides IV Administrations (past 72 hours)                   amikacin (AMIKIN) 500 mg in D5W 100 mL intermittent  "infusion (mg) 500 mg New Bag 08/18/18 0857    amikacin (AMIKIN) 500 mg in D5W 100 mL intermittent infusion (mg) 500 mg New Bag 08/17/18 0900              CRRT was off for multiple hours between these levels while patient was in the OR so unable to calculate a k of elimination.  However, level this morning (8/18) is low enough that re-dosing is appropriate.  The \"peak\" is not a steady-state level yet as this was only dose 1 of conventional dosing    Interpretation of levels and current regimen:  Aminoglycoside levels are within goal range    Renal function: CRRT    Plan  1. Re-dose with amikacin 500 mg (7.5 mg/kg) IV x 1 again.  Continue with intermittent dosing.  Kinetics on this dose may also be complicated as the patient is returning to the OR again on 8/18 where CRRT will be off during the case.    2. Pharmacy will continue to follow and check levels on this next dose      Patricia Yost PharmD  pager [phone]    "

## 2018-08-18 NOTE — PLAN OF CARE
Problem: Patient Care Overview  Goal: Plan of Care/Patient Progress Review    Pt arrived from OR at 1330.    I/A: Neuro: Sedated on 5mcg/kg/min propofol. No response to stimuli in BUEs, withdraws in BLEs. Occasionally grimaces. PERRL 3mm brisk.   CV: SR, HR 43-80. BP labile, MAP goal > 55. Currently requiring 0.03mcg/kg/min levo to maintain.   Resp: Intubated. CMV 40%/450/16/5, PIPs 12-14. Clear lungs, no secretions on suctioning.   GI: NG to LIS, dark brown/green thick drainage. NJ clamped. On TPN. No audible BS. Small residual BM this AM.   : Luis in place, UO 10-40cc/hr clear yary. On CRRT, unable to meet goal of I=O. Currently pt removal set at 20cc/hr.    Skin: Severe +3 gen edema. Sclera edema, sclera yellow. Skin tears on R leg. Abd with open skin, fascia closed, no noticeable drainage thusfar.   Drains: Bilat abd JPs. L CAROL with serosang/red drainage ~20cc q4h, R CAROL with serosang drainage ~100cc q4h.   Access: R femoral HD line. L femoral a-line, both oozing serous drainage. R PIV. L PIV. L 3L PICC. R I J 3L MAC.   Gtts: Insulin at 1cc/hr (alg 2). Heparin straight rate 500U/hr. Levo 0.03mcg/kg/min. Fentanyl 50mcg/hr. TPN at 45cc/hr.     P: Continue current POC. Alert MD of any acute changes. Attempt to wean pressors as alberto and pull fluid off CRRT.

## 2018-08-18 NOTE — PROVIDER NOTIFICATION
D/I:  Heme lab phoned with hemoglobin result of 5.3.  MD notified.  No evidence of significant bleeding noted.  Order received to resend full set of labs for comparison and will make decision to transfuse based on repeat results.  Micro lab also called with blood culture results which have been reported to MD.  Plan to continue current antibiotics as ordered.  A: Stable post-op course with successful weaning of pressors.  P:  Continue to monitor and treat as ordered.  Transfuse as ordered for heme and coag results

## 2018-08-18 NOTE — PROGRESS NOTES
Immunosuppression Note:    Yaneli Miles is a 43 year old male who is seen today  for immunosuppression management     I, Mamadou Norwood MD, I have examined the patient with the resident/PA/Fellow, discussed and agree with the note and findings.  I have reviewed today's vital signs, medications, labs and imaging. I reviewed the immunosuppression medications and levels. 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  Developed shock with severe lactic acidosis and respiratory failure on POD9. CT scan showed left PV venous thrombosis, infarction of left liver lobe, patchy infarction of right lobe and possible hepatic arterial thrombosis, patchy bowel ischemia. Transferred to ICU.   Ex lap, patchy diffuse SB ischemia. Doppler demonstrated arterial flow main vessels to bowel and liver. Splenic infarction. Liver biopsy negative for acute rejection. Fluid culture growing VRE. On Heparin gtt. 8/3 OR for reexploration liver transplant, bowel ischemia, findings improved patchy iscemic changes of small bowel, patchy ischemic changes of left liver lobe.  OR: some ischemic/necrotic jejunum, s/p resection.   Washout and abdominal closure with stratice mesh, no drains placed. Minimal ascites, intact small bowel anastomosis, patchy ischemia left liver lobe.  peritonitis with bowel necrosis and bowel perforation. S/p bowel resection, about 50 cm sm bowel, ileocecal valve and colon remaining.     Procedures:    donor (DBD) liver transplant with duct to duct anastomosis over biliary stent.    Ex lap due to concern  for ischemic bowel, findings: patchy diffuse SB ischemia. Doppler demonstrated arterial flow main vessels to bowel and liver. Splenic infarction.   8/3 Reexploration liver transplant, bowel ischemia, findings improved patchy iscemic changes of small bowel, patchy ischemic changes of left liver lobe.   8/6 Re-exploration, ischemic/necrotic jejunum, s/p resection  8/8 Washout and abdominal closure with stratice mesh, no drains placed. Minimal ascites, intact small bowel anastomosis, patchy ischemia left liver lobe.  8/17 Procedure: reopening of laparotomy. abd washout, bowel resection (clip and drop fashion). Postop Dx: same. Large segment of ischemic bowel from mid-jejunum (prior anastomosis) with distal perforation and feculent peritonitis  8/18 Reexploration, small and large bowel intestine appeared healthy per surgeon, primary anastomosis bowel. Fascia closure, operative note pending.    Graft function: Infarction of left liver lobe, patchy infarction right liver lobe. Left PV thrombosis. Narrow and irregular appearance HA. Bowel ischemia, s/p resection small bowel on 8/6. Procedures, see above. Liver biopsy negative for acute rejection. Intra-abdominal clot cultures from 8/1 + VRE and 8/6 + VRE, now s/p washout of hematoma.   Incision opened at bedside 8/12, hematoma removed.  8/13 US: new perihepatic fluid collections (largest 7x2x4), small volume of septated ascites.  VAC placed 8/15. 8/15 CT scan, left lobe infarct with complex fluid collection inferior. 8/17 went to OR for emergent exploration due to septic shock, findings bowel ischemia and distal perforation. S/p bowel resection.      Immunosuppression management:   Simulect intra-op due to BEN  Steroid taper per protocol, completed.  MMF hold due to critical status  Tac goal level ~5-6 due to sepsis and BEN. Decrease 1 mg BID. Check 12 hr level tomorrow.   Hydrocortisone, stress dosing 8/12-8/14.  Complexity of management:Medium. Contributing factors:  thrombocytopenia and anemia sepsis    Hematology:   Anemia: Hbg 7.2. 1 unit RBC  Thrombocytopenia: PLT 23, PLT transfusion.   Anticoagulation: Anticoagulate for bowel ischemia and portal thrombosis.  Heparin 500u/hr    Cardiorespiratory:    Hypotension secondary to septic shock:  On Norepi. Avoid vasopression due to SE mesenteric ischemia.     GI/Nutrition: OG.  NPO.  On TPN.    Ischemic bowel:  7/30 CTA: nonocclusive filling defects in superior mesenteric vein branches.  Anticoagulation as above.  S/p resection of necrotic jejunum on 8/6. 8/17 S/p resection   PSBO:  Noted on 8/15 CT, transition point RLQ. S/p resection on 8/17.   Concern for GIB:  Maroon tinged stools.   PPI IV BID and monitor.    Endocrine:  DM type 2, continue insulin drip.      Fluid/Electrolytes: BEN/ARF. CRRT.    : Traumatic bah placement with intermittent bleeding.   Hematuria now resolved. Bah, coude 18fr in place    Infectious disease: Afebrile. WBC up to 64.5, decreased to 17.3 this AM  -Amikacin, flagyl, ceftraroline, tigercycline daptomycin.   -ID following  -Repeat Blood cx    1. VRE intra abdominal and bacteremia.   2. Infarct left lobe liver with inferior perihepatic fluid collection    Previous ID:   3. Ecoli ESBL bacteremia. Repeat bcx on 8/1 (peripheral and VAD) grew Ecoli ESBL. Zosyn changed to Meropenem, completed on 8/14.  SICU to exchange lines as able. Blood cultures negative for E. Coli since 8/2.  4. HBV: Hepatits B DNA PCR positive prior to tx. Received HBIG on 7/21 and 7/22. 8/10 Hep B surface antigen negative. No further HBIG planned. Continue tenofovir 300 mg, dose adjusted for HD       Prophylaxis: DVT PCDs, on heparin, fall, GI, Valcyte x 12 weeks (CMVand EBV IgG +), bactrim.   Disposition: SICU    Coordinator: Kay Reyes  Surgeon: Pako    Check Hep B surface antigen at 3 months, 6 months then annually     Medical Decision Making: High    JAY/Fellow/Resident Provider: GINGER Segovia  [id number]    Faculty: Mamadou  MD Cuco   __________________________________________________________________  Transplant History: Admitted 2018 for  donor liver transplant.   The patient has a history of liver failure due to hepatitis B .    2018 (Liver), Postoperative day: 28     Interval History:  OR today    ROS:   A 10-point review of systems was negative except as noted above.    Curent Meds:    amikacin (AMIKIN) place de la cruz - receiving intermittent dosing  1 each Injection See Admin Instructions     ceFAZolin 1 g, gentamicin 80 mg, bacitracin 50,000 units in 1000 mL saline   Irrigation Once     ceftaroline (TEFLARO) intermittent infusion  400 mg Intravenous Q12H     DAPTOmycin (CUBICIN) intermittent infusion  12 mg/kg Intravenous Q24H     hydrocortisone sodium succinate PF  100 mg Intravenous Q8H     metroNIDAZOLE  500 mg Intravenous Q6H     micafungin  100 mg Intravenous Q24H     pantoprazole (PROTONIX) IV  40 mg Intravenous BID     sodium chloride (PF)  3 mL Intravenous Q8H     sodium chloride         tacrolimus  1.25 mg Oral QAM     tacrolimus  1.25 mg Oral or Feeding Tube QPM     tenofovir  300 mg Per Feeding Tube Q48H     tigecycline (TYGACIL) intermittent infusion  50 mg Intravenous Q12H     valGANciclovir  450 mg Oral or NG Tube Every Other Day    Or     valGANciclovir  450 mg Oral Every Other Day       Physical Exam:     Admit Weight: 60.7 kg (133 lb 12.8 oz)    Current Vitals:   BP (!) 89/65  Pulse 121  Temp 94.6  F (34.8  C)  Resp 16  Wt 71.1 kg (156 lb 12 oz)  SpO2 100%  BMI 25.3 kg/m2      Vital sign ranges:    Temp:  [93.9  F (34.4  C)-98.2  F (36.8  C)] 94.6  F (34.8  C)  Heart Rate:  [48-85] 69  Resp:  [16-17] 16  BP: ()/(30-83) 89/65  MAP:  [47 mmHg-98 mmHg] 74 mmHg  Arterial Line BP: ()/(37-74) 100/61  FiO2 (%):  [40 %-50 %] 50 %  SpO2:  [94 %-100 %] 100 %  Patient Vitals for the past 24 hrs:   BP Temp Temp src Heart Rate Resp SpO2 Weight   18 1630 (!) 89/65 94.6  F (34.8   C) - 69 - 100 % -   08/18/18 1615 94/66 94.5  F (34.7  C) - 74 16 100 % -   08/18/18 1614 - 94.5  F (34.7  C) - 76 - 100 % -   08/18/18 1600 95/65 94.3  F (34.6  C) Axillary 70 17 100 % -   08/18/18 1545 100/73 93.9  F (34.4  C) Axillary 70 16 100 % -   08/18/18 1530 103/75 - - 73 - 100 % -   08/18/18 1515 107/73 - - 65 - 100 % -   08/18/18 1500 99/71 - - 62 16 100 % -   08/18/18 1445 90/66 - - 68 - 100 % -   08/18/18 1430 99/72 - - 75 16 100 % -   08/18/18 1415 (!) 88/63 - - 71 16 100 % -   08/18/18 1400 111/81 - - 48 16 98 % -   08/18/18 1345 (!) 80/55 - - 77 16 99 % -   08/18/18 1258 - - - - - 94 % -   08/18/18 0945 123/79 97.2  F (36.2  C) - 77 - 100 % -   08/18/18 0930 104/66 97.2  F (36.2  C) - 66 - 100 % -   08/18/18 0915 109/67 97.3  F (36.3  C) - 67 - 100 % -   08/18/18 0715 91/61 97.9  F (36.6  C) - 67 - 100 % -   08/18/18 0700 (!) 88/59 97.7  F (36.5  C) Esophageal 66 16 100 % -   08/18/18 0655 - 97.9  F (36.6  C) Esophageal 68 16 100 % -   08/18/18 0645 93/66 97.7  F (36.5  C) - 85 - 100 % -   08/18/18 0630 (!) 89/62 97.7  F (36.5  C) Esophageal 69 16 100 % -   08/18/18 0615 93/59 97.7  F (36.5  C) - 72 - 100 % -   08/18/18 0610 - 97.7  F (36.5  C) Esophageal 79 16 100 % -   08/18/18 0600 (!) 82/57 97.7  F (36.5  C) Esophageal 78 16 100 % -   08/18/18 0545 (!) 80/46 97.9  F (36.6  C) - 71 - 100 % -   08/18/18 0530 (!) 88/53 97.9  F (36.6  C) - 72 - 100 % -   08/18/18 0515 90/57 97.9  F (36.6  C) - 69 - 100 % -   08/18/18 0500 91/58 97.9  F (36.6  C) - 63 16 100 % -   08/18/18 0445 91/55 97.9  F (36.6  C) - 68 - 100 % -   08/18/18 0430 (!) 89/53 98.1  F (36.7  C) - 73 - 100 % -   08/18/18 0415 96/55 98.1  F (36.7  C) - 77 - 100 % 71.1 kg (156 lb 12 oz)   08/18/18 0400 117/74 98.2  F (36.8  C) Esophageal 83 16 100 % -   08/18/18 0345 100/64 98.1  F (36.7  C) - 81 - 100 % -   08/18/18 0342 - 97.9  F (36.6  C) Esophageal - 16 100 % -   08/18/18 0330 93/61 97.9  F (36.6  C) - 79 - 100 % -   08/18/18 0315  97/62 97.9  F (36.6  C) - 76 - 100 % -   08/18/18 0300 106/64 97.7  F (36.5  C) Esophageal 74 - 100 % -   08/18/18 0250 - 97.7  F (36.5  C) Esophageal 75 16 100 % -   08/18/18 0245 110/68 97.7  F (36.5  C) - 74 - 100 % -   08/18/18 0240 - 97.5  F (36.4  C) Esophageal 73 16 100 % -   08/18/18 0238 - 97.5  F (36.4  C) Esophageal 73 16 100 % -   08/18/18 0230 108/52 97.5  F (36.4  C) - 72 - 100 % -   08/18/18 0215 103/64 97.3  F (36.3  C) - 76 - 100 % -   08/18/18 0200 104/58 97.3  F (36.3  C) Esophageal 80 16 99 % -   08/18/18 0150 105/54 96.6  F (35.9  C) Esophageal 77 16 100 % -   08/18/18 0145 105/54 97.2  F (36.2  C) - 81 - 100 % -   08/18/18 0140 - 97  F (36.1  C) Esophageal 77 16 100 % -   08/18/18 0130 103/53 97  F (36.1  C) - 78 - 100 % -   08/18/18 0125 103/53 96.8  F (36  C) Esophageal 71 16 100 % -   08/18/18 0115 105/48 96.8  F (36  C) - 77 - 100 % -   08/18/18 0100 104/61 96.6  F (35.9  C) Esophageal 66 16 100 % -   08/18/18 0045 106/40 96.4  F (35.8  C) Esophageal 73 16 99 % -   08/18/18 0035 - 96.3  F (35.7  C) Esophageal - 16 - -   08/18/18 0030 103/61 96.3  F (35.7  C) - 73 - 97 % -   08/18/18 0015 96/65 96.1  F (35.6  C) - 73 - 98 % -   08/18/18 0006 99/55 - - 75 - 100 % -   08/18/18 0000 99/55 95.9  F (35.5  C) Esophageal 74 16 100 % -   08/17/18 2345 90/53 95.7  F (35.4  C) - 74 - 100 % -   08/17/18 2330 95/42 95.5  F (35.3  C) - 74 - 100 % -   08/17/18 2315 - 95.4  F (35.2  C) - 69 - 100 % -   08/17/18 2310 94/56 - - 71 - 100 % -   08/17/18 2300 99/57 95.4  F (35.2  C) Esophageal 72 16 100 % -   08/17/18 2245 101/57 95.2  F (35.1  C) - 70 - 100 % -   08/17/18 2230 (!) 87/30 95  F (35  C) - 69 - 100 % -   08/17/18 2215 (!) 100/38 95  F (35  C) - 71 - 100 % -   08/17/18 2200 104/63 95.2  F (35.1  C) Esophageal 71 16 100 % -   08/17/18 2145 (!) 98/35 95.2  F (35.1  C) - 75 - 100 % -   08/17/18 2130 98/59 95.2  F (35.1  C) - 74 - 100 % -   08/17/18 2115 91/66 95.2  F (35.1  C) - 76 - 98 % -   08/17/18  2104 - 95  F (35  C) Esophageal - 16 - -   08/17/18 2100 104/44 95  F (35  C) Esophageal 70 16 100 % -   08/17/18 2045 112/42 95  F (35  C) - 75 - 100 % -   08/17/18 2030 106/40 95  F (35  C) Esophageal 73 - 99 % -   08/17/18 2015 113/51 95  F (35  C) - 73 - 100 % -   08/17/18 2010 113/51 95  F (35  C) Esophageal 73 16 100 % -   08/17/18 2000 103/49 95  F (35  C) Esophageal 73 16 100 % -   08/17/18 1945 116/70 95  F (35  C) - 75 - 100 % -   08/17/18 1930 114/72 95  F (35  C) Esophageal 75 16 100 % -   08/17/18 1915 122/76 95  F (35  C) - 75 - 100 % -   08/17/18 1900 110/69 95  F (35  C) - 75 - 100 % -   08/17/18 1845 100/67 95  F (35  C) - 74 - 100 % -   08/17/18 1830 101/61 95.2  F (35.1  C) Esophageal 74 - 98 % -   08/17/18 1820 - 95.2  F (35.1  C) Esophageal - - - -   08/17/18 1815 107/83 - - 75 - 100 % -   08/17/18 1800 93/77 - - 75 16 100 % -   08/17/18 1745 96/43 - - 74 - 100 % -   08/17/18 1730 97/45 - - 74 - 100 % -   08/17/18 1715 (!) 84/71 - - 78 - 100 % -   08/17/18 1700 105/68 96  F (35.6  C) Oral 78 - 100 % -     General Appearance: NAD  Skin: warm, dry  Heart: NSR  Lungs: Vent  Abdomen: examined by surgeon  : Janelle  Extremities: well perfused.   Neurologic: sedated  Access: PICC, CVC, PIV  Femoral HD line    Data:   CMP    Recent Labs  Lab 08/18/18  1321 08/18/18  1149 08/18/18  1048 08/18/18  0904    137 138 141   POTASSIUM 2.9* 3.0* 2.9* 3.4   CHLORIDE 107  --   --  108   CO2 24  --   --  24   * 151* 136* 117*   BUN 40*  --   --  38*   CR 0.96  --   --  0.88   GFRESTIMATED 85  --   --  >90   GFRESTBLACK >90  --   --  >90   JESUS 7.4*  --   --  7.1*   ICAW  --  4.8 4.3* 4.5   MAG 2.1  --   --  2.2   PHOS 5.6*  --   --  3.8   ALBUMIN 1.7*  --   --  1.8*   BILITOTAL 2.0*  --   --  2.0*   ALKPHOS 148  --   --  142   AST 86*  --   --  100*   ALT 32  --   --  33     CBC    Recent Labs  Lab 08/18/18  1321 08/18/18  1149  08/18/18  0904   HGB 8.6* 8.1*  < > 7.2*   WBC 30.7*  --   --  17.3*   PLT  86*  --   --  23*   < > = values in this interval not displayed.  COAGS    Recent Labs  Lab 08/18/18  1321 08/17/18  2320  08/17/18  1608   INR 2.20* 1.99*  < > 2.05*   PTT 66*  --   --  53*   < > = values in this interval not displayed.   Urinalysis  Recent Labs   Lab Test  07/27/18   2330  07/13/18   1315  06/18/18   1200   COLOR  Yellow  Dark Yellow  Yellow   APPEARANCE  Clear  Clear  Clear   URINEGLC  Negative  Negative  Negative   URINEBILI  Negative  Large*  Small*   URINEKETONE  Negative  Negative  Negative   SG  1.008  1.013  1.008   UBLD  Negative  Negative  Negative   URINEPH  7.5*  6.0  5.0   PROTEIN  30*  10*  Negative   NITRITE  Negative  Negative  Negative   LEUKEST  Negative  Negative  Negative   RBCU  5*  <1  1   WBCU  3  None  2   UTPG   --    --   0.75*     Virology:  Hepatitis C Antibody   Date Value Ref Range Status   07/20/2018 Nonreactive NR^Nonreactive Final     Comment:     Assay performance characteristics have not been established for newborns,   infants, and children         POD 14 US liver:  1.  The left portal vein is antegrade with decreased velocity,  measuring approximately 15 cm/second. This vessel was occluded on CT  7/30/2018.  2.  Elevated velocities of the main hepatic artery, now 203 cm/sec  (previously 94 cm/sec). However left hepatic arteries demonstrates  steep upstroke suggesting there is not a significant stenosis.   2a. Right hepatic artery was not visualized, could be occluded or  difficult to see postoperatively.  3. Echogenic focus of the right lobe in close proximity to the  hepatorenal fossa. This likely corresponds with nonenhancing lesion on  CT 7/30/2018 and likely representing subcapsular hematoma.  4. Additional small hematomas of the braden hepatis and posterior right  perihepatic region.  5. Small volume anechoic ascites.  6. Left hepatic lobe not well visualized secondary to open wound  preventing imaging. Note the left lobe was grossly abnormal on  CT  7/30/2018    POD 1 US liver: Impression:   1.  Hematoma associated with the inferior aspect of the perihepatic  space anteriorly, measuring 13.7 x 7.0 x 8.5 cm.  2.  Retrograde flow of the left portal vein. Additionally, low  velocities of the portal venous system. Single low resistive index of  the extrahepatic aspect of the hepatic artery, measuring 0.37. These  findings are likely within normal limits in the early postoperative  context. However, continued attention on follow-up recommended.    7/30 CT scan abd/pelvis:  IMPRESSION:   1. Hypoenhancing areas in the transplant liver concerning for  infarction. Transplant left portal vein occlusion with bubbles of  portal venous gas. Nearly occlusive filling defect in the native  hepatic artery. Narrowed and irregular appearance of the transplant  hepatic artery. Focal occlusion of the left main hepatic artery.  Segmental occlusions of right hepatic arterial branches.   2. Pneumatosis intestinalis with nonocclusive filling defects in  superior mesenteric vein branches, concerning for bowel ischemia or  infarction.   3. Splenic infarctions.   4. Bibasilar consolidation with air bronchograms. Pneumonia cannot be  excluded.   5. Post surgical changes of evacuation of peritransplant hematoma.

## 2018-08-19 NOTE — PHARMACY-AMINOGLYCOSIDE DOSING SERVICE
Pharmacy Aminoglycoside Follow-Up Note  Date of Service 2018  Patient's  1975   43 year old, male    Weight (Actual): 71.5 kg    Indication: Intra-abdominal Infection  Current Amikacin regimen: intermittent dosing.  500 mg given  at 0857  Day of therapy: 3    Target goals based on conventional dosing  Goal Peak level: 20-25 mg/L  Goal Trough level: < 5mg/L for four hours before dose    Renal function: CRRT and some UOP    Nephrotoxins and other renal medications (Future)    Start     Dose/Rate Route Frequency Ordered Stop    18 1200  amikacin (AMIKIN) 550 mg in D5W 100 mL intermittent infusion      7.5 mg/kg × 71.5 kg Intravenous ONCE 18 1028      18 0800  tacrolimus (GENERIC EQUIVALENT) suspension 1 mg      1 mg Oral or Feeding Tube EVERY MORNING. 18 1704      18 1800  tacrolimus (GENERIC EQUIVALENT) suspension 1 mg      1 mg Oral or Feeding Tube EVERY EVENING. 18 1704      18 0701  amikacin (AMIKIN) place de la cruz - receiving intermittent dosing      1 each Injection SEE ADMIN INSTRUCTIONS 18 0701      18 0945  norepinephrine (LEVOPHED) 16 mg in D5W 250 mL infusion      0.03-0.6 mcg/kg/min × 60.7 kg (Dosing Weight)  1.7-34.1 mL/hr  Intravenous CONTINUOUS 18 0937            Contrast Orders - past 72 hours     None          Aminoglycoside Levels - past 2 days  2018: 11:34 AM Amikacin Level 12 mg/L  2018:  4:11 AM Amikacin Level 4 mg/L;  1:21 PM Amikacin Level 13 mg/L  2018:  3:51 AM Amikacin Level 6 mg/L    Aminoglycosides IV Administrations (past 72 hours)                   amikacin (AMIKIN) 500 mg in D5W 100 mL intermittent infusion (mg) 500 mg New Bag 18 0857                Pharmacokinetic Analysis  Estimated peak level: ~15-20 mg/L  Calculated Trough level: 4.8 mg/L at 24 hours post dose  Volume of distribution: 0.41 L/kg  Half-life: 13 hours (while on CRRT)      Interpretation of levels and current  regimen:    Dose was given at 0857.  CRRT was turned off ~ 0930 to 1400 while the patient was in the OR.  The 2 levels which were drawn were while the patient was on CRRT so this was used to calculate a k elimination while on CRRT and extrapolate the trough for re-dosing.  The peak is likely 15-20 mg/L.  The current kinetics cannot be used to back-extrapolate the peak as the patient was not on CRRT but would assume little clearance of amikacin from the time of dosing to the time of the first level of 13 given the patient was off of CRRT.  Would expect that the true peak is higher than this given the elevated volume of distribution which can make peaks appear falsely depressed.    Amikacin peak may be slightly below the goal range and the CRRT is clearing the amikacin to a trough reasonable for re-dosing in 24-28 hours.      Plan  1. Give amikacin 550 mg IV x 1 today at noon (after trough has been below 5 for ~4 hours).  Continue with intermittent dosing  2.  Method of evaluation: 2 post dose levels  3. Pharmacy will continue to follow and check levels on this next dose      Patricia Yost PharmD  pager [phone]

## 2018-08-19 NOTE — PROGRESS NOTES
Diagnosis - BEN requiring dialysis    This patient was seen and examined while on dialysis. Laboratory results and nurses' notes were reviewed.  Back to OR today for evaluation of small bowel and possible primary anastomosis.  Seen on CRRT.  On minimal pressor and with ongoing blood product and antibiotic infusions.    -Will attempt to match I=Os but will allow for some fluid gain post-op as hemodynamic/pressor requirement dictates  -Otherwise no changes to management of anemia, BMD, acidosis, or electrolytes. Will continue with same CRRT prescription (other than volume removal)  Kevyn Garcia MD

## 2018-08-19 NOTE — PLAN OF CARE
Problem: Patient Care Overview  Goal: Plan of Care/Patient Progress Review  Outcome: Improving  D: pt s/p DDLT 7/21 with recovery complicated by multiple thromboses throughout his GI tract causing ischemia to his bowels and a lobe of his liver. S/P several ex-laps, and washouts. Primary anastomosis of bowels and fascia closed 8/18, but skin left open.    A/I:  Neuro: pt was sedated on propofol 5 mcg/kg/min and fentanyl 50 mcg/hr, propofol stopped at 2300 and fentanyl cut in half at 0300. Pt opens eyes to voice, moves all extremities spontaneously, appears to follow commands in all 4 extremities, extremely weak, grimaces to pain, overbreathes vent, pupils 3mm, equal and reactive.  Resp: Vent, CMV 16/450/5/40, lungs clear, strong cough with suctioning, obtaining a minimal amount of thin white secretions from the ETT  CV: HR 60-80 NSR, no ectopy, on levo 0.02-0.06 mcg/kg/min to keep MAP goal > 55, currently on 0.02 mcg/kg/min, following pressures from L femoral arterial line. Hypothermic at the start of the shift, juan hugger in place, temperature slowly improving. Electrolytes replaced per MAR.  Product: 1 unit FFP given for an INR > 2, awaiting AM INR results and CBC, plts borderline at 49 (goal > 50), SICU notified, awaiting response.  GI: TPN @ 45, insulin gtt 0-3 units/hr, small amount of green mucous out of rectum this AM, NG to LIS with minimal output.  : CRRT, goal I=O unless vasopressor requirements increasing, able to achieve I=O overnight. UOP minimal ~5 ml/hr  Skin: Wet to dry dressing over upper abd, c/d/i, CAROL x2, small amount of bloody output from the left side CAROL, serosanguinous output from the right side. Oozing serous fluid from both femoral line sites.    P: monitor, notify with concerns, continue to attempt to wake the patient up, I=O on CRRT, follow-up on plts and INR    Problem: Diabetes Comorbidity  Goal: Diabetes  Patient comorbidity will be monitored for signs and symptoms of hyperglycemia or  hypoglycemia. Problems will be absent, minimized or managed by discharge/transition of care.   Outcome: No Change  Insulin gtt titrated to goal -150

## 2018-08-19 NOTE — PROGRESS NOTES
Diagnosis - BEN requiring dialysis    This patient was seen and examined while on CRRT. Laboratory results and nurses' notes were reviewed.  Returned  to OR yesterday for evaluation of small bowel and primary anastomosis.  On minimal pressor and with ongoing blood product and antibiotic infusions.  Fluid removal difficult without significantly increasing norepi.      -Will attempt to match I=Os but will allow for some fluid gain post-op as hemodynamic/pressor requirement dictates.   -Will avoid higher pressor doses for volume removal given concern for mesenteric/small bowel ischemia  -Otherwise no changes to management of anemia, BMD, acidosis, or electrolytes. Will continue with same CRRT prescription.  Kevyn Garcia MD

## 2018-08-19 NOTE — PROCEDURES
Procedure: Right Internal Jugular Central Venous Catheter Re-Wiring      Sterile technique used include hand hygiene, hat, mask, gloves, and gown.     Technique: The right internal jugular MAC site was prepped with 2% chlorhexidine and the patient was draped with a full length sterile sheet in the usual fashion. The site was re-prepped with 2% chlorhexidine. A wire was introduced into the MAC catheter without difficulty. The MAC catheter was removed over the wire, while holding the wire in place. A triple lumen catheter was inserted via the seldinger technique over the wire. No ectopy was noted while performing the Seldinger technique. Blood was withdrawn from all lumens and flushed with normal saline. The line was secured with suture, QuikClot, and a large tegaderm prior to the removal of the sterile drape.        The patient tolerated the procedure well and there were no complications.     Chest x-ray post procedure for verification of line placement.      [facility] [place]  [facility]-2/PGY-3    Dr. Feliciano was immediately available for the critical portions of the procedure.    I was present for entire procedure  GB  Diagnosis: peritonitis, renal failure

## 2018-08-19 NOTE — PROGRESS NOTES
CRRT STATUS NOTE    DATA:  Time:  5:49 AM  Pressures WNL:  YES  Filter Status:  WDL    Problems Reported/Alarms Noted:  None    Supplies Present:  YES    ASSESSMENT:  Patient Net Fluid Balance:  -127ml since 0000, +1.6L yesterday   Vital Signs:  T 99, HR 70, /57 (73) on Levo at 0.03, 100% on the vent with FIO2 40%  Labs:  K 3.7, Mag 2.2  Goals of Therapy: Did not meet goals yesterday with downtime in the OR. Now meeting goal of I=O.      INTERVENTIONS:   None    PLAN:  Continue CVVHDF per renal orders. Call CRRT RN with any questions or concerns at [phone].

## 2018-08-19 NOTE — PROVIDER NOTIFICATION
MD Notification    Notified Person: MD    Notified Person Name: SICU resident    Notification Date/Time: August 19, 2018 @ 2299    Purpose of Notification: FYI hgb 6.6 and platelets 37    Orders Received: transfuse patient with PRBC and platelets.     Comments:

## 2018-08-19 NOTE — PROGRESS NOTES
Immunosuppression Note:    Yaneli Miles is a 43 year old male who is seen today  for immunosuppression management     I, Mamadou Norwood MD, I have examined the patient with the resident/PA/Fellow, discussed and agree with the note and findings.  I have reviewed today's vital signs, medications, labs and imaging. I reviewed the immunosuppression medications and levels. 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  Developed shock with severe lactic acidosis and respiratory failure on POD9. CT scan showed left PV venous thrombosis, infarction of left liver lobe, patchy infarction of right lobe and possible hepatic arterial thrombosis, patchy bowel ischemia. Transferred to ICU.   Ex lap, patchy diffuse SB ischemia. Doppler demonstrated arterial flow main vessels to bowel and liver. Splenic infarction. Liver biopsy negative for acute rejection. Fluid culture growing VRE. On Heparin gtt. 8/3 OR for reexploration liver transplant, bowel ischemia, findings improved patchy iscemic changes of small bowel, patchy ischemic changes of left liver lobe.  OR: some ischemic/necrotic jejunum, s/p resection.   Washout and abdominal closure with stratice mesh, no drains placed. Minimal ascites, intact small bowel anastomosis, patchy ischemia left liver lobe.  peritonitis with bowel necrosis and bowel perforation. S/p bowel resection, about 50 cm sm bowel, ileocecal valve and colon remaining.     Procedures:    donor (DBD) liver transplant with duct to duct anastomosis over biliary stent.    Ex lap due to concern  for ischemic bowel, findings: patchy diffuse SB ischemia. Doppler demonstrated arterial flow main vessels to bowel and liver. Splenic infarction.   8/3 Reexploration liver transplant, bowel ischemia, findings improved patchy iscemic changes of small bowel, patchy ischemic changes of left liver lobe.   8/6 Re-exploration, ischemic/necrotic jejunum, s/p resection  8/8 Washout and abdominal closure with stratice mesh, no drains placed. Minimal ascites, intact small bowel anastomosis, patchy ischemia left liver lobe.  8/17 Procedure: reopening of laparotomy. abd washout, bowel resection (clip and drop fashion). Postop Dx: same. Large segment of ischemic bowel from mid-jejunum (prior anastomosis) with distal perforation and feculent peritonitis.   8/18 Reexploration, small and large bowel intestine appeared healthy per surgeon, primary anastomosis bowel. Fascia closure, operative note pending.    Graft function: Infarction of left liver lobe, patchy infarction right liver lobe. Left PV thrombosis. Narrow and irregular appearance HA. Bowel ischemia, s/p resection small bowel on 8/6. Procedures, see above. Liver biopsy negative for acute rejection. Intra-abdominal clot cultures from 8/1 + VRE and 8/6 + VRE, now s/p washout of hematoma.   Incision opened at bedside 8/12, hematoma removed.  8/13 US: new perihepatic fluid collections (largest 7x2x4), small volume of septated ascites.  VAC placed 8/15. 8/15 CT scan, left lobe infarct with complex fluid collection inferior.  8/17 peritonitis with bowel necrosis and bowel perforation. S/p bowel resection, about 50 cm sm bowel, ileocecal valve and colon remaining. On heparin gtt due to bowel ischemia.         Immunosuppression management:   Simulect intra-op due to BEN  Steroid taper per protocol, completed.  MMF held due to critical status  Tac goal level ~5-6 due to sepsis and BEN. Decreased 1 mg BID yesterday. Level 6.9. Continue current dose.   Hydrocortisone, SICU to taper  today  Complexity of management:Medium. Contributing factors: thrombocytopenia and anemia sepsis    Hematology:   Anemia: Hbg 8  Thrombocytopenia: PLT 49. PLT 1 unit today.  Anticoagulation: Anticoagulate for bowel ischemia and portal thrombosis.  Heparin gtt, low intensity. Dextran 10 ml/hr for additional anticoagulation.   Concern for protein C/protein S deficiency. FFP 2 units today.     Cardiorespiratory:    Hypotension secondary to septic shock:  Norepi just discontinued. Avoid vasopression due to SE mesenteric ischemia.   Respiratory failure secondary to sepsis: On vent. SICU to wean as pt tolerates. No plan to extubate today per SICU.     GI/Nutrition: OG.  NPO.  On TPN.  TF 5 ml/hr for healing.   Ischemic bowel:  7/30 CTA: nonocclusive filling defects in superior mesenteric vein branches.  Anticoagulation as above.  S/p resection of necrotic jejunum on 8/6. 8/17 S/p resection.   PSBO:  Noted on 8/15 CT, transition point RLQ. S/p resection on 8/17.     Endocrine:  DM type 2, continue insulin drip.      Fluid/Electrolytes: BEN/ARF. CRRT.    : Traumatic bah placement with intermittent bleeding.   Hematuria now resolved. Bah, coude 18fr in place    Infectious disease: Afebrile. WBC 33.2  -Amikacin, flagyl, ceftraroline, tigercycline daptomycin.   -ID following  -Repeat Blood cx 8/18 and 8/19 in process    1. VRE intra abdominal and bacteremia.   2. Infarct left lobe liver with inferior perihepatic fluid collection    Previous ID:   3. Ecoli ESBL bacteremia. Repeat bcx on 8/1 (peripheral and VAD) grew Ecoli ESBL. Zosyn changed to Meropenem, completed on 8/14.  SICU to exchange lines as able. Blood cultures negative for E. Coli since 8/2.  4. HBV: Hepatits B DNA PCR positive prior to tx. Received HBIG on 7/21 and 7/22. 8/10 Hep B surface antigen negative. No further HBIG planned. Continue tenofovir 300 mg, dose adjusted for HD       Prophylaxis: DVT PCDs, on heparin, fall, GI, Valcyte x 12 weeks (CMVand EBV  IgG +), bactrim.   Disposition: SICU    Coordinator: Kay Reyes  Surgeon: Pako    Check Hep B surface antigen at 3 months, 6 months then annually     Medical Decision Making: High    JAY/Fellow/Resident Provider: GINGER Segovia  [id number]    Faculty: Mamadou Norwood MD  __________________________________________________________________  Transplant History: Admitted 2018 for  donor liver transplant.   The patient has a history of liver failure due to hepatitis B .    2018 (Liver), Postoperative day: 29     Interval History:  Off pressor as of this morning. On vent. CRRT.     ROS:   A 10-point review of systems was negative except as noted above.    Curent Meds:    amikacin (AMIKIN) place de la cruz - receiving intermittent dosing  1 each Injection See Admin Instructions     ceftaroline (TEFLARO) intermittent infusion  400 mg Intravenous Q12H     DAPTOmycin (CUBICIN) intermittent infusion  12 mg/kg Intravenous Q24H     hydrocortisone sodium succinate PF  50 mg Intravenous Q8H    Followed by     [START ON 2018] hydrocortisone sodium succinate PF  25 mg Intravenous Q8H    Followed by     [START ON 2018] hydrocortisone sodium succinate PF  25 mg Intravenous Q12H    Followed by     [START ON 2018] hydrocortisone sodium succinate PF  25 mg Intravenous Once     metroNIDAZOLE  500 mg Intravenous Q6H     micafungin  100 mg Intravenous Q24H     pantoprazole (PROTONIX) IV  40 mg Intravenous BID     sodium chloride (PF)  3 mL Intravenous Q8H     sodium chloride 0.9% (bottle)         tacrolimus  1 mg Oral or Feeding Tube QAM     tacrolimus  1 mg Oral or Feeding Tube QPM     tenofovir  300 mg Per Feeding Tube Q48H     tigecycline (TYGACIL) intermittent infusion  50 mg Intravenous Q12H     valGANciclovir  450 mg Oral or NG Tube Every Other Day    Or     valGANciclovir  450 mg Oral Every Other Day       Physical Exam:     Admit Weight: 60.7 kg (133 lb 12.8 oz)    Current Vitals:   BP 98/69  Pulse 121   Temp 97.7  F (36.5  C)  Resp 17  Wt 71.5 kg (157 lb 10.1 oz)  SpO2 100%  BMI 25.44 kg/m2      Vital sign ranges:    Temp:  [93.9  F (34.4  C)-99  F (37.2  C)] 97.7  F (36.5  C)  Heart Rate:  [53-90] 55  Resp:  [16-18] 17  BP: ()/(47-77) 98/69  MAP:  [54 mmHg-98 mmHg] 78 mmHg  Arterial Line BP: ()/(43-74) 109/61  FiO2 (%):  [40 %] 40 %  SpO2:  [99 %-100 %] 100 %  Patient Vitals for the past 24 hrs:   BP Temp Temp src Heart Rate Resp SpO2 Weight   08/19/18 1330 - - - 55 - 100 % -   08/19/18 1315 - 97.7  F (36.5  C) - 56 - 100 % -   08/19/18 1300 - 97.9  F (36.6  C) - 56 - 100 % -   08/19/18 1245 - 98.1  F (36.7  C) - 57 - 100 % -   08/19/18 1230 - 98.1  F (36.7  C) - 59 - 100 % -   08/19/18 1215 - - - 61 - 99 % -   08/19/18 1200 - 98.2  F (36.8  C) - 66 - 100 % -   08/19/18 1145 - 98.1  F (36.7  C) - 60 - 100 % -   08/19/18 1130 - - - 68 - 100 % -   08/19/18 1115 - 98.2  F (36.8  C) - 64 - 100 % -   08/19/18 1100 98/69 98.1  F (36.7  C) - 70 - 100 % -   08/19/18 1045 - 98.1  F (36.7  C) - 53 - 100 % -   08/19/18 1030 - 98.1  F (36.7  C) - 64 - 100 % -   08/19/18 1015 (!) 85/55 97.9  F (36.6  C) - 68 - 100 % -   08/19/18 1000 - - - 84 - 100 % -   08/19/18 0945 - 98.2  F (36.8  C) - 62 - 100 % -   08/19/18 0930 - 98.4  F (36.9  C) - 71 - 100 % -   08/19/18 0915 - - - 76 - 100 % -   08/19/18 0900 (!) 74/47 98.2  F (36.8  C) - 64 - 100 % -   08/19/18 0845 - 98.2  F (36.8  C) - 69 - 100 % -   08/19/18 0830 - 98.2  F (36.8  C) - 77 - 100 % -   08/19/18 0815 - 98.4  F (36.9  C) - 72 - 100 % -   08/19/18 0800 - - - 65 - 100 % -   08/19/18 0745 - 98.2  F (36.8  C) - 76 - 100 % -   08/19/18 0730 - 98.2  F (36.8  C) - 63 - 100 % -   08/19/18 0715 - 98.2  F (36.8  C) - 71 - 100 % -   08/19/18 0700 - 98.1  F (36.7  C) - 64 - 100 % -   08/19/18 0645 - - - 68 - 100 % -   08/19/18 0630 - - - 66 - 100 % -   08/19/18 0615 - - - 66 - 100 % -   08/19/18 0600 - 97.9  F (36.6  C) - 67 - 100 % -   08/19/18 0545 - - - 61 -  100 % -   08/19/18 0530 - - - 70 - 100 % -   08/19/18 0515 - - - 73 - 100 % -   08/19/18 0500 - - - 80 - 100 % -   08/19/18 0445 - - - 69 - 100 % -   08/19/18 0443 - - - 73 - 100 % -   08/19/18 0430 - - - 74 - 100 % -   08/19/18 0415 - - - 78 - 100 % -   08/19/18 0400 - 99  F (37.2  C) Esophageal 71 17 100 % 71.5 kg (157 lb 10.1 oz)   08/19/18 0345 - - - 72 - 100 % -   08/19/18 0330 - - - 73 - 100 % -   08/19/18 0315 (!) 88/58 - - 71 - 100 % -   08/19/18 0300 (!) 86/53 - - 71 - 100 % -   08/19/18 0245 (!) 85/52 - - 71 - 100 % -   08/19/18 0230 90/54 - - 72 - 100 % -   08/19/18 0215 92/59 - - 71 - 100 % -   08/19/18 0200 105/61 - - 68 - 100 % -   08/19/18 0145 (!) 87/54 - - 69 - 100 % -   08/19/18 0130 93/62 - - 67 - 100 % -   08/19/18 0115 (!) 87/54 - - 66 - 100 % -   08/19/18 0100 (!) 84/51 - - 68 - 100 % -   08/19/18 0051 - - - 68 - 100 % -   08/19/18 0045 (!) 83/56 - - 69 - 100 % -   08/19/18 0030 (!) 87/53 - - 72 - 100 % -   08/19/18 0015 (!) 88/54 - - 71 - 100 % -   08/19/18 0000 95/59 97.3  F (36.3  C) - 90 16 100 % -   08/18/18 2345 90/56 - - 73 - 100 % -   08/18/18 2330 103/67 - - 61 - 100 % -   08/18/18 2315 (!) 83/48 - - 78 - 100 % -   08/18/18 2300 101/66 97.3  F (36.3  C) - 69 - 100 % -   08/18/18 2245 100/64 - - 71 - 100 % -   08/18/18 2230 100/70 97.3  F (36.3  C) - 62 - 100 % -   08/18/18 2215 (!) 89/52 - - 76 - 100 % -   08/18/18 2200 98/62 97.3  F (36.3  C) - 86 - 100 % -   08/18/18 2145 (!) 86/58 - - 67 - 100 % -   08/18/18 2130 (!) 87/57 97.2  F (36.2  C) - 70 - 100 % -   08/18/18 2115 94/63 97  F (36.1  C) - 77 - 100 % -   08/18/18 2100 91/63 97  F (36.1  C) - 68 - 100 % -   08/18/18 2045 90/66 96.8  F (36  C) - 76 - 100 % -   08/18/18 2030 96/63 96.8  F (36  C) - 72 - 100 % -   08/18/18 2028 - - - 77 - 100 % -   08/18/18 2015 106/77 96.6  F (35.9  C) - 87 - 100 % -   08/18/18 2000 (!) 80/53 96.6  F (35.9  C) - 65 18 100 % -   08/18/18 1945 (!) 79/52 - - 65 - 100 % -   08/18/18 1930 (!) 83/53  96.3  F (35.7  C) - 68 16 100 % -   08/18/18 1915 (!) 86/60 96.1  F (35.6  C) - 65 16 100 % -   08/18/18 1900 (!) 85/53 95.9  F (35.5  C) - 71 16 100 % -   08/18/18 1845 (!) 87/61 95.9  F (35.5  C) - 67 16 100 % -   08/18/18 1830 (!) 89/66 95.7  F (35.4  C) - 71 16 100 % -   08/18/18 1815 (!) 89/62 95.5  F (35.3  C) - 71 16 100 % -   08/18/18 1800 (!) 84/60 95.4  F (35.2  C) - 69 16 100 % -   08/18/18 1745 91/62 95.4  F (35.2  C) - 72 16 100 % -   08/18/18 1730 94/68 95.2  F (35.1  C) - 75 16 100 % -   08/18/18 1715 97/64 95  F (35  C) - 73 16 100 % -   08/18/18 1700 100/69 95  F (35  C) Esophageal 72 16 100 % -   08/18/18 1645 (!) 88/62 94.8  F (34.9  C) - 71 16 100 % -   08/18/18 1630 (!) 89/65 94.6  F (34.8  C) - 69 16 100 % -   08/18/18 1615 94/66 94.5  F (34.7  C) - 74 16 100 % -   08/18/18 1614 - 94.5  F (34.7  C) Esophageal 76 - 100 % -   08/18/18 1600 95/65 94.3  F (34.6  C) Axillary 70 17 100 % -   08/18/18 1545 100/73 93.9  F (34.4  C) Axillary 70 16 100 % -   08/18/18 1530 103/75 - - 73 - 100 % -   08/18/18 1515 107/73 - - 65 - 100 % -   08/18/18 1500 99/71 - - 62 16 100 % -   08/18/18 1445 90/66 - - 68 - 100 % -     General Appearance: NAD  Skin: warm, dry  Heart: NSR  Lungs: Vent  Abdomen: examined by surgeon- soft. Dressing in place.   : Luis  Extremities: well perfused.   Neurologic: sedated  Access: PICC, CVC, PIV  Femoral HD line    Data:   CMP    Recent Labs  Lab 08/19/18  1210 08/19/18  1204 08/19/18  0555 08/19/18  0351   NA  --  141 142 141   POTASSIUM  --  3.8 3.7 3.7   CHLORIDE  --  107 109 108   CO2  --  26 25 24   GLC  --  128* 103* 124*   BUN  --  38* 37* 38*   CR  --  0.84 0.83 0.85   GFRESTIMATED  --  >90 >90 >90   GFRESTBLACK  --  >90 >90 >90   JESUS  --  7.5* 7.7* 7.6*   ICAW 4.6  --  4.8  --    MAG  --  2.2  --  2.2   PHOS  --  3.9  --  3.8   ALBUMIN  --  1.6* 1.7* 1.7*   BILITOTAL  --  2.7* 2.7* 2.5*   ALKPHOS  --  194* 193* 170*   AST  --  54* 61* 61*   ALT  --  27 27 27      CBC    Recent Labs  Lab 08/19/18  1204 08/19/18  0555   HGB 6.6* 8.1*   WBC 28.0* 33.2*   PLT 37* 49*     COAGS    Recent Labs  Lab 08/19/18  1204 08/19/18  0853  08/18/18  1812 08/18/18  1321   INR 1.75* 1.98*  < > 2.18* 2.20*   PTT  --   --   --  85* 66*   < > = values in this interval not displayed.   Urinalysis  Recent Labs   Lab Test  07/27/18   2330  07/13/18   1315  06/18/18   1200   COLOR  Yellow  Dark Yellow  Yellow   APPEARANCE  Clear  Clear  Clear   URINEGLC  Negative  Negative  Negative   URINEBILI  Negative  Large*  Small*   URINEKETONE  Negative  Negative  Negative   SG  1.008  1.013  1.008   UBLD  Negative  Negative  Negative   URINEPH  7.5*  6.0  5.0   PROTEIN  30*  10*  Negative   NITRITE  Negative  Negative  Negative   LEUKEST  Negative  Negative  Negative   RBCU  5*  <1  1   WBCU  3  None  2   UTPG   --    --   0.75*     Virology:  Hepatitis C Antibody   Date Value Ref Range Status   07/20/2018 Nonreactive NR^Nonreactive Final     Comment:     Assay performance characteristics have not been established for newborns,   infants, and children         POD 14 US liver:  1.  The left portal vein is antegrade with decreased velocity,  measuring approximately 15 cm/second. This vessel was occluded on CT  7/30/2018.  2.  Elevated velocities of the main hepatic artery, now 203 cm/sec  (previously 94 cm/sec). However left hepatic arteries demonstrates  steep upstroke suggesting there is not a significant stenosis.   2a. Right hepatic artery was not visualized, could be occluded or  difficult to see postoperatively.  3. Echogenic focus of the right lobe in close proximity to the  hepatorenal fossa. This likely corresponds with nonenhancing lesion on  CT 7/30/2018 and likely representing subcapsular hematoma.  4. Additional small hematomas of the braden hepatis and posterior right  perihepatic region.  5. Small volume anechoic ascites.  6. Left hepatic lobe not well visualized secondary to open  wound  preventing imaging. Note the left lobe was grossly abnormal on CT  7/30/2018    POD 1 US liver: Impression:   1.  Hematoma associated with the inferior aspect of the perihepatic  space anteriorly, measuring 13.7 x 7.0 x 8.5 cm.  2.  Retrograde flow of the left portal vein. Additionally, low  velocities of the portal venous system. Single low resistive index of  the extrahepatic aspect of the hepatic artery, measuring 0.37. These  findings are likely within normal limits in the early postoperative  context. However, continued attention on follow-up recommended.    7/30 CT scan abd/pelvis:  IMPRESSION:   1. Hypoenhancing areas in the transplant liver concerning for  infarction. Transplant left portal vein occlusion with bubbles of  portal venous gas. Nearly occlusive filling defect in the native  hepatic artery. Narrowed and irregular appearance of the transplant  hepatic artery. Focal occlusion of the left main hepatic artery.  Segmental occlusions of right hepatic arterial branches.   2. Pneumatosis intestinalis with nonocclusive filling defects in  superior mesenteric vein branches, concerning for bowel ischemia or  infarction.   3. Splenic infarctions.   4. Bibasilar consolidation with air bronchograms. Pneumonia cannot be  excluded.   5. Post surgical changes of evacuation of peritransplant hematoma.

## 2018-08-19 NOTE — PROGRESS NOTES
[facility]  Transplant Infectious Disease Progress Note      Patient:  Yaneli Miles, Date of birth 1975, Medical record number [id number]  Date of Visit:  08/19/2018         Assessment and Recommendations:   Recommendations:  - Await culture results of 8/17/2018 & 8/18/2018 operative interventions  - Continue amikacin and flagyl for peritonitis.  - Continue ceftaroline with dialysis dosing per ICU pharmacy as pt is on CRRT, as well as tigecycline & daptomycin, for VRE  - Continue micafungin for yeast growing in 8/17/2018 peritoneal fluid.     Transplant Infectious Disease will continue to follow with you.   Coverage this upcoming week will be:   Staff:  Dr. Faisal Momin, pager [phone].    Fellow:  Dr. Sofya Jimenez, pager [phone].    Assessment:  43 year old male s/p Liver transplant on 7/21 whose post-op course was complicated by small bowel ischemia requiring multiple trips to OR for washouts and jejunal resection and anastomosis, left lobe of the liver ischemia, splenic infarcts, VRE infected intra-abdominal hematoma and blood clots, and VRE bacteremia.  Infectious Disease issues include:  - Hepatic abscess per 8/15/2018 CT imaging; bowel perforation and feculant peritonitis at 8/17/2018 laparotomy. One of the sources of continuing VRE bacteremia, on ceftaroline, tigecycline, and daptomycin. Continue amikacin and flagyl for coverage of bowel bacterial kenneth. Continue micafungin for yeast growing in 8/17/2018 peritoneal fluid.   - Persistent VRE bacteremia. He continues to have positive blood cultures with latest culture from 8/17/2018 growing. He is on combination therapy with daptomycin & ceftaroline, as described in In vitro activity of daptomycin in combination with ß-lactams, gentamicin, rifampin, and tigecycline against daptomycin-nonsusceptible enterococci, where there was an apparent synergistic effect to combining the two. In the event that daptomycin is inactivated by surfactant in  the lungs by an occult pulmonary process, tigecycline is also part of his regimen.   - QTc interval:462 on 7/30/18  - Pneumocystis prophylaxis:TMP/SMX  - Viral serostatus D/R & prophylaxis: HCV negative/reactive, HBVnegative/reactive, CMV -/+, EBV+/+, valganciclovir ppx  - Fungal prophylaxis: micafungin  - Gamma globulin status: Never checked.  - Isolation status: Contact, Good hand hygiene.    Nicky Kumar MD. Pager [phone].         Interval History:   Since Yaneli was last seen by ID on 8/18/2018, he has returned from the OR where he had re-exploration of his intestine so he had a primary anastomosis with fascial closure. He continues with fentanyl for sedation. He remains intubated, with a strong cough when suctioned. He is on levophed for MP. L femoral A-line in place. He was transfused with FFP. Nutrition via TPN, on an insulin gtt. There is some oozing from femoral line sites. He has 2 CAROL drains in place. RRT via CVVHDF.     Transplants:  7/21/2018 (Liver), Postoperative day:  29.  Coordinator Kay Reyes    Review of Systems:   Unable to obtain review of systems due to intubation and sedation         Current Medications & Allergies:       amikacin (AMIKIN) place de la cruz - receiving intermittent dosing  1 each Injection See Admin Instructions     ceftaroline (TEFLARO) intermittent infusion  400 mg Intravenous Q12H     DAPTOmycin (CUBICIN) intermittent infusion  12 mg/kg Intravenous Q24H     hydrocortisone sodium succinate PF  50 mg Intravenous Q8H     metroNIDAZOLE  500 mg Intravenous Q6H     micafungin  100 mg Intravenous Q24H     pantoprazole (PROTONIX) IV  40 mg Intravenous BID     sodium chloride (PF)  3 mL Intravenous Q8H     sodium chloride 0.9% (bottle)         tacrolimus  1 mg Oral or Feeding Tube QAM     tacrolimus  1 mg Oral or Feeding Tube QPM     tenofovir  300 mg Per Feeding Tube Q48H     tigecycline (TYGACIL) intermittent infusion  50 mg Intravenous Q12H     valGANciclovir  450 mg Oral or NG  Tube Every Other Day    Or     valGANciclovir  450 mg Oral Every Other Day       Infusions/Drips:    IV fluid REPLACEMENT ONLY       dialysate for CVVHD & CVVHDF (Phoxillum BK4/2.5) 12.5 mL/kg/hr (08/19/18 0603)     fentaNYL 25 mcg/hr (08/19/18 0800)     heparin 50 Units/hr (08/19/18 0845)     insulin (regular) 1 Units/hr (08/19/18 0805)     lactated ringers 10 mL/hr at 07/30/18 1519     - MEDICATION INSTRUCTIONS -       norepinephrine Stopped (08/19/18 0910)     parenteral nutrition - ADULT compounded formula 45 mL/hr at 08/18/18 1956     replacement solution for CVVHD & CVVHDF (Phoxillum BK4/2.5) 200 mL/hr at 08/18/18 1131     replacement solution for CVVHD & CVVHDF (Phoxillum BK4/2.5) 12.5 mL/kg/hr (08/19/18 0603)     propofol (DIPRIVAN) infusion 5 mcg/kg/min (08/19/18 0826)     Reason beta blocker order not selected         Allergies   Allergen Reactions     Nsaids      Contraindicated             Physical Exam:   Vitals were reviewed.  All vitals stable.  Patient Vitals for the past 24 hrs:   BP Temp Temp src Resp SpO2 Weight   08/19/18 0930 - 98.4  F (36.9  C) - - - -   08/19/18 0700 - 98.1  F (36.7  C) - - 100 % -   08/19/18 0645 - - - - 100 % -   08/19/18 0630 - - - - 100 % -   08/19/18 0615 - - - - 100 % -   08/19/18 0600 - 97.9  F (36.6  C) - - 100 % -   08/19/18 0545 - - - - 100 % -   08/19/18 0530 - - - - 100 % -   08/19/18 0515 - - - - 100 % -   08/19/18 0500 - - - - 100 % -   08/19/18 0445 - - - - 100 % -   08/19/18 0443 - - - - 100 % -   08/19/18 0430 - - - - 100 % -   08/19/18 0415 - - - - 100 % -   08/19/18 0400 - 99  F (37.2  C) Esophageal 17 100 % 71.5 kg (157 lb 10.1 oz)   08/19/18 0345 - - - - 100 % -   08/19/18 0330 - - - - 100 % -   08/19/18 0315 (!) 88/58 - - - 100 % -   08/19/18 0300 (!) 86/53 - - - 100 % -   08/19/18 0245 (!) 85/52 - - - 100 % -   08/19/18 0230 90/54 - - - 100 % -   08/19/18 0215 92/59 - - - 100 % -   08/19/18 0200 105/61 - - - 100 % -   08/19/18 0145 (!) 87/54 - - - 100 %  -   08/19/18 0130 93/62 - - - 100 % -   08/19/18 0115 (!) 87/54 - - - 100 % -   08/19/18 0100 (!) 84/51 - - - 100 % -   08/19/18 0051 - - - - 100 % -   08/19/18 0045 (!) 83/56 - - - 100 % -   08/19/18 0030 (!) 87/53 - - - 100 % -   08/19/18 0015 (!) 88/54 - - - 100 % -   08/19/18 0000 95/59 97.3  F (36.3  C) - 16 100 % -   08/18/18 2345 90/56 - - - 100 % -   08/18/18 2330 103/67 - - - 100 % -   08/18/18 2315 (!) 83/48 - - - 100 % -   08/18/18 2300 101/66 97.3  F (36.3  C) - - 100 % -   08/18/18 2245 100/64 - - - 100 % -   08/18/18 2230 100/70 97.3  F (36.3  C) - - 100 % -   08/18/18 2215 (!) 89/52 - - - 100 % -   08/18/18 2200 98/62 97.3  F (36.3  C) - - 100 % -   08/18/18 2145 (!) 86/58 - - - 100 % -   08/18/18 2130 (!) 87/57 97.2  F (36.2  C) - - 100 % -   08/18/18 2115 94/63 97  F (36.1  C) - - 100 % -   08/18/18 2100 91/63 97  F (36.1  C) - - 100 % -   08/18/18 2045 90/66 96.8  F (36  C) - - 100 % -   08/18/18 2030 96/63 96.8  F (36  C) - - 100 % -   08/18/18 2028 - - - - 100 % -   08/18/18 2015 106/77 96.6  F (35.9  C) - - 100 % -   08/18/18 2000 (!) 80/53 96.6  F (35.9  C) - 18 100 % -   08/18/18 1945 (!) 79/52 - - - 100 % -   08/18/18 1930 (!) 83/53 96.3  F (35.7  C) - 16 100 % -   08/18/18 1915 (!) 86/60 96.1  F (35.6  C) - 16 100 % -   08/18/18 1900 (!) 85/53 95.9  F (35.5  C) - 16 100 % -   08/18/18 1845 (!) 87/61 95.9  F (35.5  C) - 16 100 % -   08/18/18 1830 (!) 89/66 95.7  F (35.4  C) - 16 100 % -   08/18/18 1815 (!) 89/62 95.5  F (35.3  C) - 16 100 % -   08/18/18 1800 (!) 84/60 95.4  F (35.2  C) - 16 100 % -   08/18/18 1745 91/62 95.4  F (35.2  C) - 16 100 % -   08/18/18 1730 94/68 95.2  F (35.1  C) - 16 100 % -   08/18/18 1715 97/64 95  F (35  C) - 16 100 % -   08/18/18 1700 100/69 95  F (35  C) Esophageal 16 100 % -   08/18/18 1645 (!) 88/62 94.8  F (34.9  C) - 16 100 % -   08/18/18 1630 (!) 89/65 94.6  F (34.8  C) - 16 100 % -   08/18/18 1615 94/66 94.5  F (34.7  C) - 16 100 % -   08/18/18 1614 -  94.5  F (34.7  C) Esophageal - 100 % -   08/18/18 1600 95/65 94.3  F (34.6  C) Axillary 17 100 % -   08/18/18 1545 100/73 93.9  F (34.4  C) Axillary 16 100 % -   08/18/18 1530 103/75 - - - 100 % -   08/18/18 1515 107/73 - - - 100 % -   08/18/18 1500 99/71 - - 16 100 % -   08/18/18 1445 90/66 - - - 100 % -   08/18/18 1430 99/72 - - 16 100 % -   08/18/18 1415 (!) 88/63 - - 16 100 % -   08/18/18 1400 111/81 - - 16 98 % -   08/18/18 1345 (!) 80/55 - - 16 99 % -   08/18/18 1258 - - - - 94 % -   08/18/18 0945 123/79 97.2  F (36.2  C) - - 100 % -     Ranges for vital signs:  Temp:  [93.9  F (34.4  C)-99  F (37.2  C)] 98.1  F (36.7  C)  Heart Rate:  [48-90] 64  Resp:  [16-18] 17  BP: ()/(48-81) 88/58  MAP:  [47 mmHg-98 mmHg] 60 mmHg  Arterial Line BP: ()/(37-74) 83/47  FiO2 (%):  [40 %-50 %] 40 %  SpO2:  [94 %-100 %] 100 %  Vitals:    08/15/18 0000 08/18/18 0415 08/19/18 0400   Weight: 69.4 kg (153 lb) 71.1 kg (156 lb 12 oz) 71.5 kg (157 lb 10.1 oz)       Physical Examination:   GENERAL:  Thin man intubated on the ventilator, not much response during my exam. He is sedated.  HEAD:  Head is normocephalic, atraumatic, temporal wasting  EYES:  Eyes have muddy & icteric sclerae  ENT:  Oropharynx is obscured by ETT. R nare feeding tube.   LUNGS:  Coarse breath sounds anteriorly  CARDIOVASCULAR:  Regular rate and rhythm, no murmur   ABDOMEN:  Normal bowel sounds, RUQ surgical dressing in place with serosanguinous blood-tinged drainage in the CAROL bulbs.   : Luis in place.  SKIN:  No acute rashes. R femoral CVC, R internal jugular with introducer, Left PICC, 2 PIVs.          Laboratory Data:     Metabolic Studies       Recent Labs   Lab Test  08/19/18   0555  08/19/18   0351  08/18/18   2314   08/18/18   0411   08/17/18   0407   07/13/18   2050   07/13/18   0334   NA  142  141  142   < >  141   < >  139   < >   --    < >  132*   POTASSIUM  3.7  3.7  3.7   < >  3.6   < >  4.3   < >   --    < >  2.6*   CHLORIDE  109   108  108   < >  108   < >  107   < >   --    < >  101   CO2  25  24  24   < >  23   < >  19*   < >   --    < >  20   ANIONGAP  8  9  10   < >  9   < >  12   < >   --    < >  12   BUN  37*  38*  38*   < >  38*   < >  40*   < >   --    < >  30   CR  0.83  0.85  0.80   < >  0.80   < >  0.89   < >   --    < >  2.08*   GFRESTIMATED  >90  >90  >90   < >  >90   < >  >90   < >   --    < >  35*   GLC  103*  124*  153*   < >  139*   < >  152*   < >   --    < >  104*   A1C   --    --    --    --    --    --    --    --   5.4   --    --    JESUS  7.7*  7.6*  7.5*   < >  7.3*   < >  7.3*   < >   --    < >  8.1*   PHOS   --   3.8  4.2   < >  3.7   < >  4.1   < >   --    --    --    MAG   --   2.2  2.2   < >  2.3   < >  2.2   < >   --    --    --    LACT  1.4   --   1.7   < >   --    < >  4.3*   < >  3.9*   --   1.1   PCAL   --    --    --    --    --    --    --    --    --    --   0.89   CKT   --    --    --    --   53   --   34   < >   --    --    --     < > = values in this interval not displayed.       Hepatic Studies    Recent Labs   Lab Test  08/19/18   0555  08/19/18   0351  08/18/18   2314   07/13/18   0334   06/21/18   0612   BILITOTAL  2.7*  2.5*  2.8*   < >  36.3*   < >  23.4*   DBIL   --   1.9*   --    < >  28.1*   < >   --    ALKPHOS  193*  170*  160*   < >  96   < >  78   PROTTOTAL  3.8*  3.5*  3.7*   < >  5.8*   < >  5.1*   ALBUMIN  1.7*  1.7*  1.6*   < >  2.8*   < >  2.9*   AST  61*  61*  68*   < >  287*   < >  124*   ALT  27  27  28   < >  125*   < >  79*   LDH   --    --    --    --   259*   --   177    < > = values in this interval not displayed.       Pancreatitis testing    Recent Labs   Lab Test  08/13/18   1048   07/22/18   0354  07/20/18   1741  07/12/18   1913   06/16/18   1815   AMYLASE   --    --   42  60  35   --    --    LIPASE   --    --   75   --    --    --   260   TRIG  173*   < >   --    --    --    < >   --     < > = values in this interval not displayed.       Hematology Studies      Recent Labs    Lab Test  08/19/18   0555  08/18/18   2314  08/18/18   1812  08/18/18   1321   08/18/18   0904  08/18/18   0411   WBC  33.2*  32.9*  34.5*  30.7*   --   17.3*  17.1*   ANEU  31.7*  32.9*  32.6*   --    --   16.2*  16.5*   ALYM  0.7*  0.0*  0.5*   --    --   0.3*  0.3*   MARCELA  0.6  0.0  1.0   --    --   0.7  0.3   AEOS  0.0  0.0  0.0   --    --   0.0  0.0   HGB  8.1*  8.1*  8.2*  8.6*   < >  7.2*  7.5*   HCT  22.6*  22.6*  23.5*  24.7*   --   20.5*  21.4*   PLT  49*  50*  54*  86*   --   23*  25*    < > = values in this interval not displayed.       Clotting Studies    Recent Labs   Lab Test  08/19/18   0351  08/19/18   0101  08/18/18   1812  08/18/18   1321   INR  2.00*  2.02*  2.18*  2.20*   PTT   --    --   85*  66*       Iron Testing    Recent Labs   Lab Test  08/19/18   0555   07/13/18   0334   06/17/18   2138   IRON   --    --   97   --   66   FEB   --    --   117*   --   61*   IRONSAT   --    --   83*   --   109*   ANGELLA   --    --   Unsatisfactory specimen - icteric   --    --    MCV  82   < >  81   < >   --    FOLIC   --    --   10.5   --    --    B12   --    --   Canceled, Test credited   --    --    HAPT   --    --   13*   < >   --    RETP   --    --   1.6   < >   --    RETICABSCT   --    --   46.0   < >   --     < > = values in this interval not displayed.       Arterial Blood Gas Testing    Recent Labs   Lab Test  08/18/18   1149  08/18/18   1048  08/17/18   2320  08/17/18   1608  08/17/18   1313   PH  7.38  7.39  7.45  7.36  7.29*   PCO2  40  38  36  36  39   PO2  112*  331*  172*  177*  112*   HCO3  23  23  25  20*  19*   O2PER  56%  100  40.0  50  60        Thyroid Studies     Recent Labs   Lab Test  06/17/18   2138   TSH  0.77  0.77   T4  Unsatisfactory specimen - icteric       Urine Studies     Recent Labs   Lab Test  07/27/18   2330  07/13/18   1315  06/18/18   1200  06/16/18   2220   URINEPH  7.5*  6.0  5.0  5.5   NITRITE  Negative  Negative  Negative  Negative   LEUKEST  Negative  Negative   Negative  Large*   WBCU  3  None  2  66*       Medication levels    Recent Labs   Lab Test  08/18/18   0546   06/18/18   1230   VANCOMYCIN   --    --   16.8   TACROL  7.5   < >   --     < > = values in this interval not displayed.       Body fluid stats    Recent Labs   Lab Test  08/18/18   1204   07/15/18   1115  07/13/18   1240  06/28/18   1111   FTYP   --    --   Ascites  Ascites  Ascites   FCOL   --    --   Bloody  Red  Yellow   FAPR   --    --   Cloudy  Cloudy  Slightly Cloudy   FWBC   --    --   245  373  234   FNEU   --    --   3  28  6   FLYM   --    --   19  11  24   FMONO   --    --   78   --    --    FALB   --    --    --   1.0   --    FTP   --    --    --   1.6   --    GS  No organisms seen  Many  WBC'S seen  predominantly PMN's    Many  Red blood cells seen     < >  No organisms seen  Few  WBC'S seen  predominantly mononuclear cells    No organisms seen  Few  WBC'S seen  PMNs seen    Many  Red blood cells seen     --     < > = values in this interval not displayed.       Microbiology:  Last Culture results with specimen source  Culture Micro   Date Value Ref Range Status   08/19/2018 PENDING  Preliminary   08/19/2018 PENDING  Preliminary   08/18/2018 PENDING  Preliminary   08/18/2018 PENDING  Preliminary   08/18/2018 PENDING  Preliminary   08/17/2018 No growth after 2 days  Preliminary   08/17/2018 Culture negative monitoring continues  Preliminary   08/17/2018 (A)  Preliminary    Heavy growth  Enterococcus faecium  Probable VRE await confirmation     08/17/2018 Heavy growth  Budding yeast   (A)  Preliminary   08/17/2018   Preliminary    Critical Value/Significant Value, preliminary result only, called to and read back by  Ramiro Logan RN on 7A @1204 on 8/18/18. .     08/17/2018 Culture negative after 2 hours  Preliminary   08/17/2018 (A)  Preliminary    Cultured on the 1st day of incubation:  Gram positive cocci in pairs and chains     08/17/2018   Preliminary    Critical Value/Significant  Value, preliminary result only, called to and read back by  Jamal Paulson RN @ 2011.cg 08/17/18 08/17/2018 (A)  Preliminary    Cultured on the 1st day of incubation:  Enterococcus faecium (VRE)     08/17/2018   Preliminary    Critical Value/Significant Value, preliminary result only, called to and read back by  Ramiro Donis RN 4A @ 1900.cg 08/17/18 08/17/2018 Daptomycin in progress 8.19  Preliminary   08/17/2018   Preliminary    (Note)  POSITIVE for VANCOMYCIN-RESISTANT ENTEROCOCCUS FAECIUM (VRE) - Wanda  gene POSITIVE by [employer] Appigene multiplex nucleic acid test. Final  identification and antimicrobial susceptibility testing will be  verified by standard methods.    Specimen tested with Verigene multiplex, gram-positive blood culture  nucleic acid test for the following targets: Staph aureus, Staph  epidermidis, Staph lugdunensis, other Staph species, Enterococcus  faecalis, Enterococcus faecium, Streptococcus species, S. agalactiae,  S. anginosus grp., S. pneumoniae, S. pyogenes, Listeria sp., mecA  (methicillin resistance) and Wanda/B (vancomycin resistance).    Critical Value/Significant Value called to and read back by  JAMAL PAULSON RN (4A).  08.17.18 2249 GJS     08/15/2018 (A)  Final    Cultured on the 1st day of incubation:  Enterococcus faecium (VRE)  Susceptibility testing done on previous specimen     08/15/2018   Final    Critical Value/Significant Value, preliminary result only, called to and read back by  Marta Staples RN 0600 8/16/18. MS      Specimen Description   Date Value Ref Range Status   08/19/2018 Blood Arterial blood  Final   08/19/2018 Blood BPORT  Final   08/18/2018 Hematoma Abdominal Tissue  Final   08/18/2018 Hematoma Abdominal Tissue  Final   08/18/2018 Hematoma Abdominal Tissue  Final   08/18/2018 Hematoma Abdominal Tissue  Final   08/17/2018 Blood Arterial blood  Final   08/17/2018 Peritoneal fluid  Final   08/17/2018 Peritoneal fluid  Final   08/17/2018 Peritoneal fluid  Final    08/17/2018 Peritoneal fluid  Final   08/17/2018 Blood Left Arm  Final   08/17/2018 Blood Right Hand  Final   08/15/2018 Blood Right Hand  Final   08/15/2018 Blood LEFT PIV  Final   08/14/2018 Blood Right Hand  Final   08/14/2018 Blood Arterial blood  Final   08/13/2018 Blood Right Hand  Final   08/13/2018 Blood Arterial blood  Final        Infectious Disease Testing     Recent Labs   Lab Test  06/18/18   0440   TBRSLT  Negative       Virology:  Hepatitis B Testing     Recent Labs   Lab Test  08/10/18   0349  07/24/18   0446  07/23/18   0817   07/20/18   1741  07/12/18   1913   AUSAB   --   551.06*   --    --   0.00  0.00   HBCAB   --    --    --    --   Reactive*  Reactive*   HEPBANG  Nonreactive   --   Nonreactive   < >   --    --    HBCM   --    --    --    --   Nonreactive  Nonreactive    < > = values in this interval not displayed.        Imaging:  Recent Results (from the past 24 hour(s))   XR Abdomen Port 1 View    Narrative    EXAM: Abdominal radiograph  8/18/2018 2:47 PM     HISTORY:  Nasogastric confirmation       COMPARISON: Abdominal radiograph 8/17/2018    FINDINGS: Single portable AP view of abdomen. Nasogastric tube and  sidehole over the stomach. Enteric tube tip over the distal duodenum.  Left abdominal approach tube tip over the right upper quadrant.  Surgical clips over the right upper abdomen. Partially visualized  central line tip over the cavoatrial junction. Paucity of bowel gas.  Small left pleural effusion. Stable densities over the stomach, seen  on prior CT.      Impression    IMPRESSION: Nasogastric tube tip and sidehole over stomach.    I have personally reviewed the examination and initial interpretation  and I agree with the findings.    HERBERT LAMAS MD

## 2018-08-19 NOTE — PROGRESS NOTES
CRRT STATUS NOTE    DATA:  Time:  6:32 PM  Pressures WNL:  YES  Filter Status:  WDL    Problems Reported/Alarms Noted:  none    Supplies Present:  YES    ASSESSMENT:  Patient Net Fluid Balance:  +708 mL ()  Vital Signs:  Tmax 98.2, HR 53-79, MAPs 56-85, currently off Levophed, RR 16-18, SpO2 100% on vent support (40%, PEEP 5)  Labs:  WBC 30.5 (stable) Hgb 7.6 (after 1 PRBC given), Platelets 49 (after 1 Platelet given), INR 1.75 (after 2 FFP given)  Goals of Therapy:  I=O as long as pressor requirement remains minimal, otherwise allow for minimal net fluid gain if pressor requirement increasing.  Currently slightly positive, which meets goals of therapy.    INTERVENTIONS:   No acute CRRT interventions.  Receiving multiple blood products.    PLAN:  Continue fluid removal as tolerated per goals of therapy.  Check filter daily and change filter q 72 hrs and PRN.  Please contact the CRRT resource RN at [phone] with any questions/concerns.

## 2018-08-19 NOTE — PHARMACY-CONSULT NOTE
Pharmacy Tube Feeding Consult    Medication reviewed for administration by feeding tube and for potential food/drug interactions.    Recommendation: No changes are needed at this time.     Pharmacy will continue to follow as new medications are ordered.    Patricia Shine, PharmD  August 19, 2018

## 2018-08-19 NOTE — PROGRESS NOTES
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 He returned to the SICU for shock, severe lactic acidosis, renal failure and respiratory failure, requiring intubation and dialysis.  Ischemic injury to left hemiliver and non-occlusive mesenteric ischemia.  He underwent a laparotomy and several subsequent take-backs to examine ischemic bowel, on 8/6 he underwent resection of necrotic jejunum with a primary anastomosis, and his abdomen was closed 8/8. He acutely decompensated beginning the night of 8/16, with increasing pressor needs, increasing abdominal pain and lactic acidosis. Taken to the OR on 8/17 for exploratory laparotomy notable for further intestinal necrosis requiring further bowel resection, now s/p exploratory laparotomy with entero-entero anastomosis, drain placement and fascial closure on 8/18.    TODAY'S PROGRESS/PLANS:   - plan to wean propofol as tolerated   - Avoid vasopressin, wean levophed as able  - wean hydrocortisone (short wean)  - discuss with Transplant regarding initiation of  trickle feeds (5ml per hour, no advancement) and change from straight rate heparin to low dose  - willl order antithrombin III, protein C, protein S and heparin level to evaluate for coagulopathy   - appreciate Nephrology recommendation of I = O  - daily PST  - plan to rewire CVC     PLAN:  Neuro/ pain/ sedation:  # acute pain  - monitor neurological status; notify the MD/DO for any acute changes in exam.  - fentanyl gtt for pain; PRN  IV dilaudid   -  propofol gtt for sedation, wean as tolerated     Pulmonary care:   # acute respiratory failure  - extubated (8/10), re-intubated in the OR for emergent ex-lap 8/17  - daily pst and extubate as appropriate.  Vent requirements minimal currently    Cardiovascular:    #Septic shock  # Hemodynamic instability  - monitor hemodynamic status   - norepinephrine gtt  (off 8/18) as needed.  Avoid vasopressin.  MAP goals > 60  - stress dose hydrocortisone to wean now  - TTE 8/8 showed no vegetations or valve abnormalities  - TOÑO 8/10 no evidence of endocarditis       GI care:   # DDLT 7/21/18  # Intrahepatic left portal venous thrombosis, infarction of left liver lobe, patchy infarction of right lobe, suspected hepatic arterial thrombosis  # 7/30/18 s/p exploratory laparotomy and liver biopsy  # Splenic infarctions, patchy bowel ischemia  # Ex Lap 8/6 - resection of 90 cm of necrotic jejunum  # Ex Lap 8/8 - abdomen closed  # Ex Lap 8/17 - Found to have widespread bowel ischemia requiring bowel resection.  Left in discontinuity.  # Ex Lap 8/18 with bowel resection   - two episodes of bowel ischemia of unclear etiology requiring resection of the majority of small bowel.  Now in continuity with entero-entero anastomosis.  -Will plan to trickle feed (5ml) once ok with Transplant Team  -Incision care per transplant team    Fluids/ Electrolytes/ Nutrition:   # protein calorie deficit malnutrition   #NPO  - TF's discontinued, will discuss with Transplant resuming with trickle feed  - TPN for adequate nutrition (will need long term)    Renal/ Fluid Balance:    # BEN   # severe metabolic acidosis, lactic acidosis   # severe hyponatremia - resolved  # hyperkalemia - resolved  # renal failure  # hematuria - resolved  - CRRT restarted 8/13. Nephrology placed femoral dialysis line, recommend I's = O's today  - Will continue to monitor intake and output.  - Urethral bleeding appears to have resolved  - Luis in place    Endocrine:    # DM  II  - insulin gtt  - stress dose steroids stopped 8/14, restarted 8/17   - Hydrocortisone wean now that hemodynamics better and pressor requirement reduced.     ID/ Antibiotics:  # VRE Bacteremia  # ESBL Bacteremia - treated  # Hepatitis B  Has been unable to clear VRE bacteremia.  ABX ongoing and ID transplant following.    - Daptomycin (7/30- ), ceftaroline (8/13- ), tigecycline (8/13- ) continue while blood cultures still positive for VRE  - Meropenem (8/2-8/14), discontinued after 12 days of negative cultures for ESBL  - Micafungin (7/30- )  - Immunosuppression: (restarted 8/6) tacrolimus  - Immunosuppression prophylaxis: Valgancyclovir and Tenofovir, nystatin when micafungin stopped.   - Consulted Transplant ID due to continued bacteremia with unknown source, rec to increase Daptomycin dose, start ceftaroline and tigecycline  - Amikacin (8/17 -)  - Flagyl (8/17 -)    Positive cultures   - 7/27 VRE (line/arm)   - 7/29: VRE (left arm)   - 7/30: VRE (left arm)   - 7/31: VRE (Line and peripheral)  - 8/1: ESBL (HD/hand/central line)   - 8/3: VRE (intra-abdominal tissue)  - 8/4: NGTD (blood)  - 8/5: NGTD (blood)  - 8/6: VRE (art line)  - 8/7: VRE (blood)  - 8/8: VRE (blood)  - 8/9: VRE (blood)  - 8/11: VRE (blood)  - 8/12: VRE (abdominal fluid)  - 8/13: VRE (blood)  - 8/14: VRE (blood)  - 8/15: G+ cocci in pairs and chains (blood)  - 8/17: pending (blood)     Heme:     # Acute blood loss anemia  # Anemia of critical illness  # Portal venous thrombosis  # Thrombocytopenia  - Heparin at 500 straight rate, plan to discuss decreasing to low dose heparin gtt   - Hold aspirin 81mg  - HIT panel sent and negative 8/2.  Repeat HIT panel on 8/13 negative; Hematology consult per Transplant in setting of low platelets - believe this is likely his new baseline  - DDAVP given 8/12  - TEG normal 8/12  - follow up antithrombin III, protein C, protein S and heparin level to evaluate for coagulapathy   - two units plasma now        MSK:  # weakness and deconditioning of critical illness   - PT/OT     Prophylaxis:    - DVT: straight rate heparin, SCD's  - GI: protonix     Lines/ tubes/ drains:  - L fem art line, L PICC, NJ, NG, PIVs, groin central cath, RIJ MAC.  Will rewire RIJ to triple lumen now    Disposition:  - SICU      ====================================    SUBJECTIVE:   Overnight, transfused 2U FFP for low INR. This morning, is sedated and intubated      OBJECTIVE:   1. VITAL SIGNS:   Temp:  [93.9  F (34.4  C)-99  F (37.2  C)] 98.1  F (36.7  C)  Heart Rate:  [48-90] 64  Resp:  [16-18] 17  BP: ()/(48-81) 88/58  MAP:  [47 mmHg-98 mmHg] 60 mmHg  Arterial Line BP: ()/(37-74) 83/47  FiO2 (%):  [40 %-50 %] 40 %  SpO2:  [94 %-100 %] 100 %  Ventilation Mode: CMV/AC  (Continuous Mandatory Ventilation/ Assist Control)  FiO2 (%): 40 %  Rate Set (breaths/minute): 16 breaths/min  Tidal Volume Set (mL): 450 mL  PEEP (cm H2O): 5 cmH2O  Oxygen Concentration (%): 40 %  Resp: 17    2. INTAKE/ OUTPUT:   I/O last 3 completed shifts:  In: 4651.55 [I.V.:2005.55; Other:14; NG/GT:60]  Out: 3401 [Urine:583; Emesis/NG output:400; Drains:914; Other:1504]    3. PHYSICAL EXAMINATION:   General: intubated, sedated  Neuro: intubated, sedated.  Resp: mechanically ventilated  CV: regular rhythm, tachycardic  Abdomen: diffusely distended, soft   Incisions: abdominal incision with kerlex dressings  no drainage or bleeding. Drain with serous fluid  Extremities: warm and well perfused, 2+ peripheral edema in bilateral hands and bilateral LE to thighs  PICC, MAC line, dialysis line, Art line all appropriately dressed    4. INVESTIGATIONS:   Arterial Blood Gases     Recent Labs  Lab 08/18/18  1149 08/18/18  1048 08/17/18  2320 08/17/18  1608   PH 7.38 7.39 7.45 7.36   PCO2 40 38 36 36   PO2 112* 331* 172* 177*   HCO3 23 23 25 20*     Complete Blood Count     Recent Labs  Lab 08/19/18  0555 08/18/18  2314 08/18/18  1812 08/18/18  1321   WBC 33.2* 32.9* 34.5*  30.7*   HGB 8.1* 8.1* 8.2* 8.6*   PLT 49* 50* 54* 86*     Basic Metabolic Panel    Recent Labs  Lab 08/19/18  0555 08/19/18  0351 08/18/18  2314 08/18/18  1812    141 142 141   POTASSIUM 3.7 3.7 3.7 3.9   CHLORIDE 109 108 108 109   CO2 25 24 24 25   BUN 37* 38* 38* 37*   CR 0.83 0.85 0.80 0.88   * 124* 153* 119*     Liver Function Tests    Recent Labs  Lab 08/19/18  0555 08/19/18  0351 08/19/18  0101 08/18/18  2314 08/18/18  1812 08/18/18  1321   AST 61* 61*  --  68* 71* 86*   ALT 27 27  --  28 27 32   ALKPHOS 193* 170*  --  160* 154* 148   BILITOTAL 2.7* 2.5*  --  2.8* 2.5* 2.0*   ALBUMIN 1.7* 1.7*  --  1.6* 1.6* 1.7*   INR  --  2.00* 2.02*  --  2.18* 2.20*     Pancreatic Enzymes  No lab results found in last 7 days.  Coagulation Profile    Recent Labs  Lab 08/19/18  0351 08/19/18  0101 08/18/18 1812 08/18/18  1321  08/17/18  1608 08/17/18  1010   INR 2.00* 2.02* 2.18* 2.20*  < > 2.05* 3.81*   PTT  --   --  85* 66*  --  53* >240*   < > = values in this interval not displayed.  Lactate  Invalid input(s): LACTATE    5. RADIOLOGY:   No results found for this or any previous visit (from the past 24 hour(s)).       --------------------------------------------------------------------------------------------------------------------------    Patient seen, findings and plan discussed with surgical ICU staff Dr. Feliciano.

## 2018-08-19 NOTE — PROGRESS NOTES
CLINICAL NUTRITION SERVICES - BRIEF NOTE  *See RD note on 8/16 for last nutrition reassessment note details/recs   Nutrition Prescription    Recommendations already ordered by Registered Dietitian (RD):  1. Nutren 1.5 (fiber free) via NDT @ 10 mL/hr  2. Patency water flushes (30 mL Q4H)     Continues on TPN, lipids d/c'd on 8/18.  Is on propofol.    INTERVENTIONS  Implementation  Collaboration with other providers: spoke with provider, confirmed plan is to start TF @ 10 mL/hr today and no further advancement at this time  Enteral Nutrition - Initiate trickle TF @ 10 mL/hr (see above)  Feeding tube flush - see above    Monitoring/Evaluation  Progress toward goals will be monitored and evaluated per protocol.     Mariana Cherry, RD, LD  Weekend/Holiday RD Pager: [phone]    Weekday [facility] RD pager: [phone]

## 2018-08-20 NOTE — PHARMACY-AMINOGLYCOSIDE DOSING SERVICE
Pharmacy Aminoglycoside Follow-Up Note  Date of Service 2018  Patient's  1975   43 year old, male    Weight (Actual): 71.2 kg    Indication: Intra-abdominal Infection  Current Amikacin regimen:  550 mg IV intermittent dosing by levels  Day of therapy: 4    Target goals based on conventional dosing  Goal Peak level: 25-30 mg/L  Goal Trough level: <5 mg/L four hours before the dose    Current estimated CrCl: on CRRT    Creatinine for last 3 days  2018:  4:08 PM Creatinine 0.98 mg/dL; 10:05 PM Creatinine 0.89 mg/dL; 11:20 PM Creatinine 0.87 mg/dL  2018:  4:11 AM Creatinine 0.80 mg/dL;  9:04 AM Creatinine 0.88 mg/dL;  1:21 PM Creatinine 0.96 mg/dL;  6:12 PM Creatinine 0.88 mg/dL; 11:14 PM Creatinine 0.80 mg/dL  2018:  3:51 AM Creatinine 0.85 mg/dL;  5:55 AM Creatinine 0.83 mg/dL; 12:04 PM Creatinine 0.84 mg/dL;  6:02 PM Creatinine 0.84 mg/dL; 11:29 PM Creatinine 0.85 mg/dL  2018:  4:43 AM Creatinine 0.77 mg/dL; 12:15 PM Creatinine 0.77 mg/dL    Nephrotoxins and other renal medications (Future)    Start     Dose/Rate Route Frequency Ordered Stop    18 0800  tacrolimus (GENERIC EQUIVALENT) suspension 1 mg      1 mg Oral or Feeding Tube EVERY MORNING. 18 17018 1800  tacrolimus (GENERIC EQUIVALENT) suspension 1 mg      1 mg Oral or Feeding Tube EVERY EVENING. 18 1704      18 0701  amikacin (AMIKIN) [place] - receiving intermittent dosing      1 each Injection SEE ADMIN INSTRUCTIONS 18 0701      18 0945  norepinephrine (LEVOPHED) 16 mg in D5W 250 mL infusion      0.03-0.6 mcg/kg/min × 60.7 kg (Dosing Weight)  1.7-34.1 mL/hr  Intravenous CONTINUOUS 18 0937            Contrast Orders - past 72 hours     None          Aminoglycoside Levels - past 2 days  2018:  3:51 AM Amikacin Level 6 mg/L;  4:06 PM Amikacin Level 13 mg/L  2018:  4:43 AM Amikacin Level 7 mg/L    Aminoglycosides IV Administrations (past 72 hours)                    amikacin (AMIKIN) 550 mg in D5W 100 mL intermittent infusion (mg) 550 mg New Bag 08/19/18 1250                Pharmacokinetic Analysis  Calculated Peak level: 14.4 mg/L  Calculated Trough level: 4.39 mg/L  Volume of distribution: 0.74 L/kg  Half-life: 13.4 hours        Interpretation of levels and current regimen:  Aminoglycoside levels are outside of goal range. The calculated volume of distribution is nearly twice that from yesterday which is not unusual in CRRT patients. Given his current volume of distribution, a dose of about 950 mg would be needed to achieve goal peaks. Given this high fluctuation in distribution volume, we will hold off on increasing the dose so as to not overdose if his distribution volume returns to normal. We will re-evaluate Mr. Miles's kinetics tomorrow to determine if his distribution volume is stable and a dose increase is warranted. A level of 5 mg/L was calculated to occur at approximately 1115 and the next dose should be given approximately four hours from this time.    Has serum creatinine changed greater than 50% in the last 72 hours: No on CRRT    Urine output:  diminished urine output    Renal function: remaining on CRRT with increased urine output today compared to yesterday.    Plan  1. Continue current dose of 550 mg IV x1 at 1530. 2 post-dose levels will be ordered to continue to monitor kinetics.    2.  Method of evaluation: 2 post dose levels    3. Pharmacy will continue to follow and check levels  as appropriate: Will continue daily levels for intermittent dosing.    Praneeth Dupree

## 2018-08-20 NOTE — PROGRESS NOTES
CLINICAL NUTRITION SERVICES - BRIEF NOTE (see RD note on 8/16 for full assessment)    New findings   Off propofol today.      Interventions  - Resume 250 ml 20% IV lipids 3/wk (M, W, F). Continue TPN @ 45 ml/hr.   - Continue trickle TF @ 10 ml/hr     Zoila Evans RD, LD  Pager: [phone]

## 2018-08-20 NOTE — PROGRESS NOTES
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 He returned to the SICU for shock, severe lactic acidosis, renal failure and respiratory failure, requiring intubation and dialysis.  Ischemic injury to left hemiliver and non-occlusive mesenteric ischemia.  He underwent a laparotomy and several subsequent take-backs to examine ischemic bowel, on 8/6 he underwent resection of necrotic jejunum with a primary anastomosis, and his abdomen was closed 8/8. He acutely decompensated beginning the night of 8/16, with increasing pressor needs, increasing abdominal pain and lactic acidosis, now s/p exploratory laparotomy 8/17 with entero-entero small bowel anastomosis, drain placement and fascial closure on 8/18.    TODAY'S PROGRESS/PLANS:   - change to straight rate heparin, 500 unit(s)/hr  - 1 U RBCs  - PST 7/5; consider extubation  - CRRT net negative  ml/hr    PLAN:  Neuro/ pain/ sedation:  # acute pain  - monitor neurological status; notify the MD/DO for any acute changes in exam.  - fentanyl gtt for pain; PRN  IV dilaudid and oxycodone  - propofol gtt weaned off    Pulmonary care:   # acute respiratory failure  - extubated (8/10), re-intubated in the OR for emergent ex-lap 8/17  - daily pst and extubate as appropriate.  Vent requirements minimal currently  - consider extubation tomorrow    Cardiovascular:    #Septic shock  # Hemodynamic instability  - monitor hemodynamic status   - norepinephrine gtt  as needed.  Avoid vasopressin.  MAP goals > 60  - weaning stress dose hydrocortisone, to end 8/22   - TTE 8/8 showed no vegetations or valve abnormalities  - TOÑO 8/10 no evidence of endocarditis      GI care:   # DDLT 7/21/18  # Intrahepatic left portal venous thrombosis, infarction of left liver lobe, patchy infarction of right lobe, suspected hepatic arterial thrombosis  # 7/30/18 s/p exploratory laparotomy and liver biopsy  # Splenic infarctions, patchy bowel ischemia  # Ex Lap 8/6 - resection of 90 cm of necrotic jejunum  # Ex Lap 8/8 - abdomen closed  # Ex Lap 8/17 - Found to have widespread bowel ischemia requiring bowel resection.  Left in discontinuity.  # Ex Lap 8/18 - entero-entero anastomosis and facial closure.  Approximately  cm small bowel remaining with intact ileocecal valve and colon.    - Two episodes of bowel ischemia of unclear etiology requiring resection of the majority of small bowel.  Now in continuity with entero-entero anastomosis.  - trickle feeds at 10 ml/hr  - Incision care per transplant team    Fluids/ Electrolytes/ Nutrition:   # protein calorie deficit malnutrition   # NPO  - TF's at trickle feed  - TPN for adequate nutrition (will need long term, due to ~ cm of small bowel remaining)    Renal/ Fluid Balance:    # BEN   # severe metabolic acidosis, lactic acidosis - resolved  # severe hyponatremia - resolved  # hyperkalemia - resolved  # renal failure  # hematuria - resolved  - CRRT restarted 8/13. Nephrology placed femoral dialysis line, recommend net negative  ml/hr today  - Will continue to monitor intake and output.  - Urethral bleeding appears to have resolved  - Luis in place    Endocrine:    # DM II  - insulin gtt  - stress dose steroids restarted 8/17   - Hydrocortisone wean to end 8/22, now that hemodynamics better and pressor requirement reduced.     ID/ Antibiotics:  # VRE Bacteremia  # ESBL Bacteremia - treated  # Hepatitis B  Has been unable to clear VRE bacteremia.  ABX ongoing and ID transplant following.  - Daptomycin (7/30- ), ceftaroline (8/13- ), tigecycline (8/13- ) continue while blood cultures still positive for VRE  - Micafungin  (7/30- )  - Amikacin (8/17 -)  - Flagyl (8/17 - )  - Meropenem (8/2-8/14), discontinued after 12 days of negative cultures for ESBL  - Immunosuppression: (restarted 8/6) tacrolimus  - Immunosuppression prophylaxis: Valgancyclovir and Tenofovir, nystatin when micafungin stopped.   - Consulted Transplant ID due to continued bacteremia with unknown source, rec to increase Daptomycin dose, start ceftaroline and tigecycline    Positive cultures   - 7/27 VRE (line/arm)   - 7/29: VRE (left arm)   - 7/30: VRE (left arm)   - 7/31: VRE (Line and peripheral)  - 8/1: ESBL (HD/hand/central line)   - 8/3: VRE (intra-abdominal tissue)  - 8/4: NGTD (blood)  - 8/5: NGTD (blood)  - 8/6: VRE (art line)  - 8/7: VRE (blood)  - 8/8: VRE (blood)  - 8/9: VRE (blood)  - 8/11: VRE (blood)  - 8/12: VRE (abdominal fluid)  - 8/13: VRE (blood)  - 8/14: VRE (blood)  - 8/15: VRE (blood)  - 8/17: NGTD (blood)   - 8/17: VRE & Candida (peritoneal fluid)   - 8/18: G+ cocci (tissue)  - 8/19 NGTD (blood)    Heme:     # Acute blood loss anemia  # Anemia of critical illness  # Portal venous thrombosis  # Thrombocytopenia  - Heparin at 500 straight rate  - Hold aspirin 81mg  - HIT panel sent and negative 8/2.  Repeat HIT panel on 8/13 negative; Hematology consult per Transplant in setting of low platelets - believe this is likely his new baseline  - follow up antithrombin III, protein C, protein S and heparin level to evaluate for coagulapathy     MSK:  # weakness and deconditioning of critical illness   - PT/OT     Prophylaxis:    - DVT: straight rate heparin, SCD's  - GI: protonix     Lines/ tubes/ drains:  - L fem art line, L PICC, NJ, NG, PIVs, groin central cath, RIJ MAC.  Will rewire RIJ to triple lumen now    Disposition:  - SICU      ====================================    SUBJECTIVE:   No acute events overnight. Remained off of pressors. Pain well controlled.  More alert this am.  Some concern with oozing overnight.      OBJECTIVE:   1. VITAL  SIGNS:   Temp:  [97.2  F (36.2  C)-98.4  F (36.9  C)] 97.5  F (36.4  C)  Heart Rate:  [53-88] 62  Resp:  [16-18] 16  BP: ()/(47-94) 132/82  MAP:  [54 mmHg-108 mmHg] 93 mmHg  Arterial Line BP: ()/(43-81) 133/70  FiO2 (%):  [40 %] 40 %  SpO2:  [98 %-100 %] 100 %  Ventilation Mode: CMV/AC  (Continuous Mandatory Ventilation/ Assist Control)  FiO2 (%): 40 %  Rate Set (breaths/minute): 16 breaths/min  Tidal Volume Set (mL): 450 mL  PEEP (cm H2O): 5 cmH2O  Oxygen Concentration (%): 40 %  Resp: 16    2. INTAKE/ OUTPUT:   I/O last 3 completed shifts:  In: 5277.03 [I.V.:2173.7; NG/GT:150]  Out: 4666 [Urine:178; Emesis/NG output:100; Drains:724; Other:3664]    3. PHYSICAL EXAMINATION:   General: intubated, lying in bed, appears uncomfortable.   Neuro: responds to questions with head motion  Resp: mechanically ventilated  CV: regular rhythm, tachycardic  Abdomen: non-distended, soft   Incisions: abdominal incision with kerlex dressings, moderate drainage/bleeding. Drain with bloody fluid  Extremities: warm and well perfused, 1+ peripheral edema in bilateral hands and bilateral LE to thighs  PICC, triple lumen RIJ, dialysis line, Art line all appropriately dressed    4. INVESTIGATIONS:   Arterial Blood Gases     Recent Labs  Lab 08/18/18  1149 08/18/18  1048 08/17/18  2320 08/17/18  1608   PH 7.38 7.39 7.45 7.36   PCO2 40 38 36 36   PO2 112* 331* 172* 177*   HCO3 23 23 25 20*     Complete Blood Count     Recent Labs  Lab 08/20/18  0443 08/19/18  2329 08/19/18  1802 08/19/18  1606   WBC 31.8* 29.0* 27.1* 30.5*   HGB 7.2* 7.7* 7.4* 7.6*   PLT 58* 33* 45* 49*     Basic Metabolic Panel    Recent Labs  Lab 08/20/18  0443 08/19/18  2329 08/19/18  1802 08/19/18  1204    140 141 141   POTASSIUM 3.9 4.0 4.0 3.8   CHLORIDE 107 108 107 107   CO2 24 25 25 26   BUN 40* 39* 39* 38*   CR 0.77 0.85 0.84 0.84   * 128* 105* 128*     Liver Function Tests    Recent Labs  Lab 08/20/18 0443 08/19/18  2329 08/19/18  1802  08/19/18  1204   AST 56* 57* 54* 54*   ALT 26 26 26 27   ALKPHOS 245* 239* 208* 194*   BILITOTAL 2.8* 3.0* 2.7* 2.7*   ALBUMIN 1.7* 1.6* 1.6* 1.6*   INR 1.68* 1.74* 1.70* 1.75*     Pancreatic Enzymes  No lab results found in last 7 days.  Coagulation Profile    Recent Labs  Lab 08/20/18  0443 08/19/18  2329 08/19/18  1802 08/19/18  1204  08/18/18  1812 08/18/18  1321  08/17/18  1608 08/17/18  1010   INR 1.68* 1.74* 1.70* 1.75*  < > 2.18* 2.20*  < > 2.05* 3.81*   PTT  --   --   --   --   --  85* 66*  --  53* >240*   < > = values in this interval not displayed.  Lactate  Invalid input(s): LACTATE    5. RADIOLOGY:   No results found for this or any previous visit (from the past 24 hour(s)).       --------------------------------------------------------------------------------------------------------------------------  Carlos Castellanos  Medical Student, MS4  [employer]      Patient seen, findings and plan discussed with surgical ICU staff Dr. Soler.    Eddie Acuna MD  CA-2/PGY-3

## 2018-08-20 NOTE — PLAN OF CARE
Problem: Patient Care Overview  Goal: Plan of Care/Patient Progress Review  Outcome: No Change  Neuro- PERRL. Opens eyes spontaneously. Moves all extremities independently. Nods to simple yes/no questions. Scleral edema/yellow.  CV- Afebrile. NSR 50s-70s. BP stable off pressors all night. 2 units of plts given. Pitting edema throughout extremities.   Pulm- On CMV 40%. Minimal vent settings. Lungs clear/diminished. Scant secretions.   GI- Abdomen soft. Hypoactive bowel sounds. Minimal output from OG tube. NJ with TF at goal of 55ml/hr. 4 loose/ mucoid BMs over night.   - Luis patent. UOP around 12ml/hr. On CRRT-- goal for I=Os  Skin- Abdomen with abds over packing. LLQ CAROL. RLQ CAROL draining a significant amount of blood around the site. Ostomy appliance placed around CAROL site to manage drainage. SICU made aware. Paged FYI to transplant fellow with no response. Skin tear to sacrum. B/L groin lines oozing serous drainage. Dressings changed. R internal jugular line oozing blood. Dressing changed.   Gtts- Dextran @ 10. Insulin @ 1. Heparin @ 1050. (Therapeutic Xa this am) TPN @ 45. Fentanyl @ 50. TKO @ 20.    TEG study sent per SICU. Will continue to monitor and assess for changes.

## 2018-08-20 NOTE — PROGRESS NOTES
[facility]  Transplant Infectious Disease Progress Note      Patient:  Yaneli Miles, Date of birth 1975, Medical record number [id number]  Date of Visit:  08/20/2018         Assessment and Recommendations:   Recommendations:  - Await final operative cultures from 8/17 and 8/18. Currently with both VRE and Candida albicans  - Stop amikacin as he had completed 2-week course of meropenem  for ESBL E. Coli bacteremia and has now had improved sources control with other active agents for gram-negative coverage  - Continue ceftaroline with dialysis dosing per ICU pharmacy as pt is on CRRT, as well as tigecycline & daptomycin, for VRE. Daptomycin SHARMIN rising, now up to 24 on recent blood isolates.   - Continue flagyl for anaerobic coverage of intra-abdominal process.  - Continue micafungin for Candida albicans growing in 8/17/2018 peritoneal fluid.  - Continue tenofovir for HBV   - Continue valganciclovir for viral prophylaxis    Transplant Infectious Disease will continue to follow with you.     Assessment:  43 year old male s/p Liver transplant on 7/21 whose post-op course was complicated by small bowel ischemia requiring multiple trips to OR for washouts and jejunal resection and anastomosis, left lobe of the liver ischemia, splenic infarcts, VRE infected intra-abdominal hematoma and blood clots, and VRE bacteremia.  Infectious Disease issues include:  # Hepatic abscess per 8/15/2018 CT imaging; bowel perforation and feculant peritonitis at 8/17/2018 laparotomy. One of the sources of continuing VRE bacteremia, on ceftaroline, tigecycline, and daptomycin. Daptomycin SHARMIN initially 2 on first positive blood cultures, now up to 24 on most recent from 8/17.  Continue flagyl for coverage of anaerobic bowel bacterial kenneth. Continue micafungin for Candida albicans growing in 8/17/2018 peritoneal fluid.  As of most recent OR on 8/18, remaining bowel looked viable and fascia was closed.  # Persistent  VRE bacteremia. He continues to have positive blood cultures with latest culture from 8/17/2018 growing. He is on combination therapy with daptomycin & ceftaroline, as described in In vitro activity of daptomycin in combination with ß-lactams, gentamicin, rifampin, and tigecycline against daptomycin-nonsusceptible enterococci, where there was an apparent synergistic effect to combining the two. In the event that daptomycin is inactivated by surfactant in the lungs by an occult pulmonary process, tigecycline was also part of his regimen.   # Chronic hepatitis B: On tenofovir    - QTc interval:462 on 7/30/18  - Pneumocystis prophylaxis:TMP/SMX  - Viral serostatus D/R & prophylaxis: HCV negative/reactive, HBVnegative/reactive, CMV -/+, EBV+/+, valganciclovir ppx  - Fungal prophylaxis: micafungin  - Gamma globulin status: Never checked.  - Isolation status: Contact, Good hand hygiene.    Staffed with Dr. Faisal Momin.    Sofya Jimenez MD, PhD  Adult & Pediatric Infectious Diseases Fellow PGY7, [facility]  Pager: [phone]    Attestation:  I have reviewed today's vital signs, medications, labs and imaging.      Floor time: 25 minutes, Face-to-face time: 10 minutes, Total time: 35 minutes  Faisal Momin,   Pager [phone]  Yaneli Miles was seen in the hospital by Faisal Momin on August 20th with Dr. Jimenez.  I reviewed the history & exam. Assessment and plan were jointly made.  I agree with and have edited the fellow's note and plan of care.  Faisal Momin MD.           Interval History:   Last seen by ID on 8/19.  He continues on insulin gtt.  He is mechanically ventilated on 40% FiO2.  Norepi off since yesterday afternoon.  Nurse noting increased bloody output from abdominal drains today.  Received platelets and pRBCs today.  Continues on CRRT.  Nephrology had been running positive fluid balance but anticipate may be able to pull fluid now that he is off pressors and maintaining BPs. Some stool output.   Minimal suction from ET tube.  No new rashes. Moving extremities.     Anti-infectives  Amikacin  Tigecycline  Valganciclovir  Ceftaroline  Daptomycin  Metronidazole  Tenofovir  Micafungin    Transplants:  7/21/2018 (Liver), Postoperative day:  30.  Coordinator Kay Reyes    Review of Systems:   Unable to obtain review of systems due to intubation and sedation         Current Medications & Allergies:       amikacin (AMIKIN) place de la cruz - receiving intermittent dosing  1 each Injection See Admin Instructions     ceftaroline (TEFLARO) intermittent infusion  400 mg Intravenous Q12H     DAPTOmycin (CUBICIN) intermittent infusion  12 mg/kg Intravenous Q24H     hydrocortisone sodium succinate PF  25 mg Intravenous Q8H    Followed by     [START ON 8/21/2018] hydrocortisone sodium succinate PF  25 mg Intravenous Q12H    Followed by     [START ON 8/22/2018] hydrocortisone sodium succinate PF  25 mg Intravenous Once     lipids  250 mL Intravenous Once per day on Mon Wed Fri     metroNIDAZOLE  500 mg Intravenous Q6H     micafungin  100 mg Intravenous Q24H     pantoprazole (PROTONIX) IV  40 mg Intravenous BID     sodium chloride (PF)  3 mL Intravenous Q8H     tacrolimus  1 mg Oral or Feeding Tube QAM     tacrolimus  1 mg Oral or Feeding Tube QPM     tenofovir  300 mg Per Feeding Tube Q48H     tigecycline (TYGACIL) intermittent infusion  50 mg Intravenous Q12H     valGANciclovir  450 mg Oral or NG Tube Every Other Day    Or     valGANciclovir  450 mg Oral Every Other Day       Infusions/Drips:    dextran 40 in saline 10 mL/hr at 08/20/18 1000     IV fluid REPLACEMENT ONLY       IV fluid REPLACEMENT ONLY       dialysate for CVVHD & CVVHDF (Phoxillum BK4/2.5) 12.5 mL/kg/hr (08/20/18 0510)     fentaNYL 50 mcg/hr (08/20/18 0800)     heparin 500 Units/hr (08/20/18 0800)     insulin (regular) 2 Units/hr (08/20/18 0904)     lactated ringers 10 mL/hr at 07/30/18 1519     - MEDICATION INSTRUCTIONS -       norepinephrine Stopped  (08/19/18 1445)     parenteral nutrition - ADULT compounded formula 45 mL/hr at 08/19/18 1941     replacement solution for CVVHD & CVVHDF (Phoxillum BK4/2.5) 200 mL/hr at 08/19/18 1527     replacement solution for CVVHD & CVVHDF (Phoxillum BK4/2.5) 12.5 mL/kg/hr (08/20/18 0510)     Reason beta blocker order not selected         Allergies   Allergen Reactions     Nsaids      Contraindicated             Physical Exam:   Vitals were reviewed.  Ranges for vital signs:  Temp:  [96.8  F (36  C)-98.2  F (36.8  C)] 96.8  F (36  C)  Heart Rate:  [53-88] 70  Resp:  [16-18] 16  BP: ()/(51-94) 126/91  MAP:  [54 mmHg-108 mmHg] 90 mmHg  Arterial Line BP: ()/(43-81) 125/66  FiO2 (%):  [40 %] 40 %  SpO2:  [98 %-100 %] 100 %  Vitals:    08/18/18 0415 08/19/18 0400 08/20/18 0600   Weight: 71.1 kg (156 lb 12 oz) 71.5 kg (157 lb 10.1 oz) 71.2 kg (156 lb 15.5 oz)       Physical Examination:   GENERAL:  Intubated, sedated.  Makes some purposeful movements and facial expressions.  EYES:  Icteric sclera  ENT:  Intubated.  LUNGS:  Coarse breath sounds anteriorly  CARDIOVASCULAR:  Regular rate and rhythm, no murmur   ABDOMEN:  Hypoactive bowel sounds.  Grimaces with palpation of abdomen.  Surgical dressing in place. Drain on each side of abdomen with small amounts of bloody drainage.  : Luis in place.  EXT: Sacral edema  SKIN:  No acute rashes.   T/L/D:  R femoral dialysis catheter, R internal jugular CVC, Left PICC, left femoral arterial line         Laboratory Data:     Metabolic Studies       Recent Labs   Lab Test  08/20/18   0443  08/19/18   2329   08/18/18   0411   08/17/18   0407   07/13/18   2050   07/13/18   0334   NA  140  140   < >  141   < >  139   < >   --    < >  132*   POTASSIUM  3.9  4.0   < >  3.6   < >  4.3   < >   --    < >  2.6*   CHLORIDE  107  108   < >  108   < >  107   < >   --    < >  101   CO2  24  25   < >  23   < >  19*   < >   --    < >  20   ANIONGAP  9  8   < >  9   < >  12   < >   --    < >  12    BUN  40*  39*   < >  38*   < >  40*   < >   --    < >  30   CR  0.77  0.85   < >  0.80   < >  0.89   < >   --    < >  2.08*   GFRESTIMATED  >90  >90   < >  >90   < >  >90   < >   --    < >  35*   GLC  105*  128*   < >  139*   < >  152*   < >   --    < >  104*   A1C   --    --    --    --    --    --    --   5.4   --    --    JESUS  7.0*  7.2*   < >  7.3*   < >  7.3*   < >   --    < >  8.1*   PHOS  4.6*  4.3   < >  3.7   < >  4.1   < >   --    --    --    MAG  2.2  2.2   < >  2.3   < >  2.2   < >   --    --    --    LACT  1.2  1.1   < >   --    < >  4.3*   < >  3.9*   --   1.1   PCAL   --    --    --    --    --    --    --    --    --   0.89   CKT   --    --    --   53   --   34   < >   --    --    --     < > = values in this interval not displayed.       Hepatic Studies    Recent Labs   Lab Test  08/20/18 0443 08/19/18   2329  08/19/18   1802   07/13/18   0334   06/21/18   0612   BILITOTAL  2.8*  3.0*  2.7*   < >  36.3*   < >  23.4*   DBIL  2.2*   --    --    < >  28.1*   < >   --    ALKPHOS  245*  239*  208*   < >  96   < >  78   PROTTOTAL  3.8*  3.8*  3.8*   < >  5.8*   < >  5.1*   ALBUMIN  1.7*  1.6*  1.6*   < >  2.8*   < >  2.9*   AST  56*  57*  54*   < >  287*   < >  124*   ALT  26  26  26   < >  125*   < >  79*   LDH   --    --    --    --   259*   --   177    < > = values in this interval not displayed.       Pancreatitis testing    Recent Labs   Lab Test  08/20/18 0443 07/22/18   0354  07/20/18   1741  07/12/18   1913   06/16/18   1815   AMYLASE   --    --   42  60  35   --    --    LIPASE   --    --   75   --    --    --   260   TRIG  40   < >   --    --    --    < >   --     < > = values in this interval not displayed.       Hematology Studies      Recent Labs   Lab Test  08/20/18   0443  08/19/18   2329  08/19/18   1802  08/19/18   1606  08/19/18   1204  08/19/18   0555   WBC  31.8*  29.0*  27.1*  30.5*  28.0*  33.2*   ANEU  30.4*  27.7*  25.6*   --   26.5*  31.7*   ALYM  0.8  0.7*  0.7*   --    0.6*  0.7*   MARCELA  0.4  0.4  0.5   --   0.7  0.6   AEOS  0.0  0.0  0.0   --   0.0  0.0   HGB  7.2*  7.7*  7.4*  7.6*  6.6*  8.1*   HCT  20.4*  22.2*  21.0*  21.5*  18.5*  22.6*   PLT  58*  33*  45*  49*  37*  49*       Clotting Studies    Recent Labs   Lab Test  08/20/18   0443  08/19/18   2329  08/19/18   1802  08/19/18   1204   08/18/18   1812   INR  1.68*  1.74*  1.70*  1.75*   < >  2.18*   PTT   --    --    --    --    --   85*    < > = values in this interval not displayed.       Iron Testing    Recent Labs   Lab Test  08/20/18 0443 07/13/18   0334   06/17/18 2138   IRON   --    --   97   --   66   FEB   --    --   117*   --   61*   IRONSAT   --    --   83*   --   109*   ANGELLA   --    --   Unsatisfactory specimen - icteric   --    --    MCV  84   < >  81   < >   --    FOLIC   --    --   10.5   --    --    B12   --    --   Canceled, Test credited   --    --    HAPT   --    --   13*   < >   --    RETP   --    --   1.6   < >   --    RETICABSCT   --    --   46.0   < >   --     < > = values in this interval not displayed.       Arterial Blood Gas Testing    Recent Labs   Lab Test  08/18/18   1149  08/18/18   1048  08/17/18   2320  08/17/18   1608  08/17/18   1313   PH  7.38  7.39  7.45  7.36  7.29*   PCO2  40  38  36  36  39   PO2  112*  331*  172*  177*  112*   HCO3  23  23  25  20*  19*   O2PER  56%  100  40.0  50  60        Thyroid Studies     Recent Labs   Lab Test  06/17/18 2138   TSH  0.77  0.77   T4  Unsatisfactory specimen - icteric       Urine Studies     Recent Labs   Lab Test  07/27/18   2330  07/13/18   1315  06/18/18   1200  06/16/18   2220   URINEPH  7.5*  6.0  5.0  5.5   NITRITE  Negative  Negative  Negative  Negative   LEUKEST  Negative  Negative  Negative  Large*   WBCU  3  None  2  66*       Medication levels    Recent Labs   Lab Test  08/19/18   0555   06/18/18   1230   VANCOMYCIN   --    --   16.8   TACROL  6.9   < >   --     < > = values in this interval not displayed.       Body fluid  stats    Recent Labs   Lab Test  08/18/18   1204   07/15/18   1115  07/13/18   1240  06/28/18   1111   FTYP   --    --   Ascites  Ascites  Ascites   FCOL   --    --   Bloody  Red  Yellow   FAPR   --    --   Cloudy  Cloudy  Slightly Cloudy   FWBC   --    --   245  373  234   FNEU   --    --   3  28  6   FLYM   --    --   19  11  24   FMONO   --    --   78   --    --    FALB   --    --    --   1.0   --    FTP   --    --    --   1.6   --    GS  No organisms seen  Many  WBC'S seen  predominantly PMN's    Many  Red blood cells seen     < >  No organisms seen  Few  WBC'S seen  predominantly mononuclear cells    No organisms seen  Few  WBC'S seen  PMNs seen    Many  Red blood cells seen     --     < > = values in this interval not displayed.       Microbiology:    Blood   6/17/18 negative   6/18/18 negative   6/21/18 negative   7/27/18 VRE   7/29/18 catheter tip Staphylococcus epidermidis   7/29/18 VRE   7/30/18 VRE   7/31/18 VRE   8/1/18 VRE, ESBL E. Coli   8/2/18 negative   8/4/18 negative   8/6/18 VRE   8/7/18 VRE   8/8/18 VRE   8/9/18 VRE   8/11/18 VRE   8/12/18 VRE   8/13/18 VRE   8/14/18 VRE   8/15/18 VRE   8/17/18 VRE   8/19/18 NGTD     Fluid/Tissue   6/16/18 ascites negative   6/25/18 ascites negative   6/28/18 ascites negative   7/13/18 ascites negative   7/21/18 ascites negative   7/30/18 abdominal fluid VRE   8/3/18 intra-abdominal hematoma tissue VRE   8/13/18 Abdominal fluid VRE   8/17/18 Peritoneal fluid VRE, Candida albicans/dubliniensis   8/18/18 Hematoma fluid Probable VRE    Urine  6/16/18 Enterococcus faecalis    VRE rectal culure   7/13/18  positive    Respiratory  6/17/18 sputum single colony Citrobacter freundii, light growth Candida albicans/dubliniensis    Imaging:  CXR 8/19:  1. Support devices as described above.  2. Trace bilateral pleural effusions with overlying atelectasis and/or  consolidation, stable.

## 2018-08-20 NOTE — PROGRESS NOTES
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    Interval History :   Mr Miles continues on CRRT, was net positive yesterday but so far on track to be a bit net negative today.  Newly off of vasopressors, goal 50-100cc/hr today.  Has femoral HD line which is now 7 days old, last + BC was on 8/17 so holding on tunneled line for now but may need new temp access in coming days.  If subsequent cultures become + he would be in a reasonable status to do line holiday before restarting.      Assessment & Recommendations:   BEN-Baseline Cr of 1.3-2, up to 2.8 prior to starting CRRT on 7/30 due to hyperkalemia.  Thought to be due to sepsis, increased intra-abdominal pressure and prerenal state.  Started HD on 7/30, had temp LIJ placed at that time, replaced with R femoral on 8/13.        Volume status-Trace LE edema, mild BUE edema, was net positive 1L yesterday, now off of pressors.  Planning 50-100ccc/hr, can try to pull a bit but would not restart pressor to achieve, reasonable vent settings.  Still above admit wt but with reasonable vent settings, trying to remove fluid as able.      Electrolytes/pH-K 3.9, bicarb 24.  No acute issues on CRRT.      Ca/phos/pth-Ca 7.0 but normal with albumin of 1.7, ical 4.4.  Phos 4.6, Mg 2.2, no acute issues.      Liver tx-7/21/18.  Complicated by arterial and venous vascular issues and ischemic bowel, splenic infarct, still with diffuse small bowel ischemia, last ex lap/washout 8/18.  + VRE in blood on 8/17 (peripheral blood).   INR 1.7, last tacro level 6.9 (goal 5-6).       ID-Multiple abdominal ID issues and + VRE in blood on 8/17, on amikacin and flagyl for peritonitis, on  ceftaroline, tigecycline and daptomycin for VRE and micafungin for yeast in peritoneal fluid.      Anemia-Hgb 7.2, down a bit from yesterday, needed 1u PRBC's yesterday as well as FFP and plt's.      Nutrition-Starting TF with trickle feeds.      Seen and discussed with Dr Rodriguez    Recommendations were communicated to primary team via verbal communication.      Saul Pichardo  Clinical Nurse Specialist  [phone]    Review of Systems:   I reviewed the following systems:  ROS not done due to vent/sedation.      Physical Exam:   I/O last 3 completed shifts:  In: 5277.03 [I.V.:2173.7; NG/GT:150]  Out: 4666 [Urine:178; Emesis/NG output:100; Drains:724; Other:3664]   /80  Pulse 121  Temp 96.4  F (35.8  C)  Resp 16  Wt 71.2 kg (156 lb 15.5 oz)  SpO2 100%  BMI 25.34 kg/m2     GENERAL APPEARANCE: Intubated, on CRRT, critically ill.    EYES:  No scleral icterus, pupils equal  HENT: mouth without ulcers or lesions  PULM: lungs clear to auscultation, equal air movement, no cyanosis or clubbing  CV: regular rhythm, normal rate, no rub     -JVP not elevated.     -edema No LE edema, some   GI: soft, nontender, non-distended, bowel sounds are +  MS: no evidence of inflammation in joints, no muscle tenderness  NEURO: Intubated and sedated but moves all extremities.    Lines 8/13 femoral.     Labs:   All labs reviewed by me  Electrolytes/Renal -   Recent Labs   Lab Test  08/20/18 0443 08/19/18 2329 08/19/18   1802   NA  140  140  141   POTASSIUM  3.9  4.0  4.0   CHLORIDE  107  108  107   CO2  24  25  25   BUN  40*  39*  39*   CR  0.77  0.85  0.84   GLC  105*  128*  105*   JESUS  7.0*  7.2*  7.4*   MAG  2.2  2.2  2.2   PHOS  4.6*  4.3  4.2       CBC -   Recent Labs   Lab Test  08/20/18 0443  08/19/18 2329 08/19/18   1802   WBC  31.8*  29.0*  27.1*   HGB  7.2*  7.7*  7.4*   PLT  58*  33*  45*       LFTs -   Recent Labs   Lab Test  08/20/18   0443  08/19/18   2329  08/19/18   1802   ALKPHOS  245*  239*  208*    BILITOTAL  2.8*  3.0*  2.7*   ALT  26  26  26   AST  56*  57*  54*   PROTTOTAL  3.8*  3.8*  3.8*   ALBUMIN  1.7*  1.6*  1.6*       Iron Panel -   Recent Labs   Lab Test  07/13/18   0334  06/17/18   2138   IRON  97  66   IRONSAT  83*  109*   ANGELLA  Unsatisfactory specimen - icteric   --            Current Medications:    amikacin (AMIKIN) place de la cruz - receiving intermittent dosing  1 each Injection See Admin Instructions     ceftaroline (TEFLARO) intermittent infusion  400 mg Intravenous Q12H     DAPTOmycin (CUBICIN) intermittent infusion  12 mg/kg Intravenous Q24H     hydrocortisone sodium succinate PF  25 mg Intravenous Q8H    Followed by     [START ON 8/21/2018] hydrocortisone sodium succinate PF  25 mg Intravenous Q12H    Followed by     [START ON 8/22/2018] hydrocortisone sodium succinate PF  25 mg Intravenous Once     lipids  250 mL Intravenous Once per day on Mon Wed Fri     metroNIDAZOLE  500 mg Intravenous Q6H     micafungin  100 mg Intravenous Q24H     pantoprazole (PROTONIX) IV  40 mg Intravenous BID     sodium chloride (PF)  3 mL Intravenous Q8H     tacrolimus  1 mg Oral or Feeding Tube QAM     tacrolimus  1 mg Oral or Feeding Tube QPM     tenofovir  300 mg Per Feeding Tube Q48H     tigecycline (TYGACIL) intermittent infusion  50 mg Intravenous Q12H     valGANciclovir  450 mg Oral or NG Tube Every Other Day    Or     valGANciclovir  450 mg Oral Every Other Day       dextran 40 in saline 10 mL/hr at 08/20/18 1000     IV fluid REPLACEMENT ONLY       IV fluid REPLACEMENT ONLY       dialysate for CVVHD & CVVHDF (Phoxillum BK4/2.5) 12.5 mL/kg/hr (08/20/18 1054)     fentaNYL 50 mcg/hr (08/20/18 1000)     heparin 500 Units/hr (08/20/18 1000)     insulin (regular) 2 Units/hr (08/20/18 1000)     lactated ringers 10 mL/hr at 07/30/18 1519     - MEDICATION INSTRUCTIONS -       norepinephrine Stopped (08/19/18 1445)     parenteral nutrition - ADULT compounded formula       parenteral nutrition - ADULT compounded  formula 45 mL/hr at 08/19/18 1941     replacement solution for CVVHD & CVVHDF (Phoxillum BK4/2.5) 200 mL/hr at 08/19/18 1527     replacement solution for CVVHD & CVVHDF (Phoxillum BK4/2.5) 12.5 mL/kg/hr (08/20/18 1056)     Reason beta blocker order not selected

## 2018-08-20 NOTE — PLAN OF CARE
Problem: Liver Transplant (Adult)  Goal: Signs and Symptoms of Listed Potential Problems Will be Absent, Minimized or Managed (Liver Transplant)  Signs and symptoms of listed potential problems will be absent, minimized or managed by discharge/transition of care (reference Liver Transplant (Adult) CPG).   Outcome: No Change  D:  Patient Taken to the OR on 8/17 for exploratory laparotomy notable for further intestinal necrosis requiring further bowel resection, now s/p exploratory laparotomy with entero-entero anastomosis, drain placement and fascial closure on 8/18.     I/A: Hemodynamically improving.     VS: Normothermic with GINI hugger on. BPs soft, but stable off levophed. HR 55-60s. FiO2 40%.  Gtts: Dextran @ 10. Insulin @ 1. Heparin increased to 1050 @ 1900. TPN @ 45. Fentanyl @ 50.  Neuro: Pupils equal and reactive, size 3. Patient is able to follow commands intermittently; withdraws on all 4 extremities.  CV:  SR/SB. MAP goal above 55, no longer requiring pressors.  Pulm: LS clear; diminished in bases. CMV vent settings.   GI:  NG to LIS. NJT with trickle feeds @ 10 ml/hr.   : Luis with 2-5 ml/hr.  Skin: Abd dressing intact. 2 JPs CDI, with minimal output. Coccyx dressing intact. Moderate generalized pitting edema.  I/D: Continue IV abx. I/D service following.  Pain: Continuous fentanyl drip.     P: Continue to monitor and notify MD of changes.

## 2018-08-20 NOTE — PROGRESS NOTES
CRRT STATUS NOTE    DATA:  Time:  4:29 PM  Pressures WNL:  YES  Filter Status:  WDL    Problems Reported/Alarms Noted:  None per bedside RN    Supplies Present:  YES    ASSESSMENT:  Patient Net Fluid Balance:  -1065 since MN, meeting goals of therapy  Vital Signs:  Temp: 96.6  F (35.9  C) Temp src: Esophageal BP: (!) 127/93   Heart Rate: 92 Resp: 16 SpO2: 100 % O2 Device: Mechanical Ventilator      Labs:    Lab Results   Component Value Date     08/20/2018      Lab Results   Component Value Date    POTASSIUM 4.0 08/20/2018     Lab Results   Component Value Date    CHLORIDE 108 08/20/2018     Lab Results   Component Value Date    ICAW 4.7 08/19/2018     Lab Results   Component Value Date    CO2 24 08/20/2018     Lab Results   Component Value Date    BUN 40 08/20/2018     Lab Results   Component Value Date    CR 0.77 08/20/2018     Lab Results   Component Value Date    GLC 93 08/20/2018     Lab Results   Component Value Date    MAG 2.2 08/20/2018     Lab Results   Component Value Date    PHOS 4.6 08/20/2018     Goals of Therapy:   mL/hour negative with goal of 1-2 L/day net negative    INTERVENTIONS:   Continue CRRT per written orders    PLAN:  Please call/contact CRRT RN with questions/concerns.

## 2018-08-20 NOTE — PROGRESS NOTES
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  Developed shock with severe lactic acidosis and respiratory failure on POD9. CT scan showed left PV venous thrombosis, infarction of left liver lobe, patchy infarction of right lobe and possible hepatic arterial thrombosis, patchy bowel ischemia. Transferred to ICU.   Ex lap, patchy diffuse SB ischemia. Doppler demonstrated arterial flow main vessels to bowel and liver. Splenic infarction. Liver biopsy negative for acute rejection. Fluid culture growing VRE. On Heparin gtt. 8/3 OR for reexploration liver transplant, bowel ischemia, findings improved patchy iscemic changes of small bowel, patchy ischemic changes of left liver lobe.  OR: some ischemic/necrotic jejunum, s/p resection.   Washout and abdominal closure with stratice mesh, no drains placed. Minimal ascites, intact small bowel anastomosis, patchy ischemia left liver lobe.  peritonitis with bowel necrosis and bowel perforation. S/p bowel resection, about 50 cm sm bowel, ileocecal valve and colon remaining.     Procedures:    donor (DBD) liver transplant with duct to duct anastomosis over biliary stent.    Ex lap due to concern for ischemic bowel, findings: patchy diffuse SB ischemia. Doppler demonstrated arterial flow main vessels to bowel and liver. Splenic infarction.   8/3 Reexploration liver transplant, bowel ischemia, findings improved patchy iscemic changes of small bowel, patchy ischemic changes of left liver lobe.    Re-exploration, ischemic/necrotic jejunum, s/p resection   Washout and abdominal closure with stratice mesh, no drains placed. Minimal ascites, intact small bowel  anastomosis, patchy ischemia left liver lobe.  8/17 Procedure: reopening of laparotomy. abd washout, bowel resection (clip and drop fashion). Postop Dx: same. Large segment of ischemic bowel from mid-jejunum (prior anastomosis) with distal perforation and feculent peritonitis.   8/18 Reexploration, small and large bowel intestine appeared healthy per surgeon, primary anastomosis bowel. Fascia closure, operative note pending.    Graft function: POD #30, LFTs stable with elevated bilirubin.  7/30 Hepatic infarction, left PV thrombosis, and narrow and irregular appearance HA noted on CTA. 8/8 Liver biopsy negative for acute rejection. Intra-abdominal  cultures from 8/1, 8/6, 8/17, 8/18 + VRE or GPCs.   Incision opened at bedside 8/12, hematoma removed.  8/13 US: new perihepatic fluid collections (largest 7x2x4), small volume of septated ascites.  VAC placed 8/15. 8/15 CT scan, left lobe infarct with complex fluid collection inferior.  8/17 US: patent vasculature but RIs concerning for HA stenosis, stable fluid collection near braden hepatis.     Immunosuppression management:   Induction: Simulect intra-op due to BEN.  Steroid taper per protocol, completed.  MMF held due to critical status  Tac goal level ~5-6 due to sepsis and BEN   Hydrocortisone, SICU to tapering  Complexity of management:Medium. Contributing factors: thrombocytopenia and anemia sepsis    Hematology:   Anemia: Acute blood loss.  Bleeding from lines and drain site overnight, now controlled.  Hgb 7.2.  Thrombocytopenia: PLT 58. PLT 1u transfused 8/19.  Anticoagulation: Anticoagulate for bowel ischemia and portal thrombosis.  Heparin gtt low intensity resulted in diffuse oozing overnight, now on straight rate 500u/hr. Dextran 10 ml/hr for additional anticoagulation.   Concern for protein C/protein S deficiency, labs pending.    Cardiorespiratory:    Hypotension secondary to septic shock:  Off pressors this morning.  Respiratory failure secondary to  sepsis: On vent.  Weaning trials per SICU.    GI/Nutrition: NG.  NPO.  On TPN.  TF 10 ml/hr for healing.   Ischemic bowel:  7/30 CTA: nonocclusive filling defects in superior mesenteric vein branches.  Anticoagulation as above.  S/p resection of necrotic jejunum on 8/6. S/p resection of 150cm of ischemic bowel with perforation on 8/17.  PSBO:  Noted on 8/15 CT, transition point RLQ. S/p resection on 8/17.   Bowel perforation/peritonitis:  Small bowel perforation with feculent peritonitis noted in OR on 8/17. S/p resection, abx as below.    Endocrine:  DM type 2, continue insulin drip.      Fluid/Electrolytes: BEN/ARF. CRRT.    : Traumatic bah placement with intermittent bleeding.   Hematuria now resolved. Bah, coude 18fr in place    Infectious disease: Afebrile. WBC 31.8  -Amikacin, flagyl, ceftraroline, tigecycline daptomycin, micafungin.   -ID following  -Blood cultures through 8/18 + VRE or GPCs  -8/17 Peritoneal fluid culture + VRE, candida albicans    1. VRE intra abdominal and bacteremia (dapto, ceftaroline, tigecycline)  2. Infarct left lobe liver with inferior perihepatic fluid collection  3. Peritonitis (metronidazole, amikacin, micafungin)    Previous ID:   3. Ecoli ESBL bacteremia. Repeat bcx on 8/1 (peripheral and VAD) grew Ecoli ESBL. Zosyn changed to Meropenem, completed on 8/14.  SICU to exchange lines as able. Blood cultures negative for E. Coli since 8/2.  4. HBV: Hepatits B DNA PCR positive prior to tx. Received HBIG on 7/21 and 7/22. 8/10 Hep B surface antigen negative. No further HBIG planned. Continue tenofovir 300 mg, dose adjusted for HD       Prophylaxis: DVT PCDs, on heparin, fall, GI, Valcyte x 12 weeks (CMVand EBV IgG +), bactrim.   Disposition: SICU    Coordinator: Kay Reyes  Surgeon: Pako    Check Hep B surface antigen at 3 months, 6 months then annually     Medical Decision Making: High    JAY/Fellow/Resident Provider:  Geovanan Burris NP  [id number]    Faculty: Mamadou Norwood  MD  __________________________________________________________________  Transplant History: Admitted 2018 for  donor liver transplant.   The patient has a history of liver failure due to hepatitis B .    2018 (Liver), Postoperative day: 30     Interval History:  CRRT.  Bleeding from lines overnight and drain site this morning, now stopped.  BM x3 mucous per nursing.  Pt alert.    ROS:   A 10-point review of systems was negative except as noted above.    Curent Meds:    amikacin (AMIKIN) place de la cruz - receiving intermittent dosing  1 each Injection See Admin Instructions     ceftaroline (TEFLARO) intermittent infusion  400 mg Intravenous Q12H     DAPTOmycin (CUBICIN) intermittent infusion  12 mg/kg Intravenous Q24H     hydrocortisone sodium succinate PF  25 mg Intravenous Q8H    Followed by     [START ON 2018] hydrocortisone sodium succinate PF  25 mg Intravenous Q12H    Followed by     [START ON 2018] hydrocortisone sodium succinate PF  25 mg Intravenous Once     lipids  250 mL Intravenous Once per day on      metroNIDAZOLE  500 mg Intravenous Q6H     micafungin  100 mg Intravenous Q24H     pantoprazole (PROTONIX) IV  40 mg Intravenous BID     sodium chloride (PF)  3 mL Intravenous Q8H     tacrolimus  1 mg Oral or Feeding Tube QAM     tacrolimus  1 mg Oral or Feeding Tube QPM     tenofovir  300 mg Per Feeding Tube Q48H     tigecycline (TYGACIL) intermittent infusion  50 mg Intravenous Q12H     valGANciclovir  450 mg Oral or NG Tube Every Other Day    Or     valGANciclovir  450 mg Oral Every Other Day       Physical Exam:     Admit Weight: 60.7 kg (133 lb 12.8 oz)    Current Vitals:   /80  Pulse 121  Temp 96.4  F (35.8  C)  Resp 16  Wt 71.2 kg (156 lb 15.5 oz)  SpO2 100%  BMI 25.34 kg/m2      Vital sign ranges:    Temp:  [96.3  F (35.7  C)-98.2  F (36.8  C)] 96.4  F (35.8  C)  Heart Rate:  [53-88] 69  Resp:  [16-17] 16  BP: ()/(51-94) 151/80  MAP:  [54  mmHg-108 mmHg] 104 mmHg  Arterial Line BP: ()/(43-81) 143/78  FiO2 (%):  [40 %] 40 %  SpO2:  [98 %-100 %] 100 %  Patient Vitals for the past 24 hrs:   BP Temp Temp src Heart Rate Resp SpO2 Weight   08/20/18 0945 151/80 96.4  F (35.8  C) - 69 - 100 % -   08/20/18 0930 - 96.3  F (35.7  C) - 68 - 100 % -   08/20/18 0915 - 96.3  F (35.7  C) - 69 - 100 % -   08/20/18 0900 117/81 96.3  F (35.7  C) - 72 - 100 % -   08/20/18 0845 - 96.4  F (35.8  C) - 71 - 100 % -   08/20/18 0830 - 96.4  F (35.8  C) - 70 - 100 % -   08/20/18 0815 - 96.6  F (35.9  C) - 68 - 100 % -   08/20/18 0800 (!) 126/91 96.8  F (36  C) - 73 - 100 % -   08/20/18 0745 - 96.8  F (36  C) - 68 - 100 % -   08/20/18 0730 - 97  F (36.1  C) - 74 - 100 % -   08/20/18 0715 - 97  F (36.1  C) - 66 - 100 % -   08/20/18 0700 132/82 97  F (36.1  C) - 68 16 100 % -   08/20/18 0600 (!) 129/94 - - 70 16 100 % 71.2 kg (156 lb 15.5 oz)   08/20/18 0500 (!) 120/93 - - 57 16 100 % -   08/20/18 0447 - - - 61 - 100 % -   08/20/18 0400 133/81 97.5  F (36.4  C) Esophageal 71 17 100 % -   08/20/18 0300 111/78 - - 60 16 100 % -   08/20/18 0245 - 97.5  F (36.4  C) - 77 - 100 % -   08/20/18 0200 107/74 97.5  F (36.4  C) - 76 17 100 % -   08/20/18 0140 - 97.5  F (36.4  C) - 88 - 100 % -   08/20/18 0130 - 97.7  F (36.5  C) - 71 17 100 % -   08/20/18 0100 115/76 97.5  F (36.4  C) - 58 17 100 % -   08/20/18 0050 - 97.5  F (36.4  C) - 67 16 100 % -   08/20/18 0002 - - - 60 - 100 % -   08/20/18 0000 116/85 97.7  F (36.5  C) Axillary 64 17 100 % -   08/19/18 2300 108/81 - - 62 16 100 % -   08/19/18 2200 94/67 97.2  F (36.2  C) - 56 16 100 % -   08/19/18 2100 91/66 - - 70 16 100 % -   08/19/18 2023 - - - 72 - 100 % -   08/19/18 2000 114/82 97.7  F (36.5  C) Esophageal 79 16 98 % -   08/19/18 1930 - 97.7  F (36.5  C) - 58 - 99 % -   08/19/18 1915 - 97.7  F (36.5  C) - 61 - 99 % -   08/19/18 1900 108/76 97.2  F (36.2  C) - 59 - 100 % -   08/19/18 1845 - 97.7  F (36.5  C) - 63 - 100 % -    08/19/18 1830 - 97.9  F (36.6  C) - 63 - 100 % -   08/19/18 1815 - 97.9  F (36.6  C) - 59 - 100 % -   08/19/18 1800 97/66 97.9  F (36.6  C) - 71 16 100 % -   08/19/18 1745 - 97.7  F (36.5  C) - 57 - 100 % -   08/19/18 1730 - 97.7  F (36.5  C) - 57 - 100 % -   08/19/18 1715 - 97.7  F (36.5  C) - 55 - 100 % -   08/19/18 1700 (!) 78/51 - - 58 - 100 % -   08/19/18 1645 - 97.9  F (36.6  C) - 60 - 100 % -   08/19/18 1630 - 97.9  F (36.6  C) - 62 - 100 % -   08/19/18 1615 - 97.7  F (36.5  C) - 60 - 100 % -   08/19/18 1605 - - - - - 100 % -   08/19/18 1600 92/66 97.7  F (36.5  C) Esophageal 67 16 100 % -   08/19/18 1545 - 97.7  F (36.5  C) - 62 - 100 % -   08/19/18 1530 - 97.7  F (36.5  C) - 64 - 100 % -   08/19/18 1515 - 97.7  F (36.5  C) - 66 - 100 % -   08/19/18 1500 101/76 97.5  F (36.4  C) - - - 100 % -   08/19/18 1445 - 97.7  F (36.5  C) - 57 - 100 % -   08/19/18 1430 - 97.5  F (36.4  C) - 54 - 100 % -   08/19/18 1415 - 97.7  F (36.5  C) - 63 - 100 % -   08/19/18 1400 102/70 97.7  F (36.5  C) - 60 16 100 % -   08/19/18 1345 - 97.7  F (36.5  C) - 79 - 100 % -   08/19/18 1330 - - - 55 - 100 % -   08/19/18 1315 - 97.7  F (36.5  C) - 56 - 100 % -   08/19/18 1300 - 97.9  F (36.6  C) - 56 - 100 % -   08/19/18 1245 - 98.1  F (36.7  C) - 57 - 100 % -   08/19/18 1230 - 98.1  F (36.7  C) - 59 - 100 % -   08/19/18 1215 - - - 61 - 99 % -   08/19/18 1200 - 98.2  F (36.8  C) Esophageal 66 16 100 % -   08/19/18 1145 - 98.1  F (36.7  C) - 60 - 100 % -   08/19/18 1130 - - - 68 - 100 % -   08/19/18 1115 - 98.2  F (36.8  C) - 64 - 100 % -   08/19/18 1100 98/69 98.1  F (36.7  C) - 70 - 100 % -   08/19/18 1045 - 98.1  F (36.7  C) - 53 - 100 % -   08/19/18 1030 - 98.1  F (36.7  C) - 64 - 100 % -     General Appearance: NAD  Skin: warm, dry  Heart: NSR  Lungs: Vent PS 7/5, 40%  Abdomen: Incision covered (dressing changed by fellow).  CAROL site bleeding earlier, now controlled with Quickclot.  Abd soft.  : Luis  Extremities: well  perfused. Generalized edema.  Neurologic: Alert, following commands.  Access: PICC, CVC, PIV  Femoral HD line    Data:   CMP    Recent Labs  Lab 08/20/18  0443 08/19/18  2329 08/19/18  1802    140 141   POTASSIUM 3.9 4.0 4.0   CHLORIDE 107 108 107   CO2 24 25 25   * 128* 105*   BUN 40* 39* 39*   CR 0.77 0.85 0.84   GFRESTIMATED >90 >90 >90   GFRESTBLACK >90 >90 >90   JESUS 7.0* 7.2* 7.4*   ICAW  --  4.7 4.6   MAG 2.2 2.2 2.2   PHOS 4.6* 4.3 4.2   ALBUMIN 1.7* 1.6* 1.6*   BILITOTAL 2.8* 3.0* 2.7*   ALKPHOS 245* 239* 208*   AST 56* 57* 54*   ALT 26 26 26     CBC    Recent Labs  Lab 08/20/18 0443 08/19/18  2329   HGB 7.2* 7.7*   WBC 31.8* 29.0*   PLT 58* 33*     COAGS    Recent Labs  Lab 08/20/18  0443 08/19/18  2329  08/18/18  1812 08/18/18  1321   INR 1.68* 1.74*  < > 2.18* 2.20*   PTT  --   --   --  85* 66*   < > = values in this interval not displayed.   Urinalysis  Recent Labs   Lab Test  07/27/18   2330  07/13/18   1315  06/18/18   1200   COLOR  Yellow  Dark Yellow  Yellow   APPEARANCE  Clear  Clear  Clear   URINEGLC  Negative  Negative  Negative   URINEBILI  Negative  Large*  Small*   URINEKETONE  Negative  Negative  Negative   SG  1.008  1.013  1.008   UBLD  Negative  Negative  Negative   URINEPH  7.5*  6.0  5.0   PROTEIN  30*  10*  Negative   NITRITE  Negative  Negative  Negative   LEUKEST  Negative  Negative  Negative   RBCU  5*  <1  1   WBCU  3  None  2   UTPG   --    --   0.75*     Virology:  Hepatitis C Antibody   Date Value Ref Range Status   07/20/2018 Nonreactive NR^Nonreactive Final     Comment:     Assay performance characteristics have not been established for newborns,   infants, and children         POD 14  liver:  1.  The left portal vein is antegrade with decreased velocity,  measuring approximately 15 cm/second. This vessel was occluded on CT  7/30/2018.  2.  Elevated velocities of the main hepatic artery, now 203 cm/sec  (previously 94 cm/sec). However left hepatic arteries  demonstrates  steep upstroke suggesting there is not a significant stenosis.   2a. Right hepatic artery was not visualized, could be occluded or  difficult to see postoperatively.  3. Echogenic focus of the right lobe in close proximity to the  hepatorenal fossa. This likely corresponds with nonenhancing lesion on  CT 7/30/2018 and likely representing subcapsular hematoma.  4. Additional small hematomas of the braden hepatis and posterior right  perihepatic region.  5. Small volume anechoic ascites.  6. Left hepatic lobe not well visualized secondary to open wound  preventing imaging. Note the left lobe was grossly abnormal on CT  7/30/2018    POD 1 US liver: Impression:   1.  Hematoma associated with the inferior aspect of the perihepatic  space anteriorly, measuring 13.7 x 7.0 x 8.5 cm.  2.  Retrograde flow of the left portal vein. Additionally, low  velocities of the portal venous system. Single low resistive index of  the extrahepatic aspect of the hepatic artery, measuring 0.37. These  findings are likely within normal limits in the early postoperative  context. However, continued attention on follow-up recommended.    7/30 CT scan abd/pelvis:  IMPRESSION:   1. Hypoenhancing areas in the transplant liver concerning for  infarction. Transplant left portal vein occlusion with bubbles of  portal venous gas. Nearly occlusive filling defect in the native  hepatic artery. Narrowed and irregular appearance of the transplant  hepatic artery. Focal occlusion of the left main hepatic artery.  Segmental occlusions of right hepatic arterial branches.   2. Pneumatosis intestinalis with nonocclusive filling defects in  superior mesenteric vein branches, concerning for bowel ischemia or  infarction.   3. Splenic infarctions.   4. Bibasilar consolidation with air bronchograms. Pneumonia cannot be  excluded.   5. Post surgical changes of evacuation of peritransplant hematoma.     Attestation:    The patient has been seen and  evaluated by me.   Vital signs, labs, medications and orders were reviewed.   When obtained, diagnostic images were reviewed by me and interpreted as above.    The care plan was discussed with the multidisciplinary team and I agree with the findings and plan in this note, with any differences recorded in blue.    Immunosuppressive medication management was reviewed and adjusted as reflected in the note and orders.     .

## 2018-08-20 NOTE — PLAN OF CARE
Problem: Patient Care Overview  Goal: Plan of Care/Patient Progress Review  PT 4A: Pt tolerated PROM/AAROM without adverse events. Will continue to follow.

## 2018-08-20 NOTE — PROVIDER NOTIFICATION
MD Notification    Notified Person: SICU    Notification Date/Time: August 20, 2018 @ 0362    Purpose of Notification: Critical platelets of 43    Orders Received: MD will review labs; may place order for platelets.     Comments:

## 2018-08-20 NOTE — OP NOTE
Transplant Surgery  Operative Note    Preop Dx:  S/p liver transplant, S/p bowel resection   Postop Dx: same  Procedure: exploratory laparotomy, jejunoileal anastomosis and fascia clousure  Surgeon: Mamadou Norwood M.D.  ASSISTANT:  Tracy Quesada fellow. There was no qualified general surgery resident available to assist during this procedure.   Anesthesia: General  EBL: 20 ml  Fluids: see anesthesia chart  UO: see anesthesia chart  Drains: Sanju-Brennan drain x 2  Specimen: blood clot for microbiology.  Complications: None  Findings:  Viable remaining portion of small intestine and colon. No active bleading  Complications: None.    Indication: The patient had emergency bowel resection due to bowel ischemia. Abdomen was left open with AbThera dressing and proximal and distal bowel was stapled off. After discussing the risks and benefits of surgery and potential complications, the patient's wive provided informed consent.     DETAILS OF PROCEDURE:  The patient was brought to the operating room, placed in a supine position.  Perioperative prophylactic IV antibiotics were given.  Anesthesia was adminisitered. The abdomen was prepped and draped in the usual sterile fashion.  Time out was performed.    AbThera dressing was removed. Abdomen and pelvis was washed out with antibiotic solution, hydrogen peroxide and warm saline. There was no active bleeding identified and all remaining bowels looked viable. Good blood flow in mesenteric vessels was confirmed with doppler.Jejunum was anastomosed to distal ileum in side to side double layer hand sawn anastomosis. Abdomen was again washed out. Fascia was closed without tension with  with  Prolene and PDS interrupted sutures. Wound was then packed and protected with abdominal pads.    All needle, sponge, and instrument counts were accurate.  The patient tolerated the procedure well without apparent complications and was trasfered to the SICU in stable condition.  Faculty  was present for critical portions of the procedure.  Physician Attestation   I was present for the entire procedure between opening and closing.    Mamadou Norwood  Date of Service (when I saw the patient): 8/18/2018

## 2018-08-21 NOTE — PLAN OF CARE
Problem: Patient Care Overview  Goal: Plan of Care/Patient Progress Review  Outcome: Improving  Neuro- PERRL. Opens eyes spontaneously. Moves all extremities independently. Nods to simple yes/no questions. Scleral edema/yellow.  CV- Afebrile. NSR 50s-70s. BP with MAPs in 70s-90s 2 units of PRBCs given. Pitting edema throughout extremities.   Pulm- On CMV 40%. Minimal vent settings. Lungs clear/diminished. Scant secretions.   GI- Abdomen soft. Hypoactive bowel sounds. Minimal output from OG tube. NJ with TF at goal of 55ml/hr. Rectal pouch for loose mucoid stool.   - Luis patent. UOP around 20ml/hr. On CRRT-- goal for 1-2L net negative per day.   Skin- Abdomen with abds over packing. LLQ CAROL. RLQ CAROL still has drainage around site. Skin tear to sacrum. B/L groin lines oozing serous drainage. Dressings changed.   Gtts- Dextran @ 10. Insulin @ 1. Heparin @ 500, TPN @ 45. Lipids @ 20.8 Fentanyl @ 50. TKO @ 20.     Will continue to monitor and assess for changes.

## 2018-08-21 NOTE — PROGRESS NOTES
[facility]  Transplant Infectious Disease Progress Note      Patient:  Yaneli Miles, Date of birth 1975, Medical record number [id number] Date of Visit:  08/21/2018         Assessment and Recommendations:   Recommendations:  - Continue ceftaroline with dialysis dosing per ICU pharmacy as pt is on CRRT, as well as tigecycline & daptomycin, for VRE. Consider de-escalating tigecycline over the next few days if we continue to have good source control and blood cultures remain negative.  - Continue flagyl for anaerobic coverage of intra-abdominal process.  - Stop micafungin and start fluconazole dosed for CRRT to cover Candida albicans from abdominal washout on 8/17  - Continue tenofovir for HBV   - Continue valganciclovir for viral prophylaxis  - Repeat blood cultures again in AM.  Would like to have several days of negative blood cultures before de-escalating VRE therapy.    Transplant Infectious Disease will continue to follow with you.     Assessment: 43 year old male Hep B cirrhosis s/pOLT on 7/21 whose post-op course was complicated hemorrhagic shock, portal vein and hepatic artery occlusion, liver infarct w/ pneumotosis intestinalis w/ non-occlusive filling defects in SMV branches w/ small bowel ischemia on CT a/p 7/30/18, repeat scans 8/2018 w/ bowel ischemia and fabi perforation s/p ex lap 8/18/18 w/ bowel resection, closure of fascia and persistent VRE bacteremia. Splenic infarcts too. Remains intubated on pressure support trials.  CRRT running.     ID issues:  # Hepatic abscess per 8/15/2018 CT imaging; bowel perforation and feculant peritonitis at 8/17/2018 laparotomy. One of the sources of continuing VRE bacteremia, on ceftaroline, tigecycline, and daptomycin. Daptomycin SHARMIN initially 2 on first positive blood cultures, now up to 24 on most recent from 8/17.  Continue flagyl for coverage of anaerobic bowel bacterial kenneth. On micafungin for Candida albicans growing in 8/17/2018  peritoneal fluid but can narrow to azole therapy.  As of most recent OR on 8/18, remaining bowel looked viable and fascia was closed.    # Persistent VRE bacteremia. He continues to have positive blood cultures with latest culture from 8/17/2018 growing. He is on combination therapy with daptomycin & ceftaroline, as described in In vitro activity of daptomycin in combination with ß-lactams, gentamicin, rifampin, and tigecycline against daptomycin-nonsusceptible enterococci, where there was an apparent synergistic effect to combining the two. In the event that daptomycin is inactivated by surfactant in the lungs by an occult pulmonary process, tigecycline was added to his regimen but no evidence for pulmonary process with clear CXR and minimal vent support.    # Chronic hepatitis B: On tenofovir    Other ID issues:  -  Prophylaxis:TMP/SMX, vGCV, micafungin  - Viral serostatus D/R & prophylaxis: HCV negative/reactive, HBVnegative/reactive, CMV -/+, EBV+/+, valganciclovir ppx  - Gamma globulin status: Never checked.  - Isolation status: Contact, Good hand hygiene.    Staffed with Dr. Faisal Momin.    Sofya Jimenez MD, PhD  Adult & Pediatric Infectious Diseases Fellow PGY7, [facility]  Pager: [phone]    Attestation:  I have reviewed today's vital signs, medications, labs and imaging.      Floor time: 25 minutes, Face-to-face time: 10 minutes, Total time: 35 minutes  Faisal Momin,   Pager [phone]  Yaneli Miles was seen in the hospital by Faisal Momin on August 21st with Dr. Jimenez.  I reviewed the history & exam. Assessment and plan were jointly made.  I agree with and have edited the fellow's note and plan of care.  Faisal Momin MD.           Interval History:   Last seen by ID on 8/20.  He is mechanically ventilated on 40% FiO2 but did well with pressure support trial today.  BPs stable off vasopressors.  Continues on CRRT.  He was net negative 1.5L yesterday.  Still having frankly blood output from  abdomina drains and received platelets and pRBCs today.  He continues on insulin gtt.  No new rashes.  Moving extremities appropriately.  Minimal suction requirement from ET tube.  Bear Hugger in place.  Some stool output.     Anti-infectives  Tigecycline  Valganciclovir  Ceftaroline  Daptomycin  Metronidazole  Tenofovir  Micafungin    Transplants:  7/21/2018 (Liver), Postoperative day:  31.  Coordinator Kay Reyes    Review of Systems:   Unable to obtain review of systems due to intubation and sedation         Current Medications & Allergies:       ceftaroline (TEFLARO) intermittent infusion  400 mg Intravenous Q12H     DAPTOmycin (CUBICIN) intermittent infusion  12 mg/kg Intravenous Q24H     hydrocortisone sodium succinate PF  25 mg Intravenous Q12H    Followed by     [START ON 8/22/2018] hydrocortisone sodium succinate PF  25 mg Intravenous Once     lipids  250 mL Intravenous Once per day on Mon Wed Fri     metroNIDAZOLE  500 mg Intravenous Q6H     micafungin  100 mg Intravenous Q24H     pantoprazole (PROTONIX) IV  40 mg Intravenous BID     sodium chloride (PF)  3 mL Intravenous Q8H     sodium chloride         sodium chloride         tacrolimus  1 mg Oral or Feeding Tube QAM     tacrolimus  1 mg Oral or Feeding Tube QPM     tenofovir  300 mg Per Feeding Tube Q48H     tigecycline (TYGACIL) intermittent infusion  50 mg Intravenous Q12H     valGANciclovir  450 mg Oral or NG Tube Every Other Day    Or     valGANciclovir  450 mg Oral Every Other Day       Infusions/Drips:    dextran 40 in saline 10 mL/hr at 08/20/18 1400     IV fluid REPLACEMENT ONLY       IV fluid REPLACEMENT ONLY       dialysate for CVVHD & CVVHDF (Phoxillum BK4/2.5) 12.5 mL/kg/hr (08/21/18 1437)     fentaNYL Stopped (08/21/18 1300)     heparin 500 Units/hr (08/21/18 1600)     insulin (regular) 1 Units/hr (08/21/18 0844)     lactated ringers 10 mL/hr at 07/30/18 1519     - MEDICATION INSTRUCTIONS -       parenteral nutrition - ADULT compounded  formula       parenteral nutrition - ADULT compounded formula 45 mL/hr at 08/20/18 2000     replacement solution for CVVHD & CVVHDF (Phoxillum BK4/2.5) 200 mL/hr at 08/21/18 1437     replacement solution for CVVHD & CVVHDF (Phoxillum BK4/2.5) 12.5 mL/kg/hr (08/21/18 1437)     Reason beta blocker order not selected         Allergies   Allergen Reactions     Nsaids      Contraindicated             Physical Exam:   Vitals were reviewed.  Ranges for vital signs:  Temp:  [95.5  F (35.3  C)-98.6  F (37  C)] 98.2  F (36.8  C)  Heart Rate:  [54-79] 73  Resp:  [12-18] 12  MAP:  [82 mmHg-105 mmHg] 93 mmHg  Arterial Line BP: (110-143)/(64-77) 133/70  FiO2 (%):  [40 %] 40 %  SpO2:  [96 %-100 %] 100 %  Vitals:    08/19/18 0400 08/20/18 0600 08/21/18 0400   Weight: 71.5 kg (157 lb 10.1 oz) 71.2 kg (156 lb 15.5 oz) 70 kg (154 lb 5.2 oz)       Physical Examination:   GENERAL:  Sitting up in chair, intubated, sedated.  More sleepy today.  EYES:  Icteric sclera  ENT:  Intubated.  LUNGS:  Coarse breath sounds anteriorly  CARDIOVASCULAR:  Regular rate and rhythm, no murmur   ABDOMEN:  Hypoactive bowel sounds.  No indications of tenderness to palpation.  Surgical dressing in place. Drain on each side of abdomen with small amounts of bloody drainage.  : Luis in place.  EXT: Increased lower extremity edema  SKIN:  No acute rashes.   T/L/D: R femoral dialysis catheter, R internal jugular CVC, Left PICC, left femoral arterial line         Laboratory Data:     Metabolic Studies       Recent Labs   Lab Test  08/21/18   1525  08/21/18   0952   08/18/18   0411   08/17/18   0407   07/13/18 2050   07/13/18   0334   NA  142  141   < >  141   < >  139   < >   --    < >  132*   POTASSIUM  3.3*  3.7   < >  3.6   < >  4.3   < >   --    < >  2.6*   CHLORIDE  108  107   < >  108   < >  107   < >   --    < >  101   CO2  24  24   < >  23   < >  19*   < >   --    < >  20   ANIONGAP  10  9   < >  9   < >  12   < >   --    < >  12   BUN  42*  41*   < >   38*   < >  40*   < >   --    < >  30   CR  0.77  0.72   < >  0.80   < >  0.89   < >   --    < >  2.08*   GFRESTIMATED  >90  >90   < >  >90   < >  >90   < >   --    < >  35*   GLC  145*  184*   < >  139*   < >  152*   < >   --    < >  104*   A1C   --    --    --    --    --    --    --   5.4   --    --    JESUS  6.7*  6.9*   < >  7.3*   < >  7.3*   < >   --    < >  8.1*   PHOS  4.2  4.3   < >  3.7   < >  4.1   < >   --    --    --    MAG  2.0  2.2   < >  2.3   < >  2.2   < >   --    --    --    LACT  1.2  1.2   < >   --    < >  4.3*   < >  3.9*   --   1.1   PCAL   --    --    --    --    --    --    --    --    --   0.89   CKT   --    --    --   53   --   34   < >   --    --    --     < > = values in this interval not displayed.       Hepatic Studies    Recent Labs   Lab Test  08/21/18   1525  08/21/18   0952  08/21/18   0356   07/13/18   0334   06/21/18   0612   BILITOTAL  2.2*  2.6*  2.4*   < >  36.3*   < >  23.4*   DBIL   --    --   2.0*   < >  28.1*   < >   --    ALKPHOS  244*  270*  235*   < >  96   < >  78   PROTTOTAL  3.7*  3.7*  3.3*   < >  5.8*   < >  5.1*   ALBUMIN  1.4*  1.4*  1.3*   < >  2.8*   < >  2.9*   AST  48*  60*  55*   < >  287*   < >  124*   ALT  19  20  20   < >  125*   < >  79*   LDH   --    --    --    --   259*   --   177    < > = values in this interval not displayed.       Pancreatitis testing    Recent Labs   Lab Test  08/20/18   0443   07/22/18   0354  07/20/18   1741  07/12/18   1913   06/16/18   1815   AMYLASE   --    --   42  60  35   --    --    LIPASE   --    --   75   --    --    --   260   TRIG  40   < >   --    --    --    < >   --     < > = values in this interval not displayed.       Hematology Studies      Recent Labs   Lab Test  08/21/18   1600  08/21/18   1525  08/21/18   0952  08/21/18   0356  08/20/18   2332  08/20/18   1756   WBC  15.9*  Results questioned - new specimen has been requested  20.1*  19.9*  23.8*  31.4*   ANEU  15.2*   --   19.9*  18.9*  22.7*  30.3*   ALYM   0.4*   --   0.0*  0.7*  0.6*  0.6*   MARCELA  0.2   --   0.2  0.2  0.4  0.4   AEOS  0.0   --   0.0  0.0  0.0  0.0   HGB  7.1*  Results questioned - new specimen has been requested  9.0*  7.4*  7.0*  7.2*   HCT  20.3*  Results questioned - new specimen has been requested  25.9*  21.6*  20.3*  20.4*   PLT  32*  Results questioned - new specimen has been requested  35*  46*  57*  56*       Clotting Studies    Recent Labs   Lab Test  08/20/18   1215  08/20/18   0443  08/19/18   2329  08/19/18   1802   08/18/18   1812   INR  1.65*  1.68*  1.74*  1.70*   < >  2.18*   PTT   --    --    --    --    --   85*    < > = values in this interval not displayed.       Iron Testing    Recent Labs   Lab Test  08/21/18   1600   07/13/18   0334   06/17/18   2138   IRON   --    --   97   --   66   FEB   --    --   117*   --   61*   IRONSAT   --    --   83*   --   109*   ANGELLA   --    --   Unsatisfactory specimen - icteric   --    --    MCV  87   < >  81   < >   --    FOLIC   --    --   10.5   --    --    B12   --    --   Canceled, Test credited   --    --    HAPT   --    --   13*   < >   --    RETP   --    --   1.6   < >   --    RETICABSCT   --    --   46.0   < >   --     < > = values in this interval not displayed.       Arterial Blood Gas Testing    Recent Labs   Lab Test  08/21/18   1332  08/18/18   1149  08/18/18   1048  08/17/18   2320  08/17/18   1608   PH  7.40  7.38  7.39  7.45  7.36   PCO2  40  40  38  36  36   PO2  163*  112*  331*  172*  177*   HCO3  25  23  23  25  20*   O2PER  40.0  56%  100  40.0  50        Urine Studies     Recent Labs   Lab Test  07/27/18   2330  07/13/18   1315  06/18/18   1200  06/16/18   2220   URINEPH  7.5*  6.0  5.0  5.5   NITRITE  Negative  Negative  Negative  Negative   LEUKEST  Negative  Negative  Negative  Large*   WBCU  3  None  2  66*       Medication levels    Recent Labs   Lab Test  08/21/18   0545   06/18/18   1230   VANCOMYCIN   --    --   16.8   TACROL  5.1   < >   --     < > = values in this  interval not displayed.       Body fluid stats    Recent Labs   Lab Test  08/18/18   1204   07/15/18   1115  07/13/18   1240  06/28/18   1111   FTYP   --    --   Ascites  Ascites  Ascites   FCOL   --    --   Bloody  Red  Yellow   FAPR   --    --   Cloudy  Cloudy  Slightly Cloudy   FWBC   --    --   245  373  234   FNEU   --    --   3  28  6   FLYM   --    --   19  11  24   FMONO   --    --   78   --    --    FALB   --    --    --   1.0   --    FTP   --    --    --   1.6   --    GS  No organisms seen  Many  WBC'S seen  predominantly PMN's    Many  Red blood cells seen     < >  No organisms seen  Few  WBC'S seen  predominantly mononuclear cells    No organisms seen  Few  WBC'S seen  PMNs seen    Many  Red blood cells seen     --     < > = values in this interval not displayed.       Microbiology:    Blood   6/17/18 negative   6/18/18 negative   6/21/18 negative   7/27/18 VRE   7/29/18 catheter tip Staphylococcus epidermidis   7/29/18 VRE   7/30/18 VRE   7/31/18 VRE   8/1/18 VRE, ESBL E. Coli   8/2/18 negative   8/4/18 negative   8/6/18 VRE   8/7/18 VRE   8/8/18 VRE   8/9/18 VRE   8/11/18 VRE   8/12/18 VRE   8/13/18 VRE   8/14/18 VRE   8/15/18 VRE   8/17/18 VRE   8/19/18 NGTD   8/21 NGTD     Fluid/Tissue   6/16/18 ascites negative   6/25/18 ascites negative   6/28/18 ascites negative   7/13/18 ascites negative   7/21/18 ascites negative   7/30/18 abdominal fluid VRE   8/3/18 intra-abdominal hematoma tissue VRE   8/13/18 Abdominal fluid VRE   8/17/18 Peritoneal fluid VRE, Candida albicans/dubliniensis   8/18/18 Hematoma fluid Probable VRE    Urine  6/16/18 Enterococcus faecalis    VRE rectal culure   7/13/18  positive    Respiratory  6/17/18 sputum single colony Citrobacter freundii, light growth Candida albicans/dubliniensis    Imaging:  CXR 8/21:  1. Support devices as described above.  2. Trace bilateral pleural effusions with overlying atelectasis and/or consolidation, stable.    Liver U/S: 8/17/18:   1.   Transplant liver with patent vasculature.  2.  Decreased resistive indices in the hepatic artery (0.3-0.4), highly concerning for upstream stenosis.  3.  Stable appearance of perihepatic fluid collection near the braden hepatis. Other previously described fluid collections on 8/13/2018 evaluation were not seen on this exam.  4.  Hyperechoic round lesion in the right lateral lobe, also seen on comparison CT, likely represents hemangioma.  5.  Right pleural effusion.    CT a/p w/ contrast: 8/15/18:   1.  Findings concerning for hepatic infarct involving the majority of the left hepatic lobe with associated complex fluid collection along inferior aspect, measuring up to 8.9 x 4.6 cm. The fluid collection likely represents evolving hematoma. Superimposed infection cannot be completely excluded in this patient with leukocytosis, although thought less likely based on evolving nature of this process since 7/30/2018 and sonographic appearance from ultrasound exams dated 7/30/2018 and 8/13/2018.   2.  Continued diffuse dilation of the small bowel transition point in the right lower quadrant (series 5 image [id number]). Findings concerning for small bowel obstruction.  3.  Postsurgical changes of abdominal wall abscess/hematoma evacuation with new soft tissue defect over the right upper quadrant and packing in place. There is surrounding postoperative hematoma.  4.  Unchanged biliary dilation, left lobe dominant, with a right-sided biliary stent in place.

## 2018-08-21 NOTE — PROGRESS NOTES
CRRT RESTART NOTE    Reason for Restart:  72 hr limit  Error Code:  NA    Patient s Vascular Access: R Fem CVC Catheter                   Placement Confirmed:  YES  Manufacture:  DILLAN  Model:  DILLAN  Length/Irish Size:  DILLAN  Flush Volume:  1.5ml/1.5ml    DATA:  Procedure:  CVVHDF  Start Time:  1505  Machine#:  1  Filter:  M150  Blood Flow:   180 mL/min  Pre-Replacement Solution:  Phoxillum 4/2.5  Post-Replacement Solution:  Phoxillum 4/2.5  Dialysate Solution:  Phoxillum 4/2.5  Pre-Replacement Solution Rate:  900 mL/hr  Post-Replacement Solution Rate:  200 mL/hr  Dialysate Flow Rate:  900 mL/hr  Patient Removal Rate:  200 mL/hr per bedside RN  Anticoagulation Type and Rate:  NA    ASSESSMENT:  How Patient Tolerated Restart:  Well  Vital Signs:  T 98.6, HR 73, /57 (75), O2 sats 100%  Initial Pressures:  Access:  68  Filter:  95  Return:  55  TMP:  48  Change in Filter Pressure:  10    INTERVENTIONS:  Blood warmer utilized.    PLAN:  Continue with current therapy goals as tolerates.  Call CRRT RN with questions or concerns.

## 2018-08-21 NOTE — PROGRESS NOTES
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     Interval History :   Mr Miles continues on CRRT, was net negative yesterday and remained off of pressors (now for >24h).  Thinking about iHD but still getting >4L/day intake with frequent blood products and we are trying to be net negative.  Once closer to euvolemic or if intake reduces we can transition to iHD.  Does have some increasing UOP (50cc=>500cc), unclear significance for now as we are running CRRT but will see Cr trend once able to go to iHD.        Assessment & Recommendations:   BEN-Baseline Cr of 1.3-2, up to 2.8 prior to starting CRRT on 7/30 due to hyperkalemia.  Thought to be due to sepsis, increased intra-abdominal pressure and prerenal state.  Started HD on 7/30, had temp LIJ placed at that time, replaced with R femoral on 8/13.  Had been anuric but now with 540cc of UOP yesterday, following.       -CRRT, 50-150cc/hr net negative.  Standard Rx, 4k baths.    -Line is RFV 8/13     Volume status-Trace LE edema, mild BUE edema, was net negative 1.3L yesterday and remained off of pressors.  Planning 50-150ccc/hr as BP's are normal but still with high obligate intake with frequent blood products.  Once closer to euvolemic (8-10kg up now) then UF needs may be amenable to iHD but continuing CRRT for now.       Electrolytes/pH-K 3.7, bicarb 24.  No acute issues on CRRT.       Ca/phos/pth-Ca 6.9 but normal with albumin of 1.4, ical 4.3.  Phos 4.3, Mg 2.2, no acute issues.       Liver tx-7/21/18.  Complicated by arterial and venous vascular issues and ischemic bowel, splenic infarct, still with diffuse small bowel ischemia, last ex lap/washout 8/18.  + VRE  in blood on 8/17 (peripheral blood).   INR 1.7, last tacro level 5.1 (goal 5-6).        ID-Multiple abdominal ID issues and + VRE in blood on 8/17, on amikacin and flagyl for peritonitis, on ceftaroline, tigecycline and daptomycin for VRE and micafungin for yeast in peritoneal fluid.       Anemia-Hgb 7.4, down a bit from yesterday, needed 1u PRBC's yesterday as well as FFP and plt's.       Nutrition-Starting TF with trickle feeds.       Seen and discussed with Dr Rodriguez     Recommendations were communicated to primary team via verbal communication.       Saul Pichardo  Clinical Nurse Specialist  [phone]    Review of Systems:   I reviewed the following systems:  ROS not done due to vent/sedation.      Physical Exam:   I/O last 3 completed shifts:  In: 5040.02 [I.V.:2070.22; NG/GT:240]  Out: 6758 [Urine:594; Emesis/NG output:300; Drains:820; Other:4819; Stool:225]   BP (!) 127/93  Pulse 121  Temp 97  F (36.1  C) (Axillary)  Resp 18  Wt 70 kg (154 lb 5.2 oz)  SpO2 100%  BMI 24.91 kg/m2     GENERAL APPEARANCE: Intubated, on CRRT, critically ill.    EYES:  No scleral icterus, pupils equal  HENT: mouth without ulcers or lesions  PULM: lungs clear to auscultation, equal air movement, no cyanosis or clubbing  CV: regular rhythm, normal rate, no rub     -JVP not elevated.     -edema No LE edema, some   GI: soft, nontender, non-distended, bowel sounds are +  MS: no evidence of inflammation in joints, no muscle tenderness  NEURO: Intubated and sedated but moves all extremities.    Lines 8/13 femoral.     Labs:   All labs reviewed by me  Electrolytes/Renal -   Recent Labs   Lab Test  08/21/18   0952  08/21/18   0356  08/20/18   2332   NA  141  141  140   POTASSIUM  3.7  3.6  3.8   CHLORIDE  107  107  107   CO2  24  25  24   BUN  41*  42*  41*   CR  0.72  0.72  0.74   GLC  184*  156*  170*   JESUS  6.9*  6.8*  7.0*   MAG  2.2  2.3  2.2   PHOS  4.3  4.5  4.6*       CBC -   Recent Labs   Lab Test  08/21/18   0350   08/20/18   2332  08/20/18   1756   WBC  19.9*  23.8*  31.4*   HGB  7.4*  7.0*  7.2*   PLT  46*  57*  56*       LFTs -   Recent Labs   Lab Test  08/21/18   0952  08/21/18   0356  08/20/18   2332   ALKPHOS  270*  235*  253*   BILITOTAL  2.6*  2.4*  2.8*   ALT  20 20 23   AST  60*  55*  63*   PROTTOTAL  3.7*  3.3*  3.7*   ALBUMIN  1.4*  1.3*  1.4*       Iron Panel -   Recent Labs   Lab Test  07/13/18   0334  06/17/18   2138   IRON  97  66   IRONSAT  83*  109*   ANGELLA  Unsatisfactory specimen - icteric   --            Current Medications:    ceftaroline (TEFLARO) intermittent infusion  400 mg Intravenous Q12H     DAPTOmycin (CUBICIN) intermittent infusion  12 mg/kg Intravenous Q24H     hydrocortisone sodium succinate PF  25 mg Intravenous Q12H    Followed by     [START ON 8/22/2018] hydrocortisone sodium succinate PF  25 mg Intravenous Once     lipids  250 mL Intravenous Once per day on Mon Wed Fri     metroNIDAZOLE  500 mg Intravenous Q6H     micafungin  100 mg Intravenous Q24H     pantoprazole (PROTONIX) IV  40 mg Intravenous BID     sodium chloride (PF)  3 mL Intravenous Q8H     tacrolimus  1 mg Oral or Feeding Tube QAM     tacrolimus  1 mg Oral or Feeding Tube QPM     tenofovir  300 mg Per Feeding Tube Q48H     tigecycline (TYGACIL) intermittent infusion  50 mg Intravenous Q12H     valGANciclovir  450 mg Oral or NG Tube Every Other Day    Or     valGANciclovir  450 mg Oral Every Other Day       dextran 40 in saline 10 mL/hr at 08/20/18 1400     IV fluid REPLACEMENT ONLY       IV fluid REPLACEMENT ONLY       dialysate for CVVHD & CVVHDF (Phoxillum BK4/2.5) 12.5 mL/kg/hr (08/21/18 0856)     fentaNYL 75 mcg/hr (08/21/18 0600)     heparin 500 Units/hr (08/21/18 0600)     insulin (regular) 1 Units/hr (08/21/18 0844)     lactated ringers 10 mL/hr at 07/30/18 1519     - MEDICATION INSTRUCTIONS -       parenteral nutrition - ADULT compounded formula 45 mL/hr at 08/20/18 2000     replacement solution for CVVHD & CVVHDF  (Phoxillum BK4/2.5) 200 mL/hr at 08/20/18 1702     replacement solution for CVVHD & CVVHDF (Phoxillum BK4/2.5) 12.5 mL/kg/hr (08/21/18 0901)     Reason beta blocker order not selected

## 2018-08-21 NOTE — PLAN OF CARE
Problem: Patient Care Overview  Goal: Plan of Care/Patient Progress Review  Outcome: No Change  Neuro: Remains lethargic. PERRL. Follows commands appropriately, equal strength.   Respiratory: On pressure support from 8 am until 1630, switched over to CMV settings due to low minute volumes and consistent apnea(RR between 5-10 breaths on PS). LS diminished. Minimal secretions.   GI/: Luis in place, UO between 5-50 an hour. NG, NJ remain in place, see flowsheets for output. TF remains at trickle feeds of 10ml/hr. CRRT goal of 2-3L if tolerated.  Vitals: At times BP dips to 100s/60s. Maintains MAP goal. Sinus rhythm to sinus loyda with rare PACs and PVCs. At times temp of 35.9 but consistently 36.0-36.6C. 2 units of platelets given with 1 unit of PRBCs ready to infuse.  Activity: Up in chair for two hours.   Drips: Heparin gtt 5ml/hr. Insulin: 1.5ml/hr. Fentanyl d/shaggy at noon. TPN 45ml/hr.  Continue to monitor, notify MD with any concerns.

## 2018-08-21 NOTE — PROGRESS NOTES
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 He returned to the SICU for shock, severe lactic acidosis, renal failure and respiratory failure, requiring intubation and dialysis.  Ischemic injury to left hemiliver and non-occlusive mesenteric ischemia.  He underwent a laparotomy and several subsequent take-backs to examine ischemic bowel, on 8/6 he underwent resection of necrotic jejunum with a primary anastomosis, and his abdomen was closed 8/8. He acutely decompensated beginning the night of 8/16, with increasing pressor needs, increasing abdominal pain and lactic acidosis, now s/p exploratory laparotomy 8/17 and s/p entero-entero small bowel anastomosis, drain placement and fascial closure 8/18.    TODAY'S PROGRESS/PLANS:   - PRN dilaudid, discontinue fentanyl gtt   - PST, anticipate extubation today   - CRRT, appreciate Nephrology recommendations   - discontinue amikacin per Transplant ID  - 1U pRBC and 1U platelets per transfusions parameters     PLAN:  Neuro/ pain/ sedation:  # acute pain  - monitor neurological status; notify the MD/DO for any acute changes in exam.  - PRN  IV dilaudid and oxycodone  - Discontinue fentanyl gtt after extubation    Pulmonary care:   # acute respiratory failure  - extubated (8/10), re-intubated in the OR for emergent ex-lap (8/17)  - daily pst and extubate as appropriate.  Vent requirements minimal currently  - consider extubation today    Cardiovascular:    #Septic shock - resolving  # Hemodynamic instability - resolving  - monitor hemodynamic status   - norepinephrine gtt  Discontinue since has not required for >24 hours. MAP goals > 60  - weaning  stress dose hydrocortisone, to end 8/22   - TTE 8/8 showed no vegetations or valve abnormalities  - TOÑO 8/10 no evidence of endocarditis     GI care:   # DDLT 7/21/18  # Intrahepatic left portal venous thrombosis, infarction of left liver lobe, patchy infarction of right lobe, suspected hepatic arterial thrombosis  # 7/30/18 s/p exploratory laparotomy and liver biopsy  # Splenic infarctions, patchy bowel ischemia  # Ex Lap 8/6 - resection of 90 cm of necrotic jejunum  # Ex Lap 8/8 - abdomen closed  # Ex Lap 8/17 - Found to have widespread bowel ischemia requiring bowel resection.  Left in discontinuity.  # Ex Lap 8/18 - entero-entero anastomosis and facial closure.  Approximately  cm small bowel remaining with intact ileocecal valve and colon.    - Two episodes of bowel ischemia of unclear etiology requiring resection of the majority of small bowel,  cm of small bowel remaining.  Now in continuity with entero-entero anastomosis.  - trickle feeds at 10 ml/hr  - Incision care per transplant team    Fluids/ Electrolytes/ Nutrition:   # protein calorie deficit malnutrition   # NPO  - TF's at trickle feed 10 ml/hr  - TPN at 45 ml/hr for adequate nutrition (will need long term, due to ~ cm of small bowel remaining)    Renal/ Fluid Balance:    # BEN   # severe metabolic acidosis, lactic acidosis - resolved  # severe hyponatremia - resolved  # hyperkalemia - resolved  # renal failure  # hematuria - resolved  - CRRT restarted 8/13. Nephrology placed femoral dialysis line, recommend net negative  ml/hr today  - Will continue to monitor intake and output.  - Urethral bleeding appears to have resolved  - Luis in place    Endocrine:    # DM II  - insulin gtt  - Hydrocortisone wean to end 8/22, now that hemodynamics better and pressor requirement reduced.     ID/ Antibiotics:  # VRE Bacteremia  # ESBL Bacteremia - treated  # Hepatitis B  Has been unable to clear VRE bacteremia.  ABX ongoing and ID  transplant following.  - Daptomycin (7/30- ), ceftaroline (8/13- ), tigecycline (8/13- ) continue while blood cultures still positive for VRE  - Micafungin (7/30- )  - Flagyl (8/17 - )  - Meropenem (8/2-8/14), discontinued after 12 days of negative cultures for ESBL  - Amikacin (8/17 - 8/21), discontinued as he had completed 2-week course of meropenem and has other active agents for G- coverage  - Immunosuppression: (restarted 8/6) tacrolimus  - Immunosuppression prophylaxis: Valgancyclovir and Tenofovir, nystatin when micafungin stopped.   - Transplant ID following due to continued bacteremia with unknown source  - Cultures Q48H    Cultures   - 7/27 VRE (line/arm)   - 7/29: VRE (left arm)   - 7/30: VRE (left arm)   - 7/31: VRE (Line and peripheral)  - 8/1: ESBL (HD/hand/central line)   - 8/3: VRE (intra-abdominal tissue)  - 8/4: NGTD (blood)  - 8/5: NGTD (blood)  - 8/6: VRE (art line)  - 8/7: VRE (blood)  - 8/8: VRE (blood)  - 8/9: VRE (blood)  - 8/11: VRE (blood)  - 8/12: VRE (abdominal fluid)  - 8/13: VRE (blood)  - 8/14: VRE (blood)  - 8/15: VRE (blood)  - 8/17: NGTD (blood)   - 8/17: VRE & Candida (peritoneal fluid)   - 8/18: G+ cocci (tissue)  - 8/19 NGTD (blood)  - 8/21 NGTD (blood)    Heme:     # Acute blood loss anemia  # Anemia of critical illness  # Portal venous thrombosis  # Thrombocytopenia  - Heparin at 500 straight rate  - Hold aspirin 81mg for continued bleeding  - HIT panel sent and negative 8/2.  Repeat HIT panel on 8/13 negative; Hematology consult per Transplant in setting of low platelets - believe this is likely his new baseline  - follow up antithrombin III, protein C, protein S and heparin level to evaluate for coagulapathy     MSK:  # weakness and deconditioning of critical illness   - PT/OT     Prophylaxis:    - DVT: straight rate heparin, SCD's  - GI: protonix     Lines/ tubes/ drains:  - L fem art line, L PICC, NJ, NG, PIVs, groin central cath, RIJ MAC.     Disposition:  -  SICU      ====================================    SUBJECTIVE:   No acute events overnight. Remained off pressors. Continued bleeding from abdominal CAROL drain, received 2 units RBCs. Fentanyl drip increased for pain management.       OBJECTIVE:   1. VITAL SIGNS:   Temp:  [95.5  F (35.3  C)-97.2  F (36.2  C)] 97  F (36.1  C)  Heart Rate:  [55-92] 72  Resp:  [16-18] 18  BP: (117-151)/(80-93) 127/93  MAP:  [79 mmHg-113 mmHg] 94 mmHg  Arterial Line BP: (105-155)/(63-89) 129/71  FiO2 (%):  [40 %] 40 %  SpO2:  [96 %-100 %] 100 %  Ventilation Mode: CPAP/PS  (Continuous positive airway pressure with Pressure Support)  FiO2 (%): 40 %  Rate Set (breaths/minute): 16 breaths/min  Tidal Volume Set (mL): 450 mL  PEEP (cm H2O): 5 cmH2O  Pressure Support (cm H2O): 7 cmH2O  Oxygen Concentration (%): 40 %  Resp: 18    2. INTAKE/ OUTPUT:   I/O last 3 completed shifts:  In: 5040.02 [I.V.:2070.22; NG/GT:240]  Out: 6758 [Urine:594; Emesis/NG output:300; Drains:820; Other:4819; Stool:225]    3. PHYSICAL EXAMINATION:   General: intubated, sitting up in chair  Neuro: responds to questions with head motion, alert  Resp: mechanically ventilated  CV: RRR  Abdomen: non-distended, soft   Incisions: abdominal incision with kerlex dressings. Drain with bloody fluid  Extremities: warm and well perfused, 1+ peripheral edema in bilateral LE to thighs  PICC, triple lumen RIJ, dialysis line, Art line all appropriately dressed    4. INVESTIGATIONS:   Arterial Blood Gases     Recent Labs  Lab 08/18/18  1149 08/18/18  1048 08/17/18  2320 08/17/18  1608   PH 7.38 7.39 7.45 7.36   PCO2 40 38 36 36   PO2 112* 331* 172* 177*   HCO3 23 23 25 20*     Complete Blood Count     Recent Labs  Lab 08/21/18  0356 08/20/18  2332 08/20/18  1756 08/20/18  1215   WBC 19.9* 23.8* 31.4* 26.1*   HGB 7.4* 7.0* 7.2* 7.8*   PLT 46* 57* 56* 43*     Basic Metabolic Panel    Recent Labs  Lab 08/21/18  0356 08/20/18  2332 08/20/18  1756 08/20/18  1215    140 139 141   POTASSIUM  3.6 3.8 3.8 4.0   CHLORIDE 107 107 108 108   CO2 25 24 25 24   BUN 42* 41* 40* 40*   CR 0.72 0.74 0.76 0.77   * 170* 176* 93     Liver Function Tests    Recent Labs  Lab 08/21/18  0356 08/20/18  2332 08/20/18  1756 08/20/18  1215 08/20/18  0443 08/19/18  2329 08/19/18  1802   AST 55* 63* 57* 64* 56* 57* 54*   ALT 20 23 23 24 26 26 26   ALKPHOS 235* 253* 242* 250* 245* 239* 208*   BILITOTAL 2.4* 2.8* 2.9* 2.8* 2.8* 3.0* 2.7*   ALBUMIN 1.3* 1.4* 1.5* 1.5* 1.7* 1.6* 1.6*   INR  --   --   --  1.65* 1.68* 1.74* 1.70*     Pancreatic Enzymes  No lab results found in last 7 days.  Coagulation Profile    Recent Labs  Lab 08/20/18  1215 08/20/18  0443 08/19/18  2329 08/19/18  1802  08/18/18  1812 08/18/18  1321  08/17/18  1608 08/17/18  1010   INR 1.65* 1.68* 1.74* 1.70*  < > 2.18* 2.20*  < > 2.05* 3.81*   PTT  --   --   --   --   --  85* 66*  --  53* >240*   < > = values in this interval not displayed.  Lactate  Invalid input(s): LACTATE    5. RADIOLOGY:   No results found for this or any previous visit (from the past 24 hour(s)).       --------------------------------------------------------------------------------------------------------------------------  Carlos Castellanos  Medical Student, MS4  [employer]      Patient seen, findings and plan discussed with surgical ICU staff Dr. Soler.    Eddie Acuna MD  CA-2/PGY-3

## 2018-08-21 NOTE — PHARMACY-AMINOGLYCOSIDE DOSING SERVICE
Pharmacy Aminoglycoside Follow-Up Note  Date of Service 2018  Patient's  1975   43 year old, male    Weight (Actual): 70 kg    Indication: Intra-abdominal Infection  Current Amikacin regimen:  550 mg IV intermittent dosing  Day of therapy: 5    Target goals based on conventional dosing  Goal Peak level: 20-25 mg/L  Goal Trough level: <0.5mg/L for four hours before dose    Current estimated CrCl: On CRRT    Creatinine for last 3 days  2018:  6:12 PM Creatinine 0.88 mg/dL; 11:14 PM Creatinine 0.80 mg/dL  2018:  3:51 AM Creatinine 0.85 mg/dL;  5:55 AM Creatinine 0.83 mg/dL; 12:04 PM Creatinine 0.84 mg/dL;  6:02 PM Creatinine 0.84 mg/dL; 11:29 PM Creatinine 0.85 mg/dL  2018:  4:43 AM Creatinine 0.77 mg/dL; 12:15 PM Creatinine 0.77 mg/dL;  5:56 PM Creatinine 0.76 mg/dL; 11:32 PM Creatinine 0.74 mg/dL  2018:  3:56 AM Creatinine 0.72 mg/dL;  9:52 AM Creatinine 0.72 mg/dL    Nephrotoxins and other renal medications (Future)    Start     Dose/Rate Route Frequency Ordered Stop    18 0800  tacrolimus (GENERIC EQUIVALENT) suspension 1 mg      1 mg Oral or Feeding Tube EVERY MORNING. 18 1704      18 1800  tacrolimus (GENERIC EQUIVALENT) suspension 1 mg      1 mg Oral or Feeding Tube EVERY EVENING. 18 1704            Contrast Orders - past 72 hours     None          Aminoglycoside Levels - past 2 days  2018:  4:06 PM Amikacin Level 13 mg/L  2018:  4:43 AM Amikacin Level 7 mg/L;  5:05 PM Amikacin Level 21 mg/L  2018:  3:56 AM Amikacin Level 7 mg/L    Aminoglycosides IV Administrations (past 72 hours)                   amikacin (AMIKIN) 550 mg in D5W 100 mL intermittent infusion (mg) 550 mg New Bag 18 [id number]                Pharmacokinetic Analysis  Calculated Peak level: 23.14 mg/L  Calculated Trough level: 2.18 mg/L (24-hour calculated trough)  Volume of distribution: 0.36 L/kg  Half-life: 6.9 hours        Interpretation of levels and current  regimen:  Aminoglycoside levels are at goal of 20-25 mg/L. No further evaluation needed as Amikacin has been discontinued by team.    Has serum creatinine changed greater than 50% in the last 72 hours: No    Urine output:  diminished urine output    Renal function: On CRRT    Plan  1. Discontinue therapy    2.  Method of evaluation: 2 post dose levels       Praneeth Dupree PharmD IV Student

## 2018-08-21 NOTE — PROGRESS NOTES
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  Developed shock with severe lactic acidosis and respiratory failure on POD9. CT scan showed left PV venous thrombosis, infarction of left liver lobe, patchy infarction of right lobe and possible hepatic arterial thrombosis, patchy bowel ischemia. Transferred to ICU.   Ex lap, patchy diffuse SB ischemia. Doppler demonstrated arterial flow main vessels to bowel and liver. Splenic infarction. Liver biopsy negative for acute rejection. Fluid culture growing VRE. On Heparin gtt. 8/3 OR for reexploration liver transplant, bowel ischemia, findings improved patchy iscemic changes of small bowel, patchy ischemic changes of left liver lobe.  OR: some ischemic/necrotic jejunum, s/p resection.   Washout and abdominal closure with stratice mesh, no drains placed. Minimal ascites, intact small bowel anastomosis, patchy ischemia left liver lobe.  peritonitis with bowel necrosis and bowel perforation. S/p bowel resection, about 50 cm sm bowel, ileocecal valve and colon remaining.     Procedures:    donor (DBD) liver transplant with duct to duct anastomosis over biliary stent.    Ex lap due to concern for ischemic bowel, findings: patchy diffuse SB ischemia. Doppler demonstrated arterial flow main vessels to bowel and liver. Splenic infarction.   8/3 Reexploration liver transplant, bowel ischemia, findings improved patchy iscemic changes of small bowel, patchy ischemic changes of left liver lobe.    Re-exploration, ischemic/necrotic jejunum, s/p resection   Washout and abdominal closure with stratice mesh, no drains placed. Minimal ascites, intact small bowel  anastomosis, patchy ischemia left liver lobe.  8/17 Procedure: reopening of laparotomy. abd washout, bowel resection (clip and drop fashion). Postop Dx: same. Large segment of ischemic bowel from mid-jejunum (prior anastomosis) with distal perforation and feculent peritonitis.   8/18 Reexploration, small and large bowel intestine appeared healthy per surgeon, primary anastomosis bowel. Fascia closure, operative note pending.    Graft function: POD #31, LFTs stable with elevated bilirubin, 2.4. (2.8).  7/30 Hepatic infarction, left PV thrombosis, and narrow and irregular appearance HA noted on CTA. 8/8 Liver biopsy negative for acute rejection. Intra-abdominal  cultures from 8/1, 8/6, 8/17, 8/18 + VRE or GPCs.   Incision opened at bedside 8/12, hematoma removed.  8/13 US: new perihepatic fluid collections (largest 7x2x4), small volume of septated ascites.  VAC placed 8/15. 8/15 CT scan, left lobe infarct with complex fluid collection inferior.  8/17 US: patent vasculature but RIs concerning for HA stenosis, stable fluid collection near braden hepatis.     Immunosuppression management:   Induction: Simulect intra-op due to BEN.  Steroid taper per protocol, completed.  MMF held due to critical status  Tac goal level ~5-6 due to sepsis and BEN. Level 5.1  Hydrocortisone, SICU to tapering  Complexity of management:Medium. Contributing factors: thrombocytopenia and anemia sepsis    Hematology:   Anemia: Acute blood loss.  Bleeding from lines and drain site overnight, now controlled.  Hgb 7.2.  Thrombocytopenia: PLT 46. PLT 1u transfused 8/19.  Anticoagulation: Anticoagulate for bowel ischemia and portal thrombosis.  Heparin gtt low intensity resulted in diffuse oozing, now on straight rate 500u/hr. Dextran 10 ml/hr for additional anticoagulation.   Concern for protein C/protein S deficiency, labs in process.    Cardiorespiratory:    Hypotension secondary to septic shock:  Off pressors.  Respiratory failure secondary to  sepsis: On vent.  Weaning trials per SICU.    GI/Nutrition: NG.  NPO.  On TPN.  TF 10 ml/hr for healing.   Ischemic bowel:  7/30 CTA: nonocclusive filling defects in superior mesenteric vein branches.  Anticoagulation as above.  S/p resection of necrotic jejunum on 8/6. S/p resection of 150cm of ischemic bowel with perforation on 8/17.  PSBO:  Noted on 8/15 CT, transition point RLQ. S/p resection on 8/17.   Bowel perforation/peritonitis:  Small bowel perforation with feculent peritonitis noted in OR on 8/17. S/p resection, abx as below.    Endocrine:  DM type 2, continue insulin drip.      Fluid/Electrolytes: BEN/ARF. CRRT.    : Traumatic bah placement with intermittent bleeding.   Hematuria now resolved. Bah, coude 18fr in place    Infectious disease: Afebrile. WBC 19.9, trending down  -Amikacin, flagyl, ceftraroline, tigecycline daptomycin, micafungin.   -ID following  -Blood cultures through 8/18 + VRE or GPCs  -8/17 Peritoneal fluid culture + VRE, candida albicans  -8/19 Blood cx, no growth to date    1. VRE intra abdominal and bacteremia (dapto, ceftaroline, tigecycline)  2. Infarct left lobe liver with inferior perihepatic fluid collection  3. Peritonitis (metronidazole, amikacin, micafungin)    Previous ID:   3. Ecoli ESBL bacteremia. Repeat bcx on 8/1 (peripheral and VAD) grew Ecoli ESBL. Zosyn changed to Meropenem, completed on 8/14.  SICU to exchange lines as able. Blood cultures negative for E. Coli since 8/2.  4. HBV: Hepatits B DNA PCR positive prior to tx. Received HBIG on 7/21 and 7/22. 8/10 Hep B surface antigen negative. No further HBIG planned. Continue tenofovir 300 mg, dose adjusted for HD       Prophylaxis: DVT PCDs, on heparin, fall, GI, Valcyte x 12 weeks (CMVand EBV IgG +), bactrim.   Disposition: SICU    Coordinator: Kay Reeys  Surgeon: Pako    Check Hep B surface antigen at 3 months, 6 months then annually     Medical Decision Making: High    JAY/Fellow/Resident Provider:  Rebecca  Waldo, PAC  [id number]    Faculty: Mamadou Norwood MD  __________________________________________________________________  Transplant History: Admitted 2018 for  donor liver transplant.   The patient has a history of liver failure due to hepatitis B .    2018 (Liver), Postoperative day: 31     Interval History:  CRRT. Bright red serosanguinous drainage from drain site this morning, small amount  Ostomy bag placed.  BM x1 mucous per nursing.  Pt alert.    ROS:   A 10-point review of systems was negative except as noted above.    Curent Meds:    ceftaroline (TEFLARO) intermittent infusion  400 mg Intravenous Q12H     DAPTOmycin (CUBICIN) intermittent infusion  12 mg/kg Intravenous Q24H     hydrocortisone sodium succinate PF  25 mg Intravenous Q12H    Followed by     [START ON 2018] hydrocortisone sodium succinate PF  25 mg Intravenous Once     lipids  250 mL Intravenous Once per day on      metroNIDAZOLE  500 mg Intravenous Q6H     micafungin  100 mg Intravenous Q24H     pantoprazole (PROTONIX) IV  40 mg Intravenous BID     sodium chloride (PF)  3 mL Intravenous Q8H     tacrolimus  1 mg Oral or Feeding Tube QAM     tacrolimus  1 mg Oral or Feeding Tube QPM     tenofovir  300 mg Per Feeding Tube Q48H     tigecycline (TYGACIL) intermittent infusion  50 mg Intravenous Q12H     valGANciclovir  450 mg Oral or NG Tube Every Other Day    Or     valGANciclovir  450 mg Oral Every Other Day       Physical Exam:     Admit Weight: 60.7 kg (133 lb 12.8 oz)    Current Vitals:   BP (!) 127/93  Pulse 121  Temp 96.4  F (35.8  C)  Resp 18  Wt 70 kg (154 lb 5.2 oz)  SpO2 100%  BMI 24.91 kg/m2      Vital sign ranges:    Temp:  [95.5  F (35.3  C)-97.2  F (36.2  C)] 96.4  F (35.8  C)  Heart Rate:  [54-92] 65  Resp:  [16-18] 18  MAP:  [79 mmHg-102 mmHg] 98 mmHg  Arterial Line BP: (105-141)/(63-77) 132/77  FiO2 (%):  [40 %] 40 %  SpO2:  [96 %-100 %] 100 %  Patient Vitals for the past 24 hrs:   Temp Temp  src Heart Rate Resp SpO2 Weight   08/21/18 1300 96.4  F (35.8  C) - 65 - - -   08/21/18 1200 96.4  F (35.8  C) - 72 - - -   08/21/18 1100 96.6  F (35.9  C) - 54 - - -   08/21/18 1050 96.6  F (35.9  C) - 64 - 100 % -   08/21/18 1040 97  F (36.1  C) - 65 - 100 % -   08/21/18 1030 96.8  F (36  C) - 63 - 100 % -   08/21/18 0800 97  F (36.1  C) Axillary 72 18 100 % -   08/21/18 0700 96.8  F (36  C) - 58 17 100 % -   08/21/18 0615 96.4  F (35.8  C) - 63 - 100 % -   08/21/18 0600 - - 63 17 100 % -   08/21/18 0500 96.6  F (35.9  C) - 63 16 100 % -   08/21/18 0400 97.1  F (36.2  C) Axillary 59 16 100 % 70 kg (154 lb 5.2 oz)   08/21/18 0300 96.4  F (35.8  C) - 60 - 100 % -   08/21/18 0200 96.6  F (35.9  C) - 55 17 100 % -   08/21/18 0150 96.6  F (35.9  C) - 61 - 100 % -   08/21/18 0100 96.3  F (35.7  C) - 58 16 100 % -   08/21/18 0030 95.5  F (35.3  C) - 62 - 100 % -   08/21/18 0000 96.3  F (35.7  C) Esophageal 61 17 100 % -   08/20/18 2300 - - 63 16 100 % -   08/20/18 2200 - - 56 16 100 % -   08/20/18 2100 - - 74 16 100 % -   08/20/18 2000 97.2  F (36.2  C) Esophageal 64 16 100 % -   08/20/18 1930 97.2  F (36.2  C) - 61 - 100 % -   08/20/18 1915 97.2  F (36.2  C) - 61 - 99 % -   08/20/18 1900 97.2  F (36.2  C) - 61 - 99 % -   08/20/18 1845 96.6  F (35.9  C) - 66 - 99 % -   08/20/18 1830 97  F (36.1  C) - 63 - 99 % -   08/20/18 1815 97  F (36.1  C) - 68 - 100 % -   08/20/18 1800 96.8  F (36  C) - 70 - 99 % -   08/20/18 1745 - - 71 - 99 % -   08/20/18 1730 96.6  F (35.9  C) - 68 - 97 % -   08/20/18 1715 96.6  F (35.9  C) - 79 - 96 % -   08/20/18 1700 96.4  F (35.8  C) - 73 - 97 % -   08/20/18 1645 96.4  F (35.8  C) - 78 - 97 % -   08/20/18 1630 96.4  F (35.8  C) - 74 - 100 % -   08/20/18 1615 96.1  F (35.6  C) - 77 - 100 % -   08/20/18 1600 96.4  F (35.8  C) - 92 16 100 % -   08/20/18 1545 96.4  F (35.8  C) - 66 - 100 % -   08/20/18 1530 96.4  F (35.8  C) - 64 - 100 % -   08/20/18 1515 96.6  F (35.9  C) - 68 - 100 % -    08/20/18 1500 96.4  F (35.8  C) - 65 - 100 % -   08/20/18 1445 96.6  F (35.9  C) - 60 - 100 % -   08/20/18 1430 96.6  F (35.9  C) - 61 - 100 % -   08/20/18 1415 96.6  F (35.9  C) - 70 - 100 % -   08/20/18 1400 96.6  F (35.9  C) - 64 - 100 % -   08/20/18 1345 96.6  F (35.9  C) - 55 - 100 % -   08/20/18 1330 96.6  F (35.9  C) - 59 - 100 % -   08/20/18 1315 96.6  F (35.9  C) - 62 - 100 % -     General Appearance: NAD  Skin: warm, dry  Heart: NSR  Lungs: PS trial  Abdomen: Incision intact, some gray fibrinous tissue base, scant clear drainage left side.  CAROL site, some bleeding, ostomy bag placed .  Abd soft, mild generalized pain.  : Luis  Extremities: well perfused. Generalized edema.  Neurologic: Alert, following commands.  Access: PICC, CVC, PIV  Femoral HD line    Data:   CMP    Recent Labs  Lab 08/21/18  0952 08/21/18  0357 08/21/18  0356     --  141   POTASSIUM 3.7  --  3.6   CHLORIDE 107  --  107   CO2 24  --  25   *  --  156*   BUN 41*  --  42*   CR 0.72  --  0.72   GFRESTIMATED >90  --  >90   GFRESTBLACK >90  --  >90   JESUS 6.9*  --  6.8*   ICAW 4.3* 4.6  --    MAG 2.2  --  2.3   PHOS 4.3  --  4.5   ALBUMIN 1.4*  --  1.3*   BILITOTAL 2.6*  --  2.4*   ALKPHOS 270*  --  235*   AST 60*  --  55*   ALT 20  --  20     CBC    Recent Labs  Lab 08/21/18  0952 08/21/18  0356   HGB 9.0* 7.4*   WBC 20.1* 19.9*   PLT 35* 46*     COAGS    Recent Labs  Lab 08/20/18  1215 08/20/18  0443  08/18/18  1812 08/18/18  1321   INR 1.65* 1.68*  < > 2.18* 2.20*   PTT  --   --   --  85* 66*   < > = values in this interval not displayed.   Urinalysis  Recent Labs   Lab Test  07/27/18   2330  07/13/18   1315  06/18/18   1200   COLOR  Yellow  Dark Yellow  Yellow   APPEARANCE  Clear  Clear  Clear   URINEGLC  Negative  Negative  Negative   URINEBILI  Negative  Large*  Small*   URINEKETONE  Negative  Negative  Negative   SG  1.008  1.013  1.008   UBLD  Negative  Negative  Negative   URINEPH  7.5*  6.0  5.0   PROTEIN  30*  10*   Negative   NITRITE  Negative  Negative  Negative   LEUKEST  Negative  Negative  Negative   RBCU  5*  <1  1   WBCU  3  None  2   UTPG   --    --   0.75*     Virology:  Hepatitis C Antibody   Date Value Ref Range Status   07/20/2018 Nonreactive NR^Nonreactive Final     Comment:     Assay performance characteristics have not been established for newborns,   infants, and children         POD 14 US liver:  1.  The left portal vein is antegrade with decreased velocity,  measuring approximately 15 cm/second. This vessel was occluded on CT  7/30/2018.  2.  Elevated velocities of the main hepatic artery, now 203 cm/sec  (previously 94 cm/sec). However left hepatic arteries demonstrates  steep upstroke suggesting there is not a significant stenosis.   2a. Right hepatic artery was not visualized, could be occluded or  difficult to see postoperatively.  3. Echogenic focus of the right lobe in close proximity to the  hepatorenal fossa. This likely corresponds with nonenhancing lesion on  CT 7/30/2018 and likely representing subcapsular hematoma.  4. Additional small hematomas of the braden hepatis and posterior right  perihepatic region.  5. Small volume anechoic ascites.  6. Left hepatic lobe not well visualized secondary to open wound  preventing imaging. Note the left lobe was grossly abnormal on CT  7/30/2018    POD 1 US liver: Impression:   1.  Hematoma associated with the inferior aspect of the perihepatic  space anteriorly, measuring 13.7 x 7.0 x 8.5 cm.  2.  Retrograde flow of the left portal vein. Additionally, low  velocities of the portal venous system. Single low resistive index of  the extrahepatic aspect of the hepatic artery, measuring 0.37. These  findings are likely within normal limits in the early postoperative  context. However, continued attention on follow-up recommended.    7/30 CT scan abd/pelvis:  IMPRESSION:   1. Hypoenhancing areas in the transplant liver concerning for  infarction. Transplant left  portal vein occlusion with bubbles of  portal venous gas. Nearly occlusive filling defect in the native  hepatic artery. Narrowed and irregular appearance of the transplant  hepatic artery. Focal occlusion of the left main hepatic artery.  Segmental occlusions of right hepatic arterial branches.   2. Pneumatosis intestinalis with nonocclusive filling defects in  superior mesenteric vein branches, concerning for bowel ischemia or  infarction.   3. Splenic infarctions.   4. Bibasilar consolidation with air bronchograms. Pneumonia cannot be  excluded.   5. Post surgical changes of evacuation of peritransplant hematoma.       Attestation:    The patient has been seen and evaluated by me.   Vital signs, labs, medications and orders were reviewed.   When obtained, diagnostic images were reviewed by me and interpreted as above.    The care plan was discussed with the multidisciplinary team and I agree with the findings and plan in this note, with any differences recorded in blue.    Immunosuppressive medication management was reviewed and adjusted as reflected in the note and orders.     .

## 2018-08-21 NOTE — PLAN OF CARE
Problem: Liver Transplant (Adult)  Goal: Signs and Symptoms of Listed Potential Problems Will be Absent, Minimized or Managed (Liver Transplant)  Signs and symptoms of listed potential problems will be absent, minimized or managed by discharge/transition of care (reference Liver Transplant (Adult) CPG).   Outcome: No Change  D:  Patient Taken to the OR on 8/17 for exploratory laparotomy notable for further intestinal necrosis requiring further bowel resection, now s/p exploratory laparotomy with entero-entero anastomosis, drain placement and fascial closure on 8/18.      I/A: Hemodynamically improving. Right CAROL oozing moderate amount of drainage. Transplant fellow added another suture, but oozing did not improve. Drainage bag applied around CAROL to contain drainage. Transfused with 1 unit of PRBCs and 1 unit platelets.       VS: Normothermic with GINI hugger on. BPs stable off levophed. HR 55-60s. FiO2 40%.  Gtts: Dextran @ 10. Insulin @ 0.5. Heparin @ straight rate of 500. TPN @ 45. Fentanyl @ 50.  Neuro: Pupils equal and reactive, size 3. Patient is able to follow commands intermittently; withdraws on all 4 extremities.  CV:  SR/SB. MAP goal above 55, no longer requiring pressors.  Pulm: LS clear; diminished in bases. CMV vent settings.   GI:  NG to LIS. NJT with trickle feeds @ 10 ml/hr.   : Luis with 20-40 ml/hr.  Skin: Abd dressing intact. 2 JPs CDI, with minimal output. Coccyx dressing intact. Moderate generalized pitting edema.  I/D: Continue IV abx. I/D service following.  Pain: Continuous fentanyl drip.      P: Continue to monitor and notify MD of changes.

## 2018-08-21 NOTE — PROVIDER NOTIFICATION
MD notified about hemoglobin drop of 9.0 to 7.1, platelet recheck of 32, and potassium of 3.3. A unit of platelets and a unit of PRBCs were ordered. Continue to monitor.

## 2018-08-21 NOTE — PROGRESS NOTES
CRRT STATUS NOTE    DATA:  Time:  6:16 AM  Pressures WNL: YES  Filter Status:  WDL    Problems Reported/Alarms Noted: None reported    Supplies Present:  YES    ASSESSMENT:  Patient Net Fluid Balance:  Net - 1.5 L yesterday and Net negative 557 cc since midnight  Vital Signs:  Temp 97.1, HR 63,  /68 (90), Sats 100% via Vent FiO2 40%  Labs:  Potassium 3.6, Creat 0.72, Mag 2.3, Phos 4.5, I jose 4.6, Lactate 1.2, Hgb 7.4( after 1 unit RBC given), Platelet 46  Goals of Therapy:  Net - /hour as BP allows     INTERVENTIONS:   None required overnight.     PLAN:  Continue with current plan of care, change circuit Q 72 hours and prn. Contact CRRT Resource RN at [phone] with questions of concerns.

## 2018-08-22 NOTE — PLAN OF CARE
Problem: Patient Care Overview  Goal: Plan of Care/Patient Progress Review  Outcome: Improving  Neuro: Lethargic, oriented x4. Pupils equal and reactive. Purposeful movements. Upper arms slightly weaker than lower extremities.   Respiratory: Extubated around noon, on 4L, saturations well. LS clear, diminished. Weak cough.   GI/: Luis in place, urine output has picked up to 125-200ml/hr. NG to LIS, NJ to trickle feeds. Rectal pouch in place. Watery stools.  Activity: Up in chair for 2 hours.  Cardiac: HR between 45-70s. Occasional PVCs. BPs between 120s-150s.   Vitals: Afebrile. On warming blanket to meet normothermia.   CRRT: Net negative -2800.   Continue to monitor, notify MD with any concerns.

## 2018-08-22 NOTE — PROGRESS NOTES
[facility]  Transplant Infectious Disease Progress Note      Patient:  Yaneli Miles, Date of birth 1975, Medical record number [id number] Date of Visit:  08/22/2018         Assessment and Recommendations:   Recommendations:  - Continue ceftaroline with dialysis dosing per ICU pharmacy as pt is on CRRT, as well as tigecycline & daptomycin, for VRE.  - if BCx's from 8/19 remain negative by tomorrow then will discontinue tigecycline.  - Continuing flagyl and fluconazole.   - Continue tenofovir and vGCV    Please call with questions.      Assessment: 43 year old male Hep B cirrhosis s/pOLT on 7/21 whose post-op course was complicated hemorrhagic shock, portal vein and hepatic artery occlusion, liver infarct w/ pneumotosis intestinalis w/ non-occlusive filling defects in SMV branches w/ small bowel ischemia on CT a/p 7/30/18, repeat scans 8/2018 w/ bowel ischemia and fabi perforation s/p ex lap 8/18/18 w/ bowel resection, closure of fascia and persistent VRE bacteremia. Splenic infarcts too. Remains intubated on pressure support trials.  CRRT running. Sitting in a chair today with asymptomatic bradycardia.  Abdominal drains remain in place with bloody drainage.  Remains afebrile.     ID issues:  - Hepatic abscess per 8/15/2018 CT imaging; bowel perforation and feculant peritonitis at 8/17/2018 laparotomy. One of the sources of continuing VRE bacteremia, on ceftaroline, tigecycline, and daptomycin. Daptomycin SHARMIN initially 2 on first positive blood cultures, now up to 24 on most recent from 8/17.  Continue flagyl for coverage of anaerobic bowel bacterial kenneth. On micafungin for Candida albicans growing in 8/17/2018 peritoneal fluid but can narrow to azole therapy.  As of most recent OR on 8/18, remaining bowel looked viable and fascia was closed.    - Persistent VRE bacteremia. He continues to have positive blood cultures with latest culture from 8/17/2018 growing. He is on combination  therapy with daptomycin & ceftaroline, as described in In vitro activity of daptomycin in combination with ß-lactams, gentamicin, rifampin, and tigecycline against daptomycin-nonsusceptible enterococci, where there was an apparent synergistic effect to combining the two. In the event that daptomycin is inactivated by surfactant in the lungs by an occult pulmonary process, tigecycline was added to his regimen but no evidence for pulmonary process with clear CXR and minimal vent support.    - Chronic hepatitis B: On tenofovir    Other ID issues:  -  Prophylaxis:TMP/SMX, vGCV, micafungin  - Viral serostatus D/R & prophylaxis: HCV negative/reactive, HBVnegative/reactive, CMV -/+, EBV+/+, valganciclovir ppx  - Gamma globulin status: Never checked.  - Isolation status: Contact, Good hand hygiene.    Attestation:  I have reviewed today's vital signs, medications, labs and imaging.      Floor time: 25 minutes, Face-to-face time: 10 minutes, Total time: 35 minutes  Faisal Momin,   Pager [phone]         Interval History:     Yaneli remains intubated on minimal vent setting with very small amount of suction requirements.  Abdominal drains in place with bloody fluid.  He notes abdominal pain.  Episodes of asymptomatic bradycardia including on my exam with good blood pressure and awake answering questions.  No new rashes.  Urine and stool output with bah and rectal tube in place.  Remains afebrile.     Anti-infectives  Tigecycline  Valganciclovir  Ceftaroline  Daptomycin  Metronidazole  Tenofovir  Micafungin    Transplants:  7/21/2018 (Liver), Postoperative day:  32.  Coordinator Kay Reyes    Review of Systems:   Unable to obtain review of systems due to intubation and sedation         Current Medications & Allergies:       ceftaroline (TEFLARO) intermittent infusion  400 mg Intravenous Q12H     DAPTOmycin (CUBICIN) intermittent infusion  12 mg/kg Intravenous Q24H     [START ON 8/23/2018] fluconazole  400 mg  Intravenous Q24H     hydrocortisone sodium succinate PF  25 mg Intravenous Once     lipids  250 mL Intravenous Once per day on Mon Wed Fri     metroNIDAZOLE  500 mg Intravenous Q6H     pantoprazole (PROTONIX) IV  40 mg Intravenous BID     sodium chloride (PF)  3 mL Intravenous Q8H     tacrolimus  1 mg Oral or Feeding Tube QAM     tacrolimus  1 mg Oral or Feeding Tube QPM     tenofovir  300 mg Per Feeding Tube Q48H     tigecycline (TYGACIL) intermittent infusion  50 mg Intravenous Q12H     valGANciclovir  450 mg Oral or NG Tube Every Other Day    Or     valGANciclovir  450 mg Oral Every Other Day       Infusions/Drips:    dextran 40 in saline 10 mL/hr at 08/20/18 1400     IV fluid REPLACEMENT ONLY       dialysate for CVVHD & CVVHDF (Phoxillum BK4/2.5) 12.5 mL/kg/hr (08/22/18 0753)     heparin 500 Units/hr (08/22/18 0700)     insulin (regular) 1 Units/hr (08/22/18 0700)     lactated ringers 10 mL/hr at 07/30/18 1519     - MEDICATION INSTRUCTIONS -       parenteral nutrition - ADULT compounded formula 45 mL/hr at 08/21/18 1943     replacement solution for CVVHD & CVVHDF (Phoxillum BK4/2.5) 200 mL/hr at 08/21/18 2038     replacement solution for CVVHD & CVVHDF (Phoxillum BK4/2.5) 12.5 mL/kg/hr (08/22/18 0753)     Reason beta blocker order not selected         Allergies   Allergen Reactions     Nsaids      Contraindicated             Physical Exam:   Vitals were reviewed.  Ranges for vital signs:  Temp:  [96.4  F (35.8  C)-98.6  F (37  C)] 98  F (36.7  C)  Heart Rate:  [49-73] 61  Resp:  [12-22] 14  MAP:  [75 mmHg-105 mmHg] 100 mmHg  Arterial Line BP: (110-155)/(56-79) 155/74  FiO2 (%):  [40 %] 40 %  SpO2:  [100 %] 100 %  Vitals:    08/20/18 0600 08/21/18 0400 08/22/18 0400   Weight: 71.2 kg (156 lb 15.5 oz) 70 kg (154 lb 5.2 oz) 66 kg (145 lb 8.1 oz)       Physical Examination:   GENERAL:  Resting in a chair, on Minimal vent settings, FiO2 35%.  EYES:  Icteric sclera  ENT:  Intubated.  LUNGS:  Coarse breath sounds  anteriorly  CARDIOVASCULAR:  bradycardic regular rate and rhythm, no murmur   ABDOMEN:  Hypoactive bowel sounds.  No indications of tenderness to palpation.  Surgical dressing in place. Drain on each side of abdomen with small amounts of bloody drainage.  : Luis in place in clear yellow urine.  EXT: Increased lower extremity edema  SKIN:  No acute rashes.   T/L/D: R femoral dialysis catheter, R internal jugular CVC, Left PICC, left femoral arterial line         Laboratory Data:     Metabolic Studies       Recent Labs   Lab Test  08/22/18   1019  08/22/18   0424 08/21/18 2140 08/18/18   0411   07/13/18 2050 07/13/18   0334   NA   --   140  140   < >  141   < >   --    < >  132*   POTASSIUM   --   3.5  3.4   < >  3.6   < >   --    < >  2.6*   CHLORIDE   --   108  109   < >  108   < >   --    < >  101   CO2   --   25  24   < >  23   < >   --    < >  20   ANIONGAP   --   7  8   < >  9   < >   --    < >  12   BUN   --   43*  44*   < >  38*   < >   --    < >  30   CR   --   0.73  0.77   < >  0.80   < >   --    < >  2.08*   GFRESTIMATED   --   >90  >90   < >  >90   < >   --    < >  35*   GLC   --   142*  99   < >  139*   < >   --    < >  104*   A1C   --    --    --    --    --    --   5.4   --    --    JESUS   --   6.6*  6.4*   < >  7.3*   < >   --    < >  8.1*   PHOS   --   3.9  3.8   < >  3.7   < >   --    --    --    MAG   --   2.2  2.0   < >  2.3   < >   --    --    --    LACT  0.9  0.9  1.0   < >   --    < >  3.9*   --   1.1   PCAL   --    --    --    --    --    --    --    --   0.89   CKT   --   44   --    --   53   < >   --    --    --     < > = values in this interval not displayed.       Hepatic Studies    Recent Labs   Lab Test  08/22/18   0424  08/21/18 2140 08/21/18   1525   07/13/18   0334   06/21/18   0612   BILITOTAL  3.4*  3.0*  2.2*   < >  36.3*   < >  23.4*   DBIL  2.5*   --    --    < >  28.1*   < >   --    ALKPHOS  296*  282*  244*   < >  96   < >  78   PROTTOTAL  3.9*  3.8*  3.7*   < >   5.8*   < >  5.1*   ALBUMIN  1.6*  1.6*  1.4*   < >  2.8*   < >  2.9*   AST  57*  50*  48*   < >  287*   < >  124*   ALT  19  19  19   < >  125*   < >  79*   LDH   --    --    --    --   259*   --   177    < > = values in this interval not displayed.       Pancreatitis testing    Recent Labs   Lab Test  08/20/18   0443   07/22/18   0354  07/20/18   1741  07/12/18   1913   06/16/18   1815   AMYLASE   --    --   42  60  35   --    --    LIPASE   --    --   75   --    --    --   260   TRIG  40   < >   --    --    --    < >   --     < > = values in this interval not displayed.       Hematology Studies      Recent Labs   Lab Test  08/22/18   0957  08/22/18   0424  08/21/18   2140  08/21/18   1600  08/21/18   1525  08/21/18   0952   WBC  17.7*  16.4*  17.8*  15.9*  Results questioned - new specimen has been requested  20.1*   ANEU  16.9*  15.5*  16.9*  15.2*   --   19.9*   ALYM  0.5*  0.5*  0.5*  0.4*   --   0.0*   MARCELA  0.3  0.3  0.3  0.2   --   0.2   AEOS  0.0  0.0  0.0  0.0   --   0.0   HGB  8.0*  8.2*  7.9*  7.1*  Results questioned - new specimen has been requested  9.0*   HCT  23.1*  23.9*  23.7*  20.3*  Results questioned - new specimen has been requested  25.9*   PLT  55*  42*  47*  32*  Results questioned - new specimen has been requested  35*       Clotting Studies    Recent Labs   Lab Test  08/21/18   1645  08/20/18   1215  08/20/18   0443  08/19/18   2329   08/18/18   1812   INR  1.68*  1.65*  1.68*  1.74*   < >  2.18*   PTT   --    --    --    --    --   85*    < > = values in this interval not displayed.       Arterial Blood Gas Testing    Recent Labs   Lab Test  08/21/18   1332  08/18/18   1149  08/18/18   1048  08/17/18   2320  08/17/18   1608   PH  7.40  7.38  7.39  7.45  7.36   PCO2  40  40  38  36  36   PO2  163*  112*  331*  172*  177*   HCO3  25  23  23  25  20*   O2PER  40.0  56%  100  40.0  50        Urine Studies     Recent Labs   Lab Test  07/27/18   2330  07/13/18   1315  06/18/18   1200   06/16/18   2220   URINEPH  7.5*  6.0  5.0  5.5   NITRITE  Negative  Negative  Negative  Negative   LEUKEST  Negative  Negative  Negative  Large*   WBCU  3  None  2  66*       Body fluid stats    Recent Labs   Lab Test  08/18/18   1204   07/15/18   1115  07/13/18   1240  06/28/18   1111   FTYP   --    --   Ascites  Ascites  Ascites   FCOL   --    --   Bloody  Red  Yellow   FAPR   --    --   Cloudy  Cloudy  Slightly Cloudy   FWBC   --    --   245  373  234   FNEU   --    --   3  28  6   FLYM   --    --   19  11  24   FMONO   --    --   78   --    --    FALB   --    --    --   1.0   --    FTP   --    --    --   1.6   --    GS  No organisms seen  Many  WBC'S seen  predominantly PMN's    Many  Red blood cells seen     < >  No organisms seen  Few  WBC'S seen  predominantly mononuclear cells    No organisms seen  Few  WBC'S seen  PMNs seen    Many  Red blood cells seen     --     < > = values in this interval not displayed.       Microbiology:    Blood   6/17/18 negative   6/18/18 negative   6/21/18 negative   7/27/18 VRE   7/29/18 catheter tip Staphylococcus epidermidis   7/29/18 VRE   7/30/18 VRE   7/31/18 VRE   8/1/18 VRE, ESBL E. Coli   8/2/18 negative   8/4/18 negative   8/6/18 VRE   8/7/18 VRE   8/8/18 VRE   8/9/18 VRE   8/11/18 VRE   8/12/18 VRE   8/13/18 VRE   8/14/18 VRE   8/15/18 VRE   8/17/18 VRE   8/19/18 NGTD   8/21 NGTD   8/22 pending     Fluid/Tissue   6/16/18 ascites negative   6/25/18 ascites negative   6/28/18 ascites negative   7/13/18 ascites negative   7/21/18 ascites negative   7/30/18 abdominal fluid VRE   8/3/18 intra-abdominal hematoma tissue VRE   8/13/18 Abdominal fluid VRE   8/17/18 Peritoneal fluid VRE, Candida albicans/dubliniensis   8/18/18 Hematoma fluid VRE, C. Albicans / dubliniensis    Urine  6/16/18 Enterococcus faecalis    VRE rectal culure   7/13/18  positive    Respiratory  6/17/18 sputum single colony Citrobacter freundii, light growth Candida  albicans/dubliniensis    Imaging:  CXR 8/21:  1. Support devices as described above.  2. Trace bilateral pleural effusions with overlying atelectasis and/or consolidation, stable.    Liver U/S: 8/17/18:   1.  Transplant liver with patent vasculature.  2.  Decreased resistive indices in the hepatic artery (0.3-0.4), highly concerning for upstream stenosis.  3.  Stable appearance of perihepatic fluid collection near the braden hepatis. Other previously described fluid collections on 8/13/2018 evaluation were not seen on this exam.  4.  Hyperechoic round lesion in the right lateral lobe, also seen on comparison CT, likely represents hemangioma.  5.  Right pleural effusion.    CT a/p w/ contrast: 8/15/18:   1.  Findings concerning for hepatic infarct involving the majority of the left hepatic lobe with associated complex fluid collection along inferior aspect, measuring up to 8.9 x 4.6 cm. The fluid collection likely represents evolving hematoma. Superimposed infection cannot be completely excluded in this patient with leukocytosis, although thought less likely based on evolving nature of this process since 7/30/2018 and sonographic appearance from ultrasound exams dated 7/30/2018 and 8/13/2018.   2.  Continued diffuse dilation of the small bowel transition point in the right lower quadrant (series 5 image [id number]). Findings concerning for small bowel obstruction.  3.  Postsurgical changes of abdominal wall abscess/hematoma evacuation with new soft tissue defect over the right upper quadrant and packing in place. There is surrounding postoperative hematoma.  4.  Unchanged biliary dilation, left lobe dominant, with a right-sided biliary stent in place.

## 2018-08-22 NOTE — PROGRESS NOTES
"CRRT RESTART NOTE    Reason for Restart:  \"Malfunction - communication error\" alarm - could not restart same circuit.       Patient s Vascular Access: Catheter R FEM                   Placement Confirmed:  YES      DATA:  Procedure:  Restart CVVHDF  Start Time: 2109  Machine#: 1  Filter: M150  Blood Flow:   180mL/min  Pre-Replacement Solution: PhoxillumBK 4/2.5  Post-Replacement Solution: PhoxillumBK 4/2.5  Dialysate Solution: PhoxillumBK 4/2.5  Pre-Replacement Solution Rate:  900mL/hr  Post-Replacement Solution Rate:  200mL/hr  Dialysate Flow Rate:  900mL/hr  Patient Removal Rate:  300mL/hr  Anticoagulation Type and Rate: none    ASSESSMENT:  How Patient Tolerated Restart: tolerated  Vital Signs: SB 54, 133/72(92), 100% on 40% FiO2 CMV  Initial Pressures:   Access: -70  Filter: 302  Return: 262  TMP: 42  Change in Filter Pressure: -4    INTERVENTIONS:  Restarted CVVHDF after an alarm that could not be cleared.    PLAN:   net neg/h. Call [phone] CRRT RN with concerns.        "

## 2018-08-22 NOTE — PROGRESS NOTES
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 He returned to the SICU for shock, severe lactic acidosis, renal failure and respiratory failure, requiring intubation and dialysis.  Ischemic injury to left hemiliver and non-occlusive mesenteric ischemia.  He underwent a laparotomy and several subsequent take-backs to examine ischemic bowel, on 8/6 he underwent resection of necrotic jejunum with a primary anastomosis, and his abdomen was closed 8/8. He acutely decompensated beginning the night of 8/16, with increasing pressor needs, increasing abdominal pain and lactic acidosis, now s/p exploratory laparotomy 8/17 and s/p entero-entero small bowel anastomosis, drain placement and fascial closure 8/18.    TODAY'S PROGRESS/PLANS:   - PST, anticipate extubation  - follow up with Nephrology regarding CRRT vs HD   - steroid taper to end today   - continue to follow cultures   - follow up with Transplant regarding discontinuation of dextran  - EKG to evaluate ectopy and bradycardia    PLAN:  Neuro/ pain/ sedation:  # acute pain  - monitor neurological status; notify the MD/DO for any acute changes in exam.  - PRN IV dilaudid and oxycodone    Pulmonary care:   # acute respiratory failure  - extubated (8/10), re-intubated in the OR for emergent ex-lap (8/17)  - daily PST    - consider extubation today      Cardiovascular:    #Septic shock - resolving  # Hemodynamic instability - resolving  # Junctional rhythm on 8/22 EKG, unclear cause  - monitor hemodynamic status   - MAP goals > 60  - weaning stress dose hydrocortisone, to end 8/22   - TTE 8/8 showed no vegetations or valve abnormalities  - TOÑO 8/10  no evidence of endocarditis   - EKG to evaluate new onset ectopy and bradycaria    GI care:   # DDLT 7/21/18  # Intrahepatic left portal venous thrombosis, infarction of left liver lobe, patchy infarction of right lobe, suspected hepatic arterial thrombosis  # 7/30/18 s/p exploratory laparotomy and liver biopsy  # Splenic infarctions, patchy bowel ischemia  # Ex Lap 8/6 - resection of 90 cm of necrotic jejunum  # Ex Lap 8/8 - abdomen closed  # Ex Lap 8/17 - Found to have widespread bowel ischemia requiring bowel resection.  Left in discontinuity.  # Ex Lap 8/18 - entero-entero anastomosis and facial closure.  Approximately  cm small bowel remaining with intact ileocecal valve and colon.    - Two episodes of bowel ischemia of unclear etiology requiring resection of the majority of small bowel,  cm of small bowel remaining.  Now in continuity with entero-entero anastomosis.  - trickle feeds at 10 ml/hr  - Incision care per transplant team    Fluids/ Electrolytes/ Nutrition:   # protein calorie deficit malnutrition   # NPO  - TF's at trickle feed 10 ml/hr  - TPN at 45 ml/hr for adequate nutrition (will need long term, due to ~ cm of small bowel remaining)    Renal/ Fluid Balance:    # BEN   # severe metabolic acidosis, lactic acidosis - resolved  # severe hyponatremia - resolved  # hyperkalemia - resolved  # renal failure  # hematuria - resolved  - CRRT restarted 8/13. Nephrology placed femoral dialysis line, recommend net negative  ml/hr today  - Will continue to monitor intake and output.  - Urethral bleeding appears to have resolved  - Luis in place    Endocrine:    # DM II  - insulin gtt  - Hydrocortisone wean to end 8/22, now that hemodynamics better and pressor requirement reduced.     ID/ Antibiotics:  # VRE Bacteremia  # ESBL Bacteremia - treated  # Hepatitis B  Has been unable to clear VRE bacteremia.  ABX ongoing and ID transplant following.  - Daptomycin (7/30- ), ceftaroline (8/13-  ), tigecycline (8/13- ) continue while blood cultures still positive for VRE  - Micafungin (7/30-8/22) changed to fluconazole (8/22- )  - Flagyl (8/17 - )  - Meropenem (8/2-8/14), discontinued after 12 days of negative cultures for ESBL  - Amikacin (8/17 - 8/21), discontinued as he had completed 2-week course of meropenem and has other active agents for G- coverage  - Immunosuppression: (restarted 8/6) tacrolimus  - Immunosuppression prophylaxis: Valgancyclovir and Tenofovir, nystatin when micafungin stopped.   - Transplant ID following due to continued bacteremia with unknown source  - Cultures Q48H  - May consider a line holiday soon if blood cultures continue to be negative    Cultures   - 7/27 VRE (line/arm)   - 7/29: VRE (left arm)   - 7/30: VRE (left arm)   - 7/31: VRE (Line and peripheral)  - 8/1: ESBL (HD/hand/central line)   - 8/3: VRE (intra-abdominal tissue)  - 8/4: NGTD (blood)  - 8/5: NGTD (blood)  - 8/6: VRE (art line)  - 8/7: VRE (blood)  - 8/8: VRE (blood)  - 8/9: VRE (blood)  - 8/11: VRE (blood)  - 8/12: VRE (abdominal fluid)  - 8/13: VRE (blood)  - 8/14: VRE (blood)  - 8/15: VRE (blood)  - 8/17: NGTD (blood)   - 8/17: VRE & Candida (peritoneal fluid)   - 8/18: G+ cocci (tissue)  - 8/19 NGTD (blood)  - 8/21 NGTD (blood)    Heme:     # Acute blood loss anemia  # Anemia of critical illness  # Portal venous thrombosis  # Thrombocytopenia  - Heparin at 500 straight rate  - Hold aspirin 81mg for continued bleeding  - Ask Transplant team with Dextran can be stopped due to ongoing bleeding from the CAROL drain site and potential to worsen renal function.   - HIT panel sent and negative 8/2.  Repeat HIT panel on 8/13 negative; Hematology consult per Transplant in setting of low platelets - believe this is likely his new baseline  - follow up antithrombin III, protein C, protein S and heparin level to evaluate for coagulapathy     MSK:  # weakness and deconditioning of critical illness   - PT/OT      Prophylaxis:    - DVT: straight rate heparin, SCD's  - GI: protonix     Lines/ tubes/ drains:  - L fem art line, L PICC, NJ, NG, PIVs, groin central cath, RIJ MAC.     Disposition:  - SICU      ====================================    SUBJECTIVE:   No acute events overnight. Received one unit of platelets for platelet count of 42. Bleeding from CAROL drain has decreased. Ectopy and bradycardia noted on cardiac telemetry. More awake and alert today compared to yesterday.       OBJECTIVE:   1. VITAL SIGNS:   Temp:  [96.4  F (35.8  C)-98.6  F (37  C)] 97.8  F (36.6  C)  Heart Rate:  [50-73] 59  Resp:  [12-22] 22  MAP:  [75 mmHg-105 mmHg] 100 mmHg  Arterial Line BP: (110-153)/(56-79) 144/75  FiO2 (%):  [40 %] 40 %  SpO2:  [100 %] 100 %  Ventilation Mode: CMV/AC  (Continuous Mandatory Ventilation/ Assist Control)  FiO2 (%): 40 %  Rate Set (breaths/minute): 16 breaths/min  Tidal Volume Set (mL): 450 mL  PEEP (cm H2O): 5 cmH2O  Pressure Support (cm H2O): 7 cmH2O  Oxygen Concentration (%): 40 %  Resp: 22    2. INTAKE/ OUTPUT:   I/O last 3 completed shifts:  In: 5698.2 [I.V.:2841; NG/GT:210]  Out: 7489 [Urine:715; Emesis/NG output:200; Drains:460; Other:5689; Stool:425]    3. PHYSICAL EXAMINATION:   General: intubated, sitting up in chair  Neuro: responds to questions through , alert  Resp: mechanically ventilated  CV: Regular rhythm, bradycardic  Abdomen: non-distended, soft   Incisions: abdominal incision with kerlex dressings. Drain with bloody fluid  Extremities: warm and well perfused, 1+ peripheral edema in bilateral feet  PICC, triple lumen RIJ, dialysis line, Art line all appropriately dressed    4. INVESTIGATIONS:   Arterial Blood Gases     Recent Labs  Lab 08/21/18  1332 08/18/18  1149 08/18/18  1048 08/17/18  2320   PH 7.40 7.38 7.39 7.45   PCO2 40 40 38 36   PO2 163* 112* 331* 172*   HCO3 25 23 23 25     Complete Blood Count     Recent Labs  Lab 08/22/18  0424 08/21/18  2140 08/21/18  1600  08/21/18  1525   WBC 16.4* 17.8* 15.9* Results questioned - new specimen has been requested   HGB 8.2* 7.9* 7.1* Results questioned - new specimen has been requested   PLT 42* 47* 32* Results questioned - new specimen has been requested     Basic Metabolic Panel    Recent Labs  Lab 08/22/18  0424 08/21/18  2140 08/21/18  1525 08/21/18  0952    140 142 141   POTASSIUM 3.5 3.4 3.3* 3.7   CHLORIDE 108 109 108 107   CO2 25 24 24 24   BUN 43* 44* 42* 41*   CR 0.73 0.77 0.77 0.72   * 99 145* 184*     Liver Function Tests    Recent Labs  Lab 08/22/18  0424 08/21/18  2140 08/21/18  1645 08/21/18  1525 08/21/18  0952  08/20/18  1215 08/20/18  0443 08/19/18  2329   AST 57* 50*  --  48* 60*  < > 64* 56* 57*   ALT 19 19  --  19 20  < > 24 26 26   ALKPHOS 296* 282*  --  244* 270*  < > 250* 245* 239*   BILITOTAL 3.4* 3.0*  --  2.2* 2.6*  < > 2.8* 2.8* 3.0*   ALBUMIN 1.6* 1.6*  --  1.4* 1.4*  < > 1.5* 1.7* 1.6*   INR  --   --  1.68*  --   --   --  1.65* 1.68* 1.74*   < > = values in this interval not displayed.  Pancreatic Enzymes  No lab results found in last 7 days.  Coagulation Profile    Recent Labs  Lab 08/21/18  1645 08/20/18  1215 08/20/18  0443 08/19/18  2329  08/18/18  1812 08/18/18  1321  08/17/18  1608 08/17/18  1010   INR 1.68* 1.65* 1.68* 1.74*  < > 2.18* 2.20*  < > 2.05* 3.81*   PTT  --   --   --   --   --  85* 66*  --  53* >240*   < > = values in this interval not displayed.  Lactate  Invalid input(s): LACTATE    5. RADIOLOGY:   No results found for this or any previous visit (from the past 24 hour(s)).       --------------------------------------------------------------------------------------------------------------------------  Carlos Castellanos  Medical Student, MS4  [employer]      Patient seen, findings and plan discussed with surgical ICU staff Dr. Soler.    Eddie Acuna MD  CA-2/PGY-3

## 2018-08-22 NOTE — PLAN OF CARE
Problem: Patient Care Overview  Goal: Plan of Care/Patient Progress Review  PT 4A Discharge Planner PT   Patient plan for discharge: unable to state  Current status: Reporting abdominal pain and initially refusing getting up to chair, then becoming agreeable after warm up exercises in supine.  Ax3 for lift bed<chair with extensive lines management.  CRRT alarming a few times in session, but with circulation reestablished easily once positioned in chair.  On CPAP FiO2 40% PEEP 5 with VSS for activity, HR 52-78bpm.  Barriers to return to prior living situation: Significant deconditioning, medical instability  Recommendations for discharge: TCU once LTACH goals are met.  Pending progress, has potential to be ARU candidate once LTACH goal met.  Rationale for recommendations: ARU candidacy bolstered by age, pt being highly motivated for activity, strong family support to help him through rehab process.  Pt will be significantly deconditioning 2/2 prolonged ICU stay and will likely to have needs for all therapy disciplines.       Entered by: Elaine Moya 08/22/2018 9:41 AM

## 2018-08-22 NOTE — PROGRESS NOTES
CRRT RESTART NOTE    Catheter kinked at insertion site, re-dressed R FEM site and attempted to straighten out catheter. Pressures reduced and CRRT running smoothly.    ASSESSMENT:    Initial Pressures:  Access: -49  Filter: 118  Return: 69  TMP: 52  Change in Filter Pressure: 31

## 2018-08-22 NOTE — PROGRESS NOTES
Transplant Social Work Services Progress Note      Date of Liver Transplant: 7/21/2018    Data: I attempted to see  and/or his family again today, as he remains on ICU. He has been extubated, but was sleeping. No family members were present at that time.    Intervention: Chart review and attempting to see patient/family.   Assessment:  was extubated today. His wife stays with him most of the time.  Plan:    Discharge Plans in Progress: Not at this time.     Barriers to d/c plan: Medical status.     Follow up Plan: Social work remains available for psychosocial support and disposition planning.     Shreya Araya, MSW  Pager [phone]

## 2018-08-22 NOTE — PROGRESS NOTES
Patient suctioned and electively extubated per physician order. Placed on LFNC @ 4 LPM, breath sounds were clear, labs pending. Patient tolerated procedure well without any immediate complications.    Ankush Elkins, RT  8/22/2018 11:53 AM

## 2018-08-22 NOTE — PROGRESS NOTES
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  Developed shock with severe lactic acidosis and respiratory failure on POD9. CT scan showed left PV venous thrombosis, infarction of left liver lobe, patchy infarction of right lobe and possible hepatic arterial thrombosis, patchy bowel ischemia. Transferred to ICU.   Ex lap, patchy diffuse SB ischemia. Doppler demonstrated arterial flow main vessels to bowel and liver. Splenic infarction. Liver biopsy negative for acute rejection. Fluid culture growing VRE. On Heparin gtt. 8/3 OR for reexploration liver transplant, bowel ischemia, findings improved patchy iscemic changes of small bowel, patchy ischemic changes of left liver lobe.  OR: some ischemic/necrotic jejunum, s/p resection.   Washout and abdominal closure with stratice mesh, no drains placed. Minimal ascites, intact small bowel anastomosis, patchy ischemia left liver lobe.  peritonitis with bowel necrosis and bowel perforation. S/p bowel resection, about 50 cm sm bowel, ileocecal valve and colon remaining.     Procedures:    donor (DBD) liver transplant with duct to duct anastomosis over biliary stent.    Ex lap due to concern for ischemic bowel, findings: patchy diffuse SB ischemia. Doppler demonstrated arterial flow main vessels to bowel and liver. Splenic infarction.   8/3 Reexploration liver transplant, bowel ischemia, findings improved patchy iscemic changes of small bowel, patchy ischemic changes of left liver lobe.    Re-exploration, ischemic/necrotic jejunum, s/p resection   Washout and abdominal closure with stratice mesh, no drains placed. Minimal ascites, intact small bowel  anastomosis, patchy ischemia left liver lobe.  8/17 Procedure: reopening of laparotomy. abd washout, bowel resection (clip and drop fashion). Postop Dx: same. Large segment of ischemic bowel from mid-jejunum (prior anastomosis) with distal perforation and feculent peritonitis.   8/18 Reexploration, small and large bowel intestine appeared healthy per surgeon, primary anastomosis bowel. Fascia closure, operative note pending.    Graft function: POD #32, LFTs stable with elevated bilirubin, 3.4 this morning, recheck 3.2.  7/30 Hepatic infarction, left PV thrombosis, and narrow and irregular appearance HA noted on CTA. 8/8 Liver biopsy negative for acute rejection. Intra-abdominal  cultures from 8/1, 8/6, 8/17, 8/18 + VRE or GPCs.   Incision opened at bedside 8/12, hematoma removed.  8/13 US: new perihepatic fluid collections (largest 7x2x4), small volume of septated ascites.  VAC placed 8/15. 8/15 CT scan, left lobe infarct with complex fluid collection inferior.  8/17 US: patent vasculature but RIs concerning for HA stenosis, stable fluid collection near braden hepatis.     Immunosuppression management:   Induction: Simulect intra-op due to BEN.  Steroid taper per protocol, completed.  MMF held due to critical status  Tac goal level ~5-6 due to sepsis and BEN. Level 4.6 (13h)  Hydrocortisone, SICU to tapering  Complexity of management: Medium. Contributing factors: thrombocytopenia and anemia sepsis    Hematology:   Anemia: Acute blood loss.  Bleeding from lines and drain site overnight, now controlled.  Hgb 8.  Transfused 1u RBC overnight.  Thrombocytopenia: PLT 42. PLT 3u transfused last 24h.  Anticoagulation: Anticoagulate for bowel ischemia and portal thrombosis.  Heparin gtt low intensity resulted in diffuse oozing, now on straight rate 500u/hr. Dextran 10 ml/hr for additional anticoagulation.   Concern for protein C/protein S deficiency, labs in process.    Cardiorespiratory:    Hypotension secondary to septic  shock:  Resolved, off pressors.  Respiratory failure secondary to sepsis: On vent.  Weaning trials per SICU.  Junctional rhythm:  Onset this morning, rate ~60.    GI/Nutrition: NG.  NPO.  On TPN.  TF 10 ml/hr for healing.   Ischemic bowel:  7/30 CTA: nonocclusive filling defects in superior mesenteric vein branches.  Anticoagulation as above.  S/p resection of necrotic jejunum on 8/6. S/p resection of 150cm of ischemic bowel with perforation on 8/17.  PSBO:  Noted on 8/15 CT, transition point RLQ. S/p resection on 8/17.   Bowel perforation/peritonitis:  Small bowel perforation with feculent peritonitis noted in OR on 8/17. S/p resection, abx as below.    Endocrine:  DM type 2, continue insulin drip.      Fluid/Electrolytes: BEN/ARF. CRRT.    : Traumatic bah placement with intermittent bleeding.   Hematuria now resolved. Bah, coude 18fr in place    Infectious disease: Afebrile. WBC 17.7  -Amikacin (8/17-8/20), flagyl, ceftraroline, tigecycline daptomycin, micafungin (7/30-8/21)->fluconazole.   -ID following  -Blood cultures through 8/18 + VRE or GPCs  -8/17 Peritoneal fluid culture + VRE, candida albicans  -8/19, 8/21 Blood cx, no growth to date    1. VRE intra abdominal and bacteremia (dapto, ceftaroline, tigecycline)  2. Infarct left lobe liver with inferior perihepatic fluid collection  3. Peritonitis (metronidazole, fluconazole)    Previous ID:   3. Ecoli ESBL bacteremia. Repeat bcx on 8/1 (peripheral and VAD) grew Ecoli ESBL. Zosyn changed to Meropenem, completed on 8/14.  SICU to exchange lines as able. Blood cultures negative for E. Coli since 8/2.  4. HBV: Hepatits B DNA PCR positive prior to tx. Received HBIG on 7/21 and 7/22. 8/10 Hep B surface antigen negative. No further HBIG planned. Continue tenofovir 300 mg, dose adjusted for HD       Prophylaxis: DVT PCDs, on heparin, fall, GI, Valcyte x 12 weeks (CMVand EBV IgG +), bactrim.   Disposition: SICU    Coordinator: Kay Reyes  Surgeon: Pako    Check  Hep B surface antigen at 3 months, 6 months then annually     Medical Decision Making: High    JAY/Fellow/Resident Provider:  Geovanna Burris NP  [id number]    Faculty: Stew Min MD   __________________________________________________________________  Transplant History: Admitted 2018 for  donor liver transplant.   The patient has a history of liver failure due to hepatitis B .    2018 (Liver), Postoperative day: 32     Interval History:  Oozing from lines sites overnight, now resolved. PS trial ongoing.      ROS:   A 10-point review of systems was negative except as noted above.    Curent Meds:    atropine         ceftaroline (TEFLARO) intermittent infusion  400 mg Intravenous Q12H     DAPTOmycin (CUBICIN) intermittent infusion  12 mg/kg Intravenous Q24H     [START ON 2018] fluconazole  400 mg Intravenous Q24H     lipids  250 mL Intravenous Once per day on      metroNIDAZOLE  500 mg Intravenous Q6H     pantoprazole (PROTONIX) IV  40 mg Intravenous BID     sodium chloride (PF)  3 mL Intravenous Q8H     tacrolimus  1 mg Oral or Feeding Tube QAM     tacrolimus  1 mg Oral or Feeding Tube QPM     tenofovir  300 mg Per Feeding Tube Q48H     tigecycline (TYGACIL) intermittent infusion  50 mg Intravenous Q12H     valGANciclovir  450 mg Oral or NG Tube Every Other Day    Or     valGANciclovir  450 mg Oral Every Other Day       Physical Exam:     Admit Weight: 60.7 kg (133 lb 12.8 oz)    Current Vitals:   BP (!) 127/93  Pulse 121  Temp 96.8  F (36  C) (Axillary)  Resp 16  Wt 66 kg (145 lb 8.1 oz)  SpO2 100%  BMI 23.48 kg/m2      Vital sign ranges:    Temp:  [96.4  F (35.8  C)-98.6  F (37  C)] 96.8  F (36  C)  Heart Rate:  [49-73] 58  Resp:  [12-22] 16  MAP:  [75 mmHg-112 mmHg] 105 mmHg  Arterial Line BP: (110-162)/(56-84) 156/75  FiO2 (%):  [40 %] 40 %  SpO2:  [99 %-100 %] 100 %  Patient Vitals for the past 24 hrs:   Temp Temp src Heart Rate Resp SpO2 Weight   18 1200 96.8  F (36  C)  Axillary 58 16 100 % -   08/22/18 1100 - - - - 100 % -   08/22/18 1000 - - - - 99 % -   08/22/18 0900 - - - - 100 % -   08/22/18 0800 98  F (36.7  C) Axillary 61 14 100 % -   08/22/18 0730 97.4  F (36.3  C) Axillary 49 - 100 % -   08/22/18 0720 97.5  F (36.4  C) Axillary 52 12 100 % -   08/22/18 0700 - - 54 17 100 % -   08/22/18 0600 - - 53 21 100 % -   08/22/18 0500 - - 59 22 100 % -   08/22/18 0400 97.8  F (36.6  C) Axillary 53 17 100 % 66 kg (145 lb 8.1 oz)   08/22/18 0300 - - 57 16 100 % -   08/22/18 0200 97.7  F (36.5  C) Oral 52 17 100 % -   08/22/18 0100 - - 50 16 100 % -   08/22/18 0000 96.4  F (35.8  C) Esophageal 55 16 100 % -   08/21/18 2300 - - 60 16 100 % -   08/21/18 2200 - - 58 16 100 % -   08/21/18 2100 - - 57 16 100 % -   08/21/18 2000 97.3  F (36.3  C) Esophageal 53 16 100 % -   08/21/18 1900 97.7  F (36.5  C) - 57 16 100 % -   08/21/18 1800 97.9  F (36.6  C) - 71 - 100 % -   08/21/18 1710 98.1  F (36.7  C) - 65 - 100 % -   08/21/18 1700 98.1  F (36.7  C) - 61 - 100 % -   08/21/18 1600 98.2  F (36.8  C) Axillary 73 - - -   08/21/18 1500 98.6  F (37  C) - 73 - 100 % -   08/21/18 1400 96.8  F (36  C) - 60 - 100 % -   08/21/18 1300 96.4  F (35.8  C) - 65 - 100 % -     General Appearance: NAD  HEENT:  NG, [place]  Skin: warm, dry  Heart: SA/JR  Lungs: PS trial, occasional scattered wh and wheezes  Abdomen: Unable to assess due to nursing cares ongoing at time of exam.  Rectal tube.  : Luis cl yellow  Extremities: well perfused. Generalized edema.  Neurologic: Alert, following commands.  Access: PICC, CVC, PIV  Femoral HD line    Data:   CMP    Recent Labs  Lab 08/22/18  1019 08/22/18  0957 08/22/18  0424   NA  --  140 140   POTASSIUM  --  3.4 3.5   CHLORIDE  --  109 108   CO2  --  24 25   GLC  --  109* 142*   BUN  --  41* 43*   CR  --  0.71 0.73   GFRESTIMATED  --  >90 >90   GFRESTBLACK  --  >90 >90   JESUS  --  6.7* 6.6*   ICAW 4.3*  --  4.6   MAG  --  2.2 2.2   PHOS  --  3.9 3.9   ALBUMIN  --  1.8* 1.6*    BILITOTAL  --  3.2* 3.4*   ALKPHOS  --  269* 296*   AST  --  59* 57*   ALT  --  23 19     CBC    Recent Labs  Lab 08/22/18  0957 08/22/18  0424   HGB 8.0* 8.2*   WBC 17.7* 16.4*   PLT 55* 42*     COAGS    Recent Labs  Lab 08/21/18  1645 08/20/18  1215  08/18/18  1812 08/18/18  1321   INR 1.68* 1.65*  < > 2.18* 2.20*   PTT  --   --   --  85* 66*   < > = values in this interval not displayed.   Urinalysis  Recent Labs   Lab Test  07/27/18   2330  07/13/18   1315  06/18/18   1200   COLOR  Yellow  Dark Yellow  Yellow   APPEARANCE  Clear  Clear  Clear   URINEGLC  Negative  Negative  Negative   URINEBILI  Negative  Large*  Small*   URINEKETONE  Negative  Negative  Negative   SG  1.008  1.013  1.008   UBLD  Negative  Negative  Negative   URINEPH  7.5*  6.0  5.0   PROTEIN  30*  10*  Negative   NITRITE  Negative  Negative  Negative   LEUKEST  Negative  Negative  Negative   RBCU  5*  <1  1   WBCU  3  None  2   UTPG   --    --   0.75*     Virology:  Hepatitis C Antibody   Date Value Ref Range Status   07/20/2018 Nonreactive NR^Nonreactive Final     Comment:     Assay performance characteristics have not been established for newborns,   infants, and children         POD 14  liver:  1.  The left portal vein is antegrade with decreased velocity,  measuring approximately 15 cm/second. This vessel was occluded on CT  7/30/2018.  2.  Elevated velocities of the main hepatic artery, now 203 cm/sec  (previously 94 cm/sec). However left hepatic arteries demonstrates  steep upstroke suggesting there is not a significant stenosis.   2a. Right hepatic artery was not visualized, could be occluded or  difficult to see postoperatively.  3. Echogenic focus of the right lobe in close proximity to the  hepatorenal fossa. This likely corresponds with nonenhancing lesion on  CT 7/30/2018 and likely representing subcapsular hematoma.  4. Additional small hematomas of the braden hepatis and posterior right  perihepatic region.  5. Small volume  anechoic ascites.  6. Left hepatic lobe not well visualized secondary to open wound  preventing imaging. Note the left lobe was grossly abnormal on CT  7/30/2018    POD 1 US liver: Impression:   1.  Hematoma associated with the inferior aspect of the perihepatic  space anteriorly, measuring 13.7 x 7.0 x 8.5 cm.  2.  Retrograde flow of the left portal vein. Additionally, low  velocities of the portal venous system. Single low resistive index of  the extrahepatic aspect of the hepatic artery, measuring 0.37. These  findings are likely within normal limits in the early postoperative  context. However, continued attention on follow-up recommended.    7/30 CT scan abd/pelvis:  IMPRESSION:   1. Hypoenhancing areas in the transplant liver concerning for  infarction. Transplant left portal vein occlusion with bubbles of  portal venous gas. Nearly occlusive filling defect in the native  hepatic artery. Narrowed and irregular appearance of the transplant  hepatic artery. Focal occlusion of the left main hepatic artery.  Segmental occlusions of right hepatic arterial branches.   2. Pneumatosis intestinalis with nonocclusive filling defects in  superior mesenteric vein branches, concerning for bowel ischemia or  infarction.   3. Splenic infarctions.   4. Bibasilar consolidation with air bronchograms. Pneumonia cannot be  excluded.   5. Post surgical changes of evacuation of peritransplant hematoma.       Attestation:    The patient has been seen and evaluated by me.   Vital signs, labs, medications and orders were reviewed.   When obtained, diagnostic images were reviewed by me and interpreted as above.    The care plan was discussed with the multidisciplinary team and I agree with the findings and plan in this note, with any differences recorded in blue.    Immunosuppressive medication management was reviewed and adjusted as reflected in the note and orders.     .

## 2018-08-23 NOTE — PLAN OF CARE
Problem: Patient Care Overview  Goal: Plan of Care/Patient Progress Review  Outcome: No Change  VSS, afebrile, 2L O2 via NC. Patient is following commands, using his voice more as the night has gone on. He continues to complain of abdominal pain, has received Dilaudid x2. LS coarse, cough getting stronger throughout the night. Around 0500, patient noted to be having maroon/brown stools via rectal pouch, at 0600, patient noted to be having bright red streaks of blood out via NG tube- Dr. Cerna notified of both, will continue to monitor and await any new orders. Luis remains in place with 75-250ml/h. Patient -3400ml yesterday. CRRT continues to run. During evening abdominal dressing change, fascia noted to be dusky in some places- Dr. Cerna notified of this. Glucose stable on 1u/h insulin, Heparin remains at 500u/h, Dextran at 10ml/h. 1u PRBCs this AM for Hgb 6.9, 1u platelets this AM for 40, Potassium replaced x2 overnight. JPs continue to have bright red bloody output, left CAROL now leaking around site and reinforced, both drains stripped multiple times overnight. Wife has remained at the bedside but has slept.

## 2018-08-23 NOTE — PROGRESS NOTES
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 He returned to the SICU for shock, severe lactic acidosis, renal failure and respiratory failure, requiring intubation and dialysis.  Ischemic injury to left hemiliver and non-occlusive mesenteric ischemia.  He underwent a laparotomy and several subsequent take-backs to examine ischemic bowel, on 8/6 he underwent resection of necrotic jejunum with a primary anastomosis, and his abdomen was closed 8/8. He acutely decompensated beginning the night of 8/16, with increasing pressor needs, increasing abdominal pain and lactic acidosis, now s/p exploratory laparotomy 8/17 and s/p entero-entero small bowel anastomosis, drain placement and fascial closure 8/18.    TODAY'S PROGRESS/PLANS:   - discontinue dextran  - CRRT, goal of  negative per hr  - discontinue tigecycline per ID recommendations  - s/p 1U platelets and 1U pRBC   - consider FFP if fluids required    PLAN:  Neuro/ pain/ sedation:  # acute pain  - monitor neurological status; notify the MD/DO for any acute changes in exam.  - PRN IV dilaudid and oxycodone    Pulmonary care:   # acute respiratory failure  - extubated (8/22)  - supplemental O2 for sats >92%      Cardiovascular:    #Septic shock - resolving  # Hemodynamic instability - resolving  # Junctional rhythm on 8/22 EKG, unclear cause  - monitor hemodynamic status   - MAP goals > 60  - TTE 8/8 showed no vegetations or valve abnormalities  - TOÑO 8/10 no evidence of endocarditis   - EKG (8/23): junctional bradycardia     GI care:   # DDLT 7/21/18  # Intrahepatic left portal venous thrombosis, infarction of left liver lobe, patchy  infarction of right lobe, suspected hepatic arterial thrombosis  # 7/30/18 s/p exploratory laparotomy and liver biopsy  # Splenic infarctions, patchy bowel ischemia  # Ex Lap 8/6 - resection of 90 cm of necrotic jejunum  # Ex Lap 8/8 - abdomen closed  # Ex Lap 8/17 - Found to have widespread bowel ischemia requiring bowel resection.  Left in discontinuity.  # Ex Lap 8/18 - entero-entero anastomosis and facial closure.  Approximately  cm small bowel remaining with intact ileocecal valve and colon.    - Two episodes of bowel ischemia of unclear etiology requiring resection of the majority of small bowel,  cm of small bowel remaining.  Now in continuity with entero-entero anastomosis.  - trickle feeds at 10 ml/hr  - Incision care per transplant team  - Blood in NG and maroon stools overnight. Continues to bleed from CAROL drain sites and ooze blood from incision    Fluids/ Electrolytes/ Nutrition:   # protein calorie deficit malnutrition   # NPO  - TF's at trickle feed 10 ml/hr  - TPN at 45 ml/hr for adequate nutrition (will need long term, due to ~ cm of small bowel remaining)    Renal/ Fluid Balance:    # BEN   # severe metabolic acidosis, lactic acidosis - resolved  # severe hyponatremia - resolved  # hyperkalemia - resolved  # renal failure   # hematuria - resolved  - CRRT restarted 8/13. Nephrology placed femoral dialysis line, recommend net negative  ml/hr today  - Will continue to monitor intake and output.  - Urethral bleeding appears to have resolved  - Luis in place    Endocrine:    # DM II  - insulin gtt    ID/ Antibiotics:  # VRE Bacteremia  # ESBL Bacteremia - treated  # Hepatitis B  Has been unable to clear VRE bacteremia.  ABX ongoing and ID transplant following.  - Daptomycin (7/30- ), ceftaroline (8/13- ), continue while blood cultures still positive for VRE  - Micafungin (7/30-8/22) changed to fluconazole (8/22- )  - Flagyl (8/17 - )  - Tigecycline (8/13-8/23), discontinued after  4 days of negative blood cultures  - Meropenem (8/2-8/14), discontinued after 12 days of negative cultures for ESBL  - Amikacin (8/17 - 8/21), discontinued as he had completed 2-week course of meropenem and has other active agents for G- coverage  - Immunosuppression: (restarted 8/6) tacrolimus  - Immunosuppression prophylaxis: Valgancyclovir and Tenofovir, nystatin when micafungin stopped.   - Transplant ID following due to continued bacteremia with unknown source  - Cultures Q48H  - May consider a line holiday soon if blood cultures continue to be negative    Cultures   - 7/27 VRE (line/arm)   - 7/29: VRE (left arm)   - 7/30: VRE (left arm)   - 7/31: VRE (Line and peripheral)  - 8/1: ESBL (HD/hand/central line)   - 8/3: VRE (intra-abdominal tissue)  - 8/4: NGTD (blood)  - 8/5: NGTD (blood)  - 8/6: VRE (art line)  - 8/7: VRE (blood)  - 8/8: VRE (blood)  - 8/9: VRE (blood)  - 8/11: VRE (blood)  - 8/12: VRE (abdominal fluid)  - 8/13: VRE (blood)  - 8/14: VRE (blood)  - 8/15: VRE (blood)  - 8/17: NGTD (blood)   - 8/17: VRE & Candida (peritoneal fluid)   - 8/18: G+ cocci (tissue)  - 8/19 NGTD (blood)  - 8/21 NGTD (blood)  - 8/22 NGTD (blood)  - 8/23 NGTD (blood)      Heme:     # Acute blood loss anemia  # Anemia of critical illness  # Portal venous thrombosis  # Thrombocytopenia  # Coagulopathy   - Heparin at 500 straight rate  - Hold aspirin 81mg for continued bleeding  - Discontinue Dextran   - HIT panel sent and negative 8/2.  Repeat HIT panel on 8/13 negative; Hematology consult per Transplant in setting of low platelets - believe this is likely his new baseline  - antithrombin III, protein C, protein S all low    MSK:  # weakness and deconditioning of critical illness   - PT/OT     Prophylaxis:    - DVT: straight rate heparin, SCD's  - GI: protonix     Lines/ tubes/ drains:  - L fem art line, L PICC, NJ, NG, PIVs, groin central cath, RIJ MAC.     Disposition:  -  SICU      ====================================    SUBJECTIVE:   Overnight he had some blood noted in the NG tube and one episode of a maroon colored stool. Continues to bleed from the CAROL drain. More alert and able to speak to nursing staff.     This morning, received 1U platelets and 1U pRBCs.       OBJECTIVE:   1. VITAL SIGNS:   Temp:  [96.8  F (36  C)-98  F (36.7  C)] 97.4  F (36.3  C)  Heart Rate:  [49-76] 57  Resp:  [10-16] 13  MAP:  [83 mmHg-112 mmHg] 101 mmHg  Arterial Line BP: (115-162)/(57-84) 147/76  FiO2 (%):  [40 %] 40 %  SpO2:  [96 %-100 %] 96 %  Ventilation Mode: CPAP/PS  (Continuous positive airway pressure with Pressure Support)  FiO2 (%): 40 %  Rate Set (breaths/minute): 16 breaths/min  Tidal Volume Set (mL): 450 mL  PEEP (cm H2O): 5 cmH2O  Pressure Support (cm H2O): 7 cmH2O  Oxygen Concentration (%): 40 %  Resp: 13    2. INTAKE/ OUTPUT:   I/O last 3 completed shifts:  In: 5703.5 [I.V.:2709.8; Other:6; NG/GT:180]  Out: 7386 [Urine:3670; Emesis/NG output:400; Drains:408; Other:2213; Stool:695]    3. PHYSICAL EXAMINATION:   General: lying in bed, able to speak quietly  Neuro: responds to questions, alert  Resp: course lung sounds  CV: RRR  Abdomen: non-distended, soft   Incisions: abdominal incision with kerlex dressings, right lateral portion with bloody drainage. CAROL drains with bloody fluid  Extremities: warm and well perfused, minimal peripheral edema in bilateral feet  PICC, triple lumen RIJ, dialysis line, Art line all appropriately dressed    4. INVESTIGATIONS:   Arterial Blood Gases     Recent Labs  Lab 08/21/18  1332 08/18/18  1149 08/18/18  1048 08/17/18  2320   PH 7.40 7.38 7.39 7.45   PCO2 40 40 38 36   PO2 163* 112* 331* 172*   HCO3 25 23 23 25     Complete Blood Count     Recent Labs  Lab 08/23/18  0352 08/22/18  2209 08/22/18  1545 08/22/18  0957   WBC 12.1* 12.4* 12.1* 17.7*   HGB 6.9* 7.4* 7.7* 8.0*   PLT 40* 51* 36* 55*     Basic Metabolic Panel    Recent Labs  Lab 08/23/18  0352  08/22/18  2209 08/22/18  1545 08/22/18  0957    142 140 140   POTASSIUM 3.2* 3.4 3.4 3.4   CHLORIDE 109 109 109 109   CO2 25 25 24 24   BUN 39* 39* 39* 41*   CR 0.58* 0.65* 0.66 0.71   * 174* 195* 109*     Liver Function Tests    Recent Labs  Lab 08/23/18  0352 08/22/18  2209 08/22/18  1545 08/22/18  0957  08/21/18  1645  08/20/18  1215 08/20/18  0443 08/19/18  2329   AST 56* 60* 57* 59*  < >  --   < > 64* 56* 57*   ALT 20 22 22 23  < >  --   < > 24 26 26   ALKPHOS 240* 249* 249* 269*  < >  --   < > 250* 245* 239*   BILITOTAL 3.0* 3.1* 3.3* 3.2*  < >  --   < > 2.8* 2.8* 3.0*   ALBUMIN 1.5* 1.6* 1.6* 1.8*  < >  --   < > 1.5* 1.7* 1.6*   INR  --   --   --   --   --  1.68*  --  1.65* 1.68* 1.74*   < > = values in this interval not displayed.  Pancreatic Enzymes  No lab results found in last 7 days.  Coagulation Profile    Recent Labs  Lab 08/21/18  1645 08/20/18  1215 08/20/18  0443 08/19/18  2329  08/18/18  1812 08/18/18  1321  08/17/18  1608 08/17/18  1010   INR 1.68* 1.65* 1.68* 1.74*  < > 2.18* 2.20*  < > 2.05* 3.81*   PTT  --   --   --   --   --  85* 66*  --  53* >240*   < > = values in this interval not displayed.  Lactate  Invalid input(s): LACTATE    5. RADIOLOGY:   No results found for this or any previous visit (from the past 24 hour(s)).       --------------------------------------------------------------------------------------------------------------------------  Carlos Castellanos  Medical Student, MS4  [employer]      Patient seen, findings and plan discussed with surgical ICU staff Dr. Soler.    Eddie Acuna MD  CA-2/PGY-3

## 2018-08-23 NOTE — PROGRESS NOTES
CLINICAL NUTRITION SERVICES - REASSESSMENT NOTE     Nutrition Prescription    RECOMMENDATIONS FOR MDs/PROVIDERS TO ORDER:  None at this time     Malnutrition Status:    Severe malnutrition in the context of acute on chronic illness     Recommendations already ordered by Registered Dietitian (RD):  Continue TPN as ordered   Continue trophic feeds as ordered     Future/Additional Recommendations:  - If/when appropriate to increase > trophic feeds, would recommend trial of standard formula Nutren 1.5 @ 50 mL/hr to provide 1800 kcals (30 kcal/kg), 82 g PRO (1.3 g/kg), 912 mL H2O, 211 g CHO and no fiber daily + 3 pkts PROsource for total provision = 1920 kcal/day (31 kcal/kg) and 115 g PRO/day (1.9 g/kg). Potentially may need to increase provisions given likely, but unknown degree of malabsorption.     - If unable to tolerate standard formula, would recommend Peptamen 1.5 @ goal 50 ml/hr (1200 ml/day) to provide 1800 kcals (30 kcal/kg), 82 g PRO (1.3 g/kg), 924 ml free H2O, 67 g Fat (70% from MCTs), 226 g CHO and no Fiber daily + 3 pkts PROsource for total provision = 1920 kcal/day (31 kcal/kg) and 115 g PRO/day (1.9 g/kg)  Potentially may need to increase provisions given likely, but unknown degree of malabsorption.        EVALUATION OF THE PROGRESS TOWARD GOALS   Diet: NPO   Nutrition Support:  -- TPN 1080 ml w/ 245 g Dex, 120 g AA + 250 ml 20% IV lipids 3x/week    -- TF via NDT with Peptamen @ 10 ml/hr     Intake:   -- No documented interruptions to TPN, however, lipids intermittently held over the past week d/t propofol infusions. Overall received >75% minimum energy and 100% minimum protein needs via TPN over the past week.   -- Trophic feeds started 8/19 with Nutren 1.5. MD changed to Peptamen (semi-elemental formula) on 8/20 given short gut.        NEW FINDINGS   Resp: Extubated 8/22   Renal: Holding CRRT as of 8/23, transitioning to iHD.   GI: Small bowel perforation with peritonitis s/p 150 cm mid-jejunum bowel  resection on 8/17. Approximately ~ cm small bowel remaining with intact ileocecal valve and colon.     MALNUTRITION  % Intake: Decreased intake does not meet criteria  % Weight Loss: None noted -- Remains > dry wt likely r/t fluids   Subcutaneous Fat Loss: Facial region, Upper arm, Lower arm and Thoracic/intercostal: Moderate  Muscle Loss: Temporal, Facial & jaw region, Thoracic region (clavicle, acromium bone, deltoid, trapezius, pectoral), Upper arm (bicep, tricep), Lower arm  (forearm), Upper leg (quadricep, hamstring), Patellar region and Posterior calf: Severe  Fluid Accumulation/Edema: Mild-moderate anasarca   Malnutrition Diagnosis: Severe malnutrition in the context of acute on chronic illness     Previous Goals   Total avg nutritional intake to meet a minimum of 30 kcal/kg (versus 25 kcal/kg if on PN) and 1.5 g PRO/kg daily (per dosing wt 61 kg)  Evaluation: Met protein goals, did not meet energy goals consistently d/t lipids held with propofol infusion     Previous Nutrition Diagnosis  Inadequate energy intake related to ongoing PN (goal provides <30 kcal/kg) as primary source of nutrition AEB 6 day energy intake of 28 kcal/kg  Evaluation: No change, updated PES     CURRENT NUTRITION DIAGNOSIS  Predicted inadequate nutrient intake (kcal/protein) related to altered GI function (short bowel syndrome) reliant on parenteral nutrition to meet entire nutrition needs as evidenced by received > 75% minimum energy and 100% minimum protein over the past week       INTERVENTIONS  Implementation  Collaboration with other providers - SICU rounds, nutrition plan of care   Enteral Nutrition - Continue, future recommendations   Parenteral Nutrition/IV Fluids - Continue as ordered     Goals  Total avg nutritional intake to meet a minimum of 24 kcal/kg equiv to 100% MREE (via PN alone) and 1.5 g PRO/kg daily (per dosing wt 61 kg).    Monitoring/Evaluation  Progress toward goals will be monitored and evaluated per  protocol.    Zoila Evans RD, LD  Pager: [phone]

## 2018-08-23 NOTE — PROGRESS NOTES
Nephrology Progress Note  08/23/2018         Yaneli Miles is a 43 year old male with ESLD due to Hep B and ALD complicated with portal hypertension, ascites requiring multiple paracentesis, esophageal varices, encephalopathy, severe thrombocytopenia, history of DM, who underwent a DBD OLT on 7/21/2018, nephrology consulted for acute hyponatremia and BEN.      Interval History :   Mr Miles continued on CRRT and was 3.4L net negative yesterday with drastic increase in UOP from ~750cc to 3.4L.  Given this, will stop CRRT and follow chemistries and I/O's.  Has significant intake due to frequent blood products but would essentially have been net even without UF yesterday so will hold on RRT and decide daily on HD.        Assessment & Recommendations:   BEN-Baseline Cr of 1.3-2, up to 2.8 prior to starting CRRT on 7/30 due to hyperkalemia.  Thought to be due to sepsis, increased intra-abdominal pressure and prerenal state.  Started HD on 7/30, had temp LIJ placed at that time, replaced with R femoral on 8/13.  Had been anuric but now with increasing UOP particularly yesterday with 3.4L of UOP, CRRT stopped and we will decide on HD daily.  .                             -CRRT 7/30-8/23, transitioning to iHD vs recovery with increasing UOP.                           -Line is RFV 8/13      Volume status-Trace LE edema, mild BUE edema, was net negative 3.4L yesterday with increased UOP.  Net negative so far today, holding CRRT and will see I/O's without UF.  Wt is within 2-3kg of admit wt, no major volume overload on exam.        Electrolytes/pH-K 3.4, bicarb 24.  No acute issues on CRRT.        Ca/phos/pth-Ca 7.1 but normal with albumin of 1.7, ical 4.6.  Phos 3.1, Mg 1.9, no acute issues.        Liver tx-7/21/18.  Complicated by arterial and venous vascular issues and ischemic bowel, splenic infarct, still with diffuse small bowel ischemia, last ex lap/washout 8/18.  + VRE in blood on 8/17 (peripheral blood).   INR 1.7, last  tacro level 4.6 (goal 5-6).         ID-Multiple abdominal ID issues and + VRE in blood on 8/17, on amikacin and flagyl for peritonitis, on ceftaroline, tigecycline and daptomycin for VRE and micafungin for yeast in peritoneal fluid.        Anemia-Hgb 6.9, continuing to need blood products, a bit improved yesterday.        Nutrition-Starting TF with trickle feeds.        Seen and discussed with Dr Rodriguez      Recommendations were communicated to primary team via verbal communication.         Saul Pichardo  Clinical Nurse Specialist  [phone]    Review of Systems:   I reviewed the following systems:  ROS not done due to vent/sedation.     Physical Exam:   I/O last 3 completed shifts:  In: 5703.5 [I.V.:2709.8; Other:6; NG/GT:180]  Out: 7386 [Urine:3670; Emesis/NG output:400; Drains:408; Other:2213; Stool:695]   BP (!) 127/93  Pulse 121  Temp 97.8  F (36.6  C) (Axillary)  Resp 14  Wt 62.8 kg (138 lb 7.2 oz)  SpO2 99%  BMI 22.35 kg/m2     GENERAL APPEARANCE: Intubated, on CRRT, sitting up in chair.    EYES:  No scleral icterus, pupils equal  HENT: mouth without ulcers or lesions  PULM: lungs clear to auscultation, equal air movement, no cyanosis or clubbing  CV: regular rhythm, normal rate, no rub     -JVP not elevated.     -edema No LE edema, some   GI: soft, nontender, non-distended, bowel sounds are +  MS: no evidence of inflammation in joints, no muscle tenderness  NEURO: Intubated and sedated but moves all extremities.    Lines 8/13 femoral.      Labs:   All labs reviewed by me  Electrolytes/Renal -   Recent Labs   Lab Test  08/23/18   0916  08/23/18   0352  08/22/18   2209   NA  142  141  142   POTASSIUM  3.4  3.2*  3.4   CHLORIDE  110*  109  109   CO2  24  25  25   BUN  37*  39*  39*   CR  0.60*  0.58*  0.65*   GLC  113*  150*  174*   JESUS  7.1*  6.7*  6.9*   MAG  1.9  2.0  2.1   PHOS  3.1  3.1  3.8       CBC -   Recent Labs   Lab Test  08/23/18   0916  08/23/18   0352  08/22/18   2209   WBC  PENDING   12.1*  12.4*   HGB  PENDING  6.9*  7.4*   PLT  PENDING  40*  51*       LFTs -   Recent Labs   Lab Test  08/23/18   0916  08/23/18   0352  08/22/18   2209   ALKPHOS  277*  240*  249*   BILITOTAL  3.1*  3.0*  3.1*   ALT  22 20 22   AST  64*  56*  60*   PROTTOTAL  4.2*  3.8*  4.0*   ALBUMIN  1.7*  1.5*  1.6*       Iron Panel -   Recent Labs   Lab Test  07/13/18   0334  06/17/18   2138   IRON  97  66   IRONSAT  83*  109*   ANGELLA  Unsatisfactory specimen - icteric   --            Current Medications:    ceftaroline (TEFLARO) intermittent infusion  400 mg Intravenous Q12H     DAPTOmycin (CUBICIN) intermittent infusion  12 mg/kg Intravenous Q24H     fluconazole  400 mg Intravenous Q24H     lipids  250 mL Intravenous Once per day on Mon Wed Fri     metroNIDAZOLE  500 mg Intravenous Q6H     pantoprazole (PROTONIX) IV  40 mg Intravenous BID     sodium chloride (PF)  3 mL Intravenous Q8H     sodium chloride         tacrolimus  0.75 mg Oral or Feeding Tube BID IS     tenofovir  300 mg Per Feeding Tube Q48H     valGANciclovir  450 mg Oral or NG Tube Every Other Day    Or     valGANciclovir  450 mg Oral Every Other Day       IV fluid REPLACEMENT ONLY       heparin 500 Units/hr (08/23/18 0856)     insulin (regular) 1 Units/hr (08/23/18 0700)     lactated ringers 10 mL/hr at 07/30/18 1519     parenteral nutrition - ADULT compounded formula 45 mL/hr at 08/22/18 2001     Reason beta blocker order not selected

## 2018-08-23 NOTE — PROGRESS NOTES
CRRT STATUS NOTE    DATA:  Time:  10:46 PM  Pressures WNL:  YES  Filter Status:  WDL    Problems Reported/Alarms Noted:  none    Supplies Present:  YES    ASSESSMENT:  Patient Net Fluid Balance:  -3.3L since MN  Vital Signs: HR 66, /76, , RR 13, SPO2 100%, T 97.5  Labs:  K 3.4, Crt 0.65, Mg 2.1, Phos 3.8, Hgb 7.4, WBC 12.4, Plt 51  Goals of Therapy:  - ml/h as BP allows, goal net neg 2-3L/day, meeting goal at this time    INTERVENTIONS:   none    PLAN:  Continue fluid removal as tolerated per goals of therapy. Check circuit daily and change circuit q72h and prn. Please contact CRRT resource RN at [phone] with any questions/concerns.

## 2018-08-23 NOTE — PROGRESS NOTES
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  Developed shock with severe lactic acidosis and respiratory failure on POD9. CT scan showed left PV venous thrombosis, infarction of left liver lobe, patchy infarction of right lobe and possible hepatic arterial thrombosis, patchy bowel ischemia. Transferred to ICU.   Ex lap, patchy diffuse SB ischemia. Doppler demonstrated arterial flow main vessels to bowel and liver. Splenic infarction. Liver biopsy negative for acute rejection. Fluid culture growing VRE. On Heparin gtt. 8/3 OR for reexploration liver transplant, bowel ischemia, findings improved patchy iscemic changes of small bowel, patchy ischemic changes of left liver lobe.  OR: some ischemic/necrotic jejunum, s/p resection.   Washout and abdominal closure with stratice mesh, no drains placed. Minimal ascites, intact small bowel anastomosis, patchy ischemia left liver lobe.  peritonitis with bowel necrosis and bowel perforation. S/p bowel resection, about 50 cm sm bowel, ileocecal valve and colon remaining.     Procedures:    donor (DBD) liver transplant with duct to duct anastomosis over biliary stent.    Ex lap due to concern for ischemic bowel, findings: patchy diffuse SB ischemia. Doppler demonstrated arterial flow main vessels to bowel and liver. Splenic infarction.   8/3 Reexploration liver transplant, bowel ischemia, findings improved patchy iscemic changes of small bowel, patchy ischemic changes of left liver lobe.    Re-exploration, ischemic/necrotic jejunum, s/p resection   Washout and abdominal closure with stratice mesh, no drains placed. Minimal ascites, intact small bowel  anastomosis, patchy ischemia left liver lobe.  8/17 Procedure: reopening of laparotomy. abd washout, bowel resection (clip and drop fashion). Postop Dx: same. Large segment of ischemic bowel from mid-jejunum (prior anastomosis) with distal perforation and feculent peritonitis.   8/18 Reexploration, small and large bowel intestine appeared healthy per surgeon, primary anastomosis bowel. Fascia closure.  8/20 Reopening of laparotomy. abdominal washout, bowel resection of 200cm    Graft function: POD #33, LFTs stable with elevated bilirubin, now trending down.  7/30 Hepatic infarction, left PV thrombosis, and narrow and irregular appearance HA noted on CTA. 8/8 Liver biopsy negative for acute rejection. Intra-abdominal  cultures from 8/1, 8/6, 8/17, 8/18 + VRE or GPCs.   Incision opened at bedside 8/12, hematoma removed.  8/13 US: new perihepatic fluid collections (largest 7x2x4), small volume of septated ascites.  VAC placed 8/15. 8/15 CT scan, left lobe infarct with complex fluid collection inferior.  8/17 US: patent vasculature but RIs concerning for HA stenosis, stable fluid collection near braden hepatis.     Immunosuppression management:   Induction: Simulect intra-op due to BEN.  Steroid taper per protocol, completed.  MMF held due to critical status  Tac goal level ~5-6 due to sepsis and BEN. Level pending.  Hydrocortisone, now tapered off.  Complexity of management: Medium. Contributing factors: thrombocytopenia and anemia, ARF    Hematology:   Anemia: Acute blood loss.  Bleeding from lines, wound, and drain site overnight.  Hgb 6.9, transfusing 1u RBC this morning.  Multiple transfusions in the last week.  Thrombocytopenia: Ongoing transfusions.  Anticoagulation: Anticoagulate for bowel ischemia and portal thrombosis.  Heparin gtt low intensity resulted in diffuse oozing, now on straight rate 500u/hr. Dextran 10 ml/hr for additional anticoagulation, DC today due to widespread oozing.   Concern for protein  C/protein S deficiency, labs in process.    Cardiorespiratory:    Hypotension secondary to septic shock:  Resolved, off pressors.  Respiratory failure secondary to sepsis: Extubated 8/22.  Junctional rhythm:  Onset 8/22, now resolved.    GI/Nutrition: NG.  NPO.  On TPN.  TF 10 ml/hr for healing.   Ischemic bowel:  7/30 CTA: nonocclusive filling defects in superior mesenteric vein branches.  Anticoagulation as above.  S/p resection of necrotic jejunum on 8/6. S/p resection of 150cm of ischemic bowel with perforation on 8/17.  PSBO:  Noted on 8/15 CT, transition point RLQ. S/p resection on 8/17.   Bowel perforation/peritonitis:  Small bowel perforation with feculent peritonitis noted in OR on 8/17. S/p resection, abx as below.  GIB:  BRBPR today along with generalized oozing.  Transfuse as needed.    Endocrine:  DM type 2, continue insulin drip.      Fluid/Electrolytes/Renal: BEN/ARF. CRRT.    : Traumatic bah placement with intermittent bleeding.   Hematuria now resolved. Bah, coude 18fr in place    Infectious disease: Afebrile. WBC 17.7->12.1  -Amikacin (8/17-8/20), flagyl (8/17-), ceftraroline (8/13-), tigecycline (8/13-), daptomycin (8/14-), micafungin (7/30-8/21), fluconazole (8/22-).   -ID following  -Blood cultures through 8/18 + VRE or GPCs  -8/17 Peritoneal fluid culture + VRE, candida albicans  -8/19, 8/21, 8/23 Blood cx, no growth to date    1. VRE intra abdominal and bacteremia (dapto, ceftaroline, tigecycline)  2. Infarct left lobe liver with inferior perihepatic fluid collection  3. Peritonitis (metronidazole, fluconazole)    Previous ID:   3. Ecoli ESBL bacteremia. Repeat bcx on 8/1 (peripheral and VAD) grew Ecoli ESBL. Zosyn changed to Meropenem, completed on 8/14.  SICU to exchange lines as able. Blood cultures negative for E. Coli since 8/2.  4. HBV: Hepatits B DNA PCR positive prior to tx. Received HBIG on 7/21 and 7/22. 8/10 Hep B surface antigen negative. No further HBIG planned. Continue  tenofovir 300 mg, dose adjusted for HD       Prophylaxis: DVT PCDs, on heparin, fall, GI, Valcyte x 12 weeks (CMVand EBV IgG +), bactrim.   Disposition: SICU    Coordinator: Kay Reyes  Surgeon: Pako    Check Hep B surface antigen at 3 months, 6 months then annually     Medical Decision Making: High    JAY/Fellow/Resident Provider:  Geovanna Burris NP  [id number]    Faculty: Stew Min MD   __________________________________________________________________  Transplant History: Admitted 2018 for  donor liver transplant.   The patient has a history of liver failure due to hepatitis B .    2018 (Liver), Postoperative day: 33     Interval History:  Oozing from wound, around lines, around drain overnight.  BRBPR per nursing.  Increased abdominal pain.  Receiving RBCs and PLT.    ROS:   A 10-point review of systems was negative except as noted above.    Curent Meds:    ceftaroline (TEFLARO) intermittent infusion  400 mg Intravenous Q12H     DAPTOmycin (CUBICIN) intermittent infusion  12 mg/kg Intravenous Q24H     fluconazole  400 mg Intravenous Q24H     lipids  250 mL Intravenous Once per day on      metroNIDAZOLE  500 mg Intravenous Q6H     pantoprazole (PROTONIX) IV  40 mg Intravenous BID     sodium chloride (PF)  3 mL Intravenous Q8H     sodium chloride         tacrolimus  0.75 mg Oral or Feeding Tube BID IS     tenofovir  300 mg Per Feeding Tube Q48H     valGANciclovir  450 mg Oral or NG Tube Every Other Day    Or     valGANciclovir  450 mg Oral Every Other Day       Physical Exam:     Admit Weight: 60.7 kg (133 lb 12.8 oz)    Current Vitals:   BP (!) 127/93  Pulse 121  Temp 97.8  F (36.6  C) (Axillary)  Resp 14  Wt 62.8 kg (138 lb 7.2 oz)  SpO2 99%  BMI 22.35 kg/m2      Vital sign ranges:    Temp:  [96.9  F (36.1  C)-97.9  F (36.6  C)] 97.8  F (36.6  C)  Heart Rate:  [51-76] 60  Resp:  [10-14] 14  MAP:  [83 mmHg-104 mmHg] 104 mmHg  Arterial Line BP: (115-153)/(57-77) 150/74  SpO2:  [96  %-100 %] 99 %  Patient Vitals for the past 24 hrs:   Temp Temp src Heart Rate Resp SpO2 Weight   08/23/18 0800 97.8  F (36.6  C) Axillary 60 14 99 % -   08/23/18 0700 - - 57 13 96 % -   08/23/18 0630 97.4  F (36.3  C) - 69 12 99 % -   08/23/18 0600 - - 76 13 98 % -   08/23/18 0559 97.1  F (36.2  C) Axillary 65 13 97 % 62.8 kg (138 lb 7.2 oz)   08/23/18 0510 97.9  F (36.6  C) - 62 13 - -   08/23/18 0500 - - 73 14 - -   08/23/18 0445 97.8  F (36.6  C) Axillary 58 10 100 % -   08/23/18 0430 - - 72 10 100 % -   08/23/18 0400 96.9  F (36.1  C) Axillary 69 10 100 % -   08/23/18 0300 - - 69 10 99 % -   08/23/18 0200 - - 66 10 98 % -   08/23/18 0100 - - 67 10 98 % -   08/23/18 0000 - - 62 11 98 % -   08/22/18 2300 - - 69 10 100 % -   08/22/18 2200 - - 66 14 100 % -   08/22/18 2100 - - 65 11 100 % -   08/22/18 2000 97.5  F (36.4  C) Oral 61 13 100 % -   08/22/18 1900 - - 51 - 100 % -   08/22/18 1800 - - 61 - 98 % -   08/22/18 1700 - - 63 - 97 % -   08/22/18 1650 97.3  F (36.3  C) - 58 - 97 % -   08/22/18 1640 97.5  F (36.4  C) Axillary 60 - 97 % -   08/22/18 1600 97.5  F (36.4  C) Axillary 55 - 100 % -   08/22/18 1500 - - 65 - 100 % -   08/22/18 1400 - - 64 - 100 % -   08/22/18 1300 - - 53 - 100 % -     General Appearance: NAD  HEENT:  NG (green/tan), [place]  Skin: warm, dry  Heart: NSR  Lungs: Diminished  Abdomen: Incision covered with burn pads (oozing per nursing), abd soft, rectal pouch with BRB per nursing, oozing blood around CAROL drain  : Luis cl yellow  Extremities: well perfused. Generalized edema.  Neurologic: Alert, following commands.  Access: PICC, CVC, PIV  Femoral HD line, femoral A-line    Data:   CMP    Recent Labs  Lab 08/23/18  0916 08/23/18  0352    141   POTASSIUM 3.4 3.2*   CHLORIDE 110* 109   CO2 24 25   * 150*   BUN 37* 39*   CR 0.60* 0.58*   GFRESTIMATED >90 >90   GFRESTBLACK >90 >90   JESUS 7.1* 6.7*   ICAW 4.4 4.6   MAG 1.9 2.0   PHOS 3.1 3.1   ALBUMIN 1.7* 1.5*   BILITOTAL 3.1* 3.0*    ALKPHOS 277* 240*   AST 64* 56*   ALT 22 20     CBC    Recent Labs  Lab 08/23/18  0916 08/23/18  0352   HGB PENDING 6.9*   WBC PENDING 12.1*   PLT PENDING 40*     COAGS    Recent Labs  Lab 08/21/18  1645 08/20/18  1215  08/18/18  1812 08/18/18  1321   INR 1.68* 1.65*  < > 2.18* 2.20*   PTT  --   --   --  85* 66*   < > = values in this interval not displayed.   Urinalysis  Recent Labs   Lab Test  07/27/18   2330  07/13/18   1315  06/18/18   1200   COLOR  Yellow  Dark Yellow  Yellow   APPEARANCE  Clear  Clear  Clear   URINEGLC  Negative  Negative  Negative   URINEBILI  Negative  Large*  Small*   URINEKETONE  Negative  Negative  Negative   SG  1.008  1.013  1.008   UBLD  Negative  Negative  Negative   URINEPH  7.5*  6.0  5.0   PROTEIN  30*  10*  Negative   NITRITE  Negative  Negative  Negative   LEUKEST  Negative  Negative  Negative   RBCU  5*  <1  1   WBCU  3  None  2   UTPG   --    --   0.75*     Virology:  Hepatitis C Antibody   Date Value Ref Range Status   07/20/2018 Nonreactive NR^Nonreactive Final     Comment:     Assay performance characteristics have not been established for newborns,   infants, and children         POD 14 US liver:  1.  The left portal vein is antegrade with decreased velocity,  measuring approximately 15 cm/second. This vessel was occluded on CT  7/30/2018.  2.  Elevated velocities of the main hepatic artery, now 203 cm/sec  (previously 94 cm/sec). However left hepatic arteries demonstrates  steep upstroke suggesting there is not a significant stenosis.   2a. Right hepatic artery was not visualized, could be occluded or  difficult to see postoperatively.  3. Echogenic focus of the right lobe in close proximity to the  hepatorenal fossa. This likely corresponds with nonenhancing lesion on  CT 7/30/2018 and likely representing subcapsular hematoma.  4. Additional small hematomas of the braden hepatis and posterior right  perihepatic region.  5. Small volume anechoic ascites.  6. Left hepatic  lobe not well visualized secondary to open wound  preventing imaging. Note the left lobe was grossly abnormal on CT  7/30/2018    POD 1 US liver: Impression:   1.  Hematoma associated with the inferior aspect of the perihepatic  space anteriorly, measuring 13.7 x 7.0 x 8.5 cm.  2.  Retrograde flow of the left portal vein. Additionally, low  velocities of the portal venous system. Single low resistive index of  the extrahepatic aspect of the hepatic artery, measuring 0.37. These  findings are likely within normal limits in the early postoperative  context. However, continued attention on follow-up recommended.    7/30 CT scan abd/pelvis:  IMPRESSION:   1. Hypoenhancing areas in the transplant liver concerning for  infarction. Transplant left portal vein occlusion with bubbles of  portal venous gas. Nearly occlusive filling defect in the native  hepatic artery. Narrowed and irregular appearance of the transplant  hepatic artery. Focal occlusion of the left main hepatic artery.  Segmental occlusions of right hepatic arterial branches.   2. Pneumatosis intestinalis with nonocclusive filling defects in  superior mesenteric vein branches, concerning for bowel ischemia or  infarction.   3. Splenic infarctions.   4. Bibasilar consolidation with air bronchograms. Pneumonia cannot be  excluded.   5. Post surgical changes of evacuation of peritransplant hematoma.       Attestation:    The patient has been seen and evaluated by me.   Vital signs, labs, medications and orders were reviewed.   When obtained, diagnostic images were reviewed by me and interpreted as above.    The care plan was discussed with the multidisciplinary team and I agree with the findings and plan in this note, with any differences recorded in blue.    Immunosuppressive medication management was reviewed and adjusted as reflected in the note and orders.     .

## 2018-08-23 NOTE — PROGRESS NOTES
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  - Continuing flagyl and fluconazole.   - Continue tenofovir and vGCV    Please call with questions.      Assessment: 43 year old male Hep B cirrhosis s/pOLT on 7/21 whose post-op course was complicated hemorrhagic shock, portal vein and hepatic artery occlusion, liver infarct w/ pneumotosis intestinalis w/ non-occlusive filling defects in SMV branches w/ small bowel ischemia on CT a/p 7/30/18, repeat scans 8/2018 w/ bowel ischemia and fabi perforation s/p ex lap 8/18/18 w/ bowel resection, closure of fascia and persistent VRE bacteremia. Splenic infarcts too. Extubated 8/22 and rapidly weaned to room air.  Sitting in a chair today  Abdominal drains remain in place with bloody drainage.  Remains afebrile. Currently holding CRRT with possible plan for HD.    ID issues:  - Hepatic abscess per 8/15/2018 CT imaging; bowel perforation and feculant peritonitis at 8/17/2018 laparotomy. One of the sources of continuing VRE bacteremia, on ceftaroline, tigecycline, and daptomycin. Daptomycin SHARMIN initially 2 on first positive blood cultures, now up to 24 on most recent from 8/17.  Continue flagyl for coverage of anaerobic bowel bacterial kenneth. On micafungin for Candida albicans growing in 8/17/2018 peritoneal fluid but can narrow to azole therapy.  As of most recent OR on 8/18, remaining bowel looked viable and fascia was closed.    - Persistent VRE bacteremia. He continues to have positive blood  cultures with latest culture from 8/17/2018 growing. He is on combination therapy with daptomycin & ceftaroline, as described in In vitro activity of daptomycin in combination with ß-lactams, gentamicin, rifampin, and tigecycline against daptomycin-nonsusceptible enterococci, where there was an apparent synergistic effect to combining the two. In the event that daptomycin is inactivated by surfactant in the lungs by an occult pulmonary process, tigecycline was added to his regimen but no evidence for pulmonary process with clear CXR and minimal vent support.    - Chronic hepatitis B: On tenofovir    Other ID issues:  -  Prophylaxis:TMP/SMX, vGCV, micafungin  - Viral serostatus D/R & prophylaxis: HCV negative/reactive, HBVnegative/reactive, CMV -/+, EBV+/+, valganciclovir ppx  - Gamma globulin status: Never checked.  - Isolation status: Contact, Good hand hygiene.    Staffed with Dr. Faisal Momin.    Sofya Jimenez MD, PhD  Adult & Pediatric Infectious Diseases Fellow PGY7, [facility]  Pager: [phone]    Attestation:  I have reviewed today's vital signs, medications, labs and imaging.      Floor time: 25 minutes, Face-to-face time: 10 minutes, Total time: 35 minutes  Faisal Momin,   Pager [phone]  Yaneli Miles was seen in the hospital by Faisal Momin on August 23rd with Dr. Jimenez.  I reviewed the history & exam. Assessment and plan were jointly made.  I agree with and have edited the fellow's note and plan of care.  Faisal Momin MD.           Interval History:   Extubated 8/22/18.  CRRT currently on hold, awaiting plan for HD.  Junctional rhythm on 8/22 that resolved.  Dextran stopped due to widespread oozing, streaks of blood from NG and maroon stool in rectal pouch.  Received pRBCs and platelets today.  No new rashes. Minimal cough.  Some abdominal discomfort.  No new rashes. Moving extremities appropriately and answering questions.      Anti-infectives  Tigecycline  Valganciclovir  Ceftaroline  Daptomycin  Metronidazole  Tenofovir  Micafungin    Transplants:  7/21/2018 (Liver), Postoperative day:  33.  Coordinator Kay Reyes    Review of Systems:   Unable to obtain review of systems due to mental status.         Current Medications & Allergies:       ceftaroline (TEFLARO) intermittent infusion  400 mg Intravenous Q12H     DAPTOmycin (CUBICIN) intermittent infusion  12 mg/kg Intravenous Q24H     fluconazole  400 mg Intravenous Q24H     lipids  250 mL Intravenous Once per day on Mon Wed Fri     metroNIDAZOLE  500 mg Intravenous Q6H     pantoprazole (PROTONIX) IV  40 mg Intravenous BID     sodium chloride (PF)  3 mL Intravenous Q8H     tacrolimus  0.75 mg Oral or Feeding Tube QAM     tenofovir  300 mg Per Feeding Tube Q48H     valGANciclovir  450 mg Oral or NG Tube Every Other Day    Or     valGANciclovir  450 mg Oral Every Other Day       Infusions/Drips:    IV fluid REPLACEMENT ONLY       dialysate for CVVHD & CVVHDF (Phoxillum BK4/2.5) 12.5 mL/kg/hr (08/23/18 0546)     heparin 500 Units/hr (08/23/18 0856)     insulin (regular) 1 Units/hr (08/23/18 0700)     lactated ringers 10 mL/hr at 07/30/18 1519     - MEDICATION INSTRUCTIONS -       parenteral nutrition - ADULT compounded formula 45 mL/hr at 08/22/18 2001     replacement solution for CVVHD & CVVHDF (Phoxillum BK4/2.5) 200 mL/hr at 08/22/18 2240     replacement solution for CVVHD & CVVHDF (Phoxillum BK4/2.5) 12.5 mL/kg/hr (08/23/18 0545)     Reason beta blocker order not selected         Allergies   Allergen Reactions     Nsaids      Contraindicated             Physical Exam:   Vitals were reviewed.  Ranges for vital signs:  Temp:  [96.8  F (36  C)-97.9  F (36.6  C)] 97.8  F (36.6  C)  Heart Rate:  [51-76] 60  Resp:  [10-16] 14  MAP:  [83 mmHg-112 mmHg] 104 mmHg  Arterial Line BP: (115-162)/(57-83) 150/74  SpO2:  [96 %-100 %] 99 %  Vitals:    08/21/18 0400 08/22/18 0400 08/23/18 0559    Weight: 70 kg (154 lb 5.2 oz) 66 kg (145 lb 8.1 oz) 62.8 kg (138 lb 7.2 oz)       Physical Examination:   GENERAL:  Resting in a chair, on room air, now extubated.   EYES:  Icteric sclera  ENT:  NG in place.  LUNGS:  Good air entry.  Clear to auscultation.  CARDIOVASCULAR:  normocardic regular rate and rhythm, no murmur   ABDOMEN:  Hypoactive bowel sounds.  tender to palpation. Surgical dressing in place. Drain on each side of abdomen with small amounts of bloody drainage.  : Luis in place in clear yellow urine.  EXT: Increased lower extremity edema  SKIN:  No acute rashes.   T/L/D: R femoral dialysis catheter, R internal jugular CVC, Left PICC, left femoral arterial line         Laboratory Data:     Metabolic Studies       Recent Labs   Lab Test  08/23/18   0352  08/22/18   2209   08/22/18   0424   08/18/18   0411   07/13/18 2050   07/13/18   0334   NA  141  142   < >  140   < >  141   < >   --    < >  132*   POTASSIUM  3.2*  3.4   < >  3.5   < >  3.6   < >   --    < >  2.6*   CHLORIDE  109  109   < >  108   < >  108   < >   --    < >  101   CO2  25  25   < >  25   < >  23   < >   --    < >  20   ANIONGAP  7  8   < >  7   < >  9   < >   --    < >  12   BUN  39*  39*   < >  43*   < >  38*   < >   --    < >  30   CR  0.58*  0.65*   < >  0.73   < >  0.80   < >   --    < >  2.08*   GFRESTIMATED  >90  >90   < >  >90   < >  >90   < >   --    < >  35*   GLC  150*  174*   < >  142*   < >  139*   < >   --    < >  104*   A1C   --    --    --    --    --    --    --   5.4   --    --    JESUS  6.7*  6.9*   < >  6.6*   < >  7.3*   < >   --    < >  8.1*   PHOS  3.1  3.8   < >  3.9   < >  3.7   < >   --    --    --    MAG  2.0  2.1   < >  2.2   < >  2.3   < >   --    --    --    LACT  1.3  0.9   < >  0.9   < >   --    < >  3.9*   --   1.1   PCAL   --    --    --    --    --    --    --    --    --   0.89   CKT   --    --    --   44   --   53   < >   --    --    --     < > = values in this interval not displayed.        Hepatic Studies    Recent Labs   Lab Test  08/23/18 0352 08/22/18 2209 08/22/18   1545   07/13/18   0334   06/21/18   0612   BILITOTAL  3.0*  3.1*  3.3*   < >  36.3*   < >  23.4*   DBIL  2.2*   --    --    < >  28.1*   < >   --    ALKPHOS  240*  249*  249*   < >  96   < >  78   PROTTOTAL  3.8*  4.0*  3.8*   < >  5.8*   < >  5.1*   ALBUMIN  1.5*  1.6*  1.6*   < >  2.8*   < >  2.9*   AST  56*  60*  57*   < >  287*   < >  124*   ALT  20  22  22   < >  125*   < >  79*   LDH   --    --    --    --   259*   --   177    < > = values in this interval not displayed.       Pancreatitis testing    Recent Labs   Lab Test  08/20/18   0443   07/22/18   0354  07/20/18   1741  07/12/18   1913   06/16/18   1815   AMYLASE   --    --   42  60  35   --    --    LIPASE   --    --   75   --    --    --   260   TRIG  40   < >   --    --    --    < >   --     < > = values in this interval not displayed.       Hematology Studies      Recent Labs   Lab Test  08/23/18 0352 08/22/18 2209 08/22/18   1545  08/22/18   0957  08/22/18   0424  08/21/18   2140   WBC  12.1*  12.4*  12.1*  17.7*  16.4*  17.8*   ANEU  11.4*  11.7*  11.5*  16.9*  15.5*  16.9*   ALYM  0.5*  0.4*  0.4*  0.5*  0.5*  0.5*   MARCELA  0.2  0.2  0.1  0.3  0.3  0.3   AEOS  0.0  0.0  0.0  0.0  0.0  0.0   HGB  6.9*  7.4*  7.7*  8.0*  8.2*  7.9*   HCT  19.8*  21.3*  22.1*  23.1*  23.9*  23.7*   PLT  40*  51*  36*  55*  42*  47*       Clotting Studies    Recent Labs   Lab Test  08/21/18   1645  08/20/18   1215  08/20/18   0443  08/19/18   2329   08/18/18   1812   INR  1.68*  1.65*  1.68*  1.74*   < >  2.18*   PTT   --    --    --    --    --   85*    < > = values in this interval not displayed.       Arterial Blood Gas Testing    Recent Labs   Lab Test  08/21/18   1332  08/18/18   1149  08/18/18   1048  08/17/18   2320  08/17/18   1608   PH  7.40  7.38  7.39  7.45  7.36   PCO2  40  40  38  36  36   PO2  163*  112*  331*  172*  177*   HCO3  25  23  23  25  20*   O2PER   40.0  56%  100  40.0  50        Urine Studies     Recent Labs   Lab Test  07/27/18   2330  07/13/18   1315  06/18/18   1200  06/16/18   2220   URINEPH  7.5*  6.0  5.0  5.5   NITRITE  Negative  Negative  Negative  Negative   LEUKEST  Negative  Negative  Negative  Large*   WBCU  3  None  2  66*       Body fluid stats    Recent Labs   Lab Test  08/18/18   1204   07/15/18   1115  07/13/18   1240  06/28/18   1111   FTYP   --    --   Ascites  Ascites  Ascites   FCOL   --    --   Bloody  Red  Yellow   FAPR   --    --   Cloudy  Cloudy  Slightly Cloudy   FWBC   --    --   245  373  234   FNEU   --    --   3  28  6   FLYM   --    --   19  11  24   FMONO   --    --   78   --    --    FALB   --    --    --   1.0   --    FTP   --    --    --   1.6   --    GS  No organisms seen  Many  WBC'S seen  predominantly PMN's    Many  Red blood cells seen     < >  No organisms seen  Few  WBC'S seen  predominantly mononuclear cells    No organisms seen  Few  WBC'S seen  PMNs seen    Many  Red blood cells seen     --     < > = values in this interval not displayed.       Microbiology:    Blood   6/17/18 negative   6/18/18 negative   6/21/18 negative   7/27/18 VRE   7/29/18 catheter tip Staphylococcus epidermidis   7/29/18 VRE   7/30/18 VRE   7/31/18 VRE   8/1/18 VRE, ESBL E. Coli   8/2/18 negative   8/4/18 negative   8/6/18 VRE   8/7/18 VRE   8/8/18 VRE   8/9/18 VRE   8/11/18 VRE   8/12/18 VRE   8/13/18 VRE   8/14/18 VRE   8/15/18 VRE   8/17/18 VRE   8/19/18 NGTD   8/21 NGTD   8/22 pending     Fluid/Tissue   6/16/18 ascites negative   6/25/18 ascites negative   6/28/18 ascites negative   7/13/18 ascites negative   7/21/18 ascites negative   7/30/18 abdominal fluid VRE   8/3/18 intra-abdominal hematoma tissue VRE   8/13/18 Abdominal fluid VRE   8/17/18 Peritoneal fluid VRE, Candida albicans/dubliniensis   8/18/18 Hematoma fluid VRE, C. Albicans / dubliniensis    Urine  6/16/18 Enterococcus faecalis    VRE rectal culure   7/13/18   positive    Respiratory  6/17/18 sputum single colony Citrobacter freundii, light growth Candida albicans/dubliniensis    Imaging:  AXR: 8/23/18: Lines and tubes as above. Nonobstructive bowel gas  pattern.     CXR 8/21:  1. Support devices as described above.  2. Trace bilateral pleural effusions with overlying atelectasis and/or consolidation, stable.    Liver U/S: 8/17/18:   1.  Transplant liver with patent vasculature.  2.  Decreased resistive indices in the hepatic artery (0.3-0.4), highly concerning for upstream stenosis.  3.  Stable appearance of perihepatic fluid collection near the braden hepatis. Other previously described fluid collections on 8/13/2018 evaluation were not seen on this exam.  4.  Hyperechoic round lesion in the right lateral lobe, also seen on comparison CT, likely represents hemangioma.  5.  Right pleural effusion.    CT a/p w/ contrast: 8/15/18:   1.  Findings concerning for hepatic infarct involving the majority of the left hepatic lobe with associated complex fluid collection along inferior aspect, measuring up to 8.9 x 4.6 cm. The fluid collection likely represents evolving hematoma. Superimposed infection cannot be completely excluded in this patient with leukocytosis, although thought less likely based on evolving nature of this process since 7/30/2018 and sonographic appearance from ultrasound exams dated 7/30/2018 and 8/13/2018.   2.  Continued diffuse dilation of the small bowel transition point in the right lower quadrant (series 5 image [id number]). Findings concerning for small bowel obstruction.  3.  Postsurgical changes of abdominal wall abscess/hematoma evacuation with new soft tissue defect over the right upper quadrant and packing in place. There is surrounding postoperative hematoma.  4.  Unchanged biliary dilation, left lobe dominant, with a right-sided biliary stent in place.

## 2018-08-23 NOTE — PLAN OF CARE
Problem: Patient Care Overview  Goal: Plan of Care/Patient Progress Review  Discharge Planner PT   Patient plan for discharge: Rehab  Current status: Utilized ceiling lift for supine>sit, sat EOB with min-max A and then bed>chair with ceiling lift. Seated LE strengthening/stretching.  Barriers to return to prior living situation: Deconditioning, fall risk  Recommendations for discharge: ARC once LTACH goals met  Rationale for recommendations: Current level of function, rehab trajectory, very motivated       Entered by: Antonio Leonardo 08/23/2018 1:10 PM

## 2018-08-24 NOTE — PLAN OF CARE
Problem: Patient Care Overview  Goal: Plan of Care/Patient Progress Review  Patient had uneventful day. Continues to ooze from abdominal sites and have maroon colored stool. Transplant requested to shut heparin off for 2 hours and restart at a lower rate. Additionally gave 2 units of plasma. Patient up worked with PT and OT today. Up to chair and worked on squat machine.     Plan: Remove all central lines, start on peripheral TPN.

## 2018-08-24 NOTE — PROGRESS NOTES
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 He returned to the SICU for shock, severe lactic acidosis, renal failure and respiratory failure, requiring intubation and dialysis.  Ischemic injury to left hemiliver and non-occlusive mesenteric ischemia.  He underwent a laparotomy and several subsequent take-backs to examine ischemic bowel, on 8/6 he underwent resection of necrotic jejunum with a primary anastomosis, and his abdomen was closed 8/8. He acutely decompensated beginning the night of 8/16, with increasing pressor needs, increasing abdominal pain and lactic acidosis, now s/p exploratory laparotomy 8/17 and s/p entero-entero small bowel anastomosis, drain placement and fascial closure 8/18.  Continues to require ICU cares for sepsis and hemodynamic monitoring.    TODAY'S PROGRESS/PLANS:   -  plan for line holiday now that he is off CRRT  - potassium replacement, K this morning 2.7  - add PIV  - transition to peripheral nutrition, 70 ml/hr   - transfuse plasma   - EKG  - ABX to end 9/2    PLAN:  Neuro/ pain/ sedation:  # acute pain  - monitor neurological status; notify the MD/DO for any acute changes in exam.  - PRN IV dilaudid and oxycodone    Pulmonary care:   # acute respiratory failure - resolved  - extubated (8/22)  - supplemental O2 for sats >92%      Cardiovascular:    #Septic shock - resolving  # Hemodynamic instability - resolving  # Junctional rhythm on 8/22 EKG, unclear cause, resolved 8/24  - monitor hemodynamic status   - MAP goals > 60  - TTE 8/8 showed no vegetations or valve abnormalities  - TOÑO 8/10 no evidence of endocarditis   - EKG (8/23): junctional bradycardia    - EKG today to evaluate change in rhythm    GI care:   # DDLT 7/21/18  # Intrahepatic left portal venous thrombosis, infarction of left liver lobe, patchy infarction of right lobe, suspected hepatic arterial thrombosis  # 7/30/18 s/p exploratory laparotomy and liver biopsy  # Splenic infarctions, patchy bowel ischemia  # Ex Lap 8/6 - resection of 90 cm of necrotic jejunum  # Ex Lap 8/8 - abdomen closed  # Ex Lap 8/17 - Found to have widespread bowel ischemia requiring bowel resection.  Left in discontinuity.  # Ex Lap 8/18 - entero-entero anastomosis and facial closure.  Approximately  cm small bowel remaining with intact ileocecal valve and colon.    - Two episodes of bowel ischemia of unclear etiology requiring resection of the majority of small bowel,  cm of small bowel remaining.  Now in continuity with entero-entero anastomosis.  - trickle feeds at 10 ml/hr  - Incision care per transplant team  - Blood in NG and maroon stools overnight. Continues to bleed from CAROL drain sites and ooze blood from incision    Fluids/ Electrolytes/ Nutrition:   # protein calorie deficit malnutrition   # NPO  - TF's at trickle feed 10 ml/hr  - discontinue TPN at 45 ml/hr for adequate nutrition (will need long term, due to ~ cm of small bowel remaining)  - start peripheral parenteral nutrition at 70 ml/hr in order to do a line holiday  - electrolytes to be replaced as needed    Renal/ Fluid Balance:    # BEN   # severe metabolic acidosis, lactic acidosis - resolved  # severe hyponatremia - resolved  # hyperkalemia - resolved  # renal failure - resolving  # hematuria - resolved  - CRRT stopped due to adequate urine output, nephrology following for HD as needed  - Will start a line holiday today, appreciate nephrology recs for when dialysis will be required again  - Will continue to monitor intake and output.  - Urethral bleeding appears to have resolved  - Luis in place    Endocrine:    # DM II  - insulin  gtt    ID/ Antibiotics:  # VRE Bacteremia  # ESBL Bacteremia - treated  # Hepatitis B  Has been unable to clear VRE bacteremia.  ABX ongoing and ID transplant following.  - Daptomycin (7/30- ), ceftaroline (8/13- ), continue while blood cultures still positive for VRE  - Micafungin (7/30-8/22) changed to fluconazole (8/22- )  - Flagyl (8/17 - )  - Tigecycline (8/13-8/23), discontinued after 4 days of negative blood cultures  - Meropenem (8/2-8/14), discontinued after 12 days of negative cultures for ESBL  - Amikacin (8/17 - 8/21), discontinued as he had completed 2-week course of meropenem and has other active agents for G- coverage  - Immunosuppression: (restarted 8/6) tacrolimus  - Immunosuppression prophylaxis: Valgancyclovir and Tenofovir, nystatin when micafungin stopped.  Bactrim stopped   - Transplant ID following due to continued bacteremia with unknown source  - Cultures Q48H  - plan to start a line holiday today since blood cultures have been negative since the 19th     Cultures   - 7/27 VRE (line/arm)   - 7/29: VRE (left arm)   - 7/30: VRE (left arm)   - 7/31: VRE (Line and peripheral)  - 8/1: ESBL (HD/hand/central line)   - 8/3: VRE (intra-abdominal tissue)  - 8/4: NGTD (blood)  - 8/5: NGTD (blood)  - 8/6: VRE (art line)  - 8/7: VRE (blood)  - 8/8: VRE (blood)  - 8/9: VRE (blood)  - 8/11: VRE (blood)  - 8/12: VRE (abdominal fluid)  - 8/13: VRE (blood)  - 8/14: VRE (blood)  - 8/15: VRE (blood)  - 8/17: NGTD (blood)   - 8/17: VRE & Candida (peritoneal fluid)   - 8/18: G+ cocci (tissue)  - 8/19 NGTD (blood)  - 8/21 NGTD (blood)  - 8/22 NGTD (blood)  - 8/23 NGTD (blood)    Heme:     # Acute blood loss anemia  # Anemia of critical illness  # Portal venous thrombosis  # Thrombocytopenia  # Coagulopathy   - Heparin at 500 straight rate  - Hold aspirin 81mg for continued bleeding  - HIT panel sent and negative 8/2.  Repeat HIT panel on 8/13 negative; Hematology consult per Transplant in setting of low platelets  - believe this is likely his new baseline  - antithrombin III, protein C, protein S all low  - transfuse 2 units of plasma today    MSK:  # weakness and deconditioning of critical illness   - PT/OT     Prophylaxis:    - DVT: straight rate heparin, SCD's  - GI: protonix     Lines/ tubes/ drains:  - L fem art line, L PICC, NJ, NG, PIVs, groin central cath, RIJ MAC.   - plan to start line holiday today, goal of 2-3 days depending on dialysis needs    Disposition:  - SICU      ====================================    SUBJECTIVE:   Overnight, there was increased bleeding from the CAROL drain, thought to be likely due to clot that released as bleeding slowed through the rest of the night. Stool continues to look maroon. Some blood in the NG tube.       OBJECTIVE:   1. VITAL SIGNS:   Temp:  [97.8  F (36.6  C)-99  F (37.2  C)] 97.9  F (36.6  C)  Pulse:  [77] 77  Heart Rate:  [] 104  Resp:  [10-28] 28  MAP:  [88 mmHg-195 mmHg] 121 mmHg  Arterial Line BP: (123-201)/() 123/103  SpO2:  [93 %-100 %] 98 %  Resp: 28    2. INTAKE/ OUTPUT:   I/O last 3 completed shifts:  In: 3167.38 [I.V.:1432.78; NG/GT:120]  Out: 4809 [Urine:2310; Emesis/NG output:200; Drains:416; Other:1158; Stool:725]    3. PHYSICAL EXAMINATION:   General: lying in bed, able to communicate he was having some pain  Neuro: alert, communicating, moving extremities  Resp: crackles on auscultation, does not have a strong cough  CV: RRR  Abdomen: soft, non-distended, non-tender, tender with dressing change  Incisions: upper abdominal incision packed and dressed, bloody drainage on right side of wound  Extremities: resolved edema in bilateral UE, minimal edema in bilateral LE      4. INVESTIGATIONS:   Arterial Blood Gases     Recent Labs  Lab 08/21/18  1332 08/18/18  1149 08/18/18  1048 08/17/18  2320   PH 7.40 7.38 7.39 7.45   PCO2 40 40 38 36   PO2 163* 112* 331* 172*   HCO3 25 23 23 25     Complete Blood Count     Recent Labs  Lab 08/24/18  3144  08/24/18  0017 08/23/18  1804 08/23/18  0916 08/23/18  0352   WBC 13.6*  --  11.6* 14.1* 12.1*   HGB 8.4* 8.7* 8.4* 8.6* 6.9*   PLT 47*  --  55* 41* 40*     Basic Metabolic Panel    Recent Labs  Lab 08/24/18 0434 08/23/18  0916 08/23/18 0352 08/22/18 2209    142 141 142   POTASSIUM 2.7* 3.4 3.2* 3.4   CHLORIDE 111* 110* 109 109   CO2 23 24 25 25   BUN 66* 37* 39* 39*   CR 1.10 0.60* 0.58* 0.65*   * 113* 150* 174*     Liver Function Tests    Recent Labs  Lab 08/24/18 0434 08/23/18  0916 08/23/18 0352 08/22/18  2209 08/21/18  1645 08/20/18  1215 08/20/18  0443   AST 62* 64* 56* 60*  < >  --   < > 64* 56*   ALT 23 22 20 22  < >  --   < > 24 26   ALKPHOS 254* 277* 240* 249*  < >  --   < > 250* 245*   BILITOTAL 2.9* 3.1* 3.0* 3.1*  < >  --   < > 2.8* 2.8*   ALBUMIN 1.7* 1.7* 1.5* 1.6*  < >  --   < > 1.5* 1.7*   INR 1.84*  --   --   --   --  1.68*  --  1.65* 1.68*   < > = values in this interval not displayed.  Pancreatic Enzymes  No lab results found in last 7 days.  Coagulation Profile    Recent Labs  Lab 08/24/18 0434 08/21/18  1645 08/20/18  1215 08/20/18  0443  08/18/18  1812 08/18/18  1321  08/17/18  1608   INR 1.84* 1.68* 1.65* 1.68*  < > 2.18* 2.20*  < > 2.05*   PTT 74*  --   --   --   --  85* 66*  --  53*   < > = values in this interval not displayed.  Lactate  Invalid input(s): LACTATE    5. RADIOLOGY:   No results found for this or any previous visit (from the past 24 hour(s)).       --------------------------------------------------------------------------------------------------------------------------  Carlos Castellanos  Medical Student, MS4  [employer]      Patient seen, findings and plan discussed with surgical ICU staff Dr. Soler.    Eddie Acuna MD  CA-2/PGY-3

## 2018-08-24 NOTE — PROGRESS NOTES
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  Developed shock with severe lactic acidosis and respiratory failure on POD9. CT scan showed left PV venous thrombosis, infarction of left liver lobe, patchy infarction of right lobe and possible hepatic arterial thrombosis, patchy bowel ischemia. Transferred to ICU.   Ex lap, patchy diffuse SB ischemia. Doppler demonstrated arterial flow main vessels to bowel and liver. Splenic infarction. Liver biopsy negative for acute rejection. Fluid culture growing VRE. On Heparin gtt. 8/3 OR for reexploration liver transplant, bowel ischemia, findings improved patchy iscemic changes of small bowel, patchy ischemic changes of left liver lobe.  OR: some ischemic/necrotic jejunum, s/p resection.   Washout and abdominal closure with stratice mesh, no drains placed. Minimal ascites, intact small bowel anastomosis, patchy ischemia left liver lobe.  peritonitis with bowel necrosis and bowel perforation. S/p bowel resection, about 50 cm sm bowel, ileocecal valve and colon remaining.     Procedures:    donor (DBD) liver transplant with duct to duct anastomosis over biliary stent.    Ex lap due to concern for ischemic bowel, findings: patchy diffuse SB ischemia. Doppler demonstrated arterial flow main vessels to bowel and liver. Splenic infarction.   8/3 Reexploration liver transplant, bowel ischemia, findings improved patchy iscemic changes of small bowel, patchy ischemic changes of left liver lobe.    Re-exploration, ischemic/necrotic jejunum, s/p resection   Washout and abdominal closure with stratice mesh, no drains placed. Minimal ascites, intact small bowel  anastomosis, patchy ischemia left liver lobe.  8/17 Procedure: reopening of laparotomy. abd washout, bowel resection (clip and drop fashion). Postop Dx: same. Large segment of ischemic bowel from mid-jejunum (prior anastomosis) with distal perforation and feculent peritonitis.   8/18 Reexploration, small and large bowel intestine appeared healthy per surgeon, primary anastomosis bowel. Fascia closure.  8/20 Reopening of laparotomy. abdominal washout, bowel resection of 200cm    Graft function: POD #34, LFTs stable with elevated bilirubin, now trending down.  7/30 Hepatic infarction, left PV thrombosis, and narrow and irregular appearance HA noted on CTA. 8/8 Liver biopsy negative for acute rejection. Intra-abdominal  cultures from 8/1, 8/6, 8/17, 8/18 + VRE or GPCs.   Incision opened at bedside 8/12, hematoma removed.  8/13 US: new perihepatic fluid collections (largest 7x2x4), small volume of septated ascites.  VAC placed 8/15. 8/15 CT scan, left lobe infarct with complex fluid collection inferior.  8/17 US: patent vasculature but RIs concerning for HA stenosis, stable fluid collection near braden hepatis.     Immunosuppression management:   Induction: Simulect intra-op due to BEN.  Steroid taper per protocol, completed.  MMF held due to critical status  Tac goal level ~5-6 due to sepsis and BEN. Level 4.0 today (10-hour trough), increase dose to 1mg BID and repeat level daily.  Hydrocortisone, now tapered off.  Complexity of management: Medium. Contributing factors: thrombocytopenia and anemia, ARF    Hematology:   Anemia: Acute blood loss.  Bleeding from lines, wound, and drain site overnight.  Hgb stable at 8.4 today.  Multiple transfusions in the last week.  Thrombocytopenia: Ongoing transfusions. Platelet count remains low at 47, got one unit platelets today.  Anticoagulation: Anticoagulate for bowel ischemia and portal thrombosis.  Heparin gtt low intensity resulted in diffuse oozing, now on straight rate  500u/hr. Dextran 10 ml/hr for additional anticoagulation, stopped on 8/23DC due to widespread oozing. Will decrease heparin drip to 400u/hr today.  Concern for protein C/protein S deficiency, labs in process.    Cardiorespiratory:    Hypotension secondary to septic shock:  Resolved, off pressors.  Respiratory failure secondary to sepsis: Extubated 8/22.  Junctional rhythm:  Onset 8/22, now resolved.    GI/Nutrition: NG.  NPO.  On TPN.  TF 10 ml/hr for healing.   Ischemic bowel:  7/30 CTA: nonocclusive filling defects in superior mesenteric vein branches.  Anticoagulation as above.  S/p resection of necrotic jejunum on 8/6. S/p resection of 150cm of ischemic bowel with perforation on 8/17.  PSBO:  Noted on 8/15 CT, transition point RLQ. S/p resection on 8/17.   Bowel perforation/peritonitis:  Small bowel perforation with feculent peritonitis noted in OR on 8/17. S/p resection, abx as below.  GIB:  BRBPR today along with generalized oozing.  Transfuse as needed.    Endocrine:  DM type 2, continue insulin drip.      Fluid/Electrolytes/Renal: BEN/ARF. CRRT.    : Traumatic bah placement with intermittent bleeding.   Hematuria now resolved. Bah, coude 18fr in place    Infectious disease: Afebrile. WBC increased to 13.6 today (from 11.6).  -Amikacin (8/17-8/20), flagyl (8/17-), ceftraroline (8/13-), tigecycline (8/13-8/23), daptomycin (8/14-), micafungin (7/30-8/21), fluconazole (8/22-).   -ID following  -Blood cultures through 8/18 + VRE or GPCs  -8/17 Peritoneal fluid culture + VRE, candida albicans  -8/19, 8/21, 8/23 Blood cx, no growth to date    1. VRE intra abdominal and bacteremia (dapto, ceftaroline, tigecycline)  2. Infarct left lobe liver with inferior perihepatic fluid collection  3. Peritonitis (metronidazole, fluconazole)    Previous ID:   3. Ecoli ESBL bacteremia. Repeat bcx on 8/1 (peripheral and VAD) grew Ecoli ESBL. Zosyn changed to Meropenem, completed on 8/14.  SICU to exchange lines as able. Blood  cultures negative for E. Coli since .  4. HBV: Hepatits B DNA PCR positive prior to tx. Received HBIG on  and . 8/10 Hep B surface antigen negative. No further HBIG planned. Continue tenofovir 300 mg, dose adjusted for HD       Prophylaxis: DVT PCDs, on heparin, fall, GI, Valcyte x 12 weeks (CMVand EBV IgG +), bactrim.   Disposition: SICU    Coordinator: Kay Reyes  Surgeon: Pako    Check Hep B surface antigen at 3 months, 6 months then annually     Medical Decision Making: High    JAY/Fellow/Resident Provider:  Becka Nguyen NP  [id number]    Faculty: Stew Min MD   __________________________________________________________________  Transplant History: Admitted 2018 for  donor liver transplant.   The patient has a history of liver failure due to hepatitis B .    2018 (Liver), Postoperative day: 34     Interval History:  Continues to ooze around drain sites and in rectal tube, stable per nursing. Getting platelets and FFP this morning. On room air. In the chair this morning.    ROS:   A 10-point review of systems was negative except as noted above.    Curent Meds:    ceftaroline (TEFLARO) intermittent infusion  300 mg Intravenous Q12H     [START ON 2018] DAPTOmycin (CUBICIN) intermittent infusion  12 mg/kg Intravenous Q48H     fluconazole  200 mg Intravenous Q24H     lipids  250 mL Intravenous Once per day on      metroNIDAZOLE  500 mg Intravenous Q6H     pantoprazole (PROTONIX) IV  40 mg Intravenous BID     sodium chloride (PF)  3 mL Intravenous Q8H     tacrolimus  0.75 mg Oral or Feeding Tube BID IS     [START ON 2018] tenofovir  300 mg Per Feeding Tube Q72H     [START ON 2018] valGANciclovir  450 mg Oral Once per day on     Or     [START ON 2018] valGANciclovir  450 mg Oral or NG Tube Once per day on        Physical Exam:     Admit Weight: 60.7 kg (133 lb 12.8 oz)    Current Vitals:   BP (!) 127/93  Pulse 77  Temp 97.4  F (36.3   C) (Oral)  Resp 27  Wt 61.7 kg (136 lb 0.4 oz)  SpO2 94%  BMI 21.95 kg/m2      Vital sign ranges:    Temp:  [97  F (36.1  C)-99  F (37.2  C)] 97.4  F (36.3  C)  Heart Rate:  [] 94  Resp:  [16-28] 27  MAP:  [88 mmHg-195 mmHg] 108 mmHg  Arterial Line BP: (123-201)/() 146/80  SpO2:  [93 %-100 %] 94 %  Patient Vitals for the past 24 hrs:   Temp Temp src Heart Rate Resp SpO2 Weight   08/24/18 1300 - - 94 27 - -   08/24/18 1200 97.4  F (36.3  C) Oral 95 25 94 % -   08/24/18 1151 97.2  F (36.2  C) Axillary 93 26 97 % -   08/24/18 1136 97.2  F (36.2  C) Axillary 98 22 98 % -   08/24/18 1100 - - 105 25 96 % -   08/24/18 1000 - - 90 18 98 % -   08/24/18 0900 - - 91 21 100 % -   08/24/18 0800 98.6  F (37  C) Axillary 100 25 100 % -   08/24/18 0743 97  F (36.1  C) Oral 98 23 99 % -   08/24/18 0700 - - 104 28 98 % -   08/24/18 0630 98.3  F (36.8  C) Axillary 105 26 98 % -   08/24/18 0615 97.9  F (36.6  C) Axillary 106 26 98 % -   08/24/18 0600 - - 97 17 96 % -   08/24/18 0500 - - 98 18 98 % -   08/24/18 0400 98.3  F (36.8  C) Axillary 102 25 93 % 61.7 kg (136 lb 0.4 oz)   08/24/18 0300 - - 98 22 95 % -   08/24/18 0200 - - 101 24 94 % -   08/24/18 0100 - - 108 22 93 % -   08/24/18 0000 98  F (36.7  C) Axillary 110 18 98 % -   08/23/18 2315 - - 121 27 99 % -   08/23/18 2300 - - 118 26 99 % -   08/23/18 2200 - - 123 27 99 % -   08/23/18 2100 - - 121 25 98 % -   08/23/18 2000 98.6  F (37  C) Oral 105 22 98 % -   08/23/18 1900 - - 103 19 98 % -   08/23/18 1800 - - 98 18 95 % -   08/23/18 1700 - - 95 16 96 % -   08/23/18 1600 - Axillary 87 19 99 % -   08/23/18 1545 99  F (37.2  C) - 85 17 95 % -     General Appearance: NAD  HEENT:  NG (green/tan), NJ  Skin: warm, dry  Heart: NSR  Lungs: Diminished  Abdomen: Incision covered with burn pads (oozing per nursing), abd soft, rectal pouch with BRB per nursing, oozing blood around CAROL drain  : Luis cl yellow  Extremities: well perfused. Generalized edema.  Neurologic:  Alert, following commands.  Access: PICC, CVC, PIV  Femoral A-line, HD line removed today    Data:   CMP    Recent Labs  Lab 08/24/18  0434 08/23/18  0916 08/23/18  0352    142 141   POTASSIUM 2.7* 3.4 3.2*   CHLORIDE 111* 110* 109   CO2 23 24 25   * 113* 150*   BUN 66* 37* 39*   CR 1.10 0.60* 0.58*   GFRESTIMATED 72 >90 >90   GFRESTBLACK 88 >90 >90   JESUS 7.1* 7.1* 6.7*   ICAW  --  4.4 4.6   MAG 2.0 1.9 2.0   PHOS 2.8 3.1 3.1   ALBUMIN 1.7* 1.7* 1.5*   BILITOTAL 2.9* 3.1* 3.0*   ALKPHOS 254* 277* 240*   AST 62* 64* 56*   ALT 23 22 20     CBC    Recent Labs  Lab 08/24/18  0434 08/24/18  0017 08/23/18  1804   HGB 8.4* 8.7* 8.4*   WBC 13.6*  --  11.6*   PLT 47*  --  55*     COAGS    Recent Labs  Lab 08/24/18  0434 08/21/18  1645  08/18/18  1812   INR 1.84* 1.68*  < > 2.18*   PTT 74*  --   --  85*   < > = values in this interval not displayed.   Urinalysis  Recent Labs   Lab Test  07/27/18   2330  07/13/18   1315  06/18/18   1200   COLOR  Yellow  Dark Yellow  Yellow   APPEARANCE  Clear  Clear  Clear   URINEGLC  Negative  Negative  Negative   URINEBILI  Negative  Large*  Small*   URINEKETONE  Negative  Negative  Negative   SG  1.008  1.013  1.008   UBLD  Negative  Negative  Negative   URINEPH  7.5*  6.0  5.0   PROTEIN  30*  10*  Negative   NITRITE  Negative  Negative  Negative   LEUKEST  Negative  Negative  Negative   RBCU  5*  <1  1   WBCU  3  None  2   UTPG   --    --   0.75*     Virology:  Hepatitis C Antibody   Date Value Ref Range Status   07/20/2018 Nonreactive NR^Nonreactive Final     Comment:     Assay performance characteristics have not been established for newborns,   infants, and children         POD 14  liver:  1.  The left portal vein is antegrade with decreased velocity,  measuring approximately 15 cm/second. This vessel was occluded on CT  7/30/2018.  2.  Elevated velocities of the main hepatic artery, now 203 cm/sec  (previously 94 cm/sec). However left hepatic arteries demonstrates  steep  upstroke suggesting there is not a significant stenosis.   2a. Right hepatic artery was not visualized, could be occluded or  difficult to see postoperatively.  3. Echogenic focus of the right lobe in close proximity to the  hepatorenal fossa. This likely corresponds with nonenhancing lesion on  CT 7/30/2018 and likely representing subcapsular hematoma.  4. Additional small hematomas of the braden hepatis and posterior right  perihepatic region.  5. Small volume anechoic ascites.  6. Left hepatic lobe not well visualized secondary to open wound  preventing imaging. Note the left lobe was grossly abnormal on CT  7/30/2018    POD 1 US liver: Impression:   1.  Hematoma associated with the inferior aspect of the perihepatic  space anteriorly, measuring 13.7 x 7.0 x 8.5 cm.  2.  Retrograde flow of the left portal vein. Additionally, low  velocities of the portal venous system. Single low resistive index of  the extrahepatic aspect of the hepatic artery, measuring 0.37. These  findings are likely within normal limits in the early postoperative  context. However, continued attention on follow-up recommended.    7/30 CT scan abd/pelvis:  IMPRESSION:   1. Hypoenhancing areas in the transplant liver concerning for  infarction. Transplant left portal vein occlusion with bubbles of  portal venous gas. Nearly occlusive filling defect in the native  hepatic artery. Narrowed and irregular appearance of the transplant  hepatic artery. Focal occlusion of the left main hepatic artery.  Segmental occlusions of right hepatic arterial branches.   2. Pneumatosis intestinalis with nonocclusive filling defects in  superior mesenteric vein branches, concerning for bowel ischemia or  infarction.   3. Splenic infarctions.   4. Bibasilar consolidation with air bronchograms. Pneumonia cannot be  excluded.   5. Post surgical changes of evacuation of peritransplant hematoma.       Attestation:    The patient has been seen and evaluated by me.    Vital signs, labs, medications and orders were reviewed.   When obtained, diagnostic images were reviewed by me and interpreted as above.    The care plan was discussed with the multidisciplinary team and I agree with the findings and plan in this note, with any differences recorded in blue.    Immunosuppressive medication management was reviewed and adjusted as reflected in the note and orders.     .

## 2018-08-24 NOTE — PROVIDER NOTIFICATION
SICU resident notified about right CAROL output increase of 100ml/2hrs. Drainage remains bright red/bloody. Hbg ordered. Continue to monitor per SICU resident.

## 2018-08-24 NOTE — PLAN OF CARE
Problem: Liver Transplant (Adult)  Goal: Signs and Symptoms of Listed Potential Problems Will be Absent, Minimized or Managed (Liver Transplant)  Signs and symptoms of listed potential problems will be absent, minimized or managed by discharge/transition of care (reference Liver Transplant (Adult) CPG).   Patient has been having marked increased output from right CAROL drain. SICU resident came to take a look the first time after a dumping of 100cc of bright red blood in 30 minutes occurred, said to check hemoglobin and call him again if patient was still outputting a lot. After an hour, patient was still outputting 100cc/hour of bright red blood, called SICU resident again. Hemoglobin check came back normal, 8.7, and rest of vital signs were the same and stable. SICU said to text page on call liver transplant fellow on how to proceed. Text paged liver transplant fellow and he called and said to check hemoglobin, platelets, INR, PTT basically an entire CBC at 0400. He also said if any of the values were low to replace them. Will continue to monitor and assess.

## 2018-08-24 NOTE — PROGRESS NOTES
08/24/18 1000   Quick Adds   Type of Visit Occupational Therapy Re-evaluation   Living Environment   Lives With child(doug), adult;child(doug), dependent;spouse   Living Arrangements house   Home Accessibility bed and bath on same level;stairs (2 railings present);stairs to enter home;stairs within home;tub/shower is not walk in   Number of Stairs to Enter Home 2   Number of Stairs Within Home 12   Stair Railings at Home inside, present at both sides   Living Environment Comment Pt now extubated, confirmed information with pt.   Self-Care   Dominant Hand right   Usual Activity Tolerance moderate   Current Activity Tolerance poor   Regular Exercise yes   Activity/Exercise/Self-Care Comment Prior to April pt was IND and working full time in a factory.  Since then has had a decline in strength and function since then.  Has been inpatient since 7/20 with a drastic decrease in strength and IND.    Functional Level Prior   Ambulation 0-->independent   Transferring 0-->independent   Toileting 0-->independent   Bathing 0-->independent   Dressing 0-->independent   Eating 0-->independent   Communication 0-->understands/communicates without difficulty   Cognition 0 - no cognition issues reported   Fall history within last six months no   Which of the above functional risks had a recent onset or change? ambulation;transferring;toileting;bathing;dressing;eating;swallowing;cognition;communication/speech   Prior Functional Level Comment Prior to April pt was IND.        Present yes   Language Hmong   General Information   Onset of Illness/Injury or Date of Surgery - Date 07/20/18  (Liver tx 7/21)   Referring Physician Dr BECKY Min   Patient/Family Goals Statement Would like to be IND at home.    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  Developed shock with severe lactic acidosis and respiratory failure on POD9. CT scan showed left PV venous thrombosis, infarction of left liver lobe, patchy infarction of right lobe and possible hepatic arterial thrombosis, patchy bowel ischemia. Transferred to ICU.  7/30 Ex lap, patchy diffuse SB ischemia. Doppler demonstrated arterial flow main vessels to bowel and liver. Splenic infarction. Liver biopsy negative for acute rejection. Fluid culture growing VRE. On Heparin gtt. 8/3 OR for reexploration liver transplant, bowel ischemia, findings improved patchy iscemic changes of small bowel, patchy ischemic changes of left liver lobe. 8/6 OR: some ischemic/necrotic jejunum, s/p resection.  8/8 Washout and abdominal closure with stratice mesh, no drains placed. Minimal ascites, intact small bowel anastomosis, patchy ischemia left liver lobe. 8/17 peritonitis with bowel necrosis and bowel perforation. S/p bowel resection, about 50 cm sm bowel, ileocecal valve and colon remaining.    Precautions/Limitations abdominal precautions   General Observations Pt supine, 2 drains, rectal tube, catheter, Ng, feeding tube, art line and fem line. Pt agreeable to therapy.  2 dtrs in room.    General Info Comments activity: up w A, ok OOB with fem lines   Cognitive Status Examination   Orientation person;place   Level of Consciousness lethargic/somnolent   Able to Follow Commands mild impairment   Memory impaired   Cognitive Comment Pt is below his baseline,  present.    Pain Assessment   Patient Currently in Pain Yes, see Vital Sign flowsheet   Integumentary/Edema   Integumentary/Edema other (describe)   Integumentary/Edema Comments noted edema in feet   Posture   Posture Comments pt in supine, unable to hold head when lifting up in sling   Range of Motion (ROM)   ROM Comment pt unable to gain full shoulder flexion   Strength    Strength Comments well below his baseline, deficits in  and overall UE strength BNL    ~3/5 MMT   Hand Strength   Hand Strength Comments deficits noted, fatigued and weak   Coordination   Fine Motor Coordination below baseline, struggled to hold toothbrush   Mobility   Bed Mobility Comments Max A rolling, D lift to EOB   Transfer Skills   Transfer Comments D lift   Toilet Transfer   Toilet Transfer Comments D cathether and rectal pouch   Upper Body Dressing   Level of [place]: Dress Upper Body maximum assist (25% patients effort)   Lower Body Dressing   Level of [place]: Dress Lower Body dependent (less than 25% patients effort)   Toileting   Level of [place]: Toilet dependent (less than 25% patients effort)   Grooming   Level of [place]: Grooming moderate assist (50% patients effort)   Eating/Self Feeding   Level of [place]: Eating moderate assist (50% patients effort)   Instrumental Activities of Daily Living (IADL)   Previous Responsibilities (wife performs PRN)   Activities of Daily Living Analysis   Impairments Contributing to Impaired Activities of Daily Living balance impaired;cognition impaired;coordination impaired;pain;post surgical precautions;postural control impaired;ROM decreased;strength decreased   General Therapy Interventions   Planned Therapy Interventions ADL retraining;IADL retraining;strengthening;ROM;home program guidelines   Clinical Impression   Criteria for Skilled Therapeutic Interventions Met yes, treatment indicated   OT Diagnosis post op liver   Influenced by the following impairments decreased strength activity tolerance, FMC ROM, alertness,    Assessment of Occupational Performance 5 or more Performance Deficits   Identified Performance Deficits all ADLs and IADLs are affected at this time   Clinical Decision Making (Complexity) Low complexity   Therapy Frequency 5 times/wk   Predicted Duration of Therapy Intervention (days/wks) 4 weeks   Anticipated  "Discharge Disposition Acute Rehabilitation Facility   Risks and Benefits of Treatment have been explained. Yes   Patient, Family & other staff in agreement with plan of care Yes   [facility] AM-PAC  \"6 Clicks\" Daily Activity Inpatient Short Form   1. Putting on and taking off regular lower body clothing? 1 - Total   2. Bathing (including washing, rinsing, drying)? 2 - A Lot   3. Toileting, which includes using toilet, bedpan or urinal? 1 - Total   4. Putting on and taking off regular upper body clothing? 2 - A Lot   5. Taking care of personal grooming such as brushing teeth? 2 - A Lot   6. Eating meals? 1 - Total   Daily Activity Raw Score (Score out of 24.Lower scores equate to lower levels of function) 9   Total Evaluation Time   Total Evaluation Time (Minutes) 5     "

## 2018-08-24 NOTE — PLAN OF CARE
Problem: Patient Care Overview  Goal: Plan of Care/Patient Progress Review  Outcome: Improving  Neuro: Lethargic, oriented x4. Pupils equal and reactive. Purposeful movements. Upper arms slightly weaker than lower extremities. Dilaudid 0.5 given for pain.   Respiratory: on RA, coarse pj breath sounds. Weak productive cough but unable to clear secretions. RR 18-22 saturation >96%  GI/: Luis in place urine with adequate urine output 100-150 ml/hr.   NG to LIS (for meds only), NJ to trickle feeds at 10 not to advance.. Rectal pouch in place. Watery stools maroon stools.  Activity: Up in chair for 2 hours exercises at bedside with PT/OT  Cardiac: HR between 80s-100s.  BPs between 120s-130s.   Vitals: Afebrile. On warming blanket to meet normothermia.   CRRT off at 10 am.   1 unit plates administered. Recheck pending.    Continue to monitor, notify MD with any concerns.

## 2018-08-24 NOTE — PLAN OF CARE
Problem: OT General Care Plan  Goal: Transfer (OT)  Transfer (OT)   Outcome: Change based on patient need/priority Date Met: 08/24/18

## 2018-08-24 NOTE — PROGRESS NOTES
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      Interval History :   Mr Miles had CRRT stopped yesterday with increasing UOP, following Cr which is only up slightly this am, still making >2L of UOP and was net negative yesterday, on track to be net negative today.  Will follow, hopeful with substantial UOP we are seeing recovery but will know more with labs tomorrow.         Assessment & Recommendations:   BEN-Baseline Cr of 1.3-2, up to 2.8 prior to starting CRRT on 7/30 due to hyperkalemia.  Thought to be due to sepsis, increased intra-abdominal pressure and prerenal state.  Started HD on 7/30, had temp LIJ placed at that time, replaced with R femoral on 8/13.  Had been anuric but now with increasing UOP particularly yesterday with 3.4L of UOP, CRRT stopped and we will decide on HD daily.  .                             -CRRT 7/30-8/23, transitioning to iHD vs recovery with increasing UOP.                           -Line is RFV 8/13      Volume status-Trace LE edema, mild BUE edema, was net negative 1L yesterday with increased UOP (2.2L).  Net negative so far today, holding CRRT and will see I/O's without UF.  Nearing admit wt, no major volume overload on exam.        Electrolytes/pH-K 2.7 with GI losses, bicarb 23.  No acute issues on CRRT.        Ca/phos/pth-Ca 7.1 but normal with albumin of 1.7, ical 4.6.  Phos 2.8, Mg 2.0, no acute issues.        Liver tx-7/21/18.  Complicated by arterial and venous vascular issues and ischemic bowel, splenic infarct, still with diffuse small bowel ischemia, last ex lap/washout 8/18.  + VRE in blood on 8/17 (peripheral blood).   INR 1.8, last tacro level 4.0 (goal 5-6).          ID-Multiple abdominal ID issues and + VRE in blood on 8/17, on amikacin and flagyl for peritonitis, on ceftaroline, tigecycline and daptomycin for VRE and micafungin for yeast in peritoneal fluid.        Anemia-Hgb 8.4, continuing to need blood products, a bit improved yesterday.        Nutrition-Starting TF with trickle feeds.        Seen and discussed with Dr Rodriguez      Recommendations were communicated to primary team via verbal communication.           aSul Pichardo  Clinical Nurse Specialist  [phone]    Review of Systems:   I reviewed the following systems:  ROS not done due to lethargy    Physical Exam:   I/O last 3 completed shifts:  In: 3167.38 [I.V.:1432.78; NG/GT:120]  Out: 4809 [Urine:2310; Emesis/NG output:200; Drains:416; Other:1158; Stool:725]   BP (!) 127/93  Pulse 77  Temp 97.4  F (36.3  C) (Oral)  Resp 27  Wt 61.7 kg (136 lb 0.4 oz)  SpO2 94%  BMI 21.95 kg/m2     GENERAL APPEARANCE: Extubated, in no distress.   EYES:  No scleral icterus, pupils equal  HENT: mouth without ulcers or lesions  PULM: lungs clear to auscultation, equal air movement, no cyanosis or clubbing  CV: regular rhythm, normal rate, no rub     -JVP not elevated.     -edema No LE edema, some   GI: soft, nontender, non-distended, bowel sounds are +  MS: no evidence of inflammation in joints, no muscle tenderness  NEURO: Extubated, moves all extremities.    Lines 8/13 femoral.     Labs:   All labs reviewed by me  Electrolytes/Renal -   Recent Labs   Lab Test  08/24/18   0434  08/23/18   0916  08/23/18   0352   NA  142  142  141   POTASSIUM  2.7*  3.4  3.2*   CHLORIDE  111*  110*  109   CO2  23  24  25   BUN  66*  37*  39*   CR  1.10  0.60*  0.58*   GLC  159*  113*  150*   JESUS  7.1*  7.1*  6.7*   MAG  2.0  1.9  2.0   PHOS  2.8  3.1  3.1       CBC -   Recent Labs   Lab Test  08/24/18   0434  08/24/18   0017  08/23/18   1804  08/23/18   0916   WBC  13.6*   --   11.6*  14.1*   HGB  8.4*  8.7*  8.4*  8.6*   PLT  47*    --   55*  41*       LFTs -   Recent Labs   Lab Test  08/24/18   0434  08/23/18   0916  08/23/18   0352   ALKPHOS  254*  277*  240*   BILITOTAL  2.9*  3.1*  3.0*   ALT  23 22 20   AST  62*  64*  56*   PROTTOTAL  4.1*  4.2*  3.8*   ALBUMIN  1.7*  1.7*  1.5*       Iron Panel -   Recent Labs   Lab Test  07/13/18   0334  06/17/18   2138   IRON  97  66   IRONSAT  83*  109*   ANGELLA  Unsatisfactory specimen - icteric   --            Current Medications:    ceftaroline (TEFLARO) intermittent infusion  300 mg Intravenous Q12H     [START ON 8/25/2018] DAPTOmycin (CUBICIN) intermittent infusion  12 mg/kg Intravenous Q48H     fluconazole  200 mg Intravenous Q24H     lipids  250 mL Intravenous Once per day on Mon Tue Wed Thu Fri     metroNIDAZOLE  500 mg Intravenous Q6H     pantoprazole (PROTONIX) IV  40 mg Intravenous BID     sodium chloride (PF)  3 mL Intravenous Q8H     tacrolimus  0.75 mg Oral or Feeding Tube BID IS     [START ON 8/25/2018] tenofovir  300 mg Per Feeding Tube Q72H     [START ON 8/27/2018] valGANciclovir  450 mg Oral Once per day on Mon Thu    Or     [START ON 8/27/2018] valGANciclovir  450 mg Oral or NG Tube Once per day on Mon Thu       IV fluid REPLACEMENT ONLY       heparin       insulin (regular) 1 Units/hr (08/24/18 1200)     lactated ringers 10 mL/hr at 07/30/18 1519     parenteral nutrition - ADULT compounded formula       parenteral nutrition - ADULT compounded formula 45 mL/hr at 08/24/18 0800     Reason beta blocker order not selected

## 2018-08-24 NOTE — PLAN OF CARE
Problem: Patient Care Overview  Goal: Plan of Care/Patient Progress Review  Outcome: No Change    Neuro: A&Ox4. Pupils 3mm, equal, and reactive. Moves all extremies and follows commands. Pt c/o abdominal pain - PRN Dilaudid given x1 which was effective.     Cardiac: ST. HR 100s-110s. MAPs>65.     Respiratory: On RA. Lung sounds coarse/clear with diminished bases. Scant, white, thin sputum present.     GI/: Urine output of 75-125ml/hr. Rectal pouch remains in place. Stools loose, watery and dark maroon/dark brown in color. Tube feedings infusing through NJ. NG to LIS with bile/bloody/red output present; MD aware of bloody/red output present.     Incisons: Abdominal incision dressing changed at 0000. Minimal to moderate amount of bloody/bright red drainage present around right and left CAROL drains. Left CAROL drain with 1-3ml/hr of bloody/bright red drainage present. Right CAROL drain with 15ml/hr of bloody/bright red drainage present; MD notified about right CAROL drainage increasing to 100ml/hr at ~0000.     Lines/Drips: PICC x3. R IJ x3. PIVx1. Right femoral dialysis catheter. Left femoral arterial line. TPN infusing at 45ml/hr. Insulin gtt titrated per protocol. Heparin gtt infusing at 5ml/hr.     Labs: 0400 Platelets 47; MD notified - 1 unit platelets ordered and given.     Family present at bedside overnight.   Continue to monitor and update MD as needed. Continue plan of care.    Problem: Diabetes Comorbidity  Goal: Diabetes  Patient comorbidity will be monitored for signs and symptoms of hyperglycemia or hypoglycemia. Problems will be absent, minimized or managed by discharge/transition of care.   Outcome: No Change  Pt remains on insulin gtt.

## 2018-08-24 NOTE — PLAN OF CARE
Problem: Patient Care Overview  Goal: Plan of Care/Patient Progress Review  Discharge Planner OT  4AB Reevaluation  Patient plan for discharge: rehab  Current status: Pt quite deconditioned, pt with active participation although quickly fatigued with simple ADLs. D lift transfers to chair with max A x2 to roll for sling placement.    Barriers to return to prior living situation: medical needs, severe deconditioning  Recommendations for discharge: LTACH followed by ARC to return to IND living   Rationale for recommendations: Pt presents with new deficits including decreased cognition and severe deconditioning due to prolonged hospitalization and ICU stay leading to decreased function and safety. Pt unable to complete transfers or ADLs without assist. Needs to achieve  Mod IND with walker and  stairs to return home with family. Pt will benefit from intensive, interdisciplinary ARU to address these deficits and to provide caregiver training to facilitate a safe dc to home.            Entered by: Ila Rosas 08/24/2018 10:35 AM

## 2018-08-24 NOTE — PROGRESS NOTES
NUTRITION SERVICES - BRIEF NOTE    Per SICU rounds, plan today to initiate central line holiday and start PPN. SICU team would like to continue with trickled TFs, no advancement today. Trickled TF of Peptamen 1.5 providing 360 kcal (6 kcal/kg) and 16 g PRO (0.3 g/kg), unclear how much is being absorbed in the setting of short bowel s/p procedure on 8/17.    *Note: PPN will NOT be providing adequate energy given fluid restrictions    Implementations  1. TPN Change:  -PPN 1680 mL/day with 100 g dex, 90 g AA, IV lipids 5x/week = 1057 kcal (17 kcal/kg), 1.5 g PRO/kg, 33% total kcal from fat, GIR = 1.1 mg/kg/min  -Use dosing weight of 61 kg    PPN regimen + Trickled TFs will provide 1417 kcal (23 kcal, 95% of MREE) and 106 g PRO (1.7 g/kg)    Monitoring  Nutrition will continue to follow and monitor per POC (see 8/23 RD note)    Halina Crouch, RD, LD  SICU RD Pgr: [phone]

## 2018-08-24 NOTE — PLAN OF CARE
Problem: Patient Care Overview  Goal: Plan of Care/Patient Progress Review  Discharge Planner PT   Patient plan for discharge: Rehab  Current status: Utilized ceiling lift for chair>MOVEO>bed. 4 sets x 10-12 reps on MOVEO squat table 10-15 degrees of upright  Barriers to return to prior living situation: Deconditioning  Recommendations for discharge: ARC; pt highly motivated  Rationale for recommendations: Current level of function and rehab trajectory       Entered by: Antonio Leonardo 08/24/2018 12:22 PM

## 2018-08-24 NOTE — PROGRESS NOTES
[facility]  Transplant Infectious Disease Progress Note      Patient:  Yaneli Miles, Date of birth 1975, Medical record number [id number] Date of Visit:  08/24/2018         Assessment and Recommendations:   Recommendations:  - Continue ceftaroline and daptomycin dose adjusted for improving renal function, now off CRRT  - Continuing flagyl and fluconazole.   - Continue tenofovir and vGCV  - duration of antibiotics probably at least 2 weeks from last surgery when source control was obtained (8/18),   - agree with teams plans for line holiday.   - would obtain daily BCx's through the weekend then stop.     Please call with questions.  Over the weekend Dr. Diaz is on call for questions.  Dr. Momin will resume the service on Monday.     Assessment: 43 year old male Hep B cirrhosis s/pOLT on 7/21 whose post-op course was complicated hemorrhagic shock, portal vein and hepatic artery occlusion, liver infarct w/ pneumotosis intestinalis w/ non-occlusive filling defects in SMV branches w/ small bowel ischemia on CT a/p 7/30/18, repeat scans 8/2018 w/ bowel ischemia and fabi perforation s/p ex lap 8/18/18 w/ bowel resection, closure of fascia and persistent VRE bacteremia. Splenic infarcts too. Extubated 8/22.  Sitting in a chair today  Abdominal drains remain in place with bloody drainage.  Remains afebrile. Currently holding CRRT.  Improving over the past few days.  Leukocytosis trend up today, downward trend overall.  Remains afebrile.     ID issues:  - Hepatic abscess per 8/15/2018 CT imaging; bowel perforation and feculant peritonitis at 8/17/2018 laparotomy. One of the sources of continuing VRE bacteremia, on ceftaroline, tigecycline (DC'd 8/23) , and daptomycin. Daptomycin SHARMIN initially 2 on first positive blood cultures.  Continue flagyl for coverage of anaerobic bowel bacterial kenneth. On micafungin for Candida albicans growing in 8/17/2018 peritoneal fluid but can narrow to azole  therapy.  As of most recent OR on 8/18, remaining bowel looked viable and fascia was closed.    - Persistent VRE bacteremia. He continues to have positive blood cultures with latest culture from 8/17/2018 growing, now negative BCx's from 8/19-present. He is on combination therapy with daptomycin & ceftaroline, as described in In vitro activity of daptomycin in combination with ß-lactams, gentamicin, rifampin, and tigecycline against daptomycin-nonsusceptible enterococci, where there was an apparent synergistic effect to combining the two. In the event that daptomycin is inactivated by surfactant in the lungs by an occult pulmonary process, tigecycline was added to his regimen but no evidence for pulmonary process with clear CXR and minimal vent support.    - Chronic hepatitis B: On tenofovir    Other ID issues:  -  Prophylaxis:TMP/SMX, vGCV, micafungin  - Viral serostatus D/R & prophylaxis: HCV negative/reactive, HBVnegative/reactive, CMV -/+, EBV+/+, valganciclovir ppx  - Gamma globulin status: Never checked.  - Isolation status: Contact, Good hand hygiene.    Attestation:  I have reviewed today's vital signs, medications, labs and imaging.      Floor time: 25 minutes, Face-to-face time: 10 minutes, Total time: 35 minutes  Faisal Momin,   Pager [phone]           Interval History:   Extubated 8/22/18.  CRRT currently on hold, awaiting plan for HD.  Junctional rhythm on 8/22 that resolved.   Tm 99, otherwise remains afebrile. Spoke with Yaneli today with a .  Minimal cough, no mouth pain, some abdominal discomfort.  Loose stools.      Transplants:  7/21/2018 (Liver), Postoperative day:  34.  Coordinator Kay Reyes    Review of Systems:   Unable to obtain review of systems due to mental status.         Current Medications & Allergies:       ceftaroline (TEFLARO) intermittent infusion  300 mg Intravenous Q12H     [START ON 8/25/2018] DAPTOmycin (CUBICIN) intermittent infusion  12 mg/kg Intravenous  Q48H     fluconazole  200 mg Intravenous Q24H     lipids  250 mL Intravenous Once per day on Mon Wed Fri     metroNIDAZOLE  500 mg Intravenous Q6H     pantoprazole (PROTONIX) IV  40 mg Intravenous BID     potassium chloride  80 mEq Per Feeding Tube Once     sodium chloride (PF)  3 mL Intravenous Q8H     tacrolimus  0.75 mg Oral or Feeding Tube BID IS     [START ON 8/25/2018] tenofovir  300 mg Per Feeding Tube Q72H     [START ON 8/27/2018] valGANciclovir  450 mg Oral Once per day on Mon Thu    Or     [START ON 8/27/2018] valGANciclovir  450 mg Oral or NG Tube Once per day on Mon Thu       Infusions/Drips:    IV fluid REPLACEMENT ONLY       heparin 500 Units/hr (08/24/18 0800)     insulin (regular) 1 Units/hr (08/24/18 0838)     lactated ringers 10 mL/hr at 07/30/18 1519     parenteral nutrition - ADULT compounded formula 45 mL/hr at 08/24/18 0800     Reason beta blocker order not selected         Allergies   Allergen Reactions     Nsaids      Contraindicated             Physical Exam:   Vitals were reviewed.  Ranges for vital signs:  Temp:  [97  F (36.1  C)-99  F (37.2  C)] 98.6  F (37  C)  Pulse:  [77] 77  Heart Rate:  [] 91  Resp:  [10-28] 21  MAP:  [88 mmHg-195 mmHg] 102 mmHg  Arterial Line BP: (123-201)/() 143/79  SpO2:  [93 %-100 %] 100 %  Vitals:    08/22/18 0400 08/23/18 0559 08/24/18 0400   Weight: 66 kg (145 lb 8.1 oz) 62.8 kg (138 lb 7.2 oz) 61.7 kg (136 lb 0.4 oz)       Physical Examination:   GENERAL:  Resting in a chair, on room air, chronically ill appearing overall.   EYES:  Icteric sclera  ENT:  NG in place.  LUNGS:  Good air entry.  Clear to auscultation.  CARDIOVASCULAR:  normocardic regular rate and rhythm, no murmur   ABDOMEN:  Hypoactive bowel sounds.  tender to palpation. Surgical dressing in place. Drain on each side of abdomen with small amounts of bloody drainage.  : Luis in place in clear yellow urine.  EXT: Increased lower extremity edema  SKIN:  No acute rashes.   T/L/D: R  femoral dialysis catheter, R internal jugular CVC, Left PICC, left femoral arterial line         Laboratory Data:     Metabolic Studies       Recent Labs   Lab Test  08/24/18 0434 08/23/18 0916 08/23/18 0352 08/22/18 0424 08/18/18 0411 07/13/18 2050 07/13/18   0334   NA  142  142  141   < >  140   < >  141   < >   --    < >  132*   POTASSIUM  2.7*  3.4  3.2*   < >  3.5   < >  3.6   < >   --    < >  2.6*   CHLORIDE  111*  110*  109   < >  108   < >  108   < >   --    < >  101   CO2  23  24  25   < >  25   < >  23   < >   --    < >  20   ANIONGAP  9  8  7   < >  7   < >  9   < >   --    < >  12   BUN  66*  37*  39*   < >  43*   < >  38*   < >   --    < >  30   CR  1.10  0.60*  0.58*   < >  0.73   < >  0.80   < >   --    < >  2.08*   GFRESTIMATED  72  >90  >90   < >  >90   < >  >90   < >   --    < >  35*   GLC  159*  113*  150*   < >  142*   < >  139*   < >   --    < >  104*   A1C   --    --    --    --    --    --    --    --   5.4   --    --    JESUS  7.1*  7.1*  6.7*   < >  6.6*   < >  7.3*   < >   --    < >  8.1*   PHOS  2.8  3.1  3.1   < >  3.9   < >  3.7   < >   --    --    --    MAG  2.0  1.9  2.0   < >  2.2   < >  2.3   < >   --    --    --    LACT   --   1.2  1.3   < >  0.9   < >   --    < >  3.9*   --   1.1   PCAL   --    --    --    --    --    --    --    --    --    --   0.89   CKT   --    --    --    --   44   --   53   < >   --    --    --     < > = values in this interval not displayed.       Hepatic Studies    Recent Labs   Lab Test  08/24/18 0434 08/23/18 0916 08/23/18 0352   07/13/18   0334   06/21/18   0612   BILITOTAL  2.9*  3.1*  3.0*   < >  36.3*   < >  23.4*   DBIL  2.2*   --   2.2*   < >  28.1*   < >   --    ALKPHOS  254*  277*  240*   < >  96   < >  78   PROTTOTAL  4.1*  4.2*  3.8*   < >  5.8*   < >  5.1*   ALBUMIN  1.7*  1.7*  1.5*   < >  2.8*   < >  2.9*   AST  62*  64*  56*   < >  287*   < >  124*   ALT  23  22  20   < >  125*   < >  79*   LDH   --    --    --     --   259*   --   177    < > = values in this interval not displayed.       Pancreatitis testing    Recent Labs   Lab Test  08/20/18   0443   07/22/18   0354  07/20/18   1741  07/12/18   1913   06/16/18   1815   AMYLASE   --    --   42  60  35   --    --    LIPASE   --    --   75   --    --    --   260   TRIG  40   < >   --    --    --    < >   --     < > = values in this interval not displayed.       Hematology Studies      Recent Labs   Lab Test  08/24/18   0434   08/23/18   1804  08/23/18   0916  08/23/18   0352  08/22/18   2209  08/22/18   1545   WBC  13.6*   --   11.6*  14.1*  12.1*  12.4*  12.1*   ANEU   --    --    --   13.3*  11.4*  11.7*  11.5*   ALYM   --    --    --   0.5*  0.5*  0.4*  0.4*   MARCELA   --    --    --   0.3  0.2  0.2  0.1   AEOS   --    --    --   0.0  0.0  0.0  0.0   HGB  8.4*   < >  8.4*  8.6*  6.9*  7.4*  7.7*   HCT  24.4*   --   24.4*  24.8*  19.8*  21.3*  22.1*   PLT  47*   --   55*  41*  40*  51*  36*    < > = values in this interval not displayed.       Clotting Studies    Recent Labs   Lab Test  08/24/18   0434  08/21/18   1645  08/20/18   1215  08/20/18   0443   INR  1.84*  1.68*  1.65*  1.68*   PTT  74*   --    --    --        Arterial Blood Gas Testing    Recent Labs   Lab Test  08/21/18   1332  08/18/18   1149  08/18/18   1048  08/17/18   2320  08/17/18   1608   PH  7.40  7.38  7.39  7.45  7.36   PCO2  40  40  38  36  36   PO2  163*  112*  331*  172*  177*   HCO3  25  23  23  25  20*   O2PER  40.0  56%  100  40.0  50        Urine Studies     Recent Labs   Lab Test  07/27/18   2330  07/13/18   1315  06/18/18   1200  06/16/18   2220   URINEPH  7.5*  6.0  5.0  5.5   NITRITE  Negative  Negative  Negative  Negative   LEUKEST  Negative  Negative  Negative  Large*   WBCU  3  None  2  66*       Body fluid stats    Recent Labs   Lab Test  08/18/18   1204   07/15/18   1115  07/13/18   1240  06/28/18   1111   FTYP   --    --   Ascites  Ascites  Ascites   FCOL   --    --   Bloody  Red  Yellow    FAPR   --    --   Cloudy  Cloudy  Slightly Cloudy   FWBC   --    --   245  373  234   FNEU   --    --   3  28  6   FLYM   --    --   19  11  24   FMONO   --    --   78   --    --    FALB   --    --    --   1.0   --    FTP   --    --    --   1.6   --    GS  No organisms seen  Many  WBC'S seen  predominantly PMN's    Many  Red blood cells seen     < >  No organisms seen  Few  WBC'S seen  predominantly mononuclear cells    No organisms seen  Few  WBC'S seen  PMNs seen    Many  Red blood cells seen     --     < > = values in this interval not displayed.       Microbiology:    Blood   6/17/18 negative   6/18/18 negative   6/21/18 negative   7/27/18 VRE   7/29/18 catheter tip Staphylococcus epidermidis   7/29/18 VRE   7/30/18 VRE   7/31/18 VRE   8/1/18 VRE, ESBL E. Coli   8/2/18 negative   8/4/18 negative   8/6/18 VRE   8/7/18 VRE   8/8/18 VRE   8/9/18 VRE   8/11/18 VRE   8/12/18 VRE   8/13/18 VRE   8/14/18 VRE   8/15/18 VRE   8/17/18 VRE   8/19/18 NGTD   8/21 NGTD   8/22 pending   8/23/18 pending     Fluid/Tissue   6/16/18 ascites negative   6/25/18 ascites negative   6/28/18 ascites negative   7/13/18 ascites negative   7/21/18 ascites negative   7/30/18 abdominal fluid VRE   8/3/18 intra-abdominal hematoma tissue VRE   8/13/18 Abdominal fluid VRE   8/17/18 Peritoneal fluid VRE, Candida albicans/dubliniensis   8/18/18 Hematoma fluid VRE, C. Albicans / dubliniensis    Urine  6/16/18 Enterococcus faecalis    VRE rectal culure   7/13/18  positive    Respiratory  6/17/18 sputum single colony Citrobacter freundii, light growth Candida albicans/dubliniensis    Imaging:  AXR: 8/23/18: Lines and tubes as above. Nonobstructive bowel gas\ pattern.     CXR 8/21:  1. Support devices as described above.  2. Trace bilateral pleural effusions with overlying atelectasis and/or consolidation, stable.    Liver U/S: 8/17/18:   1.  Transplant liver with patent vasculature.  2.  Decreased resistive indices in the hepatic artery  (0.3-0.4), highly concerning for upstream stenosis.  3.  Stable appearance of perihepatic fluid collection near the braden hepatis. Other previously described fluid collections on 8/13/2018 evaluation were not seen on this exam.  4.  Hyperechoic round lesion in the right lateral lobe, also seen on comparison CT, likely represents hemangioma.  5.  Right pleural effusion.    CT a/p w/ contrast: 8/15/18:   1.  Findings concerning for hepatic infarct involving the majority of the left hepatic lobe with associated complex fluid collection along inferior aspect, measuring up to 8.9 x 4.6 cm. The fluid collection likely represents evolving hematoma. Superimposed infection cannot be completely excluded in this patient with leukocytosis, although thought less likely based on evolving nature of this process since 7/30/2018 and sonographic appearance from ultrasound exams dated 7/30/2018 and 8/13/2018.   2.  Continued diffuse dilation of the small bowel transition point in the right lower quadrant (series 5 image [id number]). Findings concerning for small bowel obstruction.  3.  Postsurgical changes of abdominal wall abscess/hematoma evacuation with new soft tissue defect over the right upper quadrant and packing in place. There is surrounding postoperative hematoma.  4.  Unchanged biliary dilation, left lobe dominant, with a right-sided biliary stent in place.

## 2018-08-25 NOTE — PROGRESS NOTES
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 He returned to the SICU for shock, severe lactic acidosis, renal failure and respiratory failure, requiring intubation and dialysis.  Ischemic injury to left hemiliver and non-occlusive mesenteric ischemia.  He underwent a laparotomy and several subsequent take-backs to examine ischemic bowel, on 8/6 he underwent resection of necrotic jejunum with a primary anastomosis, and his abdomen was closed 8/8. He acutely decompensated beginning the night of 8/16, with increasing pressor needs, increasing abdominal pain and lactic acidosis, now s/p exploratory laparotomy 8/17 and s/p entero-entero small bowel anastomosis, drain placement and fascial closure 8/18.  Continues to require ICU cares for sepsis and hemodynamic monitoring.    TODAY'S PROGRESS/PLANS:   - continue line holiday  - start fiber, imodium for high stool output  - potassium replacement, K this morning 2.8  - peripheral nutrition, 70 ml/hr   - will discuss with transplant possible EGD in coming days to evaluate melena and blood from NG  - ABX to end 9/2    PLAN:  Neuro/ pain/ sedation:  # acute pain  - monitor neurological status; notify the MD/DO for any acute changes in exam.  - PRN IV dilaudid and oxycodone    Pulmonary care:   # acute respiratory failure - resolved  - extubated (8/22)  - supplemental O2 for sats >92%      Cardiovascular:    #Septic shock - resolving  # Hemodynamic instability - resolving  # Junctional rhythm on 8/22 EKG, unclear cause, resolved 8/24  - monitor hemodynamic status   - MAP goals > 60  - TTE 8/8 showed no vegetations or valve abnormalities  - TOÑO 8/10 no  evidence of endocarditis   - EKG (8/23): junctional bradycardia   - EKG (8/24): Normal sinus rhythm    GI care:   # DDLT 7/21/18  # Intrahepatic left portal venous thrombosis, infarction of left liver lobe, patchy infarction of right lobe, suspected hepatic arterial thrombosis  # 7/30/18 s/p exploratory laparotomy and liver biopsy  # Splenic infarctions, patchy bowel ischemia  # Ex Lap 8/6 - resection of 90 cm of necrotic jejunum  # Ex Lap 8/8 - abdomen closed  # Ex Lap 8/17 - Found to have widespread bowel ischemia requiring bowel resection.  Left in discontinuity.  # Ex Lap 8/18 - entero-entero anastomosis and facial closure.  Approximately  cm small bowel remaining with intact ileocecal valve and colon.    - Two episodes of bowel ischemia of unclear etiology requiring resection of the majority of small bowel,  cm of small bowel remaining.  Now in continuity with entero-entero anastomosis.  - trickle feeds at 10 ml/hr  - Incision care per transplant team  - Blood in NG and maroon stools overnight. Continues to bleed from CAROL drain sites and ooze blood from incision  - Initiate fiber and imodium for increasing stool output in the setting of short gut    Fluids/ Electrolytes/ Nutrition:   # protein calorie deficit malnutrition   # NPO  - TF's at trickle feed 10 ml/h  - Peripheral parenteral nutrition at 70 ml/hr in order to do a line holiday  - electrolytes to be replaced as needed, will add to PPN per pharmacy    Renal/ Fluid Balance:    # BEN   # severe metabolic acidosis, lactic acidosis - resolved  # severe hyponatremia - resolved  # hyperkalemia - resolved  # renal failure - resolving  # hematuria - resolved  - CRRT stopped due to adequate urine output, nephrology following for HD as needed  - HD catheter removed for line holiday  - Will continue to monitor intake and output.  - Luis in place    Endocrine:    # DM II  - insulin gtt    ID/ Antibiotics:  # VRE Bacteremia - treated  # ESBL Bacteremia -  treated  # Hepatitis B   ABX ongoing and ID transplant following.  - Daptomycin (7/30- ), ceftaroline (8/13- ), continue until 9/2 per Transplant ID  - Micafungin (7/30-8/22) changed to fluconazole (8/22- )  - Flagyl (8/17 - )  - Tigecycline (8/13-8/23), discontinued after 4 days of negative blood cultures  - Meropenem (8/2-8/14), discontinued after 12 days of negative cultures for ESBL  - Amikacin (8/17 - 8/21), discontinued as he had completed 2-week course of meropenem and has other active agents for G- coverage  - Immunosuppression: (restarted 8/6) tacrolimus  - Immunosuppression prophylaxis: Valgancyclovir and Tenofovir, nystatin when micafungin stopped.  Bactrim stopped   - Transplant ID following  - Blood cultures daily through this weekend per ID    Cultures   - 7/27 VRE (line/arm)   - 7/29: VRE (left arm)   - 7/30: VRE (left arm)   - 7/31: VRE (Line and peripheral)  - 8/1: ESBL (HD/hand/central line)   - 8/3: VRE (intra-abdominal tissue)  - 8/4: NGTD (blood)  - 8/5: NGTD (blood)  - 8/6: VRE (art line)  - 8/7: VRE (blood)  - 8/8: VRE (blood)  - 8/9: VRE (blood)  - 8/11: VRE (blood)  - 8/12: VRE (abdominal fluid)  - 8/13: VRE (blood)  - 8/14: VRE (blood)  - 8/15: VRE (blood)  - 8/17: NGTD (blood)   - 8/17: VRE & Candida (peritoneal fluid)   - 8/18: G+ cocci (tissue)  - 8/19 NGTD (blood)  - 8/21 NGTD (blood)  - 8/22 NGTD (blood)  - 8/23 NGTD (blood)  - 8/24 NGTD (blood)    Heme:     # Acute blood loss anemia  # Anemia of critical illness  # Portal venous thrombosis  # Thrombocytopenia  # Coagulopathy   - Heparin at 400 straight rate  - Hold aspirin 81mg for continued bleeding  - HIT panel sent and negative 8/2.  Repeat HIT panel on 8/13 negative; Hematology consult per Transplant in setting of low platelets - believe this is likely his new baseline  - antithrombin III, protein C, protein S all low  - transfused 1 units of platelets overnight    MSK:  # weakness and deconditioning of critical illness   - PT/OT      Prophylaxis:    - DVT: straight rate heparin, SCD's  - GI: protonix     Lines/ tubes/ drains:  - NJ, NG, PIVs.   - Line holiday over the weekend    Disposition:  - SICU      ====================================    SUBJECTIVE:   Overnight, there were no events. Continues to have oozing from wound. Stool continues to look maroon. Some blood in the NG tube.       OBJECTIVE:   1. VITAL SIGNS:   Temp:  [96.4  F (35.8  C)-98.9  F (37.2  C)] 98.2  F (36.8  C)  Pulse:  [89] 89  Heart Rate:  [] 85  Resp:  [15-28] 18  BP: (110-140)/() 132/84  MAP:  [102 mmHg-113 mmHg] 108 mmHg  Arterial Line BP: (136-152)/() 146/80  SpO2:  [90 %-100 %] 95 %  Resp: 18    2. INTAKE/ OUTPUT:   I/O last 3 completed shifts:  In: 4393.73 [I.V.:1001.93; NG/GT:375]  Out: 4877 [Urine:3600; Emesis/NG output:100; Drains:602; Stool:575]    3. PHYSICAL EXAMINATION:   General: lying in bed, appears comfortable  Neuro: alert, communicating, moving extremities  Resp: Non-labored on RA  CV: RRR  Abdomen: soft, non-distended, non-tender  Incisions: upper abdominal incision packed and dressed, bloody drainage from wound  Extremities: warm, well perfused      4. INVESTIGATIONS:   Arterial Blood Gases     Recent Labs  Lab 08/21/18  1332 08/18/18  1149 08/18/18  1048   PH 7.40 7.38 7.39   PCO2 40 40 38   PO2 163* 112* 331*   HCO3 25 23 23     Complete Blood Count     Recent Labs  Lab 08/25/18  0407 08/24/18  1641 08/24/18  0434 08/24/18  0017 08/23/18  1804   WBC 11.4* 10.6 13.6*  --  11.6*   HGB 8.8* 6.7* 8.4* 8.7* 8.4*   PLT 38* 48* 47*  --  55*     Basic Metabolic Panel    Recent Labs  Lab 08/25/18  0407 08/24/18  0434 08/23/18  0916 08/23/18  0352 08/22/18 2209   NA  --  142 142 141 142   POTASSIUM 2.8* 2.7* 3.4 3.2* 3.4   CHLORIDE  --  111* 110* 109 109   CO2  --  23 24 25 25   BUN  --  66* 37* 39* 39*   CR  --  1.10 0.60* 0.58* 0.65*   GLC  --  159* 113* 150* 174*     Liver Function Tests    Recent Labs  Lab 08/25/18  0407  08/24/18  0434 08/23/18  0916 08/23/18  0352  08/21/18  1645  08/20/18  1215 08/20/18  0443   AST 50* 62* 64* 56*  < >  --   < > 64* 56*   ALT 23 23 22 20  < >  --   < > 24 26   ALKPHOS 206* 254* 277* 240*  < >  --   < > 250* 245*   BILITOTAL 3.8* 2.9* 3.1* 3.0*  < >  --   < > 2.8* 2.8*   ALBUMIN 1.9* 1.7* 1.7* 1.5*  < >  --   < > 1.5* 1.7*   INR  --  1.84*  --   --   --  1.68*  --  1.65* 1.68*   < > = values in this interval not displayed.  Pancreatic Enzymes  No lab results found in last 7 days.  Coagulation Profile    Recent Labs  Lab 08/24/18 0434 08/21/18  1645 08/20/18  1215 08/20/18  0443  08/18/18  1812 08/18/18  1321   INR 1.84* 1.68* 1.65* 1.68*  < > 2.18* 2.20*   PTT 74*  --   --   --   --  85* 66*   < > = values in this interval not displayed.  Lactate  Invalid input(s): LACTATE    5. RADIOLOGY:   No results found for this or any previous visit (from the past 24 hour(s)).       --------------------------------------------------------------------------------------------------------------------------  Patient seen, findings and plan discussed with surgical ICU staff Dr. Roberto.    Dre Holley, PGY2  General Surgery  [phone]        Staff Summary  8/25/2018     weight is 60.2 kg (132 lb 11.5 oz). His axillary temperature is 97.7  F (36.5  C). His blood pressure is 113/80 and his pulse is 93. His respiration is 16 and oxygen saturation is 95%.     Intake/Output Summary (Last 24 hours) at 08/25/18 1425  Last data filed at 08/25/18 1400   Gross per 24 hour   Intake           4716.2 ml   Output             5256 ml   Net           -539.8 ml             --------------------------------Staff Summary  8/25/2018     weight is 60.2 kg (132 lb 11.5 oz). His axillary temperature is 97.7  F (36.5  C). His blood pressure is 113/80 and his pulse is 93. His respiration is 16 and oxygen saturation is 95%.     Intake/Output Summary (Last 24 hours) at 08/25/18 1423  Last data filed at 08/25/18 1400   Gross per 24 hour    Intake           4716.2 ml   Output             5256 ml   Net           -539.8 ml       Patient is critically ill by my examination on the date of service (DOS) listed above and requires continued ICU monitoring and cares.  The patient is being seen in the Surgical Intensive Care Unit.      I have discussed patient cares and treatment plan with the ICU team as reflected in the residents note by Dr. Holley dated 8/25/2018.  All pertinent labs, imaging studies, physical exam and medications have been reviewed by myself with the SICU team. A Luis catheter, if left in place, is required to monitor resuscitative efforts, tightly manage ins and outs, necessary for patient care needs, as well as other ICU cares.    Evaluation and management time exclusive of procedures was 30 minutes critical care time in the management and care of this patient including:  examination with the SICU team, discussion of the patient's condition with other physicians and members of the care team, reviewing all data related to the patient, and time utilizing the EMR for documentation of this patient's care.  All data, assessments and plans were discussed with Dr. Holley and ICU team.    Patient has short gut syndrome, s/p liver transplant, imunosupression and malnutrition: I spent 45 minutes while the patient was in this condition, providing critical care. I reviewed the resident s documentation and I agree with his assessment and plan of care.    Erin Roberto MD    --------------------------------

## 2018-08-25 NOTE — PROGRESS NOTES
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  Developed shock with severe lactic acidosis and respiratory failure on POD9. CT scan showed left PV venous thrombosis, infarction of left liver lobe, patchy infarction of right lobe and possible hepatic arterial thrombosis, patchy bowel ischemia. Transferred to ICU.   Ex lap, patchy diffuse SB ischemia. Doppler demonstrated arterial flow main vessels to bowel and liver. Splenic infarction. Liver biopsy negative for acute rejection. Fluid culture growing VRE. On Heparin gtt. 8/3 OR for reexploration liver transplant, bowel ischemia, findings improved patchy iscemic changes of small bowel, patchy ischemic changes of left liver lobe.  OR: some ischemic/necrotic jejunum, s/p resection.   Washout and abdominal closure with stratice mesh, no drains placed. Minimal ascites, intact small bowel anastomosis, patchy ischemia left liver lobe.  peritonitis with bowel necrosis and bowel perforation. S/p bowel resection, about 50 cm sm bowel, ileocecal valve and colon remaining.     Procedures:    donor (DBD) liver transplant with duct to duct anastomosis over biliary stent.    Ex lap due to concern for ischemic bowel, findings: patchy diffuse SB ischemia. Doppler demonstrated arterial flow main vessels to bowel and liver. Splenic infarction.   8/3 Reexploration liver transplant, bowel ischemia, findings improved patchy iscemic changes of small bowel, patchy ischemic changes of left liver lobe.    Re-exploration, ischemic/necrotic jejunum, s/p resection   Washout and abdominal closure with stratice mesh, no drains placed. Minimal ascites, intact small bowel  anastomosis, patchy ischemia left liver lobe.  8/17 Procedure: reopening of laparotomy. abd washout, bowel resection (clip and drop fashion). Postop Dx: same. Large segment of ischemic bowel from mid-jejunum (prior anastomosis) with distal perforation and feculent peritonitis.   8/18 Reexploration, small and large bowel intestine appeared healthy per surgeon, primary anastomosis bowel. Fascia closure.  8/20 Reopening of laparotomy. abdominal washout, bowel resection of 200cm    Graft function: POD #35, LFTs stable with elevated bilirubin, now trending down.  7/30 Hepatic infarction, left PV thrombosis, and narrow and irregular appearance HA noted on CTA. 8/8 Liver biopsy negative for acute rejection. Intra-abdominal  cultures from 8/1, 8/6, 8/17, 8/18 + VRE or GPCs.   Incision opened at bedside 8/12, hematoma removed.  8/13 US: new perihepatic fluid collections (largest 7x2x4), small volume of septated ascites.  VAC placed 8/15. 8/15 CT scan, left lobe infarct with complex fluid collection inferior.  8/17 US: patent vasculature but RIs concerning for HA stenosis, stable fluid collection near braden hepatis.     Immunosuppression management:   Induction: Simulect intra-op due to BEN.  Steroid taper per protocol, completed.  MMF held due to critical status  Tac goal level ~5-6 due to sepsis and BEN. Level 4.0 today (10-hour trough), continue current dose and repeat level daily.  Hydrocortisone, now tapered off.  Complexity of management: Medium. Contributing factors: thrombocytopenia and anemia, ARF    Hematology:   Anemia: Acute blood loss.  Bleeding from lines, wound, and drain site overnight.  Hgb stable at 8.8 today.  Multiple transfusions in the last week.  Thrombocytopenia: Ongoing transfusions. Platelet count remains low at 38, got one unit platelets today.  Anticoagulation: Anticoagulate for bowel ischemia and portal thrombosis.  Heparin gtt low intensity resulted in diffuse oozing. Dextran 10 ml/hr for  additional anticoagulation, stopped on 8/23 DC due to widespread oozing. Continue heparin drip 400u/hr today.  Concern for protein C/protein S deficiency, labs in process.    Cardiorespiratory:    Hypotension secondary to septic shock:  Resolved, off pressors.  Respiratory failure secondary to sepsis: Extubated 8/22.  Junctional rhythm:  Onset 8/22, now resolved.    GI/Nutrition: NG.  NPO.  On TPN.  TF 10 ml/hr for healing.   Ischemic bowel:  7/30 CTA: nonocclusive filling defects in superior mesenteric vein branches.  Anticoagulation as above.  S/p resection of necrotic jejunum on 8/6. S/p resection of 150cm of ischemic bowel with perforation on 8/17.  PSBO:  Noted on 8/15 CT, transition point RLQ. S/p resection on 8/17.   Bowel perforation/peritonitis:  Small bowel perforation with feculent peritonitis noted in OR on 8/17. S/p resection, abx as below.  GIB:  BRBPR today along with generalized oozing.  Transfuse as needed.    Endocrine:  DM type 2, continue insulin drip.      Fluid/Electrolytes/Renal: BEN/ARF. CRRT.    : Traumatic bah placement with intermittent bleeding.   Hematuria now resolved. Bah, coude 18fr in place    Infectious disease: Afebrile. WBC increased to 11.4 today (from 10.6).  -Amikacin (8/17-8/20), flagyl (8/17-), ceftraroline (8/13-), tigecycline (8/13-8/23), daptomycin (8/14-), micafungin (7/30-8/21), fluconazole (8/22-).   -ID following  -Blood cultures through 8/18 + VRE or GPCs  -8/17 Peritoneal fluid culture + VRE, candida albicans  -8/19, 8/21, 8/23 Blood cx, no growth to date    1. VRE intra abdominal and bacteremia (dapto, ceftaroline, tigecycline)  2. Infarct left lobe liver with inferior perihepatic fluid collection  3. Peritonitis (metronidazole, fluconazole)    Previous ID:   3. Ecoli ESBL bacteremia. Repeat bcx on 8/1 (peripheral and VAD) grew Ecoli ESBL. Zosyn changed to Meropenem, completed on 8/14.  SICU to exchange lines as able. Blood cultures negative for E. Coli since  8/2.  4. HBV: Hepatits B DNA PCR positive prior to tx. Received HBIG on  and . 8/10 Hep B surface antigen negative. No further HBIG planned. Continue tenofovir 300 mg, dose adjusted for HD       Prophylaxis: DVT PCDs, on heparin, fall, GI, Valcyte x 12 weeks (CMVand EBV IgG +), bactrim.   Disposition: SICU    Coordinator: Kay Reyes  Surgeon: Pako    Check Hep B surface antigen at 3 months, 6 months then annually     Medical Decision Making: High    JAY/Fellow/Resident Provider:  Becka Nguyen NP  [id number]    Faculty: Stew Min MD   __________________________________________________________________  Transplant History: Admitted 2018 for  donor liver transplant.   The patient has a history of liver failure due to hepatitis B .    2018 (Liver), Postoperative day: 35     Interval History:  Respiratory status improved today, now on room air. Less oozing, stool more maroon in color.     ROS:   A 10-point review of systems was negative except as noted above.    Curent Meds:    ceftaroline (TEFLARO) intermittent infusion  300 mg Intravenous Q12H     DAPTOmycin (CUBICIN) intermittent infusion  12 mg/kg Intravenous Q48H     fiber modular  1 packet Per Feeding Tube TID     fluconazole  200 mg Intravenous Q24H     lipids  250 mL Intravenous Once per day on      loperamide  2 mg Oral 4x Daily     metroNIDAZOLE  500 mg Intravenous Q6H     pantoprazole (PROTONIX) IV  40 mg Intravenous BID     sodium chloride (PF)  3 mL Intravenous Q8H     tacrolimus  1 mg Oral or Feeding Tube BID IS     tenofovir  300 mg Per Feeding Tube Q72H     [START ON 2018] valGANciclovir  450 mg Oral Once per day on     Or     [START ON 2018] valGANciclovir  450 mg Oral or NG Tube Once per day on        Physical Exam:     Admit Weight: 60.7 kg (133 lb 12.8 oz)    Vital sign ranges:    Temp:  [97  F (36.1  C)-98.9  F (37.2  C)] 97.5  F (36.4  C)  Pulse:  [86-93] 93  Heart Rate:  []  84  Resp:  [15-28] 18  BP: (110-140)/() 123/87  SpO2:  [90 %-100 %] 97 %    General Appearance: NAD  HEENT:  NG (green/tan), NJ  Skin: warm, dry  Heart: NSR  Lungs: Diminished  Abdomen: Incision covered with burn pads (oozing per nursing), abd soft, rectal pouch with BRB per nursing, CAROL drain with serosanguinous drainage  : Luis cl yellow  Extremities: well perfused. Generalized edema.  Neurologic: Alert and oriented x 3.  Access: PICC, CVC, PIV  Femoral A-line    Data:   CMP    Recent Labs  Lab 08/25/18  1504 08/25/18  0407 08/24/18  0434 08/23/18  0916 08/23/18  0352   * 149* 142 142 141   POTASSIUM 3.3* 2.8*  2.8* 2.7* 3.4 3.2*   CHLORIDE 117* 116* 111* 110* 109   CO2 24 21 23 24 25   * 145* 159* 113* 150*   BUN 82* 79* 66* 37* 39*   CR 1.53* 1.49* 1.10 0.60* 0.58*   GFRESTIMATED 49* 51* 72 >90 >90   GFRESTBLACK 60* 62 88 >90 >90   JESUS 7.7* 7.5* 7.1* 7.1* 6.7*   ICAW  --   --   --  4.4 4.6   MAG  --  1.8 2.0 1.9 2.0   PHOS  --  2.5 2.8 3.1 3.1   ALBUMIN  --  1.9* 1.7* 1.7* 1.5*   BILITOTAL  --  3.8* 2.9* 3.1* 3.0*   ALKPHOS  --  206* 254* 277* 240*   AST  --  50* 62* 64* 56*   ALT  --  23 23 22 20     CBC    Recent Labs  Lab 08/25/18  1501 08/25/18  0407   HGB 7.7* 8.8*   WBC 10.2 11.4*   PLT 35* 38*     COAGS    Recent Labs  Lab 08/24/18  0434 08/21/18  1645  08/18/18  1812   INR 1.84* 1.68*  < > 2.18*   PTT 74*  --   --  85*   < > = values in this interval not displayed.   Urinalysis  Recent Labs   Lab Test  07/27/18   2330  07/13/18   1315  06/18/18   1200   COLOR  Yellow  Dark Yellow  Yellow   APPEARANCE  Clear  Clear  Clear   URINEGLC  Negative  Negative  Negative   URINEBILI  Negative  Large*  Small*   URINEKETONE  Negative  Negative  Negative   SG  1.008  1.013  1.008   UBLD  Negative  Negative  Negative   URINEPH  7.5*  6.0  5.0   PROTEIN  30*  10*  Negative   NITRITE  Negative  Negative  Negative   LEUKEST  Negative  Negative  Negative   RBCU  5*  <1  1   WBCU  3  None  2   UTPG    --    --   0.75*     Virology:  Hepatitis C Antibody   Date Value Ref Range Status   07/20/2018 Nonreactive NR^Nonreactive Final     Comment:     Assay performance characteristics have not been established for newborns,   infants, and children         POD 14 US liver:  1.  The left portal vein is antegrade with decreased velocity,  measuring approximately 15 cm/second. This vessel was occluded on CT  7/30/2018.  2.  Elevated velocities of the main hepatic artery, now 203 cm/sec  (previously 94 cm/sec). However left hepatic arteries demonstrates  steep upstroke suggesting there is not a significant stenosis.   2a. Right hepatic artery was not visualized, could be occluded or  difficult to see postoperatively.  3. Echogenic focus of the right lobe in close proximity to the  hepatorenal fossa. This likely corresponds with nonenhancing lesion on  CT 7/30/2018 and likely representing subcapsular hematoma.  4. Additional small hematomas of the braden hepatis and posterior right  perihepatic region.  5. Small volume anechoic ascites.  6. Left hepatic lobe not well visualized secondary to open wound  preventing imaging. Note the left lobe was grossly abnormal on CT  7/30/2018    POD 1 US liver: Impression:   1.  Hematoma associated with the inferior aspect of the perihepatic  space anteriorly, measuring 13.7 x 7.0 x 8.5 cm.  2.  Retrograde flow of the left portal vein. Additionally, low  velocities of the portal venous system. Single low resistive index of  the extrahepatic aspect of the hepatic artery, measuring 0.37. These  findings are likely within normal limits in the early postoperative  context. However, continued attention on follow-up recommended.    7/30 CT scan abd/pelvis:  IMPRESSION:   1. Hypoenhancing areas in the transplant liver concerning for  infarction. Transplant left portal vein occlusion with bubbles of  portal venous gas. Nearly occlusive filling defect in the native  hepatic artery. Narrowed and  irregular appearance of the transplant  hepatic artery. Focal occlusion of the left main hepatic artery.  Segmental occlusions of right hepatic arterial branches.   2. Pneumatosis intestinalis with nonocclusive filling defects in  superior mesenteric vein branches, concerning for bowel ischemia or  infarction.   3. Splenic infarctions.   4. Bibasilar consolidation with air bronchograms. Pneumonia cannot be  excluded.   5. Post surgical changes of evacuation of peritransplant hematoma.       Attestation:    The patient has been seen and evaluated by me.   Vital signs, labs, medications and orders were reviewed.   When obtained, diagnostic images were reviewed by me and interpreted as above.    The care plan was discussed with the multidisciplinary team and I agree with the findings and plan in this note, with any differences recorded in blue.    Immunosuppressive medication management was reviewed and adjusted as reflected in the note and orders.     .

## 2018-08-25 NOTE — PLAN OF CARE
Problem: Patient Care Overview  Goal: Plan of Care/Patient Progress Review  PT/4a:   Discharge Planner PT   Patient plan for discharge: not discussed  Current status: pt remains very weak, yet making daily progress. Performed LE strengthening exercises this morning; rolls w/ mod-max A w/ cues for technique; dangled EOB ~8 minutes w/ CGA.   Barriers to return to prior living situation: significant weakness, deconditioning  Recommendations for discharge: acute inpatient rehab  Rationale for recommendations: pt making daily progress, would benefit from coordinated care at [facility] acute medical management needs, multiple disciplinary rehab needs, can tolerate 3 hours of therapy per day; pt previously functionally IND, now requiring Ax2 for bed mobility, dependent lift bed > chair as unable to stand at this time.        Entered by: Liana Restrepo 08/25/2018 10:08 AM

## 2018-08-25 NOTE — PLAN OF CARE
Problem: Patient Care Overview  Goal: Plan of Care/Patient Progress Review  Outcome: No Change  Assumed care of patient at approximately 2300.  Oriented x 2.  Moves all extremities but weak.  Follows basic commands.  SR on monitor 80s-90s.  BP stable overnight not requiring intervention.  On room air.  Patient breathing shallow/labored at beginning and occasionally dropping sats to mid-upper 80s, but patient sats and work of breathing improved after being encouraged to cough, deep breath, and suctioned.  Initially patient not producing much sputum, but cough getting progressively stronger overnight with encouragement and moderate amount of secretions suctioned this am.  Lung sounds remain coarse.  NPO; trickle feeds continue @ 10, and PPN infusing.  Continues to have maroon colored diarrhea.  Rectal pouch replaced due to leakage.  Wet to dry dressing change completed to abdomen.  Luis catheter patent and draining.  Platelets to be infused this am.

## 2018-08-25 NOTE — PLAN OF CARE
Problem: Patient Care Overview  Goal: Plan of Care/Patient Progress Review  Outcome: No Change          Nursing Progress Note    D/I/A:  Neuro: Lethargic, d/o to time and situation. PERRL. Hand /ankle strength weak, equal bilaterally; purposeful movement, follows commands.   CV: Sinus rhythm/sinus tachycardia, no ectopy noted. BP stable, no interventions required. Tmin 96.8F Oral; warm blankets applied with normothermia maintained. Hgb 6.7 from 8.4 this afternoon, given 1U RBCs; okay to re-check Hgb with AM labs, no increase in frequency per SICU Provider. Platelets continue to be <50.  Pulm: LS clear to coarse, diminished throughout. Weak, ineffective cough; requiring much encouragement for CADB. On RA.  GI/:  Insulin gtt maintained. Transitioned from TPN to PPN in attempt to remove necessity of PICC line. TF via NJ at 10cc/hr with standard H2O flushes. Meds via NG; LIS when not clamped, scant amt pink-tinged output. Abd dressing c/d/i. Rectal pouch with moderate amt reddish-brown, maroon output; providers aware, to continue to monitor and assess closely. PRN Oxycodone given for pain, pt reports adequate relief. Luis (coude catheter) UOP adequate.  Drains: See above for NJ/NG/Rectal pouch/Luis. Bilateral ACROL drains with small to moderate bloody output, providers aware.     P: Continue to monitor and assess. Update POC as needed.    BP (!) 116/99  Pulse 89  Temp 98.7  F (37.1  C) (Axillary)  Resp 18  Wt 61.7 kg (136 lb 0.4 oz)  SpO2 96%  BMI 21.95 kg/m2  Davidadeondre Vannesa  August 24, 2018  9:42 PM

## 2018-08-26 NOTE — PROGRESS NOTES
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 He returned to the SICU for shock, severe lactic acidosis, renal failure and respiratory failure, requiring intubation and dialysis.  Ischemic injury to left hemiliver and non-occlusive mesenteric ischemia.  He underwent a laparotomy and several subsequent take-backs to examine ischemic bowel, on 8/6 he underwent resection of necrotic jejunum with a primary anastomosis, and his abdomen was closed 8/8. He acutely decompensated beginning the night of 8/16, with increasing pressor needs, increasing abdominal pain and lactic acidosis, now s/p exploratory laparotomy 8/17 and s/p entero-entero small bowel anastomosis, drain placement and fascial closure 8/18.  Since that time his renal function has improved (off dialysis), his persistent VRE bacteremia has resolved.    TODAY'S PROGRESS/PLANS:   - continue line holiday  - monitor stool output  - potassium and phos replacement  - peripheral nutrition, 70 ml/hr   - Remove NG tube, KUB to eval for NJ placement and possible repositioning   - Platelet transfusion  - ABX to end 9/2    PLAN:  Neuro/ pain/ sedation:  # acute pain  - monitor neurological status; notify the MD/DO for any acute changes in exam.  - PRN IV dilaudid and oxycodone    Pulmonary care:   # acute respiratory failure - resolved  - extubated (8/22)  - supplemental O2 for sats >92%    -Pulm toilet    Cardiovascular:    #Septic shock - resolving  # Hemodynamic instability - resolving  # Junctional rhythm on 8/22 EKG, unclear cause, resolved 8/24  - monitor hemodynamic status   - MAP goals > 60  - TTE 8/8 showed  no vegetations or valve abnormalities  - TOÑO 8/10 no evidence of endocarditis   - EKG (8/23): junctional bradycardia   - EKG (8/24): Normal sinus rhythm    GI care:   # DDLT 7/21/18  # Intrahepatic left portal venous thrombosis, infarction of left liver lobe, patchy infarction of right lobe, suspected hepatic arterial thrombosis  # 7/30/18 s/p exploratory laparotomy and liver biopsy  # Splenic infarctions, patchy bowel ischemia  # Ex Lap 8/6 - resection of 90 cm of necrotic jejunum  # Ex Lap 8/8 - abdomen closed  # Ex Lap 8/17 - Found to have widespread bowel ischemia requiring bowel resection.  Left in discontinuity.  # Ex Lap 8/18 - entero-entero anastomosis and facial closure.  Approximately  cm small bowel remaining with intact ileocecal valve and colon.    - Two episodes of bowel ischemia of unclear etiology requiring resection of the majority of small bowel,  cm of small bowel remaining.  Now in continuity with entero-entero anastomosis.  - trickle feeds at 10 ml/hr  - Incision care per transplant team  - Remove NG and evaluate NJ placement with KUB  - Continue fiber and imodium for high stool output in the setting of short gut    Fluids/ Electrolytes/ Nutrition:   # protein calorie deficit malnutrition   # NPO  - TF's at trickle feed 10 ml/h  - Peripheral parenteral nutrition at 70 ml/hr in order to do a line holiday  - electrolytes to be replaced as needed, will add to PPN per pharmacy    Renal/ Fluid Balance:    # BEN   # severe metabolic acidosis, lactic acidosis - resolved  # severe hyponatremia - resolved  # hyperkalemia - resolved  # renal failure - resolving  # hematuria - resolved  - CRRT stopped due to improved renal function  - HD catheter removed for line holiday  - Will continue to monitor intake and output.  - Luis in place for strict I's and O's    Endocrine:    # DM II  - insulin gtt    ID/ Antibiotics:  # VRE Bacteremia - treated  # ESBL Bacteremia - treated  # Hepatitis B   ABX  ongoing and ID transplant following.  - Daptomycin (7/30- ), ceftaroline (8/13- ), continue until 9/2 per Transplant ID  - Micafungin (7/30-8/22) changed to fluconazole (8/22- )  - Flagyl (8/17 - )  - Tigecycline (8/13-8/23), discontinued after 4 days of negative blood cultures  - Meropenem (8/2-8/14), discontinued after 12 days of negative cultures for ESBL  - Amikacin (8/17 - 8/21), discontinued as he had completed 2-week course of meropenem and has other active agents for G- coverage  - Immunosuppression: (restarted 8/6) tacrolimus  - Immunosuppression prophylaxis: Valgancyclovir and Tenofovir, nystatin when micafungin stopped.  Bactrim stopped   - Transplant ID following  - Blood cultures daily through this weekend per ID    Cultures   - 7/27 VRE (line/arm)   - 7/29: VRE (left arm)   - 7/30: VRE (left arm)   - 7/31: VRE (Line and peripheral)  - 8/1: ESBL (HD/hand/central line)   - 8/3: VRE (intra-abdominal tissue)  - 8/4: NGTD (blood)  - 8/5: NGTD (blood)  - 8/6: VRE (art line)  - 8/7: VRE (blood)  - 8/8: VRE (blood)  - 8/9: VRE (blood)  - 8/11: VRE (blood)  - 8/12: VRE (abdominal fluid)  - 8/13: VRE (blood)  - 8/14: VRE (blood)  - 8/15: VRE (blood)  - 8/17: NGTD (blood)   - 8/17: VRE & Candida (peritoneal fluid)   - 8/18: G+ cocci (tissue)  - 8/19 NGTD (blood)  - 8/21 NGTD (blood)  - 8/22 NGTD (blood)  - 8/23 NGTD (blood)  - 8/24 NGTD (blood)    Heme:     # Acute blood loss anemia  # Anemia of critical illness  # Portal venous thrombosis  # Thrombocytopenia  # Coagulopathy   - Heparin at 400 straight rate  - Hold aspirin 81mg for continued bleeding  - Monitor CBC.  Transfuse PLT now x1    MSK:  # weakness and deconditioning of critical illness   - PT/OT     Prophylaxis:    - DVT: straight rate heparin, SCD's  - GI: protonix     Lines/ tubes/ drains:  - NJ, NG, PIVs.   - [place] Line holiday over the weekend    Disposition:  - SICU      ====================================    SUBJECTIVE:   No acute events  overnight, there were no events.       OBJECTIVE:   1. VITAL SIGNS:   Temp:  [97  F (36.1  C)-98.4  F (36.9  C)] 98.4  F (36.9  C)  Heart Rate:  [81-93] 84  Resp:  [11-21] 16  BP: (102-137)/(68-93) 126/79  SpO2:  [91 %-100 %] 99 %  Resp: 16    2. INTAKE/ OUTPUT:   I/O last 3 completed shifts:  In: 4727.33 [I.V.:2285.73; NG/GT:490]  Out: 4552 [Urine:2850; Emesis/NG output:150; Drains:802; Stool:750]    3. PHYSICAL EXAMINATION:   General: lying in bed, appears comfortable, tired appearing  Neuro: alert, communicating, moving extremities. Pleasant  Resp: Non-labored on RA, no accessory muscle use  CV: RRR  Abdomen: soft, non-distended, non-tender  Incisions: upper abdominal incision packed and dressed, incision is dry.  No granulation tissue  Extremities: warm, well perfused      4. INVESTIGATIONS:   Arterial Blood Gases     Recent Labs  Lab 08/21/18  1332   PH 7.40   PCO2 40   PO2 163*   HCO3 25     Complete Blood Count     Recent Labs  Lab 08/26/18  0347 08/25/18  1501 08/25/18  0407 08/24/18  1641   WBC 8.5 10.2 11.4* 10.6   HGB 7.1* 7.7* 8.8* 6.7*   PLT 26* 35* 38* 48*     Basic Metabolic Panel    Recent Labs  Lab 08/26/18  0347 08/25/18  1504 08/25/18  0407 08/24/18  0434   * 150* 149* 142   POTASSIUM 3.4 3.3* 2.8*  2.8* 2.7*   CHLORIDE 115* 117* 116* 111*   CO2 24 24 21 23   BUN 84* 82* 79* 66*   CR 1.59* 1.53* 1.49* 1.10   * 134* 145* 159*     Liver Function Tests    Recent Labs  Lab 08/26/18  0347 08/25/18  0407 08/24/18  0434 08/23/18  0916  08/21/18  1645  08/20/18  1215 08/20/18  0443   AST 44 50* 62* 64*  < >  --   < > 64* 56*   ALT 23 23 23 22  < >  --   < > 24 26   ALKPHOS 190* 206* 254* 277*  < >  --   < > 250* 245*   BILITOTAL 3.9* 3.8* 2.9* 3.1*  < >  --   < > 2.8* 2.8*   ALBUMIN 1.8* 1.9* 1.7* 1.7*  < >  --   < > 1.5* 1.7*   INR  --   --  1.84*  --   --  1.68*  --  1.65* 1.68*   < > = values in this interval not displayed.  Pancreatic Enzymes  No lab results found in last 7  days.  Coagulation Profile    Recent Labs  Lab 08/24/18  0434 08/21/18  1645 08/20/18  1215 08/20/18  0443   INR 1.84* 1.68* 1.65* 1.68*   PTT 74*  --   --   --      Lactate  Invalid input(s): LACTATE    5. RADIOLOGY:   KUB pending       --------------------------------------------------------------------------------------------------------------------------  Patient seen, findings and plan discussed with surgical ICU staff Dr. Roberto.        Staff Summary  8/26/2018     weight is 59.6 kg (131 lb 6.3 oz). His oral temperature is 97.7  F (36.5  C). His blood pressure is 109/79 and his pulse is 93. His respiration is 17 and oxygen saturation is 96%.     Intake/Output Summary (Last 24 hours) at 08/26/18 1528  Last data filed at 08/26/18 1500   Gross per 24 hour   Intake          5590.42 ml   Output             3840 ml   Net          1750.42 ml       Patient is critically ill by my examination on the date of service (DOS) listed above and requires continued ICU monitoring and cares.  The patient is being seen in the Surgical Intensive Care Unit.      I have discussed patient cares and treatment plan with the ICU team as reflected in the residents note by Dr. Howell dated 8/26/2018.  All pertinent labs, imaging studies, physical exam and medications have been reviewed by myself with the SICU team. A Luis catheter, if left in place, is required to monitor resuscitative efforts, tightly manage ins and outs, necessary for patient care needs, as well as other ICU cares.    Evaluation and management time exclusive of procedures was 30 minutes critical care time in the management and care of this patient including:  examination with the SICU team, discussion of the patient's condition with other physicians and members of the care team, reviewing all data related to the patient, and time utilizing the EMR for documentation of this patient's care.  All data, assessments and plans were discussed with Dr. Howell and the ICU  team.    Patient has Malnutrition, resolving sepsis, short gut syndrome, thrombocytopenia, hypokalemia: I spent 45 minutes while the patient was in this condition, providing critical care. I reviewed the resident s documentation and I agree with his assessment and plan of care.    Erin Roberto MD    --------------------------------

## 2018-08-26 NOTE — PLAN OF CARE
Problem: Patient Care Overview  Goal: Plan of Care/Patient Progress Review  Discharge Planner OT 4AB  Patient plan for discharge: rehab  Current status: Pt making great progress today, needed some encouragement due to fatigue but agreeable to participate.  Facilitated ADLs and up to chair with mod A bed mobility, sat unsupported at EOB x10' for basic ADLs. D lift transfer to chair with fatigue reported.  Barriers to return to prior living situation: medical needs, severe deconditioning  Recommendations for discharge: LTACH although may be appropraite ARC pending medical needs   Rationale for recommendations: Pt presents with new deficits including decreased cognition and severe deconditioning due to prolonged hospitalization and ICU stay leading to decreased function and safety. Pt unable to complete transfers or standing ADLs without assist. Needs to achieve  Mod IND with walker and  stairs to return home with family. Pt will benefit from intensive, interdisciplinary ARU to address these deficits and to provide caregiver training to facilitate a safe dc to home.        Entered by: Ila Rosas 08/26/2018 11:04 AM

## 2018-08-26 NOTE — PROGRESS NOTES
Nephrology Progress Note  08/26/2018       Yaneli Miles is a 43 year old male with ESLD due to Hep B and ALD complicated with portal hypertension, ascites requiring multiple paracentesis, esophageal varices, encephalopathy, severe thrombocytopenia, history of DM, who underwent a DBD OLT on 7/21/2018, nephrology consulted for acute hyponatremia and BEN.            Assessment & Recommendations:   BEN-Baseline Cr of 1.3-2, up to 2.8 prior to starting CRRT on 7/30 due to hyperkalemia. Thought to be due to sepsis, increased intra-abdominal pressure and prerenal state.  Started HD on 7/30, had temp LIJ placed at that time, replaced with R femoral on 8/13.  Had been anuric but now with increasing UOP particularly yesterday with 3.4L of UOP, CRRT stopped and we will decide on HD daily.  .                             -CRRT 7/30-8/23, transitioning to iHD vs recovery with increasing UOP.                             At this time, his creatinine is relatively stabilizing at 1.5.  He has good urine output will continue to monitor.  Avoid large net negative balances.      Volume status-Trace LE edema, mild BUE edema, was net negative .05L yesterday with increased UOP. Breathing on room air.  Urine output seems to keep pace with obligate ins.      Electrolytes  Hypernatremia.  Patient started on D5 infusion per primary.  Continue to monitor.  Potassium and bicarbonate acceptable.  Hypophosphatemia, currently on replacement per primary.  Normocalcemic when corrected for albumin.        Liver tx-7/21/18.  Complicated by arterial and venous vascular issues and ischemic bowel, splenic infarct, still with diffuse small bowel ischemia, last ex lap/washout 8/18.  + VRE in blood on 8/17 (peripheral blood).   INR 1.8, last tacro level 4.9 (goal 5-6).         ID-Multiple abdominal ID issues and + VRE in blood on 8/17, on amikacin and flagyl for peritonitis, on ceftaroline, tigecycline and daptomycin for VRE and micafungin for yeast in  peritoneal fluid.        Anemia-Hgb 7.1, downtrending and patient continues to require intermittent transfusion.        Nutrition-Starting TF with trickle feeds.        PLAN  1. Agree w D5 infusion  2. Avoid large net negative fluid balance at this time to promote renal recovery  3. Avoid hypotension and nephrotoxic meds (prograf adj per primary)     Seen and discussed with Dr Ellis    Recommendations were communicated to primary team via verbal communication.       Bulmaro Ricardo MD on 8/26/2018 at 6:41 AM    Interval History :   Mr. Miles has had good urine output over the past 24 hours.  Additionally, his creatinine has somewhat stabilized.  He has had no fevers.  His blood pressure has been relatively stable.  He is currently not on pressor support.  He is passing stool.    Review of Systems:   I reviewed the following systems:  ROS not done due to patient fatigue and unavailability of     Physical Exam:   I/O last 3 completed shifts:  In: 4534.78 [I.V.:1872.58; NG/GT:565]  Out: 5076 [Urine:3200; Emesis/NG output:250; Drains:776; Stool:850]   /74  Pulse 93  Temp 97.6  F (36.4  C) (Axillary)  Resp 11  Wt 59.6 kg (131 lb 6.3 oz)  SpO2 100%  BMI 21.21 kg/m2     GENERAL APPEARANCE: Comf in bed, no distress  EYES:  No scleral icterus, pupils equal  HENT: mouth without ulcers or lesions, NGT in place  PULM: lungs clear to auscultation, equal air movement,  CV: regular rhythm, normal rate, no rub     -edema - minimal  GI: soft, nontender, non-distended, bowel sounds are + - Drain in place  MS: no evidence of inflammation in joints, no muscle tenderness  NEURO: Nods appropriately. Moves all extremities.      Labs:   All labs reviewed by me  Electrolytes/Renal -   Recent Labs   Lab Test  08/26/18   0347  08/25/18   1504  08/25/18   0407  08/24/18   0434   NA  149*  150*  149*  142   POTASSIUM  3.4  3.3*  2.8*  2.8*  2.7*   CHLORIDE  115*  117*  116*  111*   CO2  24  24  21  23   BUN  84*   82*  79*  66*   CR  1.59*  1.53*  1.49*  1.10   GLC  141*  134*  145*  159*   JESUS  7.7*  7.7*  7.5*  7.1*   MAG  1.9   --   1.8  2.0   PHOS  1.9*   --   2.5  2.8       CBC -   Recent Labs   Lab Test  08/26/18   0347  08/25/18   1501  08/25/18   0407   WBC  8.5  10.2  11.4*   HGB  7.1*  7.7*  8.8*   PLT  26*  35*  38*       LFTs -   Recent Labs   Lab Test  08/26/18   0347  08/25/18   0407  08/24/18   0434   ALKPHOS  190*  206*  254*   BILITOTAL  3.9*  3.8*  2.9*   ALT  23  23  23   AST  44  50*  62*   PROTTOTAL  4.4*  4.6*  4.1*   ALBUMIN  1.8*  1.9*  1.7*       Iron Panel -   Recent Labs   Lab Test  07/13/18   0334  06/17/18   2138   IRON  97  66   IRONSAT  83*  109*   ANGELLA  Unsatisfactory specimen - icteric   --            Current Medications:    ceftaroline (TEFLARO) intermittent infusion  300 mg Intravenous Q12H     DAPTOmycin (CUBICIN) intermittent infusion  12 mg/kg Intravenous Q48H     fiber modular  1 packet Per Feeding Tube TID     fluconazole  200 mg Intravenous Q24H     lipids  250 mL Intravenous Once per day on Mon Tue Wed Thu Fri     loperamide  2 mg Oral 4x Daily     metroNIDAZOLE  500 mg Intravenous Q6H     pantoprazole (PROTONIX) IV  40 mg Intravenous BID     potassium phosphate (KPHOS) in D5W IV  15 mmol Intravenous Once     sodium chloride (PF)  3 mL Intravenous Q8H     sodium chloride 0.9% (bottle)         tacrolimus  1 mg Oral or Feeding Tube BID IS     tenofovir  300 mg Per Feeding Tube Q72H     [START ON 8/27/2018] valGANciclovir  450 mg Oral Once per day on Mon Thu    Or     [START ON 8/27/2018] valGANciclovir  450 mg Oral or NG Tube Once per day on Mon Thu       IV fluid REPLACEMENT ONLY       D5W 50 mL/hr at 08/26/18 0600     heparin 400 Units/hr (08/26/18 0600)     insulin (regular) 1 Units/hr (08/26/18 0600)     lactated ringers 10 mL/hr at 07/30/18 1519     parenteral nutrition - ADULT compounded formula 70 mL/hr at 08/26/18 0600     Reason beta blocker order not selected           .I was  present with the fellow during the history and exam.  I discussed the case with the fellow and agree with the findings as documented in the assessment and plan.  Rosangela Ellis

## 2018-08-26 NOTE — PROGRESS NOTES
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  Developed shock with severe lactic acidosis and respiratory failure on POD9. CT scan showed left PV venous thrombosis, infarction of left liver lobe, patchy infarction of right lobe and possible hepatic arterial thrombosis, patchy bowel ischemia. Transferred to ICU.   Ex lap, patchy diffuse SB ischemia. Doppler demonstrated arterial flow main vessels to bowel and liver. Splenic infarction. Liver biopsy negative for acute rejection. Fluid culture growing VRE. On Heparin gtt. 8/3 OR for reexploration liver transplant, bowel ischemia, findings improved patchy iscemic changes of small bowel, patchy ischemic changes of left liver lobe.  OR: some ischemic/necrotic jejunum, s/p resection.   Washout and abdominal closure with stratice mesh, no drains placed. Minimal ascites, intact small bowel anastomosis, patchy ischemia left liver lobe.  peritonitis with bowel necrosis and bowel perforation. S/p bowel resection, about 50 cm sm bowel, ileocecal valve and colon remaining.     Procedures:    donor (DBD) liver transplant with duct to duct anastomosis over biliary stent.    Ex lap due to concern for ischemic bowel, findings: patchy diffuse SB ischemia. Doppler demonstrated arterial flow main vessels to bowel and liver. Splenic infarction.   8/3 Reexploration liver transplant, bowel ischemia, findings improved patchy iscemic changes of small bowel, patchy ischemic changes of left liver lobe.    Re-exploration, ischemic/necrotic jejunum, s/p resection   Washout and abdominal closure with stratice mesh, no drains placed. Minimal ascites, intact small bowel  anastomosis, patchy ischemia left liver lobe.  8/17 Procedure: reopening of laparotomy. abd washout, bowel resection (clip and drop fashion). Postop Dx: same. Large segment of ischemic bowel from mid-jejunum (prior anastomosis) with distal perforation and feculent peritonitis.   8/18 Reexploration, small and large bowel intestine appeared healthy per surgeon, primary anastomosis bowel. Fascia closure.  8/20 Reopening of laparotomy. abdominal washout, bowel resection of 200cm    Graft function: POD #36, LFTs stable with elevated bilirubin, now trending down.  7/30 Hepatic infarction, left PV thrombosis, and narrow and irregular appearance HA noted on CTA. 8/8 Liver biopsy negative for acute rejection. Intra-abdominal  cultures from 8/1, 8/6, 8/17, 8/18 + VRE or GPCs.   Incision opened at bedside 8/12, hematoma removed.  8/13 US: new perihepatic fluid collections (largest 7x2x4), small volume of septated ascites.  VAC placed 8/15. 8/15 CT scan, left lobe infarct with complex fluid collection inferior.  8/17 US: patent vasculature but RIs concerning for HA stenosis, stable fluid collection near braden hepatis.     Immunosuppression management:   Induction: Simulect intra-op due to BEN.  Steroid taper per protocol, completed.  MMF held due to critical status  Tac goal level ~5-6 due to sepsis and BEN. Level 4.0 today (10-hour trough), continue current dose and repeat level daily.  Hydrocortisone, now tapered off.  Complexity of management: Medium. Contributing factors: thrombocytopenia and anemia, ARF    Hematology:   Anemia: Acute blood loss.  Bleeding from lines, wound, and drain site overnight.  Hgb stable at 7 today.  Multiple transfusions in the last week.  Thrombocytopenia: Ongoing transfusions. Platelet count remains low at 38, got one unit platelets today.  Anticoagulation: Anticoagulate for bowel ischemia and portal thrombosis.  Heparin gtt low intensity resulted in diffuse oozing. Dextran 10 ml/hr for  additional anticoagulation, stopped on 8/23 DC due to widespread oozing. Continue heparin drip 400u/hr.  Concern for protein C/protein S deficiency, labs in process.    Cardiorespiratory:    Hypotension secondary to septic shock:  Resolved, off pressors.  Respiratory failure secondary to sepsis: Extubated 8/22.  Junctional rhythm:  Onset 8/22, now resolved.    GI/Nutrition: NG.  NPO.  On TPN.  TF 10 ml/hr for healing. Will pull out TF to have the tip in the DII.   Ischemic bowel:  7/30 CTA: nonocclusive filling defects in superior mesenteric vein branches.  Anticoagulation as above.  S/p resection of necrotic jejunum on 8/6. S/p resection of 150cm of ischemic bowel with perforation on 8/17.  PSBO:  Noted on 8/15 CT, transition point RLQ. S/p resection on 8/17.   Bowel perforation/peritonitis:  Small bowel perforation with feculent peritonitis noted in OR on 8/17. S/p resection, abx as below.  GIB:  BRBPR today along with generalized oozing.  Transfuse as needed.    Endocrine:  DM type 2, continue insulin drip.      Fluid/Electrolytes/Renal: BEN/ARF. CRRT.    : Traumatic bah placement with intermittent bleeding.   Hematuria now resolved. Bah, coude 18fr in place    Infectious disease: Afebrile. WBC increased to 11.4 today (from 10.6).  -Amikacin (8/17-8/20), flagyl (8/17-), ceftraroline (8/13-), tigecycline (8/13-8/23), daptomycin (8/14-), micafungin (7/30-8/21), fluconazole (8/22-).   -ID following  -Blood cultures through 8/18 + VRE or GPCs  -8/17 Peritoneal fluid culture + VRE, candida albicans  -8/19, 8/21, 8/23 Blood cx, no growth to date    1. VRE intra abdominal and bacteremia (dapto, ceftaroline, tigecycline)  2. Infarct left lobe liver with inferior perihepatic fluid collection  3. Peritonitis (metronidazole, fluconazole)    Previous ID:   3. Ecoli ESBL bacteremia. Repeat bcx on 8/1 (peripheral and VAD) grew Ecoli ESBL. Zosyn changed to Meropenem, completed on 8/14.  SICU to exchange lines as able.  Blood cultures negative for E. Coli since .  4. HBV: Hepatits B DNA PCR positive prior to tx. Received HBIG on  and . 8/10 Hep B surface antigen negative. No further HBIG planned. Continue tenofovir 300 mg, dose adjusted for HD       Prophylaxis: DVT PCDs, on heparin, fall, GI, Valcyte x 12 weeks (CMVand EBV IgG +), bactrim.   Disposition: SICU    Coordinator: Kay Reyes  Surgeon: Pako Mathias     Check Hep B surface antigen at 3 months, 6 months then annually     Medical Decision Making: High    JAY/Fellow/Resident Provider:  Mihir Nj, fellow    Faculty: Stew Min MD   __________________________________________________________________  Transplant History: Admitted 2018 for  donor liver transplant.   The patient has a history of liver failure due to hepatitis B .    2018 (Liver), Postoperative day: 36     Interval History:  Respiratory status stable on room air. Less oozing, stool maroon in color.     ROS:   A 10-point review of systems was negative except as noted above.    Curent Meds:    ceftaroline (TEFLARO) intermittent infusion  300 mg Intravenous Q12H     DAPTOmycin (CUBICIN) intermittent infusion  12 mg/kg Intravenous Q48H     fiber modular  1 packet Per Feeding Tube TID     fluconazole  200 mg Intravenous Q24H     lipids  250 mL Intravenous Once per day on      loperamide  2 mg Oral 4x Daily     metroNIDAZOLE  500 mg Intravenous Q6H     pantoprazole (PROTONIX) IV  40 mg Intravenous BID     potassium phosphate (KPHOS) in D5W IV  15 mmol Intravenous Once     sodium chloride (PF)  3 mL Intravenous Q8H     sodium chloride 0.9% (bottle)         sulfamethoxazole-trimethoprim  10 mL Per Feeding Tube Daily     tacrolimus  1 mg Oral or Feeding Tube BID IS     tenofovir  300 mg Per Feeding Tube Q72H     [START ON 2018] valGANciclovir  450 mg Oral Once per day on     Or     [START ON 2018] valGANciclovir  450 mg Oral or NG Tube Once per day on  [facility]       Physical Exam:     Admit Weight: 60.7 kg (133 lb 12.8 oz)    Vital sign ranges:    Temp:  [97  F (36.1  C)-98.4  F (36.9  C)] 98.2  F (36.8  C)  Heart Rate:  [81-93] 84  Resp:  [11-21] 16  BP: (102-137)/(68-93) 108/77  SpO2:  [91 %-100 %] 99 %    General Appearance: NAD  HEENT:  NG (green/tan), NJ  Skin: warm, dry  Heart: NSR  Lungs: Diminished  Abdomen: Incision covered with burn pads (oozing per nursing), abd soft, rectal pouch with BRB per nursing, CAROL drain with serosanguinous drainage  : Luis cl yellow  Extremities: well perfused. Generalized edema.  Neurologic: Alert and oriented x 3.  Access: PICC, CVC, PIV  Femoral A-line    Data:   CMP    Recent Labs  Lab 08/26/18  0347 08/25/18  1504 08/25/18  0407  08/23/18  0916 08/23/18  0352   * 150* 149*  < > 142 141   POTASSIUM 3.4 3.3* 2.8*  2.8*  < > 3.4 3.2*   CHLORIDE 115* 117* 116*  < > 110* 109   CO2 24 24 21  < > 24 25   * 134* 145*  < > 113* 150*   BUN 84* 82* 79*  < > 37* 39*   CR 1.59* 1.53* 1.49*  < > 0.60* 0.58*   GFRESTIMATED 47* 49* 51*  < > >90 >90   GFRESTBLACK 57* 60* 62  < > >90 >90   JESUS 7.7* 7.7* 7.5*  < > 7.1* 6.7*   ICAW  --   --   --   --  4.4 4.6   MAG 1.9  --  1.8  < > 1.9 2.0   PHOS 1.9*  --  2.5  < > 3.1 3.1   ALBUMIN 1.8*  --  1.9*  < > 1.7* 1.5*   BILITOTAL 3.9*  --  3.8*  < > 3.1* 3.0*   ALKPHOS 190*  --  206*  < > 277* 240*   AST 44  --  50*  < > 64* 56*   ALT 23  --  23  < > 22 20   < > = values in this interval not displayed.  CBC    Recent Labs  Lab 08/26/18  0347 08/25/18  1501   HGB 7.1* 7.7*   WBC 8.5 10.2   PLT 26* 35*     COAGS    Recent Labs  Lab 08/24/18  0434 08/21/18  1645   INR 1.84* 1.68*   PTT 74*  --       Urinalysis  Recent Labs   Lab Test  07/27/18   2330  07/13/18   1315  06/18/18   1200   COLOR  Yellow  Dark Yellow  Yellow   APPEARANCE  Clear  Clear  Clear   URINEGLC  Negative  Negative  Negative   URINEBILI  Negative  Large*  Small*   URINEKETONE  Negative  Negative  Negative   SG   1.008  1.013  1.008   UBLD  Negative  Negative  Negative   URINEPH  7.5*  6.0  5.0   PROTEIN  30*  10*  Negative   NITRITE  Negative  Negative  Negative   LEUKEST  Negative  Negative  Negative   RBCU  5*  <1  1   WBCU  3  None  2   UTPG   --    --   0.75*     Virology:  Hepatitis C Antibody   Date Value Ref Range Status   07/20/2018 Nonreactive NR^Nonreactive Final     Comment:     Assay performance characteristics have not been established for newborns,   infants, and children         POD 14 US liver:  1.  The left portal vein is antegrade with decreased velocity,  measuring approximately 15 cm/second. This vessel was occluded on CT  7/30/2018.  2.  Elevated velocities of the main hepatic artery, now 203 cm/sec  (previously 94 cm/sec). However left hepatic arteries demonstrates  steep upstroke suggesting there is not a significant stenosis.   2a. Right hepatic artery was not visualized, could be occluded or  difficult to see postoperatively.  3. Echogenic focus of the right lobe in close proximity to the  hepatorenal fossa. This likely corresponds with nonenhancing lesion on  CT 7/30/2018 and likely representing subcapsular hematoma.  4. Additional small hematomas of the braden hepatis and posterior right  perihepatic region.  5. Small volume anechoic ascites.  6. Left hepatic lobe not well visualized secondary to open wound  preventing imaging. Note the left lobe was grossly abnormal on CT  7/30/2018    POD 1 US liver: Impression:   1.  Hematoma associated with the inferior aspect of the perihepatic  space anteriorly, measuring 13.7 x 7.0 x 8.5 cm.  2.  Retrograde flow of the left portal vein. Additionally, low  velocities of the portal venous system. Single low resistive index of  the extrahepatic aspect of the hepatic artery, measuring 0.37. These  findings are likely within normal limits in the early postoperative  context. However, continued attention on follow-up recommended.    7/30 CT scan  abd/pelvis:  IMPRESSION:   1. Hypoenhancing areas in the transplant liver concerning for  infarction. Transplant left portal vein occlusion with bubbles of  portal venous gas. Nearly occlusive filling defect in the native  hepatic artery. Narrowed and irregular appearance of the transplant  hepatic artery. Focal occlusion of the left main hepatic artery.  Segmental occlusions of right hepatic arterial branches.   2. Pneumatosis intestinalis with nonocclusive filling defects in  superior mesenteric vein branches, concerning for bowel ischemia or  infarction.   3. Splenic infarctions.   4. Bibasilar consolidation with air bronchograms. Pneumonia cannot be  excluded.   5. Post surgical changes of evacuation of peritransplant hematoma.       Attestation:    The patient has been seen and evaluated by me.   Vital signs, labs, medications and orders were reviewed.   When obtained, diagnostic images were reviewed by me and interpreted as above.    The care plan was discussed with the multidisciplinary team and I agree with the findings and plan in this note, with any differences recorded in blue.    Immunosuppressive medication management was reviewed and adjusted as reflected in the note and orders.     .

## 2018-08-26 NOTE — PLAN OF CARE
Problem: Patient Care Overview  Goal: Plan of Care/Patient Progress Review  Outcome: No Change  Oriented x 2; lethargic throughout shift but awakens without issue.  Moves all extremities but weak.  Follows basic commands.  SR on monitor 80s-90s with occasional PVCs.  BP stable overnight not requiring intervention.  On room air.  Lung sounds remain coarse.  Patient continues to require encouragement for turning, coughing, and deep breathing.  Minimal secretions this shift as patient's cough is congested but weak.  Patient remains NPO; trickle feeds continue @ 10, and PPN infusing.  Continues to have maroon colored diarrhea.  Rectal pouch removed due to leakage and left off.  Wet to dry dressing change completed to abdomen.  Luis catheter patent and draining.  Right CAROL with increased amount of sanguinous output during evening - SICU notified.  Patient's vital signs remained stable and output slowed.  D5 infusing @ 50, insulin @ 1 unit/hour, heparin @ 400 units/hour, and TKO @ 10.  Wife at the bedside.

## 2018-08-26 NOTE — PLAN OF CARE
Problem: Patient Care Overview  Goal: Plan of Care/Patient Progress Review         Nursing Progress Note    D/I/A:  Neuro: Lethargic, d/o to time and situation. PERRL. Hand /ankle strength weak, equal bilaterally; purposeful movement, follows commands.   CV: Sinus rhythm, no ectopy noted. BP stable, no interventions required. Afebrile. Providers aware of most recent CBC and BMP; no interventions for Platelet count, Hgb stable. D5W initiated for hypernatremia. Electrolyte replacements ordered. Heparin gtt infusing, no changes.  Pulm: LS clear to coarse, diminished throughout. Weak, ineffective cough; requiring much encouragement for CADB. On RA.  GI/:  Insulin gtt maintained. PPN and TF via NJ maintained. Abd dressing changed, see Flowsheets. Bilateral CAROL with small amt bloody drainage. Rectal pouch with small to moderate amt reddish-brown, maroon output; providers aware, to continue to monitor and assess closely. PRN Oxycodone given for pain, pt reports adequate relief. Luis (coude catheter) UOP adequate.    Pt up to chair x2 this shift.    P: Continue to monitor and assess. Update POC as needed.    /80  Pulse 93  Temp 97.5  F (36.4  C) (Axillary)  Resp 18  Wt 60.2 kg (132 lb 11.5 oz)  SpO2 96%  BMI 21.42 kg/m2  Tevin Granado  August 25, 2018  7:01 PM

## 2018-08-27 NOTE — PROGRESS NOTES
Immunosuppression Note:    Yaneli Miles is a 43 year old male who is seen today  for immunosuppression management     I, Mamadou Norwood MD, I have examined the patient with the resident/PA/Fellow, discussed and agree with the note and findings.  I have reviewed today's vital signs, medications, labs and imaging. I reviewed the immunosuppression medications and levels. 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  Developed shock with severe lactic acidosis and respiratory failure on POD9. CT scan showed left PV venous thrombosis, infarction of left liver lobe, patchy infarction of right lobe and possible hepatic arterial thrombosis, patchy bowel ischemia. Transferred to ICU.   Ex lap, patchy diffuse SB ischemia. Doppler demonstrated arterial flow main vessels to bowel and liver. Splenic infarction. Liver biopsy negative for acute rejection. Fluid culture growing VRE. On Heparin gtt. 8/3 OR for reexploration liver transplant, bowel ischemia, findings improved patchy iscemic changes of small bowel, patchy ischemic changes of left liver lobe.  OR: some ischemic/necrotic jejunum, s/p resection.   Washout and abdominal closure with stratice mesh, no drains placed. Minimal ascites, intact small bowel anastomosis, patchy ischemia left liver lobe.  peritonitis with bowel necrosis and bowel perforation. S/p bowel resection, about 50 cm sm bowel, ileocecal valve and colon remaining.     Procedures:    donor (DBD) liver transplant with duct to duct anastomosis over biliary stent.    Ex lap due to concern  for ischemic bowel, findings: patchy diffuse SB ischemia. Doppler demonstrated arterial flow main vessels to bowel and liver. Splenic infarction.   8/3 Reexploration liver transplant, bowel ischemia, findings improved patchy iscemic changes of small bowel, patchy ischemic changes of left liver lobe.   8/6 Re-exploration, ischemic/necrotic jejunum, s/p resection  8/8 Washout and abdominal closure with stratice mesh, no drains placed. Minimal ascites, intact small bowel anastomosis, patchy ischemia left liver lobe.  8/17 Procedure: reopening of laparotomy. abd washout, bowel resection (clip and drop fashion). Postop Dx: same. Large segment of ischemic bowel from mid-jejunum (prior anastomosis) with distal perforation and feculent peritonitis.   8/18 Reexploration, small and large bowel intestine appeared healthy per surgeon, primary anastomosis bowel. Fascia closure.  8/20 Reopening of laparotomy. abdominal washout, bowel resection of 200cm    Graft function: POD #37, LFTs stable with elevated bilirubin, increased to 4.4 today (from 3.9). 7/30 Hepatic infarction, left PV thrombosis, and narrow and irregular appearance HA noted on CTA. 8/8 Liver biopsy negative for acute rejection. Intra-abdominal  cultures from 8/1, 8/6, 8/17, 8/18 + VRE or GPCs.   Incision opened at bedside 8/12, hematoma removed.  8/13 US: new perihepatic fluid collections (largest 7x2x4), small volume of septated ascites.  VAC placed 8/15. 8/15 CT scan, left lobe infarct with complex fluid collection inferior.  8/17 US: patent vasculature but RIs concerning for HA stenosis, stable fluid collection near braden hepatis. Albumin remains low at 1.5, will give 25g albumin 25% q6 hours x 4 doses.     Immunosuppression management:   - Induction: Simulect intra-op due to BEN. Steroid taper per protocol, completed.  - MMF: on hold due to critical status  - Tac goal level ~5-6 due to sepsis and BEN. Level 5.0 today (10-hour trough), next level 8/28.  -  Hydrocortisone, now tapered off.  Complexity of management: Medium. Contributing factors: thrombocytopenia and anemia, ARF    Hematology:   Anemia: Acute blood loss. Hgb stable at 8.7 today after receiving 1u PRBCs yesterday. Continues to ooze from lines and drain sites, also with ongoing melena.  Multiple transfusions in the last week.  Thrombocytopenia: Ongoing transfusions. Platelet count remains low at 42, getting frequent platelet transfusions.  Anticoagulation: Anticoagulating for bowel ischemia and portal thrombosis. Protein S level low at 27 on 8/19. Heparin gtt low intensity resulted in diffuse oozing. Dextran 10 ml/hr for additional anticoagulation, stopped on 8/23 DC due to widespread oozing. Continue heparin drip 400u/hr.     Cardiorespiratory:    Hypotension secondary to septic shock:  Resolved, off pressors.  Respiratory failure secondary to sepsis: Extubated 8/22.  Junctional rhythm:  Onset 8/22, now resolved.    GI/Nutrition: NG removed 8/25. Remains NPO. On TPN. TF 10 ml/hr for healing. Feeding tube pulled back 8/26 to have the tip in the DII.   Ischemic bowel:  7/30 CTA: nonocclusive filling defects in superior mesenteric vein branches.  Anticoagulation as above.  S/p resection of necrotic jejunum on 8/6. S/p resection of 150cm of ischemic bowel with perforation on 8/17. Patient with hypotension and increased abdominal pain overnight, CT with oral contrast ordered today.  PSBO:  Noted on 8/15 CT, transition point RLQ. S/p resection on 8/17.   Bowel perforation/peritonitis:  Small bowel perforation with feculent peritonitis noted in OR on 8/17. S/p resection, abx as below.  GIB:  Continues to have maroon stools with one bright red stool overnight per nursing. Transfuse as needed.    Endocrine:  DM type 2, continue insulin drip.      Fluid/Electrolytes/Renal: BEN/ARF, recovering. CRRT off since 8/23. Good UOP.    : Traumatic bah placement with intermittent bleeding.   Hematuria now resolved.  marcelo Luis 18fr in place.    Infectious disease: Afebrile. WBC increased to 11.2 today (from 8.0).  -Amikacin (-), flagyl (-), ceftraroline (-), tigecycline (-), daptomycin (-), micafungin (-), fluconazole (-).   -ID following  -Blood cultures through  + VRE or GPCs  - Peritoneal fluid culture + VRE, candida albicans  -, ,  Blood cx, no growth to date    1. VRE intra abdominal and bacteremia (dapto, ceftaroline, tigecycline)  2. Infarct left lobe liver with inferior perihepatic fluid collection  3. Peritonitis (metronidazole, fluconazole)    Previous ID:   3. Ecoli ESBL bacteremia. Repeat bcx on  (peripheral and VAD) grew Ecoli ESBL. Zosyn changed to Meropenem, completed on .  SICU to exchange lines as able. Blood cultures negative for E. Coli since .  4. HBV: Hepatits B DNA PCR positive prior to tx. Received HBIG on  and . 8/10 Hep B surface antigen negative. No further HBIG planned. Continue tenofovir 300 mg, dose adjusted for HD       Prophylaxis: DVT PCDs, on heparin, fall, GI, Valcyte x 12 weeks (CMVand EBV IgG +), bactrim.   Disposition: SICU    Coordinator: Kay Reyes  Surgeon: Pako Mathias     Check Hep B surface antigen at 3 months, 6 months then annually     Medical Decision Making: Medium    JAY/Fellow/Resident Provider:  Becka Nguyen NP [id number]    Faculty: Mamadou Norwood MD   __________________________________________________________________  Transplant History: Admitted 2018 for  donor liver transplant.   The patient has a history of liver failure due to hepatitis B .    2018 (Liver), Postoperative day: 37     Interval History:  Hypotensive overnight, increased abdominal pain this morning. Bright red stool overnight per nursing.    ROS:   A 10-point review of systems was negative except as noted above.    Curent Meds:    albumin human  25 g Intravenous Q8H     calcium gluconate  1 g Intravenous Once      ceftaroline (TEFLARO) intermittent infusion  300 mg Intravenous Q12H     DAPTOmycin (CUBICIN) intermittent infusion  12 mg/kg Intravenous Q48H     fiber modular  1 packet Per Feeding Tube TID     fluconazole  200 mg Intravenous Q24H     iohexol  50 mL Oral Once     lipids  250 mL Intravenous Once per day on Mon Tue Wed Thu Fri     metroNIDAZOLE  500 mg Intravenous Q6H     pantoprazole (PROTONIX) IV  40 mg Intravenous BID     sodium chloride (PF)  3 mL Intravenous Q8H     sulfamethoxazole-trimethoprim  10 mL Per Feeding Tube Daily     tacrolimus  1 mg Oral or Feeding Tube BID IS     tenofovir  300 mg Per Feeding Tube Q72H     valGANciclovir  450 mg Oral Once per day on Mon Thu    Or     valGANciclovir  450 mg Oral or NG Tube Once per day on Mon Thu       Physical Exam:     Admit Weight: 60.7 kg (133 lb 12.8 oz)    Vital sign ranges:    Temp:  [97.3  F (36.3  C)-98.8  F (37.1  C)] 97.5  F (36.4  C)  Heart Rate:  [] 90  Resp:  [12-29] 24  BP: ()/(37-91) 82/45  SpO2:  [93 %-100 %] 100 %    General Appearance: NAD  HEENT:  NG (green/tan), NJ  Skin: warm, dry  Heart: NSR  Lungs: Diminished  Abdomen: Incision covered with burn pads (oozing per nursing), abd soft, diffusely tender to palpation. CAROL drain x 2 in place with serosanguinous drainage  : Luis in place draining clear yellow urine.  Extremities: well perfused. Generalized trace edema.  Neurologic: Alert and oriented x 3.  Access: PICC, PIV    Data:   CMP    Recent Labs  Lab 08/27/18  0841 08/27/18  0542 08/26/18  0347  08/23/18  0916   NA  --  144 149*  < > 142   POTASSIUM  --  3.7 3.4  < > 3.4   CHLORIDE  --  112* 115*  < > 110*   CO2  --  18* 24  < > 24   GLC  --  172* 141*  < > 113*   BUN  --  80* 84*  < > 37*   CR  --  1.58* 1.59*  < > 0.60*   GFRESTIMATED  --  48* 47*  < > >90   GFRESTBLACK  --  58* 57*  < > >90   JESUS  --  7.3* 7.7*  < > 7.1*   ICAW 4.2*  --   --   --  4.4   MAG  --  1.4* 1.9  < > 1.9   PHOS  --  3.1 1.9*  < > 3.1   ALBUMIN   --  1.5* 1.8*  < > 1.7*   BILITOTAL  --  4.4* 3.9*  < > 3.1*   ALKPHOS  --  179* 190*  < > 277*   AST  --  36 44  < > 64*   ALT  --  21 23  < > 22   < > = values in this interval not displayed.  CBC    Recent Labs  Lab 08/27/18  0542 08/26/18  1633   HGB 8.7* 6.5*   WBC 11.2* 8.0   PLT 42* 40*     COAGS    Recent Labs  Lab 08/27/18  0841 08/24/18  0434   INR 2.15* 1.84*   PTT  --  74*      Urinalysis  Recent Labs   Lab Test  07/27/18   2330  07/13/18   1315  06/18/18   1200   COLOR  Yellow  Dark Yellow  Yellow   APPEARANCE  Clear  Clear  Clear   URINEGLC  Negative  Negative  Negative   URINEBILI  Negative  Large*  Small*   URINEKETONE  Negative  Negative  Negative   SG  1.008  1.013  1.008   UBLD  Negative  Negative  Negative   URINEPH  7.5*  6.0  5.0   PROTEIN  30*  10*  Negative   NITRITE  Negative  Negative  Negative   LEUKEST  Negative  Negative  Negative   RBCU  5*  <1  1   WBCU  3  None  2   UTPG   --    --   0.75*     Virology:  Hepatitis C Antibody   Date Value Ref Range Status   07/20/2018 Nonreactive NR^Nonreactive Final     Comment:     Assay performance characteristics have not been established for newborns,   infants, and children         POD 14  liver:  1.  The left portal vein is antegrade with decreased velocity,  measuring approximately 15 cm/second. This vessel was occluded on CT  7/30/2018.  2.  Elevated velocities of the main hepatic artery, now 203 cm/sec  (previously 94 cm/sec). However left hepatic arteries demonstrates  steep upstroke suggesting there is not a significant stenosis.   2a. Right hepatic artery was not visualized, could be occluded or  difficult to see postoperatively.  3. Echogenic focus of the right lobe in close proximity to the  hepatorenal fossa. This likely corresponds with nonenhancing lesion on  CT 7/30/2018 and likely representing subcapsular hematoma.  4. Additional small hematomas of the braden hepatis and posterior right  perihepatic region.  5. Small volume  anechoic ascites.  6. Left hepatic lobe not well visualized secondary to open wound  preventing imaging. Note the left lobe was grossly abnormal on CT  7/30/2018    POD 1 US liver: Impression:   1.  Hematoma associated with the inferior aspect of the perihepatic  space anteriorly, measuring 13.7 x 7.0 x 8.5 cm.  2.  Retrograde flow of the left portal vein. Additionally, low  velocities of the portal venous system. Single low resistive index of  the extrahepatic aspect of the hepatic artery, measuring 0.37. These  findings are likely within normal limits in the early postoperative  context. However, continued attention on follow-up recommended.    7/30 CT scan abd/pelvis:  IMPRESSION:   1. Hypoenhancing areas in the transplant liver concerning for  infarction. Transplant left portal vein occlusion with bubbles of  portal venous gas. Nearly occlusive filling defect in the native  hepatic artery. Narrowed and irregular appearance of the transplant  hepatic artery. Focal occlusion of the left main hepatic artery.  Segmental occlusions of right hepatic arterial branches.   2. Pneumatosis intestinalis with nonocclusive filling defects in  superior mesenteric vein branches, concerning for bowel ischemia or  infarction.   3. Splenic infarctions.   4. Bibasilar consolidation with air bronchograms. Pneumonia cannot be  excluded.   5. Post surgical changes of evacuation of peritransplant hematoma.

## 2018-08-27 NOTE — PROGRESS NOTES
SICU Procedure Note     Name of Procedure: Left-Sided Arterial Line Placement    Date of Procedure: August 27, 2018    Description of Procedure  Consent for the procedure was incorporated into surgical consent given urgent need for the procedure. The patient was placed in the supine position and his right wrist was hyperextended. He was of subdued mentation at baseline so further sedation was deemed unnecessary.  The puncture site was then prepped and draped in the usual sterile fashion and 2 mL of 1% Lidocaine was infiltrated into skin and subcutaneous tissue overlying the radial artery. A 20-gauge needle was then advanced through the patient's skin and subcutaneous tissue and entered into the patient's radial artery. Strong pulsatile blood flow was immediately observed coming from the needle. A guidewire was then advanced through the needle. However, the guidewire would not thread smoothly. Thus, this site was abandoned.    The contralateral (LUE) brachial artery was visualized via ultrasound, prepped and draped in a similar fashion as detailed above. However, a similar set of circumstances occurred wherein the needle advanced into a pulsatile vessel but the guidewire would not thread. An additional more proximal site was attempted. This time, there was successful threading of the guidewire and smooth advancement of the catheter over the wire with pulsatile flow. However, shortly after connecting the pressure tubing and suturing the catheter in place, the wave form dampened. We were then unable to troubleshoot this line and abandoned this site and transitioned to the left femoral artery.    The puncture site was then prepped and draped in the usual sterile fashion and 2 mL of 1% Lidocaine was infiltrated into skin and subcutaneous tissue overlying the left femoral artery. A 20-gauge needle was then advanced through the patient's skin and subcutaneous tissue and entered into the patient's femoral artery. Strong  pulsatile blood flow was immediately observed coming from the needle. A guidewire was then advanced through the needle. The needle was subsequently removed over the guidewire, after which an arterial catheter was advanced over the guidewire. The guidewire was then removed and the catheter was attached to a transducer. It was subsequently sutured into place. After cleaning the site of entry, a sterile tegaderm dressing was applied. There were not any immediate complications and the patient tolerated the procedure well.    Findings: A good waveform was observed on the monitor and the arterial line blood pressure readings matched those obtained via the blood pressure cuff.    Shawn Goldsmith DO, MSc.  PGY-3 Anesthesia Resident

## 2018-08-27 NOTE — PLAN OF CARE
Problem: Patient Care Overview  Goal: Plan of Care/Patient Progress Review  Outcome: Declining  Status  D/I: Patient on unit 4A Surgical/Neuro ICU   Neuro- lethargic, oriented to self, pupils equal and reactive, purposeful movement, very weak throughout, abdominal pain with turns and movement  CV- sinus rhythm, tachycardic at times  Pulm- BP soft throughout the morning, alerted team around 0800, they assessed bedside, product and fluid given, with no real improvement, PICC line placed levo started, art line placed in left femoral artery, SICU team frequently assessing bedside throughout the day  GI/- bah in place, urine output declining throughout the day, MD aware, multiple bloody/mucus incontinent stools, patient went to pelvic CT today, oral contrast given, NJ tube pulled back to gastric area, tube feeds have been off since 1100  Skin- multiple bruises and scabs, mid abdominal clam shell incision, wound vac placed today, small skin tear area on coccyx, alerted WOC nurse, will assess in the next day or two per WOC RN  Gtts- D5% at 50 ml/hr, insulin gtt at 0.5, TKO, heparin was at 400, stopped about an hour before OR, intermittent abx, levo at 0.07  Patient brought down to OR around 1700 for exploratory lap by transplant team, concern for GI bleed or bowel perforation   See flow sheets for further interventions and assessments.  P: Continue to monitor pt closely, Notify MD of changes/concerns.

## 2018-08-27 NOTE — PROVIDER NOTIFICATION
At 0500 patient's BP noted to be 70s/30s-40s.  HR stable 80s-90s.  AM labs not drawn yet (patient being cleaned up earlier when lab initially came by to draw them) -  contacted and at room to draw labs now.  Patient unchanged mentally - remains lethargic.  SICU notified of drop in BP- will await interventions/new orders until am labs result.    0630: Patient's hemoglobin resulted as stable - 8.7.  SICU notified of result as well as patient's BP increasing while being laid flat.  Awaiting orders.    0645: Order received for albumin 12.5 g/250 mls.

## 2018-08-27 NOTE — PROGRESS NOTES
NUTRITION SERVICES - BRIEF NOTE    Pt has been tolerating trickled feeds via NJT and PPN (due to line holiday) over the weekend. Plan today for RD to retract FT into the stomach for maximum enteral absorption. Central line will also be replaced today and team would like to transition back to CPN.    Implementations  1. RD to retract FT into the stomach     2. TPN Change:  -CPN 1080 ml with goal 245g Dex, 120g AA + lipids 3x/wk = 1527 kcals (25), 2.0 g PRO/kg, GIR = 2.8 w/ 14% kcals from Fat.  -Dosing weight = 61 kg    Monitoring  Nutrition will continue to follow and monitor per POC (see 8/23 RD note)    Halina Crouch, RD, LD  SICU RD Pgr: [phone]

## 2018-08-27 NOTE — CONSULTS
GASTROENTEROLOGY CONSULTATION      Date of Admission:  7/20/2018           Reason for Consultation:   We were asked by Dr. Roberto of the surgical ICU to evaluate this patient with hematochezia           ASSESSMENT AND RECOMMENDATIONS:   Assessment:  43 year old male with a history of hepatitis B cirrhosis who underwent liver transplant on 7/21/2018, complicated by septic shock with lactic acidosis and respiratory failure, further complicated by portal venous thrombosis with infarct of his left lobe, possible hepatic or arterial thrombosis and patchy bowel ischemia.  Is under on 2 small bowel resections due to mesenteric ischemia who is now having ongoing hematochezia.    CT scan today with contrast and free air concerning for perforation or leak from small bowel anastomosis.     Recommendations    1.  Hematochezia: Suspect hematochezia related to ongoing bowel ischemia.  The perforation/leak may be tied to mesenteric ischemia or wound healing.  We discussed this with the surgical team who is already planning on taking him to the operating room for exploration.  At this point we recommend no further evaluation.    GI luminal consult service will sign off at this time, please call us again if there are any questions.      Gastroenterology outpatient follow up recommendations: No follow-up needed    Thank you for involving us in this patient's care. Please do not hesitate to contact the GI service with any questions or concerns.     Daniel Rome  GI Staff  [phone]  -------------------------------------------------------------------------------------------------------------------           History of Present Illness:   Yaneli Miles is a 43 year old male with a history of liver transplant on 7/21/2018 secondary to hepatitis B and alcoholic liver disease.  His posttransplant course has been complicated with severe lactic acidosis and respiratory failure, as well as portal venous thrombosis and function of his  left liver lobe, possible hepatic arterial thrombosis and mesenteric ischemia.  He has had 2 small bowel resections for mesenteric ischemia.    He is currently in the intensive care unit and is answering yes or no to simple questions.  He is in no apparent pain.  On discussion with nurses he is having ongoing hematochezia described as red blood mixed in with stools minimal clots.            Past Medical History:   Reviewed and edited as appropriate  Past Medical History:   Diagnosis Date     Choledocholithiasis      Chronic viral hepatitis B without delta agent and without coma (H) 6/14/2018     Cirrhosis of liver with ascites (H) 6/14/2018     Esophageal varices (H)     prior GIB     Hepatic encephalopathy (H)      Hepatitis delta with hepatitis B carrier state 06/14/2018    positive antigen     Portal vein thrombosis 6/14/2018 5/29/18 care everywhere [facility]     SBP (spontaneous bacterial peritonitis) (H) 06/2018     Type 2 diabetes mellitus without complication, without long-term current use of insulin (H) 6/14/2018            Past Surgical History:   Reviewed and edited as appropriate   Past Surgical History:   Procedure Laterality Date     ENDOSCOPIC RETROGRADE CHOLANGIOPANCREATOGRAM      with stone removal     ENDOSCOPIC ULTRASOUND UPPER GASTROINTESTINAL TRACT (GI) N/A 6/22/2018    Procedure: ENDOSCOPIC ULTRASOUND, ESOPHAGOSCOPY / UPPER GASTROINTESTINAL TRACT (GI);  Esophagogastroduodenoscopy, removal of [place] tube, ENDOSCOPIC ULTRASOUND with embolization of gastric varices, placement of nasogastric tube;  Surgeon: Armando Kraft MD;  Location: [facility] OR     INCISION AND DRAINAGE ABDOMEN WASHOUT, COMBINED N/A 8/8/2018    Procedure: COMBINED INCISION AND DRAINAGE ABDOMEN WASHOUT;  liver washout and Abdominal Closure with Mesh.;  Surgeon: Cristino Che MD;  Location: [facility] OR     IRRIGATION AND DEBRIDEMENT ABDOMEN WASHOUT, COMBINED N/A 8/3/2018    Procedure: COMBINED IRRIGATION AND DEBRIDEMENT ABDOMEN  WASHOUT;   Abdominal Wash Out;  Surgeon: Leonie Elizalde MD;  Location: [facility] OR     LAPAROTOMY EXPLORATORY N/A 2018    Procedure: LAPAROTOMY EXPLORATORY;  Exploratory Laparotomy, hematoma evacuation;  Surgeon: Stew Min MD;  Location: [facility] OR     RETURN LIVER TRANSPLANT N/A 2018    Procedure: RETURN LIVER TRANSPLANT;  liver bring back, exploratory laparotomy, bowel resection, primary anastomosis ;  Surgeon: Cristino Che MD;  Location: [facility] OR     RETURN LIVER TRANSPLANT N/A 2018    Procedure: RETURN LIVER TRANSPLANT;  Exploratory Laparotomy, abdominal washout, bowel to bowel anastomosis, primary closure;  Surgeon: Mamadou Norwood MD;  Location: [facility] OR     RETURN LIVER TRANSPLANT N/A 2018    Procedure: RETURN LIVER TRANSPLANT;  Return Liver Transplant, Explore Laparotomy, Bowel Resection;  Surgeon: Mamadou Norwood MD;  Location: [facility]     TRANSPLANT LIVER RECIPIENT  DONOR N/A 2018    Procedure: TRANSPLANT LIVER RECIPIENT  DONOR;  Liver Transplant;  Surgeon: Stew Min MD;  Location: [facility] OR            Previous Endoscopy:   No results found for this or any previous visit.         Social History:   Reviewed and edited as appropriate  Social History     Social History     Marital status: Single     Spouse name: N/A     Number of children: N/A     Years of education: N/A     Occupational History     Not on file.     Social History Main Topics     Smoking status: Never Smoker     Smokeless tobacco: Not on file     Alcohol use No     Drug use: No     Sexual activity: Not on file     Other Topics Concern     Not on file     Social History Narrative            Family History:   Reviewed and edited as appropriate  Family History   Problem Relation Age of Onset     Bleeding Disorder No family hx of      Clotting Disorder (Unknown) No family hx of        No known history of colorectal cancer, liver disease, or inflammatory bowel disease.        Allergies:   Reviewed and edited as appropriate     Allergies   Allergen Reactions     Nsaids      Contraindicated             Medications:     Current Facility-Administered Medications   Medication     albumin human 25 % injection 25 g     bisacodyl (DULCOLAX) Suppository 10 mg     ceftaroline fosamil (TEFLARO) 300 mg in sodium chloride 0.9 % 250 mL intermittent infusion     DAPTOmycin (CUBICIN) 800 mg in sodium chloride 0.9 % 100 mL intermittent infusion     dextrose 10 % 1,000 mL infusion     dextrose 5% infusion     glucose gel 15-30 g    Or     dextrose 50 % injection 25-50 mL    Or     glucagon injection 1 mg     diphenoxylate-atropine (LOMOTIL) liquid 2.5 mL     fiber modular (NUTRISOURCE FIBER) packet 1 packet     fluconazole (DIFLUCAN) intermittent infusion 200 mg in NaCl     heparin lock flush 10 UNIT/ML injection 2-5 mL     heparin lock flush 10 UNIT/ML injection 5-10 mL     heparin lock flush 10 UNIT/ML injection 5-10 mL     HYDROmorphone (PF) (DILAUDID) injection 0.3-0.5 mg     insulin 1 unit/mL in saline (NovoLIN, HumuLIN Regular) drip - ADULT IV Infusion     lactated ringers infusion     lidocaine (LMX4) cream     lidocaine 1 % 1 mL     lipids (INTRALIPID) 20 % infusion 250 mL     metroNIDAZOLE (FLAGYL) infusion 500 mg     naloxone (NARCAN) injection 0.1-0.4 mg     norepinephrine (LEVOPHED) 16 mg in D5W 250 mL infusion     ondansetron (ZOFRAN) injection 4 mg     oxyCODONE (ROXICODONE) solution 5-10 mg     pantoprazole (PROTONIX) 40 mg IV push injection     parenteral nutrition - ADULT compounded formula     parenteral nutrition - ADULT compounded formula     prochlorperazine (COMPAZINE) injection 10 mg    Or     prochlorperazine (COMPAZINE) tablet 10 mg    Or     prochlorperazine (COMPAZINE) Suppository 25 mg     Reason beta blocker order not selected     sodium chloride (PF) 0.9% PF flush 10-20 mL     sodium chloride (PF) 0.9% PF flush 10-20 mL     sodium chloride (PF) 0.9% PF flush 3 mL     sodium  chloride (PF) 0.9% PF flush 3 mL     sodium chloride (PF) 0.9% PF flush 3 mL     sodium chloride (PF) 0.9% PF flush 5-50 mL     sulfamethoxazole-trimethoprim (BACTRIM/SEPTRA) suspension 80 mg     tacrolimus (GENERIC EQUIVALENT) suspension 1 mg     tenofovir (VIREAD) tablet 300 mg     valGANciclovir (VALCYTE) tablet 450 mg    Or     valGANciclovir (VALCYTE) solution 450 mg             Review of Systems:   A complete review of systems was performed and is negative except as noted in the HPI           Physical Exam:   BP (!) 81/42  Pulse 93  Temp 97.5  F (36.4  C) (Axillary)  Resp 24  Wt 58.7 kg (129 lb 6.6 oz)  SpO2 100%  BMI 20.89 kg/m2  Wt:   Wt Readings from Last 2 Encounters:   08/27/18 58.7 kg (129 lb 6.6 oz)   07/18/18 61 kg (134 lb 6.4 oz)      Constitutional: appearing critically ill, diaphoretic, resting.   Eyes: Sclera anicteric/injected  Ears/nose/mouth/throat: Normal oropharynx without ulcers or exudate, mucus membranes moist  Neck: supple, thyroid normal size  CV: No edema  Respiratory: Unlabored breathing  Lymph: No axillary, submandibular, supraclavicular or inguinal lymphadenopathy  Abd: distended, + surgical scar. no hepatosplenomegaly, nontender, no peritoneal signs  Skin: clammy edematous  Neuro: somnolent, arousable.   Psych: depressed   MSK: No gross deformities         Data:   Labs and imaging below were independently reviewed and interpreted    BMP  Recent Labs  Lab 08/27/18  0542 08/26/18  0347 08/25/18  1504 08/25/18  0407    149* 150* 149*   POTASSIUM 3.7 3.4 3.3* 2.8*  2.8*   CHLORIDE 112* 115* 117* 116*   JESUS 7.3* 7.7* 7.7* 7.5*   CO2 18* 24 24 21   BUN 80* 84* 82* 79*   CR 1.58* 1.59* 1.53* 1.49*   * 141* 134* 145*     CBC  Recent Labs  Lab 08/27/18  0542 08/26/18  1633 08/26/18  0347 08/25/18  1501   WBC 11.2* 8.0 8.5 10.2   RBC 3.01* 2.15* 2.32* 2.57*   HGB 8.7* 6.5* 7.1* 7.7*   HCT 27.0* 19.7* 20.8* 23.3*   MCV 90 92 90 91   MCH 28.9 30.2 30.6 30.0   MCHC 32.2 33.0  34.1 33.0   RDW 19.2* 19.0* 18.6* 18.1*   PLT 42* 40* 26* 35*     INR  Recent Labs  Lab 18  0841 18  0434 18  1645 18  1215   INR 2.15* 1.84* 1.68* 1.65*     LFTs  Recent Labs  Lab 18  0542 18  0347 18  0407 18  0434   ALKPHOS 179* 190* 206* 254*   AST 36 44 50* 62*   ALT 21 23 23 23   BILITOTAL 4.4* 3.9* 3.8* 2.9*   PROTTOTAL 4.0* 4.4* 4.6* 4.1*   ALBUMIN 1.5* 1.8* 1.9* 1.7*      PANCNo lab results found in last 7 days.    Imagin18:   Suspected bowel perforation in distal ileal loops.

## 2018-08-27 NOTE — PLAN OF CARE
Problem: Patient Care Overview  Goal: Plan of Care/Patient Progress Review  Outcome: No Change          Nursing Progress Note    D/I/A:  Neuro: No acute neuro changes. Continues to be lethargic.  CV: Sinus rhythm, rare PVCs. BP stable, no interventions required. Afebrile. Providers notified of Hgb drop from 7.1 to 6.5, 1Unit RBCs ordered and started. Platelet count goal adjusted to >30, 1 Unit Platelets given this AM. Electrolytes; KPhos 15mmol given this shift, PPN adjusted for less Na+ and increased K+.  Pulm: LS clear to coarse, diminished throughout. Weak, ineffective cough; requiring much encouragement for CADB and ISP use. On RA.  GI/:  Insulin gtt maintained. PPN and TF maintained. NG removed per order, okay for meds via NJ. Abd dressing change BID, changed once this shift. Bilateral CAROL with small amt bloody drainage, R CAROL ostomy pouch removed. Stool output dark-brown, maroon with bright-red streaks; liquid, mucoid, clots present. BM x4 this shift, two small and two moderate; Imodium d/c'ed. PRN Oxycodone given for pain x1, pt reports adequate relief. Coude catheter with UOP adequate.      Pt up to chair x2 this shift, tired but tolerated well otherwise.     P: Continue to monitor and assess. Update POC as needed.    /75  Pulse 93  Temp 97.4  F (36.3  C) (Axillary)  Resp 17  Wt 59.6 kg (131 lb 6.3 oz)  SpO2 99%  BMI 21.21 kg/m2  Tevin Granado  August 26, 2018  6:51 PM

## 2018-08-27 NOTE — PROCEDURES
Small Bore Feeding Tube Reposition  Reason for Feeding Tube Reposition: Pt now with short gut, MD request for FT to be repositioned gastric for maximal TF absorption  Cortrak Start Time: 3:15  Cortrak End Time: 3:20  Medicine Delivered During Procedure: None  Reposition Successful: Suspect gastric, successful  Procedure Complications: None  Final Placement Mandeep at exit of nare: 55 cm  Face to Face time with patient: 15 minutes    Halina Crouch RD, LD  SICU RD Pgr: [phone]

## 2018-08-27 NOTE — PROGRESS NOTES
Brief Nephrology note    Mr Jd's Cr has been stable at 1.5-1.6 for past 3 days, stopped CRRT on 8/23, has not needed HD since, line removed on 8/24.  Hypernatremia improved, can reduce free water/D5 to only insensible losses (likely close to 1L/day with large GI output).  If any nephrology needs arise please page me at number below.     Saul Pichardo  Clinical Nurse Specialist  [phone]

## 2018-08-27 NOTE — PROGRESS NOTES
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 He returned to the SICU for shock, severe lactic acidosis, renal failure and respiratory failure, requiring intubation and dialysis.  Ischemic injury to left hemiliver and non-occlusive mesenteric ischemia.  He underwent a laparotomy and several subsequent take-backs to examine ischemic bowel, on 8/6 he underwent resection of necrotic jejunum with a primary anastomosis, and his abdomen was closed 8/8. He acutely decompensated beginning the night of 8/16, with increasing pressor needs, increasing abdominal pain and lactic acidosis, now s/p exploratory laparotomy 8/17 and s/p entero-entero small bowel anastomosis, drain placement and fascial closure 8/18.  Since that time his renal function has improved (off dialysis), his persistent VRE bacteremia has resolved.    TODAY'S PROGRESS/PLANS:   - albumin 5% 500 ml  - 2 units of FFP  - TEG with heparinase +coags  - Luminal GI consult   - Place PICC  - CT  abdomen/pelvis with po contrast  - 25g of 25% albumin q8  - WOC consult for vac placement  - Pull back feeding tube to stomach (instead of post-pyloric) given short bowel  - Change PPN to TPN    PLAN:  Neuro/ pain/ sedation:  # acute pain  - monitor neurological status; notify the MD/DO for any acute changes in exam.  - PRN IV dilaudid and oxycodone    Pulmonary care:   # acute respiratory failure - resolved  - extubated (8/22)  - supplemental O2 for sats >92%    - Continue pulmonary toilet/mobilization    Cardiovascular:    #Hypotension- hypovolemic vs. hemorrhagic  # Junctional rhythm on 8/22 EKG, unclear cause,  resolved 8/24  - monitor hemodynamic status. Volume challenge this am with albumin followed by 2 units of FFP.  - MAP goals > 60. Will obtain central access today.   - TTE 8/8 showed no vegetations or valve abnormalities  - TOÑO 8/10 no evidence of endocarditis   - EKG (8/23): junctional bradycardia   - EKG (8/24): Normal sinus rhythm    GI care:   # DDLT 7/21/18  # Intrahepatic left portal venous thrombosis, infarction of left liver lobe, patchy infarction of right lobe, suspected hepatic arterial thrombosis  # 7/30/18 s/p exploratory laparotomy and liver biopsy  # Splenic infarctions, patchy bowel ischemia  # Ex Lap 8/6 - resection of 90 cm of necrotic jejunum  # Ex Lap 8/8 - abdomen closed  # Ex Lap 8/17 - Found to have widespread bowel ischemia requiring bowel resection.  Left in discontinuity.  # Ex Lap 8/18 - entero-entero anastomosis and facial closure.  Approximately  cm small bowel remaining with intact ileocecal valve and colon.    #GIB- hematochezia  - Two episodes of bowel ischemia of unclear etiology requiring resection of the majority of small bowel,  cm of small bowel remaining.  Now in continuity with entero-entero anastomosis.  - trickle feeds at 10 ml/hr. No plans to advance at this time.   - Transplant requested WOC consult for VAC placement and management.   - Retract current NJ into stomach given short gut.   - Continue fiber   - Luminal GI consult today.     Fluids/ Electrolytes/ Nutrition:   # Protein calorie deficit malnutrition   # NPO  #Hypocalcemia  - TF's at trickle feed 10 ml/h  - Transition peripheral parenteral nutrition to TPN this evening after PICC is placed.   - electrolytes to be replaced as needed, will add to PPN per pharmacy  - 1 gram of calcium gluconate today given lack of central access at this time.     Renal/ Fluid Balance:    # BEN -recovering  # Mild lactic acidosis  - CRRT stopped 8/23. BEN recovering. Creatinine 1.5, urine output adequate,  electrolyte/acid-base balance stable.   - Will continue to monitor intake and output.  - Luis in place for strict I's and O's  - Lactic acid 2.1 in the setting of hypotension- volume resuscitation as above.     Endocrine:    # DM II  - continue insulin gtt. Expect higher needs given transition from PPN to TPN.     ID/ Antibiotics:  # VRE Bacteremia  # ESBL Bacteremia  # Hepatitis B   ABX ongoing and ID transplant following.  - Daptomycin (7/30- ), ceftaroline (8/13- ), continue until 9/2 per Transplant ID  - Micafungin (7/30-8/22) changed to fluconazole (8/22- )  - Flagyl (8/17 - )  - Tigecycline (8/13-8/23), discontinued after 4 days of negative blood cultures  - Meropenem (8/2-8/14), discontinued after 12 days of negative cultures for ESBL  - Amikacin (8/17 - 8/21), discontinued as he had completed 2-week course of meropenem and has other active agents for G- coverage  - Immunosuppression: (restarted 8/6) tacrolimus  - Immunosuppression prophylaxis: Valgancyclovir and Tenofovir, nystatin when micafungin stopped.  Bactrim stopped   - Transplant ID following      Cultures   - 7/27 VRE (line/arm)   - 7/29: VRE (left arm)   - 7/30: VRE (left arm)   - 7/31: VRE (Line and peripheral)  - 8/1: ESBL (HD/hand/central line)   - 8/3: VRE (intra-abdominal tissue)  - 8/4: NGTD (blood)  - 8/5: NGTD (blood)  - 8/6: VRE (art line)  - 8/7: VRE (blood)  - 8/8: VRE (blood)  - 8/9: VRE (blood)  - 8/11: VRE (blood)  - 8/12: VRE (abdominal fluid)  - 8/13: VRE (blood)  - 8/14: VRE (blood)  - 8/15: VRE (blood)  - 8/17: NGTD (blood)   - 8/17: VRE & Candida (peritoneal fluid)   - 8/18: G+ cocci (tissue)  - 8/19 NGTD (blood)  - 8/21 NGTD (blood)  - 8/22 NGTD (blood)  - 8/23 NGTD (blood)  - 8/24 NGTD (blood)    Heme:     # Acute blood loss anemia  # Anemia of critical illness  # Portal venous thrombosis  # Thrombocytopenia  # Coagulopathy   #Anti thrombin III deficiency  - Heparin at 400 straight rate. Transiently on low intensity heparin  infusion previously, decreased to straight rate in the setting of bleeding. Discussed with transplant, will continue heparin for now.   - Hold aspirin 81mg for continued bleeding  - Monitor CBC.  Transfuse PLT now x1  - Check TEG, fibrinogen, INR now. Give 2 FFP given known anti thrombin III deficiency.     MSK:  # weakness and deconditioning of critical illness   - PT/OT     Prophylaxis:    - DVT: straight rate heparin, SCD's  - GI: protonix     Lines/ tubes/ drains:  - NJ, NG, PIVs.   - PICC today. Will also insert arterial line.   Disposition:  - SICU  [facility]   CC time 40 minutes    ====================================    SUBJECTIVE:   Hypotensive, tachycardic getting albumin now. Loose bloody stools. Denies dyspnea, chest pain, nausea. Does report abdominal pain.       OBJECTIVE:   1. VITAL SIGNS:   Temp:  [97.3  F (36.3  C)-98.8  F (37.1  C)] 98.8  F (37.1  C)  Heart Rate:  [] 99  Resp:  [12-26] 24  BP: ()/(39-91) 79/50  SpO2:  [95 %-100 %] 99 %  Resp: 24    2. INTAKE/ OUTPUT:   I/O last 3 completed shifts:  In: 5961.97 [I.V.:2919.97; NG/GT:430]  Out: 2509 [Urine:2250; Drains:259]    3. PHYSICAL EXAMINATION:   General: lying in bed, appears comfortable, tired appearing  Neuro: alert, communicating, moving extremities.  Resp: Non-labored on RA, no accessory muscle use, clear lung sounds throughout.   CV: RRR, S1 S2  Abdomen: soft, non-distended, generally tender.  Incisions: upper abdominal incision packed and dressed, incision is dry.  No granulation tissue  Extremities: warm, well perfused  : bah in place.       4. INVESTIGATIONS:   Arterial Blood Gases     Recent Labs  Lab 08/21/18  1332   PH 7.40   PCO2 40   PO2 163*   HCO3 25     Complete Blood Count     Recent Labs  Lab 08/27/18  0542 08/26/18  1633 08/26/18  0347 08/25/18  1501   WBC 11.2* 8.0 8.5 10.2   HGB 8.7* 6.5* 7.1* 7.7*   PLT 42* 40* 26* 35*     Basic Metabolic Panel    Recent Labs  Lab 08/27/18  0542 08/26/18  0347  08/25/18  1504 08/25/18  0407    149* 150* 149*   POTASSIUM 3.7 3.4 3.3* 2.8*  2.8*   CHLORIDE 112* 115* 117* 116*   CO2 18* 24 24 21   BUN 80* 84* 82* 79*   CR 1.58* 1.59* 1.53* 1.49*   * 141* 134* 145*     Liver Function Tests    Recent Labs  Lab 08/27/18  0542 08/26/18  0347 08/25/18  0407 08/24/18  0434  08/21/18  1645  08/20/18  1215   AST 36 44 50* 62*  < >  --   < > 64*   ALT 21 23 23 23  < >  --   < > 24   ALKPHOS 179* 190* 206* 254*  < >  --   < > 250*   BILITOTAL 4.4* 3.9* 3.8* 2.9*  < >  --   < > 2.8*   ALBUMIN 1.5* 1.8* 1.9* 1.7*  < >  --   < > 1.5*   INR  --   --   --  1.84*  --  1.68*  --  1.65*   < > = values in this interval not displayed.  Pancreatic Enzymes  No lab results found in last 7 days.  Coagulation Profile    Recent Labs  Lab 08/24/18 0434 08/21/18  1645 08/20/18  1215   INR 1.84* 1.68* 1.65*   PTT 74*  --   --      Lactate  Invalid input(s): LACTATE    5. RADIOLOGY:   KUB pending       --------------------------------------------------------------------------------------------------------------------------  Patient seen, findings and plan discussed with surgical ICU staff Dr. Finn

## 2018-08-27 NOTE — BRIEF OP NOTE
[facility] [place]    Pre-operative diagnosis: Bowel Perforation, anastomosis dehiscence    Post-operative diagnosis Same   Procedure: Procedure(s):  Abdominal Washout, primary anastomosis closure, abthera placement - Wound Class: II-Clean Contaminated   Surgeon: Mamadou Norwood   Assistants(s): Mihir Nj   Anesthesia: General    Estimated blood loss: Less than 50 ml    Total IV fluids: (See anesthesia record)   Blood transfusion: No transfusion was given during surgery   Total urine output: (See anesthesia record)  50ml   Drains: None   Specimens: Abdominal fluid for culture, bowel wall for pathology    Implants: Abterra mesh    Findings: Perforated bowel close to anastomosis, anastomosis dehiscence .   Complications: None.   Condition: Intubated  Transferred to ICU   Comments: See dictated operative report for full details

## 2018-08-27 NOTE — PROGRESS NOTES
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  Developed shock with severe lactic acidosis and respiratory failure on POD9. CT scan showed left PV venous thrombosis, infarction of left liver lobe, patchy infarction of right lobe and possible hepatic arterial thrombosis, patchy bowel ischemia. Transferred to ICU.   Ex lap, patchy diffuse SB ischemia. Doppler demonstrated arterial flow main vessels to bowel and liver. Splenic infarction. Liver biopsy negative for acute rejection. Fluid culture growing VRE. On Heparin gtt. 8/3 OR for reexploration liver transplant, bowel ischemia, findings improved patchy iscemic changes of small bowel, patchy ischemic changes of left liver lobe.  OR: some ischemic/necrotic jejunum, s/p resection.   Washout and abdominal closure with stratice mesh, no drains placed. Minimal ascites, intact small bowel anastomosis, patchy ischemia left liver lobe.  peritonitis with bowel necrosis and bowel perforation. S/p bowel resection, about 50 cm sm bowel, ileocecal valve and colon remaining.     Sai Assessment and sub scores:   Sai Score  Av.2  Min: 9  Max: 20    Positioning: Pillows    Mattress:  Standard , Isolibrium    Moisture Management:  Incontinence Protocol, indwelling catheter    Catheter secured? Yes    Current Diet / Nutrition:       None    Labs:   Recent Labs   Lab Test  18   1202  18   0841  18   0542   18   2050   ALBUMIN   --    --   1.5*   < >   --    HGB  7.5*   --   8.7*   < >   --    INR    --   2.15*   --    < >   --    WBC   --    --   11.2*   < >   --    A1C   --    --    --    --   5.4    < > = values in this interval not displayed.       Wound Assessment (location #1):   Horizontal surgical upper abd.  Wound History:   laparotomy    donor (DBD) liver transplant with duct to duct anastomosis over biliary stent.    Ex lap due to concern for ischemic bowel, findings: patchy diffuse SB ischemia. Doppler demonstrated arterial flow main vessels to bowel and liver. Splenic infarction.   8/3 Reexploration liver transplant, bowel ischemia, findings improved patchy iscemic changes of small bowel, patchy ischemic changes of left liver lobe.    Re-exploration, ischemic/necrotic jejunum, s/p resection   Washout and abdominal closure with stratice mesh, no drains placed. Minimal ascites, intact small bowel anastomosis, patchy ischemia left liver lobe.   Procedure: reopening of laparotomy. abd washout, bowel resection (clip and drop fashion). Postop Dx: same. Large segment of ischemic bowel from mid-jejunum (prior anastomosis) with distal perforation and feculent peritonitis.    Reexploration, small and large bowel intestine appeared healthy per surgeon, primary anastomosis bowel. Fascia closure.   Reopening of laparotomy. abdominal washout, bowel resection of 200cm    Age of wound/ surgical date: 16    Date KCI VAC placed: 16      KCI Wound VAC initiated by:  [facility] Nurse: Yes  MD: No    Any other wound therapies tried prior to KCI VAC placed? Yes - Location:       18      Specific Dimensions (length x width x depth, in cm) :   2.5cm x 41.5cm x 1.5cm    Tunneling:  N/A      Undermining: N/A    Wound Base: moist and closed at fascial level with blue sutures visible. Beefy dermal tissue at edges with fat and white/gray slough in wound base.     Palpation of the wound bed:  normal    Slough appearance:  adherent, gray  and tan         Eschar appearance:   none    Periwound Skin: intact,      Color: normal and consistent with surrounding tissue    Temperature  normal     Drainage:  Serosang. Amount: moderate . Color: serosanguinous     Odor: none    Pain:  Absent, pt denies    Was patient premedicated prior to dressing change? No    Medication(s) used:  NA          Intervention:     Patient's chart evaluated.      Wound was assessed.    Wound Care: was done:  Cleansed with Microklenz, med black foam applied to wound base, draped with tegaderm and wound vac applied at 125mmhg    Orders  Written    Supplies  placed at the bedside    Discussed plan of care with Nurse          Assessment:       Pt has surgical wound running entire abdomen from right to left in clamshell shape following Liver transplant and bowel resection. No acute signs or symptoms of infection.         Plan:     Nursing to notify the Provider(s) and re-consult the WO Nurse if wound(s) deteriorate(s) or if necessary to reevaluate the plan.    Plan of care for wound located on Abdomin:  ? [facility].  -125mmhg  ? NPWT using black foam and pressure set to -125mmHg continuous suction.  ? WOC to change dressing 3x/wk Mon/Wed/Fri  ?   Face to face time: 16-30 Minutes

## 2018-08-27 NOTE — PLAN OF CARE
Problem: Patient Care Overview  Goal: Plan of Care/Patient Progress Review  OT/4AB:  Cancel - Per discussion with RN, pt with soft BPs and plans to get a new PICC line placed today.  OT session scheduled at 1400 today with  and pt with plans to have CT at 1330.  Will reschedule OT session for 8/28 and re-assess pt appropriateness at that time.

## 2018-08-27 NOTE — PLAN OF CARE
Problem: Patient Care Overview  Goal: Plan of Care/Patient Progress Review  Outcome: Declining  Patient lethargic, opens eyes to gentle stimuli/voice and answers orientation questions x 2.  Very weak and stiff but moves all extremities and follows commands.  SR on monitor, although HR increasing from 70s-80s throughout night to high 90s after 5am.  BP stable throughout night until 0500 - see additional note.  Remains on room air and encouraged to cough and deep breathe.  Secretions extremely thick and at times unable to be suctioned out effectively due to thickness.  Lungs coarse at beginning of shift but clearing up and more diminished towards end of shift.  Frequent stools increasing in thickness.  Maroon to bloody in color and incontinent.  Trickle feeds infusing @ 10 and PPN infusing @ 70.  Luis catheter patent and draining - urine more concentrated and trending down early this am but remains adequate.  Hemoglobin and platelets stable this am.  Insulin gtt continues @ 0.5-1 unit/hour.  Heparin continues @ 400 units/hour.  D5 infusing @ 50 ml/hour.  Bilateral CAROL drains with output as charted.  Dressings changed to drains as well as abdominal incision.  Wife at bedside.  Will continue to monitor/await orders regarding patient blood pressure.

## 2018-08-28 NOTE — ANESTHESIA POSTPROCEDURE EVALUATION
Patient: Yaneli Miles    Procedure(s):  Abdominal Washout, primary anastomosis closure, abthera placement - Wound Class: II-Clean Contaminated    Diagnosis:Bowel Perforation  Diagnosis Additional Information: No value filed.    Anesthesia Type:  General    Note:  Anesthesia Post Evaluation    Patient location during evaluation: Bedside  Patient participation: Unable to evaluate secondary to administered sedation  Level of consciousness: obtunded/minimal responses  Pain management: adequate  Airway patency: patent  Cardiovascular status: acceptable (stable on escalated pressor doses)  Respiratory status: acceptable and intubated  Hydration status: acceptable  PONV: none     Anesthetic complications: None          Last vitals:  Vitals:    08/27/18 1500 08/27/18 1600 08/27/18 1950   BP: 124/75 93/48    Pulse:      Resp:  24    Temp:  36.7  C (98.1  F) 33.5  C (92.3  F)   SpO2:  100% 99%       Transported to ICU with full monitors.  Report given to ICU staff.    Electronically Signed By: Shahla Sharif MD  August 27, 2018  7:57 PM

## 2018-08-28 NOTE — PROGRESS NOTES
Patient returned to unit from OR around 1850, report received from anesthesia. Patient intubated and sedated at time of arrival, BP 80's systolic, titrated levo for MAP goals >50. NJ removed in OR and NG place, chest and abdominal xray ordered to verify. Used  phone with MD to explain situation to patient's wife. SICU resident called to come assess. Handed off report on patient to NOC RN.

## 2018-08-28 NOTE — PROVIDER NOTIFICATION
Spoke w/ MD Chris Cerna to notify Pt urine output continues to be marginal. HR improved (100-110s) with RR in high 20s to low 30s. Plan to continue to monitor at this time.

## 2018-08-28 NOTE — ANESTHESIA CARE TRANSFER NOTE
Patient: Yaneli Miles    Procedure(s):  Abdominal Washout, primary anastomosis closure, abthera placement - Wound Class: II-Clean Contaminated    Diagnosis: Bowel Perforation  Diagnosis Additional Information: No value filed.    Anesthesia Type:   No value filed.     Note:  Airway :ETT  Patient transferred to:ICU (4A)  ICU Handoff: Call for PAUSE to initiate/utilize ICU HANDOFF, Identified Patient, Identified Responsible Provider, Reviewed the Pertinent Medical History, Discussed Surgical Course, Reviewed Intra-OP Anesthesia Management and Issues during Anesthesia, Set Expectations for Post Procedure Period and Allowed Opportunity for Questions and Acknowledgement of Understanding      Vitals: (Last set prior to Anesthesia Care Transfer)    CRNA VITALS  8/27/2018 1820 - 8/27/2018 1915      8/27/2018             NIBP: 135/68    Pulse: 100                Electronically Signed By: BUD Mendez CRNA  August 27, 2018  7:15 PM

## 2018-08-28 NOTE — PLAN OF CARE
Problem: Patient Care Overview  Goal: Plan of Care/Patient Progress Review  Outcome: No Change  Status  D/I: Patient on unit 4A Surgical/Neuro ICU   Neuro- sedated on 0.3 of precedex, pupils equal and reactive, moves purposefully, generalized weakness, nods appropriately to simple questions  CV- HR sinus tachycardia with rare PVC's  Pulm- lungs coarse and diminished in the bases, vented, CMV settings, thick white scant inline secretions; BP goal MAP >50, on 0.14 of levophed   GI/- NG to LIS, okay to use for medications, TPN running in central line, bah in place, urine output improving, no bowel movements today  Skin- small skin tear on coccyx, generalized bruising and scabbing from previous line placements, abdominal abthera in place, moderate serosang output  Gtts- Levo 0.14, insulin paused for hypoglycemia, heparin at 400 straight rate, MIV off, TKO, intermittent abx, TPN at 45  ID- abdominal fluid growing lactose fermenting gram negative rods, MD notified  Electrolytes- magnesium replaced as needed  x2 plasma, x1 RBCs given today. Plan is to return to the OR tomorrow for a washout.  See flow sheets for further interventions and assessments.  P: Continue to monitor pt closely, Notify MD of changes/concerns.

## 2018-08-28 NOTE — PROGRESS NOTES
[facility]  Transplant Infectious Disease Progress Note      Patient:  Yaneli Miles, Date of birth 1975, Medical record number [id number] Date of Visit:  08/28/2018         Assessment and Recommendations:   Recommendations:  - Continue ceftaroline and daptomycin dose adjusted for renal function.   - Continuing flagyl and fluconazole.   - Continue tenofovir and vGCV  - duration of antibiotics undetermined at this time  - restart tigecycline    Please call with questions.      Assessment: 43 year old male Hep B cirrhosis s/pOLT on 7/21 whose post-op course was complicated hemorrhagic shock, portal vein and hepatic artery occlusion, liver infarct w/ pneumotosis intestinalis w/ non-occlusive filling defects in SMV branches w/ small bowel ischemia on CT a/p 7/30/18, repeat scans 8/2018 w/ bowel ischemia and fabi perforation s/p ex lap 8/18/18 w/ bowel resection, closure of fascia and persistent VRE bacteremia. Splenic infarcts too. Extubated 8/22. Re-intubated on 8/27 w/ hypotensions refractory to volume, s/p ex lab w/ anastomosis dehiscence and bowel perf s/p mesh repair and wound vac in place. Titrating down vent settings, FiO2 50, weaning off norepinephrine.     ID issues:  - severe sepsis on 8/27 w/ leukocytosis up to 30+ s/p wash out OR as described above from processes above and below.     - Hepatic abscess per 8/15/2018 CT imaging; bowel perforation and feculant peritonitis at 8/17/2018 laparotomy. One of the sources of continuing VRE bacteremia, on ceftaroline, tigecycline (DC'd 8/23) , and daptomycin. Daptomycin SHARMIN initially 2 on first positive blood cultures.  Continue flagyl for coverage of anaerobic bowel bacterial kenneth. On micafungin for Candida albicans growing in 8/17/2018 peritoneal fluid but can narrow to azole therapy.  As of most recent OR on 8/18, remaining bowel looked viable and fascia was closed.    - Persistent VRE bacteremia. He continues to have positive blood  cultures with latest culture from 8/17/2018 growing, now negative BCx's from 8/19-present. He is on combination therapy with daptomycin & ceftaroline, as described in In vitro activity of daptomycin in combination with ß-lactams, gentamicin, rifampin, and tigecycline against daptomycin-nonsusceptible enterococci, where there was an apparent synergistic effect to combining the two. In the event that daptomycin is inactivated by surfactant in the lungs by an occult pulmonary process, tigecycline was added to his regimen but no evidence for pulmonary process with clear CXR and minimal vent support.    - Chronic hepatitis B: On tenofovir    Other ID issues:  -  Prophylaxis:TMP/SMX, vGCV, micafungin  - Viral serostatus D/R & prophylaxis: HCV negative/reactive, HBVnegative/reactive, CMV -/+, EBV+/+, valganciclovir ppx  - Gamma globulin status: Never checked.  - Isolation status: Contact, Good hand hygiene.    Attestation:  I have reviewed today's vital signs, medications, labs and imaging.      Floor time: 25 minutes, Face-to-face time: 10 minutes, Total time: 35 minutes  Faisal Momin,   Pager [phone]           Interval History:     Yaneli developed hypotension refractory to volume support, on norepinephrine, take back to the OR on  8/27 for abdominal washout, primary anastomsosis closure w/ abterra mesh, perforated bowel close to anastomsis w/ anastomsis dehiscence. Intubated, sedated, No new skin rashes.  Minimal suction requirements.     Transplants:  7/21/2018 (Liver), Postoperative day:  38.  Coordinator Kay Reyes    Review of Systems:   Unable to obtain review of systems due to mental status.         Current Medications & Allergies:       calcium gluconate  1 g Intravenous Once     ceftaroline (TEFLARO) intermittent infusion  300 mg Intravenous Q12H     DAPTOmycin (CUBICIN) intermittent infusion  12 mg/kg Intravenous Q48H     fiber modular  1 packet Per Feeding Tube TID     fluconazole  200 mg Intravenous  Q24H     heparin lock flush  5-10 mL Intracatheter Q24H     lipids  250 mL Intravenous Once per day on Mon Wed Fri     metroNIDAZOLE  500 mg Intravenous Q6H     pantoprazole (PROTONIX) IV  40 mg Intravenous BID     sodium chloride (PF)  3 mL Intravenous Q8H     sulfamethoxazole-trimethoprim  10 mL Per Feeding Tube Daily     tacrolimus  1 mg Oral or Feeding Tube BID IS     tenofovir  300 mg Per Feeding Tube Q72H     valGANciclovir  450 mg Oral Once per day on Mon Thu    Or     valGANciclovir  450 mg Oral or NG Tube Once per day on Mon Thu       Infusions/Drips:    dexmedetomidine 0.3 mcg/kg/hr (08/28/18 0741)     IV fluid REPLACEMENT ONLY       D5W 50 mL/hr at 08/28/18 0700     heparin 400 Units/hr (08/28/18 0800)     insulin (regular) 2 Units/hr (08/28/18 0800)     lactated ringers 10 mL/hr at 08/28/18 0444     norepinephrine 0.18 mcg/kg/min (08/28/18 0800)     parenteral nutrition - ADULT compounded formula 45 mL/hr at 08/28/18 0700     phenylephrine IV infusion ADULT Stopped (08/27/18 1953)     Reason beta blocker order not selected         Allergies   Allergen Reactions     Nsaids      Contraindicated             Physical Exam:   Vitals were reviewed.  Ranges for vital signs:  Temp:  [88.9  F (31.6  C)-99.5  F (37.5  C)] 97.7  F (36.5  C)  Heart Rate:  [] 114  Resp:  [19-35] 29  BP: ()/(37-75) 92/41  MAP:  [50 mmHg-82 mmHg] 57 mmHg  Arterial Line BP: ()/(37-66) 94/42  FiO2 (%):  [50 %] 50 %  SpO2:  [71 %-100 %] 100 %  Vitals:    08/26/18 0400 08/27/18 0400 08/28/18 0030   Weight: 59.6 kg (131 lb 6.3 oz) 58.7 kg (129 lb 6.6 oz) 56.1 kg (123 lb 10.9 oz)       Physical Examination:   GENERAL:  Sedated on the vent, FiO2 50% w/ 5 PEEP, norepinephrine running.  EYES:  Icteric sclera  ENT:  NG in place.  LUNGS:  Good air entry.  Clear to auscultation.  CARDIOVASCULAR:  tachycardic regular rate and rhythm, no murmur   ABDOMEN:  Hypoactive bowel sounds.  Wound vac in place w/ serosang fluid in the  drainage.    : Luis in place in clear yellow urine.  EXT:lower extremity edema  SKIN:  No acute rashes.   T/L/D: R femoral dialysis catheter, R internal jugular CVC, Left PICC, left femoral arterial line         Laboratory Data:     Metabolic Studies       Recent Labs   Lab Test  08/28/18   0307  08/27/18   2155  08/27/18   1858   08/27/18   0542   08/22/18   0424   08/18/18   0411   07/13/18 2050   07/13/18   0334   NA  139   --   141   < >  144   < >  140   < >  141   < >   --    < >  132*   POTASSIUM  3.8   --   3.0*   < >  3.7   < >  3.5   < >  3.6   < >   --    < >  2.6*   CHLORIDE  108   --   108   < >  112*   < >  108   < >  108   < >   --    < >  101   CO2  17*   --   21   < >  18*   < >  25   < >  23   < >   --    < >  20   ANIONGAP  14   --   11   < >  14   < >  7   < >  9   < >   --    < >  12   BUN  80*   --   79*   < >  80*   < >  43*   < >  38*   < >   --    < >  30   CR  1.72*   --   1.64*   < >  1.58*   < >  0.73   < >  0.80   < >   --    < >  2.08*   GFRESTIMATED  43*   --   46*   < >  48*   < >  >90   < >  >90   < >   --    < >  35*   GLC  197*   --   113*   < >  172*   < >  142*   < >  139*   < >   --    < >  104*   A1C   --    --    --    --    --    --    --    --    --    --   5.4   --    --    JESUS  6.8*   --   6.9*   < >  7.3*   < >  6.6*   < >  7.3*   < >   --    < >  8.1*   PHOS  3.2   --    --    --   3.1   < >  3.9   < >  3.7   < >   --    --    --    MAG   --    --    --    --   1.4*   < >  2.2   < >  2.3   < >   --    --    --    LACT  3.9*  3.7*  2.7*  1.8   < >   --    < >  0.9   < >   --    < >  3.9*   --   1.1   PCAL   --    --    --    --    --    --    --    --    --    --    --    --   0.89   CKT   --    --    --    --    --    --   44   --   53   < >   --    --    --     < > = values in this interval not displayed.       Hepatic Studies    Recent Labs   Lab Test  08/28/18   0307  08/27/18   1858  08/27/18   0542   07/13/18   0334   06/21/18   0612   BILITOTAL  3.5*  3.8*   4.4*   < >  36.3*   < >  23.4*   DBIL  2.9*   --   3.5*   < >  28.1*   < >   --    ALKPHOS  100  94  179*   < >  96   < >  78   PROTTOTAL  3.7*  3.9*  4.0*   < >  5.8*   < >  5.1*   ALBUMIN  2.1*  2.1*  1.5*   < >  2.8*   < >  2.9*   AST  17  19  36   < >  287*   < >  124*   ALT  13  13  21   < >  125*   < >  79*   LDH   --    --    --    --   259*   --   177    < > = values in this interval not displayed.       Pancreatitis testing    Recent Labs   Lab Test  08/20/18   0443   07/22/18   0354  07/20/18   1741  07/12/18   1913   06/16/18   1815   AMYLASE   --    --   42  60  35   --    --    LIPASE   --    --   75   --    --    --   260   TRIG  40   < >   --    --    --    < >   --     < > = values in this interval not displayed.       Hematology Studies      Recent Labs   Lab Test  08/28/18   0307  08/27/18   2155  08/27/18   1858  08/27/18   1518   08/27/18   0542  08/26/18   1633   08/23/18   0916  08/23/18   0352   WBC  37.3*  36.8*  32.2*  22.8*   --   11.2*  8.0   < >  14.1*  12.1*   ANEU   --    --    --    --    --    --    --    --   13.3*  11.4*   ALYM   --    --    --    --    --    --    --    --   0.5*  0.5*   MARCELA   --    --    --    --    --    --    --    --   0.3  0.2   AEOS   --    --    --    --    --    --    --    --   0.0  0.0   HGB  9.3*  10.3*  9.3*  9.7*   < >  8.7*  6.5*   < >  8.6*  6.9*   HCT  27.3*  30.9*  27.9*  28.6*   --   27.0*  19.7*   < >  24.8*  19.8*   PLT  82*  101*  88*  65*   --   42*  40*   < >  41*  40*    < > = values in this interval not displayed.       Clotting Studies    Recent Labs   Lab Test  08/28/18   0307  08/27/18   2155  08/27/18   1858  08/27/18   1518   INR  3.50*  2.78*  2.81*  2.25*   PTT   --    --   47*   --        Arterial Blood Gas Testing    Recent Labs   Lab Test  08/28/18   0628  08/27/18   2155  08/27/18   1858  08/21/18   1332  08/18/18   1149   PH  7.30*  7.31*  7.23*  7.40  7.38   PCO2  32*  35  44  40  40   PO2  100  127*  132*  163*  112*   HCO3   16*  18*  19*  25  23   O2PER  50.0  50  60  40.0  56%        Urine Studies     Recent Labs   Lab Test  07/27/18   2330  07/13/18   1315  06/18/18   1200  06/16/18   2220   URINEPH  7.5*  6.0  5.0  5.5   NITRITE  Negative  Negative  Negative  Negative   LEUKEST  Negative  Negative  Negative  Large*   WBCU  3  None  2  66*       Body fluid stats    Recent Labs   Lab Test  08/18/18   1204   07/15/18   1115  07/13/18   1240  06/28/18   1111   FTYP   --    --   Ascites  Ascites  Ascites   FCOL   --    --   Bloody  Red  Yellow   FAPR   --    --   Cloudy  Cloudy  Slightly Cloudy   FWBC   --    --   245  373  234   FNEU   --    --   3  28  6   FLYM   --    --   19  11  24   FMONO   --    --   78   --    --    FALB   --    --    --   1.0   --    FTP   --    --    --   1.6   --    GS  No organisms seen  Many  WBC'S seen  predominantly PMN's    Many  Red blood cells seen     < >  No organisms seen  Few  WBC'S seen  predominantly mononuclear cells    No organisms seen  Few  WBC'S seen  PMNs seen    Many  Red blood cells seen     --     < > = values in this interval not displayed.       Microbiology:    Blood   6/17/18 negative   6/18/18 negative   6/21/18 negative   7/27/18 VRE   7/29/18 catheter tip Staphylococcus epidermidis   7/29/18 VRE   7/30/18 VRE   7/31/18 VRE   8/1/18 VRE, ESBL E. Coli   8/2/18 negative   8/4/18 negative   8/6/18 VRE   8/7/18 VRE   8/8/18 VRE   8/9/18 VRE   8/11/18 VRE   8/12/18 VRE   8/13/18 VRE   8/14/18 VRE   8/15/18 VRE   8/17/18 VRE   8/19/18 NGTD   8/21 NGTD   8/22 negative   8/23/18 negative   8/25 negative   8/27 NGTD     Fluid/Tissue   6/16/18 ascites negative   6/25/18 ascites negative   6/28/18 ascites negative   7/13/18 ascites negative   7/21/18 ascites negative   7/30/18 abdominal fluid VRE   8/3/18 intra-abdominal hematoma tissue VRE   8/13/18 Abdominal fluid VRE   8/17/18 Peritoneal fluid VRE, Candida albicans/dubliniensis   8/18/18 Hematoma fluid VRE, C. Albicans /  harjinder   8/27/18: LF GNR, E. faecium    Urine  6/16/18 Enterococcus faecalis    VRE rectal culure   7/13/18  positive    Respiratory  6/17/18 sputum single colony Citrobacter freundii, light growth Candida albicans/dubliniensis    Imaging:  CXR: 8/28/18:   1.  Endotracheal tube tip now 3 cm above the tano. Temperature probe tip in the upper esophagus.  2.  Unchanged left lower lobe atelectasis.    AXR: 8/28/18:   1. Enteric tube tip and sidehole project over the expected location of the stomach.  2. Nonspecific bowel gas pattern.    Liver U/S: 8/17/18:   1.  Transplant liver with patent vasculature.  2.  Decreased resistive indices in the hepatic artery (0.3-0.4), highly concerning for upstream stenosis.  3.  Stable appearance of perihepatic fluid collection near the braden hepatis. Other previously described fluid collections on 8/13/2018 evaluation were not seen on this exam.  4.  Hyperechoic round lesion in the right lateral lobe, also seen on comparison CT, likely represents hemangioma.  5.  Right pleural effusion.    CT a/p w/ contrast: 8/15/18:   1.  Findings concerning for hepatic infarct involving the majority of the left hepatic lobe with associated complex fluid collection along inferior aspect, measuring up to 8.9 x 4.6 cm. The fluid collection likely represents evolving hematoma. Superimposed infection cannot be completely excluded in this patient with leukocytosis, although thought less likely based on evolving nature of this process since 7/30/2018 and sonographic appearance from ultrasound exams dated 7/30/2018 and 8/13/2018.   2.  Continued diffuse dilation of the small bowel transition point in the right lower quadrant (series 5 image [id number]). Findings concerning for small bowel obstruction.  3.  Postsurgical changes of abdominal wall abscess/hematoma evacuation with new soft tissue defect over the right upper quadrant and packing in place. There is surrounding postoperative hematoma.  4.   Unchanged biliary dilation, left lobe dominant, with a right-sided biliary stent in place.

## 2018-08-28 NOTE — PROGRESS NOTES
Immunosuppression Note:    Yaneli Miles is a 43 year old male who is seen today  for immunosuppression management     I, Mamadou Norwood MD, I have examined the patient with the resident/PA/Fellow, discussed and agree with the note and findings.  I have reviewed today's vital signs, medications, labs and imaging. I reviewed the immunosuppression medications and levels. 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  Developed shock with severe lactic acidosis and respiratory failure on POD9. CT scan showed left PV venous thrombosis, infarction of left liver lobe, patchy infarction of right lobe and possible hepatic arterial thrombosis, patchy bowel ischemia. Transferred to ICU.   Ex lap, patchy diffuse SB ischemia. Doppler demonstrated arterial flow main vessels to bowel and liver. Splenic infarction. Liver biopsy negative for acute rejection. Fluid culture growing VRE. On Heparin gtt. 8/3 OR for reexploration liver transplant, bowel ischemia, findings improved patchy iscemic changes of small bowel, patchy ischemic changes of left liver lobe.  OR: some ischemic/necrotic jejunum, s/p resection.   Washout and abdominal closure with stratice mesh, no drains placed. Minimal ascites, intact small bowel anastomosis, patchy ischemia left liver lobe.  peritonitis with bowel necrosis and bowel perforation. S/p bowel resection, about 50 cm sm bowel, ileocecal valve and colon remaining.     Procedures:    donor (DBD) liver transplant with duct to duct anastomosis over biliary stent.    Ex lap due to  concern for ischemic bowel, findings: patchy diffuse SB ischemia. Doppler demonstrated arterial flow main vessels to bowel and liver. Splenic infarction.   8/3 Reexploration liver transplant, bowel ischemia, findings improved patchy iscemic changes of small bowel, patchy ischemic changes of left liver lobe.   8/6 Re-exploration, ischemic/necrotic jejunum, s/p resection  8/8 Washout and abdominal closure with stratice mesh, no drains placed. Minimal ascites, intact small bowel anastomosis, patchy ischemia left liver lobe.  8/17 Procedure: reopening of laparotomy. abd washout, bowel resection (clip and drop fashion). Postop Dx: same. Large segment of ischemic bowel from mid-jejunum (prior anastomosis) with distal perforation and feculent peritonitis.   8/18 Reexploration, small and large bowel intestine appeared healthy per surgeon, primary anastomosis bowel. Fascia closure.  8/20 Reopening of laparotomy. abdominal washout, bowel resection of 200cm  8/27 Abdominal Washout, primary anastomosis closure, abthera placement     Graft function: POD #38, LFTs stable with elevated bilirubin, 3.5. 7/30 Hepatic infarction, left PV thrombosis, and narrow and irregular appearance HA noted on CTA. 8/8 Liver biopsy negative for acute rejection. Intra-abdominal  cultures from 8/1, 8/6, 8/17, 8/18 + VRE or GPCs.   Incision opened at bedside 8/12, hematoma removed.  8/13 US: new perihepatic fluid collections (largest 7x2x4), small volume of septated ascites.  VAC placed 8/15. 8/15 CT scan, left lobe infarct with complex fluid collection inferior.  8/17 US: patent vasculature but RIs concerning for HA stenosis, stable fluid collection near braden hepatis. Albumin 25% q6 hours x 4 doses yesterday.      Immunosuppression management:   - Induction: Simulect intra-op due to BEN. Steroid taper per protocol, completed.  - MMF: on hold due to critical status  - Tac goal level ~5-6 due to sepsis and BEN. Level 8/27 5.0 (10-hour trough), 8/28  5.7 (21 hour trough). Will recheck level tomorrow.   - Hydrocortisone, now tapered off.   Complexity of management: Medium. Contributing factors: thrombocytopenia and anemia, ARF    Hematology:   Anemia: Acute blood loss. Hgb stable at 9.3 today after receiving 1u PRBCs on 8/26 Continues to ooze from lines and drain sites, also with ongoing melena.  Multiple transfusions in the last week.  Thrombocytopenia: Ongoing transfusions. Platelet count remains low at 82, getting frequent platelet transfusions.  Anticoagulation: Anticoagulating for bowel ischemia and portal thrombosis. Protein S level low at 27 on 8/19. Heparin gtt low intensity resulted in diffuse oozing. Dextran 10 ml/hr for additional anticoagulation, stopped on 8/23 DC due to widespread oozing. Continue heparin drip 400u/hr. INR 3.5 from 2.8. FFP this AM.     Cardiorespiratory:    Hypotension secondary to septic shock:  Resolved, off pressors. Now with hypovolemia, requiring NE 0.18.   Respiratory failure secondary to sepsis: Extubated 8/22. Intubated for OR on 8/27, continues on vent.   Junctional rhythm:  Onset 8/22, now resolved.    GI/Nutrition: NG removed 8/25. Remains NPO. On TPN. TF 10 ml/hr for healing. Feeding tube pulled back 8/26 to have the tip in the DII.   Ischemic bowel:  7/30 CTA: nonocclusive filling defects in superior mesenteric vein branches.  Anticoagulation as above.  S/p resection of necrotic jejunum on 8/6. S/p resection of 150cm of ischemic bowel with perforation on 8/17. Patient with hypotension and increased abdominal pain overnight, CT on 8/27 suggestive of bowel perforation. OR on 8/27 for wash out and bowel repair. Will monitor today and return to OR tomorrow.   PSBO:  Noted on 8/15 CT, transition point RLQ. S/p resection on 8/17.   Bowel perforation/peritonitis:  Small bowel perforation with feculent peritonitis noted in OR on 8/17. S/p resection, abx as below. Patient with hypotension and increased abdominal pain overnight,  CT on  suggestive of bowel perforation. OR on  for wash out and bowel repair. Will monitor today and return to OR tomorrow.   GIB:  Continues to have maroon stools. Transfuse as needed.    Endocrine:  DM type 2, continue insulin drip.      Fluid/Electrolytes/Renal: BEN/ARF, recovering. CRRT off since . Good UOP.    : Traumatic bah placement with intermittent bleeding.   Hematuria now resolved. Bah, coude 18fr in place.    Infectious disease: Afebrile. WBC increasing to 37 from 11.  -Amikacin (-), flagyl (-), ceftraroline (-), tigecycline (-), daptomycin (-), micafungin (-), fluconazole (-).   -ID following  -Blood cultures through  + VRE or GPCs  - Peritoneal fluid culture + VRE, candida albicans  -, ,  Blood cx, no growth to date    1. VRE intra abdominal and bacteremia (dapto, ceftaroline, tigecycline)  2. Infarct left lobe liver with inferior perihepatic fluid collection  3. Peritonitis (metronidazole, fluconazole)    Previous ID:   3. Ecoli ESBL bacteremia. Repeat bcx on  (peripheral and VAD) grew Ecoli ESBL. Zosyn changed to Meropenem, completed on .  SICU to exchange lines as able. Blood cultures negative for E. Coli since .  4. HBV: Hepatits B DNA PCR positive prior to tx. Received HBIG on  and . 8/10 Hep B surface antigen negative. No further HBIG planned. Continue tenofovir 300 mg, dose adjusted for HD       Prophylaxis: DVT PCDs, on heparin, fall, GI, Valcyte x 12 weeks (CMVand EBV IgG +), bactrim.   Disposition: SICU    Coordinator: Kay Reyes  Surgeon: Pako Mathias     Check Hep B surface antigen at 3 months, 6 months then annually     Medical Decision Making: Medium    JAY/Fellow/Resident Provider:  Janet Benavides [id number] [id number]    Faculty: Mamadou Norwood MD   __________________________________________________________________  Transplant History: Admitted 2018 for  donor liver transplant.   The  patient has a history of liver failure due to hepatitis B .    7/21/2018 (Liver), Postoperative day: 38     Interval History:  OR yesterday evening for bowel perforation. Now requiring NE with increasing LA.     ROS:   A 10-point review of systems was negative except as noted above.    Curent Meds:    ceftaroline (TEFLARO) intermittent infusion  300 mg Intravenous Q12H     DAPTOmycin (CUBICIN) intermittent infusion  12 mg/kg Intravenous Q48H     fiber modular  1 packet Per Feeding Tube TID     fluconazole  200 mg Intravenous Q24H     heparin lock flush  5-10 mL Intracatheter Q24H     lipids  250 mL Intravenous Once per day on Mon Wed Fri     metroNIDAZOLE  500 mg Intravenous Q6H     pantoprazole (PROTONIX) IV  40 mg Intravenous BID     sodium chloride (PF)  3 mL Intravenous Q8H     sulfamethoxazole-trimethoprim  10 mL Per Feeding Tube Daily     tacrolimus  1 mg Oral or Feeding Tube BID IS     tenofovir  300 mg Per Feeding Tube Q72H     valGANciclovir  450 mg Oral Once per day on Mon Thu    Or     valGANciclovir  450 mg Oral or NG Tube Once per day on Mon Thu       Physical Exam:     Admit Weight: 60.7 kg (133 lb 12.8 oz)    Vital sign ranges:    Temp:  [88.9  F (31.6  C)-99.5  F (37.5  C)] 97.7  F (36.5  C)  Heart Rate:  [] 107  Resp:  [19-35] 23  BP: ()/(42-75) 87/52  MAP:  [50 mmHg-82 mmHg] 65 mmHg  Arterial Line BP: ()/(37-66) 97/49  FiO2 (%):  [50 %] 50 %  SpO2:  [71 %-100 %] 100 %    General Appearance: NAD  HEENT:  NG (green/tan), [place]  Skin: warm, dry  Heart: Tachycardic  Lungs: Diminished, on vent  Abdomen: Incision with wound vac in place, sanguinous output. CAROL drain x 2 in place with serosanguinous drainage  : Luis in place draining clear yellow urine.  Extremities: well perfused. Generalized trace edema.  Neurologic: Sedated.   Access: PICC, PIV    Data:   CMP    Recent Labs  Lab 08/28/18  0307 08/27/18  1858  08/27/18  0841 08/27/18  0542 08/26/18  0347    141  < >  --  144 149*    POTASSIUM 3.8 3.0*  < >  --  3.7 3.4   CHLORIDE 108 108  < >  --  112* 115*   CO2 17* 21  < >  --  18* 24   * 113*  < >  --  172* 141*   BUN 80* 79*  < >  --  80* 84*   CR 1.72* 1.64*  < >  --  1.58* 1.59*   GFRESTIMATED 43* 46*  < >  --  48* 47*   GFRESTBLACK 52* 55*  < >  --  58* 57*   JESUS 6.8* 6.9*  < >  --  7.3* 7.7*   ICAW  --  4.6  --  4.2*  --   --    MAG  --   --   --   --  1.4* 1.9   PHOS 3.2  --   --   --  3.1 1.9*   ALBUMIN 2.1* 2.1*  --   --  1.5* 1.8*   BILITOTAL 3.5* 3.8*  --   --  4.4* 3.9*   ALKPHOS 100 94  --   --  179* 190*   AST 17 19  --   --  36 44   ALT 13 13  --   --  21 23   < > = values in this interval not displayed.  CBC    Recent Labs  Lab 08/28/18  0803 08/28/18  0307   HGB 9.1* 9.3*   WBC 35.6* 37.3*   PLT 81* 82*     COAGS    Recent Labs  Lab 08/28/18  0956 08/28/18  0307  08/27/18  1858  08/24/18  0434   INR 2.48* 3.50*  < > 2.81*  < > 1.84*   PTT  --   --   --  47*  --  74*   < > = values in this interval not displayed.   Urinalysis  Recent Labs   Lab Test  07/27/18   2330  07/13/18   1315  06/18/18   1200   COLOR  Yellow  Dark Yellow  Yellow   APPEARANCE  Clear  Clear  Clear   URINEGLC  Negative  Negative  Negative   URINEBILI  Negative  Large*  Small*   URINEKETONE  Negative  Negative  Negative   SG  1.008  1.013  1.008   UBLD  Negative  Negative  Negative   URINEPH  7.5*  6.0  5.0   PROTEIN  30*  10*  Negative   NITRITE  Negative  Negative  Negative   LEUKEST  Negative  Negative  Negative   RBCU  5*  <1  1   WBCU  3  None  2   UTPG   --    --   0.75*     Virology:  Hepatitis C Antibody   Date Value Ref Range Status   07/20/2018 Nonreactive NR^Nonreactive Final     Comment:     Assay performance characteristics have not been established for newborns,   infants, and children         POD 14  liver:  1.  The left portal vein is antegrade with decreased velocity,  measuring approximately 15 cm/second. This vessel was occluded on CT  7/30/2018.  2.  Elevated velocities of the  main hepatic artery, now 203 cm/sec  (previously 94 cm/sec). However left hepatic arteries demonstrates  steep upstroke suggesting there is not a significant stenosis.   2a. Right hepatic artery was not visualized, could be occluded or  difficult to see postoperatively.  3. Echogenic focus of the right lobe in close proximity to the  hepatorenal fossa. This likely corresponds with nonenhancing lesion on  CT 7/30/2018 and likely representing subcapsular hematoma.  4. Additional small hematomas of the braden hepatis and posterior right  perihepatic region.  5. Small volume anechoic ascites.  6. Left hepatic lobe not well visualized secondary to open wound  preventing imaging. Note the left lobe was grossly abnormal on CT  7/30/2018    POD 1 US liver: Impression:   1.  Hematoma associated with the inferior aspect of the perihepatic  space anteriorly, measuring 13.7 x 7.0 x 8.5 cm.  2.  Retrograde flow of the left portal vein. Additionally, low  velocities of the portal venous system. Single low resistive index of  the extrahepatic aspect of the hepatic artery, measuring 0.37. These  findings are likely within normal limits in the early postoperative  context. However, continued attention on follow-up recommended.    7/30 CT scan abd/pelvis:  IMPRESSION:   1. Hypoenhancing areas in the transplant liver concerning for  infarction. Transplant left portal vein occlusion with bubbles of  portal venous gas. Nearly occlusive filling defect in the native  hepatic artery. Narrowed and irregular appearance of the transplant  hepatic artery. Focal occlusion of the left main hepatic artery.  Segmental occlusions of right hepatic arterial branches.   2. Pneumatosis intestinalis with nonocclusive filling defects in  superior mesenteric vein branches, concerning for bowel ischemia or  infarction.   3. Splenic infarctions.   4. Bibasilar consolidation with air bronchograms. Pneumonia cannot be  excluded.   5. Post surgical changes of  evacuation of peritransplant hematoma.

## 2018-08-28 NOTE — PROVIDER NOTIFICATION
Spoke w/ MD Shawn Goldsmith to notify Pt respiratory rate maintaining > 24 bpm and urine output <30cc/hr average since 0400. Order for STAT ABG draw placed. Will continue to monitor.

## 2018-08-28 NOTE — PROGRESS NOTES
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 He returned to the SICU for shock, severe lactic acidosis, renal failure and respiratory failure, requiring intubation and dialysis.  Ischemic injury to left hemiliver and non-occlusive mesenteric ischemia.  He underwent a laparotomy and several subsequent take-backs to examine ischemic bowel, on 8/6 he underwent resection of necrotic jejunum with a primary anastomosis, and his abdomen was closed 8/8. He acutely decompensated beginning the night of 8/16, with increasing pressor needs, increasing abdominal pain and lactic acidosis, now s/p exploratory laparotomy 8/17 and s/p entero-entero small bowel anastomosis, drain placement and fascial closure 8/18.  Since that time his renal function has improved (off dialysis), his persistent VRE bacteremia has resolved. Take back to OR on 8/27/18 for hematochezia and found to have anastomotic dehiscence and perforated bowel near site.     TODAY'S PROGRESS/PLANS:   - replace Calcium   - replace magnesium with 4 grams   - 2 units FFP. More if needed   - Albumin 25% albumin 25grams once  - continue with TPN    - plan for OR tomorrow for closure   - wean precidex as able.     PLAN:  Neuro/ pain/ sedation:  # acute pain  - monitor neurological status; notify the MD/DO for any acute changes in exam.  - PRN IV dilaudid and oxycodone.Consider continuous pain infusion if needed     Pulmonary care:   # acute respiratory failure, re intubated for OR.   # compensated metabolic acidosis   - extubated (8/22), re intubated for OR 8/24/18  - HOB elevation.   - Vent bundle. Continue with full vent support   - supplemental O2 for sats >92%      Cardiovascular:    # Hypotension- hypovolemic vs. hemorrhagic  # Junctional rhythm on 8/22 EKG,  unclear cause, resolved 8/24  - monitor hemodynamic status. Discontinue Precidex if bradycardia develops  - MAP goals > 60.  - TTE 8/8 showed no vegetations or valve abnormalities  - TOÑO 8/10 no evidence of endocarditis   - EKG (8/23): junctional bradycardia   - EKG (8/24): Normal sinus rhythm     Gastroenterolgy   # DDLT 7/21/18  # Intrahepatic left portal venous thrombosis, infarction of left liver lobe, patchy infarction of right lobe, suspected hepatic arterial thrombosis  # 7/30/18 s/p exploratory laparotomy and liver biopsy  # Splenic infarctions, patchy bowel ischemia  # Ex Lap 8/6 - resection of 90 cm of necrotic jejunum  # Ex Lap 8/8 - abdomen closed  # Ex Lap 8/17 - Found to have widespread bowel ischemia requiring bowel resection.  Left in discontinuity.  # Ex Lap 8/18 - entero-entero anastomosis and facial closure.  Approximately  cm small bowel remaining with intact ileocecal valve and colon.  # s/p abdomen washout primary anastomosis closure with abthera placement (found to have anastomotic dehiscence, perf bowel near anastomotic site)--pt left open  # GIB- hematochezia  - Two episodes of bowel ischemia of unclear etiology requiring resection of the majority of small bowel,  cm of small bowel remaining.   - [facility] GI consulted for hematochezia but discovered to be due to bowel ischemia/perforation-- signed off   - NJ removed, now on TPN only   -  Plan for OR tomorrow for closure per discussion with GI team.      Fluids/ Electrolytes/ Nutrition:   # Protein calorie deficit malnutrition   # Hypocalcemia  # hypomagnesia   # Short gut syndrome   - NJ removed 8/27/18 during OR.   - Give Calcium 1 gram of calcium gluconate  Now.   - replace Mag with 4 grams now   - continue with TPN,  Hold off on replacing NJ today.     Renal/ Fluid Balance:    # BEN -recovering  # Metabolic acidosis 2/2 lactic acidosis  - CRRT stopped 8/23. BEN recovering, pt with total of 1L/24 hours of urine output.   - Janelle  in place for strict I's and O's  - elevated lactic level 3.9--recheck 2.9, will trend      Endocrine:    # DM II  - Continue insulin gtt for now.      ID/ Antibiotics:  # VRE Bacteremia  # ESBL Bacteremia  # Hepatitis B   continued need for ongoing ABX.   - Appreciate transplant ID  following for now.   - Daptomycin (7/30- ), ceftaroline (8/13- ), continue until 9/2 per Transplant ID  - Micafungin (7/30-8/22) changed to fluconazole (8/22- )  - Flagyl (8/17 - )  - Tigecycline (8/13-8/23), discontinued after 4 days of negative blood cultures  - Meropenem (8/2-8/14), discontinued after 12 days of negative cultures for ESBL  - Amikacin (8/17 - 8/21), discontinued as he had completed 2-week course of meropenem and has other active agents for G- coverage  - Immunosuppression: (restarted 8/6) tacrolimus  - Immunosuppression prophylaxis: Valgancyclovir and Tenofovir, nystatin when micafungin stopped.  Bactrim stopped      Cultures   - 7/27 VRE (line/arm)   - 7/29: VRE (left arm)   - 7/30: VRE (left arm)   - 7/31: VRE (Line and peripheral)  - 8/1: ESBL (HD/hand/central line)   - 8/3: VRE (intra-abdominal tissue)  - 8/4: NGTD (blood)  - 8/5: NGTD (blood)  - 8/6: VRE (art line)  - 8/7: VRE (blood)  - 8/8: VRE (blood)  - 8/9: VRE (blood)  - 8/11: VRE (blood)  - 8/12: VRE (abdominal fluid)  - 8/13: VRE (blood)  - 8/14: VRE (blood)  - 8/15: VRE (blood)  - 8/17: NGTD (blood)   - 8/17: VRE & Candida (peritoneal fluid)   - 8/18: G+ cocci (tissue)  - 8/19 NGTD (blood)  - 8/21 NGTD (blood)  - 8/22 NGTD (blood)  - 8/23 NGTD (blood)  - 8/24 NGTD (blood)     Heme:     # Acute blood loss anemia  # Anemia of critical illness  # Portal venous thrombosis  # Thrombocytopenia  # Coagulopathy   # Anti thrombin III deficiency  # leukocytosis   - Heparin at 400 straight rate. Transiently on low intensity heparin infusion previously, decreased to straight rate in the setting of bleeding.   - Hold aspirin 81mg for continued bleeding  - Monitor CBC  and INR every 6 hours   - Give 2 FFP now given elevated INR. Recheck pending   - WBC elevated 35.6K, monitor closely for now, likely de  perf     MSK:  # weakness and deconditioning of critical illness   - PT/OT consulted.      General cares and Prophylaxis:    - DVT: straight rate heparin--400units/hr, SCD's  - GI: protonix 40 mg BID      Lines/ tubes/ drains:  - NG  - PIVs.   - PICC   - Arterial line   - Abthera device ( CAROL's removed during case 8/27/18)    Disposition:  - SICU    Patient seen, findings and plan discussed with surgical ICU staff, Dr. Finn     Time spent on this Encounter   Billing:  I spent 40 minutes bedside and on the inpatient unit today managing the critical care of Yaneli Miles in relation to the issues listed in this note.      Mega Alvarez PA-C  ====================================    SUBJECTIVE:  Course reviewed. No acute events. OR last yesterday and found to have anastomotic dehiscence. Pt appears to have eyes open but does not follow commands this am. Not able to perform ROS.     OBJECTIVE:   1. VITAL SIGNS:   Temp:  [88.9  F (31.6  C)-99.5  F (37.5  C)] 97.9  F (36.6  C)  Heart Rate:  [] 106  Resp:  [19-35] 22  BP: ()/(42-75) 87/52  MAP:  [50 mmHg-82 mmHg] 64 mmHg  Arterial Line BP: ()/(37-66) 97/47  FiO2 (%):  [50 %] 50 %  SpO2:  [71 %-100 %] 100 %  Ventilation Mode: CMV/AC  (Continuous Mandatory Ventilation/ Assist Control)  FiO2 (%): 50 %  Rate Set (breaths/minute): 18 breaths/min  Tidal Volume Set (mL): 400 mL  PEEP (cm H2O): 5 cmH2O  Oxygen Concentration (%): 50 %  Resp: 22    2. INTAKE/ OUTPUT:   I/O last 3 completed shifts:  In: 8270.29 [I.V.:4250.94; NG/GT:90]  Out: 2378 [Urine:633; Emesis/NG output:550; Drains:1195]    3. PHYSICAL EXAMINATION:   General: appears awake, but does not interact   HEENT: pupils 4mm and equal.  NG present--green bilious output noted. ETT present and secured.   Pulm/Resp: Clear breath sounds bilaterally without rhonchi, crackles  or wheezes. Overbreathing set rate on vent.   CV: RRR, S1,S2.   Abdomen: abthera in place, suction intact, serosanguinous fluid in abthera tubing. Scant amount of serosangious fluid at prior CAROL site--dressed with Tegaderm.   : (+)bah catheter in place, urine yellow and clear  Extremities: Generalized peripheral edema, peripheral pulses intact, calves soft and appear non-tender.     4. INVESTIGATIONS:   Arterial Blood Gases     Recent Labs  Lab 08/28/18  0628 08/27/18 2155 08/27/18 1858 08/21/18  1332   PH 7.30* 7.31* 7.23* 7.40   PCO2 32* 35 44 40   PO2 100 127* 132* 163*   HCO3 16* 18* 19* 25     Complete Blood Count     Recent Labs  Lab 08/28/18  0803 08/28/18  0307 08/27/18 2155 08/27/18  1858   WBC 35.6* 37.3* 36.8* 32.2*   HGB 9.1* 9.3* 10.3* 9.3*   PLT 81* 82* 101* 88*     Basic Metabolic Panel    Recent Labs  Lab 08/28/18  0307 08/27/18  1858 08/27/18  1518 08/27/18  0542    141 141 144   POTASSIUM 3.8 3.0* 3.4 3.7   CHLORIDE 108 108 109 112*   CO2 17* 21 21 18*   BUN 80* 79* 82* 80*   CR 1.72* 1.64* 1.67* 1.58*   * 113* 141* 172*     Liver Function Tests    Recent Labs  Lab 08/28/18  0956 08/28/18  0307 08/27/18 2155 08/27/18  1858  08/27/18  0542 08/26/18  0347   AST  --  17  --  19  --  36 44   ALT  --  13  --  13  --  21 23   ALKPHOS  --  100  --  94  --  179* 190*   BILITOTAL  --  3.5*  --  3.8*  --  4.4* 3.9*   ALBUMIN  --  2.1*  --  2.1*  --  1.5* 1.8*   INR 2.48* 3.50* 2.78* 2.81*  < >  --   --    < > = values in this interval not displayed.  Pancreatic Enzymes  No lab results found in last 7 days.  Coagulation Profile    Recent Labs  Lab 08/28/18  0956 08/28/18  0307 08/27/18  2155 08/27/18  1858  08/24/18  0434   INR 2.48* 3.50* 2.78* 2.81*  < > 1.84*   PTT  --   --   --  47*  --  74*   < > = values in this interval not displayed.      5. RADIOLOGY:   Recent Results (from the past 24 hour(s))   CT Abdomen Pelvis w/o Contrast    Narrative    EXAMINATION: CT ABDOMEN PELVIS W/O  CONTRAST  8/27/2018 3:10 PM      CLINICAL HISTORY: Interval evaluation. hx of liver transplant,  mesenteric ischemia, SB resection, VRE intrabdominal sepsis. Please  administer oral contrast;     COMPARISON: CT abdomen and pelvis 8/15/2018    PROCEDURE COMMENTS: CT of the abdomen was performed with oral  contrast. Coronal and sagittal reformatted images were obtained.    FINDINGS:    Lower thorax:     Bilateral moderate pleural effusion with associated basilar  atelectasis.    Abdomen and pelvis:    Evaluation of the abdominal parenchymal organs due to lack of IV  iodinated contrast.    Enteric tube is in the small bowel. Unchanged hyperdensities along the  stomach and gastrohepatic location, likely post intervention (series  2, image 47). Postsurgical changes of liver transplant. Biliary stent  in place external drainage catheter is in the    Ill marginated area of hypodensity in the left lobe of liver with  pneumobilia. Splenomegaly with the spleen measuring 16 cm.  Pancreas is normal in appearance. The adrenal glands and kidneys are  normal in appearance.    Diffuse thickening of the stomach, and large bowel. The pelvic small  bowel loops are diffusely thickened. Bowel loop in the right lower  quadrant shows with fecalization (series 2, image 137) with an area of  discontinuity (coronal series series 3, image 26 and sagittal series  4, image 92 and axial series 5, image 336).  Oral contrast is seen in  the pelvis and along the left paracolic gutter. Pneumoperitoneum is  seen. Mesentery is congested and shows diffuse thickening.  Fluid/collection as noted in the gastrosplenic location, measuring 7.5  x 11.2 x 20 cm. Luis balloon within the bladder. Drainage catheters  in the right lower quadrant and central abdomen.    Multiple subcentimeter retroperitoneal and para-aortic lymph node    Osseous structures: No aggressive osseous lesion.  Anasarca.      Impression    IMPRESSION:    1. Bowel perforation likely  involving the distal ileal loop in the  right lower quadrant with free oral contrast in the pelvis and left  paracolic gutter and pneumoperitoneum.  2. Diffusely thickened small bowel loops, colon and the stomach, may  represent infection, inflammatory or vascular etiology  3. Fluid/collection in the gastrosplenic location with  edematous/congested mesentery.  4. Hypodensity with air density specks in the left lobe of liver,  representing possible hepatic necrosis.  5. Splenomegaly.  6. Diffuse anasarca.  7. Postsurgical changes of liver transplantation. Drainage catheters  in the right lower quadrant and central abdomen.    Findings of  bowel perforation were discussed with Dr. Carson by Dr. Arcos at 3:20 PM 8/27/2018.      I have personally reviewed the examination and initial interpretation  and I agree with the findings.    MIR DURAN MD   XR Abdomen Port 1 View    Narrative    Exam: XR ABDOMEN PORT 1 , 8/27/2018 7:49 PM    Indication: ng tube position;     Comparison: None    Findings:     Supine frontal view of the abdomen. Multiple staples projecting over  the upper abdomen. Drainage catheter projecting over the pelvis. Left  lower extremity vascular line projecting over the mid left hemipelvis.  Biliary stent stent is seen projecting over the central abdomen.  Nonobstructive bowel gas pattern. No pneumatosis.      Impression: Upper abdomen is not included. No enteric tube identified.    I have personally reviewed the examination and initial interpretation  and I agree with the findings.    MIR DURAN MD   XR Chest Port 1 View    Narrative    Exam: XR CHEST PORT 1 VW, 8/27/2018 7:50 PM    Indication: ET tube and lines position;     Comparison: Radiograph the chest 8/27/2018.    Findings:   Supine AP view of the chest. Endotracheal tube tip projects over the  lower thoracic trachea, 1 cm above the tano. Right PICC tip projects  over the mid right atrium. A gastric tube tip and sidehole project  over  the body of the stomach. The cardiomediastinal silhouette is  within normal limits. Pulmonary vascular structures indistinct. Left  lung base opacity which secures the left hemidiaphragm. No  pneumothorax. Small bilateral pleural effusions The upper abdomen and  straight surgical clips in the epigastric area, and is otherwise  unremarkable.      Impression:   1. Endotracheal tube tip projects 1 cm above the tano. Recommend  retracting the tube 2 to 3 cm.  2. Right PICC tip projects over the mid right atrium.  3. Small bilateral effusions with overlying  atelectasis/consolidations, left greater than right.  4. Pulmonary vascular congestion.    I have personally reviewed the examination and initial interpretation  and I agree with the findings.    MIR DURAN MD   XR Chest Port 1 View    Narrative    EXAM: XR CHEST PORT 1 VW  8/28/2018 1:00 AM      HISTORY: Check ETT placement following pulling back 3cm;     COMPARISON: Chest radiograph 8/27/2018    FINDINGS:     Single AP view of the chest. Endotracheal tube is 3 cm above the  tano. Stable enteric tube and right arm PICC. Placement of an  esophageal temperature with tip in the upper esophagus.    The cardiomediastinal silhouette is stably prominent. Unchanged left  retrocardiac dense pulmonary opacities.     Persistent perihilar pulmonary opacities. No pneumothorax.     Radiodensities project over the GE junction. Surgical clips projects  over the mid upper abdomen.      Impression    IMPRESSION:   1.  Endotracheal tube tip now 3 cm above the tano. Temperature probe  tip in the upper esophagus.  2.  Unchanged left lower lobe atelectasis.    I have personally reviewed the examination and initial interpretation  and I agree with the findings.    NICANOR MARIN MD   XR Abdomen Port 1 View    Narrative    Examination:  XR ABDOMEN PORT 1 VW 8/28/2018 9:18 AM     Comparison: 8/27/2018 radiograph. 8/27/2018 CT images.    History: assess NG location    Findings: The  supine radiograph of the mid abdomen. Enteric tube tip  and sidehole project over the expected location of the stomach.  Unchanged appearance of hyperdensities over the mid upper abdomen.  Postsurgical changes of the upper abdomen, including staples, surgical  clips. Tubing projects over the mid abdomen, likely wound vac.  Drainage catheter projects over the pelvis. Left lower extremity  vascular catheter projects over the inferior left hemipelvis. Biliary  stent seen projecting over the central abdomen. General paucity of  bowel gas. No pneumatosis. No portal venous gas.       Impression:   1. Enteric tube tip and sidehole project over the expected location of  the stomach.  2. Nonspecific bowel gas pattern.    I have personally reviewed the examination and initial interpretation and I agree with the findings.    GEOVANY SHEPARD MD       =========================================

## 2018-08-28 NOTE — PROVIDER NOTIFICATION
Spoke w/ MD Chris Cerna to notify Pt's HR increasingly tachycardic (120-130s). RR in High 20s to Low 30s. Total of 1 L of LR ordered. Plan to withdraw ETT 3cm. WIll continue to monitor.

## 2018-08-28 NOTE — PLAN OF CARE
Problem: Patient Care Overview  Goal: Plan of Care/Patient Progress Review  PT 4A: Cancel. No appropriate 2/2 declining medical status.  Tomorrow OR per RN.  Will reschedule tomorrow.

## 2018-08-28 NOTE — ANESTHESIA PREPROCEDURE EVALUATION
ANESTHESIA PREOP EVALUATION    NPO Status: more then 6 hours    Procedure: Abdominal Washout, primary anastomosis closure, abthera placement    HPI: Bowel Perforation    PMHx/PSHx/ROS:  PAST MEDICAL HISTORY:   Past Medical History:   Diagnosis Date     Choledocholithiasis      Chronic viral hepatitis B without delta agent and without coma (H) 6/14/2018     Cirrhosis of liver with ascites (H) 6/14/2018     Esophageal varices (H)     prior GIB     Hepatic encephalopathy (H)      Hepatitis delta with hepatitis B carrier state 06/14/2018    positive antigen     Portal vein thrombosis 6/14/2018 5/29/18 care everywhere US     SBP (spontaneous bacterial peritonitis) (H) 06/2018     Type 2 diabetes mellitus without complication, without long-term current use of insulin (H) 6/14/2018       PAST SURGICAL HISTORY:   Past Surgical History:   Procedure Laterality Date     ENDOSCOPIC RETROGRADE CHOLANGIOPANCREATOGRAM      with stone removal     ENDOSCOPIC ULTRASOUND UPPER GASTROINTESTINAL TRACT (GI) N/A 6/22/2018    Procedure: ENDOSCOPIC ULTRASOUND, ESOPHAGOSCOPY / UPPER GASTROINTESTINAL TRACT (GI);  Esophagogastroduodenoscopy, removal of [place] tube, ENDOSCOPIC ULTRASOUND with embolization of gastric varices, placement of nasogastric tube;  Surgeon: Armando Kraft MD;  Location: [facility] OR     INCISION AND DRAINAGE ABDOMEN WASHOUT, COMBINED N/A 8/8/2018    Procedure: COMBINED INCISION AND DRAINAGE ABDOMEN WASHOUT;  liver washout and Abdominal Closure with Mesh.;  Surgeon: Cristino Che MD;  Location: [facility] OR     IRRIGATION AND DEBRIDEMENT ABDOMEN WASHOUT, COMBINED N/A 8/3/2018    Procedure: COMBINED IRRIGATION AND DEBRIDEMENT ABDOMEN WASHOUT;   Abdominal Wash Out;  Surgeon: Leonie Elizalde MD;  Location: [facility] OR     LAPAROTOMY EXPLORATORY N/A 7/30/2018    Procedure: LAPAROTOMY EXPLORATORY;  Exploratory Laparotomy, hematoma evacuation;  Surgeon: Stew Min MD;  Location: [facility] OR     RETURN LIVER  TRANSPLANT N/A 2018    Procedure: RETURN LIVER TRANSPLANT;  liver bring back, exploratory laparotomy, bowel resection, primary anastomosis ;  Surgeon: Cristino Che MD;  Location: [facility] OR     RETURN LIVER TRANSPLANT N/A 2018    Procedure: RETURN LIVER TRANSPLANT;  Exploratory Laparotomy, abdominal washout, bowel to bowel anastomosis, primary closure;  Surgeon: Mamadou Norwood MD;  Location: [facility] OR     RETURN LIVER TRANSPLANT N/A 2018    Procedure: RETURN LIVER TRANSPLANT;  Return Liver Transplant, Explore Laparotomy, Bowel Resection;  Surgeon: Mamadou Norwood MD;  Location: [facility] OR     TRANSPLANT LIVER RECIPIENT  DONOR N/A 2018    Procedure: TRANSPLANT LIVER RECIPIENT  DONOR;  Liver Transplant;  Surgeon: Stew Min MD;  Location: [facility] OR       FAMILY HISTORY:   Family History   Problem Relation Age of Onset     Bleeding Disorder No family hx of      Clotting Disorder (Unknown) No family hx of          Past Anes Hx: No personal or family h/o anesthesia problems      Allergies:   Allergies   Allergen Reactions     Nsaids      Contraindicated        Meds:   Prescriptions Prior to Admission   Medication Sig Dispense Refill Last Dose     acetaminophen (TYLENOL) 500 MG tablet Take 1 tablet (500 mg) by mouth every 6 hours as needed for mild pain or fever 120 tablet 0 Taking     ciprofloxacin (CIPRO) 500 MG tablet Take 1 tablet (500 mg) by mouth every 24 hours 30 tablet 0 Taking     ferrous sulfate (IRON) 325 (65 Fe) MG tablet Take 325 mg by mouth   Taking     insulin aspart (NOVOLOG PEN) 100 UNIT/ML injection Inject 1 Units Subcutaneous 3 times daily (with meals) 3 mL 0 Taking     lactulose (CHRONULAC) 10 GM/15ML solution Take 30 mLs (20 g) by mouth 3 times daily Goal of 3-4 BMs per day 1892 mL 2 Taking     lidocaine (XYLOCAINE) 5 % ointment Apply topically every 4 hours as needed for moderate pain 240 g 0 Taking     miconazole (MICATIN; MICRO GUARD) 2 % powder  Apply topically 2 times daily 90 g 0 Taking     midodrine HCl 10 MG TABS Take 10 mg by mouth 3 times daily (with meals) 30 tablet 3      pantoprazole (PROTONIX) 40 MG EC tablet Take 1 tablet (40 mg) by mouth 2 times daily (before meals) 180 tablet 0 Taking     phosphorus tablet 250 mg (PHOSPHA 250 NEUTRAL) 250 MG per tablet Take 2 tablets (500 mg) by mouth 2 times daily 120 tablet 0 Taking     potassium chloride SA (KLOR-CON) 20 MEQ CR tablet Take 1 tablet (20 mEq) by mouth daily 90 tablet 1      rifaximin (XIFAXAN) 550 MG TABS tablet Take 1 tablet (550 mg) by mouth 2 times daily 180 tablet 0 Taking     simethicone (MI-ACID GAS RELIEF) 80 MG chewable tablet Take 80 mg by mouth   Taking     tenofovir (VIREAD) 300 MG tablet Take 300 mg by mouth   Taking       No current outpatient prescriptions on file.       Physical Exam:  VS: T 92.3, P 93, BP 93/48, R 24, SpO2 99%           BMP:  Lab Results   Component Value Date     08/27/2018      Lab Results   Component Value Date    POTASSIUM 3.4 08/27/2018     Lab Results   Component Value Date    CHLORIDE 109 08/27/2018     Lab Results   Component Value Date    JESUS 7.6 08/27/2018     Lab Results   Component Value Date    CO2 21 08/27/2018     Lab Results   Component Value Date    BUN 82 08/27/2018     Lab Results   Component Value Date    CR 1.67 08/27/2018     Lab Results   Component Value Date     08/27/2018        CBC:  Lab Results   Component Value Date    WBC 32.2 08/27/2018     Lab Results   Component Value Date    HGB 9.3 08/27/2018     Lab Results   Component Value Date    HCT 27.9 08/27/2018     Lab Results   Component Value Date    PLT 88 08/27/2018        Coags/Type and Screen  Lab Results   Component Value Date    INR 2.25 08/27/2018     No results found for: PT  Type and Screen:                         Anesthesia Plan      History & Physical Review  History and physical reviewed and following examination; no interval change.    ASA Status:  4  emergent.    NPO Status:  Full stomach    Plan for General, RSI and ETT with Intravenous induction. Maintenance will be Balanced.    PONV prophylaxis:  Ondansetron (or other 5HT-3)  Additional equipment: Arterial Line and Central Line      Postoperative Care  Postoperative pain management:  IV analgesics.      Consents  Anesthetic plan, risks, benefits and alternatives discussed with:  Patient..      Shahla Sharif MD    8/27/2018  7:53 PM

## 2018-08-28 NOTE — PROVIDER NOTIFICATION
MD Lorna Lai called to notify of X-ray results following ETT placement. Also confirmed okay to place esophageal temp probe. Will continue to monitor.

## 2018-08-28 NOTE — PLAN OF CARE
Problem: Patient Care Overview  Goal: Plan of Care/Patient Progress Review  OT/4AB:  Cancel - Pt not medically appropriate for OT today due to medical status.  Pt requiring pressor support to maintain BPs.  Will reschedule per OT POC.

## 2018-08-29 NOTE — PLAN OF CARE
Problem: Patient Care Overview  Goal: Plan of Care/Patient Progress Review  Outcome: No Change  Neuro: Lethargic, follows simple commands. Pupils equal, reactive. Sclera jaundiced. Significant weakness in all extremities.   Cardiac: Levophed gtt titrated to maintain MAP > 50. Sinus tachycardia with occasional PVCs. Hypothermia improved w/ bear hugger and warm blankets.   Resp: CMV/AC settings w/ , FiO2 50%, PEEP 5 and set RR of 18 (breathing above at rate of 22 to 28). Lung sounds coarse, suctioning small amounts of very thick yellow-white secretions from ETT and orally.   GI: Abdomen distended. Abthera wound vac in place. No BM. NG to LIS w/ moderate brown output. TPN at 45cc/hr. Insulin infusion restarted for blood sugars >150, following algorithim 2.   : Luis patent w/ adequate yary urine output.   Skin: Generalized bruising, scabs. Skin tear on coccyx. Scabbing on R chest.  Muscle/Mobility: Turning q2h.   Pain: Precedex at 0.3 mcg/kg/min.   Lines/Drains: R Triple lumen PICC infusing w/ heparin @ 400 unit(s)/hr, insulin infusion, levophed, LR @ 10cc/hr, and TPN at 45cc/hr.      PLAN: Plan for abdominal washout today. Monitor hemodynamic status, monitor for increasing signs of infection, continue w/ POC.

## 2018-08-29 NOTE — PROGRESS NOTES
[facility]  Transplant Infectious Disease Progress Note      Patient:  Yaneli Miles, Date of birth 1975, Medical record number [id number] Date of Visit:  08/29/2018         Assessment and Recommendations:   Recommendations:  - Continue ceftaroline and daptomycin dose adjusted for renal function.   - Continuing flagyl, tigecycline and fluconazole.   - Continue tenofovir and vGCV  - duration of antibiotics undetermined at this time    Please call with questions.    Going back to OR for wash out / exploration today.     Assessment: 43 year old male Hep B cirrhosis s/pOLT on 7/21 whose post-op course was complicated hemorrhagic shock, portal vein and hepatic artery occlusion, liver infarct w/ pneumotosis intestinalis w/ non-occlusive filling defects in SMV branches w/ small bowel ischemia on CT a/p 7/30/18, repeat scans 8/2018 w/ bowel ischemia and fabi perforation s/p ex lap 8/18/18 w/ bowel resection, closure of fascia and persistent VRE bacteremia. Splenic infarcts too. Extubated 8/22. Re-intubated on 8/27 w/ hypotensions refractory to volume, s/p ex lab w/ anastomosis dehiscence and bowel perf s/p mesh repair and wound vac in place. Titrating down vent settings, FiO2 50, weaning off norepinephrine. Yaneli is awake enough on the vent to answer questions and is appropriately responding.     ID issues:  - severe sepsis on 8/27 w/ leukocytosis up to 30+ s/p wash out OR as described above from processes above and below.     - Hepatic abscess per 8/15/2018 CT imaging; bowel perforation and feculant peritonitis at 8/17/2018 laparotomy. One of the sources of continuing VRE bacteremia, on ceftaroline, tigecycline (DC'd 8/23) , and daptomycin. Daptomycin SHARMIN initially 2 on first positive blood cultures.  Continue flagyl for coverage of anaerobic bowel bacterial kenneth. On micafungin for Candida albicans growing in 8/17/2018 peritoneal fluid but can narrow to azole therapy.  As of most recent OR on  8/18, remaining bowel looked viable and fascia was closed. Operative cx's from 8/27 growing LFGNR, yeast and VRE.     - Persistent VRE bacteremia. He continues to have positive blood cultures with latest culture from 8/17/2018 growing, now negative BCx's from 8/19-present. He is on combination therapy with daptomycin & ceftaroline, as described in In vitro activity of daptomycin in combination with ß-lactams, gentamicin, rifampin, and tigecycline against daptomycin-nonsusceptible enterococci, where there was an apparent synergistic effect to combining the two. In the event that daptomycin is inactivated by surfactant in the lungs by an occult pulmonary process, tigecycline was added to his regimen but no evidence for pulmonary process with clear CXR and minimal vent support.    - Chronic hepatitis B: On tenofovir    Other ID issues:  -  Prophylaxis:TMP/SMX, vGCV, micafungin  - Viral serostatus D/R & prophylaxis: HCV negative/reactive, HBVnegative/reactive, CMV -/+, EBV+/+, valganciclovir ppx  - Gamma globulin status: Never checked.  - Isolation status: Contact, Good hand hygiene.    Attestation:  I have reviewed today's vital signs, medications, labs and imaging.      Floor time: 25 minutes, Face-to-face time: 10 minutes, Total time: 35 minutes  Faisal Momin,   Pager [phone]           Interval History:     Yaneli developed hypotension refractory to volume support on 8/27, back to the OR on  8/27 for abdominal washout, primary anastomsosis closure w/ abterra mesh, perforated bowel close to anastomsis w/ anastomsis dehiscence. Intubated, sedated, No new skin rashes.  Minimal suction requirements.  Yaneli is awake enough on the vent to answer questions this am, he has abdominal surgical site pain, no chest pain.  Minimal suction requirements.  No stool output this am per nursing.  No new rashes. Moving extremities appropriately.     Transplants:  7/21/2018 (Liver), Postoperative day:  39.  Coordinator Kay SHEA  Amy    Review of Systems:   Unable to obtain review of systems due to mental status.         Current Medications & Allergies:       ceftaroline (TEFLARO) intermittent infusion  300 mg Intravenous Q12H     DAPTOmycin (CUBICIN) intermittent infusion  12 mg/kg Intravenous Q48H     fiber modular  1 packet Per Feeding Tube TID     fluconazole  200 mg Intravenous Q24H     heparin lock flush  5-10 mL Intracatheter Q24H     lipids  250 mL Intravenous Once per day on Mon Wed Fri     metroNIDAZOLE  500 mg Intravenous Q6H     pantoprazole (PROTONIX) IV  40 mg Intravenous BID     sodium chloride (PF)  3 mL Intravenous Q8H     sulfamethoxazole-trimethoprim  10 mL Per Feeding Tube Once per day on Mon Wed Fri     tacrolimus  1 mg Oral or Feeding Tube BID IS     tenofovir  300 mg Per Feeding Tube Q72H     tigecycline (TYGACIL) intermittent infusion  50 mg Intravenous Q12H     valGANciclovir  450 mg Oral Once per day on Mon Thu    Or     valGANciclovir  450 mg Oral or NG Tube Once per day on Mon Thu       Infusions/Drips:    dexmedetomidine 0.3 mcg/kg/hr (08/29/18 0800)     IV fluid REPLACEMENT ONLY       heparin 400 Units/hr (08/29/18 0700)     insulin (regular) 1 Units/hr (08/29/18 0700)     lactated ringers 10 mL/hr at 08/28/18 0444     norepinephrine 0.03 mcg/kg/min (08/29/18 0700)     parenteral nutrition - ADULT compounded formula 45 mL/hr at 08/29/18 0800     phenylephrine IV infusion ADULT Stopped (08/27/18 1953)     Reason beta blocker order not selected         Allergies   Allergen Reactions     Nsaids      Contraindicated             Physical Exam:   Vitals were reviewed.  Ranges for vital signs:  Temp:  [96.4  F (35.8  C)-99  F (37.2  C)] 97.9  F (36.6  C)  Pulse:  [95] 95  Heart Rate:  [] 97  Resp:  [17-31] 21  BP: (106)/(77) 106/77  MAP:  [53 mmHg-82 mmHg] 82 mmHg  Arterial Line BP: ()/(21-63) 114/63  FiO2 (%):  [50 %] 50 %  SpO2:  [97 %-100 %] 100 %  Vitals:    08/27/18 0400 08/28/18 0030 08/28/18 2104    Weight: 58.7 kg (129 lb 6.6 oz) 65.1 kg (143 lb 8.3 oz) 65.7 kg (144 lb 13.5 oz)       Physical Examination:   GENERAL:  Sedated on the vent, FiO2 50% w/ 5 PEEP, norepinephrine weaning down  EYES:  Icteric sclera  ENT:  NG in place.  LUNGS:  Good air entry. Crackles on left.   CARDIOVASCULAR:  tachycardic regular rate and rhythm, no murmur   ABDOMEN:  Hypoactive bowel sounds.  Wound vac in place w/ serosang fluid in the drainage.    : Luis in place in clear yellow urine.  EXT:lower extremity edema  SKIN:  No acute rashes.   Lines in place w/out e/e.          Laboratory Data:     Metabolic Studies       Recent Labs   Lab Test  08/29/18   0448  08/28/18   2228  08/28/18   1527   08/28/18   0307   08/22/18   0424   07/13/18   2050   07/13/18   0334   NA  139   --    --    --   139   < >  140   < >   --    < >  132*   POTASSIUM  4.0  3.7   --    --   3.8   < >  3.5   < >   --    < >  2.6*   CHLORIDE  108   --    --    --   108   < >  108   < >   --    < >  101   CO2  21   --    --    --   17*   < >  25   < >   --    < >  20   ANIONGAP  10   --    --    --   14   < >  7   < >   --    < >  12   BUN  82*   --    --    --   80*   < >  43*   < >   --    < >  30   CR  1.87*   --    --    --   1.72*   < >  0.73   < >   --    < >  2.08*   GFRESTIMATED  39*   --    --    --   43*   < >  >90   < >   --    < >  35*   GLC  123*   --    --    --   197*   < >  142*   < >   --    < >  104*   A1C   --    --    --    --    --    --    --    --   5.4   --    --    JESUS  8.0*   --    --    --   6.8*   < >  6.6*   < >   --    < >  8.1*   PHOS  3.2  2.9   --    --   3.2   < >  3.9   < >   --    --    --    MAG  2.4*  2.4*   --    --   1.2*   < >  2.2   < >   --    --    --    LACT   --   2.1*  1.6   < >  3.9*  3.7*   < >  0.9   < >  3.9*   --   1.1   PCAL   --    --    --    --    --    --    --    --    --    --   0.89   CKT  28*   --    --    --    --    --   44   < >   --    --    --     < > = values in this interval not displayed.        Hepatic Studies    Recent Labs   Lab Test  08/29/18   0448  08/28/18   0307  08/27/18   1858   07/13/18   0334   06/21/18   0612   BILITOTAL  3.7*  3.5*  3.8*   < >  36.3*   < >  23.4*   DBIL  2.9*  2.9*   --    < >  28.1*   < >   --    ALKPHOS  158*  100  94   < >  96   < >  78   PROTTOTAL  4.7*  3.7*  3.9*   < >  5.8*   < >  5.1*   ALBUMIN  2.4*  2.1*  2.1*   < >  2.8*   < >  2.9*   AST  18  17  19   < >  287*   < >  124*   ALT  15  13  13   < >  125*   < >  79*   LDH   --    --    --    --   259*   --   177    < > = values in this interval not displayed.       Pancreatitis testing    Recent Labs   Lab Test  08/20/18   0443   07/22/18   0354  07/20/18   1741  07/12/18   1913   06/16/18   1815   AMYLASE   --    --   42  60  35   --    --    LIPASE   --    --   75   --    --    --   260   TRIG  40   < >   --    --    --    < >   --     < > = values in this interval not displayed.       Hematology Studies      Recent Labs   Lab Test  08/29/18 0448 08/28/18   2228  08/28/18   1527  08/28/18   0803  08/28/18   0307  08/27/18   2155   08/23/18   0916  08/23/18   0352   WBC  15.7*  20.9*  27.1*  35.6*  37.3*  36.8*   < >  14.1*  12.1*   ANEU   --    --    --    --    --    --    --   13.3*  11.4*   ALYM   --    --    --    --    --    --    --   0.5*  0.5*   MARCELA   --    --    --    --    --    --    --   0.3  0.2   AEOS   --    --    --    --    --    --    --   0.0  0.0   HGB  7.5*  8.0*  7.3*  9.1*  9.3*  10.3*   < >  8.6*  6.9*   HCT  22.5*  24.2*  21.8*  27.1*  27.3*  30.9*   < >  24.8*  19.8*   PLT  59*  35*  52*  81*  82*  101*   < >  41*  40*    < > = values in this interval not displayed.       Clotting Studies    Recent Labs   Lab Test  08/29/18   0448  08/28/18   2228  08/28/18   1527  08/28/18   0956   08/27/18   1858   INR  2.43*  2.35*  2.60*  2.48*   < >  2.81*   PTT   --    --    --    --    --   47*    < > = values in this interval not displayed.       Arterial Blood Gas Testing    Recent Labs    Lab Test  08/28/18   0628  08/27/18   2155  08/27/18   1858  08/21/18   1332  08/18/18   1149   PH  7.30*  7.31*  7.23*  7.40  7.38   PCO2  32*  35  44  40  40   PO2  100  127*  132*  163*  112*   HCO3  16*  18*  19*  25  23   O2PER  50.0  50  60  40.0  56%        Urine Studies     Recent Labs   Lab Test  07/27/18   2330  07/13/18   1315  06/18/18   1200  06/16/18   2220   URINEPH  7.5*  6.0  5.0  5.5   NITRITE  Negative  Negative  Negative  Negative   LEUKEST  Negative  Negative  Negative  Large*   WBCU  3  None  2  66*       Body fluid stats    Recent Labs   Lab Test  08/18/18   1204   07/15/18   1115  07/13/18   1240  06/28/18   1111   FTYP   --    --   Ascites  Ascites  Ascites   FCOL   --    --   Bloody  Red  Yellow   FAPR   --    --   Cloudy  Cloudy  Slightly Cloudy   FWBC   --    --   245  373  234   FNEU   --    --   3  28  6   FLYM   --    --   19  11  24   FMONO   --    --   78   --    --    FALB   --    --    --   1.0   --    FTP   --    --    --   1.6   --    GS  No organisms seen  Many  WBC'S seen  predominantly PMN's    Many  Red blood cells seen     < >  No organisms seen  Few  WBC'S seen  predominantly mononuclear cells    No organisms seen  Few  WBC'S seen  PMNs seen    Many  Red blood cells seen     --     < > = values in this interval not displayed.       Microbiology:    Blood   6/17/18 negative   6/18/18 negative   6/21/18 negative   7/27/18 VRE   7/29/18 catheter tip Staphylococcus epidermidis   7/29/18 VRE   7/30/18 VRE   7/31/18 VRE   8/1/18 VRE, ESBL E. Coli   8/2/18 negative   8/4/18 negative   8/6/18 VRE   8/7/18 VRE   8/8/18 VRE   8/9/18 VRE   8/11/18 VRE   8/12/18 VRE   8/13/18 VRE   8/14/18 VRE   8/15/18 VRE   8/17/18 VRE   8/19/18 NGTD   8/21 NGTD   8/22 negative   8/23/18 negative   8/25 negative   8/27 NGTD     Fluid/Tissue   6/16/18 ascites negative   6/25/18 ascites negative   6/28/18 ascites negative   7/13/18 ascites negative   7/21/18 ascites negative   7/30/18 abdominal  fluid VRE   8/3/18 intra-abdominal hematoma tissue VRE   8/13/18 Abdominal fluid VRE   8/17/18 Peritoneal fluid VRE, Candida albicans/dubliniensis   8/18/18 Hematoma fluid VRE, C. Albicans / dubliniensis   8/27/18: LF GNR, E. Faecium, yeast    Urine  6/16/18 Enterococcus faecalis    VRE rectal culure   7/13/18  positive    Respiratory  6/17/18 sputum single colony Citrobacter freundii, light growth Candida albicans/dubliniensis    Imaging:  CXR: 8/28/18:   1.  Endotracheal tube tip now 3 cm above the tano. Temperature probe tip in the upper esophagus.  2.  Unchanged left lower lobe atelectasis.    AXR: 8/28/18:   1. Enteric tube tip and sidehole project over the expected location of the stomach.  2. Nonspecific bowel gas pattern.    Liver U/S: 8/17/18:   1.  Transplant liver with patent vasculature.  2.  Decreased resistive indices in the hepatic artery (0.3-0.4), highly concerning for upstream stenosis.  3.  Stable appearance of perihepatic fluid collection near the braden hepatis. Other previously described fluid collections on 8/13/2018 evaluation were not seen on this exam.  4.  Hyperechoic round lesion in the right lateral lobe, also seen on comparison CT, likely represents hemangioma.  5.  Right pleural effusion.    CT a/p w/ contrast: 8/15/18:   1.  Findings concerning for hepatic infarct involving the majority of the left hepatic lobe with associated complex fluid collection along inferior aspect, measuring up to 8.9 x 4.6 cm. The fluid collection likely represents evolving hematoma. Superimposed infection cannot be completely excluded in this patient with leukocytosis, although thought less likely based on evolving nature of this process since 7/30/2018 and sonographic appearance from ultrasound exams dated 7/30/2018 and 8/13/2018.   2.  Continued diffuse dilation of the small bowel transition point in the right lower quadrant (series 5 image [id number]). Findings concerning for small bowel obstruction.  3.   Postsurgical changes of abdominal wall abscess/hematoma evacuation with new soft tissue defect over the right upper quadrant and packing in place. There is surrounding postoperative hematoma.  4.  Unchanged biliary dilation, left lobe dominant, with a right-sided biliary stent in place.

## 2018-08-29 NOTE — PROGRESS NOTES
NUTRITION SERVICES - BRIEF NOTE    Pt continues on CPN with plan to continue for the foreseeable future given short gut and recent anastomotic leak. Pt currently receiving 250 mL 20% intralipid 3x/week.    Direct Bilirubin has been >2 for the past week, today's level = 2.9. Pt qualifies for receiving SMOFLIPID.    Implementations  1. TPN Change:  -CPN 1080 ml with goal 245g Dex, 120g AA + SMOFLIPID 3x/wk = 1527 kcals (25), 2.0 g PRO/kg, GIR = 2.8 w/ 14% kcals from Fat.  -Dosing weight = 61 kg    Monitoring  Nutrition will continue to follow and monitor per POC (see 8/23 RD note)    Halina Crouch, RD, LD  SICU RD Pgr: [phone]

## 2018-08-29 NOTE — PLAN OF CARE
Problem: Patient Care Overview  Goal: Plan of Care/Patient Progress Review  Pt to go to OR closer to midnight for washout.  Neuro: Lethargic, responds appropriately to commands. Pupils equal and reactive. Weak, but equal strength.  Respiratory: Remains on CMV settings, FiO2 of 50%, RR 18, PEEP 5, . LS coarse to diminished in the bases. Minimal secretions.  Cardiac: Off of the norepinephrine currently. HR between  with rare PVCs. BP stable at 96/57 currently. MAP goal above 50. Pulses weak.   GI/: Abthera remains in place, see flowsheets. Luis catheter, adequate UO, yary. Hypoactive bowel sounds, belly slightly distended. NG to LIS, see flowsheets. TPN remains at 45 ml/hr.   Vitals: Temp is 36.1, in need of warm blankets for control. VSS. Gave 2 units of plasma and 1 unit of platelets. MD notified about critical and low values.   Continue to monitor, notify MD with any concerns.

## 2018-08-29 NOTE — PROGRESS NOTES
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 He returned to the SICU for shock, severe lactic acidosis, renal failure and respiratory failure, requiring intubation and dialysis.  Ischemic injury to left hemiliver and non-occlusive mesenteric ischemia.  He underwent a laparotomy and several subsequent take-backs to examine ischemic bowel, on 8/6 he underwent resection of necrotic jejunum with a primary anastomosis, and his abdomen was closed 8/8. He acutely decompensated beginning the night of 8/16, with increasing pressor needs, increasing abdominal pain and lactic acidosis, now s/p exploratory laparotomy 8/17 and s/p entero-entero small bowel anastomosis, drain placement and fascial closure 8/18.  Since that time his renal function has improved (off dialysis), his persistent VRE bacteremia has resolved. Take back to OR on 8/27/18 for hematochezia and found to have anastomotic dehiscence and perforated bowel near site.      CHANGES FOR TODAY/MAJOR THINGS:   - OR today with Transplant for washout and possible closure  - 2  Units FFP today.   - Wean off vent.    PLAN:    Neuro/ pain/ sedation:  # acute pain  - PRN IV dilaudid and oxycodone.  - Precedex infusion for sedation   - Consider continuous IV fentanyl drip for pain if needed.     Pulmonary care:   # acute respiratory failure, re intubated for OR.   # compensated metabolic acidosis   - extubated (8/22), re intubated for OR 8/24/18  - HOB elevation. Vent bundle.   - CMV:AC/VCContinue with full vent support for now, reassess readiness for extubation tomorrow if stable      Cardiovascular:    # Hypotension- hypovolemic vs. hemorrhagic  # Junctional rhythm on 8/22 EKG, unclear cause, resolved 8/24  - Discontinue Precidex if bradycardia develops. MAP goals >  60.  - TTE 8/8 showed no vegetations or valve abnormalities  - TOÑO 8/10 no evidence of endocarditis   - EKG (8/23): junctional bradycardia   - EKG (8/24): Normal sinus rhythm      Gastroenterolgy   # DDLT 7/21/18  # Intrahepatic left portal venous thrombosis, infarction of left liver lobe, patchy infarction of right lobe, suspected hepatic arterial thrombosis  # 7/30/18 s/p exploratory laparotomy and liver biopsy  # Splenic infarctions, patchy bowel ischemia  # Ex Lap 8/6 - resection of 90 cm of necrotic jejunum  # Ex Lap 8/8 - abdomen closed  # Ex Lap 8/17 - Found to have widespread bowel ischemia requiring bowel resection.  Left in discontinuity.  # Ex Lap 8/18 - entero-entero anastomosis and facial closure.  Approximately  cm small bowel remaining with intact ileocecal valve and colon.  # 8/27 s/p abdomen washout primary anastomosis closure with abthera placement (found to have anastomotic dehiscence, perf bowel near anastomotic site)--pt left open  # GIB- hematochezia  - Two episodes of bowel ischemia of unclear etiology requiring resection of the majority of small bowel,  cm of small bowel remaining.   - Luminal GI consulted for hematochezia but discovered to be due to bowel ischemia/perforation-- now signed off   - NJ removed 8/27, now on TPN only- RD and Pharm D managing.   - Plan for OR later today with Transplant.       Fluids/ Electrolytes/ Nutrition:   # Protein calorie deficit malnutrition   # Hypocalcemia, replaced.   # hypomagnesia, replaced   # Short gut syndrome,  cm of small bowel remaining per notes.   - NJ removed 8/27/18 during OR.   - continue with TPN     Renal/ Fluid Balance:    # BEN -recovering  # Metabolic acidosis 2/2 lactic acidosis--lactic acid now normal   - CRRT stopped 8/23. UOP improving. Continues to make urine [id number] hours of urine output.   - Luis in place for strict I's and O's      Endocrine:    # DM II  - Continue insulin gtt for now.  Hold off transition  to subcutaneous.       ID/ Antibiotics:  # VRE Bacteremia  # ESBL Bacteremia  # Hepatitis B   continued need for ongoing ABX.   - Appreciate transplant ID  following for now.   - s/p Meropenem (8/2-8/14), discontinued after 12 days of negative cultures for ESBL  - s/P Amikacin (8/17 - 8/21), discontinued as he had completed 2-week course of meropenem and has other active agents for G- coverage  Current ABx: KEEP all below for now, duration to be determined based on improvement.   - Daptomycin (7/30- ), ceftaroline (8/13- ), continue until 9/2 per Transplant ID  - Micafungin (7/30-8/22) changed to fluconazole (8/22- )  - Flagyl (8/17 - )  - Tigecycline (8/13-8/23), discontinued after 4 days of negative blood cultures. Re-started 8/28/17    - Immunosuppression: (restarted 8/6) tacrolimus  - Immunosuppression prophylaxis: Valgancyclovir and Tenofovir, nystatin when micafungin stopped.  Bactrim stopped       Cultures   - 7/27 VRE (line/arm)   - 7/29: VRE (left arm)   - 7/30: VRE (left arm)   - 7/31: VRE (Line and peripheral)  - 8/1: ESBL (HD/hand/central line)   - 8/3: VRE (intra-abdominal tissue)  - 8/4: NGTD (blood)  - 8/5: NGTD (blood)  - 8/6: VRE (art line)  - 8/7: VRE (blood)  - 8/8: VRE (blood)  - 8/9: VRE (blood)  - 8/11: VRE (blood)  - 8/12: VRE (abdominal fluid)  - 8/13: VRE (blood)  - 8/14: VRE (blood)  - 8/15: VRE (blood)  - 8/17: NGTD (blood)   - 8/17: VRE & Candida (peritoneal fluid)   - 8/18: G+ cocci (tissue)  - 8/19 NGTD (blood)  - 8/21 NGTD (blood)  - 8/22 NGTD (blood)  - 8/23 NGTD (blood)  - 8/24 NGTD (blood)      Heme:     # Acute blood loss anemia  # Anemia of critical illness  # Portal venous thrombosis  # Thrombocytopenia  # Coagulopathy   # Anti thrombin III deficiency  # leukocytosis   - Heparin at 400 straight rate. Transiently on low intensity heparin infusion previously, decreased to straight rate in the setting of bleeding.   - Hold aspirin 81mg for continued bleeding  - Monitor CBC and INR  every 6 hours   - Give 2 FFP now given elevated INR. Recheck pending   - WBC elevated 35.6K, monitor closely for now, likely de  perf      MSK:  # weakness and deconditioning of critical illness   - PT/OT consulted. Passive and active ROM while abthera device in place.      General cares and Prophylaxis:    - DVT: Heparin--400units/hr, SCD's  - GI: protonix 40 mg IV BID       Lines/ tubes/ drains:  - NG  - PIVs.   - PICC line   - right femoral arterial line   - Abthera device (CAROL's removed during case 8/27/18)     Mega Alvarez PA-C     Time spent on this Encounter   Billing:  I spent 35minutes bedside and on the inpatient unit today managing the critical care of Yaneil Miles in relation to the issues listed in this note.    ====================================    SUBJECTIVE:  Course reviewed. No acute event overnight. Pressor  requirements down this am, pt easily arousable. Follows commands correctly and consistently. Reports abdominal pain, denies chest pain, palpitations or shortness of breath. Plan for OR this am for transplant.     OBJECTIVE:   1. VITAL SIGNS:   Temp:  [96.4  F (35.8  C)-99  F (37.2  C)] 97.9  F (36.6  C)  Pulse:  [95] 95  Heart Rate:  [] 97  Resp:  [17-34] 19  BP: (106)/(77) 106/77  MAP:  [53 mmHg-80 mmHg] 78 mmHg  Arterial Line BP: ()/(21-63) 109/60  FiO2 (%):  [50 %] 50 %  SpO2:  [97 %-100 %] 100 %  Ventilation Mode: CMV/AC  (Continuous Mandatory Ventilation/ Assist Control)  FiO2 (%): 50 %  Rate Set (breaths/minute): 18 breaths/min  Tidal Volume Set (mL): 400 mL  PEEP (cm H2O): 5 cmH2O  Oxygen Concentration (%): 45 %  Resp: 19    2. INTAKE/ OUTPUT:   I/O last 3 completed shifts:  In: 4074.1 [I.V.:1081.7; NG/GT:80]  Out: 3665 [Urine:1515; Emesis/NG output:1050; Drains:1100]    3. PHYSICAL EXAMINATION:   General: laying in bed, arouses to voice. Follows simple commands. Appears comfortable with current sedation  HEENT: pupils 3mm. ETT present and secured. NG present with bilious  output.   Neuro: CARTWRIGHT. Follows simple commands.   Pulm/Resp: Coarse breath sounds.   CV: RRR, S1, S2  Abdomen: abthera device in place, serous--red fluid in tubing. Abdominal tender as evidenced by facial grimaces on palpitation and head nods   : (+) bah catheter in place, urine yellow and clear  Incisions/Skin: abthera in place, suction intact. Small amount of older appearing dark blood at prior CAROL site.  Extremities: 1+ peripheral edema,  peripheral pulses intact, calves soft and non-tender,  extremities well perfused 2+.     4. INVESTIGATIONS:   Arterial Blood Gases     Recent Labs  Lab 08/28/18  0628 08/27/18  2155 08/27/18  1858   PH 7.30* 7.31* 7.23*   PCO2 32* 35 44   PO2 100 127* 132*   HCO3 16* 18* 19*     Complete Blood Count     Recent Labs  Lab 08/29/18 0448 08/28/18 2228 08/28/18  1527 08/28/18  0803   WBC 15.7* 20.9* 27.1* 35.6*   HGB 7.5* 8.0* 7.3* 9.1*   PLT 59* 35* 52* 81*     Basic Metabolic Panel    Recent Labs  Lab 08/29/18 0448 08/28/18 2228 08/28/18  0307 08/27/18  1858 08/27/18  1518     --  139 141 141   POTASSIUM 4.0 3.7 3.8 3.0* 3.4   CHLORIDE 108  --  108 108 109   CO2 21  --  17* 21 21   BUN 82*  --  80* 79* 82*   CR 1.87*  --  1.72* 1.64* 1.67*   *  --  197* 113* 141*     Liver Function Tests    Recent Labs  Lab 08/29/18 0448 08/28/18 2228 08/28/18  1527 08/28/18  0956 08/28/18  0307  08/27/18  1858  08/27/18  0542   AST 18  --   --   --  17  --  19  --  36   ALT 15  --   --   --  13  --  13  --  21   ALKPHOS 158*  --   --   --  100  --  94  --  179*   BILITOTAL 3.7*  --   --   --  3.5*  --  3.8*  --  4.4*   ALBUMIN 2.4*  --   --   --  2.1*  --  2.1*  --  1.5*   INR 2.43* 2.35* 2.60* 2.48* 3.50*  < > 2.81*  < >  --    < > = values in this interval not displayed.  Pancreatic Enzymes  No lab results found in last 7 days.  Coagulation Profile    Recent Labs  Lab 08/29/18  0448 08/28/18  2228 08/28/18  1527 08/28/18  0956  08/27/18  1858  08/24/18  0434   INR 2.43*  2.35* 2.60* 2.48*  < > 2.81*  < > 1.84*   PTT  --   --   --   --   --  47*  --  74*   < > = values in this interval not displayed.      5. RADIOLOGY:   Recent Results (from the past 24 hour(s))   XR Abdomen Port 1 View    Narrative    Examination:  XR ABDOMEN PORT 1 VW 8/28/2018 9:18 AM     Comparison: 8/27/2018 radiograph. 8/27/2018 CT images.    History: assess NG location    Findings: The supine radiograph of the mid abdomen. Enteric tube tip  and sidehole project over the expected location of the stomach.  Unchanged appearance of hyperdensities over the mid upper abdomen.  Postsurgical changes of the upper abdomen, including staples, surgical  clips. Tubing projects over the mid abdomen, likely wound vac.  Drainage catheter projects over the pelvis. Left lower extremity  vascular catheter projects over the inferior left hemipelvis. Biliary  stent seen projecting over the central abdomen. General paucity of  bowel gas. No pneumatosis. No portal venous gas.       Impression    Impression:   1. Enteric tube tip and sidehole project over the expected location of  the stomach.  2. Nonspecific bowel gas pattern.    I have personally reviewed the examination and initial interpretation  and I agree with the findings.    GEOVANY SHEPARD MD       =========================================

## 2018-08-29 NOTE — PROGRESS NOTES
WOC nurse here for a follow- up assessment for a NPWT. Unable to replace dressing today. Patient nurse stated he is scheduled for a washout today in surgery and was confirmed by surgery team.  WOC will continue to follow-up per consult

## 2018-08-29 NOTE — PLAN OF CARE
Problem: Patient Care Overview  Goal: Plan of Care/Patient Progress Review  Discharge Planner OT   Patient plan for discharge: Not discussed - pt intubated.  Current status: Facilitated AAROM to improve strength, ROM, and promote joint integrity.  Pt on full vent settings, PEEP 5, FiO2 50%.  Pt awake and actively engages in exercises as strength allows.  Pt disoriented to season/month, but knows place and year.  Pt able to follow commands and comprehend multi-step directions.  AVSS throughout and pt tolerates well.   Barriers to return to prior living situation: Acute medical status, assist needed for BADLs, post-surgical precautions.  Recommendations for discharge: Rehab   Rationale for recommendations: To maximize IND with ADLs and mobility.       Entered by: Kurt Witt 08/29/2018 10:01 AM

## 2018-08-30 NOTE — PLAN OF CARE
Problem: Patient Care Overview  Goal: Plan of Care/Patient Progress Review  Neuro: Lethargic, responds appropriately to commands. Pupils equal and reactive. Weak, but equal strength.  Respiratory: Remains on CMV settings, FiO2 of 50%, RR 18, PEEP 5, . LS coarse to diminished in the bases. Minimal secretions.  Cardiac: On slight amounts of norepinephrine after washout. HR between  with rare PVCs. BP stable at 96/57 currently. MAP goal above 50. Pulses weak.   GI/: Abthera exchanged for wound vac, see flowsheets. Luis catheter, adequate UO, yary. Hypoactive bowel sounds, belly slightly distended. NG to LIS, see flowsheets. TPN remains at 45 ml/hr.   Vitals: Temp is 36.1, in need of warm blankets for control. VSS. Gave 2 units of plasma. MD notified about critical values.  Events: OR for washout in afternoon.  Continue to monitor, notify MD with any concerns.

## 2018-08-30 NOTE — ANESTHESIA PREPROCEDURE EVALUATION
Anesthesia Evaluation     . Pt has had prior anesthetic. Type: General    No history of anesthetic complications          ROS/MED HX    ENT/Pulmonary: Comment: intubated      Neurologic: Comment: On precedex gtt, awake, follows      Cardiovascular: Comment: Off pressors    TTE 8/13/2018:  Interpretation Summary  Global and regional left ventricular function is hyperkinetic with an EF >70%.  Right ventricular function, chamber size, wall motion, and thickness are  normal.  Trivial pericardial effusion is present.  Small size IVC with preserved respiratory variation.  _____________________________________________________________________________  __        Left Ventricle  Global and regional left ventricular function is hyperkinetic with an EF >70%.     Right Ventricle  Right ventricular function, chamber size, wall motion, and thickness are  normal.     Vessels  Small size IVC with preserved respiratory variation.     Pericardium  Trivial pericardial effusion is present.    TTE 8/12/2018:  Interpretation Summary  Small biventricular cavity size with hyperdynamic systolic function.  Small inferior vena cava size consistent with hypovolemia.  Trivial pericardial effusion present.  _____________________________________________________________________________  __        Left Ventricle  Small left ventricular cavity size with hyperdynamic systolic function, LVEF  >70%.     Right Ventricle  Small right ventricular cavity size with hyperdynamic systolic function.     Atria  Small atria.     Mitral Valve  Trace mitral insufficiency is present.        Aortic Valve  Mild aortic valve sclerosis is present.     Tricuspid Valve  The tricuspid valve is normal.     Vessels  Small inferior vena cava size consistent with hypovolemia.     Pericardium  Trivial pericardial effusion is present. Chamber compression is not present;  there is no evidence for tamponade.    (+) ----. : . . . :. . Previous cardiac testing Echodate:results:date:  results: date: results: date: results:          METS/Exercise Tolerance:     Hematologic: Comments: plt of 26k at 0300 08/03/18    (+) History of Transfusion Other Hematologic Disorder-thrombocytopenia      Musculoskeletal:         GI/Hepatic: Comment: S/p liver transplant 07/21/18 for chronic hep B, cirrhosis, esophageal varices, intubated prior to transplant for encephalopathy    Brought back to OR for emergent ex lap on 07/30, found to have mesenteric ischemia, small bowel resection, closure, but since then, reopening of laparotomy with multiple washouts, most recently due to anastomotic deheisence. Abdomen currently open with temporary closure. On broad spectrum abx.    (+) hepatitis type B, liver disease,       Renal/Genitourinary: Comment: CVVHD this admission for ARF, but currently off dialysis and making urine    (+) chronic renal disease,       Endo:     (+) type II DM .      Psychiatric:  - neg psychiatric ROS       Infectious Disease:  - neg infectious disease ROS       Malignancy:         Other:                     Physical Exam      Airway   Comment: Intubated, vinod    Dental   Comment: Intubated, VINOD    Cardiovascular   Rhythm and rate: regular and normal      Pulmonary                     Anesthesia Plan      History & Physical Review      ASA Status:  4 .        Plan for General and ETT with Inhalation induction. Maintenance will be Balanced.      GETA. PICC. Standard ASA monitors + arterial line. IV opioids. ICU postop.      Postoperative Care      Consents  Anesthetic plan, risks, benefits and alternatives discussed with:  Spouse.  Blood products discussed: patient has been receiving multiple transfusions this admission.   .

## 2018-08-30 NOTE — PROGRESS NOTES
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 He returned to the SICU for shock, severe lactic acidosis, renal failure and respiratory failure, requiring intubation and dialysis.  Ischemic injury to left hemiliver and non-occlusive mesenteric ischemia.  He underwent a laparotomy and several subsequent take-backs to examine ischemic bowel, on 8/6 he underwent resection of necrotic jejunum with a primary anastomosis, and his abdomen was closed 8/8. He acutely decompensated beginning the night of 8/16, with increasing pressor needs, increasing abdominal pain and lactic acidosis, now s/p exploratory laparotomy 8/17 and s/p entero-entero small bowel anastomosis, drain placement and fascial closure 8/18. Improving renal function has improved (off dialysis). Take back to OR on 8/27/18 for hematochezia and found to have anastomotic dehiscence and perforated bowel near site.      CHANGES FOR TODAY/MAJOR THINGS:   - hopefully OR today with Transplant for washout and possible closure  - talk to ID regarding abx regimen for new findings of ESBL, VRE, yeast, etc in abdominal fluid culture  - 2 units FFP today.   - replace potassium for K 3.2  - social work contact for disability letter for patient/family     PLAN:    Neuro/ pain/ sedation:  # acute pain  - PRN IV dilaudid and oxycodone.  - Precedex infusion for sedation   - Consider continuous IV fentanyl drip for pain if needed.     Pulmonary care:   # Acute respiratory failure  # Compensated metabolic acidosis   - extubated (8/22), re intubated for OR 8/24/18. PST when able.   - HOB elevation. Vent bundle.   - CMV: AC/VC Continue with full vent support for now, reassess readiness post OR     Cardiovascular:    # Hypotension- hypovolemic vs. hemorrhagic  # Junctional rhythm  on 8/22 EKG, unclear cause, resolved 8/24- no additional episodes.   - Discontinue Precidex if bradycardia develops. MAP goals > 60.  - TTE 8/8 showed no vegetations or valve abnormalities  - TOÑO 8/10 no evidence of endocarditis   - EKG (8/23): junctional bradycardia   - EKG (8/24): Normal sinus rhythm      Gastroenterolgy   # DDLT 7/21/18  # Intrahepatic left portal venous thrombosis, infarction of left liver lobe, patchy infarction of right lobe, suspected hepatic arterial thrombosis  # 7/30/18 s/p exploratory laparotomy and liver biopsy  # Splenic infarctions, patchy bowel ischemia  # Ex Lap 8/6 - resection of 90 cm of necrotic jejunum  # Ex Lap 8/8 - abdomen closed  # Ex Lap 8/17 - Found to have widespread bowel ischemia requiring bowel resection.  Left in discontinuity.  # Ex Lap 8/18 - entero-entero anastomosis and facial closure.  Approximately  cm small bowel remaining with intact ileocecal valve and colon.  # 8/27 s/p abdomen washout primary anastomosis closure with abthera placement (found to have anastomotic dehiscence, perf bowel near anastomotic site)--pt left open  # GIB- hematochezia  - Two episodes of bowel ischemia of unclear etiology requiring resection of the majority of small bowel ,approx  cm of small bowel remaining.   - [facility] GI consulted for hematochezia but discovered to be due to bowel ischemia/perforation-- now signed off   - ON TPN only- RD and Pharm D managing.   - OR at some point today with Transplant      Fluids/ Electrolytes/ Nutrition:   # Protein calorie deficit malnutrition   # Hypocalcemia, replaced.   # hypomagnesia, replaced   # Short gut syndrome,  cm of small bowel remaining per notes.   - NJ removed 8/27/18 during OR.   - continue with TPN     Renal/ Fluid Balance:    # BEN -recovering  # Metabolic acidosis 2/2 lactic acidosis--lactic acid now normal   - CRRT stopped 8/23. UOP improving. Continues to make urine.  [id number] hours of urine output.   -  Janelle in place for strict I's and O's      Endocrine:    # DM II  - Continue insulin gtt for now.  Hold off transition to subcutaneous.       ID/ Antibiotics:  # VRE Bacteremia  # ESBL Bacteremia  # Hepatitis B   continued need for ongoing ABX.   - Appreciate transplant ID  following for now. NEW cultures form OR 8/27 with ESBL e. Coli, VRE, yeast, GPC-- discontinue flagyl and start meropenem    - s/p Meropenem (8/2-8/14), discontinued after 12 days of negative cultures for ESBL  - s/P Amikacin (8/17 - 8/21), discontinued as he had completed 2-week course of meropenem and has other active agents for G- coverage  -- Flagyl (8/17 - 8/30/18)    Current ABx: KEEP all below for now, duration to be determined based on improvement.   - Daptomycin (7/30- ), ceftaroline (8/13- ), continue until 9/2 per Transplant ID  - Micafungin (7/30-8/22) changed to fluconazole (8/22- )  - Tigecycline (8/13-8/23), discontinued after 4 days of negative blood cultures. Re-started 8/28/17  - Meropenem restarted 8/30/18  - Immunosuppression: (restarted 8/6) tacrolimus  - Immunosuppression prophylaxis: Valgancyclovir and Tenofovir, nystatin when micafungin stopped.  Bactrim stopped       Cultures   - 7/27 VRE (line/arm)   - 7/29: VRE (left arm)   - 7/30: VRE (left arm)   - 7/31: VRE (Line and peripheral)  - 8/1: ESBL (HD/hand/central line)   - 8/3: VRE (intra-abdominal tissue)  - 8/4: NGTD (blood)  - 8/5: NGTD (blood)  - 8/6: VRE (art line)  - 8/7: VRE (blood)  - 8/8: VRE (blood)  - 8/9: VRE (blood)  - 8/11: VRE (blood)  - 8/12: VRE (abdominal fluid)  - 8/13: VRE (blood)  - 8/14: VRE (blood)  - 8/15: VRE (blood)  - 8/17: NGTD (blood)   - 8/17: VRE & Candida (peritoneal fluid)   - 8/18: G+ cocci (tissue)  - 8/19 NGTD (blood)  - 8/21 NGTD (blood)  - 8/22 NGTD (blood)  - 8/23 NGTD (blood)  - 8/24 NGTD (blood)  -8/27 FLUID: VRE, ESBL E coli x2, yeast, GPC       Heme:     # Acute blood loss anemia  # Anemia of critical illness  # Portal venous  thrombosis  # Thrombocytopenia  # Coagulopathy   # Anti thrombin III deficiency  # leukocytosis   - Heparin at 400 straight rate  - Hold aspirin 81mg for continued bleeding  - Monitor CBC and INR every 6 hours   - Give 2 FFP this am in preparation for OR.   - WBC now normal.      MSK:  # weakness and deconditioning of critical illness   - PT/OT consulted. Passive and active ROM while abthera device in place.      General cares and Prophylaxis:    - DVT: Heparin--400units/hr, SCD's  - GI: protonix 40 mg IV BID       Lines/ tubes/ drains:  - NG  - PIVs.   - PICC line   - right femoral arterial line   - Abthera device (CAROL's removed during case 8/27/18)     Mega Alvarez PA-C     Time spent on this Encounter   Billing:  I spent 35minutes bedside and on the inpatient unit today managing the critical care of Yaneli Miles in relation to the issues listed in this note.    ====================================    SUBJECTIVE:  Course reviewed. No acute event overnight. Pressor  requirements down this am, pt easily arousable. Follows commands correctly and consistently. Reports abdominal pain, denies chest pain, palpitations or shortness of breath. Plan for OR this am for transplant.     OBJECTIVE:   1. VITAL SIGNS:   Temp:  [96.4  F (35.8  C)-99.7  F (37.6  C)] 98.8  F (37.1  C)  Heart Rate:  [] 97  Resp:  [17-23] 18  MAP:  [60 mmHg-88 mmHg] 66 mmHg  Arterial Line BP: ()/(47-69) 92/51  FiO2 (%):  [40 %-50 %] 50 %  SpO2:  [100 %] 100 %  Ventilation Mode: CMV/AC  (Continuous Mandatory Ventilation/ Assist Control)  FiO2 (%): 50 %  Rate Set (breaths/minute): 18 breaths/min  Tidal Volume Set (mL): 400 mL  PEEP (cm H2O): 5 cmH2O  Oxygen Concentration (%): 40 %  Resp: 18    2. INTAKE/ OUTPUT:   I/O last 3 completed shifts:  In: 4685.08 [I.V.:1745.01; NG/GT:90]  Out: 3080 [Urine:1780; Emesis/NG output:400; Drains:900]    3. PHYSICAL EXAMINATION:   General: laying in bed, arouses to voice, awake   HEENT: pupils 3mm. ETT  present and secured. NG present with bilious output.   Neuro: CARTWRIGHT. Follows simple commands consistently.   Pulm/Resp: Coarse breath sounds. BBS. No wheezes   CV: RRR, S1, S2  Abdomen: abthera device in place, suction intact. Tenderness along incision site.   : (+) bah catheter in place, urine yellow and clear  Incisions/Skin: abthera in place, suction intact with thin pink tinged serous fluid in tubing.   Extremities: generalized peripheral edema evonne in posterior dependent areas,  peripheral pulses intact, calves soft and non-tender, extremities well perfused 2+.     4. INVESTIGATIONS:   Arterial Blood Gases     Recent Labs  Lab 08/28/18  0628 08/27/18  2155 08/27/18  1858   PH 7.30* 7.31* 7.23*   PCO2 32* 35 44   PO2 100 127* 132*   HCO3 16* 18* 19*     Complete Blood Count     Recent Labs  Lab 08/30/18  0357 08/29/18  2204 08/29/18  1530 08/29/18  1000   WBC 9.3 9.9 9.8 12.9*   HGB 9.0* 9.0* 7.8* 8.3*   PLT 36* 38* 43* 36*     Basic Metabolic Panel    Recent Labs  Lab 08/30/18  0357 08/29/18  0448 08/28/18  2228 08/28/18  0307 08/27/18  1858    139  --  139 141   POTASSIUM 3.2* 4.0 3.7 3.8 3.0*   CHLORIDE 110* 108  --  108 108   CO2 21 21  --  17* 21   BUN 88* 82*  --  80* 79*   CR 1.87* 1.87*  --  1.72* 1.64*   * 123*  --  197* 113*     Liver Function Tests    Recent Labs  Lab 08/30/18  0357 08/29/18  2204 08/29/18  1530 08/29/18  1000 08/29/18  0448  08/28/18  0307  08/27/18  1858   AST 16  --   --   --  18  --  17  --  19   ALT 16  --   --   --  15  --  13  --  13   ALKPHOS 274*  --   --   --  158*  --  100  --  94   BILITOTAL 5.1*  --   --   --  3.7*  --  3.5*  --  3.8*   ALBUMIN 2.4*  --   --   --  2.4*  --  2.1*  --  2.1*   INR 2.39* 2.26* 2.64* 2.23* 2.43*  < > 3.50*  < > 2.81*   < > = values in this interval not displayed.  Pancreatic Enzymes  No lab results found in last 7 days.  Coagulation Profile    Recent Labs  Lab 08/30/18  0357 08/29/18  2204 08/29/18  1530 08/29/18  1000   08/27/18  1858  08/24/18  0434   INR 2.39* 2.26* 2.64* 2.23*  < > 2.81*  < > 1.84*   PTT  --   --   --   --   --  47*  --  74*   < > = values in this interval not displayed.      5. RADIOLOGY:   No results found for this or any previous visit (from the past 24 hour(s)).    =========================================

## 2018-08-30 NOTE — ANESTHESIA POSTPROCEDURE EVALUATION
Patient: Yaneli Miles    Procedure(s):  Abdominal washout.  Abdominal wall closure with Permacol.  Wound vac placement    Diagnosis:open liver transplant  Diagnosis Additional Information: No value filed.    Anesthesia Type:  No value filed.    Note:  Anesthesia Post Evaluation    Patient location during evaluation: ICU  Post op Mental Status: DILLAN; patient intubated, sedated, Hmong speaking.  Level of consciousness: awake  Pain management: unable to assess  Airway patency: patent (intubated; on vent)  Cardiovascular status: acceptable (on low dose pressor)  Respiratory status: ETT  Hydration status: acceptable  PONV: unable to assess     Anesthetic complications: None          Last vitals:  Vitals:    08/30/18 1300 08/30/18 1600 08/30/18 1700   BP:      Pulse:      Resp:  18 18   Temp:   35.8  C (96.4  F)   SpO2: 100%  99%         Electronically Signed By: Perico Valiente MD  August 30, 2018  5:25 PM

## 2018-08-30 NOTE — BRIEF OP NOTE
[facility], [place]    Pre-operative diagnosis: Open abdomen liver transplant   Post-operative diagnosis Same   Procedure: Procedure(s):  Abdominal washout.  Abdominal wall closure with Permacol.  Wound vac placement   Surgeon: Mamadou Norwood M.D.   Assistants(s): Mihir Nj M.D., Ann Glynn    Anesthesia: General    Estimated blood loss: Less than 50 ml    Total IV fluids: (See anesthesia record)   Blood transfusion: No transfusion was given during surgery   Total urine output: (See anesthesia record)  None   Drains: None   Specimens: Abdominal fluid    Implants: Permacol mesh, Wound VAC    Findings: Frozen abdomen, minimal ascitis, intact anastomosis .   Complications: None.   Condition: Stable  Intubated  Transferred to ICU   Comments: See dictated operative report for full details

## 2018-08-30 NOTE — PLAN OF CARE
Problem: Patient Care Overview  Goal: Plan of Care/Patient Progress Review  Outcome: No Change  Patient remains lethargic but able to follow commands and make needs known.  Sinus rhythm, temp 36-37.5.  Pressures appropriate without intervention.  Pulses weak but palpable.  Lungs coarse over diminished bases, CMV 50/400/18/5.  Small amount thick secretions via ETT.  NPO, TPN and lipids infusing.  Abthera with ~450 serosang output, NG with ~300.  Abdomen distended and tender, bowel sounds faint.  Insulin at 2.  Luis with adequate uop, yary in color.  Heparin and precedex currently infusing.  Plan to go back to OR for washout this morning.  Will continue to monitor and update team with changes or concerns.

## 2018-08-30 NOTE — PROGRESS NOTES
[facility]  Transplant Infectious Disease Progress Note      Patient:  Yaneli Miles, Date of birth 1975, Medical record number [id number] Date of Visit:  08/30/2018         Assessment and Recommendations:   Recommendations:  - Continue ceftaroline and daptomycin dose adjusted for renal function.   - Continuing tigecycline and fluconazole.   - Start meropenem and stop flagyl due to ESBL E. Coli growing from abdominal fluid.  - Continue tenofovir and vGCV  - duration of antibiotics undetermined at this time  - Awaiting findings from OR/washout today.    Please call with questions.  Thoughts discussed with SICU team.    Assessment: 43 year old male Hep B cirrhosis s/pOLT on 7/21 whose post-op course was complicated hemorrhagic shock, portal vein and hepatic artery occlusion, liver infarct w/ pneumotosis intestinalis w/ non-occlusive filling defects in SMV branches w/ small bowel ischemia on CT a/p 7/30/18, repeat scans 8/2018 w/ bowel ischemia and fabi perforation s/p ex lap 8/18/18 w/ bowel resection, closure of fascia and persistent VRE bacteremia. Splenic infarcts too. Extubated 8/22. Re-intubated on 8/27 w/ hypotensions refractory to volume, s/p ex lab w/ anastomosis dehiscence and bowel perf s/p mesh repair and wound vac in place. Continues on minimal vent settings. Taken to OR again today.   norepi off today    ID issues:  - severe sepsis on 8/27 w/ leukocytosis up to 30+ s/p wash out OR as described above from processes above and below.     - Hepatic abscess per 8/15/2018 CT imaging; bowel perforation and feculant peritonitis at 8/17/2018 laparotomy. One of the sources of continuing VRE bacteremia, on ceftaroline, tigecycline (DC'd 8/23) , and daptomycin. Daptomycin SHARMIN initially 2 on first positive blood cultures.  Continue flagyl for coverage of anaerobic bowel bacterial kenneth. On micafungin for Candida albicans growing in 8/17/2018 peritoneal fluid but can narrow to azole  therapy.  As of most recent OR on 8/18, remaining bowel looked viable and fascia was closed. Operative cx's from 8/27 growing ESBL E. coli, yeast and VRE.     - Persistent VRE bacteremia. Last positive culture from 8/17/2018, now negative BCx's from 8/19-present. He is on combination therapy with daptomycin, ceftaroline and tigecycline, as described in In vitro activity of daptomycin in combination with ß-lactams, gentamicin, rifampin, and tigecycline against daptomycin-nonsusceptible enterococci, where there was an apparent synergistic effect to combining the two. In the event that daptomycin is inactivated by surfactant in the lungs by an occult pulmonary process, tigecycline was added to his regimen but no evidence for pulmonary process with clear CXR and minimal vent support.  Tigecycline restarted with new sepsis episode and new perforation.    - Chronic hepatitis B: On tenofovir    Other ID issues:  -  Prophylaxis:TMP/SMX, vGCV, micafungin  - Viral serostatus D/R & prophylaxis: HCV negative/reactive, HBVnegative/reactive, CMV -/+, EBV+/+, valganciclovir ppx  - Gamma globulin status: Never checked.  - Isolation status: Contact, Good hand hygiene.    Staffed with Dr. Momin.    Sofya Jimenez MD, PhD  Adult & Pediatric Infectious Diseases Fellow PGY7, [facility]  Pager: [phone]    Attestation:  I have reviewed today's vital signs, medications, labs and imaging.      Floor time: 25 minutes, Face-to-face time: 10 minutes, Total time: 35 minutes  Faisal Momin,   Pager [phone]  Yaneli Miles was seen in the hospital by Faisal Momin on August 30th with Dr. Jimenez.  I reviewed the history & exam. Assessment and plan were jointly made.  I agree with and have edited the fellow's note and plan of care.  Faisal Momin MD.           Interval History:   Last seen on 8/29.  Taken to OR for washout again on 8/27 with perforation/dehiscence.  Tmax 99.7 for past 24h.  Remains mechanically ventilated on 50% FiO2  with PEEP5.  OR planned for this afternoon.  On precedex for sedation.  On TPN for nutrition.  Abthera in place over abdominal wound.  Making urine but less than yesterday. No new rashes.  Norepinephrine now off.     Transplants:  7/21/2018 (Liver), Postoperative day:  40.  Coordinator Kay Reyes    Review of Systems:   Unable to obtain review of systems due to mental status.         Current Medications & Allergies:       ceftaroline (TEFLARO) intermittent infusion  300 mg Intravenous Q12H     DAPTOmycin (CUBICIN) intermittent infusion  12 mg/kg Intravenous Q48H     fluconazole  200 mg Intravenous Q24H     heparin lock flush  5-10 mL Intracatheter Q24H     lipids 4 oil  250 mL Intravenous Once per day on Mon Wed Fri     metroNIDAZOLE  500 mg Intravenous Q6H     pantoprazole (PROTONIX) IV  40 mg Intravenous BID     potassium chloride  20 mEq Intravenous Once     sodium chloride (PF)  3 mL Intravenous Q8H     sulfamethoxazole-trimethoprim  10 mL Per Feeding Tube Once per day on Mon Wed Fri     tacrolimus  1 mg Oral or Feeding Tube BID IS     tenofovir  300 mg Per Feeding Tube Q72H     tigecycline (TYGACIL) intermittent infusion  50 mg Intravenous Q12H     valGANciclovir  450 mg Oral Once per day on Mon Thu    Or     valGANciclovir  450 mg Oral or NG Tube Once per day on Mon Thu       Infusions/Drips:    dexmedetomidine 0.3 mcg/kg/hr (08/30/18 0748)     IV fluid REPLACEMENT ONLY       heparin 400 Units/hr (08/29/18 2000)     insulin (regular) 2 Units/hr (08/30/18 0411)     lactated ringers 10 mL/hr at 08/28/18 0444     norepinephrine Stopped (08/29/18 1000)     parenteral nutrition - ADULT compounded formula 45 mL/hr at 08/29/18 2020     Reason beta blocker order not selected         Allergies   Allergen Reactions     Nsaids      Contraindicated             Physical Exam:   Vitals were reviewed.  Ranges for vital signs:  Temp:  [96.4  F (35.8  C)-99.7  F (37.6  C)] 99  F (37.2  C)  Heart Rate:  [] 93  Resp:   [17-23] 18  MAP:  [60 mmHg-88 mmHg] 60 mmHg  Arterial Line BP: ()/(47-69) 83/47  FiO2 (%):  [40 %-50 %] 50 %  SpO2:  [100 %] 100 %  Vitals:    08/28/18 0030 08/28/18 2104 08/30/18 0000   Weight: 65.1 kg (143 lb 8.3 oz) 65.7 kg (144 lb 13.5 oz) 66.8 kg (147 lb 4.3 oz)       Physical Examination:   GENERAL:  Sedated on the vent, FiO2 50% w/ 5 PEEP, norepinephrine off.   EYES:  Icteric sclera  ENT:  NG in place.  LUNGS:  Good air entry. Crackles on left.   CARDIOVASCULAR:  tachycardic regular rate and rhythm, no murmur   ABDOMEN:  Hypoactive bowel sounds.  Wound vac in place w/ serosang fluid in the drainage.    : Luis in place in clear yellow urine.  EXT:lower extremity edema  SKIN:  No acute rashes.   Lines in place w/out e/e.          Laboratory Data:     Metabolic Studies       Recent Labs   Lab Test  08/30/18   0357  08/29/18   2204   08/29/18   0448   08/22/18   0424   07/13/18   2050   07/13/18   0334   NA  141   --    --   139   < >  140   < >   --    < >  132*   POTASSIUM  3.2*   --    --   4.0   < >  3.5   < >   --    < >  2.6*   CHLORIDE  110*   --    --   108   < >  108   < >   --    < >  101   CO2  21   --    --   21   < >  25   < >   --    < >  20   ANIONGAP  11   --    --   10   < >  7   < >   --    < >  12   BUN  88*   --    --   82*   < >  43*   < >   --    < >  30   CR  1.87*   --    --   1.87*   < >  0.73   < >   --    < >  2.08*   GFRESTIMATED  39*   --    --   39*   < >  >90   < >   --    < >  35*   GLC  144*   --    --   123*   < >  142*   < >   --    < >  104*   A1C   --    --    --    --    --    --    --   5.4   --    --    JESUS  8.3*   --    --   8.0*   < >  6.6*   < >   --    < >  8.1*   PHOS  2.3*   --    --   3.2   < >  3.9   < >   --    --    --    MAG   --    --    --   2.4*   < >  2.2   < >   --    --    --    LACT  1.8  1.6   < >   --    < >  0.9   < >  3.9*   --   1.1   PCAL   --    --    --    --    --    --    --    --    --   0.89   CKT   --    --    --   28*   --   44   < >    --    --    --     < > = values in this interval not displayed.       Hepatic Studies    Recent Labs   Lab Test  08/30/18   0357 08/29/18   0448  08/28/18   0307   07/13/18   0334   06/21/18   0612   BILITOTAL  5.1*  3.7*  3.5*   < >  36.3*   < >  23.4*   DBIL  3.9*  2.9*  2.9*   < >  28.1*   < >   --    ALKPHOS  274*  158*  100   < >  96   < >  78   PROTTOTAL  5.0*  4.7*  3.7*   < >  5.8*   < >  5.1*   ALBUMIN  2.4*  2.4*  2.1*   < >  2.8*   < >  2.9*   AST  16  18  17   < >  287*   < >  124*   ALT  16  15  13   < >  125*   < >  79*   LDH   --    --    --    --   259*   --   177    < > = values in this interval not displayed.       Pancreatitis testing    Recent Labs   Lab Test  08/20/18   0443   07/22/18   0354  07/20/18   1741  07/12/18   1913   06/16/18   1815   AMYLASE   --    --   42  60  35   --    --    LIPASE   --    --   75   --    --    --   260   TRIG  40   < >   --    --    --    < >   --     < > = values in this interval not displayed.       Hematology Studies      Recent Labs   Lab Test  08/30/18 0357 08/29/18 2204  08/29/18   1530  08/29/18   1000  08/29/18   0448  08/28/18   2228   08/23/18   0916  08/23/18   0352   WBC  9.3  9.9  9.8  12.9*  15.7*  20.9*   < >  14.1*  12.1*   ANEU   --    --    --    --    --    --    --   13.3*  11.4*   ALYM   --    --    --    --    --    --    --   0.5*  0.5*   MARCELA   --    --    --    --    --    --    --   0.3  0.2   AEOS   --    --    --    --    --    --    --   0.0  0.0   HGB  9.0*  9.0*  7.8*  8.3*  7.5*  8.0*   < >  8.6*  6.9*   HCT  27.0*  26.4*  23.6*  24.1*  22.5*  24.2*   < >  24.8*  19.8*   PLT  36*  38*  43*  36*  59*  35*   < >  41*  40*    < > = values in this interval not displayed.       Clotting Studies    Recent Labs   Lab Test  08/30/18   0357  08/29/18   2204  08/29/18   1530  08/29/18   1000   08/27/18   1858   INR  2.39*  2.26*  2.64*  2.23*   < >  2.81*   PTT   --    --    --    --    --   47*    < > = values in this interval not  displayed.       Arterial Blood Gas Testing    Recent Labs   Lab Test  08/28/18   0628  08/27/18   2155  08/27/18   1858  08/21/18   1332  08/18/18   1149   PH  7.30*  7.31*  7.23*  7.40  7.38   PCO2  32*  35  44  40  40   PO2  100  127*  132*  163*  112*   HCO3  16*  18*  19*  25  23   O2PER  50.0  50  60  40.0  56%        Urine Studies     Recent Labs   Lab Test  07/27/18   2330  07/13/18   1315  06/18/18   1200  06/16/18   2220   URINEPH  7.5*  6.0  5.0  5.5   NITRITE  Negative  Negative  Negative  Negative   LEUKEST  Negative  Negative  Negative  Large*   WBCU  3  None  2  66*       Body fluid stats    Recent Labs   Lab Test  08/18/18   1204   07/15/18   1115  07/13/18   1240  06/28/18   1111   FTYP   --    --   Ascites  Ascites  Ascites   FCOL   --    --   Bloody  Red  Yellow   FAPR   --    --   Cloudy  Cloudy  Slightly Cloudy   FWBC   --    --   245  373  234   FNEU   --    --   3  28  6   FLYM   --    --   19  11  24   FMONO   --    --   78   --    --    FALB   --    --    --   1.0   --    FTP   --    --    --   1.6   --    GS  No organisms seen  Many  WBC'S seen  predominantly PMN's    Many  Red blood cells seen     < >  No organisms seen  Few  WBC'S seen  predominantly mononuclear cells    No organisms seen  Few  WBC'S seen  PMNs seen    Many  Red blood cells seen     --     < > = values in this interval not displayed.       Microbiology:    Blood   6/17/18 negative   6/18/18 negative   6/21/18 negative   7/27/18 VRE   7/29/18 catheter tip Staphylococcus epidermidis   7/29/18 VRE   7/30/18 VRE   7/31/18 VRE   8/1/18 VRE, ESBL E. Coli   8/2/18 negative   8/4/18 negative   8/6/18 VRE   8/7/18 VRE   8/8/18 VRE   8/9/18 VRE   8/11/18 VRE   8/12/18 VRE   8/13/18 VRE   8/14/18 VRE   8/15/18 VRE   8/17/18 VRE   8/19/18 NGTD   8/21 NGTD   8/22 negative   8/23/18 negative   8/25 negative   8/27 NGTD   8/29 NGTD     Fluid/Tissue   6/16/18 ascites negative   6/25/18 ascites negative   6/28/18 ascites  negative   7/13/18 ascites negative   7/21/18 ascites negative   7/30/18 abdominal fluid VRE   8/3/18 intra-abdominal hematoma tissue VRE   8/13/18 Abdominal fluid VRE   8/17/18 Peritoneal fluid VRE, Candida albicans/dubliniensis   8/18/18 Hematoma fluid VRE, C. Albicans / dubliniensis   8/27/18: ESBL E. Coli x2, E. Faecium (VRE), yeast (presumptive Candida albicans/dubliniensis)    Urine  6/16/18 Enterococcus faecalis    VRE rectal culure   7/13/18  positive    Respiratory  6/17/18 sputum single colony Citrobacter freundii, light growth Candida albicans/dubliniensis    Imaging:  AXR: 8/30/18:   1. Postoperative changes. Surgical clips in the right thoracoabdominal region. Biliary stent in place. Luis catheter in place.  2. No findings to concern for missing surgical items.  3. NG/OG tube with its tip projecting over the proximal stomach and sidehole projecting over the GE junction. Recommend advancement of approximately 8 cm.    CXR: 8/28/18:   1.  Endotracheal tube tip now 3 cm above the tano. Temperature probe tip in the upper esophagus.  2.  Unchanged left lower lobe atelectasis.    Liver U/S: 8/17/18:   1.  Transplant liver with patent vasculature.  2.  Decreased resistive indices in the hepatic artery (0.3-0.4), highly concerning for upstream stenosis.  3.  Stable appearance of perihepatic fluid collection near the braden hepatis. Other previously described fluid collections on 8/13/2018 evaluation were not seen on this exam.  4.  Hyperechoic round lesion in the right lateral lobe, also seen on comparison CT, likely represents hemangioma.  5.  Right pleural effusion.    CT a/p w/ contrast: 8/15/18:   1.  Findings concerning for hepatic infarct involving the majority of the left hepatic lobe with associated complex fluid collection along inferior aspect, measuring up to 8.9 x 4.6 cm. The fluid collection likely represents evolving hematoma. Superimposed infection cannot be completely excluded in this patient  with leukocytosis, although thought less likely based on evolving nature of this process since 7/30/2018 and sonographic appearance from ultrasound exams dated 7/30/2018 and 8/13/2018.   2.  Continued diffuse dilation of the small bowel transition point in the right lower quadrant (series 5 image [id number]). Findings concerning for small bowel obstruction.  3.  Postsurgical changes of abdominal wall abscess/hematoma evacuation with new soft tissue defect over the right upper quadrant and packing in place. There is surrounding postoperative hematoma.  4.  Unchanged biliary dilation, left lobe dominant, with a right-sided biliary stent in place.

## 2018-08-30 NOTE — PROGRESS NOTES
CLINICAL NUTRITION SERVICES - REASSESSMENT NOTE     Nutrition Prescription    RECOMMENDATIONS FOR MDs/PROVIDERS TO ORDER:  None at this time    Malnutrition Status:    Severe malnutrition in the context of acute on chronic illness     Recommendations already ordered by Registered Dietitian (RD):  Continue TPN as ordered     Future/Additional Recommendations:  1. If/when appropriate to start tube feeding, would recommend feeding stomach (given short gut syndrome):  -Peptamen 1.5 @ 10 mL/hr  -If pt tolerates, adv TF by 10 mL q8h to goal 50 ml/hr (1200 ml/day) to provide 1800 kcals (30 kcal/kg), 82 g PRO (1.3 g/kg), 924 ml free H2O, 67 g Fat (70% from MCTs), 226 g CHO and no Fiber daily  -3 pkts PROsource for total provision = 1920 kcal/day (31 kcal/kg) and 115 g PRO/day (1.9 g/kg)   -Order multivitamin/mineral (15 ml/day via FT) to help ensure micronutrient needs being met with suspected hypermetabolic demands and potential interruptions to TF infusions.  -30 mL water flush q4h for tube patency    2. Repeat metabolic cart studies prior to next reassessment to assess adequacy of nutrition support     EVALUATION OF THE PROGRESS TOWARD GOALS   Diet: NPO   Nutrition Support:  8/20 - 8/27: Peptamen 1.5 @ 10 = 360 kcal (6 kcal/kg) and 16 g PRO (0.3 g/kg)    8/24-8/27: PPN 1680 mL, 100 g dex, 90 g AA, IV lipids 5x/wk = 1057 kcal (17), 1.5 g PRO/kg, 33% kcal from fat.    8/20-8/24; 8/27-8/29: PN 1080 ml w/ 245g Dex, 120g AA + lipids 3x/wk = 1527 kcals (25), 2.0 g PRO/kg, GIR = 2.8 w/ 14% kcals from Fat.    8/29-___: SMOFLIPID 3x/wk    Intake:   -TF: 6 day TF infusion average = 138 mL to provide 207 kcal (kcal/kg) and 9 g PRO (0.1 g/kg)  -PN: 6 day average PN provisions = 1292 kcal (21 kcal/kg) and 105 g PRO (1.7 g/kg)     NEW FINDINGS   Resp: Re-Intubated on 8/27    Renal: Pt continues on iHD.    GI: 8/27 - FT repositioned per MD request for short gut, now NGT + Bridle - later removed; Abdominal Washout, primary anastomosis  closure, abthera placement, NGT (AXR). Plan for pt to return to OR today. No plans to re-initiate EN as of right now. Pt was switched from standard intralipid IV solution 3x/week to SMOFLIPID 3x/week (same caloric provisions) given anticipated long-term PN need, elevated bilirubin, and s/p DDLT.    Labs (8/30): K = 3.3 (L), BUN = 88 (H), Creatinine = 1.87 (H), Phos = 2.3 (L), TBili = 5.1 (H), Alk Phos = 274 (H), DBili = 3.9 (H); Lactic acid (8/28) = 3.7 (H)    Weight: NEW lowest admit wt of 58.7 kg on 8/27. NEW dosing weight of 59 kg.  UPDATED ASSESSED NUTRITION NEEDS  Estimated Energy Needs: 1489 - 1787 kcals/day (100-120% of MREE, 25 - 30 kcals/kg)  Justification: Increased needs and Post-op  Estimated Protein Needs: 89 - 118 grams protein/day (1.5 - 2 grams of pro/kg)  Justification: Increased needs and Post-op    MALNUTRITION  % Intake: Decreased intake does not meet criteria  % Weight Loss: >2% in 1 week (severe   Subcutaneous Fat Loss: Facial region, Upper arm, Lower arm and Thoracic/intercostal: Moderate-Severe  Muscle Loss: Temporal, Facial & jaw region, Thoracic region (clavicle, acromium bone, deltoid, trapezius, pectoral), Upper arm (bicep, tricep), Lower arm  (forearm), Upper leg (quadricep, hamstring), Patellar region and Posterior calf: Severe  Fluid Accumulation/Edema: Mild-moderate  Malnutrition Diagnosis: Severe malnutrition in the context of acute on chronic illness     Previous Goals   Total avg nutritional intake to meet a minimum of 24 kcal/kg equiv to 100% MREE (via PN alone) and 1.5 g PRO/kg daily (per dosing wt 61 kg).  Evaluation: Met    Previous Nutrition Diagnosis  Predicted inadequate nutrient intake (kcal/protein) related to altered GI function (short bowel syndrome) reliant on parenteral nutrition to meet entire nutrition needs as evidenced by received > 75% minimum energy and 100% minimum protein over the past week     Evaluation: Declining    CURRENT NUTRITION DIAGNOSIS  Inadequate  energy intake r/t short gut syndrome requiring modest-energy PN as sole source of nutrition AEB 6 day average intake of 25 kcal/kg with >2% weight loss over the past week    INTERVENTIONS  Implementation  Collaboration and Referral of Nutrition care - Discussed plan for FEN/GI on rounds with Providers    Goals  Total avg nutritional intake to meet a minimum of 25 kcal/kg equiv to 100% MREE (via PN alone) and 1.5 g PRO/kg daily (per dosing wt 59 kg).    Monitoring/Evaluation  Progress toward goals will be monitored and evaluated per protocol.    Halina Crouch, RD, LD  SICU RD Pgr: [phone]

## 2018-08-30 NOTE — PROGRESS NOTES
Immunosuppression Note:    Yaneli Miles is a 43 year old male who is seen today  for immunosuppression management     I, Mamadou Norwood MD, I have examined the patient with the resident/PA/Fellow, discussed and agree with the note and findings.  I have reviewed today's vital signs, medications, labs and imaging. I reviewed the immunosuppression medications and levels. 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  Developed shock with severe lactic acidosis and respiratory failure on POD9. CT scan showed left PV venous thrombosis, infarction of left liver lobe, patchy infarction of right lobe and possible hepatic arterial thrombosis, patchy bowel ischemia. Transferred to ICU.   Ex lap, patchy diffuse SB ischemia. Doppler demonstrated arterial flow main vessels to bowel and liver. Splenic infarction. Liver biopsy negative for acute rejection. Fluid culture growing VRE. On Heparin gtt. 8/3 OR for reexploration liver transplant, bowel ischemia, findings improved patchy iscemic changes of small bowel, patchy ischemic changes of left liver lobe.  OR: some ischemic/necrotic jejunum, s/p resection.   Washout and abdominal closure with stratice mesh, no drains placed. Minimal ascites, intact small bowel anastomosis, patchy ischemia left liver lobe.  peritonitis with bowel necrosis and bowel perforation. S/p bowel resection, about 50 cm sm bowel, ileocecal valve and colon remaining.      Procedures:    donor (DBD) liver transplant with duct to duct anastomosis over biliary stent.    Ex lap due to  concern for ischemic bowel, findings: patchy diffuse SB ischemia. Doppler demonstrated arterial flow main vessels to bowel and liver. Splenic infarction.   8/3 Reexploration liver transplant, bowel ischemia, findings improved patchy iscemic changes of small bowel, patchy ischemic changes of left liver lobe.   8/6 Re-exploration, ischemic/necrotic jejunum, s/p resection  8/8 Washout and abdominal closure with stratice mesh, no drains placed. Minimal ascites, intact small bowel anastomosis, patchy ischemia left liver lobe.  8/17 Procedure: reopening of laparotomy. abd washout, bowel resection (clip and drop fashion). Postop Dx: same. Large segment of ischemic bowel from mid-jejunum (prior anastomosis) with distal perforation and feculent peritonitis.   8/18 Reexploration, small and large bowel intestine appeared healthy per surgeon, primary anastomosis bowel. Fascia closure.  8/20 Reopening of laparotomy. abdominal washout, bowel resection of 200cm  8/27 Abdominal Washout, primary anastomosis closure, abthera placement     Graft function: POD #40, Tbili increased to 5.1 today (from 3.7), alk phos increased to 274 (from 158), ALT/AST normal. 7/30 Hepatic infarction, left PV thrombosis, and narrow and irregular appearance HA noted on CTA. 8/8 Liver biopsy negative for acute rejection. Intra-abdominal  cultures from 8/1, 8/6, 8/17, 8/18 + VRE or GPCs.   Incision opened at bedside 8/12, hematoma removed.  8/13 US: new perihepatic fluid collections (largest 7x2x4), small volume of septated ascites.  VAC placed 8/15. 8/15 CT scan, left lobe infarct with complex fluid collection inferior.  8/17 US: patent vasculature but RIs concerning for HA stenosis, stable fluid collection near braden hepatis.      Immunosuppression management:   - Induction: Simulect intra-op due to BEN. Steroid taper per protocol, completed.  - MMF: on hold due to critical status.  - Tac goal level ~5-6 due to sepsis and BEN. Level on 8/29 is 5.6  (13-hour trough). Will continue current dose and recheck level tomorrow.   - Hydrocortisone, now tapered off.   Complexity of management: Medium. Contributing factors: thrombocytopenia and anemia, ARF    Hematology:   Anemia: Acute blood loss. Hgb stable at 9.0 today. Multiple transfusions in the last week, last on 8/28.  Thrombocytopenia: Ongoing transfusions. Platelet count remains low at 36 (38), getting frequent platelet transfusions.  Anticoagulation: Anticoagulating for bowel ischemia and portal thrombosis. Protein S level low at 27 on 8/19. Heparin gtt low intensity resulted in diffuse oozing. Dextran 10 ml/hr for additional anticoagulation, stopped on 8/23 due to widespread oozing. Continue heparin drip 400u/hr. INR 2.46 this morning, getting FFP this AM prior to planned washout in OR.     Cardiorespiratory:    Hypotension secondary to septic shock:  Resolved, off pressors.  Respiratory failure secondary to sepsis: Extubated 8/22. Intubated for OR on 8/27, continues on vent.   Junctional rhythm:  Onset 8/22, now resolved.    GI/Nutrition: NG in place, remains NPO. On TPN. Feeding tube pulled back 8/26 to have the tip in the DII.   Ischemic bowel:  7/30 CTA: nonocclusive filling defects in superior mesenteric vein branches.  Anticoagulation as above.  S/p resection of necrotic jejunum on 8/6. S/p resection of 150cm of ischemic bowel with perforation on 8/17. Patient with hypotension and increased abdominal pain overnight, CT on 8/27 suggestive of bowel perforation. OR on 8/27 for wash out and bowel repair. Plan to return to OR for washout today.   PSBO: Noted on 8/15 CT, transition point RLQ. S/p resection on 8/17.   Bowel perforation/peritonitis:  Small bowel perforation with feculent peritonitis noted in OR on 8/17. S/p resection, abx as below. Patient with hypotension and increased abdominal pain overnight, CT on 8/27 suggestive of bowel perforation. OR on 8/27 for wash out and bowel repair. Will monitor  today and return to OR tomorrow.   GIB: Was having maroon stools prior to OR on , no return of bowel function since then.    Endocrine: DM type 2, continue insulin drip.      Fluid/Electrolytes/Renal: BEN/ARF, recovering. CRRT off since . Good UOP.    : Traumatic bah placement with intermittent bleeding.   Hematuria now resolved. Bah, coude 18fr in place.    Infectious disease: Afebrile. WBC stable at 9.3 today (from 9.9). ID following.  -Amikacin (-), flagyl (-), ceftraroline (-), tigecycline (-), daptomycin (-), micafungin (-), fluconazole (-).   -Blood cultures through  + VRE or GPCs  - Peritoneal fluid culture + VRE, candida albicans  -, ,  Blood cx, no growth to date    1. VRE intra abdominal and bacteremia (dapto, ceftaroline, tigecycline)  2. Infarct left lobe liver with inferior perihepatic fluid collection  3. Peritonitis (metronidazole, fluconazole)    Previous ID:   3. Ecoli ESBL bacteremia. Repeat bcx on  (peripheral and VAD) grew Ecoli ESBL. Zosyn changed to Meropenem, completed on .  SICU to exchange lines as able. Blood cultures negative for E. Coli since .  4. HBV: Hepatits B DNA PCR positive prior to tx. Received HBIG on  and . 8/10 Hep B surface antigen negative. No further HBIG planned. Continue tenofovir 300 mg, dose adjusted for HD     Prophylaxis: DVT PCDs, on heparin, fall, GI, Valcyte x 12 weeks (CMVand EBV IgG +), bactrim.   Disposition: SICU    Coordinator: Kay Reyes  Surgeon: Pako Mathias     Check Hep B surface antigen at 3 months, 6 months then annually     Medical Decision Making: Medium    JAY/Fellow/Resident Provider:  Becka Nguyen, HOSEA x [phone]    Faculty: Mamadou Norwood MD   __________________________________________________________________  Transplant History: Admitted 2018 for  donor liver transplant.   The patient has a history of liver failure due to hepatitis B .     7/21/2018 (Liver), Postoperative day: 40     Interval History:  Remains vented, off pressors. Plan for return to OR this morning for washout.    ROS:   A 10-point review of systems was negative except as noted above.    Curent Meds:    ceftaroline (TEFLARO) intermittent infusion  300 mg Intravenous Q12H     DAPTOmycin (CUBICIN) intermittent infusion  12 mg/kg Intravenous Q48H     fluconazole  200 mg Intravenous Q24H     heparin lock flush  5-10 mL Intracatheter Q24H     lipids 4 oil  250 mL Intravenous Once per day on Mon Wed Fri     meropenem  1,000 mg Intravenous Q8H     pantoprazole (PROTONIX) IV  40 mg Intravenous BID     potassium chloride  20 mEq Intravenous Once     sodium chloride (PF)  3 mL Intravenous Q8H     sulfamethoxazole-trimethoprim  10 mL Per Feeding Tube Once per day on Mon Wed Fri     tacrolimus  1 mg Oral or Feeding Tube BID IS     tenofovir  300 mg Per Feeding Tube Q72H     tigecycline (TYGACIL) intermittent infusion  50 mg Intravenous Q12H     valGANciclovir  450 mg Oral Once per day on Mon Thu    Or     valGANciclovir  450 mg Oral or NG Tube Once per day on Mon Thu       Physical Exam:     Admit Weight: 60.7 kg (133 lb 12.8 oz)    Vital sign ranges:    Temp:  [96.4  F (35.8  C)-99.7  F (37.6  C)] 98.4  F (36.9  C)  Heart Rate:  [] 87  Resp:  [17-23] 18  MAP:  [60 mmHg-88 mmHg] 73 mmHg  Arterial Line BP: ()/(47-69) 99/60  FiO2 (%):  [40 %-50 %] 50 %  SpO2:  [100 %] 100 %    General Appearance: NAD  HEENT:  NG (green/tan), [place]  Skin: warm, dry  Heart: Regular rate and rhythm  Lungs: Nonlabored breathing on ventilator.  Abdomen: Incision with wound vac in place, sanguinous output. Abthera drain in place with sanguinous drainage.  : Luis in place draining clear yellow urine.  Extremities: well perfused. Generalized trace edema.  Neurologic: Sedated.   Access: PICC, PIV    Data:   CMP    Recent Labs  Lab 08/30/18  0357 08/29/18  0448  08/27/18  1858  08/27/18  0841    139  < >  141  < >  --    POTASSIUM 3.2* 4.0  < > 3.0*  < >  --    CHLORIDE 110* 108  < > 108  < >  --    CO2 21 21  < > 21  < >  --    * 123*  < > 113*  < >  --    BUN 88* 82*  < > 79*  < >  --    CR 1.87* 1.87*  < > 1.64*  < >  --    GFRESTIMATED 39* 39*  < > 46*  < >  --    GFRESTBLACK 47* 47*  < > 55*  < >  --    JESUS 8.3* 8.0*  < > 6.9*  < >  --    ICAW  --   --   --  4.6  --  4.2*   MAG 2.5* 2.4*  < >  --   --   --    PHOS 2.3* 3.2  < >  --   --   --    ALBUMIN 2.4* 2.4*  < > 2.1*  --   --    BILITOTAL 5.1* 3.7*  < > 3.8*  --   --    ALKPHOS 274* 158*  < > 94  --   --    AST 16 18  < > 19  --   --    ALT 16 15  < > 13  --   --    < > = values in this interval not displayed.  CBC    Recent Labs  Lab 08/30/18  0940 08/30/18  0357   HGB 8.6* 9.0*   WBC 8.5 9.3   PLT 34* 36*     COAGS    Recent Labs  Lab 08/30/18  0940 08/30/18  0357  08/27/18  1858  08/24/18  0434   INR 2.46* 2.39*  < > 2.81*  < > 1.84*   PTT  --   --   --  47*  --  74*   < > = values in this interval not displayed.   Urinalysis  Recent Labs   Lab Test  07/27/18   2330  07/13/18   1315  06/18/18   1200   COLOR  Yellow  Dark Yellow  Yellow   APPEARANCE  Clear  Clear  Clear   URINEGLC  Negative  Negative  Negative   URINEBILI  Negative  Large*  Small*   URINEKETONE  Negative  Negative  Negative   SG  1.008  1.013  1.008   UBLD  Negative  Negative  Negative   URINEPH  7.5*  6.0  5.0   PROTEIN  30*  10*  Negative   NITRITE  Negative  Negative  Negative   LEUKEST  Negative  Negative  Negative   RBCU  5*  <1  1   WBCU  3  None  2   UTPG   --    --   0.75*     Virology:  Hepatitis C Antibody   Date Value Ref Range Status   07/20/2018 Nonreactive NR^Nonreactive Final     Comment:     Assay performance characteristics have not been established for newborns,   infants, and children         POD 14  liver:  1.  The left portal vein is antegrade with decreased velocity,  measuring approximately 15 cm/second. This vessel was occluded on CT  7/30/2018.  2.   Elevated velocities of the main hepatic artery, now 203 cm/sec  (previously 94 cm/sec). However left hepatic arteries demonstrates  steep upstroke suggesting there is not a significant stenosis.   2a. Right hepatic artery was not visualized, could be occluded or  difficult to see postoperatively.  3. Echogenic focus of the right lobe in close proximity to the  hepatorenal fossa. This likely corresponds with nonenhancing lesion on  CT 7/30/2018 and likely representing subcapsular hematoma.  4. Additional small hematomas of the braden hepatis and posterior right  perihepatic region.  5. Small volume anechoic ascites.  6. Left hepatic lobe not well visualized secondary to open wound  preventing imaging. Note the left lobe was grossly abnormal on CT  7/30/2018    POD 1 US liver: Impression:   1.  Hematoma associated with the inferior aspect of the perihepatic  space anteriorly, measuring 13.7 x 7.0 x 8.5 cm.  2.  Retrograde flow of the left portal vein. Additionally, low  velocities of the portal venous system. Single low resistive index of  the extrahepatic aspect of the hepatic artery, measuring 0.37. These  findings are likely within normal limits in the early postoperative  context. However, continued attention on follow-up recommended.    7/30 CT scan abd/pelvis:  IMPRESSION:   1. Hypoenhancing areas in the transplant liver concerning for  infarction. Transplant left portal vein occlusion with bubbles of  portal venous gas. Nearly occlusive filling defect in the native  hepatic artery. Narrowed and irregular appearance of the transplant  hepatic artery. Focal occlusion of the left main hepatic artery.  Segmental occlusions of right hepatic arterial branches.   2. Pneumatosis intestinalis with nonocclusive filling defects in  superior mesenteric vein branches, concerning for bowel ischemia or  infarction.   3. Splenic infarctions.   4. Bibasilar consolidation with air bronchograms. Pneumonia cannot be  excluded.   5.  Post surgical changes of evacuation of peritransplant hematoma.

## 2018-08-30 NOTE — PLAN OF CARE
Problem: Patient Care Overview  Goal: Plan of Care/Patient Progress Review  OT 4AB: cx. per chart review and discussion with RN pt. not appropriate at time of attempt, plan for OR; washout.

## 2018-08-30 NOTE — OR NURSING
Abthera Removed Prior to Surgery. X-Ray Taken To Verify No Retained Objects prior to closing,by Radha Shipley, Radiologist, With Permacol Mesh and Large Silver Wound Vac Sponges x2

## 2018-08-31 NOTE — PLAN OF CARE
Problem: Patient Care Overview  Goal: Plan of Care/Patient Progress Review  Discharge Planner OT   Patient plan for discharge: not discussed at time of session.  Current status: pt. with eyes closed for majority of session, though following basic commands. Pt. Tolerated BUE/LE AAROM well on Fio2 40%, PEEP 5.  Barriers to return to prior living situation: medical status, below baseline with ADLs/mobility  Recommendations for discharge: rehab  Rationale for recommendations: increase activity tolerance, to max. Safety/indep. with ADls/mobility.       Entered by: Kusum Dubose 08/31/2018 9:08 AM

## 2018-08-31 NOTE — PLAN OF CARE
Problem: Patient Care Overview  Goal: Plan of Care/Patient Progress Review  Outcome: No Change  Patient remains lethargic but able to follow commands and make needs known.  Sinus rhythm, temp 36-37.  Pressures within goal of >50 without intervention.  Pulses weak but palpable.  Lungs coarse over diminished bases, CMV 40/400/18/5.  Small amount thick secretions via ETT.  NPO, TPN infusing.  Wound vac with scant output, NG with very small amount.  Abdomen tender, bowel sounds faint.  Insulin at 1.  Luis with adequate uop, yary in color.  Heparin and precedex currently infusing.  Will continue to monitor and update team with changes or concerns.

## 2018-08-31 NOTE — PROGRESS NOTES
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 He returned to the SICU for shock, severe lactic acidosis, renal failure and respiratory failure, requiring intubation and dialysis.  Ischemic injury to left hemiliver and non-occlusive mesenteric ischemia.  He underwent a laparotomy and several subsequent take-backs to examine ischemic bowel, on 8/6 he underwent resection of necrotic jejunum with a primary anastomosis, and his abdomen was closed 8/8. He acutely decompensated beginning the night of 8/16, with increasing pressor needs, increasing abdominal pain and lactic acidosis, now s/p exploratory laparotomy 8/17 and s/p entero-entero small bowel anastomosis, drain placement and fascial closure 8/18. Improving renal function has improved (off dialysis). Take back to OR on 8/27/18 for hematochezia and found to have anastomotic dehiscence and perforated bowel near site.  Returned to OR 8/30 for washout and replacement of mesh.     CHANGES FOR TODAY/MAJOR THINGS:   - 25% albumin x3 doses today  - Hold off on NG feeds x1 week  - Change wound vac q3 days (next 9/1)  - PST  - Added Meropenem 8/30 d/t intraabdominal ESBL, will r/o to transplant ID about switching to Ertapenem    PLAN:    Neuro/ pain/ sedation:  # acute pain  - PRN IV dilaudid  - Precedex infusion for sedation     Pulmonary care:   # Acute respiratory failure  - extubated (8/22), re intubated for OR 8/24/18. PST today  - HOB elevation. Vent bundle.   - CMV 18/400/5/40    Cardiovascular:    # Hypotension- hypovolemic vs. hemorrhagic  # Junctional rhythm on 8/22 EKG, unclear cause, resolved 8/24- no additional episodes.   - Discontinue Precedex if bradycardia develops. MAP goals > 60  - TTE 8/8 showed no vegetations or valve abnormalities  - TOÑO 8/10 no  evidence of endocarditis   - EKG (8/23): junctional bradycardia   - EKG (8/24): Normal sinus rhythm      Gastroenterolgy   # DDLT 7/21/18  # Intrahepatic left portal venous thrombosis, infarction of left liver lobe, patchy infarction of right lobe, suspected hepatic arterial thrombosis  # 7/30/18 s/p exploratory laparotomy and liver biopsy  # Splenic infarctions, patchy bowel ischemia  # Ex Lap 8/6 - resection of 90 cm of necrotic jejunum  # Ex Lap 8/8 - abdomen closed  # Ex Lap 8/17 - Found to have widespread bowel ischemia requiring bowel resection.  Left in discontinuity.  # Ex Lap 8/18 - entero-entero anastomosis and facial closure.  Approximately  cm small bowel remaining with intact ileocecal valve and colon.  # 8/27 s/p abdomen washout primary anastomosis closure with abthera placement (found to have anastomotic dehiscence, perf bowel near anastomotic site)--pt left open  # GIB- hematochezia  - Two episodes of bowel ischemia of unclear etiology requiring resection of the majority of small bowel ,approx  cm of small bowel remaining.   - Luminal GI consulted for hematochezia but discovered to be due to bowel ischemia/perforation-- now signed off   - ON TPN only- RD and Pharm D managing.   - Wound vac changes per transplant, anticipate change in 1-2 days.       Fluids/ Electrolytes/ Nutrition:   # Protein calorie deficit malnutrition   # Hypocalcemia, replaced.   # hypomagnesia, replaced   # Short gut syndrome,  cm of small bowel remaining per notes.   - NJ removed 8/27/18 during OR.   - continue with TPN only.     Renal/ Fluid Balance:    # BEN -recovering  # Metabolic acidosis 2/2 lactic acidosis--lactic acid now normal   - CRRT stopped 8/23. UOP improving. Continues to make urine.  Total 1L/24 hours of urine output.   - Luis in place for strict I's and O's      Endocrine:    # DM II  - Continue insulin gtt for now      ID/ Antibiotics:  # VRE Bacteremia  # ESBL Bacteremia  # Hepatitis B  #  Continued need for ongoing ABX.   - Appreciate transplant ID  following for now. NEW cultures form OR 8/27 with ESBL e. Coli, VRE, yeast, GPC-- discontinue flagyl and start meropenem    - s/p Meropenem (8/2-8/14), discontinued after 12 days of negative cultures for ESBL. Meropenem resumed 8/30. Will discuss with ID, whether we can change to Ertapenem.   - s/P Amikacin (8/17 - 8/21), discontinued as he had completed 2-week course of meropenem and has other active agents for G- coverage  - Flagyl (8/17 - 8/30/18)     Current ABx: KEEP all below for now, duration to be determined based on improvement.   - Daptomycin (7/30- ), ceftaroline (8/13- ), continue until 9/2 per Transplant ID  - Micafungin (7/30-8/22) changed to fluconazole (8/22- )  - Tigecycline (8/13-8/23), discontinued after 4 days of negative blood cultures. Re-started 8/28/17  - Meropenem restarted 8/30/18  - Immunosuppression: (restarted 8/6) tacrolimus  - Immunosuppression prophylaxis: Valgancyclovir and Tenofovir, nystatin when micafungin stopped.  Bactrim stopped       Cultures   - 7/27 VRE (line/arm)   - 7/29: VRE (left arm)   - 7/30: VRE (left arm)   - 7/31: VRE (Line and peripheral)  - 8/1: ESBL (HD/hand/central line)   - 8/3: VRE (intra-abdominal tissue)  - 8/4: NGTD (blood)  - 8/5: NGTD (blood)  - 8/6: VRE (art line)  - 8/7: VRE (blood)  - 8/8: VRE (blood)  - 8/9: VRE (blood)  - 8/11: VRE (blood)  - 8/12: VRE (abdominal fluid)  - 8/13: VRE (blood)  - 8/14: VRE (blood)  - 8/15: VRE (blood)  - 8/17: NGTD (blood)   - 8/17: VRE & Candida (peritoneal fluid)   - 8/18: G+ cocci (tissue)  - 8/19 NGTD (blood)  - 8/21 NGTD (blood)  - 8/22 NGTD (blood)  - 8/23 NGTD (blood)  - 8/24 NGTD (blood)  -8/27 FLUID: VRE, ESBL E coli x2, yeast, GPC       Heme:     # Acute blood loss anemia  # Anemia of critical illness  # Portal venous thrombosis  # Thrombocytopenia  # Coagulopathy   # Anti thrombin III deficiency  - Heparin at 400 straight rate  - Hold aspirin 81mg  for continued bleeding  - Monitor CBC and INR every 12 hours     MSK:  # weakness and deconditioning of critical illness   - PT/OT consulted. Passive and active ROM while abthera device in place.      General cares and Prophylaxis:    - DVT: Heparin--400units/hr, SCD's  - GI: protonix 40 mg IV BID       Lines/ tubes/ drains:  - NG  - PIVs.   - PICC line   - right femoral arterial line    - Abthera device (CAROL's removed during case 8/27/18)       Time spent on this Encounter   Billing:  I spent 35minutes bedside and on the inpatient unit today managing the critical care of Yaneli Miles in relation to the issues listed in this note.    Lisa De La Mater    ====================================    SUBJECTIVE:  Course reviewed. No acute event overnight. Off pressors. Able to wake up and follow commands. Debilitated. Denies pain and dyspnea.     OBJECTIVE:   1. VITAL SIGNS:   Temp:  [96.4  F (35.8  C)-98.6  F (37  C)] 98.6  F (37  C)  Heart Rate:  [80-92] 85  Resp:  [18-27] 21  MAP:  [56 mmHg-77 mmHg] 65 mmHg  Arterial Line BP: ()/(43-61) 88/54  FiO2 (%):  [40 %-50 %] 40 %  SpO2:  [99 %-100 %] 100 %  Ventilation Mode: CMV/AC  (Continuous Mandatory Ventilation/ Assist Control)  FiO2 (%): 40 %  Rate Set (breaths/minute): 18 breaths/min  Tidal Volume Set (mL): 400 mL  PEEP (cm H2O): 5 cmH2O  Oxygen Concentration (%): 40 %  Resp: 21    2. INTAKE/ OUTPUT:   I/O last 3 completed shifts:  In: 3044.5 [I.V.:2057.9]  Out: 1510 [Urine:1080; Emesis/NG output:250; Drains:150; Blood:30]    3. PHYSICAL EXAMINATION:   General: laying in bed, arouses to voice, awake   HEENT: pupils 3mm. ETT present and secured. NG present with bilious output.   Neuro: CARTWRIGHT. Follows simple commands consistently.   Pulm/Resp: Coarse breath sounds. BBS. No wheezes    CV: RRR, S1, S2   Abdomen: abthera device in place, suction intact. Tenderness along incision site.   : (+) bah catheter in place, urine yary  Incisions/Skin: abthera in place, suction  intact with dark serosanguinous output.   Extremities: generalized peripheral edema evonne in posterior dependent areas,  peripheral pulses intact, calves soft and non-tender, extremities well perfused 2+.     4. INVESTIGATIONS:   Arterial Blood Gases     Recent Labs  Lab 08/30/18  1412 08/28/18  0628 08/27/18  2155 08/27/18  1858   PH 7.33* 7.30* 7.31* 7.23*   PCO2 41 32* 35 44   PO2 69* 100 127* 132*   HCO3 22 16* 18* 19*     Complete Blood Count     Recent Labs  Lab 08/31/18 0414 08/30/18  1631 08/30/18  1412 08/30/18  0940 08/30/18  0357   WBC 8.6 9.1  --  8.5 9.3   HGB 8.4* 9.0* 8.8* 8.6* 9.0*   PLT 31* 34*  --  34* 36*     Basic Metabolic Panel    Recent Labs  Lab 08/31/18 0414 08/30/18  1412 08/30/18  0357 08/29/18  0448  08/28/18  0307    143 141 139  --  139   POTASSIUM 4.2 3.3* 3.2* 4.0  < > 3.8   CHLORIDE 113*  --  110* 108  --  108   CO2 20  --  21 21  --  17*   BUN 94*  --  88* 82*  --  80*   CR 1.94*  --  1.87* 1.87*  --  1.72*   * 97 144* 123*  --  197*   < > = values in this interval not displayed.  Liver Function Tests    Recent Labs  Lab 08/31/18 0414 08/30/18  1631 08/30/18  0940 08/30/18  0357  08/29/18  0448  08/28/18  0307   AST 22  --   --  16  --  18  --  17   ALT 12  --   --  16  --  15  --  13   ALKPHOS 412*  --   --  274*  --  158*  --  100   BILITOTAL 5.3*  --   --  5.1*  --  3.7*  --  3.5*   ALBUMIN 2.0*  --   --  2.4*  --  2.4*  --  2.1*   INR 2.62* 2.33* 2.46* 2.39*  < > 2.43*  < > 3.50*   < > = values in this interval not displayed.  Pancreatic Enzymes  No lab results found in last 7 days.  Coagulation Profile    Recent Labs  Lab 08/31/18  0414 08/30/18  1631 08/30/18  0940 08/30/18  0357  08/27/18  1858   INR 2.62* 2.33* 2.46* 2.39*  < > 2.81*   PTT  --   --   --   --   --  47*   < > = values in this interval not displayed.      5. RADIOLOGY:   Recent Results (from the past 24 hour(s))   XR Abdomen Port 1 View    Narrative    Exam: Abdominal x-ray, supine view, 8  30,018.    COMPARISON: 8/28/2018.    HISTORY: Operative room x-ray for missing items.    FINDINGS: Supine view of the abdomen was obtained. The right lateral  and most inferior portions of the abdomen were collimated out of the  field-of-view. Postoperative changes with surgical clips in the right  thoracoabdominal region. Again noted biliary stent projecting over the  right upper quadrant/central abdomen. Nonspecific paucity of bowel gas  throughout the abdomen. Partially visualized Luis catheter. No  findings to concern for missing surgical items. NG/OG tube with its  tip projecting over the proximal stomach and sidehole projecting over  the GE junction. Unchanged appearance of hyperdensities over the mid  upper abdomen.      Impression    Impression:  1. Postoperative changes. Surgical clips in the right thoracoabdominal  region. Biliary stent in place. Luis catheter in place.  2. No findings to concern for missing surgical items.  3. NG/OG tube with its tip projecting over the proximal stomach and  sidehole projecting over the GE junction. Recommend advancement of  approximately 8 cm.    Findings were discussed with operative room team. on 8/30/2018 at 2:45  PM.    GEOVANY SHEPARD MD       =========================================

## 2018-08-31 NOTE — PROGRESS NOTES
Transplant Social Work Services Progress Note      Date of Liver Transplant: 7/21/208  Collaborated with: 's wife    Data: I was informed yesterday that Mr. Miles's wife, Luann, has some paperwork of me to assist with. She has a request from [employer] for additional medical records for her 's Supplemental Security (SSI) application, specifically his dates of admission and discharge since May 1, 2018.   Intervention: I left a message for Ms. Orourke at the [place] SSA office ([phone], [phone]) to confirm if she only needs the dates, or also records from this period of time.   Assessment: Per 's voice mail, she will be away from her office until Sept. 11.   Plan:    Discharge Plans in Progress: No    Barriers to d/c plan: Not medically appropriate.     Follow up Plan: I will follow up with  at the [employer] office after her return.     KAIA Park, [facility]  Pager [phone]

## 2018-08-31 NOTE — PROGRESS NOTES
Focus- coccyx/sacrum and BL buttocks         Received consult for suspected pressure injury on sacrum. Assessed the area and noted intact epidermis on coccyx and sacrum. Currently covered with Mepilex for protection. No pressure injury detected. Noted two small healing abrasion on left buttock (fleshy aspect) ~ 0.3x0.2x<0.1cm. Pt has been tolerating repositioning. Needs assistance with repositioning.   P. Continue to follow pressure injury prevention protocol. Apply No sting film barrier on healing abrasion daily until healed. No further visit planned, will sign off.    Addendum- Pt went to the OR for abdominal washout yesterday, and NPWT was changed. Paged out to transplant team. WOC will follow up next week as needed for a wound vac.

## 2018-09-01 NOTE — PROGRESS NOTES
Immunosuppression Note:    Yaneli Miles is a 43 year old male who is seen today  for immunosuppression management     I, Mamadou Norwood MD, I have examined the patient with the resident/PA/Fellow, discussed and agree with the note and findings.  I have reviewed today's vital signs, medications, labs and imaging. I reviewed the immunosuppression medications and levels. 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  Developed shock with severe lactic acidosis and respiratory failure on POD9. CT scan showed left PV venous thrombosis, infarction of left liver lobe, patchy infarction of right lobe and possible hepatic arterial thrombosis, patchy bowel ischemia. Transferred to ICU.   Ex lap, patchy diffuse SB ischemia. Doppler demonstrated arterial flow main vessels to bowel and liver. Splenic infarction. Liver biopsy negative for acute rejection. Fluid culture growing VRE. On Heparin gtt. 8/3 OR for reexploration liver transplant, bowel ischemia, findings improved patchy iscemic changes of small bowel, patchy ischemic changes of left liver lobe.  OR: some ischemic/necrotic jejunum, s/p resection.   Washout and abdominal closure with stratice mesh, no drains placed. Minimal ascites, intact small bowel anastomosis, patchy ischemia left liver lobe.  peritonitis with bowel necrosis and bowel perforation. S/p bowel resection, about 50 cm sm bowel, ileocecal valve and colon remaining.      Procedures:    donor (DBD) liver transplant with duct to duct anastomosis over biliary stent.    Ex lap due to  concern for ischemic bowel, findings: patchy diffuse SB ischemia. Doppler demonstrated arterial flow main vessels to bowel and liver. Splenic infarction.   8/3 Reexploration liver transplant, bowel ischemia, findings improved patchy iscemic changes of small bowel, patchy ischemic changes of left liver lobe.   8/6 Re-exploration, ischemic/necrotic jejunum, s/p resection  8/8 Washout and abdominal closure with stratice mesh, no drains placed. Minimal ascites, intact small bowel anastomosis, patchy ischemia left liver lobe.  8/17 Procedure: reopening of laparotomy. abd washout, bowel resection (clip and drop fashion). Postop Dx: same. Large segment of ischemic bowel from mid-jejunum (prior anastomosis) with distal perforation and feculent peritonitis.   8/18 Reexploration, small and large bowel intestine appeared healthy per surgeon, primary anastomosis bowel. Fascia closure.  8/20 Reopening of laparotomy. abdominal washout, bowel resection of 200cm  8/27 Abdominal washout, primary anastomosis closure, abthera placement   8/30 Abdominal washout, abdominal wall closure with Permacol, wound vac placement    Graft function: POD #42, Tbili increased stable at 5.3 today (5.1), alk phos increased to 412 (274), ALT/AST normal. 7/30 Hepatic infarction, left PV thrombosis, and narrow and irregular appearance HA noted on CTA. 8/8 Liver biopsy negative for acute rejection. Intra-abdominal  cultures from 8/1, 8/6, 8/17, 8/18 + VRE or GPCs.   Incision opened at bedside 8/12, hematoma removed.  8/13 US: new perihepatic fluid collections (largest 7x2x4), small volume of septated ascites.  VAC placed 8/15. 8/15 CT scan, left lobe infarct with complex fluid collection inferior.  8/17 US: patent vasculature but RIs concerning for HA stenosis, stable fluid collection near braden hepatis. Will give 25gm albumin 25% x 3 doses today.     Immunosuppression management:   - Induction: Simulect intra-op due to BEN. Steroid taper completed per  protocol.  - MMF: on hold due to critical status.  - Tac goal level ~5-6 due to sepsis and BEN. Level on 8/31 is 7.6 and 9/1 is 9.6 (12-hour trough). Will decrease dose to 0.5mg BID and recheck level tomorrow.   - Hydrocortisone 7/30-8/21, now tapered off.   Complexity of management: Medium. Contributing factors: thrombocytopenia and anemia, ARF    Hematology:   Anemia: Acute blood loss. Hgb stable in the 8-9 range. Multiple transfusions in the last week, last on 8/28.  Thrombocytopenia: Ongoing transfusions. Platelet count remains low at 31 (34), getting frequent platelet transfusions.  Anticoagulation: Anticoagulating for bowel ischemia and portal thrombosis. Protein S level low at 27 on 8/19. Heparin gtt low intensity resulted in diffuse oozing. Dextran 10 ml/hr for additional anticoagulation, stopped on 8/23 due to widespread oozing. Continue heparin drip at 400u/hr. INR 2.62 this morning.     Cardiorespiratory:    Hypotension secondary to septic shock: Resolved, off pressors.  Respiratory failure secondary to sepsis: Extubated 8/22. Intubated for OR on 8/27, continues on vent. PST trial today with possible extubation.  Junctional rhythm:  Onset 8/22, now resolved.    GI/Nutrition: NG tube in place, remains NPO. On TPN. Will plan to keep NPO for at least one week, NG tube to remain to LIS.  Ischemic bowel:  7/30 CTA: nonocclusive filling defects in superior mesenteric vein branches.  Anticoagulation as above.  S/p resection of necrotic jejunum on 8/6. S/p resection of 150cm of ischemic bowel with perforation on 8/17. Patient with hypotension and increased abdominal pain overnight, CT on 8/27 suggestive of bowel perforation. OR on 8/27 for wash out and bowel repair. Returned to OR on 7/30 for washout and abdominal wall closure with wound vac placement. Wound vac to be changed at the bedside by transplant surgery on 9/1.   PSBO: Noted on 8/15 CT, transition point RLQ. S/p resection on 8/17.   Bowel  perforation/peritonitis:  Small bowel perforation with feculent peritonitis noted in OR on 8/17. S/p resection, abx as below. Patient with hypotension and increased abdominal pain overnight, CT on 8/27 suggestive of bowel perforation. OR on 8/27 for wash out and bowel repair. Returned to OR on 8/30 for washout and abdominal wall closure. Discussed possible rectal tube placement for bowel decompression with ICU team, but not felt to be indicated at this time.  GIB: Was having maroon stools prior to OR on 8/27, had return of bowel function overnight.    Endocrine: DM type 2, continue insulin drip.    Fluid/Electrolytes/Renal: CRRT off since 8/23. Creatinine is slowly trending up with decreasing UOP, will continue to monitor.    : Traumatic bah placement with intermittent bleeding.   Hematuria now resolved. Bah, coude 18fr in place.    Infectious disease: Afebrile. WBC stable at 8.6 today (from 9.1). ID following.  -Amikacin (8/17-8/20), flagyl (8/17-8/30), ceftraroline (8/13-), tigecycline (8/13-8/23 and 8/29-), daptomycin (8/14-), micafungin (7/30-8/21), fluconazole (8/22-), and meropenem (8/30-).   -Blood cultures through 8/18 + VRE or GPCs  -8/17 Peritoneal fluid culture + VRE, candida albicans  -8/19, 8/21, 8/23 Blood cx, no growth to date  -8/27 abdominal fluid culture + ESBL E. Coli, yeast, and VRE    1. VRE intra abdominal and bacteremia (dapto, ceftaroline, tigecycline, meropenem)  2. Infarct left lobe liver with inferior perihepatic fluid collection  3. Peritonitis (fluconazole)    Previous ID:   3. Ecoli ESBL bacteremia. Repeat bcx on 8/1 (peripheral and VAD) grew Ecoli ESBL. Zosyn changed to Meropenem, completed course on 8/14.  4. HBV: Hepatits B DNA PCR positive prior to tx. Received HBIG on 7/21 and 7/22. 8/10 Hep B surface antigen negative. No further HBIG planned. Continue tenofovir 300 mg, dose adjusted for HD     Prophylaxis: DVT PCDs, on heparin, fall, GI, Valcyte x 12 weeks (CMVand EBV IgG  +), bactrim.   Disposition: SICU    Coordinator: Kay Reyes  Surgeon: Pako Mathias     Check Hep B surface antigen at 3 months, 6 months then annually     Medical Decision Making: Medium    JAY/Fellow/Resident Provider:  Mihir Nj, fellow    Faculty: Mamadou Norwood MD   __________________________________________________________________  Transplant History: Admitted 2018 for  donor liver transplant.   The patient has a history of liver failure due to hepatitis B .    2018 (Liver), Postoperative day: 42     Interval History:  Remains vented, not requiring pressors. Had return of bowel function overnight. PS trial today with possible extubation.    ROS:   A 10-point review of systems was negative except as noted above.    Curent Meds:    ceftaroline (TEFLARO) intermittent infusion  300 mg Intravenous Q12H     DAPTOmycin (CUBICIN) intermittent infusion  12 mg/kg Intravenous Q48H     ertapenem (INVanz) IV  500 mg Intravenous Q24H     fluconazole  200 mg Intravenous Q24H     heparin lock flush  5-10 mL Intracatheter Q24H     lipids 4 oil  250 mL Intravenous Once per day on      nystatin  500,000 Units Mouth/Throat 4x Daily     pantoprazole (PROTONIX) IV  40 mg Intravenous BID     potassium chloride  20 mEq Intravenous Once     sodium chloride (PF)  3 mL Intravenous Q8H     sulfamethoxazole-trimethoprim  10 mL Per Feeding Tube Once per day on      tacrolimus  0.5 mg Oral or Feeding Tube BID IS     tenofovir  300 mg Per Feeding Tube Q72H     tigecycline (TYGACIL) intermittent infusion  50 mg Intravenous Q12H     valGANciclovir  450 mg Oral Once per day on     Or     valGANciclovir  450 mg Oral or NG Tube Once per day on        Physical Exam:     Admit Weight: 60.7 kg (133 lb 12.8 oz)    Vital sign ranges:    Temp:  [97  F (36.1  C)-99.1  F (37.3  C)] 99.1  F (37.3  C)  Heart Rate:  [] 101  Resp:  [18-26] 21  BP: (103)/(70) 103/70  MAP:  [56 mmHg-86 mmHg]  77 mmHg  Arterial Line BP: ()/(41-68) 106/58  FiO2 (%):  [40 %] 40 %  SpO2:  [97 %-100 %] 100 %    General Appearance: NAD, on ventilator  HEENT:  NG (green/tan)  Skin: warm, dry  Heart: Regular rate and rhythm  Lungs: Nonlabored breathing on ventilator.  Abdomen: Incision with wound vac in place, sanguinous output. Wound vac in place with serosanguinous drainage.  : Luis in place draining clear yellow urine.  Extremities: well perfused. Generalized trace edema.  Neurologic: Sedated.   Access: PICC, PIV    Data:   CMP    Recent Labs  Lab 09/01/18  0339 08/31/18  0414 08/30/18  1412  08/27/18  1858   * 144 143  < > 141   POTASSIUM 3.9 4.2 3.3*  < > 3.0*   CHLORIDE 113* 113*  --   < > 108   CO2 19* 20  --   < > 21   * 132* 97  < > 113*   * 94*  --   < > 79*   CR 1.96* 1.94*  --   < > 1.64*   GFRESTIMATED 37* 38*  --   < > 46*   GFRESTBLACK 45* 45*  --   < > 55*   JESUS 9.0 8.3*  --   < > 6.9*   ICAW  --   --  5.1  --  4.6   MAG 2.4* 2.4*  --   < >  --    PHOS 2.8 2.3*  --   < >  --    ALBUMIN 3.0* 2.0*  --   < > 2.1*   BILITOTAL 6.8* 5.3*  --   < > 3.8*   ALKPHOS 518* 412*  --   < > 94   AST 23 22  --   < > 19   ALT 15 12  --   < > 13   < > = values in this interval not displayed.  CBC    Recent Labs  Lab 09/01/18 0339 08/31/18  1608   HGB 8.3* 8.4*   WBC 6.8 7.5   PLT 28* 24*     COAGS    Recent Labs  Lab 09/01/18  0339 08/31/18  1608  08/27/18  1858   INR 2.57* 2.75*  < > 2.81*   PTT  --   --   --  47*   < > = values in this interval not displayed.   Urinalysis  Recent Labs   Lab Test  07/27/18   2330  07/13/18   1315  06/18/18   1200   COLOR  Yellow  Dark Yellow  Yellow   APPEARANCE  Clear  Clear  Clear   URINEGLC  Negative  Negative  Negative   URINEBILI  Negative  Large*  Small*   URINEKETONE  Negative  Negative  Negative   SG  1.008  1.013  1.008   UBLD  Negative  Negative  Negative   URINEPH  7.5*  6.0  5.0   PROTEIN  30*  10*  Negative   NITRITE  Negative  Negative  Negative    LEUKEST  Negative  Negative  Negative   RBCU  5*  <1  1   WBCU  3  None  2   UTPG   --    --   0.75*     Virology:  Hepatitis C Antibody   Date Value Ref Range Status   07/20/2018 Nonreactive NR^Nonreactive Final     Comment:     Assay performance characteristics have not been established for newborns,   infants, and children         POD 14 US liver:  1.  The left portal vein is antegrade with decreased velocity,  measuring approximately 15 cm/second. This vessel was occluded on CT  7/30/2018.  2.  Elevated velocities of the main hepatic artery, now 203 cm/sec  (previously 94 cm/sec). However left hepatic arteries demonstrates  steep upstroke suggesting there is not a significant stenosis.   2a. Right hepatic artery was not visualized, could be occluded or  difficult to see postoperatively.  3. Echogenic focus of the right lobe in close proximity to the  hepatorenal fossa. This likely corresponds with nonenhancing lesion on  CT 7/30/2018 and likely representing subcapsular hematoma.  4. Additional small hematomas of the braden hepatis and posterior right  perihepatic region.  5. Small volume anechoic ascites.  6. Left hepatic lobe not well visualized secondary to open wound  preventing imaging. Note the left lobe was grossly abnormal on CT  7/30/2018    POD 1 US liver: Impression:   1.  Hematoma associated with the inferior aspect of the perihepatic  space anteriorly, measuring 13.7 x 7.0 x 8.5 cm.  2.  Retrograde flow of the left portal vein. Additionally, low  velocities of the portal venous system. Single low resistive index of  the extrahepatic aspect of the hepatic artery, measuring 0.37. These  findings are likely within normal limits in the early postoperative  context. However, continued attention on follow-up recommended.    7/30 CT scan abd/pelvis:  IMPRESSION:   1. Hypoenhancing areas in the transplant liver concerning for  infarction. Transplant left portal vein occlusion with bubbles of  portal venous  gas. Nearly occlusive filling defect in the native  hepatic artery. Narrowed and irregular appearance of the transplant  hepatic artery. Focal occlusion of the left main hepatic artery.  Segmental occlusions of right hepatic arterial branches.   2. Pneumatosis intestinalis with nonocclusive filling defects in  superior mesenteric vein branches, concerning for bowel ischemia or  infarction.   3. Splenic infarctions.   4. Bibasilar consolidation with air bronchograms. Pneumonia cannot be  excluded.   5. Post surgical changes of evacuation of peritransplant hematoma.

## 2018-09-01 NOTE — PLAN OF CARE
Problem: Patient Care Overview  Goal: Plan of Care/Patient Progress Review  Outcome: Improving  I/A:  precedex currently 0.3 rass goal -2. Follows simple commands PERRLA  SR 80s-90s; BP WNL-remained off pressors; afebrile; dependent edema  PS 7/5 for 2 hrs this PM. Tolerated well. CMV settings 400/18/5/40%; Thick yellow secretions ETT. Lungs coarse  NPO/ TPN; + BM this shift; hypoactive BS  UOP adequate via bah cath  BG well controlled on insulin gtt  Hep gtt remains flat rate 400 units/hr  Electros replaced per protocol  No acute issues    Please see flowsheets/MAR for further assessments/interventions    P: Continue to monitor pt and notify MD of any acute changes. Possibly to OR today for washout and wound vac change    Tracy Simms RN

## 2018-09-01 NOTE — PLAN OF CARE
Problem: Patient Care Overview  Goal: Plan of Care/Patient Progress Review  Outcome: No Change  I: Order for Levophed changed to maintain MAP>60.   A: Sedated on Precedex. PRN Dilaudid for pain. RASS -2. Follows simple commands. Vent settings unchanged. Remained on CMV 18/400/5/40%. PS 7/5 for 1.5 hrs. Thick yellow secretions suctioned from ETT. Fair cough. Coarse and diminished in bases. Sinus rhythm. Titrated Levophed to Maintain MAP>60. Currently off. Platelets currently infusing. Plt level was 24 at 1600. Scheduled Albumin ordered. Adequate UOP. NPO. TF remained held. TPN infusing. WOC consult placed for documented pre-existing pressure ulcer in OR. It was decided by WOC and RN that pressure ulcer did not exist and was therefore deleted from chart. Insulin gtt titrated to maintain goal.      P: Likely return to OR tomorrow for a washout and wound vac change. Will continue to hold TF for approx a week longer.  Care conference planned for Tuesday. Notify SICU with any questions/concerns.

## 2018-09-01 NOTE — PROGRESS NOTES
[facility]  Transplant Infectious Disease Progress Note      Patient:  Yaneli Miles, Date of birth 1975, Medical record number [id number] Date of Visit:  08/31/2018         Assessment and Recommendations:   Recommendations:  - Continue ceftaroline and daptomycin dose adjusted for renal function.   - Continuing tigecycline, fluconazole  - Agree with change from meropenem to ertapenem for ESBL E.coli  - Continue tenofovir and vGCV  - duration of antibiotics undetermined at this time, pending clinical course and further operative plan     Dr. Darby will be rounding for transplant ID over the weekend. Dr. Snyder will resume the service on Monday. Please call with questions.      Assessment: 43 year old male Hep B cirrhosis s/pOLT on 7/21 whose post-op course was complicated hemorrhagic shock, portal vein and hepatic artery occlusion, liver infarct w/ pneumotosis intestinalis w/ non-occlusive filling defects in SMV branches w/ small bowel ischemia on CT a/p 7/30/18, repeat scans 8/2018 w/ bowel ischemia and fabi perforation s/p ex lap 8/18/18 w/ bowel resection, closure of fascia and persistent VRE bacteremia. Splenic infarcts too. Extubated 8/22. Re-intubated on 8/27 w/ hypotensions refractory to volume, s/p ex lab on 8/27 w/ anastomosis dehiscence and bowel perf s/p mesh repair and wound vac in place. Continues on minimal vent settings. Last OR on 8/30 with ongoing growth of GPCs, likely VRE, from operative cultures.    ID issues:  - severe sepsis on 8/27 w/ leukocytosis up to 30+ s/p wash out OR as described above from processes above and below.     - Hepatic abscess per 8/15/2018 CT imaging; bowel perforation and feculant peritonitis at 8/17/2018 laparotomy. One of the sources of continuing VRE bacteremia, on ceftaroline, tigecycline (DC'd 8/23) , and daptomycin. Daptomycin SHARMIN initially 2 on first positive blood cultures.  Continue flagyl for coverage of anaerobic bowel bacterial  kenneth. On micafungin for Candida albicans growing in 8/17/2018 peritoneal fluid but can narrow to azole therapy.  As of most recent OR on 8/18, remaining bowel looked viable and fascia was closed. Operative cx's from 8/27 growing ESBL E. coli, yeast and VRE and 8/30 (back to OR, wash out,  Mesh placed) operative cx's again growing GPCs in pains/chains.     - VRE bacteremia was persistent , now cleared:. Last positive culture from 8/17/2018, now negative BCx's from 8/19-present. He is on combination therapy with daptomycin, ceftaroline and tigecycline, as described in In vitro activity of daptomycin in combination with ß-lactams, gentamicin, rifampin, and tigecycline against daptomycin-nonsusceptible enterococci, where there was an apparent synergistic effect to combining the two. In the event that daptomycin is inactivated by surfactant in the lungs by an occult pulmonary process, tigecycline was added to his regimen but no evidence for pulmonary process with clear CXR and minimal vent support.  Tigecycline restarted with new sepsis episode and new perforation.    - Chronic hepatitis B: On tenofovir    Other ID issues:  -  Prophylaxis:TMP/SMX, vGCV, micafungin  - Viral serostatus D/R & prophylaxis: HCV negative/reactive, HBVnegative/reactive, CMV -/+, EBV+/+, valganciclovir ppx  - Gamma globulin status: Never checked.  - Isolation status: Contact, Good hand hygiene.    Staffed with Dr. Momin.    Sofya Jimenez MD, PhD  Adult & Pediatric Infectious Diseases Fellow PGY7, [facility]  Pager: [phone]    Attestation:  I have reviewed today's vital signs, medications, labs and imaging.      Floor time: 25 minutes, Face-to-face time: 10 minutes, Total time: 35 minutes  Faisal Momin,   Pager [phone]  Yaneli Miles was seen in the hospital by Faisal Momin on August 31st with Dr. Jimenez.  I reviewed the history & exam. Assessment and plan were jointly made.  I agree with and have edited the fellow's note and  plan of care.  Faisal Momin MD.         Interval History:   Last seen on 8/30.  Last washout with abdominal wall closure with permacol on 8/30.  Operative cultures growing GPCs in pairs/chains, likely VRE.  Possible OR again tomorrow.  Remains mechanically ventilated on minimal settings.  Abthera in place over abdominal wound.  Remains on TPN for nutrition and planning for NPO for 1 week.  Briefly on norepi around OR yesterday but now off again.  No new rashes.    Transplants:  7/21/2018 (Liver), Postoperative day:  41.  Coordinator Kay Reyes    Review of Systems:   Unable to obtain review of systems due to intubation and sedation.         Current Medications & Allergies:       albumin human  25 g Intravenous Q8H     ceftaroline (TEFLARO) intermittent infusion  300 mg Intravenous Q12H     DAPTOmycin (CUBICIN) intermittent infusion  12 mg/kg Intravenous Q48H     ertapenem (INVanz) IV  500 mg Intravenous Q24H     fluconazole  200 mg Intravenous Q24H     heparin lock flush  5-10 mL Intracatheter Q24H     lipids 4 oil  250 mL Intravenous Once per day on Mon Wed Fri     nystatin  500,000 Units Mouth/Throat 4x Daily     pantoprazole (PROTONIX) IV  40 mg Intravenous BID     potassium chloride  20 mEq Intravenous Once     sodium chloride (PF)  3 mL Intravenous Q8H     sulfamethoxazole-trimethoprim  10 mL Per Feeding Tube Once per day on Mon Wed Fri     tacrolimus  0.75 mg Oral or Feeding Tube BID IS     tenofovir  300 mg Per Feeding Tube Q72H     tigecycline (TYGACIL) intermittent infusion  50 mg Intravenous Q12H     valGANciclovir  450 mg Oral Once per day on Mon Thu    Or     valGANciclovir  450 mg Oral or NG Tube Once per day on Mon Thu       Infusions/Drips:    dexmedetomidine 0.3 mcg/kg/hr (08/31/18 2000)     IV fluid REPLACEMENT ONLY       heparin 400 Units/hr (08/31/18 1900)     insulin (regular) 1 Units/hr (08/31/18 2002)     lactated ringers 10 mL/hr at 08/31/18 0811     norepinephrine Stopped (08/31/18 1542)      parenteral nutrition - ADULT compounded formula 45 mL/hr at 08/31/18 1951     Reason beta blocker order not selected         Allergies   Allergen Reactions     Nsaids      Contraindicated             Physical Exam:   Vitals were reviewed.  Ranges for vital signs:  Temp:  [97  F (36.1  C)-98.6  F (37  C)] 97.3  F (36.3  C)  Heart Rate:  [75-90] 82  Resp:  [18-27] 25  MAP:  [53 mmHg-84 mmHg] 82 mmHg  Arterial Line BP: ()/(41-63) 110/62  FiO2 (%):  [40 %] 40 %  SpO2:  [99 %-100 %] 100 %  Vitals:    08/28/18 2104 08/30/18 0000 08/31/18 0400   Weight: 65.7 kg (144 lb 13.5 oz) 66.8 kg (147 lb 4.3 oz) 67.1 kg (147 lb 14.9 oz)       Physical Examination:   GENERAL:  Sedated on the vent, FiO2 50% w/ 5 PEEP  EYES:  Icteric sclera  ENT:  NG in place.  LUNGS:  Good air entry. Coarse breath sounds.  CARDIOVASCULAR:  tachycardic regular rate and rhythm, no murmur   ABDOMEN:  Hypoactive bowel sounds.  Wound vac in place w/ serosang fluid in the drain.    : Luis in place in clear yellow urine.  EXT:lower extremity edema  SKIN:  No acute rashes.   Lines in place w/out e/e.          Laboratory Data:     Metabolic Studies       Recent Labs   Lab Test  08/31/18   1608  08/31/18   0414   08/30/18   1412   08/30/18   0357   08/29/18   0448   08/22/18   0424   07/13/18   2050   07/13/18   0334   NA   --   144   --   143   --   141   --   139   < >  140   < >   --    < >  132*   POTASSIUM   --   4.2   --   3.3*   --   3.2*   --   4.0   < >  3.5   < >   --    < >  2.6*   CHLORIDE   --   113*   --    --    --   110*   --   108   < >  108   < >   --    < >  101   CO2   --   20   --    --    --   21   --   21   < >  25   < >   --    < >  20   ANIONGAP   --   12   --    --    --   11   --   10   < >  7   < >   --    < >  12   BUN   --   94*   --    --    --   88*   --   82*   < >  43*   < >   --    < >  30   CR   --   1.94*   --    --    --   1.87*   --   1.87*   < >  0.73   < >   --    < >  2.08*   GFRESTIMATED   --   38*   --    --     --   39*   --   39*   < >  >90   < >   --    < >  35*   GLC   --   132*   --   97   --   144*   --   123*   < >  142*   < >   --    < >  104*   A1C   --    --    --    --    --    --    --    --    --    --    --   5.4   --    --    JESUS   --   8.3*   --    --    --   8.3*   --   8.0*   < >  6.6*   < >   --    < >  8.1*   PHOS   --   2.3*   --    --    --   2.3*   --   3.2   < >  3.9   < >   --    --    --    MAG   --   2.4*   --    --    --   2.5*   --   2.4*   < >  2.2   < >   --    --    --    LACT  1.3  1.7   < >   --    < >  1.8   < >   --    < >  0.9   < >  3.9*   --   1.1   PCAL   --    --    --    --    --    --    --    --    --    --    --    --    --   0.89   CKT   --    --    --    --    --    --    --   28*   --   44   < >   --    --    --     < > = values in this interval not displayed.       Hepatic Studies    Recent Labs   Lab Test  08/31/18   0414  08/30/18   0357  08/29/18   0448   07/13/18   0334   06/21/18   0612   BILITOTAL  5.3*  5.1*  3.7*   < >  36.3*   < >  23.4*   DBIL  4.3*  3.9*  2.9*   < >  28.1*   < >   --    ALKPHOS  412*  274*  158*   < >  96   < >  78   PROTTOTAL  4.5*  5.0*  4.7*   < >  5.8*   < >  5.1*   ALBUMIN  2.0*  2.4*  2.4*   < >  2.8*   < >  2.9*   AST  22  16  18   < >  287*   < >  124*   ALT  12  16  15   < >  125*   < >  79*   LDH   --    --    --    --   259*   --   177    < > = values in this interval not displayed.       Pancreatitis testing    Recent Labs   Lab Test  08/20/18   0443   07/22/18   0354  07/20/18   1741  07/12/18   1913   06/16/18   1815   AMYLASE   --    --   42  60  35   --    --    LIPASE   --    --   75   --    --    --   260   TRIG  40   < >   --    --    --    < >   --     < > = values in this interval not displayed.       Hematology Studies      Recent Labs   Lab Test  08/31/18   1608  08/31/18   0414  08/30/18   1631   08/30/18   0940  08/30/18   0357  08/29/18   2204   08/23/18   0916  08/23/18   0352   WBC  7.5  8.6  9.1   --   8.5  9.3  9.9    < >  14.1*  12.1*   ANEU   --    --    --    --    --    --    --    --   13.3*  11.4*   ALYM   --    --    --    --    --    --    --    --   0.5*  0.5*   MARCELA   --    --    --    --    --    --    --    --   0.3  0.2   AEOS   --    --    --    --    --    --    --    --   0.0  0.0   HGB  8.4*  8.4*  9.0*   < >  8.6*  9.0*  9.0*   < >  8.6*  6.9*   HCT  25.6*  25.5*  26.4*   --   26.2*  27.0*  26.4*   < >  24.8*  19.8*   PLT  24*  31*  34*   --   34*  36*  38*   < >  41*  40*    < > = values in this interval not displayed.       Clotting Studies    Recent Labs   Lab Test  08/31/18   1608  08/31/18   0414  08/30/18   1631  08/30/18   0940   08/27/18   1858   INR  2.75*  2.62*  2.33*  2.46*   < >  2.81*   PTT   --    --    --    --    --   47*    < > = values in this interval not displayed.       Arterial Blood Gas Testing    Recent Labs   Lab Test  08/30/18   1412  08/28/18   0628  08/27/18   2155  08/27/18   1858  08/21/18   1332   PH  7.33*  7.30*  7.31*  7.23*  7.40   PCO2  41  32*  35  44  40   PO2  69*  100  127*  132*  163*   HCO3  22  16*  18*  19*  25   O2PER  51.0  50.0  50  60  40.0        Urine Studies     Recent Labs   Lab Test  07/27/18   2330  07/13/18   1315  06/18/18   1200  06/16/18   2220   URINEPH  7.5*  6.0  5.0  5.5   NITRITE  Negative  Negative  Negative  Negative   LEUKEST  Negative  Negative  Negative  Large*   WBCU  3  None  2  66*       Body fluid stats    Recent Labs   Lab Test  08/30/18   1436   07/15/18   1115  07/13/18   1240  06/28/18   1111   FTYP   --    --   Ascites  Ascites  Ascites   FCOL   --    --   Bloody  Red  Yellow   FAPR   --    --   Cloudy  Cloudy  Slightly Cloudy   FWBC   --    --   245  373  234   FNEU   --    --   3  28  6   FLYM   --    --   19 11  24   FMONO   --    --   78   --    --    FALB   --    --    --   1.0   --    FTP   --    --    --   1.6   --    GS  Rare  Gram positive cocci  *  Moderate  WBC'S seen  Moderate  PMNs seen     < >  No organisms seen   Few  WBC'S seen  predominantly mononuclear cells    No organisms seen  Few  WBC'S seen  PMNs seen    Many  Red blood cells seen     --     < > = values in this interval not displayed.       Microbiology:    Blood   6/17/18 negative   6/18/18 negative   6/21/18 negative   7/27/18 VRE   7/29/18 catheter tip Staphylococcus epidermidis   7/29/18 VRE   7/30/18 VRE   7/31/18 VRE   8/1/18 VRE, ESBL E. Coli   8/2/18 negative   8/4/18 negative   8/6/18 VRE   8/7/18 VRE   8/8/18 VRE   8/9/18 VRE   8/11/18 VRE   8/12/18 VRE   8/13/18 VRE   8/14/18 VRE   8/15/18 VRE   8/17/18 VRE   8/19/18 NGTD   8/21 NGTD   8/22 negative   8/23/18 negative   8/25 negative   8/27 NGTD   8/29 NGTD   8/31/18 NGTD     Fluid/Tissue   6/16/18 ascites negative   6/25/18 ascites negative   6/28/18 ascites negative   7/13/18 ascites negative   7/21/18 ascites negative   7/30/18 abdominal fluid VRE   8/3/18 intra-abdominal hematoma tissue VRE   8/13/18 Abdominal fluid VRE   8/17/18 Peritoneal fluid VRE, Candida albicans/dubliniensis   8/18/18 Hematoma fluid VRE, C. Albicans / dubliniensis   8/27/18: ESBL E. Coli x2, E. Faecium (VRE), Candida albicans/dubliniensis   8/30/18: GPCs in pairs and chains    Urine  6/16/18 Enterococcus faecalis    VRE rectal culure   7/13/18  positive    Respiratory  6/17/18 sputum single colony Citrobacter freundii, light growth Candida albicans/dubliniensis    Imaging:  AXR: 8/30/18:   1. Postoperative changes. Surgical clips in the right thoracoabdominal  region. Biliary stent in place. Luis catheter in place.  2. No findings to concern for missing surgical items.  3. NG/OG tube with its tip projecting over the proximal stomach and  sidehole projecting over the GE junction. Recommend advancement of  approximately 8 cm.    CXR: 8/28/18:   1.  Endotracheal tube tip now 3 cm above the tano. Temperature probe tip in the upper esophagus.  2.  Unchanged left lower lobe atelectasis.    Liver U/S: 8/17/18:   1.  Transplant  liver with patent vasculature.  2.  Decreased resistive indices in the hepatic artery (0.3-0.4), highly concerning for upstream stenosis.  3.  Stable appearance of perihepatic fluid collection near the braden hepatis. Other previously described fluid collections on 8/13/2018 evaluation were not seen on this exam.  4.  Hyperechoic round lesion in the right lateral lobe, also seen on comparison CT, likely represents hemangioma.  5.  Right pleural effusion.    CT a/p w/ contrast: 8/15/18:   1.  Findings concerning for hepatic infarct involving the majority of the left hepatic lobe with associated complex fluid collection along inferior aspect, measuring up to 8.9 x 4.6 cm. The fluid collection likely represents evolving hematoma. Superimposed infection cannot be completely excluded in this patient with leukocytosis, although thought less likely based on evolving nature of this process since 7/30/2018 and sonographic appearance from ultrasound exams dated 7/30/2018 and 8/13/2018.   2.  Continued diffuse dilation of the small bowel transition point in the right lower quadrant (series 5 image [id number]). Findings concerning for small bowel obstruction.  3.  Postsurgical changes of abdominal wall abscess/hematoma evacuation with new soft tissue defect over the right upper quadrant and packing in place. There is surrounding postoperative hematoma.  4.  Unchanged biliary dilation, left lobe dominant, with a right-sided biliary stent in place.

## 2018-09-01 NOTE — PROGRESS NOTES
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 He returned to the SICU for shock, severe lactic acidosis, renal failure and respiratory failure, requiring intubation and dialysis.  Ischemic injury to left hemiliver and non-occlusive mesenteric ischemia.  He underwent a laparotomy and several subsequent take-backs to examine ischemic bowel, on 8/6 he underwent resection of necrotic jejunum with a primary anastomosis, and his abdomen was closed 8/8. He acutely decompensated beginning the night of 8/16, with increasing pressor needs, increasing abdominal pain and lactic acidosis, now s/p exploratory laparotomy 8/17 and s/p entero-entero small bowel anastomosis, drain placement and fascial closure 8/18. Improving renal function has improved (off dialysis). Take back to OR on 8/27/18 for hematochezia and found to have anastomotic dehiscence and perforated bowel near site.  Returned to OR 8/30 for washout and replacement of mesh.     CHANGES FOR TODAY/MAJOR THINGS:   - PST, wean vent  - Possibly OR today for washout & wound vac change  - Monitor & give platelets as needed  - Advance ETT    PLAN:    Neuro/ pain/ sedation:  # acute pain  - PRN IV dilaudid  - Precedex infusion for sedation     Pulmonary care:   # Acute respiratory failure  - extubated (8/22), re intubated for OR 8/24/18. PST  - HOB elevation. Vent bundle.   - CMV 18/400/5/40    Cardiovascular:    # Hypotension- hypovolemic vs. hemorrhagic  # Junctional rhythm on 8/22 EKG, unclear cause, resolved 8/24- no additional episodes.   - Discontinue Precedex if bradycardia develops. MAP goals > 60. Levophed weaned off.  - TTE 8/8 showed no vegetations or valve abnormalities  - TOÑO 8/10 no evidence of endocarditis   - EKG (8/23): junctional bradycardia   - EKG (8/24):  Normal sinus rhythm      Gastroenterolgy   # DDLT 7/21/18  # Intrahepatic left portal venous thrombosis, infarction of left liver lobe, patchy infarction of right lobe, suspected hepatic arterial thrombosis  # 7/30/18 s/p exploratory laparotomy and liver biopsy  # Splenic infarctions, patchy bowel ischemia  # Ex Lap 8/6 - resection of 90 cm of necrotic jejunum  # Ex Lap 8/8 - abdomen closed  # Ex Lap 8/17 - Found to have widespread bowel ischemia requiring bowel resection.  Left in discontinuity.  # Ex Lap 8/18 - entero-entero anastomosis and facial closure.  Approximately  cm small bowel remaining with intact ileocecal valve and colon.  # 8/27 s/p abdomen washout primary anastomosis closure with abthera placement (found to have anastomotic dehiscence, perf bowel near anastomotic site)--pt left open  # GIB- hematochezia  - Two episodes of bowel ischemia of unclear etiology requiring resection of the majority of small bowel ,approx  cm of small bowel remaining.   - Luminal GI consulted for hematochezia but discovered to be due to bowel ischemia/perforation-- now signed off   - ON TPN only- RD and Pharm D managing.   - Wound vac changes q3 days per transplant (next 9/1)      Fluids/ Electrolytes/ Nutrition:   # Protein calorie deficit malnutrition   # Hypocalcemia, replaced.   # hypomagnesia, replaced   # Short gut syndrome,  cm of small bowel remaining per notes.   - NJ removed 8/27/18 during OR. Hold off NG feeds x1 week.  - continue with TPN only.     Renal/ Fluid Balance:    # BEN -recovering  # Metabolic acidosis 2/2 lactic acidosis--lactic acid now normal   - CRRT stopped 8/23. UOP improving. Continues to make urine.  Total 1L/24 hours of urine output.   - Luis in place for strict I's and O's  - Albumin prn      Endocrine:    # DM II  - Continue insulin gtt for now      ID/ Antibiotics:  # VRE Bacteremia  # ESBL Bacteremia  # Hepatitis B  # Continued need for ongoing ABX.   - Appreciate  transplant ID  following for now. NEW cultures form OR 8/27 with ESBL e. Coli, VRE, yeast, GPC-- discontinue flagyl and start meropenem    - s/p Meropenem (8/2-8/14), discontinued after 12 days of negative cultures for ESBL. Meropenem resumed 8/30 d/t intraabdominal ESBL, changed to Ertapenem.   - s/P Amikacin (8/17 - 8/21), discontinued as he had completed 2-week course of meropenem and has other active agents for G- coverage  - Flagyl (8/17 - 8/30/18)     Current ABx: KEEP all below for now, duration to be determined based on improvement.   - Daptomycin (7/30- ), ceftaroline (8/13- ), continue until 9/2 per Transplant ID  - Micafungin (7/30-8/22) changed to fluconazole (8/22- )  - Tigecycline (8/13-8/23), discontinued after 4 days of negative blood cultures. Re-started 8/28/17  - Meropenem restarted 8/30/18, changed to ertapenem for ESBL E. coli  - Immunosuppression: (restarted 8/6) tacrolimus  - Immunosuppression prophylaxis: Valgancyclovir and Tenofovir, nystatin when micafungin stopped.  Bactrim stopped       Cultures   - 7/27 VRE (line/arm)   - 7/29: VRE (left arm)   - 7/30: VRE (left arm)   - 7/31: VRE (Line and peripheral)  - 8/1: ESBL (HD/hand/central line)   - 8/3: VRE (intra-abdominal tissue)  - 8/4: NGTD (blood)  - 8/5: NGTD (blood)  - 8/6: VRE (art line)  - 8/7: VRE (blood)  - 8/8: VRE (blood)  - 8/9: VRE (blood)  - 8/11: VRE (blood)  - 8/12: VRE (abdominal fluid)  - 8/13: VRE (blood)  - 8/14: VRE (blood)  - 8/15: VRE (blood)  - 8/17: NGTD (blood)   - 8/17: VRE & Candida (peritoneal fluid)   - 8/18: G+ cocci (tissue)  - 8/19 NGTD (blood)  - 8/21 NGTD (blood)  - 8/22 NGTD (blood)  - 8/23 NGTD (blood)  - 8/24 NGTD (blood)  -8/27 FLUID: VRE, ESBL E coli x2, yeast, GPC       Heme:     # Acute blood loss anemia  # Anemia of critical illness  # Portal venous thrombosis  # Thrombocytopenia  # Coagulopathy   # Anti thrombin III deficiency  - Heparin at 400 straight rate  - Hold aspirin 81mg for continued  bleeding  - Monitor CBC and INR every 12 hours     MSK:  # weakness and deconditioning of critical illness   - PT/OT consulted. Passive and active ROM while abthera device in place.      General cares and Prophylaxis:    - DVT: Heparin--400units/hr, SCD's  - GI: protonix 40 mg IV BID       Lines/ tubes/ drains:  - NG  - PIVs.   - PICC line   - right femoral arterial line    - Abthera device (CAROL's removed during case 8/27/18)    Dispo:  - SICU. Care conference planned for Tuesday.    ====================================    SUBJECTIVE: Low platelets 28. Vitals stable, remains off pressors. No interventions at this time.    OBJECTIVE:   1. VITAL SIGNS:   Temp:  [97  F (36.1  C)-99.1  F (37.3  C)] 99.1  F (37.3  C)  Heart Rate:  [] 105  Resp:  [18-26] 24  BP: (103)/(70) 103/70  MAP:  [55 mmHg-86 mmHg] 82 mmHg  Arterial Line BP: ()/(41-68) 115/62  FiO2 (%):  [40 %] 40 %  SpO2:  [97 %-100 %] 100 %  Ventilation Mode: CMV/AC  (Continuous Mandatory Ventilation/ Assist Control)  FiO2 (%): 40 %  Rate Set (breaths/minute): 18 breaths/min  Tidal Volume Set (mL): 400 mL  PEEP (cm H2O): 5 cmH2O  Pressure Support (cm H2O): 7 cmH2O  Oxygen Concentration (%): 40 %  Resp: 24    2. INTAKE/ OUTPUT:   I/O last 3 completed shifts:  In: 3120.99 [I.V.:1375.99; NG/GT:210]  Out: 2245 [Urine:1695; Emesis/NG output:150; Drains:400]    3. PHYSICAL EXAMINATION:   General: laying in bed, sedated, intubated  HEENT: pupils 3mm. ETT present and secured. NG present with bilious output.    Pulm/Resp: Nonlabored breathing on ventilator.   CV: RRR  Abdomen: abthera device in place, suction intact with serosanguinous output. Tenderness along incision site.   : (+) bah catheter in place  Extremities: generalized peripheral edema, extremities well perfused     4. INVESTIGATIONS:   Arterial Blood Gases     Recent Labs  Lab 08/30/18  1412 08/28/18  0628 08/27/18  2155 08/27/18  1858   PH 7.33* 7.30* 7.31* 7.23*   PCO2 41 32* 35 44   PO2 69*  100 127* 132*   HCO3 22 16* 18* 19*     Complete Blood Count     Recent Labs  Lab 09/01/18 0339 08/31/18  1608 08/31/18 0414 08/30/18  1631   WBC 6.8 7.5 8.6 9.1   HGB 8.3* 8.4* 8.4* 9.0*   PLT 28* 24* 31* 34*     Basic Metabolic Panel    Recent Labs  Lab 09/01/18 0339 08/31/18 0414 08/30/18  1412 08/30/18  0357 08/29/18  0448   * 144 143 141 139   POTASSIUM 3.9 4.2 3.3* 3.2* 4.0   CHLORIDE 113* 113*  --  110* 108   CO2 19* 20  --  21 21   * 94*  --  88* 82*   CR 1.96* 1.94*  --  1.87* 1.87*   * 132* 97 144* 123*     Liver Function Tests    Recent Labs  Lab 09/01/18 0339 08/31/18 1608 08/31/18 0414 08/30/18  1631 08/30/18  0357 08/29/18  0448   AST 23  --  22  --   --  16  --  18   ALT 15  --  12  --   --  16  --  15   ALKPHOS 518*  --  412*  --   --  274*  --  158*   BILITOTAL 6.8*  --  5.3*  --   --  5.1*  --  3.7*   ALBUMIN 3.0*  --  2.0*  --   --  2.4*  --  2.4*   INR 2.57* 2.75* 2.62* 2.33*  < > 2.39*  < > 2.43*   < > = values in this interval not displayed.  Pancreatic Enzymes  No lab results found in last 7 days.  Coagulation Profile    Recent Labs  Lab 09/01/18 0339 08/31/18 1608 08/31/18 0414 08/30/18  1631 08/27/18  1858   INR 2.57* 2.75* 2.62* 2.33*  < > 2.81*   PTT  --   --   --   --   --  47*   < > = values in this interval not displayed.      5. RADIOLOGY:   Recent Results (from the past 24 hour(s))   XR Chest Port 1 View    Narrative    EXAM: XR CHEST PORT 1 VW  9/1/2018 4:02 AM      HISTORY: eval for effusions;     COMPARISON: Chest radiograph 8/28/2018    FINDINGS:     Single view of the chest. Interval advancement with temperature probe  in the low esophagus. Stable right PICC, enteric tube. Endotracheal  tube tip projects at the thoracic inlet.    The cardiomediastinal silhouette is within normal limits. Slight  increased perihilar and right basilar pulmonary opacities.    Increased small bilateral pleural effusions and overlying  atelectasis/consolidation. No  pneumothorax.       Impression    IMPRESSION:   1.  Endotracheal tube tip projects at the thoracic inlet. Recommend  advancement.  2.  Increased perihilar and bibasilar pulmonary edema/infection.  3.  Increased small bilateral pleural effusions and overlying  atelectasis/consolidation.    I have personally reviewed the examination and initial interpretation  and I agree with the findings.    MIR DURAN MD       =========================================    Discussed with SICU staff, Dr. Feliciano.    Merlin Campuzano MD  Surgery Resident PGY1

## 2018-09-01 NOTE — PROVIDER NOTIFICATION
D/w KARLA Gale pt plts 28 this AM. Were 24 yesterday. vitals stable. Remains off pressors overnight. No new orders at this time.

## 2018-09-02 NOTE — PLAN OF CARE
Problem: Patient Care Overview  Goal: Plan of Care/Patient Progress Review  D/I: Pt lethargic, does follow commands. PERRLA. Up to chair with lift today. SR, no BP issues. Off pressors. Minimal vent settings. PS x 2 hours today, tolerated well but because to decompensate towards end. 2 BMs today. Luis with good UOP. Tolerating TPN. No new skin issues.  A/P: Monitor. Attempt extubation in near future.

## 2018-09-02 NOTE — PLAN OF CARE
Problem: Patient Care Overview  Goal: Plan of Care/Patient Progress Review  Discharge Planner OT    4A  Patient plan for discharge: Not discussed at this time  Current status: Pt able to open eyes upon command and is visually tracking within peripheral vision but not initiating cervical rotation to continue tracking. Nystagmus noted in both eyes. Pt participated in BUE/LE ROM in all planes with AAROM ~25% of the time and PROM ~75% of the time. Pt required continuous v/c to initiate movement with AAROM. Pt able to resist dorsiflexion resistance which demonstrates good command following and LE strength. Pt total A x3 for log roll and positioning of sling. Pt total A for lift to chair.   Barriers to return to prior living situation: medical status, cognition, abdominal precautions, weakness, very limited activity tolerance  Recommendations for discharge: TCU  Rationale for recommendations: Pt to benefit from additional skilled OT intervention to progress activity tolerance and maximize ADL/IADL IND and safety.        Entered by: Katja Golden 09/02/2018 10:44 AM

## 2018-09-02 NOTE — PROGRESS NOTES
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 He returned to the SICU for shock, severe lactic acidosis, renal failure and respiratory failure, requiring intubation and dialysis.  Ischemic injury to left hemiliver and non-occlusive mesenteric ischemia.  He underwent a laparotomy and several subsequent take-backs to examine ischemic bowel, on 8/6 he underwent resection of necrotic jejunum with a primary anastomosis, and his abdomen was closed 8/8. He acutely decompensated beginning the night of 8/16, with increasing pressor needs, increasing abdominal pain and lactic acidosis, now s/p exploratory laparotomy 8/17 and s/p entero-entero small bowel anastomosis, drain placement and fascial closure 8/18. Improving renal function has improved (off dialysis). Take back to OR on 8/27/18 for hematochezia and found to have anastomotic dehiscence and perforated bowel near site.  Returned to OR 8/30 for washout and replacement of mesh.     CHANGES FOR TODAY/MAJOR THINGS:   - PST, wean vent, hold precedex  - Transfused 2U Plts this AM  - Monitor & give product as needed to meet goals    PLAN:    Neuro/ pain/ sedation:  # acute pain  - PRN IV dilaudid  - Precedex infusion for sedation being held    Pulmonary care:   # Acute respiratory failure  - extubated (8/22), re intubated for OR 8/24/18. PST  - HOB elevation. Vent bundle.   - CMV 18/400/5/40  - PST with precedex off, hoping to extubate today if able.    Cardiovascular:    # Hypotension- hypovolemic vs. hemorrhagic  # Junctional rhythm on 8/22 EKG, unclear cause, resolved 8/24- no additional episodes.   - Discontinue Precedex if bradycardia develops. MAP goals > 60. Levophed weaned off.  - TTE 8/8 showed no vegetations or valve abnormalities  - TOÑO 8/10 no evidence of  endocarditis   - EKG (8/23): junctional bradycardia   - EKG (8/24): Normal sinus rhythm      Gastroenterolgy   # DDLT 7/21/18  # Intrahepatic left portal venous thrombosis, infarction of left liver lobe, patchy infarction of right lobe, suspected hepatic arterial thrombosis  # 7/30/18 s/p exploratory laparotomy and liver biopsy  # Splenic infarctions, patchy bowel ischemia  # Ex Lap 8/6 - resection of 90 cm of necrotic jejunum  # Ex Lap 8/8 - abdomen closed  # Ex Lap 8/17 - Found to have widespread bowel ischemia requiring bowel resection.  Left in discontinuity.  # Ex Lap 8/18 - entero-entero anastomosis and facial closure.  Approximately  cm small bowel remaining with intact ileocecal valve and colon.  # 8/27 s/p abdomen washout primary anastomosis closure with abthera placement (found to have anastomotic dehiscence, perf bowel near anastomotic site)--pt left open  # GIB- hematochezia  - Two episodes of bowel ischemia of unclear etiology requiring resection of the majority of small bowel ,approx  cm of small bowel remaining.   - Luminal GI consulted for hematochezia but discovered to be due to bowel ischemia/perforation-- now signed off   - ON TPN only- RD and Pharm D managing.   - Wound vac changes q3 days per transplant (next 9/1)      Fluids/ Electrolytes/ Nutrition:   # Protein calorie deficit malnutrition   # Hypocalcemia, replaced.   # hypomagnesia, replaced   # Short gut syndrome,  cm of small bowel remaining per notes.   - NJ removed 8/27/18 during OR. Hold off NG feeds x1 week.  - continue with TPN only.     Renal/ Fluid Balance:    # BEN -recovering  # Metabolic acidosis 2/2 lactic acidosis--lactic acid now normal   - CRRT stopped 8/23. UOP improving. Continues to make urine.  Total 1L/24 hours of urine output.   - Luis in place for strict I's and O's  - Albumin prn      Endocrine:    # DM II  - Continue insulin gtt for now      ID/ Antibiotics:  # VRE Bacteremia  # ESBL Bacteremia  #  Hepatitis B  # Continued need for ongoing ABX.   - Appreciate transplant ID  following for now. NEW cultures form OR 8/27 with ESBL e. Coli, VRE, yeast, GPC-- discontinue flagyl and start meropenem    - s/p Meropenem (8/2-8/14), discontinued after 12 days of negative cultures for ESBL. Meropenem resumed 8/30 d/t intraabdominal ESBL, changed to Ertapenem.   - s/p Amikacin (8/17 - 8/21), discontinued as he had completed 2-week course of meropenem and has other active agents for G- coverage  - Flagyl (8/17 - 8/30/18)     Current ABx: KEEP all below for now, duration to be determined based on improvement.   - Daptomycin (7/30- ), ceftaroline (8/13- ), continue until 9/2 per Transplant ID  - Micafungin (7/30-8/22) changed to fluconazole (8/22- )  - Tigecycline (8/13-8/23), discontinued after 4 days of negative blood cultures. Re-started 8/28/17  - Meropenem restarted 8/30/18, changed to ertapenem for ESBL E. coli  - Immunosuppression: (restarted 8/6) tacrolimus  - Immunosuppression prophylaxis: Valgancyclovir and Tenofovir, nystatin when micafungin stopped.  Bactrim stopped       Cultures   - 7/27 VRE (line/arm)   - 7/29: VRE (left arm)   - 7/30: VRE (left arm)   - 7/31: VRE (Line and peripheral)  - 8/1: ESBL (HD/hand/central line)   - 8/3: VRE (intra-abdominal tissue)  - 8/4: NGTD (blood)  - 8/5: NGTD (blood)  - 8/6: VRE (art line)  - 8/7: VRE (blood)  - 8/8: VRE (blood)  - 8/9: VRE (blood)  - 8/11: VRE (blood)  - 8/12: VRE (abdominal fluid)  - 8/13: VRE (blood)  - 8/14: VRE (blood)  - 8/15: VRE (blood)  - 8/17: NGTD (blood)   - 8/17: VRE & Candida (peritoneal fluid)   - 8/18: G+ cocci (tissue)  - 8/19 NGTD (blood)  - 8/21 NGTD (blood)  - 8/22 NGTD (blood)  - 8/23 NGTD (blood)  - 8/24 NGTD (blood)  -8/27 FLUID: VRE, ESBL E coli x2, yeast, GPC       Heme:     # Acute blood loss anemia  # Anemia of critical illness  # Portal venous thrombosis  # Thrombocytopenia  # Coagulopathy   # Anti thrombin III deficiency  - Heparin  at 400 straight rate  - Hold aspirin 81mg for continued bleeding  - Monitor CBC and INR every 12 hours     MSK:  # weakness and deconditioning of critical illness   - PT/OT consulted. Passive and active ROM while abthera device in place.      General cares and Prophylaxis:    - DVT: Heparin--400units/hr, SCD's  - GI: protonix 40 mg IV BID       Lines/ tubes/ drains:  - NG  - PIVs.   - PICC line   - right femoral arterial line    - Abthera device (CAROL's removed during case 8/27/18)    Dispo:  - SICU. Care conference planned for Tuesday.    ====================================    SUBJECTIVE: Low platelets 19, transfused 2U plts. Vitals stable, remains off pressors.    OBJECTIVE:   1. VITAL SIGNS:   Temp:  [97.5  F (36.4  C)-99.1  F (37.3  C)] 97.7  F (36.5  C)  Heart Rate:  [] 98  Resp:  [19-29] 24  BP: (105)/(73) 105/73  MAP:  [68 mmHg-92 mmHg] 74 mmHg  Arterial Line BP: ()/(53-72) 103/55  FiO2 (%):  [40 %] 40 %  SpO2:  [96 %-100 %] 99 %  Ventilation Mode: CMV/AC  (Continuous Mandatory Ventilation/ Assist Control)  FiO2 (%): 40 %  Rate Set (breaths/minute): 18 breaths/min  Tidal Volume Set (mL): 400 mL  PEEP (cm H2O): 5 cmH2O  Pressure Support (cm H2O): 7 cmH2O  Oxygen Concentration (%): 40 %  Resp: 24    2. INTAKE/ OUTPUT:   I/O last 3 completed shifts:  In: 2552.88 [I.V.:1402.88; NG/GT:70]  Out: 2090 [Urine:1715; Emesis/NG output:175; Drains:200]    3. PHYSICAL EXAMINATION:   General: lying in bed, sedated, intubated, lethargic  HEENT: ETT present and secured. NG present with bilious output.    Pulm/Resp: Nonlabored breathing on ventilator.   CV: RRR  Abdomen: abthera device in place, suction intact with serosanguinous output. Tenderness along incision site.   : (+) bah catheter in place  Extremities: generalized peripheral edema, extremities well perfused     4. INVESTIGATIONS:   Arterial Blood Gases     Recent Labs  Lab 08/30/18  1412 08/28/18  0628 08/27/18  2155 08/27/18  1858   PH 7.33* 7.30*  7.31* 7.23*   PCO2 41 32* 35 44   PO2 69* 100 127* 132*   HCO3 22 16* 18* 19*     Complete Blood Count     Recent Labs  Lab 09/02/18 0331 09/01/18 0339 08/31/18  1608 08/31/18  0414   WBC 6.6 6.8 7.5 8.6   HGB 8.5* 8.3* 8.4* 8.4*   PLT 19* 28* 24* 31*     Basic Metabolic Panel    Recent Labs  Lab 09/02/18 0331 09/01/18 0339 08/31/18  0414 08/30/18  1412 08/30/18  0357   * 146* 144 143 141   POTASSIUM 4.5 3.9 4.2 3.3* 3.2*   CHLORIDE 116* 113* 113*  --  110*   CO2 19* 19* 20  --  21   * 108* 94*  --  88*   CR 2.12* 1.96* 1.94*  --  1.87*   * 151* 132* 97 144*     Liver Function Tests    Recent Labs  Lab 09/02/18 0331 09/01/18 0339 08/31/18  1608 08/31/18  0414  08/30/18  0357   AST 25 23  --  22  --  16   ALT 13 15  --  12  --  16   ALKPHOS 704* 518*  --  412*  --  274*   BILITOTAL 7.7* 6.8*  --  5.3*  --  5.1*   ALBUMIN 2.2* 3.0*  --  2.0*  --  2.4*   INR 2.65* 2.57* 2.75* 2.62*  < > 2.39*   < > = values in this interval not displayed.  Pancreatic Enzymes  No lab results found in last 7 days.  Coagulation Profile    Recent Labs  Lab 09/02/18 0331 09/01/18 0339 08/31/18 1608 08/31/18 0414 08/27/18  1858   INR 2.65* 2.57* 2.75* 2.62*  < > 2.81*   PTT  --   --   --   --   --  47*   < > = values in this interval not displayed.      5. RADIOLOGY:   No results found for this or any previous visit (from the past 24 hour(s)).    =========================================    Discussed with SICU staff, Dr. Feliciano.    -----------------------------------  Shawn Goldsmith DO, MSc  Anesthesia Resident, PGY3

## 2018-09-02 NOTE — PLAN OF CARE
Problem: Patient Care Overview  Goal: Plan of Care/Patient Progress Review  D/I: Pt lethargic, arouses to voice. Follows commands. PERRLA. SR, no BP issues. PS x2 hours today, tolerated. Possibly attempt extubation tomorrow? Luis with good UOP. 2x BM today. No new skin issues.  A/P: Monitor.

## 2018-09-02 NOTE — PROGRESS NOTES
Immunosuppression Note:    Yaneli Miles is a 43 year old male who is seen today  for immunosuppression management     I, Mamadou Norwood MD, I have examined the patient with the resident/PA/Fellow, discussed and agree with the note and findings.  I have reviewed today's vital signs, medications, labs and imaging. I reviewed the immunosuppression medications and levels. 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  Developed shock with severe lactic acidosis and respiratory failure on POD9. CT scan showed left PV venous thrombosis, infarction of left liver lobe, patchy infarction of right lobe and possible hepatic arterial thrombosis, patchy bowel ischemia. Transferred to ICU.   Ex lap, patchy diffuse SB ischemia. Doppler demonstrated arterial flow main vessels to bowel and liver. Splenic infarction. Liver biopsy negative for acute rejection. Fluid culture growing VRE. On Heparin gtt. 8/3 OR for reexploration liver transplant, bowel ischemia, findings improved patchy iscemic changes of small bowel, patchy ischemic changes of left liver lobe.  OR: some ischemic/necrotic jejunum, s/p resection.   Washout and abdominal closure with stratice mesh, no drains placed. Minimal ascites, intact small bowel anastomosis, patchy ischemia left liver lobe.  peritonitis with bowel necrosis and bowel perforation. S/p bowel resection, about 50 cm sm bowel, ileocecal valve and colon remaining.      Procedures:    donor (DBD) liver transplant with duct to duct anastomosis over biliary stent.    Ex lap due to  concern for ischemic bowel, findings: patchy diffuse SB ischemia. Doppler demonstrated arterial flow main vessels to bowel and liver. Splenic infarction.   8/3 Reexploration liver transplant, bowel ischemia, findings improved patchy iscemic changes of small bowel, patchy ischemic changes of left liver lobe.   8/6 Re-exploration, ischemic/necrotic jejunum, s/p resection  8/8 Washout and abdominal closure with stratice mesh, no drains placed. Minimal ascites, intact small bowel anastomosis, patchy ischemia left liver lobe.  8/17 Procedure: reopening of laparotomy. abd washout, bowel resection (clip and drop fashion). Postop Dx: same. Large segment of ischemic bowel from mid-jejunum (prior anastomosis) with distal perforation and feculent peritonitis.   8/18 Reexploration, small and large bowel intestine appeared healthy per surgeon, primary anastomosis bowel. Fascia closure.  8/20 Reopening of laparotomy. abdominal washout, bowel resection of 200cm  8/27 Abdominal washout, primary anastomosis closure, abthera placement   8/30 Abdominal washout, abdominal wall closure with Permacol, wound vac placement    Graft function: POD #43, Tbili increased stable at 5.3 today (5.1), alk phos increased to 412 (274), ALT/AST normal. 7/30 Hepatic infarction, left PV thrombosis, and narrow and irregular appearance HA noted on CTA. 8/8 Liver biopsy negative for acute rejection. Intra-abdominal  cultures from 8/1, 8/6, 8/17, 8/18 + VRE or GPCs.   Incision opened at bedside 8/12, hematoma removed.  8/13 US: new perihepatic fluid collections (largest 7x2x4), small volume of septated ascites.  VAC placed 8/15. 8/15 CT scan, left lobe infarct with complex fluid collection inferior.  8/17 US: patent vasculature but RIs concerning for HA stenosis, stable fluid collection near braden hepatis. Will give 25gm albumin 25% x 3 doses today.     Immunosuppression management:   - Induction: Simulect intra-op due to BEN. Steroid taper completed per  protocol.  - MMF: on hold due to critical status.  - Tac goal level ~5-6 due to sepsis and BEN. Level on 8/31 is 7.6 and 9/1 is 9.6 (12-hour trough). Will decrease dose to 0.5mg BID and recheck level tomorrow.   - Hydrocortisone 7/30-8/21, now tapered off.   Complexity of management: Medium. Contributing factors: thrombocytopenia and anemia, ARF    Hematology:   Anemia: Acute blood loss. Hgb stable in the 8-9 range. Multiple transfusions in the last week, last on 8/28.  Thrombocytopenia: Ongoing transfusions. Platelet count remains low at 31 (34), getting frequent platelet transfusions.  Anticoagulation: Anticoagulating for bowel ischemia and portal thrombosis. Protein S level low at 27 on 8/19. Heparin gtt low intensity resulted in diffuse oozing. Dextran 10 ml/hr for additional anticoagulation, stopped on 8/23 due to widespread oozing. Continue heparin drip at 400u/hr. INR 2.62 this morning.     Cardiorespiratory:    Hypotension secondary to septic shock: Resolved, off pressors.  Respiratory failure secondary to sepsis: Extubated 8/22. Intubated for OR on 8/27, continues on vent. PST trial today with possible extubation.  Junctional rhythm:  Onset 8/22, now resolved.    GI/Nutrition: NG tube in place, remains NPO. On TPN. Will plan to keep NPO for at least one week, NG tube to remain to LIS.  Ischemic bowel:  7/30 CTA: nonocclusive filling defects in superior mesenteric vein branches.  Anticoagulation as above.  S/p resection of necrotic jejunum on 8/6. S/p resection of 150cm of ischemic bowel with perforation on 8/17. Patient with hypotension and increased abdominal pain overnight, CT on 8/27 suggestive of bowel perforation. OR on 8/27 for wash out and bowel repair. Returned to OR on 7/30 for washout and abdominal wall closure with wound vac placement. Wound vac to be changed at the bedside by transplant surgery on 9/1.   PSBO: Noted on 8/15 CT, transition point RLQ. S/p resection on 8/17.   Bowel  perforation/peritonitis:  Small bowel perforation with feculent peritonitis noted in OR on 8/17. S/p resection, abx as below. Patient with hypotension and increased abdominal pain overnight, CT on 8/27 suggestive of bowel perforation. OR on 8/27 for wash out and bowel repair. Returned to OR on 8/30 for washout and abdominal wall closure. Discussed possible rectal tube placement for bowel decompression with ICU team, but not felt to be indicated at this time.  GIB: Was having maroon stools prior to OR on 8/27, had return of bowel function overnight.    Endocrine: DM type 2, continue insulin drip.    Fluid/Electrolytes/Renal: CRRT off since 8/23. Creatinine is slowly trending up with decreasing UOP, will continue to monitor.    : Traumatic bah placement with intermittent bleeding.   Hematuria now resolved. Bah, coude 18fr in place.    Infectious disease: Afebrile. WBC stable at 8.6 today (from 9.1). ID following.  -Amikacin (8/17-8/20), flagyl (8/17-8/30), ceftraroline (8/13-), tigecycline (8/13-8/23 and 8/29-), daptomycin (8/14-), micafungin (7/30-8/21), fluconazole (8/22-), and meropenem (8/30-).   -Blood cultures through 8/18 + VRE or GPCs  -8/17 Peritoneal fluid culture + VRE, candida albicans  -8/19, 8/21, 8/23 Blood cx, no growth to date  -8/27 abdominal fluid culture + ESBL E. Coli, yeast, and VRE    1. VRE intra abdominal and bacteremia (dapto, ceftaroline, tigecycline, meropenem)  2. Infarct left lobe liver with inferior perihepatic fluid collection  3. Peritonitis (fluconazole)    Previous ID:   3. Ecoli ESBL bacteremia. Repeat bcx on 8/1 (peripheral and VAD) grew Ecoli ESBL. Zosyn changed to Meropenem, completed course on 8/14.  4. HBV: Hepatits B DNA PCR positive prior to tx. Received HBIG on 7/21 and 7/22. 8/10 Hep B surface antigen negative. No further HBIG planned. Continue tenofovir 300 mg, dose adjusted for HD     Prophylaxis: DVT PCDs, on heparin, fall, GI, Valcyte x 12 weeks (CMVand EBV IgG  +), bactrim.   Disposition: SICU    Coordinator: Kay Reyes  Surgeon: Pako Mathias     Check Hep B surface antigen at 3 months, 6 months then annually     Medical Decision Making: Medium    JAY/Fellow/Resident Provider:  Mihir Nj, fellow    Faculty: Mamadou Norwood MD   __________________________________________________________________  Transplant History: Admitted 2018 for  donor liver transplant.   The patient has a history of liver failure due to hepatitis B .    2018 (Liver), Postoperative day: 43     Interval History:  Remains vented, not requiring pressors. Had return of bowel function overnight. PS trial today with possible extubation.    ROS:   A 10-point review of systems was negative except as noted above.    Curent Meds:    ceftaroline (TEFLARO) intermittent infusion  300 mg Intravenous Q12H     DAPTOmycin (CUBICIN) intermittent infusion  12 mg/kg Intravenous Q48H     ertapenem (INVanz) IV  500 mg Intravenous Q24H     fluconazole  200 mg Intravenous Q24H     heparin lock flush  5-10 mL Intracatheter Q24H     lipids 4 oil  250 mL Intravenous Once per day on      nystatin  500,000 Units Mouth/Throat 4x Daily     pantoprazole (PROTONIX) IV  40 mg Intravenous BID     potassium chloride  20 mEq Intravenous Once     sodium chloride (PF)  3 mL Intravenous Q8H     sulfamethoxazole-trimethoprim  10 mL Per Feeding Tube Once per day on      tacrolimus  0.5 mg Oral or Feeding Tube BID IS     tenofovir  300 mg Per Feeding Tube Q72H     tigecycline (TYGACIL) intermittent infusion  50 mg Intravenous Q12H     valGANciclovir  450 mg Oral Once per day on     Or     valGANciclovir  450 mg Oral or NG Tube Once per day on        Physical Exam:     Admit Weight: 60.7 kg (133 lb 12.8 oz)    Vital sign ranges:    Temp:  [97.7  F (36.5  C)-99.1  F (37.3  C)] 98.1  F (36.7  C)  Heart Rate:  [] 91  Resp:  [19-27] 22  BP: (105)/(73) 105/73  MAP:  [68 mmHg-92 mmHg]  92 mmHg  Arterial Line BP: ()/(53-72) 120/72  FiO2 (%):  [40 %] 40 %  SpO2:  [96 %-100 %] 100 %    General Appearance: NAD, on ventilator  HEENT:  NG (green/tan)  Skin: warm, dry  Heart: Regular rate and rhythm  Lungs: Nonlabored breathing on ventilator.  Abdomen: Incision with wound vac in place, sanguinous output. Wound vac in place with serosanguinous drainage.  : Luis in place draining clear yellow urine.  Extremities: well perfused. Generalized trace edema.  Neurologic: Sedated.   Access: PICC, PIV    Data:   CMP    Recent Labs  Lab 09/02/18 0331 09/01/18 0339 08/30/18  1412  08/27/18  1858   * 146*  < > 143  < > 141   POTASSIUM 4.5 3.9  < > 3.3*  < > 3.0*   CHLORIDE 116* 113*  < >  --   < > 108   CO2 19* 19*  < >  --   < > 21   * 151*  < > 97  < > 113*   * 108*  < >  --   < > 79*   CR 2.12* 1.96*  < >  --   < > 1.64*   GFRESTIMATED 34* 37*  < >  --   < > 46*   GFRESTBLACK 41* 45*  < >  --   < > 55*   JESUS 8.8 9.0  < >  --   < > 6.9*   ICAW  --   --   --  5.1  --  4.6   MAG 2.5* 2.4*  < >  --   < >  --    PHOS 3.6 2.8  < >  --   < >  --    ALBUMIN 2.2* 3.0*  < >  --   < > 2.1*   BILITOTAL 7.7* 6.8*  < >  --   < > 3.8*   ALKPHOS 704* 518*  < >  --   < > 94   AST 25 23  < >  --   < > 19   ALT 13 15  < >  --   < > 13   < > = values in this interval not displayed.  CBC    Recent Labs  Lab 09/02/18 0331 09/01/18 0339   HGB 8.5* 8.3*   WBC 6.6 6.8   PLT 19* 28*     COAGS    Recent Labs  Lab 09/02/18 0331 09/01/18 0339 08/27/18  1858   INR 2.65* 2.57*  < > 2.81*   PTT  --   --   --  47*   < > = values in this interval not displayed.   Urinalysis  Recent Labs   Lab Test  07/27/18   2330  07/13/18   1315  06/18/18   1200   COLOR  Yellow  Dark Yellow  Yellow   APPEARANCE  Clear  Clear  Clear   URINEGLC  Negative  Negative  Negative   URINEBILI  Negative  Large*  Small*   URINEKETONE  Negative  Negative  Negative   SG  1.008  1.013  1.008   UBLD  Negative  Negative  Negative   URINEPH   7.5*  6.0  5.0   PROTEIN  30*  10*  Negative   NITRITE  Negative  Negative  Negative   LEUKEST  Negative  Negative  Negative   RBCU  5*  <1  1   WBCU  3  None  2   UTPG   --    --   0.75*     Virology:  Hepatitis C Antibody   Date Value Ref Range Status   07/20/2018 Nonreactive NR^Nonreactive Final     Comment:     Assay performance characteristics have not been established for newborns,   infants, and children         POD 14 US liver:  1.  The left portal vein is antegrade with decreased velocity,  measuring approximately 15 cm/second. This vessel was occluded on CT  7/30/2018.  2.  Elevated velocities of the main hepatic artery, now 203 cm/sec  (previously 94 cm/sec). However left hepatic arteries demonstrates  steep upstroke suggesting there is not a significant stenosis.   2a. Right hepatic artery was not visualized, could be occluded or  difficult to see postoperatively.  3. Echogenic focus of the right lobe in close proximity to the  hepatorenal fossa. This likely corresponds with nonenhancing lesion on  CT 7/30/2018 and likely representing subcapsular hematoma.  4. Additional small hematomas of the braden hepatis and posterior right  perihepatic region.  5. Small volume anechoic ascites.  6. Left hepatic lobe not well visualized secondary to open wound  preventing imaging. Note the left lobe was grossly abnormal on CT  7/30/2018    POD 1 US liver: Impression:   1.  Hematoma associated with the inferior aspect of the perihepatic  space anteriorly, measuring 13.7 x 7.0 x 8.5 cm.  2.  Retrograde flow of the left portal vein. Additionally, low  velocities of the portal venous system. Single low resistive index of  the extrahepatic aspect of the hepatic artery, measuring 0.37. These  findings are likely within normal limits in the early postoperative  context. However, continued attention on follow-up recommended.    7/30 CT scan abd/pelvis:  IMPRESSION:   1. Hypoenhancing areas in the transplant liver concerning  for  infarction. Transplant left portal vein occlusion with bubbles of  portal venous gas. Nearly occlusive filling defect in the native  hepatic artery. Narrowed and irregular appearance of the transplant  hepatic artery. Focal occlusion of the left main hepatic artery.  Segmental occlusions of right hepatic arterial branches.   2. Pneumatosis intestinalis with nonocclusive filling defects in  superior mesenteric vein branches, concerning for bowel ischemia or  infarction.   3. Splenic infarctions.   4. Bibasilar consolidation with air bronchograms. Pneumonia cannot be  excluded.   5. Post surgical changes of evacuation of peritransplant hematoma.

## 2018-09-02 NOTE — PROGRESS NOTES
I/A:  NEURO: precedex currently 0.3 rass goal -2. Follows simple commands, PERRLA, able to move all extrems  CV: SR/ST 80s-100s; BP WNL-remained off pressors; afebrile; dependent edema  RESP: PS 7/5 for 2 hrs this PM. Tolerated well. CMV settings 400/18/5/40%; small amount thick yellow secretions ETT. Lungs coarse  GI: NPO/ TPN; + BM-loose green/yellow; NG to LIS tan output  : UOP adequate via bah cath  BG well controlled on insulin gtt  Hep gtt remains flat rate 400 units/hr  Wound vac w/ small amount sanguineous output  1 unit of plts currently infusing     Please see flowsheets/MAR for further assessments/interventions     P: Continue to monitor pt and notify MD of any acute changes.      Tracy Simms RN

## 2018-09-03 NOTE — PLAN OF CARE
Problem: Patient Care Overview  Goal: Plan of Care/Patient Progress Review  D/I: Pt lethargic but follows commands. PERRLA. SR, no BP issues. Afebrile. Tolerating minimal vent settings. PS x 4 hours. Minimal/moderate amount of secretions. Weak cough. Tolerating TPN. 4 large mucousy BMs. Luis with good UOP. No new skin issues.  A/P: BG checks. Wean vent.

## 2018-09-03 NOTE — PROGRESS NOTES
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 He returned to the SICU for shock, severe lactic acidosis, renal failure and respiratory failure, requiring intubation and dialysis.  Ischemic injury to left hemiliver and non-occlusive mesenteric ischemia.  He underwent a laparotomy and several subsequent take-backs to examine ischemic bowel, on 8/6 he underwent resection of necrotic jejunum with a primary anastomosis, and his abdomen was closed 8/8. He acutely decompensated beginning the night of 8/16, with increasing pressor needs, increasing abdominal pain and lactic acidosis, now s/p exploratory laparotomy 8/17 and s/p entero-entero small bowel anastomosis, drain placement and fascial closure 8/18. Improving renal function has improved (off dialysis). Take back to OR on 8/27/18 for hematochezia and found to have anastomotic dehiscence and perforated bowel near site.  Returned to OR 8/30 for washout and replacement of mesh.     CHANGES FOR TODAY/MAJOR THINGS:   - PST  - Transfused 2U Plts this AM, will decrease transfusion goal to > 20  - discontinue arterial line    PLAN:    Neuro/ pain/ sedation:  # acute pain  - PRN IV dilaudid  - Precedex infusion for sedation being held    Pulmonary care:   # Acute respiratory failure  - extubated (8/22), re intubated for OR 8/24/18  - HOB elevation. Vent bundle.   - CMV 18/400/5/30  - PST again today    Cardiovascular:    # Hypotension- hypovolemic vs. hemorrhagic  # Junctional rhythm on 8/22 EKG, unclear cause, resolved 8/24- no additional episodes.   - MAP goals > 60. Levophed weaned off.  - TTE 8/8 showed no vegetations or valve abnormalities  - TOÑO 8/10 no evidence of endocarditis   - EKG (8/23): junctional bradycardia   - EKG (8/24, 8/27): Normal sinus  rhythm      Gastroenterolgy   # DDLT 7/21/18  # Intrahepatic left portal venous thrombosis, infarction of left liver lobe, patchy infarction of right lobe, suspected hepatic arterial thrombosis  # 7/30/18 s/p exploratory laparotomy and liver biopsy  # Splenic infarctions, patchy bowel ischemia  # Ex Lap 8/6 - resection of 90 cm of necrotic jejunum  # Ex Lap 8/8 - abdomen closed  # Ex Lap 8/17 - Found to have widespread bowel ischemia requiring bowel resection.  Left in discontinuity.  # Ex Lap 8/18 - entero-entero anastomosis and facial closure.  Approximately  cm small bowel remaining with intact ileocecal valve and colon.  # 8/27 s/p abdomen washout primary anastomosis closure with abthera placement (found to have anastomotic dehiscence, perf bowel near anastomotic site)--pt left open  # GIB- hematochezia  - Two episodes of bowel ischemia of unclear etiology requiring resection of the majority of small bowel ,approx  cm of small bowel remaining.   - Luminal GI consulted for hematochezia but discovered to be due to bowel ischemia/perforation-- now signed off   - ON TPN only- RD and Pharm D managing.   - Wound vac changes q3 days per transplant (next 9/4)      Fluids/ Electrolytes/ Nutrition:   # Protein calorie deficit malnutrition   # Hypocalcemia, replaced.   # hypomagnesia, replaced   # Short gut syndrome,  cm of small bowel remaining per notes.   - NJ removed 8/27/18 during OR. Hold off NG feeds x1 week.  - continue with TPN only.     Renal/ Fluid Balance:  # BEN -recovering  # Metabolic acidosis 2/2 lactic acidosis--lactic acid now normal   - CRRT stopped 8/23. UOP improving. Continues to make urine.  Total 1.8L/24 hours of urine output.   - Luis in place for strict I's and O's      Endocrine:    # DM II  - Continue insulin gtt      ID/ Antibiotics:  # VRE Bacteremia  # ESBL Bacteremia  # Hepatitis B  # Continued need for ongoing ABX.   - Appreciate transplant ID  following for now. NEW  cultures form OR 8/27 with ESBL e. Coli, VRE, yeast, GPC-- discontinue flagyl and start meropenem    - s/p Meropenem (8/2-8/14), discontinued after 12 days of negative cultures for ESBL. Meropenem resumed 8/30 d/t intraabdominal ESBL, changed to Ertapenem.   - s/p Amikacin (8/17 - 8/21), discontinued as he had completed 2-week course of meropenem and has other active agents for G- coverage  - Flagyl (8/17 - 8/30/18)     Current ABx: KEEP all below for now, duration to be determined based on improvement.   - Daptomycin (7/30- ), ceftaroline (8/13- )  - Micafungin (7/30-8/22) changed to fluconazole (8/22- )  - Tigecycline (8/13-8/23), discontinued after 4 days of negative blood cultures. Re-started 8/28/17  - Meropenem restarted 8/30/18, changed to ertapenem for ESBL E. coli  - Immunosuppression: (restarted 8/6) tacrolimus  - Immunosuppression prophylaxis: Valgancyclovir and Tenofovir, nystatin when micafungin stopped.  Bactrim stopped       Cultures   - 7/27 VRE (line/arm)   - 7/29: VRE (left arm)   - 7/30: VRE (left arm)   - 7/31: VRE (Line and peripheral)  - 8/1: ESBL (HD/hand/central line)   - 8/3: VRE (intra-abdominal tissue)  - 8/4: NGTD (blood)  - 8/5: NGTD (blood)  - 8/6: VRE (art line)  - 8/7: VRE (blood)  - 8/8: VRE (blood)  - 8/9: VRE (blood)  - 8/11: VRE (blood)  - 8/12: VRE (abdominal fluid)  - 8/13: VRE (blood)  - 8/14: VRE (blood)  - 8/15: VRE (blood)  - 8/17: NGTD (blood)   - 8/17: VRE & Candida (peritoneal fluid)   - 8/18: G+ cocci (tissue)  - 8/19 NGTD (blood)  - 8/21 NGTD (blood)  - 8/22 NGTD (blood)  - 8/23 NGTD (blood)  - 8/24 NGTD (blood)  - 8/27 FLUID: VRE, ESBL E coli x2, yeast, GPC   - 8/30 FLUID: VRE, yeast      Heme:     # Acute blood loss anemia  # Anemia of critical illness  # Portal venous thrombosis  # Thrombocytopenia  # Coagulopathy   # Anti thrombin III deficiency  - Heparin at 400 straight rate  - Hold aspirin 81mg for continued bleeding  - Monitor CBC and INR daily    MSK:  #  weakness and deconditioning of critical illness   - PT/OT consulted..      General cares and Prophylaxis:    - DVT: Heparin--400units/hr, SCD's  - GI: protonix 40 mg IV BID       Lines/ tubes/ drains:  - PIVs.   - PICC line   - wound vac    Dispo:  - SICU. Care conference planned for Tuesday.    ====================================    SUBJECTIVE: Low platelets 23, transfused 2U plts. Vitals stable, remains off pressors.    OBJECTIVE:   1. VITAL SIGNS:   Temp:  [97  F (36.1  C)-98.5  F (36.9  C)] 97  F (36.1  C)  Heart Rate:  [] 97  Resp:  [18-29] 28  MAP:  [60 mmHg-84 mmHg] 64 mmHg  Arterial Line BP: ()/(44-64) 93/47  FiO2 (%):  [40 %] 40 %  SpO2:  [97 %-100 %] 100 %  Ventilation Mode: CPAP/PS  (Continuous positive airway pressure with Pressure Support) (pt seen on PS; breathing well at this time)  FiO2 (%): 40 %  Rate Set (breaths/minute): 18 breaths/min  Tidal Volume Set (mL): 400 mL  PEEP (cm H2O): 5 cmH2O  Pressure Support (cm H2O): 5 cmH2O  Oxygen Concentration (%): 30 %  Resp: 28    2. INTAKE/ OUTPUT:   I/O last 3 completed shifts:  In: 3396.04 [I.V.:2331.04; NG/GT:30]  Out: 2225 [Urine:1825; Emesis/NG output:275; Drains:125]    3. PHYSICAL EXAMINATION:   General: lying in bed, appears comfortable off sedation  HEENT: Intubated  Pulm/Resp: Intubated, non-labored breathing on pressure support  CV: tachycardic on monitor, regular rhythm  Abdomen: tender throughout abdomen, wound vac in place  : (+) bah catheter in place  Extremities: generalized peripheral edema, extremities well perfused     4. INVESTIGATIONS:   Arterial Blood Gases     Recent Labs  Lab 08/30/18  1412 08/28/18  0628 08/27/18  2155 08/27/18  1858   PH 7.33* 7.30* 7.31* 7.23*   PCO2 41 32* 35 44   PO2 69* 100 127* 132*   HCO3 22 16* 18* 19*     Complete Blood Count     Recent Labs  Lab 09/03/18  0346 09/02/18  0331 09/01/18  0339 08/31/18  1608   WBC 6.1 6.6 6.8 7.5   HGB 8.3* 8.5* 8.3* 8.4*   PLT 23* 19* 28* 24*     Basic  Metabolic Panel    Recent Labs  Lab 09/03/18  0346 09/02/18  0331 09/01/18  0339 08/31/18  0414   * 147* 146* 144   POTASSIUM 3.7 4.5 3.9 4.2   CHLORIDE 118* 116* 113* 113*   CO2 16* 19* 19* 20   * 108* 108* 94*   CR 2.19* 2.12* 1.96* 1.94*   * 124* 151* 132*     Liver Function Tests    Recent Labs  Lab 09/03/18  0346 09/02/18  0331 09/01/18  0339 08/31/18  1608 08/31/18  0414   AST 26 25 23  --  22   ALT 14 13 15  --  12   ALKPHOS 599* 704* 518*  --  412*   BILITOTAL 7.7* 7.7* 6.8*  --  5.3*   ALBUMIN 2.1* 2.2* 3.0*  --  2.0*   INR 2.65* 2.65* 2.57* 2.75* 2.62*     Pancreatic Enzymes  No lab results found in last 7 days.  Coagulation Profile    Recent Labs  Lab 09/03/18  0346 09/02/18  0331 09/01/18  0339 08/31/18  1608  08/27/18  1858   INR 2.65* 2.65* 2.57* 2.75*  < > 2.81*   PTT  --   --   --   --   --  47*   < > = values in this interval not displayed.      5. RADIOLOGY:   No results found for this or any previous visit (from the past 24 hour(s)).    =========================================    Discussed with SICU staff, Dr. Feliciano.    -----------------------------------  Dre Holley, PGY2  General Surgery  [phone]

## 2018-09-03 NOTE — PROGRESS NOTES
I/A:  NEURO:  Follows simple commands, PERRLA, able to move all extrems  CV: SR/ST 80s-100s; BP WNL-remained off pressors; afebrile; dependent edema  RESP:  CMV settings 400/18/5/40%; small amount thick yellow secretions ETT. Lungs coarse  GI: NPO/ TPN; multiple BMs-loose green/yellow; NG to LIS tan output  : UOP adequate via bah cath  BG well controlled on insulin gtt  Hep gtt remains flat rate 400 units/hr  Wound vac w/ small amount serosanguineous output  SICU Chris updated pt plts 23 this AM- 2 units of plts ordered for this AM.      Please see flowsheets/MAR for further assessments/interventions      P: Continue to monitor pt and notify MD of any acute changes.       Tracy Simms RN

## 2018-09-03 NOTE — PROGRESS NOTES
Immunosuppression Note:    Yaneli Miles is a 43 year old male who is seen today  for immunosuppression management     I, Mamadou Norwood MD, I have examined the patient with the resident/PA/Fellow, discussed and agree with the note and findings.  I have reviewed today's vital signs, medications, labs and imaging. I reviewed the immunosuppression medications and levels. 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  Developed shock with severe lactic acidosis and respiratory failure on POD9. CT scan showed left PV venous thrombosis, infarction of left liver lobe, patchy infarction of right lobe and possible hepatic arterial thrombosis, patchy bowel ischemia. Transferred to ICU.   Ex lap, patchy diffuse SB ischemia. Doppler demonstrated arterial flow main vessels to bowel and liver. Splenic infarction. Liver biopsy negative for acute rejection. Fluid culture growing VRE. On Heparin gtt. 8/3 OR for reexploration liver transplant, bowel ischemia, findings improved patchy iscemic changes of small bowel, patchy ischemic changes of left liver lobe.  OR: some ischemic/necrotic jejunum, s/p resection.   Washout and abdominal closure with stratice mesh, no drains placed. Minimal ascites, intact small bowel anastomosis, patchy ischemia left liver lobe.  peritonitis with bowel necrosis and bowel perforation. S/p bowel resection, about 50 cm sm bowel, ileocecal valve and colon remaining.      Procedures:    donor (DBD) liver transplant with duct to duct anastomosis over biliary stent.    Ex lap due to  concern for ischemic bowel, findings: patchy diffuse SB ischemia. Doppler demonstrated arterial flow main vessels to bowel and liver. Splenic infarction.   8/3 Reexploration liver transplant, bowel ischemia, findings improved patchy iscemic changes of small bowel, patchy ischemic changes of left liver lobe.   8/6 Re-exploration, ischemic/necrotic jejunum, s/p resection  8/8 Washout and abdominal closure with stratice mesh, no drains placed. Minimal ascites, intact small bowel anastomosis, patchy ischemia left liver lobe.  8/17 Procedure: reopening of laparotomy. abd washout, bowel resection (clip and drop fashion). Postop Dx: same. Large segment of ischemic bowel from mid-jejunum (prior anastomosis) with distal perforation and feculent peritonitis.   8/18 Reexploration, small and large bowel intestine appeared healthy per surgeon, primary anastomosis bowel. Fascia closure.  8/20 Reopening of laparotomy. abdominal washout, bowel resection of 200cm  8/27 Abdominal washout, primary anastomosis closure, abthera placement   8/30 Abdominal washout, abdominal wall closure with Permacol, wound vac placement    Graft function: POD #44, Tbili increased stable at 5.3 today (5.1), alk phos increased to 412 (274), ALT/AST normal. 7/30 Hepatic infarction, left PV thrombosis, and narrow and irregular appearance HA noted on CTA. 8/8 Liver biopsy negative for acute rejection. Intra-abdominal  cultures from 8/1, 8/6, 8/17, 8/18 + VRE or GPCs.   Incision opened at bedside 8/12, hematoma removed.  8/13 US: new perihepatic fluid collections (largest 7x2x4), small volume of septated ascites.  VAC placed 8/15. 8/15 CT scan, left lobe infarct with complex fluid collection inferior.  8/17 US: patent vasculature but RIs concerning for HA stenosis, stable fluid collection near braden hepatis. Will give 25gm albumin 25% x 3 doses today.     Immunosuppression management:   - Induction: Simulect intra-op due to BEN. Steroid taper completed per  protocol.  - MMF: on hold due to critical status.  - Tac goal level ~5-6 due to sepsis and BEN. Level on 8/31 is 7.6 and 9/1 is 9.6 (12-hour trough). Will decrease dose to 0.5mg BID and recheck level tomorrow.   - Hydrocortisone 7/30-8/21, now tapered off.   Complexity of management: Medium. Contributing factors: thrombocytopenia and anemia, ARF    Hematology:   Anemia: Acute blood loss. Hgb stable in the 8-9 range. Multiple transfusions in the last week, last on 8/28.  Thrombocytopenia: Ongoing transfusions. Platelet count remains low at 31 (34), getting frequent platelet transfusions.  Anticoagulation: Anticoagulating for bowel ischemia and portal thrombosis. Protein S level low at 27 on 8/19. Heparin gtt low intensity resulted in diffuse oozing. Dextran 10 ml/hr for additional anticoagulation, stopped on 8/23 due to widespread oozing. Continue heparin drip at 400u/hr. INR 2.62 this morning.     Cardiorespiratory:    Hypotension secondary to septic shock: Resolved, off pressors.  Respiratory failure secondary to sepsis: Extubated 8/22. Intubated for OR on 8/27, continues on vent. PST trial today with possible extubation.  Junctional rhythm:  Onset 8/22, now resolved.    GI/Nutrition: NG tube in place, remains NPO. On TPN. Will plan to keep NPO for at least one week, NG tube to remain to LIS.  Ischemic bowel:  7/30 CTA: nonocclusive filling defects in superior mesenteric vein branches.  Anticoagulation as above.  S/p resection of necrotic jejunum on 8/6. S/p resection of 150cm of ischemic bowel with perforation on 8/17. Patient with hypotension and increased abdominal pain overnight, CT on 8/27 suggestive of bowel perforation. OR on 8/27 for wash out and bowel repair. Returned to OR on 7/30 for washout and abdominal wall closure with wound vac placement. Wound vac to be changed at the bedside by transplant surgery on 9/1.   PSBO: Noted on 8/15 CT, transition point RLQ. S/p resection on 8/17.   Bowel  perforation/peritonitis:  Small bowel perforation with feculent peritonitis noted in OR on 8/17. S/p resection, abx as below. Patient with hypotension and increased abdominal pain overnight, CT on 8/27 suggestive of bowel perforation. OR on 8/27 for wash out and bowel repair. Returned to OR on 8/30 for washout and abdominal wall closure. Discussed possible rectal tube placement for bowel decompression with ICU team, but not felt to be indicated at this time.  GIB: Was having maroon stools prior to OR on 8/27, had return of bowel function overnight.    Endocrine: DM type 2, continue insulin drip.    Fluid/Electrolytes/Renal: CRRT off since 8/23. Creatinine is slowly trending up with decreasing UOP, will continue to monitor.    : Traumatic bah placement with intermittent bleeding.   Hematuria now resolved. Bah, coude 18fr in place.    Infectious disease: Afebrile. WBC stable at 8.6 today (from 9.1). ID following.  -Amikacin (8/17-8/20), flagyl (8/17-8/30), ceftraroline (8/13-), tigecycline (8/13-8/23 and 8/29-), daptomycin (8/14-), micafungin (7/30-8/21), fluconazole (8/22-), and meropenem (8/30-).   -Blood cultures through 8/18 + VRE or GPCs  -8/17 Peritoneal fluid culture + VRE, candida albicans  -8/19, 8/21, 8/23 Blood cx, no growth to date  -8/27 abdominal fluid culture + ESBL E. Coli, yeast, and VRE    1. VRE intra abdominal and bacteremia (dapto, ceftaroline, tigecycline, meropenem)  2. Infarct left lobe liver with inferior perihepatic fluid collection  3. Peritonitis (fluconazole)    Previous ID:   3. Ecoli ESBL bacteremia. Repeat bcx on 8/1 (peripheral and VAD) grew Ecoli ESBL. Zosyn changed to Meropenem, completed course on 8/14.  4. HBV: Hepatits B DNA PCR positive prior to tx. Received HBIG on 7/21 and 7/22. 8/10 Hep B surface antigen negative. No further HBIG planned. Continue tenofovir 300 mg, dose adjusted for HD     Prophylaxis: DVT PCDs, on heparin, fall, GI, Valcyte x 12 weeks (CMVand EBV IgG  +), bactrim.   Disposition: SICU    Coordinator: Kay Reyes  Surgeon: Pako Mathias     Check Hep B surface antigen at 3 months, 6 months then annually     Medical Decision Making: Medium    JAY/Fellow/Resident Provider:  Mihir Nj, fellow    Faculty: Mamadou Norwood MD   __________________________________________________________________  Transplant History: Admitted 2018 for  donor liver transplant.   The patient has a history of liver failure due to hepatitis B .    2018 (Liver), Postoperative day: 44     Interval History:  Remains vented, not requiring pressors. Had return of bowel function. Wound VAC changed today.    ROS:   A 10-point review of systems was negative except as noted above.    Curent Meds:    ceftaroline (TEFLARO) intermittent infusion  300 mg Intravenous Q12H     DAPTOmycin (CUBICIN) intermittent infusion  12 mg/kg Intravenous Q48H     ertapenem (INVanz) IV  500 mg Intravenous Q24H     fluconazole  200 mg Intravenous Q24H     heparin lock flush  5-10 mL Intracatheter Q24H     lipids 4 oil  250 mL Intravenous Once per day on      nystatin  500,000 Units Mouth/Throat 4x Daily     pantoprazole (PROTONIX) IV  40 mg Intravenous BID     sodium chloride (PF)  3 mL Intravenous Q8H     sulfamethoxazole-trimethoprim  10 mL Per Feeding Tube Once per day on      tacrolimus  0.5 mg Oral or Feeding Tube BID IS     tenofovir  300 mg Per Feeding Tube Q72H     tigecycline (TYGACIL) intermittent infusion  50 mg Intravenous Q12H     valGANciclovir  450 mg Oral Once per day on     Or     valGANciclovir  450 mg Oral or NG Tube Once per day on        Physical Exam:     Admit Weight: 60.7 kg (133 lb 12.8 oz)    Vital sign ranges:    Temp:  [97  F (36.1  C)-98.5  F (36.9  C)] 97.2  F (36.2  C)  Heart Rate:  [] 98  Resp:  [18-29] 28  BP: (100-104)/(68-70) 100/70  MAP:  [60 mmHg-81 mmHg] 74 mmHg  Arterial Line BP: ()/(44-64) 104/55  FiO2 (%):  [40 %]  40 %  SpO2:  [98 %-100 %] 100 %    General Appearance: NAD, on ventilator  HEENT:  NG (green/tan)  Skin: warm, dry  Heart: Regular rate and rhythm  Lungs: Nonlabored breathing on ventilator.  Abdomen: Incision with wound vac in place, sanguinous output. Wound vac in place with serosanguinous drainage.  : Luis in place draining clear yellow urine.  Extremities: well perfused. Generalized trace edema.  Neurologic: Sedated.   Access: PICC, PIV    Data:   CMP    Recent Labs  Lab 09/03/18 0346 09/02/18 0331 08/30/18  1412 08/27/18  1858   * 147*  < > 143  < > 141   POTASSIUM 3.7 4.5  < > 3.3*  < > 3.0*   CHLORIDE 118* 116*  < >  --   < > 108   CO2 16* 19*  < >  --   < > 21   * 124*  < > 97  < > 113*   * 108*  < >  --   < > 79*   CR 2.19* 2.12*  < >  --   < > 1.64*   GFRESTIMATED 33* 34*  < >  --   < > 46*   GFRESTBLACK 40* 41*  < >  --   < > 55*   JESUS 8.8 8.8  < >  --   < > 6.9*   ICAW  --   --   --  5.1  --  4.6   MAG 2.3 2.5*  < >  --   < >  --    PHOS 4.3 3.6  < >  --   < >  --    ALBUMIN 2.1* 2.2*  < >  --   < > 2.1*   BILITOTAL 7.7* 7.7*  < >  --   < > 3.8*   ALKPHOS 599* 704*  < >  --   < > 94   AST 26 25  < >  --   < > 19   ALT 14 13  < >  --   < > 13   < > = values in this interval not displayed.  CBC    Recent Labs  Lab 09/03/18 0346 09/02/18 0331   HGB 8.3* 8.5*   WBC 6.1 6.6   PLT 23* 19*     COAGS    Recent Labs  Lab 09/03/18 0346 09/02/18  0331  08/27/18  1858   INR 2.65* 2.65*  < > 2.81*   PTT  --   --   --  47*   < > = values in this interval not displayed.   Urinalysis  Recent Labs   Lab Test  07/27/18   2330  07/13/18   1315  06/18/18   1200   COLOR  Yellow  Dark Yellow  Yellow   APPEARANCE  Clear  Clear  Clear   URINEGLC  Negative  Negative  Negative   URINEBILI  Negative  Large*  Small*   URINEKETONE  Negative  Negative  Negative   SG  1.008  1.013  1.008   UBLD  Negative  Negative  Negative   URINEPH  7.5*  6.0  5.0   PROTEIN  30*  10*  Negative   NITRITE  Negative   Negative  Negative   LEUKEST  Negative  Negative  Negative   RBCU  5*  <1  1   WBCU  3  None  2   UTPG   --    --   0.75*     Virology:  Hepatitis C Antibody   Date Value Ref Range Status   07/20/2018 Nonreactive NR^Nonreactive Final     Comment:     Assay performance characteristics have not been established for newborns,   infants, and children         POD 14 US liver:  1.  The left portal vein is antegrade with decreased velocity,  measuring approximately 15 cm/second. This vessel was occluded on CT  7/30/2018.  2.  Elevated velocities of the main hepatic artery, now 203 cm/sec  (previously 94 cm/sec). However left hepatic arteries demonstrates  steep upstroke suggesting there is not a significant stenosis.   2a. Right hepatic artery was not visualized, could be occluded or  difficult to see postoperatively.  3. Echogenic focus of the right lobe in close proximity to the  hepatorenal fossa. This likely corresponds with nonenhancing lesion on  CT 7/30/2018 and likely representing subcapsular hematoma.  4. Additional small hematomas of the braden hepatis and posterior right  perihepatic region.  5. Small volume anechoic ascites.  6. Left hepatic lobe not well visualized secondary to open wound  preventing imaging. Note the left lobe was grossly abnormal on CT  7/30/2018    POD 1 US liver: Impression:   1.  Hematoma associated with the inferior aspect of the perihepatic  space anteriorly, measuring 13.7 x 7.0 x 8.5 cm.  2.  Retrograde flow of the left portal vein. Additionally, low  velocities of the portal venous system. Single low resistive index of  the extrahepatic aspect of the hepatic artery, measuring 0.37. These  findings are likely within normal limits in the early postoperative  context. However, continued attention on follow-up recommended.    7/30 CT scan abd/pelvis:  IMPRESSION:   1. Hypoenhancing areas in the transplant liver concerning for  infarction. Transplant left portal vein occlusion with bubbles  of  portal venous gas. Nearly occlusive filling defect in the native  hepatic artery. Narrowed and irregular appearance of the transplant  hepatic artery. Focal occlusion of the left main hepatic artery.  Segmental occlusions of right hepatic arterial branches.   2. Pneumatosis intestinalis with nonocclusive filling defects in  superior mesenteric vein branches, concerning for bowel ischemia or  infarction.   3. Splenic infarctions.   4. Bibasilar consolidation with air bronchograms. Pneumonia cannot be  excluded.   5. Post surgical changes of evacuation of peritransplant hematoma.

## 2018-09-04 NOTE — PROGRESS NOTES
"  Care Coordinator - Conference Planning    Admission Date/Time:  7/20/2018  Attending MD:  Stew Min MD     Data  Date of initial CC assessment:  07/26/18  Chart reviewed, discussed with interdisciplinary team.   Patient was admitted for:   1. Liver transplanted (H)    2. Chronic viral hepatitis B without delta agent and without coma (H)    3. Immunosuppression (H)    4. On enteral nutrition         Assessment   Full assessment completed in previous note. Pt continues in ICU on vent with frequent surgical procedures. SICU team requesting care conference to discuss medical update and goals of care when Dr Norwood is available.     Coordination of Care:    I have contacted Dr Norwood for conference scheduling. He was not available today but requested conference tomorrow, Wednesday after pt's \"bowel test\" as he will need results of the test to share with family at conference. Dr Norwood has spoken with family and they are in agreement with plan. As of this writing, timing of the \"bowel test\" is not confirmed. RNCC to follow for care coordination and conference scheduling.       Plan  Care Conference Wednesday 09/05/18 following pt's bowel test with results available.  will need to be scheduled once timing of test is determined.       Marcus Escalante RN, BSN  ICU Care Coordinator  Pager: [phone]  Phone:  [phone]    "

## 2018-09-04 NOTE — PROGRESS NOTES
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  Developed shock with severe lactic acidosis and respiratory failure on POD9. CT scan showed left PV venous thrombosis, infarction of left liver lobe, patchy infarction of right lobe and possible hepatic arterial thrombosis, patchy bowel ischemia. Transferred to ICU.   Ex lap, patchy diffuse SB ischemia. Doppler demonstrated arterial flow main vessels to bowel and liver. Splenic infarction. Liver biopsy negative for acute rejection. Fluid culture growing VRE. On Heparin gtt. 8/3 OR for reexploration liver transplant, bowel ischemia, findings improved patchy iscemic changes of small bowel, patchy ischemic changes of left liver lobe.  OR: some ischemic/necrotic jejunum, s/p resection.   Washout and abdominal closure with stratice mesh, no drains placed. Minimal ascites, intact small bowel anastomosis, patchy ischemia left liver lobe.  peritonitis with bowel necrosis and bowel perforation. S/p bowel resection, about 50 cm sm bowel, ileocecal valve and colon remaining.     Procedures:    donor (DBD) liver transplant with duct to duct anastomosis over biliary stent.    Ex lap due to concern for ischemic bowel, findings: patchy diffuse SB ischemia. Doppler demonstrated arterial flow main vessels to bowel and liver. Splenic infarction.   8/3 Reexploration liver transplant, bowel ischemia, findings improved patchy iscemic changes of small bowel, patchy ischemic changes of left liver lobe.     Re-exploration, ischemic/necrotic jejunum, s/p resection   Washout and abdominal closure with stratice mesh, no drains placed. Minimal ascites, intact small bowel anastomosis, patchy ischemia left liver lobe.   Procedure: reopening of laparotomy. abd washout, bowel resection (clip and drop fashion). Postop Dx: same. Large segment of ischemic bowel from mid-jejunum (prior anastomosis) with distal perforation and feculent peritonitis.    Reexploration, small and large bowel intestine appeared healthy per surgeon, primary anastomosis bowel. Fascia closure.   Reopening of laparotomy. abdominal washout, bowel resection of 200cm   Abdominal washout, primary anastomosis closure, abthera placement    Abdominal washout, abdominal wall closure with Permacol, wound vac placement    Sai Assessment and sub scores:   Sai Score  Av.2  Min: 9  Max: 20    Positioning: Pillows    Mattress:  Standard , Isolibrium    Moisture Management:  Incontinence Protocol, indwelling catheter    Catheter secured? Yes    Current Diet / Nutrition:       None     Recent Labs   Lab Test  18   0413   18   ALBUMIN  2.0*   < >   --    HGB  7.9*   < >   --    INR  2.32*   < >   --    WBC  6.8   < >   --    A1C   --    --   5.4    < > = values in this interval not displayed.          Wound Assessment (location #1):   Horizontal surgical upper abd.  Wound History:   laparotomy    donor (DBD) liver transplant with duct to duct anastomosis over biliary stent.    Ex lap due to concern for ischemic bowel, findings: patchy diffuse SB ischemia. Doppler demonstrated arterial flow main vessels to bowel and liver. Splenic infarction.   8/3 Reexploration liver transplant, bowel ischemia, findings improved patchy iscemic changes of small bowel, patchy ischemic changes of left liver lobe.    Re-exploration, ischemic/necrotic jejunum, s/p resection   Washout and abdominal closure with  stratice mesh, no drains placed. Minimal ascites, intact small bowel anastomosis, patchy ischemia left liver lobe.  8/17 Procedure: reopening of laparotomy. abd washout, bowel resection (clip and drop fashion). Postop Dx: same. Large segment of ischemic bowel from mid-jejunum (prior anastomosis) with distal perforation and feculent peritonitis.   8/18 Reexploration, small and large bowel intestine appeared healthy per surgeon, primary anastomosis bowel. Fascia closure.  8/20 Reopening of laparotomy. abdominal washout, bowel resection of 200cm    Age of wound/ surgical date: 8/27/18    Date KCI VAC placed: 8/27/18      KCI Wound VAC initiated by:  [facility] Nurse: Yes  MD: No    Any other wound therapies tried prior to KCI VAC placed? Yes - Location:          8/27/18       Specific Dimensions (length x width x depth, in cm) :   10cm x 36cm x 1.5cm    Tunneling:  N/A      Undermining: N/A    Wound Base: White permacol patch/mesh    Palpation of the wound bed:  normal    Slough appearance:  none      Eschar appearance:  none    Periwound Skin: intact, few scattered erosions    Color: normal and consistent with surrounding tissue    Temperature  normal     Drainage:  Serosang. Amount: moderate . Color: sanginous     Odor: none    Pain:  Absent, pt denies    Was patient premedicated prior to dressing change? No    Medication(s) used:  NA          Intervention:     Patient's chart evaluated.      Wound was assessed.    Wound Care: was done:  Cleansed with Microklenz, large silver foam applied to wound base, draped with tegaderm and wound vac applied at 50mmhg    Orders  Written    Supplies  placed at the bedside    Discussed plan of care with Nurse          Assessment:       Pt has surgical wound running entire abdomen from right to left in clamshell shape that was enlarged in OR during washout. Pt is postop following Liver transplant and bowel resection. No acute signs or symptoms of infection.          Plan:     Nursing to  notify the Provider(s) and re-consult the WO Nurse if wound(s) deteriorate(s) or if necessary to reevaluate the plan.    Plan of care for wound located on Abdomin:    NPWT dressing change by the Gela WILHELM while in the hospital.  -125mmhg    NPWT using black foam and pressure set to -50mmHg continuous suction.    WO to change dressing 3x/wk Mon/Wed/Fri       Face to face time: 40 Minutes

## 2018-09-04 NOTE — PLAN OF CARE
Problem: Patient Care Overview  Goal: Plan of Care/Patient Progress Review  OT 4AB: Discharge Planner OT   Patient plan for discharge: Pt unable to state  Current status: pt remains orally intubated, more alert/participative today; following approx 75% of simple commands though keeps eyes closed throughout much of session.  Pt requires mod A and vc to complete UE/LE AAROM exercises to increase strength.  Fatigues easily, c/o pain with B hip ab/adduction otherwise tolerates well with VSS on CPAP with 30% FiO2 and PEEP 5.  Barriers to return to prior living situation: acute medical needs, severe weakness/deconditioning, post surgical precautions  Recommendations for discharge: TCU vs LTACH  Rationale for recommendations: Pt is currently below baseline with regards to mobility and independence with self cares and will benefit from continued skilled therapy intervention to address deficits.         Entered by: Kim Valiente 09/04/2018 10:14 AM

## 2018-09-04 NOTE — PROGRESS NOTES
SURGICAL ICU PROGRESS NOTE  September 4, 2018          Date of Service (when I saw the patient): 09/04/2018    ASSESSMENT:Yaneli Miles is a 43 year old male with past medical history of ESLD secondary to hepatitis B and ALD who is now s/p DBD OLT on 7/21. He returned to the SICU for shock, severe lactic acidosis, renal failure and respiratory failure, requiring intubation and dialysis.  Ischemic injury to left hemiliver and non-occlusive mesenteric ischemia.  He underwent a laparotomy and several subsequent take-backs to examine ischemic bowel, on 8/6 he underwent resection of necrotic jejunum with a primary anastomosis, and his abdomen was closed 8/8. He acutely decompensated beginning the night of 8/16, with increasing pressor needs, increasing abdominal pain and lactic acidosis, now s/p exploratory laparotomy 8/17 and s/p entero-entero small bowel anastomosis, drain placement and fascial closure 8/18. Improving renal function has improved (off dialysis). Take back to OR on 8/27/18 for hematochezia and found to have anastomotic dehiscence and perforated bowel near site.  Returned to OR 8/30 for washout and replacement of mesh.      CHANGES FOR TODAY/MAJOR THINGS:   - albumin bolus   - replace potassium today--20mEQ   - Renal to assess for HD needs   - change IVF to D5 given hyperchloremia and hypernatremia     PLAN:     Neuro/ pain/ sedation:  # acute pain  - PRN IV dilaudid as needed.      Pulmonary care:   # Acute respiratory failure  - extubated (8/22), re intubated for OR 8/24/18--has been reintubated since.   - HOB elevation. Vent bundle.   - CMV 18/400/5/30  - PST again today with High RR.     Cardiovascular:    # Hypotension- hypovolemic vs. hemorrhagic  # Junctional rhythm on 8/22 EKG, unclear cause, resolved 8/24- no additional episodes.   - MAP goals > 60. Levophed weaned off.  - TTE 8/8 showed no vegetations or valve abnormalities  - TOÑO 8/10 no evidence of endocarditis   - EKG (8/23): junctional  bradycardia   - EKG (8/24, 8/27): Normal sinus rhythm      Gastroenterolgy   # DDLT 7/21/18  # Intrahepatic left portal venous thrombosis, infarction of left liver lobe, patchy infarction of right lobe, suspected hepatic arterial thrombosis  # 7/30/18 s/p exploratory laparotomy and liver biopsy  # Splenic infarctions, patchy bowel ischemia  # Ex Lap 8/6 - resection of 90 cm of necrotic jejunum  # Ex Lap 8/8 - abdomen closed  # Ex Lap 8/17 - Found to have widespread bowel ischemia requiring bowel resection.  Left in discontinuity.  # Ex Lap 8/18 - entero-entero anastomosis and facial closure.  Approximately  cm small bowel remaining with intact ileocecal valve and colon.  # 8/27 s/p abdomen washout primary anastomosis closure with abthera placement (found to have anastomotic dehiscence, perf bowel near anastomotic site)--pt left open,   # s/p abd closure 8/30 with permacol, VAC placement   # GIB- hematochezia  - Two episodes of bowel ischemia of unclear etiology requiring resection of the majority of small bowel ,approx  cm of small bowel remaining per transplant   - Luminal GI consulted for hematochezia but discovered to be due to bowel ischemia/perforation-- now signed off   - ON TPN only- RD and Pharm D managing this  - Wound vac changes q3 days per transplant (next 9/4)      Fluids/ Electrolytes/ Nutrition:   # Protein calorie deficit malnutrition   # Hypocalcemia, now hypercalcemia   # hypomagnesia, replaced   # hypokalemia--replaced   # Short gut syndrome,  cm of small bowel remaining per notes.   - NJ removed 8/27/18 during OR. Hold off NG feeds x1 week, will confer with Transplant when to resume.   - continue with TPN only.     Renal/ Fluid Balance:  # BEN -recovering  # Metabolic acidosis   - CRRT stopped 8/23. Continues to make urine.  Total 1.6L/24 hours of urine output.   - Renal to see, given ongoing metabolic acidosis and electrolyte abnormalities. Await additional recommendations   -  Luis in place for strict I's and O's      Endocrine:    # DM II  - Continue insulin gtt      ID/ Antibiotics:  # VRE Bacteremia  # ESBL Bacteremia  # Hepatitis B  # Continued need for ongoing ABX.   - Appreciate transplant ID  following for now. NEW cultures form OR 8/27 with ESBL e. Coli, VRE, yeast, GPC-- now on Ertapenem.    - s/p Meropenem (8/2-8/14), discontinued after 12 days of negative cultures for ESBL. Meropenem resumed 8/30 d/t intraabdominal ESBL, changed to Ertapenem 8/31  - s/p Amikacin (8/17 - 8/21), discontinued as he had completed 2-week course of meropenem and has other active agents for G-coverage  - Flagyl (8/17- 8/30)      Current ABx: KEEP all below for now, duration to be determined by ID.  - Daptomycin (7/30- ), ceftaroline (8/13- )  - Micafungin (7/30-8/22) changed to fluconazole (8/22- )  - Tigecycline (8/13-8/23), discontinued after 4 days of negative blood cultures. Re-started 8/28/17  - Meropenem restarted 8/30/18, changed to ertapenem 8/31/18 for ESBL E. coli  - Immunosuppression: (restarted 8/6) tacrolimus  - Immunosuppression prophylaxis: Valgancyclovir and Tenofovir, nystatin when micafungin stopped.  Bactrim resumed 8/29      Cultures   - 7/27 VRE (line/arm)   - 7/29: VRE (left arm)   - 7/30: VRE (left arm)   - 7/31: VRE (Line and peripheral)  - 8/1: ESBL (HD/hand/central line)   - 8/3: VRE (intra-abdominal tissue)  - 8/4: NGTD (blood)  - 8/5: NGTD (blood)  - 8/6: VRE (art line)  - 8/7: VRE (blood)  - 8/8: VRE (blood)  - 8/9: VRE (blood)  - 8/11: VRE (blood)  - 8/12: VRE (abdominal fluid)  - 8/13: VRE (blood)  - 8/14: VRE (blood)  - 8/15: VRE (blood)  - 8/17: NGTD (blood)   - 8/17: VRE & Candida (peritoneal fluid)   - 8/18: G+ cocci (tissue)  - 8/19 NGTD (blood)  - 8/21 NGTD (blood)  - 8/22 NGTD (blood)  - 8/23 NGTD (blood)  - 8/24 NGTD (blood)  - 8/27 FLUID: VRE, ESBL E coli x2, yeast, GPC   - 8/30 FLUID: VRE, yeast      Heme:     # Acute blood loss anemia  # Anemia of critical  illness  # Portal venous thrombosis  # Thrombocytopenia  # Coagulopathy   # Anti thrombin III deficiency  - Heparin at 400 straight rate- per transplant   - Holding aspirin 81mg for continued bleeding  - Monitor CBC and INR daily  - Plan to keep platelet count > 20K, transfuse if signs/symptoms of oozing.      MSK:  # weakness and deconditioning of critical illness   - PT/OT consulted. OOB to chair       General cares and Prophylaxis:    - DVT: Heparin--400units/hr, SCD's  - GI: protonix 40 mg IV BID       Lines/ tubes/ drains:  - PIVs.   - PICC line   - Luis catheter   - wound vac     Dispo:  - SICU. Care conference later this week.     Time spent on this Encounter   Billing:  I spent 35 minutes bedside and on the inpatient unit today managing the critical care of Yaneli Miles in relation to the issues listed in this note.    [place]  ====================================    SUBJECTIVE:  Course reviewed. No acute events overnight. More lethargic this am. Arouses to normal tone of voice, follow commands. Reports abdominal pain.      OBJECTIVE:   1. VITAL SIGNS:   Temp:  [95.7  F (35.4  C)-97.5  F (36.4  C)] 96.6  F (35.9  C)  Heart Rate:  [] 89  Resp:  [21-35] 30  BP: ()/(42-63) 92/53  FiO2 (%):  [30 %-40 %] 30 %  SpO2:  [93 %-100 %] 100 %  Ventilation Mode: CMV/AC  (Continuous Mandatory Ventilation/ Assist Control) (original settings due to inc RR and Ve)  FiO2 (%): 30 %  Rate Set (breaths/minute): 18 breaths/min  Tidal Volume Set (mL): 400 mL  PEEP (cm H2O): 5 cmH2O  Pressure Support (cm H2O): 7 cmH2O  Oxygen Concentration (%): 30 %  Resp: 30    2. INTAKE/ OUTPUT:   I/O last 3 completed shifts:  In: 3563.68 [I.V.:2457.43; NG/GT:60]  Out: 1800 [Urine:1700; Emesis/NG output:100]    3. PHYSICAL EXAMINATION:   General: arouses to normal tone of voice, follows commands when asked   HEENT: Pupils 4mm and equal. ETT present and secured. NG present   Neuro: awake, arouse to normal tone of voice. Follows  commands   Pulm/Resp: Clear breath sounds bilaterally, coarse breath sounds.   CV: RRR, S1, S2.   Abdomen: Abd present. Suction intact with pink serous fluid int ubing.   : (+) bah catheter in place, urine yellow and clear  Incisions/Skin: skin jaundiced.   Extremities: generalized 2+ pitting  peripheral edema, moving all extremities, peripheral pulses intact, calves soft.    4. INVESTIGATIONS:   Arterial Blood Gases     Recent Labs  Lab 08/30/18  1412   PH 7.33*   PCO2 41   PO2 69*   HCO3 22     Complete Blood Count     Recent Labs  Lab 09/04/18 0413 09/03/18  0346 09/02/18  0331 09/01/18  0339   WBC 6.8 6.1 6.6 6.8   HGB 7.9* 8.3* 8.5* 8.3*   PLT 24* 23* 19* 28*     Basic Metabolic Panel    Recent Labs  Lab 09/04/18  1139 09/04/18 0413 09/03/18 0346 09/02/18  0331 09/01/18  0339   NA  --  148* 149* 147* 146*   POTASSIUM 3.9 3.2* 3.7 4.5 3.9   CHLORIDE  --  118* 118* 116* 113*   CO2  --  16* 16* 19* 19*   BUN  --  127* 127* 108* 108*   CR  --  2.28* 2.19* 2.12* 1.96*   GLC  --  186* 139* 124* 151*     Liver Function Tests    Recent Labs  Lab 09/04/18 0413 09/03/18 0346 09/02/18  0331 09/01/18  0339   AST 33 26 25 23   ALT 16 14 13 15   ALKPHOS 620* 599* 704* 518*   BILITOTAL 7.5* 7.7* 7.7* 6.8*   ALBUMIN 2.0* 2.1* 2.2* 3.0*   INR 2.32* 2.65* 2.65* 2.57*     Pancreatic Enzymes  No lab results found in last 7 days.  Coagulation Profile    Recent Labs  Lab 09/04/18  0413 09/03/18  0346 09/02/18  0331 09/01/18  0339   INR 2.32* 2.65* 2.65* 2.57*       5. RADIOLOGY:   No results found for this or any previous visit (from the past 24 hour(s)).    =========================================

## 2018-09-04 NOTE — PROGRESS NOTES
Immunosuppression Note:    Yaneli Miles is a 43 year old male who is seen today  for immunosuppression management     I, Mamadou Norwood MD, I have examined the patient with the resident/PA/Fellow, discussed and agree with the note and findings.  I have reviewed today's vital signs, medications, labs and imaging. I reviewed the immunosuppression medications and levels. 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  Developed shock with severe lactic acidosis and respiratory failure on POD9. CT scan showed left PV venous thrombosis, infarction of left liver lobe, patchy infarction of right lobe and possible hepatic arterial thrombosis, patchy bowel ischemia. Transferred to ICU.   Ex lap, patchy diffuse SB ischemia. Doppler demonstrated arterial flow main vessels to bowel and liver. Splenic infarction. Liver biopsy negative for acute rejection. Fluid culture growing VRE. On Heparin gtt. 8/3 OR for reexploration liver transplant, bowel ischemia, findings improved patchy iscemic changes of small bowel, patchy ischemic changes of left liver lobe.  OR: some ischemic/necrotic jejunum, s/p resection.   Washout and abdominal closure with stratice mesh, no drains placed. Minimal ascites, intact small bowel anastomosis, patchy ischemia left liver lobe.  peritonitis with bowel necrosis and bowel perforation. S/p bowel resection, about 50 cm sm bowel, ileocecal valve and colon remaining.      Procedures:    donor (DBD) liver transplant with duct to duct anastomosis over biliary stent.    Ex lap due to  concern for ischemic bowel, findings: patchy diffuse SB ischemia. Doppler demonstrated arterial flow main vessels to bowel and liver. Splenic infarction.   8/3 Reexploration liver transplant, bowel ischemia, findings improved patchy iscemic changes of small bowel, patchy ischemic changes of left liver lobe.   8/6 Re-exploration, ischemic/necrotic jejunum, s/p resection  8/8 Washout and abdominal closure with stratice mesh, no drains placed. Minimal ascites, intact small bowel anastomosis, patchy ischemia left liver lobe.  8/17 Procedure: reopening of laparotomy. abd washout, bowel resection (clip and drop fashion). Postop Dx: same. Large segment of ischemic bowel from mid-jejunum (prior anastomosis) with distal perforation and feculent peritonitis.   8/18 Reexploration, small and large bowel intestine appeared healthy per surgeon, primary anastomosis bowel. Fascia closure.  8/20 Reopening of laparotomy. abdominal washout, bowel resection of 200cm  8/27 Abdominal washout, primary anastomosis closure, abthera placement   8/30 Abdominal washout, abdominal wall closure with Permacol, wound vac placement    Graft function: POD #45, Tbili remains elevated and stable at 7.5 (7.7), alk phos 620 today (599), ALT/AST normal. 7/30 Hepatic infarction, left PV thrombosis, and narrow and irregular appearance HA noted on CTA. 8/8 Liver biopsy negative for acute rejection. Intra-abdominal  cultures from 8/1, 8/6, 8/17, 8/18 + VRE or GPCs.   Incision opened at bedside 8/12, hematoma removed.  8/13 US: new perihepatic fluid collections (largest 7x2x4), small volume of septated ascites.  VAC placed 8/15. 8/15 CT scan, left lobe infarct with complex fluid collection inferior.  8/17 US: patent vasculature but RIs concerning for HA stenosis, stable fluid collection near braden hepatis.      Immunosuppression management:   - Induction: Simulect intra-op due to BEN. Steroid taper completed per protocol.  - MMF: on hold due to critical  status.  - Tac goal level ~5-6 due to sepsis and BEN. Level on 9/4 is 8.7 (12-hour trough). Level trending down after dose decrease on 8/31, will decrease dose further to 0.25mg BID and repeat level daily for now. Continues on Fluconazole.  - Hydrocortisone 7/30-8/21, now tapered off.   Complexity of management: Medium. Contributing factors: thrombocytopenia and anemia, ARF    Hematology:   Anemia: Acute blood loss. Hgb stable in the 8-9 range. Multiple transfusions in the last week, last on 8/28.  Thrombocytopenia: Ongoing transfusions. Platelet count remains low at 24 (23), getting frequent platelet transfusions. Goal PLT > 20.  Anticoagulation: Anticoagulating for bowel ischemia and portal thrombosis. Protein S level low at 27 on 8/19. Heparin gtt low intensity resulted in diffuse oozing. Dextran 10 ml/hr for additional anticoagulation, stopped on 8/23 due to widespread oozing. Continue heparin drip at 400u/hr. INR 2.32 this morning.     Cardiorespiratory:    Hypotension secondary to septic shock: Resolved, off pressors.  Respiratory failure secondary to sepsis: Extubated 8/22. Intubated for OR on 8/27, continues on vent. Doing PS trials BID as tolerated.  Junctional rhythm:  Onset 8/22, now resolved.    GI/Nutrition: NG tube in place, remains NPO. On TPN. Will plan to keep NPO for at least one week, NG tube to remain to LIS.  Ischemic bowel:  7/30 CTA: nonocclusive filling defects in superior mesenteric vein branches.  Anticoagulation as above.  S/p resection of necrotic jejunum on 8/6. S/p resection of 150cm of ischemic bowel with perforation on 8/17. Patient with hypotension and increased abdominal pain overnight, CT on 8/27 suggestive of bowel perforation. OR on 8/27 for wash out and bowel repair. Returned to OR on 7/30 for washout and abdominal wall closure with wound vac placement. Wound vac to be changed today, next change needed on 9/7.  PSBO: Noted on 8/15 CT, transition point RLQ. S/p resection on 8/17.    Bowel perforation/peritonitis:  Small bowel perforation with feculent peritonitis noted in OR on 8/17. S/p resection, abx as below. Patient with hypotension and increased abdominal pain overnight, CT on 8/27 suggestive of bowel perforation. OR on 8/27 for wash out and bowel repair. Returned to OR on 8/30 for washout and abdominal wall closure.   GIB: Was having maroon stools prior to OR on 8/27, no evidence of blood in stools at this time.    Endocrine: DM type 2, continue insulin drip.    Fluid/Electrolytes/Renal: CRRT off since 8/23. Creatinine is slowly trending up but continues to make urine (1.6L in 24 hours), will continue to monitor.    : Traumatic bah placement with intermittent bleeding. Hematuria now resolved. Bah, coude 18fr in place.    Infectious disease: Afebrile, WBC normal. ID following.  -Amikacin (8/17-8/20), flagyl (8/17-8/30), ceftraroline (8/13-), tigecycline (8/13-8/23 and 8/29-), daptomycin (8/14-), micafungin (7/30-8/21), fluconazole (8/22-), and meropenem (8/30-8/31).   -Blood cultures through 8/18 + VRE or GPCs  -8/17 Peritoneal fluid culture + VRE, candida albicans  -8/19, 8/21, 8/23 Blood cx, no growth to date  -8/27 abdominal fluid culture + ESBL E. Coli, yeast, and VRE    1. VRE intra abdominal and bacteremia (dapto, ceftaroline, tigecycline, meropenem)  2. Infarct left lobe liver with inferior perihepatic fluid collection  3. Peritonitis (fluconazole)    Previous ID:   3. Ecoli ESBL bacteremia. Repeat bcx on 8/1 (peripheral and VAD) grew Ecoli ESBL. Zosyn changed to Meropenem, completed course on 8/14.  4. HBV: Hepatits B DNA PCR positive prior to tx. Received HBIG on 7/21 and 7/22. 8/10 Hep B surface antigen negative. No further HBIG planned. Continue tenofovir 300 mg, dose adjusted for HD     Prophylaxis: DVT PCDs, on heparin, fall, GI, Valcyte x 12 weeks (CMVand EBV IgG +), bactrim.   Disposition: SICU    Coordinator: Kay Reyes  Surgeon: Pako Palmer Hep B  surface antigen at 3 months, 6 months then annually     Medical Decision Making: Medium    JAY/Fellow/Resident Provider:  Becka Nguyen, NP [id number]    Faculty: Mamadou Norwood MD   __________________________________________________________________  Transplant History: Admitted 2018 for  donor liver transplant.   The patient has a history of liver failure due to hepatitis B .    2018 (Liver), Postoperative day: 45     Interval History:  Remains vented, doing PS trials daily but is 'wiped out' afterwards per nursing. Following commands. Having liquid stools with no evidence of blood. Not requiring pressors. Getting up to the chair.    ROS:   A 10-point review of systems was negative except as noted above.    Curent Meds:    albumin human  25 g Intravenous Once     ceftaroline (TEFLARO) intermittent infusion  300 mg Intravenous Q12H     DAPTOmycin (CUBICIN) intermittent infusion  12 mg/kg Intravenous Q48H     ertapenem (INVanz) IV  500 mg Intravenous Q24H     fluconazole  200 mg Intravenous Q24H     heparin lock flush  5-10 mL Intracatheter Q24H     lipids 4 oil  250 mL Intravenous Once per day on      nystatin  500,000 Units Mouth/Throat 4x Daily     pantoprazole (PROTONIX) IV  40 mg Intravenous BID     sodium chloride (PF)  3 mL Intravenous Q8H     sodium chloride         sulfamethoxazole-trimethoprim  10 mL Per Feeding Tube Once per day on      tacrolimus  0.5 mg Oral or Feeding Tube BID IS     tenofovir  300 mg Per Feeding Tube Q72H     tigecycline (TYGACIL) intermittent infusion  50 mg Intravenous Q12H     valGANciclovir  450 mg Oral Once per day on     Or     valGANciclovir  450 mg Oral or NG Tube Once per day on        Physical Exam:     Admit Weight: 60.7 kg (133 lb 12.8 oz)    Vital sign ranges:    Temp:  [95.3  F (35.2  C)-97.5  F (36.4  C)] 95.3  F (35.2  C)  Heart Rate:  [] 100  Resp:  [21-29] 29  BP: ()/(47-70) 91/47  FiO2 (%):  [30 %-40  %] 30 %  SpO2:  [97 %-100 %] 97 %    General Appearance: NAD, on ventilator  HEENT:  NG (green/tan)  Skin: warm, dry  Heart: Regular rate and rhythm  Lungs: Nonlabored breathing on ventilator.  Abdomen: Incision with wound vac in place, serosanguinous output.  : Luis in place draining clear yellow urine.  Extremities: well perfused. 1+ bilateral LE edema.  Neurologic: Follows commands when awake, no tremor.  Access: PICC, PIV    Data:   CMP    Recent Labs  Lab 09/04/18 0413 09/03/18 0346  08/30/18  1412   * 149*  < > 143   POTASSIUM 3.2* 3.7  < > 3.3*   CHLORIDE 118* 118*  < >  --    CO2 16* 16*  < >  --    * 139*  < > 97   * 127*  < >  --    CR 2.28* 2.19*  < >  --    GFRESTIMATED 31* 33*  < >  --    GFRESTBLACK 38* 40*  < >  --    JESUS 9.1 8.8  < >  --    ICAW 5.4*  --   --  5.1   MAG 2.3 2.3  < >  --    PHOS 4.8* 4.3  < >  --    ALBUMIN 2.0* 2.1*  < >  --    BILITOTAL 7.5* 7.7*  < >  --    ALKPHOS 620* 599*  < >  --    AST 33 26  < >  --    ALT 16 14  < >  --    < > = values in this interval not displayed.  CBC    Recent Labs  Lab 09/04/18 0413 09/03/18 0346   HGB 7.9* 8.3*   WBC 6.8 6.1   PLT 24* 23*     COAGS    Recent Labs  Lab 09/04/18  0413 09/03/18  0346   INR 2.32* 2.65*      Urinalysis  Recent Labs   Lab Test  07/27/18   2330  07/13/18   1315  06/18/18   1200   COLOR  Yellow  Dark Yellow  Yellow   APPEARANCE  Clear  Clear  Clear   URINEGLC  Negative  Negative  Negative   URINEBILI  Negative  Large*  Small*   URINEKETONE  Negative  Negative  Negative   SG  1.008  1.013  1.008   UBLD  Negative  Negative  Negative   URINEPH  7.5*  6.0  5.0   PROTEIN  30*  10*  Negative   NITRITE  Negative  Negative  Negative   LEUKEST  Negative  Negative  Negative   RBCU  5*  <1  1   WBCU  3  None  2   UTPG   --    --   0.75*     Virology:  Hepatitis C Antibody   Date Value Ref Range Status   07/20/2018 Nonreactive NR^Nonreactive Final     Comment:     Assay performance characteristics have not been  established for newborns,   infants, and children         POD 14 US liver:  1.  The left portal vein is antegrade with decreased velocity,  measuring approximately 15 cm/second. This vessel was occluded on CT  7/30/2018.  2.  Elevated velocities of the main hepatic artery, now 203 cm/sec  (previously 94 cm/sec). However left hepatic arteries demonstrates  steep upstroke suggesting there is not a significant stenosis.   2a. Right hepatic artery was not visualized, could be occluded or  difficult to see postoperatively.  3. Echogenic focus of the right lobe in close proximity to the  hepatorenal fossa. This likely corresponds with nonenhancing lesion on  CT 7/30/2018 and likely representing subcapsular hematoma.  4. Additional small hematomas of the braden hepatis and posterior right  perihepatic region.  5. Small volume anechoic ascites.  6. Left hepatic lobe not well visualized secondary to open wound  preventing imaging. Note the left lobe was grossly abnormal on CT  7/30/2018    POD 1 US liver: Impression:   1.  Hematoma associated with the inferior aspect of the perihepatic  space anteriorly, measuring 13.7 x 7.0 x 8.5 cm.  2.  Retrograde flow of the left portal vein. Additionally, low  velocities of the portal venous system. Single low resistive index of  the extrahepatic aspect of the hepatic artery, measuring 0.37. These  findings are likely within normal limits in the early postoperative  context. However, continued attention on follow-up recommended.    7/30 CT scan abd/pelvis:  IMPRESSION:   1. Hypoenhancing areas in the transplant liver concerning for  infarction. Transplant left portal vein occlusion with bubbles of  portal venous gas. Nearly occlusive filling defect in the native  hepatic artery. Narrowed and irregular appearance of the transplant  hepatic artery. Focal occlusion of the left main hepatic artery.  Segmental occlusions of right hepatic arterial branches.   2. Pneumatosis intestinalis with  nonocclusive filling defects in  superior mesenteric vein branches, concerning for bowel ischemia or  infarction.   3. Splenic infarctions.   4. Bibasilar consolidation with air bronchograms. Pneumonia cannot be  excluded.   5. Post surgical changes of evacuation of peritransplant hematoma.

## 2018-09-04 NOTE — PLAN OF CARE
Problem: Patient Care Overview  Goal: Plan of Care/Patient Progress Review  Status: patient resting comfortably overnight.   D/I: Patient on unit 4A Surgical/Neuro ICU s/p: liver transplant resulting in shock and respiratory failure.   Neuro- alert to self. Patient is primarily Hmong speaking but can understand basic English. PERRLA. Moves all extremities spontaneously and intermittent to command.   CV- VSS, sinus rhythm. Afebrile. Keep MAPs above 60. Heparin gtt @400. Previous Left femoral A line site.   Pulm- lung sounds clear to diminished. On ventilator setting of CMV: 40%, 400, 18, and PEEP 5. Patient only to pressure support 2-4 hours a day to prevent fatigue.   GI- NG to LIS or clamped post giving meds. TPN @45 with Lipids running overnight. Insulin gtt @1. BMs overnight loose, mucoid, and yellow.   - Luis catheter in place.   Skin- midline, abdominal wound vac @ -50. Skin tears and scabbing throughout.   Gtts- Heparin @400, TPN @45, Insulin @1, D5 1/2 with NS.   Pain-  Electrolytes- replaced per protocol.  See flow sheets for further interventions and assessments.  P: Continue to monitor pt closely, Notify MD of changes/concerns.

## 2018-09-04 NOTE — PROGRESS NOTES
NUTRITION SERVICES - BRIEF NOTE    Pt continues on CPN given short gut. BUN has been consistently elevated >100 since 9/1 with PN provisions >2 g PRO/kg/day. Will decrease amino acid provisions to 105 g daily in an effort to tax kidney less.    Implementations  TPN change:  -Dosing weight = 59 kg  -CPN. 1080 mL with 245 g dex, 105 g AA, SMOFLIPID 3x/week = 1467 kcal (25 kcal/kg), 1.8 g/kg, GIR = 2.8 mg/kg/min, 15% total kcal from fat.    Monitoring  Nutrition will continue to follow and monitor per POC (see 8/30 RD note)    Halina Crouch, RD, LD  SICU RD Pgr: [phone]

## 2018-09-04 NOTE — PROGRESS NOTES
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     Interval History :   Mr Miles had recovered renal fx to the point we have not run HD since 8/23 but now with some signs of poor clearance although nothing pushing to start HD today.  Likely due to poor perfusion as there has been a hesitancy to use vasopressors with hx of bowel ischemia, will follow for need for CRRT/HD, likely in next few days unless he shows some recovery, still made 1.6L of UOP yesterday without diuretics but likely osmotic diuresis with elevated BUN, team is giving appropriate amount of free H2O.        Assessment & Recommendations:   BEN-Baseline Cr of 1.3-2, up to 2.8 prior to starting CRRT on 7/30 due to hyperkalemia, continued until 8/23 when he made >3L of UOP and Cr came down on its own.  Now with Cr on rise to 2.3, BUN up to 127 today.  Repeat BEN likely hemodynamic due to sepsis, increased intra-abdominal pressure and prerenal state.  No pressing issue requiring intervention today but showing signs of poor clearance and may need to go back on HD in coming days, will eval daily.  No access currently.       Volume status-Up in wt, net positive daily despite reasonable UOP (1.6L yesterday) but has high obligate intake.  Can use diuretic for augmenting UOP but no acute issue currently.        Electrolytes/pH-Na 148 a bit improved with free water (D5 IV), likely free H2O loss in UOP, K 3.2, bicarb 16.  Showing signs of poor clearance but no emergent issue requiring HD/CRRT.       Ca/phos/pth-Ca 9.1, iCal a bit high at 5.4, could be contributing to BEN.      Liver tx-7/21/18.  Complicated by arterial and venous vascular issues and ischemic  bowel, splenic infarct, still with diffuse small bowel ischemia, last ex lap/washout 8/18.  + VRE in blood on 8/17 (peripheral blood).   INR 2.3, last tacro level 8.7.          ID-Multiple abdominal ID issues, on ceftaroline, ertapenem, fluconazole, tenofovir, tigecycline.       Anemia-Hgb 7.9, down a bit from yesterday, last PRBC 8/29, also gets FFP and plt's.       Nutrition-Starting TF with trickle feeds.       Seen and discussed with Dr Luis     Recommendations were communicated to primary team via verbal communication.         Saul Pichardo  Clinical Nurse Specialist  [phone]    Review of Systems:   I reviewed the following systems:  ROS not done due to vent/sedation.     Physical Exam:   I/O last 3 completed shifts:  In: 3563.68 [I.V.:2457.43; NG/GT:60]  Out: 1800 [Urine:1700; Emesis/NG output:100]   BP 95/50  Pulse 95  Temp 97  F (36.1  C)  Resp (!) 33  Wt 71.4 kg (157 lb 6.5 oz)  SpO2 100%  BMI 25.41 kg/m2     GENERAL APPEARANCE: Intubated, critically ill.    EYES:  No scleral icterus, pupils equal  HENT: mouth without ulcers or lesions  PULM: lungs clear to auscultation, equal air movement, no cyanosis or clubbing  CV: regular rhythm, normal rate, no rub     -JVP not elevated.     -edema No LE edema, some   GI: soft, nontender, non-distended, bowel sounds are +  MS: no evidence of inflammation in joints, no muscle tenderness  NEURO: Intubated and sedated but moves all extremities.      Labs:   All labs reviewed by me  Electrolytes/Renal -   Recent Labs   Lab Test  09/04/18   1139  09/04/18   0413  09/03/18   0346  09/02/18   0331   NA   --   148*  149*  147*   POTASSIUM  3.9  3.2*  3.7  4.5   CHLORIDE   --   118*  118*  116*   CO2   --   16*  16*  19*   BUN   --   127*  127*  108*   CR   --   2.28*  2.19*  2.12*   GLC   --   186*  139*  124*   JESUS   --   9.1  8.8  8.8   MAG   --   2.3  2.3  2.5*   PHOS   --   4.8*  4.3  3.6       CBC -   Recent Labs   Lab Test  09/04/18   0413  09/03/18   0346   09/02/18   0331   WBC  6.8  6.1  6.6   HGB  7.9*  8.3*  8.5*   PLT  24*  23*  19*       LFTs -   Recent Labs   Lab Test  09/04/18   0413  09/03/18   0346  09/02/18   0331   ALKPHOS  620*  599*  704*   BILITOTAL  7.5*  7.7*  7.7*   ALT  16  14  13   AST  33  26  25   PROTTOTAL  5.2*  5.1*  5.1*   ALBUMIN  2.0*  2.1*  2.2*       Iron Panel -   Recent Labs   Lab Test  07/13/18   0334  06/17/18   2138   IRON  97  66   IRONSAT  83*  109*   ANGELLA  Unsatisfactory specimen - icteric   --            Current Medications:    ceftaroline (TEFLARO) intermittent infusion  300 mg Intravenous Q12H     DAPTOmycin (CUBICIN) intermittent infusion  12 mg/kg Intravenous Q48H     ertapenem (INVanz) IV  500 mg Intravenous Q24H     fluconazole  200 mg Intravenous Q24H     heparin lock flush  5-10 mL Intracatheter Q24H     lipids 4 oil  250 mL Intravenous Once per day on Mon Wed Fri     nystatin  500,000 Units Mouth/Throat 4x Daily     pantoprazole (PROTONIX) IV  40 mg Intravenous BID     sodium chloride (PF)  3 mL Intravenous Q8H     sulfamethoxazole-trimethoprim  10 mL Per Feeding Tube Once per day on Mon Wed Fri     tacrolimus  0.25 mg Oral or Feeding Tube BID IS     tenofovir  300 mg Per Feeding Tube Q72H     tigecycline (TYGACIL) intermittent infusion  50 mg Intravenous Q12H     valGANciclovir  450 mg Oral Once per day on Mon Thu    Or     valGANciclovir  450 mg Oral or NG Tube Once per day on Mon Thu       IV fluid REPLACEMENT ONLY       D5W 50 mL/hr at 09/04/18 1300     heparin 400 Units/hr (09/04/18 1300)     insulin (regular) 2 Units/hr (09/04/18 1300)     parenteral nutrition - ADULT compounded formula       parenteral nutrition - ADULT compounded formula 45 mL/hr at 09/04/18 1300     Reason beta blocker order not selected           I, Kennedy Luis, saw and evaluated this patient with Saul Pichardo, Clinical Nurse Specialist, 09/04/18, as part of a shared visit.     I personally reviewed major complaints, physical findings,  investigations, medications and the overall management plan.        My key findings: BEN on CKD    Key management decisions made by me: closely monitor need for renal replacement therapy.

## 2018-09-05 NOTE — PLAN OF CARE
Problem: Patient Care Overview  Goal: Plan of Care/Patient Progress Review  Outcome: No Change  D/I= in hospital after liver transplant in July 2018 and has had many setbacks and surgeries. Skilled nursing assessments, adm of medications and adm of platelets this am as plt count < 20. A/P= Will not open eyes but did squeeze my hand per command. Will have ugi w/ small bowel follow through test and then case conference is planned to discuss plans w/ family.

## 2018-09-05 NOTE — PLAN OF CARE
Problem: Liver Transplant (Adult)  Goal: Signs and Symptoms of Listed Potential Problems Will be Absent, Minimized or Managed (Liver Transplant)  Signs and symptoms of listed potential problems will be absent, minimized or managed by discharge/transition of care (reference Liver Transplant (Adult) CPG).   Outcome: No Change  Neuro: Somnolent, nods yes/no to questions. +3-4 strength in all extremities. Perrl.   Cardiac: Sinus tach 90-100s, no noted ectopy. BP soft, 80-90/40-50s w/ 25g of 5% albumin + 500ml LR given. Afebrile.   Resp: PS 7/5 30% for 3.5 hrs, turned back r/t low TVs, high RR (32-36). Lungs coarse/dim. Pale/yellow thick secretions.   GI: NG-LIS, clamped with meds. BM x4 - liquid/yellow.   : Luis w/ UO 30-50ml/hr.   LADs:  PIV x2. R PICC x3.   Gtts: D5 at 50ml/hr, Heparin at 400u/hr, TPN at 45ml/hr, Insulin gtt (1-2u/hr)  Integ: W[place]-50 suction, changed at bedside by [facility] nurse.      Continue to monitor and encourage activity as able. Update MD as needed.

## 2018-09-05 NOTE — PROGRESS NOTES
[facility]    Transplant Infectious Diseases Inpatient Progress note      Yaneli Miles MRN# [id number]   YOB: 1975 Age: 43 year old   Date of Admission: 7/20/2018  5:09 PM  Transplant: 7/21/2018 (Liver), Postoperative day: 45            Recommendations:   1. Continue ceftaroline, ertapenem, tigecycline, fluconazole  2. Will d/w microbiology lab regarding susceptibility testing of latest cultures in hopes we can eliminate one or more agents. Will provide recs once we have this data.    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 Suspicion is that infarcted/abscessed tissue in transplanted liver is the source of persistent VRE detected in abdominal fluid and, until 8/17, in blood. Currently on multiple synergistic agents (ceftaroline, tigecycline, daptomycin), but possible we could now narrow these down if susceptibilities allow. We are contacting the lab for additional tigecycline susceptibility testing.        Active Problems and Infectious Diseases Issues:   1. Severe sepsis (8/27) with significant leukocytosis s/p abdominal washouts - Abdominal fluid cultures (latest on 8/30) continue to grow VRE, suggesting #2 might be out VRE source for the previous bacteremia. Continuing abx at present with keen eye on agents that will provide appropriate concentrations both in liver and in peritoneal cavity.    2. Hepatic absces (CT 8/15/18) - Treating as above for #1    3. Persistent VRE bacteremia - Negative blood cultures since 8/19. TTE (8/8) and TOÑO (8/10) witout notable vegetations. Coninuing current abx.    Other  Infectious Disease issues include:  - PCP prophylaxis: Bactrim, TDF, vGCV  - Serostatus: CMV D-/R+, EBV D+/R+, HCV -/-, HBV -/-    Interim History and Events:   No significant events overnight. Remains intubated but when sedation is decreased can easily follow commands. No episodes of hypotension, tachycardia overnight. BCx from 9/2 remain NGTD, new cultures collected today.    HPI:  44yo M with h/o chronic HBv s/p OLT on 7/21/2018. Post-op course complicated by hemorrhagic shock s/p MTP. He was started on zosyn partha-operatively per transplant protocol, and it was continued for 48 hours through 7/24. Immunosuppression induction was with basiliximab (2/2 real dysfunction) with a methylpred/prednisone taper and maintenance has MMF + tacrolimus.     On 7/27, he became febrile and was started on levofloxacin then changed to linezolid. Cultures were collected, lines removed, and CT abdomen performed demonstrating two fluid collections. By 7/40, he was having emesis, lethargy, and hypotension. Micafungin and zosyn were started, immunosuppression was held, and a new CT showed possible liver infarct with left portal vein occlusion, hepatic artery occlusion, and pneumtosis intestinalis with non-occlusive filling defects in the SMV c/f bowel ischemia. He was intubated and taken to the OR for an ex-lap, where cultures were collected of the abdominal fluid and a liver bx was taken. BCx and abdominal fluid grew VRE. At about the same time, his lactate became elevated so the linezolid was swapped for daptomycin. On 7/31, a culture of the hematoma seen in the Or also grew VRE. On 8/1, blood cultures also grew ESBL E coli, so zosyn was changed to meropenem. A repeat abdominal washout was performed on 8/2 with segmental jejeunal resection and more cultures collected (again growing VRE from the hematoma). Another washout was performed on 8/8. Throughout this, and up to 8/17, daily blood cultures were returning positve for VRE growth.  Given this persistent bacteremia, the daptomycin dose was increased, and suspicions arose that this infarcted/abscessed liver zone could be harboring the VRE we were seeing in the blood and abdominal fluid. Ceftaroline was added on 8/14 along with tigecycline for synergy with daptomycin in an attempt to clear the VRE. An emergen return to the OR for washout occurred on 8/16 after an episode of abd pain and hypotension. That same day, amikacin and Flagyl were added for expanded coverage. Amikaci nwas stopped on 8/20 due to improved source control and decreased concern for GNR (none growing in cultures). Further washouts were perfored on 8/27 (with anastomotic dehiscence and bowel perf repair) and 8/30, both growing VRE. At present, his antibiotic regimen is daptomycin, ceftaroline, tigecycline, fluconazole, ertapenem.      ROS:  As mentioned in the interim history otherwise negative by reviewing constitutional symptoms, central and peripheral neurological systems, respiratory system, cardiac system, GI system,  system, musculoskeletal, skin, allergy, and lymphatics.                 Pysical Examination:  Temp: 97.2  F (36.2  C) Temp src: Esophageal BP: 93/54   Heart Rate: 91 Resp: (!) 31 SpO2: 100 % O2 Device: Mechanical Ventilator    Vitals:    08/31/18 0400 09/01/18 0343 09/02/18 0300 09/03/18 0400   Weight: 67.1 kg (147 lb 14.9 oz) 68.9 kg (151 lb 14.4 oz) 71 kg (156 lb 8.4 oz) 71.1 kg (156 lb 12 oz)    09/04/18 0000   Weight: 71.4 kg (157 lb 6.5 oz)       Constitutional: Somnolent, nonverbal, responds with nods and head shakes  Head, ENT, Eyes, and Neck: ETT present. Normocephalic PERRL, EOMI, pink conjunctivae, non-icteric sclera. Neck supple without rigidity, no cervical/axillary LA bilaterally.    Neurologic: Patient sedated, minimal participation in neuro exam  Lungs: Intubated. Coarse breath sounds bilaterally..   CVS: RRR, normal S1/S2, no murmur   Abdomen: Surgical site with dressing/vac, no obvious signs  of infection, light pink sero-sang fluid in drain line. non-distended  Genitalia: bah catheter in place   Extremities: +2 pitting edema of bilateral lower extremities, no ulcers, no swelling or erythema of any of joints and no tenderness to palpation.   Skin: Jaundice. No noted rashes. Abd surgical site as described above.    Medications:  Medications that Require Transfusion:     IV fluid REPLACEMENT ONLY       D5W 50 mL/hr at 09/04/18 1800     heparin 400 Units/hr (09/04/18 1800)     insulin (regular) 1.5 Units/hr (09/04/18 1800)     parenteral nutrition - ADULT compounded formula       Reason beta blocker order not selected         Scheduled Medications:     ceftaroline (TEFLARO) intermittent infusion  300 mg Intravenous Q12H     DAPTOmycin (CUBICIN) intermittent infusion  12 mg/kg Intravenous Q48H     ertapenem (INVanz) IV  500 mg Intravenous Q24H     fluconazole  200 mg Intravenous Q24H     heparin lock flush  5-10 mL Intracatheter Q24H     lipids 4 oil  250 mL Intravenous Once per day on Mon Wed Fri     nystatin  500,000 Units Mouth/Throat 4x Daily     pantoprazole (PROTONIX) IV  40 mg Intravenous BID     sodium chloride (PF)  3 mL Intravenous Q8H     sulfamethoxazole-trimethoprim  10 mL Per Feeding Tube Once per day on Mon Wed Fri     tacrolimus  0.25 mg Oral or Feeding Tube BID IS     tenofovir  300 mg Per Feeding Tube Q72H     tigecycline (TYGACIL) intermittent infusion  50 mg Intravenous Q12H     valGANciclovir  450 mg Oral Once per day on Mon Thu    Or     valGANciclovir  450 mg Oral or NG Tube Once per day on Mon Thu     Metabolic Studies       Recent Labs   Lab Test  09/04/18   1139  09/04/18   0413  09/03/18   0346  09/02/18   0331  09/01/18   0339  08/31/18   1608  08/31/18   0414   08/30/18   1412   08/30/18   0357   08/29/18   0448   08/22/18   0424   07/13/18   2050   NA   --   148*  149*  147*  146*   --   144   --   143   --   141   --   139   < >  140   < >   --    POTASSIUM  3.9  3.2*  3.7   4.5  3.9   --   4.2   --   3.3*   --   3.2*   --   4.0   < >  3.5   < >   --    CHLORIDE   --   118*  118*  116*  113*   --   113*   --    --    --   110*   --   108   < >  108   < >   --    CO2   --   16*  16*  19*  19*   --   20   --    --    --   21   --   21   < >  25   < >   --    ANIONGAP   --   15*  15*  12  14   --   12   --    --    --   11   --   10   < >  7   < >   --    BUN   --   127*  127*  108*  108*   --   94*   --    --    --   88*   --   82*   < >  43*   < >   --    CR   --   2.28*  2.19*  2.12*  1.96*   --   1.94*   --    --    --   1.87*   --   1.87*   < >  0.73   < >   --    GFRESTIMATED   --   31*  33*  34*  37*   --   38*   --    --    --   39*   --   39*   < >  >90   < >   --    GLC   --   186*  139*  124*  151*   --   132*   --   97   --   144*   --   123*   < >  142*   < >   --    A1C   --    --    --    --    --    --    --    --    --    --    --    --    --    --    --    --   5.4   JESUS   --   9.1  8.8  8.8  9.0   --   8.3*   --    --    --   8.3*   --   8.0*   < >  6.6*   < >   --    PHOS   --   4.8*  4.3  3.6  2.8   --   2.3*   --    --    --   2.3*   --   3.2   < >  3.9   < >   --    MAG   --   2.3  2.3  2.5*  2.4*   --   2.4*   --    --    --   2.5*   --   2.4*   < >  2.2   < >   --    LACT   --    --    --    --   1.4  1.3  1.7   < >   --    < >  1.8   < >   --    < >  0.9   < >  3.9*   CKT   --    --    --    --    --    --    --    --    --    --    --    --   28*   --   44   < >   --     < > = values in this interval not displayed.       Hepatic Studies    Recent Labs   Lab Test  09/04/18   0413  09/03/18   0346  09/02/18   0331  09/01/18   0339  08/31/18   0414  08/30/18   0357   07/13/18   0334   06/21/18   0612   BILITOTAL  7.5*  7.7*  7.7*  6.8*  5.3*  5.1*   < >  36.3*   < >  23.4*   ALKPHOS  620*  599*  704*  518*  412*  274*   < >  96   < >  78   ALBUMIN  2.0*  2.1*  2.2*  3.0*  2.0*  2.4*   < >  2.8*   < >  2.9*   AST  33  26  25  23  22  16   < >  287*   < >  124*   ALT   16  14  13  15  12  16   < >  125*   < >  79*   LDH   --    --    --    --    --    --    --   259*   --   177    < > = values in this interval not displayed.       Pancreatitis testing    Recent Labs   Lab Test  08/20/18   0443  08/13/18   1048  08/09/18   0335  07/22/18   0354  07/20/18   1741  07/12/18   1913  06/17/18   2138  06/16/18   1815   AMYLASE   --    --    --   42  60  35   --    --    LIPASE   --    --    --   75   --    --    --   260   TRIG  40  173*  351*   --    --    --   Unsatisfactory specimen - icteric   --        Hematology Studies      Recent Labs   Lab Test  09/04/18   0413  09/03/18   0346  09/02/18   0331  09/01/18   0339  08/31/18   1608  08/31/18   0414   08/23/18   0916  08/23/18   0352  08/22/18   2209  08/22/18   1545  08/22/18   0957  08/22/18   0424   WBC  6.8  6.1  6.6  6.8  7.5  8.6   < >  14.1*  12.1*  12.4*  12.1*  17.7*  16.4*   ANEU   --    --    --    --    --    --    --   13.3*  11.4*  11.7*  11.5*  16.9*  15.5*   ALYM   --    --    --    --    --    --    --   0.5*  0.5*  0.4*  0.4*  0.5*  0.5*   MARCELA   --    --    --    --    --    --    --   0.3  0.2  0.2  0.1  0.3  0.3   AEOS   --    --    --    --    --    --    --   0.0  0.0  0.0  0.0  0.0  0.0   HGB  7.9*  8.3*  8.5*  8.3*  8.4*  8.4*   < >  8.6*  6.9*  7.4*  7.7*  8.0*  8.2*   HCT  24.7*  25.8*  25.7*  24.7*  25.6*  25.5*   < >  24.8*  19.8*  21.3*  22.1*  23.1*  23.9*   PLT  24*  23*  19*  28*  24*  31*   < >  41*  40*  51*  36*  55*  42*    < > = values in this interval not displayed.       Arterial Blood Gas Testing    Recent Labs   Lab Test  08/30/18   1412  08/28/18   0628  08/27/18   2155  08/27/18   1858  08/21/18   1332   PH  7.33*  7.30*  7.31*  7.23*  7.40   PCO2  41  32*  35  44  40   PO2  69*  100  127*  132*  163*   HCO3  22  16*  18*  19*  25   O2PER  51.0  50.0  50  60  40.0        Urine Studies     Recent Labs   Lab Test  07/27/18   2330  07/13/18   1315  06/18/18   1200  06/16/18   2220   URINEPH   7.5*  6.0  5.0  5.5   NITRITE  Negative  Negative  Negative  Negative   LEUKEST  Negative  Negative  Negative  Large*   WBCU  3  None  2  66*       Vancomycin Levels     Recent Labs   Lab Test  06/18/18   1230   VANCOMYCIN  16.8     Tacrolimus levels      Transplant Immunosuppression Labs Latest Ref Rng & Units 9/4/2018 9/3/2018 9/2/2018 9/1/2018 8/31/2018   Tacro Level 5.0 - 15.0 ug/L 8.7 10.2 10.2 9.6 -   Tacro Level - Not Provided Not Provided Not Provided Not Provided -   Creat 0.66 - 1.25 mg/dL 2.28(H) 2.19(H) 2.12(H) 1.96(H) -   BUN 7 - 30 mg/dL 127(H) 127(H) 108(H) 108(H) -   WBC 4.0 - 11.0 10e9/L 6.8 6.1 6.6 6.8 7.5   Neutrophil % - - - - -   ANEU 1.6 - 8.3 10e9/L - - - - -

## 2018-09-05 NOTE — PROGRESS NOTES
Care Coordinator - Conference Planning    Admission Date/Time:  7/20/2018  Attending MD:  Collette Holland MD     Data  Date of initial CC assessment:  07/26/18  Chart reviewed, discussed with interdisciplinary team.   Patient was admitted for:   1. Liver transplanted (H)    2. Chronic viral hepatitis B without delta agent and without coma (H)    3. Immunosuppression (H)    4. On enteral nutrition         Assessment   Full assessment completed in previous note. 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 Pt has had multiple post-op complications and has returned to OR several times for bowel ischemia, dehiscence and perforation.  Pt continues on vent, is lethargic, opens eyes but does not follow commands.     Coordination of Care:   I have spoke with Dr Norwood today and he updated me that he is available for 1:00 pm care conference tomorrow. SICU team has been updated and is available. Pt's CT of abd results should be available for the conference. I spoke with pt's wife Luann at bedside today and she is available for care conference tomorrow. She will update other family members. I have scheduled a professional  for the conference.       Plan  Care Conference Thursday 09/06 at 1:00 pm in 4A conference room. Pre-conference for providers will be at 12:45 pm in 4A SICU workroom.       Marcus Escalante RN, BSN  ICU Care Coordinator  Pager: [phone]  Phone:  [phone]

## 2018-09-05 NOTE — PROGRESS NOTES
Nutrition Progress Note - f/u for progress towards previous nutrition POC (see previous 8/30 reassessment for details)    -PN: currently receiving PN @ goal rate  -Resp: remains on vent support with FiO2 @ 30%    Interventions:  Collaboration and Referral of Nutrition care - Discussed plan for FEN/GI/Resp on rounds with team who approved RD to order metabolic cart study to evaluate approp of energy provisions.    Halina Crouch, RD, LD  SICU RD Pgr: [phone]

## 2018-09-05 NOTE — PLAN OF CARE
Problem: Patient Care Overview  Goal: Plan of Care/Patient Progress Review  Outcome: No Change  I: CT Abdomen with contrast done this PM.   A: Pt remains more somnolent. Pt able to move fingers and toes to command. PS for approx 1.5 hrs today. Tachypnic in 30s but tolerated ok. Otherwise on CMV 30%. No changes to vent settings. Sinus rhythm to sinus tachycardia. Received 500mL of 5% Albumin x2 today for hypotension. MAP goal >60. Adequate UOP. TPN continued. TF remain held. NG to LIS. Continues to have large amounts of watery yellow/brown stool output. Rectal pouch attempted in AM with no success d/t moist skin. Rectal tube placed this afternoon after SICU MD approval. Skin remains very fragile. Multiple skin tears. Sacrum excoriated.   P: Care conference at 1300 tomorrow. Notify SICU with any questions/concerns.

## 2018-09-05 NOTE — PROGRESS NOTES
Immunosuppression Note:    Yaneli Miles is a 43 year old male who is seen today  for immunosuppression management     I, Mamadou Norwood MD, I have examined the patient with the resident/PA/Fellow, discussed and agree with the note and findings.  I have reviewed today's vital signs, medications, labs and imaging. I reviewed the immunosuppression medications and levels. 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  Developed shock with severe lactic acidosis and respiratory failure on POD9. CT scan showed left PV venous thrombosis, infarction of left liver lobe, patchy infarction of right lobe and possible hepatic arterial thrombosis, patchy bowel ischemia. Transferred to ICU.   Ex lap, patchy diffuse SB ischemia. Doppler demonstrated arterial flow main vessels to bowel and liver. Splenic infarction. Liver biopsy negative for acute rejection. Fluid culture growing VRE. On Heparin gtt. 8/3 OR for reexploration liver transplant, bowel ischemia, findings improved patchy iscemic changes of small bowel, patchy ischemic changes of left liver lobe.  OR: some ischemic/necrotic jejunum, s/p resection.   Washout and abdominal closure with stratice mesh, no drains placed. Minimal ascites, intact small bowel anastomosis, patchy ischemia left liver lobe.  peritonitis with bowel necrosis and bowel perforation. S/p bowel resection, about 50 cm sm bowel, ileocecal valve and colon remaining.      Procedures:    donor (DBD) liver transplant with duct to duct anastomosis over biliary stent.    Ex lap due to  concern for ischemic bowel, findings: patchy diffuse SB ischemia. Doppler demonstrated arterial flow main vessels to bowel and liver. Splenic infarction.   8/3 Reexploration liver transplant, bowel ischemia, findings improved patchy iscemic changes of small bowel, patchy ischemic changes of left liver lobe.   8/6 Re-exploration, ischemic/necrotic jejunum, s/p resection  8/8 Washout and abdominal closure with stratice mesh, no drains placed. Minimal ascites, intact small bowel anastomosis, patchy ischemia left liver lobe.  8/17 Procedure: reopening of laparotomy. abd washout, bowel resection (clip and drop fashion). Postop Dx: same. Large segment of ischemic bowel from mid-jejunum (prior anastomosis) with distal perforation and feculent peritonitis.   8/18 Reexploration, small and large bowel intestine appeared healthy per surgeon, primary anastomosis bowel. Fascia closure.  8/20 Reopening of laparotomy. abdominal washout, bowel resection of 200cm  8/27 Abdominal washout, primary anastomosis closure, abthera placement   8/30 Abdominal washout, abdominal wall closure with Permacol, wound vac placement    Graft function: POD #46, Tbili remains elevated and stable at 7.6, alk phos 664, ALT/AST normal. 7/30 Hepatic infarction, left PV thrombosis, and narrow and irregular appearance HA noted on CTA. 8/8 Liver biopsy negative for acute rejection. Intra-abdominal  cultures from 8/1, 8/6, 8/17, 8/18 + VRE or GPCs.   Incision opened at bedside 8/12, hematoma removed.  8/13 US: new perihepatic fluid collections (largest 7x2x4), small volume of septated ascites.  VAC placed 8/15. 8/15 CT scan, left lobe infarct with complex fluid collection inferior.  8/17 US: patent vasculature but RIs concerning for HA stenosis, stable fluid collection near braden hepatis.      Immunosuppression management:   - Induction: Simulect intra-op due to BEN. Steroid taper completed per protocol.  - MMF: on hold due to critical status.  - Tac goal  level ~5-6 due to sepsis and BEN. Level on 9/4 is 8.7 (12-hour trough). Level trending down after dose decrease on 8/31, decreased dose further to 0.25mg BID. 9/5 level 8.1 (12.5 hour trough). No change today as dose was decreased yesterday. Continues on Fluconazole.  - Hydrocortisone 7/30-8/21, now tapered off.   Complexity of management: Medium. Contributing factors: thrombocytopenia and anemia, ARF    Hematology:   Anemia: Acute blood loss. Hgb stable in the 7-9 range. Multiple transfusions in the last week, last on 8/28.  Thrombocytopenia: Ongoing transfusions. Platelet count remains low at 17, getting frequent platelet transfusions. Goal PLT > 20.  Anticoagulation: Anticoagulating for bowel ischemia and portal thrombosis. Protein S level low at 27 on 8/19. Heparin gtt low intensity resulted in diffuse oozing. Dextran 10 ml/hr for additional anticoagulation, stopped on 8/23 due to widespread oozing. Continue heparin drip at 400u/hr. INR 2.4 this morning.     Cardiorespiratory:    Hypotension secondary to septic shock: Resolved, off pressors.  Respiratory failure secondary to sepsis: Extubated 8/22. Intubated for OR on 8/27, continues on vent. Doing PS trials as tolerated.  Junctional rhythm:  Onset 8/22, now resolved.    GI/Nutrition: NG tube in place, remains NPO. On TPN. Will plan to keep NPO for at least one week, NG tube to remain to LIS.  Ischemic bowel:  7/30 CTA: nonocclusive filling defects in superior mesenteric vein branches.  Anticoagulation as above.  S/p resection of necrotic jejunum on 8/6. S/p resection of 150cm of ischemic bowel with perforation on 8/17. Patient with hypotension and increased abdominal pain overnight, CT on 8/27 suggestive of bowel perforation. OR on 8/27 for wash out and bowel repair. Returned to OR on 7/30 for washout and abdominal wall closure with wound vac placement. Wound vac changed 9/4, next change needed on 9/7.  PSBO: Noted on 8/15 CT, transition point RLQ. S/p  resection on 8/17.   Bowel perforation/peritonitis:  Small bowel perforation with feculent peritonitis noted in OR on 8/17. S/p resection, abx as below. Patient with hypotension and increased abdominal pain overnight, CT on 8/27 suggestive of bowel perforation. OR on 8/27 for wash out and bowel repair. Returned to OR on 8/30 for washout and abdominal wall closure. Now with brown/green material from wound vac opposed to serosang output yesterday, awaiting SBFT today.   GIB: Was having maroon stools prior to OR on 8/27, no evidence of blood in stools at this time.    Endocrine: DM type 2, continue insulin drip.    Fluid/Electrolytes/Renal: CRRT off since 8/23. Creatinine is slowly trending up but continues to make urine (1.4L in 24 hours), will continue to monitor. Nephrology monitoring daily for HD need.     : Traumatic bah placement with intermittent bleeding. Hematuria now resolved. Bah, coude 18fr in place.    Infectious disease: Tmax 99.5, WBC normal. ID following.  -Amikacin (8/17-8/20), flagyl (8/17-8/30), ceftraroline (8/13-), tigecycline (8/13-8/23 and 8/29-), daptomycin (8/14-), micafungin (7/30-8/21), fluconazole (8/22-), and meropenem (8/30-8/31).   -Blood cultures through 8/18 + VRE or GPCs  -8/17 Peritoneal fluid culture + VRE, candida albicans  -8/19, 8/21, 8/23 Blood cx, no growth to date  -8/27 abdominal fluid culture + ESBL E. Coli, yeast, and VRE    1. VRE intra abdominal and bacteremia (dapto, ceftaroline, tigecycline, meropenem)  2. Infarct left lobe liver with inferior perihepatic fluid collection  3. Peritonitis (fluconazole)    Previous ID:   3. Ecoli ESBL bacteremia. Repeat bcx on 8/1 (peripheral and VAD) grew Ecoli ESBL. Zosyn changed to Meropenem, completed course on 8/14.  4. HBV: Hepatits B DNA PCR positive prior to tx. Received HBIG on 7/21 and 7/22. 8/10 Hep B surface antigen negative. No further HBIG planned. Continue tenofovir 300 mg, dose adjusted for HD     Prophylaxis: DVT  PCDs, on heparin, fall, GI, Valcyte x 12 weeks (CMVand EBV IgG +), bactrim.   Disposition: SICU    Coordinator: Kay Reyes  Surgeon: Pako Mathias     Check Hep B surface antigen at 3 months, 6 months then annually     Medical Decision Making: Medium    JAY/Fellow/Resident Provider:  Janet Benavides PA-C [id number]    Faculty: Mamadou Norwood MD   __________________________________________________________________  Transplant History: Admitted 2018 for  donor liver transplant.   The patient has a history of liver failure due to hepatitis B .    2018 (Liver), Postoperative day: 46     Interval History:  Remains vented, doing PS trials daily. Green/brown drainage from wound.     ROS:   A 10-point review of systems was negative except as noted above.    Curent Meds:    albumin human         ceftaroline (TEFLARO) intermittent infusion  300 mg Intravenous Q12H     DAPTOmycin (CUBICIN) intermittent infusion  12 mg/kg Intravenous Q48H     ertapenem (INVanz) IV  500 mg Intravenous Q24H     fluconazole  200 mg Intravenous Q24H     heparin lock flush  5-10 mL Intracatheter Q24H     lipids 4 oil  250 mL Intravenous Once per day on      nystatin  500,000 Units Mouth/Throat 4x Daily     pantoprazole (PROTONIX) IV  40 mg Intravenous BID     sodium chloride (PF)  3 mL Intravenous Q8H     sulfamethoxazole-trimethoprim  10 mL Per Feeding Tube Once per day on      tacrolimus  0.25 mg Oral or Feeding Tube BID IS     tenofovir  300 mg Per Feeding Tube Q72H     tigecycline (TYGACIL) intermittent infusion  50 mg Intravenous Q12H     valGANciclovir  450 mg Oral Once per day on     Or     valGANciclovir  450 mg Oral or NG Tube Once per day on        Physical Exam:     Admit Weight: 60.7 kg (133 lb 12.8 oz)    Vital sign ranges:    Temp:  [96.4  F (35.8  C)-99.5  F (37.5  C)] 97.9  F (36.6  C)  Heart Rate:  [] 95  Resp:  [18-36] 28  BP: ()/(41-66) 85/52  FiO2 (%):  [30 %] 30  %  SpO2:  [97 %-100 %] 100 %    General Appearance: NAD, on ventilator  HEENT: NG (green/tan)  Skin: warm, dry  Heart: Regular rate and rhythm  Lungs: Nonlabored breathing on ventilator.  Abdomen: Incision with wound vac in place, brown/greenish output.  : Luis in place draining clear yellow urine.  Extremities: well perfused. 1+ bilateral LE edema.  Neurologic: Follows commands when awake, no tremor.  Access: PICC, PIV    Data:   CMP    Recent Labs  Lab 09/05/18  0409 09/04/18  1139 09/04/18  0413  08/30/18  1412   *  --  148*  < > 143   POTASSIUM 3.5 3.9 3.2*  < > 3.3*   CHLORIDE 118*  --  118*  < >  --    CO2 14*  --  16*  < >  --    *  --  186*  < > 97   *  --  127*  < >  --    CR 2.40*  --  2.28*  < >  --    GFRESTIMATED 29*  --  31*  < >  --    GFRESTBLACK 36*  --  38*  < >  --    JESUS 9.5  --  9.1  < >  --    ICAW  --   --  5.4*  --  5.1   MAG 2.3  --  2.3  < >  --    PHOS 4.6*  --  4.8*  < >  --    ALBUMIN 2.1*  --  2.0*  < >  --    BILITOTAL 7.6*  --  7.5*  < >  --    ALKPHOS 664*  --  620*  < >  --    AST 41  --  33  < >  --    ALT 17  --  16  < >  --    < > = values in this interval not displayed.  CBC    Recent Labs  Lab 09/05/18 0409 09/04/18  0413   HGB 7.3* 7.9*   WBC 7.2 6.8   PLT 17* 24*     COAGS    Recent Labs  Lab 09/05/18  0409 09/04/18  0413   INR 2.36* 2.32*      Urinalysis  Recent Labs   Lab Test  07/27/18   2330  07/13/18   1315  06/18/18   1200   COLOR  Yellow  Dark Yellow  Yellow   APPEARANCE  Clear  Clear  Clear   URINEGLC  Negative  Negative  Negative   URINEBILI  Negative  Large*  Small*   URINEKETONE  Negative  Negative  Negative   SG  1.008  1.013  1.008   UBLD  Negative  Negative  Negative   URINEPH  7.5*  6.0  5.0   PROTEIN  30*  10*  Negative   NITRITE  Negative  Negative  Negative   LEUKEST  Negative  Negative  Negative   RBCU  5*  <1  1   WBCU  3  None  2   UTPG   --    --   0.75*     Virology:  Hepatitis C Antibody   Date Value Ref Range Status    07/20/2018 Nonreactive NR^Nonreactive Final     Comment:     Assay performance characteristics have not been established for newborns,   infants, and children         POD 14 US liver:  1.  The left portal vein is antegrade with decreased velocity,  measuring approximately 15 cm/second. This vessel was occluded on CT  7/30/2018.  2.  Elevated velocities of the main hepatic artery, now 203 cm/sec  (previously 94 cm/sec). However left hepatic arteries demonstrates  steep upstroke suggesting there is not a significant stenosis.   2a. Right hepatic artery was not visualized, could be occluded or  difficult to see postoperatively.  3. Echogenic focus of the right lobe in close proximity to the  hepatorenal fossa. This likely corresponds with nonenhancing lesion on  CT 7/30/2018 and likely representing subcapsular hematoma.  4. Additional small hematomas of the braden hepatis and posterior right  perihepatic region.  5. Small volume anechoic ascites.  6. Left hepatic lobe not well visualized secondary to open wound  preventing imaging. Note the left lobe was grossly abnormal on CT  7/30/2018    POD 1 US liver: Impression:   1.  Hematoma associated with the inferior aspect of the perihepatic  space anteriorly, measuring 13.7 x 7.0 x 8.5 cm.  2.  Retrograde flow of the left portal vein. Additionally, low  velocities of the portal venous system. Single low resistive index of  the extrahepatic aspect of the hepatic artery, measuring 0.37. These  findings are likely within normal limits in the early postoperative  context. However, continued attention on follow-up recommended.    7/30 CT scan abd/pelvis:  IMPRESSION:   1. Hypoenhancing areas in the transplant liver concerning for  infarction. Transplant left portal vein occlusion with bubbles of  portal venous gas. Nearly occlusive filling defect in the native  hepatic artery. Narrowed and irregular appearance of the transplant  hepatic artery. Focal occlusion of the left main  hepatic artery.  Segmental occlusions of right hepatic arterial branches.   2. Pneumatosis intestinalis with nonocclusive filling defects in  superior mesenteric vein branches, concerning for bowel ischemia or  infarction.   3. Splenic infarctions.   4. Bibasilar consolidation with air bronchograms. Pneumonia cannot be  excluded.   5. Post surgical changes of evacuation of peritransplant hematoma.

## 2018-09-05 NOTE — PROGRESS NOTES
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      Interval History :   Mr Miles's Cr is stable at 2.4 although has signs of poor clearance with elevated BUN and low bicarb/HAGMA.  Still with 1.7L of UOP, K 3.5, no urgent issue requiring intervention today.  Poor clearance may effect plt fx, also may delay healing, would be reasonable to start in coming days unless renal fx improves, would be best to discuss in light of overall goals of care as he has major issues with recent bowel perf requiring removal of majority of small bowel, also remains on ventilator with difficulty weaning.          Assessment & Recommendations:   BEN-Baseline Cr of 1.3-2, up to 2.8 prior to starting CRRT on 7/30 due to hyperkalemia, continued until 8/23 when he made >3L of UOP and Cr came down on its own.  Now with Cr on rise to 2.3, BUN up to 127 today.  Repeat BEN likely hemodynamic due to sepsis, increased intra-abdominal pressure and prerenal state.  No pressing issue requiring intervention today but showing signs of poor clearance and may need to go back on HD in coming days, will eval daily.  No access currently.     -Cr stable at 2.2-2.4 the past 2 days, signs of poor clearance with low bicarb and        Volume status-Up in wt, net positive daily despite reasonable UOP (1.7L yesterday) but has high obligate intake,.  Can use diuretic for augmenting UOP as needed, respiratory .         Electrolytes/pH-Na 147 a bit improved with free water (D5 IV), likely free H2O loss in UOP, K 3.5, bicarb 14.  Showing signs of poor clearance but still with significant UOP and no pressing issue with K.        Ca/phos/pth-Ca 9.5, iCal a bit high at  5.4, could be contributing to BEN.      Liver tx-7/21/18.  Complicated by arterial and venous vascular issues and ischemic bowel, splenic infarct, still with diffuse small bowel ischemia, last ex lap/washout 8/18.  + VRE in blood on 8/17 (peripheral blood).   INR 2.3, last tacro level 8.7.           ID-Multiple abdominal ID issues, on ceftaroline, ertapenem, fluconazole, tenofovir, tigecycline.        Anemia-Hgb 7.3, down a bit from yesterday, last PRBC 8/29, also gets FFP and plt's.        Nutrition-Starting TF with trickle feeds.        Seen and discussed with Dr Luis      Recommendations were communicated to primary team via verbal communication.       Saul Pichardo  Clinical Nurse Specialist  [phone]    Review of Systems:   I reviewed the following systems:  ROS not done due to vent/sedation.       Physical Exam:   I/O last 3 completed shifts:  In: 4796.14 [I.V.:2366.14; NG/GT:395; IV Piggyback:500]  Out: 1375 [Urine:1375]   BP (!) 85/52  Pulse 95  Temp 97.9  F (36.6  C)  Resp 28  Wt 70.1 kg (154 lb 8.7 oz)  SpO2 100%  BMI 24.94 kg/m2     GENERAL APPEARANCE: Intubated, critically ill.    EYES:  No scleral icterus, pupils equal  HENT: mouth without ulcers or lesions  PULM: lungs clear to auscultation, equal air movement, no cyanosis or clubbing  CV: regular rhythm, normal rate, no rub     -JVP not elevated.     -edema No LE edema, some   GI: soft, nontender, non-distended, bowel sounds are +  MS: no evidence of inflammation in joints, no muscle tenderness  NEURO: Intubated and sedated but moves all extremities.      Labs:   All labs reviewed by me  Electrolytes/Renal -   Recent Labs   Lab Test  09/05/18   0409  09/04/18   1139  09/04/18   0413  09/03/18   0346   NA  147*   --   148*  149*   POTASSIUM  3.5  3.9  3.2*  3.7   CHLORIDE  118*   --   118*  118*   CO2  14*   --   16*  16*   BUN  131*   --   127*  127*   CR  2.40*   --   2.28*  2.19*   GLC  107*   --   186*  139*   JESUS  9.5   --   9.1  8.8    MAG  2.3   --   2.3  2.3   PHOS  4.6*   --   4.8*  4.3       CBC -   Recent Labs   Lab Test  09/05/18   0409  09/04/18   0413  09/03/18   0346   WBC  7.2  6.8  6.1   HGB  7.3*  7.9*  8.3*   PLT  17*  24*  23*       LFTs -   Recent Labs   Lab Test  09/05/18   0409  09/04/18   0413  09/03/18   0346   ALKPHOS  664*  620*  599*   BILITOTAL  7.6*  7.5*  7.7*   ALT  17  16  14   AST  41  33  26   PROTTOTAL  5.2*  5.2*  5.1*   ALBUMIN  2.1*  2.0*  2.1*       Iron Panel -   Recent Labs   Lab Test  07/13/18   0334  06/17/18   2138   IRON  97  66   IRONSAT  83*  109*   ANGELLA  Unsatisfactory specimen - icteric   --            Current Medications:    albumin human         ceftaroline (TEFLARO) intermittent infusion  300 mg Intravenous Q12H     DAPTOmycin (CUBICIN) intermittent infusion  12 mg/kg Intravenous Q48H     ertapenem (INVanz) IV  500 mg Intravenous Q24H     fluconazole  200 mg Intravenous Q24H     heparin lock flush  5-10 mL Intracatheter Q24H     lipids 4 oil  250 mL Intravenous Once per day on Mon Wed Fri     nystatin  500,000 Units Mouth/Throat 4x Daily     pantoprazole (PROTONIX) IV  40 mg Intravenous BID     sodium chloride (PF)  3 mL Intravenous Q8H     sulfamethoxazole-trimethoprim  10 mL Per Feeding Tube Once per day on Mon Wed Fri     tacrolimus  0.25 mg Oral or Feeding Tube BID IS     tenofovir  300 mg Per Feeding Tube Q72H     tigecycline (TYGACIL) intermittent infusion  50 mg Intravenous Q12H     valGANciclovir  450 mg Oral Once per day on Mon Thu    Or     valGANciclovir  450 mg Oral or NG Tube Once per day on Mon Thu       IV fluid REPLACEMENT ONLY       D5W 50 mL/hr at 09/04/18 1800     heparin 400 Units/hr (09/05/18 0757)     insulin (regular) 2 Units/hr (09/05/18 1000)     parenteral nutrition - ADULT compounded formula       parenteral nutrition - ADULT compounded formula 45 mL/hr at 09/05/18 0758     Reason beta blocker order not selected           I, Kennedy Luis, saw and evaluated this  patient with Saul Pichardo, Clinical Nurse Specialist, 09/05/18, as part of a shared visit.     I personally reviewed major complaints, physical findings, investigations, medications and the overall management plan.        My key findings: BEN on CKD    Key management decisions made by me: renal replacement therapy unlikely to be of meaningful benefit, will monitor this closely..

## 2018-09-05 NOTE — PROGRESS NOTES
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 He returned to the SICU for shock, severe lactic acidosis, renal failure and respiratory failure, requiring intubation and dialysis.  Ischemic injury to left hemiliver and non-occlusive mesenteric ischemia.  He underwent a laparotomy and several subsequent take-backs to examine ischemic bowel, on 8/6 he underwent resection of necrotic jejunum with a primary anastomosis, and his abdomen was closed 8/8. He acutely decompensated beginning the night of 8/16, with increasing pressor needs, increasing abdominal pain and lactic acidosis, now s/p exploratory laparotomy 8/17 and s/p entero-entero small bowel anastomosis, drain placement and fascial closure 8/18. Improving renal function has improved (off dialysis). Take back to OR on 8/27/18 for hematochezia and found to have anastomotic dehiscence and perforated bowel near site.  Returned to OR 8/30 for washout and replacement of mesh.      CHANGES FOR TODAY/MAJOR THINGS:   - PST, wean ventilator- continue weaning efforts   - Discuss plan with nephrology re HD vs monitor  - ID: f/u sensitivities, narrow abx as able--await ID recommendations. Additional sensitivities added   - upper GI XR with small bowel follow through per Liver transplant   - potential family care conference     PLAN:     Neuro/ pain/ sedation:  # acute pain  - PRN IV dilaudid as needed.   - No sedation      Pulmonary care:   # Acute respiratory failure  - extubated (8/22), re intubated for OR 8/24/18--has remained intubation since   - HOB elevation. Vent bundle. Oral cares   - CMV 18/400/5/30. PST as able.   - PST again today with High RR and low TV today.      Cardiovascular:    # Hypotension- hypovolemic vs. hemorrhagic  # Junctional rhythm on 8/22  EKG, unclear cause, resolved 8/24- no additional episodes.   - MAP goal > 60. Levophed weaned off.  - TTE 8/8 showed no vegetations or valve abnormalities  - TOÑO 8/10 no evidence of endocarditis   - EKG (8/23): junctional bradycardia   - EKG (8/24, 8/27): Normal sinus rhythm      Gastroenterolgy   # DDLT 7/21/18  # Intrahepatic left portal venous thrombosis, infarction of left liver lobe, patchy infarction of right lobe, suspected hepatic arterial thrombosis  # 7/30/18 s/p exploratory laparotomy and liver biopsy  # Splenic infarctions, patchy bowel ischemia  # Ex Lap 8/6 - resection of 90 cm of necrotic jejunum  # Ex Lap 8/8 - abdomen closed  # Ex Lap 8/17 - Found to have widespread bowel ischemia requiring bowel resection.  Left in discontinuity.  # Ex Lap 8/18 - entero-entero anastomosis and facial closure.  Approximately  cm small bowel remaining with intact ileocecal valve and colon.  # 8/27 s/p abdomen washout primary anastomosis closure with abthera placement (found to have anastomotic dehiscence, perf bowel near anastomotic site)--pt left open,   # 8/30 s/p abd closur with permacol, VAC placement   # GIB- hematochezia  - Two episodes of bowel ischemia of unclear etiology requiring resection of the majority of small bowel ,approx  cm of small bowel remaining per transplant   - Luminal GI consulted for hematochezia but discovered to be due to bowel ischemia/perforation-- now signed off   - ON TPN only- RD and Pharm D managing and adjusting   - Wound vac changes q3 days per transplant. Transplant monitoring       Fluids/ Electrolytes/ Nutrition:   # Protein calorie deficit malnutrition   # Hypocalcemia, now with hypercalcemia   # hypomagnesia, replaced   # hypokalemia--replaced   # Short gut syndrome,  cm of small bowel remaining per notes.   - NJ removed 8/27/18 during OR. Hold off NG feeds x1 week, will confer with Transplant when to resume this, await GI study today.    - continue with TPN  only at this time     Renal/ Fluid Balance:  # BEN -recovering  # Metabolic acidosis   - CRRT stopped 8/23. Continues to make urine since then. Urine Total 1.7L/24 hours of urine output.   - Renal re-consulted given metabolic acidosis, increasing phos/calcium level. Await additional recommendations. Will need access for HD is needed   - Luis in place for strict I's and O's      Endocrine:    # DM II  - Continue insulin gtt for now given subcutaneous edema.        ID/ Antibiotics:  # VRE Bacteremia, resolved   # ESBL Bacteremia, resolved   # Hepatitis B  # Peritonitis   --Continued need for ongoing ABX, no stop date thus far.   - Appreciate transplant ID  following for now.  - Cultures from abdomen fluid 8/30: VRE, yeast, GPC-- now on Ertapenem.     - 8/27: ESBL x2, VRE, Yeast   - s/p Meropenem (8/2-8/14), discontinued after 12 days of negative cultures for ESBL. Meropenem resumed 8/30 d/t intraabdominal ESBL, changed to Ertapenem 8/31  - s/p Amikacin (8/17 - 8/21), discontinued as he had completed 2-week course of meropenem and has other active agents for G-coverage  - Flagyl (8/17- 8/30)      Current ABx: KEEP all below for now, sensitivities added per ID.   - Daptomycin (7/30- ), ceftaroline (8/13- )  - Micafungin (7/30-8/22) changed to fluconazole (8/22- )  - Tigecycline (8/13-8/23), discontinued after 4 days of negative blood cultures. Re-started 8/28/17  - Meropenem restarted 8/30/18, changed to ertapenem 8/31/18 for ESBL E. coli  - Immunosuppression: (restarted 8/6) tacrolimus BID   - Immunosuppression prophylaxis: Valgancyclovir and Tenofovir, micafungin, Nystatin QID. Bactrim resumed 8/29      Cultures   - 7/27 VRE (line/arm)   - 7/29: VRE (left arm)   - 7/30: VRE (left arm)   - 7/31: VRE (Line and peripheral)  - 8/1: ESBL (HD/hand/central line)   - 8/3: VRE (intra-abdominal tissue)  - 8/4: NGTD (blood)  - 8/5: NGTD (blood)  - 8/6: VRE (art line)  - 8/7: VRE (blood)  - 8/8: VRE (blood)  - 8/9: VRE  (blood)  - 8/11: VRE (blood)  - 8/12: VRE (abdominal fluid)  - 8/13: VRE (blood)  - 8/14: VRE (blood)  - 8/15: VRE (blood)  - 8/17: NGTD (blood)   - 8/17: VRE & Candida (peritoneal fluid)   - 8/18: G+ cocci (tissue)  - 8/19 NGTD (blood)  - 8/21 NGTD (blood)  - 8/22 NGTD (blood)  - 8/23 NGTD (blood)  - 8/24 NGTD (blood)  - 8/27 FLUID: VRE, ESBL E coli x2, yeast, GPC   - 8/30 FLUID: VRE, yeast      Heme:     # Acute blood loss anemia  # Anemia of critical illness  # Portal venous thrombosis  # Thrombocytopenia  # Coagulopathy   # Anti thrombin III deficiency  - Heparin at 400 straight rate- per transplant   - Holding aspirin 81mg   - Monitor CBC and INR daily  - Plan to keep platelet count > 20K, transfuse if signs/symptoms of oozing.      MSK:  # weakness and deconditioning of critical illness   - PT/OT consulted. OOB to chair daily       General cares and Prophylaxis:    - DVT: Heparin--400units/hr, SCD's  - GI: protonix 40 mg IV BID       Lines/ tubes/ drains:  - PIVs.   - PICC line   - Luis catheter   - wound vac     Dispo:  - SICU. Care conference sometime today vs later in week     Time spent on this Encounter   Billing:  I spent 35 minutes bedside and on the inpatient unit today managing the critical care of Yaneli Miles in relation to the issues listed in this note.      Mega Alvarez     ====================================    SUBJECTIVE:  Given 1U platelets overnight. Pt appears more lethargic today. Opens eyes to voice but did not follow commands. More liquid stool output per rectum. Does not follow commands.     OBJECTIVE:   1. VITAL SIGNS:   Temp:  [95.7  F (35.4  C)-99.5  F (37.5  C)] 99.1  F (37.3  C)  Heart Rate:  [] 109  Resp:  [18-36] 25  BP: ()/(42-66) 99/52  FiO2 (%):  [30 %] 30 %  SpO2:  [93 %-100 %] 100 %  Ventilation Mode: CMV/AC  (Continuous Mandatory Ventilation/ Assist Control)  FiO2 (%): 30 %  Rate Set (breaths/minute): 18 breaths/min  Tidal Volume Set (mL): 400 mL  PEEP (cm H2O): 5  cmH2O  Pressure Support (cm H2O): 7 cmH2O  Oxygen Concentration (%): 30 %  Resp: 25    2. INTAKE/ OUTPUT:   I/O last 3 completed shifts:  In: 4949.14 [I.V.:2519.14; NG/GT:395; IV Piggyback:500]  Out: 1715 [Urine:1715]    3. PHYSICAL EXAMINATION:   General: arouses to normal tone of voice, does not follow commands this am. Appears more lethargic  HEENT: Pupils 4mm and equal. ETT present and secured. NG present   Neuro: awake, arouses to name, did not follow commands this am.   Pulm/Resp: Clear breath sounds bilaterally, coarse breath sounds bilaterally. Decreased at bases   CV: RRR, S1, S2.   Abdomen: Abd VAC  present. Suction intact with brown-green output in tubing   : (+) bah catheter in place, urine yellow and clear  Incisions/Skin: skin jaundiced. VAC dressing secured, suction intact.   Extremities: generalized 2+ pitting peripheral edema evonne in posterior thighs/buttocks, generalized edema in legs and feet. calves soft, pulses 2+.     4. INVESTIGATIONS:   Arterial Blood Gases     Recent Labs  Lab 08/30/18  1412   PH 7.33*   PCO2 41   PO2 69*   HCO3 22     Complete Blood Count     Recent Labs  Lab 09/05/18  0409 09/04/18  0413 09/03/18  0346 09/02/18  0331   WBC 7.2 6.8 6.1 6.6   HGB 7.3* 7.9* 8.3* 8.5*   PLT 17* 24* 23* 19*     Basic Metabolic Panel    Recent Labs  Lab 09/05/18  0409 09/04/18  1139 09/04/18  0413 09/03/18  0346 09/02/18  0331   *  --  148* 149* 147*   POTASSIUM 3.5 3.9 3.2* 3.7 4.5   CHLORIDE 118*  --  118* 118* 116*   CO2 14*  --  16* 16* 19*   *  --  127* 127* 108*   CR 2.40*  --  2.28* 2.19* 2.12*   *  --  186* 139* 124*     Liver Function Tests    Recent Labs  Lab 09/05/18  0409 09/04/18  0413 09/03/18  0346 09/02/18  0331   AST 41 33 26 25   ALT 17 16 14 13   ALKPHOS 664* 620* 599* 704*   BILITOTAL 7.6* 7.5* 7.7* 7.7*   ALBUMIN 2.1* 2.0* 2.1* 2.2*   INR 2.36* 2.32* 2.65* 2.65*     Pancreatic Enzymes  No lab results found in last 7 days.  Coagulation Profile    Recent  Labs  Lab 09/05/18  0409 09/04/18  0413 09/03/18  0346 09/02/18  0331   INR 2.36* 2.32* 2.65* 2.65*       5. RADIOLOGY:   Recent Results (from the past 24 hour(s))   XR Abdomen Port 1 View    Narrative    XR ABDOMEN PORT 1 VW  9/4/2018 6:13 PM      HISTORY: Verify small bowel feeding tube bedside placement;     COMPARISON: 9/1/2018    FINDINGS: Supine AP view of the abdomen. Biliary drain unchanged in position. Gastric tube tip and side port projecting over the stomach. Contrast material in the colon. Paucity of bowel gas, nonspecific.      IMPRESSION: No feeding tube seen. Gastric tube tip projecting over the stomach..    I have personally reviewed the examination and initial interpretation and I agree with the findings.    AMANDA PORTER MD       =========================================

## 2018-09-06 NOTE — PROGRESS NOTES
CLINICAL NUTRITION SERVICES - REASSESSMENT NOTE     Nutrition Prescription    RECOMMENDATIONS FOR MDs/PROVIDERS TO ORDER:  None at this time    Malnutrition Status:    Severe malnutrition in the context of acute on chronic illness     Recommendations already ordered by Registered Dietitian (RD):  1. TPN Change:  -CPN 1080 mL with 275 g dex, 120 g AA, SMOFLIPID 3x/week = 1629 kcal (28 kcal/kg), 2 g/kg, GIR = 3.2 mg/kg/min, 13% total kcal from fat.  -Dosing weight = 59 kg    Future/Additional Recommendations:  1. If/when appropriate to start tube feeding, would recommend feeding stomach (given short gut syndrome):  -Peptamen 1.5 @ 10 mL/hr  -If pt tolerates, adv TF by 10 mL q8h to goal 50 ml/hr (1200 ml/day) to provide 1800 kcals (30 kcal/kg), 82 g PRO (1.3 g/kg), 924 ml free H2O, 67 g Fat (70% from MCTs), 226 g CHO and no Fiber daily  -3 pkts PROsource for total provision = 1920 kcal/day (31 kcal/kg) and 115 g PRO/day (1.9 g/kg)   -Order multivitamin/mineral (15 ml/day via FT) to help ensure micronutrient needs being met with suspected hypermetabolic demands and potential interruptions to TF infusions.  -30 mL water flush q4h for tube patency    2. Repeat metabolic cart studies prior to next reassessment to assess adequacy of nutrition support     EVALUATION OF THE PROGRESS TOWARD GOALS   Diet: NPO   Nutrition Support:  8/29-9/4: PN 1080 ml w/ 245g Dex, 120g AA + SMOFLIPID 3x/wk = 1527 kcals (25), 2.0 g PRO/kg, GIR = 2.8 w/ 14% kcals from Fat.     9/4-___: CPN 1080 mL with 245 g dex, 105 g AA, SMOFLIPID 3x/week = 1467 kcal (25 kcal/kg), 1.8 g/kg, GIR = 2.8 mg/kg/min, 15% total kcal from fat.    Intake:   -PN: 6 day average PN provisions = 1517 kcal (26 kcal/kg) and 118 g PRO (2 g/kg)     NEW FINDINGS   Resp: Pt continues on mechanical ventilation, FiO2 = 30%. Obtained metabolic cart study 9/5 @ 1240 with the following results: MREE = 1980 kcals/day (equiv to 34 kcal/kg/day) with RQ = 0.93.  Pt received 1080 ml of PN  in 24 hours preceding the study providing 1467 kcals (74 % MREE).  RQ within physiologic range; RQ is logical given provisions (PN - maximal absorption, would predict higher RQ) received prior to study.  Would aim energy intakes minimally at % of this MREE (equiv to 27-34 kcal/kg/day).    Renal: Pt continues on iHD. BUN has continued to trend up despite decreasing AA in PN. Given this, will re-increase AA in PN given pt continuing to worsen and lose muscle mass.    GI: Pt tolerating PN at goal rate. Still unable to start enteral nutrition until small bowel study completed    Labs (9/6): Na = 145 (H), K = 3.3 (L), BUN = 134 (H), Creatinine = 2.47 (H), Phos = 4.7 (H), TBili = 7.9 (H), Alk Phos = 664 (H), DBili = 6.3 (H); Lactic acid (8/28) = 3.7 (H)    Weight: Most recent weight of 70.1 kg on 9/5 is up from lowest admit wt of 58.7 kg on 8/27.    MALNUTRITION  % Intake: Decreased intake does not meet criteria  % Weight Loss: None noted  Subcutaneous Fat Loss: Facial region, Upper arm, Lower arm and Thoracic/intercostal: Severe  Muscle Loss: Temporal, Facial & jaw region, Thoracic region (clavicle, acromium bone, deltoid, trapezius, pectoral), Upper arm (bicep, tricep), Lower arm  (forearm), Upper leg (quadricep, hamstring), Patellar region and Posterior calf: Severe  Fluid Accumulation/Edema: Mild-moderate  Malnutrition Diagnosis: Severe malnutrition in the context of acute on chronic illness     Previous Goals   Total avg nutritional intake to meet a minimum of 25 kcal/kg equiv to 100% MREE (via PN alone) and 1.5 g PRO/kg daily (per dosing wt 59 kg).  Evaluation: Met    Previous Nutrition Diagnosis  Inadequate energy intake r/t short gut syndrome requiring modest-energy PN as sole source of nutrition AEB 6 day average intake of 25 kcal/kg with >2% weight loss over the past week  Evaluation: Improving    CURRENT NUTRITION DIAGNOSIS  Inadequate energy intake r/t short gut syndrome requiring modest-energy PN in the  setting ongoing liver disease as sole source of nutrition AEB 6 day average intake of 26 kcal/kg with severe fat and muscle depletion    INTERVENTIONS  Implementation  Collaboration and Referral of Nutrition care - Discussed plan for FEN/GI on rounds with Providers  TPN Change - increase dextrose and amino acids    Goals  Total avg nutritional intake to meet a minimum of 80% MREE (equiv to 27 kcal/kg) while on sole PN and 1.5 g PRO/kg daily (per dosing wt 59 kg).    Monitoring/Evaluation  Progress toward goals will be monitored and evaluated per protocol.    Halina Crouch, RD, LD  SICU RD Pgr: [phone]

## 2018-09-06 NOTE — CARE CONFERENCE
Care Conference    Care conference was held on September 6, 2018.  Conference was coordinated by Care Coordinator in the conference room with a professional  throughout meeting.     Attending the conference were:      7 family members including wife Luann, pt's son, 2 uncles one cousin and 2 female family members.     SICU team: Dr Holland, Dr Howell, Dr Acuna and 1 medical student, Mega Alvarez PA-C  Transplant team: Geovanna Burris NP  ICU RNCC: Marcus Escalante  Renal: Saul Pichardo NP    Purpose of the conference was medical update and goals of care.      Discussion/Outcomes/Follow-Up:     Introductions took place. Dr Howell led the conference, providing family with pt's recent medical course from last care conference until today. He indicated that pt is in multi-system organ failure since his transplant with continued issues with anastomosis leak and sepsis. Pt's recent CT scan showed a new leak and surgical repair is scheduled for this evening. Dr Howell and Dr Holland updated family that pt's likely best case scenario would be nursing home cares with chronic TPN and HD for the rest of his life. Pt would not be able to enjoy real food due to short gut. Family had many questions that were answered. Family verbalized understanding of answers provided. They asked for time to speak privately as a family. Care team left the room and family discussed situation for about 10-15 min and asked us to return. Entire care team returned and family updated that they would like pt to continue on current medications and vent until other family members arrive. At which time they would like to remove pt from the vent. They do NOT want pt to return to the OR for any further surgeries. They do NOT want further HD. Mega Alvarez addressed pt's code status and family all in agreement that they would like pt to be changed to DNR/DNI. They do not want pt to suffer any longer and wish for him to be made comfortable. Family was  appropriately grieving and thanked all staff for their treatment of pt and family during their time at [facility]. They did request that pt's abdomen be completely closed for cultural reasons. HOSEA Olmstead stated that Dr Norwood would like to come and speak to family around 3 pm today. Family understood and in the mean time, will contact other family members to come to the hospital to spend time with pt prior to removal of the vent. Bedside RN updated with outcome of the conference.       Marcus Escalante RN, BSN  ICU Care Coordinator  Pager: [phone]  Phone:  [phone]

## 2018-09-06 NOTE — PROGRESS NOTES
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 He returned to the SICU for shock, severe lactic acidosis, renal failure and respiratory failure, requiring intubation and dialysis.  Ischemic injury to left hemiliver and non-occlusive mesenteric ischemia.  He underwent a laparotomy and several subsequent take-backs to examine ischemic bowel, on 8/6 he underwent resection of necrotic jejunum with a primary anastomosis, and his abdomen was closed 8/8. He acutely decompensated beginning the night of 8/16, with increasing pressor needs, increasing abdominal pain and lactic acidosis, now s/p exploratory laparotomy 8/17 and s/p entero-entero small bowel anastomosis, drain placement and fascial closure 8/18. Improving renal function has improved (off dialysis). Take back to OR on 8/27/18 for hematochezia and found to have anastomotic dehiscence and perforated bowel near site.  Returned to OR 8/30 for washout and replacement of mesh.      CHANGES FOR TODAY/MAJOR THINGS:   - transfuse one unit PRBC and platelet this am--recheck and transfuse as indicated   - discontinue  D5 infusion as Na 145  - OR delayed until 8pm per transplant   - care conference today   - start Norepi for BP support   - Follow up with infectious disease for  abx recommendations.     PLAN:     Neuro/ pain/ sedation:  # acute pain  - PRN IV dilaudid as needed.   - No sedation, no current continuous infusions.      Pulmonary care:   # Acute respiratory failure  - extubated (8/22), re intubated for OR 8/24/18--has remained intubation since  - HOB elevation. Vent bundle. Oral cares   - CMV 18/400/5/30. PST as able however with high RR rate and low tidal volumes.        Cardiovascular:    # Hypotension  # Septic shock   # Junctional rhythm on 8/22 EKG, unclear  cause, resolved 8/24- no additional episodes.   - MAP goal > 60. No meeting MAP goals given sepsis from anastomic leaks--start Norepi today.   - TTE 8/8 showed no vegetations or valve abnormalities  - TOÑO 8/10 no evidence of endocarditis   - EKG (8/23): junctional bradycardia   - EKG (8/24, 8/27): Normal sinus rhythm      Gastroenterolgy   # DDLT 7/21/18  # Intrahepatic left portal venous thrombosis, infarction of left liver lobe, patchy infarction of right lobe, suspected hepatic arterial thrombosis  # 7/30/18 s/p exploratory laparotomy and liver biopsy  # Splenic infarctions, patchy bowel ischemia  # Ex Lap 8/6 - resection of 90 cm of necrotic jejunum  # Ex Lap 8/8 - abdomen closed  # Ex Lap 8/17 - Found to have widespread bowel ischemia requiring bowel resection.  Left in discontinuity.  # Ex Lap 8/18 - entero-entero anastomosis and facial closure.  Approximately  cm small bowel remaining with intact ileocecal valve and colon.  # 8/27 s/p abdomen washout primary anastomosis closure with abthera placement (found to have anastomotic dehiscence, perf bowel near anastomotic site)--pt left open,   # 8/30 s/p abd closur with permacol, VAC placement   # GIB- hematochezia  - Two episodes of bowel ischemia of unclear etiology requiring resection of the majority of small bowel ,approx  cm of small bowel remaining per transplant   - ON TPN only- RD and Pharm D managing and adjusting   - Wound vac changes q3 days per transplant. Transplant monitoring   - CT done 9/5/18 showing leak near anastomic site and adjacent air foci along with free fluid in the abdomen Transplant aware, planning for washout later today.       Fluids/ Electrolytes/ Nutrition:   # Protein calorie deficit malnutrition   # Hypocalcemia, now with hypercalcemia   # hypomagnesia, replaced   # hypokalemia--replaced   # Short gut syndrome,  cm of small bowel remaining per notes.   - NJ removed 8/27/18 during OR. Continue with TPN only given  leak.     Renal/ Fluid Balance:  # BEN -recovering  # Metabolic acidosis   - CRRT stopped 8/23. Continues to make urine since then. Making urine but not able to clear toxins.   - Renal re-consulted for possible HD needs.   - Luis in place for strict I's and O's      Endocrine:    # DM II  - Continue insulin gtt for now given subcutaneous edema. Pt may not akrmen able to absorb SC insulin   - BG's should improve with cessation of free water        ID/ Antibiotics:  # VRE Bacteremia, resolved   # ESBL Bacteremia, resolved   # Hepatitis B  # Peritonitis   --Continued need for ongoing ABX per ID.   - Appreciate transplant ID  following for now.  - Cultures from abdomen fluid 8/30: VRE, yeast, GPC-- now on Ertapenem.     - 8/27: ESBL x2, VRE, Yeast   - s/p Meropenem (8/2-8/14), discontinued after 12 days of negative cultures for ESBL. Meropenem resumed 8/30 d/t intraabdominal ESBL, changed to Ertapenem 8/31  - s/p Amikacin (8/17 - 8/21), discontinued as he had completed 2-week course of meropenem and has other active agents for G-coverage  - Flagyl (8/17- 8/30)      Current ABx: KEEP all below for now, sensitivities added per ID.   - Daptomycin (7/30- ), ceftaroline (8/13- )  - Micafungin (7/30-8/22) changed to fluconazole (8/22- )  - Tigecycline (8/13-8/23), discontinued after 4 days of negative blood cultures. Re-started 8/28/17  - Meropenem restarted 8/30/18, changed to ertapenem 8/31/18 for ESBL E. coli  - Immunosuppression: (restarted 8/6) tacrolimus BID   - Immunosuppression prophylaxis: Valgancyclovir and Tenofovir, micafungin, Nystatin QID. Bactrim resumed 8/29      Cultures   - 7/27 VRE (line/arm)   - 7/29: VRE (left arm)   - 7/30: VRE (left arm)   - 7/31: VRE (Line and peripheral)  - 8/1: ESBL (HD/hand/central line)   - 8/3: VRE (intra-abdominal tissue)  - 8/4: NGTD (blood)  - 8/5: NGTD (blood)  - 8/6: VRE (art line)  - 8/7: VRE (blood)  - 8/8: VRE (blood)  - 8/9: VRE (blood)  - 8/11: VRE (blood)  - 8/12: VRE  (abdominal fluid)  - 8/13: VRE (blood)  - 8/14: VRE (blood)  - 8/15: VRE (blood)  - 8/17: NGTD (blood)   - 8/17: VRE & Candida (peritoneal fluid)   - 8/18: G+ cocci (tissue)  - 8/19 NGTD (blood)  - 8/21 NGTD (blood)  - 8/22 NGTD (blood)  - 8/23 NGTD (blood)  - 8/24 NGTD (blood)  - 8/27 FLUID: VRE, ESBL E coli x2, yeast, GPC   - 8/30 FLUID: VRE, yeast      Heme:     # Acute blood loss anemia  # Anemia of critical illness  # Portal venous thrombosis  # Thrombocytopenia  # Coagulopathy   # Anti thrombin III deficiency  - Heparin at 400 straight rate- per transplant   - Holding aspirin 81mg   - Monitor CBC and INR daily  - Plan to keep platelet count > 20K,Transfuse hgb and platelets to support BP.      MSK:  # weakness and deconditioning of critical illness   - PT/OT consulted.   - In bed passive ROM.       General cares and Prophylaxis:    - DVT: Heparin--400units/hr, SCD's  - GI: protonix 40 mg IV BID       Lines/ tubes/ drains:  - PIVs.   - PICC line   - Luis catheter   - wound vac     Dispo:  - SICU. Care conference  Later today.     Time spent on this Encounter   Billing:  I spent 45 minutes bedside and on the inpatient unit today managing the critical care of Yaneli Miles in relation to the issues listed in this note.      Mega Alvarez     ====================================    SUBJECTIVE:  Given 1U platelets and PRBC this am. Additional albumin given overnight for low BP's, blood pressure without much improvement. Pt opens eyes only. Does nto interact or follow commands.  Facial grimaces on abdominal examination.  Plan for possible OR later this afternoon with transplant.     OBJECTIVE:   1. VITAL SIGNS:   Temp:  [96.3  F (35.7  C)-99.9  F (37.7  C)] 99.5  F (37.5  C)  Heart Rate:  [] 111  Resp:  [22-49] 31  BP: ()/(40-64) 94/51  FiO2 (%):  [30 %] 30 %  SpO2:  [93 %-100 %] 98 %  Ventilation Mode: CMV/AC  (Continuous Mandatory Ventilation/ Assist Control)  FiO2 (%): 30 %  Rate Set (breaths/minute): 18  breaths/min  Tidal Volume Set (mL): 400 mL  PEEP (cm H2O): 5 cmH2O  Pressure Support (cm H2O): 7 cmH2O  Oxygen Concentration (%): 30 %  Resp: 31    2. INTAKE/ OUTPUT:   I/O last 3 completed shifts:  In: 4770 [I.V.:1990; NG/GT:700]  Out: 1935 [Urine:885; Emesis/NG output:250; Stool:800]    3. PHYSICAL EXAMINATION:   General: arouse to name.   HEENT: Pupils 4mm and equal. ETT present and secured. NG present and to suction.   Neuro: awake, arouses to name, did not follow commands this am.   Pulm/Resp: Coarse breath sounds bilaterally. No wheezes, rales or rhonchi.   CV: RRR, S1, S2.   Abdomen: Abd VAC Present Suction intact, brown-green drainage output in tubing. Abdomen tender to examination, facial grimacing on examination.   Liquid yellow stool in rectal tubing.   : (+) bah catheter in place, urine yellow and clear  Incisions/Skin: skin jaundiced. VAC dressing secured, suction intact. No rashes noted.   Extremities: generalized pitting edema from mid thorax  to feet, calves soft and non-tender. PP2+.     4. INVESTIGATIONS:   Arterial Blood Gases     Recent Labs  Lab 08/30/18  1412   PH 7.33*   PCO2 41   PO2 69*   HCO3 22     Complete Blood Count     Recent Labs  Lab 09/06/18  0440 09/05/18  0409 09/04/18  0413 09/03/18  0346   WBC 7.9 7.2 6.8 6.1   HGB 6.6* 7.3* 7.9* 8.3*   PLT 19* 17* 24* 23*     Basic Metabolic Panel    Recent Labs  Lab 09/06/18  0440 09/05/18  0409 09/04/18  1139 09/04/18  0413 09/03/18  0346   * 147*  --  148* 149*   POTASSIUM 3.3* 3.5 3.9 3.2* 3.7   CHLORIDE 114* 118*  --  118* 118*   CO2 14* 14*  --  16* 16*   * 131*  --  127* 127*   CR 2.47* 2.40*  --  2.28* 2.19*   * 107*  --  186* 139*     Liver Function Tests    Recent Labs  Lab 09/06/18  0440 09/05/18  0409 09/04/18  0413 09/03/18  0346   AST 47* 41 33 26   ALT 19 17 16 14   ALKPHOS 664* 664* 620* 599*   BILITOTAL 7.9* 7.6* 7.5* 7.7*   ALBUMIN 2.8* 2.1* 2.0* 2.1*   INR 2.44* 2.36* 2.32* 2.65*     Pancreatic  Enzymes  No lab results found in last 7 days.  Coagulation Profile    Recent Labs  Lab 09/06/18  0440 09/05/18  0409 09/04/18  0413 09/03/18  0346   INR 2.44* 2.36* 2.32* 2.65*       5. RADIOLOGY:   Recent Results (from the past 24 hour(s))   XR Abdomen Port 1 View    Narrative    XR ABDOMEN PORT 1 VW  9/4/2018 6:13 PM      HISTORY: Verify small bowel feeding tube bedside placement;     COMPARISON: 9/1/2018    FINDINGS: Supine AP view of the abdomen. Biliary drain unchanged in position. Gastric tube tip and side port projecting over the stomach. Contrast material in the colon. Paucity of bowel gas, nonspecific.      IMPRESSION: No feeding tube seen. Gastric tube tip projecting over the stomach..    I have personally reviewed the examination and initial interpretation and I agree with the findings.    AMANDA PORTER MD       =========================================

## 2018-09-06 NOTE — PLAN OF CARE
Problem: Patient Care Overview  Goal: Plan of Care/Patient Progress Review    Occupational Therapy Discharge Summary    Reason for therapy discharge:    Change in medical status.  Change in goals of care; plan to transition to comfort cares    Progress towards therapy goal(s). See goals on Care Plan in [facility] electronic health record for goal details.  Goals not met.  Barriers to achieving goals:   change in goals of care.    Therapy recommendation(s):    No further therapy is recommended.

## 2018-09-06 NOTE — CARE CONFERENCE
Care conference note.     Met with patient's wife, 2 children along with 3 brothers and sister in law. [facility] [place], Ms Escalante,along with members of the ICU team, Dr. Holland, SICU staff; Dr Howell, fellow; along with Mr. Pichardo, renal NP and transplant Live NP, Ms Burris for discussion.     Family was updated on his current clinical situation and current medical therapies that are currently in place to care for him. Family asked questions regarding  additional interventions and procedures that would be necessary for him to recover.  They asked what his life would be if cares continued. We discussed additional operations and procedures to close his abdomen tp repair current leak, placement of HD line with ongoing hemodialysis which would be followed by prolonged rehab possible needing community TCU/nursing home placement, life long TPN as it is unlikely he would be able to eat or drink enough given his short gut, with possibility of repeat hospitalizations.They acknowledge his prolonged hospitalization, the many surgeries and complications and quality of life he would have if recovered. Patient's family acknowledged efforts and care of team and expressed gratitude for cares and efforts.  Patient's family did request time to speak amongst themselves without providers present.     Upon, provider return, Mr Miles's brothers expressed that they would not want him to suffer or be in pain.  They report they did not want the patient to be in pain or suffer as his brothers report  his wife and children have suffered during this prolonged hospitalization as well. They do not want additional procedures, operations or dialysis but would like to continue with medical management acknowledging that he may not survive.  They wish he be made as comfortable as possible until additional family arrive. They expressed they would like patient to be extubated once additional family were present, so  that if able, Yaneli could speak and eat.  Patient's wife did request that his abdomen incision (skin) to be closed so he be intact when he passed. They expressed if he were to pass away, they would want him to pass peaceful.     At this point, I did address code status with his family, given ongoing sepsis, recurrent infections, and multiorgan dysfunction, cardiac resuscitation and CPR would likely cause more harm to him. Family did not want chest compression as this would cause pain and suffering.     Will continue forward with current cares and not escalate. Will not proceed with additional procedures or operations per family wishes.

## 2018-09-06 NOTE — PROGRESS NOTES
[facility]    Transplant Infectious Diseases Inpatient Progress note      Yaneli Miles MRN# [id number]   YOB: 1975 Age: 43 year old   Date of Admission: 7/20/2018  5:09 PM  Transplant: 7/21/2018 (Liver), Postoperative day: 47            Recommendations:   1. Based on new tigecycline susceptibilities, recommend discontinuing ceftaroline, daptomycin, and ertapanem.  2. Continue with tigecycline and fluconazole.    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 Suspicion is that infarcted/abscessed tissue in transplanted liver is the source of persistent VRE detected in abdominal fluid and, until 8/17, in blood. Currently on multiple synergistic agents (ceftaroline, tigecycline, daptomycin), but as both VRE strains and both ESBL E coli strains are tigecycline sensitive, we favor narrowing to this agent for bacterial coverage, with fluconazole for fungal coverage.        Active Problems and Infectious Diseases Issues:   1. Severe sepsis (8/27) with significant leukocytosis s/p abdominal washouts - Abdominal fluid cultures (latest on 8/30) continue to grow VRE, suggesting #2 might be our VRE source for the previous bacteremia. Given patient family's wish for comfort care and minimally invasive approach, we favor going forward with antibiotics (tigecycline, based on sensitivities) without further surgical attempts at source control.    2. Hepatic absces (CT 8/15/18) - Treating as above for #1    3. Persistent VRE bacteremia - Negative blood cultures since 8/19. TTE (8/8) and TOÑO (8/10) witout notable vegetations.  Coninuing current abx.    4. New possible anastomotic small bowel leak - per care conference note, family is asking for comfort care-based approach with some medical intervention. Will not push for invasive measures to respect their wishes.    Other Infectious Disease issues include:  - PCP prophylaxis: Bactrim, TDF, vGCV  - Serostatus: CMV D-/R+, EBV D+/R+, HCV -/-, HBV -/-    Interim History and Events:   No significant events overnight. Remains intubated but when sedation is decreased can easily follow commands. No episodes of hypotension, tachycardia overnight. BCx from 9/2 remain NGTD, new cultures collected today.    HPI:  42yo M with h/o chronic HBv s/p OLT on 7/21/2018. Post-op course complicated by hemorrhagic shock s/p MTP. He was started on zosyn partha-operatively per transplant protocol, and it was continued for 48 hours through 7/24. Immunosuppression induction was with basiliximab (2/2 real dysfunction) with a methylpred/prednisone taper and maintenance has MMF + tacrolimus.     On 7/27, he became febrile and was started on levofloxacin then changed to linezolid. Cultures were collected, lines removed, and CT abdomen performed demonstrating two fluid collections. By 7/40, he was having emesis, lethargy, and hypotension. Micafungin and zosyn were started, immunosuppression was held, and a new CT showed possible liver infarct with left portal vein occlusion, hepatic artery occlusion, and pneumtosis intestinalis with non-occlusive filling defects in the SMV c/f bowel ischemia. He was intubated and taken to the OR for an ex-lap, where cultures were collected of the abdominal fluid and a liver bx was taken. BCx and abdominal fluid grew VRE. At about the same time, his lactate became elevated so the linezolid was swapped for daptomycin. On 7/31, a culture of the hematoma seen in the Or also grew VRE. On 8/1, blood cultures also grew ESBL E coli, so zosyn was changed to meropenem. A repeat abdominal washout  was performed on 8/2 with segmental jejeunal resection and more cultures collected (again growing VRE from the hematoma). Another washout was performed on 8/8. Throughout this, and up to 8/17, daily blood cultures were returning positve for VRE growth. Given this persistent bacteremia, the daptomycin dose was increased, and suspicions arose that this infarcted/abscessed liver zone could be harboring the VRE we were seeing in the blood and abdominal fluid. Ceftaroline was added on 8/14 along with tigecycline for synergy with daptomycin in an attempt to clear the VRE. An emergen return to the OR for washout occurred on 8/16 after an episode of abd pain and hypotension. That same day, amikacin and Flagyl were added for expanded coverage. Amikaci nwas stopped on 8/20 due to improved source control and decreased concern for GNR (none growing in cultures). Further washouts were perfored on 8/27 (with anastomotic dehiscence and bowel perf repair) and 8/30, both growing VRE. At present, his antibiotic regimen is daptomycin, ceftaroline, tigecycline, fluconazole, ertapenem.      ROS:  As mentioned in the interim history otherwise negative by reviewing constitutional symptoms, central and peripheral neurological systems, respiratory system, cardiac system, GI system,  system, musculoskeletal, skin, allergy, and lymphatics.                 Pysical Examination:  Temp: 98.4  F (36.9  C) Temp src: Esophageal BP: 98/58   Heart Rate: 96 Resp: 23 SpO2: 100 % O2 Device: Mechanical Ventilator    Vitals:    09/01/18 0343 09/02/18 0300 09/03/18 0400 09/04/18 0000   Weight: 68.9 kg (151 lb 14.4 oz) 71 kg (156 lb 8.4 oz) 71.1 kg (156 lb 12 oz) 71.4 kg (157 lb 6.5 oz)    09/05/18 0200   Weight: 70.1 kg (154 lb 8.7 oz)     (Exam was performed this morning, prior to somnolence/decompensation)  Constitutional: Somnolent, nonverbal, responds with nods and head shakes  Head, ENT, Eyes, and Neck: ETT present. Normocephalic PERRL, EOMI, pink  conjunctivae, non-icteric sclera. Neck supple without rigidity, no cervical/axillary LA bilaterally.    Neurologic: Patient sedated, minimal participation in neuro exam  Lungs: Intubated. Coarse breath sounds bilaterally..   CVS: RRR, normal S1/S2, no murmur   Abdomen: Surgical site with dressing/vac, no obvious signs of infection, light pink sero-sang fluid in drain line. non-distended  Genitalia: bah catheter in place   Extremities: +2 pitting edema of bilateral lower extremities, no ulcers, no swelling or erythema of any of joints and no tenderness to palpation.   Skin: Jaundice. No noted rashes. Abd surgical site as described above.    Medications:  Medications that Require Transfusion:     IV fluid REPLACEMENT ONLY       heparin 400 Units/hr (09/06/18 0759)     insulin (regular) 0.2 Units/hr (09/06/18 1753)     norepinephrine 0.05 mcg/kg/min (09/06/18 1752)     parenteral nutrition - ADULT compounded formula       parenteral nutrition - ADULT compounded formula 45 mL/hr at 09/06/18 0758     Reason beta blocker order not selected         Scheduled Medications:     fluconazole  200 mg Intravenous Q24H     heparin lock flush  5-10 mL Intracatheter Q24H     lipids 4 oil  250 mL Intravenous Once per day on Mon Wed Fri     nystatin  500,000 Units Mouth/Throat 4x Daily     pantoprazole (PROTONIX) IV  40 mg Intravenous BID     sodium chloride (PF)  3 mL Intravenous Q8H     sodium chloride         sulfamethoxazole-trimethoprim  10 mL Per Feeding Tube Once per day on Mon Wed Fri     tacrolimus  0.25 mg Oral or Feeding Tube BID IS     tenofovir  300 mg Per Feeding Tube Q72H     tigecycline (TYGACIL) intermittent infusion  50 mg Intravenous Q12H     valGANciclovir  450 mg Oral Once per day on Mon Thu    Or     valGANciclovir  450 mg Oral or NG Tube Once per day on Mon Thu     Metabolic Studies       Recent Labs   Lab Test  09/06/18   0440  09/05/18   0409  09/04/18   1139  09/04/18   0413  09/03/18   0346  09/02/18    0331  09/01/18   0339  08/31/18   1608   08/29/18 0448   07/13/18   2050   NA  145*  147*   --   148*  149*  147*  146*   --    < >  139   < >   --    POTASSIUM  3.3*  3.5  3.9  3.2*  3.7  4.5  3.9   --    < >  4.0   < >   --    CHLORIDE  114*  118*   --   118*  118*  116*  113*   --    < >  108   < >   --    CO2  14*  14*   --   16*  16*  19*  19*   --    < >  21   < >   --    ANIONGAP  18*  15*   --   15*  15*  12  14   --    < >  10   < >   --    BUN  134*  131*   --   127*  127*  108*  108*   --    < >  82*   < >   --    CR  2.47*  2.40*   --   2.28*  2.19*  2.12*  1.96*   --    < >  1.87*   < >   --    GFRESTIMATED  28*  29*   --   31*  33*  34*  37*   --    < >  39*   < >   --    GLC  119*  107*   --   186*  139*  124*  151*   --    < >  123*   < >   --    A1C   --    --    --    --    --    --    --    --    --    --    --   5.4   JESUS  9.6  9.5   --   9.1  8.8  8.8  9.0   --    < >  8.0*   < >   --    PHOS  4.7*  4.6*   --   4.8*  4.3  3.6  2.8   --    < >  3.2   < >   --    MAG  2.3  2.3   --   2.3  2.3  2.5*  2.4*   --    < >  2.4*   < >   --    LACT   --    --    --    --    --    --   1.4  1.3   < >   --    < >  3.9*   CKT   --   26*   --    --    --    --    --    --    --   28*   < >   --     < > = values in this interval not displayed.       Hepatic Studies    Recent Labs   Lab Test  09/06/18   0440  09/05/18   0409  09/04/18   0413  09/03/18   0346  09/02/18   0331  09/01/18   0339   07/13/18   0334   06/21/18   0612   BILITOTAL  7.9*  7.6*  7.5*  7.7*  7.7*  6.8*   < >  36.3*   < >  23.4*   ALKPHOS  664*  664*  620*  599*  704*  518*   < >  96   < >  78   ALBUMIN  2.8*  2.1*  2.0*  2.1*  2.2*  3.0*   < >  2.8*   < >  2.9*   AST  47*  41  33  26  25  23   < >  287*   < >  124*   ALT  19  17  16  14  13  15   < >  125*   < >  79*   LDH   --    --    --    --    --    --    --   259*   --   177    < > = values in this interval not displayed.       Pancreatitis testing    Recent Labs   Lab Test   08/20/18   0443  08/13/18   1048  08/09/18   0335  07/22/18   0354  07/20/18   1741  07/12/18   1913  06/17/18   2138  06/16/18   1815   AMYLASE   --    --    --   42  60  35   --    --    LIPASE   --    --    --   75   --    --    --   260   TRIG  40  173*  351*   --    --    --   Unsatisfactory specimen - icteric   --        Hematology Studies      Recent Labs   Lab Test  09/06/18   1023  09/06/18   0440  09/05/18   0409  09/04/18   0413  09/03/18   0346  09/02/18   0331   08/23/18   0916  08/23/18   0352  08/22/18   2209  08/22/18   1545  08/22/18   0957  08/22/18   0424   WBC  10.8  7.9  7.2  6.8  6.1  6.6   < >  14.1*  12.1*  12.4*  12.1*  17.7*  16.4*   ANEU   --    --    --    --    --    --    --   13.3*  11.4*  11.7*  11.5*  16.9*  15.5*   ALYM   --    --    --    --    --    --    --   0.5*  0.5*  0.4*  0.4*  0.5*  0.5*   MARCELA   --    --    --    --    --    --    --   0.3  0.2  0.2  0.1  0.3  0.3   AEOS   --    --    --    --    --    --    --   0.0  0.0  0.0  0.0  0.0  0.0   HGB  7.6*  6.6*  7.3*  7.9*  8.3*  8.5*   < >  8.6*  6.9*  7.4*  7.7*  8.0*  8.2*   HCT  23.3*  19.8*  22.2*  24.7*  25.8*  25.7*   < >  24.8*  19.8*  21.3*  22.1*  23.1*  23.9*   PLT  26*  19*  17*  24*  23*  19*   < >  41*  40*  51*  36*  55*  42*    < > = values in this interval not displayed.       Arterial Blood Gas Testing    Recent Labs   Lab Test  08/30/18   1412  08/28/18   0628  08/27/18   2155  08/27/18   1858  08/21/18   1332   PH  7.33*  7.30*  7.31*  7.23*  7.40   PCO2  41  32*  35  44  40   PO2  69*  100  127*  132*  163*   HCO3  22  16*  18*  19*  25   O2PER  51.0  50.0  50  60  40.0        Urine Studies     Recent Labs   Lab Test  07/27/18   2330  07/13/18   1315  06/18/18   1200  06/16/18   2220   URINEPH  7.5*  6.0  5.0  5.5   NITRITE  Negative  Negative  Negative  Negative   LEUKEST  Negative  Negative  Negative  Large*   WBCU  3  None  2  66*       Vancomycin Levels     Recent Labs   Lab Test  06/18/18   1230    VANCOMYCIN  16.8     Tacrolimus levels      Transplant Immunosuppression Labs Latest Ref Rng & Units 9/6/2018 9/6/2018 9/6/2018 9/5/2018 9/4/2018   Tacro Level 5.0 - 15.0 ug/L - 6.8 - 8.1 8.7   Tacro Level - - Not Provided - Not Provided Not Provided   Creat 0.66 - 1.25 mg/dL - - 2.47(H) 2.40(H) 2.28(H)   BUN 7 - 30 mg/dL - - 134(H) 131(H) 127(H)   WBC 4.0 - 11.0 10e9/L 10.8 - 7.9 7.2 6.8   Neutrophil % - - - - -   ANEU 1.6 - 8.3 10e9/L - - - - -

## 2018-09-07 NOTE — PROGRESS NOTES
Immunosuppression Note:    Yaneli Miles is a 43 year old male who is seen today  for immunosuppression management     I, Mamadou Norwood MD, I have examined the patient with the resident/PA/Fellow, discussed and agree with the note and findings.  I have reviewed today's vital signs, medications, labs and imaging. I reviewed the immunosuppression medications and levels. 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  Developed shock with severe lactic acidosis and respiratory failure on POD9. CT scan showed left PV venous thrombosis, infarction of left liver lobe, patchy infarction of right lobe and possible hepatic arterial thrombosis, patchy bowel ischemia. Transferred to ICU.   Ex lap, patchy diffuse SB ischemia. Doppler demonstrated arterial flow main vessels to bowel and liver. Splenic infarction. Liver biopsy negative for acute rejection. Fluid culture growing VRE. On Heparin gtt. 8/3 OR for reexploration liver transplant, bowel ischemia, findings improved patchy iscemic changes of small bowel, patchy ischemic changes of left liver lobe.  OR: some ischemic/necrotic jejunum, s/p resection.   Washout and abdominal closure with stratice mesh, no drains placed. Minimal ascites, intact small bowel anastomosis, patchy ischemia left liver lobe.  peritonitis with bowel necrosis and bowel perforation. S/p bowel resection, about 50 cm sm bowel, ileocecal valve and colon remaining.      Procedures:    donor (DBD) liver transplant with duct to duct anastomosis over biliary stent.    Ex lap due to  concern for ischemic bowel, findings: patchy diffuse SB ischemia. Doppler demonstrated arterial flow main vessels to bowel and liver. Splenic infarction.   8/3 Reexploration liver transplant, bowel ischemia, findings improved patchy iscemic changes of small bowel, patchy ischemic changes of left liver lobe.   8/6 Re-exploration, ischemic/necrotic jejunum, s/p resection  8/8 Washout and abdominal closure with stratice mesh, no drains placed. Minimal ascites, intact small bowel anastomosis, patchy ischemia left liver lobe.  8/17 Procedure: reopening of laparotomy. abd washout, bowel resection (clip and drop fashion). Postop Dx: same. Large segment of ischemic bowel from mid-jejunum (prior anastomosis) with distal perforation and feculent peritonitis.   8/18 Reexploration, small and large bowel intestine appeared healthy per surgeon, primary anastomosis bowel. Fascia closure.  8/20 Reopening of laparotomy. abdominal washout, bowel resection of 200cm  8/27 Abdominal washout, primary anastomosis closure, abthera placement   8/30 Abdominal washout, abdominal wall closure with Permacol, wound vac placement    Graft function: POD #48, Tbili continues to trend up.   7/30 Hepatic infarction, left PV thrombosis, and narrow and irregular appearance HA noted on CTA. 8/8 Liver biopsy negative for acute rejection. Intra-abdominal cultures from 8/1, 8/6, 8/17, 8/18, 8/27, 8/30 + VRE.   Incision opened at bedside 8/12, hematoma removed.  8/13 US: new perihepatic fluid collections (largest 7x2x4), small volume of septated ascites.  VAC placed 8/15. 8/15 CT scan, left lobe infarct with complex fluid collection inferior.  8/17 US: patent vasculature but RIs concerning for HA stenosis, stable fluid collection near braden hepatis.  9/5 CT: Redemonstrated ill-defined region of hypoattenuation in the left lobe of the transplanted liver remains suspicious for necrosis or infection.      Immunosuppression management:   - Induction: Simulect  intra-op due to BEN. Steroid taper completed per protocol.  - MMF: on hold due to critical status.  - Tac goal level ~5-6 due to sepsis and BEN. Continues on Fluconazole.  Continue during comfort care to prevent acute painful rejection.  - Hydrocortisone 7/30-8/21, now tapered off.   Complexity of management: Medium. Contributing factors: thrombocytopenia and anemia, ARF    Hematology:   Anemia: Acute blood loss/anemia of chronic disease. Intermittent transfusions since transplant.  Thrombocytopenia:  Platelet count remains low at 29.  Transfusion dependent.  Anticoagulation: Anticoagulating for bowel ischemia and portal thrombosis. Protein S level low at 27 on 8/19. Heparin gtt low intensity resulted in diffuse oozing. Dextran 10 ml/hr for additional anticoagulation, stopped on 8/23 due to widespread oozing. Continue heparin drip at 400u/hr.     Cardiorespiratory:    Hypotension secondary to septic shock: Back on pressors as of 9/6.  Respiratory failure secondary to sepsis/critical illness: Extubated 8/22.  Re-intubated for OR on 8/27.  Extubate when extended family arrives.  Junctional rhythm:  Onset 8/22, now resolved.    GI/Nutrition: NG tube in place, remains NPO. DC TPN.   Ischemic bowel/short gut:  7/30 CTA: nonocclusive filling defects in superior mesenteric vein branches.  Anticoagulation as above.  S/p resection of necrotic jejunum on 8/6. S/p resection of 150cm of ischemic bowel with perforation on 8/17. Patient with hypotension and increased abdominal pain overnight, CT on 8/27 suggestive of bowel perforation. OR on 8/27 for wash out and bowel repair. Returned to OR on 7/30 for washout and abdominal wall closure with wound vac placement.   PSBO: Noted on 8/15 CT, transition point RLQ. S/p resection on 8/17.   Bowel perforation/peritonitis:  Small bowel perforation with feculent peritonitis noted in OR on 8/17. S/p resection, abx as below. CT on 8/27 suggestive of bowel perforation. OR on 8/27 for wash  out and bowel repair. Returned to OR on 8/30 for washout and abdominal wall closure.  CT on 9/5 suggestive of anastomotic bowel leak.  No further treatment per family.  GIB: Was having maroon stools prior to OR on 8/27, no evidence of blood in stools at this time.    Endocrine: DM type 2, DC insulin gtt and start sliding scale insulin.    Fluid/Electrolytes/Renal:   BEN:  CRRT off since 8/23. No further dialysis per family.  Hypernatremia:  Continue D5.    : Traumatic bah placement with intermittent bleeding early August. Hematuria now resolved. Bah, coude 18fr in place.    Infectious disease: Tmax 101.5F  -Amikacin (8/17-8/20), flagyl (8/17-8/30), ceftraroline (8/13-9/6), tigecycline (8/13-8/23 and 8/29-), daptomycin (8/14-9/6), micafungin (7/30-8/21), fluconazole (8/22-), meropenem (8/30-8/31), ertapenem (8/31-9/6).   -Blood cultures through 8/17 + VRE or GPCs  -8/17 Peritoneal fluid culture + VRE, candida albicans  -Blood cx, negative since 8/17  -8/27 abdominal fluid culture + ESBL E. Coli, yeast, lactobacillus, and VRE x2 strains    1. VRE intra abdominal and bacteremia   2. Infarct left lobe liver with inferior perihepatic fluid collection  3. Peritonitis secondary to ongoing bowel leak  4. Sepsis secondary to above    Previous ID:   Ecoli ESBL bacteremia. Repeat bcx on 8/1 (peripheral and VAD) grew Ecoli ESBL. Zosyn changed to Meropenem, completed course on 8/14.  HBV: Hepatits B DNA PCR positive prior to tx. Received HBIG on 7/21 and 7/22. 8/10 Hep B surface antigen negative. Continue tenofovir 300 mg, dose adjusted for HD     Pain/comfort:  Dilaudid PRN.    Prophylaxis: DVT PCDs, on heparin, fall, GI, DC Valcyte, DC bactrim.   Disposition: SICU.  Limiting cares with goal of comfort care when family arrives.    Medical Decision Making: Medium    JAY/Fellow/Resident Provider:  Geovanna Burris NP  [id number]    Faculty: Mamadou Norwood MD    __________________________________________________________________  Transplant History:   7/21/2018 (Liver), Postoperative day: 48     Interval History:  Care conference yesterday afternoon.  Family wishes to pursue comfort care once the extended family has arrived.  They do not want further treatment procedures. Family does request abdominal closure for cultural reasons.    ROS:   A 10-point review of systems was negative except as noted above.    Curent Meds:    fentaNYL (PF)         fluconazole  200 mg Intravenous Q24H     heparin lock flush  5-10 mL Intracatheter Q24H     insulin aspart  1-6 Units Subcutaneous Q4H     nystatin  500,000 Units Mouth/Throat 4x Daily     pantoprazole (PROTONIX) IV  40 mg Intravenous BID     propofol         propofol  15 mg Intravenous Once     sodium chloride (PF)  3 mL Intravenous Q8H     sulfamethoxazole-trimethoprim  10 mL Per Feeding Tube Once per day on Mon Wed Fri     tacrolimus  0.25 mg Oral or Feeding Tube BID IS     tenofovir  300 mg Per Feeding Tube Q72H     tigecycline (TYGACIL) intermittent infusion  50 mg Intravenous Q12H     valGANciclovir  450 mg Oral Once per day on Mon Thu    Or     valGANciclovir  450 mg Oral or NG Tube Once per day on Mon Thu       Physical Exam:     Admit Weight: 60.7 kg (133 lb 12.8 oz)    Vital sign ranges:    Temp:  [97.3  F (36.3  C)-101.5  F (38.6  C)] 98.1  F (36.7  C)  Heart Rate:  [] 104  Resp:  [23-33] 27  BP: ()/(40-63) 92/52  FiO2 (%):  [30 %] 30 %  SpO2:  [96 %-100 %] 100 %    General Appearance: Unresponsive  HEENT: NG, ETT  Skin: warm, dry  Heart: NSR  Lungs:Tachypnea on ventilator. FiO2 30%  Abdomen: Incision with wound vac in place.  RT with watery yellow stool.  : Luis, dark yary urine  Extremities: well perfused. 2+ bilateral LE edema.  Neurologic: Unresponsive  Access: PICC, PIV    Data:   CMP    Recent Labs  Lab 09/07/18  0316 09/06/18  0905 09/06/18  0440  09/04/18  0413   *  --  145*  < > 148*    POTASSIUM 4.3  --  3.3*  < > 3.2*   CHLORIDE 114*  --  114*  < > 118*   CO2 15*  --  14*  < > 16*   *  --  119*  < > 186*   *  --  134*  < > 127*   CR 2.69*  --  2.47*  < > 2.28*   GFRESTIMATED 26*  --  28*  < > 31*   GFRESTBLACK 31*  --  34*  < > 38*   JESUS 9.6  --  9.6  < > 9.1   ICAW  --  5.7*  --   --  5.4*   MAG 2.2  --  2.3  < > 2.3   PHOS 5.8*  --  4.7*  < > 4.8*   ALBUMIN 2.4*  --  2.8*  < > 2.0*   BILITOTAL 8.0*  --  7.9*  < > 7.5*   ALKPHOS 737*  --  664*  < > 620*   AST 50*  --  47*  < > 33   ALT 20  --  19  < > 16   < > = values in this interval not displayed.  CBC    Recent Labs  Lab 09/07/18  0316 09/06/18  1023   HGB 8.0* 7.6*   WBC 12.2* 10.8   PLT 29* 26*     COAGS    Recent Labs  Lab 09/07/18  0316 09/06/18  0440   INR 2.34* 2.44*      Urinalysis  Recent Labs   Lab Test  07/27/18   2330  07/13/18   1315  06/18/18   1200   COLOR  Yellow  Dark Yellow  Yellow   APPEARANCE  Clear  Clear  Clear   URINEGLC  Negative  Negative  Negative   URINEBILI  Negative  Large*  Small*   URINEKETONE  Negative  Negative  Negative   SG  1.008  1.013  1.008   UBLD  Negative  Negative  Negative   URINEPH  7.5*  6.0  5.0   PROTEIN  30*  10*  Negative   NITRITE  Negative  Negative  Negative   LEUKEST  Negative  Negative  Negative   RBCU  5*  <1  1   WBCU  3  None  2   UTPG   --    --   0.75*     Virology:  Hepatitis C Antibody   Date Value Ref Range Status   07/20/2018 Nonreactive NR^Nonreactive Final     Comment:     Assay performance characteristics have not been established for newborns,   infants, and children         POD 14 US liver:  1.  The left portal vein is antegrade with decreased velocity,  measuring approximately 15 cm/second. This vessel was occluded on CT  7/30/2018.  2.  Elevated velocities of the main hepatic artery, now 203 cm/sec  (previously 94 cm/sec). However left hepatic arteries demonstrates  steep upstroke suggesting there is not a significant stenosis.   2a. Right hepatic artery was  not visualized, could be occluded or  difficult to see postoperatively.  3. Echogenic focus of the right lobe in close proximity to the  hepatorenal fossa. This likely corresponds with nonenhancing lesion on  CT 7/30/2018 and likely representing subcapsular hematoma.  4. Additional small hematomas of the braden hepatis and posterior right  perihepatic region.  5. Small volume anechoic ascites.  6. Left hepatic lobe not well visualized secondary to open wound  preventing imaging. Note the left lobe was grossly abnormal on CT  7/30/2018    POD 1 US liver: Impression:   1.  Hematoma associated with the inferior aspect of the perihepatic  space anteriorly, measuring 13.7 x 7.0 x 8.5 cm.  2.  Retrograde flow of the left portal vein. Additionally, low  velocities of the portal venous system. Single low resistive index of  the extrahepatic aspect of the hepatic artery, measuring 0.37. These  findings are likely within normal limits in the early postoperative  context. However, continued attention on follow-up recommended.    7/30 CT scan abd/pelvis:  IMPRESSION:   1. Hypoenhancing areas in the transplant liver concerning for  infarction. Transplant left portal vein occlusion with bubbles of  portal venous gas. Nearly occlusive filling defect in the native  hepatic artery. Narrowed and irregular appearance of the transplant  hepatic artery. Focal occlusion of the left main hepatic artery.  Segmental occlusions of right hepatic arterial branches.   2. Pneumatosis intestinalis with nonocclusive filling defects in  superior mesenteric vein branches, concerning for bowel ischemia or  infarction.   3. Splenic infarctions.   4. Bibasilar consolidation with air bronchograms. Pneumonia cannot be  excluded.   5. Post surgical changes of evacuation of peritransplant hematoma.

## 2018-09-07 NOTE — PROGRESS NOTES
Focus: abdominal wound    S: WOC paged Transplant per request so they could assess abdominal wound during routine Vac Dressing change.    O: Pain medication given prior to procedure and pt was resting in bed comfortably.  Vac removed with provider at bedside for assessment.    A: Bilious drainage with mild odor draining from wound with mesh secured in wound base.     R: Transplant team plans to try to approximate wound edges and close wound in the room this afternoon. Wound loosely packed with Kerlix and secured with tape until procedure can be performed.     Ramiro Larose RN, BSN, CWOCN

## 2018-09-07 NOTE — PLAN OF CARE
Problem: Patient Care Overview  Goal: Plan of Care/Patient Progress Review  Physical Therapy Discharge Summary  Reason for discharge:   Decline in medical status  Progress towards goals:   Progress made, but goals not met. Barriers to achieving goals: limited tolerance,  Decline in medical status  Recommendation(s):   Continued therapy is not recommended at this time.          Patient is 18 years or older (and not pregnant)

## 2018-09-07 NOTE — PLAN OF CARE
Problem: Patient Care Overview  Goal: Plan of Care/Patient Progress Review  Outcome: Declining  I: Some family visited pt last night, apparently waiting for a brother to come in from [place] of state, but might not arrive until Sunday. Pt remains very critically ill. Pt is DNR if declines prior to comfort cares. No escalation of cares.     A: PRN dilaudid given x3 for pain. Insulin drip titrated to 2 units/hr. Levophed increased to 0.07mcg/kg/min to maintain MAPs >60. TPN @ 45 ml/hr. Heparin remains on at 400 units/hr. Multiple skin tears, very edematous/fragile skin. Pt spiked temp to 101.5, juan hugger removed. HR tachy up to 120. Remains on CMV, no vent changes overnight. Luis with adequate UOP. RT in place.     P: Need to discuss going to comfort cares if pt's brother is unable to make it in time. Need to suture abdominal incision closed prior to patient passing, per [facility] culture. Continue to monitor pt closely, no escalation of cares/DNR.

## 2018-09-07 NOTE — PROGRESS NOTES
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 He returned to the SICU for shock, severe lactic acidosis, renal failure and respiratory failure, requiring intubation and dialysis.  Ischemic injury to left hemiliver and non-occlusive mesenteric ischemia.  He underwent a laparotomy and several subsequent take-backs to examine ischemic bowel, on 8/6 he underwent resection of necrotic jejunum with a primary anastomosis, and his abdomen was closed 8/8. He acutely decompensated beginning the night of 8/16, with increasing pressor needs, increasing abdominal pain and lactic acidosis, now s/p exploratory laparotomy 8/17 and s/p entero-entero small bowel anastomosis, drain placement and fascial closure 8/18. Improving renal function has improved (off dialysis). Take back to OR on 8/27/18 for hematochezia and found to have anastomotic dehiscence and perforated bowel near site.  Returned to OR 8/30 for washout and replacement of mesh.      CHANGES FOR TODAY/MAJOR THINGS:   - DC TPN- start D20%  - DC insulin gtt- start sliding scale insulin  - Abdominal wound closure at bedside     PLAN:      Neuro/ pain/ sedation:  # acute pain  - PRN IV dilaudid as needed.   - No sedation, continuous infusions.   - Unresponsive      Pulmonary care:   # Acute respiratory failure  - extubated (8/22), re intubated for OR 8/24/18  - HOB elevation. Vent bundle. Oral cares   - CMV 18/400/5/30        Cardiovascular:    # Septic shock   # Junctional rhythm on 8/22 EKG, unclear cause, resolved 8/24- no additional episodes.   - MAP goal > 60. Continue norepinephrine.   - TTE 8/8 showed no vegetations or valve abnormalities  - TOÑO 8/10 no evidence of endocarditis   - EKG (8/23): junctional bradycardia   - EKG (8/24, 8/27): Normal sinus rhythm      Gastroenterolgy   # DDLT 7/21/18  # Intrahepatic left portal  venous thrombosis, infarction of left liver lobe, patchy infarction of right lobe, suspected hepatic arterial thrombosis  # 7/30/18 s/p exploratory laparotomy and liver biopsy  # Splenic infarctions, patchy bowel ischemia  # Ex Lap 8/6 - resection of 90 cm of necrotic jejunum  # Ex Lap 8/8 - abdomen closed  # Ex Lap 8/17 - Found to have widespread bowel ischemia requiring bowel resection.  Left in discontinuity.  # Ex Lap 8/18 - entero-entero anastomosis and facial closure.  Approximately  cm small bowel remaining with intact ileocecal valve and colon.  # 8/27 s/p abdomen washout primary anastomosis closure with abthera placement (found to have anastomotic dehiscence, perf bowel near anastomotic site)--pt left open,   # 8/30 s/p abd closur with permacol, VAC placement   # GIB- hematochezia  - Two episodes of bowel ischemia of unclear etiology requiring resection of the majority of small bowel ,approx  cm of small bowel remaining per transplant   - Wound vac changes q3 days per transplant. Plan for full closure today at bedside. Family requests skin closure prior to withdrawing care.   - CT done 9/5/18 showing leak near anastomic site and adjacent air foci along with free fluid in the abdomen Transplant aware, planning for washout later today.       Fluids/ Electrolytes/ Nutrition:   # Protein calorie deficit malnutrition   # Hypocalcemia, now with hypercalcemia   # hypomagnesia, replaced   # hypokalemia--replaced   # Short gut syndrome,  cm of small bowel remaining per notes.   - Continue TPN until bag runs out. Then start D20% infusion.      Renal/ Fluid Balance:  # BEN   # Metabolic acidosis   - CRRT stopped 8/23. 950 urine output last 24 hours.   - Creatinine trending up. Bicarb 15.   - Per family discussion, will not escalate care including dialysis.   - Luis in place for strict I's and O's      Endocrine:    # DM II  - Transition to sliding scale insulin for comfort (avoid hourly  fingersticks).       ID/ Antibiotics:  # VRE Bacteremia, resolved   # ESBL Bacteremia, resolved   # Hepatitis B  # Peritonitis   - Appreciate transplant ID   - Cultures from abdomen fluid 8/30: VRE, yeast, GPC                         - 8/27: ESBL x2, VRE, Yeast   - s/p Meropenem (8/2-8/14), discontinued after 12 days of negative cultures for ESBL. Meropenem resumed 8/30 d/t intraabdominal ESBL, changed to Ertapenem 8/31  - s/p Amikacin (8/17 - 8/21), discontinued as he had completed 2-week course of meropenem and has other active agents for G-coverage  - Flagyl (8/17- 8/30)       Current ABx:   - Daptomycin (7/30- ), ceftaroline (8/13- 9/6)  - Micafungin (7/30-8/22) changed to fluconazole (8/22- )  - Tigecycline (8/13-8/23), discontinued after 4 days of negative blood cultures. Re-started 8/28/17  - Meropenem restarted 8/30/18, changed to ertapenem 8/31/18-9/6/18 for ESBL E. coli  - Immunosuppression: (restarted 8/6) tacrolimus BID   - Immunosuppression prophylaxis: Valgancyclovir and Tenofovir, micafungin, Nystatin QID. Bactrim resumed 8/29      Cultures   - 7/27 VRE (line/arm)   - 7/29: VRE (left arm)   - 7/30: VRE (left arm)   - 7/31: VRE (Line and peripheral)  - 8/1: ESBL (HD/hand/central line)   - 8/3: VRE (intra-abdominal tissue)  - 8/4: NGTD (blood)  - 8/5: NGTD (blood)  - 8/6: VRE (art line)  - 8/7: VRE (blood)  - 8/8: VRE (blood)  - 8/9: VRE (blood)  - 8/11: VRE (blood)  - 8/12: VRE (abdominal fluid)  - 8/13: VRE (blood)  - 8/14: VRE (blood)  - 8/15: VRE (blood)  - 8/17: NGTD (blood)   - 8/17: VRE & Candida (peritoneal fluid)   - 8/18: G+ cocci (tissue)  - 8/19 NGTD (blood)  - 8/21 NGTD (blood)  - 8/22 NGTD (blood)  - 8/23 NGTD (blood)  - 8/24 NGTD (blood)  - 8/27 FLUID: VRE, ESBL E coli x2, yeast, GPC   - 8/30 FLUID: VRE, yeast      Heme:     # Acute blood loss anemia  # Anemia of critical illness  # Portal venous thrombosis  # Thrombocytopenia  # Coagulopathy   # Anti thrombin III deficiency  - Heparin at  400 straight rate- per transplant   - Holding aspirin 81mg   - Monitor CBC and INR daily  - Plan to keep platelet count > 20K      MSK:  # weakness and deconditioning of critical illness   - PT/OT consulted.   - In bed passive ROM.       General cares and Prophylaxis:    - DVT: Heparin--400units/hr, SCD's  - GI: protonix 40 mg IV BID       Lines/ tubes/ drains:  - PIVs.   - PICC line   - Bah catheter   - wound vac      Dispo:  - SICU.  - family awaiting brother's arrival tomorrow and will then proceed with transition to comfort care.      Time spent on this Encounter   Billing:  I spent 35 minutes bedside and on the inpatient unit today managing the critical care of Yaneli Miles in relation to the issues listed in this note.    Lisa De La Mater       ====================================    SUBJECTIVE:   No acute events overnight. Increased levophed requirements. Patient unresponsive, unable to obtain ROS.     OBJECTIVE:   1. VITAL SIGNS:   Temp:  [97.3  F (36.3  C)-101.5  F (38.6  C)] 98.4  F (36.9  C)  Heart Rate:  [] 104  Resp:  [23-38] 26  BP: ()/(40-63) 99/54  FiO2 (%):  [30 %] 30 %  SpO2:  [96 %-100 %] 99 %  Ventilation Mode: CMV/AC  (Continuous Mandatory Ventilation/ Assist Control)  FiO2 (%): 30 %  Rate Set (breaths/minute): 18 breaths/min  Tidal Volume Set (mL): 400 mL  PEEP (cm H2O): 5 cmH2O  Oxygen Concentration (%): 30 %  Resp: 26    2. INTAKE/ OUTPUT:   I/O last 3 completed shifts:  In: 2976.42 [I.V.:1123.42; NG/GT:165]  Out: 3725 [Urine:1025; Emesis/NG output:400; Drains:400; Stool:1900]    3. PHYSICAL EXAMINATION:     GEN: intubated, unresponsive.   EYES: PERRL, Anicteric sclera.   HEENT:  Normocephalic, atraumatic, trachea midline, ETT secure  CV: RRR, no gallops, rubs, or murmurs  PULM/CHEST: coarse breath sounds bilaterally without rhonchi, crackles or wheeze, symmetric chest rise  GI: abdomen soft, wound vac with place with bilious output  : bah catheter in place, urine  yary  EXTREMITIES: pitting peripheral edema, peripheral pulses 2+  NEURO: unresponsive, withdraws with all extremities, no eye opening.   SKIN: No rashes, sores or ulcerations  PSYCH: DILLAN  Imaging personally reviewed: no new imaging  EKG: SR    4. INVESTIGATIONS:   Arterial Blood Gases   No lab results found in last 7 days.  Complete Blood Count     Recent Labs  Lab 09/07/18  0316 09/06/18  1023 09/06/18  0440 09/05/18  0409   WBC 12.2* 10.8 7.9 7.2   HGB 8.0* 7.6* 6.6* 7.3*   PLT 29* 26* 19* 17*     Basic Metabolic Panel    Recent Labs  Lab 09/07/18  0316 09/06/18  0440 09/05/18  0409 09/04/18  1139 09/04/18  0413   * 145* 147*  --  148*   POTASSIUM 4.3 3.3* 3.5 3.9 3.2*   CHLORIDE 114* 114* 118*  --  118*   CO2 15* 14* 14*  --  16*   * 134* 131*  --  127*   CR 2.69* 2.47* 2.40*  --  2.28*   * 119* 107*  --  186*     Liver Function Tests    Recent Labs  Lab 09/07/18 0316 09/06/18 0440 09/05/18  0409 09/04/18  0413   AST 50* 47* 41 33   ALT 20 19 17 16   ALKPHOS 737* 664* 664* 620*   BILITOTAL 8.0* 7.9* 7.6* 7.5*   ALBUMIN 2.4* 2.8* 2.1* 2.0*   INR 2.34* 2.44* 2.36* 2.32*     Pancreatic Enzymes  No lab results found in last 7 days.  Coagulation Profile    Recent Labs  Lab 09/07/18 0316 09/06/18  0440 09/05/18  0409 09/04/18  0413   INR 2.34* 2.44* 2.36* 2.32*         5. RADIOLOGY:   No results found for this or any previous visit (from the past 24 hour(s)).    =========================================

## 2018-09-07 NOTE — PLAN OF CARE
Problem: Patient Care Overview  Goal: Plan of Care/Patient Progress Review  Patient remains minimally responsive with unchanged neuro status. Remains intubated, CMV settings. Sinus tach 100-110s. Around 1800, BP dropped to 84/73. Levo titrated up to 0.2 and 500 mL of LR given. Abdominal incision closed by SICU this afternoon. 400 mL watery stool today.  Urine output 0-50/hr.     No escalation of cares. Large amount of family at the bedside throughout afternoon.

## 2018-09-07 NOTE — PLAN OF CARE
Problem: Patient Care Overview  Goal: Plan of Care/Patient Progress Review  Outcome: Declining  I: Family care conference held today. Family decided to transition to comfort cares today. No changes to orders yet.   A: Pt remains lethargic. Received prn Dilaudid for pain. Remains on CMV. No vent settings unchanged. Sinus tachycardia. 1U PRBCs given this AM. Levophed gtt ordered to maintain MAP>60 after 1L bolus of LR.  Jb hugger placed to maintain >36 C. Fair UOP. TPN remains. Titrated insulin gtt to maintain goal 100-150. Rectal tube with large amount of output. NG to LIS. Multiple skin tears.   P: Awaiting more family members to arrive before extubating and officially transitioning to comfort. Family also requested per their culture beliefs that abdomen will be closed prior to extubation. Notify SICU once family is ready to extubate.

## 2018-09-08 NOTE — OP NOTE
Procedure Date: 08/27/2018      DATE OF PROCEDURE: 08/27/2018      PREOPERATIVE DIAGNOSIS:  Bowel perforation status post liver transplant.      POSTOPERATIVE DIAGNOSIS:  Bowel perforation status post liver transplant.      PROCEDURES PERFORMED:   1.  Abdominal washout.   2.  Primary repair of the bowel perforation.      SURGEON:  Mamadou Norwood MD       ASSISTANT:  Mihir Nj MD      INDICATIONS FOR THE PROCEDURE:  Mr. Miles received a liver transplant, following which he had bowel ischemia and underwent massive bowel resection.  The last time, we performed a side-to-side anastomosis within the proximal bowel and the distal bowel.  He had a CT scan which showed that there was extravasation of contrast into the peritoneal cavity.  After this, I explained the details of the procedure to the family and consent was obtained for the procedure.      OPERATIVE FINDINGS:   1.  There was a 1 x 1 cm perforation in the distal ileum.   2.  There was one area of leakage of the anastomosis.      DESCRIPTION OF OPERATIVE PROCEDURE:  The patient was brought to the operating room and placed in supine position.  Anesthesia was administered.  Parts were prepped from nipple to mid thighs. Sterile drapes were placed.  The abdomen was reopened by opening the sutures.  After opening the abdomen, we found purulent material.  The aforementioned findings were noted.  The first perforation in the distal limb of the ileum, we essentially freshened the edges and closed it in a single layer of Prolene sutures.  The one on the anastomosis, we essentially put Prolene sutures and reinforced it with a patch of the mesentery.  After this, the whole thing looked good.  We thoroughly washed the abdominal cavity with antibiotic and antifungal solution.  Then we placed an ABThera dressing.  The patient was then transferred to the ICU in a stable but critical condition.        I explained the details of the procedure to the family and answered all  their questions.         LEXUS ARAMBULA MD             D: 2018   T: 2018   MT:       Name:     CHRISTINA RODRIGUEZ   MRN:      [id number]        Account:        [id number]   :      1975           Procedure Date: 2018      Document: [id number]

## 2018-09-08 NOTE — PROGRESS NOTES
SICU attending note    Was at bedside when patient suddenly became bradycardic,  --> 40s.  BP 50s systolic despite vasopressors.  I discussed with the patients wife, via phone , that the patient had become bradycardic and that he could arrest at any minute.  I urged her to encourage any family that wanted to be present at his death to come to bedside.     Norepinephrine gtt turned up to 1.0 mcg/kg/mn  Vasopressin to 4.2    1 amp of bicarbonate administered    Will try to maintain patient until last brother arrives from out of town ~ 3pm, although family has been notified that this may not be possible.    Collette Holland  09/08/18

## 2018-09-08 NOTE — PLAN OF CARE
Problem: Patient Care Overview  Goal: Plan of Care/Patient Progress Review  Outcome: Declining  D/I/A: Pt continues to steadily decline overnight. Pt is more tachycardic, up to 130s, and more hypotensive. Pt received 500 ml LR, 250 ml albumin, added vasopressin @ 2.4 units/hr, and titrated Levophed drip up to max dose of 0.4 mcg/kg/min to maintain MAP goal of >60. Heparin drip was stopped for 2 hrs from 0556-3503 due to copious oozing from abdominal incision. Restarted at 0000 per MD order. AM labs at 0300 showed Hgb at 4.7, platelets 39, and INR @ 3.82. MD notified and heparin drip stopped.    SICU resident spoke with pt's wife, via  phone, about pt's more rapidly declining status, and need for blood products/more medications to continue to keep him alive until his brother arrives. Pt's wife wants to maintain course, and give blood/additional pressors if needed, until the brother arrives, but is aware pt might pass away before he is able to get here. 2 units of PRBCs given. Will continue to monitor.     Pt was started on fentanyl drip for comfort, up to max dose of 50 mcg/hr. PRN dilaudid bumps also given overnight for pt comfort.     P: Awaiting arrival of pt's brother this AM then transition to comfort cares. Will continue to assess and treat pt when appropriate. Braulio  requested.

## 2018-09-08 NOTE — PROGRESS NOTES
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 He returned to the SICU for shock, severe lactic acidosis, renal failure and respiratory failure, requiring intubation and dialysis.  Ischemic injury to left hemiliver and non-occlusive mesenteric ischemia.  He underwent a laparotomy and several subsequent take-backs to examine ischemic bowel, on 8/6 he underwent resection of necrotic jejunum with a primary anastomosis, and his abdomen was closed 8/8. He acutely decompensated beginning the night of 8/16, with increasing pressor needs, increasing abdominal pain and lactic acidosis, now s/p exploratory laparotomy 8/17 and s/p entero-entero small bowel anastomosis, drain placement and fascial closure 8/18. Improving renal function has improved (off dialysis). Take back to OR on 8/27/18 for hematochezia and found to have anastomotic dehiscence and perforated bowel near site.  Returned to OR 8/30 for washout and replacement of mesh. Care conference 9/6, agreed for comfort cares. Will likely extubate when last brother arrives. On 9/7, abdominal wound closed with sutures per family cultural request. On morning of 9/8, acutely deteriorating, bradycardia to 30s, MAP to 30s. Supporting patient as appropriate while family arrives. Likely will pass away today.      CHANGES FOR TODAY/MAJOR THINGS:   - Comfort cares  - Plan to extubate when brother arrives. Family made aware that despite medical support, patient may pass away at any time today.  - Held NG meds.  - Supporting with pressors, pain control, bicarb.  - Family meeting held at noon.    PLAN:      Neuro/ pain/ sedation:  # acute pain  - PRN IV dilaudid as needed.   - No sedation. Fentanyl, morphine for comfort.  - Unresponsive      Pulmonary care:   # Acute respiratory failure  - extubated (8/22), re intubated for OR 8/24/18  -  HOB elevation. Vent bundle. Oral cares   - CMV 18/400/5/30        Cardiovascular:    # Septic shock   # Junctional rhythm on 8/22 EKG, unclear cause, resolved 8/24- no additional episodes.   # Cardiogenic shock  - Bradycardia to 30s, MAPs to 30s. Support with pressors. Continue norepinephrine, added vasopressin.  - TTE 8/8 showed no vegetations or valve abnormalities  - TOÑO 8/10 no evidence of endocarditis   - EKG (8/23): junctional bradycardia   - EKG (8/24, 8/27): Normal sinus rhythm      Gastroenterolgy   # DDLT 7/21/18  # Intrahepatic left portal venous thrombosis, infarction of left liver lobe, patchy infarction of right lobe, suspected hepatic arterial thrombosis  # 7/30/18 s/p exploratory laparotomy and liver biopsy  # Splenic infarctions, patchy bowel ischemia  # Ex Lap 8/6 - resection of 90 cm of necrotic jejunum  # Ex Lap 8/8 - abdomen closed  # Ex Lap 8/17 - Found to have widespread bowel ischemia requiring bowel resection.  Left in discontinuity.  # Ex Lap 8/18 - entero-entero anastomosis and facial closure.  Approximately  cm small bowel remaining with intact ileocecal valve and colon.  # 8/27 s/p abdomen washout primary anastomosis closure with abthera placement (found to have anastomotic dehiscence, perf bowel near anastomotic site)--pt left open,   # 8/30 s/p abd closur with permacol, VAC placement   # GIB- hematochezia  # 9/7 Abdominal wound closed bedside per family cultural preference, likely now with hematoma    - Two episodes of bowel ischemia of unclear etiology requiring resection of the majority of small bowel ,approx  cm of small bowel remaining per transplant   - CT done 9/5/18 showing leak near anastomic site and adjacent air foci along with free fluid in the abdomen Transplant aware.   - 9/7 Abdominal wound closed bedside per family cultural preference, likely now with hematoma.    Fluids/ Electrolytes/ Nutrition:   # Protein calorie deficit malnutrition   # Hypocalcemia, now  with hypercalcemia   # hypomagnesia, replaced   # hypokalemia--replaced   # Short gut syndrome,  cm of small bowel remaining per notes.   - Dc'd TPN -> started D20% infusion.     Renal/ Fluid Balance:  # BEN   # Metabolic acidosis   - CRRT stopped 8/23.  - Creatinine trending up.  - Per family discussion, will not escalate care including dialysis.   - Luis in place      Endocrine:    # DM II  - sliding scale insulin for comfort (avoid hourly fingersticks).       ID/ Antibiotics:  # VRE Bacteremia, resolved   # ESBL Bacteremia, resolved   # Hepatitis B  # Peritonitis   - Appreciate transplant ID   - Cultures from abdomen fluid 8/30: VRE, yeast, GPC                         - 8/27: ESBL x2, VRE, Yeast   - s/p Meropenem (8/2-8/14), discontinued after 12 days of negative cultures for ESBL. Meropenem resumed 8/30 d/t intraabdominal ESBL, changed to Ertapenem 8/31  - s/p Amikacin (8/17 - 8/21), discontinued as he had completed 2-week course of meropenem and has other active agents for G-coverage  - Flagyl (8/17- 8/30)       Current ABx:   - Daptomycin (7/30- ), ceftaroline (8/13- 9/6)  - Micafungin (7/30-8/22) changed to fluconazole (8/22- )  - Tigecycline (8/13-8/23), discontinued after 4 days of negative blood cultures. Re-started 8/28/17  - Meropenem restarted 8/30/18, changed to ertapenem 8/31/18-9/6/18 for ESBL E. coli  - Immunosuppression: (restarted 8/6) tacrolimus BID   - Immunosuppression prophylaxis: Valgancyclovir and Tenofovir, micafungin, Nystatin QID. Bactrim resumed 8/29      Cultures   - 7/27 VRE (line/arm)   - 7/29: VRE (left arm)   - 7/30: VRE (left arm)   - 7/31: VRE (Line and peripheral)  - 8/1: ESBL (HD/hand/central line)   - 8/3: VRE (intra-abdominal tissue)  - 8/4: NGTD (blood)  - 8/5: NGTD (blood)  - 8/6: VRE (art line)  - 8/7: VRE (blood)  - 8/8: VRE (blood)  - 8/9: VRE (blood)  - 8/11: VRE (blood)  - 8/12: VRE (abdominal fluid)  - 8/13: VRE (blood)  - 8/14: VRE (blood)  - 8/15: VRE  (blood)  - 8/17: NGTD (blood)   - 8/17: VRE & Candida (peritoneal fluid)   - 8/18: G+ cocci (tissue)  - 8/19 NGTD (blood)  - 8/21 NGTD (blood)  - 8/22 NGTD (blood)  - 8/23 NGTD (blood)  - 8/24 NGTD (blood)  - 8/27 FLUID: VRE, ESBL E coli x2, yeast, GPC   - 8/30 FLUID: VRE, yeast      Heme:     # Acute blood loss anemia  # Anemia of critical illness  # Portal venous thrombosis  # Thrombocytopenia  # Coagulopathy   # Anti thrombin III deficiency  - Heparin at 400 straight rate- per transplant. Held overnight.  - Holding aspirin 81mg   - Monitor CBC and INR daily  - Plan to keep platelet count > 20K      MSK:  # weakness and deconditioning of critical illness   - PT/OT consulted.   - In bed passive ROM.       General cares and Prophylaxis:    - DVT: Heparin--400units/hr held overnight, SCD's  - GI: protonix 40 mg IV BID       Lines/ tubes/ drains:  - PIVs.   - PICC line   - Luis catheter       Dispo:  - SICU.  - comfort cares started, awaiting family as able       ====================================    SUBJECTIVE:   Abdomen closed yesterday. Hb 4, got 2U pRBCs. INR 3.8. Heparin drip stopped. Fentanyl drip at 50. 500 ml bolus, vasopressin. Held NG meds this morning. This morning, acutely deteriorating, bradycardic to 30s, MAP 30s, supporting patient as appropriate while waiting for last brother to arrive. Family discussion, patient likely will pass away today.    OBJECTIVE:   1. VITAL SIGNS:   Temp:  [96.8  F (36  C)-101.1  F (38.4  C)] 100.9  F (38.3  C)  Pulse:  [131] 131  Heart Rate:  [] 135  Resp:  [19-39] 31  BP: ()/(21-70) 88/62  FiO2 (%):  [30 %-100 %] 100 %  SpO2:  [76 %-100 %] 100 %  Ventilation Mode: CMV/AC  (Continuous Mandatory Ventilation/ Assist Control)  FiO2 (%): 100 %  Rate Set (breaths/minute): 18 breaths/min  Tidal Volume Set (mL): 400 mL  PEEP (cm H2O): 5 cmH2O  Oxygen Concentration (%): 100 %  Resp: 31    2. INTAKE/ OUTPUT:   I/O last 3 completed shifts:  In: 3396.04 [I.V.:1716.04;  NG/GT:180; IV Piggyback:500]  Out: 1405 [Urine:805; Emesis/NG output:200; Stool:400]    3. PHYSICAL EXAMINATION:     GEN: intubated, unresponsive  EYES: jaundiced  HEENT:  Normocephalic, atraumatic, trachea midline, ETT secure  CV: bradycardic to 30s, MAPs 30s  PULM/CHEST: mechanically ventilated  GI: abdominal wound closed, distended, likely hematoma  : bah catheter in place  EXTREMITIES: pitting peripheral edema, peripheral pulses 2+  SKIN: No rashes, sores or ulcerations    4. INVESTIGATIONS:   Arterial Blood Gases   No lab results found in last 7 days.  Complete Blood Count     Recent Labs  Lab 09/08/18  0338 09/08/18 0253 09/07/18 0316 09/06/18  1023   WBC 29.8* 28.6* 12.2* 10.8   HGB 4.6* 4.7* 8.0* 7.6*   PLT 39* 39* 29* 26*     Basic Metabolic Panel    Recent Labs  Lab 09/08/18 0253 09/07/18 0316 09/06/18 0440 09/05/18  0409    145* 145* 147*   POTASSIUM 5.0 4.3 3.3* 3.5   CHLORIDE 113* 114* 114* 118*   CO2 10* 15* 14* 14*   * 139* 134* 131*   CR 2.88* 2.69* 2.47* 2.40*   * 153* 119* 107*     Liver Function Tests    Recent Labs  Lab 09/08/18 0253 09/07/18 0316 09/06/18 0440 09/05/18  0409   AST 64* 50* 47* 41   ALT 16 20 19 17   ALKPHOS 552* 737* 664* 664*   BILITOTAL 6.6* 8.0* 7.9* 7.6*   ALBUMIN 2.1* 2.4* 2.8* 2.1*   INR 3.82* 2.34* 2.44* 2.36*     Pancreatic Enzymes  No lab results found in last 7 days.  Coagulation Profile    Recent Labs  Lab 09/08/18 0253 09/07/18 0316 09/06/18 0440 09/05/18  0409   INR 3.82* 2.34* 2.44* 2.36*         5. RADIOLOGY:   No results found for this or any previous visit (from the past 24 hour(s)).    =========================================    Merlin Campuzano MD  Surgery Resident PGY1

## 2018-09-08 NOTE — PROGRESS NOTES
MD DEATH PRONOUNCEMENT    Called to pronounce Yaneli Miles dead.    Physical Exam: Unresponsive to noxious stimuli, Spontaneous respirations absent, Breath sounds absent, Carotid pulse absent, Heart sounds absent, Pupillary light reflex absent and Corneal blink reflex absent    Patient was pronounced dead at :  3:08 PM, September 8, 2018.    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 He returned to the SICU for shock, severe lactic acidosis, renal failure and respiratory failure, requiring intubation and dialysis.  Ischemic injury to left hemiliver and non-occlusive mesenteric ischemia.  He underwent a laparotomy and several subsequent take-backs to examine ischemic bowel, on 8/6 he underwent resection of necrotic jejunum with a primary anastomosis, and his abdomen was closed 8/8. He acutely decompensated beginning the night of 8/16, with increasing pressor needs, increasing abdominal pain and lactic acidosis, now s/p exploratory laparotomy 8/17 and s/p entero-entero small bowel anastomosis, drain placement and fascial closure 8/18. Improving renal function has improved (off dialysis). Take back to OR on 8/27/18 for hematochezia and found to have anastomotic dehiscence and perforated bowel near site.  Returned to OR 8/30 for washout and replacement of mesh. Care conference 9/6, agreed for comfort cares. Will likely extubate when last  brother arrives. On 9/7, abdominal wound closed with sutures per family cultural request. On morning of 9/8, acutely deteriorating, bradycardia to 30s, MAP to 30s. Supporting patient as appropriate while family arrives. Passed away today 9/8 at 3:08 PM.      Infectious disease present?: YES    Communicable disease present? (examples: HIV, chicken pox, TB, Ebola, CJD) :  YES, ESBL, VRE    Multi-drug resistant organism present? (example: MRSA): YES, ESBL, VRE      Body disposition, autopsy: Nurse/staff to discuss with family.    Merlin Campuzano MD  Surgery Resident PGY1

## 2018-09-08 NOTE — PROGRESS NOTES
SICU Brief Update    Called by RN about hemoglobin 4.7. This was rechecked, and again was 4.6, which is down from 8.0 from the day prior. Has had slow ooze from closure of wound. Has become more tachycardic, and with increasing pressor requirement (NE up to 0.38, vasopressin started). Plan at this time is to continue cares until his brother gets here today, after which they plan to transition to comfort cares.     Discussed with wife that it would be appropriate to consider transition to comfort cares at this point, since acute blood loss of this nature would typically prompt product replacement, which can be considered an 'escalation of cares.' She again reiterates that she is interested in keeping him alive until his brother gets here if that is possible, and prefers to give blood products in the meantime. Plan to stop heparin gtt and give 2u pRBC.     Discussed with staff, Dr. Sung.    Anna Alonso MD  General Surgery PGY-3

## 2018-09-08 NOTE — OP NOTE
Procedure Date: 2018      DATE OF PROCEDURE: 2018      PREOPERATIVE DIAGNOSES:  Open abdomen status post liver transplant.  History of massive bowel resection.      POSTOPERATIVE DIAGNOSES:  Open abdomen status post liver transplant.  History of massive bowel resection.      SURGEON:  Mamadou Norwood MD       ASSISTANTS:   1.  Mihir Nj MD   2.  Ann Glynn DO      INDICATIONS FOR THE PROCEDURE:  Mr. Rodriguez received a liver transplant, following which he had bowel ischemia.  The abdomen was infected  so abdomen was left open.  This was a planned reexploration.      OPERATIVE FINDINGS:  The abdomen was frozen.  There was minimal ascites.  The bowel anastomosis was intact.      DESCRIPTION OF OPERATIVE PROCEDURE:  The patient was brought to the operating room, placed in supine position.  General anesthesia was administered.  Parts were prepped from nipple to mid thigh.  Sterile drapes were placed.  The abdomen was opened by removing the ABThera dressing.  We thoroughly washed the abdominal cavity.  We brought in a Permacol mesh and fixed it to the abdominal fascia using 0 PDS, over which a wound VAC was placed.  The patient tolerated the procedure well and was transferred to the ICU in a stable but critical condition.        I explained the details of the procedure to his family and answered all their questions.         MAMADOU NORWOOD MD             D: 2018   T: 2018   MT:       Name:     CHRISTINA RODRIGUEZ   MRN:      [id number]        Account:        [id number]   :      1975           Procedure Date: 2018      Document: [id number]

## 2018-09-08 NOTE — PROGRESS NOTES
This morning family stated to keep pt alive till more family member arrived, but pt remained DRN. continued to increase levophed dose, and vasopressin dose. Pt was tachycardic to 130's. Grimacing intermittently. At 12120 pt dropped HR from 130's to 60's, BP soft. MD notified, in room immediatetely. Vasopressin increased to max rate of 4.2, levophed increased to max rate of 1. Bicarb given X 3, bicarb drip initiated at150 Meq/hr. Family requested that ET tube be the last thing removed. PRN morphine given as needed. Fentanyl drip increased to 100, per order. Pt continued to become more bradycardic, and intermittent low BP's. family decided to remove all drains and tubes. Luis, rectal tube, PICC, PIV all removed. RT terminally extubated, with MD at bedside. Pt  at 1508. MD in room and confirmed time of death. Family took all belongings home. Death packet completed. [facility] notified, pt not a candidate for any transplant. [place] [facility] [place]

## 2018-09-10 LAB
BACTERIA SPEC CULT: NO GROWTH
BACTERIA SPEC CULT: NO GROWTH
FUNGUS SPEC CULT: NORMAL
Lab: NORMAL
Lab: NORMAL
SPECIMEN SOURCE: NORMAL

## 2018-09-12 LAB
BACTERIA SPEC CULT: NO GROWTH
BACTERIA SPEC CULT: NO GROWTH
Lab: NORMAL
Lab: NORMAL
SPECIMEN SOURCE: NORMAL
SPECIMEN SOURCE: NORMAL

## 2018-09-14 LAB
FUNGUS SPEC CULT: ABNORMAL
FUNGUS SPEC CULT: ABNORMAL
SPECIMEN SOURCE: ABNORMAL

## 2018-09-17 LAB
FUNGUS SPEC CULT: ABNORMAL
FUNGUS SPEC CULT: ABNORMAL
SPECIMEN SOURCE: ABNORMAL

## 2018-09-20 NOTE — PROGRESS NOTES
Patient was called three times and no answer so post 24 hr DC follow up calls will be closed out since it is Friday      
yes

## 2018-09-21 LAB
BLD PROD TYP BPU: NORMAL
BLD UNIT ID BPU: 0
BLOOD PRODUCT CODE: NORMAL
BPU ID: NORMAL
TRANSFUSION STATUS PATIENT QL: NORMAL
TRANSFUSION STATUS PATIENT QL: NORMAL

## 2018-09-22 LAB
ABO + RH BLD: NORMAL
ABO + RH BLD: NORMAL
BLD GP AB SCN SERPL QL: NORMAL
BLD PROD TYP BPU: NORMAL
BLOOD BANK CMNT PATIENT-IMP: NORMAL
NUM BPU REQUESTED: 5
SPECIMEN EXP DATE BLD: NORMAL

## 2018-09-24 LAB
FUNGUS SPEC CULT: ABNORMAL
FUNGUS SPEC CULT: ABNORMAL
SPECIMEN SOURCE: ABNORMAL

## 2018-09-26 NOTE — DISCHARGE SUMMARY
Admit Date:     2018   Discharge Date:     2018      DATE OF SURGERY:  2018      DATE OF DEATH:  2018.        CAUSE OF DEATH:     1.  Sepsis.   2.  Short gut syndrome, massive bowel resection.   3.  Status post liver transplant.      HOSPITAL COURSE:  Mr. Christina Rodriguez was admitted with end-stage liver disease and went to the operating room for a liver transplant.  Please see the operative note.  Postoperatively, he did well in the initial week and then subsequently developed abdominal pain.  A CT scan showed that he had ischemic bowel.  He is taken to the operating room multiple times.  Please refer to the operative notes for all the details.  He progressively developed massive gangrene of the bowel and required massive resections of the bowel.  He subsequently underwent bowel anastomosis but did leak from that.  Please read the ICU notes for the details of the hospital course.  Subsequently, he developed leakage of the bowel anastomosis/new perforation.  At that point he was vent dependent and renal failure on pressors, and the family decided to withdraw care.  Please refer to the ICU notes, and subsequently the patient was put on comfort care.        Final cause of death is status post sepsis, massive bowel resection, short gut syndrome, status post liver transplant.         LEXUS ARAMBULA MD             D: 2018   T: 2018   MT: KARLIE      Name:     CHRISTINA RODRIGUEZ   MRN:      [id number]        Account:        [id number]   :      1975           Admit Date:     2018                                  Discharge Date: 2018      Document: [id number]

## 2018-09-27 LAB
FUNGUS SPEC CULT: ABNORMAL
FUNGUS SPEC CULT: ABNORMAL
SPECIMEN SOURCE: ABNORMAL

## 2018-11-24 NOTE — PROGRESS NOTES
Neuro- Neuro status remains stable and unchanged.   CV- Afebrile. Sinus loyda to NSR 40s-70s. PVCs noted. BP stable. Pitting edema throughout extremities.   Pulm- On CMV 40%. Minimal vent settings. Lungs clear/diminished. Scant secretions. Placed on PS this morning at 0645.  GI- Abdomen soft. Hypoactive bowel sounds. Minimal output from OG tube. NJ with TF at goal of 55ml/hr. Rectal pouch for loose stool.   - Luis patent. UOP around 50ml/hr. On CRRT-- goal for 2-3L net negative per day.   Skin- Abdomen with abds over packing. LLQ CAROL. RLQ CAROL still has drainage around site. Skin tear to sacrum. B/L groin lines oozing serous drainage. Dressings changed.   Gtts- Dextran @ 10. Insulin @ 1. Heparin @ 500, TPN @ 45. TKO @ 20.      Will continue to monitor and assess for changes.    Discharged

## 2019-02-28 NOTE — ADDENDUM NOTE
Addendum  created 02/28/19 1137 by Perico Valiente MD    Delete clinical note, Intraprocedure Blocks edited, Intraprocedure Event edited, Order Canceled from Note, Pend clinical note, Sign clinical note

## 2020-09-24 NOTE — PROGRESS NOTES
Immunosuppression Note:    Yaneli Miles is a 43 year old male who is seen today  for immunosuppression management     I, Mamadou Norwood MD, I have examined the patient with the resident/PA/Fellow, discussed and agree with the note and findings.  I have reviewed today's vital signs, medications, labs and imaging. I reviewed the immunosuppression medications and levels. 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  Developed shock with severe lactic acidosis and respiratory failure on POD9. CT scan showed left PV venous thrombosis, infarction of left liver lobe, patchy infarction of right lobe and possible hepatic arterial thrombosis, patchy bowel ischemia. Transferred to ICU.   Ex lap, patchy diffuse SB ischemia. Doppler demonstrated arterial flow main vessels to bowel and liver. Splenic infarction. Liver biopsy negative for acute rejection. Fluid culture growing VRE. On Heparin gtt. 8/3 OR for reexploration liver transplant, bowel ischemia, findings improved patchy iscemic changes of small bowel, patchy ischemic changes of left liver lobe.  OR: some ischemic/necrotic jejunum, s/p resection.   Washout and abdominal closure with stratice mesh, no drains placed. Minimal ascites, intact small bowel anastomosis, patchy ischemia left liver lobe.  peritonitis with bowel necrosis and bowel perforation. S/p bowel resection, about 50 cm sm bowel, ileocecal valve and colon remaining.      Procedures:    donor (DBD) liver transplant with duct to duct anastomosis over biliary stent.    Ex lap due to  concern for ischemic bowel, findings: patchy diffuse SB ischemia. Doppler demonstrated arterial flow main vessels to bowel and liver. Splenic infarction.   8/3 Reexploration liver transplant, bowel ischemia, findings improved patchy iscemic changes of small bowel, patchy ischemic changes of left liver lobe.   8/6 Re-exploration, ischemic/necrotic jejunum, s/p resection  8/8 Washout and abdominal closure with stratice mesh, no drains placed. Minimal ascites, intact small bowel anastomosis, patchy ischemia left liver lobe.  8/17 Procedure: reopening of laparotomy. abd washout, bowel resection (clip and drop fashion). Postop Dx: same. Large segment of ischemic bowel from mid-jejunum (prior anastomosis) with distal perforation and feculent peritonitis.   8/18 Reexploration, small and large bowel intestine appeared healthy per surgeon, primary anastomosis bowel. Fascia closure.  8/20 Reopening of laparotomy. abdominal washout, bowel resection of 200cm  8/27 Abdominal washout, primary anastomosis closure, abthera placement   8/30 Abdominal washout, abdominal wall closure with Permacol, wound vac placement    Graft function: POD #41, Tbili increased stable at 5.3 today (5.1), alk phos increased to 412 (274), ALT/AST normal. 7/30 Hepatic infarction, left PV thrombosis, and narrow and irregular appearance HA noted on CTA. 8/8 Liver biopsy negative for acute rejection. Intra-abdominal  cultures from 8/1, 8/6, 8/17, 8/18 + VRE or GPCs.   Incision opened at bedside 8/12, hematoma removed.  8/13 US: new perihepatic fluid collections (largest 7x2x4), small volume of septated ascites.  VAC placed 8/15. 8/15 CT scan, left lobe infarct with complex fluid collection inferior.  8/17 US: patent vasculature but RIs concerning for HA stenosis, stable fluid collection near braden hepatis. Will give 25gm albumin 25% x 3 doses today.     Immunosuppression management:   - Induction: Simulect intra-op due to BEN. Steroid taper completed per  protocol.  - MMF: on hold due to critical status.  - Tac goal level ~5-6 due to sepsis and BEN. Level on 8/31 is 7.6 (1-hour trough). Will decrease dose to 0.75mg BID and recheck level tomorrow.   - Hydrocortisone 7/30-8/21, now tapered off.   Complexity of management: Medium. Contributing factors: thrombocytopenia and anemia, ARF    Hematology:   Anemia: Acute blood loss. Hgb stable in the 8-9 range. Multiple transfusions in the last week, last on 8/28.  Thrombocytopenia: Ongoing transfusions. Platelet count remains low at 31 (34), getting frequent platelet transfusions.  Anticoagulation: Anticoagulating for bowel ischemia and portal thrombosis. Protein S level low at 27 on 8/19. Heparin gtt low intensity resulted in diffuse oozing. Dextran 10 ml/hr for additional anticoagulation, stopped on 8/23 due to widespread oozing. Continue heparin drip at 400u/hr. INR 2.62 this morning.     Cardiorespiratory:    Hypotension secondary to septic shock: Resolved, off pressors.  Respiratory failure secondary to sepsis: Extubated 8/22. Intubated for OR on 8/27, continues on vent. PST trial today with possible extubation.  Junctional rhythm:  Onset 8/22, now resolved.    GI/Nutrition: NG tube in place, remains NPO. On TPN. Will plan to keep NPO for at least one week, NG tube to remain to LIS.  Ischemic bowel:  7/30 CTA: nonocclusive filling defects in superior mesenteric vein branches.  Anticoagulation as above.  S/p resection of necrotic jejunum on 8/6. S/p resection of 150cm of ischemic bowel with perforation on 8/17. Patient with hypotension and increased abdominal pain overnight, CT on 8/27 suggestive of bowel perforation. OR on 8/27 for wash out and bowel repair. Returned to OR on 7/30 for washout and abdominal wall closure with wound vac placement. Wound vac to be changed at the bedside by transplant surgery on 9/1.   PSBO: Noted on 8/15 CT, transition point RLQ. S/p resection on 8/17.   Bowel perforation/peritonitis:   Small bowel perforation with feculent peritonitis noted in OR on 8/17. S/p resection, abx as below. Patient with hypotension and increased abdominal pain overnight, CT on 8/27 suggestive of bowel perforation. OR on 8/27 for wash out and bowel repair. Returned to OR on 8/30 for washout and abdominal wall closure. Discussed possible rectal tube placement for bowel decompression with ICU team, but not felt to be indicated at this time.  GIB: Was having maroon stools prior to OR on 8/27, had return of bowel function overnight.    Endocrine: DM type 2, continue insulin drip.    Fluid/Electrolytes/Renal: CRRT off since 8/23. Creatinine is slowly trending up with decreasing UOP, will continue to monitor.    : Traumatic bah placement with intermittent bleeding.   Hematuria now resolved. Bah, coude 18fr in place.    Infectious disease: Afebrile. WBC stable at 8.6 today (from 9.1). ID following.  -Amikacin (8/17-8/20), flagyl (8/17-8/30), ceftraroline (8/13-), tigecycline (8/13-8/23 and 8/29-), daptomycin (8/14-), micafungin (7/30-8/21), fluconazole (8/22-), and meropenem (8/30-).   -Blood cultures through 8/18 + VRE or GPCs  -8/17 Peritoneal fluid culture + VRE, candida albicans  -8/19, 8/21, 8/23 Blood cx, no growth to date  -8/27 abdominal fluid culture + ESBL E. Coli, yeast, and VRE    1. VRE intra abdominal and bacteremia (dapto, ceftaroline, tigecycline, meropenem)  2. Infarct left lobe liver with inferior perihepatic fluid collection  3. Peritonitis (fluconazole)    Previous ID:   3. Ecoli ESBL bacteremia. Repeat bcx on 8/1 (peripheral and VAD) grew Ecoli ESBL. Zosyn changed to Meropenem, completed course on 8/14.  4. HBV: Hepatits B DNA PCR positive prior to tx. Received HBIG on 7/21 and 7/22. 8/10 Hep B surface antigen negative. No further HBIG planned. Continue tenofovir 300 mg, dose adjusted for HD     Prophylaxis: DVT PCDs, on heparin, fall, GI, Valcyte x 12 weeks (CMVand EBV IgG +), bactrim.   Disposition:  SICU    Coordinator: Kay Reyes  Surgeon: Pako Mathias     Check Hep B surface antigen at 3 months, 6 months then annually     Medical Decision Making: Medium    JAY/Fellow/Resident Provider:  Becka Nguyen NP [phone]    Faculty: Mamadou Norwood MD   __________________________________________________________________  Transplant History: Admitted 2018 for  donor liver transplant.   The patient has a history of liver failure due to hepatitis B .    2018 (Liver), Postoperative day: 41     Interval History:  Remains vented, not requiring pressors. Had return of bowel function overnight. PS trial today with possible extubation.    ROS:   A 10-point review of systems was negative except as noted above.    Curent Meds:    albumin human  25 g Intravenous Q8H     ceftaroline (TEFLARO) intermittent infusion  300 mg Intravenous Q12H     DAPTOmycin (CUBICIN) intermittent infusion  12 mg/kg Intravenous Q48H     fluconazole  200 mg Intravenous Q24H     heparin lock flush  5-10 mL Intracatheter Q24H     lipids 4 oil  250 mL Intravenous Once per day on      meropenem  1,000 mg Intravenous Q8H     nystatin  500,000 Units Mouth/Throat 4x Daily     pantoprazole (PROTONIX) IV  40 mg Intravenous BID     potassium chloride  20 mEq Intravenous Once     sodium chloride (PF)  3 mL Intravenous Q8H     sulfamethoxazole-trimethoprim  10 mL Per Feeding Tube Once per day on      tacrolimus  1 mg Oral or Feeding Tube BID IS     tenofovir  300 mg Per Feeding Tube Q72H     tigecycline (TYGACIL) intermittent infusion  50 mg Intravenous Q12H     valGANciclovir  450 mg Oral Once per day on     Or     valGANciclovir  450 mg Oral or NG Tube Once per day on        Physical Exam:     Admit Weight: 60.7 kg (133 lb 12.8 oz)    Vital sign ranges:    Temp:  [96.4  F (35.8  C)-98.6  F (37  C)] 97.3  F (36.3  C)  Heart Rate:  [77-90] 77  Resp:  [18-27] 21  MAP:  [53 mmHg-77 mmHg] 53 mmHg  Arterial Line  BP: ()/(42-60) 74/42  FiO2 (%):  [40 %-50 %] 40 %  SpO2:  [99 %-100 %] 100 %    General Appearance: NAD, on ventilator  HEENT:  NG (green/tan)  Skin: warm, dry  Heart: Regular rate and rhythm  Lungs: Nonlabored breathing on ventilator.  Abdomen: Incision with wound vac in place, sanguinous output. Wound vac in place with serosanguinous drainage.  : Luis in place draining clear yellow urine.  Extremities: well perfused. Generalized trace edema.  Neurologic: Sedated.   Access: PICC, PIV    Data:   CMP    Recent Labs  Lab 08/31/18 0414 08/30/18  1412 08/30/18  0357  08/27/18  1858    143 141  < > 141   POTASSIUM 4.2 3.3* 3.2*  < > 3.0*   CHLORIDE 113*  --  110*  < > 108   CO2 20  --  21  < > 21   * 97 144*  < > 113*   BUN 94*  --  88*  < > 79*   CR 1.94*  --  1.87*  < > 1.64*   GFRESTIMATED 38*  --  39*  < > 46*   GFRESTBLACK 45*  --  47*  < > 55*   JESUS 8.3*  --  8.3*  < > 6.9*   ICAW  --  5.1  --   --  4.6   MAG 2.4*  --  2.5*  < >  --    PHOS 2.3*  --  2.3*  < >  --    ALBUMIN 2.0*  --  2.4*  < > 2.1*   BILITOTAL 5.3*  --  5.1*  < > 3.8*   ALKPHOS 412*  --  274*  < > 94   AST 22  --  16  < > 19   ALT 12  --  16  < > 13   < > = values in this interval not displayed.  CBC    Recent Labs  Lab 08/31/18 0414 08/30/18  1631   HGB 8.4* 9.0*   WBC 8.6 9.1   PLT 31* 34*     COAGS    Recent Labs  Lab 08/31/18  0414 08/30/18  1631  08/27/18  1858   INR 2.62* 2.33*  < > 2.81*   PTT  --   --   --  47*   < > = values in this interval not displayed.   Urinalysis  Recent Labs   Lab Test  07/27/18   2330  07/13/18   1315  06/18/18   1200   COLOR  Yellow  Dark Yellow  Yellow   APPEARANCE  Clear  Clear  Clear   URINEGLC  Negative  Negative  Negative   URINEBILI  Negative  Large*  Small*   URINEKETONE  Negative  Negative  Negative   SG  1.008  1.013  1.008   UBLD  Negative  Negative  Negative   URINEPH  7.5*  6.0  5.0   PROTEIN  30*  10*  Negative   NITRITE  Negative  Negative  Negative   LEUKEST  Negative   Negative  Negative   RBCU  5*  <1  1   WBCU  3  None  2   UTPG   --    --   0.75*     Virology:  Hepatitis C Antibody   Date Value Ref Range Status   07/20/2018 Nonreactive NR^Nonreactive Final     Comment:     Assay performance characteristics have not been established for newborns,   infants, and children         POD 14 US liver:  1.  The left portal vein is antegrade with decreased velocity,  measuring approximately 15 cm/second. This vessel was occluded on CT  7/30/2018.  2.  Elevated velocities of the main hepatic artery, now 203 cm/sec  (previously 94 cm/sec). However left hepatic arteries demonstrates  steep upstroke suggesting there is not a significant stenosis.   2a. Right hepatic artery was not visualized, could be occluded or  difficult to see postoperatively.  3. Echogenic focus of the right lobe in close proximity to the  hepatorenal fossa. This likely corresponds with nonenhancing lesion on  CT 7/30/2018 and likely representing subcapsular hematoma.  4. Additional small hematomas of the braden hepatis and posterior right  perihepatic region.  5. Small volume anechoic ascites.  6. Left hepatic lobe not well visualized secondary to open wound  preventing imaging. Note the left lobe was grossly abnormal on CT  7/30/2018    POD 1 US liver: Impression:   1.  Hematoma associated with the inferior aspect of the perihepatic  space anteriorly, measuring 13.7 x 7.0 x 8.5 cm.  2.  Retrograde flow of the left portal vein. Additionally, low  velocities of the portal venous system. Single low resistive index of  the extrahepatic aspect of the hepatic artery, measuring 0.37. These  findings are likely within normal limits in the early postoperative  context. However, continued attention on follow-up recommended.    7/30 CT scan abd/pelvis:  IMPRESSION:   1. Hypoenhancing areas in the transplant liver concerning for  infarction. Transplant left portal vein occlusion with bubbles of  portal venous gas. Nearly occlusive  filling defect in the native  hepatic artery. Narrowed and irregular appearance of the transplant  hepatic artery. Focal occlusion of the left main hepatic artery.  Segmental occlusions of right hepatic arterial branches.   2. Pneumatosis intestinalis with nonocclusive filling defects in  superior mesenteric vein branches, concerning for bowel ischemia or  infarction.   3. Splenic infarctions.   4. Bibasilar consolidation with air bronchograms. Pneumonia cannot be  excluded.   5. Post surgical changes of evacuation of peritransplant hematoma.     Consent (Lip)/Introductory Paragraph: The rationale for Mohs was explained to the patient and consent was obtained. The risks, benefits and alternatives to therapy were discussed in detail. Specifically, the risks of lip deformity, changes in the oral aperture, infection, scarring, bleeding, prolonged wound healing, incomplete removal, allergy to anesthesia, nerve injury and recurrence were addressed. Prior to the procedure, the treatment site was clearly identified and confirmed by the patient. All components of Universal Protocol/PAUSE Rule completed.

## 2021-04-23 NOTE — PLAN OF CARE
Problem: Patient Care Overview  Goal: Plan of Care/Patient Progress Review  Outcome: No Change  D/I/A: Patient admitted on 18 s/p  donor liver transplant.   Neuro- Opens eyes spontaneously. PERRL. Moves all extremities on command. Nods/shakes head to questions.   CV- ST with PVCs. -120's. Afebrile. MAP > 60 maintained with Levophed gtt. CVP 2-3.   Resp- Lung sounds clear, slightly diminished in bases. Vent mode CMV with FiO2 35% and PEEP 5.0. Small amount of white sputum from ETT.   GI- Abdomen distended. Abthera in place, CDI. TF via NJ 20 mL/hr per order. NG to LIS with minimal output, flushed every 4 hours. Medium mucous BM.    - Anuric. Bladder scan every shift. CRRT per orders. Net hour fluid removal rate 0 per order.    P: Continue to monitor and notify MD of any changes. Wean Levophed as able. PS trial BID.        No

## 2021-04-29 NOTE — PROGRESS NOTES
[address]       Physical Therapy  Cancellation/No-show Note  Patient Name:  Kavita Friedman  :  2020   Date:  2021  Referring Practitioner: Dr. Marisol Garcia  Diagnosis: trisomy 21, hypotonia     Visit Information:  PT Visit Information  PT Insurance Information: Nelda Johnson, [facility]  Total # of Visits Approved: (Unlimited bmn)  Total # of Visits to Date: 15  No Show: 1  Canceled Appointment: 18  Progress Note Counter:  (cx 21)    For today's appointment patient:  [x]  Cancelled  []  Rescheduled appointment  []  No-show   []  Called pt to remind of next appointment     Reason given by patient:  []  Patient ill  []  Conflicting appointment  []  No transportation    []  Conflict with work  [x]  No reason given  []  Other:       Comments:       Signature: Electronically signed by Suraj Davis PTA on 21 at 7:54 AM EDT Pt arrived from OR at 1900.  Placed on vent with settings of 14/400/50/+5.  7.5 ETT secured at 23cm at the lips.    Marlon rOo, RRT  8/27/2018

## 2021-05-30 VITALS
HEIGHT: 65 IN | WEIGHT: 169.8 LBS | HEIGHT: 65 IN | BODY MASS INDEX: 28.29 KG/M2 | WEIGHT: 169.8 LBS | BODY MASS INDEX: 28.29 KG/M2

## 2021-06-01 VITALS — HEIGHT: 67 IN | WEIGHT: 127.65 LBS | BODY MASS INDEX: 20.03 KG/M2

## 2021-06-01 VITALS — HEIGHT: 67 IN | WEIGHT: 135.13 LBS | BODY MASS INDEX: 21.21 KG/M2

## 2021-06-01 VITALS — WEIGHT: 158.1 LBS | BODY MASS INDEX: 26.34 KG/M2 | HEIGHT: 65 IN

## 2021-06-18 NOTE — ANESTHESIA CARE TRANSFER NOTE
Last vitals:   Vitals:    05/31/18 1756   BP: 104/67   Pulse: 64   Resp: 16   Temp: (!) 35.7  C (96.2  F)   SpO2: 100%     Volatile agents turned off, muscle relaxant reversed, 4/4 twitches with sustained tetany. Pt breathing spontaneously with adequate tidal volumes, following commands, gently suctioned oropharynx, extubated without issue. Transported by CRNA and RN to recovery.      Patient's level of consciousness is drowsy  Spontaneous respirations: yes  Maintains airway independently: yes  Dentition unchanged: yes  Oropharynx: oropharynx clear of all foreign objects    QCDR Measures:  ASA# 20 - Surgical Safety Checklist: WHO surgical safety checklist completed prior to induction  PQRS# 430 - Adult PONV Prevention: 4558F - Pt received => 2 anti-emetic agents (different classes) preop & intraop  ASA# 8 - Peds PONV Prevention: NA - Not pediatric patient, not GA or 2 or more risk factors NOT present  PQRS# 424 - [place]-op Temp Management: 4559F - At least one body temp DOCUMENTED => 35.5C or 95.9F within required timeframe  PQRS# 426 - PACU Transfer Protocol: - Transfer of care checklist used  ASA# 14 - Acute Post-op Pain: ASA14B - Patient did NOT experience pain >= 7 out of 10

## 2021-06-18 NOTE — ANESTHESIA PREPROCEDURE EVALUATION
Anesthesia Evaluation      Patient summary reviewed   No history of anesthetic complications     Airway   Mallampati: I  Neck ROM: full   Pulmonary - negative ROS and normal exam                          Cardiovascular - negative ROS and normal exam  Exercise tolerance: > or = 4 METS   Neuro/Psych - negative ROS     Endo/Other    (+) diabetes mellitus type 2,      GI/Hepatic/Renal    (+)   impaired hepatic function     Other findings: H/o acute GI bleed with shock, respiratory failure (intubated 5/25/18, now extubated and on room air), hepatitis A & B, cirrhosis with impaired liver function, was tapped in past and 9.5 liters of ascites drained, h/o hepatic encephalopathy (alert today), esophageal varices, portal vein thrombosis w/o obstruction, CBD stone, malnutrition, h/o C. Diff, h/o tongue ulceration.   Echo 4/5/18 was nl.  Na 129, K 3.9, BUN 25, GFR>60, Hg 10, Plts 57K, INR 2.01. D-dimer elevated, fibrinogen decreased.      Dental - normal exam                        Anesthesia Plan  Planned anesthetic: general endotracheal    ASA 4   Induction: intravenous   Anesthetic plan and risks discussed with: patient    Post-op plan: routine recovery  DNR/DNI status   DNR/DNI status discussed with patient.  Plan is for suspension of DNR

## 2021-06-18 NOTE — ANESTHESIA PROCEDURE NOTES
Emergent Intubation  Date/Time: 5/25/2018 2:59 AM    Performing CRNA: MICHAEL WALTER  Indications: respiratory distress  Route: oral  Technique: direct (glidescope)  Laryngoscope size: Mac 3  Tube size: 8.0 mm  Tube type: cuffed  Level of Difficulty: 0ETT to lip: 23 (at lip) cm  Tube secured with: ETT de la cruz  ETCO2 = Yes  Breath sounds: equal  SaO2 %: 100    Sign out given. CXR and sedation per primary care team.  Comments: Called to ICU re: intubation.  Pre-O2 per ambu to 100% SaO2.  Propofol 120mg IV.  Direct visualization tracheal intubation performed without difficulty.  ETCO2 confirmed.  BL BS +/=.  Tube secure at 23 cm by RT.  VS stable.  CXR ordered.

## 2021-06-18 NOTE — ANESTHESIA PREPROCEDURE EVALUATION
Anesthesia Evaluation      Patient summary reviewed   No history of anesthetic complications     Airway   Mallampati: I  Neck ROM: full   Pulmonary - negative ROS and normal exam                          Cardiovascular - negative ROS and normal exam  Exercise tolerance: > or = 4 METS   Neuro/Psych - negative ROS     Endo/Other    (+) diabetes mellitus type 2,      GI/Hepatic/Renal    (+)   impaired hepatic function     Other findings: H/o acute GI bleed with shock, respiratory failure (intubated 5/25/18, now extubated and on room air), hepatitis A & B, cirrhosis with impaired liver function, was tapped in past and 9.5 liters of ascites drained, h/o hepatic encephalopathy (alert today), esophageal varices, portal vein thrombosis w/o obstruction, CBD stone, malnutrition, h/o C. Diff, h/o tongue ulceration.   Echo 4/5/18 was nl. Labs reviewed.      Dental - normal exam                          Anesthesia Plan  Planned anesthetic: general endotracheal    ASA 4   Induction: intravenous   Anesthetic plan and risks discussed with: patient and  services used    Post-op plan: routine recovery  DNR/DNI status   DNR/DNI status discussed with patient.

## 2021-06-18 NOTE — ANESTHESIA POSTPROCEDURE EVALUATION
Patient: Yaneli Miles  ENDOSCOPIC RETROGRADE CHOLANGIOPANCREATOGRAPHY, SPHINCTEROTOMY, STONE EXTRACTION  Anesthesia type: general    Patient location: PACU  Last vitals:   Vitals:    05/31/18 1820   BP: 93/52   Pulse: 60   Resp: 28   Temp:    SpO2: 100%     Post vital signs: stable  Level of consciousness: awake and responds to simple questions  Post-anesthesia pain: pain controlled  Post-anesthesia nausea and vomiting: no  Pulmonary: unassisted, return to baseline  Cardiovascular: stable and blood pressure at baseline  Hydration: adequate  Anesthetic events: no    QCDR Measures:  ASA# 11 - [place]-op Cardiac Arrest: ASA11B - Patient did NOT experience unanticipated cardiac arrest  ASA# 12 - [place]-op Mortality Rate: ASA12B - Patient did NOT die  ASA# 13 - PACU Re-Intubation Rate: ASA13B - Patient did NOT require a new airway mgmt  ASA# 10 - Composite Anes Safety: ASA10A - No serious adverse event    Additional Notes:

## 2021-06-18 NOTE — ANESTHESIA POSTPROCEDURE EVALUATION
Patient: Yaneli Miles  ENDOSCOPIC RETROGRADE CHOLANGIOPANCREATOGRAPHY  Anesthesia type: general    Patient location: Phase II Recovery  Last vitals:   Vitals:    06/08/18 0830   BP: (!) 88/54   Pulse: 70   Resp:    Temp:    SpO2: 100%     Post vital signs: stable  Level of consciousness: awake and responds to simple questions  Post-anesthesia pain: pain controlled  Post-anesthesia nausea and vomiting: no  Pulmonary: unassisted, return to baseline  Cardiovascular: stable and blood pressure at baseline  Hydration: adequate  Anesthetic events: no    QCDR Measures:  ASA# 11 - [place]-op Cardiac Arrest: ASA11B - Patient did NOT experience unanticipated cardiac arrest  ASA# 12 - [place]-op Mortality Rate: ASA12B - Patient did NOT die  ASA# 13 - PACU Re-Intubation Rate: NA - No ETT / LMA used for case  ASA# 10 - Composite Anes Safety: ASA10A - No serious adverse event    Additional Notes:

## 2021-07-03 NOTE — ADDENDUM NOTE
Addendum Note by Cuba Smyth CRNA at 5/25/2018  6:17 AM     Author: Cuba Smyth CRNA Service: -- Author Type: Nurse Anesthetist    Filed: 5/25/2018  6:17 AM Date of Service: 5/25/2018  6:17 AM Status: Signed    : Cuba Smyth CRNA (Nurse Anesthetist)       Addendum  created 05/25/18 0617 by Cuba Smyth CRNA    Anesthesia Intra LDAs edited, LDA properties accepted

## 2021-07-14 PROBLEM — K92.2 ACUTE UPPER GI BLEED: Status: RESOLVED | Noted: 2018-01-01 | Resolved: 2018-01-01

## 2021-07-14 PROBLEM — K92.2 GI BLEED: Status: RESOLVED | Noted: 2018-01-01 | Resolved: 2018-01-01

## 2021-07-29 NOTE — PROGRESS NOTES
Pt tachypneac RR 30s, BPs 70s/40s, HR 120s, placed on 10 L oxyplus mask by RT. . Blood gas, lactic acid, CBC, and BMP drawn. K cocktail initiated. EKG done, shows sinus tach. D10 discontinued per TF orders. Liver US done. Abdomen distended. NGT irrigated x4, 250 ml thick, brown, output. CAROL x2 w/ tea colored output. NJ clamped. Second PIV placed. Transfer orders to  placed. Report given to MISAEL Colvin.  and wife at bedside. All belongings and medications sent w/ pt. Pt transferred to  w/ float RN.   moderate risk/yes

## 2022-11-15 NOTE — PLAN OF CARE
Huang Lamb has called back and rescheduled to the [facility] office due to location  He asked to be scheduled there due to distance and ok with a NP or CRNP  I have rescheduled with Yosi Hiss on 01/23/23 at 3 pm, [facility], ALL office  Is this ok?     I thank you in advance,     Yoko Galaviz Problem: Patient Care Overview  Goal: Plan of Care/Patient Progress Review  OT: Per discussion w/ RN, pt nonresponsive to stimuli and only coughing at this time. OT will cx and reschedule for 6/25/18

## 2024-03-12 NOTE — PLAN OF CARE
Problem: Patient Care Overview  Goal: Plan of Care/Patient Progress Review    /77 (BP Location: Left arm)  Pulse 115  Temp 99.4  F (37.4  C) (Oral)  Resp 16  Wt 61.7 kg (136 lb)  SpO2 95%  BMI 21.95 kg/m2     1334-2177  Tmax 99.9, tachycardic 110s; OVSS on RA. A/Ox4, wife at bedside overnight. No s/s of distress; cooperative with cares. Up x1 assist, though pt did not get up this shift--needs reinforcement. Incontinent of urine/stool x2, large BM with linen change. Denied n/v--poor appetite on DD3 diet; cycled TF thru [place] from 8p-8am @60ml/hr and pt tolerating well. Pt given humulin N insulin with start of TF tonight. AC/HS BG; bedtime  and 2am  (see previous note). C/o abdominal pain--given PRN IV dilaudid x1 and PRN oxy x1 with relief; pt then able to sleep most of shift. RJP and LJP patent with moderate output; RIJ x3, SL, dressing reinforced (pt declined dressing change in the middle of the night). RPIV TKO for abx therapy. Pt slept most of shift; resting quietly now, call light and belongings within reach. Will continue to monitor and continue POC/notify team as needed.        [Negative] : Heme/Lymph

## 2024-09-05 NOTE — PLAN OF CARE
Problem: Patient Care Overview  Goal: Plan of Care/Patient Progress Review  Discharge Planner PT   Patient plan for discharge: Rehab  Current status: Heavy assist x 2-3 people to place sling and utilized ceiling lift bed>chair. Very weak but seemingly happy to be OOB. Worked on LE strengthening/stretching in chair.  Barriers to return to prior living situation: Medical needs, deconditioning, fall risk  Recommendations for discharge: TCU once LTACH goals met; may be [facility]C appropriate due to extensive needs and pt highly motivated.  Rationale for recommendations: Current level of function       Entered by: Antonio Leonardo 08/21/2018 1:02 PM            none

## 2024-11-22 NOTE — Clinical Note
Patient being admitted for transplant but a different donor. Pako preferred this donor instead--older but smaller. 
no stemi

## (undated) DEVICE — DRAIN JACKSON PRATT ROUND SIL 19FR W/TROCAR LF JP-2232

## (undated) DEVICE — STPL ENDO ARTICULATING 60MM EC60A

## (undated) DEVICE — SURGICEL ABSORBABLE HEMOSTAT SNOW 4"X4" 2083

## (undated) DEVICE — TUBING SUCTION 10'X3/16" N510

## (undated) DEVICE — ESU PENCIL W/COATED BLADE E2450H

## (undated) DEVICE — SU SILK 0 SH 30" K834H

## (undated) DEVICE — DRAPE SLUSH/WARMER 66X44" ORS-320

## (undated) DEVICE — PREP BRUSH SURG SCRUB PROVIDONE IODINE 9% 20ML

## (undated) DEVICE — DRSG TELFA 3X8" 1238

## (undated) DEVICE — RAD RX LIPIODOL 48% (10ML) LP-AMP-10 CHARGE PER ML

## (undated) DEVICE — Device

## (undated) DEVICE — SU SILK 3-0 TIE 12X30" A304H

## (undated) DEVICE — PAD CHUX UNDERPAD 23X24" 7136

## (undated) DEVICE — CATH TRAY FOLEY SURESTEP 16FR W/TMP PRB STLK LATEX A319416AM

## (undated) DEVICE — SOL WATER IRRIG 1000ML BOTTLE 2F7114

## (undated) DEVICE — SU PROLENE 7-0 BV-1DA 30" 8703H

## (undated) DEVICE — STPL REMOVER SKIN DISP 150462

## (undated) DEVICE — ENDO NDL BALL TIP ULTRASOUND 22GA 5.2FR ECHO-3-22

## (undated) DEVICE — PREP SKIN SCRUB TRAY 4461A

## (undated) DEVICE — SUCTION MANIFOLD DORNOCH ULTRA CART UL-CL500

## (undated) DEVICE — NDL BLUNT 18GA 1" W/O FILTER 305181

## (undated) DEVICE — SU PROLENE 0 CTX 30" 8454H

## (undated) DEVICE — PITCHER STERILE 1000ML  SSK9004A

## (undated) DEVICE — SYR 30ML LL W/O NDL 302832

## (undated) DEVICE — SU PROLENE 1 CTX 30" 8455H

## (undated) DEVICE — STPL 80 X 3.8MM GIA8038S

## (undated) DEVICE — SYR BULB IRRIG DOVER 60 ML LATEX FREE 67000

## (undated) DEVICE — SU PROLENE 3-0 SHDA 48" 8534H

## (undated) DEVICE — DRAPE IOBAN C-SECTION W/POUCH 30X35" 6657

## (undated) DEVICE — SU ETHILON 2-0 FS 18" 664H

## (undated) DEVICE — SUCTION TIP YANKAUER VIAGUARD W/SLEEVE SMP-2006-001SS

## (undated) DEVICE — TUBE FEEDING 08FR 20" 461701

## (undated) DEVICE — SYR 50ML CATH TIP W/O NDL 309620

## (undated) DEVICE — TEST TUBE W/SCREW CAP 17361

## (undated) DEVICE — DRSG WOUND VAC GRANUFOAM LG SILVER M8275099/5

## (undated) DEVICE — APPLICATOR COTTON TIP 6"X2 STERILE LF 6012

## (undated) DEVICE — PACK ENDOSCOPY GI CUSTOM UMMC

## (undated) DEVICE — CLIP HORIZON LG ORANGE 004200

## (undated) DEVICE — NDL COUNTER 20CT 31142493

## (undated) DEVICE — SYR BULB IRRIG 50ML LATEX FREE 0035280

## (undated) DEVICE — SOL DEXTROSE 5% 500ML BAG 2B0063Q

## (undated) DEVICE — ESU LIGASURE IMPACT OPEN SEALER/DVDR CVD LG JAW LF4418

## (undated) DEVICE — DRAPE IOBAN INCISE 23X17" 6650EZ

## (undated) DEVICE — TAPE CLOTH 3" CARDINAL 3TRCL03

## (undated) DEVICE — NDL BLUNT 18GA 1.5" FILTER 305211

## (undated) DEVICE — GLOVE PROTEXIS MICRO 7.5  2D73PM75

## (undated) DEVICE — DRSG GAUZE 4X4" TRAY 6939

## (undated) DEVICE — SU SILK 2-0 SH 30" K833H

## (undated) DEVICE — SU SILK 3-0 SH 30" K832H

## (undated) DEVICE — SU PDS II 4-0 SH 27" Z315H

## (undated) DEVICE — HYDROGEN PEROXIDE 3% 16OZ D0012

## (undated) DEVICE — RETR VISCERA FISH

## (undated) DEVICE — SPONGE RAY-TEC 4X8" 7318

## (undated) DEVICE — SU PDS II 6-0 RB-2DA 30" Z149H

## (undated) DEVICE — LINEN TOWEL PACK X6 WHITE 5487

## (undated) DEVICE — NDL BX TRU CUT 14GA 4.5" 2N2702X

## (undated) DEVICE — SU VICRYL 2-0 CT-1 27" UND J259H

## (undated) DEVICE — ESU GROUND PAD THERMOGUARD PLUS ABC PEDS 7-382

## (undated) DEVICE — DRSG TEGADERM 8X12" 1629

## (undated) DEVICE — ENDO NDL BALL TIP ULTRASOUND 19GA ECHO-19

## (undated) DEVICE — SU VICRYL 3-0 SH 27" J316H

## (undated) DEVICE — SU PROLENE 3-0 SHDA 36" 8522H

## (undated) DEVICE — TUBE CULTURE AEROBIC/ANAEROBIC W/O SWABS A.C.T.I.1. 12401

## (undated) DEVICE — SU SILK 1 TIE 6X30" A307H

## (undated) DEVICE — DRAPE IOBAN INCISE 13X13" 6640EZ

## (undated) DEVICE — STPL 60 X 3.8MM GIA6038S

## (undated) DEVICE — SU SILK 3-0 SH CR 8X18" C013D

## (undated) DEVICE — LINEN TOWEL PACK X5 5464

## (undated) DEVICE — STPL ENDO RELOAD TA 30X2.5MM 010911L

## (undated) DEVICE — SU PROLENE 6-0 RB-2DA 30" 8711H

## (undated) DEVICE — SU SILK 2-0 SH CR 5X18" C0125

## (undated) DEVICE — SU ETHILON 3-0 PS-1 18" 1663H

## (undated) DEVICE — SU SILK 4-0 TIE 12X30" A303H

## (undated) DEVICE — CLIP HORIZON MED BLUE 002200

## (undated) DEVICE — SU PROLENE 5-0 RB-1DA 36"  8556H

## (undated) DEVICE — STPL ENDO TA30 2.5MM MULTIFIRE 010901

## (undated) DEVICE — SYR 03ML LL W/O NDL 309657

## (undated) DEVICE — STPL SKIN 35W 059037

## (undated) DEVICE — LINEN GOWN XLG 5407

## (undated) DEVICE — DRSG MEDIPORE 3 1/2X13 3/4" 3573

## (undated) DEVICE — SUCTION TIP YANKAUER STR K87

## (undated) DEVICE — TUBE CULTURE ANAEROBIC A.C.T.I. 12401

## (undated) DEVICE — HISTOACRYL

## (undated) DEVICE — SPONGE LAP 18X18" X8435

## (undated) DEVICE — SOL NACL 0.9% IRRIG 3000ML BAG 07972-08

## (undated) DEVICE — SU PROLENE 2-0 SHDA 36" 8523H

## (undated) DEVICE — DRSG ABTHERA NEGATIVE PRESSURE WOUND 370605

## (undated) DEVICE — SOL NACL 0.9% IRRIG 1000ML BOTTLE 2F7124

## (undated) DEVICE — SU SILK 0 TIE 6X30" A306H

## (undated) DEVICE — STPL RELOAD 80 X 3.8MM GIA8038L

## (undated) DEVICE — PREP POVIDONE IODINE SOLUTION 10% 120ML

## (undated) DEVICE — CLIP HORIZON SM RED WIDE SLOT 001201

## (undated) DEVICE — DEFIB PRO-PADZ LVP LQD GEL ADULT 8900-2105-01

## (undated) DEVICE — PREP POVIDONE IODINE SCRUB 7.5% 120ML

## (undated) DEVICE — DRAIN JACKSON PRATT RESERVOIR 400ML SU130-1000

## (undated) DEVICE — ESU ELEC BLADE 6" COATED E1450-6

## (undated) DEVICE — SYR 10ML LL W/O NDL 302995

## (undated) DEVICE — WIPES FOLEY CARE SURESTEP PROVON DFC100

## (undated) DEVICE — DRSG PRIMAPORE 02X3" 7133

## (undated) DEVICE — KIT ENDO FIRST STEP DISINFECTANT 200ML W/POUCH EP-4

## (undated) DEVICE — LINEN TOWEL PACK X30 5481

## (undated) DEVICE — SU PROLENE 0 CT-1 30" 8424H

## (undated) DEVICE — SYR 01ML 27GA 0.5" NDL TBC 309623

## (undated) DEVICE — SU PROLENE 3-0 MH 36" 8842H

## (undated) DEVICE — DRAPE MAYO STAND 23X54 8337

## (undated) DEVICE — ENDO TUBING CO2 SMARTCAP STERILE DISP 100145CO2EXT

## (undated) DEVICE — PACK SET-UP STD 9102

## (undated) DEVICE — ESU GROUND PAD ADULT W/CORD E7507

## (undated) DEVICE — INSERT FOGARTY 61MM TRACTION HYDRAJAW HYDRA61

## (undated) DEVICE — CANISTER WOUND VAC W/GEL 1000ML M8275093/5

## (undated) DEVICE — SUTURE BOOTS 051003PBX

## (undated) DEVICE — SU PDS II 1 CTX 36" Z371T

## (undated) DEVICE — SU PROLENE 5-0 RB-2DA 30" 8710H

## (undated) DEVICE — PREP CHLORAPREP 26ML TINTED ORANGE  260815

## (undated) DEVICE — SU SILK 2-0 TIE 12X30" A305H

## (undated) DEVICE — SU PDS II 1 TP-1 54" Z879G

## (undated) DEVICE — ENDO BITE BLOCK ADULT OMNI-BLOC

## (undated) DEVICE — SU PROLENE 4-0 SHDA 36" 8521H

## (undated) DEVICE — SYR 01ML MEDALLION SALINE BLUE MSS011-LB

## (undated) DEVICE — DRSG KERLIX 4 1/2"X4YDS ROLL 6715

## (undated) DEVICE — SU PROLENE 4-0 RB-1DA 36" 8557H

## (undated) DEVICE — SUCTION TIP YANKAUER W/O VENT K86

## (undated) DEVICE — DRSG TELFA 3"X8" NON25720

## (undated) DEVICE — PREP POVIDONE IODINE SOLUTION 10% 4OZ

## (undated) DEVICE — DRAPE SHEET REV FOLD 3/4 9349

## (undated) DEVICE — ESU HANDPIECE ABC BEND-A-BEAM 6" 134006

## (undated) DEVICE — SUCTION TIP POOLE K770

## (undated) DEVICE — STPL RELOAD 60 X 3.8MM GIA6038L

## (undated) DEVICE — TUBE CULTURE W/SCREW CAP STERILE INSIDE 222-2089-05I

## (undated) DEVICE — TUBING PRESSURE W/MALE TO FEMALE CONNECTOR 24" 50P124

## (undated) DEVICE — TUBING IRRIG CYSTO/BLADDER SET 81" LF 2C4040

## (undated) DEVICE — KIT CONNECTOR FOR OLYMPUS ENDOSCOPES DEFENDO 100310

## (undated) DEVICE — PREP POVIDONE IODINE SCRUB 7.5% 4OZ APL82212

## (undated) RX ORDER — LIDOCAINE HYDROCHLORIDE 10 MG/ML
INJECTION, SOLUTION EPIDURAL; INFILTRATION; INTRACAUDAL; PERINEURAL
Status: DISPENSED
Start: 2018-01-01

## (undated) RX ORDER — LABETALOL HYDROCHLORIDE 5 MG/ML
INJECTION, SOLUTION INTRAVENOUS
Status: DISPENSED
Start: 2018-01-01

## (undated) RX ORDER — METOPROLOL TARTRATE 1 MG/ML
INJECTION, SOLUTION INTRAVENOUS
Status: DISPENSED
Start: 2018-01-01

## (undated) RX ORDER — PHENYLEPHRINE HCL IN 0.9% NACL 1 MG/10 ML
SYRINGE (ML) INTRAVENOUS
Status: DISPENSED
Start: 2018-01-01

## (undated) RX ORDER — ALBUMIN, HUMAN INJ 5% 5 %
SOLUTION INTRAVENOUS
Status: DISPENSED
Start: 2018-01-01

## (undated) RX ORDER — GLYCOPYRROLATE 0.2 MG/ML
INJECTION, SOLUTION INTRAMUSCULAR; INTRAVENOUS
Status: DISPENSED
Start: 2018-01-01

## (undated) RX ORDER — HEPARIN SODIUM 1000 [USP'U]/ML
INJECTION, SOLUTION INTRAVENOUS; SUBCUTANEOUS
Status: DISPENSED
Start: 2018-01-01

## (undated) RX ORDER — FENTANYL CITRATE 50 UG/ML
INJECTION, SOLUTION INTRAMUSCULAR; INTRAVENOUS
Status: DISPENSED
Start: 2018-01-01

## (undated) RX ORDER — PROPOFOL 10 MG/ML
INJECTION, EMULSION INTRAVENOUS
Status: DISPENSED
Start: 2018-01-01

## (undated) RX ORDER — SIMETHICONE 20 MG/.3ML
EMULSION ORAL
Status: DISPENSED
Start: 2018-01-01

## (undated) RX ORDER — DOBUTAMINE HYDROCHLORIDE 200 MG/100ML
INJECTION INTRAVENOUS
Status: DISPENSED
Start: 2018-01-01

## (undated) RX ORDER — ALBUTEROL SULFATE 90 UG/1
AEROSOL, METERED RESPIRATORY (INHALATION)
Status: DISPENSED
Start: 2018-01-01

## (undated) RX ORDER — ONDANSETRON 2 MG/ML
INJECTION INTRAMUSCULAR; INTRAVENOUS
Status: DISPENSED
Start: 2018-01-01

## (undated) RX ORDER — HYDROMORPHONE HYDROCHLORIDE 1 MG/ML
INJECTION, SOLUTION INTRAMUSCULAR; INTRAVENOUS; SUBCUTANEOUS
Status: DISPENSED
Start: 2018-01-01

## (undated) RX ORDER — ALBUMIN (HUMAN) 12.5 G/50ML
SOLUTION INTRAVENOUS
Status: DISPENSED
Start: 2018-01-01

## (undated) RX ORDER — LIDOCAINE HYDROCHLORIDE 20 MG/ML
INJECTION, SOLUTION EPIDURAL; INFILTRATION; INTRACAUDAL; PERINEURAL
Status: DISPENSED
Start: 2018-01-01

## (undated) RX ORDER — CALCIUM CHLORIDE 100 MG/ML
INJECTION INTRAVENOUS; INTRAVENTRICULAR
Status: DISPENSED
Start: 2018-01-01

## (undated) RX ORDER — DEXAMETHASONE SODIUM PHOSPHATE 4 MG/ML
INJECTION, SOLUTION INTRA-ARTICULAR; INTRALESIONAL; INTRAMUSCULAR; INTRAVENOUS; SOFT TISSUE
Status: DISPENSED
Start: 2018-01-01

## (undated) RX ORDER — EPHEDRINE SULFATE 50 MG/ML
INJECTION, SOLUTION INTRAMUSCULAR; INTRAVENOUS; SUBCUTANEOUS
Status: DISPENSED
Start: 2018-01-01